# Patient Record
Sex: MALE | Race: WHITE | Employment: FULL TIME | URBAN - METROPOLITAN AREA
[De-identification: names, ages, dates, MRNs, and addresses within clinical notes are randomized per-mention and may not be internally consistent; named-entity substitution may affect disease eponyms.]

---

## 2017-02-07 DIAGNOSIS — I77.82 ANCA-ASSOCIATED VASCULITIS (H): ICD-10-CM

## 2017-02-07 LAB
ALBUMIN SERPL-MCNC: 3.8 G/DL (ref 3.4–5)
ALBUMIN UR-MCNC: NEGATIVE MG/DL
ALT SERPL W P-5'-P-CCNC: 23 U/L (ref 0–70)
APPEARANCE UR: CLEAR
AST SERPL W P-5'-P-CCNC: 23 U/L (ref 0–45)
BASOPHILS # BLD AUTO: 0 10E9/L (ref 0–0.2)
BASOPHILS NFR BLD AUTO: 0.5 %
BILIRUB UR QL STRIP: NEGATIVE
COLOR UR AUTO: YELLOW
CREAT SERPL-MCNC: 1.61 MG/DL (ref 0.66–1.25)
CREAT UR-MCNC: 102 MG/DL
CRP SERPL-MCNC: 40.6 MG/L (ref 0–8)
DIFFERENTIAL METHOD BLD: ABNORMAL
EOSINOPHIL # BLD AUTO: 0.5 10E9/L (ref 0–0.7)
EOSINOPHIL NFR BLD AUTO: 6.3 %
ERYTHROCYTE [DISTWIDTH] IN BLOOD BY AUTOMATED COUNT: 13.1 % (ref 10–15)
ERYTHROCYTE [SEDIMENTATION RATE] IN BLOOD BY WESTERGREN METHOD: 12 MM/H (ref 0–20)
GFR SERPL CREATININE-BSD FRML MDRD: 43 ML/MIN/1.7M2
GLUCOSE UR STRIP-MCNC: NEGATIVE MG/DL
HCT VFR BLD AUTO: 40.3 % (ref 40–53)
HGB BLD-MCNC: 13.6 G/DL (ref 13.3–17.7)
HGB UR QL STRIP: ABNORMAL
KETONES UR STRIP-MCNC: NEGATIVE MG/DL
LEUKOCYTE ESTERASE UR QL STRIP: NEGATIVE
LYMPHOCYTES # BLD AUTO: 1.3 10E9/L (ref 0.8–5.3)
LYMPHOCYTES NFR BLD AUTO: 17.3 %
MCH RBC QN AUTO: 32.2 PG (ref 26.5–33)
MCHC RBC AUTO-ENTMCNC: 33.7 G/DL (ref 31.5–36.5)
MCV RBC AUTO: 96 FL (ref 78–100)
MONOCYTES # BLD AUTO: 1.2 10E9/L (ref 0–1.3)
MONOCYTES NFR BLD AUTO: 16.3 %
NEUTROPHILS # BLD AUTO: 4.4 10E9/L (ref 1.6–8.3)
NEUTROPHILS NFR BLD AUTO: 59.6 %
NITRATE UR QL: NEGATIVE
PH UR STRIP: 5.5 PH (ref 5–7)
PLATELET # BLD AUTO: 182 10E9/L (ref 150–450)
PROT UR-MCNC: 0.15 G/L
PROT/CREAT 24H UR: 0.15 G/G CR (ref 0–0.2)
RBC # BLD AUTO: 4.22 10E12/L (ref 4.4–5.9)
RBC #/AREA URNS AUTO: NORMAL /HPF (ref 0–2)
SP GR UR STRIP: 1.02 (ref 1–1.03)
URN SPEC COLLECT METH UR: ABNORMAL
UROBILINOGEN UR STRIP-ACNC: 0.2 EU/DL (ref 0.2–1)
WBC # BLD AUTO: 7.4 10E9/L (ref 4–11)
WBC #/AREA URNS AUTO: NORMAL /HPF (ref 0–2)

## 2017-02-07 PROCEDURE — 83876 ASSAY MYELOPEROXIDASE: CPT | Performed by: INTERNAL MEDICINE

## 2017-02-07 PROCEDURE — 86256 FLUORESCENT ANTIBODY TITER: CPT | Mod: 90 | Performed by: INTERNAL MEDICINE

## 2017-02-07 PROCEDURE — 82040 ASSAY OF SERUM ALBUMIN: CPT | Performed by: INTERNAL MEDICINE

## 2017-02-07 PROCEDURE — 99000 SPECIMEN HANDLING OFFICE-LAB: CPT | Performed by: INTERNAL MEDICINE

## 2017-02-07 PROCEDURE — 85652 RBC SED RATE AUTOMATED: CPT | Performed by: INTERNAL MEDICINE

## 2017-02-07 PROCEDURE — 86255 FLUORESCENT ANTIBODY SCREEN: CPT | Mod: 90 | Performed by: INTERNAL MEDICINE

## 2017-02-07 PROCEDURE — 83516 IMMUNOASSAY NONANTIBODY: CPT | Performed by: INTERNAL MEDICINE

## 2017-02-07 PROCEDURE — 84450 TRANSFERASE (AST) (SGOT): CPT | Performed by: INTERNAL MEDICINE

## 2017-02-07 PROCEDURE — 84460 ALANINE AMINO (ALT) (SGPT): CPT | Performed by: INTERNAL MEDICINE

## 2017-02-07 PROCEDURE — 82565 ASSAY OF CREATININE: CPT | Performed by: INTERNAL MEDICINE

## 2017-02-07 PROCEDURE — 85025 COMPLETE CBC W/AUTO DIFF WBC: CPT | Performed by: INTERNAL MEDICINE

## 2017-02-07 PROCEDURE — 36415 COLL VENOUS BLD VENIPUNCTURE: CPT | Performed by: INTERNAL MEDICINE

## 2017-02-07 PROCEDURE — 81001 URINALYSIS AUTO W/SCOPE: CPT | Performed by: INTERNAL MEDICINE

## 2017-02-07 PROCEDURE — 84156 ASSAY OF PROTEIN URINE: CPT | Performed by: INTERNAL MEDICINE

## 2017-02-07 PROCEDURE — 86140 C-REACTIVE PROTEIN: CPT | Performed by: INTERNAL MEDICINE

## 2017-02-08 LAB
MYELOPEROXIDASE AB SER-ACNC: NORMAL AI (ref 0–0.9)
PROTEINASE3 IGG SER-ACNC: 0.9 AI (ref 0–0.9)

## 2017-02-09 LAB — ANCA IGG TITR SER IF: ABNORMAL {TITER}

## 2017-02-10 NOTE — PROGRESS NOTES
Quick Note:    Stable labs except high CRP (inflammation marker) and slightly worsened kidney function, suspect to be due to infection as your vasculitis markers look really good and even better than before. Did you have an infection at the time of blood draw?  ______

## 2017-02-13 ENCOUNTER — TELEPHONE (OUTPATIENT)
Dept: RHEUMATOLOGY | Facility: CLINIC | Age: 72
End: 2017-02-13

## 2017-02-13 NOTE — TELEPHONE ENCOUNTER
Ede Sanchez MD at 2/9/2017 11:35 PM      Status: Signed           Quick Note:    Stable labs except high CRP (inflammation marker) and slightly worsened kidney function, suspect to be due to infection as your vasculitis markers look really good and even better than before. Did you have an infection at the time of blood draw?           Left message requesting return call.    Consuelo Canales RN  [facility]

## 2017-02-13 NOTE — TELEPHONE ENCOUNTER
Talked with ambrocio, discussed that pt has been running a fever, and has been sick for about a week and a half right now, lots of sinus drainage and coughing. Fever broke yesterday, but does feel tired today.   Was taking tylenol for the fever.  No other cold medicine.  Discussed that he should still get checked out since he was running a fever, just to make sure he doesn't have something more serious than a cold.    She will do her best to get the pt in and looked at.  Will call back if pt needs antibiotics or if he starts running a fever again.    Consuelo Canales RN  [facility]

## 2017-02-14 ENCOUNTER — APPOINTMENT (OUTPATIENT)
Dept: GENERAL RADIOLOGY | Facility: CLINIC | Age: 72
End: 2017-02-14
Attending: EMERGENCY MEDICINE
Payer: COMMERCIAL

## 2017-02-14 ENCOUNTER — HOSPITAL ENCOUNTER (EMERGENCY)
Facility: CLINIC | Age: 72
Discharge: HOME | End: 2017-02-14
Attending: EMERGENCY MEDICINE | Admitting: EMERGENCY MEDICINE
Payer: COMMERCIAL

## 2017-02-14 VITALS
RESPIRATION RATE: 18 BRPM | OXYGEN SATURATION: 99 % | DIASTOLIC BLOOD PRESSURE: 77 MMHG | SYSTOLIC BLOOD PRESSURE: 148 MMHG | TEMPERATURE: 98.6 F

## 2017-02-14 DIAGNOSIS — J01.90 ACUTE SINUSITIS WITH SYMPTOMS > 10 DAYS: ICD-10-CM

## 2017-02-14 DIAGNOSIS — J18.9 PNEUMONIA DUE TO INFECTIOUS ORGANISM, UNSPECIFIED LATERALITY, UNSPECIFIED PART OF LUNG: ICD-10-CM

## 2017-02-14 LAB
ALBUMIN SERPL-MCNC: 2.9 G/DL (ref 3.4–5)
ALP SERPL-CCNC: 121 U/L (ref 40–150)
ALT SERPL W P-5'-P-CCNC: 47 U/L (ref 0–70)
ANION GAP SERPL CALCULATED.3IONS-SCNC: 8 MMOL/L (ref 3–14)
AST SERPL W P-5'-P-CCNC: 35 U/L (ref 0–45)
BASOPHILS # BLD AUTO: 0.1 10E9/L (ref 0–0.2)
BASOPHILS NFR BLD AUTO: 0.6 %
BILIRUB SERPL-MCNC: 0.6 MG/DL (ref 0.2–1.3)
BUN SERPL-MCNC: 32 MG/DL (ref 7–30)
CALCIUM SERPL-MCNC: 9.2 MG/DL (ref 8.5–10.1)
CHLORIDE SERPL-SCNC: 103 MMOL/L (ref 94–109)
CO2 SERPL-SCNC: 25 MMOL/L (ref 20–32)
CREAT SERPL-MCNC: 1.46 MG/DL (ref 0.66–1.25)
DIFFERENTIAL METHOD BLD: ABNORMAL
EOSINOPHIL # BLD AUTO: 0.3 10E9/L (ref 0–0.7)
EOSINOPHIL NFR BLD AUTO: 3.4 %
ERYTHROCYTE [DISTWIDTH] IN BLOOD BY AUTOMATED COUNT: 12.7 % (ref 10–15)
FLUAV+FLUBV AG SPEC QL: NORMAL
FLUAV+FLUBV AG SPEC QL: NORMAL
GFR SERPL CREATININE-BSD FRML MDRD: 48 ML/MIN/1.7M2
GLUCOSE SERPL-MCNC: 91 MG/DL (ref 70–99)
HCT VFR BLD AUTO: 34.1 % (ref 40–53)
HGB BLD-MCNC: 11.5 G/DL (ref 13.3–17.7)
IMM GRANULOCYTES # BLD: 0.1 10E9/L (ref 0–0.4)
IMM GRANULOCYTES NFR BLD: 0.6 %
LACTATE BLD-SCNC: 0.8 MMOL/L (ref 0.7–2.1)
LYMPHOCYTES # BLD AUTO: 1.2 10E9/L (ref 0.8–5.3)
LYMPHOCYTES NFR BLD AUTO: 15.1 %
MCH RBC QN AUTO: 31.5 PG (ref 26.5–33)
MCHC RBC AUTO-ENTMCNC: 33.7 G/DL (ref 31.5–36.5)
MCV RBC AUTO: 93 FL (ref 78–100)
MONOCYTES # BLD AUTO: 0.9 10E9/L (ref 0–1.3)
MONOCYTES NFR BLD AUTO: 11.9 %
NEUTROPHILS # BLD AUTO: 5.3 10E9/L (ref 1.6–8.3)
NEUTROPHILS NFR BLD AUTO: 68.4 %
PLATELET # BLD AUTO: 351 10E9/L (ref 150–450)
POTASSIUM SERPL-SCNC: 4.6 MMOL/L (ref 3.4–5.3)
PROT SERPL-MCNC: 6.9 G/DL (ref 6.8–8.8)
RBC # BLD AUTO: 3.65 10E12/L (ref 4.4–5.9)
SODIUM SERPL-SCNC: 136 MMOL/L (ref 133–144)
SPECIMEN SOURCE: NORMAL
TROPONIN I SERPL-MCNC: NORMAL UG/L (ref 0–0.04)
WBC # BLD AUTO: 7.8 10E9/L (ref 4–11)

## 2017-02-14 PROCEDURE — 85025 COMPLETE CBC W/AUTO DIFF WBC: CPT | Performed by: EMERGENCY MEDICINE

## 2017-02-14 PROCEDURE — 25000132 ZZH RX MED GY IP 250 OP 250 PS 637: Performed by: EMERGENCY MEDICINE

## 2017-02-14 PROCEDURE — 99284 EMERGENCY DEPT VISIT MOD MDM: CPT | Mod: 25

## 2017-02-14 PROCEDURE — 84484 ASSAY OF TROPONIN QUANT: CPT | Performed by: EMERGENCY MEDICINE

## 2017-02-14 PROCEDURE — 80053 COMPREHEN METABOLIC PANEL: CPT | Performed by: EMERGENCY MEDICINE

## 2017-02-14 PROCEDURE — 83605 ASSAY OF LACTIC ACID: CPT | Performed by: EMERGENCY MEDICINE

## 2017-02-14 PROCEDURE — 25800025 ZZH RX 258: Performed by: EMERGENCY MEDICINE

## 2017-02-14 PROCEDURE — 99284 EMERGENCY DEPT VISIT MOD MDM: CPT | Performed by: EMERGENCY MEDICINE

## 2017-02-14 PROCEDURE — 96361 HYDRATE IV INFUSION ADD-ON: CPT

## 2017-02-14 PROCEDURE — 71020 XR CHEST 2 VW: CPT

## 2017-02-14 PROCEDURE — 87804 INFLUENZA ASSAY W/OPTIC: CPT | Performed by: EMERGENCY MEDICINE

## 2017-02-14 PROCEDURE — 25000128 H RX IP 250 OP 636: Performed by: EMERGENCY MEDICINE

## 2017-02-14 PROCEDURE — 96365 THER/PROPH/DIAG IV INF INIT: CPT

## 2017-02-14 RX ORDER — CEFTRIAXONE 1 G/1
1 INJECTION, POWDER, FOR SOLUTION INTRAMUSCULAR; INTRAVENOUS ONCE
Status: COMPLETED | OUTPATIENT
Start: 2017-02-14 | End: 2017-02-14

## 2017-02-14 RX ORDER — SODIUM CHLORIDE, SODIUM LACTATE, POTASSIUM CHLORIDE, CALCIUM CHLORIDE 600; 310; 30; 20 MG/100ML; MG/100ML; MG/100ML; MG/100ML
1000 INJECTION, SOLUTION INTRAVENOUS CONTINUOUS
Status: DISCONTINUED | OUTPATIENT
Start: 2017-02-14 | End: 2017-02-14 | Stop reason: HOSPADM

## 2017-02-14 RX ORDER — AZITHROMYCIN 250 MG/1
500 TABLET, FILM COATED ORAL ONCE
Status: COMPLETED | OUTPATIENT
Start: 2017-02-14 | End: 2017-02-14

## 2017-02-14 RX ORDER — AZITHROMYCIN 250 MG/1
250 TABLET, FILM COATED ORAL DAILY
Qty: 4 TABLET | Refills: 0 | Status: SHIPPED | OUTPATIENT
Start: 2017-02-14 | End: 2017-02-18

## 2017-02-14 RX ADMIN — AZITHROMYCIN 500 MG: 250 TABLET, FILM COATED ORAL at 18:05

## 2017-02-14 RX ADMIN — CEFTRIAXONE 1 G: 1 INJECTION, POWDER, FOR SOLUTION INTRAMUSCULAR; INTRAVENOUS at 18:05

## 2017-02-14 RX ADMIN — SODIUM CHLORIDE, POTASSIUM CHLORIDE, SODIUM LACTATE AND CALCIUM CHLORIDE 1000 ML: 600; 310; 30; 20 INJECTION, SOLUTION INTRAVENOUS at 17:00

## 2017-02-14 RX ADMIN — SODIUM CHLORIDE 1000 ML: 9 INJECTION, SOLUTION INTRAVENOUS at 18:04

## 2017-02-14 NOTE — ED NOTES
patient presents to Ed with complaint of URi symptoms. Has hx of wegners disease. Pt called primary MD and was asked to be evaluated in ED. Reports, cough, sinus congestion, and intermittent fever for 2 weeks.

## 2017-02-14 NOTE — ED PROVIDER NOTES
Chief complaint fever congestion    71-year-old male chronic methotrexate for Wegener's vasculitis presents with upper respiratory congestion, green nasal drainage, cough productive of yellowish sputum, intermittent chills and sweats, no measured temperature at home.  Frontal headache, denies stiff neck.  Denies shortness of air.  No nausea or vomiting, 1 episode diarrhea last week.  Symptoms waxing and waning for the past 2 weeks.  Urinating 3 times daily, nocturia ×3, which is baseline.  Continues to take fluids, anorectic.  Denies chest pain.    Past medical history, medications, allergies, social history, family history all reviewed.  Patient Active Problem List   Diagnosis     Tobacco abuse     CARDIOVASCULAR SCREENING; LDL GOAL LESS THAN 160     Melanoma (H)     Idiopathic progressive polyneuropathy     Advanced directives, counseling/discussion     GERD (gastroesophageal reflux disease)     Hypertension, renal     Anemia     Encounter for long-term (current) use of steroids     Granulomatosis with polyangiitis (H)     CKD (chronic kidney disease) stage 3, GFR 30-59 ml/min     ROS: All other systems reviewed and are negative.    /73  Temp 98.6  F (37  C) (Oral)  SpO2 98%  Nontoxic appearing no respiratory distress alert and oriented ×3  Head atraumatic normocephalic  TMs unremarkable, conjunctiva noninjected, oropharynx moist without lesions or erythema  No cervical adenopathy neck supple full active range of motion  Lungs clear to auscultation  Heart regular no murmur  Abdomen soft nontender bowel sounds positive no masses or HSM  Strength and sensation grossly intact throughout the extremities, gait and station normal  Speech is fluent, good eye contact, thought processes are rational    Results for orders placed or performed during the hospital encounter of 02/14/17   XR Chest 2 Views    Narrative    CHEST TWO VIEWS   2/14/2017 5:30 PM     HISTORY: Cough.    COMPARISON: 4/3/2015.      Impression     IMPRESSION: New mild patchy opacity at the right lung base appears to  localize to the posterior right lower lobe base and could represent a  developing area of pneumonia. Left lung is clear.    JEFFERY BREEN MD   CBC with platelets differential   Result Value Ref Range    WBC 7.8 4.0 - 11.0 10e9/L    RBC Count 3.65 (L) 4.4 - 5.9 10e12/L    Hemoglobin 11.5 (L) 13.3 - 17.7 g/dL    Hematocrit 34.1 (L) 40.0 - 53.0 %    MCV 93 78 - 100 fl    MCH 31.5 26.5 - 33.0 pg    MCHC 33.7 31.5 - 36.5 g/dL    RDW 12.7 10.0 - 15.0 %    Platelet Count 351 150 - 450 10e9/L    Diff Method Automated Method     % Neutrophils 68.4 %    % Lymphocytes 15.1 %    % Monocytes 11.9 %    % Eosinophils 3.4 %    % Basophils 0.6 %    % Immature Granulocytes 0.6 %    Absolute Neutrophil 5.3 1.6 - 8.3 10e9/L    Absolute Lymphocytes 1.2 0.8 - 5.3 10e9/L    Absolute Monocytes 0.9 0.0 - 1.3 10e9/L    Absolute Eosinophils 0.3 0.0 - 0.7 10e9/L    Absolute Basophils 0.1 0.0 - 0.2 10e9/L    Abs Immature Granulocytes 0.1 0 - 0.4 10e9/L   Comprehensive metabolic panel   Result Value Ref Range    Sodium 136 133 - 144 mmol/L    Potassium 4.6 3.4 - 5.3 mmol/L    Chloride 103 94 - 109 mmol/L    Carbon Dioxide 25 20 - 32 mmol/L    Anion Gap 8 3 - 14 mmol/L    Glucose 91 70 - 99 mg/dL    Urea Nitrogen 32 (H) 7 - 30 mg/dL    Creatinine 1.46 (H) 0.66 - 1.25 mg/dL    GFR Estimate 48 (L) >60 mL/min/1.7m2    GFR Estimate If Black 58 (L) >60 mL/min/1.7m2    Calcium 9.2 8.5 - 10.1 mg/dL    Bilirubin Total 0.6 0.2 - 1.3 mg/dL    Albumin 2.9 (L) 3.4 - 5.0 g/dL    Protein Total 6.9 6.8 - 8.8 g/dL    Alkaline Phosphatase 121 40 - 150 U/L    ALT 47 0 - 70 U/L    AST 35 0 - 45 U/L   Lactic acid whole blood   Result Value Ref Range    Lactic Acid 0.8 0.7 - 2.1 mmol/L   Troponin I   Result Value Ref Range    Troponin I ES  0.000 - 0.045 ug/L     <0.015  The 99th percentile for upper reference range is 0.045 ug/L.  Troponin values in   the range of 0.045 - 0.120 ug/L may be  associated with risks of adverse   clinical events.     Influenza A/B antigen   Result Value Ref Range    Influenza A/B Agn Specimen Nasopharyngeal     Influenza A  NEG     Negative   Test results must be correlated with clinical data. If necessary, results   should be confirmed by a molecular assay or viral culture.      Influenza B  NEG     Negative   Test results must be correlated with clinical data. If necessary, results   should be confirmed by a molecular assay or viral culture.           Medications   lactated ringers BOLUS 1,000 mL (0 mLs Intravenous Stopped 2/14/17 1753)   0.9% sodium chloride BOLUS (0 mLs Intravenous Stopped 2/14/17 1837)   cefTRIAXone (ROCEPHIN) 1 g vial to attach to  mL bag for ADULTS or NS 50 mL bag for PEDS (0 g Intravenous Stopped 2/14/17 1837)   azithromycin (ZITHROMAX) tablet 500 mg (500 mg Oral Given 2/14/17 1805)     MDM: 71-year-old male presents with 2 weeks of congestion, cough.  Context of Wegener's vasculitis on methotrexate.  Creatinine up slightly from baseline consistent with dehydration, lactate normal, chest x-ray question new right lower lobe infiltrate versus chronic change secondary to Wegener's, white count normal.  1 g Rocephin and 1st dose of Zithromax administered here.  Finished course Zithromax 250 mg daily ×4 more days.  Should cover sinusitis recently well in addition to possible pneumonia.  Recommend rest, Tylenol, plenty of fluids, return criteria reviewed.    Impression: Sinusitis, pneumonia, Wegener's vasculitis, immunosuppression     Shukri Mcqueen MD  02/16/17 1024

## 2017-02-14 NOTE — ED AVS SNAPSHOT
[facility] Emergency Department    [address] [address]    Phone:  [phone]    Fax:  [phone]                                       Joshua Ennis   MRN: [id number]    Department:  [facility] Emergency Department   Date of Visit:  2/14/2017           After Visit Summary Signature Page     I have received my discharge instructions, and my questions have been answered. I have discussed any challenges I see with this plan with the nurse or doctor.    ..........................................................................................................................................  Patient/Patient Representative Signature      ..........................................................................................................................................  Patient Representative Print Name and Relationship to Patient    ..................................................               ................................................  Date                                            Time    ..........................................................................................................................................  Reviewed by Signature/Title    ...................................................              ..............................................  Date                                                            Time

## 2017-02-14 NOTE — ED AVS SNAPSHOT
[facility] Emergency Department    [address] [facility] [address]    Phone:  [phone]    Fax:  [phone]                                       Joshua Ennis   MRN: [id number]    Department:  [facility] Emergency Department   Date of Visit:  2/14/2017           Patient Information     Date Of Birth          1945        Your diagnoses for this visit were:     Acute sinusitis with symptoms > 10 days     Pneumonia due to infectious organism, unspecified laterality, unspecified part of lung        You were seen by Shukri Mcqueen MD.      Follow-up Information     Follow up with Unknown, Provider.        Discharge Instructions         Acute Bacterial Rhinosinusitis (ABRS)  Acute bacterial rhinosinusitis (ABRS) is an infection of your nasal cavity and sinuses. It s caused by bacteria. Acute means that you ve had symptoms for less than 12 weeks.  Understanding your sinuses  The nasal cavity is the large air-filled space behind your nose. The sinuses are a group of spaces formed by the bones of your face. They connect with your nasal cavity. ABRS causes the tissue lining these spaces to become inflamed. Mucus may not drain normally. This leads to facial pain and other symptoms.  What causes ABRS?  ABRS most often follows an upper respiratory infection caused by a virus. Bacteria then infect the lining of your nasal cavity and sinuses. But you can also get ABRS if you have:    Nasal allergies    Long-term nasal swelling and congestion not caused by allergies    Blockage in the nose  Symptoms of ABRS  The symptoms of ABRS may be different for each person, and can include:    Nasal congestion    Runny nose    Fluid draining from the nose down the throat (postnasal drip)    Headache    Cough    Pain in the sinuses    Thick, colored fluid from the nose (mucus)    Fever  Diagnosing ABRS  ABRS may be diagnosed if you ve had an upper respiratory infection like a cold and cough for longer than 10  to 14 days. Your health care provider will ask about your symptoms and your medical history. The provider will check your vital signs, including your temperature. You ll have a physical exam. The health care provider will check your ears, nose, and throat. You likely won t need any tests. If ABRS comes back, you may have a culture or other tests.  Treatment for ABRS  Treatment may include:    Antibiotic medicine. This is for symptoms that last for at least 10 to 14 days.    Nasal corticosteroid medicine. Drops or spray used in the nose can lessen swelling and congestion.    Over-the-counter pain medicine. This is to lessen sinus pain and pressure.    Nasal decongestant medicine. Spray or drops may help to lessen congestion. Do not use them for more than a few days.    Salt wash (saline irrigation). This can help to loosen mucus.  Possible complications of ABRS  ABRS may come back or become long-term (chronic).  In rare cases, ABRS may cause complications such as:     Inflamed tissue around the brain and spinal cord (meningitis)    Inflamed tissue around the eyes (orbital cellulitis)    Inflamed bones around the sinuses (osteitis)  These problems may need to be treated in a hospital with intravenous (IV) antibiotic medicine or surgery.  When to call the health care provider  Call your health care provider if you have any of the following:    Symptoms that don t get better, or get worse    Symptoms that don t get better after 3 to 5 days on antibiotics    Trouble seeing    Swelling around your eyes    Confusion or trouble staying awake        0536-0273 [employer]. [address] [place] [address]. All rights reserved. This information is not intended as a substitute for professional medical care. Always follow your healthcare professional's instructions.          Future Appointments        Provider Department Dept Phone Center    3/3/2017 11:30 AM Ede Sanchez MD [facility] Rheumatology  [phone] [facility]      24 Hour Appointment Hotline       To make an appointment at any [facility], call [phone]). If you don't have a family doctor or clinic, we will help you find one. [facility] are conveniently located to serve the needs of you and your family.             Review of your medicines      START taking        Dose / Directions Last dose taken    azithromycin 250 MG tablet   Commonly known as:  ZITHROMAX   Dose:  250 mg   Quantity:  4 tablet        Take 1 tablet (250 mg) by mouth daily for 4 days   Refills:  0          Our records show that you are taking the medicines listed below. If these are incorrect, please call your family doctor or clinic.        Dose / Directions Last dose taken    Blood Pressure Monitor Kit   Quantity:  1 kit        Automatic Blood Pressure Monitor   Refills:  0        calcium 600 + D 600-400 MG-UNIT per tablet   Dose:  1 tablet   Generic drug:  calcium-vitamin D        Take 1 tablet by mouth 2 times daily.   Refills:  0        folic acid 1 MG tablet   Commonly known as:  FOLVITE   Dose:  2 mg   Quantity:  180 tablet        Take 2 tablets (2 mg) by mouth daily   Refills:  3        gabapentin 400 MG capsule   Commonly known as:  NEURONTIN   Dose:  400 mg   Quantity:  90 capsule        Take 1 capsule (400 mg) by mouth At Bedtime   Refills:  1        losartan 50 MG tablet   Commonly known as:  COZAAR   Dose:  50 mg   Quantity:  90 tablet        Take 1 tablet (50 mg) by mouth daily   Refills:  3        methotrexate 2.5 MG tablet CHEMO   Dose:  15 mg   Quantity:  90 tablet        Take 6 tablets (15 mg) by mouth once a week   Refills:  1        TYLENOL PO   Dose:  650 mg        Take 650 mg by mouth once as needed for mild pain or fever   Refills:  0        vitamin D 1000 UNITS capsule   Dose:  1 capsule        Take 1 capsule by mouth daily.   Refills:  0                Prescriptions were sent or printed at these locations (1 Prescription)                    [facility] [place], [place] - [address]   [address] [facility] [address]    Telephone:  [phone]   Fax:  [phone]   Hours:                  Printed at Department/Unit printer (1 of 1)         azithromycin (ZITHROMAX) 250 MG tablet                Procedures and tests performed during your visit     CBC with platelets differential    Comprehensive metabolic panel    Influenza A/B antigen    Lactic acid whole blood    Troponin I    XR Chest 2 Views      Orders Needing Specimen Collection     None      Pending Results     Date and Time Order Name Status Description    2/14/2017 1642 XR Chest 2 Views Preliminary             Pending Culture Results     No orders found from 2/12/2017 to 2/15/2017.             Test Results from your hospital stay     2/14/2017  5:29 PM - Interface, Flexilab Results      Component Results     Component Value Ref Range & Units Status    WBC 7.8 4.0 - 11.0 10e9/L Final    RBC Count 3.65 (L) 4.4 - 5.9 10e12/L Final    Hemoglobin 11.5 (L) 13.3 - 17.7 g/dL Final    Hematocrit 34.1 (L) 40.0 - 53.0 % Final    MCV 93 78 - 100 fl Final    MCH 31.5 26.5 - 33.0 pg Final    MCHC 33.7 31.5 - 36.5 g/dL Final    RDW 12.7 10.0 - 15.0 % Final    Platelet Count 351 150 - 450 10e9/L Final    Diff Method Automated Method  Final    % Neutrophils 68.4 % Final    % Lymphocytes 15.1 % Final    % Monocytes 11.9 % Final    % Eosinophils 3.4 % Final    % Basophils 0.6 % Final    % Immature Granulocytes 0.6 % Final    Absolute Neutrophil 5.3 1.6 - 8.3 10e9/L Final    Absolute Lymphocytes 1.2 0.8 - 5.3 10e9/L Final    Absolute Monocytes 0.9 0.0 - 1.3 10e9/L Final    Absolute Eosinophils 0.3 0.0 - 0.7 10e9/L Final    Absolute Basophils 0.1 0.0 - 0.2 10e9/L Final    Abs Immature Granulocytes 0.1 0 - 0.4 10e9/L Final         2/14/2017  5:42 PM - Interface, Flexilab Results      Component Results     Component Value Ref Range & Units Status    Sodium 136 133 - 144 mmol/L Final     Potassium 4.6 3.4 - 5.3 mmol/L Final    Chloride 103 94 - 109 mmol/L Final    Carbon Dioxide 25 20 - 32 mmol/L Final    Anion Gap 8 3 - 14 mmol/L Final    Glucose 91 70 - 99 mg/dL Final    Urea Nitrogen 32 (H) 7 - 30 mg/dL Final    Creatinine 1.46 (H) 0.66 - 1.25 mg/dL Final    GFR Estimate 48 (L) >60 mL/min/1.7m2 Final    Non  GFR Calc    GFR Estimate If Black 58 (L) >60 mL/min/1.7m2 Final    African American GFR Calc    Calcium 9.2 8.5 - 10.1 mg/dL Final    Bilirubin Total 0.6 0.2 - 1.3 mg/dL Final    Albumin 2.9 (L) 3.4 - 5.0 g/dL Final    Protein Total 6.9 6.8 - 8.8 g/dL Final    Alkaline Phosphatase 121 40 - 150 U/L Final    ALT 47 0 - 70 U/L Final    AST 35 0 - 45 U/L Final         2/14/2017  5:13 PM - Interface, Flexilab Results      Component Results     Component Value Ref Range & Units Status    Lactic Acid 0.8 0.7 - 2.1 mmol/L Final         2/14/2017  5:42 PM - Interface, Flexilab Results      Component Results     Component Value Ref Range & Units Status    Troponin I ES  0.000 - 0.045 ug/L Final    <0.015  The 99th percentile for upper reference range is 0.045 ug/L.  Troponin values in   the range of 0.045 - 0.120 ug/L may be associated with risks of adverse   clinical events.           2/14/2017  5:33 PM - Interface, Radiant Ib      Narrative     CHEST TWO VIEWS   2/14/2017 5:30 PM     HISTORY: Cough.    COMPARISON: 4/3/2015.        Impression     IMPRESSION: New mild patchy opacity at the right lung base appears to  localize to the posterior right lower lobe base and could represent a  developing area of pneumonia. Left lung is clear.         2/14/2017  5:38 PM - Interface, Flexilab Results      Component Results     Component Value Ref Range & Units Status    Influenza A/B Agn Specimen Nasopharyngeal  Final    Influenza A  NEG Final    Negative   Test results must be correlated with clinical data. If necessary, results   should be confirmed by a molecular assay or viral culture.    "   Influenza B  NEG Final    Negative   Test results must be correlated with clinical data. If necessary, results   should be confirmed by a molecular assay or viral culture.                  Thank you for choosing [facility]       Thank you for choosing [facility] for your care. Our goal is always to provide you with excellent care. Hearing back from our patients is one way we can continue to improve our services. Please take a few minutes to complete the written survey that you may receive in the mail after you visit with us. Thank you!        [employer] Information     [employer] lets you send messages to your doctor, view your test results, renew your prescriptions, schedule appointments and more. To sign up, go to [email or web address] . Click on \"Log in\" on the left side of the screen, which will take you to the Welcome page. Then click on \"Sign up Now\" on the right side of the page.     You will be asked to enter the access code listed below, as well as some personal information. Please follow the directions to create your username and password.     Your access code is: [email or web address]  Expires: 2017  9:39 AM     Your access code will  in 90 days. If you need help or a new code, please call your [facility] clinic or [phone].        Care EveryWhere ID     This is your Care EveryWhere ID. This could be used by other organizations to access your [facility] medical records  [id number]        After Visit Summary       This is your record. Keep this with you and show to your community pharmacist(s) and doctor(s) at your next visit.                  "

## 2017-02-15 NOTE — DISCHARGE INSTRUCTIONS
Acute Bacterial Rhinosinusitis (ABRS)  Acute bacterial rhinosinusitis (ABRS) is an infection of your nasal cavity and sinuses. It s caused by bacteria. Acute means that you ve had symptoms for less than 12 weeks.  Understanding your sinuses  The nasal cavity is the large air-filled space behind your nose. The sinuses are a group of spaces formed by the bones of your face. They connect with your nasal cavity. ABRS causes the tissue lining these spaces to become inflamed. Mucus may not drain normally. This leads to facial pain and other symptoms.  What causes ABRS?  ABRS most often follows an upper respiratory infection caused by a virus. Bacteria then infect the lining of your nasal cavity and sinuses. But you can also get ABRS if you have:    Nasal allergies    Long-term nasal swelling and congestion not caused by allergies    Blockage in the nose  Symptoms of ABRS  The symptoms of ABRS may be different for each person, and can include:    Nasal congestion    Runny nose    Fluid draining from the nose down the throat (postnasal drip)    Headache    Cough    Pain in the sinuses    Thick, colored fluid from the nose (mucus)    Fever  Diagnosing ABRS  ABRS may be diagnosed if you ve had an upper respiratory infection like a cold and cough for longer than 10 to 14 days. Your health care provider will ask about your symptoms and your medical history. The provider will check your vital signs, including your temperature. You ll have a physical exam. The health care provider will check your ears, nose, and throat. You likely won t need any tests. If ABRS comes back, you may have a culture or other tests.  Treatment for ABRS  Treatment may include:    Antibiotic medicine. This is for symptoms that last for at least 10 to 14 days.    Nasal corticosteroid medicine. Drops or spray used in the nose can lessen swelling and congestion.    Over-the-counter pain medicine. This is to lessen sinus pain and pressure.    Nasal  decongestant medicine. Spray or drops may help to lessen congestion. Do not use them for more than a few days.    Salt wash (saline irrigation). This can help to loosen mucus.  Possible complications of ABRS  ABRS may come back or become long-term (chronic).  In rare cases, ABRS may cause complications such as:     Inflamed tissue around the brain and spinal cord (meningitis)    Inflamed tissue around the eyes (orbital cellulitis)    Inflamed bones around the sinuses (osteitis)  These problems may need to be treated in a hospital with intravenous (IV) antibiotic medicine or surgery.  When to call the health care provider  Call your health care provider if you have any of the following:    Symptoms that don t get better, or get worse    Symptoms that don t get better after 3 to 5 days on antibiotics    Trouble seeing    Swelling around your eyes    Confusion or trouble staying awake        8241-1252 [employer]. [address] [place] [address]. All rights reserved. This information is not intended as a substitute for professional medical care. Always follow your healthcare professional's instructions.

## 2017-02-23 ENCOUNTER — OFFICE VISIT (OUTPATIENT)
Dept: DERMATOLOGY | Facility: CLINIC | Age: 72
End: 2017-02-23
Payer: COMMERCIAL

## 2017-02-23 VITALS — HEART RATE: 75 BPM | DIASTOLIC BLOOD PRESSURE: 64 MMHG | SYSTOLIC BLOOD PRESSURE: 126 MMHG | OXYGEN SATURATION: 98 %

## 2017-02-23 DIAGNOSIS — Z85.89 HISTORY OF SQUAMOUS CELL CARCINOMA: ICD-10-CM

## 2017-02-23 DIAGNOSIS — Z85.820 HISTORY OF MELANOMA: ICD-10-CM

## 2017-02-23 DIAGNOSIS — D18.01 CHERRY ANGIOMA: ICD-10-CM

## 2017-02-23 DIAGNOSIS — L57.0 AK (ACTINIC KERATOSIS): Primary | ICD-10-CM

## 2017-02-23 DIAGNOSIS — B35.3 TINEA PEDIS OF BOTH FEET: ICD-10-CM

## 2017-02-23 DIAGNOSIS — L82.1 SEBORRHEIC KERATOSIS: ICD-10-CM

## 2017-02-23 DIAGNOSIS — D22.9 MULTIPLE BENIGN NEVI: ICD-10-CM

## 2017-02-23 DIAGNOSIS — L81.4 LENTIGO: ICD-10-CM

## 2017-02-23 PROCEDURE — 99213 OFFICE O/P EST LOW 20 MIN: CPT | Performed by: PHYSICIAN ASSISTANT

## 2017-02-23 RX ORDER — FLUOROURACIL 50 MG/G
CREAM TOPICAL
Qty: 40 G | Refills: 0 | Status: SHIPPED | OUTPATIENT
Start: 2017-02-23 | End: 2018-06-08

## 2017-02-23 NOTE — MR AVS SNAPSHOT
"              After Visit Summary   2/23/2017    Joshua Ennis    MRN: [id number]           Patient Information     Date Of Birth          1945        Visit Information        Provider Department      2/23/2017 3:40 PM Ricarda Lowery PA-C [facility]        Today's Diagnoses     AK (actinic keratosis)    -  1      Care Instructions    Apply efudex sparingly twice daily to scalp for 2-4 weeks, rub in. Will get red and sore.     Make sure that red areas are resolved one month after treatment has finished.     Can use betadine solution in between toes, to help with fungus.         Follow-ups after your visit        Your next 10 appointments already scheduled     Mar 03, 2017 11:30 AM CST   (Arrive by 11:15 AM)   Return Visit with Ede Sanchez MD   [facility] ([facility])    [address] [place] [address] [phone]              Who to contact     If you have questions or need follow up information about today's clinic visit or your schedule please contact [facility] directly at [phone].  Normal or non-critical lab and imaging results will be communicated to you by MyChart, letter or phone within 4 business days after the clinic has received the results. If you do not hear from us within 7 days, please contact the clinic through Notice Kioskhart or phone. If you have a critical or abnormal lab result, we will notify you by phone as soon as possible.  Submit refill requests through [email or web address] or call your pharmacy and they will forward the refill request to us. Please allow 3 business days for your refill to be completed.          Additional Information About Your Visit        MyChart Information     [email or web address] lets you send messages to your doctor, view your test results, renew your prescriptions, schedule appointments and more. To sign up, go to [email or web address] . Click on \"Log in\" on the left side of the " "screen, which will take you to the Welcome page. Then click on \"Sign up Now\" on the right side of the page.     You will be asked to enter the access code listed below, as well as some personal information. Please follow the directions to create your username and password.     Your access code is: [email or web address]  Expires: 2017  6:30 AM     Your access code will  in 90 days. If you need help or a new code, please call your [facility] or [phone].        Care EveryWhere ID     This is your Care EveryWhere ID. This could be used by other organizations to access your [place] medical records  [id number]        Your Vitals Were     Pulse Pulse Oximetry                75 98%           Blood Pressure from Last 3 Encounters:   17 126/64   17 148/77   16 126/71    Weight from Last 3 Encounters:   16 82.3 kg (181 lb 6.4 oz)   16 85.1 kg (187 lb 9.6 oz)   16 89.5 kg (197 lb 6.4 oz)              Today, you had the following     No orders found for display         Today's Medication Changes          These changes are accurate as of: 17  4:09 PM.  If you have any questions, ask your nurse or doctor.               Start taking these medicines.        Dose/Directions    fluorouracil 5 % cream   Commonly known as:  EFUDEX   Used for:  AK (actinic keratosis)   Started by:  Ricarda Lowery PA-C        Apply twice daily to scalp for 2-4 weeks.   Quantity:  40 g   Refills:  0            Where to get your medicines      These medications were sent to [facility] - [address] [address]     Phone:  [phone]     fluorouracil 5 % cream                Primary Care Provider    Provider Unknown       No address on file        Thank you!     Thank you for choosing [facility]  for your care. Our goal is always to provide you with excellent care. Hearing back from our patients is one " way we can continue to improve our services. Please take a few minutes to complete the written survey that you may receive in the mail after your visit with us. Thank you!             Your Updated Medication List - Protect others around you: Learn how to safely use, store and throw away your medicines at www.disposemymeds.org.          This list is accurate as of: 2/23/17  4:09 PM.  Always use your most recent med list.                   Brand Name Dispense Instructions for use    Blood Pressure Monitor Kit     1 kit    Automatic Blood Pressure Monitor       calcium 600 + D 600-400 MG-UNIT per tablet   Generic drug:  calcium-vitamin D      Take 1 tablet by mouth 2 times daily.       fluorouracil 5 % cream    EFUDEX    40 g    Apply twice daily to scalp for 2-4 weeks.       folic acid 1 MG tablet    FOLVITE    180 tablet    Take 2 tablets (2 mg) by mouth daily       gabapentin 400 MG capsule    NEURONTIN    90 capsule    Take 1 capsule (400 mg) by mouth At Bedtime       losartan 50 MG tablet    COZAAR    90 tablet    Take 1 tablet (50 mg) by mouth daily       methotrexate 2.5 MG tablet CHEMO     90 tablet    Take 6 tablets (15 mg) by mouth once a week       TYLENOL PO      Take 650 mg by mouth once as needed for mild pain or fever       vitamin D 1000 UNITS capsule      Take 1 capsule by mouth daily.

## 2017-02-23 NOTE — PATIENT INSTRUCTIONS
Apply efudex sparingly twice daily to scalp for 2-4 weeks, rub in. Will get red and sore.     Make sure that red areas are resolved one month after treatment has finished.     Can use betadine solution in between toes, to help with fungus.

## 2017-02-23 NOTE — NURSING NOTE
"Initial /64  Pulse 75  SpO2 98% Estimated body mass index is 26.79 kg/(m^2) as calculated from the following:    Height as of 11/18/16: 1.753 m (5' 9\").    Weight as of 11/18/16: 82.3 kg (181 lb 6.4 oz). .      "

## 2017-02-27 NOTE — PROGRESS NOTES
Joshua Ennis is a 71 year old year old male patient here today for skin check.  Patient denies any new concerning spots today. He reports that he will occasionally get white peeling skin on his feet.  Remainder of the HPI, Meds, PMH, Allergies, FH, and SH was reviewed in chart.    Pertinent Hx:   History of Melanoma (back 6-8 years ago) and SCC  Past Medical History   Diagnosis Date     Hypertension      Malignant melanoma (H)      Malignant neoplasm (H)      melanoma     Squamous cell carcinoma (H)      Russo syndrome        Past Surgical History   Procedure Laterality Date     Biopsy  11/2011     melanoma on back     Herniorrhaphy inguinal  8/6/2012     Procedure: HERNIORRHAPHY INGUINAL;  Open Repair Left Inguinal Hernia;  Surgeon: Jerad Baker MD;  Location: [facility] OR     Dissect lymph node inguinal  3/1/2013     Procedure: DISSECT LYMPH NODE INGUINAL;  Bilateral Inguinal Lymph Node Biopsy.;  Surgeon: Tariq Acosta MD;  Location: [facility] OR     Esophagoscopy, gastroscopy, duodenoscopy (egd), combined  7/5/2013     Procedure: COMBINED ESOPHAGOSCOPY, GASTROSCOPY, DUODENOSCOPY (EGD);  Gastroscopy;  Surgeon: Tariq Acosta MD;  Location: [facility]        Family History   Problem Relation Age of Onset     DIABETES Mother      CANCER Father      stomach     HEART DISEASE Father      HEART DISEASE Brother      DIABETES Sister      DIABETES Sister      DIABETES Sister      HEART DISEASE Brother      Glaucoma No family hx of      Macular Degeneration No family hx of        Social History     Social History     Marital status:      Spouse name: N/A     Number of children: N/A     Years of education: N/A     Occupational History      [employer]     Social History Main Topics     Smoking status: Former Smoker     Packs/day: 1.00     Types: Cigarettes     Quit date: 2/14/2013     Smokeless tobacco: Never Used     Alcohol use Yes      Comment: rare     Drug use: No     Sexual activity: No      Other Topics Concern     Exercise Yes     walking     Social History Narrative       Outpatient Encounter Prescriptions as of 2/23/2017   Medication Sig Dispense Refill     fluorouracil (EFUDEX) 5 % cream Apply twice daily to scalp for 2-4 weeks. 40 g 0     Acetaminophen (TYLENOL PO) Take 650 mg by mouth once as needed for mild pain or fever       folic acid (FOLVITE) 1 MG tablet Take 2 tablets (2 mg) by mouth daily 180 tablet 3     methotrexate 2.5 MG tablet Take 6 tablets (15 mg) by mouth once a week 90 tablet 1     gabapentin (NEURONTIN) 400 MG capsule Take 1 capsule (400 mg) by mouth At Bedtime 90 capsule 1     losartan (COZAAR) 50 MG tablet Take 1 tablet (50 mg) by mouth daily 90 tablet 3     calcium-vitamin D (CALCIUM 600 + D) 600-400 MG-UNIT per tablet Take 1 tablet by mouth 2 times daily.       Cholecalciferol (VITAMIN D) 1000 UNITS capsule Take 1 capsule by mouth daily.       Blood Pressure Monitor KIT Automatic Blood Pressure Monitor 1 kit 0     No facility-administered encounter medications on file as of 2/23/2017.              Review Of Systems  Skin: As above  Eyes: negative  Ears/Nose/Throat: negative  Respiratory: No shortness of breath, dyspnea on exertion, cough, or hemoptysis  Cardiovascular: negative  Gastrointestinal: negative  Genitourinary: negative  Musculoskeletal: negative  Neurologic: negative  Psychiatric: negative  Hematologic/Lymphatic/Immunologic: negative  Endocrine: negative      O:   NAD, WDWN, Alert & Oriented, Mood & Affect wnl, Vitals stable   Here today alone   /64  Pulse 75  SpO2 98%   General appearance normal   Vitals stable   Alert, oriented and in no acute distress      Pink gritty papules of scalp    Following lymph nodes palpated: Occipital, Cervical, Supraclavicular, and axillary   Brown stuck on papules, brown macules, and red papules on torso and extremities    Brown papules and macules with regular pigment network and borders on torso and  extremities  Interdigital maceration on 4th interspace on bilateral feet       The remainder of the detailed exam was unremarkable; the following areas were examined:  scalp/hair, conjunctiva/lids, face, neck, lips/teeth, oral mucosa/gingiva, chest, back, abdomen, buttocks, digits/nails, RUE, LUE, RLE, LLE.      Eyes: Conjunctivae/lids:Normal     ENT: Lips    MSK:Normal    Pulm: Breathing Normal    Lymph Nodes: No Head, Neck, or axillary Lymphadenopathy     Neuro/Psych: Orientation:Normal; Mood/Affect:Normal  A/P:  1. History of Melanoma and SCC   I explained the need for monthly skin exams including and taught the patient how to do this. Patient was asked about new or changing moles and a full skin exam was performed.   2. Actinic Keratoses on scalp   Apply efudex twice daily to scalp for 2-4 weeks. Will get red and sore.   3. Tinea Pedis   Discussed applying betadine in between toes.   4. Seborrheic keratosis, lentigo, cherry angioma, benign nevi   BENIGN LESIONS DISCUSSED WITH PATIENT:  I discussed the specifics of tumor, prognosis, and genetics of benign lesions.  I explained that treatment of these lesions would be purely cosmetic and not medically neccessary.  I discussed with patient different removal options including excision, cautery and /or laser.      Nature and genetics of benign skin lesions dicussed with patient.  Signs and Symptoms of skin cancer discussed with patient.  ABCDEs of melanoma reviewed with patient.  Patient encouraged to perform monthly skin exams.  UV precautions reviewed with patient.  Skin care regimen reviewed with patient: Eliminate harsh soaps, i.e. Dial, zest, irsih spring; Mild soaps such as Cetaphil or Dove sensitive skin, avoid hot or cold showers, aggressive use of emollients including vanicream, cetaphil or cerave discussed with patient.    Risks of non-melanoma skin cancer discussed with patient   Return to clinic one year or sooner if needed.

## 2017-03-03 ENCOUNTER — TELEPHONE (OUTPATIENT)
Dept: OPHTHALMOLOGY | Facility: CLINIC | Age: 72
End: 2017-03-03

## 2017-03-03 ENCOUNTER — OFFICE VISIT (OUTPATIENT)
Dept: RHEUMATOLOGY | Facility: CLINIC | Age: 72
End: 2017-03-03
Attending: INTERNAL MEDICINE
Payer: COMMERCIAL

## 2017-03-03 VITALS
HEART RATE: 68 BPM | OXYGEN SATURATION: 98 % | BODY MASS INDEX: 27.55 KG/M2 | DIASTOLIC BLOOD PRESSURE: 78 MMHG | SYSTOLIC BLOOD PRESSURE: 147 MMHG | HEIGHT: 69 IN | WEIGHT: 186 LBS

## 2017-03-03 DIAGNOSIS — G62.9 NEUROPATHY: ICD-10-CM

## 2017-03-03 DIAGNOSIS — M31.30 GRANULOMATOSIS WITH POLYANGIITIS (H): ICD-10-CM

## 2017-03-03 DIAGNOSIS — H53.2 DIPLOPIA: Primary | ICD-10-CM

## 2017-03-03 DIAGNOSIS — Z79.899 HIGH RISK MEDICATIONS (NOT ANTICOAGULANTS) LONG-TERM USE: ICD-10-CM

## 2017-03-03 PROCEDURE — 99212 OFFICE O/P EST SF 10 MIN: CPT | Mod: 25,ZF

## 2017-03-03 PROCEDURE — 90732 PPSV23 VACC 2 YRS+ SUBQ/IM: CPT | Mod: ZF

## 2017-03-03 PROCEDURE — G0009 ADMIN PNEUMOCOCCAL VACCINE: HCPCS

## 2017-03-03 PROCEDURE — 25000128 H RX IP 250 OP 636: Mod: ZF

## 2017-03-03 RX ORDER — FOLIC ACID 1 MG/1
2 TABLET ORAL DAILY
Qty: 180 TABLET | Refills: 3 | Status: SHIPPED | OUTPATIENT
Start: 2017-03-03 | End: 2018-04-04

## 2017-03-03 RX ORDER — GABAPENTIN 400 MG/1
400 CAPSULE ORAL AT BEDTIME
Qty: 90 CAPSULE | Refills: 1 | Status: SHIPPED | OUTPATIENT
Start: 2017-03-03 | End: 2018-04-04

## 2017-03-03 ASSESSMENT — PAIN SCALES - GENERAL: PAINLEVEL: NO PAIN (0)

## 2017-03-03 NOTE — TELEPHONE ENCOUNTER
Referral to dr. Robertson for diplopia per dr. frias note-- one time episode was reported to me  Pt was scheduled by call center in April after reviewing different dates-- pt lives out of town per call center  Sudden vision loss was before seeing dr. Melchor in November 2016 per message from call center  Pt schedule with dr. Robertson for diplopia in April-- able to see as scheduled-- orthoptist available  Left message with pt to call direct triage to review diplopia/vision changes in more detail-- would recommended earlier appt if new vision changes since visit with dr. Riuz Thompson RN 3:33 PM 03/03/17

## 2017-03-03 NOTE — MR AVS SNAPSHOT
After Visit Summary   3/3/2017    Joshua Ennis    MRN: [id number]           Patient Information     Date Of Birth          1945        Visit Information        Provider Department      3/3/2017 11:30 AM Ede Sanchez MD M [facility] Rheumatology        Today's Diagnoses     Diplopia    -  1    High risk medications (not anticoagulants) long-term use        Granulomatosis with polyangiitis (H)        Neuropathy (H)          Care Instructions    Pneumovax vaccine today  Lower MTX to 5 tab a week  Hold MTX for infections and fevers  Labs in 4 weeks  Refer to Dr. Robertson eye doctor  F/u in 3-4 months        Follow-ups after your visit        Additional Services     OPHTHALMOLOGY ADULT REFERRAL       Your provider has referred you to: Dr. Robertson neuro-ophthalmologist    Please be aware that coverage of these services is subject to the terms and limitations of your health insurance plan.  Call member services at your health plan with any benefit or coverage questions.      Please bring the following with you to your appointment:    (1) Any X-Rays, CTs or MRIs which have been performed.  Contact the facility where they were done to arrange for  prior to your scheduled appointment.    (2) List of current medications  (3) This referral request   (4) Any documents/labs given to you for this referral                  Your next 10 appointments already scheduled     Apr 18, 2017  8:30 AM CDT   NEW NEURO with Tyler Robertson MD   Eye Clinic ([facility])    Callum Diaz [address]  [address] [address]   [phone]            Apr 19, 2017 10:15 AM CDT   (Arrive by 10:00 AM)   Return Visit with Gabbi Obando MD   [facility] ([facility])    [address]  [address] [facility] [address]   [phone]            Jun 30, 2017 11:00 AM CDT   (Arrive by 10:45 AM)   Return Visit with Ede Sanchez MD     [facility] ([facility])    [address]  3rd Floor  [facility] [id number] [phone]              Future tests that were ordered for you today     Open Future Orders        Priority Expected Expires Ordered    Albumin level Routine 4/14/2017 3/3/2018 3/3/2017    ALT Routine 4/14/2017 3/3/2018 3/3/2017    AST Routine 4/14/2017 3/3/2018 3/3/2017    CBC with platelets differential Routine 4/14/2017 3/3/2018 3/3/2017    Creatinine Routine 4/14/2017 3/3/2018 3/3/2017    CRP inflammation Routine 4/14/2017 3/3/2018 3/3/2017    Erythrocyte sedimentation rate auto Routine 4/14/2017 3/3/2018 3/3/2017    Routine UA with Micro Reflex to Culture Routine 4/14/2017 3/3/2018 3/3/2017    Protein  random urine Routine 4/14/2017 3/3/2018 3/3/2017    Creatinine random urine Routine 4/14/2017 3/3/2018 3/3/2017    Antineutrophil cytoplasmic Anju IgG Routine 4/14/2017 3/3/2018 3/3/2017    Vasculitis panel Routine 4/14/2017 3/3/2018 3/3/2017            Who to contact     If you have questions or need follow up information about today's clinic visit or your schedule please contact [facility] RHE[facility] directly at [phone].  Normal or non-critical lab and imaging results will be communicated to you by CoachMePlushart, letter or phone within 4 business days after the clinic has received the results. If you do not hear from us within 7 days, please contact the clinic through CoachMePlushart or phone. If you have a critical or abnormal lab result, we will notify you by phone as soon as possible.  Submit refill requests through Vital Juice Newsletter or call your pharmacy and they will forward the refill request to us. Please allow 3 business days for your refill to be completed.          Additional Information About Your Visit        Vital Juice Newsletter Information     Vital Juice Newsletter lets you send messages to your doctor, view your test results, renew your prescriptions, schedule appointments and more. To sign up, go to  [email or web address] . Click on \"Log in\" on the left side of the screen, which will take you to the Welcome page. Then click on \"Sign up Now\" on the right side of the page.     You will be asked to enter the access code listed below, as well as some personal information. Please follow the directions to create your username and password.     Your access code is: [email or web address]  Expires: 2017  6:30 AM     Your access code will  in 90 days. If you need help or a new code, please call your [place] clinic or [phone].        Care EveryWhere ID     This is your Care EveryWhere ID. This could be used by other organizations to access your [facility] medical records  [id number]        Your Vitals Were     Pulse Height Pulse Oximetry BMI (Body Mass Index)          68 1.753 m (5' 9\") 98% 27.47 kg/m2         Blood Pressure from Last 3 Encounters:   17 147/78   17 126/64   17 148/77    Weight from Last 3 Encounters:   17 84.4 kg (186 lb)   16 82.3 kg (181 lb 6.4 oz)   16 85.1 kg (187 lb 9.6 oz)              We Performed the Following     OPHTHALMOLOGY ADULT REFERRAL     PNEUMOCOCCAL VACCINE,ADULT,SQ OR IM (PPSV23)          Today's Medication Changes          These changes are accurate as of: 3/3/17 12:49 PM.  If you have any questions, ask your nurse or doctor.               These medicines have changed or have updated prescriptions.        Dose/Directions    methotrexate 2.5 MG tablet CHEMO   This may have changed:  how much to take   Used for:  High risk medications (not anticoagulants) long-term use        Dose:  12.5 mg   Take 5 tablets (12.5 mg) by mouth once a week   Quantity:  60 tablet   Refills:  1            Where to get your medicines      These medications were sent to [facility]- - [address] [address]     Phone:  [phone]     folic acid 1 MG tablet    gabapentin 400 MG capsule    " methotrexate 2.5 MG tablet CHEMO                Primary Care Provider    Provider Unknown       No address on file        Thank you!     Thank you for choosing [facility] RHEUM[facility]  for your care. Our goal is always to provide you with excellent care. Hearing back from our patients is one way we can continue to improve our services. Please take a few minutes to complete the written survey that you may receive in the mail after your visit with us. Thank you!             Your Updated Medication List - Protect others around you: Learn how to safely use, store and throw away your medicines at www.disposemymeds.org.          This list is accurate as of: 3/3/17 12:49 PM.  Always use your most recent med list.                   Brand Name Dispense Instructions for use    Blood Pressure Monitor Kit     1 kit    Automatic Blood Pressure Monitor       calcium 600 + D 600-400 MG-UNIT per tablet   Generic drug:  calcium-vitamin D      Take 1 tablet by mouth 2 times daily.       fluorouracil 5 % cream    EFUDEX    40 g    Apply twice daily to scalp for 2-4 weeks.       folic acid 1 MG tablet    FOLVITE    180 tablet    Take 2 tablets (2 mg) by mouth daily       gabapentin 400 MG capsule    NEURONTIN    90 capsule    Take 1 capsule (400 mg) by mouth At Bedtime       losartan 50 MG tablet    COZAAR    90 tablet    Take 1 tablet (50 mg) by mouth daily       methotrexate 2.5 MG tablet CHEMO     60 tablet    Take 5 tablets (12.5 mg) by mouth once a week       TYLENOL PO      Take 650 mg by mouth once as needed for mild pain or fever       vitamin D 1000 UNITS capsule      Take 1 capsule by mouth daily.

## 2017-03-03 NOTE — NURSING NOTE
"Chief Complaint   Patient presents with     RECHECK     Granulomatosis with polyangiitis       Initial /78  Pulse 68  Ht 1.753 m (5' 9\")  Wt 84.4 kg (186 lb)  SpO2 98%  BMI 27.47 kg/m2 Estimated body mass index is 27.47 kg/(m^2) as calculated from the following:    Height as of this encounter: 1.753 m (5' 9\").    Weight as of this encounter: 84.4 kg (186 lb).  Medication Reconciliation: complete  Lexus Das CMA    "

## 2017-03-03 NOTE — NURSING NOTE
Patient received a/n pneumonia 23 injection today. Patient identified by name and date of birth. Patient tolerated the injection well and left in stable condition.   Lexus Das, CMA

## 2017-03-03 NOTE — PATIENT INSTRUCTIONS
Pneumovax vaccine today  Lower MTX to 5 tab a week  Hold MTX for infections and fevers  Labs in 4 weeks  Refer to Dr. Robertson eye doctor  F/u in 3-4 months

## 2017-03-04 NOTE — PROGRESS NOTES
[facility] Follow-Up Note  Date of visit: 3/3/17  Last visit date:  11/18/2016    Joshua Ennis MRN# [id number]   Age: 71 year old YOB: 1945          Assessment and Plan:     Assessment:   Mr. Ennis is a 71 yr old  male with history of melanoma s/p resection in 11/2011, former tobacco use, and GPA (PR3+, MPO and c-ANCA negative in 3/2013 based on aforementioned labs, confirmed by kidney and lung biopsy) who presents for clinic follow-up regarding GPA.  He was initiated on cyclophosphamide 150 mg QD and high-dose prednisone 3/2013. He last took prednisone in 10/2013. Cytoxan was switched to MTX for maintenance treatment in 10/2013.  He has been tolerating it well. He had a flare in 12/2013 with increased crusts in his nose along with recurrence of arthralgia, worsening numbness in feet and increasing vasculitis marker. Renal function was stable with negative repeat chest CT. His vasculitis flare was treated with short course of prednisone taper 20 mg po qd max and increasing MTX to 8 tab = 20 mg qwk. Vasculitis symptoms improved.  At visit on 1/2015, he had scabs in his nose, had cold [place] symptoms and increased arthralgia/fatigue (shoulders, L hand). Labs from 12/16 showed increased Cr level and hematuria with rising PR3 (more than 8), there was a concern for vasculitis flare (in kidneys, sinuses), no flare on repeat chest CT 1/2015. Therefore it was recommended to try rituximab. Another reason was to be able to taper MTX off given abnormal Cr.  He is now s/p Rituximab infusion x 4 (1st on 2/25/2015). He is feeling well. No hematuria with stable Cr, no SOB. Saw ENT with negative nasal mucosa biopsy 6/2015 for granuloma but showed active inflammation, had left nostril lesion which decreased in size by use of mupirocin ointment. PR3 vasculitis marker went back to NL in 6/2015, it was NL in 10/2015, given stable vasculitis and low GFR, MTX from 8 to 7 tab po qwk. Repeat MI-3  in 3/2016 was slightly positive, Cr was slightly higher than baseline. We tapered his MTX further to 6 tab po qwk in 2/2016 given lack of symptoms. Re-check labs in April were almost unchanged from 3/2016 but CRP was slightly up. Most recent labs on 2/7/17 with elevated CRP (40.6) but normal ESR (12) and ANCA 1:20 likely reflecting bacterial infection.  Patient is currently recovering from pneumonia but is otherwise stable from a vasculitis standpoint. He does not have any new complaints. He was found on physical exam today to have a small dark lesion on his left soft palate. Given history of melanoma, would recommend that this be further evaluated. He recently followed-up in dermatology for annual skin exam but this was not commented on. He also continues to have episodes of diplopia concerning for a central process given normal eye exam.      Plan:    - Decrease MTX to 12.5mg (5 tabs) po qwk, continue with Folic acid     > Will repeat MTX monitoring labs in 1 month     > Patient instructed to hold MTX if develops fevers or infection  - Refer to neurophthalmology for evaluation of diplopia (Dr. Robertson)  - Schedule appointment with Dr. Obando in ENT for 2nd opinion on mouth lesion   - Neuropathy unchanged on Gabapentin  - PPSV23 vaccine today  - RTC in 3-4 months    Patient was seen and discussed with Dr. Sanchez who agrees with the above plan.    Angelica Smith  Internal Medicine, PGY2  [phone]    Attending Note: I saw and evaluated the patient with Dr. Smith. I agree with the assessment and plan.    Ede Sanchez MD    Orders Placed This Encounter   Procedures     PNEUMOCOCCAL VACCINE,ADULT,SQ OR IM (PPSV23)     Albumin level     ALT     AST     CBC with platelets differential     Creatinine     CRP inflammation     Erythrocyte sedimentation rate auto     Routine UA with Micro Reflex to Culture     Protein  random urine     Creatinine random urine     Antineutrophil cytoplasmic Anju IgG     Vasculitis  panel     OPHTHALMOLOGY ADULT REFERRAL             HPI / Interim History:      Mr. Ennis is a 71 yo WM with h/o melanoma s/p resection in 11/2011, former tobacco use, and GPA diagnosed in 3/2013. For details of GPA diagnosis, please refer to initial consult note. Briefly, he initially presented with constitutional sx of fever, sweating, weight loss, migratory arthralgia, numbness/shooting pain in feet to his PCP.  Had enlarged inguinal LNs. His PCP suspected malignancy and especially lung cancer given h/o tobacco use.  His work-up showed several lung nodules and CT guided biopsy of one of the nodules done in 2/13 showed granulomatous inflammation with no evidence of malignancy.  Inguinal LN biopsy done 3/13 showed inflammation with no malignancy.  He had not had any respiratory sx, SOB, CP, cough, hemoptysis or sinusitis.  He was hospitalized for further work up and was found having high PR3, neg MPO, neg ANCA.  He also had + RF and trace cryo.   Had microscopic hematuria with NL Cr at the time of admission but his Cr started to rise before discharge. Had abnormal LFTs toward the end of discharge(was trending down, liver US was unremarkable).  He was diagnosed with GPA given high titer of PR3 to 723, +granulomatous inflammation of the lung nodule, microscopic hematuria, and migratory arthralgia along with high ESR/CRP and constitutional sx.  He was Started on cytoxan 150 mg po qd, prednisone 70 mg po qd (after IV solumedrol 1 gr qd x 3 days), Bactrim DS three times a wk for PCP prophylaxis and fosamax 70 mg po qwk. He was also put on vit D 06076 units qwk and neurontin.  He was discharged on 3/13/2013.   At initial visit in Rheumatology clinic, patient was referred to nephrology for renal biopsy since Cr was up despite being on cytoxan and prednisone 75 qd.  Renal biopsy 3/13 confirmed Dx of GPA.  After discussion with Dr. Krishnamurthy, made a decision to continue with current regimen and if no improvement to switch to IV  cytoxan or rituximab.  His Cr initially increased to 1.71, but stabilized to ~ 1.3.  He has a h/o high BP and was recently started on losartan per nephology.  He has been seen by neurology for bilaterally foot and ankle numbness and was diagnosed with neuropathy possibly secondary to vasculitis.  He was also seen by oncology; there was no concern for malignancy.   Overall, the patient is doing well today with no specific complaints or concerns.  He had recent blood work 9/19 which showed negative inflammatory and vasculitis markers (PR3, MPO, ANCA, and CRP).  His SCr has remained elevated, but stable at 1.37.  He is at the tail end of a prednisone taper; currently taking 1 mg QD, set to d/c 10/15.  He is still on cyclophosphamide, taking 150 mg QD.  He was prescribed losartan at a f/u visit w/ nephrology 8/21, but states his BP have been running 130's/80's, so has not taken the medication for over a month.  Additionally, he had been taking neurontin for numbness in his feet, toes, and ankles, but stopped b/c he was not getting symptom relief.  Today, the patient denies vision changes, epistaxis, oral ulcerations, SOB, hemoptysis, CP, joint pain, abdominal pain, dysuria, or hematuria.  He does endorse a cough, but states that this has been intermittent, chronic, and non-productive.  Additionally, he endorses right shoulder discomfort that is worse w/ abduction, but has also been chronic. Of note, the patient remains a former smoker, having quit in February of this year.     His major complaint is increased numbness of his toes, but has resumed taking neurontin and feels sensation is coming back which is painful. No SOB, cough, crusts in nose or blood in urine. He has fatigue. He is off cytoxan since 10/2013, is on MTX 8tab po qwk. MTX was started with 4 tab po qwk in 10/2013, was increased to 8 tab after last visit in 1/2014. In 12/2013, was felt to have a small flare of vasculitis with increased PR3, crusts in nose  and R shoulder pain; therefore prednisone taper with max dose of 20 mg qd was given along with increasing MTX to 7 tab qwk. MTX was later increased to 8 tab qwk in 1/2014. Labs done in 2/2014 showed rising PR3. He had ER visit on 1/8 for urosepsis (E. Coli), was treated with cedifir, recovered. Continues having shoulder pain, requests referral to PT.    1/2015: He reports having increased arthralgia (shoulders, L hand) x a month. About 2 wk ago, L hand pain was so bad that he could not move his hand. No major joint swelling. Has no AM stiffness. Reports being tested negative for lyme (local lab) about 2 wk ago. Appetite is worse. He is more tired. He sleeps a lot. Has a cold x 1 wk, no hemoptysis. Neuropathy is the same. No SOB. No fever. Reports having a rash under L axilla x last 2 wk, comes and goes but to him looks like a lyme rash. He thinks he probably had a tick bite.    Last visit 3/2015: Rituximab qwk x 4 doses was recommended at last visit given concern for active GPA. He had his 1st infusion on 2/25/2015, right after start of infusion, developed hives without any resp sx, was treated with extra dose of benadryl and solu cortef. Hives resolved. Infusion was resumed at a slower rate and he finished and tolerated it well. Had his 2nd infusion on 3/4/2015 with no reaction although this time I increased his solumedrol from 100 to 250 mg as part of pre-medication. He has since completed all 4 infusions w/o reaction. States that nasal and joint symptoms remain improved. He still does have some crusting in his left nostril. Does have some pain and swelling in the hands w/minimal morning stiffness but that is his baseline. Says pain in his shoulders has improved. He denies any medication side effects.    He was seen in the ED 4/3/15 for productive cough and fever. He was treated with Levaquin for likely pna. He is now feeling better with only lingering cough.    10/16/2015: He is feeling well, has no complaints  "except cough at night because of sinus drainage (chronic). Requests pneumonia shot.    2/2016: Today patient reports feeling in excellent condition. Denies chest symptoms, such as SOB, hemoptysis, cough, or nasal drainage/bleeding. He also denies any hematuria and his last renal labs were stable. Unfortunately, his neuropathy seems to be his biggest problem. Neurology started him on a low dose of gabapentin without improvement.     5/6/16: Besides stuffy nose due to seasonal allergies, has no complaints. No cough, SOB, hemoptysis, hematuria, weight loss or fatigue.    8/12/16: Patient has no complaints today. No cough, SOB, hemoptysis, hematuria, weight loss or fatigue. He was seen today in Nephrology clinic for CKD follow up, doing well, no further follow up is needed. Patient reports visual disturbance with decreased vision probably on the R eye (chelsietent does not remember) with decreased to none visual strength in low visual field with some \"blanks\" which lasted for 40 seconds. This is a second episode in the last 1.5-2 years. Patient also reports recurrent double vision occuring every 4-5 months. Last ophthalmologist appointment was 1986.     11/18/16: Feeling very well, no complaints. Is going to have an eye exam today.    3/3/17: Mr. Ennis says he is doing well overall. He is still recovering from a pneumonia diagnosed on 2/14/17. He says that he was feeling ill for about 2 weeks before this with worsening productive cough, nasal congestion and high fevers with \"fever blisters\". He went to the ED on 2/14/17 and received ceftriaxone and a Z-pack and says he has been improving since then. He says that many other people at his work were sick with similar symptoms. Today he reports some residual nasal congestion but denies shortness of breath. He says that during his illness he did have some blood in his nose \"if I picked at it\" but he otherwise denies epistaxis, hemoptysis, hematuria and blood in his stools. His " significant other is wondering if his methotrexate dose can start to be tapered down. Currently he is taking MTX 15mg weekly although this was held when he had pneumonia.    Mr. Ennis says that he continues to experience episodes of diplopia on average once per month. These episodes typically last 20-30 seconds. He was seen by ophthalmology on 11/18/16 and had a normal eye exam.         Past Medical History:     Past Medical History   Diagnosis Date     Hypertension      Malignant melanoma (H)      Malignant neoplasm (H)      melanoma     Squamous cell carcinoma (H)      Russo syndrome           Past Surgical History:     Past Surgical History   Procedure Laterality Date     Biopsy  11/2011     melanoma on back     Herniorrhaphy inguinal  8/6/2012     Procedure: HERNIORRHAPHY INGUINAL;  Open Repair Left Inguinal Hernia;  Surgeon: Jerad Baker MD;  Location: [facility]     Dissect lymph node inguinal  3/1/2013     Procedure: DISSECT LYMPH NODE INGUINAL;  Bilateral Inguinal Lymph Node Biopsy.;  Surgeon: Tariq Acosta MD;  Location: [facility]     Esophagoscopy, gastroscopy, duodenoscopy (egd), combined  7/5/2013     Procedure: COMBINED ESOPHAGOSCOPY, GASTROSCOPY, DUODENOSCOPY (EGD);  Gastroscopy;  Surgeon: Tariq Acosta MD;  Location: [facility]          Social History:     Social History   Substance Use Topics     Smoking status: Former Smoker     Packs/day: 1.00     Types: Cigarettes     Quit date: 2/14/2013     Smokeless tobacco: Never Used     Alcohol use Yes      Comment: rare          Family History:     Family History   Problem Relation Age of Onset     DIABETES Mother      CANCER Father      stomach     HEART DISEASE Father      HEART DISEASE Brother      DIABETES Sister      DIABETES Sister      DIABETES Sister      HEART DISEASE Brother      Glaucoma No family hx of      Macular Degeneration No family hx of           Immunizations:   Due for PPSV23, will administer today. Immunizations otherwise up  "to date.    PMHx, FHx, SHx were reviewed, unchanged.         Allergies:     Allergies   Allergen Reactions     Shrimp Nausea and Vomiting     Got sick each time he ate it          Medications:     Current Outpatient Prescriptions   Medication Sig     methotrexate 2.5 MG tablet CHEMO Take 5 tablets (12.5 mg) by mouth once a week     folic acid (FOLVITE) 1 MG tablet Take 2 tablets (2 mg) by mouth daily     gabapentin (NEURONTIN) 400 MG capsule Take 1 capsule (400 mg) by mouth At Bedtime     fluorouracil (EFUDEX) 5 % cream Apply twice daily to scalp for 2-4 weeks.     Acetaminophen (TYLENOL PO) Take 650 mg by mouth once as needed for mild pain or fever     losartan (COZAAR) 50 MG tablet Take 1 tablet (50 mg) by mouth daily     calcium-vitamin D (CALCIUM 600 + D) 600-400 MG-UNIT per tablet Take 1 tablet by mouth 2 times daily.     Cholecalciferol (VITAMIN D) 1000 UNITS capsule Take 1 capsule by mouth daily.     Blood Pressure Monitor KIT Automatic Blood Pressure Monitor     [DISCONTINUED] methotrexate 2.5 MG tablet Take 6 tablets (15 mg) by mouth once a week     [DISCONTINUED] gabapentin (NEURONTIN) 400 MG capsule Take 1 capsule (400 mg) by mouth At Bedtime     No current facility-administered medications for this visit.           Review of Systems:   A 10-point ROS was done. Positives are per HPI.         Physical Exam:     Vitals:    03/03/17 1128   BP: 147/78   Pulse: 68   SpO2: 98%   Weight: 84.4 kg (186 lb)   Height: 1.753 m (5' 9\")     Constitutional:   Awake, alert, cooperative, no apparent distress.   Eyes:   Pupils equal, round and reactive to light, sclera clear, conjunctiva normal.   ENT:   Normocephalic, atramatic,  oral pharynx with moist mucus membranes, tonsils without erythema or exudates, dentures in place. Small dark purple/black macule left soft palate.   Neck:   Supple, symmetrical, trachea midline, no adenopathy.   Lungs:   No increased work of breathing, good air exchange, clear to auscultation " bilaterally, no crackles or wheezing.    Cardiovascular:   Regular rate and rhythm, normal S1 and S2, no S3 or S4, and no murmur noted.   Musculoskeletal:   No active synovitis or joint tenderness. Good ROM in all joints tested. No edema in LE.   Neurologic:   AOx3; CN grossly intact.   Skin:   No rashes or ecchymoses on limited exam.          Data:   Recent laboratory data reviewed.    2/14/17  Cr 1.46, WBC 7.8, AST/ALT normal    2/7/17  CRP 40.6, ESR 12, ANCA 1:20, MPO neg, TN-3 neg    Recent imaging studies reviewed by me.    CXR (2/14/17)    New mild patchy opacity at the right lung base appears to  localize to the posterior right lower lobe base and could represent a  developing area of pneumonia. Left lung is clear.    CHEST CT (2/16/2013)    Chest: Multiple indeterminate pulmonary nodules. Several of the  larger lesions are cavitary. There are 3 dominant lesions, a 3.2 cm  cavitary lesion in the left upper lobe, a 3.4 cm solid mass in the  right lower lobe laterally and a 3.7 cm cavitary mass in the right  lower lobe medially. In addition there is mediastinal lymphadenopathy  with a dominant 3 cm subcarinal node. Bilateral axillary  lymphadenopathy with 2.4 cm node seen bilaterally. Chest otherwise  unremarkable.       Exam: High-resolution Chest CT without contrast 1/9/2015 12:42 PM.  History: Concern for ANCA vasculitis flare in the chest.  Comparison: 3/7/2014, 11/14/2013, 2/16/2013.  Technique: CT helical acquisition from the lung apices to the kidneys  was obtained without intravenous contrast. Both inspiratory and  expiratory images were obtained.    Findings:  Moderate atherosclerotic calcifications of the coronary arteries. Mild  calcifications involving the aorta and aortic great vessels. Prominent  paratracheal lymph node on series 3 image 22 measuring 9 mm. Decreased  from 2/16/2013 and unchanged from 3/7/2014. Borderline enlarged right  hilar lymph node, unchanged from 3/7/2014 and decreased  from  2/16/2013. Heart is not enlarged. Trace cardial effusion. No axillary  lymphadenopathy.  Nodular scarring in the left apex, unchanged from 2/16/2013. No  pleural effusion or pneumothorax. No evidence of intrapulmonary  infection. There is a cluster of tree in bud nodularity noted in the  anteromedial right upper lobe. These nodules appear new. Expiratory  images demonstrate mild air trapping. Scattered pulmonary nodules:  Series 6 image 146: Seen posteriorly in the right lower lobe, there is  a sub-pleural nodule measuring 1.6 x 1.2 cm with a spiculated border.  Previously, this nodule measured 2.0 x 1.6 cm.  Series 6 image 189: Seen laterally in the right lower lobe, there is a  former mid pulmonary nodule which is unchanged from 3/7/2013 and  decreased from 2/16/2013.  Series 6 image 169: Seen posterolaterally in the right lower lobe,  there is a 3 mm pulmonary nodule which is unchanged from 3/7/2014 and  decreased from 2/16/2013.  Series 6 image 225: Seen posterolaterally in the left lower lobe,  there is a 4 mm subpleural nodule which is unchanged from 2/16/2014.  Other scattered sub-4 mm pulmonary nodules appear stable from previous  study.  Evaluation of the upper abdomen is limited due to the lack of  intravenous contrast.  No suspicious bony lesions.    IMPRESSION  Impression:   1. Scattered pulmonary nodules enlarged lymph nodes are  stable/slightly decreased from the previous study. No evidence for  acute flare in patient's known ANCA-associated vasculitis.  2. New tree in bud nodularity seen anteromedially in the right middle  lobe. Findings could be seen in the setting of an atypical infectious  process.  3. Mild air trapping. Finding is nonspecific and is seen in setting of  small airways disease such as asthma or bronchiolitis.  I have personally reviewed the examination and initial interpretation  and I agree with the findings.  TOÑO PERKINS MD    Component      Latest Ref Rng 6/11/2015  6/11/2015           9:26 AM  9:29 AM   Sodium      133 - 144 mmol/L  139   Potassium      3.4 - 5.3 mmol/L  4.4   Chloride      94 - 109 mmol/L  109   Carbon Dioxide      20 - 32 mmol/L  23   Anion Gap      3 - 14 mmol/L  7   Glucose      70 - 99 mg/dL  81   Urea Nitrogen      7 - 30 mg/dL  28   Creatinine      0.66 - 1.25 mg/dL  1.26 (H)   GFR Estimate      >60 mL/min/1.7m2  57 (L)   GFR Estimate If Black      >60 mL/min/1.7m2  69   Calcium      8.5 - 10.1 mg/dL  9.2   Phosphorus      2.5 - 4.5 mg/dL  2.1 (L)   Albumin      3.4 - 5.0 g/dL  1.7 (L)   Iron      35 - 180 ug/dL  129   Iron Binding Cap      240 - 430 ug/dL  292   Iron Saturation Index      15 - 46 %  44   Protein Random Urine       0.11    Protein Total Urine g/gr Creatinine      0 - 0.2 g/g Cr 0.14    Hemoglobin      13.3 - 17.7 g/dL  13.0 (L)   Ferritin      26 - 388 ng/mL  193   Parathormone Intact      12 - 72 pg/mL  49   Vitamin D Deficiency screening      30 - 75 ug/L  55   Creatinine Urine       84    Copath Report           Proteinase 3 Antibody IgG      0.0 - 0.9 AI       Component      Latest Ref Rng 6/11/2015 6/11/2015          11:15 AM 12:18 PM   Copath Report       Patient Name: ADONAY FAITH . . .    Proteinase 3 Antibody IgG      0.0 - 0.9 AI  0.8     Copath Report     Patient Name: ADONAY FAITH  MR#: [id number]  Specimen #: [id number]  Collected: 6/11/2015  Received: 6/11/2015  Reported: 6/12/2015 17:37  Ordering Phy(s): DANIELLE PARIS    SPECIMEN(S):  Nasal septum    FINAL DIAGNOSIS:  Nasal septum, biopsy:  -Squamous mucosa with ulcer, marked acute inflammation and reactive  changes  -No granulomas identified  -A special stain for fungi (GMS) is negative    I have personally reviewed all specimens and or slides, including the  listed special stains, and used them with my medical judgement to  determine the final diagnosis.    Electronically signed out by:    Dorys Barton M.D., [facility]    CLINICAL HISTORY:  The patient is a  "70-year-old man with a history of left upper back  melanoma, resected in 2011 (see consult report [id number]). 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 He now presents for followup visit with nasal  cavity scabs, increased arthralgia and fatigue, concerning for  vasculitis flare. Operative procedure: Nasal septum biopsy.    GROSS:  The specimen is received in formalin with proper patient identification,  labeled \"septal tissue\". The specimen consists of three tan-pink tissue  fragments, 0.2 cm in greatest dimension, entirely submitted in cassette  1. (Dictated by: Ana Mixon 6/11/2015 03:39 PM)    MICROSCOPIC:  Microscopic examination is performed. A GMS stain, performed with  appropriate positive control, is negative.    CPT Codes:  A: 60251-ZV7, 36913-ALT    TESTING LAB LOCATION:  [facility], [address] [id number] [phone]    COLLECTION SITE:  Client: [facility]  Location: [facility] (B)     Component      Latest Ref Rng 12/10/2015   WBC      4.0 - 11.0 10e9/L 5.8   RBC Count      4.4 - 5.9 10e12/L 4.32 (L)   Hemoglobin      13.3 - 17.7 g/dL 13.9   Hematocrit      40.0 - 53.0 % 42.0   MCV      78 - 100 fl 97   MCH      26.5 - 33.0 pg 32.2   MCHC      31.5 - 36.5 g/dL 33.1   RDW      10.0 - 15.0 % 12.5   Platelet Count      150 - 450 10e9/L 199   Diff Method       Automated Method   % Neutrophils       70.2   % Lymphocytes       10.8   % Monocytes       9.6   % Eosinophils       7.7   % Basophils       1.7   Absolute Neutrophil      1.6 - 8.3 10e9/L 4.1   Absolute Lymphocytes      0.8 - 5.3 10e9/L 0.6 (L)   Absolute Monoctyes      0.0 - 1.3 10e9/L 0.6   Absolute Eosinophils      0.0 - 0.7 10e9/L " 0.5   Absolute Basophils      0.0 - 0.2 10e9/L 0.1   Sodium      133 - 144 mmol/L 140   Potassium      3.4 - 5.3 mmol/L 4.7   Chloride      94 - 109 mmol/L 107   Carbon Dioxide      20 - 32 mmol/L 25   Anion Gap      3 - 14 mmol/L 8   Glucose      70 - 99 mg/dL 91   Urea Nitrogen      7 - 30 mg/dL 28   Creatinine      0.66 - 1.25 mg/dL 1.40 (H)   GFR Estimate      >60 mL/min/1.7m2 50 (L)   GFR Estimate If Black      >60 mL/min/1.7m2 61   Calcium      8.5 - 10.1 mg/dL 9.1   Bilirubin Total      0.2 - 1.3 mg/dL 0.7   Albumin      3.4 - 5.0 g/dL 3.9   Protein Total      6.8 - 8.8 g/dL 6.9   Alkaline Phosphatase      40 - 150 U/L 103   ALT      0 - 70 U/L 28   AST      0 - 45 U/L 16   Color Urine       Yellow   Appearance Urine       Clear   Glucose Urine      NEG mg/dL Negative   Bilirubin Urine      NEG Negative   Ketones Urine      NEG mg/dL Negative   Specific Gravity Urine      1.003 - 1.035 >1.030   pH Urine      5.0 - 7.0 pH 5.5   Protein Albumin Urine      NEG mg/dL Negative   Urobilinogen Urine      0.2 - 1.0 EU/dL 0.2   Nitrite Urine      NEG Negative   Blood Urine      NEG Negative   Leukocyte Esterase Urine      NEG Negative   Source       Midstream Urine   WBC Urine      0 - 2 /HPF O - 2   RBC Urine      0 - 2 /HPF O - 2   Protein Random Urine       0.16   Protein Total Urine g/gr Creatinine      0 - 0.2 g/g Cr 0.11   Myeloperoxidase Antibody IgG      0.0 - 0.9 AI <0.2 . . .   Proteinase 3 Antibody IgG      0.0 - 0.9 AI 0.2   CRP Inflammation      0.0 - 8.0 mg/L <2.9   Sed Rate      0 - 20 mm/h 6   Creatinine Urine Random       142   Neutrophil Cytoplasmic IgG Antibody       <1:20 . . .   Creatinine Urine       142     Component      Latest Ref Rn 3/14/2016   WBC      4.0 - 11.0 10e9/L 7.2   RBC Count      4.4 - 5.9 10e12/L 4.07 (L)   Hemoglobin      13.3 - 17.7 g/dL 13.4   Hematocrit      40.0 - 53.0 % 38.8 (L)   MCV      78 - 100 fl 95   MCH      26.5 - 33.0 pg 32.9   MCHC      31.5 - 36.5 g/dL 34.5    RDW      10.0 - 15.0 % 12.5   Platelet Count      150 - 450 10e9/L 194   Diff Method       Automated Method   % Neutrophils       68.1   % Lymphocytes       14.0   % Monocytes       11.7   % Eosinophils       5.5   % Basophils       0.7   Absolute Neutrophil      1.6 - 8.3 10e9/L 4.9   Absolute Lymphocytes      0.8 - 5.3 10e9/L 1.0   Absolute Monocytes      0.0 - 1.3 10e9/L 0.8   Absolute Eosinophils      0.0 - 0.7 10e9/L 0.4   Absolute Basophils      0.0 - 0.2 10e9/L 0.1   Sodium      133 - 144 mmol/L 136   Potassium      3.4 - 5.3 mmol/L 4.7   Chloride      94 - 109 mmol/L 106   Carbon Dioxide      20 - 32 mmol/L 25   Anion Gap      3 - 14 mmol/L 5   Glucose      70 - 99 mg/dL 86   Urea Nitrogen      7 - 30 mg/dL 32 (H)   Creatinine      0.66 - 1.25 mg/dL 1.62 (H)   GFR Estimate      >60 mL/min/1.7m2 42 (L)   GFR Estimate If Black      >60 mL/min/1.7m2 51 (L)   Calcium      8.5 - 10.1 mg/dL 9.2   Bilirubin Total      0.2 - 1.3 mg/dL 0.7   Albumin      3.4 - 5.0 g/dL 4.0   Protein Total      6.8 - 8.8 g/dL 6.9   Alkaline Phosphatase      40 - 150 U/L 94   ALT      0 - 70 U/L 34   AST      0 - 45 U/L 24   Color Urine       Yellow   Appearance Urine       Clear   Glucose Urine      NEG mg/dL Negative   Bilirubin Urine      NEG Negative   Ketones Urine      NEG mg/dL Negative   Specific Gravity Urine      1.003 - 1.035 <=1.005   pH Urine      5.0 - 7.0 pH 5.5   Protein Albumin Urine      NEG mg/dL Negative   Urobilinogen Urine      0.2 - 1.0 EU/dL 0.2   Nitrite Urine      NEG Negative   Blood Urine      NEG Negative   Leukocyte Esterase Urine      NEG Negative   Source       Midstream Urine   WBC Urine      0 - 2 /HPF    RBC Urine      0 - 2 /HPF    Myeloperoxidase Antibody IgG      0.0 - 0.9 AI <0.2 . . .   Proteinase 3 Antibody IgG      0.0 - 0.9 AI 1.7 (H)   Protein Random Urine       <0.05   Protein Total Urine g/gr Creatinine      0 - 0.2 g/g Cr Unable to calculate due to low value   CRP Inflammation      0.0 -  8.0 mg/L <2.9   Sed Rate      0 - 20 mm/h 5   Neutrophil Cytoplasmic IgG Antibody       <1:20 . . .   Magnesium      1.6 - 2.3 mg/dL 2.1   Creatinine Urine Random       26   Copath Report          Creatinine Urine            Component      Latest Ref Rng 4/18/2016   WBC      4.0 - 11.0 10e9/L 6.7   RBC Count      4.4 - 5.9 10e12/L 4.14 (L)   Hemoglobin      13.3 - 17.7 g/dL 13.3   Hematocrit      40.0 - 53.0 % 39.5 (L)   MCV      78 - 100 fl 95   MCH      26.5 - 33.0 pg 32.1   MCHC      31.5 - 36.5 g/dL 33.7   RDW      10.0 - 15.0 % 12.5   Platelet Count      150 - 450 10e9/L 218   Diff Method       Automated Method   % Neutrophils       65.4   % Lymphocytes       16.0   % Monocytes       13.2   % Eosinophils       4.2   % Basophils       1.2   Absolute Neutrophil      1.6 - 8.3 10e9/L 4.4   Absolute Lymphocytes      0.8 - 5.3 10e9/L 1.1   Absolute Monocytes      0.0 - 1.3 10e9/L 0.9   Absolute Eosinophils      0.0 - 0.7 10e9/L 0.3   Absolute Basophils      0.0 - 0.2 10e9/L 0.1   Sodium      133 - 144 mmol/L    Potassium      3.4 - 5.3 mmol/L    Chloride      94 - 109 mmol/L    Carbon Dioxide      20 - 32 mmol/L    Anion Gap      3 - 14 mmol/L    Glucose      70 - 99 mg/dL    Urea Nitrogen      7 - 30 mg/dL    Creatinine      0.66 - 1.25 mg/dL 1.72 (H)   GFR Estimate      >60 mL/min/1.7m2 40 (L)   GFR Estimate If Black      >60 mL/min/1.7m2 48 (L)   Calcium      8.5 - 10.1 mg/dL    Bilirubin Total      0.2 - 1.3 mg/dL    Albumin      3.4 - 5.0 g/dL    Protein Total      6.8 - 8.8 g/dL    Alkaline Phosphatase      40 - 150 U/L    ALT      0 - 70 U/L 30   AST      0 - 45 U/L 22   Color Urine       Yellow   Appearance Urine       Clear   Glucose Urine      NEG mg/dL Negative   Bilirubin Urine      NEG Negative   Ketones Urine      NEG mg/dL Negative   Specific Gravity Urine      1.003 - 1.035 <=1.005   pH Urine      5.0 - 7.0 pH 5.0   Protein Albumin Urine      NEG mg/dL Negative   Urobilinogen Urine      0.2 - 1.0  EU/dL 0.2   Nitrite Urine      NEG Negative   Blood Urine      NEG Negative   Leukocyte Esterase Urine      NEG Negative   Source       Midstream Urine   WBC Urine      0 - 2 /HPF O - 2   RBC Urine      0 - 2 /HPF O - 2   Myeloperoxidase Antibody IgG      0.0 - 0.9 AI    Proteinase 3 Antibody IgG      0.0 - 0.9 AI 2.0 (H)   Protein Random Urine       <0.05   Protein Total Urine g/gr Creatinine      0 - 0.2 g/g Cr Unable to calculate due to low value   CRP Inflammation      0.0 - 8.0 mg/L 8.4 (H)   Sed Rate      0 - 20 mm/h 9   Neutrophil Cytoplasmic IgG Antibody       <1:20 . . .   Magnesium      1.6 - 2.3 mg/dL    Creatinine Urine Random          Copath Report          Creatinine Urine       53           Component      Latest Ref Rng 7/6/2016   WBC      4.0 - 11.0 10e9/L 7.9   RBC Count      4.4 - 5.9 10e12/L 4.31 (L)   Hemoglobin      13.3 - 17.7 g/dL 13.9   Hematocrit      40.0 - 53.0 % 41.5   MCV      78 - 100 fl 96   MCH      26.5 - 33.0 pg 32.3   MCHC      31.5 - 36.5 g/dL 33.5   RDW      10.0 - 15.0 % 12.5   Platelet Count      150 - 450 10e9/L 197   Diff Method       Automated Method   % Neutrophils       68.1   % Lymphocytes       13.2   % Monocytes       11.8   % Eosinophils       5.6   % Basophils       1.3   Absolute Neutrophil      1.6 - 8.3 10e9/L 5.4   Absolute Lymphocytes      0.8 - 5.3 10e9/L 1.0   Absolute Monocytes      0.0 - 1.3 10e9/L 0.9   Absolute Eosinophils      0.0 - 0.7 10e9/L 0.4   Absolute Basophils      0.0 - 0.2 10e9/L 0.1   Color Urine       Yellow   Appearance Urine       Clear   Glucose Urine      NEG mg/dL Negative   Bilirubin Urine      NEG Negative   Ketones Urine      NEG mg/dL Negative   Specific Gravity Urine      1.003 - 1.035 1.015   Blood Urine      NEG Negative   pH Urine      5.0 - 7.0 pH 5.5   Protein Albumin Urine      NEG mg/dL Negative   Urobilinogen Urine      0.2 - 1.0 EU/dL 0.2   Nitrite Urine      NEG Negative   Leukocyte Esterase Urine      NEG Negative    Source       Midstream Urine   Creatinine      0.66 - 1.25 mg/dL 1.62 (H)   GFR Estimate      >60 mL/min/1.7m2 42 (L)   GFR Estimate If Black      >60 mL/min/1.7m2 51 (L)   Protein Random Urine       0.10   Protein Total Urine g/gr Creatinine      0 - 0.2 g/g Cr 0.11   Myeloperoxidase Antibody IgG      0.0 - 0.9 AI <0.2 . . .   Proteinase 3 Antibody IgG      0.0 - 0.9 AI 1.3 (H)   CRP Inflammation      0.0 - 8.0 mg/L <2.9   Sed Rate      0 - 20 mm/h 5   Creatinine Urine Random       91   Neutrophil Cytoplasmic IgG Antibody       <1:20 . . .   ALT      0 - 70 U/L 28   AST      0 - 45 U/L 19   Albumin      3.4 - 5.0 g/dL 4.0   Creatinine Urine       91     Component      Latest Ref [facility] 10/24/2016   WBC      4.0 - 11.0 10e9/L 6.3   RBC Count      4.4 - 5.9 10e12/L 4.18 (L)   Hemoglobin      13.3 - 17.7 g/dL 13.5   Hematocrit      40.0 - 53.0 % 40.1   MCV      78 - 100 fl 96   MCH      26.5 - 33.0 pg 32.3   MCHC      31.5 - 36.5 g/dL 33.7   RDW      10.0 - 15.0 % 12.7   Platelet Count      150 - 450 10e9/L 190   Diff Method       Automated Method   % Neutrophils       67.6   % Lymphocytes       14.9   % Monocytes       12.1   % Eosinophils       4.3   % Basophils       1.1   Absolute Neutrophil      1.6 - 8.3 10e9/L 4.3   Absolute Lymphocytes      0.8 - 5.3 10e9/L 0.9   Absolute Monocytes      0.0 - 1.3 10e9/L 0.8   Absolute Eosinophils      0.0 - 0.7 10e9/L 0.3   Absolute Basophils      0.0 - 0.2 10e9/L 0.1   Color Urine       Yellow   Appearance Urine       Clear   Glucose Urine      NEG mg/dL Negative   Bilirubin Urine      NEG Negative   Ketones Urine      NEG mg/dL Negative   Specific Gravity Urine      1.003 - 1.035 1.010   pH Urine      5.0 - 7.0 pH 5.0   Protein Albumin Urine      NEG mg/dL Negative   Urobilinogen Urine      0.2 - 1.0 EU/dL 0.2   Nitrite Urine      NEG Negative   Blood Urine      NEG Negative   Leukocyte Esterase Urine      NEG Negative   Source       Midstream Urine   WBC Urine      0 - 2  /HPF O - 2   RBC Urine      0 - 2 /HPF O - 2   Creatinine      0.66 - 1.25 mg/dL 1.38 (H)   GFR Estimate      >60 mL/min/1.7m2 51 (L)   GFR Estimate If Black      >60 mL/min/1.7m2 62   Protein Random Urine       <0.05   Protein Total Urine g/gr Creatinine      0 - 0.2 g/g Cr Unable to calculate due to low value   Albumin      3.4 - 5.0 g/dL 3.9   ALT      0 - 70 U/L 26   AST      0 - 45 U/L 25   CRP Inflammation      0.0 - 8.0 mg/L 6.1   Sed Rate      0 - 20 mm/h 6   Creatinine Urine Random       36   Proteinase 3 Antibody IgG      0.0 - 0.9 AI 1.2 (H)   Neutrophil Cytoplasmic IgG Antibody       <1:20 . . .   SAKINA  [employer]       Canceled, Test credited   DNA-ds      <10 IU/mL <1 . . .   Creatinine Urine       36   Direct Antiglobulin       Neg

## 2017-03-07 NOTE — TELEPHONE ENCOUNTER
Left 2 messages on 3 calls since Friday.  No call back as of 3-7-17 at 0900    Asked pt to call and update clinic if having any new vision changes/symptoms since last visit  Direct triage number provided  Del Thompson RN 8:59 AM 03/07/17

## 2017-04-03 DIAGNOSIS — M31.30 GRANULOMATOSIS WITH POLYANGIITIS (H): ICD-10-CM

## 2017-04-03 DIAGNOSIS — G62.9 NEUROPATHY: ICD-10-CM

## 2017-04-03 DIAGNOSIS — I77.82 ANCA-ASSOCIATED VASCULITIS (H): ICD-10-CM

## 2017-04-03 DIAGNOSIS — Z79.899 HIGH RISK MEDICATIONS (NOT ANTICOAGULANTS) LONG-TERM USE: ICD-10-CM

## 2017-04-03 LAB
ALBUMIN SERPL-MCNC: 4.2 G/DL (ref 3.4–5)
ALBUMIN UR-MCNC: NEGATIVE MG/DL
ALT SERPL W P-5'-P-CCNC: 32 U/L (ref 0–70)
APPEARANCE UR: CLEAR
AST SERPL W P-5'-P-CCNC: 27 U/L (ref 0–45)
BASOPHILS # BLD AUTO: 0.1 10E9/L (ref 0–0.2)
BASOPHILS NFR BLD AUTO: 0.9 %
BILIRUB UR QL STRIP: NEGATIVE
COLOR UR AUTO: YELLOW
CREAT SERPL-MCNC: 1.41 MG/DL (ref 0.66–1.25)
CREAT UR-MCNC: 48 MG/DL
CREAT UR-MCNC: 48 MG/DL
CRP SERPL-MCNC: <2.9 MG/L (ref 0–8)
DIFFERENTIAL METHOD BLD: ABNORMAL
EOSINOPHIL # BLD AUTO: 0.4 10E9/L (ref 0–0.7)
EOSINOPHIL NFR BLD AUTO: 4.7 %
ERYTHROCYTE [DISTWIDTH] IN BLOOD BY AUTOMATED COUNT: 13.4 % (ref 10–15)
ERYTHROCYTE [SEDIMENTATION RATE] IN BLOOD BY WESTERGREN METHOD: 5 MM/H (ref 0–20)
GFR SERPL CREATININE-BSD FRML MDRD: 50 ML/MIN/1.7M2
GLUCOSE UR STRIP-MCNC: NEGATIVE MG/DL
HCT VFR BLD AUTO: 43.1 % (ref 40–53)
HGB BLD-MCNC: 15.1 G/DL (ref 13.3–17.7)
HGB UR QL STRIP: NEGATIVE
KETONES UR STRIP-MCNC: NEGATIVE MG/DL
LEUKOCYTE ESTERASE UR QL STRIP: NEGATIVE
LYMPHOCYTES # BLD AUTO: 1.4 10E9/L (ref 0.8–5.3)
LYMPHOCYTES NFR BLD AUTO: 17.7 %
MCH RBC QN AUTO: 33.6 PG (ref 26.5–33)
MCHC RBC AUTO-ENTMCNC: 35 G/DL (ref 31.5–36.5)
MCV RBC AUTO: 96 FL (ref 78–100)
MONOCYTES # BLD AUTO: 0.9 10E9/L (ref 0–1.3)
MONOCYTES NFR BLD AUTO: 11 %
NEUTROPHILS # BLD AUTO: 5.1 10E9/L (ref 1.6–8.3)
NEUTROPHILS NFR BLD AUTO: 65.7 %
NITRATE UR QL: NEGATIVE
PH UR STRIP: 5 PH (ref 5–7)
PLATELET # BLD AUTO: 216 10E9/L (ref 150–450)
PROT UR-MCNC: <0.05 G/L
PROT/CREAT 24H UR: NORMAL G/G CR (ref 0–0.2)
RBC # BLD AUTO: 4.5 10E12/L (ref 4.4–5.9)
SP GR UR STRIP: 1.01 (ref 1–1.03)
URN SPEC COLLECT METH UR: NORMAL
UROBILINOGEN UR STRIP-ACNC: 0.2 EU/DL (ref 0.2–1)
WBC # BLD AUTO: 7.7 10E9/L (ref 4–11)

## 2017-04-03 PROCEDURE — 86255 FLUORESCENT ANTIBODY SCREEN: CPT | Mod: 90 | Performed by: INTERNAL MEDICINE

## 2017-04-03 PROCEDURE — 83516 IMMUNOASSAY NONANTIBODY: CPT | Performed by: INTERNAL MEDICINE

## 2017-04-03 PROCEDURE — 83876 ASSAY MYELOPEROXIDASE: CPT | Performed by: INTERNAL MEDICINE

## 2017-04-03 PROCEDURE — 85652 RBC SED RATE AUTOMATED: CPT | Performed by: INTERNAL MEDICINE

## 2017-04-03 PROCEDURE — 81003 URINALYSIS AUTO W/O SCOPE: CPT | Performed by: INTERNAL MEDICINE

## 2017-04-03 PROCEDURE — 99000 SPECIMEN HANDLING OFFICE-LAB: CPT | Performed by: INTERNAL MEDICINE

## 2017-04-03 PROCEDURE — 84156 ASSAY OF PROTEIN URINE: CPT | Performed by: INTERNAL MEDICINE

## 2017-04-03 PROCEDURE — 82040 ASSAY OF SERUM ALBUMIN: CPT | Performed by: INTERNAL MEDICINE

## 2017-04-03 PROCEDURE — 85025 COMPLETE CBC W/AUTO DIFF WBC: CPT | Performed by: INTERNAL MEDICINE

## 2017-04-03 PROCEDURE — 36415 COLL VENOUS BLD VENIPUNCTURE: CPT | Performed by: INTERNAL MEDICINE

## 2017-04-03 PROCEDURE — 84460 ALANINE AMINO (ALT) (SGPT): CPT | Performed by: INTERNAL MEDICINE

## 2017-04-03 PROCEDURE — 82565 ASSAY OF CREATININE: CPT | Performed by: INTERNAL MEDICINE

## 2017-04-03 PROCEDURE — 86140 C-REACTIVE PROTEIN: CPT | Performed by: INTERNAL MEDICINE

## 2017-04-03 PROCEDURE — 84450 TRANSFERASE (AST) (SGOT): CPT | Performed by: INTERNAL MEDICINE

## 2017-04-03 NOTE — LETTER
Patient:  Joshua Ennis  :   1945  MRN:     [id number]            Mr.Roger DEANNA Ennis  [address] [facility] [address]        2017    Dear ,    We are writing to inform you of your test results. Labs look good except slight rise in MPO (vasculitis marker), this could be monitored for now. Please stay on your methotrexate 5 tab a week and let me know if any concerns or new symptoms suggestive of vasculitis flare (like weight loss, difficulty breathing, coughing up blood, blood in urine).      Resulted Orders   Albumin level   Result Value Ref Range    Albumin 4.2 3.4 - 5.0 g/dL   ALT   Result Value Ref Range    ALT 32 0 - 70 U/L   AST   Result Value Ref Range    AST 27 0 - 45 U/L   CBC with platelets differential   Result Value Ref Range    WBC 7.7 4.0 - 11.0 10e9/L    RBC Count 4.50 4.4 - 5.9 10e12/L    Hemoglobin 15.1 13.3 - 17.7 g/dL    Hematocrit 43.1 40.0 - 53.0 %    MCV 96 78 - 100 fl    MCH 33.6 (H) 26.5 - 33.0 pg    MCHC 35.0 31.5 - 36.5 g/dL    RDW 13.4 10.0 - 15.0 %    Platelet Count 216 150 - 450 10e9/L    Diff Method Automated Method     % Neutrophils 65.7 %    % Lymphocytes 17.7 %    % Monocytes 11.0 %    % Eosinophils 4.7 %    % Basophils 0.9 %    Absolute Neutrophil 5.1 1.6 - 8.3 10e9/L    Absolute Lymphocytes 1.4 0.8 - 5.3 10e9/L    Absolute Monocytes 0.9 0.0 - 1.3 10e9/L    Absolute Eosinophils 0.4 0.0 - 0.7 10e9/L    Absolute Basophils 0.1 0.0 - 0.2 10e9/L   Creatinine   Result Value Ref Range    Creatinine 1.41 (H) 0.66 - 1.25 mg/dL    GFR Estimate 50 (L) >60 mL/min/1.7m2      Comment:      Non  GFR Calc    GFR Estimate If Black 60 (L) >60 mL/min/1.7m2      Comment:       GFR Calc   CRP inflammation   Result Value Ref Range    CRP Inflammation <2.9 0.0 - 8.0 mg/L   Erythrocyte sedimentation rate auto   Result Value Ref Range    Sed Rate 5 0 - 20 mm/h   Antineutrophil cytoplasmic Anju IgG   Result Value Ref Range    Neutrophil  Cytoplasmic IgG Antibody       <1:20  Reference range: <1:20  (Note)  The ANCA IFA is <1:20; therefore, no further testing will  be performed.  INTERPRETIVE INFORMATION: Anti-Neutrophil Cyto Ab, IgG  Neutrophil Cytoplasmic Antibodies (C-ANCA = granular  cytoplasmic staining, P-ANCA = perinuclear staining) are  found in the serum of over 90 percent of patients with  certain necrotizing systemic vasculitides, and usually in  less than 5 percent of patients with collagen vascular  disease or arthritis.  Performed by [facility],  [address] [place] [id number] [phone]  [email or web address], Joe Keller MD, Lab. Director     Vasculitis panel   Result Value Ref Range    Myeloperoxidase Antibody IgG  0.0 - 0.9 AI     <0.2  Negative   Antibody index (AI) values reflect qualitative changes in antibody   concentration that cannot be directly associated with clinical condition or   disease state.      Proteinase 3 Antibody IgG 2.8 (H) 0.0 - 0.9 AI      Comment:      Positive   Antibody index (AI) values reflect qualitative changes in antibody   concentration that cannot be directly associated with clinical condition or   disease state.     Protein  random urine   Result Value Ref Range    Protein Random Urine <0.05 g/L    Protein Total Urine g/gr Creatinine Unable to calculate due to low value 0 - 0.2 g/g Cr   Creatinine random urine   Result Value Ref Range    Creatinine Urine Random 48 mg/dL   *UA reflex to Microscopic and Culture ([facility] (except [place] and [place])   Result Value Ref Range    Color Urine Yellow     Appearance Urine Clear     Glucose Urine Negative NEG mg/dL    Bilirubin Urine Negative NEG    Ketones Urine Negative NEG mg/dL    Specific Gravity Urine 1.010 1.003 - 1.035    Blood Urine Negative NEG    pH Urine 5.0 5.0 - 7.0 pH    Protein Albumin Urine Negative NEG mg/dL    Urobilinogen Urine 0.2 0.2 - 1.0 EU/dL    Nitrite Urine Negative NEG    Leukocyte Esterase  Urine Negative NEG    Source Midstream Urine    Creatinine urine calculation only   Result Value Ref Range    Creatinine Urine 48 mg/dL       Ede Sanchez MD

## 2017-04-04 LAB
MYELOPEROXIDASE AB SER-ACNC: ABNORMAL AI (ref 0–0.9)
PROTEINASE3 IGG SER-ACNC: 2.8 AI (ref 0–0.9)

## 2017-04-06 LAB — ANCA IGG TITR SER IF: NORMAL {TITER}

## 2017-04-14 DIAGNOSIS — H53.10 SUBJECTIVE VISUAL DISTURBANCE: Primary | ICD-10-CM

## 2017-04-17 NOTE — PROGRESS NOTES
Labs look good except slight rise in MPO (vasculitis marker), this could be monitored for now. Please stay on your methotrexate 5 tab a week and let me know if any concerns or new symptoms suggestive of vasculitis flare (like weight loss, difficulty breathing, coughing up blood, blood in urine).

## 2017-04-18 ENCOUNTER — OFFICE VISIT (OUTPATIENT)
Dept: OPHTHALMOLOGY | Facility: CLINIC | Age: 72
End: 2017-04-18
Attending: OPHTHALMOLOGY
Payer: COMMERCIAL

## 2017-04-18 ENCOUNTER — HOSPITAL ENCOUNTER (OUTPATIENT)
Dept: MRI IMAGING | Facility: CLINIC | Age: 72
Discharge: HOME | End: 2017-04-18
Attending: OPHTHALMOLOGY | Admitting: OPHTHALMOLOGY
Payer: COMMERCIAL

## 2017-04-18 DIAGNOSIS — H53.10 SUBJECTIVE VISUAL DISTURBANCE: ICD-10-CM

## 2017-04-18 DIAGNOSIS — H53.2 DIPLOPIA: ICD-10-CM

## 2017-04-18 DIAGNOSIS — H53.129 TRANSIENT VISUAL LOSS, UNSPECIFIED LATERALITY: Primary | ICD-10-CM

## 2017-04-18 DIAGNOSIS — H53.40 VISUAL FIELD DEFECT: ICD-10-CM

## 2017-04-18 PROCEDURE — 25500064 ZZH RX 255 OP 636: Performed by: OPHTHALMOLOGY

## 2017-04-18 PROCEDURE — 99000 SPECIMEN HANDLING OFFICE-LAB: CPT | Performed by: OPHTHALMOLOGY

## 2017-04-18 PROCEDURE — 92133 CPTRZD OPH DX IMG PST SGM ON: CPT | Mod: ZF | Performed by: OPHTHALMOLOGY

## 2017-04-18 PROCEDURE — 83519 RIA NONANTIBODY: CPT | Mod: 90 | Performed by: OPHTHALMOLOGY

## 2017-04-18 PROCEDURE — 99214 OFFICE O/P EST MOD 30 MIN: CPT | Mod: 25,ZF

## 2017-04-18 PROCEDURE — 36415 COLL VENOUS BLD VENIPUNCTURE: CPT | Performed by: OPHTHALMOLOGY

## 2017-04-18 PROCEDURE — A9585 GADOBUTROL INJECTION: HCPCS | Performed by: OPHTHALMOLOGY

## 2017-04-18 PROCEDURE — 92083 EXTENDED VISUAL FIELD XM: CPT | Mod: ZF | Performed by: OPHTHALMOLOGY

## 2017-04-18 PROCEDURE — 70553 MRI BRAIN STEM W/O & W/DYE: CPT

## 2017-04-18 RX ORDER — GADOBUTROL 604.72 MG/ML
7 INJECTION INTRAVENOUS ONCE
Status: COMPLETED | OUTPATIENT
Start: 2017-04-18 | End: 2017-04-18

## 2017-04-18 RX ADMIN — GADOBUTROL 65 ML: 604.72 INJECTION INTRAVENOUS at 14:39

## 2017-04-18 ASSESSMENT — CUP TO DISC RATIO
OD_RATIO: 0.3
OS_RATIO: 0.3

## 2017-04-18 ASSESSMENT — TONOMETRY
IOP_METHOD: TONOPEN
OD_IOP_MMHG: 15
OS_IOP_MMHG: 15

## 2017-04-18 ASSESSMENT — EXTERNAL EXAM - RIGHT EYE: OD_EXAM: NORMAL

## 2017-04-18 ASSESSMENT — SLIT LAMP EXAM - LIDS
COMMENTS: NORMAL
COMMENTS: NORMAL

## 2017-04-18 ASSESSMENT — EXTERNAL EXAM - LEFT EYE: OS_EXAM: NORMAL

## 2017-04-18 ASSESSMENT — CONF VISUAL FIELD
OD_NORMAL: 1
OS_NORMAL: 1

## 2017-04-18 ASSESSMENT — VISUAL ACUITY
METHOD: SNELLEN - LINEAR
OD_SC: 20/20
OS_SC: 20/20

## 2017-04-18 NOTE — PROGRESS NOTES
Assessment & Plan     Joshua Ennis is a 71 year old male with the following diagnoses:   1. Transient visual loss, unspecified laterality    2. Diplopia    3. Subjective visual disturbance    4. Visual field defect       Mr. Ennis has a history of skin melanoma, and granulomatosis with polyangiitis. Today he presents for evaluation of intermittent short episodes of variable double vision for about 2-3 years and in addition to that he had an episode of complete vision loss in both eye that lasted for <30 seconds about 8 months ago.     Examination today is remarkable for normal visual acuity.  No afferent defect. Left hyperphoria.  He has a paracentral visual field defect RIGHT eye but the optical coherence tomography is normal.  This is likely an artifact. The patient is asymptomatic.   The double vision is hard for him to describe and does not correlate to his exam.  Will obtain testing for myasthenia gravis and an MRI brain.  If these are normal, then would observe.    The transient vision loss is not apt to be embolic since it was complete, simultaneous, bilateral vision loss x seconds.  It is virtually impossible to embolize both eyes or both occipital lobes simultaneously.  Additionally, emboli typically cause transient vision loss x 2-5 min rather than seconds.  It was one episode 8 months ago.  I think it unlikely to be vasculitic for the same reasons as the embolic argument.  Would observe.            Attending Physician Attestation:  I have seen and examined this patient.  I have confirmed and edited as necessary the chief complaint(s), history of present illness, review of systems, relevant history, and examination findings as documented by others.  I have personally reviewed the relevant tests, images, and reports as documented above.  I have confirmed and edited as necessary the assessment and plan and agree with this note.  - Tyler Robertson MD 10:28 AM 4/18/2017

## 2017-04-18 NOTE — MR AVS SNAPSHOT
After Visit Summary   4/18/2017    Joshua Ennis    MRN: [id number]           Patient Information     Date Of Birth          1945        Visit Information        Provider Department      4/18/2017 8:30 AM Tyler Robertson MD [facility]        Today's Diagnoses     Diplopia        Subjective visual disturbance           Follow-ups after your visit        Your next 10 appointments already scheduled     Apr 19, 2017  5:00 PM CDT   MR BRAIN W/O & W CONTRAST with [id number]   [facility] ([facility])    [address] [facility] [address]   [phone]           Take your medicines as usual, unless your doctor tells you not to. Bring a list of your current medicines to your exam (including vitamins, minerals and over-the-counter drugs).  You will be given intravenous contrast for this exam. To prepare:   The day before your exam, drink extra fluids at least six 8-ounce glasses (unless your doctor tells you to restrict your fluids).   Have a blood test (creatinine test) within 30 days of your exam. Go to your clinic or Diagnostic Imaging Department for this test.  The MRI machine uses a strong magnet. Please wear clothes without metal (snaps, zippers). A sweatsuit works well, or we may give you a hospital gown.  Please remove any body piercings and hair extensions before you arrive. You will also remove watches, jewelry, hairpins, wallets, dentures, partial dental plates and hearing aids. You may wear contact lenses, and you may be able to wear your rings. We have a safe place to keep your personal items, but it is safer to leave them at home.   **IMPORTANT** THE INSTRUCTIONS BELOW ARE ONLY FOR THOSE PATIENTS WHO HAVE BEEN TOLD THEY WILL RECEIVE SEDATION OR GENERAL ANESTHESIA DURING THEIR MRI PROCEDURE:  IF YOU WILL RECEIVE SEDATION (take medicine to help you relax during your exam):   You must get the medicine from your doctor before you arrive. Bring the medicine  to the exam. Do not take it at home.   Arrive one hour early. Bring someone who can take you home after the test. Your medicine will make you sleepy. After the exam, you may not drive, take a bus or take a taxi by yourself.   No eating 8 hours before your exam. You may have clear liquids up until 4 hours before your exam. (Clear liquids include water, clear tea, black coffee and fruit juice without pulp.)  IF YOU WILL RECEIVE ANESTHESIA (be asleep for your exam):   Arrive 1 1/2 hours early. Bring someone who can take you home after the test. You may not drive, take a bus or take a taxi by yourself.   No eating 8 hours before your exam. You may have clear liquids up until 4 hours before your exam. (Clear liquids include water, clear tea, black coffee and fruit juice without pulp.)  Please call the Imaging Department at your exam site with any questions.            May 24, 2017  9:30 AM CDT   (Arrive by 9:15 AM)   Return Visit with Gabbi Obando MD   [facility] ([facility])    [address] [place] [id number] [phone]            Jun 30, 2017 11:00 AM CDT   (Arrive by 10:45 AM)   Return Visit with Ede Sanchez MD   [facility] Rhe[facility] ([facility])    [address] [place] [id number] [phone]              Future tests that were ordered for you today     Open Future Orders        Priority Expected Expires Ordered    MR Brain w/o & w Contrast Routine  4/18/2018 4/18/2017    Acetylcholine receptor binding Routine  7/17/2017 4/18/2017            Who to contact     Please call your clinic at [phone] to:    Ask questions about your health    Make or cancel appointments    Discuss your medicines    Learn about your test results    Speak to your doctor   If you have compliments or concerns about an experience at your clinic, or if you wish to file a complaint, please contact [facility] Patient Relations at [phone] or email us at [email or web address]         Additional Information About Your Visit        [employer] Information     I-Pulse is an electronic gateway that provides easy, online access to your medical records. With I-Pulse, you can request a clinic appointment, read your test results, renew a prescription or communicate with your care team.     To sign up for I-Pulse visit the website at [email or web address]   You will be asked to enter the access code listed below, as well as some personal information. Please follow the directions to create your username and password.     Your access code is: [email or web address]  Expires: 2017  7:30 AM     Your access code will  in 90 days. If you need help or a new code, please contact your [facility] or call [phone] for assistance.        Care EveryWhere ID     This is your Care EveryWhere ID. This could be used by other organizations to access your [place] medical records  [id number]         Blood Pressure from Last 3 Encounters:   17 147/78   17 126/64   17 148/77    Weight from Last 3 Encounters:   17 84.4 kg (186 lb)   16 82.3 kg (181 lb 6.4 oz)   16 85.1 kg (187 lb 9.6 oz)              We Performed the Following     Glaucoma Top OU     IOP Measurement     OCT Optic Nerve RNFL Spectralis OU (both eyes)        Primary Care Provider    Provider Unknown       No address on file        Thank you!     Thank you for choosing [facility]  for your care. Our goal is always to provide you with excellent care. Hearing back from our patients is one way we can continue to improve our services. Please take a few minutes to complete the written survey that you may receive in the mail after your visit with us. Thank you!             Your Updated Medication List - Protect others around you: Learn how to safely use, store and throw away your  medicines at www.disposemymeds.org.          This list is accurate as of: 4/18/17 11:20 AM.  Always use your most recent med list.                   Brand Name Dispense Instructions for use    Blood Pressure Monitor Kit     1 kit    Automatic Blood Pressure Monitor       calcium 600 + D 600-400 MG-UNIT per tablet   Generic drug:  calcium-vitamin D      Take 1 tablet by mouth 2 times daily.       fluorouracil 5 % cream    EFUDEX    40 g    Apply twice daily to scalp for 2-4 weeks.       folic acid 1 MG tablet    FOLVITE    180 tablet    Take 2 tablets (2 mg) by mouth daily       gabapentin 400 MG capsule    NEURONTIN    90 capsule    Take 1 capsule (400 mg) by mouth At Bedtime       losartan 50 MG tablet    COZAAR    90 tablet    Take 1 tablet (50 mg) by mouth daily       methotrexate 2.5 MG tablet CHEMO     60 tablet    Take 5 tablets (12.5 mg) by mouth once a week       TYLENOL PO      Take 650 mg by mouth once as needed for mild pain or fever       vitamin D 1000 UNITS capsule      Take 1 capsule by mouth daily.

## 2017-04-18 NOTE — NURSING NOTE
Chief Complaints and History of Present Illnesses   Patient presents with     Diplopia Evaluation     INTERMITTENT     HPI    Symptoms:              Comments:  Joshua is a 71 year old male presenting with a history of:    1. Intermittent diplopia  - longstanding per patient  - happens twice a month. Lasts a minute at most  - no history of brain MRI.   - binocular, intermittent, vertical.     2. Had one episode of total vision loss OU. Lasted 30 seconds. 6-8 months ago. Resolved. No associated headaches.     Medina CAO 8:27 AM April 18, 2017

## 2017-04-20 LAB — ACHR BIND AB SER-SCNC: 0 NMOL/L

## 2017-05-24 ENCOUNTER — OFFICE VISIT (OUTPATIENT)
Dept: OTOLARYNGOLOGY | Facility: CLINIC | Age: 72
End: 2017-05-24

## 2017-05-24 DIAGNOSIS — J34.89 NASAL CRUSTING: Primary | ICD-10-CM

## 2017-05-24 DIAGNOSIS — K13.79 LESION OF PALATE: ICD-10-CM

## 2017-05-24 ASSESSMENT — PAIN SCALES - GENERAL: PAINLEVEL: NO PAIN (0)

## 2017-05-24 NOTE — MR AVS SNAPSHOT
After Visit Summary   2017    Joshua Ennis    MRN: [id number]           Patient Information     Date Of Birth          1945        Visit Information        Provider Department      2017 9:30 AM Gabbi Obando MD M [facility]         Follow-ups after your visit        Your next 10 appointments already scheduled     2017 11:00 AM CDT   (Arrive by 10:45 AM)   Return Visit with MD KILLIAN Pittman [facility] Rheumatology ([facility])    [address] [place] [id number] [phone]            Aug 30, 2017 10:15 AM CDT   (Arrive by 10:00 AM)   Return Visit with MD KILLIAN Jim [facility] ([facility])    [address] [place] [id number] [phone]              Who to contact     Please call your clinic at [phone] to:    Ask questions about your health    Make or cancel appointments    Discuss your medicines    Learn about your test results    Speak to your doctor   If you have compliments or concerns about an experience at your clinic, or if you wish to file a complaint, please contact [facility] Patient Relations at [phone] or email us at [email or web address]         Additional Information About Your Visit        MyChart Information     [employer] is an electronic gateway that provides easy, online access to your medical records. With [employer], you can request a clinic appointment, read your test results, renew a prescription or communicate with your care team.     To sign up for [employer] visit the website at [email or web address]   You will be asked to enter the access code listed below, as well as some personal information. Please follow the directions to create your username and password.     Your access code is: [email or web address]  Expires: 2017 10:30 AM     Your access code will   in 90 days. If you need help or a new code, please contact your [facility] or call [phone] for assistance.        Care EveryWhere ID     This is your Care EveryWhere ID. This could be used by other organizations to access your [facility] medical records  [id number]         Blood Pressure from Last 3 Encounters:   03/03/17 147/78   02/23/17 126/64   02/14/17 148/77    Weight from Last 3 Encounters:   03/03/17 84.4 kg (186 lb)   11/18/16 82.3 kg (181 lb 6.4 oz)   08/12/16 85.1 kg (187 lb 9.6 oz)              Today, you had the following     No orders found for display       Primary Care Provider    Provider Unknown       No address on file        Thank you!     Thank you for choosing [facility] E[facility] TH[facility]AT  for your care. Our goal is always to provide you with excellent care. Hearing back from our patients is one way we can continue to improve our services. Please take a few minutes to complete the written survey that you may receive in the mail after your visit with us. Thank you!             Your Updated Medication List - Protect others around you: Learn how to safely use, store and throw away your medicines at www.disposemymeds.org.          This list is accurate as of: 5/24/17 10:30 AM.  Always use your most recent med list.                   Brand Name Dispense Instructions for use    Blood Pressure Monitor Kit     1 kit    Automatic Blood Pressure Monitor       calcium 600 + D 600-400 MG-UNIT per tablet   Generic drug:  calcium-vitamin D      Take 1 tablet by mouth 2 times daily.       fluorouracil 5 % cream    EFUDEX    40 g    Apply twice daily to scalp for 2-4 weeks.       folic acid 1 MG tablet    FOLVITE    180 tablet    Take 2 tablets (2 mg) by mouth daily       gabapentin 400 MG capsule    NEURONTIN    90 capsule    Take 1 capsule (400 mg) by mouth At Bedtime       losartan 50 MG tablet    COZAAR    90 tablet    Take 1 tablet (50 mg) by mouth daily        methotrexate 2.5 MG tablet CHEMO     60 tablet    Take 5 tablets (12.5 mg) by mouth once a week       TYLENOL PO      Take 650 mg by mouth once as needed for mild pain or fever       vitamin D 1000 UNITS capsule      Take 1 capsule by mouth daily.

## 2017-05-24 NOTE — PATIENT INSTRUCTIONS
Plan of care:  Follow up with Dr Obando in 3 months  Clinic contact information:  1. To schedule an appointment call [phone], option 1  2. To talk to the Triage RN call [phone], option 3  3. If you need to speak to Tena or get a message to your doctor on a Friday, call the triage RN  4. TenaRN: [phone]  5. Surgery scheduling:      Gillian Woo: [phone]      Barbra Vivar: [phone]  6. Fax: [phone]  7. [phone]

## 2017-05-24 NOTE — NURSING NOTE
Chief Complaint   Patient presents with     RECHECK     Return F/U Nose      Pt states no pain today.    N Lukasz PONCEN

## 2017-05-24 NOTE — LETTER
5/24/2017       RE: Joshua Ennis  [address] [facility] [address]     Dear Colleague,    Thank you for referring your patient, Joshua Ennis, to the [facility] E[facility] TH[facility] at [facility]. Please see a copy of my visit note below.    HISTORY OF PRESENT ILLNESS:  Joshua Ennis is a patient I have seen before for a crusted nasal lesion on the left.  I prescribed mupirocin ointment.  He was using it for a while, had some improvement, and then stopped using it.  He is now having nasal crusting.  He also saw Dr. Sanchez and she noted a melanotic lesion on his left soft palate.  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  He is here for evaluation of that as well.      PHYSICAL EXAMINATION:  Exam reveals that in his oral cavity, he has a small bluish pigmented lesion in the left soft palate.  This could be a venous lake or could be a melanotic lesion.      ASSESSMENT AND PLAN:  I have recommended biopsy, but he would like to hold off on that for now.  He is not sure how long it has been there.  It was just noticed recently by Dr. Sanchez.  He does follow frequently with Rheumatology.  For his nose, I cleaned out some of the crusting in the nose and performed silver nitrate cauterization.  I also would consider biopsying that in the future if it continues to be problematic.  His wife states that he has had intermittent hoarseness.  His voice comes and goes.  Currently, he is not significantly hoarse.  He is a previous smoker.  When he returns, we will discuss whether or not undergoing biopsy is reasonable.  If he develops persistent hoarseness, I think laryngoscopy would be indicated and I will also reexamine his nasal lesion.  I would like to see him back in two to three months.         Again, thank you for allowing me to participate in the care of your patient.      Sincerely,    Gabbi Obando MD

## 2017-05-24 NOTE — PROGRESS NOTES
HISTORY OF PRESENT ILLNESS:  Joshua Ennis is a patient I have seen before for a crusted nasal lesion on the left.  I prescribed mupirocin ointment.  He was using it for a while, had some improvement, and then stopped using it.  He is now having nasal crusting.  He also saw Dr. Sanchez and she noted a melanotic lesion on his left soft palate.  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  He is here for evaluation of that as well.      PHYSICAL EXAMINATION:  Exam reveals that in his oral cavity, he has a small bluish pigmented lesion in the left soft palate.  This could be a venous lake or could be a melanotic lesion.      ASSESSMENT AND PLAN:  I have recommended biopsy, but he would like to hold off on that for now.  He is not sure how long it has been there.  It was just noticed recently by Dr. Sanchez.  He does follow frequently with Rheumatology.  For his nose, I cleaned out some of the crusting in the nose and performed silver nitrate cauterization.  I also would consider biopsying that in the future if it continues to be problematic.  His wife states that he has had intermittent hoarseness.  His voice comes and goes.  Currently, he is not significantly hoarse.  He is a previous smoker.  When he returns, we will discuss whether or not undergoing biopsy is reasonable.  If he develops persistent hoarseness, I think laryngoscopy would be indicated and I will also reexamine his nasal lesion.  I would like to see him back in two to three months.

## 2017-05-31 ENCOUNTER — TELEPHONE (OUTPATIENT)
Dept: OTOLARYNGOLOGY | Facility: CLINIC | Age: 72
End: 2017-05-31

## 2017-05-31 DIAGNOSIS — J34.89 INFECTION OF NOSE: Primary | ICD-10-CM

## 2017-05-31 RX ORDER — MUPIROCIN 20 MG/G
OINTMENT TOPICAL
Qty: 22 G | Refills: 0 | Status: SHIPPED | OUTPATIENT
Start: 2017-05-31 | End: 2017-06-10

## 2017-06-30 ENCOUNTER — OFFICE VISIT (OUTPATIENT)
Dept: RHEUMATOLOGY | Facility: CLINIC | Age: 72
End: 2017-06-30
Attending: INTERNAL MEDICINE
Payer: COMMERCIAL

## 2017-06-30 VITALS
SYSTOLIC BLOOD PRESSURE: 129 MMHG | OXYGEN SATURATION: 99 % | BODY MASS INDEX: 27.37 KG/M2 | HEIGHT: 69 IN | WEIGHT: 184.8 LBS | TEMPERATURE: 98.8 F | HEART RATE: 61 BPM | DIASTOLIC BLOOD PRESSURE: 77 MMHG

## 2017-06-30 DIAGNOSIS — I12.9 HYPERTENSION, RENAL, STAGE 1-4 OR UNSPECIFIED CHRONIC KIDNEY DISEASE: ICD-10-CM

## 2017-06-30 DIAGNOSIS — Z79.899 HIGH RISK MEDICATIONS (NOT ANTICOAGULANTS) LONG-TERM USE: ICD-10-CM

## 2017-06-30 LAB
ALBUMIN SERPL-MCNC: 3.7 G/DL (ref 3.4–5)
ALBUMIN UR-MCNC: NEGATIVE MG/DL
ALP SERPL-CCNC: 101 U/L (ref 40–150)
ALT SERPL W P-5'-P-CCNC: 22 U/L (ref 0–70)
ANION GAP SERPL CALCULATED.3IONS-SCNC: 6 MMOL/L (ref 3–14)
APPEARANCE UR: CLEAR
AST SERPL W P-5'-P-CCNC: 16 U/L (ref 0–45)
BASOPHILS # BLD AUTO: 0.1 10E9/L (ref 0–0.2)
BASOPHILS NFR BLD AUTO: 1 %
BILIRUB SERPL-MCNC: 0.7 MG/DL (ref 0.2–1.3)
BILIRUB UR QL STRIP: NEGATIVE
BUN SERPL-MCNC: 24 MG/DL (ref 7–30)
CALCIUM SERPL-MCNC: 9.1 MG/DL (ref 8.5–10.1)
CHLORIDE SERPL-SCNC: 107 MMOL/L (ref 94–109)
CO2 SERPL-SCNC: 27 MMOL/L (ref 20–32)
COLOR UR AUTO: YELLOW
CREAT SERPL-MCNC: 1.26 MG/DL (ref 0.66–1.25)
CREAT UR-MCNC: 115 MG/DL
CREAT UR-MCNC: 115 MG/DL
CRP SERPL-MCNC: <2.9 MG/L (ref 0–8)
DIFFERENTIAL METHOD BLD: NORMAL
EOSINOPHIL # BLD AUTO: 0.4 10E9/L (ref 0–0.7)
EOSINOPHIL NFR BLD AUTO: 5.7 %
ERYTHROCYTE [DISTWIDTH] IN BLOOD BY AUTOMATED COUNT: 12.6 % (ref 10–15)
ERYTHROCYTE [SEDIMENTATION RATE] IN BLOOD BY WESTERGREN METHOD: 6 MM/H (ref 0–20)
GFR SERPL CREATININE-BSD FRML MDRD: 57 ML/MIN/1.7M2
GLUCOSE SERPL-MCNC: 104 MG/DL (ref 70–99)
GLUCOSE UR STRIP-MCNC: NEGATIVE MG/DL
HCT VFR BLD AUTO: 45.8 % (ref 40–53)
HGB BLD-MCNC: 14.8 G/DL (ref 13.3–17.7)
HGB UR QL STRIP: NEGATIVE
IMM GRANULOCYTES # BLD: 0 10E9/L (ref 0–0.4)
IMM GRANULOCYTES NFR BLD: 0.3 %
KETONES UR STRIP-MCNC: NEGATIVE MG/DL
LEUKOCYTE ESTERASE UR QL STRIP: NEGATIVE
LYMPHOCYTES # BLD AUTO: 1.2 10E9/L (ref 0.8–5.3)
LYMPHOCYTES NFR BLD AUTO: 18.3 %
MCH RBC QN AUTO: 31.4 PG (ref 26.5–33)
MCHC RBC AUTO-ENTMCNC: 32.3 G/DL (ref 31.5–36.5)
MCV RBC AUTO: 97 FL (ref 78–100)
MONOCYTES # BLD AUTO: 0.6 10E9/L (ref 0–1.3)
MONOCYTES NFR BLD AUTO: 10.2 %
MUCOUS THREADS #/AREA URNS LPF: PRESENT /LPF
NEUTROPHILS # BLD AUTO: 4 10E9/L (ref 1.6–8.3)
NEUTROPHILS NFR BLD AUTO: 64.5 %
NITRATE UR QL: NEGATIVE
NRBC # BLD AUTO: 0 10*3/UL
NRBC BLD AUTO-RTO: 0 /100
PH UR STRIP: 5 PH (ref 5–7)
PLATELET # BLD AUTO: 177 10E9/L (ref 150–450)
POTASSIUM SERPL-SCNC: 4.7 MMOL/L (ref 3.4–5.3)
PROT SERPL-MCNC: 7 G/DL (ref 6.8–8.8)
PROT UR-MCNC: 0.15 G/L
PROT/CREAT 24H UR: 0.13 G/G CR (ref 0–0.2)
RBC # BLD AUTO: 4.71 10E12/L (ref 4.4–5.9)
RBC #/AREA URNS AUTO: 1 /HPF (ref 0–2)
SODIUM SERPL-SCNC: 140 MMOL/L (ref 133–144)
SP GR UR STRIP: 1.02 (ref 1–1.03)
URN SPEC COLLECT METH UR: ABNORMAL
UROBILINOGEN UR STRIP-MCNC: 0 MG/DL (ref 0–2)
WBC # BLD AUTO: 6.3 10E9/L (ref 4–11)
WBC #/AREA URNS AUTO: 1 /HPF (ref 0–2)

## 2017-06-30 PROCEDURE — 81001 URINALYSIS AUTO W/SCOPE: CPT | Performed by: INTERNAL MEDICINE

## 2017-06-30 PROCEDURE — 85652 RBC SED RATE AUTOMATED: CPT | Performed by: INTERNAL MEDICINE

## 2017-06-30 PROCEDURE — 83516 IMMUNOASSAY NONANTIBODY: CPT | Performed by: INTERNAL MEDICINE

## 2017-06-30 PROCEDURE — 99212 OFFICE O/P EST SF 10 MIN: CPT | Mod: ZF

## 2017-06-30 PROCEDURE — 80053 COMPREHEN METABOLIC PANEL: CPT | Performed by: INTERNAL MEDICINE

## 2017-06-30 PROCEDURE — 84156 ASSAY OF PROTEIN URINE: CPT | Performed by: INTERNAL MEDICINE

## 2017-06-30 PROCEDURE — 83876 ASSAY MYELOPEROXIDASE: CPT | Performed by: INTERNAL MEDICINE

## 2017-06-30 PROCEDURE — 86255 FLUORESCENT ANTIBODY SCREEN: CPT | Performed by: INTERNAL MEDICINE

## 2017-06-30 PROCEDURE — 36415 COLL VENOUS BLD VENIPUNCTURE: CPT | Performed by: INTERNAL MEDICINE

## 2017-06-30 PROCEDURE — 85025 COMPLETE CBC W/AUTO DIFF WBC: CPT | Performed by: INTERNAL MEDICINE

## 2017-06-30 PROCEDURE — 86140 C-REACTIVE PROTEIN: CPT | Performed by: INTERNAL MEDICINE

## 2017-06-30 RX ORDER — LOSARTAN POTASSIUM 50 MG/1
50 TABLET ORAL DAILY
Qty: 90 TABLET | Refills: 3 | Status: SHIPPED | OUTPATIENT
Start: 2017-06-30 | End: 2018-08-27

## 2017-06-30 ASSESSMENT — PAIN SCALES - GENERAL: PAINLEVEL: NO PAIN (0)

## 2017-06-30 NOTE — PROGRESS NOTES
[facility] Follow-Up Note  Date of visit: 6/30/2017  Last visit date:  3/3/17      Joshua Ennis MRN# [id number]   Age: 72 year old YOB: 1945          Assessment and Plan:     Assessment:   Mr. Ennis is a 72 yr old  male with history of melanoma s/p resection in 11/2011, former tobacco use, and GPA (PR3+, MPO and c-ANCA negative in 3/2013 based on aforementioned labs, confirmed by kidney and lung biopsy) who presents for clinic follow-up regarding GPA.  He was initiated on cyclophosphamide 150 mg QD and high-dose prednisone 3/2013. He last took prednisone in 10/2013. Cytoxan was switched to MTX for maintenance treatment in 10/2013.  He has been tolerating it well. He had a flare in 12/2013 with increased crusts in his nose along with recurrence of arthralgia, worsening numbness in feet and increasing vasculitis marker. Renal function was stable with negative repeat chest CT. His vasculitis flare was treated with short course of prednisone taper 20 mg po qd max and increasing MTX to 8 tab = 20 mg qwk. Vasculitis symptoms improved.  At visit on 1/2015, he had scabs in his nose, had cold [place] symptoms and increased arthralgia/fatigue (shoulders, L hand). Labs from 12/16 showed increased Cr level and hematuria with rising PR3 (more than 8), there was a concern for vasculitis flare (in kidneys, sinuses), no flare on repeat chest CT 1/2015. Therefore it was recommended to try rituximab. Another reason was to be able to taper MTX off given abnormal Cr.  He is now s/p Rituximab infusion x 4 (1st on 2/25/2015). He is feeling well. No hematuria with stable Cr, no SOB. Saw ENT with negative nasal mucosa biopsy 6/2015 for granuloma but showed active inflammation, had left nostril lesion which decreased in size by use of mupirocin ointment. PR3 vasculitis marker went back to NL in 6/2015, it was NL in 10/2015, given stable vasculitis and low GFR, MTX from 8 to 7 tab po qwk. Repeat GA-3  in 3/2016 was slightly positive, Cr was slightly higher than baseline. We tapered his MTX further to 6 tab po qwk in 2/2016 given lack of symptoms. Re-check labs in April were almost unchanged from 3/2016 but CRP was slightly up. Labs on 2/7/17 with elevated CRP (40.6) but normal ESR (12) and ANCA 1:20 likely reflecting bacterial infection.    In 3/2017, MTX was reduced from 6 to 5 tab qwk. Labs in 4/2017 were stable except slight rise in PR3, ANCA was negative. Vasculitis seems to be stable and he has no signs or symptoms of flare up today; therefore will reduce MTX to 4 tab po qwk with checking labs today and in 2 months. Was advised to call in case of vasculitis flare up sx.        Plan:    - Decrease MTX from 12.5 to 10 mg (4 tabs) po qwk, continue with Folic acid     > Labs today and in 2 months including MTX monitoring labs    > Patient instructed to hold MTX if develops fevers or infection  - Neuropathy unchanged on Gabapentin  - PPSV23 vaccine today  - RTC in 4-5 months        Orders Placed This Encounter   Procedures     CBC with platelets differential     CRP inflammation     Erythrocyte sedimentation rate auto     Routine UA with Micro Reflex to Culture     Protein  random urine     Creatinine random urine     Comprehensive metabolic panel     Antineutrophil cytoplasmic Anju IgG     Vasculitis panel             HPI / Interim History:      Mr. Ennis is a 71 yo WM with h/o melanoma s/p resection in 11/2011, former tobacco use, and GPA diagnosed in 3/2013. For details of GPA diagnosis, please refer to initial consult note. Briefly, he initially presented with constitutional sx of fever, sweating, weight loss, migratory arthralgia, numbness/shooting pain in feet to his PCP.  Had enlarged inguinal LNs. His PCP suspected malignancy and especially lung cancer given h/o tobacco use.  His work-up showed several lung nodules and CT guided biopsy of one of the nodules done in 2/13 showed granulomatous inflammation  with no evidence of malignancy.  Inguinal LN biopsy done 3/13 showed inflammation with no malignancy.  He had not had any respiratory sx, SOB, CP, cough, hemoptysis or sinusitis.  He was hospitalized for further work up and was found having high PR3, neg MPO, neg ANCA.  He also had + RF and trace cryo.   Had microscopic hematuria with NL Cr at the time of admission but his Cr started to rise before discharge. Had abnormal LFTs toward the end of discharge(was trending down, liver US was unremarkable).  He was diagnosed with GPA given high titer of PR3 to 723, +granulomatous inflammation of the lung nodule, microscopic hematuria, and migratory arthralgia along with high ESR/CRP and constitutional sx.  He was Started on cytoxan 150 mg po qd, prednisone 70 mg po qd (after IV solumedrol 1 gr qd x 3 days), Bactrim DS three times a wk for PCP prophylaxis and fosamax 70 mg po qwk. He was also put on vit D 42334 units qwk and neurontin.  He was discharged on 3/13/2013.   At initial visit in Rheumatology clinic, patient was referred to nephrology for renal biopsy since Cr was up despite being on cytoxan and prednisone 75 qd.  Renal biopsy 3/13 confirmed Dx of GPA.  After discussion with Dr. Krishnamurthy, made a decision to continue with current regimen and if no improvement to switch to IV cytoxan or rituximab.  His Cr initially increased to 1.71, but stabilized to ~ 1.3.  He has a h/o high BP and was recently started on losartan per nephology.  He has been seen by neurology for bilaterally foot and ankle numbness and was diagnosed with neuropathy possibly secondary to vasculitis.  He was also seen by oncology; there was no concern for malignancy.   Overall, the patient is doing well today with no specific complaints or concerns.  He had recent blood work 9/19 which showed negative inflammatory and vasculitis markers (PR3, MPO, ANCA, and CRP).  His SCr has remained elevated, but stable at 1.37.  He is at the tail end of a prednisone  taper; currently taking 1 mg QD, set to d/c 10/15.  He is still on cyclophosphamide, taking 150 mg QD.  He was prescribed losartan at a f/u visit w/ nephrology 8/21, but states his BP have been running 130's/80's, so has not taken the medication for over a month.  Additionally, he had been taking neurontin for numbness in his feet, toes, and ankles, but stopped b/c he was not getting symptom relief.  Today, the patient denies vision changes, epistaxis, oral ulcerations, SOB, hemoptysis, CP, joint pain, abdominal pain, dysuria, or hematuria.  He does endorse a cough, but states that this has been intermittent, chronic, and non-productive.  Additionally, he endorses right shoulder discomfort that is worse w/ abduction, but has also been chronic. Of note, the patient remains a former smoker, having quit in February of this year.     His major complaint is increased numbness of his toes, but has resumed taking neurontin and feels sensation is coming back which is painful. No SOB, cough, crusts in nose or blood in urine. He has fatigue. He is off cytoxan since 10/2013, is on MTX 8tab po qwk. MTX was started with 4 tab po qwk in 10/2013, was increased to 8 tab after last visit in 1/2014. In 12/2013, was felt to have a small flare of vasculitis with increased PR3, crusts in nose and R shoulder pain; therefore prednisone taper with max dose of 20 mg qd was given along with increasing MTX to 7 tab qwk. MTX was later increased to 8 tab qwk in 1/2014. Labs done in 2/2014 showed rising PR3. He had ER visit on 1/8 for urosepsis (E. Coli), was treated with cedifir, recovered. Continues having shoulder pain, requests referral to PT.    1/2015: He reports having increased arthralgia (shoulders, L hand) x a month. About 2 wk ago, L hand pain was so bad that he could not move his hand. No major joint swelling. Has no AM stiffness. Reports being tested negative for lyme (local lab) about 2 wk ago. Appetite is worse. He is more tired.  He sleeps a lot. Has a cold x 1 wk, no hemoptysis. Neuropathy is the same. No SOB. No fever. Reports having a rash under L axilla x last 2 wk, comes and goes but to him looks like a lyme rash. He thinks he probably had a tick bite.    Last visit 3/2015: Rituximab qwk x 4 doses was recommended at last visit given concern for active GPA. He had his 1st infusion on 2/25/2015, right after start of infusion, developed hives without any resp sx, was treated with extra dose of benadryl and solu cortef. Hives resolved. Infusion was resumed at a slower rate and he finished and tolerated it well. Had his 2nd infusion on 3/4/2015 with no reaction although this time I increased his solumedrol from 100 to 250 mg as part of pre-medication. He has since completed all 4 infusions w/o reaction. States that nasal and joint symptoms remain improved. He still does have some crusting in his left nostril. Does have some pain and swelling in the hands w/minimal morning stiffness but that is his baseline. Says pain in his shoulders has improved. He denies any medication side effects.    He was seen in the ED 4/3/15 for productive cough and fever. He was treated with Levaquin for likely pna. He is now feeling better with only lingering cough.    10/16/2015: He is feeling well, has no complaints except cough at night because of sinus drainage (chronic). Requests pneumonia shot.    2/2016: Today patient reports feeling in excellent condition. Denies chest symptoms, such as SOB, hemoptysis, cough, or nasal drainage/bleeding. He also denies any hematuria and his last renal labs were stable. Unfortunately, his neuropathy seems to be his biggest problem. Neurology started him on a low dose of gabapentin without improvement.     5/6/16: Besides stuffy nose due to seasonal allergies, has no complaints. No cough, SOB, hemoptysis, hematuria, weight loss or fatigue.    8/12/16: Patient has no complaints today. No cough, SOB, hemoptysis, hematuria,  "weight loss or fatigue. He was seen today in Nephrology clinic for CKD follow up, doing well, no further follow up is needed. Patient reports visual disturbance with decreased vision probably on the R eye (linden does not remember) with decreased to none visual strength in low visual field with some \"blanks\" which lasted for 40 seconds. This is a second episode in the last 1.5-2 years. Patient also reports recurrent double vision occuring every 4-5 months. Last ophthalmologist appointment was 1986.     11/18/16: Feeling very well, no complaints. Is going to have an eye exam today.    3/3/17: Mr. Ennis says he is doing well overall. He is still recovering from a pneumonia diagnosed on 2/14/17. He says that he was feeling ill for about 2 weeks before this with worsening productive cough, nasal congestion and high fevers with \"fever blisters\". He went to the ED on 2/14/17 and received ceftriaxone and a Z-pack and says he has been improving since then. He says that many other people at his work were sick with similar symptoms. Today he reports some residual nasal congestion but denies shortness of breath. He says that during his illness he did have some blood in his nose \"if I picked at it\" but he otherwise denies epistaxis, hemoptysis, hematuria and blood in his stools. His significant other is wondering if his methotrexate dose can start to be tapered down. Currently he is taking MTX 15mg weekly although this was held when he had pneumonia.    Mr. Ennis says that he continues to experience episodes of diplopia on average once per month. These episodes typically last 20-30 seconds. He was seen by ophthalmology on 11/18/16 and had a normal eye exam.    Today: Feeling well with no complaints. On MTX 5 tab po qwk.         Past Medical History:     Past Medical History:   Diagnosis Date     Arthritis      Autoimmune disease (H)      Hearing problem      Hoarseness      Hypertension      Kidney problem      Malignant " melanoma (H)      Malignant neoplasm (H)     melanoma     Squamous cell carcinoma (H)      Russo syndrome           Past Surgical History:     Past Surgical History:   Procedure Laterality Date     BIOPSY  11/2011    melanoma on back     DISSECT LYMPH NODE INGUINAL  3/1/2013    Procedure: DISSECT LYMPH NODE INGUINAL;  Bilateral Inguinal Lymph Node Biopsy.;  Surgeon: Tariq Acosta MD;  Location: [facility]     ESOPHAGOSCOPY, GASTROSCOPY, DUODENOSCOPY (EGD), COMBINED  7/5/2013    Procedure: COMBINED ESOPHAGOSCOPY, GASTROSCOPY, DUODENOSCOPY (EGD);  Gastroscopy;  Surgeon: Tariq Acosta MD;  Location: [facility]     HERNIORRHAPHY INGUINAL  8/6/2012    Procedure: HERNIORRHAPHY INGUINAL;  Open Repair Left Inguinal Hernia;  Surgeon: Jerad Baker MD;  Location: [facility]          Social History:     Social History   Substance Use Topics     Smoking status: Former Smoker     Packs/day: 1.00     Years: 20.00     Types: Cigarettes     Quit date: 2/14/2013     Smokeless tobacco: Never Used     Alcohol use Yes      Comment: rare          Family History:     Family History   Problem Relation Age of Onset     DIABETES Mother      Hypertension Mother      CANCER Father      stomach     HEART DISEASE Father      HEART DISEASE Brother      DIABETES Sister      DIABETES Sister      DIABETES Sister      HEART DISEASE Brother      CANCER Sister      Glaucoma No family hx of      Macular Degeneration No family hx of           Immunizations:   Due for PPSV23, will administer today. Immunizations otherwise up to date.    PMHx, FHx, SHx were reviewed, unchanged.         Allergies:     Allergies   Allergen Reactions     Shrimp Nausea and Vomiting     Got sick each time he ate it          Medications:     Current Outpatient Prescriptions   Medication Sig     methotrexate 2.5 MG tablet CHEMO Take 5 tablets (12.5 mg) by mouth once a week     folic acid (FOLVITE) 1 MG tablet Take 2 tablets (2 mg) by mouth daily     gabapentin  "(NEURONTIN) 400 MG capsule Take 1 capsule (400 mg) by mouth At Bedtime     Acetaminophen (TYLENOL PO) Take 650 mg by mouth once as needed for mild pain or fever     losartan (COZAAR) 50 MG tablet Take 1 tablet (50 mg) by mouth daily     calcium-vitamin D (CALCIUM 600 + D) 600-400 MG-UNIT per tablet Take 1 tablet by mouth 2 times daily.     Cholecalciferol (VITAMIN D) 1000 UNITS capsule Take 1 capsule by mouth daily.     fluorouracil (EFUDEX) 5 % cream Apply twice daily to scalp for 2-4 weeks. (Patient not taking: Reported on 5/24/2017)     Blood Pressure Monitor KIT Automatic Blood Pressure Monitor (Patient not taking: Reported on 5/24/2017)     No current facility-administered medications for this visit.           Review of Systems:   A 10-point ROS was done. Positives are per HPI.         Physical Exam:     Vitals:    06/30/17 1053   BP: 129/77   Pulse: 61   Temp: 98.8  F (37.1  C)   TempSrc: Oral   SpO2: 99%   Weight: 83.8 kg (184 lb 12.8 oz)   Height: 1.753 m (5' 9\")     Constitutional:   Awake, alert, cooperative, no apparent distress.   Eyes:   Pupils equal, round and reactive to light, sclera clear, conjunctiva normal.   ENT:   Normocephalic, atramatic,  oral pharynx with moist mucus membranes, tonsils without erythema or exudates, dentures in place. Small dark purple/black macule left soft palate.   Neck:   Supple, symmetrical, trachea midline, no adenopathy.   Lungs:   No increased work of breathing, good air exchange, clear to auscultation bilaterally, no crackles or wheezing.    Cardiovascular:   Regular rate and rhythm, normal S1 and S2, no S3 or S4, and no murmur noted.   Musculoskeletal:   No active synovitis or joint tenderness. Good ROM in all joints tested. No edema in LE.   Neurologic:   AOx3; CN grossly intact.   Skin:   No rashes or ecchymoses on limited exam.          Data:   Recent laboratory data reviewed.    2/14/17  Cr 1.46, WBC 7.8, AST/ALT normal    2/7/17  CRP 40.6, ESR 12, ANCA 1:20, MPO " neg, NM-3 neg    Recent imaging studies reviewed by me.    CXR (2/14/17)    New mild patchy opacity at the right lung base appears to  localize to the posterior right lower lobe base and could represent a  developing area of pneumonia. Left lung is clear.    CHEST CT (2/16/2013)    Chest: Multiple indeterminate pulmonary nodules. Several of the  larger lesions are cavitary. There are 3 dominant lesions, a 3.2 cm  cavitary lesion in the left upper lobe, a 3.4 cm solid mass in the  right lower lobe laterally and a 3.7 cm cavitary mass in the right  lower lobe medially. In addition there is mediastinal lymphadenopathy  with a dominant 3 cm subcarinal node. Bilateral axillary  lymphadenopathy with 2.4 cm node seen bilaterally. Chest otherwise  unremarkable.       Exam: High-resolution Chest CT without contrast 1/9/2015 12:42 PM.  History: Concern for ANCA vasculitis flare in the chest.  Comparison: 3/7/2014, 11/14/2013, 2/16/2013.  Technique: CT helical acquisition from the lung apices to the kidneys  was obtained without intravenous contrast. Both inspiratory and  expiratory images were obtained.    Findings:  Moderate atherosclerotic calcifications of the coronary arteries. Mild  calcifications involving the aorta and aortic great vessels. Prominent  paratracheal lymph node on series 3 image 22 measuring 9 mm. Decreased  from 2/16/2013 and unchanged from 3/7/2014. Borderline enlarged right  hilar lymph node, unchanged from 3/7/2014 and decreased from  2/16/2013. Heart is not enlarged. Trace cardial effusion. No axillary  lymphadenopathy.  Nodular scarring in the left apex, unchanged from 2/16/2013. No  pleural effusion or pneumothorax. No evidence of intrapulmonary  infection. There is a cluster of tree in bud nodularity noted in the  anteromedial right upper lobe. These nodules appear new. Expiratory  images demonstrate mild air trapping. Scattered pulmonary nodules:  Series 6 image 146: Seen posteriorly in the right  lower lobe, there is  a sub-pleural nodule measuring 1.6 x 1.2 cm with a spiculated border.  Previously, this nodule measured 2.0 x 1.6 cm.  Series 6 image 189: Seen laterally in the right lower lobe, there is a  former mid pulmonary nodule which is unchanged from 3/7/2013 and  decreased from 2/16/2013.  Series 6 image 169: Seen posterolaterally in the right lower lobe,  there is a 3 mm pulmonary nodule which is unchanged from 3/7/2014 and  decreased from 2/16/2013.  Series 6 image 225: Seen posterolaterally in the left lower lobe,  there is a 4 mm subpleural nodule which is unchanged from 2/16/2014.  Other scattered sub-4 mm pulmonary nodules appear stable from previous  study.  Evaluation of the upper abdomen is limited due to the lack of  intravenous contrast.  No suspicious bony lesions.    IMPRESSION  Impression:   1. Scattered pulmonary nodules enlarged lymph nodes are  stable/slightly decreased from the previous study. No evidence for  acute flare in patient's known ANCA-associated vasculitis.  2. New tree in bud nodularity seen anteromedially in the right middle  lobe. Findings could be seen in the setting of an atypical infectious  process.  3. Mild air trapping. Finding is nonspecific and is seen in setting of  small airways disease such as asthma or bronchiolitis.  I have personally reviewed the examination and initial interpretation  and I agree with the findings.  TOÑO PERKINS MD    Component      Latest Ref Rng 6/11/2015 6/11/2015           9:26 AM  9:29 AM   Sodium      133 - 144 mmol/L  139   Potassium      3.4 - 5.3 mmol/L  4.4   Chloride      94 - 109 mmol/L  109   Carbon Dioxide      20 - 32 mmol/L  23   Anion Gap      3 - 14 mmol/L  7   Glucose      70 - 99 mg/dL  81   Urea Nitrogen      7 - 30 mg/dL  28   Creatinine      0.66 - 1.25 mg/dL  1.26 (H)   GFR Estimate      >60 mL/min/1.7m2  57 (L)   GFR Estimate If Black      >60 mL/min/1.7m2  69   Calcium      8.5 - 10.1 mg/dL  9.2   Phosphorus       2.5 - 4.5 mg/dL  2.1 (L)   Albumin      3.4 - 5.0 g/dL  1.7 (L)   Iron      35 - 180 ug/dL  129   Iron Binding Cap      240 - 430 ug/dL  292   Iron Saturation Index      15 - 46 %  44   Protein Random Urine       0.11    Protein Total Urine g/gr Creatinine      0 - 0.2 g/g Cr 0.14    Hemoglobin      13.3 - 17.7 g/dL  13.0 (L)   Ferritin      26 - 388 ng/mL  193   Parathormone Intact      12 - 72 pg/mL  49   Vitamin D Deficiency screening      30 - 75 ug/L  55   Creatinine Urine       84    Copath Report           Proteinase 3 Antibody IgG      0.0 - 0.9 AI       Component      Latest Ref Rng 6/11/2015 6/11/2015          11:15 AM 12:18 PM   Copath Report       Patient Name: ADONAY FAITH . . .    Proteinase 3 Antibody IgG      0.0 - 0.9 AI  0.8     Copath Report     Patient Name: ADONAY FAITH  MR#: [id number]  Specimen #: [id number]  Collected: 6/11/2015  Received: 6/11/2015  Reported: 6/12/2015 17:37  Ordering Phy(s): DANIELLE PARIS    SPECIMEN(S):  Nasal septum    FINAL DIAGNOSIS:  Nasal septum, biopsy:  -Squamous mucosa with ulcer, marked acute inflammation and reactive  changes  -No granulomas identified  -A special stain for fungi (GMS) is negative    I have personally reviewed all specimens and or slides, including the  listed special stains, and used them with my medical judgement to  determine the final diagnosis.    Electronically signed out by:    Dorys Barton M.D., [facility]    CLINICAL HISTORY:  The patient is a 70-year-old man with a history of left upper back  melanoma, resected in 2011 (see consult report [id number]). 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 He now presents for followup visit with nasal  cavity scabs, increased arthralgia and fatigue, concerning for  vasculitis flare. Operative  "procedure: Nasal septum biopsy.    GROSS:  The specimen is received in formalin with proper patient identification,  labeled \"septal tissue\". The specimen consists of three tan-pink tissue  fragments, 0.2 cm in greatest dimension, entirely submitted in cassette  1. (Dictated by: Ana Mixon 6/11/2015 03:39 PM)    MICROSCOPIC:  Microscopic examination is performed. A GMS stain, performed with  appropriate positive control, is negative.    CPT Codes:  A: 51511-LY5, 33716-ADN    TESTING LAB LOCATION:  [facility], [address] [address]  [phone]    COLLECTION SITE:  Client: [facility]  Location: [facility] (B)     Component      Latest Ref Rng 12/10/2015   WBC      4.0 - 11.0 10e9/L 5.8   RBC Count      4.4 - 5.9 10e12/L 4.32 (L)   Hemoglobin      13.3 - 17.7 g/dL 13.9   Hematocrit      40.0 - 53.0 % 42.0   MCV      78 - 100 fl 97   MCH      26.5 - 33.0 pg 32.2   MCHC      31.5 - 36.5 g/dL 33.1   RDW      10.0 - 15.0 % 12.5   Platelet Count      150 - 450 10e9/L 199   Diff Method       Automated Method   % Neutrophils       70.2   % Lymphocytes       10.8   % Monocytes       9.6   % Eosinophils       7.7   % Basophils       1.7   Absolute Neutrophil      1.6 - 8.3 10e9/L 4.1   Absolute Lymphocytes      0.8 - 5.3 10e9/L 0.6 (L)   Absolute Monoctyes      0.0 - 1.3 10e9/L 0.6   Absolute Eosinophils      0.0 - 0.7 10e9/L 0.5   Absolute Basophils      0.0 - 0.2 10e9/L 0.1   Sodium      133 - 144 mmol/L 140   Potassium      3.4 - 5.3 mmol/L 4.7   Chloride      94 - 109 mmol/L 107   Carbon Dioxide      20 - 32 mmol/L 25   Anion Gap      3 - 14 mmol/L 8   Glucose      70 - 99 mg/dL 91   Urea Nitrogen      7 - 30 mg/dL 28   Creatinine      0.66 - 1.25 mg/dL 1.40 (H)   GFR Estimate      >60 mL/min/1.7m2 50 (L)   GFR Estimate If Black      >60 mL/min/1.7m2 61   Calcium      8.5 - 10.1 mg/dL 9.1   Bilirubin " Total      0.2 - 1.3 mg/dL 0.7   Albumin      3.4 - 5.0 g/dL 3.9   Protein Total      6.8 - 8.8 g/dL 6.9   Alkaline Phosphatase      40 - 150 U/L 103   ALT      0 - 70 U/L 28   AST      0 - 45 U/L 16   Color Urine       Yellow   Appearance Urine       Clear   Glucose Urine      NEG mg/dL Negative   Bilirubin Urine      NEG Negative   Ketones Urine      NEG mg/dL Negative   Specific Gravity Urine      1.003 - 1.035 >1.030   pH Urine      5.0 - 7.0 pH 5.5   Protein Albumin Urine      NEG mg/dL Negative   Urobilinogen Urine      0.2 - 1.0 EU/dL 0.2   Nitrite Urine      NEG Negative   Blood Urine      NEG Negative   Leukocyte Esterase Urine      NEG Negative   Source       Midstream Urine   WBC Urine      0 - 2 /HPF O - 2   RBC Urine      0 - 2 /HPF O - 2   Protein Random Urine       0.16   Protein Total Urine g/gr Creatinine      0 - 0.2 g/g Cr 0.11   Myeloperoxidase Antibody IgG      0.0 - 0.9 AI <0.2 . . .   Proteinase 3 Antibody IgG      0.0 - 0.9 AI 0.2   CRP Inflammation      0.0 - 8.0 mg/L <2.9   Sed Rate      0 - 20 mm/h 6   Creatinine Urine Random       142   Neutrophil Cytoplasmic IgG Antibody       <1:20 . . .   Creatinine Urine       142     Component      Latest Ref Rng 3/14/2016   WBC      4.0 - 11.0 10e9/L 7.2   RBC Count      4.4 - 5.9 10e12/L 4.07 (L)   Hemoglobin      13.3 - 17.7 g/dL 13.4   Hematocrit      40.0 - 53.0 % 38.8 (L)   MCV      78 - 100 fl 95   MCH      26.5 - 33.0 pg 32.9   MCHC      31.5 - 36.5 g/dL 34.5   RDW      10.0 - 15.0 % 12.5   Platelet Count      150 - 450 10e9/L 194   Diff Method       Automated Method   % Neutrophils       68.1   % Lymphocytes       14.0   % Monocytes       11.7   % Eosinophils       5.5   % Basophils       0.7   Absolute Neutrophil      1.6 - 8.3 10e9/L 4.9   Absolute Lymphocytes      0.8 - 5.3 10e9/L 1.0   Absolute Monocytes      0.0 - 1.3 10e9/L 0.8   Absolute Eosinophils      0.0 - 0.7 10e9/L 0.4   Absolute Basophils      0.0 - 0.2 10e9/L 0.1   Sodium       133 - 144 mmol/L 136   Potassium      3.4 - 5.3 mmol/L 4.7   Chloride      94 - 109 mmol/L 106   Carbon Dioxide      20 - 32 mmol/L 25   Anion Gap      3 - 14 mmol/L 5   Glucose      70 - 99 mg/dL 86   Urea Nitrogen      7 - 30 mg/dL 32 (H)   Creatinine      0.66 - 1.25 mg/dL 1.62 (H)   GFR Estimate      >60 mL/min/1.7m2 42 (L)   GFR Estimate If Black      >60 mL/min/1.7m2 51 (L)   Calcium      8.5 - 10.1 mg/dL 9.2   Bilirubin Total      0.2 - 1.3 mg/dL 0.7   Albumin      3.4 - 5.0 g/dL 4.0   Protein Total      6.8 - 8.8 g/dL 6.9   Alkaline Phosphatase      40 - 150 U/L 94   ALT      0 - 70 U/L 34   AST      0 - 45 U/L 24   Color Urine       Yellow   Appearance Urine       Clear   Glucose Urine      NEG mg/dL Negative   Bilirubin Urine      NEG Negative   Ketones Urine      NEG mg/dL Negative   Specific Gravity Urine      1.003 - 1.035 <=1.005   pH Urine      5.0 - 7.0 pH 5.5   Protein Albumin Urine      NEG mg/dL Negative   Urobilinogen Urine      0.2 - 1.0 EU/dL 0.2   Nitrite Urine      NEG Negative   Blood Urine      NEG Negative   Leukocyte Esterase Urine      NEG Negative   Source       Midstream Urine   WBC Urine      0 - 2 /HPF    RBC Urine      0 - 2 /HPF    Myeloperoxidase Antibody IgG      0.0 - 0.9 AI <0.2 . . .   Proteinase 3 Antibody IgG      0.0 - 0.9 AI 1.7 (H)   Protein Random Urine       <0.05   Protein Total Urine g/gr Creatinine      0 - 0.2 g/g Cr Unable to calculate due to low value   CRP Inflammation      0.0 - 8.0 mg/L <2.9   Sed Rate      0 - 20 mm/h 5   Neutrophil Cytoplasmic IgG Antibody       <1:20 . . .   Magnesium      1.6 - 2.3 mg/dL 2.1   Creatinine Urine Random       26   Copath Report          Creatinine Urine            Component      Latest Ref Rng 4/18/2016   WBC      4.0 - 11.0 10e9/L 6.7   RBC Count      4.4 - 5.9 10e12/L 4.14 (L)   Hemoglobin      13.3 - 17.7 g/dL 13.3   Hematocrit      40.0 - 53.0 % 39.5 (L)   MCV      78 - 100 fl 95   MCH      26.5 - 33.0 pg 32.1   MCHC       31.5 - 36.5 g/dL 33.7   RDW      10.0 - 15.0 % 12.5   Platelet Count      150 - 450 10e9/L 218   Diff Method       Automated Method   % Neutrophils       65.4   % Lymphocytes       16.0   % Monocytes       13.2   % Eosinophils       4.2   % Basophils       1.2   Absolute Neutrophil      1.6 - 8.3 10e9/L 4.4   Absolute Lymphocytes      0.8 - 5.3 10e9/L 1.1   Absolute Monocytes      0.0 - 1.3 10e9/L 0.9   Absolute Eosinophils      0.0 - 0.7 10e9/L 0.3   Absolute Basophils      0.0 - 0.2 10e9/L 0.1   Sodium      133 - 144 mmol/L    Potassium      3.4 - 5.3 mmol/L    Chloride      94 - 109 mmol/L    Carbon Dioxide      20 - 32 mmol/L    Anion Gap      3 - 14 mmol/L    Glucose      70 - 99 mg/dL    Urea Nitrogen      7 - 30 mg/dL    Creatinine      0.66 - 1.25 mg/dL 1.72 (H)   GFR Estimate      >60 mL/min/1.7m2 40 (L)   GFR Estimate If Black      >60 mL/min/1.7m2 48 (L)   Calcium      8.5 - 10.1 mg/dL    Bilirubin Total      0.2 - 1.3 mg/dL    Albumin      3.4 - 5.0 g/dL    Protein Total      6.8 - 8.8 g/dL    Alkaline Phosphatase      40 - 150 U/L    ALT      0 - 70 U/L 30   AST      0 - 45 U/L 22   Color Urine       Yellow   Appearance Urine       Clear   Glucose Urine      NEG mg/dL Negative   Bilirubin Urine      NEG Negative   Ketones Urine      NEG mg/dL Negative   Specific Gravity Urine      1.003 - 1.035 <=1.005   pH Urine      5.0 - 7.0 pH 5.0   Protein Albumin Urine      NEG mg/dL Negative   Urobilinogen Urine      0.2 - 1.0 EU/dL 0.2   Nitrite Urine      NEG Negative   Blood Urine      NEG Negative   Leukocyte Esterase Urine      NEG Negative   Source       Midstream Urine   WBC Urine      0 - 2 /HPF O - 2   RBC Urine      0 - 2 /HPF O - 2   Myeloperoxidase Antibody IgG      0.0 - 0.9 AI    Proteinase 3 Antibody IgG      0.0 - 0.9 AI 2.0 (H)   Protein Random Urine       <0.05   Protein Total Urine g/gr Creatinine      0 - 0.2 g/g Cr Unable to calculate due to low value   CRP Inflammation      0.0 - 8.0 mg/L  8.4 (H)   Sed Rate      0 - 20 mm/h 9   Neutrophil Cytoplasmic IgG Antibody       <1:20 . . .   Magnesium      1.6 - 2.3 mg/dL    Creatinine Urine Random          Copath Report          Creatinine Urine       53           Component      Latest Ref Rng 7/6/2016   WBC      4.0 - 11.0 10e9/L 7.9   RBC Count      4.4 - 5.9 10e12/L 4.31 (L)   Hemoglobin      13.3 - 17.7 g/dL 13.9   Hematocrit      40.0 - 53.0 % 41.5   MCV      78 - 100 fl 96   MCH      26.5 - 33.0 pg 32.3   MCHC      31.5 - 36.5 g/dL 33.5   RDW      10.0 - 15.0 % 12.5   Platelet Count      150 - 450 10e9/L 197   Diff Method       Automated Method   % Neutrophils       68.1   % Lymphocytes       13.2   % Monocytes       11.8   % Eosinophils       5.6   % Basophils       1.3   Absolute Neutrophil      1.6 - 8.3 10e9/L 5.4   Absolute Lymphocytes      0.8 - 5.3 10e9/L 1.0   Absolute Monocytes      0.0 - 1.3 10e9/L 0.9   Absolute Eosinophils      0.0 - 0.7 10e9/L 0.4   Absolute Basophils      0.0 - 0.2 10e9/L 0.1   Color Urine       Yellow   Appearance Urine       Clear   Glucose Urine      NEG mg/dL Negative   Bilirubin Urine      NEG Negative   Ketones Urine      NEG mg/dL Negative   Specific Gravity Urine      1.003 - 1.035 1.015   Blood Urine      NEG Negative   pH Urine      5.0 - 7.0 pH 5.5   Protein Albumin Urine      NEG mg/dL Negative   Urobilinogen Urine      0.2 - 1.0 EU/dL 0.2   Nitrite Urine      NEG Negative   Leukocyte Esterase Urine      NEG Negative   Source       Midstream Urine   Creatinine      0.66 - 1.25 mg/dL 1.62 (H)   GFR Estimate      >60 mL/min/1.7m2 42 (L)   GFR Estimate If Black      >60 mL/min/1.7m2 51 (L)   Protein Random Urine       0.10   Protein Total Urine g/gr Creatinine      0 - 0.2 g/g Cr 0.11   Myeloperoxidase Antibody IgG      0.0 - 0.9 AI <0.2 . . .   Proteinase 3 Antibody IgG      0.0 - 0.9 AI 1.3 (H)   CRP Inflammation      0.0 - 8.0 mg/L <2.9   Sed Rate      0 - 20 mm/h 5   Creatinine Urine Random       91    Neutrophil Cytoplasmic IgG Antibody       <1:20 . . .   ALT      0 - 70 U/L 28   AST      0 - 45 U/L 19   Albumin      3.4 - 5.0 g/dL 4.0   Creatinine Urine       91     Component      Latest Ref Rn 10/24/2016   WBC      4.0 - 11.0 10e9/L 6.3   RBC Count      4.4 - 5.9 10e12/L 4.18 (L)   Hemoglobin      13.3 - 17.7 g/dL 13.5   Hematocrit      40.0 - 53.0 % 40.1   MCV      78 - 100 fl 96   MCH      26.5 - 33.0 pg 32.3   MCHC      31.5 - 36.5 g/dL 33.7   RDW      10.0 - 15.0 % 12.7   Platelet Count      150 - 450 10e9/L 190   Diff Method       Automated Method   % Neutrophils       67.6   % Lymphocytes       14.9   % Monocytes       12.1   % Eosinophils       4.3   % Basophils       1.1   Absolute Neutrophil      1.6 - 8.3 10e9/L 4.3   Absolute Lymphocytes      0.8 - 5.3 10e9/L 0.9   Absolute Monocytes      0.0 - 1.3 10e9/L 0.8   Absolute Eosinophils      0.0 - 0.7 10e9/L 0.3   Absolute Basophils      0.0 - 0.2 10e9/L 0.1   Color Urine       Yellow   Appearance Urine       Clear   Glucose Urine      NEG mg/dL Negative   Bilirubin Urine      NEG Negative   Ketones Urine      NEG mg/dL Negative   Specific Gravity Urine      1.003 - 1.035 1.010   pH Urine      5.0 - 7.0 pH 5.0   Protein Albumin Urine      NEG mg/dL Negative   Urobilinogen Urine      0.2 - 1.0 EU/dL 0.2   Nitrite Urine      NEG Negative   Blood Urine      NEG Negative   Leukocyte Esterase Urine      NEG Negative   Source       Midstream Urine   WBC Urine      0 - 2 /HPF O - 2   RBC Urine      0 - 2 /HPF O - 2   Creatinine      0.66 - 1.25 mg/dL 1.38 (H)   GFR Estimate      >60 mL/min/1.7m2 51 (L)   GFR Estimate If Black      >60 mL/min/1.7m2 62   Protein Random Urine       <0.05   Protein Total Urine g/gr Creatinine      0 - 0.2 g/g Cr Unable to calculate due to low value   Albumin      3.4 - 5.0 g/dL 3.9   ALT      0 - 70 U/L 26   AST      0 - 45 U/L 25   CRP Inflammation      0.0 - 8.0 mg/L 6.1   Sed Rate      0 - 20 mm/h 6   Creatinine Urine  Random       36   Proteinase 3 Antibody IgG      0.0 - 0.9 AI 1.2 (H)   Neutrophil Cytoplasmic IgG Antibody       <1:20 . . .   SAKINA  Broad Spectrum       Canceled, Test credited   DNA-ds      <10 IU/mL <1 . . .   Creatinine Urine       36   Direct Antiglobulin       Neg     Component      Latest Ref Rng & Units 4/3/2017 4/18/2017   WBC      4.0 - 11.0 10e9/L 7.7    RBC Count      4.4 - 5.9 10e12/L 4.50    Hemoglobin      13.3 - 17.7 g/dL 15.1    Hematocrit      40.0 - 53.0 % 43.1    MCV      78 - 100 fl 96    MCH      26.5 - 33.0 pg 33.6 (H)    MCHC      31.5 - 36.5 g/dL 35.0    RDW      10.0 - 15.0 % 13.4    Platelet Count      150 - 450 10e9/L 216    Diff Method       Automated Method    % Neutrophils      % 65.7    % Lymphocytes      % 17.7    % Monocytes      % 11.0    % Eosinophils      % 4.7    % Basophils      % 0.9    Absolute Neutrophil      1.6 - 8.3 10e9/L 5.1    Absolute Lymphocytes      0.8 - 5.3 10e9/L 1.4    Absolute Monocytes      0.0 - 1.3 10e9/L 0.9    Absolute Eosinophils      0.0 - 0.7 10e9/L 0.4    Absolute Basophils      0.0 - 0.2 10e9/L 0.1    Color Urine       Yellow    Appearance Urine       Clear    Glucose Urine      NEG mg/dL Negative    Bilirubin Urine      NEG Negative    Ketones Urine      NEG mg/dL Negative    Specific Gravity Urine      1.003 - 1.035 1.010    Blood Urine      NEG Negative    pH Urine      5.0 - 7.0 pH 5.0    Protein Albumin Urine      NEG mg/dL Negative    Urobilinogen Urine      0.2 - 1.0 EU/dL 0.2    Nitrite Urine      NEG Negative    Leukocyte Esterase Urine      NEG Negative    Source       Midstream Urine    Creatinine      0.66 - 1.25 mg/dL 1.41 (H)    GFR Estimate      >60 mL/min/1.7m2 50 (L)    GFR Estimate If Black      >60 mL/min/1.7m2 60 (L)    Myeloperoxidase Antibody IgG      0.0 - 0.9 AI <0.2 . . .    Proteinase 3 Antibody IgG      0.0 - 0.9 AI 2.8 (H)    Protein Random Urine      g/L <0.05    Protein Total Urine g/gr Creatinine      0 - 0.2 g/g Cr  Unable to calculate due to low value    Albumin      3.4 - 5.0 g/dL 4.2    ALT      0 - 70 U/L 32    AST      0 - 45 U/L 27    CRP Inflammation      0.0 - 8.0 mg/L <2.9    Sed Rate      0 - 20 mm/h 5    Neutrophil Cytoplasmic IgG Antibody       <1:20 . . .    Creatinine Urine Random      mg/dL 48    Creatinine Urine      mg/dL 48    AcetChol Binding Anju        0.0

## 2017-06-30 NOTE — LETTER
6/30/2017      RE: Joshua Ennis  [address] [facility] [place] [address]       [facility] Follow-Up Note  Date of visit: 6/30/2017  Last visit date:  3/3/17      Joshua Ennis MRN# [id number]   Age: 72 year old YOB: 1945          Assessment and Plan:     Assessment:   Mr. Ennis is a 72 yr old  male with history of melanoma s/p resection in 11/2011, former tobacco use, and GPA (PR3+, MPO and c-ANCA negative in 3/2013 based on aforementioned labs, confirmed by kidney and lung biopsy) who presents for clinic follow-up regarding GPA.  He was initiated on cyclophosphamide 150 mg QD and high-dose prednisone 3/2013. He last took prednisone in 10/2013. Cytoxan was switched to MTX for maintenance treatment in 10/2013.  He has been tolerating it well. He had a flare in 12/2013 with increased crusts in his nose along with recurrence of arthralgia, worsening numbness in feet and increasing vasculitis marker. Renal function was stable with negative repeat chest CT. His vasculitis flare was treated with short course of prednisone taper 20 mg po qd max and increasing MTX to 8 tab = 20 mg qwk. Vasculitis symptoms improved.  At visit on 1/2015, he had scabs in his nose, had cold [place]ak[place] symptoms and increased arthralgia/fatigue (shoulders, L hand). Labs from 12/16 showed increased Cr level and hematuria with rising PR3 (more than 8), there was a concern for vasculitis flare (in kidneys, sinuses), no flare on repeat chest CT 1/2015. Therefore it was recommended to try rituximab. Another reason was to be able to taper MTX off given abnormal Cr.  He is now s/p Rituximab infusion x 4 (1st on 2/25/2015). He is feeling well. No hematuria with stable Cr, no SOB. Saw ENT with negative nasal mucosa biopsy 6/2015 for granuloma but showed active inflammation, had left nostril lesion which decreased in size by use of mupirocin ointment. PR3 vasculitis marker went back to NL in 6/2015, it was NL in  10/2015, given stable vasculitis and low GFR, MTX from 8 to 7 tab po qwk. Repeat PA-3 in 3/2016 was slightly positive, Cr was slightly higher than baseline. We tapered his MTX further to 6 tab po qwk in 2/2016 given lack of symptoms. Re-check labs in April were almost unchanged from 3/2016 but CRP was slightly up. Labs on 2/7/17 with elevated CRP (40.6) but normal ESR (12) and ANCA 1:20 likely reflecting bacterial infection.    In 3/2017, MTX was reduced from 6 to 5 tab qwk. Labs in 4/2017 were stable except slight rise in PR3, ANCA was negative. Vasculitis seems to be stable and he has no signs or symptoms of flare up today; therefore will reduce MTX to 4 tab po qwk with checking labs today and in 2 months. Was advised to call in case of vasculitis flare up sx.        Plan:    - Decrease MTX from 12.5 to 10 mg (4 tabs) po qwk, continue with Folic acid     > Labs today and in 2 months including MTX monitoring labs    > Patient instructed to hold MTX if develops fevers or infection  - Neuropathy unchanged on Gabapentin  - PPSV23 vaccine today  - RTC in 4-5 months        Orders Placed This Encounter   Procedures     CBC with platelets differential     CRP inflammation     Erythrocyte sedimentation rate auto     Routine UA with Micro Reflex to Culture     Protein  random urine     Creatinine random urine     Comprehensive metabolic panel     Antineutrophil cytoplasmic Anju IgG     Vasculitis panel             HPI / Interim History:      Mr. Ennis is a 71 yo WM with h/o melanoma s/p resection in 11/2011, former tobacco use, and GPA diagnosed in 3/2013. For details of GPA diagnosis, please refer to initial consult note. Briefly, he initially presented with constitutional sx of fever, sweating, weight loss, migratory arthralgia, numbness/shooting pain in feet to his PCP.  Had enlarged inguinal LNs. His PCP suspected malignancy and especially lung cancer given h/o tobacco use.  His work-up showed several lung nodules and  CT guided biopsy of one of the nodules done in 2/13 showed granulomatous inflammation with no evidence of malignancy.  Inguinal LN biopsy done 3/13 showed inflammation with no malignancy.  He had not had any respiratory sx, SOB, CP, cough, hemoptysis or sinusitis.  He was hospitalized for further work up and was found having high PR3, neg MPO, neg ANCA.  He also had + RF and trace cryo.   Had microscopic hematuria with NL Cr at the time of admission but his Cr started to rise before discharge. Had abnormal LFTs toward the end of discharge(was trending down, liver US was unremarkable).  He was diagnosed with GPA given high titer of PR3 to 723, +granulomatous inflammation of the lung nodule, microscopic hematuria, and migratory arthralgia along with high ESR/CRP and constitutional sx.  He was Started on cytoxan 150 mg po qd, prednisone 70 mg po qd (after IV solumedrol 1 gr qd x 3 days), Bactrim DS three times a wk for PCP prophylaxis and fosamax 70 mg po qwk. He was also put on vit D 75304 units qwk and neurontin.  He was discharged on 3/13/2013.   At initial visit in Rheumatology clinic, patient was referred to nephrology for renal biopsy since Cr was up despite being on cytoxan and prednisone 75 qd.  Renal biopsy 3/13 confirmed Dx of GPA.  After discussion with Dr. Krishnamurthy, made a decision to continue with current regimen and if no improvement to switch to IV cytoxan or rituximab.  His Cr initially increased to 1.71, but stabilized to ~ 1.3.  He has a h/o high BP and was recently started on losartan per nephology.  He has been seen by neurology for bilaterally foot and ankle numbness and was diagnosed with neuropathy possibly secondary to vasculitis.  He was also seen by oncology; there was no concern for malignancy.   Overall, the patient is doing well today with no specific complaints or concerns.  He had recent blood work 9/19 which showed negative inflammatory and vasculitis markers (PR3, MPO, ANCA, and CRP).  His  SCr has remained elevated, but stable at 1.37.  He is at the tail end of a prednisone taper; currently taking 1 mg QD, set to d/c 10/15.  He is still on cyclophosphamide, taking 150 mg QD.  He was prescribed losartan at a f/u visit w/ nephrology 8/21, but states his BP have been running 130's/80's, so has not taken the medication for over a month.  Additionally, he had been taking neurontin for numbness in his feet, toes, and ankles, but stopped b/c he was not getting symptom relief.  Today, the patient denies vision changes, epistaxis, oral ulcerations, SOB, hemoptysis, CP, joint pain, abdominal pain, dysuria, or hematuria.  He does endorse a cough, but states that this has been intermittent, chronic, and non-productive.  Additionally, he endorses right shoulder discomfort that is worse w/ abduction, but has also been chronic. Of note, the patient remains a former smoker, having quit in February of this year.     His major complaint is increased numbness of his toes, but has resumed taking neurontin and feels sensation is coming back which is painful. No SOB, cough, crusts in nose or blood in urine. He has fatigue. He is off cytoxan since 10/2013, is on MTX 8tab po qwk. MTX was started with 4 tab po qwk in 10/2013, was increased to 8 tab after last visit in 1/2014. In 12/2013, was felt to have a small flare of vasculitis with increased PR3, crusts in nose and R shoulder pain; therefore prednisone taper with max dose of 20 mg qd was given along with increasing MTX to 7 tab qwk. MTX was later increased to 8 tab qwk in 1/2014. Labs done in 2/2014 showed rising PR3. He had ER visit on 1/8 for urosepsis (E. Coli), was treated with cedifir, recovered. Continues having shoulder pain, requests referral to PT.    1/2015: He reports having increased arthralgia (shoulders, L hand) x a month. About 2 wk ago, L hand pain was so bad that he could not move his hand. No major joint swelling. Has no AM stiffness. Reports being  tested negative for lyme (local lab) about 2 wk ago. Appetite is worse. He is more tired. He sleeps a lot. Has a cold x 1 wk, no hemoptysis. Neuropathy is the same. No SOB. No fever. Reports having a rash under L axilla x last 2 wk, comes and goes but to him looks like a lyme rash. He thinks he probably had a tick bite.    Last visit 3/2015: Rituximab qwk x 4 doses was recommended at last visit given concern for active GPA. He had his 1st infusion on 2/25/2015, right after start of infusion, developed hives without any resp sx, was treated with extra dose of benadryl and solu cortef. Hives resolved. Infusion was resumed at a slower rate and he finished and tolerated it well. Had his 2nd infusion on 3/4/2015 with no reaction although this time I increased his solumedrol from 100 to 250 mg as part of pre-medication. He has since completed all 4 infusions w/o reaction. States that nasal and joint symptoms remain improved. He still does have some crusting in his left nostril. Does have some pain and swelling in the hands w/minimal morning stiffness but that is his baseline. Says pain in his shoulders has improved. He denies any medication side effects.    He was seen in the ED 4/3/15 for productive cough and fever. He was treated with Levaquin for likely pna. He is now feeling better with only lingering cough.    10/16/2015: He is feeling well, has no complaints except cough at night because of sinus drainage (chronic). Requests pneumonia shot.    2/2016: Today patient reports feeling in excellent condition. Denies chest symptoms, such as SOB, hemoptysis, cough, or nasal drainage/bleeding. He also denies any hematuria and his last renal labs were stable. Unfortunately, his neuropathy seems to be his biggest problem. Neurology started him on a low dose of gabapentin without improvement.     5/6/16: Besides stuffy nose due to seasonal allergies, has no complaints. No cough, SOB, hemoptysis, hematuria, weight loss or  "fatigue.    8/12/16: Patient has no complaints today. No cough, SOB, hemoptysis, hematuria, weight loss or fatigue. He was seen today in Nephrology clinic for CKD follow up, doing well, no further follow up is needed. Patient reports visual disturbance with decreased vision probably on the R eye (ariant does not remember) with decreased to none visual strength in low visual field with some \"blanks\" which lasted for 40 seconds. This is a second episode in the last 1.5-2 years. Patient also reports recurrent double vision occuring every 4-5 months. Last ophthalmologist appointment was 1986.     11/18/16: Feeling very well, no complaints. Is going to have an eye exam today.    3/3/17: Mr. Ennis says he is doing well overall. He is still recovering from a pneumonia diagnosed on 2/14/17. He says that he was feeling ill for about 2 weeks before this with worsening productive cough, nasal congestion and high fevers with \"fever blisters\". He went to the ED on 2/14/17 and received ceftriaxone and a Z-pack and says he has been improving since then. He says that many other people at his work were sick with similar symptoms. Today he reports some residual nasal congestion but denies shortness of breath. He says that during his illness he did have some blood in his nose \"if I picked at it\" but he otherwise denies epistaxis, hemoptysis, hematuria and blood in his stools. His significant other is wondering if his methotrexate dose can start to be tapered down. Currently he is taking MTX 15mg weekly although this was held when he had pneumonia.    Mr. Ennis says that he continues to experience episodes of diplopia on average once per month. These episodes typically last 20-30 seconds. He was seen by ophthalmology on 11/18/16 and had a normal eye exam.    Today: Feeling well with no complaints. On MTX 5 tab po qwk.         Past Medical History:     Past Medical History:   Diagnosis Date     Arthritis      Autoimmune disease (H)  "     Hearing problem      Hoarseness      Hypertension      Kidney problem      Malignant melanoma (H)      Malignant neoplasm (H)     melanoma     Squamous cell carcinoma (H)      Russo syndrome           Past Surgical History:     Past Surgical History:   Procedure Laterality Date     BIOPSY  11/2011    melanoma on back     DISSECT LYMPH NODE INGUINAL  3/1/2013    Procedure: DISSECT LYMPH NODE INGUINAL;  Bilateral Inguinal Lymph Node Biopsy.;  Surgeon: Tariq Acosta MD;  Location: [facility]     ESOPHAGOSCOPY, GASTROSCOPY, DUODENOSCOPY (EGD), COMBINED  7/5/2013    Procedure: COMBINED ESOPHAGOSCOPY, GASTROSCOPY, DUODENOSCOPY (EGD);  Gastroscopy;  Surgeon: Tariq Acosta MD;  Location: [facility]     HERNIORRHAPHY INGUINAL  8/6/2012    Procedure: HERNIORRHAPHY INGUINAL;  Open Repair Left Inguinal Hernia;  Surgeon: Jerad Baker MD;  Location: [facility]          Social History:     Social History   Substance Use Topics     Smoking status: Former Smoker     Packs/day: 1.00     Years: 20.00     Types: Cigarettes     Quit date: 2/14/2013     Smokeless tobacco: Never Used     Alcohol use Yes      Comment: rare          Family History:     Family History   Problem Relation Age of Onset     DIABETES Mother      Hypertension Mother      CANCER Father      stomach     HEART DISEASE Father      HEART DISEASE Brother      DIABETES Sister      DIABETES Sister      DIABETES Sister      HEART DISEASE Brother      CANCER Sister      Glaucoma No family hx of      Macular Degeneration No family hx of           Immunizations:   Due for PPSV23, will administer today. Immunizations otherwise up to date.    PMHx, FHx, SHx were reviewed, unchanged.         Allergies:     Allergies   Allergen Reactions     Shrimp Nausea and Vomiting     Got sick each time he ate it          Medications:     Current Outpatient Prescriptions   Medication Sig     methotrexate 2.5 MG tablet CHEMO Take 5 tablets (12.5 mg) by mouth once a week     folic  "acid (FOLVITE) 1 MG tablet Take 2 tablets (2 mg) by mouth daily     gabapentin (NEURONTIN) 400 MG capsule Take 1 capsule (400 mg) by mouth At Bedtime     Acetaminophen (TYLENOL PO) Take 650 mg by mouth once as needed for mild pain or fever     losartan (COZAAR) 50 MG tablet Take 1 tablet (50 mg) by mouth daily     calcium-vitamin D (CALCIUM 600 + D) 600-400 MG-UNIT per tablet Take 1 tablet by mouth 2 times daily.     Cholecalciferol (VITAMIN D) 1000 UNITS capsule Take 1 capsule by mouth daily.     fluorouracil (EFUDEX) 5 % cream Apply twice daily to scalp for 2-4 weeks. (Patient not taking: Reported on 5/24/2017)     Blood Pressure Monitor KIT Automatic Blood Pressure Monitor (Patient not taking: Reported on 5/24/2017)     No current facility-administered medications for this visit.           Review of Systems:   A 10-point ROS was done. Positives are per HPI.         Physical Exam:     Vitals:    06/30/17 1053   BP: 129/77   Pulse: 61   Temp: 98.8  F (37.1  C)   TempSrc: Oral   SpO2: 99%   Weight: 83.8 kg (184 lb 12.8 oz)   Height: 1.753 m (5' 9\")     Constitutional:   Awake, alert, cooperative, no apparent distress.   Eyes:   Pupils equal, round and reactive to light, sclera clear, conjunctiva normal.   ENT:   Normocephalic, atramatic,  oral pharynx with moist mucus membranes, tonsils without erythema or exudates, dentures in place. Small dark purple/black macule left soft palate.   Neck:   Supple, symmetrical, trachea midline, no adenopathy.   Lungs:   No increased work of breathing, good air exchange, clear to auscultation bilaterally, no crackles or wheezing.    Cardiovascular:   Regular rate and rhythm, normal S1 and S2, no S3 or S4, and no murmur noted.   Musculoskeletal:   No active synovitis or joint tenderness. Good ROM in all joints tested. No edema in LE.   Neurologic:   AOx3; CN grossly intact.   Skin:   No rashes or ecchymoses on limited exam.          Data:   Recent laboratory data " reviewed.    2/14/17  Cr 1.46, WBC 7.8, AST/ALT normal    2/7/17  CRP 40.6, ESR 12, ANCA 1:20, MPO neg, MO-3 neg    Recent imaging studies reviewed by me.    CXR (2/14/17)    New mild patchy opacity at the right lung base appears to  localize to the posterior right lower lobe base and could represent a  developing area of pneumonia. Left lung is clear.    CHEST CT (2/16/2013)    Chest: Multiple indeterminate pulmonary nodules. Several of the  larger lesions are cavitary. There are 3 dominant lesions, a 3.2 cm  cavitary lesion in the left upper lobe, a 3.4 cm solid mass in the  right lower lobe laterally and a 3.7 cm cavitary mass in the right  lower lobe medially. In addition there is mediastinal lymphadenopathy  with a dominant 3 cm subcarinal node. Bilateral axillary  lymphadenopathy with 2.4 cm node seen bilaterally. Chest otherwise  unremarkable.       Exam: High-resolution Chest CT without contrast 1/9/2015 12:42 PM.  History: Concern for ANCA vasculitis flare in the chest.  Comparison: 3/7/2014, 11/14/2013, 2/16/2013.  Technique: CT helical acquisition from the lung apices to the kidneys  was obtained without intravenous contrast. Both inspiratory and  expiratory images were obtained.    Findings:  Moderate atherosclerotic calcifications of the coronary arteries. Mild  calcifications involving the aorta and aortic great vessels. Prominent  paratracheal lymph node on series 3 image 22 measuring 9 mm. Decreased  from 2/16/2013 and unchanged from 3/7/2014. Borderline enlarged right  hilar lymph node, unchanged from 3/7/2014 and decreased from  2/16/2013. Heart is not enlarged. Trace cardial effusion. No axillary  lymphadenopathy.  Nodular scarring in the left apex, unchanged from 2/16/2013. No  pleural effusion or pneumothorax. No evidence of intrapulmonary  infection. There is a cluster of tree in bud nodularity noted in the  anteromedial right upper lobe. These nodules appear new. Expiratory  images demonstrate  mild air trapping. Scattered pulmonary nodules:  Series 6 image 146: Seen posteriorly in the right lower lobe, there is  a sub-pleural nodule measuring 1.6 x 1.2 cm with a spiculated border.  Previously, this nodule measured 2.0 x 1.6 cm.  Series 6 image 189: Seen laterally in the right lower lobe, there is a  former mid pulmonary nodule which is unchanged from 3/7/2013 and  decreased from 2/16/2013.  Series 6 image 169: Seen posterolaterally in the right lower lobe,  there is a 3 mm pulmonary nodule which is unchanged from 3/7/2014 and  decreased from 2/16/2013.  Series 6 image 225: Seen posterolaterally in the left lower lobe,  there is a 4 mm subpleural nodule which is unchanged from 2/16/2014.  Other scattered sub-4 mm pulmonary nodules appear stable from previous  study.  Evaluation of the upper abdomen is limited due to the lack of  intravenous contrast.  No suspicious bony lesions.    IMPRESSION  Impression:   1. Scattered pulmonary nodules enlarged lymph nodes are  stable/slightly decreased from the previous study. No evidence for  acute flare in patient's known ANCA-associated vasculitis.  2. New tree in bud nodularity seen anteromedially in the right middle  lobe. Findings could be seen in the setting of an atypical infectious  process.  3. Mild air trapping. Finding is nonspecific and is seen in setting of  small airways disease such as asthma or bronchiolitis.  I have personally reviewed the examination and initial interpretation  and I agree with the findings.  TOÑO PERKINS MD    Component      Latest Ref Rng 6/11/2015 6/11/2015           9:26 AM  9:29 AM   Sodium      133 - 144 mmol/L  139   Potassium      3.4 - 5.3 mmol/L  4.4   Chloride      94 - 109 mmol/L  109   Carbon Dioxide      20 - 32 mmol/L  23   Anion Gap      3 - 14 mmol/L  7   Glucose      70 - 99 mg/dL  81   Urea Nitrogen      7 - 30 mg/dL  28   Creatinine      0.66 - 1.25 mg/dL  1.26 (H)   GFR Estimate      >60 mL/min/1.7m2  57 (L)    GFR Estimate If Black      >60 mL/min/1.7m2  69   Calcium      8.5 - 10.1 mg/dL  9.2   Phosphorus      2.5 - 4.5 mg/dL  2.1 (L)   Albumin      3.4 - 5.0 g/dL  1.7 (L)   Iron      35 - 180 ug/dL  129   Iron Binding Cap      240 - 430 ug/dL  292   Iron Saturation Index      15 - 46 %  44   Protein Random Urine       0.11    Protein Total Urine g/gr Creatinine      0 - 0.2 g/g Cr 0.14    Hemoglobin      13.3 - 17.7 g/dL  13.0 (L)   Ferritin      26 - 388 ng/mL  193   Parathormone Intact      12 - 72 pg/mL  49   Vitamin D Deficiency screening      30 - 75 ug/L  55   Creatinine Urine       84    Copath Report           Proteinase 3 Antibody IgG      0.0 - 0.9 AI       Component      Latest Ref Rng 6/11/2015 6/11/2015          11:15 AM 12:18 PM   Copath Report       Patient Name: ADONAY FAITH . . .    Proteinase 3 Antibody IgG      0.0 - 0.9 AI  0.8     Copath Report     Patient Name: ADONAY FAITH  MR#: [id number]  Specimen #: [id number]  Collected: 6/11/2015  Received: 6/11/2015  Reported: 6/12/2015 17:37  Ordering Phy(s): DANIELLE PARIS    SPECIMEN(S):  Nasal septum    FINAL DIAGNOSIS:  Nasal septum, biopsy:  -Squamous mucosa with ulcer, marked acute inflammation and reactive  changes  -No granulomas identified  -A special stain for fungi (GMS) is negative    I have personally reviewed all specimens and or slides, including the  listed special stains, and used them with my medical judgement to  determine the final diagnosis.    Electronically signed out by:    Dorys Barton M.D., [facility]    CLINICAL HISTORY:  The patient is a 70-year-old man with a history of left upper back  melanoma, resected in 2011 (see consult report [id number]). 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 He now presents for followup visit  "with nasal  cavity scabs, increased arthralgia and fatigue, concerning for  vasculitis flare. Operative procedure: Nasal septum biopsy.    GROSS:  The specimen is received in formalin with proper patient identification,  labeled \"septal tissue\". The specimen consists of three tan-pink tissue  fragments, 0.2 cm in greatest dimension, entirely submitted in cassette  1. (Dictated by: Ana Mixon 6/11/2015 03:39 PM)    MICROSCOPIC:  Microscopic examination is performed. A GMS stain, performed with  appropriate positive control, is negative.    CPT Codes:  A: 61747-LV4, 33593-AON    TESTING LAB LOCATION:  [facility], [address] [id number] [phone]    COLLECTION SITE:  Client: [facility]  Location: [facility] (B)     Component      Latest Ref Rng 12/10/2015   WBC      4.0 - 11.0 10e9/L 5.8   RBC Count      4.4 - 5.9 10e12/L 4.32 (L)   Hemoglobin      13.3 - 17.7 g/dL 13.9   Hematocrit      40.0 - 53.0 % 42.0   MCV      78 - 100 fl 97   MCH      26.5 - 33.0 pg 32.2   MCHC      31.5 - 36.5 g/dL 33.1   RDW      10.0 - 15.0 % 12.5   Platelet Count      150 - 450 10e9/L 199   Diff Method       Automated Method   % Neutrophils       70.2   % Lymphocytes       10.8   % Monocytes       9.6   % Eosinophils       7.7   % Basophils       1.7   Absolute Neutrophil      1.6 - 8.3 10e9/L 4.1   Absolute Lymphocytes      0.8 - 5.3 10e9/L 0.6 (L)   Absolute Monoctyes      0.0 - 1.3 10e9/L 0.6   Absolute Eosinophils      0.0 - 0.7 10e9/L 0.5   Absolute Basophils      0.0 - 0.2 10e9/L 0.1   Sodium      133 - 144 mmol/L 140   Potassium      3.4 - 5.3 mmol/L 4.7   Chloride      94 - 109 mmol/L 107   Carbon Dioxide      20 - 32 mmol/L 25   Anion Gap      3 - 14 mmol/L 8   Glucose      70 - 99 mg/dL 91   Urea Nitrogen      7 - 30 mg/dL 28   Creatinine      0.66 - 1.25 mg/dL 1.40 (H)   GFR Estimate      >60 mL/min/1.7m2 50 " (L)   GFR Estimate If Black      >60 mL/min/1.7m2 61   Calcium      8.5 - 10.1 mg/dL 9.1   Bilirubin Total      0.2 - 1.3 mg/dL 0.7   Albumin      3.4 - 5.0 g/dL 3.9   Protein Total      6.8 - 8.8 g/dL 6.9   Alkaline Phosphatase      40 - 150 U/L 103   ALT      0 - 70 U/L 28   AST      0 - 45 U/L 16   Color Urine       Yellow   Appearance Urine       Clear   Glucose Urine      NEG mg/dL Negative   Bilirubin Urine      NEG Negative   Ketones Urine      NEG mg/dL Negative   Specific Gravity Urine      1.003 - 1.035 >1.030   pH Urine      5.0 - 7.0 pH 5.5   Protein Albumin Urine      NEG mg/dL Negative   Urobilinogen Urine      0.2 - 1.0 EU/dL 0.2   Nitrite Urine      NEG Negative   Blood Urine      NEG Negative   Leukocyte Esterase Urine      NEG Negative   Source       Midstream Urine   WBC Urine      0 - 2 /HPF O - 2   RBC Urine      0 - 2 /HPF O - 2   Protein Random Urine       0.16   Protein Total Urine g/gr Creatinine      0 - 0.2 g/g Cr 0.11   Myeloperoxidase Antibody IgG      0.0 - 0.9 AI <0.2 . . .   Proteinase 3 Antibody IgG      0.0 - 0.9 AI 0.2   CRP Inflammation      0.0 - 8.0 mg/L <2.9   Sed Rate      0 - 20 mm/h 6   Creatinine Urine Random       142   Neutrophil Cytoplasmic IgG Antibody       <1:20 . . .   Creatinine Urine       142     Component      Latest Ref Rn 3/14/2016   WBC      4.0 - 11.0 10e9/L 7.2   RBC Count      4.4 - 5.9 10e12/L 4.07 (L)   Hemoglobin      13.3 - 17.7 g/dL 13.4   Hematocrit      40.0 - 53.0 % 38.8 (L)   MCV      78 - 100 fl 95   MCH      26.5 - 33.0 pg 32.9   MCHC      31.5 - 36.5 g/dL 34.5   RDW      10.0 - 15.0 % 12.5   Platelet Count      150 - 450 10e9/L 194   Diff Method       Automated Method   % Neutrophils       68.1   % Lymphocytes       14.0   % Monocytes       11.7   % Eosinophils       5.5   % Basophils       0.7   Absolute Neutrophil      1.6 - 8.3 10e9/L 4.9   Absolute Lymphocytes      0.8 - 5.3 10e9/L 1.0   Absolute Monocytes      0.0 - 1.3 10e9/L 0.8    Absolute Eosinophils      0.0 - 0.7 10e9/L 0.4   Absolute Basophils      0.0 - 0.2 10e9/L 0.1   Sodium      133 - 144 mmol/L 136   Potassium      3.4 - 5.3 mmol/L 4.7   Chloride      94 - 109 mmol/L 106   Carbon Dioxide      20 - 32 mmol/L 25   Anion Gap      3 - 14 mmol/L 5   Glucose      70 - 99 mg/dL 86   Urea Nitrogen      7 - 30 mg/dL 32 (H)   Creatinine      0.66 - 1.25 mg/dL 1.62 (H)   GFR Estimate      >60 mL/min/1.7m2 42 (L)   GFR Estimate If Black      >60 mL/min/1.7m2 51 (L)   Calcium      8.5 - 10.1 mg/dL 9.2   Bilirubin Total      0.2 - 1.3 mg/dL 0.7   Albumin      3.4 - 5.0 g/dL 4.0   Protein Total      6.8 - 8.8 g/dL 6.9   Alkaline Phosphatase      40 - 150 U/L 94   ALT      0 - 70 U/L 34   AST      0 - 45 U/L 24   Color Urine       Yellow   Appearance Urine       Clear   Glucose Urine      NEG mg/dL Negative   Bilirubin Urine      NEG Negative   Ketones Urine      NEG mg/dL Negative   Specific Gravity Urine      1.003 - 1.035 <=1.005   pH Urine      5.0 - 7.0 pH 5.5   Protein Albumin Urine      NEG mg/dL Negative   Urobilinogen Urine      0.2 - 1.0 EU/dL 0.2   Nitrite Urine      NEG Negative   Blood Urine      NEG Negative   Leukocyte Esterase Urine      NEG Negative   Source       Midstream Urine   WBC Urine      0 - 2 /HPF    RBC Urine      0 - 2 /HPF    Myeloperoxidase Antibody IgG      0.0 - 0.9 AI <0.2 . . .   Proteinase 3 Antibody IgG      0.0 - 0.9 AI 1.7 (H)   Protein Random Urine       <0.05   Protein Total Urine g/gr Creatinine      0 - 0.2 g/g Cr Unable to calculate due to low value   CRP Inflammation      0.0 - 8.0 mg/L <2.9   Sed Rate      0 - 20 mm/h 5   Neutrophil Cytoplasmic IgG Antibody       <1:20 . . .   Magnesium      1.6 - 2.3 mg/dL 2.1   Creatinine Urine Random       26   Copath Report          Creatinine Urine            Component      Latest Ref Rn 4/18/2016   WBC      4.0 - 11.0 10e9/L 6.7   RBC Count      4.4 - 5.9 10e12/L 4.14 (L)   Hemoglobin      13.3 - 17.7 g/dL 13.3    Hematocrit      40.0 - 53.0 % 39.5 (L)   MCV      78 - 100 fl 95   MCH      26.5 - 33.0 pg 32.1   MCHC      31.5 - 36.5 g/dL 33.7   RDW      10.0 - 15.0 % 12.5   Platelet Count      150 - 450 10e9/L 218   Diff Method       Automated Method   % Neutrophils       65.4   % Lymphocytes       16.0   % Monocytes       13.2   % Eosinophils       4.2   % Basophils       1.2   Absolute Neutrophil      1.6 - 8.3 10e9/L 4.4   Absolute Lymphocytes      0.8 - 5.3 10e9/L 1.1   Absolute Monocytes      0.0 - 1.3 10e9/L 0.9   Absolute Eosinophils      0.0 - 0.7 10e9/L 0.3   Absolute Basophils      0.0 - 0.2 10e9/L 0.1   Sodium      133 - 144 mmol/L    Potassium      3.4 - 5.3 mmol/L    Chloride      94 - 109 mmol/L    Carbon Dioxide      20 - 32 mmol/L    Anion Gap      3 - 14 mmol/L    Glucose      70 - 99 mg/dL    Urea Nitrogen      7 - 30 mg/dL    Creatinine      0.66 - 1.25 mg/dL 1.72 (H)   GFR Estimate      >60 mL/min/1.7m2 40 (L)   GFR Estimate If Black      >60 mL/min/1.7m2 48 (L)   Calcium      8.5 - 10.1 mg/dL    Bilirubin Total      0.2 - 1.3 mg/dL    Albumin      3.4 - 5.0 g/dL    Protein Total      6.8 - 8.8 g/dL    Alkaline Phosphatase      40 - 150 U/L    ALT      0 - 70 U/L 30   AST      0 - 45 U/L 22   Color Urine       Yellow   Appearance Urine       Clear   Glucose Urine      NEG mg/dL Negative   Bilirubin Urine      NEG Negative   Ketones Urine      NEG mg/dL Negative   Specific Gravity Urine      1.003 - 1.035 <=1.005   pH Urine      5.0 - 7.0 pH 5.0   Protein Albumin Urine      NEG mg/dL Negative   Urobilinogen Urine      0.2 - 1.0 EU/dL 0.2   Nitrite Urine      NEG Negative   Blood Urine      NEG Negative   Leukocyte Esterase Urine      NEG Negative   Source       Midstream Urine   WBC Urine      0 - 2 /HPF O - 2   RBC Urine      0 - 2 /HPF O - 2   Myeloperoxidase Antibody IgG      0.0 - 0.9 AI    Proteinase 3 Antibody IgG      0.0 - 0.9 AI 2.0 (H)   Protein Random Urine       <0.05   Protein Total Urine g/gr  Creatinine      0 - 0.2 g/g Cr Unable to calculate due to low value   CRP Inflammation      0.0 - 8.0 mg/L 8.4 (H)   Sed Rate      0 - 20 mm/h 9   Neutrophil Cytoplasmic IgG Antibody       <1:20 . . .   Magnesium      1.6 - 2.3 mg/dL    Creatinine Urine Random          Copath Report          Creatinine Urine       53           Component      Latest Ref Rng 7/6/2016   WBC      4.0 - 11.0 10e9/L 7.9   RBC Count      4.4 - 5.9 10e12/L 4.31 (L)   Hemoglobin      13.3 - 17.7 g/dL 13.9   Hematocrit      40.0 - 53.0 % 41.5   MCV      78 - 100 fl 96   MCH      26.5 - 33.0 pg 32.3   MCHC      31.5 - 36.5 g/dL 33.5   RDW      10.0 - 15.0 % 12.5   Platelet Count      150 - 450 10e9/L 197   Diff Method       Automated Method   % Neutrophils       68.1   % Lymphocytes       13.2   % Monocytes       11.8   % Eosinophils       5.6   % Basophils       1.3   Absolute Neutrophil      1.6 - 8.3 10e9/L 5.4   Absolute Lymphocytes      0.8 - 5.3 10e9/L 1.0   Absolute Monocytes      0.0 - 1.3 10e9/L 0.9   Absolute Eosinophils      0.0 - 0.7 10e9/L 0.4   Absolute Basophils      0.0 - 0.2 10e9/L 0.1   Color Urine       Yellow   Appearance Urine       Clear   Glucose Urine      NEG mg/dL Negative   Bilirubin Urine      NEG Negative   Ketones Urine      NEG mg/dL Negative   Specific Gravity Urine      1.003 - 1.035 1.015   Blood Urine      NEG Negative   pH Urine      5.0 - 7.0 pH 5.5   Protein Albumin Urine      NEG mg/dL Negative   Urobilinogen Urine      0.2 - 1.0 EU/dL 0.2   Nitrite Urine      NEG Negative   Leukocyte Esterase Urine      NEG Negative   Source       Midstream Urine   Creatinine      0.66 - 1.25 mg/dL 1.62 (H)   GFR Estimate      >60 mL/min/1.7m2 42 (L)   GFR Estimate If Black      >60 mL/min/1.7m2 51 (L)   Protein Random Urine       0.10   Protein Total Urine g/gr Creatinine      0 - 0.2 g/g Cr 0.11   Myeloperoxidase Antibody IgG      0.0 - 0.9 AI <0.2 . . .   Proteinase 3 Antibody IgG      0.0 - 0.9 AI 1.3 (H)   CRP  Inflammation      0.0 - 8.0 mg/L <2.9   Sed Rate      0 - 20 mm/h 5   Creatinine Urine Random       91   Neutrophil Cytoplasmic IgG Antibody       <1:20 . . .   ALT      0 - 70 U/L 28   AST      0 - 45 U/L 19   Albumin      3.4 - 5.0 g/dL 4.0   Creatinine Urine       91     Component      Latest Ref Rng 10/24/2016   WBC      4.0 - 11.0 10e9/L 6.3   RBC Count      4.4 - 5.9 10e12/L 4.18 (L)   Hemoglobin      13.3 - 17.7 g/dL 13.5   Hematocrit      40.0 - 53.0 % 40.1   MCV      78 - 100 fl 96   MCH      26.5 - 33.0 pg 32.3   MCHC      31.5 - 36.5 g/dL 33.7   RDW      10.0 - 15.0 % 12.7   Platelet Count      150 - 450 10e9/L 190   Diff Method       Automated Method   % Neutrophils       67.6   % Lymphocytes       14.9   % Monocytes       12.1   % Eosinophils       4.3   % Basophils       1.1   Absolute Neutrophil      1.6 - 8.3 10e9/L 4.3   Absolute Lymphocytes      0.8 - 5.3 10e9/L 0.9   Absolute Monocytes      0.0 - 1.3 10e9/L 0.8   Absolute Eosinophils      0.0 - 0.7 10e9/L 0.3   Absolute Basophils      0.0 - 0.2 10e9/L 0.1   Color Urine       Yellow   Appearance Urine       Clear   Glucose Urine      NEG mg/dL Negative   Bilirubin Urine      NEG Negative   Ketones Urine      NEG mg/dL Negative   Specific Gravity Urine      1.003 - 1.035 1.010   pH Urine      5.0 - 7.0 pH 5.0   Protein Albumin Urine      NEG mg/dL Negative   Urobilinogen Urine      0.2 - 1.0 EU/dL 0.2   Nitrite Urine      NEG Negative   Blood Urine      NEG Negative   Leukocyte Esterase Urine      NEG Negative   Source       Midstream Urine   WBC Urine      0 - 2 /HPF O - 2   RBC Urine      0 - 2 /HPF O - 2   Creatinine      0.66 - 1.25 mg/dL 1.38 (H)   GFR Estimate      >60 mL/min/1.7m2 51 (L)   GFR Estimate If Black      >60 mL/min/1.7m2 62   Protein Random Urine       <0.05   Protein Total Urine g/gr Creatinine      0 - 0.2 g/g Cr Unable to calculate due to low value   Albumin      3.4 - 5.0 g/dL 3.9   ALT      0 - 70 U/L 26   AST      0 - 45 U/L  25   CRP Inflammation      0.0 - 8.0 mg/L 6.1   Sed Rate      0 - 20 mm/h 6   Creatinine Urine Random       36   Proteinase 3 Antibody IgG      0.0 - 0.9 AI 1.2 (H)   Neutrophil Cytoplasmic IgG Antibody       <1:20 . . .   SAKINA  Broad Spectrum       Canceled, Test credited   DNA-ds      <10 IU/mL <1 . . .   Creatinine Urine       36   Direct Antiglobulin       Neg     Component      Latest Ref Rng & Units 4/3/2017 4/18/2017   WBC      4.0 - 11.0 10e9/L 7.7    RBC Count      4.4 - 5.9 10e12/L 4.50    Hemoglobin      13.3 - 17.7 g/dL 15.1    Hematocrit      40.0 - 53.0 % 43.1    MCV      78 - 100 fl 96    MCH      26.5 - 33.0 pg 33.6 (H)    MCHC      31.5 - 36.5 g/dL 35.0    RDW      10.0 - 15.0 % 13.4    Platelet Count      150 - 450 10e9/L 216    Diff Method       Automated Method    % Neutrophils      % 65.7    % Lymphocytes      % 17.7    % Monocytes      % 11.0    % Eosinophils      % 4.7    % Basophils      % 0.9    Absolute Neutrophil      1.6 - 8.3 10e9/L 5.1    Absolute Lymphocytes      0.8 - 5.3 10e9/L 1.4    Absolute Monocytes      0.0 - 1.3 10e9/L 0.9    Absolute Eosinophils      0.0 - 0.7 10e9/L 0.4    Absolute Basophils      0.0 - 0.2 10e9/L 0.1    Color Urine       Yellow    Appearance Urine       Clear    Glucose Urine      NEG mg/dL Negative    Bilirubin Urine      NEG Negative    Ketones Urine      NEG mg/dL Negative    Specific Gravity Urine      1.003 - 1.035 1.010    Blood Urine      NEG Negative    pH Urine      5.0 - 7.0 pH 5.0    Protein Albumin Urine      NEG mg/dL Negative    Urobilinogen Urine      0.2 - 1.0 EU/dL 0.2    Nitrite Urine      NEG Negative    Leukocyte Esterase Urine      NEG Negative    Source       Midstream Urine    Creatinine      0.66 - 1.25 mg/dL 1.41 (H)    GFR Estimate      >60 mL/min/1.7m2 50 (L)    GFR Estimate If Black      >60 mL/min/1.7m2 60 (L)    Myeloperoxidase Antibody IgG      0.0 - 0.9 AI <0.2 . . .    Proteinase 3 Antibody IgG      0.0 - 0.9 AI 2.8 (H)     Protein Random Urine      g/L <0.05    Protein Total Urine g/gr Creatinine      0 - 0.2 g/g Cr Unable to calculate due to low value    Albumin      3.4 - 5.0 g/dL 4.2    ALT      0 - 70 U/L 32    AST      0 - 45 U/L 27    CRP Inflammation      0.0 - 8.0 mg/L <2.9    Sed Rate      0 - 20 mm/h 5    Neutrophil Cytoplasmic IgG Antibody       <1:20 . . .    Creatinine Urine Random      mg/dL 48    Creatinine Urine      mg/dL 48    AcetChol Binding Anju        0.0       Ede Sanchez MD

## 2017-06-30 NOTE — MR AVS SNAPSHOT
After Visit Summary   6/30/2017    Joshua Ennis    MRN: [id number]           Patient Information     Date Of Birth          1945        Visit Information        Provider Department      6/30/2017 11:00 AM Ede Sanchez MD [facility] Rheumatology        Today's Diagnoses     Hypertension, renal, stage 1-4 or unspecified chronic kidney disease        High risk medications (not anticoagulants) long-term use          Care Instructions    Cut back on methotrexate to 4 tab a week    Labs today and in 2 months    F/u in 4-5 months          Follow-ups after your visit        Your next 10 appointments already scheduled     Jun 30, 2017 12:15 PM CDT   Lab with  LAB   [facility] Lab ([facility])    [address]  1st Floor  [place] [address]   [phone]            Aug 30, 2017 10:15 AM CDT   (Arrive by 10:00 AM)   Return Visit with Gabbi Obando MD   [facility] Ear Nose and Throat ([facility])    [address]  4th Floor  [place] [address]   [phone]            Jan 05, 2018 11:00 AM CST   (Arrive by 10:45 AM)   Return Visit with Ede Sanchez MD   [facility] Rheumatology ([facility])    [address]  3rd Floor  [place] [address]   [phone]              Future tests that were ordered for you today     Open Standing Orders        Priority Remaining Interval Expires Ordered    CBC with platelets differential Routine 3/3 q2 months 6/30/2018 6/30/2017    CRP inflammation Routine 3/3 q2 months 6/30/2018 6/30/2017    Erythrocyte sedimentation rate auto Routine 3/3 q2 months 6/30/2018 6/30/2017    Routine UA with Micro Reflex to Culture Routine 3/3 q2 months 6/30/2018 6/30/2017    Protein  random urine Routine 3/3 q2 months 6/30/2018 6/30/2017    Creatinine random urine Routine 3/3 q2 months 6/30/2018 6/30/2017    Comprehensive metabolic panel Routine 3/3 q2 months 6/30/2018  "2017    Antineutrophil cytoplasmic Anju IgG Routine 3/3 q2 months 2018    Vasculitis panel Routine 3/3 q2 months 2018            Who to contact     If you have questions or need follow up information about today's clinic visit or your schedule please contact [facility] RHEUMATOLOGY directly at [phone].  Normal or non-critical lab and imaging results will be communicated to you by Inf[employer]art, letter or phone within 4 business days after the clinic has received the results. If you do not hear from us within 7 days, please contact the clinic through Inf[employer] or phone. If you have a critical or abnormal lab result, we will notify you by phone as soon as possible.  Submit refill requests through [employer] or call your pharmacy and they will forward the refill request to us. Please allow 3 business days for your refill to be completed.          Additional Information About Your Visit        InfermedicaharTaodangpu Information     [employer] lets you send messages to your doctor, view your test results, renew your prescriptions, schedule appointments and more. To sign up, go to [email or web address] . Click on \"Log in\" on the left side of the screen, which will take you to the Welcome page. Then click on \"Sign up Now\" on the right side of the page.     You will be asked to enter the access code listed below, as well as some personal information. Please follow the directions to create your username and password.     Your access code is: [email or web address]  Expires: 2017 10:30 AM     Your access code will  in 90 days. If you need help or a new code, please call your [place] clinic or [phone].        Care EveryWhere ID     This is your Care EveryWhere ID. This could be used by other organizations to access your [place] medical records  [id number]        Your Vitals Were     Pulse Temperature Height Pulse Oximetry BMI (Body Mass Index)       61 98.8  F (37.1  C) (Oral) 1.753 m (5' 9\") 99% 27.29 " kg/m2        Blood Pressure from Last 3 Encounters:   06/30/17 129/77   03/03/17 147/78   02/23/17 126/64    Weight from Last 3 Encounters:   06/30/17 83.8 kg (184 lb 12.8 oz)   03/03/17 84.4 kg (186 lb)   11/18/16 82.3 kg (181 lb 6.4 oz)                 Today's Medication Changes          These changes are accurate as of: 6/30/17 12:04 PM.  If you have any questions, ask your nurse or doctor.               These medicines have changed or have updated prescriptions.        Dose/Directions    methotrexate 2.5 MG tablet CHEMO   This may have changed:  how much to take   Used for:  High risk medications (not anticoagulants) long-term use   Changed by:  Ede Sanchez MD        Dose:  10 mg   Take 4 tablets (10 mg) by mouth once a week   Quantity:  60 tablet   Refills:  1            Where to get your medicines      These medications were sent to [facility]-[address] [address]     Phone:  [phone]     losartan 50 MG tablet    methotrexate 2.5 MG tablet CHEMO                Primary Care Provider    Provider Unknown       No address on file        Equal Access to Services     GURPREET ARREDONDO AH: Hadii tom Keyes, walakeshada luduane, qaybta kaalmada adekevinyada, baljit alvarado. So [facility] [phone].    ATENCIÓN: Si habla español, tiene a enamorado disposición servicios gratuitos de asistencia lingüística. Llame al [phone].    We comply with applicable federal civil rights laws and [place] laws. We do not discriminate on the basis of race, color, national origin, age, disability sex, sexual orientation or gender identity.            Thank you!     Thank you for choosing [facility] RHEUMATOLOGY  for your care. Our goal is always to provide you with excellent care. Hearing back from our patients is one way we can continue to improve our services. Please take a few minutes to complete the written survey that you  may receive in the mail after your visit with us. Thank you!             Your Updated Medication List - Protect others around you: Learn how to safely use, store and throw away your medicines at www.disposemymeds.org.          This list is accurate as of: 6/30/17 12:04 PM.  Always use your most recent med list.                   Brand Name Dispense Instructions for use Diagnosis    Blood Pressure Monitor Kit     1 kit    Automatic Blood Pressure Monitor    Hypertension, renal       calcium 600 + D 600-400 MG-UNIT per tablet   Generic drug:  calcium-vitamin D      Take 1 tablet by mouth 2 times daily.        fluorouracil 5 % cream    EFUDEX    40 g    Apply twice daily to scalp for 2-4 weeks.    AK (actinic keratosis)       folic acid 1 MG tablet    FOLVITE    180 tablet    Take 2 tablets (2 mg) by mouth daily    Granulomatosis with polyangiitis (H)       gabapentin 400 MG capsule    NEURONTIN    90 capsule    Take 1 capsule (400 mg) by mouth At Bedtime    Neuropathy (H)       losartan 50 MG tablet    COZAAR    90 tablet    Take 1 tablet (50 mg) by mouth daily    Hypertension, renal, stage 1-4 or unspecified chronic kidney disease       methotrexate 2.5 MG tablet CHEMO     60 tablet    Take 4 tablets (10 mg) by mouth once a week    High risk medications (not anticoagulants) long-term use       TYLENOL PO      Take 650 mg by mouth once as needed for mild pain or fever        vitamin D 1000 UNITS capsule      Take 1 capsule by mouth daily.

## 2017-06-30 NOTE — NURSING NOTE
"Chief Complaint   Patient presents with     RECHECK     Follow up CKD stage 3.       Initial /77  Pulse 61  Temp 98.8  F (37.1  C) (Oral)  Ht 1.753 m (5' 9\")  Wt 83.8 kg (184 lb 12.8 oz)  SpO2 99%  BMI 27.29 kg/m2 Estimated body mass index is 27.29 kg/(m^2) as calculated from the following:    Height as of this encounter: 1.753 m (5' 9\").    Weight as of this encounter: 83.8 kg (184 lb 12.8 oz).  Medication Reconciliation: complete   Nahomy Blanchard., CMA    "

## 2017-06-30 NOTE — LETTER
Patient:  Joshua Ennis  :   1945  MRN:     [id number]        Mr.Roger DEANNA Ennis  [address] [facility] [address]        2017    Dear ,    We are writing to inform you of your test results. Labs look good. Cr (kidney function) is improved. One of vasculitis markers (ANCA) is normal and the other one (PR3) is improved. This is good news!        Resulted Orders   CBC with platelets differential   Result Value Ref Range    WBC 6.3 4.0 - 11.0 10e9/L    RBC Count 4.71 4.4 - 5.9 10e12/L    Hemoglobin 14.8 13.3 - 17.7 g/dL    Hematocrit 45.8 40.0 - 53.0 %    MCV 97 78 - 100 fl    MCH 31.4 26.5 - 33.0 pg    MCHC 32.3 31.5 - 36.5 g/dL    RDW 12.6 10.0 - 15.0 %    Platelet Count 177 150 - 450 10e9/L    Diff Method Automated Method     % Neutrophils 64.5 %    % Lymphocytes 18.3 %    % Monocytes 10.2 %    % Eosinophils 5.7 %    % Basophils 1.0 %    % Immature Granulocytes 0.3 %    Nucleated RBCs 0 0 /100    Absolute Neutrophil 4.0 1.6 - 8.3 10e9/L    Absolute Lymphocytes 1.2 0.8 - 5.3 10e9/L    Absolute Monocytes 0.6 0.0 - 1.3 10e9/L    Absolute Eosinophils 0.4 0.0 - 0.7 10e9/L    Absolute Basophils 0.1 0.0 - 0.2 10e9/L    Abs Immature Granulocytes 0.0 0 - 0.4 10e9/L    Absolute Nucleated RBC 0.0    CRP inflammation   Result Value Ref Range    CRP Inflammation <2.9 0.0 - 8.0 mg/L   Erythrocyte sedimentation rate auto   Result Value Ref Range    Sed Rate 6 0 - 20 mm/h   Routine UA with Micro Reflex to Culture   Result Value Ref Range    Color Urine Yellow     Appearance Urine Clear     Glucose Urine Negative NEG mg/dL    Bilirubin Urine Negative NEG    Ketones Urine Negative NEG mg/dL    Specific Gravity Urine 1.017 1.003 - 1.035    Blood Urine Negative NEG    pH Urine 5.0 5.0 - 7.0 pH    Protein Albumin Urine Negative NEG mg/dL    Urobilinogen mg/dL 0.0 0.0 - 2.0 mg/dL    Nitrite Urine Negative NEG    Leukocyte Esterase Urine Negative NEG    Source Midstream Urine     WBC Urine 1 0 - 2 /HPF     RBC Urine 1 0 - 2 /HPF    Mucous Urine Present (A) NEG /LPF   Protein  random urine   Result Value Ref Range    Protein Random Urine 0.15 g/L    Protein Total Urine g/gr Creatinine 0.13 0 - 0.2 g/g Cr   Creatinine random urine   Result Value Ref Range    Creatinine Urine Random 115 mg/dL   Comprehensive metabolic panel   Result Value Ref Range    Sodium 140 133 - 144 mmol/L    Potassium 4.7 3.4 - 5.3 mmol/L    Chloride 107 94 - 109 mmol/L    Carbon Dioxide 27 20 - 32 mmol/L    Anion Gap 6 3 - 14 mmol/L    Glucose 104 (H) 70 - 99 mg/dL    Urea Nitrogen 24 7 - 30 mg/dL    Creatinine 1.26 (H) 0.66 - 1.25 mg/dL    GFR Estimate 57 (L) >60 mL/min/1.7m2      Comment:      Non  GFR Calc    GFR Estimate If Black 68 >60 mL/min/1.7m2      Comment:       GFR Calc    Calcium 9.1 8.5 - 10.1 mg/dL    Bilirubin Total 0.7 0.2 - 1.3 mg/dL    Albumin 3.7 3.4 - 5.0 g/dL    Protein Total 7.0 6.8 - 8.8 g/dL    Alkaline Phosphatase 101 40 - 150 U/L    ALT 22 0 - 70 U/L    AST 16 0 - 45 U/L   Antineutrophil cytoplasmic Anju IgG   Result Value Ref Range    Neutrophil Cytoplasmic IgG Antibody       <1:20  Reference range: <1:20  (Note)  The ANCA IFA is <1:20; therefore, no further testing will  be performed.  INTERPRETIVE INFORMATION: Anti-Neutrophil Cyto Ab, IgG  Neutrophil Cytoplasmic Antibodies (C-ANCA = granular  cytoplasmic staining, P-ANCA = perinuclear staining) are  found in the serum of over 90 percent of patients with  certain necrotizing systemic vasculitides, and usually in  less than 5 percent of patients with collagen vascular  disease or arthritis.  Performed by [facility],  [address] [place] [id number] [phone]  [email or web address], Joe Keller MD, Lab. Director     Vasculitis panel   Result Value Ref Range    Myeloperoxidase Antibody IgG  0.0 - 0.9 AI     <0.2  Negative   Antibody index (AI) values reflect qualitative changes in antibody   concentration that cannot be directly  associated with clinical condition or   disease state.      Proteinase 3 Antibody IgG 2.3 (H) 0.0 - 0.9 AI      Comment:      Positive   Antibody index (AI) values reflect qualitative changes in antibody   concentration that cannot be directly associated with clinical condition or   disease state.     Creatinine urine calculation only   Result Value Ref Range    Creatinine Urine 115 mg/dL       Ede Sanchez MD

## 2017-07-03 LAB
MYELOPEROXIDASE AB SER-ACNC: ABNORMAL AI (ref 0–0.9)
PROTEINASE3 IGG SER-ACNC: 2.3 AI (ref 0–0.9)

## 2017-07-04 LAB — ANCA IGG TITR SER IF: NORMAL {TITER}

## 2017-07-06 NOTE — PROGRESS NOTES
Labs look good. Cr (kidney function) is improved. One of vasculitis markers (ANCA) is normal and the other one (PR3) is improved. This is good news!

## 2017-08-25 DIAGNOSIS — I77.82 ANCA-ASSOCIATED VASCULITIS (H): ICD-10-CM

## 2017-08-25 DIAGNOSIS — Z79.899 HIGH RISK MEDICATIONS (NOT ANTICOAGULANTS) LONG-TERM USE: ICD-10-CM

## 2017-08-25 DIAGNOSIS — I12.9 HYPERTENSION, RENAL, STAGE 1-4 OR UNSPECIFIED CHRONIC KIDNEY DISEASE: ICD-10-CM

## 2017-08-25 LAB
ALBUMIN SERPL-MCNC: 3.9 G/DL (ref 3.4–5)
ALBUMIN UR-MCNC: NEGATIVE MG/DL
ALP SERPL-CCNC: 98 U/L (ref 40–150)
ALT SERPL W P-5'-P-CCNC: 25 U/L (ref 0–70)
ANION GAP SERPL CALCULATED.3IONS-SCNC: 5 MMOL/L (ref 3–14)
APPEARANCE UR: CLEAR
AST SERPL W P-5'-P-CCNC: 20 U/L (ref 0–45)
BASOPHILS # BLD AUTO: 0.1 10E9/L (ref 0–0.2)
BASOPHILS NFR BLD AUTO: 1.3 %
BILIRUB SERPL-MCNC: 0.5 MG/DL (ref 0.2–1.3)
BILIRUB UR QL STRIP: NEGATIVE
BUN SERPL-MCNC: 31 MG/DL (ref 7–30)
CALCIUM SERPL-MCNC: 9.6 MG/DL (ref 8.5–10.1)
CHLORIDE SERPL-SCNC: 106 MMOL/L (ref 94–109)
CO2 SERPL-SCNC: 27 MMOL/L (ref 20–32)
COLOR UR AUTO: YELLOW
CREAT SERPL-MCNC: 1.47 MG/DL (ref 0.66–1.25)
CREAT UR-MCNC: 88 MG/DL
CREAT UR-MCNC: 88 MG/DL
CRP SERPL-MCNC: <2.9 MG/L (ref 0–8)
DIFFERENTIAL METHOD BLD: ABNORMAL
EOSINOPHIL # BLD AUTO: 0.4 10E9/L (ref 0–0.7)
EOSINOPHIL NFR BLD AUTO: 5.5 %
ERYTHROCYTE [DISTWIDTH] IN BLOOD BY AUTOMATED COUNT: 12.8 % (ref 10–15)
ERYTHROCYTE [SEDIMENTATION RATE] IN BLOOD BY WESTERGREN METHOD: 5 MM/H (ref 0–20)
GFR SERPL CREATININE-BSD FRML MDRD: 47 ML/MIN/1.7M2
GLUCOSE SERPL-MCNC: 91 MG/DL (ref 70–99)
GLUCOSE UR STRIP-MCNC: NEGATIVE MG/DL
HCT VFR BLD AUTO: 41.2 % (ref 40–53)
HGB BLD-MCNC: 13.6 G/DL (ref 13.3–17.7)
HGB UR QL STRIP: ABNORMAL
KETONES UR STRIP-MCNC: NEGATIVE MG/DL
LEUKOCYTE ESTERASE UR QL STRIP: NEGATIVE
LYMPHOCYTES # BLD AUTO: 1.4 10E9/L (ref 0.8–5.3)
LYMPHOCYTES NFR BLD AUTO: 22 %
MCH RBC QN AUTO: 32 PG (ref 26.5–33)
MCHC RBC AUTO-ENTMCNC: 33 G/DL (ref 31.5–36.5)
MCV RBC AUTO: 97 FL (ref 78–100)
MONOCYTES # BLD AUTO: 0.8 10E9/L (ref 0–1.3)
MONOCYTES NFR BLD AUTO: 12.2 %
NEUTROPHILS # BLD AUTO: 3.8 10E9/L (ref 1.6–8.3)
NEUTROPHILS NFR BLD AUTO: 59 %
NITRATE UR QL: NEGATIVE
PH UR STRIP: 5.5 PH (ref 5–7)
PLATELET # BLD AUTO: 174 10E9/L (ref 150–450)
POTASSIUM SERPL-SCNC: 4.8 MMOL/L (ref 3.4–5.3)
PROT SERPL-MCNC: 6.9 G/DL (ref 6.8–8.8)
PROT UR-MCNC: 0.1 G/L
PROT/CREAT 24H UR: 0.12 G/G CR (ref 0–0.2)
RBC # BLD AUTO: 4.25 10E12/L (ref 4.4–5.9)
RBC #/AREA URNS AUTO: NORMAL /HPF
SODIUM SERPL-SCNC: 138 MMOL/L (ref 133–144)
SOURCE: ABNORMAL
SP GR UR STRIP: 1.02 (ref 1–1.03)
UROBILINOGEN UR STRIP-ACNC: 0.2 EU/DL (ref 0.2–1)
WBC # BLD AUTO: 6.4 10E9/L (ref 4–11)
WBC #/AREA URNS AUTO: NORMAL /HPF

## 2017-08-25 PROCEDURE — 80053 COMPREHEN METABOLIC PANEL: CPT | Performed by: INTERNAL MEDICINE

## 2017-08-25 PROCEDURE — 85652 RBC SED RATE AUTOMATED: CPT | Performed by: INTERNAL MEDICINE

## 2017-08-25 PROCEDURE — 86255 FLUORESCENT ANTIBODY SCREEN: CPT | Mod: 90 | Performed by: INTERNAL MEDICINE

## 2017-08-25 PROCEDURE — 86140 C-REACTIVE PROTEIN: CPT | Performed by: INTERNAL MEDICINE

## 2017-08-25 PROCEDURE — 83876 ASSAY MYELOPEROXIDASE: CPT | Performed by: INTERNAL MEDICINE

## 2017-08-25 PROCEDURE — 81001 URINALYSIS AUTO W/SCOPE: CPT | Performed by: INTERNAL MEDICINE

## 2017-08-25 PROCEDURE — 99000 SPECIMEN HANDLING OFFICE-LAB: CPT | Performed by: INTERNAL MEDICINE

## 2017-08-25 PROCEDURE — 84156 ASSAY OF PROTEIN URINE: CPT | Performed by: INTERNAL MEDICINE

## 2017-08-25 PROCEDURE — 85025 COMPLETE CBC W/AUTO DIFF WBC: CPT | Performed by: INTERNAL MEDICINE

## 2017-08-25 PROCEDURE — 36415 COLL VENOUS BLD VENIPUNCTURE: CPT | Performed by: INTERNAL MEDICINE

## 2017-08-25 PROCEDURE — 83516 IMMUNOASSAY NONANTIBODY: CPT | Performed by: INTERNAL MEDICINE

## 2017-08-25 NOTE — LETTER
Patient:  Joshua Ennis  :   1945  MRN:     [id number]        Mr.Roger DEANNA Ennis  [address] [facility] [address]        October 15, 2017    Dear ,    We are writing to inform you of your test results. PR3 (vasculitis marker) continues to go down (improve) which is good news.      Resulted Orders   CBC with platelets differential   Result Value Ref Range    WBC 6.4 4.0 - 11.0 10e9/L    RBC Count 4.25 (L) 4.4 - 5.9 10e12/L    Hemoglobin 13.6 13.3 - 17.7 g/dL    Hematocrit 41.2 40.0 - 53.0 %    MCV 97 78 - 100 fl    MCH 32.0 26.5 - 33.0 pg    MCHC 33.0 31.5 - 36.5 g/dL    RDW 12.8 10.0 - 15.0 %    Platelet Count 174 150 - 450 10e9/L    Diff Method Automated Method     % Neutrophils 59.0 %    % Lymphocytes 22.0 %    % Monocytes 12.2 %    % Eosinophils 5.5 %    % Basophils 1.3 %    Absolute Neutrophil 3.8 1.6 - 8.3 10e9/L    Absolute Lymphocytes 1.4 0.8 - 5.3 10e9/L    Absolute Monocytes 0.8 0.0 - 1.3 10e9/L    Absolute Eosinophils 0.4 0.0 - 0.7 10e9/L    Absolute Basophils 0.1 0.0 - 0.2 10e9/L   CRP inflammation   Result Value Ref Range    CRP Inflammation <2.9 0.0 - 8.0 mg/L   Erythrocyte sedimentation rate auto   Result Value Ref Range    Sed Rate 5 0 - 20 mm/h   Protein  random urine   Result Value Ref Range    Protein Random Urine 0.10 g/L    Protein Total Urine g/gr Creatinine 0.12 0 - 0.2 g/g Cr   Creatinine random urine   Result Value Ref Range    Creatinine Urine Random 88 mg/dL   Comprehensive metabolic panel   Result Value Ref Range    Sodium 138 133 - 144 mmol/L    Potassium 4.8 3.4 - 5.3 mmol/L    Chloride 106 94 - 109 mmol/L    Carbon Dioxide 27 20 - 32 mmol/L    Anion Gap 5 3 - 14 mmol/L    Glucose 91 70 - 99 mg/dL    Urea Nitrogen 31 (H) 7 - 30 mg/dL    Creatinine 1.47 (H) 0.66 - 1.25 mg/dL    GFR Estimate 47 (L) >60 mL/min/1.7m2      Comment:      Non  GFR Calc    GFR Estimate If Black 57 (L) >60 mL/min/1.7m2      Comment:       GFR Calc     Calcium 9.6 8.5 - 10.1 mg/dL    Bilirubin Total 0.5 0.2 - 1.3 mg/dL    Albumin 3.9 3.4 - 5.0 g/dL    Protein Total 6.9 6.8 - 8.8 g/dL    Alkaline Phosphatase 98 40 - 150 U/L    ALT 25 0 - 70 U/L    AST 20 0 - 45 U/L   Antineutrophil cytoplasmic Anju IgG   Result Value Ref Range    Neutrophil Cytoplasmic IgG Antibody <1:20 <1:20      Comment:      (Note)  The ANCA IFA is <1:20; therefore, no further testing will   be performed.  INTERPRETIVE INFORMATION: Anti-Neutrophil Cyto Ab, IgG  Neutrophil Cytoplasmic Antibodies (C-ANCA = granular   cytoplasmic staining, P-ANCA = perinuclear staining) are   found in the serum of over 90 percent of patients with   certain necrotizing systemic vasculitides, and usually in   less than 5 percent of patients with collagen vascular   disease or arthritis.  Performed by [facility],  [address] [place] [id number] [phone]  [email or web address], Joe Keller MD, Lab. Director     Vasculitis panel   Result Value Ref Range    Myeloperoxidase Antibody IgG <0.2 0.0 - 0.9 AI      Comment:      Negative  Antibody index (AI) values reflect qualitative changes in antibody   concentration that cannot be directly associated with clinical condition or   disease state.      Proteinase 3 Antibody IgG 2.0 (H) 0.0 - 0.9 AI      Comment:      Positive  Antibody index (AI) values reflect qualitative changes in antibody   concentration that cannot be directly associated with clinical condition or   disease state.     Creatinine urine calculation only   Result Value Ref Range    Creatinine Urine 88 mg/dL   *UA reflex to Microscopic and Culture ([facility] (except [place] and [place])   Result Value Ref Range    Color Urine Yellow     Appearance Urine Clear     Glucose Urine Negative NEG^Negative mg/dL    Bilirubin Urine Negative NEG^Negative    Ketones Urine Negative NEG^Negative mg/dL    Specific Gravity Urine 1.020 1.003 - 1.035    Blood Urine Trace (A) NEG^Negative     pH Urine 5.5 5.0 - 7.0 pH    Protein Albumin Urine Negative NEG^Negative mg/dL    Urobilinogen Urine 0.2 0.2 - 1.0 EU/dL    Nitrite Urine Negative NEG^Negative    Leukocyte Esterase Urine Negative NEG^Negative    Source Midstream Urine    Urine Microscopic   Result Value Ref Range    WBC Urine O - 2 OTO2^O - 2 /HPF    RBC Urine O - 2 OTO2^O - 2 /HPF       Ede Sanchez MD

## 2017-08-27 LAB — ANCA IGG TITR SER IF: NORMAL {TITER}

## 2017-08-28 LAB
MYELOPEROXIDASE AB SER-ACNC: <0.2 AI (ref 0–0.9)
PROTEINASE3 IGG SER-ACNC: 2 AI (ref 0–0.9)

## 2017-12-13 ENCOUNTER — OFFICE VISIT (OUTPATIENT)
Dept: RHEUMATOLOGY | Facility: CLINIC | Age: 72
End: 2017-12-13
Attending: INTERNAL MEDICINE
Payer: COMMERCIAL

## 2017-12-13 VITALS
HEIGHT: 68 IN | BODY MASS INDEX: 28.43 KG/M2 | WEIGHT: 187.6 LBS | DIASTOLIC BLOOD PRESSURE: 80 MMHG | OXYGEN SATURATION: 98 % | SYSTOLIC BLOOD PRESSURE: 130 MMHG | HEART RATE: 74 BPM

## 2017-12-13 DIAGNOSIS — Z79.899 HIGH RISK MEDICATIONS (NOT ANTICOAGULANTS) LONG-TERM USE: ICD-10-CM

## 2017-12-13 DIAGNOSIS — M31.30 WEGENER'S GRANULOMATOSIS: Primary | ICD-10-CM

## 2017-12-13 DIAGNOSIS — M31.30 WEGENER'S GRANULOMATOSIS: ICD-10-CM

## 2017-12-13 DIAGNOSIS — I12.9 HYPERTENSIVE CKD (CHRONIC KIDNEY DISEASE): ICD-10-CM

## 2017-12-13 LAB
ALBUMIN SERPL-MCNC: 4 G/DL (ref 3.4–5)
ALBUMIN UR-MCNC: NEGATIVE MG/DL
ALP SERPL-CCNC: 97 U/L (ref 40–150)
ALT SERPL W P-5'-P-CCNC: 30 U/L (ref 0–70)
ANION GAP SERPL CALCULATED.3IONS-SCNC: 6 MMOL/L (ref 3–14)
APPEARANCE UR: CLEAR
AST SERPL W P-5'-P-CCNC: 22 U/L (ref 0–45)
BASOPHILS # BLD AUTO: 0.1 10E9/L (ref 0–0.2)
BASOPHILS NFR BLD AUTO: 1 %
BILIRUB SERPL-MCNC: 0.7 MG/DL (ref 0.2–1.3)
BILIRUB UR QL STRIP: NEGATIVE
BUN SERPL-MCNC: 25 MG/DL (ref 7–30)
CALCIUM SERPL-MCNC: 9.4 MG/DL (ref 8.5–10.1)
CHLORIDE SERPL-SCNC: 104 MMOL/L (ref 94–109)
CO2 SERPL-SCNC: 27 MMOL/L (ref 20–32)
COLOR UR AUTO: YELLOW
CREAT SERPL-MCNC: 1.48 MG/DL (ref 0.66–1.25)
CREAT UR-MCNC: 106 MG/DL
CRP SERPL-MCNC: <2.9 MG/L (ref 0–8)
DIFFERENTIAL METHOD BLD: NORMAL
EOSINOPHIL # BLD AUTO: 0.2 10E9/L (ref 0–0.7)
EOSINOPHIL NFR BLD AUTO: 3.8 %
ERYTHROCYTE [DISTWIDTH] IN BLOOD BY AUTOMATED COUNT: 12.8 % (ref 10–15)
ERYTHROCYTE [SEDIMENTATION RATE] IN BLOOD BY WESTERGREN METHOD: 5 MM/H (ref 0–20)
GFR SERPL CREATININE-BSD FRML MDRD: 47 ML/MIN/1.7M2
GLUCOSE SERPL-MCNC: 91 MG/DL (ref 70–99)
GLUCOSE UR STRIP-MCNC: NEGATIVE MG/DL
HCT VFR BLD AUTO: 46.4 % (ref 40–53)
HGB BLD-MCNC: 15.3 G/DL (ref 13.3–17.7)
HGB UR QL STRIP: NEGATIVE
IMM GRANULOCYTES # BLD: 0 10E9/L (ref 0–0.4)
IMM GRANULOCYTES NFR BLD: 0.3 %
KETONES UR STRIP-MCNC: NEGATIVE MG/DL
LEUKOCYTE ESTERASE UR QL STRIP: NEGATIVE
LYMPHOCYTES # BLD AUTO: 1.2 10E9/L (ref 0.8–5.3)
LYMPHOCYTES NFR BLD AUTO: 20 %
MCH RBC QN AUTO: 31.9 PG (ref 26.5–33)
MCHC RBC AUTO-ENTMCNC: 33 G/DL (ref 31.5–36.5)
MCV RBC AUTO: 97 FL (ref 78–100)
MONOCYTES # BLD AUTO: 0.7 10E9/L (ref 0–1.3)
MONOCYTES NFR BLD AUTO: 11.8 %
MUCOUS THREADS #/AREA URNS LPF: PRESENT /LPF
NEUTROPHILS # BLD AUTO: 3.8 10E9/L (ref 1.6–8.3)
NEUTROPHILS NFR BLD AUTO: 63.1 %
NITRATE UR QL: NEGATIVE
NRBC # BLD AUTO: 0 10*3/UL
NRBC BLD AUTO-RTO: 0 /100
PH UR STRIP: 5 PH (ref 5–7)
PLATELET # BLD AUTO: 180 10E9/L (ref 150–450)
POTASSIUM SERPL-SCNC: 5 MMOL/L (ref 3.4–5.3)
PROT SERPL-MCNC: 7.8 G/DL (ref 6.8–8.8)
PROT UR-MCNC: 0.15 G/L
PROT/CREAT 24H UR: 0.14 G/G CR (ref 0–0.2)
RBC # BLD AUTO: 4.8 10E12/L (ref 4.4–5.9)
RBC #/AREA URNS AUTO: 1 /HPF (ref 0–2)
SODIUM SERPL-SCNC: 137 MMOL/L (ref 133–144)
SOURCE: ABNORMAL
SP GR UR STRIP: 1.02 (ref 1–1.03)
SQUAMOUS #/AREA URNS AUTO: <1 /HPF (ref 0–1)
UROBILINOGEN UR STRIP-MCNC: 0 MG/DL (ref 0–2)
WBC # BLD AUTO: 6.1 10E9/L (ref 4–11)
WBC #/AREA URNS AUTO: <1 /HPF (ref 0–2)

## 2017-12-13 PROCEDURE — 85025 COMPLETE CBC W/AUTO DIFF WBC: CPT | Performed by: INTERNAL MEDICINE

## 2017-12-13 PROCEDURE — 86140 C-REACTIVE PROTEIN: CPT | Performed by: INTERNAL MEDICINE

## 2017-12-13 PROCEDURE — 36415 COLL VENOUS BLD VENIPUNCTURE: CPT | Performed by: INTERNAL MEDICINE

## 2017-12-13 PROCEDURE — 83876 ASSAY MYELOPEROXIDASE: CPT | Performed by: INTERNAL MEDICINE

## 2017-12-13 PROCEDURE — 86255 FLUORESCENT ANTIBODY SCREEN: CPT | Performed by: INTERNAL MEDICINE

## 2017-12-13 PROCEDURE — 99212 OFFICE O/P EST SF 10 MIN: CPT | Mod: ZF

## 2017-12-13 PROCEDURE — 85652 RBC SED RATE AUTOMATED: CPT | Performed by: INTERNAL MEDICINE

## 2017-12-13 PROCEDURE — 81001 URINALYSIS AUTO W/SCOPE: CPT | Performed by: INTERNAL MEDICINE

## 2017-12-13 PROCEDURE — 83516 IMMUNOASSAY NONANTIBODY: CPT | Performed by: INTERNAL MEDICINE

## 2017-12-13 PROCEDURE — 84156 ASSAY OF PROTEIN URINE: CPT | Performed by: INTERNAL MEDICINE

## 2017-12-13 PROCEDURE — 80053 COMPREHEN METABOLIC PANEL: CPT | Performed by: INTERNAL MEDICINE

## 2017-12-13 ASSESSMENT — PAIN SCALES - GENERAL: PAINLEVEL: MILD PAIN (2)

## 2017-12-13 NOTE — NURSING NOTE
"Chief Complaint   Patient presents with     RECHECK     Follow up for Wegener's        Initial /80  Pulse 74  Ht 1.727 m (5' 8\")  Wt 85.1 kg (187 lb 9.6 oz)  SpO2 98%  BMI 28.52 kg/m2 Estimated body mass index is 28.52 kg/(m^2) as calculated from the following:    Height as of this encounter: 1.727 m (5' 8\").    Weight as of this encounter: 85.1 kg (187 lb 9.6 oz).  Medication Reconciliation: complete   Peg Bahena CMA    "

## 2017-12-13 NOTE — LETTER
12/13/2017      RE: Joshua Ennis  [address] [facility] [address]       [facility] Follow-Up Note  Date of visit: 12/13/2017  Last visit date:  6/30/2017      Joshua Ennis MRN# [id number]   Age: 72 year old YOB: 1945          Assessment and Plan:     Assessment:   Mr. Ennis is a 72 yr old  male with history of melanoma s/p resection in 11/2011, former tobacco use, and GPA (PR3+, MPO and c-ANCA negative in 3/2013 based on aforementioned labs, confirmed by kidney and lung biopsy) who presents for clinic follow-up regarding GPA.  He was initiated on cyclophosphamide 150 mg QD and high-dose prednisone 3/2013. He last took prednisone in 10/2013. Cytoxan was switched to MTX for maintenance treatment in 10/2013.  He has been tolerating it well. He had a flare in 12/2013 with increased crusts in his nose along with recurrence of arthralgia, worsening numbness in feet and increasing vasculitis marker. Renal function was stable with negative repeat chest CT. His vasculitis flare was treated with short course of prednisone taper 20 mg po qd max and increasing MTX to 8 tab = 20 mg qwk. Vasculitis symptoms improved.  At visit on 1/2015, he had scabs in his nose, had cold [place]akota symptoms and increased arthralgia/fatigue (shoulders, L hand). Labs from 12/16 showed increased Cr level and hematuria with rising PR3 (more than 8), there was a concern for vasculitis flare (in kidneys, sinuses), no flare on repeat chest CT 1/2015. Therefore it was recommended to try rituximab. Another reason was to be able to taper MTX off given abnormal Cr.  He is now s/p Rituximab infusion x 4 (1st on 2/25/2015). He is feeling well. No hematuria with stable Cr, no SOB. Saw ENT with negative nasal mucosa biopsy 6/2015 for granuloma but showed active inflammation, had left nostril lesion which decreased in size by use of mupirocin ointment. PR3 vasculitis marker went back to NL in 6/2015, it was NL in  10/2015, given stable vasculitis and low GFR, MTX from 8 to 7 tab po qwk. Repeat ID-3 in 3/2016 was slightly positive, Cr was slightly higher than baseline. We tapered his MTX further to 6 tab po qwk in 2/2016 given lack of symptoms. Re-check labs in April were almost unchanged from 3/2016 but CRP was slightly up. Labs on 2/7/17 with elevated CRP (40.6) but normal ESR (12) and ANCA 1:20 likely reflecting bacterial infection.    In 3/2017, MTX was reduced from 6 to 5 tab qwk. It was further tapered to 4 tab po qwk in 6/2017 given stable disease. No vasculitis flare ups by such change. ANCA remained negative, ID-3 is going down despite tapering MTX, dropped from 2.8 in 4/2017 to 2.3 in 6/2017 to 2 in 8/2017.    Vasculitis seems to be stable and he has no signs or symptoms of flare up today. Recommend to continue MTX at low dose of 4 tab po qwk as GPA or ANCA-vasculitis tends to flare up. Was advised to call in case of vasculitis flare up sx.        Plan:    - Continue MTX 10 mg (4 tabs) po qwk, continue with Folic acid     > Labs today and q3 months including MTX monitoring labs    > Patient instructed to hold MTX if develops fevers or infection  - Neuropathy unchanged on Gabapentin  - F/u with Dr. Obando ENT for small bluish pigmented lesion in the left soft palate.   Dr. Obando recommended biopsy in 5/2017 but pt wanted to hold off  - RTC in 6 months        Orders Placed This Encounter   Procedures     ALT     Albumin level     AST     CBC with platelets differential     Creatinine     CRP inflammation     Erythrocyte sedimentation rate auto     Routine UA with micro reflex to culture     Antineutrophil cytoplasmic Anju IgG     Vasculitis panel             HPI / Interim History:      Mr. Ennis is a 71 yo WM with h/o melanoma s/p resection in 11/2011, former tobacco use, and GPA diagnosed in 3/2013. For details of GPA diagnosis, please refer to initial consult note. Briefly, he initially presented with constitutional  sx of fever, sweating, weight loss, migratory arthralgia, numbness/shooting pain in feet to his PCP.  Had enlarged inguinal LNs. His PCP suspected malignancy and especially lung cancer given h/o tobacco use.  His work-up showed several lung nodules and CT guided biopsy of one of the nodules done in 2/13 showed granulomatous inflammation with no evidence of malignancy.  Inguinal LN biopsy done 3/13 showed inflammation with no malignancy.  He had not had any respiratory sx, SOB, CP, cough, hemoptysis or sinusitis.  He was hospitalized for further work up and was found having high PR3, neg MPO, neg ANCA.  He also had + RF and trace cryo.   Had microscopic hematuria with NL Cr at the time of admission but his Cr started to rise before discharge. Had abnormal LFTs toward the end of discharge(was trending down, liver US was unremarkable).  He was diagnosed with GPA given high titer of PR3 to 723, +granulomatous inflammation of the lung nodule, microscopic hematuria, and migratory arthralgia along with high ESR/CRP and constitutional sx.  He was Started on cytoxan 150 mg po qd, prednisone 70 mg po qd (after IV solumedrol 1 gr qd x 3 days), Bactrim DS three times a wk for PCP prophylaxis and fosamax 70 mg po qwk. He was also put on vit D 38267 units qwk and neurontin.  He was discharged on 3/13/2013.   At initial visit in Rheumatology clinic, patient was referred to nephrology for renal biopsy since Cr was up despite being on cytoxan and prednisone 75 qd.  Renal biopsy 3/13 confirmed Dx of GPA.  After discussion with Dr. Krishnamurthy, made a decision to continue with current regimen and if no improvement to switch to IV cytoxan or rituximab.  His Cr initially increased to 1.71, but stabilized to ~ 1.3.  He has a h/o high BP and was recently started on losartan per nephology.  He has been seen by neurology for bilaterally foot and ankle numbness and was diagnosed with neuropathy possibly secondary to vasculitis.  He was also seen  by oncology; there was no concern for malignancy.   Overall, the patient is doing well today with no specific complaints or concerns.  He had recent blood work 9/19 which showed negative inflammatory and vasculitis markers (PR3, MPO, ANCA, and CRP).  His SCr has remained elevated, but stable at 1.37.  He is at the tail end of a prednisone taper; currently taking 1 mg QD, set to d/c 10/15.  He is still on cyclophosphamide, taking 150 mg QD.  He was prescribed losartan at a f/u visit w/ nephrology 8/21, but states his BP have been running 130's/80's, so has not taken the medication for over a month.  Additionally, he had been taking neurontin for numbness in his feet, toes, and ankles, but stopped b/c he was not getting symptom relief.  Today, the patient denies vision changes, epistaxis, oral ulcerations, SOB, hemoptysis, CP, joint pain, abdominal pain, dysuria, or hematuria.  He does endorse a cough, but states that this has been intermittent, chronic, and non-productive.  Additionally, he endorses right shoulder discomfort that is worse w/ abduction, but has also been chronic. Of note, the patient remains a former smoker, having quit in February of this year.     His major complaint is increased numbness of his toes, but has resumed taking neurontin and feels sensation is coming back which is painful. No SOB, cough, crusts in nose or blood in urine. He has fatigue. He is off cytoxan since 10/2013, is on MTX 8tab po qwk. MTX was started with 4 tab po qwk in 10/2013, was increased to 8 tab after last visit in 1/2014. In 12/2013, was felt to have a small flare of vasculitis with increased PR3, crusts in nose and R shoulder pain; therefore prednisone taper with max dose of 20 mg qd was given along with increasing MTX to 7 tab qwk. MTX was later increased to 8 tab qwk in 1/2014. Labs done in 2/2014 showed rising PR3. He had ER visit on 1/8 for urosepsis (E. Coli), was treated with cedifir, recovered. Continues having  shoulder pain, requests referral to PT.    1/2015: He reports having increased arthralgia (shoulders, L hand) x a month. About 2 wk ago, L hand pain was so bad that he could not move his hand. No major joint swelling. Has no AM stiffness. Reports being tested negative for lyme (local lab) about 2 wk ago. Appetite is worse. He is more tired. He sleeps a lot. Has a cold x 1 wk, no hemoptysis. Neuropathy is the same. No SOB. No fever. Reports having a rash under L axilla x last 2 wk, comes and goes but to him looks like a lyme rash. He thinks he probably had a tick bite.    Last visit 3/2015: Rituximab qwk x 4 doses was recommended at last visit given concern for active GPA. He had his 1st infusion on 2/25/2015, right after start of infusion, developed hives without any resp sx, was treated with extra dose of benadryl and solu cortef. Hives resolved. Infusion was resumed at a slower rate and he finished and tolerated it well. Had his 2nd infusion on 3/4/2015 with no reaction although this time I increased his solumedrol from 100 to 250 mg as part of pre-medication. He has since completed all 4 infusions w/o reaction. States that nasal and joint symptoms remain improved. He still does have some crusting in his left nostril. Does have some pain and swelling in the hands w/minimal morning stiffness but that is his baseline. Says pain in his shoulders has improved. He denies any medication side effects.    He was seen in the ED 4/3/15 for productive cough and fever. He was treated with Levaquin for likely pna. He is now feeling better with only lingering cough.    10/16/2015: He is feeling well, has no complaints except cough at night because of sinus drainage (chronic). Requests pneumonia shot.    2/2016: Today patient reports feeling in excellent condition. Denies chest symptoms, such as SOB, hemoptysis, cough, or nasal drainage/bleeding. He also denies any hematuria and his last renal labs were stable. Unfortunately,  "his neuropathy seems to be his biggest problem. Neurology started him on a low dose of gabapentin without improvement.     5/6/16: Besides stuffy nose due to seasonal allergies, has no complaints. No cough, SOB, hemoptysis, hematuria, weight loss or fatigue.    8/12/16: Patient has no complaints today. No cough, SOB, hemoptysis, hematuria, weight loss or fatigue. He was seen today in Nephrology clinic for CKD follow up, doing well, no further follow up is needed. Patient reports visual disturbance with decreased vision probably on the R eye (paitent does not remember) with decreased to none visual strength in low visual field with some \"blanks\" which lasted for 40 seconds. This is a second episode in the last 1.5-2 years. Patient also reports recurrent double vision occuring every 4-5 months. Last ophthalmologist appointment was 1986.     11/18/16: Feeling very well, no complaints. Is going to have an eye exam today.    3/3/17: Mr. Ennis says he is doing well overall. He is still recovering from a pneumonia diagnosed on 2/14/17. He says that he was feeling ill for about 2 weeks before this with worsening productive cough, nasal congestion and high fevers with \"fever blisters\". He went to the ED on 2/14/17 and received ceftriaxone and a Z-pack and says he has been improving since then. He says that many other people at his work were sick with similar symptoms. Today he reports some residual nasal congestion but denies shortness of breath. He says that during his illness he did have some blood in his nose \"if I picked at it\" but he otherwise denies epistaxis, hemoptysis, hematuria and blood in his stools. His significant other is wondering if his methotrexate dose can start to be tapered down. Currently he is taking MTX 15mg weekly although this was held when he had pneumonia.    Mr. Ennis says that he continues to experience episodes of diplopia on average once per month. These episodes typically last 20-30 " seconds. He was seen by ophthalmology on 11/18/16 and had a normal eye exam.    Today: Feeling well with no complaints. On MTX 4 tab po qwk.         Past Medical History:     Past Medical History:   Diagnosis Date     Arthritis      Autoimmune disease (H)      Hearing problem      Hoarseness      Hypertension      Kidney problem      Malignant melanoma (H)      Malignant neoplasm (H)     melanoma     Squamous cell carcinoma      Russo syndrome           Past Surgical History:     Past Surgical History:   Procedure Laterality Date     BIOPSY  11/2011    melanoma on back     DISSECT LYMPH NODE INGUINAL  3/1/2013    Procedure: DISSECT LYMPH NODE INGUINAL;  Bilateral Inguinal Lymph Node Biopsy.;  Surgeon: Tariq Acosta MD;  Location: [facility]     ESOPHAGOSCOPY, GASTROSCOPY, DUODENOSCOPY (EGD), COMBINED  7/5/2013    Procedure: COMBINED ESOPHAGOSCOPY, GASTROSCOPY, DUODENOSCOPY (EGD);  Gastroscopy;  Surgeon: Tariq Acosta MD;  Location: [facility]     HERNIORRHAPHY INGUINAL  8/6/2012    Procedure: HERNIORRHAPHY INGUINAL;  Open Repair Left Inguinal Hernia;  Surgeon: Jerad Baker MD;  Location: [facility]          Social History:     Social History   Substance Use Topics     Smoking status: Former Smoker     Packs/day: 1.00     Years: 20.00     Types: Cigarettes     Quit date: 2/14/2013     Smokeless tobacco: Never Used     Alcohol use Yes      Comment: rare          Family History:     Family History   Problem Relation Age of Onset     DIABETES Mother      Hypertension Mother      CANCER Father      stomach     HEART DISEASE Father      HEART DISEASE Brother      DIABETES Sister      DIABETES Sister      DIABETES Sister      HEART DISEASE Brother      CANCER Sister      Glaucoma No family hx of      Macular Degeneration No family hx of           Immunizations:   Due for PPSV23, will administer today. Immunizations otherwise up to date.    PMHx, FHx, SHx were reviewed, unchanged.         Allergies:     Allergies  "  Allergen Reactions     Shrimp Nausea and Vomiting     Got sick each time he ate it          Medications:     Current Outpatient Prescriptions   Medication Sig     losartan (COZAAR) 50 MG tablet Take 1 tablet (50 mg) by mouth daily     methotrexate 2.5 MG tablet CHEMO Take 4 tablets (10 mg) by mouth once a week     folic acid (FOLVITE) 1 MG tablet Take 2 tablets (2 mg) by mouth daily     gabapentin (NEURONTIN) 400 MG capsule Take 1 capsule (400 mg) by mouth At Bedtime     fluorouracil (EFUDEX) 5 % cream Apply twice daily to scalp for 2-4 weeks. (Patient not taking: Reported on 5/24/2017)     Acetaminophen (TYLENOL PO) Take 650 mg by mouth once as needed for mild pain or fever     calcium-vitamin D (CALCIUM 600 + D) 600-400 MG-UNIT per tablet Take 1 tablet by mouth 2 times daily.     Cholecalciferol (VITAMIN D) 1000 UNITS capsule Take 1 capsule by mouth daily.     Blood Pressure Monitor KIT Automatic Blood Pressure Monitor (Patient not taking: Reported on 5/24/2017)     No current facility-administered medications for this visit.           Review of Systems:   A 10-point ROS was done. Positives are per HPI.         Physical Exam:     Vitals:    12/13/17 1115   BP: 130/80   Pulse: 74   SpO2: 98%   Weight: 85.1 kg (187 lb 9.6 oz)   Height: 1.727 m (5' 8\")     Constitutional:   Awake, alert, cooperative, no apparent distress.   Eyes:   Pupils equal, round and reactive to light, sclera clear, conjunctiva normal.   ENT:   Normocephalic, atramatic,  oral pharynx with moist mucus membranes, tonsils without erythema or exudates, dentures in place. Small dark black spot over left soft palate.   Neck:   Supple, symmetrical, trachea midline, no adenopathy.   Lungs:   No increased work of breathing, good air exchange, clear to auscultation bilaterally, no crackles or wheezing.    Cardiovascular:   Regular rate and rhythm, normal S1 and S2, no S3 or S4, and no murmur noted.   Musculoskeletal:   No active synovitis or joint " tenderness. Good ROM in all joints tested. No edema in LE.   Neurologic:   AOx3; CN grossly intact.   Skin:   No rashes or ecchymoses on limited exam.          Data:   Recent laboratory data reviewed.    2/14/17  Cr 1.46, WBC 7.8, AST/ALT normal    2/7/17  CRP 40.6, ESR 12, ANCA 1:20, MPO neg, PA-3 neg    Recent imaging studies reviewed by me.    CXR (2/14/17)    New mild patchy opacity at the right lung base appears to  localize to the posterior right lower lobe base and could represent a  developing area of pneumonia. Left lung is clear.    CHEST CT (2/16/2013)    Chest: Multiple indeterminate pulmonary nodules. Several of the  larger lesions are cavitary. There are 3 dominant lesions, a 3.2 cm  cavitary lesion in the left upper lobe, a 3.4 cm solid mass in the  right lower lobe laterally and a 3.7 cm cavitary mass in the right  lower lobe medially. In addition there is mediastinal lymphadenopathy  with a dominant 3 cm subcarinal node. Bilateral axillary  lymphadenopathy with 2.4 cm node seen bilaterally. Chest otherwise  unremarkable.       Exam: High-resolution Chest CT without contrast 1/9/2015 12:42 PM.  History: Concern for ANCA vasculitis flare in the chest.  Comparison: 3/7/2014, 11/14/2013, 2/16/2013.  Technique: CT helical acquisition from the lung apices to the kidneys  was obtained without intravenous contrast. Both inspiratory and  expiratory images were obtained.    Findings:  Moderate atherosclerotic calcifications of the coronary arteries. Mild  calcifications involving the aorta and aortic great vessels. Prominent  paratracheal lymph node on series 3 image 22 measuring 9 mm. Decreased  from 2/16/2013 and unchanged from 3/7/2014. Borderline enlarged right  hilar lymph node, unchanged from 3/7/2014 and decreased from  2/16/2013. Heart is not enlarged. Trace cardial effusion. No axillary  lymphadenopathy.  Nodular scarring in the left apex, unchanged from 2/16/2013. No  pleural effusion or  pneumothorax. No evidence of intrapulmonary  infection. There is a cluster of tree in bud nodularity noted in the  anteromedial right upper lobe. These nodules appear new. Expiratory  images demonstrate mild air trapping. Scattered pulmonary nodules:  Series 6 image 146: Seen posteriorly in the right lower lobe, there is  a sub-pleural nodule measuring 1.6 x 1.2 cm with a spiculated border.  Previously, this nodule measured 2.0 x 1.6 cm.  Series 6 image 189: Seen laterally in the right lower lobe, there is a  former mid pulmonary nodule which is unchanged from 3/7/2013 and  decreased from 2/16/2013.  Series 6 image 169: Seen posterolaterally in the right lower lobe,  there is a 3 mm pulmonary nodule which is unchanged from 3/7/2014 and  decreased from 2/16/2013.  Series 6 image 225: Seen posterolaterally in the left lower lobe,  there is a 4 mm subpleural nodule which is unchanged from 2/16/2014.  Other scattered sub-4 mm pulmonary nodules appear stable from previous  study.  Evaluation of the upper abdomen is limited due to the lack of  intravenous contrast.  No suspicious bony lesions.    IMPRESSION  Impression:   1. Scattered pulmonary nodules enlarged lymph nodes are  stable/slightly decreased from the previous study. No evidence for  acute flare in patient's known ANCA-associated vasculitis.  2. New tree in bud nodularity seen anteromedially in the right middle  lobe. Findings could be seen in the setting of an atypical infectious  process.  3. Mild air trapping. Finding is nonspecific and is seen in setting of  small airways disease such as asthma or bronchiolitis.  I have personally reviewed the examination and initial interpretation  and I agree with the findings.  TOÑO PERKINS MD    Component      Latest Ref Rng 6/11/2015 6/11/2015           9:26 AM  9:29 AM   Sodium      133 - 144 mmol/L  139   Potassium      3.4 - 5.3 mmol/L  4.4   Chloride      94 - 109 mmol/L  109   Carbon Dioxide      20 - 32 mmol/L   23   Anion Gap      3 - 14 mmol/L  7   Glucose      70 - 99 mg/dL  81   Urea Nitrogen      7 - 30 mg/dL  28   Creatinine      0.66 - 1.25 mg/dL  1.26 (H)   GFR Estimate      >60 mL/min/1.7m2  57 (L)   GFR Estimate If Black      >60 mL/min/1.7m2  69   Calcium      8.5 - 10.1 mg/dL  9.2   Phosphorus      2.5 - 4.5 mg/dL  2.1 (L)   Albumin      3.4 - 5.0 g/dL  1.7 (L)   Iron      35 - 180 ug/dL  129   Iron Binding Cap      240 - 430 ug/dL  292   Iron Saturation Index      15 - 46 %  44   Protein Random Urine       0.11    Protein Total Urine g/gr Creatinine      0 - 0.2 g/g Cr 0.14    Hemoglobin      13.3 - 17.7 g/dL  13.0 (L)   Ferritin      26 - 388 ng/mL  193   Parathormone Intact      12 - 72 pg/mL  49   Vitamin D Deficiency screening      30 - 75 ug/L  55   Creatinine Urine       84    Copath Report           Proteinase 3 Antibody IgG      0.0 - 0.9 AI       Component      Latest Ref Rng 6/11/2015 6/11/2015          11:15 AM 12:18 PM   Copath Report       Patient Name: ADONAY FAITH . . .    Proteinase 3 Antibody IgG      0.0 - 0.9 AI  0.8     Copath Report     Patient Name: ADONAY FAITH  MR#: [id number]  Specimen #: [id number]  Collected: 6/11/2015  Received: 6/11/2015  Reported: 6/12/2015 17:37  Ordering Phy(s): DANIELLE PARIS    SPECIMEN(S):  Nasal septum    FINAL DIAGNOSIS:  Nasal septum, biopsy:  -Squamous mucosa with ulcer, marked acute inflammation and reactive  changes  -No granulomas identified  -A special stain for fungi (GMS) is negative    I have personally reviewed all specimens and or slides, including the  listed special stains, and used them with my medical judgement to  determine the final diagnosis.    Electronically signed out by:    Dorys Barton M.D., [facility]    CLINICAL HISTORY:  The patient is a 70-year-old man with a history of left upper back  melanoma, resected in 2011 (see consult report [id number]). 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 He now presents for followup visit with nasal  cavity scabs, increased arthralgia and fatigue, concerning for  vasculitis flare. Operative procedure: Nasal septum biopsy.    GROSS:  The specimen is received in formalin with proper patient identification,  labeled \"septal tissue\". The specimen consists of three tan-pink tissue  fragments, 0.2 cm in greatest dimension, entirely submitted in cassette  1. (Dictated by: Ana Mixon 6/11/2015 03:39 PM)    MICROSCOPIC:  Microscopic examination is performed. A GMS stain, performed with  appropriate positive control, is negative.    CPT Codes:  A: 21979-KX2, 12955-KJW    TESTING LAB LOCATION:  [facility], [address] [address] [phone]    COLLECTION SITE:  Client: [facility]  Location: [facility] (B)     Component      Latest Ref Rng 12/10/2015   WBC      4.0 - 11.0 10e9/L 5.8   RBC Count      4.4 - 5.9 10e12/L 4.32 (L)   Hemoglobin      13.3 - 17.7 g/dL 13.9   Hematocrit      40.0 - 53.0 % 42.0   MCV      78 - 100 fl 97   MCH      26.5 - 33.0 pg 32.2   MCHC      31.5 - 36.5 g/dL 33.1   RDW      10.0 - 15.0 % 12.5   Platelet Count      150 - 450 10e9/L 199   Diff Method       Automated Method   % Neutrophils       70.2   % Lymphocytes       10.8   % Monocytes       9.6   % Eosinophils       7.7   % Basophils       1.7   Absolute Neutrophil      1.6 - 8.3 10e9/L 4.1   Absolute Lymphocytes      0.8 - 5.3 10e9/L 0.6 (L)   Absolute Monoctyes      0.0 - 1.3 10e9/L 0.6   Absolute Eosinophils      0.0 - 0.7 10e9/L 0.5   Absolute Basophils      0.0 - 0.2 10e9/L 0.1   Sodium      133 - 144 mmol/L 140   Potassium      3.4 - 5.3 mmol/L 4.7   Chloride      94 - 109 mmol/L 107   Carbon Dioxide      20 " - 32 mmol/L 25   Anion Gap      3 - 14 mmol/L 8   Glucose      70 - 99 mg/dL 91   Urea Nitrogen      7 - 30 mg/dL 28   Creatinine      0.66 - 1.25 mg/dL 1.40 (H)   GFR Estimate      >60 mL/min/1.7m2 50 (L)   GFR Estimate If Black      >60 mL/min/1.7m2 61   Calcium      8.5 - 10.1 mg/dL 9.1   Bilirubin Total      0.2 - 1.3 mg/dL 0.7   Albumin      3.4 - 5.0 g/dL 3.9   Protein Total      6.8 - 8.8 g/dL 6.9   Alkaline Phosphatase      40 - 150 U/L 103   ALT      0 - 70 U/L 28   AST      0 - 45 U/L 16   Color Urine       Yellow   Appearance Urine       Clear   Glucose Urine      NEG mg/dL Negative   Bilirubin Urine      NEG Negative   Ketones Urine      NEG mg/dL Negative   Specific Gravity Urine      1.003 - 1.035 >1.030   pH Urine      5.0 - 7.0 pH 5.5   Protein Albumin Urine      NEG mg/dL Negative   Urobilinogen Urine      0.2 - 1.0 EU/dL 0.2   Nitrite Urine      NEG Negative   Blood Urine      NEG Negative   Leukocyte Esterase Urine      NEG Negative   Source       Midstream Urine   WBC Urine      0 - 2 /HPF O - 2   RBC Urine      0 - 2 /HPF O - 2   Protein Random Urine       0.16   Protein Total Urine g/gr Creatinine      0 - 0.2 g/g Cr 0.11   Myeloperoxidase Antibody IgG      0.0 - 0.9 AI <0.2 . . .   Proteinase 3 Antibody IgG      0.0 - 0.9 AI 0.2   CRP Inflammation      0.0 - 8.0 mg/L <2.9   Sed Rate      0 - 20 mm/h 6   Creatinine Urine Random       142   Neutrophil Cytoplasmic IgG Antibody       <1:20 . . .   Creatinine Urine       142     Component      Latest Ref Rn 3/14/2016   WBC      4.0 - 11.0 10e9/L 7.2   RBC Count      4.4 - 5.9 10e12/L 4.07 (L)   Hemoglobin      13.3 - 17.7 g/dL 13.4   Hematocrit      40.0 - 53.0 % 38.8 (L)   MCV      78 - 100 fl 95   MCH      26.5 - 33.0 pg 32.9   MCHC      31.5 - 36.5 g/dL 34.5   RDW      10.0 - 15.0 % 12.5   Platelet Count      150 - 450 10e9/L 194   Diff Method       Automated Method   % Neutrophils       68.1   % Lymphocytes       14.0   % Monocytes       11.7    % Eosinophils       5.5   % Basophils       0.7   Absolute Neutrophil      1.6 - 8.3 10e9/L 4.9   Absolute Lymphocytes      0.8 - 5.3 10e9/L 1.0   Absolute Monocytes      0.0 - 1.3 10e9/L 0.8   Absolute Eosinophils      0.0 - 0.7 10e9/L 0.4   Absolute Basophils      0.0 - 0.2 10e9/L 0.1   Sodium      133 - 144 mmol/L 136   Potassium      3.4 - 5.3 mmol/L 4.7   Chloride      94 - 109 mmol/L 106   Carbon Dioxide      20 - 32 mmol/L 25   Anion Gap      3 - 14 mmol/L 5   Glucose      70 - 99 mg/dL 86   Urea Nitrogen      7 - 30 mg/dL 32 (H)   Creatinine      0.66 - 1.25 mg/dL 1.62 (H)   GFR Estimate      >60 mL/min/1.7m2 42 (L)   GFR Estimate If Black      >60 mL/min/1.7m2 51 (L)   Calcium      8.5 - 10.1 mg/dL 9.2   Bilirubin Total      0.2 - 1.3 mg/dL 0.7   Albumin      3.4 - 5.0 g/dL 4.0   Protein Total      6.8 - 8.8 g/dL 6.9   Alkaline Phosphatase      40 - 150 U/L 94   ALT      0 - 70 U/L 34   AST      0 - 45 U/L 24   Color Urine       Yellow   Appearance Urine       Clear   Glucose Urine      NEG mg/dL Negative   Bilirubin Urine      NEG Negative   Ketones Urine      NEG mg/dL Negative   Specific Gravity Urine      1.003 - 1.035 <=1.005   pH Urine      5.0 - 7.0 pH 5.5   Protein Albumin Urine      NEG mg/dL Negative   Urobilinogen Urine      0.2 - 1.0 EU/dL 0.2   Nitrite Urine      NEG Negative   Blood Urine      NEG Negative   Leukocyte Esterase Urine      NEG Negative   Source       Midstream Urine   WBC Urine      0 - 2 /HPF    RBC Urine      0 - 2 /HPF    Myeloperoxidase Antibody IgG      0.0 - 0.9 AI <0.2 . . .   Proteinase 3 Antibody IgG      0.0 - 0.9 AI 1.7 (H)   Protein Random Urine       <0.05   Protein Total Urine g/gr Creatinine      0 - 0.2 g/g Cr Unable to calculate due to low value   CRP Inflammation      0.0 - 8.0 mg/L <2.9   Sed Rate      0 - 20 mm/h 5   Neutrophil Cytoplasmic IgG Antibody       <1:20 . . .   Magnesium      1.6 - 2.3 mg/dL 2.1   Creatinine Urine Random       26   Copath  Report          Creatinine Urine            Component      Latest Ref Rng 4/18/2016   WBC      4.0 - 11.0 10e9/L 6.7   RBC Count      4.4 - 5.9 10e12/L 4.14 (L)   Hemoglobin      13.3 - 17.7 g/dL 13.3   Hematocrit      40.0 - 53.0 % 39.5 (L)   MCV      78 - 100 fl 95   MCH      26.5 - 33.0 pg 32.1   MCHC      31.5 - 36.5 g/dL 33.7   RDW      10.0 - 15.0 % 12.5   Platelet Count      150 - 450 10e9/L 218   Diff Method       Automated Method   % Neutrophils       65.4   % Lymphocytes       16.0   % Monocytes       13.2   % Eosinophils       4.2   % Basophils       1.2   Absolute Neutrophil      1.6 - 8.3 10e9/L 4.4   Absolute Lymphocytes      0.8 - 5.3 10e9/L 1.1   Absolute Monocytes      0.0 - 1.3 10e9/L 0.9   Absolute Eosinophils      0.0 - 0.7 10e9/L 0.3   Absolute Basophils      0.0 - 0.2 10e9/L 0.1   Sodium      133 - 144 mmol/L    Potassium      3.4 - 5.3 mmol/L    Chloride      94 - 109 mmol/L    Carbon Dioxide      20 - 32 mmol/L    Anion Gap      3 - 14 mmol/L    Glucose      70 - 99 mg/dL    Urea Nitrogen      7 - 30 mg/dL    Creatinine      0.66 - 1.25 mg/dL 1.72 (H)   GFR Estimate      >60 mL/min/1.7m2 40 (L)   GFR Estimate If Black      >60 mL/min/1.7m2 48 (L)   Calcium      8.5 - 10.1 mg/dL    Bilirubin Total      0.2 - 1.3 mg/dL    Albumin      3.4 - 5.0 g/dL    Protein Total      6.8 - 8.8 g/dL    Alkaline Phosphatase      40 - 150 U/L    ALT      0 - 70 U/L 30   AST      0 - 45 U/L 22   Color Urine       Yellow   Appearance Urine       Clear   Glucose Urine      NEG mg/dL Negative   Bilirubin Urine      NEG Negative   Ketones Urine      NEG mg/dL Negative   Specific Gravity Urine      1.003 - 1.035 <=1.005   pH Urine      5.0 - 7.0 pH 5.0   Protein Albumin Urine      NEG mg/dL Negative   Urobilinogen Urine      0.2 - 1.0 EU/dL 0.2   Nitrite Urine      NEG Negative   Blood Urine      NEG Negative   Leukocyte Esterase Urine      NEG Negative   Source       Midstream Urine   WBC Urine      0 - 2 /HPF O -  2   RBC Urine      0 - 2 /HPF O - 2   Myeloperoxidase Antibody IgG      0.0 - 0.9 AI    Proteinase 3 Antibody IgG      0.0 - 0.9 AI 2.0 (H)   Protein Random Urine       <0.05   Protein Total Urine g/gr Creatinine      0 - 0.2 g/g Cr Unable to calculate due to low value   CRP Inflammation      0.0 - 8.0 mg/L 8.4 (H)   Sed Rate      0 - 20 mm/h 9   Neutrophil Cytoplasmic IgG Antibody       <1:20 . . .   Magnesium      1.6 - 2.3 mg/dL    Creatinine Urine Random          Copath Report          Creatinine Urine       53           Component      Latest Ref Rng 7/6/2016   WBC      4.0 - 11.0 10e9/L 7.9   RBC Count      4.4 - 5.9 10e12/L 4.31 (L)   Hemoglobin      13.3 - 17.7 g/dL 13.9   Hematocrit      40.0 - 53.0 % 41.5   MCV      78 - 100 fl 96   MCH      26.5 - 33.0 pg 32.3   MCHC      31.5 - 36.5 g/dL 33.5   RDW      10.0 - 15.0 % 12.5   Platelet Count      150 - 450 10e9/L 197   Diff Method       Automated Method   % Neutrophils       68.1   % Lymphocytes       13.2   % Monocytes       11.8   % Eosinophils       5.6   % Basophils       1.3   Absolute Neutrophil      1.6 - 8.3 10e9/L 5.4   Absolute Lymphocytes      0.8 - 5.3 10e9/L 1.0   Absolute Monocytes      0.0 - 1.3 10e9/L 0.9   Absolute Eosinophils      0.0 - 0.7 10e9/L 0.4   Absolute Basophils      0.0 - 0.2 10e9/L 0.1   Color Urine       Yellow   Appearance Urine       Clear   Glucose Urine      NEG mg/dL Negative   Bilirubin Urine      NEG Negative   Ketones Urine      NEG mg/dL Negative   Specific Gravity Urine      1.003 - 1.035 1.015   Blood Urine      NEG Negative   pH Urine      5.0 - 7.0 pH 5.5   Protein Albumin Urine      NEG mg/dL Negative   Urobilinogen Urine      0.2 - 1.0 EU/dL 0.2   Nitrite Urine      NEG Negative   Leukocyte Esterase Urine      NEG Negative   Source       Midstream Urine   Creatinine      0.66 - 1.25 mg/dL 1.62 (H)   GFR Estimate      >60 mL/min/1.7m2 42 (L)   GFR Estimate If Black      >60 mL/min/1.7m2 51 (L)   Protein Random  Urine       0.10   Protein Total Urine g/gr Creatinine      0 - 0.2 g/g Cr 0.11   Myeloperoxidase Antibody IgG      0.0 - 0.9 AI <0.2 . . .   Proteinase 3 Antibody IgG      0.0 - 0.9 AI 1.3 (H)   CRP Inflammation      0.0 - 8.0 mg/L <2.9   Sed Rate      0 - 20 mm/h 5   Creatinine Urine Random       91   Neutrophil Cytoplasmic IgG Antibody       <1:20 . . .   ALT      0 - 70 U/L 28   AST      0 - 45 U/L 19   Albumin      3.4 - 5.0 g/dL 4.0   Creatinine Urine       91     Component      Latest Ref Rng 10/24/2016   WBC      4.0 - 11.0 10e9/L 6.3   RBC Count      4.4 - 5.9 10e12/L 4.18 (L)   Hemoglobin      13.3 - 17.7 g/dL 13.5   Hematocrit      40.0 - 53.0 % 40.1   MCV      78 - 100 fl 96   MCH      26.5 - 33.0 pg 32.3   MCHC      31.5 - 36.5 g/dL 33.7   RDW      10.0 - 15.0 % 12.7   Platelet Count      150 - 450 10e9/L 190   Diff Method       Automated Method   % Neutrophils       67.6   % Lymphocytes       14.9   % Monocytes       12.1   % Eosinophils       4.3   % Basophils       1.1   Absolute Neutrophil      1.6 - 8.3 10e9/L 4.3   Absolute Lymphocytes      0.8 - 5.3 10e9/L 0.9   Absolute Monocytes      0.0 - 1.3 10e9/L 0.8   Absolute Eosinophils      0.0 - 0.7 10e9/L 0.3   Absolute Basophils      0.0 - 0.2 10e9/L 0.1   Color Urine       Yellow   Appearance Urine       Clear   Glucose Urine      NEG mg/dL Negative   Bilirubin Urine      NEG Negative   Ketones Urine      NEG mg/dL Negative   Specific Gravity Urine      1.003 - 1.035 1.010   pH Urine      5.0 - 7.0 pH 5.0   Protein Albumin Urine      NEG mg/dL Negative   Urobilinogen Urine      0.2 - 1.0 EU/dL 0.2   Nitrite Urine      NEG Negative   Blood Urine      NEG Negative   Leukocyte Esterase Urine      NEG Negative   Source       Midstream Urine   WBC Urine      0 - 2 /HPF O - 2   RBC Urine      0 - 2 /HPF O - 2   Creatinine      0.66 - 1.25 mg/dL 1.38 (H)   GFR Estimate      >60 mL/min/1.7m2 51 (L)   GFR Estimate If Black      >60 mL/min/1.7m2 62   Protein  Random Urine       <0.05   Protein Total Urine g/gr Creatinine      0 - 0.2 g/g Cr Unable to calculate due to low value   Albumin      3.4 - 5.0 g/dL 3.9   ALT      0 - 70 U/L 26   AST      0 - 45 U/L 25   CRP Inflammation      0.0 - 8.0 mg/L 6.1   Sed Rate      0 - 20 mm/h 6   Creatinine Urine Random       36   Proteinase 3 Antibody IgG      0.0 - 0.9 AI 1.2 (H)   Neutrophil Cytoplasmic IgG Antibody       <1:20 . . .   SAKINA  Broad Spectrum       Canceled, Test credited   DNA-ds      <10 IU/mL <1 . . .   Creatinine Urine       36   Direct Antiglobulin       Neg     Component      Latest Ref Rng & Units 4/3/2017 4/18/2017   WBC      4.0 - 11.0 10e9/L 7.7    RBC Count      4.4 - 5.9 10e12/L 4.50    Hemoglobin      13.3 - 17.7 g/dL 15.1    Hematocrit      40.0 - 53.0 % 43.1    MCV      78 - 100 fl 96    MCH      26.5 - 33.0 pg 33.6 (H)    MCHC      31.5 - 36.5 g/dL 35.0    RDW      10.0 - 15.0 % 13.4    Platelet Count      150 - 450 10e9/L 216    Diff Method       Automated Method    % Neutrophils      % 65.7    % Lymphocytes      % 17.7    % Monocytes      % 11.0    % Eosinophils      % 4.7    % Basophils      % 0.9    Absolute Neutrophil      1.6 - 8.3 10e9/L 5.1    Absolute Lymphocytes      0.8 - 5.3 10e9/L 1.4    Absolute Monocytes      0.0 - 1.3 10e9/L 0.9    Absolute Eosinophils      0.0 - 0.7 10e9/L 0.4    Absolute Basophils      0.0 - 0.2 10e9/L 0.1    Color Urine       Yellow    Appearance Urine       Clear    Glucose Urine      NEG mg/dL Negative    Bilirubin Urine      NEG Negative    Ketones Urine      NEG mg/dL Negative    Specific Gravity Urine      1.003 - 1.035 1.010    Blood Urine      NEG Negative    pH Urine      5.0 - 7.0 pH 5.0    Protein Albumin Urine      NEG mg/dL Negative    Urobilinogen Urine      0.2 - 1.0 EU/dL 0.2    Nitrite Urine      NEG Negative    Leukocyte Esterase Urine      NEG Negative    Source       Midstream Urine    Creatinine      0.66 - 1.25 mg/dL 1.41 (H)    GFR Estimate       >60 mL/min/1.7m2 50 (L)    GFR Estimate If Black      >60 mL/min/1.7m2 60 (L)    Myeloperoxidase Antibody IgG      0.0 - 0.9 AI <0.2 . . .    Proteinase 3 Antibody IgG      0.0 - 0.9 AI 2.8 (H)    Protein Random Urine      g/L <0.05    Protein Total Urine g/gr Creatinine      0 - 0.2 g/g Cr Unable to calculate due to low value    Albumin      3.4 - 5.0 g/dL 4.2    ALT      0 - 70 U/L 32    AST      0 - 45 U/L 27    CRP Inflammation      0.0 - 8.0 mg/L <2.9    Sed Rate      0 - 20 mm/h 5    Neutrophil Cytoplasmic IgG Antibody       <1:20 . . .    Creatinine Urine Random      mg/dL 48    Creatinine Urine      mg/dL 48    AcetChol Binding Anju        0.0     Component      Latest Ref Rng & Units 6/30/2017 8/25/2017   WBC      4.0 - 11.0 10e9/L 6.3 6.4   RBC Count      4.4 - 5.9 10e12/L 4.71 4.25 (L)   Hemoglobin      13.3 - 17.7 g/dL 14.8 13.6   Hematocrit      40.0 - 53.0 % 45.8 41.2   MCV      78 - 100 fl 97 97   MCH      26.5 - 33.0 pg 31.4 32.0   MCHC      31.5 - 36.5 g/dL 32.3 33.0   RDW      10.0 - 15.0 % 12.6 12.8   Platelet Count      150 - 450 10e9/L 177 174   Diff Method       Automated Method Automated Method   % Neutrophils      % 64.5 59.0   % Lymphocytes      % 18.3 22.0   % Monocytes      % 10.2 12.2   % Eosinophils      % 5.7 5.5   % Basophils      % 1.0 1.3   % Immature Granulocytes      % 0.3    Nucleated RBCs      0 /100 0    Absolute Neutrophil      1.6 - 8.3 10e9/L 4.0 3.8   Absolute Lymphocytes      0.8 - 5.3 10e9/L 1.2 1.4   Absolute Monocytes      0.0 - 1.3 10e9/L 0.6 0.8   Absolute Eosinophils      0.0 - 0.7 10e9/L 0.4 0.4   Absolute Basophils      0.0 - 0.2 10e9/L 0.1 0.1   Abs Immature Granulocytes      0 - 0.4 10e9/L 0.0    Absolute Nucleated RBC       0.0    Sodium      133 - 144 mmol/L 140 138   Potassium      3.4 - 5.3 mmol/L 4.7 4.8   Chloride      94 - 109 mmol/L 107 106   Carbon Dioxide      20 - 32 mmol/L 27 27   Anion Gap      3 - 14 mmol/L 6 5   Glucose      70 - 99 mg/dL 104 (H) 91    Urea Nitrogen      7 - 30 mg/dL 24 31 (H)   Creatinine      0.66 - 1.25 mg/dL 1.26 (H) 1.47 (H)   GFR Estimate      >60 mL/min/1.7m2 57 (L) 47 (L)   GFR Estimate If Black      >60 mL/min/1.7m2 68 57 (L)   Calcium      8.5 - 10.1 mg/dL 9.1 9.6   Bilirubin Total      0.2 - 1.3 mg/dL 0.7 0.5   Albumin      3.4 - 5.0 g/dL 3.7 3.9   Protein Total      6.8 - 8.8 g/dL 7.0 6.9   Alkaline Phosphatase      40 - 150 U/L 101 98   ALT      0 - 70 U/L 22 25   AST      0 - 45 U/L 16 20   Color Urine       Yellow Yellow   Appearance Urine       Clear Clear   Glucose Urine      NEG:Negative mg/dL Negative Negative   Bilirubin Urine      NEG:Negative Negative Negative   Ketones Urine      NEG:Negative mg/dL Negative Negative   Specific Gravity Urine      1.003 - 1.035 1.017 1.020   Blood Urine      NEG:Negative Negative Trace (A)   pH Urine      5.0 - 7.0 pH 5.0 5.5   Protein Albumin Urine      NEG:Negative mg/dL Negative Negative   Urobilinogen mg/dL      0.0 - 2.0 mg/dL 0.0    Nitrite Urine      NEG:Negative Negative Negative   Leukocyte Esterase Urine      NEG:Negative Negative Negative   Source       Midstream Urine Midstream Urine   WBC Urine      OTO2:O - 2 /HPF 1 O - 2   RBC Urine      OTO2:O - 2 /HPF 1 O - 2   Mucous Urine      NEG /LPF Present (A)    Urobilinogen Urine      0.2 - 1.0 EU/dL  0.2   Protein Random Urine      g/L 0.15 0.10   Protein Total Urine g/gr Creatinine      0 - 0.2 g/g Cr 0.13 0.12   Myeloperoxidase Antibody IgG      0.0 - 0.9 AI <0.2 . . . <0.2   Proteinase 3 Antibody IgG      0.0 - 0.9 AI 2.3 (H) 2.0 (H)   CRP Inflammation      0.0 - 8.0 mg/L <2.9 <2.9   Sed Rate      0 - 20 mm/h 6 5   Creatinine Urine Random      mg/dL 115 88   Neutrophil Cytoplasmic IgG Antibody      <1:20 <1:20 . . . <1:20   Creatinine Urine      mg/dL 115 88       Parastoo Fazeli, MD

## 2017-12-13 NOTE — LETTER
Patient:  Joshua Ennis  :   1945  MRN:     [id number]        Mr.Roger DEANNA Ennis  [address] [facility] [place] [address]        2017    Dear ,    We are writing to inform you of your test results. They are stable. This is good news.      Resulted Orders   CBC with platelets differential   Result Value Ref Range    WBC 6.1 4.0 - 11.0 10e9/L    RBC Count 4.80 4.4 - 5.9 10e12/L    Hemoglobin 15.3 13.3 - 17.7 g/dL    Hematocrit 46.4 40.0 - 53.0 %    MCV 97 78 - 100 fl    MCH 31.9 26.5 - 33.0 pg    MCHC 33.0 31.5 - 36.5 g/dL    RDW 12.8 10.0 - 15.0 %    Platelet Count 180 150 - 450 10e9/L    Diff Method Automated Method     % Neutrophils 63.1 %    % Lymphocytes 20.0 %    % Monocytes 11.8 %    % Eosinophils 3.8 %    % Basophils 1.0 %    % Immature Granulocytes 0.3 %    Nucleated RBCs 0 0 /100    Absolute Neutrophil 3.8 1.6 - 8.3 10e9/L    Absolute Lymphocytes 1.2 0.8 - 5.3 10e9/L    Absolute Monocytes 0.7 0.0 - 1.3 10e9/L    Absolute Eosinophils 0.2 0.0 - 0.7 10e9/L    Absolute Basophils 0.1 0.0 - 0.2 10e9/L    Abs Immature Granulocytes 0.0 0 - 0.4 10e9/L    Absolute Nucleated RBC 0.0    CRP inflammation   Result Value Ref Range    CRP Inflammation <2.9 0.0 - 8.0 mg/L   Erythrocyte sedimentation rate auto   Result Value Ref Range    Sed Rate 5 0 - 20 mm/h   Routine UA with Micro Reflex to Culture   Result Value Ref Range    Color Urine Yellow     Appearance Urine Clear     Glucose Urine Negative NEG^Negative mg/dL    Bilirubin Urine Negative NEG^Negative    Ketones Urine Negative NEG^Negative mg/dL    Specific Gravity Urine 1.016 1.003 - 1.035    Blood Urine Negative NEG^Negative    pH Urine 5.0 5.0 - 7.0 pH    Protein Albumin Urine Negative NEG^Negative mg/dL    Urobilinogen mg/dL 0.0 0.0 - 2.0 mg/dL    Nitrite Urine Negative NEG^Negative    Leukocyte Esterase Urine Negative NEG^Negative    Source Midstream Urine     WBC Urine <1 0 - 2 /HPF    RBC Urine 1 0 - 2 /HPF    Squamous Epithelial  /HPF Urine <1 0 - 1 /HPF    Mucous Urine Present (A) NEG^Negative /LPF   Protein  random urine   Result Value Ref Range    Protein Random Urine 0.15 g/L    Protein Total Urine g/gr Creatinine 0.14 0 - 0.2 g/g Cr   Creatinine random urine   Result Value Ref Range    Creatinine Urine Random 106 mg/dL   Comprehensive metabolic panel   Result Value Ref Range    Sodium 137 133 - 144 mmol/L    Potassium 5.0 3.4 - 5.3 mmol/L    Chloride 104 94 - 109 mmol/L    Carbon Dioxide 27 20 - 32 mmol/L    Anion Gap 6 3 - 14 mmol/L    Glucose 91 70 - 99 mg/dL    Urea Nitrogen 25 7 - 30 mg/dL    Creatinine 1.48 (H) 0.66 - 1.25 mg/dL    GFR Estimate 47 (L) >60 mL/min/1.7m2      Comment:      Non  GFR Calc    GFR Estimate If Black 57 (L) >60 mL/min/1.7m2      Comment:       GFR Calc    Calcium 9.4 8.5 - 10.1 mg/dL    Bilirubin Total 0.7 0.2 - 1.3 mg/dL    Albumin 4.0 3.4 - 5.0 g/dL    Protein Total 7.8 6.8 - 8.8 g/dL    Alkaline Phosphatase 97 40 - 150 U/L    ALT 30 0 - 70 U/L    AST 22 0 - 45 U/L   Antineutrophil cytoplasmic Anju IgG   Result Value Ref Range    Neutrophil Cytoplasmic IgG Antibody <1:20 <1:20      Comment:      (Note)  The ANCA IFA is <1:20; therefore, no further testing will   be performed.  INTERPRETIVE INFORMATION: Anti-Neutrophil Cyto Ab, IgG  Neutrophil Cytoplasmic Antibodies (C-ANCA = granular   cytoplasmic staining, P-ANCA = perinuclear staining) are   found in the serum of over 90 percent of patients with   certain necrotizing systemic vasculitides, and usually in   less than 5 percent of patients with collagen vascular   disease or arthritis.  Performed by [facility],  [address] [place] [id number] [phone]  [email or web address], Joe Keller MD, Lab. Director     Vasculitis panel   Result Value Ref Range    Myeloperoxidase Antibody IgG <0.2 0.0 - 0.9 AI      Comment:      Negative  Antibody index (AI) values reflect qualitative changes in antibody   concentration that  cannot be directly associated with clinical condition or   disease state.      Proteinase 3 Antibody IgG 2.2 (H) 0.0 - 0.9 AI      Comment:      Positive  Antibody index (AI) values reflect qualitative changes in antibody   concentration that cannot be directly associated with clinical condition or   disease state.         Ede Sanchez MD

## 2017-12-13 NOTE — PROGRESS NOTES
[facility] Follow-Up Note  Date of visit: 12/13/2017  Last visit date:  6/30/2017      Joshua Ennis MRN# [id number]   Age: 72 year old YOB: 1945          Assessment and Plan:     Assessment:   Mr. Ennis is a 72 yr old  male with history of melanoma s/p resection in 11/2011, former tobacco use, and GPA (PR3+, MPO and c-ANCA negative in 3/2013 based on aforementioned labs, confirmed by kidney and lung biopsy) who presents for clinic follow-up regarding GPA.  He was initiated on cyclophosphamide 150 mg QD and high-dose prednisone 3/2013. He last took prednisone in 10/2013. Cytoxan was switched to MTX for maintenance treatment in 10/2013.  He has been tolerating it well. He had a flare in 12/2013 with increased crusts in his nose along with recurrence of arthralgia, worsening numbness in feet and increasing vasculitis marker. Renal function was stable with negative repeat chest CT. His vasculitis flare was treated with short course of prednisone taper 20 mg po qd max and increasing MTX to 8 tab = 20 mg qwk. Vasculitis symptoms improved.  At visit on 1/2015, he had scabs in his nose, had cold [place]akota symptoms and increased arthralgia/fatigue (shoulders, L hand). Labs from 12/16 showed increased Cr level and hematuria with rising PR3 (more than 8), there was a concern for vasculitis flare (in kidneys, sinuses), no flare on repeat chest CT 1/2015. Therefore it was recommended to try rituximab. Another reason was to be able to taper MTX off given abnormal Cr.  He is now s/p Rituximab infusion x 4 (1st on 2/25/2015). He is feeling well. No hematuria with stable Cr, no SOB. Saw ENT with negative nasal mucosa biopsy 6/2015 for granuloma but showed active inflammation, had left nostril lesion which decreased in size by use of mupirocin ointment. PR3 vasculitis marker went back to NL in 6/2015, it was NL in 10/2015, given stable vasculitis and low GFR, MTX from 8 to 7 tab po qwk. Repeat  IL-3 in 3/2016 was slightly positive, Cr was slightly higher than baseline. We tapered his MTX further to 6 tab po qwk in 2/2016 given lack of symptoms. Re-check labs in April were almost unchanged from 3/2016 but CRP was slightly up. Labs on 2/7/17 with elevated CRP (40.6) but normal ESR (12) and ANCA 1:20 likely reflecting bacterial infection.    In 3/2017, MTX was reduced from 6 to 5 tab qwk. It was further tapered to 4 tab po qwk in 6/2017 given stable disease. No vasculitis flare ups by such change. ANCA remained negative, IL-3 is going down despite tapering MTX, dropped from 2.8 in 4/2017 to 2.3 in 6/2017 to 2 in 8/2017.    Vasculitis seems to be stable and he has no signs or symptoms of flare up today. Recommend to continue MTX at low dose of 4 tab po qwk as GPA or ANCA-vasculitis tends to flare up. Was advised to call in case of vasculitis flare up sx.        Plan:    - Continue MTX 10 mg (4 tabs) po qwk, continue with Folic acid     > Labs today and q3 months including MTX monitoring labs    > Patient instructed to hold MTX if develops fevers or infection  - Neuropathy unchanged on Gabapentin  - F/u with Dr. Obando ENT for small bluish pigmented lesion in the left soft palate.  Dr. Obando recommended biopsy in 5/2017 but pt wanted to hold off  - RTC in 6 months        Orders Placed This Encounter   Procedures     ALT     Albumin level     AST     CBC with platelets differential     Creatinine     CRP inflammation     Erythrocyte sedimentation rate auto     Routine UA with micro reflex to culture     Antineutrophil cytoplasmic Anju IgG     Vasculitis panel             HPI / Interim History:      Mr. Ennis is a 69 yo WM with h/o melanoma s/p resection in 11/2011, former tobacco use, and GPA diagnosed in 3/2013. For details of GPA diagnosis, please refer to initial consult note. Briefly, he initially presented with constitutional sx of fever, sweating, weight loss, migratory arthralgia, numbness/shooting pain in  feet to his PCP.  Had enlarged inguinal LNs. His PCP suspected malignancy and especially lung cancer given h/o tobacco use.  His work-up showed several lung nodules and CT guided biopsy of one of the nodules done in 2/13 showed granulomatous inflammation with no evidence of malignancy.  Inguinal LN biopsy done 3/13 showed inflammation with no malignancy.  He had not had any respiratory sx, SOB, CP, cough, hemoptysis or sinusitis.  He was hospitalized for further work up and was found having high PR3, neg MPO, neg ANCA.  He also had + RF and trace cryo.   Had microscopic hematuria with NL Cr at the time of admission but his Cr started to rise before discharge. Had abnormal LFTs toward the end of discharge(was trending down, liver US was unremarkable).  He was diagnosed with GPA given high titer of PR3 to 723, +granulomatous inflammation of the lung nodule, microscopic hematuria, and migratory arthralgia along with high ESR/CRP and constitutional sx.  He was Started on cytoxan 150 mg po qd, prednisone 70 mg po qd (after IV solumedrol 1 gr qd x 3 days), Bactrim DS three times a wk for PCP prophylaxis and fosamax 70 mg po qwk. He was also put on vit D 91200 units qwk and neurontin.  He was discharged on 3/13/2013.   At initial visit in Rheumatology clinic, patient was referred to nephrology for renal biopsy since Cr was up despite being on cytoxan and prednisone 75 qd.  Renal biopsy 3/13 confirmed Dx of GPA.  After discussion with Dr. Krishnamurthy, made a decision to continue with current regimen and if no improvement to switch to IV cytoxan or rituximab.  His Cr initially increased to 1.71, but stabilized to ~ 1.3.  He has a h/o high BP and was recently started on losartan per nephology.  He has been seen by neurology for bilaterally foot and ankle numbness and was diagnosed with neuropathy possibly secondary to vasculitis.  He was also seen by oncology; there was no concern for malignancy.   Overall, the patient is doing  well today with no specific complaints or concerns.  He had recent blood work 9/19 which showed negative inflammatory and vasculitis markers (PR3, MPO, ANCA, and CRP).  His SCr has remained elevated, but stable at 1.37.  He is at the tail end of a prednisone taper; currently taking 1 mg QD, set to d/c 10/15.  He is still on cyclophosphamide, taking 150 mg QD.  He was prescribed losartan at a f/u visit w/ nephrology 8/21, but states his BP have been running 130's/80's, so has not taken the medication for over a month.  Additionally, he had been taking neurontin for numbness in his feet, toes, and ankles, but stopped b/c he was not getting symptom relief.  Today, the patient denies vision changes, epistaxis, oral ulcerations, SOB, hemoptysis, CP, joint pain, abdominal pain, dysuria, or hematuria.  He does endorse a cough, but states that this has been intermittent, chronic, and non-productive.  Additionally, he endorses right shoulder discomfort that is worse w/ abduction, but has also been chronic. Of note, the patient remains a former smoker, having quit in February of this year.     His major complaint is increased numbness of his toes, but has resumed taking neurontin and feels sensation is coming back which is painful. No SOB, cough, crusts in nose or blood in urine. He has fatigue. He is off cytoxan since 10/2013, is on MTX 8tab po qwk. MTX was started with 4 tab po qwk in 10/2013, was increased to 8 tab after last visit in 1/2014. In 12/2013, was felt to have a small flare of vasculitis with increased PR3, crusts in nose and R shoulder pain; therefore prednisone taper with max dose of 20 mg qd was given along with increasing MTX to 7 tab qwk. MTX was later increased to 8 tab qwk in 1/2014. Labs done in 2/2014 showed rising PR3. He had ER visit on 1/8 for urosepsis (E. Coli), was treated with cedifir, recovered. Continues having shoulder pain, requests referral to PT.    1/2015: He reports having increased  arthralgia (shoulders, L hand) x a month. About 2 wk ago, L hand pain was so bad that he could not move his hand. No major joint swelling. Has no AM stiffness. Reports being tested negative for lyme (local lab) about 2 wk ago. Appetite is worse. He is more tired. He sleeps a lot. Has a cold x 1 wk, no hemoptysis. Neuropathy is the same. No SOB. No fever. Reports having a rash under L axilla x last 2 wk, comes and goes but to him looks like a lyme rash. He thinks he probably had a tick bite.    Last visit 3/2015: Rituximab qwk x 4 doses was recommended at last visit given concern for active GPA. He had his 1st infusion on 2/25/2015, right after start of infusion, developed hives without any resp sx, was treated with extra dose of benadryl and solu cortef. Hives resolved. Infusion was resumed at a slower rate and he finished and tolerated it well. Had his 2nd infusion on 3/4/2015 with no reaction although this time I increased his solumedrol from 100 to 250 mg as part of pre-medication. He has since completed all 4 infusions w/o reaction. States that nasal and joint symptoms remain improved. He still does have some crusting in his left nostril. Does have some pain and swelling in the hands w/minimal morning stiffness but that is his baseline. Says pain in his shoulders has improved. He denies any medication side effects.    He was seen in the ED 4/3/15 for productive cough and fever. He was treated with Levaquin for likely pna. He is now feeling better with only lingering cough.    10/16/2015: He is feeling well, has no complaints except cough at night because of sinus drainage (chronic). Requests pneumonia shot.    2/2016: Today patient reports feeling in excellent condition. Denies chest symptoms, such as SOB, hemoptysis, cough, or nasal drainage/bleeding. He also denies any hematuria and his last renal labs were stable. Unfortunately, his neuropathy seems to be his biggest problem. Neurology started him on a low  "dose of gabapentin without improvement.     5/6/16: Besides stuffy nose due to seasonal allergies, has no complaints. No cough, SOB, hemoptysis, hematuria, weight loss or fatigue.    8/12/16: Patient has no complaints today. No cough, SOB, hemoptysis, hematuria, weight loss or fatigue. He was seen today in Nephrology clinic for CKD follow up, doing well, no further follow up is needed. Patient reports visual disturbance with decreased vision probably on the R eye (chelsietent does not remember) with decreased to none visual strength in low visual field with some \"blanks\" which lasted for 40 seconds. This is a second episode in the last 1.5-2 years. Patient also reports recurrent double vision occuring every 4-5 months. Last ophthalmologist appointment was 1986.     11/18/16: Feeling very well, no complaints. Is going to have an eye exam today.    3/3/17: Mr. Ennis says he is doing well overall. He is still recovering from a pneumonia diagnosed on 2/14/17. He says that he was feeling ill for about 2 weeks before this with worsening productive cough, nasal congestion and high fevers with \"fever blisters\". He went to the ED on 2/14/17 and received ceftriaxone and a Z-pack and says he has been improving since then. He says that many other people at his work were sick with similar symptoms. Today he reports some residual nasal congestion but denies shortness of breath. He says that during his illness he did have some blood in his nose \"if I picked at it\" but he otherwise denies epistaxis, hemoptysis, hematuria and blood in his stools. His significant other is wondering if his methotrexate dose can start to be tapered down. Currently he is taking MTX 15mg weekly although this was held when he had pneumonia.    Mr. Ennis says that he continues to experience episodes of diplopia on average once per month. These episodes typically last 20-30 seconds. He was seen by ophthalmology on 11/18/16 and had a normal eye exam.    Today: " Feeling well with no complaints. On MTX 4 tab po qwk.         Past Medical History:     Past Medical History:   Diagnosis Date     Arthritis      Autoimmune disease (H)      Hearing problem      Hoarseness      Hypertension      Kidney problem      Malignant melanoma (H)      Malignant neoplasm (H)     melanoma     Squamous cell carcinoma      Russo syndrome           Past Surgical History:     Past Surgical History:   Procedure Laterality Date     BIOPSY  11/2011    melanoma on back     DISSECT LYMPH NODE INGUINAL  3/1/2013    Procedure: DISSECT LYMPH NODE INGUINAL;  Bilateral Inguinal Lymph Node Biopsy.;  Surgeon: Tariq Acosta MD;  Location: [facility]     ESOPHAGOSCOPY, GASTROSCOPY, DUODENOSCOPY (EGD), COMBINED  7/5/2013    Procedure: COMBINED ESOPHAGOSCOPY, GASTROSCOPY, DUODENOSCOPY (EGD);  Gastroscopy;  Surgeon: Tariq Acosta MD;  Location: [facility]     HERNIORRHAPHY INGUINAL  8/6/2012    Procedure: HERNIORRHAPHY INGUINAL;  Open Repair Left Inguinal Hernia;  Surgeon: Jerad Baker MD;  Location: [facility]          Social History:     Social History   Substance Use Topics     Smoking status: Former Smoker     Packs/day: 1.00     Years: 20.00     Types: Cigarettes     Quit date: 2/14/2013     Smokeless tobacco: Never Used     Alcohol use Yes      Comment: rare          Family History:     Family History   Problem Relation Age of Onset     DIABETES Mother      Hypertension Mother      CANCER Father      stomach     HEART DISEASE Father      HEART DISEASE Brother      DIABETES Sister      DIABETES Sister      DIABETES Sister      HEART DISEASE Brother      CANCER Sister      Glaucoma No family hx of      Macular Degeneration No family hx of           Immunizations:   Due for PPSV23, will administer today. Immunizations otherwise up to date.    PMHx, FHx, SHx were reviewed, unchanged.         Allergies:     Allergies   Allergen Reactions     Shrimp Nausea and Vomiting     Got sick each time he ate it  "         Medications:     Current Outpatient Prescriptions   Medication Sig     losartan (COZAAR) 50 MG tablet Take 1 tablet (50 mg) by mouth daily     methotrexate 2.5 MG tablet CHEMO Take 4 tablets (10 mg) by mouth once a week     folic acid (FOLVITE) 1 MG tablet Take 2 tablets (2 mg) by mouth daily     gabapentin (NEURONTIN) 400 MG capsule Take 1 capsule (400 mg) by mouth At Bedtime     fluorouracil (EFUDEX) 5 % cream Apply twice daily to scalp for 2-4 weeks. (Patient not taking: Reported on 5/24/2017)     Acetaminophen (TYLENOL PO) Take 650 mg by mouth once as needed for mild pain or fever     calcium-vitamin D (CALCIUM 600 + D) 600-400 MG-UNIT per tablet Take 1 tablet by mouth 2 times daily.     Cholecalciferol (VITAMIN D) 1000 UNITS capsule Take 1 capsule by mouth daily.     Blood Pressure Monitor KIT Automatic Blood Pressure Monitor (Patient not taking: Reported on 5/24/2017)     No current facility-administered medications for this visit.           Review of Systems:   A 10-point ROS was done. Positives are per HPI.         Physical Exam:     Vitals:    12/13/17 1115   BP: 130/80   Pulse: 74   SpO2: 98%   Weight: 85.1 kg (187 lb 9.6 oz)   Height: 1.727 m (5' 8\")     Constitutional:   Awake, alert, cooperative, no apparent distress.   Eyes:   Pupils equal, round and reactive to light, sclera clear, conjunctiva normal.   ENT:   Normocephalic, atramatic,  oral pharynx with moist mucus membranes, tonsils without erythema or exudates, dentures in place. Small dark black spot over left soft palate.   Neck:   Supple, symmetrical, trachea midline, no adenopathy.   Lungs:   No increased work of breathing, good air exchange, clear to auscultation bilaterally, no crackles or wheezing.    Cardiovascular:   Regular rate and rhythm, normal S1 and S2, no S3 or S4, and no murmur noted.   Musculoskeletal:   No active synovitis or joint tenderness. Good ROM in all joints tested. No edema in LE.   Neurologic:   AOx3; CN " grossly intact.   Skin:   No rashes or ecchymoses on limited exam.          Data:   Recent laboratory data reviewed.    2/14/17  Cr 1.46, WBC 7.8, AST/ALT normal    2/7/17  CRP 40.6, ESR 12, ANCA 1:20, MPO neg, IL-3 neg    Recent imaging studies reviewed by me.    CXR (2/14/17)    New mild patchy opacity at the right lung base appears to  localize to the posterior right lower lobe base and could represent a  developing area of pneumonia. Left lung is clear.    CHEST CT (2/16/2013)    Chest: Multiple indeterminate pulmonary nodules. Several of the  larger lesions are cavitary. There are 3 dominant lesions, a 3.2 cm  cavitary lesion in the left upper lobe, a 3.4 cm solid mass in the  right lower lobe laterally and a 3.7 cm cavitary mass in the right  lower lobe medially. In addition there is mediastinal lymphadenopathy  with a dominant 3 cm subcarinal node. Bilateral axillary  lymphadenopathy with 2.4 cm node seen bilaterally. Chest otherwise  unremarkable.       Exam: High-resolution Chest CT without contrast 1/9/2015 12:42 PM.  History: Concern for ANCA vasculitis flare in the chest.  Comparison: 3/7/2014, 11/14/2013, 2/16/2013.  Technique: CT helical acquisition from the lung apices to the kidneys  was obtained without intravenous contrast. Both inspiratory and  expiratory images were obtained.    Findings:  Moderate atherosclerotic calcifications of the coronary arteries. Mild  calcifications involving the aorta and aortic great vessels. Prominent  paratracheal lymph node on series 3 image 22 measuring 9 mm. Decreased  from 2/16/2013 and unchanged from 3/7/2014. Borderline enlarged right  hilar lymph node, unchanged from 3/7/2014 and decreased from  2/16/2013. Heart is not enlarged. Trace cardial effusion. No axillary  lymphadenopathy.  Nodular scarring in the left apex, unchanged from 2/16/2013. No  pleural effusion or pneumothorax. No evidence of intrapulmonary  infection. There is a cluster of tree in bud  nodularity noted in the  anteromedial right upper lobe. These nodules appear new. Expiratory  images demonstrate mild air trapping. Scattered pulmonary nodules:  Series 6 image 146: Seen posteriorly in the right lower lobe, there is  a sub-pleural nodule measuring 1.6 x 1.2 cm with a spiculated border.  Previously, this nodule measured 2.0 x 1.6 cm.  Series 6 image 189: Seen laterally in the right lower lobe, there is a  former mid pulmonary nodule which is unchanged from 3/7/2013 and  decreased from 2/16/2013.  Series 6 image 169: Seen posterolaterally in the right lower lobe,  there is a 3 mm pulmonary nodule which is unchanged from 3/7/2014 and  decreased from 2/16/2013.  Series 6 image 225: Seen posterolaterally in the left lower lobe,  there is a 4 mm subpleural nodule which is unchanged from 2/16/2014.  Other scattered sub-4 mm pulmonary nodules appear stable from previous  study.  Evaluation of the upper abdomen is limited due to the lack of  intravenous contrast.  No suspicious bony lesions.    IMPRESSION  Impression:   1. Scattered pulmonary nodules enlarged lymph nodes are  stable/slightly decreased from the previous study. No evidence for  acute flare in patient's known ANCA-associated vasculitis.  2. New tree in bud nodularity seen anteromedially in the right middle  lobe. Findings could be seen in the setting of an atypical infectious  process.  3. Mild air trapping. Finding is nonspecific and is seen in setting of  small airways disease such as asthma or bronchiolitis.  I have personally reviewed the examination and initial interpretation  and I agree with the findings.  TOÑO PERKINS MD    Component      Latest Ref Rng 6/11/2015 6/11/2015           9:26 AM  9:29 AM   Sodium      133 - 144 mmol/L  139   Potassium      3.4 - 5.3 mmol/L  4.4   Chloride      94 - 109 mmol/L  109   Carbon Dioxide      20 - 32 mmol/L  23   Anion Gap      3 - 14 mmol/L  7   Glucose      70 - 99 mg/dL  81   Urea Nitrogen       7 - 30 mg/dL  28   Creatinine      0.66 - 1.25 mg/dL  1.26 (H)   GFR Estimate      >60 mL/min/1.7m2  57 (L)   GFR Estimate If Black      >60 mL/min/1.7m2  69   Calcium      8.5 - 10.1 mg/dL  9.2   Phosphorus      2.5 - 4.5 mg/dL  2.1 (L)   Albumin      3.4 - 5.0 g/dL  1.7 (L)   Iron      35 - 180 ug/dL  129   Iron Binding Cap      240 - 430 ug/dL  292   Iron Saturation Index      15 - 46 %  44   Protein Random Urine       0.11    Protein Total Urine g/gr Creatinine      0 - 0.2 g/g Cr 0.14    Hemoglobin      13.3 - 17.7 g/dL  13.0 (L)   Ferritin      26 - 388 ng/mL  193   Parathormone Intact      12 - 72 pg/mL  49   Vitamin D Deficiency screening      30 - 75 ug/L  55   Creatinine Urine       84    Copath Report           Proteinase 3 Antibody IgG      0.0 - 0.9 AI       Component      Latest Ref Rng 6/11/2015 6/11/2015          11:15 AM 12:18 PM   Copath Report       Patient Name: ADONAY FAITH . . .    Proteinase 3 Antibody IgG      0.0 - 0.9 AI  0.8     Copath Report     Patient Name: ADONAY FAITH  MR#: [id number]  Specimen #: [id number]  Collected: 6/11/2015  Received: 6/11/2015  Reported: 6/12/2015 17:37  Ordering Phy(s): DANIELLE PARIS    SPECIMEN(S):  Nasal septum    FINAL DIAGNOSIS:  Nasal septum, biopsy:  -Squamous mucosa with ulcer, marked acute inflammation and reactive  changes  -No granulomas identified  -A special stain for fungi (GMS) is negative    I have personally reviewed all specimens and or slides, including the  listed special stains, and used them with my medical judgement to  determine the final diagnosis.    Electronically signed out by:    Dorys Barton M.D., [facility]    CLINICAL HISTORY:  The patient is a 70-year-old man with a history of left upper back  melanoma, resected in 2011 (see consult report [id number]). 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 He now presents for followup visit with nasal  cavity scabs, increased arthralgia and fatigue, concerning for  vasculitis flare. Operative procedure: Nasal septum biopsy.    GROSS:  The specimen is received in formalin with proper patient identification,  labeled \"septal tissue\". The specimen consists of three tan-pink tissue  fragments, 0.2 cm in greatest dimension, entirely submitted in cassette  1. (Dictated by: Ana Mixon 6/11/2015 03:39 PM)    MICROSCOPIC:  Microscopic examination is performed. A GMS stain, performed with  appropriate positive control, is negative.    CPT Codes:  A: 02410-KU0, 98757-DWH    TESTING LAB LOCATION:  [facility], [address] [id number] [phone]    COLLECTION SITE:  Client: [facility]  Location: [facility] (B)     Component      Latest Ref Rng 12/10/2015   WBC      4.0 - 11.0 10e9/L 5.8   RBC Count      4.4 - 5.9 10e12/L 4.32 (L)   Hemoglobin      13.3 - 17.7 g/dL 13.9   Hematocrit      40.0 - 53.0 % 42.0   MCV      78 - 100 fl 97   MCH      26.5 - 33.0 pg 32.2   MCHC      31.5 - 36.5 g/dL 33.1   RDW      10.0 - 15.0 % 12.5   Platelet Count      150 - 450 10e9/L 199   Diff Method       Automated Method   % Neutrophils       70.2   % Lymphocytes       10.8   % Monocytes       9.6   % Eosinophils       7.7   % Basophils       1.7   Absolute Neutrophil      1.6 - 8.3 10e9/L 4.1   Absolute Lymphocytes      0.8 - 5.3 10e9/L 0.6 (L)   Absolute Monoctyes      0.0 - 1.3 10e9/L 0.6   Absolute Eosinophils      0.0 - 0.7 10e9/L 0.5   Absolute Basophils      0.0 - 0.2 10e9/L 0.1   Sodium      133 - 144 mmol/L 140   Potassium      3.4 - 5.3 mmol/L 4.7   Chloride      94 - 109 mmol/L 107   Carbon Dioxide      20 - 32 mmol/L 25   Anion Gap      3 - 14 mmol/L 8   Glucose      70 - 99 mg/dL 91   Urea " Nitrogen      7 - 30 mg/dL 28   Creatinine      0.66 - 1.25 mg/dL 1.40 (H)   GFR Estimate      >60 mL/min/1.7m2 50 (L)   GFR Estimate If Black      >60 mL/min/1.7m2 61   Calcium      8.5 - 10.1 mg/dL 9.1   Bilirubin Total      0.2 - 1.3 mg/dL 0.7   Albumin      3.4 - 5.0 g/dL 3.9   Protein Total      6.8 - 8.8 g/dL 6.9   Alkaline Phosphatase      40 - 150 U/L 103   ALT      0 - 70 U/L 28   AST      0 - 45 U/L 16   Color Urine       Yellow   Appearance Urine       Clear   Glucose Urine      NEG mg/dL Negative   Bilirubin Urine      NEG Negative   Ketones Urine      NEG mg/dL Negative   Specific Gravity Urine      1.003 - 1.035 >1.030   pH Urine      5.0 - 7.0 pH 5.5   Protein Albumin Urine      NEG mg/dL Negative   Urobilinogen Urine      0.2 - 1.0 EU/dL 0.2   Nitrite Urine      NEG Negative   Blood Urine      NEG Negative   Leukocyte Esterase Urine      NEG Negative   Source       Midstream Urine   WBC Urine      0 - 2 /HPF O - 2   RBC Urine      0 - 2 /HPF O - 2   Protein Random Urine       0.16   Protein Total Urine g/gr Creatinine      0 - 0.2 g/g Cr 0.11   Myeloperoxidase Antibody IgG      0.0 - 0.9 AI <0.2 . . .   Proteinase 3 Antibody IgG      0.0 - 0.9 AI 0.2   CRP Inflammation      0.0 - 8.0 mg/L <2.9   Sed Rate      0 - 20 mm/h 6   Creatinine Urine Random       142   Neutrophil Cytoplasmic IgG Antibody       <1:20 . . .   Creatinine Urine       142     Component      Latest Ref [facility] 3/14/2016   WBC      4.0 - 11.0 10e9/L 7.2   RBC Count      4.4 - 5.9 10e12/L 4.07 (L)   Hemoglobin      13.3 - 17.7 g/dL 13.4   Hematocrit      40.0 - 53.0 % 38.8 (L)   MCV      78 - 100 fl 95   MCH      26.5 - 33.0 pg 32.9   MCHC      31.5 - 36.5 g/dL 34.5   RDW      10.0 - 15.0 % 12.5   Platelet Count      150 - 450 10e9/L 194   Diff Method       Automated Method   % Neutrophils       68.1   % Lymphocytes       14.0   % Monocytes       11.7   % Eosinophils       5.5   % Basophils       0.7   Absolute Neutrophil      1.6 - 8.3  10e9/L 4.9   Absolute Lymphocytes      0.8 - 5.3 10e9/L 1.0   Absolute Monocytes      0.0 - 1.3 10e9/L 0.8   Absolute Eosinophils      0.0 - 0.7 10e9/L 0.4   Absolute Basophils      0.0 - 0.2 10e9/L 0.1   Sodium      133 - 144 mmol/L 136   Potassium      3.4 - 5.3 mmol/L 4.7   Chloride      94 - 109 mmol/L 106   Carbon Dioxide      20 - 32 mmol/L 25   Anion Gap      3 - 14 mmol/L 5   Glucose      70 - 99 mg/dL 86   Urea Nitrogen      7 - 30 mg/dL 32 (H)   Creatinine      0.66 - 1.25 mg/dL 1.62 (H)   GFR Estimate      >60 mL/min/1.7m2 42 (L)   GFR Estimate If Black      >60 mL/min/1.7m2 51 (L)   Calcium      8.5 - 10.1 mg/dL 9.2   Bilirubin Total      0.2 - 1.3 mg/dL 0.7   Albumin      3.4 - 5.0 g/dL 4.0   Protein Total      6.8 - 8.8 g/dL 6.9   Alkaline Phosphatase      40 - 150 U/L 94   ALT      0 - 70 U/L 34   AST      0 - 45 U/L 24   Color Urine       Yellow   Appearance Urine       Clear   Glucose Urine      NEG mg/dL Negative   Bilirubin Urine      NEG Negative   Ketones Urine      NEG mg/dL Negative   Specific Gravity Urine      1.003 - 1.035 <=1.005   pH Urine      5.0 - 7.0 pH 5.5   Protein Albumin Urine      NEG mg/dL Negative   Urobilinogen Urine      0.2 - 1.0 EU/dL 0.2   Nitrite Urine      NEG Negative   Blood Urine      NEG Negative   Leukocyte Esterase Urine      NEG Negative   Source       Midstream Urine   WBC Urine      0 - 2 /HPF    RBC Urine      0 - 2 /HPF    Myeloperoxidase Antibody IgG      0.0 - 0.9 AI <0.2 . . .   Proteinase 3 Antibody IgG      0.0 - 0.9 AI 1.7 (H)   Protein Random Urine       <0.05   Protein Total Urine g/gr Creatinine      0 - 0.2 g/g Cr Unable to calculate due to low value   CRP Inflammation      0.0 - 8.0 mg/L <2.9   Sed Rate      0 - 20 mm/h 5   Neutrophil Cytoplasmic IgG Antibody       <1:20 . . .   Magnesium      1.6 - 2.3 mg/dL 2.1   Creatinine Urine Random       26   Copath Report          Creatinine Urine            Component      Latest Ref Rng 4/18/2016   WBC       4.0 - 11.0 10e9/L 6.7   RBC Count      4.4 - 5.9 10e12/L 4.14 (L)   Hemoglobin      13.3 - 17.7 g/dL 13.3   Hematocrit      40.0 - 53.0 % 39.5 (L)   MCV      78 - 100 fl 95   MCH      26.5 - 33.0 pg 32.1   MCHC      31.5 - 36.5 g/dL 33.7   RDW      10.0 - 15.0 % 12.5   Platelet Count      150 - 450 10e9/L 218   Diff Method       Automated Method   % Neutrophils       65.4   % Lymphocytes       16.0   % Monocytes       13.2   % Eosinophils       4.2   % Basophils       1.2   Absolute Neutrophil      1.6 - 8.3 10e9/L 4.4   Absolute Lymphocytes      0.8 - 5.3 10e9/L 1.1   Absolute Monocytes      0.0 - 1.3 10e9/L 0.9   Absolute Eosinophils      0.0 - 0.7 10e9/L 0.3   Absolute Basophils      0.0 - 0.2 10e9/L 0.1   Sodium      133 - 144 mmol/L    Potassium      3.4 - 5.3 mmol/L    Chloride      94 - 109 mmol/L    Carbon Dioxide      20 - 32 mmol/L    Anion Gap      3 - 14 mmol/L    Glucose      70 - 99 mg/dL    Urea Nitrogen      7 - 30 mg/dL    Creatinine      0.66 - 1.25 mg/dL 1.72 (H)   GFR Estimate      >60 mL/min/1.7m2 40 (L)   GFR Estimate If Black      >60 mL/min/1.7m2 48 (L)   Calcium      8.5 - 10.1 mg/dL    Bilirubin Total      0.2 - 1.3 mg/dL    Albumin      3.4 - 5.0 g/dL    Protein Total      6.8 - 8.8 g/dL    Alkaline Phosphatase      40 - 150 U/L    ALT      0 - 70 U/L 30   AST      0 - 45 U/L 22   Color Urine       Yellow   Appearance Urine       Clear   Glucose Urine      NEG mg/dL Negative   Bilirubin Urine      NEG Negative   Ketones Urine      NEG mg/dL Negative   Specific Gravity Urine      1.003 - 1.035 <=1.005   pH Urine      5.0 - 7.0 pH 5.0   Protein Albumin Urine      NEG mg/dL Negative   Urobilinogen Urine      0.2 - 1.0 EU/dL 0.2   Nitrite Urine      NEG Negative   Blood Urine      NEG Negative   Leukocyte Esterase Urine      NEG Negative   Source       Midstream Urine   WBC Urine      0 - 2 /HPF O - 2   RBC Urine      0 - 2 /HPF O - 2   Myeloperoxidase Antibody IgG      0.0 - 0.9 AI     Proteinase 3 Antibody IgG      0.0 - 0.9 AI 2.0 (H)   Protein Random Urine       <0.05   Protein Total Urine g/gr Creatinine      0 - 0.2 g/g Cr Unable to calculate due to low value   CRP Inflammation      0.0 - 8.0 mg/L 8.4 (H)   Sed Rate      0 - 20 mm/h 9   Neutrophil Cytoplasmic IgG Antibody       <1:20 . . .   Magnesium      1.6 - 2.3 mg/dL    Creatinine Urine Random          Copath Report          Creatinine Urine       53           Component      Latest Ref Rng 7/6/2016   WBC      4.0 - 11.0 10e9/L 7.9   RBC Count      4.4 - 5.9 10e12/L 4.31 (L)   Hemoglobin      13.3 - 17.7 g/dL 13.9   Hematocrit      40.0 - 53.0 % 41.5   MCV      78 - 100 fl 96   MCH      26.5 - 33.0 pg 32.3   MCHC      31.5 - 36.5 g/dL 33.5   RDW      10.0 - 15.0 % 12.5   Platelet Count      150 - 450 10e9/L 197   Diff Method       Automated Method   % Neutrophils       68.1   % Lymphocytes       13.2   % Monocytes       11.8   % Eosinophils       5.6   % Basophils       1.3   Absolute Neutrophil      1.6 - 8.3 10e9/L 5.4   Absolute Lymphocytes      0.8 - 5.3 10e9/L 1.0   Absolute Monocytes      0.0 - 1.3 10e9/L 0.9   Absolute Eosinophils      0.0 - 0.7 10e9/L 0.4   Absolute Basophils      0.0 - 0.2 10e9/L 0.1   Color Urine       Yellow   Appearance Urine       Clear   Glucose Urine      NEG mg/dL Negative   Bilirubin Urine      NEG Negative   Ketones Urine      NEG mg/dL Negative   Specific Gravity Urine      1.003 - 1.035 1.015   Blood Urine      NEG Negative   pH Urine      5.0 - 7.0 pH 5.5   Protein Albumin Urine      NEG mg/dL Negative   Urobilinogen Urine      0.2 - 1.0 EU/dL 0.2   Nitrite Urine      NEG Negative   Leukocyte Esterase Urine      NEG Negative   Source       Midstream Urine   Creatinine      0.66 - 1.25 mg/dL 1.62 (H)   GFR Estimate      >60 mL/min/1.7m2 42 (L)   GFR Estimate If Black      >60 mL/min/1.7m2 51 (L)   Protein Random Urine       0.10   Protein Total Urine g/gr Creatinine      0 - 0.2 g/g Cr 0.11    Myeloperoxidase Antibody IgG      0.0 - 0.9 AI <0.2 . . .   Proteinase 3 Antibody IgG      0.0 - 0.9 AI 1.3 (H)   CRP Inflammation      0.0 - 8.0 mg/L <2.9   Sed Rate      0 - 20 mm/h 5   Creatinine Urine Random       91   Neutrophil Cytoplasmic IgG Antibody       <1:20 . . .   ALT      0 - 70 U/L 28   AST      0 - 45 U/L 19   Albumin      3.4 - 5.0 g/dL 4.0   Creatinine Urine       91     Component      Latest Ref Rn 10/24/2016   WBC      4.0 - 11.0 10e9/L 6.3   RBC Count      4.4 - 5.9 10e12/L 4.18 (L)   Hemoglobin      13.3 - 17.7 g/dL 13.5   Hematocrit      40.0 - 53.0 % 40.1   MCV      78 - 100 fl 96   MCH      26.5 - 33.0 pg 32.3   MCHC      31.5 - 36.5 g/dL 33.7   RDW      10.0 - 15.0 % 12.7   Platelet Count      150 - 450 10e9/L 190   Diff Method       Automated Method   % Neutrophils       67.6   % Lymphocytes       14.9   % Monocytes       12.1   % Eosinophils       4.3   % Basophils       1.1   Absolute Neutrophil      1.6 - 8.3 10e9/L 4.3   Absolute Lymphocytes      0.8 - 5.3 10e9/L 0.9   Absolute Monocytes      0.0 - 1.3 10e9/L 0.8   Absolute Eosinophils      0.0 - 0.7 10e9/L 0.3   Absolute Basophils      0.0 - 0.2 10e9/L 0.1   Color Urine       Yellow   Appearance Urine       Clear   Glucose Urine      NEG mg/dL Negative   Bilirubin Urine      NEG Negative   Ketones Urine      NEG mg/dL Negative   Specific Gravity Urine      1.003 - 1.035 1.010   pH Urine      5.0 - 7.0 pH 5.0   Protein Albumin Urine      NEG mg/dL Negative   Urobilinogen Urine      0.2 - 1.0 EU/dL 0.2   Nitrite Urine      NEG Negative   Blood Urine      NEG Negative   Leukocyte Esterase Urine      NEG Negative   Source       Midstream Urine   WBC Urine      0 - 2 /HPF O - 2   RBC Urine      0 - 2 /HPF O - 2   Creatinine      0.66 - 1.25 mg/dL 1.38 (H)   GFR Estimate      >60 mL/min/1.7m2 51 (L)   GFR Estimate If Black      >60 mL/min/1.7m2 62   Protein Random Urine       <0.05   Protein Total Urine g/gr Creatinine      0 - 0.2 g/g  Cr Unable to calculate due to low value   Albumin      3.4 - 5.0 g/dL 3.9   ALT      0 - 70 U/L 26   AST      0 - 45 U/L 25   CRP Inflammation      0.0 - 8.0 mg/L 6.1   Sed Rate      0 - 20 mm/h 6   Creatinine Urine Random       36   Proteinase 3 Antibody IgG      0.0 - 0.9 AI 1.2 (H)   Neutrophil Cytoplasmic IgG Antibody       <1:20 . . .   SAKINA  Broad Spectrum       Canceled, Test credited   DNA-ds      <10 IU/mL <1 . . .   Creatinine Urine       36   Direct Antiglobulin       Neg     Component      Latest Ref Rng & Units 4/3/2017 4/18/2017   WBC      4.0 - 11.0 10e9/L 7.7    RBC Count      4.4 - 5.9 10e12/L 4.50    Hemoglobin      13.3 - 17.7 g/dL 15.1    Hematocrit      40.0 - 53.0 % 43.1    MCV      78 - 100 fl 96    MCH      26.5 - 33.0 pg 33.6 (H)    MCHC      31.5 - 36.5 g/dL 35.0    RDW      10.0 - 15.0 % 13.4    Platelet Count      150 - 450 10e9/L 216    Diff Method       Automated Method    % Neutrophils      % 65.7    % Lymphocytes      % 17.7    % Monocytes      % 11.0    % Eosinophils      % 4.7    % Basophils      % 0.9    Absolute Neutrophil      1.6 - 8.3 10e9/L 5.1    Absolute Lymphocytes      0.8 - 5.3 10e9/L 1.4    Absolute Monocytes      0.0 - 1.3 10e9/L 0.9    Absolute Eosinophils      0.0 - 0.7 10e9/L 0.4    Absolute Basophils      0.0 - 0.2 10e9/L 0.1    Color Urine       Yellow    Appearance Urine       Clear    Glucose Urine      NEG mg/dL Negative    Bilirubin Urine      NEG Negative    Ketones Urine      NEG mg/dL Negative    Specific Gravity Urine      1.003 - 1.035 1.010    Blood Urine      NEG Negative    pH Urine      5.0 - 7.0 pH 5.0    Protein Albumin Urine      NEG mg/dL Negative    Urobilinogen Urine      0.2 - 1.0 EU/dL 0.2    Nitrite Urine      NEG Negative    Leukocyte Esterase Urine      NEG Negative    Source       Midstream Urine    Creatinine      0.66 - 1.25 mg/dL 1.41 (H)    GFR Estimate      >60 mL/min/1.7m2 50 (L)    GFR Estimate If Black      >60 mL/min/1.7m2 60 (L)     Myeloperoxidase Antibody IgG      0.0 - 0.9 AI <0.2 . . .    Proteinase 3 Antibody IgG      0.0 - 0.9 AI 2.8 (H)    Protein Random Urine      g/L <0.05    Protein Total Urine g/gr Creatinine      0 - 0.2 g/g Cr Unable to calculate due to low value    Albumin      3.4 - 5.0 g/dL 4.2    ALT      0 - 70 U/L 32    AST      0 - 45 U/L 27    CRP Inflammation      0.0 - 8.0 mg/L <2.9    Sed Rate      0 - 20 mm/h 5    Neutrophil Cytoplasmic IgG Antibody       <1:20 . . .    Creatinine Urine Random      mg/dL 48    Creatinine Urine      mg/dL 48    AcetChol Binding Anju        0.0     Component      Latest Ref Rng & Units 6/30/2017 8/25/2017   WBC      4.0 - 11.0 10e9/L 6.3 6.4   RBC Count      4.4 - 5.9 10e12/L 4.71 4.25 (L)   Hemoglobin      13.3 - 17.7 g/dL 14.8 13.6   Hematocrit      40.0 - 53.0 % 45.8 41.2   MCV      78 - 100 fl 97 97   MCH      26.5 - 33.0 pg 31.4 32.0   MCHC      31.5 - 36.5 g/dL 32.3 33.0   RDW      10.0 - 15.0 % 12.6 12.8   Platelet Count      150 - 450 10e9/L 177 174   Diff Method       Automated Method Automated Method   % Neutrophils      % 64.5 59.0   % Lymphocytes      % 18.3 22.0   % Monocytes      % 10.2 12.2   % Eosinophils      % 5.7 5.5   % Basophils      % 1.0 1.3   % Immature Granulocytes      % 0.3    Nucleated RBCs      0 /100 0    Absolute Neutrophil      1.6 - 8.3 10e9/L 4.0 3.8   Absolute Lymphocytes      0.8 - 5.3 10e9/L 1.2 1.4   Absolute Monocytes      0.0 - 1.3 10e9/L 0.6 0.8   Absolute Eosinophils      0.0 - 0.7 10e9/L 0.4 0.4   Absolute Basophils      0.0 - 0.2 10e9/L 0.1 0.1   Abs Immature Granulocytes      0 - 0.4 10e9/L 0.0    Absolute Nucleated RBC       0.0    Sodium      133 - 144 mmol/L 140 138   Potassium      3.4 - 5.3 mmol/L 4.7 4.8   Chloride      94 - 109 mmol/L 107 106   Carbon Dioxide      20 - 32 mmol/L 27 27   Anion Gap      3 - 14 mmol/L 6 5   Glucose      70 - 99 mg/dL 104 (H) 91   Urea Nitrogen      7 - 30 mg/dL 24 31 (H)   Creatinine      0.66 - 1.25 mg/dL  1.26 (H) 1.47 (H)   GFR Estimate      >60 mL/min/1.7m2 57 (L) 47 (L)   GFR Estimate If Black      >60 mL/min/1.7m2 68 57 (L)   Calcium      8.5 - 10.1 mg/dL 9.1 9.6   Bilirubin Total      0.2 - 1.3 mg/dL 0.7 0.5   Albumin      3.4 - 5.0 g/dL 3.7 3.9   Protein Total      6.8 - 8.8 g/dL 7.0 6.9   Alkaline Phosphatase      40 - 150 U/L 101 98   ALT      0 - 70 U/L 22 25   AST      0 - 45 U/L 16 20   Color Urine       Yellow Yellow   Appearance Urine       Clear Clear   Glucose Urine      NEG:Negative mg/dL Negative Negative   Bilirubin Urine      NEG:Negative Negative Negative   Ketones Urine      NEG:Negative mg/dL Negative Negative   Specific Gravity Urine      1.003 - 1.035 1.017 1.020   Blood Urine      NEG:Negative Negative Trace (A)   pH Urine      5.0 - 7.0 pH 5.0 5.5   Protein Albumin Urine      NEG:Negative mg/dL Negative Negative   Urobilinogen mg/dL      0.0 - 2.0 mg/dL 0.0    Nitrite Urine      NEG:Negative Negative Negative   Leukocyte Esterase Urine      NEG:Negative Negative Negative   Source       Midstream Urine Midstream Urine   WBC Urine      OTO2:O - 2 /HPF 1 O - 2   RBC Urine      OTO2:O - 2 /HPF 1 O - 2   Mucous Urine      NEG /LPF Present (A)    Urobilinogen Urine      0.2 - 1.0 EU/dL  0.2   Protein Random Urine      g/L 0.15 0.10   Protein Total Urine g/gr Creatinine      0 - 0.2 g/g Cr 0.13 0.12   Myeloperoxidase Antibody IgG      0.0 - 0.9 AI <0.2 . . . <0.2   Proteinase 3 Antibody IgG      0.0 - 0.9 AI 2.3 (H) 2.0 (H)   CRP Inflammation      0.0 - 8.0 mg/L <2.9 <2.9   Sed Rate      0 - 20 mm/h 6 5   Creatinine Urine Random      mg/dL 115 88   Neutrophil Cytoplasmic IgG Antibody      <1:20 <1:20 . . . <1:20   Creatinine Urine      mg/dL 115 88

## 2017-12-13 NOTE — MR AVS SNAPSHOT
After Visit Summary   12/13/2017    Joshua Ennis    MRN: [id number]           Patient Information     Date Of Birth          1945        Visit Information        Provider Department      12/13/2017 11:00 AM Ede Sanchez MD [facility] Rhe[facility]        Today's Diagnoses     Wegener's granulomatosis (H)    -  1      Care Instructions    Labs today and in 3 months (March 2018)    Follow up with Dr. Obando    Return in 6 months          Follow-ups after your visit        Follow-up notes from your care team     Return in about 6 months (around 6/13/2018).      Your next 10 appointments already scheduled     Dec 13, 2017 12:00 PM CST   Lab with  LAB   [facility] ([facility])    [address]  [address] [place] [id number] [phone]            Mar 13, 2018  4:45 PM CDT   LAB with [facility] ([facility])    [address] [address] [phone]           Please do not eat 10-12 hours before your appointment if you are coming in fasting for labs on lipids, cholesterol, or glucose (sugar). This does not apply to pregnant women. Water, hot tea and black coffee (with nothing added) are okay. Do not drink other fluids, diet soda or chew gum.            Jun 13, 2018  9:00 AM CDT   (Arrive by 8:45 AM)   Return Visit with Gabbi Obando MD   [facility] ([facility])    [address]  [address] [place] [id number] [phone]            Jun 13, 2018 10:00 AM CDT   (Arrive by 9:45 AM)   Return Visit with Ede Sanchez MD   [facility] ([facility])    [address]  [address] [place] [id number] [phone]              Future tests that were ordered for you today     Open Future Orders        Priority Expected Expires Ordered    Antineutrophil cytoplasmic Anju IgG Routine 3/13/2018  12/13/2018 12/13/2017    Vasculitis panel Routine 3/13/2018 12/13/2018 12/13/2017    CBC with platelets differential Routine 3/13/2018 12/13/2018 12/13/2017    AST Routine 3/13/2018 12/13/2018 12/13/2017    ALT Routine 3/13/2018 12/13/2018 12/13/2017    Albumin level Routine 3/13/2018 12/13/2018 12/13/2017    Creatinine Routine 3/13/2018 12/13/2018 12/13/2017    CRP inflammation Routine 3/13/2018 12/13/2018 12/13/2017    Erythrocyte sedimentation rate auto Routine 3/13/2018 12/13/2018 12/13/2017    Routine UA with micro reflex to culture Routine 3/13/2018 12/13/2018 12/13/2017    ALT Routine  12/13/2018 12/13/2017    Albumin level Routine  12/13/2018 12/13/2017    AST Routine  12/13/2018 12/13/2017    CBC with platelets differential Routine  12/13/2018 12/13/2017    Creatinine Routine  12/13/2018 12/13/2017    CRP inflammation Routine  12/13/2018 12/13/2017    Erythrocyte sedimentation rate auto Routine  12/13/2018 12/13/2017    Routine UA with micro reflex to culture Routine  12/13/2018 12/13/2017    Antineutrophil cytoplasmic Anju IgG Routine  12/13/2018 12/13/2017            Who to contact     If you have questions or need follow up information about today's clinic visit or your schedule please contact [facility] RHE[facility]AT[facility] directly at [phone].  Normal or non-critical lab and imaging results will be communicated to you by [employer], letter or phone within 4 business days after the clinic has received the results. If you do not hear from us within 7 days, please contact the clinic through [employer] or phone. If you have a critical or abnormal lab result, we will notify you by phone as soon as possible.  Submit refill requests through [facility] or call your pharmacy and they will forward the refill request to us. Please allow 3 business days for your refill to be completed.          Additional Information About Your Visit        [facility] Information     [facility] lets you send messages to your doctor, view your test  "results, renew your prescriptions, schedule appointments and more. To sign up, go to [email or web address] . Click on \"Log in\" on the left side of the screen, which will take you to the Welcome page. Then click on \"Sign up Now\" on the right side of the page.     You will be asked to enter the access code listed below, as well as some personal information. Please follow the directions to create your username and password.     Your access code is: [email or web address]  Expires: 2017  5:31 AM     Your access code will  in 90 days. If you need help or a new code, please call your [facility] clinic or [phone].        Care EveryWhere ID     This is your Care EveryWhere ID. This could be used by other organizations to access your [facility] medical records  [id number]        Your Vitals Were     Pulse Height Pulse Oximetry BMI (Body Mass Index)          74 1.727 m (5' 8\") 98% 28.52 kg/m2         Blood Pressure from Last 3 Encounters:   17 130/80   17 129/77   17 147/78    Weight from Last 3 Encounters:   17 85.1 kg (187 lb 9.6 oz)   17 83.8 kg (184 lb 12.8 oz)   17 84.4 kg (186 lb)              We Performed the Following     Vasculitis panel        Primary Care Provider Fax #    Physician No Ref-Primary [phone]       No address on file        Equal Access to Services     [facility] : Hadii tom ku hadasho Soomaali, waaxda luqadaha, qaybta kaalmada adeegyada, baljit ortega . So [facility] [phone].    ATENCIÓN: Si habla español, tiene a enamorado disposición servicios gratuitos de asistencia lingüística. Llame al [phone].    We comply with applicable federal civil rights laws and [place] laws. We do not discriminate on the basis of race, color, national origin, age, disability, sex, sexual orientation, or gender identity.            Thank you!     Thank you for choosing [facility] RHEUMATOLOG[facility]  for your care. Our goal is always to provide you with " excellent care. Hearing back from our patients is one way we can continue to improve our services. Please take a few minutes to complete the written survey that you may receive in the mail after your visit with us. Thank you!             Your Updated Medication List - Protect others around you: Learn how to safely use, store and throw away your medicines at www.disposemymeds.org.          This list is accurate as of: 12/13/17 11:57 AM.  Always use your most recent med list.                   Brand Name Dispense Instructions for use Diagnosis    Blood Pressure Monitor Kit     1 kit    Automatic Blood Pressure Monitor    Hypertension, renal       calcium 600 + D 600-400 MG-UNIT per tablet   Generic drug:  calcium-vitamin D      Take 1 tablet by mouth 2 times daily.        fluorouracil 5 % cream    EFUDEX    40 g    Apply twice daily to scalp for 2-4 weeks.    AK (actinic keratosis)       folic acid 1 MG tablet    FOLVITE    180 tablet    Take 2 tablets (2 mg) by mouth daily    Granulomatosis with polyangiitis (H)       gabapentin 400 MG capsule    NEURONTIN    90 capsule    Take 1 capsule (400 mg) by mouth At Bedtime    Neuropathy       losartan 50 MG tablet    COZAAR    90 tablet    Take 1 tablet (50 mg) by mouth daily    Hypertension, renal, stage 1-4 or unspecified chronic kidney disease       methotrexate 2.5 MG tablet CHEMO     60 tablet    Take 4 tablets (10 mg) by mouth once a week    High risk medications (not anticoagulants) long-term use       TYLENOL PO      Take 650 mg by mouth once as needed for mild pain or fever        vitamin D 1000 UNITS capsule      Take 1 capsule by mouth daily.

## 2017-12-14 LAB
ANCA IGG TITR SER IF: NORMAL {TITER}
MYELOPEROXIDASE AB SER-ACNC: <0.2 AI (ref 0–0.9)
PROTEINASE3 IGG SER-ACNC: 2.2 AI (ref 0–0.9)

## 2018-02-06 ENCOUNTER — OFFICE VISIT (OUTPATIENT)
Dept: FAMILY MEDICINE | Facility: CLINIC | Age: 73
End: 2018-02-06
Payer: COMMERCIAL

## 2018-02-06 VITALS
SYSTOLIC BLOOD PRESSURE: 138 MMHG | RESPIRATION RATE: 18 BRPM | HEART RATE: 68 BPM | WEIGHT: 186 LBS | BODY MASS INDEX: 28.19 KG/M2 | TEMPERATURE: 97.6 F | HEIGHT: 68 IN | DIASTOLIC BLOOD PRESSURE: 82 MMHG

## 2018-02-06 DIAGNOSIS — M31.30 GRANULOMATOSIS WITH POLYANGIITIS (H): ICD-10-CM

## 2018-02-06 DIAGNOSIS — J02.0 ACUTE STREPTOCOCCAL PHARYNGITIS: Primary | ICD-10-CM

## 2018-02-06 DIAGNOSIS — R07.0 THROAT PAIN: ICD-10-CM

## 2018-02-06 DIAGNOSIS — Z12.11 SCREEN FOR COLON CANCER: ICD-10-CM

## 2018-02-06 LAB
DEPRECATED S PYO AG THROAT QL EIA: ABNORMAL
SPECIMEN SOURCE: ABNORMAL

## 2018-02-06 PROCEDURE — 87880 STREP A ASSAY W/OPTIC: CPT | Performed by: FAMILY MEDICINE

## 2018-02-06 PROCEDURE — 99214 OFFICE O/P EST MOD 30 MIN: CPT | Performed by: FAMILY MEDICINE

## 2018-02-06 RX ORDER — CEPHALEXIN 500 MG/1
500 CAPSULE ORAL 2 TIMES DAILY
Qty: 20 CAPSULE | Refills: 0 | Status: SHIPPED | OUTPATIENT
Start: 2018-02-06 | End: 2018-02-16

## 2018-02-06 NOTE — MR AVS SNAPSHOT
After Visit Summary   2/6/2018    Joshua Ennis    MRN: [id number]           Patient Information     Date Of Birth          1945        Visit Information        Provider Department      2/6/2018 5:40 PM Patrick Mcintyre MD [facility]        Today's Diagnoses     Throat pain    -  1    Acute streptococcal pharyngitis        Screen for colon cancer          Care Instructions      Pharyngitis: Strep (Confirmed)    You have had a positive test for strep throat. Strep throat is a contagious illness. It is spread by coughing, kissing or by touching others after touching your mouth or nose. Symptoms include throat pain that is worse with swallowing, aching all over, headache, and fever. It is treated with antibiotic medicine. This should help you start to feel better in 1 to 2 days.  Home care    Rest at home. Drink plenty of fluids to you won't get dehydrated.    No work or school for the first 2 days of taking the antibiotics. After this time, you will not be contagious. You can then return to school or work if you are feeling better.     Take antibiotic medicine for the full 10 days, even if you feel better. This is very important to ensure the infection is treated. It is also important to prevent medicine-resistant germs from developing. If you were given an antibiotic shot, you don't need any more antibiotics.    You may use acetaminophen or ibuprofen to control pain or fever, unless another medicine was prescribed for this. Talk with your doctor before taking these medicines if you have chronic liver or kidney disease. Also talk with your doctor if you have had a stomach ulcer or GI bleeding.    Throat lozenges or sprays help reduce pain. Gargling with warm saltwater will also reduce throat pain. Dissolve 1/2 teaspoon of salt in 1 glass of warm water. This may be useful just before meals.     Soft foods are OK. Avoid salty or spicy foods.  Follow-up care  Follow up with your  healthcare provider or our staff if you don't get better over the next week.  When to seek medical advice  Call your healthcare provider right away if any of these occur:    Fever of 100.4 F (38 C) or higher, or as directed by your healthcare provider    New or worsening ear pain, sinus pain, or headache    Painful lumps in the back of neck    Stiff neck    Lymph nodes getting larger or becoming soft in the middle    You can't swallow liquids or you can't open your mouth wide because of throat pain    Signs of dehydration. These include very dark urine or no urine, sunken eyes, and dizziness.    Trouble breathing or noisy breathing    Muffled voice    Rash  Date Last Reviewed: 4/13/2015 2000-2017 [employer]. [address] [place] [address], [place] [address]. All rights reserved. This information is not intended as a substitute for professional medical care. Always follow your healthcare professional's instructions.        Cephalexin tablets or capsules  Brand Names: Biocef, Daxbia, Keflex  What is this medicine?  CEPHALEXIN (sef a LAQUITA in) is a cephalosporin antibiotic. It is used to treat certain kinds of bacterial infections It will not work for colds, flu, or other viral infections.  How should I use this medicine?  Take this medicine by mouth with a full glass of water. Follow the directions on the prescription label. This medicine can be taken with or without food. Take your medicine at regular intervals. Do not take your medicine more often than directed. Take all of your medicine as directed even if you think you are better. Do not skip doses or stop your medicine early.  Talk to your pediatrician regarding the use of this medicine in children. While this drug may be prescribed for selected conditions, precautions do apply.  What side effects may I notice from receiving this medicine?  Side effects that you should report to your doctor or health care professional as soon as possible:    allergic  reactions like skin rash, itching or hives, swelling of the face, lips, or tongue    breathing problems    pain or trouble passing urine    redness, blistering, peeling or loosening of the skin, including inside the mouth    severe or watery diarrhea    unusually weak or tired    yellowing of the eyes, skin  Side effects that usually do not require medical attention (report to your doctor or health care professional if they continue or are bothersome):    gas or heartburn    genital or anal irritation    headache    joint or muscle pain    nausea, vomiting  What may interact with this medicine?    probenecid    some other antibiotics  What if I miss a dose?  If you miss a dose, take it as soon as you can. If it is almost time for your next dose, take only that dose. Do not take double or extra doses. There should be at least 4 to 6 hours between doses.  Where should I keep my medicine?  Keep out of the reach of children.  Store at room temperature between 59 and 86 degrees F (15 and 30 degrees C). Throw away any unused medicine after the expiration date.  What should I tell my health care provider before I take this medicine?  They need to know if you have any of these conditions:    kidney disease    stomach or intestine problems, especially colitis    an unusual or allergic reaction to cephalexin, other cephalosporins, penicillins, other antibiotics, medicines, foods, dyes or preservatives    pregnant or trying to get pregnant    breast-feeding  What should I watch for while using this medicine?  Tell your doctor or health care professional if your symptoms do not begin to improve in a few days.  Do not treat diarrhea with over the counter products. Contact your doctor if you have diarrhea that lasts more than 2 days or if it is severe and watery.  If you have diabetes, you may get a false-positive result for sugar in your urine. Check with your doctor or health care professional.  NOTE:This sheet is a summary. It  may not cover all possible information. If you have questions about this medicine, talk to your doctor, pharmacist, or health care provider. Copyright  2017 [employer]                Follow-ups after your visit        Additional Services     GASTROENTEROLOGY ADULT REF PROCEDURE ONLY       Last Lab Result: Creatinine (mg/dL)       Date                     Value                 12/13/2017               1.48 (H)         ----------  Body mass index is 28.28 kg/(m^2).     Needed:  No  Language:  English    Patient will be contacted to schedule procedure.     Please be aware that coverage of these services is subject to the terms and limitations of your health insurance plan.  Call member services at your health plan with any benefit or coverage questions.  Any procedures must be performed at a [facility] OR coordinated by your clinic's referral office.    Please bring the following with you to your appointment:    (1) Any X-Rays, CTs or MRIs which have been performed.  Contact the facility where they were done to arrange for  prior to your scheduled appointment.    (2) List of current medications   (3) This referral request   (4) Any documents/labs given to you for this referral                  Your next 10 appointments already scheduled     Mar 13, 2018  4:45 PM CDT   LAB with [facility] ([facility])    [address] [address] [phone]           Please do not eat 10-12 hours before your appointment if you are coming in fasting for labs on lipids, cholesterol, or glucose (sugar). This does not apply to pregnant women. Water, hot tea and black coffee (with nothing added) are okay. Do not drink other fluids, diet soda or chew gum.            Jun 13, 2018  9:00 AM CDT   (Arrive by 8:45 AM)   Return Visit with Gabbi Obando MD   [facility] ([facility])    [address] [place] [address]  "Floor  [facility] [id number] [phone]            2018 10:00 AM CDT   (Arrive by 9:45 AM)   Return Visit with Ede Sanchez MD   [facility] ([facility])    [address]  [address]  [facility] [id number] [phone]              Who to contact     If you have questions or need follow up information about today's clinic visit or your schedule please contact [facility] directly at [phone].  Normal or non-critical lab and imaging results will be communicated to you by [employer], letter or phone within 4 business days after the clinic has received the results. If you do not hear from us within 7 days, please contact the clinic through [employer] or phone. If you have a critical or abnormal lab result, we will notify you by phone as soon as possible.  Submit refill requests through [employer] or call your pharmacy and they will forward the refill request to us. Please allow 3 business days for your refill to be completed.          Additional Information About Your Visit        MyChart Information     [employer] lets you send messages to your doctor, view your test results, renew your prescriptions, schedule appointments and more. To sign up, go to [email or web address] . Click on \"Log in\" on the left side of the screen, which will take you to the Welcome page. Then click on \"Sign up Now\" on the right side of the page.     You will be asked to enter the access code listed below, as well as some personal information. Please follow the directions to create your username and password.     Your access code is: [email or web address]  Expires: 2018  6:15 PM     Your access code will  in 90 days. If you need help or a new code, please call your [facility] or [phone].        Care EveryWhere ID     This is your Care EveryWhere ID. This could be used by other organizations to access your [facility] medical records  [id number]        Your " "Vitals Were     Pulse Temperature Respirations Height BMI (Body Mass Index)       68 97.6  F (36.4  C) (Tympanic) 18 5' 8\" (1.727 m) 28.28 kg/m2        Blood Pressure from Last 3 Encounters:   02/06/18 138/82   12/13/17 130/80   06/30/17 129/77    Weight from Last 3 Encounters:   02/06/18 186 lb (84.4 kg)   12/13/17 187 lb 9.6 oz (85.1 kg)   06/30/17 184 lb 12.8 oz (83.8 kg)              We Performed the Following     GASTROENTEROLOGY ADULT REF PROCEDURE ONLY     Strep, Rapid Screen          Today's Medication Changes          These changes are accurate as of 2/6/18  6:15 PM.  If you have any questions, ask your nurse or doctor.               Start taking these medicines.        Dose/Directions    Benzocaine 20 % Aero spray   Commonly known as:  HURRICAINE/TOPEX   Used for:  Throat pain   Started by:  Patrick Mcintyre MD        1 spray up to 4 times daily as needed   Quantity:  57 g   Refills:  0       cephALEXin 500 MG capsule   Commonly known as:  KEFLEX   Used for:  Acute streptococcal pharyngitis   Started by:  Patrick Mcintyre MD        Dose:  500 mg   Take 1 capsule (500 mg) by mouth 2 times daily for 10 days   Quantity:  20 capsule   Refills:  0            Where to get your medicines      These medications were sent to [facility] [address] [place] [address]  [address] [address]     Phone:  [phone]     Benzocaine 20 % Aero spray    cephALEXin 500 MG capsule                Primary Care Provider Fax #    Physician No Ref-Primary [phone]       No address on file        Equal Access to Services     [facility] AH: Ana Keyes, wayen luduane, qaybta kaalrene oconnor, baljit alvarado. So [facility] [phone].    ATENCIÓN: Si habla español, tiene a enamorado disposición servicios gratuitos de asistencia lingüística. Llame al [phone].    We comply with applicable federal civil rights laws and [place] laws. We do not discriminate " on the basis of race, color, national origin, age, disability, sex, sexual orientation, or gender identity.            Thank you!     Thank you for choosing [facility]  for your care. Our goal is always to provide you with excellent care. Hearing back from our patients is one way we can continue to improve our services. Please take a few minutes to complete the written survey that you may receive in the mail after your visit with us. Thank you!             Your Updated Medication List - Protect others around you: Learn how to safely use, store and throw away your medicines at www.disposemymeds.org.          This list is accurate as of 2/6/18  6:15 PM.  Always use your most recent med list.                   Brand Name Dispense Instructions for use Diagnosis    Benzocaine 20 % Aero spray    HURRICAINE/TOPEX    57 g    1 spray up to 4 times daily as needed    Throat pain       Blood Pressure Monitor Kit     1 kit    Automatic Blood Pressure Monitor    Hypertension, renal       calcium 600 + D 600-400 MG-UNIT per tablet   Generic drug:  calcium-vitamin D      Take 1 tablet by mouth 2 times daily.        cephALEXin 500 MG capsule    KEFLEX    20 capsule    Take 1 capsule (500 mg) by mouth 2 times daily for 10 days    Acute streptococcal pharyngitis       fluorouracil 5 % cream    EFUDEX    40 g    Apply twice daily to scalp for 2-4 weeks.    AK (actinic keratosis)       folic acid 1 MG tablet    FOLVITE    180 tablet    Take 2 tablets (2 mg) by mouth daily    Granulomatosis with polyangiitis (H)       gabapentin 400 MG capsule    NEURONTIN    90 capsule    Take 1 capsule (400 mg) by mouth At Bedtime    Neuropathy       losartan 50 MG tablet    COZAAR    90 tablet    Take 1 tablet (50 mg) by mouth daily    Hypertension, renal, stage 1-4 or unspecified chronic kidney disease       methotrexate 2.5 MG tablet CHEMO     60 tablet    Take 4 tablets (10 mg) by mouth once a week    High risk medications (not  anticoagulants) long-term use       TYLENOL PO      Take 650 mg by mouth once as needed for mild pain or fever        vitamin D 1000 UNITS capsule      Take 1 capsule by mouth daily.

## 2018-02-06 NOTE — NURSING NOTE
"Chief Complaint   Patient presents with     Pharyngitis       Initial /82 (Cuff Size: Adult Regular)  Pulse 68  Temp 97.6  F (36.4  C) (Tympanic)  Resp 18  Ht 5' 8\" (1.727 m)  Wt 186 lb (84.4 kg)  BMI 28.28 kg/m2 Estimated body mass index is 28.28 kg/(m^2) as calculated from the following:    Height as of this encounter: 5' 8\" (1.727 m).    Weight as of this encounter: 186 lb (84.4 kg).      Health Maintenance that is potentially due pending provider review:  Colonoscopy/FIT    Gave pt phone number/pended order to schedule mammo and/or colonoscopy(or FIT)    Is there anyone who you would like to be able to receive your results? Not Applicable  If yes have patient fill out SABAS    "

## 2018-02-06 NOTE — PROGRESS NOTES
SUBJECTIVE:   Joshua Ennis is a 72 year old male who presents to clinic today for the following health issues:      RESPIRATORY SYMPTOMS      Duration: 3 days     Description  rhinorrhea and sore throat, headache    Severity: moderate    Accompanying signs and symptoms: Has had a few bloody noses. Thinks the blood and mucous is running down his throat .  Hurts to swallow     History (predisposing factors):  strep exposure and sick co-workers     Precipitating or alleviating factors: None    Therapies tried and outcome:  rest and fluids, cough drops            Problem list and histories reviewed & adjusted, as indicated.  Additional history: as documented    Patient Active Problem List   Diagnosis     Tobacco abuse     CARDIOVASCULAR SCREENING; LDL GOAL LESS THAN 160     Melanoma (H)     Idiopathic progressive polyneuropathy     Advanced directives, counseling/discussion     GERD (gastroesophageal reflux disease)     Hypertension, renal     Anemia     Encounter for long-term (current) use of steroids     Granulomatosis with polyangiitis (H)     CKD (chronic kidney disease) stage 3, GFR 30-59 ml/min     Past Surgical History:   Procedure Laterality Date     BIOPSY  11/2011    melanoma on back     DISSECT LYMPH NODE INGUINAL  3/1/2013    Procedure: DISSECT LYMPH NODE INGUINAL;  Bilateral Inguinal Lymph Node Biopsy.;  Surgeon: Tariq Acosta MD;  Location: [facility]     ESOPHAGOSCOPY, GASTROSCOPY, DUODENOSCOPY (EGD), COMBINED  7/5/2013    Procedure: COMBINED ESOPHAGOSCOPY, GASTROSCOPY, DUODENOSCOPY (EGD);  Gastroscopy;  Surgeon: Tariq Acosta MD;  Location: [facility]     HERNIORRHAPHY INGUINAL  8/6/2012    Procedure: HERNIORRHAPHY INGUINAL;  Open Repair Left Inguinal Hernia;  Surgeon: Jerad Baker MD;  Location: [facility]       Social History   Substance Use Topics     Smoking status: Former Smoker     Packs/day: 1.00     Years: 20.00     Types: Cigarettes     Quit date: 2/14/2013     Smokeless tobacco:  Never Used     Alcohol use Yes      Comment: rare     Family History   Problem Relation Age of Onset     DIABETES Mother      Hypertension Mother      CANCER Father      stomach     HEART DISEASE Father      HEART DISEASE Brother      DIABETES Sister      DIABETES Sister      DIABETES Sister      HEART DISEASE Brother      CANCER Sister      Glaucoma No family hx of      Macular Degeneration No family hx of          Current Outpatient Prescriptions   Medication Sig Dispense Refill     Benzocaine (BENZOCAINE) 20 % AERO spray 1 spray up to 4 times daily as needed 57 g 0     cephALEXin (KEFLEX) 500 MG capsule Take 1 capsule (500 mg) by mouth 2 times daily for 10 days 20 capsule 0     losartan (COZAAR) 50 MG tablet Take 1 tablet (50 mg) by mouth daily 90 tablet 3     methotrexate 2.5 MG tablet CHEMO Take 4 tablets (10 mg) by mouth once a week 60 tablet 1     folic acid (FOLVITE) 1 MG tablet Take 2 tablets (2 mg) by mouth daily 180 tablet 3     gabapentin (NEURONTIN) 400 MG capsule Take 1 capsule (400 mg) by mouth At Bedtime 90 capsule 1     Acetaminophen (TYLENOL PO) Take 650 mg by mouth once as needed for mild pain or fever       calcium-vitamin D (CALCIUM 600 + D) 600-400 MG-UNIT per tablet Take 1 tablet by mouth 2 times daily.       Cholecalciferol (VITAMIN D) 1000 UNITS capsule Take 1 capsule by mouth daily.       Blood Pressure Monitor KIT Automatic Blood Pressure Monitor 1 kit 0     fluorouracil (EFUDEX) 5 % cream Apply twice daily to scalp for 2-4 weeks. (Patient not taking: Reported on 5/24/2017) 40 g 0     Allergies   Allergen Reactions     Shrimp Nausea and Vomiting     Got sick each time he ate it     Recent Labs   Lab Test  12/13/17   1228  08/25/17   1651  06/30/17   1229   04/17/13   0901   LDL   --    --    --    --   91   HDL   --    --    --    --   72   TRIG   --    --    --    --   125   ALT  30  25  22   < >   --    CR  1.48*  1.47*  1.26*   < >   --    GFRESTIMATED  47*  47*  57*   < >   --   "  GFRESTBLACK  57*  57*  68   < >   --    POTASSIUM  5.0  4.8  4.7   < >   --     < > = values in this interval not displayed.      BP Readings from Last 3 Encounters:   02/06/18 138/82   12/13/17 130/80   06/30/17 129/77    Wt Readings from Last 3 Encounters:   02/06/18 186 lb (84.4 kg)   12/13/17 187 lb 9.6 oz (85.1 kg)   06/30/17 184 lb 12.8 oz (83.8 kg)                  Labs reviewed in EPIC    Reviewed and updated as needed this visit by clinical staff       Reviewed and updated as needed this visit by Provider         ROS:  Constitutional, HEENT, cardiovascular, pulmonary, GI, , musculoskeletal, neuro, skin, endocrine and psych systems are negative, except as otherwise noted.    OBJECTIVE:     /82 (Cuff Size: Adult Regular)  Pulse 68  Temp 97.6  F (36.4  C) (Tympanic)  Resp 18  Ht 5' 8\" (1.727 m)  Wt 186 lb (84.4 kg)  BMI 28.28 kg/m2  Body mass index is 28.28 kg/(m^2).  GENERAL: alert, no distress and over weight  EYES: Eyes grossly normal to inspection, PERRL and conjunctivae and sclerae normal  HENT: normal cephalic/atraumatic, ear canals and TM's normal, nasal mucosa edematous , oral mucous membranes moist, oropharxnx crowded, tonsillar erythema, left-sided nasal crusting, postnasal drainage with some blood  NECK: no adenopathy, no asymmetry, masses, or scars and thyroid normal to palpation  RESP: lungs clear to auscultation - no rales, rhonchi or wheezes  CV: regular rates and rhythm, normal S1 S2, no S3 or S4 and no murmur, click or rub  ABDOMEN: soft, nontender, no hepatosplenomegaly, no masses and bowel sounds normal  MS: no gross musculoskeletal defects noted, no edema  NEURO: Normal strength and tone, mentation intact and speech normal  PSYCH: mentation appears normal, affect normal/bright    Diagnostic Test Results:  Results for orders placed or performed in visit on 02/06/18 (from the past 24 hour(s))   Strep, Rapid Screen   Result Value Ref Range    Specimen Description Throat     " Rapid Strep A Screen (A)      POSITIVE: Group A Streptococcal antigen detected by immunoassay.       ASSESSMENT/PLAN:         ICD-10-CM    1. Acute streptococcal pharyngitis J02.0 cephALEXin (KEFLEX) 500 MG capsule   2. Granulomatosis with polyangiitis (H) M31.30    3. Screen for colon cancer Z12.11 GASTROENTEROLOGY ADULT REF PROCEDURE ONLY     CANCELED: Fecal colorectal cancer screen (FIT)   4. Throat pain R07.0 Strep, Rapid Screen     Benzocaine (BENZOCAINE) 20 % AERO spray       72-year-old male presents with sore throat and rhinorrhea.  Past medical history significant for [place] pneumatosis, chronic kidney disease stage III.  Physical examination remarkable for left nasal crusting, tonsillar erythema along with some postnasal drainage with some blood.  Rapid strep came back positive.  Symptoms likely secondary to strep pharyngitis.  Cephalexin prescribed considering amoxicillin drug interaction with methotrexate, common side effect discussed.  Suggested to continue well hydration, warm fluids and humidifier use.  Continue Tylenol and benzocaine spray prescribed for pain control.  Other medications reviewed and no changes made.  Colonoscopy ordered as per current guidelines for colon cancer screening.  Written information provided.  All questions answered.        Patient Instructions     Pharyngitis: Strep (Confirmed)    You have had a positive test for strep throat. Strep throat is a contagious illness. It is spread by coughing, kissing or by touching others after touching your mouth or nose. Symptoms include throat pain that is worse with swallowing, aching all over, headache, and fever. It is treated with antibiotic medicine. This should help you start to feel better in 1 to 2 days.  Home care    Rest at home. Drink plenty of fluids to you won't get dehydrated.    No work or school for the first 2 days of taking the antibiotics. After this time, you will not be contagious. You can then return to  school or work if you are feeling better.     Take antibiotic medicine for the full 10 days, even if you feel better. This is very important to ensure the infection is treated. It is also important to prevent medicine-resistant germs from developing. If you were given an antibiotic shot, you don't need any more antibiotics.    You may use acetaminophen or ibuprofen to control pain or fever, unless another medicine was prescribed for this. Talk with your doctor before taking these medicines if you have chronic liver or kidney disease. Also talk with your doctor if you have had a stomach ulcer or GI bleeding.    Throat lozenges or sprays help reduce pain. Gargling with warm saltwater will also reduce throat pain. Dissolve 1/2 teaspoon of salt in 1 glass of warm water. This may be useful just before meals.     Soft foods are OK. Avoid salty or spicy foods.  Follow-up care  Follow up with your healthcare provider or our staff if you don't get better over the next week.  When to seek medical advice  Call your healthcare provider right away if any of these occur:    Fever of 100.4 F (38 C) or higher, or as directed by your healthcare provider    New or worsening ear pain, sinus pain, or headache    Painful lumps in the back of neck    Stiff neck    Lymph nodes getting larger or becoming soft in the middle    You can't swallow liquids or you can't open your mouth wide because of throat pain    Signs of dehydration. These include very dark urine or no urine, sunken eyes, and dizziness.    Trouble breathing or noisy breathing    Muffled voice    Rash  Date Last Reviewed: 4/13/2015 2000-2017 [employer]. [address] [place] [address], [place] [address]. All rights reserved. This information is not intended as a substitute for professional medical care. Always follow your healthcare professional's instructions.        Cephalexin tablets or capsules  Brand Names: Biocef, Daxbia, Keflex  What is this  medicine?  CEPHALEXIN (sef a LAQUITA in) is a cephalosporin antibiotic. It is used to treat certain kinds of bacterial infections It will not work for colds, flu, or other viral infections.  How should I use this medicine?  Take this medicine by mouth with a full glass of water. Follow the directions on the prescription label. This medicine can be taken with or without food. Take your medicine at regular intervals. Do not take your medicine more often than directed. Take all of your medicine as directed even if you think you are better. Do not skip doses or stop your medicine early.  Talk to your pediatrician regarding the use of this medicine in children. While this drug may be prescribed for selected conditions, precautions do apply.  What side effects may I notice from receiving this medicine?  Side effects that you should report to your doctor or health care professional as soon as possible:    allergic reactions like skin rash, itching or hives, swelling of the face, lips, or tongue    breathing problems    pain or trouble passing urine    redness, blistering, peeling or loosening of the skin, including inside the mouth    severe or watery diarrhea    unusually weak or tired    yellowing of the eyes, skin  Side effects that usually do not require medical attention (report to your doctor or health care professional if they continue or are bothersome):    gas or heartburn    genital or anal irritation    headache    joint or muscle pain    nausea, vomiting  What may interact with this medicine?    probenecid    some other antibiotics  What if I miss a dose?  If you miss a dose, take it as soon as you can. If it is almost time for your next dose, take only that dose. Do not take double or extra doses. There should be at least 4 to 6 hours between doses.  Where should I keep my medicine?  Keep out of the reach of children.  Store at room temperature between 59 and 86 degrees F (15 and 30 degrees C). Throw away any  unused medicine after the expiration date.  What should I tell my health care provider before I take this medicine?  They need to know if you have any of these conditions:    kidney disease    stomach or intestine problems, especially colitis    an unusual or allergic reaction to cephalexin, other cephalosporins, penicillins, other antibiotics, medicines, foods, dyes or preservatives    pregnant or trying to get pregnant    breast-feeding  What should I watch for while using this medicine?  Tell your doctor or health care professional if your symptoms do not begin to improve in a few days.  Do not treat diarrhea with over the counter products. Contact your doctor if you have diarrhea that lasts more than 2 days or if it is severe and watery.  If you have diabetes, you may get a false-positive result for sugar in your urine. Check with your doctor or health care professional.  NOTE:This sheet is a summary. It may not cover all possible information. If you have questions about this medicine, talk to your doctor, pharmacist, or health care provider. Copyright  2017 [employer]            Patrick Mcintyre MD  [facility]

## 2018-02-07 NOTE — PATIENT INSTRUCTIONS
Pharyngitis: Strep (Confirmed)    You have had a positive test for strep throat. Strep throat is a contagious illness. It is spread by coughing, kissing or by touching others after touching your mouth or nose. Symptoms include throat pain that is worse with swallowing, aching all over, headache, and fever. It is treated with antibiotic medicine. This should help you start to feel better in 1 to 2 days.  Home care    Rest at home. Drink plenty of fluids to you won't get dehydrated.    No work or school for the first 2 days of taking the antibiotics. After this time, you will not be contagious. You can then return to school or work if you are feeling better.     Take antibiotic medicine for the full 10 days, even if you feel better. This is very important to ensure the infection is treated. It is also important to prevent medicine-resistant germs from developing. If you were given an antibiotic shot, you don't need any more antibiotics.    You may use acetaminophen or ibuprofen to control pain or fever, unless another medicine was prescribed for this. Talk with your doctor before taking these medicines if you have chronic liver or kidney disease. Also talk with your doctor if you have had a stomach ulcer or GI bleeding.    Throat lozenges or sprays help reduce pain. Gargling with warm saltwater will also reduce throat pain. Dissolve 1/2 teaspoon of salt in 1 glass of warm water. This may be useful just before meals.     Soft foods are OK. Avoid salty or spicy foods.  Follow-up care  Follow up with your healthcare provider or our staff if you don't get better over the next week.  When to seek medical advice  Call your healthcare provider right away if any of these occur:    Fever of 100.4 F (38 C) or higher, or as directed by your healthcare provider    New or worsening ear pain, sinus pain, or headache    Painful lumps in the back of neck    Stiff neck    Lymph nodes getting larger or becoming soft in the  middle    You can't swallow liquids or you can't open your mouth wide because of throat pain    Signs of dehydration. These include very dark urine or no urine, sunken eyes, and dizziness.    Trouble breathing or noisy breathing    Muffled voice    Rash  Date Last Reviewed: 4/13/2015 2000-2017 [employer]. [address] [place] [address]. All rights reserved. This information is not intended as a substitute for professional medical care. Always follow your healthcare professional's instructions.        Cephalexin tablets or capsules  Brand Names: Biocef, Daxbia, Keflex  What is this medicine?  CEPHALEXIN (sef a LAQUITA in) is a cephalosporin antibiotic. It is used to treat certain kinds of bacterial infections It will not work for colds, flu, or other viral infections.  How should I use this medicine?  Take this medicine by mouth with a full glass of water. Follow the directions on the prescription label. This medicine can be taken with or without food. Take your medicine at regular intervals. Do not take your medicine more often than directed. Take all of your medicine as directed even if you think you are better. Do not skip doses or stop your medicine early.  Talk to your pediatrician regarding the use of this medicine in children. While this drug may be prescribed for selected conditions, precautions do apply.  What side effects may I notice from receiving this medicine?  Side effects that you should report to your doctor or health care professional as soon as possible:    allergic reactions like skin rash, itching or hives, swelling of the face, lips, or tongue    breathing problems    pain or trouble passing urine    redness, blistering, peeling or loosening of the skin, including inside the mouth    severe or watery diarrhea    unusually weak or tired    yellowing of the eyes, skin  Side effects that usually do not require medical attention (report to your doctor or health care  professional if they continue or are bothersome):    gas or heartburn    genital or anal irritation    headache    joint or muscle pain    nausea, vomiting  What may interact with this medicine?    probenecid    some other antibiotics  What if I miss a dose?  If you miss a dose, take it as soon as you can. If it is almost time for your next dose, take only that dose. Do not take double or extra doses. There should be at least 4 to 6 hours between doses.  Where should I keep my medicine?  Keep out of the reach of children.  Store at room temperature between 59 and 86 degrees F (15 and 30 degrees C). Throw away any unused medicine after the expiration date.  What should I tell my health care provider before I take this medicine?  They need to know if you have any of these conditions:    kidney disease    stomach or intestine problems, especially colitis    an unusual or allergic reaction to cephalexin, other cephalosporins, penicillins, other antibiotics, medicines, foods, dyes or preservatives    pregnant or trying to get pregnant    breast-feeding  What should I watch for while using this medicine?  Tell your doctor or health care professional if your symptoms do not begin to improve in a few days.  Do not treat diarrhea with over the counter products. Contact your doctor if you have diarrhea that lasts more than 2 days or if it is severe and watery.  If you have diabetes, you may get a false-positive result for sugar in your urine. Check with your doctor or health care professional.  NOTE:This sheet is a summary. It may not cover all possible information. If you have questions about this medicine, talk to your doctor, pharmacist, or health care provider. Copyright  2017 [employer]

## 2018-03-13 DIAGNOSIS — M31.30 WEGENER'S GRANULOMATOSIS: ICD-10-CM

## 2018-03-13 LAB
ALBUMIN SERPL-MCNC: 3.7 G/DL (ref 3.4–5)
ALBUMIN UR-MCNC: NEGATIVE MG/DL
ALT SERPL W P-5'-P-CCNC: 30 U/L (ref 0–70)
APPEARANCE UR: CLEAR
AST SERPL W P-5'-P-CCNC: 26 U/L (ref 0–45)
BASOPHILS # BLD AUTO: 0.1 10E9/L (ref 0–0.2)
BASOPHILS NFR BLD AUTO: 0.9 %
BILIRUB UR QL STRIP: NEGATIVE
COLOR UR AUTO: YELLOW
CREAT SERPL-MCNC: 1.47 MG/DL (ref 0.66–1.25)
CRP SERPL-MCNC: <2.9 MG/L (ref 0–8)
DIFFERENTIAL METHOD BLD: ABNORMAL
EOSINOPHIL # BLD AUTO: 0.3 10E9/L (ref 0–0.7)
EOSINOPHIL NFR BLD AUTO: 4.8 %
ERYTHROCYTE [DISTWIDTH] IN BLOOD BY AUTOMATED COUNT: 12.5 % (ref 10–15)
ERYTHROCYTE [SEDIMENTATION RATE] IN BLOOD BY WESTERGREN METHOD: 5 MM/H (ref 0–20)
GFR SERPL CREATININE-BSD FRML MDRD: 47 ML/MIN/1.7M2
GLUCOSE UR STRIP-MCNC: NEGATIVE MG/DL
HCT VFR BLD AUTO: 41.2 % (ref 40–53)
HGB BLD-MCNC: 13.9 G/DL (ref 13.3–17.7)
HGB UR QL STRIP: NEGATIVE
KETONES UR STRIP-MCNC: NEGATIVE MG/DL
LEUKOCYTE ESTERASE UR QL STRIP: NEGATIVE
LYMPHOCYTES # BLD AUTO: 1.5 10E9/L (ref 0.8–5.3)
LYMPHOCYTES NFR BLD AUTO: 25.1 %
MCH RBC QN AUTO: 32.6 PG (ref 26.5–33)
MCHC RBC AUTO-ENTMCNC: 33.7 G/DL (ref 31.5–36.5)
MCV RBC AUTO: 97 FL (ref 78–100)
MONOCYTES # BLD AUTO: 0.7 10E9/L (ref 0–1.3)
MONOCYTES NFR BLD AUTO: 11.7 %
NEUTROPHILS # BLD AUTO: 3.3 10E9/L (ref 1.6–8.3)
NEUTROPHILS NFR BLD AUTO: 57.5 %
NITRATE UR QL: NEGATIVE
PH UR STRIP: 6 PH (ref 5–7)
PLATELET # BLD AUTO: 177 10E9/L (ref 150–450)
RBC # BLD AUTO: 4.27 10E12/L (ref 4.4–5.9)
RBC #/AREA URNS AUTO: NORMAL /HPF
SOURCE: NORMAL
SP GR UR STRIP: 1.01 (ref 1–1.03)
UROBILINOGEN UR STRIP-ACNC: 0.2 EU/DL (ref 0.2–1)
WBC # BLD AUTO: 5.8 10E9/L (ref 4–11)
WBC #/AREA URNS AUTO: NORMAL /HPF

## 2018-03-13 PROCEDURE — 84450 TRANSFERASE (AST) (SGOT): CPT | Performed by: INTERNAL MEDICINE

## 2018-03-13 PROCEDURE — 85652 RBC SED RATE AUTOMATED: CPT | Performed by: INTERNAL MEDICINE

## 2018-03-13 PROCEDURE — 83876 ASSAY MYELOPEROXIDASE: CPT | Performed by: INTERNAL MEDICINE

## 2018-03-13 PROCEDURE — 85025 COMPLETE CBC W/AUTO DIFF WBC: CPT | Performed by: INTERNAL MEDICINE

## 2018-03-13 PROCEDURE — 82040 ASSAY OF SERUM ALBUMIN: CPT | Performed by: INTERNAL MEDICINE

## 2018-03-13 PROCEDURE — 83516 IMMUNOASSAY NONANTIBODY: CPT | Performed by: INTERNAL MEDICINE

## 2018-03-13 PROCEDURE — 86255 FLUORESCENT ANTIBODY SCREEN: CPT | Mod: 90 | Performed by: INTERNAL MEDICINE

## 2018-03-13 PROCEDURE — 86140 C-REACTIVE PROTEIN: CPT | Performed by: INTERNAL MEDICINE

## 2018-03-13 PROCEDURE — 99000 SPECIMEN HANDLING OFFICE-LAB: CPT | Performed by: INTERNAL MEDICINE

## 2018-03-13 PROCEDURE — 82565 ASSAY OF CREATININE: CPT | Performed by: INTERNAL MEDICINE

## 2018-03-13 PROCEDURE — 84460 ALANINE AMINO (ALT) (SGPT): CPT | Performed by: INTERNAL MEDICINE

## 2018-03-13 PROCEDURE — 81001 URINALYSIS AUTO W/SCOPE: CPT | Performed by: INTERNAL MEDICINE

## 2018-03-13 PROCEDURE — 36415 COLL VENOUS BLD VENIPUNCTURE: CPT | Performed by: INTERNAL MEDICINE

## 2018-03-13 NOTE — LETTER
Patient:  Joshua Ennis  :   1945  MRN:     [id number]        Mr.Roger DEANNA Ennis  [address] [facility] [address]        2018    Dear ,    We are writing to inform you of your test results. They are stable/unchanged.      Resulted Orders   CBC with platelets differential   Result Value Ref Range    WBC 5.8 4.0 - 11.0 10e9/L    RBC Count 4.27 (L) 4.4 - 5.9 10e12/L    Hemoglobin 13.9 13.3 - 17.7 g/dL    Hematocrit 41.2 40.0 - 53.0 %    MCV 97 78 - 100 fl    MCH 32.6 26.5 - 33.0 pg    MCHC 33.7 31.5 - 36.5 g/dL    RDW 12.5 10.0 - 15.0 %    Platelet Count 177 150 - 450 10e9/L    Diff Method Automated Method     % Neutrophils 57.5 %    % Lymphocytes 25.1 %    % Monocytes 11.7 %    % Eosinophils 4.8 %    % Basophils 0.9 %    Absolute Neutrophil 3.3 1.6 - 8.3 10e9/L    Absolute Lymphocytes 1.5 0.8 - 5.3 10e9/L    Absolute Monocytes 0.7 0.0 - 1.3 10e9/L    Absolute Eosinophils 0.3 0.0 - 0.7 10e9/L    Absolute Basophils 0.1 0.0 - 0.2 10e9/L   AST   Result Value Ref Range    AST 26 0 - 45 U/L   ALT   Result Value Ref Range    ALT 30 0 - 70 U/L   Albumin level   Result Value Ref Range    Albumin 3.7 3.4 - 5.0 g/dL   Creatinine   Result Value Ref Range    Creatinine 1.47 (H) 0.66 - 1.25 mg/dL    GFR Estimate 47 (L) >60 mL/min/1.7m2      Comment:      Non  GFR Calc    GFR Estimate If Black 57 (L) >60 mL/min/1.7m2      Comment:       GFR Calc   CRP inflammation   Result Value Ref Range    CRP Inflammation <2.9 0.0 - 8.0 mg/L   Erythrocyte sedimentation rate auto   Result Value Ref Range    Sed Rate 5 0 - 20 mm/h   Antineutrophil cytoplasmic Anju IgG   Result Value Ref Range    Neutrophil Cytoplasmic IgG Antibody <1:20 <1:20      Comment:      (Note)  The ANCA IFA is <1:20; therefore, no further testing will   be performed.  INTERPRETIVE INFORMATION: Anti-Neutrophil Cyto Ab, IgG  Neutrophil Cytoplasmic Antibodies (C-ANCA = granular   cytoplasmic staining, P-ANCA =  perinuclear staining) are   found in the serum of over 90 percent of patients with   certain necrotizing systemic vasculitides, and usually in   less than 5 percent of patients with collagen vascular   disease or arthritis.  Performed by [facility],  [address] [place], [place] [address] [address] [phone]  [email or web address], Joe Keller MD, Lab. Director     Vasculitis panel   Result Value Ref Range    Myeloperoxidase Antibody IgG <0.2 0.0 - 0.9 AI      Comment:      Negative  Antibody index (AI) values reflect qualitative changes in antibody   concentration that cannot be directly associated with clinical condition or   disease state.      Proteinase 3 Antibody IgG 2.4 (H) 0.0 - 0.9 AI      Comment:      Positive  Antibody index (AI) values reflect qualitative changes in antibody   concentration that cannot be directly associated with clinical condition or   disease state.     UA with Microscopic reflex to Culture   Result Value Ref Range    Color Urine Yellow     Appearance Urine Clear     Glucose Urine Negative NEG^Negative mg/dL    Bilirubin Urine Negative NEG^Negative    Ketones Urine Negative NEG^Negative mg/dL    Specific Gravity Urine 1.010 1.003 - 1.035    pH Urine 6.0 5.0 - 7.0 pH    Protein Albumin Urine Negative NEG^Negative mg/dL    Urobilinogen Urine 0.2 0.2 - 1.0 EU/dL    Nitrite Urine Negative NEG^Negative    Blood Urine Negative NEG^Negative    Leukocyte Esterase Urine Negative NEG^Negative    Source Midstream Urine     WBC Urine 0 - 5 OTO5^0 - 5 /HPF    RBC Urine O - 2 OTO2^O - 2 /HPF       Ede Sanchez MD

## 2018-03-14 LAB
MYELOPEROXIDASE AB SER-ACNC: <0.2 AI (ref 0–0.9)
PROTEINASE3 IGG SER-ACNC: 2.4 AI (ref 0–0.9)

## 2018-03-16 LAB — ANCA IGG TITR SER IF: NORMAL {TITER}

## 2018-03-27 ENCOUNTER — OFFICE VISIT (OUTPATIENT)
Dept: FAMILY MEDICINE | Facility: CLINIC | Age: 73
End: 2018-03-27
Payer: COMMERCIAL

## 2018-03-27 ENCOUNTER — TELEPHONE (OUTPATIENT)
Dept: FAMILY MEDICINE | Facility: CLINIC | Age: 73
End: 2018-03-27

## 2018-03-27 VITALS
SYSTOLIC BLOOD PRESSURE: 132 MMHG | WEIGHT: 193 LBS | BODY MASS INDEX: 29.25 KG/M2 | TEMPERATURE: 98.7 F | DIASTOLIC BLOOD PRESSURE: 70 MMHG | HEIGHT: 68 IN | HEART RATE: 88 BPM | RESPIRATION RATE: 18 BRPM
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
## 2018-03-27 PROCEDURE — 90471 IMMUNIZATION ADMIN: CPT | Performed by: FAMILY MEDICINE

## 2018-03-27 PROCEDURE — 90715 TDAP VACCINE 7 YRS/> IM: CPT | Performed by: FAMILY MEDICINE

## 2018-03-27 PROCEDURE — 99213 OFFICE O/P EST LOW 20 MIN: CPT | Mod: 25 | Performed by: FAMILY MEDICINE

## 2018-03-27 NOTE — PATIENT INSTRUCTIONS
Dog Bite  A dog bite can cause a wound deep enough to break the skin. In such cases, the wound is cleaned and sometimes closed. If the wound is closed, it is usually not completely closed. This is so that fluid can drain if the wound becomes infected. Often, wounds will be left open to heal. In addition to wound care, a tetanus shot may be given, if needed.    Home care    Wash your hands well with soap and warm water before and after caring for the wound. This helps lower the risk of infection.    Care for the wound as directed. If a dressing was applied to the wound, be sure to change it as directed.    If the wound bleeds, place a clean, soft cloth on the wound. Then firmly apply pressure until the bleeding stops. This may take up to 5 minutes. Do not release the pressure and look at the wound during this time.    Most wounds heal within 10 days. But an infection can occur even with proper treatment. So be sure to check the wound daily for signs of infection (see below).    Antibiotics may be prescribed. These help prevent or treat infection. If you re given antibiotics, take them as directed. Also be sure to complete the medicines.  Rabies prevention  Rabies is a virus that can be carried in certain animals. These can include domestic animals such as dogs and cats. Pets fully vaccinated against rabies (2 shots) are at very low risk of infection. But because human rabies is almost always fatal, any biting pet should be confined for 10 days as an extra precaution. In general, if there is a risk for rabies, the following steps may need to be taken:    If someone s pet dog has bitten you, it should be kept in a secure area for the next 10 days to watch for signs of illness. (If the pet owner won t allow this, contact your local animal control center.) If the dog becomes ill or dies during that time, contact your local animal control center at once so the animal may be tested for rabies. If the dog stays healthy  for the next 10 days, there is no danger of rabies in the animal or you.    If a stray dog bit you, contact your local animal control center. They can give information on capture, quarantine, and animal rabies testing.    If you can t find the animal that bit you in the next 2 days, and if rabies exists in your area, you may need to receive the rabies vaccine series. Call your healthcare provider right away. Or, return to the emergency department promptly.    All animal bites should be reported to the local animal control center. If you were not given a form to fill out, you can report this yourself.  Follow-up care  Follow up with your healthcare provider, or as directed.  When to seek medical advice  Call your healthcare provider right away if any of these occur:    Signs of infection:    Spreading redness or warmth from the wound    Increased pain or swelling    Fever of 100.4 F (38 C) or higher, or as directed by your healthcare provider    Colored fluid or pus draining from the wound    Signs of rabies infection:    Headache    Confusion    Strange behavior    Increased salivating and drooling    Seizure    Decreased ability to move any body part near the wound    Bleeding that can't be stopped after 5 minutes of firm pressure  Date Last Reviewed: 3/1/2017    7714-0359 [employer]. [address] [place] [address]. All rights reserved. This information is not intended as a substitute for professional medical care. Always follow your healthcare professional's instructions.        Amoxicillin; Clavulanic Acid chewable tablets  Brand Name: Augmentin  What is this medicine?  AMOXICILLIN; CLAVULANIC ACID (a mox i PROMISE in; MARIALUISA cagle ic AS id) is a penicillin antibiotic. It is used to treat certain kinds of bacterial infections. It It will not work for colds, flu, or other viral infections.  How should I use this medicine?  Take this medicine by mouth. Chew it completely before swallowing.  Follow the directions on the prescription label. Take this medicine at the start of a meal or snack. Take your medicine at regular intervals. Do not take your medicine more often than directed. Take all of your medicine as directed even if you think you are better. Do not skip doses or stop your medicine early.  Talk to your pediatrician regarding the use of this medicine in children. While this drug may be prescribed for selected conditions, precautions do apply.  What side effects may I notice from receiving this medicine?  Side effects that you should report to your doctor or health care professional as soon as possible:    allergic reactions like skin rash, itching or hives, swelling of the face, lips, or tongue    breathing problems    dark urine    fever or chills, sore throat    redness, blistering, peeling or loosening of the skin, including inside the mouth    seizures    trouble passing urine or change in the amount of urine    unusual bleeding, bruising    unusually weak or tired    white patches or sores in the mouth or throat  Side effects that usually do not require medical attention (report to your doctor or health care professional if they continue or are bothersome):    diarrhea    dizziness    headache    nausea, vomiting    stomach upset    vaginal or anal irritation  What may interact with this medicine?    allopurinol    anticoagulants    birth control pills    methotrexate    probenecid  What if I miss a dose?  If you miss a dose, take it as soon as you can. If it is almost time for your next dose, take only that dose. Do not take double or extra doses.  Where should I keep my medicine?  Keep out of the reach of children.  Store at room temperature below 25 degrees C (77 degrees F). Keep container tightly closed. Throw away any unused medicine after the expiration date.  What should I tell my health care provider before I take this medicine?  They need to know if you have any of these  conditions:    bowel disease, like colitis    kidney disease    liver disease    mononucleosis    phenylketonuria    an unusual or allergic reaction to amoxicillin, penicillin, cephalosporin, other antibiotics, clavulanic acid, other medicines, foods, dyes, or preservatives    pregnant or trying to get pregnant    breast-feeding  What should I watch for while using this medicine?  Tell your doctor or health care professional if your symptoms do not improve.  Do not treat diarrhea with over the counter products. Contact your doctor if you have diarrhea that lasts more than 2 days or if it is severe and watery.  If you have diabetes, you may get a false-positive result for sugar in your urine. Check with your doctor or health care professional.  Birth control pills may not work properly while you are taking this medicine. Talk to your doctor about using an extra method of birth control.  NOTE:This sheet is a summary. It may not cover all possible information. If you have questions about this medicine, talk to your doctor, pharmacist, or health care provider. Copyright  2017 [employer]

## 2018-03-27 NOTE — PROGRESS NOTES
SUBJECTIVE:   Joshua Ennis is a 72 year old male who presents to clinic today for the following health issues:      Dog Bite       Duration: Today    Description (location/character/radiation): Left hand- thumb     Intensity:  mild    Accompanying signs and symptoms: 2 little puncture wounds    History (similar episodes/previous evaluation): Dog is up to date on shots. Dog is just old and weak and crabby     Precipitating or alleviating factors: CKD    Therapies tried and outcome: rinsing out with peroxide, band-aid          Problem list and histories reviewed & adjusted, as indicated.    Additional history: as documented    Patient Active Problem List   Diagnosis     Tobacco abuse     CARDIOVASCULAR SCREENING; LDL GOAL LESS THAN 160     Melanoma (H)     Idiopathic progressive polyneuropathy     Advanced directives, counseling/discussion     GERD (gastroesophageal reflux disease)     Hypertension, renal     Anemia     Encounter for long-term (current) use of steroids     Granulomatosis with polyangiitis (H)     CKD (chronic kidney disease) stage 3, GFR 30-59 ml/min     Past Surgical History:   Procedure Laterality Date     BIOPSY  11/2011    melanoma on back     DISSECT LYMPH NODE INGUINAL  3/1/2013    Procedure: DISSECT LYMPH NODE INGUINAL;  Bilateral Inguinal Lymph Node Biopsy.;  Surgeon: Tariq Acosta MD;  Location: [facility]     ESOPHAGOSCOPY, GASTROSCOPY, DUODENOSCOPY (EGD), COMBINED  7/5/2013    Procedure: COMBINED ESOPHAGOSCOPY, GASTROSCOPY, DUODENOSCOPY (EGD);  Gastroscopy;  Surgeon: Tariq Acosta MD;  Location: [facility]     HERNIORRHAPHY INGUINAL  8/6/2012    Procedure: HERNIORRHAPHY INGUINAL;  Open Repair Left Inguinal Hernia;  Surgeon: Jerad Baker MD;  Location: [facility]       Social History   Substance Use Topics     Smoking status: Former Smoker     Packs/day: 1.00     Years: 20.00     Types: Cigarettes     Quit date: 2/14/2013     Smokeless tobacco: Never Used     Alcohol use Yes       Comment: rare     Family History   Problem Relation Age of Onset     DIABETES Mother      Hypertension Mother      CANCER Father      stomach     HEART DISEASE Father      HEART DISEASE Brother      DIABETES Sister      DIABETES Sister      DIABETES Sister      HEART DISEASE Brother      CANCER Sister      Glaucoma No family hx of      Macular Degeneration No family hx of          Current Outpatient Prescriptions   Medication Sig Dispense Refill     amoxicillin-clavulanate (AUGMENTIN) 875-125 MG per tablet Take 1 tablet by mouth 2 times daily 20 tablet 0     Benzocaine (BENZOCAINE) 20 % AERO spray 1 spray up to 4 times daily as needed 57 g 0     losartan (COZAAR) 50 MG tablet Take 1 tablet (50 mg) by mouth daily 90 tablet 3     methotrexate 2.5 MG tablet CHEMO Take 4 tablets (10 mg) by mouth once a week 60 tablet 1     folic acid (FOLVITE) 1 MG tablet Take 2 tablets (2 mg) by mouth daily 180 tablet 3     gabapentin (NEURONTIN) 400 MG capsule Take 1 capsule (400 mg) by mouth At Bedtime 90 capsule 1     Acetaminophen (TYLENOL PO) Take 650 mg by mouth once as needed for mild pain or fever       calcium-vitamin D (CALCIUM 600 + D) 600-400 MG-UNIT per tablet Take 1 tablet by mouth 2 times daily.       Cholecalciferol (VITAMIN D) 1000 UNITS capsule Take 1 capsule by mouth daily.       Blood Pressure Monitor KIT Automatic Blood Pressure Monitor 1 kit 0     fluorouracil (EFUDEX) 5 % cream Apply twice daily to scalp for 2-4 weeks. (Patient not taking: Reported on 5/24/2017) 40 g 0     Allergies   Allergen Reactions     Shrimp Nausea and Vomiting     Got sick each time he ate it     Recent Labs   Lab Test  03/13/18   1630  12/13/17   1228  08/25/17   1651   04/17/13   0901   LDL   --    --    --    --   91   HDL   --    --    --    --   72   TRIG   --    --    --    --   125   ALT  30  30  25   < >   --    CR  1.47*  1.48*  1.47*   < >   --    GFRESTIMATED  47*  47*  47*   < >   --    GFRESTBLACK  57*  57*  57*   < >   --   "  POTASSIUM   --   5.0  4.8   < >   --     < > = values in this interval not displayed.      BP Readings from Last 3 Encounters:   03/27/18 132/70   02/06/18 138/82   12/13/17 130/80    Wt Readings from Last 3 Encounters:   03/27/18 193 lb (87.5 kg)   02/06/18 186 lb (84.4 kg)   12/13/17 187 lb 9.6 oz (85.1 kg)                  Labs reviewed in EPIC    Reviewed and updated as needed this visit by clinical staff  Tobacco  Allergies  Meds       Reviewed and updated as needed this visit by Provider         ROS:  Constitutional, HEENT, cardiovascular, pulmonary, gi and gu systems are negative, except as otherwise noted.    OBJECTIVE:     /70 (Cuff Size: Adult Regular)  Pulse 88  Temp 98.7  F (37.1  C) (Tympanic)  Resp 18  Ht 5' 8\" (1.727 m)  Wt 193 lb (87.5 kg)  BMI 29.35 kg/m2  Body mass index is 29.35 kg/(m^2).  GENERAL: healthy, alert and no distress  EYES: Eyes grossly normal to inspection, PERRL and conjunctivae and sclerae normal  HENT: ear canals and TM's normal, nose and mouth without ulcers or lesions  NECK: no adenopathy, no asymmetry, masses, or scars and thyroid normal to palpation  RESP: lungs clear to auscultation - no rales, rhonchi or wheezes  CV: regular rates and rhythm, normal S1 S2, no S3 or S4 and no murmur, click or rub  SKIN:  2 X small bite marks with some oozing involving left plantar thumb skin      ASSESSMENT/PLAN:         ICD-10-CM    1. Dog bite, initial encounter W54.0XXA TDAP (ADACEL)     amoxicillin-clavulanate (AUGMENTIN) 875-125 MG per tablet     Prophylactic antibiotic indicated, Augmentin prescribed, common side effect discussed.  Wound dressed with normal saline, bacitracin applied and dressed with gauze and Kerlix.  Suggested to continue apply bacitracin regularly.  Risk of infection explained.  TDAP vaccination administered.  Written information provided.  All questions answered.      Patient Instructions     Dog Bite  A dog bite can cause a wound deep enough to " break the skin. In such cases, the wound is cleaned and sometimes closed. If the wound is closed, it is usually not completely closed. This is so that fluid can drain if the wound becomes infected. Often, wounds will be left open to heal. In addition to wound care, a tetanus shot may be given, if needed.    Home care    Wash your hands well with soap and warm water before and after caring for the wound. This helps lower the risk of infection.    Care for the wound as directed. If a dressing was applied to the wound, be sure to change it as directed.    If the wound bleeds, place a clean, soft cloth on the wound. Then firmly apply pressure until the bleeding stops. This may take up to 5 minutes. Do not release the pressure and look at the wound during this time.    Most wounds heal within 10 days. But an infection can occur even with proper treatment. So be sure to check the wound daily for signs of infection (see below).    Antibiotics may be prescribed. These help prevent or treat infection. If you re given antibiotics, take them as directed. Also be sure to complete the medicines.  Rabies prevention  Rabies is a virus that can be carried in certain animals. These can include domestic animals such as dogs and cats. Pets fully vaccinated against rabies (2 shots) are at very low risk of infection. But because human rabies is almost always fatal, any biting pet should be confined for 10 days as an extra precaution. In general, if there is a risk for rabies, the following steps may need to be taken:    If someone s pet dog has bitten you, it should be kept in a secure area for the next 10 days to watch for signs of illness. (If the pet owner won t allow this, contact your local animal control center.) If the dog becomes ill or dies during that time, contact your local animal control center at once so the animal may be tested for rabies. If the dog stays healthy for the next 10 days, there is no danger of rabies in the  animal or you.    If a stray dog bit you, contact your local animal control center. They can give information on capture, quarantine, and animal rabies testing.    If you can t find the animal that bit you in the next 2 days, and if rabies exists in your area, you may need to receive the rabies vaccine series. Call your healthcare provider right away. Or, return to the emergency department promptly.    All animal bites should be reported to the local animal control center. If you were not given a form to fill out, you can report this yourself.  Follow-up care  Follow up with your healthcare provider, or as directed.  When to seek medical advice  Call your healthcare provider right away if any of these occur:    Signs of infection:    Spreading redness or warmth from the wound    Increased pain or swelling    Fever of 100.4 F (38 C) or higher, or as directed by your healthcare provider    Colored fluid or pus draining from the wound    Signs of rabies infection:    Headache    Confusion    Strange behavior    Increased salivating and drooling    Seizure    Decreased ability to move any body part near the wound    Bleeding that can't be stopped after 5 minutes of firm pressure  Date Last Reviewed: 3/1/2017    7033-2841 [employer]. [address] [place] [address]. All rights reserved. This information is not intended as a substitute for professional medical care. Always follow your healthcare professional's instructions.        Amoxicillin; Clavulanic Acid chewable tablets  Brand Name: Augmentin  What is this medicine?  AMOXICILLIN; CLAVULANIC ACID (a mox i PROMISE in; MARIALUISA cagle ic AS id) is a penicillin antibiotic. It is used to treat certain kinds of bacterial infections. It It will not work for colds, flu, or other viral infections.  How should I use this medicine?  Take this medicine by mouth. Chew it completely before swallowing. Follow the directions on the prescription label. Take this  medicine at the start of a meal or snack. Take your medicine at regular intervals. Do not take your medicine more often than directed. Take all of your medicine as directed even if you think you are better. Do not skip doses or stop your medicine early.  Talk to your pediatrician regarding the use of this medicine in children. While this drug may be prescribed for selected conditions, precautions do apply.  What side effects may I notice from receiving this medicine?  Side effects that you should report to your doctor or health care professional as soon as possible:    allergic reactions like skin rash, itching or hives, swelling of the face, lips, or tongue    breathing problems    dark urine    fever or chills, sore throat    redness, blistering, peeling or loosening of the skin, including inside the mouth    seizures    trouble passing urine or change in the amount of urine    unusual bleeding, bruising    unusually weak or tired    white patches or sores in the mouth or throat  Side effects that usually do not require medical attention (report to your doctor or health care professional if they continue or are bothersome):    diarrhea    dizziness    headache    nausea, vomiting    stomach upset    vaginal or anal irritation  What may interact with this medicine?    allopurinol    anticoagulants    birth control pills    methotrexate    probenecid  What if I miss a dose?  If you miss a dose, take it as soon as you can. If it is almost time for your next dose, take only that dose. Do not take double or extra doses.  Where should I keep my medicine?  Keep out of the reach of children.  Store at room temperature below 25 degrees C (77 degrees F). Keep container tightly closed. Throw away any unused medicine after the expiration date.  What should I tell my health care provider before I take this medicine?  They need to know if you have any of these conditions:    bowel disease, like colitis    kidney  disease    liver disease    mononucleosis    phenylketonuria    an unusual or allergic reaction to amoxicillin, penicillin, cephalosporin, other antibiotics, clavulanic acid, other medicines, foods, dyes, or preservatives    pregnant or trying to get pregnant    breast-feeding  What should I watch for while using this medicine?  Tell your doctor or health care professional if your symptoms do not improve.  Do not treat diarrhea with over the counter products. Contact your doctor if you have diarrhea that lasts more than 2 days or if it is severe and watery.  If you have diabetes, you may get a false-positive result for sugar in your urine. Check with your doctor or health care professional.  Birth control pills may not work properly while you are taking this medicine. Talk to your doctor about using an extra method of birth control.  NOTE:This sheet is a summary. It may not cover all possible information. If you have questions about this medicine, talk to your doctor, pharmacist, or health care provider. Copyright  2017 [employer]            Patrick Mcintyre MD  [facility]

## 2018-03-27 NOTE — NURSING NOTE
"Chief Complaint   Patient presents with     Dog Bite       Initial /70 (Cuff Size: Adult Regular)  Pulse 88  Temp 98.7  F (37.1  C) (Tympanic)  Resp 18  Ht 5' 8\" (1.727 m)  Wt 193 lb (87.5 kg)  BMI 29.35 kg/m2 Estimated body mass index is 29.35 kg/(m^2) as calculated from the following:    Height as of this encounter: 5' 8\" (1.727 m).    Weight as of this encounter: 193 lb (87.5 kg).      Health Maintenance that is potentially due pending provider review:  NONE    Gave pt phone number/pended order to schedule mammo and/or colonoscopy(or FIT)    Is there anyone who you would like to be able to receive your results? Not Applicable  If yes have patient fill out SABAS    "

## 2018-03-27 NOTE — NURSING NOTE
Screening Questionnaire for Adult Immunization    Are you sick today?   No   Do you have allergies to medications, food, a vaccine component or latex?   No   Have you ever had a serious reaction after receiving a vaccination?   No   Do you have a long-term health problem with heart disease, lung disease, asthma, kidney disease, metabolic disease (e.g. diabetes), anemia, or other blood disorder?   No   Do you have cancer, leukemia, HIV/AIDS, or any other immune system problem?   No   In the past 3 months, have you taken medications that affect  your immune system, such as prednisone, other steroids, or anticancer drugs; drugs for the treatment of rheumatoid arthritis, Crohn s disease, or psoriasis; or have you had radiation treatments?   No   Have you had a seizure, or a brain or other nervous system problem?   No   During the past year, have you received a transfusion of blood or blood     products, or been given immune (gamma) globulin or antiviral drug?   No   For women: Are you pregnant or is there a chance you could become        pregnant during the next month?   No   Have you received any vaccinations in the past 4 weeks?   No     Immunization questionnaire answers were all negative.        Per orders of Dr. Mcintyre, injection of Adacel was given by Nury Means. Patient instructed to remain in clinic for 15 minutes afterwards, and to report any adverse reaction to me immediately.       Screening performed by Nury Means on 3/27/2018 at 6:08 PM.

## 2018-03-27 NOTE — MR AVS SNAPSHOT
After Visit Summary   3/27/2018    Joshua Ennis    MRN: [id number]           Patient Information     Date Of Birth          1945        Visit Information        Provider Department      3/27/2018 5:20 PM Patrick Mcintyre MD [facility]        Today's Diagnoses     Dog bite, initial encounter    -  1      Care Instructions      Dog Bite  A dog bite can cause a wound deep enough to break the skin. In such cases, the wound is cleaned and sometimes closed. If the wound is closed, it is usually not completely closed. This is so that fluid can drain if the wound becomes infected. Often, wounds will be left open to heal. In addition to wound care, a tetanus shot may be given, if needed.    Home care    Wash your hands well with soap and warm water before and after caring for the wound. This helps lower the risk of infection.    Care for the wound as directed. If a dressing was applied to the wound, be sure to change it as directed.    If the wound bleeds, place a clean, soft cloth on the wound. Then firmly apply pressure until the bleeding stops. This may take up to 5 minutes. Do not release the pressure and look at the wound during this time.    Most wounds heal within 10 days. But an infection can occur even with proper treatment. So be sure to check the wound daily for signs of infection (see below).    Antibiotics may be prescribed. These help prevent or treat infection. If you re given antibiotics, take them as directed. Also be sure to complete the medicines.  Rabies prevention  Rabies is a virus that can be carried in certain animals. These can include domestic animals such as dogs and cats. Pets fully vaccinated against rabies (2 shots) are at very low risk of infection. But because human rabies is almost always fatal, any biting pet should be confined for 10 days as an extra precaution. In general, if there is a risk for rabies, the following steps may need to be taken:    If  someone s pet dog has bitten you, it should be kept in a secure area for the next 10 days to watch for signs of illness. (If the pet owner won t allow this, contact your local animal control center.) If the dog becomes ill or dies during that time, contact your local animal control center at once so the animal may be tested for rabies. If the dog stays healthy for the next 10 days, there is no danger of rabies in the animal or you.    If a stray dog bit you, contact your local animal control center. They can give information on capture, quarantine, and animal rabies testing.    If you can t find the animal that bit you in the next 2 days, and if rabies exists in your area, you may need to receive the rabies vaccine series. Call your healthcare provider right away. Or, return to the emergency department promptly.    All animal bites should be reported to the local animal control center. If you were not given a form to fill out, you can report this yourself.  Follow-up care  Follow up with your healthcare provider, or as directed.  When to seek medical advice  Call your healthcare provider right away if any of these occur:    Signs of infection:    Spreading redness or warmth from the wound    Increased pain or swelling    Fever of 100.4 F (38 C) or higher, or as directed by your healthcare provider    Colored fluid or pus draining from the wound    Signs of rabies infection:    Headache    Confusion    Strange behavior    Increased salivating and drooling    Seizure    Decreased ability to move any body part near the wound    Bleeding that can't be stopped after 5 minutes of firm pressure  Date Last Reviewed: 3/1/2017    2089-4272 [employer]. [address] [place], [place] [id number]. All rights reserved. This information is not intended as a substitute for professional medical care. Always follow your healthcare professional's instructions.        Amoxicillin; Clavulanic Acid chewable tablets  Brand  Name: Augmentin  What is this medicine?  AMOXICILLIN; CLAVULANIC ACID (a mox i PROMISE in; MARIALUISA harry tsering ic AS id) is a penicillin antibiotic. It is used to treat certain kinds of bacterial infections. It It will not work for colds, flu, or other viral infections.  How should I use this medicine?  Take this medicine by mouth. Chew it completely before swallowing. Follow the directions on the prescription label. Take this medicine at the start of a meal or snack. Take your medicine at regular intervals. Do not take your medicine more often than directed. Take all of your medicine as directed even if you think you are better. Do not skip doses or stop your medicine early.  Talk to your pediatrician regarding the use of this medicine in children. While this drug may be prescribed for selected conditions, precautions do apply.  What side effects may I notice from receiving this medicine?  Side effects that you should report to your doctor or health care professional as soon as possible:    allergic reactions like skin rash, itching or hives, swelling of the face, lips, or tongue    breathing problems    dark urine    fever or chills, sore throat    redness, blistering, peeling or loosening of the skin, including inside the mouth    seizures    trouble passing urine or change in the amount of urine    unusual bleeding, bruising    unusually weak or tired    white patches or sores in the mouth or throat  Side effects that usually do not require medical attention (report to your doctor or health care professional if they continue or are bothersome):    diarrhea    dizziness    headache    nausea, vomiting    stomach upset    vaginal or anal irritation  What may interact with this medicine?    allopurinol    anticoagulants    birth control pills    methotrexate    probenecid  What if I miss a dose?  If you miss a dose, take it as soon as you can. If it is almost time for your next dose, take only that dose. Do not take double or  extra doses.  Where should I keep my medicine?  Keep out of the reach of children.  Store at room temperature below 25 degrees C (77 degrees F). Keep container tightly closed. Throw away any unused medicine after the expiration date.  What should I tell my health care provider before I take this medicine?  They need to know if you have any of these conditions:    bowel disease, like colitis    kidney disease    liver disease    mononucleosis    phenylketonuria    an unusual or allergic reaction to amoxicillin, penicillin, cephalosporin, other antibiotics, clavulanic acid, other medicines, foods, dyes, or preservatives    pregnant or trying to get pregnant    breast-feeding  What should I watch for while using this medicine?  Tell your doctor or health care professional if your symptoms do not improve.  Do not treat diarrhea with over the counter products. Contact your doctor if you have diarrhea that lasts more than 2 days or if it is severe and watery.  If you have diabetes, you may get a false-positive result for sugar in your urine. Check with your doctor or health care professional.  Birth control pills may not work properly while you are taking this medicine. Talk to your doctor about using an extra method of birth control.  NOTE:This sheet is a summary. It may not cover all possible information. If you have questions about this medicine, talk to your doctor, pharmacist, or health care provider. Copyright  2017 [employer]                Follow-ups after your visit        Your next 10 appointments already scheduled     Apr 13, 2018   Procedure with Costa Brumfield MD   [facility] ([facility])    [address] [place] [id number]   [phone]           The medical center is located at [address] [place] [address]. (between [address] in [place], [place] miles north of [place]).            Jun 13, 2018  9:00 AM CDT   (Arrive by 8:45 AM)   Return Visit with MD KILLIAN Jim  [facility] ([facility])    [address]  4th Floor  [facility] [id number] [phone]            2018 10:00 AM CDT   (Arrive by 9:45 AM)   Return Visit with Ede Sanchez MD   [facility] ([facility])    [address]  [address]  [facility] [id number] [phone]              Who to contact     If you have questions or need follow up information about today's clinic visit or your schedule please contact [facility] directly at [phone].  Normal or non-critical lab and imaging results will be communicated to you by MyChart, letter or phone within 4 business days after the clinic has received the results. If you do not hear from us within 7 days, please contact the clinic through MyChart or phone. If you have a critical or abnormal lab result, we will notify you by phone as soon as possible.  Submit refill requests through [employer] or call your pharmacy and they will forward the refill request to us. Please allow 3 business days for your refill to be completed.          Additional Information About Your Visit        [employer] Information     [employer] lets you send messages to your doctor, view your test results, renew your prescriptions, schedule appointments and more. To sign up, go to [email or web address] . Click on \"Log in\" on the left side of the screen, which will take you to the Welcome page. Then click on \"Sign up Now\" on the right side of the page.     You will be asked to enter the access code listed below, as well as some personal information. Please follow the directions to create your username and password.     Your access code is: [email or web address]  Expires: 2018  7:15 PM     Your access code will  in 90 days. If you need help or a new code, please call your [facility] or [phone].        Care EveryWhere ID     This is your Care EveryWhere ID. This could be " "used by other organizations to access your [facility] medical records  [id number]        Your Vitals Were     Pulse Temperature Respirations Height BMI (Body Mass Index)       88 98.7  F (37.1  C) (Tympanic) 18 5' 8\" (1.727 m) 29.35 kg/m2        Blood Pressure from Last 3 Encounters:   03/27/18 132/70   02/06/18 138/82   12/13/17 130/80    Weight from Last 3 Encounters:   03/27/18 193 lb (87.5 kg)   02/06/18 186 lb (84.4 kg)   12/13/17 187 lb 9.6 oz (85.1 kg)              We Performed the Following     TDAP (ADACEL)          Today's Medication Changes          These changes are accurate as of 3/27/18  5:42 PM.  If you have any questions, ask your nurse or doctor.               Start taking these medicines.        Dose/Directions    amoxicillin-clavulanate 875-125 MG per tablet   Commonly known as:  AUGMENTIN   Used for:  Dog bite, initial encounter   Started by:  Patrick Mcintyre MD        Dose:  1 tablet   Take 1 tablet by mouth 2 times daily   Quantity:  20 tablet   Refills:  0            Where to get your medicines      These medications were sent to [facility] [address] [place] [address]     Phone:  [phone]     amoxicillin-clavulanate 875-125 MG per tablet                Primary Care Provider Fax #    Physician No Ref-Primary [phone]       No address on file        Equal Access to Services     GURPREET ARREDONDO AH: Ana bai Sofransico, waaxda luqadaha, qaybta kaalmada anastasia, wax magdi alvarado. So [facility] [phone].    ATENCIÓN: Si habla español, tiene a enamorado disposición servicios gratuitos de asistencia lingüística. Llame al [phone].    We comply with applicable federal civil rights laws and [place] laws. We do not discriminate on the basis of race, color, national origin, age, disability, sex, sexual orientation, or gender identity.            Thank you!     Thank you for choosing [facility]" for your care. Our goal is always to provide you with excellent care. Hearing back from our patients is one way we can continue to improve our services. Please take a few minutes to complete the written survey that you may receive in the mail after your visit with us. Thank you!             Your Updated Medication List - Protect others around you: Learn how to safely use, store and throw away your medicines at www.disposemymeds.org.          This list is accurate as of 3/27/18  5:42 PM.  Always use your most recent med list.                   Brand Name Dispense Instructions for use Diagnosis    amoxicillin-clavulanate 875-125 MG per tablet    AUGMENTIN    20 tablet    Take 1 tablet by mouth 2 times daily    Dog bite, initial encounter       Benzocaine 20 % Aero spray    HURRICAINE/TOPEX    57 g    1 spray up to 4 times daily as needed    Throat pain       Blood Pressure Monitor Kit     1 kit    Automatic Blood Pressure Monitor    Hypertension, renal       calcium 600 + D 600-400 MG-UNIT per tablet   Generic drug:  calcium-vitamin D      Take 1 tablet by mouth 2 times daily.        fluorouracil 5 % cream    EFUDEX    40 g    Apply twice daily to scalp for 2-4 weeks.    AK (actinic keratosis)       folic acid 1 MG tablet    FOLVITE    180 tablet    Take 2 tablets (2 mg) by mouth daily    Granulomatosis with polyangiitis (H)       gabapentin 400 MG capsule    NEURONTIN    90 capsule    Take 1 capsule (400 mg) by mouth At Bedtime    Neuropathy       losartan 50 MG tablet    COZAAR    90 tablet    Take 1 tablet (50 mg) by mouth daily    Hypertension, renal, stage 1-4 or unspecified chronic kidney disease       methotrexate 2.5 MG tablet CHEMO     60 tablet    Take 4 tablets (10 mg) by mouth once a week    High risk medications (not anticoagulants) long-term use       TYLENOL PO      Take 650 mg by mouth once as needed for mild pain or fever        vitamin D 1000 UNITS capsule      Take 1 capsule by mouth daily.

## 2018-03-27 NOTE — TELEPHONE ENCOUNTER
PC from [place], , who reports dog bite to thumb approx 15 min ago. Dog belongs to friend, up to date on vaccinations, 17 yrs old and becoming agressive. Friend having dog put down due to age and behavior.  F[place]i denies any known illness, infectious process with dog.  Has washed out puncture wounds with H2O2, bleeding has stopped.   ? Last tetanus.  RN discussed with Dr. Mcintyre who will see pt today, appt scheduled 5:20 PM today.   MELISA Suazo RN

## 2018-04-04 DIAGNOSIS — M31.30 GRANULOMATOSIS WITH POLYANGIITIS (H): ICD-10-CM

## 2018-04-04 DIAGNOSIS — G62.9 NEUROPATHY: ICD-10-CM

## 2018-04-04 RX ORDER — FOLIC ACID 1 MG/1
2 TABLET ORAL DAILY
Qty: 180 TABLET | Refills: 3 | Status: SHIPPED | OUTPATIENT
Start: 2018-04-04 | End: 2019-03-30

## 2018-04-05 RX ORDER — GABAPENTIN 400 MG/1
400 CAPSULE ORAL AT BEDTIME
Qty: 90 CAPSULE | Refills: 1 | Status: SHIPPED | OUTPATIENT
Start: 2018-04-05 | End: 2018-10-03

## 2018-04-11 ENCOUNTER — ANESTHESIA EVENT (OUTPATIENT)
Dept: GASTROENTEROLOGY | Facility: CLINIC | Age: 73
End: 2018-04-11
Payer: COMMERCIAL

## 2018-04-11 ASSESSMENT — LIFESTYLE VARIABLES: TOBACCO_USE: 1

## 2018-04-11 NOTE — ANESTHESIA PREPROCEDURE EVALUATION
Anesthesia Evaluation     . Pt has had prior anesthetic.     No history of anesthetic complications          ROS/MED HX    ENT/Pulmonary:     (+)tobacco use, Past use , . .    Neurologic: Comment: Idiopathic progressive polyneuropathy      Cardiovascular:     (+) hypertension----. : . . . :. . Previous cardiac testing date:results:date: results:ECG reviewed date:4/3/15 results:Sinus Rhythm date: results:          METS/Exercise Tolerance:  >4 METS   Hematologic:     (+) Anemia, -      Musculoskeletal:   (+) arthritis, , , -       GI/Hepatic:     (+) GERD       Renal/Genitourinary:     (+) chronic renal disease, type: CRI,       Endo:  - neg endo ROS       Psychiatric:  - neg psychiatric ROS       Infectious Disease:  - neg infectious disease ROS       Malignancy:   (+) Malignancy History of Skin          Other: Comment: Russo syndrome                    Physical Exam  Normal systems: cardiovascular and pulmonary    Airway   Mallampati: II  TM distance: >3 FB  Neck ROM: full    Dental   (+) upper dentures    Cardiovascular       Pulmonary                     Anesthesia Plan      History & Physical Review  History and physical reviewed and following examination; no interval change.    ASA Status:  2 .    NPO Status:  > 8 hours    Plan for MAC and General with Propofol and Intravenous induction. Reason for MAC:  Difficulty with conscious sedation (QS)  PONV prophylaxis:  Ondansetron (or other 5HT-3) and Dexamethasone or Solumedrol       Postoperative Care  Postoperative pain management:  IV analgesics and Oral pain medications.      Consents  Anesthetic plan, risks, benefits and alternatives discussed with:  Patient and Spouse..                          .

## 2018-04-13 ENCOUNTER — SURGERY (OUTPATIENT)
Age: 73
End: 2018-04-13

## 2018-04-13 ENCOUNTER — HOSPITAL ENCOUNTER (OUTPATIENT)
Facility: CLINIC | Age: 73
Discharge: HOME | End: 2018-04-13
Attending: SURGERY | Admitting: SURGERY
Payer: COMMERCIAL

## 2018-04-13 ENCOUNTER — ANESTHESIA (OUTPATIENT)
Dept: GASTROENTEROLOGY | Facility: CLINIC | Age: 73
End: 2018-04-13
Payer: COMMERCIAL

## 2018-04-13 VITALS
HEIGHT: 68 IN | SYSTOLIC BLOOD PRESSURE: 131 MMHG | WEIGHT: 193 LBS | BODY MASS INDEX: 29.25 KG/M2 | OXYGEN SATURATION: 99 % | HEART RATE: 60 BPM | TEMPERATURE: 98.1 F | RESPIRATION RATE: 16 BRPM | DIASTOLIC BLOOD PRESSURE: 86 MMHG

## 2018-04-13 LAB — COLONOSCOPY: NORMAL

## 2018-04-13 PROCEDURE — 88305 TISSUE EXAM BY PATHOLOGIST: CPT | Performed by: SURGERY

## 2018-04-13 PROCEDURE — 45385 COLONOSCOPY W/LESION REMOVAL: CPT | Mod: PT | Performed by: SURGERY

## 2018-04-13 PROCEDURE — 25000125 ZZHC RX 250: Performed by: NURSE ANESTHETIST, CERTIFIED REGISTERED

## 2018-04-13 PROCEDURE — 37000008 ZZH ANESTHESIA TECHNICAL FEE, 1ST 30 MIN: Performed by: SURGERY

## 2018-04-13 PROCEDURE — 37000009 ZZH ANESTHESIA TECHNICAL FEE, EACH ADDTL 15 MIN: Performed by: SURGERY

## 2018-04-13 PROCEDURE — 25000125 ZZHC RX 250: Performed by: SURGERY

## 2018-04-13 PROCEDURE — 25000128 H RX IP 250 OP 636: Performed by: SURGERY

## 2018-04-13 PROCEDURE — 25000128 H RX IP 250 OP 636: Performed by: NURSE ANESTHETIST, CERTIFIED REGISTERED

## 2018-04-13 PROCEDURE — 88305 TISSUE EXAM BY PATHOLOGIST: CPT | Mod: 26 | Performed by: SURGERY

## 2018-04-13 RX ORDER — SODIUM CHLORIDE, SODIUM LACTATE, POTASSIUM CHLORIDE, CALCIUM CHLORIDE 600; 310; 30; 20 MG/100ML; MG/100ML; MG/100ML; MG/100ML
INJECTION, SOLUTION INTRAVENOUS CONTINUOUS
Status: DISCONTINUED | OUTPATIENT
Start: 2018-04-13 | End: 2018-04-13 | Stop reason: HOSPADM

## 2018-04-13 RX ORDER — PROPOFOL 10 MG/ML
INJECTION, EMULSION INTRAVENOUS PRN
Status: DISCONTINUED | OUTPATIENT
Start: 2018-04-13 | End: 2018-04-13

## 2018-04-13 RX ORDER — LIDOCAINE 40 MG/G
CREAM TOPICAL
Status: DISCONTINUED | OUTPATIENT
Start: 2018-04-13 | End: 2018-04-13 | Stop reason: HOSPADM

## 2018-04-13 RX ORDER — LIDOCAINE HYDROCHLORIDE 10 MG/ML
INJECTION, SOLUTION INFILTRATION; PERINEURAL PRN
Status: DISCONTINUED | OUTPATIENT
Start: 2018-04-13 | End: 2018-04-13

## 2018-04-13 RX ORDER — PROPOFOL 10 MG/ML
INJECTION, EMULSION INTRAVENOUS CONTINUOUS PRN
Status: DISCONTINUED | OUTPATIENT
Start: 2018-04-13 | End: 2018-04-13

## 2018-04-13 RX ORDER — ONDANSETRON 2 MG/ML
4 INJECTION INTRAMUSCULAR; INTRAVENOUS
Status: DISCONTINUED | OUTPATIENT
Start: 2018-04-13 | End: 2018-04-13 | Stop reason: HOSPADM

## 2018-04-13 RX ADMIN — LIDOCAINE HYDROCHLORIDE 50 MG: 10 INJECTION, SOLUTION INFILTRATION; PERINEURAL at 07:34

## 2018-04-13 RX ADMIN — LIDOCAINE HYDROCHLORIDE 0.2 ML: 10 INJECTION, SOLUTION EPIDURAL; INFILTRATION; INTRACAUDAL; PERINEURAL at 06:50

## 2018-04-13 RX ADMIN — SODIUM CHLORIDE, POTASSIUM CHLORIDE, SODIUM LACTATE AND CALCIUM CHLORIDE: 600; 310; 30; 20 INJECTION, SOLUTION INTRAVENOUS at 06:50

## 2018-04-13 RX ADMIN — PROPOFOL 150 MCG/KG/MIN: 10 INJECTION, EMULSION INTRAVENOUS at 07:34

## 2018-04-13 RX ADMIN — PROPOFOL 60 MG: 10 INJECTION, EMULSION INTRAVENOUS at 07:34

## 2018-04-13 NOTE — H&P
"72 year old year old male here for colonoscopy for screening.    Patient Active Problem List   Diagnosis     Tobacco abuse     CARDIOVASCULAR SCREENING; LDL GOAL LESS THAN 160     Melanoma (H)     Idiopathic progressive polyneuropathy     Advanced directives, counseling/discussion     GERD (gastroesophageal reflux disease)     Hypertension, renal     Anemia     Encounter for long-term (current) use of steroids     Granulomatosis with polyangiitis (H)     CKD (chronic kidney disease) stage 3, GFR 30-59 ml/min       Past Medical History:   Diagnosis Date     Arthritis      Autoimmune disease (H)      Hearing problem      Hoarseness      Hypertension      Kidney problem      Malignant melanoma (H)      Malignant neoplasm (H)     melanoma     Squamous cell carcinoma      Russo syndrome        Past Surgical History:   Procedure Laterality Date     BIOPSY  11/2011    melanoma on back     DISSECT LYMPH NODE INGUINAL  3/1/2013    Procedure: DISSECT LYMPH NODE INGUINAL;  Bilateral Inguinal Lymph Node Biopsy.;  Surgeon: Tariq Acosta MD;  Location: [facility] OR     ESOPHAGOSCOPY, GASTROSCOPY, DUODENOSCOPY (EGD), COMBINED  7/5/2013    Procedure: COMBINED ESOPHAGOSCOPY, GASTROSCOPY, DUODENOSCOPY (EGD);  Gastroscopy;  Surgeon: Tariq Acosta MD;  Location: [facility]     HERNIORRHAPHY INGUINAL  8/6/2012    Procedure: HERNIORRHAPHY INGUINAL;  Open Repair Left Inguinal Hernia;  Surgeon: Jerad Baker MD;  Location: [facility] OR       @[facility]@     current outpatient prescriptions on file.       Allergies   Allergen Reactions     Shrimp Nausea and Vomiting     Got sick each time he ate it       Pt reports that he quit smoking about 5 years ago. His smoking use included Cigarettes. He has a 20.00 pack-year smoking history. He has never used smokeless tobacco. He reports that he drinks alcohol. He reports that he does not use illicit drugs.    Exam:  /86  Temp 98.1  F (36.7  C) (Oral)  Resp 16  Ht 1.727 m (5' 8\")  Wt 87.5 " kg (193 lb)  SpO2 98%  BMI 29.35 kg/m2    Awake, Alert OX3  Lungs - CTA bilaterally  CV - RRR, no murmurs, distal pulses intact  Abd - soft, non-distended, non-tender, +BS  Extr - No cyanosis or edema    A/P 72 year old year old male in need of colonoscopy for screening. Risks, benefits, alternatives, and complications were discussed including the possibility of perforation and the patient agreed to proceed    Feliz Franco MD

## 2018-04-13 NOTE — ANESTHESIA POSTPROCEDURE EVALUATION
Patient: Joshua Ennis    Procedure(s):  colonoscopy - Wound Class: II-Clean Contaminated    Diagnosis:screening  Diagnosis Additional Information: No value filed.    Anesthesia Type:  No value filed.    Note:  Anesthesia Post Evaluation    Patient location during evaluation: Bedside  Patient participation: Able to fully participate in evaluation  Level of consciousness: awake  Pain management: adequate  Airway patency: patent  Cardiovascular status: stable  Respiratory status: nasal cannula  Hydration status: stable  PONV: none     Anesthetic complications: None          Last vitals:  Vitals:    04/13/18 0629 04/13/18 0812   BP: 136/86 101/68   Pulse:  66   Resp: 16 12   Temp: 36.7  C (98.1  F)    SpO2: 98% 100%         Electronically Signed By: DENISSE Lainez CRNA  April 13, 2018  8:26 AM

## 2018-04-13 NOTE — BRIEF OP NOTE
[facility]   Brief Operative Note    Pre-operative diagnosis: screening   Post-operative diagnosis polyps, hemorrhoids   Procedure: Procedure(s):  colonoscopy - Wound Class: II-Clean Contaminated   Surgeon(s): Surgeon(s) and Role:     * Feliz Franco MD - Primary   Estimated blood loss: * No values recorded between 4/13/2018 12:00 AM and 4/13/2018  8:03 AM *    Specimens:   ID Type Source Tests Collected by Time Destination   A : polyp at cecum Polyp Large Intestine, Cecum SURGICAL PATHOLOGY EXAM Feliz Franco MD 4/13/2018  7:54 AM    B : polyp at 30 cm Polyp Colon SURGICAL PATHOLOGY EXAM Feliz Franco MD 4/13/2018  7:57 AM       Findings: 1. Two polyps  2. Scattered diverticuli  3. Hemorrhoids

## 2018-04-13 NOTE — ANESTHESIA CARE TRANSFER NOTE
Patient: Jsohua Ennis    Procedure(s):  colonoscopy - Wound Class: II-Clean Contaminated    Diagnosis: screening  Diagnosis Additional Information: No value filed.    Anesthesia Type:   No value filed.     Note:  Airway :Nasal Cannula  Patient transferred to:Phase II  Handoff Report: Identifed the Patient, Identified the Reponsible Provider, Reviewed the pertinent medical history, Discussed the surgical course, Reviewed Intra-OP anesthesia mangement and issues during anesthesia, Set expectations for post-procedure period and Allowed opportunity for questions and acknowledgement of understanding      Vitals: (Last set prior to Anesthesia Care Transfer)    CRNA VITALS  4/13/2018 0738 - 4/13/2018 0809      4/13/2018             Pulse: 60    SpO2: 99 %                Electronically Signed By: DENISSE Lainez CRNA  April 13, 2018  8:09 AM

## 2018-04-17 LAB — COPATH REPORT: NORMAL

## 2018-04-18 PROBLEM — Z86.0100 HISTORY OF COLONIC POLYPS: Status: ACTIVE | Noted: 2018-04-18

## 2018-04-19 ENCOUNTER — NURSE TRIAGE (OUTPATIENT)
Dept: NURSING | Facility: CLINIC | Age: 73
End: 2018-04-19

## 2018-04-19 NOTE — TELEPHONE ENCOUNTER
Reason for Disposition    [1] Caller requesting NON-URGENT health information AND [2] PCP's office is the best resource    Protocols used: INFORMATION ONLY CALL-ADULT-  Caller returning a call from the clinic about patient's coloscopy, saying there was a message to call back.  No notes in chart found, and [place] clinic closed.  They will return the call 4-20-19.  Berenice Bose RN  [facility]

## 2018-05-14 DIAGNOSIS — Z79.899 HIGH RISK MEDICATIONS (NOT ANTICOAGULANTS) LONG-TERM USE: ICD-10-CM

## 2018-05-15 NOTE — TELEPHONE ENCOUNTER
methotrexate 2.5 MG    Last Written Prescription Date:  6/30/17  Last Fill Quantity: 60,   # refills: 1  Last Office Visit: 12/13/17  Future Office visit:  6/13/18    CBC RESULTS:   Recent Labs   Lab Test  03/13/18   1630   WBC  5.8   RBC  4.27*   HGB  13.9   HCT  41.2   MCV  97   MCH  32.6   MCHC  33.7   RDW  12.5   PLT  177       Creatinine   Date Value Ref Range Status   03/13/2018 1.47 (H) 0.66 - 1.25 mg/dL Final   ]    Liver Function Studies -   Recent Labs   Lab Test  03/13/18   1630  12/13/17   1228   PROTTOTAL   --   7.8   ALBUMIN  3.7  4.0   BILITOTAL   --   0.7   ALKPHOS   --   97   AST  26  22   ALT  30  30       Routing refill request to provider for review/approval because:  LABS 3/13/18     1 MOS RF PENDING    6/13/18 APPT.

## 2018-06-08 ENCOUNTER — OFFICE VISIT (OUTPATIENT)
Dept: FAMILY MEDICINE | Facility: CLINIC | Age: 73
End: 2018-06-08
Payer: COMMERCIAL

## 2018-06-08 VITALS
RESPIRATION RATE: 18 BRPM | HEART RATE: 82 BPM | OXYGEN SATURATION: 98 % | DIASTOLIC BLOOD PRESSURE: 70 MMHG | BODY MASS INDEX: 28.4 KG/M2 | SYSTOLIC BLOOD PRESSURE: 126 MMHG | TEMPERATURE: 99.1 F | WEIGHT: 186.8 LBS

## 2018-06-08 DIAGNOSIS — H69.93 DYSFUNCTION OF BOTH EUSTACHIAN TUBES: ICD-10-CM

## 2018-06-08 DIAGNOSIS — R07.0 THROAT PAIN: ICD-10-CM

## 2018-06-08 DIAGNOSIS — J01.10 ACUTE NON-RECURRENT FRONTAL SINUSITIS: Primary | ICD-10-CM

## 2018-06-08 LAB
DEPRECATED S PYO AG THROAT QL EIA: NORMAL
SPECIMEN SOURCE: NORMAL

## 2018-06-08 PROCEDURE — 87880 STREP A ASSAY W/OPTIC: CPT | Performed by: NURSE PRACTITIONER

## 2018-06-08 PROCEDURE — 99213 OFFICE O/P EST LOW 20 MIN: CPT | Performed by: NURSE PRACTITIONER

## 2018-06-08 PROCEDURE — 87081 CULTURE SCREEN ONLY: CPT | Performed by: NURSE PRACTITIONER

## 2018-06-08 ASSESSMENT — PAIN SCALES - GENERAL: PAINLEVEL: MILD PAIN (2)

## 2018-06-08 NOTE — PROGRESS NOTES
SUBJECTIVE:   Joshua Ennis is a 73 year old male who presents to clinic today for the following health issues:      ENT Symptoms             Symptoms: cc Present Absent Comment   Fever/Chills   x    Fatigue   x    Muscle Aches   x    Eye Irritation   x    Sneezing  x     Nasal Jovani/Drg  x  So plugged up last night, could barely breath   Sinus Pressure/Pain  x  Above eyes    Loss of smell   x    Dental pain   x    Sore Throat  x  But throat is getting better   Swollen Glands   x    Ear Pain/Fullness  x  Left ear pain   Cough  x  Productive cough    Wheeze   x    Chest Pain   x    Shortness of breath   x    Rash   x    Other  x  Headache this morning     Symptom duration:  4 days   Symptom severity:  mild   Treatments tried:  tylenol is some help   Contacts:   at work had strep         Problem list and histories reviewed & adjusted, as indicated.  Additional history: as documented    Patient Active Problem List   Diagnosis     Tobacco abuse     CARDIOVASCULAR SCREENING; LDL GOAL LESS THAN 160     Melanoma (H)     Idiopathic progressive polyneuropathy     Advanced directives, counseling/discussion     GERD (gastroesophageal reflux disease)     Hypertension, renal     Anemia     Encounter for long-term (current) use of steroids     Granulomatosis with polyangiitis (H)     CKD (chronic kidney disease) stage 3, GFR 30-59 ml/min     colonic polyps- Tubular Adenomas     Past Surgical History:   Procedure Laterality Date     BIOPSY  11/2011    melanoma on back     DISSECT LYMPH NODE INGUINAL  3/1/2013    Procedure: DISSECT LYMPH NODE INGUINAL;  Bilateral Inguinal Lymph Node Biopsy.;  Surgeon: Tariq Acosta MD;  Location: [facility]     ESOPHAGOSCOPY, GASTROSCOPY, DUODENOSCOPY (EGD), COMBINED  7/5/2013    Procedure: COMBINED ESOPHAGOSCOPY, GASTROSCOPY, DUODENOSCOPY (EGD);  Gastroscopy;  Surgeon: Tariq Acosta MD;  Location: [facility]     HERNIORRHAPHY INGUINAL  8/6/2012    Procedure: HERNIORRHAPHY  INGUINAL;  Open Repair Left Inguinal Hernia;  Surgeon: Jerad Baker MD;  Location: [facility]       Social History   Substance Use Topics     Smoking status: Former Smoker     Packs/day: 1.00     Years: 20.00     Types: Cigarettes     Quit date: 2/14/2013     Smokeless tobacco: Never Used     Alcohol use Yes      Comment: rare     Family History   Problem Relation Age of Onset     DIABETES Mother      Hypertension Mother      CANCER Father      stomach     HEART DISEASE Father      HEART DISEASE Brother      DIABETES Sister      DIABETES Sister      DIABETES Sister      HEART DISEASE Brother      CANCER Sister      Glaucoma No family hx of      Macular Degeneration No family hx of          Current Outpatient Prescriptions   Medication Sig Dispense Refill     Acetaminophen (TYLENOL PO) Take 650 mg by mouth once as needed for mild pain or fever       Blood Pressure Monitor KIT Automatic Blood Pressure Monitor 1 kit 0     calcium-vitamin D (CALCIUM 600 + D) 600-400 MG-UNIT per tablet Take 1 tablet by mouth 2 times daily.       Cholecalciferol (VITAMIN D) 1000 UNITS capsule Take 1 capsule by mouth daily.       folic acid (FOLVITE) 1 MG tablet Take 2 tablets (2 mg) by mouth daily 180 tablet 3     gabapentin (NEURONTIN) 400 MG capsule Take 1 capsule (400 mg) by mouth At Bedtime 90 capsule 1     losartan (COZAAR) 50 MG tablet Take 1 tablet (50 mg) by mouth daily 90 tablet 3     methotrexate 2.5 MG tablet CHEMO Take 4 tablets (10 mg) by mouth every 7 days 60 tablet 0     methotrexate 2.5 MG tablet CHEMO Take 4 tablets (10 mg) by mouth once a week 60 tablet 1     Allergies   Allergen Reactions     Shrimp Nausea and Vomiting     Got sick each time he ate it       Reviewed and updated as needed this visit by clinical staff  Tobacco  Allergies  Meds  Problems  Med Hx  Surg Hx  Fam Hx  Soc Hx        Reviewed and updated as needed this visit by Provider  Tobacco  Allergies  Meds  Problems  Med Hx  Surg Hx  Fam  Hx  Soc Hx          ROS:  Constitutional, HEENT, cardiovascular, pulmonary, gi and gu systems are negative, except as otherwise noted.    OBJECTIVE:     /70  Pulse 82  Temp 99.1  F (37.3  C) (Tympanic)  Resp 18  Wt 186 lb 12.8 oz (84.7 kg)  SpO2 98%  BMI 28.4 kg/m2  Body mass index is 28.4 kg/(m^2).  GENERAL APPEARANCE: healthy, alert and no distress  EYES: Eyes grossly normal to inspection, PERRL and conjunctivae and sclerae normal  HENT: ear canals normal and TM's with serous fluid bilateral, nose and mouth without ulcers or lesions, oropharynx clear, frontal sinus tenderness bilateral and maxillary sinus tenderness bilateral, palatoglossal arch and posterior oropharynx erythema without exudate and no post nasal drainage noted  NECK: no adenopathy, no asymmetry, masses, or scars and thyroid normal to palpation  RESP: lungs clear to auscultation - no rales, rhonchi or wheezes  CV: regular rates and rhythm, normal S1 S2, no S3 or S4 and no murmur, click or rub  ABDOMEN: soft, nontender, without hepatosplenomegaly or masses and bowel sounds normal    Diagnostic Test Results:  Results for orders placed or performed in visit on 06/08/18 (from the past 24 hour(s))   Strep, Rapid Screen   Result Value Ref Range    Specimen Description Throat     Rapid Strep A Screen       NEGATIVE: No Group A streptococcal antigen detected by immunoassay, await culture report.       ASSESSMENT/PLAN:     1. Acute non-recurrent frontal sinusitis  Patients symptoms consistent with sinusitis, increased congestion, post nasal drainage, sinus pressure. No fevers. Hasn't tried any at home or over the counter remedies. Discussed supportive cares for this, start and if not improving or worsening in 1 week, call and update and will start antibiotics.     2. Dysfunction of both eustachian tubes  Start anti-histamine and decongestant. Likely from sinus congestion.     3. Throat pain  Rapid strep negative, await culture.   - Strep, Rapid  Screen  - Beta strep group A culture    Patient Instructions   Rapid strep negative - will await culture and update you if positive     This is most likely a viral sinusitis/rhinosinusitis   Make sure you are getting a lot of rest and fluids  Ibuprofen and/or tylenol for discomfort or fever  Warm salt water gargles 3-4 times a day for sore throat   Start Claritin or Zyrtec over the counter to help fluid - start over the counter Sudafed for congestion - only take this for 3-4 days and check blood pressure daily - if blood pressure significantly elevated stop      Cool mist vaporizer at night   If you can tolerate you can use a nasal saline flush such as the Nicole Pot  Sleep with head of bed up at night to promote drainage     No Antibiotic are warranted at this time.  It is important if your symptoms worsen or not improved in a another week contact me and we will evaluate need for antibiotic.         Sinusitis (No Antibiotics)    The sinuses are air-filled spaces within the bones of the face. They connect to the inside of the nose. Sinusitis is an inflammation of the tissue that lines the sinuses. Sinusitis can occur during a cold. It can also happen due to allergies to pollens and other particles in the air. It can cause symptoms such as sinus congestion, headache, sore throat, facial swelling, and a feeling of fullness. It may also cause a low-grade fever. Your sinusitis does not include an infection with bacteria. Because of this, antibiotics are not used to treat this problem.  Home care    Drink plenty of water, hot tea, and other liquids. This may help thin nasal mucus. It also may help your sinuses drain fluids.    Heat may help soothe painful areas of your face. Use a towel soaked in hot water. Or,  the shower and direct the warm spray onto your face. Using a vaporizer along with a menthol rub at night may also help soothe symptoms.     An expectorant with guaifenesin may help thin nasal mucus and  help your sinuses drain fluids.    You can use an over-the-counter decongestant, unless a similar medicine was prescribed to you. Nasal sprays work the fastest. Use one that contains phenylephrine or oxymetazoline. First blow your nose gently. Then use the spray. Do not use these medicines more often than directed on the label. If you do, your symptoms may get worse. You may also take pills that contain pseudoephedrine. Don t use products that combine multiple medicines. This is because side effects may be increased. Read all medicine labels. You can also ask the pharmacist for help. (People with high blood pressure should not use decongestants. They can raise blood pressure.)    Over-the-counter antihistamines may help if allergies contributed to your sinusitis.      Use acetaminophen or ibuprofen to control pain, unless another pain medicine was prescribed to you. If you have chronic liver or kidney disease or ever had a stomach ulcer, talk with your healthcare provider before using these medicines. (Aspirin should never be taken by anyone under age 18 who is ill with a fever. It may cause severe liver damage.)    Use nasal rinses or irrigation as instructed by your healthcare provider.    Don't smoke. This can make symptoms worse.  Follow-up care  Follow up with your healthcare provider or our staff if you are better in 1 week.  When to seek medical advice  Call your healthcare provider if any of these occur:    Green or yellow fluid draining from your nose or into your throat    Facial pain or headache that gets worse    Stiff neck    Unusual drowsiness or confusion    Swelling of your forehead or eyelids    Vision problems, such as blurred or double vision    Fever of 100.4 F (38 C) or higher, or as directed by your healthcare provider    Seizure    Breathing problems    Symptoms that don't go away in 10 days  Date Last Reviewed: 11/1/2017 2000-2017 [employer]. [address] [facility]  [place] [address]. All rights reserved. This information is not intended as a substitute for professional medical care. Always follow your healthcare professional's instructions.            DENISSE Romero [facility] [place]

## 2018-06-08 NOTE — LETTER
June 11, 2018      Joshua Ennis  [address] [facility] [address]        Dear Joshua,       The results of your recent throat culture were negative.  If you have any further questions or concerns please contact the clinic.      Sincerely,        DENISSE Romero CNP

## 2018-06-08 NOTE — NURSING NOTE
"Chief Complaint   Patient presents with     Pharyngitis       Initial /70  Pulse 82  Temp 99.1  F (37.3  C) (Tympanic)  Resp 18  Wt 186 lb 12.8 oz (84.7 kg)  SpO2 98%  BMI 28.4 kg/m2 Estimated body mass index is 28.4 kg/(m^2) as calculated from the following:    Height as of 4/13/18: 5' 8\" (1.727 m).    Weight as of this encounter: 186 lb 12.8 oz (84.7 kg).      Health Maintenance that is potentially due pending provider review:  NONE    n/a        "

## 2018-06-08 NOTE — PATIENT INSTRUCTIONS
Rapid strep negative - will await culture and update you if positive     This is most likely a viral sinusitis/rhinosinusitis   Make sure you are getting a lot of rest and fluids  Ibuprofen and/or tylenol for discomfort or fever  Warm salt water gargles 3-4 times a day for sore throat   Start Claritin or Zyrtec over the counter to help fluid - start over the counter Sudafed for congestion - only take this for 3-4 days and check blood pressure daily - if blood pressure significantly elevated stop      Cool mist vaporizer at night   If you can tolerate you can use a nasal saline flush such as the Nicole Pot  Sleep with head of bed up at night to promote drainage     No Antibiotic are warranted at this time.  It is important if your symptoms worsen or not improved in a another week contact me and we will evaluate need for antibiotic.         Sinusitis (No Antibiotics)    The sinuses are air-filled spaces within the bones of the face. They connect to the inside of the nose. Sinusitis is an inflammation of the tissue that lines the sinuses. Sinusitis can occur during a cold. It can also happen due to allergies to pollens and other particles in the air. It can cause symptoms such as sinus congestion, headache, sore throat, facial swelling, and a feeling of fullness. It may also cause a low-grade fever. Your sinusitis does not include an infection with bacteria. Because of this, antibiotics are not used to treat this problem.  Home care    Drink plenty of water, hot tea, and other liquids. This may help thin nasal mucus. It also may help your sinuses drain fluids.    Heat may help soothe painful areas of your face. Use a towel soaked in hot water. Or,  the shower and direct the warm spray onto your face. Using a vaporizer along with a menthol rub at night may also help soothe symptoms.     An expectorant with guaifenesin may help thin nasal mucus and help your sinuses drain fluids.    You can use an  over-the-counter decongestant, unless a similar medicine was prescribed to you. Nasal sprays work the fastest. Use one that contains phenylephrine or oxymetazoline. First blow your nose gently. Then use the spray. Do not use these medicines more often than directed on the label. If you do, your symptoms may get worse. You may also take pills that contain pseudoephedrine. Don t use products that combine multiple medicines. This is because side effects may be increased. Read all medicine labels. You can also ask the pharmacist for help. (People with high blood pressure should not use decongestants. They can raise blood pressure.)    Over-the-counter antihistamines may help if allergies contributed to your sinusitis.      Use acetaminophen or ibuprofen to control pain, unless another pain medicine was prescribed to you. If you have chronic liver or kidney disease or ever had a stomach ulcer, talk with your healthcare provider before using these medicines. (Aspirin should never be taken by anyone under age 18 who is ill with a fever. It may cause severe liver damage.)    Use nasal rinses or irrigation as instructed by your healthcare provider.    Don't smoke. This can make symptoms worse.  Follow-up care  Follow up with your healthcare provider or our staff if you are better in 1 week.  When to seek medical advice  Call your healthcare provider if any of these occur:    Green or yellow fluid draining from your nose or into your throat    Facial pain or headache that gets worse    Stiff neck    Unusual drowsiness or confusion    Swelling of your forehead or eyelids    Vision problems, such as blurred or double vision    Fever of 100.4 F (38 C) or higher, or as directed by your healthcare provider    Seizure    Breathing problems    Symptoms that don't go away in 10 days  Date Last Reviewed: 11/1/2017 2000-2017 [employer]. [address] [place] [address], [place] [address]. All rights reserved. This information  is not intended as a substitute for professional medical care. Always follow your healthcare professional's instructions.

## 2018-06-08 NOTE — MR AVS SNAPSHOT
After Visit Summary   6/8/2018    Joshua Ennis    MRN: [id number]           Patient Information     Date Of Birth          1945        Visit Information        Provider Department      6/8/2018 10:40 AM Jennie Celestin APRN [facility]        Today's Diagnoses     Throat pain    -  1    Acute rhinitis, unspecified type        Dysfunction of both eustachian tubes        Acute non-recurrent frontal sinusitis          Care Instructions    Rapid strep negative - will await culture and update you if positive     This is most likely a viral sinusitis/rhinosinusitis   Make sure you are getting a lot of rest and fluids  Ibuprofen and/or tylenol for discomfort or fever  Warm salt water gargles 3-4 times a day for sore throat   Start Claritin or Zyrtec over the counter to help fluid - start over the counter Sudafed for congestion - only take this for 3-4 days and check blood pressure daily - if blood pressure significantly elevated stop      Cool mist vaporizer at night   If you can tolerate you can use a nasal saline flush such as the [place] Pot  Sleep with head of bed up at night to promote drainage     No Antibiotic are warranted at this time.  It is important if your symptoms worsen or not improved in a another week contact me and we will evaluate need for antibiotic.         Sinusitis (No Antibiotics)    The sinuses are air-filled spaces within the bones of the face. They connect to the inside of the nose. Sinusitis is an inflammation of the tissue that lines the sinuses. Sinusitis can occur during a cold. It can also happen due to allergies to pollens and other particles in the air. It can cause symptoms such as sinus congestion, headache, sore throat, facial swelling, and a feeling of fullness. It may also cause a low-grade fever. Your sinusitis does not include an infection with bacteria. Because of this, antibiotics are not used to treat this problem.  Home care    Drink  plenty of water, hot tea, and other liquids. This may help thin nasal mucus. It also may help your sinuses drain fluids.    Heat may help soothe painful areas of your face. Use a towel soaked in hot water. Or,  the shower and direct the warm spray onto your face. Using a vaporizer along with a menthol rub at night may also help soothe symptoms.     An expectorant with guaifenesin may help thin nasal mucus and help your sinuses drain fluids.    You can use an over-the-counter decongestant, unless a similar medicine was prescribed to you. Nasal sprays work the fastest. Use one that contains phenylephrine or oxymetazoline. First blow your nose gently. Then use the spray. Do not use these medicines more often than directed on the label. If you do, your symptoms may get worse. You may also take pills that contain pseudoephedrine. Don t use products that combine multiple medicines. This is because side effects may be increased. Read all medicine labels. You can also ask the pharmacist for help. (People with high blood pressure should not use decongestants. They can raise blood pressure.)    Over-the-counter antihistamines may help if allergies contributed to your sinusitis.      Use acetaminophen or ibuprofen to control pain, unless another pain medicine was prescribed to you. If you have chronic liver or kidney disease or ever had a stomach ulcer, talk with your healthcare provider before using these medicines. (Aspirin should never be taken by anyone under age 18 who is ill with a fever. It may cause severe liver damage.)    Use nasal rinses or irrigation as instructed by your healthcare provider.    Don't smoke. This can make symptoms worse.  Follow-up care  Follow up with your healthcare provider or our staff if you are better in 1 week.  When to seek medical advice  Call your healthcare provider if any of these occur:    Green or yellow fluid draining from your nose or into your throat    Facial pain or  headache that gets worse    Stiff neck    Unusual drowsiness or confusion    Swelling of your forehead or eyelids    Vision problems, such as blurred or double vision    Fever of 100.4 F (38 C) or higher, or as directed by your healthcare provider    Seizure    Breathing problems    Symptoms that don't go away in 10 days  Date Last Reviewed: 11/1/2017 2000-2017 [employer]. [address] [place] [address]. All rights reserved. This information is not intended as a substitute for professional medical care. Always follow your healthcare professional's instructions.                Follow-ups after your visit        Your next 10 appointments already scheduled     Jun 13, 2018  9:00 AM CDT   (Arrive by 8:45 AM)   Return Visit with Gabbi Obando MD   [facility] Ear Nose and Throat ([facility])    [address] [place] [address]  4th Floor  [facility] [id number] [phone]            Jun 13, 2018 10:00 AM CDT   (Arrive by 9:45 AM)   Return Visit with Ede Sanchez MD   [facility] Rheumatology ([facility])    [address] [place] [address]  [address]  [facility] [id number] [phone]              Who to contact     If you have questions or need follow up information about today's clinic visit or your schedule please contact [facility] directly at [phone].  Normal or non-critical lab and imaging results will be communicated to you by MyChart, letter or phone within 4 business days after the clinic has received the results. If you do not hear from us within 7 days, please contact the clinic through MyChart or phone. If you have a critical or abnormal lab result, we will notify you by phone as soon as possible.  Submit refill requests through [employer] or call your pharmacy and they will forward the refill request to us. Please allow 3 business days for your refill to be completed.          Additional Information About Your Visit       "  MyChart Information     [employer] lets you send messages to your doctor, view your test results, renew your prescriptions, schedule appointments and more. To sign up, go to [email or web address] . Click on \"Log in\" on the left side of the screen, which will take you to the Welcome page. Then click on \"Sign up Now\" on the right side of the page.     You will be asked to enter the access code listed below, as well as some personal information. Please follow the directions to create your username and password.     Your access code is: [email or web address]  Expires: 2018  6:30 AM     Your access code will  in 90 days. If you need help or a new code, please call your [place] clinic or [phone].        Care EveryWhere ID     This is your [facility] EveryWhere ID. This could be used by other organizations to access your [facility] medical records  [id number]        Your Vitals Were     Pulse Temperature Respirations Pulse Oximetry BMI (Body Mass Index)       82 99.1  F (37.3  C) (Tympanic) 18 98% 28.4 kg/m2        Blood Pressure from Last 3 Encounters:   18 126/70   18 131/86   18 132/70    Weight from Last 3 Encounters:   18 186 lb 12.8 oz (84.7 kg)   18 193 lb (87.5 kg)   18 193 lb (87.5 kg)              We Performed the Following     Beta strep group A culture     Strep, Rapid Screen        Primary Care Provider Office Phone # Fax #    [facility] [phone]       [address] [facility] [address]        Equal Access to Services     LAZARUS ARREDONDO : Hadii tom Keyes, walakeshada ludawitadaha, qaybta pavelalbaljit seay. So [facility] [phone].    ATENCIÓN: Si habla español, tiene a enamorado disposición servicios gratuitos de asistencia lingüística. Llame al [phone].    We comply with applicable federal civil rights laws and [place] laws. We do not discriminate on the basis of race, color, national " origin, age, disability, sex, sexual orientation, or gender identity.            Thank you!     Thank you for choosing [facility]  for your care. Our goal is always to provide you with excellent care. Hearing back from our patients is one way we can continue to improve our services. Please take a few minutes to complete the written survey that you may receive in the mail after your visit with us. Thank you!             Your Updated Medication List - Protect others around you: Learn how to safely use, store and throw away your medicines at www.disposemymeds.org.          This list is accurate as of 6/8/18 11:31 AM.  Always use your most recent med list.                   Brand Name Dispense Instructions for use Diagnosis    Blood Pressure Monitor Kit     1 kit    Automatic Blood Pressure Monitor    Hypertension, renal       calcium 600 + D 600-400 MG-UNIT per tablet   Generic drug:  calcium-vitamin D      Take 1 tablet by mouth 2 times daily.        folic acid 1 MG tablet    FOLVITE    180 tablet    Take 2 tablets (2 mg) by mouth daily    Granulomatosis with polyangiitis (H)       gabapentin 400 MG capsule    NEURONTIN    90 capsule    Take 1 capsule (400 mg) by mouth At Bedtime    Neuropathy       losartan 50 MG tablet    COZAAR    90 tablet    Take 1 tablet (50 mg) by mouth daily    Hypertension, renal, stage 1-4 or unspecified chronic kidney disease       * methotrexate 2.5 MG tablet CHEMO     60 tablet    Take 4 tablets (10 mg) by mouth once a week    High risk medications (not anticoagulants) long-term use       * methotrexate 2.5 MG tablet CHEMO     60 tablet    Take 4 tablets (10 mg) by mouth every 7 days    High risk medications (not anticoagulants) long-term use       TYLENOL PO      Take 650 mg by mouth once as needed for mild pain or fever        vitamin D 1000 units capsule      Take 1 capsule by mouth daily.        * Notice:  This list has 2 medication(s) that are the same as other  medications prescribed for you. Read the directions carefully, and ask your doctor or other care provider to review them with you.

## 2018-06-09 LAB
BACTERIA SPEC CULT: NORMAL
SPECIMEN SOURCE: NORMAL

## 2018-06-13 ENCOUNTER — OFFICE VISIT (OUTPATIENT)
Dept: OTOLARYNGOLOGY | Facility: CLINIC | Age: 73
End: 2018-06-13
Payer: COMMERCIAL

## 2018-06-13 ENCOUNTER — OFFICE VISIT (OUTPATIENT)
Dept: RHEUMATOLOGY | Facility: CLINIC | Age: 73
End: 2018-06-13
Attending: INTERNAL MEDICINE
Payer: COMMERCIAL

## 2018-06-13 VITALS
DIASTOLIC BLOOD PRESSURE: 77 MMHG | OXYGEN SATURATION: 100 % | TEMPERATURE: 97.5 F | SYSTOLIC BLOOD PRESSURE: 136 MMHG | WEIGHT: 186.4 LBS | HEIGHT: 68 IN | BODY MASS INDEX: 28.25 KG/M2 | HEART RATE: 55 BPM

## 2018-06-13 VITALS — BODY MASS INDEX: 27.28 KG/M2 | WEIGHT: 180 LBS | HEIGHT: 68 IN

## 2018-06-13 DIAGNOSIS — I77.82 ANCA-ASSOCIATED VASCULITIS (H): ICD-10-CM

## 2018-06-13 DIAGNOSIS — J34.89 NASAL LESION: Primary | ICD-10-CM

## 2018-06-13 DIAGNOSIS — Z51.81 MEDICATION MONITORING ENCOUNTER: Primary | ICD-10-CM

## 2018-06-13 DIAGNOSIS — Z51.81 MEDICATION MONITORING ENCOUNTER: ICD-10-CM

## 2018-06-13 LAB
ALBUMIN SERPL-MCNC: 3.6 G/DL (ref 3.4–5)
ALBUMIN UR-MCNC: NEGATIVE MG/DL
ALT SERPL W P-5'-P-CCNC: 25 U/L (ref 0–70)
APPEARANCE UR: CLEAR
AST SERPL W P-5'-P-CCNC: 20 U/L (ref 0–45)
BASOPHILS # BLD AUTO: 0.1 10E9/L (ref 0–0.2)
BASOPHILS NFR BLD AUTO: 0.8 %
BILIRUB UR QL STRIP: NEGATIVE
COLOR UR AUTO: ABNORMAL
CREAT SERPL-MCNC: 1.33 MG/DL (ref 0.66–1.25)
CREAT UR-MCNC: 32 MG/DL
CREAT UR-MCNC: 32 MG/DL
CRP SERPL-MCNC: 5 MG/L (ref 0–8)
DIFFERENTIAL METHOD BLD: NORMAL
EOSINOPHIL # BLD AUTO: 0.3 10E9/L (ref 0–0.7)
EOSINOPHIL NFR BLD AUTO: 4.5 %
ERYTHROCYTE [DISTWIDTH] IN BLOOD BY AUTOMATED COUNT: 12.5 % (ref 10–15)
ERYTHROCYTE [SEDIMENTATION RATE] IN BLOOD BY WESTERGREN METHOD: 9 MM/H (ref 0–20)
GFR SERPL CREATININE-BSD FRML MDRD: 53 ML/MIN/1.7M2
GLUCOSE UR STRIP-MCNC: NEGATIVE MG/DL
HCT VFR BLD AUTO: 44.9 % (ref 40–53)
HGB BLD-MCNC: 14.8 G/DL (ref 13.3–17.7)
HGB UR QL STRIP: NEGATIVE
IMM GRANULOCYTES # BLD: 0 10E9/L (ref 0–0.4)
IMM GRANULOCYTES NFR BLD: 0.5 %
KETONES UR STRIP-MCNC: NEGATIVE MG/DL
LEUKOCYTE ESTERASE UR QL STRIP: NEGATIVE
LYMPHOCYTES # BLD AUTO: 1.5 10E9/L (ref 0.8–5.3)
LYMPHOCYTES NFR BLD AUTO: 24.5 %
MCH RBC QN AUTO: 31.7 PG (ref 26.5–33)
MCHC RBC AUTO-ENTMCNC: 33 G/DL (ref 31.5–36.5)
MCV RBC AUTO: 96 FL (ref 78–100)
MONOCYTES # BLD AUTO: 0.6 10E9/L (ref 0–1.3)
MONOCYTES NFR BLD AUTO: 9.7 %
MUCOUS THREADS #/AREA URNS LPF: PRESENT /LPF
NEUTROPHILS # BLD AUTO: 3.7 10E9/L (ref 1.6–8.3)
NEUTROPHILS NFR BLD AUTO: 60 %
NITRATE UR QL: NEGATIVE
NRBC # BLD AUTO: 0 10*3/UL
NRBC BLD AUTO-RTO: 0 /100
PH UR STRIP: 7 PH (ref 5–7)
PLATELET # BLD AUTO: 209 10E9/L (ref 150–450)
PROT UR-MCNC: 0.07 G/L
PROT/CREAT 24H UR: 0.22 G/G CR (ref 0–0.2)
RBC # BLD AUTO: 4.67 10E12/L (ref 4.4–5.9)
RBC #/AREA URNS AUTO: 1 /HPF (ref 0–2)
SOURCE: ABNORMAL
SP GR UR STRIP: 1.01 (ref 1–1.03)
UROBILINOGEN UR STRIP-MCNC: 0 MG/DL (ref 0–2)
WBC # BLD AUTO: 6.2 10E9/L (ref 4–11)
WBC #/AREA URNS AUTO: <1 /HPF (ref 0–5)

## 2018-06-13 PROCEDURE — 81001 URINALYSIS AUTO W/SCOPE: CPT | Performed by: INTERNAL MEDICINE

## 2018-06-13 PROCEDURE — 86140 C-REACTIVE PROTEIN: CPT | Performed by: INTERNAL MEDICINE

## 2018-06-13 PROCEDURE — 82565 ASSAY OF CREATININE: CPT | Performed by: INTERNAL MEDICINE

## 2018-06-13 PROCEDURE — 82040 ASSAY OF SERUM ALBUMIN: CPT | Performed by: INTERNAL MEDICINE

## 2018-06-13 PROCEDURE — 36415 COLL VENOUS BLD VENIPUNCTURE: CPT | Performed by: INTERNAL MEDICINE

## 2018-06-13 PROCEDURE — 84460 ALANINE AMINO (ALT) (SGPT): CPT | Performed by: INTERNAL MEDICINE

## 2018-06-13 PROCEDURE — 85652 RBC SED RATE AUTOMATED: CPT | Performed by: INTERNAL MEDICINE

## 2018-06-13 PROCEDURE — 86255 FLUORESCENT ANTIBODY SCREEN: CPT | Performed by: INTERNAL MEDICINE

## 2018-06-13 PROCEDURE — G0463 HOSPITAL OUTPT CLINIC VISIT: HCPCS | Mod: ZF

## 2018-06-13 PROCEDURE — 85025 COMPLETE CBC W/AUTO DIFF WBC: CPT | Performed by: INTERNAL MEDICINE

## 2018-06-13 PROCEDURE — 84156 ASSAY OF PROTEIN URINE: CPT | Performed by: INTERNAL MEDICINE

## 2018-06-13 PROCEDURE — 84450 TRANSFERASE (AST) (SGOT): CPT | Performed by: INTERNAL MEDICINE

## 2018-06-13 RX ORDER — MUPIROCIN 20 MG/G
OINTMENT TOPICAL
Qty: 22 G | Refills: 0 | Status: SHIPPED | OUTPATIENT
Start: 2018-06-13 | End: 2019-07-21

## 2018-06-13 ASSESSMENT — PAIN SCALES - GENERAL
PAINLEVEL: NO PAIN (0)
PAINLEVEL: NO PAIN (0)

## 2018-06-13 NOTE — LETTER
6/13/2018       RE: Joshua Ennis  [address] [facility] [address]     Dear Colleague,    Thank you for referring your patient, Joshua Ennis, to the [facility] EAR NOSE AND THROAT at [facility]. Please see a copy of my visit note below.    Joshua saw me previously for a nasal lesion and oral cavity lesion.  He had a dark pigmented lesion on his palate that appeared benign - we did not biopsy.  There has been no change.  He also reports no change in nasal lesion.      Exam:  Small area on left posterior palate unchanged, not ulcerated, no mass like appearance.  Left caudal septum with scab.  Removed scab, slight bleeding cauterized with silver nitrate. Small perf with smooth edges.     Recommend consistent use of mupirocin ointment.  No indication to biopsy.  See me in 6 months.     Again, thank you for allowing me to participate in the care of your patient.      Sincerely,    Gabbi Obando MD

## 2018-06-13 NOTE — MR AVS SNAPSHOT
After Visit Summary   6/13/2018    Joshua Ennis    MRN: [id number]           Patient Information     Date Of Birth          1945        Visit Information        Provider Department      6/13/2018 10:00 AM Ede Sanchez MD [facility] Rheumatology        Today's Diagnoses     Medication monitoring encounter    -  1    ANCA-associated vasculitis (H)          Care Instructions    Recommend Shingrix vaccine    Labs today (done)and again in 3 months    Return in 6 months          Follow-ups after your visit        Follow-up notes from your care team     Return in about 6 months (around 12/13/2018).      Your next 10 appointments already scheduled     Dec 14, 2018 10:00 AM CST   Lab with  LAB   [facility] Lab ([facility])    [address]  1st Floor  [facility] [id number] [phone]            Dec 14, 2018 10:30 AM CST   (Arrive by 10:15 AM)   Return Visit with Ede Sanchez MD   [facility] Rheumatology ([facility])    [address]  [address]  [facility] [id number] [phone]              Future tests that were ordered for you today     Open Standing Orders        Priority Remaining Interval Expires Ordered    Albumin level Routine 3/3 o1afkdde 6/13/2019 6/13/2018    ALT Routine 3/3 g8wngwhz 6/13/2019 6/13/2018    AST Routine 3/3 j5alivcp 6/13/2019 6/13/2018    CBC with platelets differential Routine 3/3 m1fvafbd 6/13/2019 6/13/2018    Creatinine Routine 3/3 c2yhrmbz 6/13/2019 6/13/2018    CRP inflammation Routine 3/3 s6nvvltz 6/13/2019 6/13/2018    Erythrocyte sedimentation rate auto Routine 3/3 t4vgraxv 6/13/2019 6/13/2018    Routine UA with micro reflex to culture Routine 3/3 g8cfthok 6/13/2019 6/13/2018    Protein  random urine with Creat Ratio Routine 3/3 l1iqahyz 6/13/2019 6/13/2018    Creatinine random urine Routine 3/3 b1flipzw 6/13/2019 6/13/2018    Antineutrophil cytoplasmic Anju IgG Routine 3/3 n1kvyaeq  "2019            Who to contact     If you have questions or need follow up information about today's clinic visit or your schedule please contact [facility] RHEUMATOLOGY directly at [phone].  Normal or non-critical lab and imaging results will be communicated to you by MyChart, letter or phone within 4 business days after the clinic has received the results. If you do not hear from us within 7 days, please contact the clinic through MyChart or phone. If you have a critical or abnormal lab result, we will notify you by phone as soon as possible.  Submit refill requests through [employer] or call your pharmacy and they will forward the refill request to us. Please allow 3 business days for your refill to be completed.          Additional Information About Your Visit        [employer] Information     [employer] lets you send messages to your doctor, view your test results, renew your prescriptions, schedule appointments and more. To sign up, go to [email or web address] . Click on \"Log in\" on the left side of the screen, which will take you to the Welcome page. Then click on \"Sign up Now\" on the right side of the page.     You will be asked to enter the access code listed below, as well as some personal information. Please follow the directions to create your username and password.     Your access code is: [email or web address]  Expires: 2018  6:30 AM     Your access code will  in 90 days. If you need help or a new code, please call your [place] clinic or [phone].        Care EveryWhere ID     This is your Care EveryWhere ID. This could be used by other organizations to access your [place] medical records  [id number]        Your Vitals Were     Pulse Temperature Height Pulse Oximetry BMI (Body Mass Index)       55 97.5  F (36.4  C) (Oral) 1.727 m (5' 8\") 100% 28.34 kg/m2        Blood Pressure from Last 3 Encounters:   18 136/77   18 126/70   18 131/86    Weight from Last 3 " Encounters:   06/13/18 84.6 kg (186 lb 6.4 oz)   06/13/18 81.6 kg (180 lb)   06/08/18 84.7 kg (186 lb 12.8 oz)                 Today's Medication Changes          These changes are accurate as of 6/13/18 11:02 AM.  If you have any questions, ask your nurse or doctor.               Start taking these medicines.        Dose/Directions    mupirocin 2 % ointment   Commonly known as:  BACTROBAN   Used for:  Nasal lesion   Started by:  Gabbi Obando MD        Use in nose twice daily for 5 days   Quantity:  22 g   Refills:  0            Where to get your medicines      These medications were sent to [facility]-[address] [address]     Phone:  [phone]     mupirocin 2 % ointment                Primary Care Provider Office Phone # Fax #    [facility] [phone]       [address] [facility] [address]        Equal Access to Services     LAZARUS ARREDONDO AH: Hadii aad ku hadasho Soomaali, waaxda luqadaha, qaybta kaalmada adeegyada, waxay idiin haychristianen rickey ortega . So [facility] [phone].    ATENCIÓN: Si habla español, tiene a enamorado disposición servicios gratuitos de asistencia lingüística. LlK[facility]tering Health [facility] [phone].    We comply with applicable federal civil rights laws and [place] laws. We do not discriminate on the basis of race, color, national origin, age, disability, sex, sexual orientation, or gender identity.            Thank you!     Thank you for choosing [facility] RHEUMATOLOGY  for your care. Our goal is always to provide you with excellent care. Hearing back from our patients is one way we can continue to improve our services. Please take a few minutes to complete the written survey that you may receive in the mail after your visit with us. Thank you!             Your Updated Medication List - Protect others around you: Learn how to safely use, store and throw away your medicines at  www.disposemymeds.org.          This list is accurate as of 6/13/18 11:02 AM.  Always use your most recent med list.                   Brand Name Dispense Instructions for use Diagnosis    Blood Pressure Monitor Kit     1 kit    Automatic Blood Pressure Monitor    Hypertension, renal       calcium 600 + D 600-400 MG-UNIT per tablet   Generic drug:  calcium-vitamin D      Take 1 tablet by mouth 2 times daily.        folic acid 1 MG tablet    FOLVITE    180 tablet    Take 2 tablets (2 mg) by mouth daily    Granulomatosis with polyangiitis (H)       gabapentin 400 MG capsule    NEURONTIN    90 capsule    Take 1 capsule (400 mg) by mouth At Bedtime    Neuropathy       losartan 50 MG tablet    COZAAR    90 tablet    Take 1 tablet (50 mg) by mouth daily    Hypertension, renal, stage 1-4 or unspecified chronic kidney disease       * methotrexate 2.5 MG tablet CHEMO     60 tablet    Take 4 tablets (10 mg) by mouth once a week    High risk medications (not anticoagulants) long-term use       * methotrexate 2.5 MG tablet CHEMO     60 tablet    Take 4 tablets (10 mg) by mouth every 7 days    High risk medications (not anticoagulants) long-term use       mupirocin 2 % ointment    BACTROBAN    22 g    Use in nose twice daily for 5 days    Nasal lesion       TYLENOL PO      Take 650 mg by mouth once as needed for mild pain or fever        vitamin D 1000 units capsule      Take 1 capsule by mouth daily.        * Notice:  This list has 2 medication(s) that are the same as other medications prescribed for you. Read the directions carefully, and ask your doctor or other care provider to review them with you.

## 2018-06-13 NOTE — NURSING NOTE
"Chief Complaint   Patient presents with     RECHECK     f/u wegeners     /77  Pulse 55  Temp 97.5  F (36.4  C) (Oral)  Ht 1.727 m (5' 8\")  Wt 84.6 kg (186 lb 6.4 oz)  SpO2 100%  BMI 28.34 kg/m2  Gordon Redd, CMA    "

## 2018-06-13 NOTE — LETTER
Patient:  Joshua Ennis  :   1945  MRN:     [id number]        Mr.Roger DEANNA Ennis  [address] [facility] [address]        2018    Dear ,    We are writing to inform you of your test results. GFR (kidney function) is improved. ANCA (vasculitis marker) is normal which is good news.        Results for orders placed or performed in visit on 18   Albumin level   Result Value Ref Range    Albumin 3.6 3.4 - 5.0 g/dL   ALT   Result Value Ref Range    ALT 25 0 - 70 U/L   AST   Result Value Ref Range    AST 20 0 - 45 U/L   CBC with platelets differential   Result Value Ref Range    WBC 6.2 4.0 - 11.0 10e9/L    RBC Count 4.67 4.4 - 5.9 10e12/L    Hemoglobin 14.8 13.3 - 17.7 g/dL    Hematocrit 44.9 40.0 - 53.0 %    MCV 96 78 - 100 fl    MCH 31.7 26.5 - 33.0 pg    MCHC 33.0 31.5 - 36.5 g/dL    RDW 12.5 10.0 - 15.0 %    Platelet Count 209 150 - 450 10e9/L    Diff Method Automated Method     % Neutrophils 60.0 %    % Lymphocytes 24.5 %    % Monocytes 9.7 %    % Eosinophils 4.5 %    % Basophils 0.8 %    % Immature Granulocytes 0.5 %    Nucleated RBCs 0 0 /100    Absolute Neutrophil 3.7 1.6 - 8.3 10e9/L    Absolute Lymphocytes 1.5 0.8 - 5.3 10e9/L    Absolute Monocytes 0.6 0.0 - 1.3 10e9/L    Absolute Eosinophils 0.3 0.0 - 0.7 10e9/L    Absolute Basophils 0.1 0.0 - 0.2 10e9/L    Abs Immature Granulocytes 0.0 0 - 0.4 10e9/L    Absolute Nucleated RBC 0.0    Creatinine   Result Value Ref Range    Creatinine 1.33 (H) 0.66 - 1.25 mg/dL    GFR Estimate 53 (L) >60 mL/min/1.7m2    GFR Estimate If Black 64 >60 mL/min/1.7m2   CRP inflammation   Result Value Ref Range    CRP Inflammation 5.0 0.0 - 8.0 mg/L   Erythrocyte sedimentation rate auto   Result Value Ref Range    Sed Rate 9 0 - 20 mm/h   Antineutrophil cytoplasmic Anju IgG   Result Value Ref Range    Neutrophil Cytoplasmic IgG Antibody <1:20 <1:20   Routine UA with micro reflex to culture   Result Value Ref Range    Color Urine Straw     Appearance  Urine Clear     Glucose Urine Negative NEG^Negative mg/dL    Bilirubin Urine Negative NEG^Negative    Ketones Urine Negative NEG^Negative mg/dL    Specific Gravity Urine 1.008 1.003 - 1.035    Blood Urine Negative NEG^Negative    pH Urine 7.0 5.0 - 7.0 pH    Protein Albumin Urine Negative NEG^Negative mg/dL    Urobilinogen mg/dL 0.0 0.0 - 2.0 mg/dL    Nitrite Urine Negative NEG^Negative    Leukocyte Esterase Urine Negative NEG^Negative    Source Midstream Urine     WBC Urine <1 0 - 5 /HPF    RBC Urine 1 0 - 2 /HPF    Mucous Urine Present (A) NEG^Negative /LPF   Protein  random urine with Creat Ratio   Result Value Ref Range    Protein Random Urine 0.07 g/L    Protein Total Urine g/gr Creatinine 0.22 (H) 0 - 0.2 g/g Cr   Creatinine random urine   Result Value Ref Range    Creatinine Urine Random 32 mg/dL   Creatinine urine calculation only   Result Value Ref Range    Creatinine Urine 32 mg/dL       Ede Sanchez MD

## 2018-06-13 NOTE — PROGRESS NOTES
[facility] Follow-Up Note  Date of visit: 6/13/2018  Last visit date: 12/13/2017      Joshua Ennis MRN# [id number]   Age: 73 year old YOB: 1945          Assessment and Plan:     Assessment:   Mr. Ennis is a 73 yr old  male with history of melanoma s/p resection in 11/2011, former tobacco use, and GPA (PR3+, MPO and c-ANCA negative in 3/2013 based on aforementioned labs, confirmed by kidney and lung biopsy) who presents for clinic follow-up regarding GPA.  He was initiated on cyclophosphamide 150 mg QD and high-dose prednisone 3/2013. He last took prednisone in 10/2013. Cytoxan was switched to MTX for maintenance treatment in 10/2013.  He has been tolerating it well. He had a flare in 12/2013 with increased crusts in his nose along with recurrence of arthralgia, worsening numbness in feet and increasing vasculitis marker. Renal function was stable with negative repeat chest CT. His vasculitis flare was treated with short course of prednisone taper 20 mg po qd max and increasing MTX to 8 tab = 20 mg qwk. Vasculitis symptoms improved.  At visit on 1/2015, he had scabs in his nose, had cold [place] symptoms and increased arthralgia/fatigue (shoulders, L hand). Labs from 12/16 showed increased Cr level and hematuria with rising PR3 (more than 8), there was a concern for vasculitis flare (in kidneys, sinuses), no flare on repeat chest CT 1/2015. Therefore it was recommended to try rituximab. Another reason was to be able to taper MTX off given abnormal Cr.  He is now s/p Rituximab infusion x 4 (1st on 2/25/2015). He is feeling well. No hematuria with stable Cr, no SOB. Saw ENT with negative nasal mucosa biopsy 6/2015 for granuloma but showed active inflammation, had left nostril lesion which decreased in size by use of mupirocin ointment. PR3 vasculitis marker went back to NL in 6/2015, it was NL in 10/2015, given stable vasculitis and low GFR, MTX from 8 to 7 tab po qwk. Repeat  NC-3 in 3/2016 was slightly positive, Cr was slightly higher than baseline. We tapered his MTX further to 6 tab po qwk in 2/2016 given lack of symptoms. Re-check labs in April were almost unchanged from 3/2016 but CRP was slightly up. Labs on 2/7/17 with elevated CRP (40.6) but normal ESR (12) and ANCA 1:20 likely reflecting bacterial infection.    In 3/2017, MTX was reduced from 6 to 5 tab qwk. It was further tapered to 4 tab po qwk in 6/2017 given stable disease. No vasculitis flare ups by such change. ANCA remained negative, NC-3 is going down despite tapering MTX, dropped from 2.8 in 4/2017 to 2.3 in 6/2017 to 2 in 8/2017. 2.4 in 3/2018.    Vasculitis seems to be stable and he has no signs or symptoms of flare up today. Recommend to continue MTX at low dose of 4 tab po qwk as GPA or ANCA-vasculitis tends to flare up. Was advised to call in case of vasculitis flare up sx.    He had labs done today, will follow the results.        Plan:    - Continue MTX 10 mg (4 tabs) po qwk, continue with Folic acid 1 mg po qd    > Labs today and q3 months including MTX monitoring labs    > Patient instructed to hold MTX if develops fevers or infection  - Neuropathy unchanged on Gabapentin  - F/u with Dr. Obando ENT for small bluish pigmented lesion in the left soft palate.  Dr. Obando recommended biopsy in 5/2017 but pt wanted to hold off, looks smaller in size to me  -Shingrix vaccine. Recommended shingrix. CDC recommends this vaccine 2 doses,  by 2-6 months for adults 50 years and older. It is killed virus and ok to be used in immunocompromised patients. Shingrix reduces the risk of shingles and PHN by more than 90% in people 50 and older.     AE include: pain, redness and swelling at the inj site, myalgia, fatigue, headaches, shivering, fever and stomach upset.    He is going to get it locally.    - RTC in 6 months         Orders Placed This Encounter   Procedures     Albumin level     ALT     AST     CBC with  platelets differential     Creatinine     CRP inflammation     Erythrocyte sedimentation rate auto     Antineutrophil cytoplasmic Anju IgG     Routine UA with micro reflex to culture     Protein  random urine with Creat Ratio     Creatinine random urine             HPI / Interim History:      Mr. Ennis is a 71 yo WM with h/o melanoma s/p resection in 11/2011, former tobacco use, and GPA diagnosed in 3/2013. For details of GPA diagnosis, please refer to initial consult note. Briefly, he initially presented with constitutional sx of fever, sweating, weight loss, migratory arthralgia, numbness/shooting pain in feet to his PCP.  Had enlarged inguinal LNs. His PCP suspected malignancy and especially lung cancer given h/o tobacco use.  His work-up showed several lung nodules and CT guided biopsy of one of the nodules done in 2/13 showed granulomatous inflammation with no evidence of malignancy.  Inguinal LN biopsy done 3/13 showed inflammation with no malignancy.  He had not had any respiratory sx, SOB, CP, cough, hemoptysis or sinusitis.  He was hospitalized for further work up and was found having high PR3, neg MPO, neg ANCA.  He also had + RF and trace cryo.   Had microscopic hematuria with NL Cr at the time of admission but his Cr started to rise before discharge. Had abnormal LFTs toward the end of discharge(was trending down, liver US was unremarkable).  He was diagnosed with GPA given high titer of PR3 to 723, +granulomatous inflammation of the lung nodule, microscopic hematuria, and migratory arthralgia along with high ESR/CRP and constitutional sx.  He was Started on cytoxan 150 mg po qd, prednisone 70 mg po qd (after IV solumedrol 1 gr qd x 3 days), Bactrim DS three times a wk for PCP prophylaxis and fosamax 70 mg po qwk. He was also put on vit D 25578 units qwk and neurontin.  He was discharged on 3/13/2013.   At initial visit in Rheumatology clinic, patient was referred to nephrology for renal biopsy since Cr  was up despite being on cytoxan and prednisone 75 qd.  Renal biopsy 3/13 confirmed Dx of GPA.  After discussion with Dr. Krishnamurthy, made a decision to continue with current regimen and if no improvement to switch to IV cytoxan or rituximab.  His Cr initially increased to 1.71, but stabilized to ~ 1.3.  He has a h/o high BP and was recently started on losartan per nephology.  He has been seen by neurology for bilaterally foot and ankle numbness and was diagnosed with neuropathy possibly secondary to vasculitis.  He was also seen by oncology; there was no concern for malignancy.   Overall, the patient is doing well today with no specific complaints or concerns.  He had recent blood work 9/19 which showed negative inflammatory and vasculitis markers (PR3, MPO, ANCA, and CRP).  His SCr has remained elevated, but stable at 1.37.  He is at the tail end of a prednisone taper; currently taking 1 mg QD, set to d/c 10/15.  He is still on cyclophosphamide, taking 150 mg QD.  He was prescribed losartan at a f/u visit w/ nephrology 8/21, but states his BP have been running 130's/80's, so has not taken the medication for over a month.  Additionally, he had been taking neurontin for numbness in his feet, toes, and ankles, but stopped b/c he was not getting symptom relief.  Today, the patient denies vision changes, epistaxis, oral ulcerations, SOB, hemoptysis, CP, joint pain, abdominal pain, dysuria, or hematuria.  He does endorse a cough, but states that this has been intermittent, chronic, and non-productive.  Additionally, he endorses right shoulder discomfort that is worse w/ abduction, but has also been chronic. Of note, the patient remains a former smoker, having quit in February of this year.     His major complaint is increased numbness of his toes, but has resumed taking neurontin and feels sensation is coming back which is painful. No SOB, cough, crusts in nose or blood in urine. He has fatigue. He is off cytoxan since  10/2013, is on MTX 8tab po qwk. MTX was started with 4 tab po qwk in 10/2013, was increased to 8 tab after last visit in 1/2014. In 12/2013, was felt to have a small flare of vasculitis with increased PR3, crusts in nose and R shoulder pain; therefore prednisone taper with max dose of 20 mg qd was given along with increasing MTX to 7 tab qwk. MTX was later increased to 8 tab qwk in 1/2014. Labs done in 2/2014 showed rising PR3. He had ER visit on 1/8 for urosepsis (E. Coli), was treated with cedifir, recovered. Continues having shoulder pain, requests referral to PT.    1/2015: He reports having increased arthralgia (shoulders, L hand) x a month. About 2 wk ago, L hand pain was so bad that he could not move his hand. No major joint swelling. Has no AM stiffness. Reports being tested negative for lyme (local lab) about 2 wk ago. Appetite is worse. He is more tired. He sleeps a lot. Has a cold x 1 wk, no hemoptysis. Neuropathy is the same. No SOB. No fever. Reports having a rash under L axilla x last 2 wk, comes and goes but to him looks like a lyme rash. He thinks he probably had a tick bite.    Last visit 3/2015: Rituximab qwk x 4 doses was recommended at last visit given concern for active GPA. He had his 1st infusion on 2/25/2015, right after start of infusion, developed hives without any resp sx, was treated with extra dose of benadryl and solu cortef. Hives resolved. Infusion was resumed at a slower rate and he finished and tolerated it well. Had his 2nd infusion on 3/4/2015 with no reaction although this time I increased his solumedrol from 100 to 250 mg as part of pre-medication. He has since completed all 4 infusions w/o reaction. States that nasal and joint symptoms remain improved. He still does have some crusting in his left nostril. Does have some pain and swelling in the hands w/minimal morning stiffness but that is his baseline. Says pain in his shoulders has improved. He denies any medication side  "effects.    He was seen in the ED 4/3/15 for productive cough and fever. He was treated with Levaquin for likely pna. He is now feeling better with only lingering cough.    10/16/2015: He is feeling well, has no complaints except cough at night because of sinus drainage (chronic). Requests pneumonia shot.    2/2016: Today patient reports feeling in excellent condition. Denies chest symptoms, such as SOB, hemoptysis, cough, or nasal drainage/bleeding. He also denies any hematuria and his last renal labs were stable. Unfortunately, his neuropathy seems to be his biggest problem. Neurology started him on a low dose of gabapentin without improvement.     5/6/16: Besides stuffy nose due to seasonal allergies, has no complaints. No cough, SOB, hemoptysis, hematuria, weight loss or fatigue.    8/12/16: Patient has no complaints today. No cough, SOB, hemoptysis, hematuria, weight loss or fatigue. He was seen today in Nephrology clinic for CKD follow up, doing well, no further follow up is needed. Patient reports visual disturbance with decreased vision probably on the R eye (paitent does not remember) with decreased to none visual strength in low visual field with some \"blanks\" which lasted for 40 seconds. This is a second episode in the last 1.5-2 years. Patient also reports recurrent double vision occuring every 4-5 months. Last ophthalmologist appointment was 1986.     11/18/16: Feeling very well, no complaints. Is going to have an eye exam today.    3/3/17: Mr. Ennis says he is doing well overall. He is still recovering from a pneumonia diagnosed on 2/14/17. He says that he was feeling ill for about 2 weeks before this with worsening productive cough, nasal congestion and high fevers with \"fever blisters\". He went to the ED on 2/14/17 and received ceftriaxone and a Z-pack and says he has been improving since then. He says that many other people at his work were sick with similar symptoms. Today he reports some residual " "nasal congestion but denies shortness of breath. He says that during his illness he did have some blood in his nose \"if I picked at it\" but he otherwise denies epistaxis, hemoptysis, hematuria and blood in his stools. His significant other is wondering if his methotrexate dose can start to be tapered down. Currently he is taking MTX 15mg weekly although this was held when he had pneumonia.    Mr. Ennis says that he continues to experience episodes of diplopia on average once per month. These episodes typically last 20-30 seconds. He was seen by ophthalmology on 11/18/16 and had a normal eye exam.    Today: Feeling well with no complaints. On MTX 4 tab po qwk.         Past Medical History:     Past Medical History:   Diagnosis Date     Arthritis      Autoimmune disease (H)      Hearing problem      Hoarseness      Hypertension      Kidney problem      Malignant melanoma (H)      Malignant neoplasm (H)     melanoma     Squamous cell carcinoma      Russo syndrome           Past Surgical History:     Past Surgical History:   Procedure Laterality Date     BIOPSY  11/2011    melanoma on back     DISSECT LYMPH NODE INGUINAL  3/1/2013    Procedure: DISSECT LYMPH NODE INGUINAL;  Bilateral Inguinal Lymph Node Biopsy.;  Surgeon: Tariq Acosta MD;  Location: [facility]     ESOPHAGOSCOPY, GASTROSCOPY, DUODENOSCOPY (EGD), COMBINED  7/5/2013    Procedure: COMBINED ESOPHAGOSCOPY, GASTROSCOPY, DUODENOSCOPY (EGD);  Gastroscopy;  Surgeon: Tariq Acosta MD;  Location: [facility]     HERNIORRHAPHY INGUINAL  8/6/2012    Procedure: HERNIORRHAPHY INGUINAL;  Open Repair Left Inguinal Hernia;  Surgeon: Jerad Baker MD;  Location: [facility]          Social History:     Social History   Substance Use Topics     Smoking status: Former Smoker     Packs/day: 1.00     Years: 20.00     Types: Cigarettes     Quit date: 2/14/2013     Smokeless tobacco: Never Used     Alcohol use Yes      Comment: rare          Family History:     Family " "History   Problem Relation Age of Onset     DIABETES Mother      Hypertension Mother      CANCER Father      stomach     HEART DISEASE Father      HEART DISEASE Brother      DIABETES Sister      DIABETES Sister      DIABETES Sister      HEART DISEASE Brother      CANCER Sister      Glaucoma No family hx of      Macular Degeneration No family hx of           Immunizations:   Due for PPSV23, will administer today. Immunizations otherwise up to date.    PMHx, FHx, SHx were reviewed, unchanged.         Allergies:     Allergies   Allergen Reactions     Shrimp Nausea and Vomiting     Got sick each time he ate it          Medications:     Current Outpatient Prescriptions   Medication Sig     Acetaminophen (TYLENOL PO) Take 650 mg by mouth once as needed for mild pain or fever     Blood Pressure Monitor KIT Automatic Blood Pressure Monitor     calcium-vitamin D (CALCIUM 600 + D) 600-400 MG-UNIT per tablet Take 1 tablet by mouth 2 times daily.     Cholecalciferol (VITAMIN D) 1000 UNITS capsule Take 1 capsule by mouth daily.     folic acid (FOLVITE) 1 MG tablet Take 2 tablets (2 mg) by mouth daily     gabapentin (NEURONTIN) 400 MG capsule Take 1 capsule (400 mg) by mouth At Bedtime     losartan (COZAAR) 50 MG tablet Take 1 tablet (50 mg) by mouth daily     methotrexate 2.5 MG tablet CHEMO Take 4 tablets (10 mg) by mouth every 7 days     methotrexate 2.5 MG tablet CHEMO Take 4 tablets (10 mg) by mouth once a week     mupirocin (BACTROBAN) 2 % ointment Use in nose twice daily for 5 days     No current facility-administered medications for this visit.           Review of Systems:   A comprehensive ROS was done. Positives are per HPI.           Physical Exam:     Vitals:    06/13/18 0927   BP: 136/77   Pulse: 55   Temp: 97.5  F (36.4  C)   TempSrc: Oral   SpO2: 100%   Weight: 84.6 kg (186 lb 6.4 oz)   Height: 1.727 m (5' 8\")     Constitutional:   Awake, alert, cooperative, no apparent distress. Falguni present, grandson present. "   Eyes:   Pupils equal, round and reactive to light, sclera clear, conjunctiva normal.   ENT:   Normocephalic, atramatic,  oral pharynx with moist mucus membranes, tonsils without erythema or exudates, dentures in place. Small dark black spot over left soft palate smaller and lighter in color.   Neck:   Supple, symmetrical, trachea midline, no adenopathy.   Lungs:   No increased work of breathing, good air exchange, clear to auscultation bilaterally, no crackles or wheezing.    Cardiovascular:   Regular rate and rhythm, normal S1 and S2, no S3 or S4, and no murmur noted.   Musculoskeletal:   No active synovitis or joint tenderness. Good ROM in all joints tested. No edema in LE.   Neurologic:   AOx3; CN grossly intact.   Skin:   No rashes or ecchymoses on limited exam.          Data:   Recent laboratory data reviewed.    2/14/17  Cr 1.46, WBC 7.8, AST/ALT normal    2/7/17  CRP 40.6, ESR 12, ANCA 1:20, MPO neg, IN-3 neg    Recent imaging studies reviewed by me.    CXR (2/14/17)    New mild patchy opacity at the right lung base appears to  localize to the posterior right lower lobe base and could represent a  developing area of pneumonia. Left lung is clear.    CHEST CT (2/16/2013)    Chest: Multiple indeterminate pulmonary nodules. Several of the  larger lesions are cavitary. There are 3 dominant lesions, a 3.2 cm  cavitary lesion in the left upper lobe, a 3.4 cm solid mass in the  right lower lobe laterally and a 3.7 cm cavitary mass in the right  lower lobe medially. In addition there is mediastinal lymphadenopathy  with a dominant 3 cm subcarinal node. Bilateral axillary  lymphadenopathy with 2.4 cm node seen bilaterally. Chest otherwise  unremarkable.       Exam: High-resolution Chest CT without contrast 1/9/2015 12:42 PM.  History: Concern for ANCA vasculitis flare in the chest.  Comparison: 3/7/2014, 11/14/2013, 2/16/2013.  Technique: CT helical acquisition from the lung apices to the kidneys  was obtained  without intravenous contrast. Both inspiratory and  expiratory images were obtained.    Findings:  Moderate atherosclerotic calcifications of the coronary arteries. Mild  calcifications involving the aorta and aortic great vessels. Prominent  paratracheal lymph node on series 3 image 22 measuring 9 mm. Decreased  from 2/16/2013 and unchanged from 3/7/2014. Borderline enlarged right  hilar lymph node, unchanged from 3/7/2014 and decreased from  2/16/2013. Heart is not enlarged. Trace cardial effusion. No axillary  lymphadenopathy.  Nodular scarring in the left apex, unchanged from 2/16/2013. No  pleural effusion or pneumothorax. No evidence of intrapulmonary  infection. There is a cluster of tree in bud nodularity noted in the  anteromedial right upper lobe. These nodules appear new. Expiratory  images demonstrate mild air trapping. Scattered pulmonary nodules:  Series 6 image 146: Seen posteriorly in the right lower lobe, there is  a sub-pleural nodule measuring 1.6 x 1.2 cm with a spiculated border.  Previously, this nodule measured 2.0 x 1.6 cm.  Series 6 image 189: Seen laterally in the right lower lobe, there is a  former mid pulmonary nodule which is unchanged from 3/7/2013 and  decreased from 2/16/2013.  Series 6 image 169: Seen posterolaterally in the right lower lobe,  there is a 3 mm pulmonary nodule which is unchanged from 3/7/2014 and  decreased from 2/16/2013.  Series 6 image 225: Seen posterolaterally in the left lower lobe,  there is a 4 mm subpleural nodule which is unchanged from 2/16/2014.  Other scattered sub-4 mm pulmonary nodules appear stable from previous  study.  Evaluation of the upper abdomen is limited due to the lack of  intravenous contrast.  No suspicious bony lesions.    IMPRESSION  Impression:   1. Scattered pulmonary nodules enlarged lymph nodes are  stable/slightly decreased from the previous study. No evidence for  acute flare in patient's known ANCA-associated vasculitis.  2. New  tree in bud nodularity seen anteromedially in the right middle  lobe. Findings could be seen in the setting of an atypical infectious  process.  3. Mild air trapping. Finding is nonspecific and is seen in setting of  small airways disease such as asthma or bronchiolitis.  I have personally reviewed the examination and initial interpretation  and I agree with the findings.  TOÑO PERKINS MD    Component      Latest Ref Rng 6/11/2015 6/11/2015           9:26 AM  9:29 AM   Sodium      133 - 144 mmol/L  139   Potassium      3.4 - 5.3 mmol/L  4.4   Chloride      94 - 109 mmol/L  109   Carbon Dioxide      20 - 32 mmol/L  23   Anion Gap      3 - 14 mmol/L  7   Glucose      70 - 99 mg/dL  81   Urea Nitrogen      7 - 30 mg/dL  28   Creatinine      0.66 - 1.25 mg/dL  1.26 (H)   GFR Estimate      >60 mL/min/1.7m2  57 (L)   GFR Estimate If Black      >60 mL/min/1.7m2  69   Calcium      8.5 - 10.1 mg/dL  9.2   Phosphorus      2.5 - 4.5 mg/dL  2.1 (L)   Albumin      3.4 - 5.0 g/dL  1.7 (L)   Iron      35 - 180 ug/dL  129   Iron Binding Cap      240 - 430 ug/dL  292   Iron Saturation Index      15 - 46 %  44   Protein Random Urine       0.11    Protein Total Urine g/gr Creatinine      0 - 0.2 g/g Cr 0.14    Hemoglobin      13.3 - 17.7 g/dL  13.0 (L)   Ferritin      26 - 388 ng/mL  193   Parathormone Intact      12 - 72 pg/mL  49   Vitamin D Deficiency screening      30 - 75 ug/L  55   Creatinine Urine       84    Copath Report           Proteinase 3 Antibody IgG      0.0 - 0.9 AI       Component      Latest Ref Rng 6/11/2015 6/11/2015          11:15 AM 12:18 PM   Copath Report       Patient Name: ADONAY FAITH . . .    Proteinase 3 Antibody IgG      0.0 - 0.9 AI  0.8     Copath Report     Patient Name: ADONAY FAITH  MR#: [id number]  Specimen #: [id number]  Collected: 6/11/2015  Received: 6/11/2015  Reported: 6/12/2015 17:37  Ordering Phy(s): DANIELLE PARIS    SPECIMEN(S):  Nasal septum    FINAL DIAGNOSIS:  Nasal septum,  "biopsy:  -Squamous mucosa with ulcer, marked acute inflammation and reactive  changes  -No granulomas identified  -A special stain for fungi (GMS) is negative    I have personally reviewed all specimens and or slides, including the  listed special stains, and used them with my medical judgement to  determine the final diagnosis.    Electronically signed out by:    Dorys Barton M.D., [facility]    CLINICAL HISTORY:  The patient is a 70-year-old man with a history of left upper back  melanoma, resected in 2011 (see consult report [id number]). 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 He now presents for followup visit with nasal  cavity scabs, increased arthralgia and fatigue, concerning for  vasculitis flare. Operative procedure: Nasal septum biopsy.    GROSS:  The specimen is received in formalin with proper patient identification,  labeled \"septal tissue\". The specimen consists of three tan-pink tissue  fragments, 0.2 cm in greatest dimension, entirely submitted in cassette  1. (Dictated by: Ana Mixon 6/11/2015 03:39 PM)    MICROSCOPIC:  Microscopic examination is performed. A GMS stain, performed with  appropriate positive control, is negative.    CPT Codes:  A: 37313-JL3, 38623-CGO    TESTING LAB LOCATION:  [facility], [address] [address]  [phone]    COLLECTION SITE:  Client: [facility] acute hospital  Location: [facility] (B)       Component      Latest Ref Rng & Units 3/13/2018   WBC      4.0 - 11.0 10e9/L 5.8   RBC Count      4.4 - 5.9 10e12/L 4.27 (L)   Hemoglobin      13.3 - 17.7 g/dL 13.9   Hematocrit      40.0 - 53.0 % 41.2   MCV      78 - 100 fl 97   MCH      26.5 - 33.0 pg 32.6   MCHC      31.5 " - 36.5 g/dL 33.7   RDW      10.0 - 15.0 % 12.5   Platelet Count      150 - 450 10e9/L 177   Diff Method       Automated Method   % Neutrophils      % 57.5   % Lymphocytes      % 25.1   % Monocytes      % 11.7   % Eosinophils      % 4.8   % Basophils      % 0.9   Absolute Neutrophil      1.6 - 8.3 10e9/L 3.3   Absolute Lymphocytes      0.8 - 5.3 10e9/L 1.5   Absolute Monocytes      0.0 - 1.3 10e9/L 0.7   Absolute Eosinophils      0.0 - 0.7 10e9/L 0.3   Absolute Basophils      0.0 - 0.2 10e9/L 0.1   Color Urine       Yellow   Appearance Urine       Clear   Glucose Urine      NEG:Negative mg/dL Negative   Bilirubin Urine      NEG:Negative Negative   Ketones Urine      NEG:Negative mg/dL Negative   Specific Gravity Urine      1.003 - 1.035 1.010   pH Urine      5.0 - 7.0 pH 6.0   Protein Albumin Urine      NEG:Negative mg/dL Negative   Urobilinogen Urine      0.2 - 1.0 EU/dL 0.2   Nitrite Urine      NEG:Negative Negative   Blood Urine      NEG:Negative Negative   Leukocyte Esterase Urine      NEG:Negative Negative   Source       Midstream Urine   WBC Urine      OTO5:0 - 5 /HPF 0 - 5   RBC Urine      OTO2:O - 2 /HPF O - 2   Creatinine      0.66 - 1.25 mg/dL 1.47 (H)   GFR Estimate      >60 mL/min/1.7m2 47 (L)   GFR Estimate If Black      >60 mL/min/1.7m2 57 (L)   Myeloperoxidase Antibody IgG      0.0 - 0.9 AI <0.2   Proteinase 3 Antibody IgG      0.0 - 0.9 AI 2.4 (H)   AST      0 - 45 U/L 26   ALT      0 - 70 U/L 30   Albumin      3.4 - 5.0 g/dL 3.7   CRP Inflammation      0.0 - 8.0 mg/L <2.9   Sed Rate      0 - 20 mm/h 5   Neutrophil Cytoplasmic IgG Antibody      <1:20 <1:20

## 2018-06-13 NOTE — PROGRESS NOTES
Joshua saw me previously for a nasal lesion and oral cavity lesion.  He had a dark pigmented lesion on his palate that appeared benign - we did not biopsy.  There has been no change.  He also reports no change in nasal lesion.      Exam:  Small area on left posterior palate unchanged, not ulcerated, no mass like appearance.  Left caudal septum with scab.  Removed scab, slight bleeding cauterized with silver nitrate. Small perf with smooth edges.     Recommend consistent use of mupirocin ointment.  No indication to biopsy.  See me in 6 months.

## 2018-06-13 NOTE — NURSING NOTE
Chief Complaint   Patient presents with     RECHECK     requested by dr. Laura Pruitt Medical Assistant

## 2018-06-13 NOTE — LETTER
6/13/2018      RE: Joshua Ennis  [address] [facility] [address]       [facility] Follow-Up Note  Date of visit: 6/13/2018  Last visit date: 12/13/2017      Joshua Ennis MRN# [id number]   Age: 73 year old YOB: 1945          Assessment and Plan:     Assessment:   Mr. Ennis is a 73 yr old  male with history of melanoma s/p resection in 11/2011, former tobacco use, and GPA (PR3+, MPO and c-ANCA negative in 3/2013 based on aforementioned labs, confirmed by kidney and lung biopsy) who presents for clinic follow-up regarding GPA.  He was initiated on cyclophosphamide 150 mg QD and high-dose prednisone 3/2013. He last took prednisone in 10/2013. Cytoxan was switched to MTX for maintenance treatment in 10/2013.  He has been tolerating it well. He had a flare in 12/2013 with increased crusts in his nose along with recurrence of arthralgia, worsening numbness in feet and increasing vasculitis marker. Renal function was stable with negative repeat chest CT. His vasculitis flare was treated with short course of prednisone taper 20 mg po qd max and increasing MTX to 8 tab = 20 mg qwk. Vasculitis symptoms improved.  At visit on 1/2015, he had scabs in his nose, had cold [place] symptoms and increased arthralgia/fatigue (shoulders, L hand). Labs from 12/16 showed increased Cr level and hematuria with rising PR3 (more than 8), there was a concern for vasculitis flare (in kidneys, sinuses), no flare on repeat chest CT 1/2015. Therefore it was recommended to try rituximab. Another reason was to be able to taper MTX off given abnormal Cr.  He is now s/p Rituximab infusion x 4 (1st on 2/25/2015). He is feeling well. No hematuria with stable Cr, no SOB. Saw ENT with negative nasal mucosa biopsy 6/2015 for granuloma but showed active inflammation, had left nostril lesion which decreased in size by use of mupirocin ointment. PR3 vasculitis marker went back to NL in 6/2015, it was NL in  10/2015, given stable vasculitis and low GFR, MTX from 8 to 7 tab po qwk. Repeat ME-3 in 3/2016 was slightly positive, Cr was slightly higher than baseline. We tapered his MTX further to 6 tab po qwk in 2/2016 given lack of symptoms. Re-check labs in April were almost unchanged from 3/2016 but CRP was slightly up. Labs on 2/7/17 with elevated CRP (40.6) but normal ESR (12) and ANCA 1:20 likely reflecting bacterial infection.    In 3/2017, MTX was reduced from 6 to 5 tab qwk. It was further tapered to 4 tab po qwk in 6/2017 given stable disease. No vasculitis flare ups by such change. ANCA remained negative, ME-3 is going down despite tapering MTX, dropped from 2.8 in 4/2017 to 2.3 in 6/2017 to 2 in 8/2017. 2.4 in 3/2018.    Vasculitis seems to be stable and he has no signs or symptoms of flare up today. Recommend to continue MTX at low dose of 4 tab po qwk as GPA or ANCA-vasculitis tends to flare up. Was advised to call in case of vasculitis flare up sx.    He had labs done today, will follow the results.        Plan:    - Continue MTX 10 mg (4 tabs) po qwk, continue with Folic acid 1 mg po qd    > Labs today and q3 months including MTX monitoring labs    > Patient instructed to hold MTX if develops fevers or infection  - Neuropathy unchanged on Gabapentin  - F/u with Dr. Obando ENT for small bluish pigmented lesion in the left soft palate.  Dr. Obando recommended biopsy in 5/2017 but pt wanted to hold off, looks smaller in size to me  -Shingrix vaccine. Recommended shingrix. CDC recommends this vaccine 2 doses,  by 2-6 months for adults 50 years and older. It is killed virus and ok to be used in immunocompromised patients. Shingrix reduces the risk of shingles and PHN by more than 90% in people 50 and older.     AE include: pain, redness and swelling at the inj site, myalgia, fatigue, headaches, shivering, fever and stomach upset.    He is going to get it locally.    - RTC in 6 months         Orders Placed  This Encounter   Procedures     Albumin level     ALT     AST     CBC with platelets differential     Creatinine     CRP inflammation     Erythrocyte sedimentation rate auto     Antineutrophil cytoplasmic Anju IgG     Routine UA with micro reflex to culture     Protein  random urine with Creat Ratio     Creatinine random urine             HPI / Interim History:      Mr. Ennis is a 69 yo WM with h/o melanoma s/p resection in 11/2011, former tobacco use, and GPA diagnosed in 3/2013. For details of GPA diagnosis, please refer to initial consult note. Briefly, he initially presented with constitutional sx of fever, sweating, weight loss, migratory arthralgia, numbness/shooting pain in feet to his PCP.  Had enlarged inguinal LNs. His PCP suspected malignancy and especially lung cancer given h/o tobacco use.  His work-up showed several lung nodules and CT guided biopsy of one of the nodules done in 2/13 showed granulomatous inflammation with no evidence of malignancy.  Inguinal LN biopsy done 3/13 showed inflammation with no malignancy.  He had not had any respiratory sx, SOB, CP, cough, hemoptysis or sinusitis.  He was hospitalized for further work up and was found having high PR3, neg MPO, neg ANCA.  He also had + RF and trace cryo.   Had microscopic hematuria with NL Cr at the time of admission but his Cr started to rise before discharge. Had abnormal LFTs toward the end of discharge(was trending down, liver US was unremarkable).  He was diagnosed with GPA given high titer of PR3 to 723, +granulomatous inflammation of the lung nodule, microscopic hematuria, and migratory arthralgia along with high ESR/CRP and constitutional sx.  He was Started on cytoxan 150 mg po qd, prednisone 70 mg po qd (after IV solumedrol 1 gr qd x 3 days), Bactrim DS three times a wk for PCP prophylaxis and fosamax 70 mg po qwk. He was also put on vit D 58444 units qwk and neurontin.  He was discharged on 3/13/2013.   At initial visit in  Rheumatology clinic, patient was referred to nephrology for renal biopsy since Cr was up despite being on cytoxan and prednisone 75 qd.  Renal biopsy 3/13 confirmed Dx of GPA.  After discussion with Dr. Krishnamurthy, made a decision to continue with current regimen and if no improvement to switch to IV cytoxan or rituximab.  His Cr initially increased to 1.71, but stabilized to ~ 1.3.  He has a h/o high BP and was recently started on losartan per nephology.  He has been seen by neurology for bilaterally foot and ankle numbness and was diagnosed with neuropathy possibly secondary to vasculitis.  He was also seen by oncology; there was no concern for malignancy.   Overall, the patient is doing well today with no specific complaints or concerns.  He had recent blood work 9/19 which showed negative inflammatory and vasculitis markers (PR3, MPO, ANCA, and CRP).  His SCr has remained elevated, but stable at 1.37.  He is at the tail end of a prednisone taper; currently taking 1 mg QD, set to d/c 10/15.  He is still on cyclophosphamide, taking 150 mg QD.  He was prescribed losartan at a f/u visit w/ nephrology 8/21, but states his BP have been running 130's/80's, so has not taken the medication for over a month.  Additionally, he had been taking neurontin for numbness in his feet, toes, and ankles, but stopped b/c he was not getting symptom relief.  Today, the patient denies vision changes, epistaxis, oral ulcerations, SOB, hemoptysis, CP, joint pain, abdominal pain, dysuria, or hematuria.  He does endorse a cough, but states that this has been intermittent, chronic, and non-productive.  Additionally, he endorses right shoulder discomfort that is worse w/ abduction, but has also been chronic. Of note, the patient remains a former smoker, having quit in February of this year.     His major complaint is increased numbness of his toes, but has resumed taking neurontin and feels sensation is coming back which is painful. No SOB,  cough, crusts in nose or blood in urine. He has fatigue. He is off cytoxan since 10/2013, is on MTX 8tab po qwk. MTX was started with 4 tab po qwk in 10/2013, was increased to 8 tab after last visit in 1/2014. In 12/2013, was felt to have a small flare of vasculitis with increased PR3, crusts in nose and R shoulder pain; therefore prednisone taper with max dose of 20 mg qd was given along with increasing MTX to 7 tab qwk. MTX was later increased to 8 tab qwk in 1/2014. Labs done in 2/2014 showed rising PR3. He had ER visit on 1/8 for urosepsis (E. Coli), was treated with cedifir, recovered. Continues having shoulder pain, requests referral to PT.    1/2015: He reports having increased arthralgia (shoulders, L hand) x a month. About 2 wk ago, L hand pain was so bad that he could not move his hand. No major joint swelling. Has no AM stiffness. Reports being tested negative for lyme (local lab) about 2 wk ago. Appetite is worse. He is more tired. He sleeps a lot. Has a cold x 1 wk, no hemoptysis. Neuropathy is the same. No SOB. No fever. Reports having a rash under L axilla x last 2 wk, comes and goes but to him looks like a lyme rash. He thinks he probably had a tick bite.    Last visit 3/2015: Rituximab qwk x 4 doses was recommended at last visit given concern for active GPA. He had his 1st infusion on 2/25/2015, right after start of infusion, developed hives without any resp sx, was treated with extra dose of benadryl and solu cortef. Hives resolved. Infusion was resumed at a slower rate and he finished and tolerated it well. Had his 2nd infusion on 3/4/2015 with no reaction although this time I increased his solumedrol from 100 to 250 mg as part of pre-medication. He has since completed all 4 infusions w/o reaction. States that nasal and joint symptoms remain improved. He still does have some crusting in his left nostril. Does have some pain and swelling in the hands w/minimal morning stiffness but that is his  "baseline. Says pain in his shoulders has improved. He denies any medication side effects.    He was seen in the ED 4/3/15 for productive cough and fever. He was treated with Levaquin for likely pna. He is now feeling better with only lingering cough.    10/16/2015: He is feeling well, has no complaints except cough at night because of sinus drainage (chronic). Requests pneumonia shot.    2/2016: Today patient reports feeling in excellent condition. Denies chest symptoms, such as SOB, hemoptysis, cough, or nasal drainage/bleeding. He also denies any hematuria and his last renal labs were stable. Unfortunately, his neuropathy seems to be his biggest problem. Neurology started him on a low dose of gabapentin without improvement.     5/6/16: Besides stuffy nose due to seasonal allergies, has no complaints. No cough, SOB, hemoptysis, hematuria, weight loss or fatigue.    8/12/16: Patient has no complaints today. No cough, SOB, hemoptysis, hematuria, weight loss or fatigue. He was seen today in Nephrology clinic for CKD follow up, doing well, no further follow up is needed. Patient reports visual disturbance with decreased vision probably on the R eye (paitent does not remember) with decreased to none visual strength in low visual field with some \"blanks\" which lasted for 40 seconds. This is a second episode in the last 1.5-2 years. Patient also reports recurrent double vision occuring every 4-5 months. Last ophthalmologist appointment was 1986.     11/18/16: Feeling very well, no complaints. Is going to have an eye exam today.    3/3/17: Mr. Ennis says he is doing well overall. He is still recovering from a pneumonia diagnosed on 2/14/17. He says that he was feeling ill for about 2 weeks before this with worsening productive cough, nasal congestion and high fevers with \"fever blisters\". He went to the ED on 2/14/17 and received ceftriaxone and a Z-pack and says he has been improving since then. He says that many other " "people at his work were sick with similar symptoms. Today he reports some residual nasal congestion but denies shortness of breath. He says that during his illness he did have some blood in his nose \"if I picked at it\" but he otherwise denies epistaxis, hemoptysis, hematuria and blood in his stools. His significant other is wondering if his methotrexate dose can start to be tapered down. Currently he is taking MTX 15mg weekly although this was held when he had pneumonia.    Mr. Ennis says that he continues to experience episodes of diplopia on average once per month. These episodes typically last 20-30 seconds. He was seen by ophthalmology on 11/18/16 and had a normal eye exam.    Today: Feeling well with no complaints. On MTX 4 tab po qwk.         Past Medical History:     Past Medical History:   Diagnosis Date     Arthritis      Autoimmune disease (H)      Hearing problem      Hoarseness      Hypertension      Kidney problem      Malignant melanoma (H)      Malignant neoplasm (H)     melanoma     Squamous cell carcinoma      Russo syndrome           Past Surgical History:     Past Surgical History:   Procedure Laterality Date     BIOPSY  11/2011    melanoma on back     DISSECT LYMPH NODE INGUINAL  3/1/2013    Procedure: DISSECT LYMPH NODE INGUINAL;  Bilateral Inguinal Lymph Node Biopsy.;  Surgeon: Tariq Acosta MD;  Location: [facility]     ESOPHAGOSCOPY, GASTROSCOPY, DUODENOSCOPY (EGD), COMBINED  7/5/2013    Procedure: COMBINED ESOPHAGOSCOPY, GASTROSCOPY, DUODENOSCOPY (EGD);  Gastroscopy;  Surgeon: Tariq Acosta MD;  Location: [facility]     HERNIORRHAPHY INGUINAL  8/6/2012    Procedure: HERNIORRHAPHY INGUINAL;  Open Repair Left Inguinal Hernia;  Surgeon: Jerad Baker MD;  Location: [facility]          Social History:     Social History   Substance Use Topics     Smoking status: Former Smoker     Packs/day: 1.00     Years: 20.00     Types: Cigarettes     Quit date: 2/14/2013     Smokeless tobacco: Never " "Used     Alcohol use Yes      Comment: rare          Family History:     Family History   Problem Relation Age of Onset     DIABETES Mother      Hypertension Mother      CANCER Father      stomach     HEART DISEASE Father      HEART DISEASE Brother      DIABETES Sister      DIABETES Sister      DIABETES Sister      HEART DISEASE Brother      CANCER Sister      Glaucoma No family hx of      Macular Degeneration No family hx of           Immunizations:   Due for PPSV23, will administer today. Immunizations otherwise up to date.    PMHx, FHx, SHx were reviewed, unchanged.         Allergies:     Allergies   Allergen Reactions     Shrimp Nausea and Vomiting     Got sick each time he ate it          Medications:     Current Outpatient Prescriptions   Medication Sig     Acetaminophen (TYLENOL PO) Take 650 mg by mouth once as needed for mild pain or fever     Blood Pressure Monitor KIT Automatic Blood Pressure Monitor     calcium-vitamin D (CALCIUM 600 + D) 600-400 MG-UNIT per tablet Take 1 tablet by mouth 2 times daily.     Cholecalciferol (VITAMIN D) 1000 UNITS capsule Take 1 capsule by mouth daily.     folic acid (FOLVITE) 1 MG tablet Take 2 tablets (2 mg) by mouth daily     gabapentin (NEURONTIN) 400 MG capsule Take 1 capsule (400 mg) by mouth At Bedtime     losartan (COZAAR) 50 MG tablet Take 1 tablet (50 mg) by mouth daily     methotrexate 2.5 MG tablet CHEMO Take 4 tablets (10 mg) by mouth every 7 days     methotrexate 2.5 MG tablet CHEMO Take 4 tablets (10 mg) by mouth once a week     mupirocin (BACTROBAN) 2 % ointment Use in nose twice daily for 5 days     No current facility-administered medications for this visit.           Review of Systems:   A comprehensive ROS was done. Positives are per HPI.           Physical Exam:     Vitals:    06/13/18 0927   BP: 136/77   Pulse: 55   Temp: 97.5  F (36.4  C)   TempSrc: Oral   SpO2: 100%   Weight: 84.6 kg (186 lb 6.4 oz)   Height: 1.727 m (5' 8\")     Constitutional:   " Awake, alert, cooperative, no apparent distress. Falguni present, grandson present.   Eyes:   Pupils equal, round and reactive to light, sclera clear, conjunctiva normal.   ENT:   Normocephalic, atramatic,  oral pharynx with moist mucus membranes, tonsils without erythema or exudates, dentures in place. Small dark black spot over left soft palate smaller and lighter in color.   Neck:   Supple, symmetrical, trachea midline, no adenopathy.   Lungs:   No increased work of breathing, good air exchange, clear to auscultation bilaterally, no crackles or wheezing.    Cardiovascular:   Regular rate and rhythm, normal S1 and S2, no S3 or S4, and no murmur noted.   Musculoskeletal:   No active synovitis or joint tenderness. Good ROM in all joints tested. No edema in LE.   Neurologic:   AOx3; CN grossly intact.   Skin:   No rashes or ecchymoses on limited exam.          Data:   Recent laboratory data reviewed.    2/14/17  Cr 1.46, WBC 7.8, AST/ALT normal    2/7/17  CRP 40.6, ESR 12, ANCA 1:20, MPO neg, AZ-3 neg    Recent imaging studies reviewed by me.    CXR (2/14/17)    New mild patchy opacity at the right lung base appears to  localize to the posterior right lower lobe base and could represent a  developing area of pneumonia. Left lung is clear.    CHEST CT (2/16/2013)    Chest: Multiple indeterminate pulmonary nodules. Several of the  larger lesions are cavitary. There are 3 dominant lesions, a 3.2 cm  cavitary lesion in the left upper lobe, a 3.4 cm solid mass in the  right lower lobe laterally and a 3.7 cm cavitary mass in the right  lower lobe medially. In addition there is mediastinal lymphadenopathy  with a dominant 3 cm subcarinal node. Bilateral axillary  lymphadenopathy with 2.4 cm node seen bilaterally. Chest otherwise  unremarkable.       Exam: High-resolution Chest CT without contrast 1/9/2015 12:42 PM.  History: Concern for ANCA vasculitis flare in the chest.  Comparison: 3/7/2014, 11/14/2013,  2/16/2013.  Technique: CT helical acquisition from the lung apices to the kidneys  was obtained without intravenous contrast. Both inspiratory and  expiratory images were obtained.    Findings:  Moderate atherosclerotic calcifications of the coronary arteries. Mild  calcifications involving the aorta and aortic great vessels. Prominent  paratracheal lymph node on series 3 image 22 measuring 9 mm. Decreased  from 2/16/2013 and unchanged from 3/7/2014. Borderline enlarged right  hilar lymph node, unchanged from 3/7/2014 and decreased from  2/16/2013. Heart is not enlarged. Trace cardial effusion. No axillary  lymphadenopathy.  Nodular scarring in the left apex, unchanged from 2/16/2013. No  pleural effusion or pneumothorax. No evidence of intrapulmonary  infection. There is a cluster of tree in bud nodularity noted in the  anteromedial right upper lobe. These nodules appear new. Expiratory  images demonstrate mild air trapping. Scattered pulmonary nodules:  Series 6 image 146: Seen posteriorly in the right lower lobe, there is  a sub-pleural nodule measuring 1.6 x 1.2 cm with a spiculated border.  Previously, this nodule measured 2.0 x 1.6 cm.  Series 6 image 189: Seen laterally in the right lower lobe, there is a  former mid pulmonary nodule which is unchanged from 3/7/2013 and  decreased from 2/16/2013.  Series 6 image 169: Seen posterolaterally in the right lower lobe,  there is a 3 mm pulmonary nodule which is unchanged from 3/7/2014 and  decreased from 2/16/2013.  Series 6 image 225: Seen posterolaterally in the left lower lobe,  there is a 4 mm subpleural nodule which is unchanged from 2/16/2014.  Other scattered sub-4 mm pulmonary nodules appear stable from previous  study.  Evaluation of the upper abdomen is limited due to the lack of  intravenous contrast.  No suspicious bony lesions.    IMPRESSION  Impression:   1. Scattered pulmonary nodules enlarged lymph nodes are  stable/slightly decreased from the  previous study. No evidence for  acute flare in patient's known ANCA-associated vasculitis.  2. New tree in bud nodularity seen anteromedially in the right middle  lobe. Findings could be seen in the setting of an atypical infectious  process.  3. Mild air trapping. Finding is nonspecific and is seen in setting of  small airways disease such as asthma or bronchiolitis.  I have personally reviewed the examination and initial interpretation  and I agree with the findings.  TOÑO PERKINS MD    Component      Latest Ref Rng 6/11/2015 6/11/2015           9:26 AM  9:29 AM   Sodium      133 - 144 mmol/L  139   Potassium      3.4 - 5.3 mmol/L  4.4   Chloride      94 - 109 mmol/L  109   Carbon Dioxide      20 - 32 mmol/L  23   Anion Gap      3 - 14 mmol/L  7   Glucose      70 - 99 mg/dL  81   Urea Nitrogen      7 - 30 mg/dL  28   Creatinine      0.66 - 1.25 mg/dL  1.26 (H)   GFR Estimate      >60 mL/min/1.7m2  57 (L)   GFR Estimate If Black      >60 mL/min/1.7m2  69   Calcium      8.5 - 10.1 mg/dL  9.2   Phosphorus      2.5 - 4.5 mg/dL  2.1 (L)   Albumin      3.4 - 5.0 g/dL  1.7 (L)   Iron      35 - 180 ug/dL  129   Iron Binding Cap      240 - 430 ug/dL  292   Iron Saturation Index      15 - 46 %  44   Protein Random Urine       0.11    Protein Total Urine g/gr Creatinine      0 - 0.2 g/g Cr 0.14    Hemoglobin      13.3 - 17.7 g/dL  13.0 (L)   Ferritin      26 - 388 ng/mL  193   Parathormone Intact      12 - 72 pg/mL  49   Vitamin D Deficiency screening      30 - 75 ug/L  55   Creatinine Urine       84    Copath Report           Proteinase 3 Antibody IgG      0.0 - 0.9 AI       Component      Latest Ref Rng 6/11/2015 6/11/2015          11:15 AM 12:18 PM   Copath Report       Patient Name: ADONAY FAITH . . .    Proteinase 3 Antibody IgG      0.0 - 0.9 AI  0.8     Copath Report     Patient Name: ADONAY FAITH  MR#: [id number]  Specimen #: [id number]  Collected: 6/11/2015  Received: 6/11/2015  Reported: 6/12/2015  "17:37  Ordering Phy(s): DANIELLE PARIS    SPECIMEN(S):  Nasal septum    FINAL DIAGNOSIS:  Nasal septum, biopsy:  -Squamous mucosa with ulcer, marked acute inflammation and reactive  changes  -No granulomas identified  -A special stain for fungi (GMS) is negative    I have personally reviewed all specimens and or slides, including the  listed special stains, and used them with my medical judgement to  determine the final diagnosis.    Electronically signed out by:    Dorys Barton M.D., [facility] [[email or web address]]    CLINICAL HISTORY:  The patient is a 70-year-old man with a history of left upper back  melanoma, resected in 2011 (see consult report [id number]). 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 He now presents for followup visit with nasal  cavity scabs, increased arthralgia and fatigue, concerning for  vasculitis flare. Operative procedure: Nasal septum biopsy.    GROSS:  The specimen is received in formalin with proper patient identification,  labeled \"septal tissue\". The specimen consists of three tan-pink tissue  fragments, 0.2 cm in greatest dimension, entirely submitted in cassette  1. (Dictated by: Ana Mixon 6/11/2015 03:39 PM)    MICROSCOPIC:  Microscopic examination is performed. A GMS stain, performed with  appropriate positive control, is negative.    CPT Codes:  A: 98815-MT8, 21414-KYT    TESTING LAB LOCATION:  [facility], [address] [id number] [phone]    COLLECTION SITE:  Client: [facility]  Location: [facility] (B)       Component      Latest Ref Rng & Units 3/13/2018   WBC      4.0 - 11.0 10e9/L 5.8   RBC Count      4.4 - 5.9 10e12/L 4.27 (L)   Hemoglobin      13.3 - 17.7 g/dL 13.9 "   Hematocrit      40.0 - 53.0 % 41.2   MCV      78 - 100 fl 97   MCH      26.5 - 33.0 pg 32.6   MCHC      31.5 - 36.5 g/dL 33.7   RDW      10.0 - 15.0 % 12.5   Platelet Count      150 - 450 10e9/L 177   Diff Method       Automated Method   % Neutrophils      % 57.5   % Lymphocytes      % 25.1   % Monocytes      % 11.7   % Eosinophils      % 4.8   % Basophils      % 0.9   Absolute Neutrophil      1.6 - 8.3 10e9/L 3.3   Absolute Lymphocytes      0.8 - 5.3 10e9/L 1.5   Absolute Monocytes      0.0 - 1.3 10e9/L 0.7   Absolute Eosinophils      0.0 - 0.7 10e9/L 0.3   Absolute Basophils      0.0 - 0.2 10e9/L 0.1   Color Urine       Yellow   Appearance Urine       Clear   Glucose Urine      NEG:Negative mg/dL Negative   Bilirubin Urine      NEG:Negative Negative   Ketones Urine      NEG:Negative mg/dL Negative   Specific Gravity Urine      1.003 - 1.035 1.010   pH Urine      5.0 - 7.0 pH 6.0   Protein Albumin Urine      NEG:Negative mg/dL Negative   Urobilinogen Urine      0.2 - 1.0 EU/dL 0.2   Nitrite Urine      NEG:Negative Negative   Blood Urine      NEG:Negative Negative   Leukocyte Esterase Urine      NEG:Negative Negative   Source       Midstream Urine   WBC Urine      OTO5:0 - 5 /HPF 0 - 5   RBC Urine      OTO2:O - 2 /HPF O - 2   Creatinine      0.66 - 1.25 mg/dL 1.47 (H)   GFR Estimate      >60 mL/min/1.7m2 47 (L)   GFR Estimate If Black      >60 mL/min/1.7m2 57 (L)   Myeloperoxidase Antibody IgG      0.0 - 0.9 AI <0.2   Proteinase 3 Antibody IgG      0.0 - 0.9 AI 2.4 (H)   AST      0 - 45 U/L 26   ALT      0 - 70 U/L 30   Albumin      3.4 - 5.0 g/dL 3.7   CRP Inflammation      0.0 - 8.0 mg/L <2.9   Sed Rate      0 - 20 mm/h 5   Neutrophil Cytoplasmic IgG Antibody      <1:20 <1:20       Ede Sanchez MD

## 2018-06-13 NOTE — MR AVS SNAPSHOT
After Visit Summary   2018    Joshua Ennis    MRN: [id number]           Patient Information     Date Of Birth          1945        Visit Information        Provider Department      2018 9:00 AM Gabbi Obando MD [facility]        Today's Diagnoses     Nasal lesion    -  1      Care Instructions    Please follow up in about 6 months if the symptoms persist.  Ian Platt ,RN  [phone]              Follow-ups after your visit        Follow-up notes from your care team     Return in about 6 months (around 2018).      Your next 10 appointments already scheduled     2018 10:00 AM CDT   (Arrive by 9:45 AM)   Return Visit with MD KILLIAN [facility] ([facility] [place])    [address] [place] [address]  [address]  [facility] [address] [phone]              Who to contact     Please call your clinic at [phone] to:    Ask questions about your health    Make or cancel appointments    Discuss your medicines    Learn about your test results    Speak to your doctor            Additional Information About Your Visit        MyChart Information     [email or web address] is an electronic gateway that provides easy, online access to your medical records. With [email or web address], you can request a clinic appointment, read your test results, renew a prescription or communicate with your care team.     To sign up for [employer] visit the website at [email or web address]   You will be asked to enter the access code listed below, as well as some personal information. Please follow the directions to create your username and password.     Your access code is: [email or web address]  Expires: 2018  6:30 AM     Your access code will  in 90 days. If you need help or a new code, please contact your [facility] or call [phone] for assistance.        Care EveryWhere ID     This is your Care EveryWhere ID. This could  "be used by other organizations to access your [facility] medical records  [id number]        Your Vitals Were     Height BMI (Body Mass Index)                1.727 m (5' 7.99\") 27.38 kg/m2           Blood Pressure from Last 3 Encounters:   06/08/18 126/70   04/13/18 131/86   03/27/18 132/70    Weight from Last 3 Encounters:   06/13/18 81.6 kg (180 lb)   06/08/18 84.7 kg (186 lb 12.8 oz)   04/13/18 87.5 kg (193 lb)              Today, you had the following     No orders found for display         Today's Medication Changes          These changes are accurate as of 6/13/18  9:37 AM.  If you have any questions, ask your nurse or doctor.               Start taking these medicines.        Dose/Directions    mupirocin 2 % ointment   Commonly known as:  BACTROBAN   Used for:  Nasal lesion   Started by:  Gabbi Obando MD        Use in nose twice daily for 5 days   Quantity:  22 g   Refills:  0            Where to get your medicines      These medications were sent to [facility] [id number]     Phone:  [phone]     mupirocin 2 % ointment                Primary Care Provider Office Phone # Fax #    [facility] [phone]       [address] [id number]        Equal Access to Services     GURPREET ARREDONDO AH: Hadii tom solis hadasho Sofransico, waaxda luqadaha, qaybta kaalmada adeojdabaljit . So [facility] [phone].    ATENCIÓN: Si habla español, tiene a enamorado disposición servicios gratuitos de asistencia lingüística. Llame al [phone].    We comply with applicable federal civil rights laws and [place] laws. We do not discriminate on the basis of race, color, national origin, age, disability, sex, sexual orientation, or gender identity.            Thank you!     Thank you for choosing [facility]  for your care. Our goal is always to provide you " with excellent care. Hearing back from our patients is one way we can continue to improve our services. Please take a few minutes to complete the written survey that you may receive in the mail after your visit with us. Thank you!             Your Updated Medication List - Protect others around you: Learn how to safely use, store and throw away your medicines at www.disposemymeds.org.          This list is accurate as of 6/13/18  9:37 AM.  Always use your most recent med list.                   Brand Name Dispense Instructions for use Diagnosis    Blood Pressure Monitor Kit     1 kit    Automatic Blood Pressure Monitor    Hypertension, renal       calcium 600 + D 600-400 MG-UNIT per tablet   Generic drug:  calcium-vitamin D      Take 1 tablet by mouth 2 times daily.        folic acid 1 MG tablet    FOLVITE    180 tablet    Take 2 tablets (2 mg) by mouth daily    Granulomatosis with polyangiitis (H)       gabapentin 400 MG capsule    NEURONTIN    90 capsule    Take 1 capsule (400 mg) by mouth At Bedtime    Neuropathy       losartan 50 MG tablet    COZAAR    90 tablet    Take 1 tablet (50 mg) by mouth daily    Hypertension, renal, stage 1-4 or unspecified chronic kidney disease       * methotrexate 2.5 MG tablet CHEMO     60 tablet    Take 4 tablets (10 mg) by mouth once a week    High risk medications (not anticoagulants) long-term use       * methotrexate 2.5 MG tablet CHEMO     60 tablet    Take 4 tablets (10 mg) by mouth every 7 days    High risk medications (not anticoagulants) long-term use       mupirocin 2 % ointment    BACTROBAN    22 g    Use in nose twice daily for 5 days    Nasal lesion       TYLENOL PO      Take 650 mg by mouth once as needed for mild pain or fever        vitamin D 1000 units capsule      Take 1 capsule by mouth daily.        * Notice:  This list has 2 medication(s) that are the same as other medications prescribed for you. Read the directions carefully, and ask your doctor or other care  provider to review them with you.

## 2018-06-15 LAB — ANCA IGG TITR SER IF: NORMAL {TITER}

## 2018-08-27 DIAGNOSIS — I10 HYPERTENSION, UNSPECIFIED TYPE: Primary | ICD-10-CM

## 2018-08-27 DIAGNOSIS — Z79.899 HIGH RISK MEDICATIONS (NOT ANTICOAGULANTS) LONG-TERM USE: ICD-10-CM

## 2018-08-29 RX ORDER — METHOTREXATE 2.5 MG/1
10 TABLET ORAL
Qty: 60 TABLET | Refills: 3 | Status: SHIPPED | OUTPATIENT
Start: 2018-08-29 | End: 2019-10-14

## 2018-08-29 RX ORDER — LOSARTAN POTASSIUM 50 MG/1
50 TABLET ORAL DAILY
Qty: 90 TABLET | Refills: 3 | Status: SHIPPED | OUTPATIENT
Start: 2018-08-29 | End: 2019-08-12

## 2018-08-29 NOTE — TELEPHONE ENCOUNTER
LOSARTAN POTASSIUM 50 MG TABLET       Last Written Prescription Date:  6/30/2017  Last Fill Quantity: 90,   # refills: 3  Routing refill request to provider for review/approval because: fails protocol for refill:   CR 1.33(H)    METHOTREXATE 2.5 MG TABLET          Last Written Prescription Date:  6/30/2017  Last Fill Quantity: 60,   # refills: 1  Last Office Visit: 6/13/2018  Future Office visit:  12/14/2018    CBC RESULTS:   Recent Labs   Lab Test  06/13/18   1013   WBC  6.2   RBC  4.67   HGB  14.8   HCT  44.9   MCV  96   MCH  31.7   MCHC  33.0   RDW  12.5   PLT  209       Creatinine   Date Value Ref Range Status   06/13/2018 1.33 (H) 0.66 - 1.25 mg/dL Final   ]    Liver Function Studies -   Recent Labs   Lab Test  06/13/18   1013   12/13/17   1228   PROTTOTAL   --    --   7.8   ALBUMIN  3.6   < >  4.0   BILITOTAL   --    --   0.7   ALKPHOS   --    --   97   AST  20   < >  22   ALT  25   < >  30    < > = values in this interval not displayed.       Routing refill request to provider for review/approval because:  DMARD, labs every 3 months per notes. NVD 12/14/2018

## 2018-09-21 DIAGNOSIS — Z51.81 MEDICATION MONITORING ENCOUNTER: ICD-10-CM

## 2018-09-21 DIAGNOSIS — I77.82 ANCA-ASSOCIATED VASCULITIS (H): ICD-10-CM

## 2018-09-21 LAB
ALBUMIN SERPL-MCNC: 3.8 G/DL (ref 3.4–5)
ALBUMIN UR-MCNC: NEGATIVE MG/DL
ALT SERPL W P-5'-P-CCNC: 24 U/L (ref 0–70)
APPEARANCE UR: CLEAR
AST SERPL W P-5'-P-CCNC: 19 U/L (ref 0–45)
BASOPHILS # BLD AUTO: 0.1 10E9/L (ref 0–0.2)
BASOPHILS NFR BLD AUTO: 1.2 %
BILIRUB UR QL STRIP: NEGATIVE
COLOR UR AUTO: YELLOW
CREAT SERPL-MCNC: 1.44 MG/DL (ref 0.66–1.25)
CREAT UR-MCNC: 49 MG/DL
CRP SERPL-MCNC: <2.9 MG/L (ref 0–8)
DIFFERENTIAL METHOD BLD: ABNORMAL
EOSINOPHIL # BLD AUTO: 0.2 10E9/L (ref 0–0.7)
EOSINOPHIL NFR BLD AUTO: 2.9 %
ERYTHROCYTE [DISTWIDTH] IN BLOOD BY AUTOMATED COUNT: 12.5 % (ref 10–15)
ERYTHROCYTE [SEDIMENTATION RATE] IN BLOOD BY WESTERGREN METHOD: 4 MM/H (ref 0–20)
GFR SERPL CREATININE-BSD FRML MDRD: 48 ML/MIN/1.7M2
GLUCOSE UR STRIP-MCNC: NEGATIVE MG/DL
HCT VFR BLD AUTO: 40.9 % (ref 40–53)
HGB BLD-MCNC: 13.9 G/DL (ref 13.3–17.7)
HGB UR QL STRIP: NEGATIVE
KETONES UR STRIP-MCNC: NEGATIVE MG/DL
LEUKOCYTE ESTERASE UR QL STRIP: NEGATIVE
LYMPHOCYTES # BLD AUTO: 1.4 10E9/L (ref 0.8–5.3)
LYMPHOCYTES NFR BLD AUTO: 21.3 %
MCH RBC QN AUTO: 32.6 PG (ref 26.5–33)
MCHC RBC AUTO-ENTMCNC: 34 G/DL (ref 31.5–36.5)
MCV RBC AUTO: 96 FL (ref 78–100)
MONOCYTES # BLD AUTO: 0.6 10E9/L (ref 0–1.3)
MONOCYTES NFR BLD AUTO: 9.5 %
NEUTROPHILS # BLD AUTO: 4.3 10E9/L (ref 1.6–8.3)
NEUTROPHILS NFR BLD AUTO: 65.1 %
NITRATE UR QL: NEGATIVE
PH UR STRIP: 5.5 PH (ref 5–7)
PLATELET # BLD AUTO: 178 10E9/L (ref 150–450)
PROT UR-MCNC: 0.06 G/L
PROT/CREAT 24H UR: 0.12 G/G CR (ref 0–0.2)
RBC # BLD AUTO: 4.26 10E12/L (ref 4.4–5.9)
RBC #/AREA URNS AUTO: NORMAL /HPF
SOURCE: NORMAL
SP GR UR STRIP: 1.01 (ref 1–1.03)
UROBILINOGEN UR STRIP-ACNC: 0.2 EU/DL (ref 0.2–1)
WBC # BLD AUTO: 6.6 10E9/L (ref 4–11)
WBC #/AREA URNS AUTO: NORMAL /HPF

## 2018-09-21 PROCEDURE — 84156 ASSAY OF PROTEIN URINE: CPT | Performed by: INTERNAL MEDICINE

## 2018-09-21 PROCEDURE — 85025 COMPLETE CBC W/AUTO DIFF WBC: CPT | Performed by: INTERNAL MEDICINE

## 2018-09-21 PROCEDURE — 85652 RBC SED RATE AUTOMATED: CPT | Performed by: INTERNAL MEDICINE

## 2018-09-21 PROCEDURE — 36415 COLL VENOUS BLD VENIPUNCTURE: CPT | Performed by: INTERNAL MEDICINE

## 2018-09-21 PROCEDURE — 84460 ALANINE AMINO (ALT) (SGPT): CPT | Performed by: INTERNAL MEDICINE

## 2018-09-21 PROCEDURE — 82565 ASSAY OF CREATININE: CPT | Performed by: INTERNAL MEDICINE

## 2018-09-21 PROCEDURE — 83876 ASSAY MYELOPEROXIDASE: CPT | Performed by: INTERNAL MEDICINE

## 2018-09-21 PROCEDURE — 86140 C-REACTIVE PROTEIN: CPT | Performed by: INTERNAL MEDICINE

## 2018-09-21 PROCEDURE — 81001 URINALYSIS AUTO W/SCOPE: CPT | Performed by: INTERNAL MEDICINE

## 2018-09-21 PROCEDURE — 84450 TRANSFERASE (AST) (SGOT): CPT | Performed by: INTERNAL MEDICINE

## 2018-09-21 PROCEDURE — 82040 ASSAY OF SERUM ALBUMIN: CPT | Performed by: INTERNAL MEDICINE

## 2018-09-21 PROCEDURE — 83516 IMMUNOASSAY NONANTIBODY: CPT | Performed by: INTERNAL MEDICINE

## 2018-09-21 NOTE — LETTER
Patient:  Joshua Ennis  :   1945  MRN:     [id number]        Mr.Roger DEANNA Ennis  [address] [facility] [address]        2018    Dear ,    We are writing to inform you of your test results. Stable labs. PR3 (vasculitis marker) continues to go down which is good news.      Results for orders placed or performed in visit on 18   Albumin level   Result Value Ref Range    Albumin 3.8 3.4 - 5.0 g/dL   ALT   Result Value Ref Range    ALT 24 0 - 70 U/L   AST   Result Value Ref Range    AST 19 0 - 45 U/L   CBC with platelets differential   Result Value Ref Range    WBC 6.6 4.0 - 11.0 10e9/L    RBC Count 4.26 (L) 4.4 - 5.9 10e12/L    Hemoglobin 13.9 13.3 - 17.7 g/dL    Hematocrit 40.9 40.0 - 53.0 %    MCV 96 78 - 100 fl    MCH 32.6 26.5 - 33.0 pg    MCHC 34.0 31.5 - 36.5 g/dL    RDW 12.5 10.0 - 15.0 %    Platelet Count 178 150 - 450 10e9/L    % Neutrophils 65.1 %    % Lymphocytes 21.3 %    % Monocytes 9.5 %    % Eosinophils 2.9 %    % Basophils 1.2 %    Absolute Neutrophil 4.3 1.6 - 8.3 10e9/L    Absolute Lymphocytes 1.4 0.8 - 5.3 10e9/L    Absolute Monocytes 0.6 0.0 - 1.3 10e9/L    Absolute Eosinophils 0.2 0.0 - 0.7 10e9/L    Absolute Basophils 0.1 0.0 - 0.2 10e9/L    Diff Method Automated Method    Creatinine   Result Value Ref Range    Creatinine 1.44 (H) 0.66 - 1.25 mg/dL    GFR Estimate 48 (L) >60 mL/min/1.7m2    GFR Estimate If Black 58 (L) >60 mL/min/1.7m2   CRP inflammation   Result Value Ref Range    CRP Inflammation <2.9 0.0 - 8.0 mg/L   Erythrocyte sedimentation rate auto   Result Value Ref Range    Sed Rate 4 0 - 20 mm/h   Protein  random urine with Creat Ratio   Result Value Ref Range    Protein Random Urine 0.06 g/L    Protein Total Urine g/gr Creatinine 0.12 0 - 0.2 g/g Cr   Creatinine random urine   Result Value Ref Range    Creatinine Urine Random 49 mg/dL   UA with Microscopic reflex to Culture   Result Value Ref Range    Color Urine Yellow     Appearance Urine  Clear     Glucose Urine Negative NEG^Negative mg/dL    Bilirubin Urine Negative NEG^Negative    Ketones Urine Negative NEG^Negative mg/dL    Specific Gravity Urine 1.015 1.003 - 1.035    pH Urine 5.5 5.0 - 7.0 pH    Protein Albumin Urine Negative NEG^Negative mg/dL    Urobilinogen Urine 0.2 0.2 - 1.0 EU/dL    Nitrite Urine Negative NEG^Negative    Blood Urine Negative NEG^Negative    Leukocyte Esterase Urine Negative NEG^Negative    Source Urine     WBC Urine 0 - 5 OTO5^0 - 5 /HPF    RBC Urine O - 2 OTO2^O - 2 /HPF   Vasculitis panel   Result Value Ref Range    Myeloperoxidase Antibody IgG <0.2 0.0 - 0.9 AI    Proteinase 3 Antibody IgG 1.6 (H) 0.0 - 0.9 DAYANA Sanchez MD

## 2018-09-28 LAB
MYELOPEROXIDASE AB SER-ACNC: <0.2 AI (ref 0–0.9)
PROTEINASE3 IGG SER-ACNC: 1.6 AI (ref 0–0.9)

## 2018-10-03 DIAGNOSIS — G62.9 NEUROPATHY: ICD-10-CM

## 2018-10-03 RX ORDER — GABAPENTIN 400 MG/1
CAPSULE ORAL
Qty: 90 CAPSULE | Refills: 1 | Status: SHIPPED | OUTPATIENT
Start: 2018-10-03 | End: 2019-03-30

## 2018-10-03 NOTE — TELEPHONE ENCOUNTER
gabapentin (NEURONTIN) 400 MG capsule      Last Written Prescription Date:  4-5-18  Last Fill Quantity: 90,   # refills: 1  Last Office Visit : 6-13-18  Future Office visit:  12-14-18    Routing refill request to provider for review/approval because:  Not on rheumatology medication refill protocol.

## 2018-12-10 ENCOUNTER — OFFICE VISIT (OUTPATIENT)
Dept: DERMATOLOGY | Facility: CLINIC | Age: 73
End: 2018-12-10
Payer: COMMERCIAL

## 2018-12-10 VITALS — OXYGEN SATURATION: 98 % | SYSTOLIC BLOOD PRESSURE: 142 MMHG | DIASTOLIC BLOOD PRESSURE: 75 MMHG | HEART RATE: 84 BPM

## 2018-12-10 DIAGNOSIS — Z85.820 HISTORY OF MELANOMA: ICD-10-CM

## 2018-12-10 DIAGNOSIS — Z85.828 HISTORY OF SKIN CANCER: Primary | ICD-10-CM

## 2018-12-10 DIAGNOSIS — L82.1 SEBORRHEIC KERATOSIS: ICD-10-CM

## 2018-12-10 DIAGNOSIS — L57.0 AK (ACTINIC KERATOSIS): ICD-10-CM

## 2018-12-10 DIAGNOSIS — L81.4 LENTIGO: ICD-10-CM

## 2018-12-10 PROCEDURE — 99213 OFFICE O/P EST LOW 20 MIN: CPT | Performed by: DERMATOLOGY

## 2018-12-10 NOTE — LETTER
12/10/2018         RE: Joshua Ennis  [address] [facility] [address]        Dear Colleague,    Thank you for referring your patient, Joshua Ennis, to the [facility]. Please see a copy of my visit note below.    Joshua Ennis is a 73 year old year old male patient here today for hx of non-melanoma skin cancer and melanoma.  He notes rough spots on scalp.  He has hx of getting pdt on face/   .  Patient states this has been present for  A wihle ons calp .  Patient reports the following symptoms:  scale.  Patient reports the following previous treatments none.  Patient reports the following modifying factors none.  Associated symptoms: none.  Patient has no other skin complaints today.  Remainder of the HPI, Meds, PMH, Allergies, FH, and SH was reviewed in chart.      Past Medical History:   Diagnosis Date     Arthritis      Autoimmune disease (H)      Hearing problem      Hoarseness      Hypertension      Kidney problem      Malignant melanoma (H)      Malignant neoplasm (H)     melanoma     Squamous cell carcinoma      Russo syndrome        Past Surgical History:   Procedure Laterality Date     BIOPSY  11/2011    melanoma on back     DISSECT LYMPH NODE INGUINAL  3/1/2013    Procedure: DISSECT LYMPH NODE INGUINAL;  Bilateral Inguinal Lymph Node Biopsy.;  Surgeon: Tariq Acosta MD;  Location: [facility]     ESOPHAGOSCOPY, GASTROSCOPY, DUODENOSCOPY (EGD), COMBINED  7/5/2013    Procedure: COMBINED ESOPHAGOSCOPY, GASTROSCOPY, DUODENOSCOPY (EGD);  Gastroscopy;  Surgeon: Tariq Acosta MD;  Location: [facility]     HERNIORRHAPHY INGUINAL  8/6/2012    Procedure: HERNIORRHAPHY INGUINAL;  Open Repair Left Inguinal Hernia;  Surgeon: Jerad Baker MD;  Location: [facility] OR        Family History   Problem Relation Age of Onset     Diabetes Mother      Hypertension Mother      Cancer Father         stomach     Heart Disease Father      Heart Disease Brother      Diabetes Sister      Diabetes  Sister      Diabetes Sister      Heart Disease Brother      Cancer Sister      Glaucoma No family hx of      Macular Degeneration No family hx of        Social History     Socioeconomic History     Marital status: Single     Spouse name: Not on file     Number of children: Not on file     Years of education: Not on file     Highest education level: Not on file   Social Needs     Financial resource strain: Not on file     Food insecurity - worry: Not on file     Food insecurity - inability: Not on file     Transportation needs - medical: Not on file     Transportation needs - non-medical: Not on file   Occupational History     Employer: [employer]   Tobacco Use     Smoking status: Former Smoker     Packs/day: 1.00     Years: 20.00     Pack years: 20.00     Types: Cigarettes     Last attempt to quit: 2013     Years since quittin.8     Smokeless tobacco: Never Used   Substance and Sexual Activity     Alcohol use: Yes     Comment: rare     Drug use: No     Sexual activity: No   Other Topics Concern     Parent/sibling w/ CABG, MI or angioplasty before 65F 55M? Not Asked      Service Not Asked     Blood Transfusions Not Asked     Caffeine Concern Not Asked     Occupational Exposure Not Asked     Hobby Hazards Not Asked     Sleep Concern Not Asked     Stress Concern Not Asked     Weight Concern Not Asked     Special Diet Not Asked     Back Care Not Asked     Exercise Yes     Comment: walking     Bike Helmet Not Asked     Seat Belt Not Asked     Self-Exams Not Asked   Social History Narrative     Not on file       Outpatient Encounter Medications as of 12/10/2018   Medication Sig Dispense Refill     Acetaminophen (TYLENOL PO) Take 650 mg by mouth once as needed for mild pain or fever       Blood Pressure Monitor KIT Automatic Blood Pressure Monitor 1 kit 0     calcium-vitamin D (CALCIUM 600 + D) 600-400 MG-UNIT per tablet Take 1 tablet by mouth 2 times daily.       Cholecalciferol (VITAMIN D)  1000 UNITS capsule Take 1 capsule by mouth daily.       folic acid (FOLVITE) 1 MG tablet Take 2 tablets (2 mg) by mouth daily 180 tablet 3     gabapentin (NEURONTIN) 400 MG capsule Take 1 capsule (400 mg) by mouth At Bedtime 90 capsule 1     losartan (COZAAR) 50 MG tablet Take 1 tablet (50 mg) by mouth daily 90 tablet 3     methotrexate 2.5 MG tablet CHEMO Take 4 tablets (10 mg) by mouth once a week 60 tablet 1     methotrexate sodium 2.5 MG TABS Take 4 tablets (10 mg) by mouth every 7 days 60 tablet 3     mupirocin (BACTROBAN) 2 % ointment Use in nose twice daily for 5 days 22 g 0     No facility-administered encounter medications on file as of 12/10/2018.              Review Of Systems  Skin: As above  Eyes: negative  Ears/Nose/Throat: negative  Respiratory: No shortness of breath, dyspnea on exertion, cough, or hemoptysis  Cardiovascular: negative  Gastrointestinal: negative  Genitourinary: negative  Musculoskeletal: negative  Neurologic: negative  Psychiatric: negative  Hematologic/Lymphatic/Immunologic: negative  Endocrine: negative      O:   NAD, WDWN, Alert & Oriented, Mood & Affect wnl, Vitals stable   Here today alone   /75   Pulse 84   SpO2 98%    General appearance normal   Vitals stable   Alert, oriented and in no acute distress      Following lymph nodes palpated: Occipital, Cervical, Supraclavicular , axill,a, inguinal no lad   Stuck on papules and brown macules on trunk and ext    Gritty papule son scalp      Stuck on papules and brown macules on trunk and ext   Red papules on trunk  Flesh colored papules on trunk     The remainder of the full exam was unremarkable; the following areas were examined:  conjunctiva/lids, oral mucosa, neck, peripheral vascular system, abdomen, lymph nodes, digits/nails, eccrine and apocrine glands, scalp/hair, face, neck, chest, abdomen, buttocks, back, RUE, LUE, RLE, LLE       Eyes: Conjunctivae/lids:Normal     ENT: Lips, buccal mucosa, tongue:  normal    MSK:Normal    Cardiovascular: peripheral edema none    Pulm: Breathing Normal    Lymph Nodes: No Head and Neck Lymphadenopathy     Neuro/Psych: Orientation:Normal; Mood/Affect:Normal      A/P:  1. Seborrheic keratosis, lentigo, angioma, dermal nevus, hx of melanoma., hx of msc   2. Actinic keratosis   Efudex discussed with patient   Irritation discussed with patient   Twice daily 3 weeks to sclap    BENIGN LESIONS DISCUSSED WITH PATIENT:  I discussed the specifics of tumor, prognosis, and genetics of benign lesions.  I explained that treatment of these lesions would be purely cosmetic and not medically neccessary.  I discussed with patient different removal options including excision, cautery and /or laser.      Nature and genetics of benign skin lesions dicussed with patient.  Signs and Symptoms of skin cancer discussed with patient.  ABCDEs of melanoma reviewed with patient.  Patient encouraged to perform monthly skin exams.  UV precautions reviewed with patient.  Patient to follow up with Primary Care provider regarding elevated blood pressure.  Skin care regimen reviewed with patient: Eliminate harsh soaps, i.e. Dial, zest, irsih spring; Mild soaps such as Cetaphil or Dove sensitive skin, avoid hot or cold showers, aggressive use of emollients including vanicream, cetaphil or cerave discussed with patient.    Risks of non-melanoma skin cancer discussed with patient   Return to clinic 3 months        Again, thank you for allowing me to participate in the care of your patient.        Sincerely,        Tyler Hooks MD

## 2018-12-10 NOTE — NURSING NOTE
Chief Complaint   Patient presents with     Skin Check       Vitals:    12/10/18 1356   BP: 142/75   Pulse: 84   SpO2: 98%     Wt Readings from Last 1 Encounters:   06/13/18 84.6 kg (186 lb 6.4 oz)       Megan Lkae LPN.................12/10/2018

## 2018-12-10 NOTE — PROGRESS NOTES
Joshua Ennis is a 73 year old year old male patient here today for hx of non-melanoma skin cancer and melanoma.  He notes rough spots on scalp.  He has hx of getting pdt on face/   .  Patient states this has been present for  A wihle ons calp .  Patient reports the following symptoms:  scale.  Patient reports the following previous treatments none.  Patient reports the following modifying factors none.  Associated symptoms: none.  Patient has no other skin complaints today.  Remainder of the HPI, Meds, PMH, Allergies, FH, and SH was reviewed in chart.      Past Medical History:   Diagnosis Date     Arthritis      Autoimmune disease (H)      Hearing problem      Hoarseness      Hypertension      Kidney problem      Malignant melanoma (H)      Malignant neoplasm (H)     melanoma     Squamous cell carcinoma      Russo syndrome        Past Surgical History:   Procedure Laterality Date     BIOPSY  11/2011    melanoma on back     DISSECT LYMPH NODE INGUINAL  3/1/2013    Procedure: DISSECT LYMPH NODE INGUINAL;  Bilateral Inguinal Lymph Node Biopsy.;  Surgeon: Tariq Acosta MD;  Location: [facility]     ESOPHAGOSCOPY, GASTROSCOPY, DUODENOSCOPY (EGD), COMBINED  7/5/2013    Procedure: COMBINED ESOPHAGOSCOPY, GASTROSCOPY, DUODENOSCOPY (EGD);  Gastroscopy;  Surgeon: Tariq Acosta MD;  Location: [facility]     HERNIORRHAPHY INGUINAL  8/6/2012    Procedure: HERNIORRHAPHY INGUINAL;  Open Repair Left Inguinal Hernia;  Surgeon: Jerda Baker MD;  Location: [facility]        Family History   Problem Relation Age of Onset     Diabetes Mother      Hypertension Mother      Cancer Father         stomach     Heart Disease Father      Heart Disease Brother      Diabetes Sister      Diabetes Sister      Diabetes Sister      Heart Disease Brother      Cancer Sister      Glaucoma No family hx of      Macular Degeneration No family hx of        Social History     Socioeconomic History     Marital status: Single     Spouse name: Not  on file     Number of children: Not on file     Years of education: Not on file     Highest education level: Not on file   Social Needs     Financial resource strain: Not on file     Food insecurity - worry: Not on file     Food insecurity - inability: Not on file     Transportation needs - medical: Not on file     Transportation needs - non-medical: Not on file   Occupational History     Employer: [employer]   Tobacco Use     Smoking status: Former Smoker     Packs/day: 1.00     Years: 20.00     Pack years: 20.00     Types: Cigarettes     Last attempt to quit: 2013     Years since quittin.8     Smokeless tobacco: Never Used   Substance and Sexual Activity     Alcohol use: Yes     Comment: rare     Drug use: No     Sexual activity: No   Other Topics Concern     Parent/sibling w/ CABG, MI or angioplasty before 65F 55M? Not Asked      Service Not Asked     Blood Transfusions Not Asked     Caffeine Concern Not Asked     Occupational Exposure Not Asked     Hobby Hazards Not Asked     Sleep Concern Not Asked     Stress Concern Not Asked     Weight Concern Not Asked     Special Diet Not Asked     Back Care Not Asked     Exercise Yes     Comment: walking     Bike Helmet Not Asked     Seat Belt Not Asked     Self-Exams Not Asked   Social History Narrative     Not on file       Outpatient Encounter Medications as of 12/10/2018   Medication Sig Dispense Refill     Acetaminophen (TYLENOL PO) Take 650 mg by mouth once as needed for mild pain or fever       Blood Pressure Monitor KIT Automatic Blood Pressure Monitor 1 kit 0     calcium-vitamin D (CALCIUM 600 + D) 600-400 MG-UNIT per tablet Take 1 tablet by mouth 2 times daily.       Cholecalciferol (VITAMIN D) 1000 UNITS capsule Take 1 capsule by mouth daily.       folic acid (FOLVITE) 1 MG tablet Take 2 tablets (2 mg) by mouth daily 180 tablet 3     gabapentin (NEURONTIN) 400 MG capsule Take 1 capsule (400 mg) by mouth At Bedtime 90 capsule 1      losartan (COZAAR) 50 MG tablet Take 1 tablet (50 mg) by mouth daily 90 tablet 3     methotrexate 2.5 MG tablet CHEMO Take 4 tablets (10 mg) by mouth once a week 60 tablet 1     methotrexate sodium 2.5 MG TABS Take 4 tablets (10 mg) by mouth every 7 days 60 tablet 3     mupirocin (BACTROBAN) 2 % ointment Use in nose twice daily for 5 days 22 g 0     No facility-administered encounter medications on file as of 12/10/2018.              Review Of Systems  Skin: As above  Eyes: negative  Ears/Nose/Throat: negative  Respiratory: No shortness of breath, dyspnea on exertion, cough, or hemoptysis  Cardiovascular: negative  Gastrointestinal: negative  Genitourinary: negative  Musculoskeletal: negative  Neurologic: negative  Psychiatric: negative  Hematologic/Lymphatic/Immunologic: negative  Endocrine: negative      O:   NAD, WDWN, Alert & Oriented, Mood & Affect wnl, Vitals stable   Here today alone   /75   Pulse 84   SpO2 98%    General appearance normal   Vitals stable   Alert, oriented and in no acute distress      Following lymph nodes palpated: Occipital, Cervical, Supraclavicular , axill,a, inguinal no lad   Stuck on papules and brown macules on trunk and ext    Gritty papule son scalp      Stuck on papules and brown macules on trunk and ext   Red papules on trunk  Flesh colored papules on trunk     The remainder of the full exam was unremarkable; the following areas were examined:  conjunctiva/lids, oral mucosa, neck, peripheral vascular system, abdomen, lymph nodes, digits/nails, eccrine and apocrine glands, scalp/hair, face, neck, chest, abdomen, buttocks, back, RUE, LUE, RLE, LLE       Eyes: Conjunctivae/lids:Normal     ENT: Lips, buccal mucosa, tongue: normal    MSK:Normal    Cardiovascular: peripheral edema none    Pulm: Breathing Normal    Lymph Nodes: No Head and Neck Lymphadenopathy     Neuro/Psych: Orientation:Normal; Mood/Affect:Normal      A/P:  1. Seborrheic keratosis, lentigo, angioma, dermal  nevus, hx of melanoma., hx of msc   2. Actinic keratosis   Efudex discussed with patient   Irritation discussed with patient   Twice daily 3 weeks to sclap    BENIGN LESIONS DISCUSSED WITH PATIENT:  I discussed the specifics of tumor, prognosis, and genetics of benign lesions.  I explained that treatment of these lesions would be purely cosmetic and not medically neccessary.  I discussed with patient different removal options including excision, cautery and /or laser.      Nature and genetics of benign skin lesions dicussed with patient.  Signs and Symptoms of skin cancer discussed with patient.  ABCDEs of melanoma reviewed with patient.  Patient encouraged to perform monthly skin exams.  UV precautions reviewed with patient.  Patient to follow up with Primary Care provider regarding elevated blood pressure.  Skin care regimen reviewed with patient: Eliminate harsh soaps, i.e. Dial, zest, irsih spring; Mild soaps such as Cetaphil or Dove sensitive skin, avoid hot or cold showers, aggressive use of emollients including vanicream, cetaphil or cerave discussed with patient.    Risks of non-melanoma skin cancer discussed with patient   Return to clinic 3 months

## 2018-12-14 ENCOUNTER — OFFICE VISIT (OUTPATIENT)
Dept: RHEUMATOLOGY | Facility: CLINIC | Age: 73
End: 2018-12-14
Attending: INTERNAL MEDICINE
Payer: COMMERCIAL

## 2018-12-14 VITALS
OXYGEN SATURATION: 97 % | DIASTOLIC BLOOD PRESSURE: 87 MMHG | TEMPERATURE: 98.6 F | WEIGHT: 190.2 LBS | BODY MASS INDEX: 28.82 KG/M2 | HEART RATE: 65 BPM | HEIGHT: 68 IN | SYSTOLIC BLOOD PRESSURE: 143 MMHG

## 2018-12-14 DIAGNOSIS — I77.82 ANCA-ASSOCIATED VASCULITIS (H): ICD-10-CM

## 2018-12-14 DIAGNOSIS — M31.30 WEGENER'S VASCULITIS: Primary | ICD-10-CM

## 2018-12-14 DIAGNOSIS — Z51.81 MEDICATION MONITORING ENCOUNTER: ICD-10-CM

## 2018-12-14 LAB
ALBUMIN SERPL-MCNC: 3.8 G/DL (ref 3.4–5)
ALBUMIN UR-MCNC: NEGATIVE MG/DL
ALT SERPL W P-5'-P-CCNC: 26 U/L (ref 0–70)
APPEARANCE UR: CLEAR
AST SERPL W P-5'-P-CCNC: 21 U/L (ref 0–45)
BASOPHILS # BLD AUTO: 0.1 10E9/L (ref 0–0.2)
BASOPHILS NFR BLD AUTO: 1 %
BILIRUB UR QL STRIP: NEGATIVE
COLOR UR AUTO: YELLOW
CREAT SERPL-MCNC: 1.34 MG/DL (ref 0.66–1.25)
CREAT UR-MCNC: 46 MG/DL
CREAT UR-MCNC: 46 MG/DL
CRP SERPL-MCNC: <2.9 MG/L (ref 0–8)
DIFFERENTIAL METHOD BLD: NORMAL
EOSINOPHIL # BLD AUTO: 0.2 10E9/L (ref 0–0.7)
EOSINOPHIL NFR BLD AUTO: 3.9 %
ERYTHROCYTE [DISTWIDTH] IN BLOOD BY AUTOMATED COUNT: 12.7 % (ref 10–15)
ERYTHROCYTE [SEDIMENTATION RATE] IN BLOOD BY WESTERGREN METHOD: 5 MM/H (ref 0–20)
GFR SERPL CREATININE-BSD FRML MDRD: 52 ML/MIN/1.7M2
GLUCOSE UR STRIP-MCNC: NEGATIVE MG/DL
HCT VFR BLD AUTO: 43.8 % (ref 40–53)
HGB BLD-MCNC: 14.8 G/DL (ref 13.3–17.7)
HGB UR QL STRIP: NEGATIVE
IMM GRANULOCYTES # BLD: 0 10E9/L (ref 0–0.4)
IMM GRANULOCYTES NFR BLD: 0.3 %
KETONES UR STRIP-MCNC: NEGATIVE MG/DL
LEUKOCYTE ESTERASE UR QL STRIP: NEGATIVE
LYMPHOCYTES # BLD AUTO: 1.2 10E9/L (ref 0.8–5.3)
LYMPHOCYTES NFR BLD AUTO: 19.3 %
MCH RBC QN AUTO: 32.5 PG (ref 26.5–33)
MCHC RBC AUTO-ENTMCNC: 33.8 G/DL (ref 31.5–36.5)
MCV RBC AUTO: 96 FL (ref 78–100)
MONOCYTES # BLD AUTO: 0.6 10E9/L (ref 0–1.3)
MONOCYTES NFR BLD AUTO: 9.4 %
MYELOPEROXIDASE AB SER-ACNC: <0.2 AI (ref 0–0.9)
NEUTROPHILS # BLD AUTO: 3.9 10E9/L (ref 1.6–8.3)
NEUTROPHILS NFR BLD AUTO: 66.1 %
NITRATE UR QL: NEGATIVE
NRBC # BLD AUTO: 0 10*3/UL
NRBC BLD AUTO-RTO: 0 /100
PH UR STRIP: 7 PH (ref 5–7)
PLATELET # BLD AUTO: 185 10E9/L (ref 150–450)
PROT UR-MCNC: 0.06 G/L
PROT/CREAT 24H UR: 0.13 G/G CR (ref 0–0.2)
PROTEINASE3 IGG SER-ACNC: 1.6 AI (ref 0–0.9)
RBC # BLD AUTO: 4.56 10E12/L (ref 4.4–5.9)
RBC #/AREA URNS AUTO: 0 /HPF (ref 0–2)
SOURCE: NORMAL
SP GR UR STRIP: 1.01 (ref 1–1.03)
UROBILINOGEN UR STRIP-MCNC: 0 MG/DL (ref 0–2)
WBC # BLD AUTO: 6 10E9/L (ref 4–11)
WBC #/AREA URNS AUTO: 0 /HPF (ref 0–5)

## 2018-12-14 PROCEDURE — G0463 HOSPITAL OUTPT CLINIC VISIT: HCPCS | Mod: ZF

## 2018-12-14 PROCEDURE — 84450 TRANSFERASE (AST) (SGOT): CPT | Performed by: INTERNAL MEDICINE

## 2018-12-14 PROCEDURE — 85652 RBC SED RATE AUTOMATED: CPT | Performed by: INTERNAL MEDICINE

## 2018-12-14 PROCEDURE — 85025 COMPLETE CBC W/AUTO DIFF WBC: CPT | Performed by: INTERNAL MEDICINE

## 2018-12-14 PROCEDURE — 83516 IMMUNOASSAY NONANTIBODY: CPT | Performed by: INTERNAL MEDICINE

## 2018-12-14 PROCEDURE — 82565 ASSAY OF CREATININE: CPT | Performed by: INTERNAL MEDICINE

## 2018-12-14 PROCEDURE — 84156 ASSAY OF PROTEIN URINE: CPT | Performed by: INTERNAL MEDICINE

## 2018-12-14 PROCEDURE — 83876 ASSAY MYELOPEROXIDASE: CPT | Performed by: INTERNAL MEDICINE

## 2018-12-14 PROCEDURE — 81001 URINALYSIS AUTO W/SCOPE: CPT | Performed by: INTERNAL MEDICINE

## 2018-12-14 PROCEDURE — 84460 ALANINE AMINO (ALT) (SGPT): CPT | Performed by: INTERNAL MEDICINE

## 2018-12-14 PROCEDURE — 86140 C-REACTIVE PROTEIN: CPT | Performed by: INTERNAL MEDICINE

## 2018-12-14 PROCEDURE — 82040 ASSAY OF SERUM ALBUMIN: CPT | Performed by: INTERNAL MEDICINE

## 2018-12-14 PROCEDURE — 36415 COLL VENOUS BLD VENIPUNCTURE: CPT | Performed by: INTERNAL MEDICINE

## 2018-12-14 ASSESSMENT — PAIN SCALES - GENERAL: PAINLEVEL: MILD PAIN (3)

## 2018-12-14 ASSESSMENT — MIFFLIN-ST. JEOR: SCORE: 1592.24

## 2018-12-14 NOTE — LETTER
12/14/2018      RE: Joshua Ennis  [address]  \[facility] [place] [facility]\"" [id number]           Ronald macias  [facility] Follow-Up Note  Date of visit: 6/13/2018  Last visit date: 12/13/2017      Joshua Ennis MRN# [id number]   Age: 73 year old YOB: 1945          Assessment and Plan:     Assessment:   Mr. Ennis is a 73 yr old  male with history of melanoma s/p resection in 11/2011, former tobacco use, and GPA (PR3+, MPO and c-ANCA negative in 3/2013 based on aforementioned labs, confirmed by kidney and lung biopsy) who presents for clinic follow-up regarding GPA.  He was initiated on cyclophosphamide 150 mg QD and high-dose prednisone 3/2013. He last took prednisone in 10/2013. Cytoxan was switched to MTX for maintenance treatment in 10/2013.  He has been tolerating it well. He had a flare in 12/2013 with increased crusts in his nose along with recurrence of arthralgia, worsening numbness in feet and increasing vasculitis marker. Renal function was stable with negative repeat chest CT. His vasculitis flare was treated with short course of prednisone taper 20 mg po qd max and increasing MTX to 8 tab = 20 mg qwk. Vasculitis symptoms improved.  At visit on 1/2015, he had scabs in his nose, had cold [place] symptoms and increased arthralgia/fatigue (shoulders, L hand). Labs from 12/16 showed increased Cr level and hematuria with rising PR3 (more than 8), there was a concern for vasculitis flare (in kidneys, sinuses), no flare on repeat chest CT 1/2015. Therefore it was recommended to try rituximab. Another reason was to be able to taper MTX off given abnormal Cr.  He is now s/p Rituximab infusion x 4 (1st on 2/25/2015). He is feeling well. No hematuria with stable Cr, no SOB. Saw ENT with negative nasal mucosa biopsy 6/2015 for granuloma but showed active inflammation, had left nostril lesion which decreased in size by use of mupirocin ointment. PR3 vasculitis marker went back to NL in  6/2015, it was NL in 10/2015, given stable vasculitis and low GFR, MTX from 8 to 7 tab po qwk. Repeat SC-3 in 3/2016 was slightly positive, Cr was slightly higher than baseline. We tapered his MTX further to 6 tab po qwk in 2/2016 given lack of symptoms. Re-check labs in April were almost unchanged from 3/2016 but CRP was slightly up. Labs on 2/7/17 with elevated CRP (40.6) but normal ESR (12) and ANCA 1:20 likely reflecting bacterial infection.    In 3/2017, MTX was reduced from 6 to 5 tab qwk. It was further tapered to 4 tab po qwk in 6/2017 given stable disease. No vasculitis flare ups by such change. ANCA remained negative, SC-3 is going down despite tapering MTX, dropped from 2.8 in 4/2017 to 2.3 in 6/2017 to 2 in 8/2017. 2.4 in 3/2018.    Vasculitis seems to be stable and he has no signs or symptoms of flare up today. Recommend to continue MTX at low dose of 4 tab po qwk as GPA or ANCA-vasculitis tends to flare up. Was advised to call in case of vasculitis flare up sx.    He had labs done today, will follow the results.        Plan:    - Continue MTX 10 mg (4 tabs) po qwk, continue with Folic acid 1 mg po qd    > Labs today and q3 months including MTX monitoring labs    > Patient instructed to hold MTX if develops fevers or infection  - Neuropathy unchanged on Gabapentin  - F/u with Dr. Obando ENT for small bluish pigmented lesion in the left soft palate.  Dr. Obando recommended biopsy in 5/2017 but pt wanted to hold off, looks smaller in size to me  -Shingrix vaccine. Recommended shingrix. CDC recommends this vaccine 2 doses,  by 2-6 months for adults 50 years and older. It is killed virus and ok to be used in immunocompromised patients. Shingrix reduces the risk of shingles and PHN by more than 90% in people 50 and older.     AE include: pain, redness and swelling at the inj site, myalgia, fatigue, headaches, shivering, fever and stomach upset.    He is going to get it locally.    - RTC in 6 months          No orders of the defined types were placed in this encounter.            HPI / Interim History:      Mr. Ennis is a 71 yo WM with h/o melanoma s/p resection in 11/2011, former tobacco use, and GPA diagnosed in 3/2013. For details of GPA diagnosis, please refer to initial consult note. Briefly, he initially presented with constitutional sx of fever, sweating, weight loss, migratory arthralgia, numbness/shooting pain in feet to his PCP.  Had enlarged inguinal LNs. His PCP suspected malignancy and especially lung cancer given h/o tobacco use.  His work-up showed several lung nodules and CT guided biopsy of one of the nodules done in 2/13 showed granulomatous inflammation with no evidence of malignancy.  Inguinal LN biopsy done 3/13 showed inflammation with no malignancy.  He had not had any respiratory sx, SOB, CP, cough, hemoptysis or sinusitis.  He was hospitalized for further work up and was found having high PR3, neg MPO, neg ANCA.  He also had + RF and trace cryo.   Had microscopic hematuria with NL Cr at the time of admission but his Cr started to rise before discharge. Had abnormal LFTs toward the end of discharge(was trending down, liver US was unremarkable).  He was diagnosed with GPA given high titer of PR3 to 723, +granulomatous inflammation of the lung nodule, microscopic hematuria, and migratory arthralgia along with high ESR/CRP and constitutional sx.  He was Started on cytoxan 150 mg po qd, prednisone 70 mg po qd (after IV solumedrol 1 gr qd x 3 days), Bactrim DS three times a wk for PCP prophylaxis and fosamax 70 mg po qwk. He was also put on vit D 03130 units qwk and neurontin.  He was discharged on 3/13/2013.   At initial visit in Rheumatology clinic, patient was referred to nephrology for renal biopsy since Cr was up despite being on cytoxan and prednisone 75 qd.  Renal biopsy 3/13 confirmed Dx of GPA.  After discussion with Dr. Krishnamurthy, made a decision to continue with current regimen and if  no improvement to switch to IV cytoxan or rituximab.  His Cr initially increased to 1.71, but stabilized to ~ 1.3.  He has a h/o high BP and was recently started on losartan per nephology.  He has been seen by neurology for bilaterally foot and ankle numbness and was diagnosed with neuropathy possibly secondary to vasculitis.  He was also seen by oncology; there was no concern for malignancy.   Overall, the patient is doing well today with no specific complaints or concerns.  He had recent blood work 9/19 which showed negative inflammatory and vasculitis markers (PR3, MPO, ANCA, and CRP).  His SCr has remained elevated, but stable at 1.37.  He is at the tail end of a prednisone taper; currently taking 1 mg QD, set to d/c 10/15.  He is still on cyclophosphamide, taking 150 mg QD.  He was prescribed losartan at a f/u visit w/ nephrology 8/21, but states his BP have been running 130's/80's, so has not taken the medication for over a month.  Additionally, he had been taking neurontin for numbness in his feet, toes, and ankles, but stopped b/c he was not getting symptom relief.  Today, the patient denies vision changes, epistaxis, oral ulcerations, SOB, hemoptysis, CP, joint pain, abdominal pain, dysuria, or hematuria.  He does endorse a cough, but states that this has been intermittent, chronic, and non-productive.  Additionally, he endorses right shoulder discomfort that is worse w/ abduction, but has also been chronic. Of note, the patient remains a former smoker, having quit in February of this year.     His major complaint is increased numbness of his toes, but has resumed taking neurontin and feels sensation is coming back which is painful. No SOB, cough, crusts in nose or blood in urine. He has fatigue. He is off cytoxan since 10/2013, is on MTX 8tab po qwk. MTX was started with 4 tab po qwk in 10/2013, was increased to 8 tab after last visit in 1/2014. In 12/2013, was felt to have a small flare of vasculitis with  increased PR3, crusts in nose and R shoulder pain; therefore prednisone taper with max dose of 20 mg qd was given along with increasing MTX to 7 tab qwk. MTX was later increased to 8 tab qwk in 1/2014. Labs done in 2/2014 showed rising PR3. He had ER visit on 1/8 for urosepsis (E. Coli), was treated with cedifir, recovered. Continues having shoulder pain, requests referral to PT.    1/2015: He reports having increased arthralgia (shoulders, L hand) x a month. About 2 wk ago, L hand pain was so bad that he could not move his hand. No major joint swelling. Has no AM stiffness. Reports being tested negative for lyme (local lab) about 2 wk ago. Appetite is worse. He is more tired. He sleeps a lot. Has a cold x 1 wk, no hemoptysis. Neuropathy is the same. No SOB. No fever. Reports having a rash under L axilla x last 2 wk, comes and goes but to him looks like a lyme rash. He thinks he probably had a tick bite.    Last visit 3/2015: Rituximab qwk x 4 doses was recommended at last visit given concern for active GPA. He had his 1st infusion on 2/25/2015, right after start of infusion, developed hives without any resp sx, was treated with extra dose of benadryl and solu cortef. Hives resolved. Infusion was resumed at a slower rate and he finished and tolerated it well. Had his 2nd infusion on 3/4/2015 with no reaction although this time I increased his solumedrol from 100 to 250 mg as part of pre-medication. He has since completed all 4 infusions w/o reaction. States that nasal and joint symptoms remain improved. He still does have some crusting in his left nostril. Does have some pain and swelling in the hands w/minimal morning stiffness but that is his baseline. Says pain in his shoulders has improved. He denies any medication side effects.    He was seen in the ED 4/3/15 for productive cough and fever. He was treated with Levaquin for likely pna. He is now feeling better with only lingering cough.    10/16/2015: He is  "feeling well, has no complaints except cough at night because of sinus drainage (chronic). Requests pneumonia shot.    2/2016: Today patient reports feeling in excellent condition. Denies chest symptoms, such as SOB, hemoptysis, cough, or nasal drainage/bleeding. He also denies any hematuria and his last renal labs were stable. Unfortunately, his neuropathy seems to be his biggest problem. Neurology started him on a low dose of gabapentin without improvement.     5/6/16: Besides stuffy nose due to seasonal allergies, has no complaints. No cough, SOB, hemoptysis, hematuria, weight loss or fatigue.    8/12/16: Patient has no complaints today. No cough, SOB, hemoptysis, hematuria, weight loss or fatigue. He was seen today in Nephrology clinic for CKD follow up, doing well, no further follow up is needed. Patient reports visual disturbance with decreased vision probably on the R eye (chelsietent does not remember) with decreased to none visual strength in low visual field with some \"blanks\" which lasted for 40 seconds. This is a second episode in the last 1.5-2 years. Patient also reports recurrent double vision occuring every 4-5 months. Last ophthalmologist appointment was 1986.     11/18/16: Feeling very well, no complaints. Is going to have an eye exam today.    3/3/17: Mr. Ennis says he is doing well overall. He is still recovering from a pneumonia diagnosed on 2/14/17. He says that he was feeling ill for about 2 weeks before this with worsening productive cough, nasal congestion and high fevers with \"fever blisters\". He went to the ED on 2/14/17 and received ceftriaxone and a Z-pack and says he has been improving since then. He says that many other people at his work were sick with similar symptoms. Today he reports some residual nasal congestion but denies shortness of breath. He says that during his illness he did have some blood in his nose \"if I picked at it\" but he otherwise denies epistaxis, hemoptysis, " hematuria and blood in his stools. His significant other is wondering if his methotrexate dose can start to be tapered down. Currently he is taking MTX 15mg weekly although this was held when he had pneumonia.    Mr. Ennis says that he continues to experience episodes of diplopia on average once per month. These episodes typically last 20-30 seconds. He was seen by ophthalmology on 16 and had a normal eye exam.    Today: Feeling well with no complaints. On MTX 4 tab po qwk.         Past Medical History:     Past Medical History:   Diagnosis Date     Arthritis      Autoimmune disease (H)      Hearing problem      Hoarseness      Hypertension      Kidney problem      Malignant melanoma (H)      Malignant neoplasm (H)     melanoma     Squamous cell carcinoma      Russo syndrome           Past Surgical History:     Past Surgical History:   Procedure Laterality Date     BIOPSY  2011    melanoma on back     DISSECT LYMPH NODE INGUINAL  3/1/2013    Procedure: DISSECT LYMPH NODE INGUINAL;  Bilateral Inguinal Lymph Node Biopsy.;  Surgeon: Tariq Acosta MD;  Location: [facility]     ESOPHAGOSCOPY, GASTROSCOPY, DUODENOSCOPY (EGD), COMBINED  2013    Procedure: COMBINED ESOPHAGOSCOPY, GASTROSCOPY, DUODENOSCOPY (EGD);  Gastroscopy;  Surgeon: Tariq Acosta MD;  Location: [facility]     HERNIORRHAPHY INGUINAL  2012    Procedure: HERNIORRHAPHY INGUINAL;  Open Repair Left Inguinal Hernia;  Surgeon: Jerad Baker MD;  Location: [facility]          Social History:     Social History     Tobacco Use     Smoking status: Former Smoker     Packs/day: 1.00     Years: 20.00     Pack years: 20.00     Types: Cigarettes     Last attempt to quit: 2013     Years since quittin.8     Smokeless tobacco: Never Used   Substance Use Topics     Alcohol use: Yes     Comment: rare          Family History:     Family History   Problem Relation Age of Onset     Diabetes Mother      Hypertension Mother      Cancer Father   "       stomach     Heart Disease Father      Heart Disease Brother      Diabetes Sister      Diabetes Sister      Diabetes Sister      Heart Disease Brother      Cancer Sister      Glaucoma No family hx of      Macular Degeneration No family hx of           Immunizations:   Due for PPSV23, will administer today. Immunizations otherwise up to date.    PMHx, FHx, SHx were reviewed, unchanged.         Allergies:     Allergies   Allergen Reactions     Shrimp Nausea and Vomiting     Got sick each time he ate it          Medications:     Current Outpatient Medications   Medication Sig     Acetaminophen (TYLENOL PO) Take 650 mg by mouth once as needed for mild pain or fever     Blood Pressure Monitor KIT Automatic Blood Pressure Monitor     calcium-vitamin D (CALCIUM 600 + D) 600-400 MG-UNIT per tablet Take 1 tablet by mouth 2 times daily.     Cholecalciferol (VITAMIN D) 1000 UNITS capsule Take 1 capsule by mouth daily.     folic acid (FOLVITE) 1 MG tablet Take 2 tablets (2 mg) by mouth daily     gabapentin (NEURONTIN) 400 MG capsule Take 1 capsule (400 mg) by mouth At Bedtime     losartan (COZAAR) 50 MG tablet Take 1 tablet (50 mg) by mouth daily     methotrexate 2.5 MG tablet CHEMO Take 4 tablets (10 mg) by mouth once a week     methotrexate sodium 2.5 MG TABS Take 4 tablets (10 mg) by mouth every 7 days     mupirocin (BACTROBAN) 2 % ointment Use in nose twice daily for 5 days     No current facility-administered medications for this visit.           Review of Systems:   A comprehensive ROS was done. Positives are per HPI.           Physical Exam:     Vitals:    12/14/18 1030   BP: 143/87   Pulse: 65   Temp: 98.6  F (37  C)   TempSrc: Oral   SpO2: 97%   Weight: 86.3 kg (190 lb 3.2 oz)   Height: 1.727 m (5' 8\")     Constitutional:   Awake, alert, cooperative, no apparent distress. Falguni present, grandson present.   Eyes:   Pupils equal, round and reactive to light, sclera clear, conjunctiva normal.   ENT:   Normocephalic, " atramatic,  oral pharynx with moist mucus membranes, tonsils without erythema or exudates, dentures in place. Small dark black spot over left soft palate smaller and lighter in color.   Neck:   Supple, symmetrical, trachea midline, no adenopathy.   Lungs:   No increased work of breathing, good air exchange, clear to auscultation bilaterally, no crackles or wheezing.    Cardiovascular:   Regular rate and rhythm, normal S1 and S2, no S3 or S4, and no murmur noted.   Musculoskeletal:   No active synovitis or joint tenderness. Good ROM in all joints tested. No edema in LE.   Neurologic:   AOx3; CN grossly intact.   Skin:   No rashes or ecchymoses on limited exam.          Data:   Recent laboratory data reviewed.    2/14/17  Cr 1.46, WBC 7.8, AST/ALT normal    2/7/17  CRP 40.6, ESR 12, ANCA 1:20, MPO neg, DC-3 neg    Recent imaging studies reviewed by me.    CXR (2/14/17)    New mild patchy opacity at the right lung base appears to  localize to the posterior right lower lobe base and could represent a  developing area of pneumonia. Left lung is clear.    CHEST CT (2/16/2013)    Chest: Multiple indeterminate pulmonary nodules. Several of the  larger lesions are cavitary. There are 3 dominant lesions, a 3.2 cm  cavitary lesion in the left upper lobe, a 3.4 cm solid mass in the  right lower lobe laterally and a 3.7 cm cavitary mass in the right  lower lobe medially. In addition there is mediastinal lymphadenopathy  with a dominant 3 cm subcarinal node. Bilateral axillary  lymphadenopathy with 2.4 cm node seen bilaterally. Chest otherwise  unremarkable.       Exam: High-resolution Chest CT without contrast 1/9/2015 12:42 PM.  History: Concern for ANCA vasculitis flare in the chest.  Comparison: 3/7/2014, 11/14/2013, 2/16/2013.  Technique: CT helical acquisition from the lung apices to the kidneys  was obtained without intravenous contrast. Both inspiratory and  expiratory images were obtained.    Findings:  Moderate  atherosclerotic calcifications of the coronary arteries. Mild  calcifications involving the aorta and aortic great vessels. Prominent  paratracheal lymph node on series 3 image 22 measuring 9 mm. Decreased  from 2/16/2013 and unchanged from 3/7/2014. Borderline enlarged right  hilar lymph node, unchanged from 3/7/2014 and decreased from  2/16/2013. Heart is not enlarged. Trace cardial effusion. No axillary  lymphadenopathy.  Nodular scarring in the left apex, unchanged from 2/16/2013. No  pleural effusion or pneumothorax. No evidence of intrapulmonary  infection. There is a cluster of tree in bud nodularity noted in the  anteromedial right upper lobe. These nodules appear new. Expiratory  images demonstrate mild air trapping. Scattered pulmonary nodules:  Series 6 image 146: Seen posteriorly in the right lower lobe, there is  a sub-pleural nodule measuring 1.6 x 1.2 cm with a spiculated border.  Previously, this nodule measured 2.0 x 1.6 cm.  Series 6 image 189: Seen laterally in the right lower lobe, there is a  former mid pulmonary nodule which is unchanged from 3/7/2013 and  decreased from 2/16/2013.  Series 6 image 169: Seen posterolaterally in the right lower lobe,  there is a 3 mm pulmonary nodule which is unchanged from 3/7/2014 and  decreased from 2/16/2013.  Series 6 image 225: Seen posterolaterally in the left lower lobe,  there is a 4 mm subpleural nodule which is unchanged from 2/16/2014.  Other scattered sub-4 mm pulmonary nodules appear stable from previous  study.  Evaluation of the upper abdomen is limited due to the lack of  intravenous contrast.  No suspicious bony lesions.    IMPRESSION  Impression:   1. Scattered pulmonary nodules enlarged lymph nodes are  stable/slightly decreased from the previous study. No evidence for  acute flare in patient's known ANCA-associated vasculitis.  2. New tree in bud nodularity seen anteromedially in the right middle  lobe. Findings could be seen in the setting  of an atypical infectious  process.  3. Mild air trapping. Finding is nonspecific and is seen in setting of  small airways disease such as asthma or bronchiolitis.  I have personally reviewed the examination and initial interpretation  and I agree with the findings.  TOÑO PERKINS MD    Component      Latest Ref Rng 6/11/2015 6/11/2015           9:26 AM  9:29 AM   Sodium      133 - 144 mmol/L  139   Potassium      3.4 - 5.3 mmol/L  4.4   Chloride      94 - 109 mmol/L  109   Carbon Dioxide      20 - 32 mmol/L  23   Anion Gap      3 - 14 mmol/L  7   Glucose      70 - 99 mg/dL  81   Urea Nitrogen      7 - 30 mg/dL  28   Creatinine      0.66 - 1.25 mg/dL  1.26 (H)   GFR Estimate      >60 mL/min/1.7m2  57 (L)   GFR Estimate If Black      >60 mL/min/1.7m2  69   Calcium      8.5 - 10.1 mg/dL  9.2   Phosphorus      2.5 - 4.5 mg/dL  2.1 (L)   Albumin      3.4 - 5.0 g/dL  1.7 (L)   Iron      35 - 180 ug/dL  129   Iron Binding Cap      240 - 430 ug/dL  292   Iron Saturation Index      15 - 46 %  44   Protein Random Urine       0.11    Protein Total Urine g/gr Creatinine      0 - 0.2 g/g Cr 0.14    Hemoglobin      13.3 - 17.7 g/dL  13.0 (L)   Ferritin      26 - 388 ng/mL  193   Parathormone Intact      12 - 72 pg/mL  49   Vitamin D Deficiency screening      30 - 75 ug/L  55   Creatinine Urine       84    Copath Report           Proteinase 3 Antibody IgG      0.0 - 0.9 AI       Component      Latest Ref Rng 6/11/2015 6/11/2015          11:15 AM 12:18 PM   Copath Report       Patient Name: ADONAY FAITH . . .    Proteinase 3 Antibody IgG      0.0 - 0.9 AI  0.8     Copath Report     Patient Name: ADONAY FAITH  MR#: [id number]  Specimen #: [id number]  Collected: 6/11/2015  Received: 6/11/2015  Reported: 6/12/2015 17:37  Ordering Phy(s): DANIELLE PARIS    SPECIMEN(S):  Nasal septum    FINAL DIAGNOSIS:  Nasal septum, biopsy:  -Squamous mucosa with ulcer, marked acute inflammation and reactive  changes  -No granulomas  "identified  -A special stain for fungi (GMS) is negative    I have personally reviewed all specimens and or slides, including the  listed special stains, and used them with my medical judgement to  determine the final diagnosis.    Electronically signed out by:    Dorys Barton M.D., [facility]    CLINICAL HISTORY:  The patient is a 70-year-old man with a history of left upper back  melanoma, resected in 2011 (see consult report [id number]). 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 He now presents for followup visit with nasal  cavity scabs, increased arthralgia and fatigue, concerning for  vasculitis flare. Operative procedure: Nasal septum biopsy.    GROSS:  The specimen is received in formalin with proper patient identification,  labeled \"septal tissue\". The specimen consists of three tan-pink tissue  fragments, 0.2 cm in greatest dimension, entirely submitted in cassette  1. (Dictated by: Ana Mixon 6/11/2015 03:39 PM)    MICROSCOPIC:  Microscopic examination is performed. A GMS stain, performed with  appropriate positive control, is negative.    CPT Codes:  A: 42965-GK5, 30910-PVY    TESTING LAB LOCATION:  [facility], [address] [id number] [phone]    COLLECTION SITE:  Client: [facility]  Location: [facility] (B)       Component      Latest Ref Rng & Units 3/13/2018   WBC      4.0 - 11.0 10e9/L 5.8   RBC Count      4.4 - 5.9 10e12/L 4.27 (L)   Hemoglobin      13.3 - 17.7 g/dL 13.9   Hematocrit      40.0 - 53.0 % 41.2   MCV      78 - 100 fl 97   MCH      26.5 - 33.0 pg 32.6   MCHC      31.5 - 36.5 g/dL 33.7   RDW      10.0 - 15.0 % 12.5   Platelet Count      150 - 450 10e9/L 177   Diff " Method       Automated Method   % Neutrophils      % 57.5   % Lymphocytes      % 25.1   % Monocytes      % 11.7   % Eosinophils      % 4.8   % Basophils      % 0.9   Absolute Neutrophil      1.6 - 8.3 10e9/L 3.3   Absolute Lymphocytes      0.8 - 5.3 10e9/L 1.5   Absolute Monocytes      0.0 - 1.3 10e9/L 0.7   Absolute Eosinophils      0.0 - 0.7 10e9/L 0.3   Absolute Basophils      0.0 - 0.2 10e9/L 0.1   Color Urine       Yellow   Appearance Urine       Clear   Glucose Urine      NEG:Negative mg/dL Negative   Bilirubin Urine      NEG:Negative Negative   Ketones Urine      NEG:Negative mg/dL Negative   Specific Gravity Urine      1.003 - 1.035 1.010   pH Urine      5.0 - 7.0 pH 6.0   Protein Albumin Urine      NEG:Negative mg/dL Negative   Urobilinogen Urine      0.2 - 1.0 EU/dL 0.2   Nitrite Urine      NEG:Negative Negative   Blood Urine      NEG:Negative Negative   Leukocyte Esterase Urine      NEG:Negative Negative   Source       Midstream Urine   WBC Urine      OTO5:0 - 5 /HPF 0 - 5   RBC Urine      OTO2:O - 2 /HPF O - 2   Creatinine      0.66 - 1.25 mg/dL 1.47 (H)   GFR Estimate      >60 mL/min/1.7m2 47 (L)   GFR Estimate If Black      >60 mL/min/1.7m2 57 (L)   Myeloperoxidase Antibody IgG      0.0 - 0.9 AI <0.2   Proteinase 3 Antibody IgG      0.0 - 0.9 AI 2.4 (H)   AST      0 - 45 U/L 26   ALT      0 - 70 U/L 30   Albumin      3.4 - 5.0 g/dL 3.7   CRP Inflammation      0.0 - 8.0 mg/L <2.9   Sed Rate      0 - 20 mm/h 5   Neutrophil Cytoplasmic IgG Antibody      <1:20 <1:20       [facility] Follow-Up Note  Date of visit: 12/14/2018  Last visit date: 6/13/2018      Joshua Ennis MRN# [id number]   Age: 73 year old YOB: 1945          Assessment and Plan:     Assessment:   Mr. Ennis is a 73 yr old  male with history of melanoma s/p resection in 11/2011, former tobacco use, and GPA (PR3+, MPO and c-ANCA negative in 3/2013 based on aforementioned labs, confirmed by kidney and  lung biopsy) who presents for clinic follow-up regarding GPA.  He was initiated on cyclophosphamide 150 mg QD and high-dose prednisone 3/2013. He last took prednisone in 10/2013. Cytoxan was switched to MTX for maintenance treatment in 10/2013.  He has been tolerating it well. He had a flare in 12/2013 with increased crusts in his nose along with recurrence of arthralgia, worsening numbness in feet and increasing vasculitis marker. Renal function was stable with negative repeat chest CT. His vasculitis flare was treated with short course of prednisone taper 20 mg po qd max and increasing MTX to 8 tab = 20 mg qwk. Vasculitis symptoms improved.  At visit on 1/2015, he had scabs in his nose, had cold [place] symptoms and increased arthralgia/fatigue (shoulders, L hand). Labs from 12/16 showed increased Cr level and hematuria with rising PR3 (more than 8), there was a concern for vasculitis flare (in kidneys, sinuses), no flare on repeat chest CT 1/2015. Therefore it was recommended to try rituximab. Another reason was to be able to taper MTX off given abnormal Cr.  He is now s/p Rituximab infusion x 4 (1st on 2/25/2015). He is feeling well. No hematuria with stable Cr, no SOB. Saw ENT with negative nasal mucosa biopsy 6/2015 for granuloma but showed active inflammation, had left nostril lesion which decreased in size by use of mupirocin ointment. PR3 vasculitis marker went back to NL in 6/2015, it was NL in 10/2015, given stable vasculitis and low GFR, MTX from 8 to 7 tab po qwk. Repeat MI-3 in 3/2016 was slightly positive, Cr was slightly higher than baseline. We tapered his MTX further to 6 tab po qwk in 2/2016 given lack of symptoms. Re-check labs in April were almost unchanged from 3/2016 but CRP was slightly up. Labs on 2/7/17 with elevated CRP (40.6) but normal ESR (12) and ANCA 1:20 likely reflecting bacterial infection.    In 3/2017, MTX was reduced from 6 to 5 tab qwk. It was further tapered to 4 tab po qwk in  6/2017 given stable disease. No vasculitis flare ups by such change. ANCA remained negative, LA-3 is going down despite tapering MTX, dropped from 2.8 in 4/2017 to 2.3 in 6/2017 to 2 in 8/2017. 2.4 in 3/2018.    Vasculitis seems to be stable and he has no signs or symptoms of flare up today. Recommend to continue MTX at low dose of 4 tab po qwk as GPA or ANCA-vasculitis tends to flare up. Was advised to call in case of vasculitis flare up sx.    He had labs done today, will follow the results.        Plan:    - Continue MTX 10 mg (4 tabs) po qwk, continue with Folic acid 1 mg po qd    > Labs today and q3 months including MTX monitoring labs    > Patient instructed to hold MTX if develops fevers or infection  - Neuropathy unchanged on Gabapentin  - F/u with Dr. Obando ENT for small bluish pigmented lesion in the left soft palate.  Dr. Obando recommended biopsy in 5/2017 but pt wanted to hold off, looks almost gone.    He is going to get it locally.    - RTC in 12 months         Orders Placed This Encounter   Procedures     ALT     Albumin level     AST     CBC with platelets differential     Creatinine     CRP inflammation     Erythrocyte sedimentation rate auto     Routine UA with Micro Reflex to Culture     Protein  random urine with Creat Ratio     Creatinine random urine     ANCA IgG by IFA with Reflex to Titer     Proteinase 3 Antibody IgG             HPI / Interim History:      Mr. Ennis is a 69 yo WM with h/o melanoma s/p resection in 11/2011, former tobacco use, and GPA diagnosed in 3/2013. For details of GPA diagnosis, please refer to initial consult note. Briefly, he initially presented with constitutional sx of fever, sweating, weight loss, migratory arthralgia, numbness/shooting pain in feet to his PCP.  Had enlarged inguinal LNs. His PCP suspected malignancy and especially lung cancer given h/o tobacco use.  His work-up showed several lung nodules and CT guided biopsy of one of the nodules done in 2/13  showed granulomatous inflammation with no evidence of malignancy.  Inguinal LN biopsy done 3/13 showed inflammation with no malignancy.  He had not had any respiratory sx, SOB, CP, cough, hemoptysis or sinusitis.  He was hospitalized for further work up and was found having high PR3, neg MPO, neg ANCA.  He also had + RF and trace cryo.   Had microscopic hematuria with NL Cr at the time of admission but his Cr started to rise before discharge. Had abnormal LFTs toward the end of discharge(was trending down, liver US was unremarkable).  He was diagnosed with GPA given high titer of PR3 to 723, +granulomatous inflammation of the lung nodule, microscopic hematuria, and migratory arthralgia along with high ESR/CRP and constitutional sx.  He was Started on cytoxan 150 mg po qd, prednisone 70 mg po qd (after IV solumedrol 1 gr qd x 3 days), Bactrim DS three times a wk for PCP prophylaxis and fosamax 70 mg po qwk. He was also put on vit D 46233 units qwk and neurontin.  He was discharged on 3/13/2013.   At initial visit in Rheumatology clinic, patient was referred to nephrology for renal biopsy since Cr was up despite being on cytoxan and prednisone 75 qd.  Renal biopsy 3/13 confirmed Dx of GPA.  After discussion with Dr. Krishnamurthy, made a decision to continue with current regimen and if no improvement to switch to IV cytoxan or rituximab.  His Cr initially increased to 1.71, but stabilized to ~ 1.3.  He has a h/o high BP and was recently started on losartan per nephology.  He has been seen by neurology for bilaterally foot and ankle numbness and was diagnosed with neuropathy possibly secondary to vasculitis.  He was also seen by oncology; there was no concern for malignancy.   Overall, the patient is doing well today with no specific complaints or concerns.  He had recent blood work 9/19 which showed negative inflammatory and vasculitis markers (PR3, MPO, ANCA, and CRP).  His SCr has remained elevated, but stable at 1.37.  He  is at the tail end of a prednisone taper; currently taking 1 mg QD, set to d/c 10/15.  He is still on cyclophosphamide, taking 150 mg QD.  He was prescribed losartan at a f/u visit w/ nephrology 8/21, but states his BP have been running 130's/80's, so has not taken the medication for over a month.  Additionally, he had been taking neurontin for numbness in his feet, toes, and ankles, but stopped b/c he was not getting symptom relief.  Today, the patient denies vision changes, epistaxis, oral ulcerations, SOB, hemoptysis, CP, joint pain, abdominal pain, dysuria, or hematuria.  He does endorse a cough, but states that this has been intermittent, chronic, and non-productive.  Additionally, he endorses right shoulder discomfort that is worse w/ abduction, but has also been chronic. Of note, the patient remains a former smoker, having quit in February of this year.     His major complaint is increased numbness of his toes, but has resumed taking neurontin and feels sensation is coming back which is painful. No SOB, cough, crusts in nose or blood in urine. He has fatigue. He is off cytoxan since 10/2013, is on MTX 8tab po qwk. MTX was started with 4 tab po qwk in 10/2013, was increased to 8 tab after last visit in 1/2014. In 12/2013, was felt to have a small flare of vasculitis with increased PR3, crusts in nose and R shoulder pain; therefore prednisone taper with max dose of 20 mg qd was given along with increasing MTX to 7 tab qwk. MTX was later increased to 8 tab qwk in 1/2014. Labs done in 2/2014 showed rising PR3. He had ER visit on 1/8 for urosepsis (E. Coli), was treated with cedifir, recovered. Continues having shoulder pain, requests referral to PT.    1/2015: He reports having increased arthralgia (shoulders, L hand) x a month. About 2 wk ago, L hand pain was so bad that he could not move his hand. No major joint swelling. Has no AM stiffness. Reports being tested negative for lyme (local lab) about 2 wk ago.  Appetite is worse. He is more tired. He sleeps a lot. Has a cold x 1 wk, no hemoptysis. Neuropathy is the same. No SOB. No fever. Reports having a rash under L axilla x last 2 wk, comes and goes but to him looks like a lyme rash. He thinks he probably had a tick bite.    Last visit 3/2015: Rituximab qwk x 4 doses was recommended at last visit given concern for active GPA. He had his 1st infusion on 2/25/2015, right after start of infusion, developed hives without any resp sx, was treated with extra dose of benadryl and solu cortef. Hives resolved. Infusion was resumed at a slower rate and he finished and tolerated it well. Had his 2nd infusion on 3/4/2015 with no reaction although this time I increased his solumedrol from 100 to 250 mg as part of pre-medication. He has since completed all 4 infusions w/o reaction. States that nasal and joint symptoms remain improved. He still does have some crusting in his left nostril. Does have some pain and swelling in the hands w/minimal morning stiffness but that is his baseline. Says pain in his shoulders has improved. He denies any medication side effects.    He was seen in the ED 4/3/15 for productive cough and fever. He was treated with Levaquin for likely pna. He is now feeling better with only lingering cough.    10/16/2015: He is feeling well, has no complaints except cough at night because of sinus drainage (chronic). Requests pneumonia shot.    2/2016: Today patient reports feeling in excellent condition. Denies chest symptoms, such as SOB, hemoptysis, cough, or nasal drainage/bleeding. He also denies any hematuria and his last renal labs were stable. Unfortunately, his neuropathy seems to be his biggest problem. Neurology started him on a low dose of gabapentin without improvement.     5/6/16: Besides stuffy nose due to seasonal allergies, has no complaints. No cough, SOB, hemoptysis, hematuria, weight loss or fatigue.    8/12/16: Patient has no complaints today. No  "cough, SOB, hemoptysis, hematuria, weight loss or fatigue. He was seen today in Nephrology clinic for CKD follow up, doing well, no further follow up is needed. Patient reports visual disturbance with decreased vision probably on the R eye (ariant does not remember) with decreased to none visual strength in low visual field with some \"blanks\" which lasted for 40 seconds. This is a second episode in the last 1.5-2 years. Patient also reports recurrent double vision occuring every 4-5 months. Last ophthalmologist appointment was 1986.     11/18/16: Feeling very well, no complaints. Is going to have an eye exam today.    3/3/17: Mr. Ennis says he is doing well overall. He is still recovering from a pneumonia diagnosed on 2/14/17. He says that he was feeling ill for about 2 weeks before this with worsening productive cough, nasal congestion and high fevers with \"fever blisters\". He went to the ED on 2/14/17 and received ceftriaxone and a Z-pack and says he has been improving since then. He says that many other people at his work were sick with similar symptoms. Today he reports some residual nasal congestion but denies shortness of breath. He says that during his illness he did have some blood in his nose \"if I picked at it\" but he otherwise denies epistaxis, hemoptysis, hematuria and blood in his stools. His significant other is wondering if his methotrexate dose can start to be tapered down. Currently he is taking MTX 15mg weekly although this was held when he had pneumonia.    Mr. Ennis says that he continues to experience episodes of diplopia on average once per month. These episodes typically last 20-30 seconds. He was seen by ophthalmology on 11/18/16 and had a normal eye exam.    Today: Feeling well with no complaints, still gets some crusting in his nose and cough ups phlegm (unchanged for years). On MTX 4 tab po qwk.         Past Medical History:     Past Medical History:   Diagnosis Date     Arthritis      " Autoimmune disease (H)      Hearing problem      Hoarseness      Hypertension      Kidney problem      Malignant melanoma (H)      Malignant neoplasm (H)     melanoma     Squamous cell carcinoma      Russo syndrome           Past Surgical History:     Past Surgical History:   Procedure Laterality Date     BIOPSY  2011    melanoma on back     DISSECT LYMPH NODE INGUINAL  3/1/2013    Procedure: DISSECT LYMPH NODE INGUINAL;  Bilateral Inguinal Lymph Node Biopsy.;  Surgeon: Tariq Acosta MD;  Location: [facility]     ESOPHAGOSCOPY, GASTROSCOPY, DUODENOSCOPY (EGD), COMBINED  2013    Procedure: COMBINED ESOPHAGOSCOPY, GASTROSCOPY, DUODENOSCOPY (EGD);  Gastroscopy;  Surgeon: Tariq Acosta MD;  Location: [facility]     HERNIORRHAPHY INGUINAL  2012    Procedure: HERNIORRHAPHY INGUINAL;  Open Repair Left Inguinal Hernia;  Surgeon: Jerad Baker MD;  Location: [facility]          Social History:     Social History     Tobacco Use     Smoking status: Former Smoker     Packs/day: 1.00     Years: 20.00     Pack years: 20.00     Types: Cigarettes     Last attempt to quit: 2013     Years since quittin.8     Smokeless tobacco: Never Used   Substance Use Topics     Alcohol use: Yes     Comment: rare          Family History:     Family History   Problem Relation Age of Onset     Diabetes Mother      Hypertension Mother      Cancer Father         stomach     Heart Disease Father      Heart Disease Brother      Diabetes Sister      Diabetes Sister      Diabetes Sister      Heart Disease Brother      Cancer Sister      Glaucoma No family hx of      Macular Degeneration No family hx of           Immunizations:   Due for PPSV23, will administer today. Immunizations otherwise up to date.    PMHx, FHx, SHx were reviewed, unchanged.         Allergies:     Allergies   Allergen Reactions     Shrimp Nausea and Vomiting     Got sick each time he ate it          Medications:     Current Outpatient Medications  "  Medication Sig     Acetaminophen (TYLENOL PO) Take 650 mg by mouth once as needed for mild pain or fever     Blood Pressure Monitor KIT Automatic Blood Pressure Monitor     calcium-vitamin D (CALCIUM 600 + D) 600-400 MG-UNIT per tablet Take 1 tablet by mouth 2 times daily.     Cholecalciferol (VITAMIN D) 1000 UNITS capsule Take 1 capsule by mouth daily.     folic acid (FOLVITE) 1 MG tablet Take 2 tablets (2 mg) by mouth daily     gabapentin (NEURONTIN) 400 MG capsule Take 1 capsule (400 mg) by mouth At Bedtime     losartan (COZAAR) 50 MG tablet Take 1 tablet (50 mg) by mouth daily     methotrexate 2.5 MG tablet CHEMO Take 4 tablets (10 mg) by mouth once a week     methotrexate sodium 2.5 MG TABS Take 4 tablets (10 mg) by mouth every 7 days     mupirocin (BACTROBAN) 2 % ointment Use in nose twice daily for 5 days     No current facility-administered medications for this visit.           Review of Systems:   A comprehensive ROS was done. Positives are per HPI.           Physical Exam:     Vitals:    12/14/18 1030   BP: 143/87   Pulse: 65   Temp: 98.6  F (37  C)   TempSrc: Oral   SpO2: 97%   Weight: 86.3 kg (190 lb 3.2 oz)   Height: 1.727 m (5' 8\")     Constitutional:   Awake, alert, cooperative, no apparent distress. Falguni present   Eyes:   Pupils equal, round and reactive to light, sclera clear, conjunctiva normal.   ENT:   Normocephalic, atramatic,  oral pharynx with moist mucus membranes, tonsils without erythema or exudates, dentures in place. Small dark black spot over left soft palate smaller and lighter in color (almost gone).   Neck:   Supple, symmetrical, trachea midline, no adenopathy.   Lungs:   No increased work of breathing, good air exchange, clear to auscultation bilaterally, no crackles or wheezing.    Cardiovascular:   Regular rate and rhythm, normal S1 and S2, no S3 or S4, and no murmur noted.   Musculoskeletal:   No active synovitis or joint tenderness. Good ROM in all joints tested. No edema in " LE.   Neurologic:   AOx3; CN grossly intact.   Skin:   No rashes or ecchymoses on limited exam.       Psych: NL affect       Data:   Recent laboratory data reviewed.    2/14/17  Cr 1.46, WBC 7.8, AST/ALT normal    2/7/17  CRP 40.6, ESR 12, ANCA 1:20, MPO neg, AL-3 neg    Recent imaging studies reviewed by me.    CXR (2/14/17)    New mild patchy opacity at the right lung base appears to  localize to the posterior right lower lobe base and could represent a  developing area of pneumonia. Left lung is clear.    CHEST CT (2/16/2013)    Chest: Multiple indeterminate pulmonary nodules. Several of the  larger lesions are cavitary. There are 3 dominant lesions, a 3.2 cm  cavitary lesion in the left upper lobe, a 3.4 cm solid mass in the  right lower lobe laterally and a 3.7 cm cavitary mass in the right  lower lobe medially. In addition there is mediastinal lymphadenopathy  with a dominant 3 cm subcarinal node. Bilateral axillary  lymphadenopathy with 2.4 cm node seen bilaterally. Chest otherwise  unremarkable.       Exam: High-resolution Chest CT without contrast 1/9/2015 12:42 PM.  History: Concern for ANCA vasculitis flare in the chest.  Comparison: 3/7/2014, 11/14/2013, 2/16/2013.  Technique: CT helical acquisition from the lung apices to the kidneys  was obtained without intravenous contrast. Both inspiratory and  expiratory images were obtained.    Findings:  Moderate atherosclerotic calcifications of the coronary arteries. Mild  calcifications involving the aorta and aortic great vessels. Prominent  paratracheal lymph node on series 3 image 22 measuring 9 mm. Decreased  from 2/16/2013 and unchanged from 3/7/2014. Borderline enlarged right  hilar lymph node, unchanged from 3/7/2014 and decreased from  2/16/2013. Heart is not enlarged. Trace cardial effusion. No axillary  lymphadenopathy.  Nodular scarring in the left apex, unchanged from 2/16/2013. No  pleural effusion or pneumothorax. No evidence of  intrapulmonary  infection. There is a cluster of tree in bud nodularity noted in the  anteromedial right upper lobe. These nodules appear new. Expiratory  images demonstrate mild air trapping. Scattered pulmonary nodules:  Series 6 image 146: Seen posteriorly in the right lower lobe, there is  a sub-pleural nodule measuring 1.6 x 1.2 cm with a spiculated border.  Previously, this nodule measured 2.0 x 1.6 cm.  Series 6 image 189: Seen laterally in the right lower lobe, there is a  former mid pulmonary nodule which is unchanged from 3/7/2013 and  decreased from 2/16/2013.  Series 6 image 169: Seen posterolaterally in the right lower lobe,  there is a 3 mm pulmonary nodule which is unchanged from 3/7/2014 and  decreased from 2/16/2013.  Series 6 image 225: Seen posterolaterally in the left lower lobe,  there is a 4 mm subpleural nodule which is unchanged from 2/16/2014.  Other scattered sub-4 mm pulmonary nodules appear stable from previous  study.  Evaluation of the upper abdomen is limited due to the lack of  intravenous contrast.  No suspicious bony lesions.    IMPRESSION  Impression:   1. Scattered pulmonary nodules enlarged lymph nodes are  stable/slightly decreased from the previous study. No evidence for  acute flare in patient's known ANCA-associated vasculitis.  2. New tree in bud nodularity seen anteromedially in the right middle  lobe. Findings could be seen in the setting of an atypical infectious  process.  3. Mild air trapping. Finding is nonspecific and is seen in setting of  small airways disease such as asthma or bronchiolitis.  I have personally reviewed the examination and initial interpretation  and I agree with the findings.  TOÑO PERKINS MD    Component      Latest Ref Rng 6/11/2015 6/11/2015           9:26 AM  9:29 AM   Sodium      133 - 144 mmol/L  139   Potassium      3.4 - 5.3 mmol/L  4.4   Chloride      94 - 109 mmol/L  109   Carbon Dioxide      20 - 32 mmol/L  23   Anion Gap      3 - 14  mmol/L  7   Glucose      70 - 99 mg/dL  81   Urea Nitrogen      7 - 30 mg/dL  28   Creatinine      0.66 - 1.25 mg/dL  1.26 (H)   GFR Estimate      >60 mL/min/1.7m2  57 (L)   GFR Estimate If Black      >60 mL/min/1.7m2  69   Calcium      8.5 - 10.1 mg/dL  9.2   Phosphorus      2.5 - 4.5 mg/dL  2.1 (L)   Albumin      3.4 - 5.0 g/dL  1.7 (L)   Iron      35 - 180 ug/dL  129   Iron Binding Cap      240 - 430 ug/dL  292   Iron Saturation Index      15 - 46 %  44   Protein Random Urine       0.11    Protein Total Urine g/gr Creatinine      0 - 0.2 g/g Cr 0.14    Hemoglobin      13.3 - 17.7 g/dL  13.0 (L)   Ferritin      26 - 388 ng/mL  193   Parathormone Intact      12 - 72 pg/mL  49   Vitamin D Deficiency screening      30 - 75 ug/L  55   Creatinine Urine       84    Copath Report           Proteinase 3 Antibody IgG      0.0 - 0.9 AI       Component      Latest Ref Rng 6/11/2015 6/11/2015          11:15 AM 12:18 PM   Copath Report       Patient Name: ADONAY FAITH . . .    Proteinase 3 Antibody IgG      0.0 - 0.9 AI  0.8     Copath Report     Patient Name: ADONAY FAITH  MR#: [id number]  Specimen #: [id number]  Collected: 6/11/2015  Received: 6/11/2015  Reported: 6/12/2015 17:37  Ordering Phy(s): DANIELLE PARIS    SPECIMEN(S):  Nasal septum    FINAL DIAGNOSIS:  Nasal septum, biopsy:  -Squamous mucosa with ulcer, marked acute inflammation and reactive  changes  -No granulomas identified  -A special stain for fungi (GMS) is negative    I have personally reviewed all specimens and or slides, including the  listed special stains, and used them with my medical judgement to  determine the final diagnosis.    Electronically signed out by:    Dorys Barton M.D., [facility]    CLINICAL HISTORY:  The patient is a 70-year-old man with a history of left upper back  melanoma, resected in 2011 (see consult report [id number]). 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 He now presents for followup visit with nasal  cavity scabs, increased arthralgia and fatigue, concerning for  vasculitis flare. Operative procedure: Nasal septum biopsy.    GROSS:  The specimen is received in formalin with proper patient identification,  labeled \"septal tissue\". The specimen consists of three tan-pink tissue  fragments, 0.2 cm in greatest dimension, entirely submitted in cassette  1. (Dictated by: Ana Mixon 6/11/2015 03:39 PM)    MICROSCOPIC:  Microscopic examination is performed. A GMS stain, performed with  appropriate positive control, is negative.    CPT Codes:  A: 99551-QK0, 64469-IUS    TESTING LAB LOCATION:  [facility], [address] [id number] [phone]    COLLECTION SITE:  Client: [facility]  Location: [facility] (B)       Component      Latest Ref Rng & Units 3/13/2018   WBC      4.0 - 11.0 10e9/L 5.8   RBC Count      4.4 - 5.9 10e12/L 4.27 (L)   Hemoglobin      13.3 - 17.7 g/dL 13.9   Hematocrit      40.0 - 53.0 % 41.2   MCV      78 - 100 fl 97   MCH      26.5 - 33.0 pg 32.6   MCHC      31.5 - 36.5 g/dL 33.7   RDW      10.0 - 15.0 % 12.5   Platelet Count      150 - 450 10e9/L 177   Diff Method       Automated Method   % Neutrophils      % 57.5   % Lymphocytes      % 25.1   % Monocytes      % 11.7   % Eosinophils      % 4.8   % Basophils      % 0.9   Absolute Neutrophil      1.6 - 8.3 10e9/L 3.3   Absolute Lymphocytes      0.8 - 5.3 10e9/L 1.5   Absolute Monocytes      0.0 - 1.3 10e9/L 0.7   Absolute Eosinophils      0.0 - 0.7 10e9/L 0.3   Absolute Basophils      0.0 - 0.2 10e9/L 0.1   Color Urine       Yellow   Appearance Urine       Clear   Glucose Urine      NEG:Negative mg/dL Negative   Bilirubin Urine      NEG:Negative Negative "   Ketones Urine      NEG:Negative mg/dL Negative   Specific Gravity Urine      1.003 - 1.035 1.010   pH Urine      5.0 - 7.0 pH 6.0   Protein Albumin Urine      NEG:Negative mg/dL Negative   Urobilinogen Urine      0.2 - 1.0 EU/dL 0.2   Nitrite Urine      NEG:Negative Negative   Blood Urine      NEG:Negative Negative   Leukocyte Esterase Urine      NEG:Negative Negative   Source       Midstream Urine   WBC Urine      OTO5:0 - 5 /HPF 0 - 5   RBC Urine      OTO2:O - 2 /HPF O - 2   Creatinine      0.66 - 1.25 mg/dL 1.47 (H)   GFR Estimate      >60 mL/min/1.7m2 47 (L)   GFR Estimate If Black      >60 mL/min/1.7m2 57 (L)   Myeloperoxidase Antibody IgG      0.0 - 0.9 AI <0.2   Proteinase 3 Antibody IgG      0.0 - 0.9 AI 2.4 (H)   AST      0 - 45 U/L 26   ALT      0 - 70 U/L 30   Albumin      3.4 - 5.0 g/dL 3.7   CRP Inflammation      0.0 - 8.0 mg/L <2.9   Sed Rate      0 - 20 mm/h 5   Neutrophil Cytoplasmic IgG Antibody      <1:20 <1:20   Ede Sanchez MD

## 2018-12-14 NOTE — LETTER
Patient:  Joshua Ennis  :   1945  MRN:     [id number]        Mr.Roger DEANNA Ennis  [address] [facility] [address]        2019    Dear ,    We are writing to inform you of your test results. Improved kidney function (GFR). The rest of labs are stable.      Results for orders placed or performed in visit on 18   Creatinine random urine   Result Value Ref Range    Creatinine Urine Random 46 mg/dL   Protein  random urine with Creat Ratio   Result Value Ref Range    Protein Random Urine 0.06 g/L    Protein Total Urine g/gr Creatinine 0.13 0 - 0.2 g/g Cr   Routine UA with micro reflex to culture   Result Value Ref Range    Color Urine Yellow     Appearance Urine Clear     Glucose Urine Negative NEG^Negative mg/dL    Bilirubin Urine Negative NEG^Negative    Ketones Urine Negative NEG^Negative mg/dL    Specific Gravity Urine 1.010 1.003 - 1.035    Blood Urine Negative NEG^Negative    pH Urine 7.0 5.0 - 7.0 pH    Protein Albumin Urine Negative NEG^Negative mg/dL    Urobilinogen mg/dL 0.0 0.0 - 2.0 mg/dL    Nitrite Urine Negative NEG^Negative    Leukocyte Esterase Urine Negative NEG^Negative    Source Midstream Urine     WBC Urine 0 0 - 5 /HPF    RBC Urine 0 0 - 2 /HPF   Erythrocyte sedimentation rate auto   Result Value Ref Range    Sed Rate 5 0 - 20 mm/h   CRP inflammation   Result Value Ref Range    CRP Inflammation <2.9 0.0 - 8.0 mg/L   Creatinine   Result Value Ref Range    Creatinine 1.34 (H) 0.66 - 1.25 mg/dL    GFR Estimate 52 (L) >60 mL/min/1.7m2    GFR Estimate If Black 63 >60 mL/min/1.7m2   CBC with platelets differential   Result Value Ref Range    WBC 6.0 4.0 - 11.0 10e9/L    RBC Count 4.56 4.4 - 5.9 10e12/L    Hemoglobin 14.8 13.3 - 17.7 g/dL    Hematocrit 43.8 40.0 - 53.0 %    MCV 96 78 - 100 fl    MCH 32.5 26.5 - 33.0 pg    MCHC 33.8 31.5 - 36.5 g/dL    RDW 12.7 10.0 - 15.0 %    Platelet Count 185 150 - 450 10e9/L    Diff Method Automated Method     % Neutrophils 66.1  %    % Lymphocytes 19.3 %    % Monocytes 9.4 %    % Eosinophils 3.9 %    % Basophils 1.0 %    % Immature Granulocytes 0.3 %    Nucleated RBCs 0 0 /100    Absolute Neutrophil 3.9 1.6 - 8.3 10e9/L    Absolute Lymphocytes 1.2 0.8 - 5.3 10e9/L    Absolute Monocytes 0.6 0.0 - 1.3 10e9/L    Absolute Eosinophils 0.2 0.0 - 0.7 10e9/L    Absolute Basophils 0.1 0.0 - 0.2 10e9/L    Abs Immature Granulocytes 0.0 0 - 0.4 10e9/L    Absolute Nucleated RBC 0.0    AST   Result Value Ref Range    AST 21 0 - 45 U/L   ALT   Result Value Ref Range    ALT 26 0 - 70 U/L   Albumin level   Result Value Ref Range    Albumin 3.8 3.4 - 5.0 g/dL   Vasculitis panel   Result Value Ref Range    Myeloperoxidase Antibody IgG <0.2 0.0 - 0.9 AI    Proteinase 3 Antibody IgG 1.6 (H) 0.0 - 0.9 AI   Creatinine urine calculation only   Result Value Ref Range    Creatinine Urine 46 mg/dL       Ede Sanchez MD

## 2018-12-14 NOTE — PROGRESS NOTES
[facility] Follow-Up Note  Date of visit: 12/14/2018  Last visit date: 6/13/2018      Joshua Ennis MRN# [id number]   Age: 73 year old YOB: 1945          Assessment and Plan:     Assessment:   Mr. Ennis is a 73 yr old  male with history of melanoma s/p resection in 11/2011, former tobacco use, and GPA (PR3+, MPO and c-ANCA negative in 3/2013 based on aforementioned labs, confirmed by kidney and lung biopsy) who presents for clinic follow-up regarding GPA.  He was initiated on cyclophosphamide 150 mg QD and high-dose prednisone 3/2013. He last took prednisone in 10/2013. Cytoxan was switched to MTX for maintenance treatment in 10/2013.  He has been tolerating it well. He had a flare in 12/2013 with increased crusts in his nose along with recurrence of arthralgia, worsening numbness in feet and increasing vasculitis marker. Renal function was stable with negative repeat chest CT. His vasculitis flare was treated with short course of prednisone taper 20 mg po qd max and increasing MTX to 8 tab = 20 mg qwk. Vasculitis symptoms improved.  At visit on 1/2015, he had scabs in his nose, had cold [place] symptoms and increased arthralgia/fatigue (shoulders, L hand). Labs from 12/16 showed increased Cr level and hematuria with rising PR3 (more than 8), there was a concern for vasculitis flare (in kidneys, sinuses), no flare on repeat chest CT 1/2015. Therefore it was recommended to try rituximab. Another reason was to be able to taper MTX off given abnormal Cr.  He is now s/p Rituximab infusion x 4 (1st on 2/25/2015). He is feeling well. No hematuria with stable Cr, no SOB. Saw ENT with negative nasal mucosa biopsy 6/2015 for granuloma but showed active inflammation, had left nostril lesion which decreased in size by use of mupirocin ointment. PR3 vasculitis marker went back to NL in 6/2015, it was NL in 10/2015, given stable vasculitis and low GFR, MTX from 8 to 7 tab po qwk. Repeat  NC-3 in 3/2016 was slightly positive, Cr was slightly higher than baseline. We tapered his MTX further to 6 tab po qwk in 2/2016 given lack of symptoms. Re-check labs in April were almost unchanged from 3/2016 but CRP was slightly up. Labs on 2/7/17 with elevated CRP (40.6) but normal ESR (12) and ANCA 1:20 likely reflecting bacterial infection.    In 3/2017, MTX was reduced from 6 to 5 tab qwk. It was further tapered to 4 tab po qwk in 6/2017 given stable disease. No vasculitis flare ups by such change. ANCA remained negative, NC-3 is going down despite tapering MTX, dropped from 2.8 in 4/2017 to 2.3 in 6/2017 to 2 in 8/2017. 2.4 in 3/2018.    Vasculitis seems to be stable and he has no signs or symptoms of flare up today. Recommend to continue MTX at low dose of 4 tab po qwk as GPA or ANCA-vasculitis tends to flare up. Was advised to call in case of vasculitis flare up sx.    He had labs done today, will follow the results.        Plan:    - Continue MTX 10 mg (4 tabs) po qwk, continue with Folic acid 1 mg po qd    > Labs today and q3 months including MTX monitoring labs    > Patient instructed to hold MTX if develops fevers or infection  - Neuropathy unchanged on Gabapentin  - F/u with Dr. Obando ENT for small bluish pigmented lesion in the left soft palate.  Dr. Obando recommended biopsy in 5/2017 but pt wanted to hold off, looks almost gone.    He is going to get it locally.    - RTC in 12 months         Orders Placed This Encounter   Procedures     ALT     Albumin level     AST     CBC with platelets differential     Creatinine     CRP inflammation     Erythrocyte sedimentation rate auto     Routine UA with Micro Reflex to Culture     Protein  random urine with Creat Ratio     Creatinine random urine     ANCA IgG by IFA with Reflex to Titer     Proteinase 3 Antibody IgG             HPI / Interim History:      Mr. Ennis is a 71 yo WM with h/o melanoma s/p resection in 11/2011, former tobacco use, and GPA  diagnosed in 3/2013. For details of GPA diagnosis, please refer to initial consult note. Briefly, he initially presented with constitutional sx of fever, sweating, weight loss, migratory arthralgia, numbness/shooting pain in feet to his PCP.  Had enlarged inguinal LNs. His PCP suspected malignancy and especially lung cancer given h/o tobacco use.  His work-up showed several lung nodules and CT guided biopsy of one of the nodules done in 2/13 showed granulomatous inflammation with no evidence of malignancy.  Inguinal LN biopsy done 3/13 showed inflammation with no malignancy.  He had not had any respiratory sx, SOB, CP, cough, hemoptysis or sinusitis.  He was hospitalized for further work up and was found having high PR3, neg MPO, neg ANCA.  He also had + RF and trace cryo.   Had microscopic hematuria with NL Cr at the time of admission but his Cr started to rise before discharge. Had abnormal LFTs toward the end of discharge(was trending down, liver US was unremarkable).  He was diagnosed with GPA given high titer of PR3 to 723, +granulomatous inflammation of the lung nodule, microscopic hematuria, and migratory arthralgia along with high ESR/CRP and constitutional sx.  He was Started on cytoxan 150 mg po qd, prednisone 70 mg po qd (after IV solumedrol 1 gr qd x 3 days), Bactrim DS three times a wk for PCP prophylaxis and fosamax 70 mg po qwk. He was also put on vit D 36746 units qwk and neurontin.  He was discharged on 3/13/2013.   At initial visit in Rheumatology clinic, patient was referred to nephrology for renal biopsy since Cr was up despite being on cytoxan and prednisone 75 qd.  Renal biopsy 3/13 confirmed Dx of GPA.  After discussion with Dr. Krishnamurthy, made a decision to continue with current regimen and if no improvement to switch to IV cytoxan or rituximab.  His Cr initially increased to 1.71, but stabilized to ~ 1.3.  He has a h/o high BP and was recently started on losartan per nephology.  He has been seen  by neurology for bilaterally foot and ankle numbness and was diagnosed with neuropathy possibly secondary to vasculitis.  He was also seen by oncology; there was no concern for malignancy.   Overall, the patient is doing well today with no specific complaints or concerns.  He had recent blood work 9/19 which showed negative inflammatory and vasculitis markers (PR3, MPO, ANCA, and CRP).  His SCr has remained elevated, but stable at 1.37.  He is at the tail end of a prednisone taper; currently taking 1 mg QD, set to d/c 10/15.  He is still on cyclophosphamide, taking 150 mg QD.  He was prescribed losartan at a f/u visit w/ nephrology 8/21, but states his BP have been running 130's/80's, so has not taken the medication for over a month.  Additionally, he had been taking neurontin for numbness in his feet, toes, and ankles, but stopped b/c he was not getting symptom relief.  Today, the patient denies vision changes, epistaxis, oral ulcerations, SOB, hemoptysis, CP, joint pain, abdominal pain, dysuria, or hematuria.  He does endorse a cough, but states that this has been intermittent, chronic, and non-productive.  Additionally, he endorses right shoulder discomfort that is worse w/ abduction, but has also been chronic. Of note, the patient remains a former smoker, having quit in February of this year.     His major complaint is increased numbness of his toes, but has resumed taking neurontin and feels sensation is coming back which is painful. No SOB, cough, crusts in nose or blood in urine. He has fatigue. He is off cytoxan since 10/2013, is on MTX 8tab po qwk. MTX was started with 4 tab po qwk in 10/2013, was increased to 8 tab after last visit in 1/2014. In 12/2013, was felt to have a small flare of vasculitis with increased PR3, crusts in nose and R shoulder pain; therefore prednisone taper with max dose of 20 mg qd was given along with increasing MTX to 7 tab qwk. MTX was later increased to 8 tab qwk in 1/2014. Labs  done in 2/2014 showed rising PR3. He had ER visit on 1/8 for urosepsis (E. Coli), was treated with cedifir, recovered. Continues having shoulder pain, requests referral to PT.    1/2015: He reports having increased arthralgia (shoulders, L hand) x a month. About 2 wk ago, L hand pain was so bad that he could not move his hand. No major joint swelling. Has no AM stiffness. Reports being tested negative for lyme (local lab) about 2 wk ago. Appetite is worse. He is more tired. He sleeps a lot. Has a cold x 1 wk, no hemoptysis. Neuropathy is the same. No SOB. No fever. Reports having a rash under L axilla x last 2 wk, comes and goes but to him looks like a lyme rash. He thinks he probably had a tick bite.    Last visit 3/2015: Rituximab qwk x 4 doses was recommended at last visit given concern for active GPA. He had his 1st infusion on 2/25/2015, right after start of infusion, developed hives without any resp sx, was treated with extra dose of benadryl and solu cortef. Hives resolved. Infusion was resumed at a slower rate and he finished and tolerated it well. Had his 2nd infusion on 3/4/2015 with no reaction although this time I increased his solumedrol from 100 to 250 mg as part of pre-medication. He has since completed all 4 infusions w/o reaction. States that nasal and joint symptoms remain improved. He still does have some crusting in his left nostril. Does have some pain and swelling in the hands w/minimal morning stiffness but that is his baseline. Says pain in his shoulders has improved. He denies any medication side effects.    He was seen in the ED 4/3/15 for productive cough and fever. He was treated with Levaquin for likely pna. He is now feeling better with only lingering cough.    10/16/2015: He is feeling well, has no complaints except cough at night because of sinus drainage (chronic). Requests pneumonia shot.    2/2016: Today patient reports feeling in excellent condition. Denies chest symptoms, such as  "SOB, hemoptysis, cough, or nasal drainage/bleeding. He also denies any hematuria and his last renal labs were stable. Unfortunately, his neuropathy seems to be his biggest problem. Neurology started him on a low dose of gabapentin without improvement.     5/6/16: Besides stuffy nose due to seasonal allergies, has no complaints. No cough, SOB, hemoptysis, hematuria, weight loss or fatigue.    8/12/16: Patient has no complaints today. No cough, SOB, hemoptysis, hematuria, weight loss or fatigue. He was seen today in Nephrology clinic for CKD follow up, doing well, no further follow up is needed. Patient reports visual disturbance with decreased vision probably on the R eye (chelsietent does not remember) with decreased to none visual strength in low visual field with some \"blanks\" which lasted for 40 seconds. This is a second episode in the last 1.5-2 years. Patient also reports recurrent double vision occuring every 4-5 months. Last ophthalmologist appointment was 1986.     11/18/16: Feeling very well, no complaints. Is going to have an eye exam today.    3/3/17: Mr. Ennis says he is doing well overall. He is still recovering from a pneumonia diagnosed on 2/14/17. He says that he was feeling ill for about 2 weeks before this with worsening productive cough, nasal congestion and high fevers with \"fever blisters\". He went to the ED on 2/14/17 and received ceftriaxone and a Z-pack and says he has been improving since then. He says that many other people at his work were sick with similar symptoms. Today he reports some residual nasal congestion but denies shortness of breath. He says that during his illness he did have some blood in his nose \"if I picked at it\" but he otherwise denies epistaxis, hemoptysis, hematuria and blood in his stools. His significant other is wondering if his methotrexate dose can start to be tapered down. Currently he is taking MTX 15mg weekly although this was held when he had pneumonia.    Mr." Raymon says that he continues to experience episodes of diplopia on average once per month. These episodes typically last 20-30 seconds. He was seen by ophthalmology on 16 and had a normal eye exam.    Today: Feeling well with no complaints, still gets some crusting in his nose and cough ups phlegm (unchanged for years). On MTX 4 tab po qwk.         Past Medical History:     Past Medical History:   Diagnosis Date     Arthritis      Autoimmune disease (H)      Hearing problem      Hoarseness      Hypertension      Kidney problem      Malignant melanoma (H)      Malignant neoplasm (H)     melanoma     Squamous cell carcinoma      Russo syndrome           Past Surgical History:     Past Surgical History:   Procedure Laterality Date     BIOPSY  2011    melanoma on back     DISSECT LYMPH NODE INGUINAL  3/1/2013    Procedure: DISSECT LYMPH NODE INGUINAL;  Bilateral Inguinal Lymph Node Biopsy.;  Surgeon: Tariq Acosta MD;  Location: [facility]     ESOPHAGOSCOPY, GASTROSCOPY, DUODENOSCOPY (EGD), COMBINED  2013    Procedure: COMBINED ESOPHAGOSCOPY, GASTROSCOPY, DUODENOSCOPY (EGD);  Gastroscopy;  Surgeon: Tariq Acosta MD;  Location: [facility]     HERNIORRHAPHY INGUINAL  2012    Procedure: HERNIORRHAPHY INGUINAL;  Open Repair Left Inguinal Hernia;  Surgeon: Jerad Baker MD;  Location: [facility]          Social History:     Social History     Tobacco Use     Smoking status: Former Smoker     Packs/day: 1.00     Years: 20.00     Pack years: 20.00     Types: Cigarettes     Last attempt to quit: 2013     Years since quittin.8     Smokeless tobacco: Never Used   Substance Use Topics     Alcohol use: Yes     Comment: rare          Family History:     Family History   Problem Relation Age of Onset     Diabetes Mother      Hypertension Mother      Cancer Father         stomach     Heart Disease Father      Heart Disease Brother      Diabetes Sister      Diabetes Sister      Diabetes Sister       "Heart Disease Brother      Cancer Sister      Glaucoma No family hx of      Macular Degeneration No family hx of           Immunizations:   Due for PPSV23, will administer today. Immunizations otherwise up to date.    PMHx, FHx, SHx were reviewed, unchanged.         Allergies:     Allergies   Allergen Reactions     Shrimp Nausea and Vomiting     Got sick each time he ate it          Medications:     Current Outpatient Medications   Medication Sig     Acetaminophen (TYLENOL PO) Take 650 mg by mouth once as needed for mild pain or fever     Blood Pressure Monitor KIT Automatic Blood Pressure Monitor     calcium-vitamin D (CALCIUM 600 + D) 600-400 MG-UNIT per tablet Take 1 tablet by mouth 2 times daily.     Cholecalciferol (VITAMIN D) 1000 UNITS capsule Take 1 capsule by mouth daily.     folic acid (FOLVITE) 1 MG tablet Take 2 tablets (2 mg) by mouth daily     gabapentin (NEURONTIN) 400 MG capsule Take 1 capsule (400 mg) by mouth At Bedtime     losartan (COZAAR) 50 MG tablet Take 1 tablet (50 mg) by mouth daily     methotrexate 2.5 MG tablet CHEMO Take 4 tablets (10 mg) by mouth once a week     methotrexate sodium 2.5 MG TABS Take 4 tablets (10 mg) by mouth every 7 days     mupirocin (BACTROBAN) 2 % ointment Use in nose twice daily for 5 days     No current facility-administered medications for this visit.           Review of Systems:   A comprehensive ROS was done. Positives are per HPI.           Physical Exam:     Vitals:    12/14/18 1030   BP: 143/87   Pulse: 65   Temp: 98.6  F (37  C)   TempSrc: Oral   SpO2: 97%   Weight: 86.3 kg (190 lb 3.2 oz)   Height: 1.727 m (5' 8\")     Constitutional:   Awake, alert, cooperative, no apparent distress. Falguni present   Eyes:   Pupils equal, round and reactive to light, sclera clear, conjunctiva normal.   ENT:   Normocephalic, atramatic,  oral pharynx with moist mucus membranes, tonsils without erythema or exudates, dentures in place. Small dark black spot over left soft palate " smaller and lighter in color (almost gone).   Neck:   Supple, symmetrical, trachea midline, no adenopathy.   Lungs:   No increased work of breathing, good air exchange, clear to auscultation bilaterally, no crackles or wheezing.    Cardiovascular:   Regular rate and rhythm, normal S1 and S2, no S3 or S4, and no murmur noted.   Musculoskeletal:   No active synovitis or joint tenderness. Good ROM in all joints tested. No edema in LE.   Neurologic:   AOx3; CN grossly intact.   Skin:   No rashes or ecchymoses on limited exam.       Psych: NL affect       Data:   Recent laboratory data reviewed.    2/14/17  Cr 1.46, WBC 7.8, AST/ALT normal    2/7/17  CRP 40.6, ESR 12, ANCA 1:20, MPO neg, DE-3 neg    Recent imaging studies reviewed by me.    CXR (2/14/17)    New mild patchy opacity at the right lung base appears to  localize to the posterior right lower lobe base and could represent a  developing area of pneumonia. Left lung is clear.    CHEST CT (2/16/2013)    Chest: Multiple indeterminate pulmonary nodules. Several of the  larger lesions are cavitary. There are 3 dominant lesions, a 3.2 cm  cavitary lesion in the left upper lobe, a 3.4 cm solid mass in the  right lower lobe laterally and a 3.7 cm cavitary mass in the right  lower lobe medially. In addition there is mediastinal lymphadenopathy  with a dominant 3 cm subcarinal node. Bilateral axillary  lymphadenopathy with 2.4 cm node seen bilaterally. Chest otherwise  unremarkable.       Exam: High-resolution Chest CT without contrast 1/9/2015 12:42 PM.  History: Concern for ANCA vasculitis flare in the chest.  Comparison: 3/7/2014, 11/14/2013, 2/16/2013.  Technique: CT helical acquisition from the lung apices to the kidneys  was obtained without intravenous contrast. Both inspiratory and  expiratory images were obtained.    Findings:  Moderate atherosclerotic calcifications of the coronary arteries. Mild  calcifications involving the aorta and aortic great vessels.  Prominent  paratracheal lymph node on series 3 image 22 measuring 9 mm. Decreased  from 2/16/2013 and unchanged from 3/7/2014. Borderline enlarged right  hilar lymph node, unchanged from 3/7/2014 and decreased from  2/16/2013. Heart is not enlarged. Trace cardial effusion. No axillary  lymphadenopathy.  Nodular scarring in the left apex, unchanged from 2/16/2013. No  pleural effusion or pneumothorax. No evidence of intrapulmonary  infection. There is a cluster of tree in bud nodularity noted in the  anteromedial right upper lobe. These nodules appear new. Expiratory  images demonstrate mild air trapping. Scattered pulmonary nodules:  Series 6 image 146: Seen posteriorly in the right lower lobe, there is  a sub-pleural nodule measuring 1.6 x 1.2 cm with a spiculated border.  Previously, this nodule measured 2.0 x 1.6 cm.  Series 6 image 189: Seen laterally in the right lower lobe, there is a  former mid pulmonary nodule which is unchanged from 3/7/2013 and  decreased from 2/16/2013.  Series 6 image 169: Seen posterolaterally in the right lower lobe,  there is a 3 mm pulmonary nodule which is unchanged from 3/7/2014 and  decreased from 2/16/2013.  Series 6 image 225: Seen posterolaterally in the left lower lobe,  there is a 4 mm subpleural nodule which is unchanged from 2/16/2014.  Other scattered sub-4 mm pulmonary nodules appear stable from previous  study.  Evaluation of the upper abdomen is limited due to the lack of  intravenous contrast.  No suspicious bony lesions.    IMPRESSION  Impression:   1. Scattered pulmonary nodules enlarged lymph nodes are  stable/slightly decreased from the previous study. No evidence for  acute flare in patient's known ANCA-associated vasculitis.  2. New tree in bud nodularity seen anteromedially in the right middle  lobe. Findings could be seen in the setting of an atypical infectious  process.  3. Mild air trapping. Finding is nonspecific and is seen in setting of  small airways  disease such as asthma or bronchiolitis.  I have personally reviewed the examination and initial interpretation  and I agree with the findings.  TOÑO PERKINS MD    Component      Latest Ref Rng 6/11/2015 6/11/2015           9:26 AM  9:29 AM   Sodium      133 - 144 mmol/L  139   Potassium      3.4 - 5.3 mmol/L  4.4   Chloride      94 - 109 mmol/L  109   Carbon Dioxide      20 - 32 mmol/L  23   Anion Gap      3 - 14 mmol/L  7   Glucose      70 - 99 mg/dL  81   Urea Nitrogen      7 - 30 mg/dL  28   Creatinine      0.66 - 1.25 mg/dL  1.26 (H)   GFR Estimate      >60 mL/min/1.7m2  57 (L)   GFR Estimate If Black      >60 mL/min/1.7m2  69   Calcium      8.5 - 10.1 mg/dL  9.2   Phosphorus      2.5 - 4.5 mg/dL  2.1 (L)   Albumin      3.4 - 5.0 g/dL  1.7 (L)   Iron      35 - 180 ug/dL  129   Iron Binding Cap      240 - 430 ug/dL  292   Iron Saturation Index      15 - 46 %  44   Protein Random Urine       0.11    Protein Total Urine g/gr Creatinine      0 - 0.2 g/g Cr 0.14    Hemoglobin      13.3 - 17.7 g/dL  13.0 (L)   Ferritin      26 - 388 ng/mL  193   Parathormone Intact      12 - 72 pg/mL  49   Vitamin D Deficiency screening      30 - 75 ug/L  55   Creatinine Urine       84    Copath Report           Proteinase 3 Antibody IgG      0.0 - 0.9 AI       Component      Latest Ref Rng 6/11/2015 6/11/2015          11:15 AM 12:18 PM   Copath Report       Patient Name: ADONAY FAITH . . .    Proteinase 3 Antibody IgG      0.0 - 0.9 AI  0.8     Copath Report     Patient Name: ADONAY FAITH  MR#: [id number]  Specimen #: [id number]  Collected: 6/11/2015  Received: 6/11/2015  Reported: 6/12/2015 17:37  Ordering Phy(s): DANIELLE PARIS    SPECIMEN(S):  Nasal septum    FINAL DIAGNOSIS:  Nasal septum, biopsy:  -Squamous mucosa with ulcer, marked acute inflammation and reactive  changes  -No granulomas identified  -A special stain for fungi (GMS) is negative    I have personally reviewed all specimens and or slides, including  "the  listed special stains, and used them with my medical judgement to  determine the final diagnosis.    Electronically signed out by:    Dorys Barton M.D., [facility]    CLINICAL HISTORY:  The patient is a 70-year-old man with a history of left upper back  melanoma, resected in 2011 (see consult report [id number]). 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 He now presents for followup visit with nasal  cavity scabs, increased arthralgia and fatigue, concerning for  vasculitis flare. Operative procedure: Nasal septum biopsy.    GROSS:  The specimen is received in formalin with proper patient identification,  labeled \"septal tissue\". The specimen consists of three tan-pink tissue  fragments, 0.2 cm in greatest dimension, entirely submitted in cassette  1. (Dictated by: Ana Mixon 6/11/2015 03:39 PM)    MICROSCOPIC:  Microscopic examination is performed. A GMS stain, performed with  appropriate positive control, is negative.    CPT Codes:  A: 76079-YU4, 09112-UPH    TESTING LAB LOCATION:  [facility], [address] [address] [phone]    COLLECTION SITE:  Client: [facility]  Location: [facility] (B)       Component      Latest Ref Rng & Units 3/13/2018   WBC      4.0 - 11.0 10e9/L 5.8   RBC Count      4.4 - 5.9 10e12/L 4.27 (L)   Hemoglobin      13.3 - 17.7 g/dL 13.9   Hematocrit      40.0 - 53.0 % 41.2   MCV      78 - 100 fl 97   MCH      26.5 - 33.0 pg 32.6   MCHC      31.5 - 36.5 g/dL 33.7   RDW      10.0 - 15.0 % 12.5   Platelet Count      150 - 450 10e9/L 177   Diff Method       Automated Method   % Neutrophils      % 57.5   % Lymphocytes      % 25.1   % Monocytes      % 11.7   % Eosinophils     "  % 4.8   % Basophils      % 0.9   Absolute Neutrophil      1.6 - 8.3 10e9/L 3.3   Absolute Lymphocytes      0.8 - 5.3 10e9/L 1.5   Absolute Monocytes      0.0 - 1.3 10e9/L 0.7   Absolute Eosinophils      0.0 - 0.7 10e9/L 0.3   Absolute Basophils      0.0 - 0.2 10e9/L 0.1   Color Urine       Yellow   Appearance Urine       Clear   Glucose Urine      NEG:Negative mg/dL Negative   Bilirubin Urine      NEG:Negative Negative   Ketones Urine      NEG:Negative mg/dL Negative   Specific Gravity Urine      1.003 - 1.035 1.010   pH Urine      5.0 - 7.0 pH 6.0   Protein Albumin Urine      NEG:Negative mg/dL Negative   Urobilinogen Urine      0.2 - 1.0 EU/dL 0.2   Nitrite Urine      NEG:Negative Negative   Blood Urine      NEG:Negative Negative   Leukocyte Esterase Urine      NEG:Negative Negative   Source       Midstream Urine   WBC Urine      OTO5:0 - 5 /HPF 0 - 5   RBC Urine      OTO2:O - 2 /HPF O - 2   Creatinine      0.66 - 1.25 mg/dL 1.47 (H)   GFR Estimate      >60 mL/min/1.7m2 47 (L)   GFR Estimate If Black      >60 mL/min/1.7m2 57 (L)   Myeloperoxidase Antibody IgG      0.0 - 0.9 AI <0.2   Proteinase 3 Antibody IgG      0.0 - 0.9 AI 2.4 (H)   AST      0 - 45 U/L 26   ALT      0 - 70 U/L 30   Albumin      3.4 - 5.0 g/dL 3.7   CRP Inflammation      0.0 - 8.0 mg/L <2.9   Sed Rate      0 - 20 mm/h 5   Neutrophil Cytoplasmic IgG Antibody      <1:20 <1:20

## 2018-12-14 NOTE — LETTER
12/14/2018       RE: Joshua Ennis  [address] [facility] [id number]     Dear Colleague,    Thank you for referring your patient, Joshua Ennis, to the [facility] RHEUMATOLOGY at [facility]. Please see a copy of my visit note below.        Elias quiñonez,   [facility] Follow-Up Note  Date of visit: 6/13/2018  Last visit date: 12/13/2017      Joshua Ennis MRN# [id number]   Age: 73 year old YOB: 1945          Assessment and Plan:     Assessment:   Mr. Ennis is a 73 yr old  male with history of melanoma s/p resection in 11/2011, former tobacco use, and GPA (PR3+, MPO and c-ANCA negative in 3/2013 based on aforementioned labs, confirmed by kidney and lung biopsy) who presents for clinic follow-up regarding GPA.  He was initiated on cyclophosphamide 150 mg QD and high-dose prednisone 3/2013. He last took prednisone in 10/2013. Cytoxan was switched to MTX for maintenance treatment in 10/2013.  He has been tolerating it well. He had a flare in 12/2013 with increased crusts in his nose along with recurrence of arthralgia, worsening numbness in feet and increasing vasculitis marker. Renal function was stable with negative repeat chest CT. His vasculitis flare was treated with short course of prednisone taper 20 mg po qd max and increasing MTX to 8 tab = 20 mg qwk. Vasculitis symptoms improved.  At visit on 1/2015, he had scabs in his nose, had cold [place]ota symptoms and increased arthralgia/fatigue (shoulders, L hand). Labs from 12/16 showed increased Cr level and hematuria with rising PR3 (more than 8), there was a concern for vasculitis flare (in kidneys, sinuses), no flare on repeat chest CT 1/2015. Therefore it was recommended to try rituximab. Another reason was to be able to taper MTX off given abnormal Cr.  He is now s/p Rituximab infusion x 4 (1st on 2/25/2015). He is feeling well. No hematuria with stable Cr, no SOB. Saw ENT with  negative nasal mucosa biopsy 6/2015 for granuloma but showed active inflammation, had left nostril lesion which decreased in size by use of mupirocin ointment. PR3 vasculitis marker went back to NL in 6/2015, it was NL in 10/2015, given stable vasculitis and low GFR, MTX from 8 to 7 tab po qwk. Repeat MN-3 in 3/2016 was slightly positive, Cr was slightly higher than baseline. We tapered his MTX further to 6 tab po qwk in 2/2016 given lack of symptoms. Re-check labs in April were almost unchanged from 3/2016 but CRP was slightly up. Labs on 2/7/17 with elevated CRP (40.6) but normal ESR (12) and ANCA 1:20 likely reflecting bacterial infection.    In 3/2017, MTX was reduced from 6 to 5 tab qwk. It was further tapered to 4 tab po qwk in 6/2017 given stable disease. No vasculitis flare ups by such change. ANCA remained negative, MN-3 is going down despite tapering MTX, dropped from 2.8 in 4/2017 to 2.3 in 6/2017 to 2 in 8/2017. 2.4 in 3/2018.    Vasculitis seems to be stable and he has no signs or symptoms of flare up today. Recommend to continue MTX at low dose of 4 tab po qwk as GPA or ANCA-vasculitis tends to flare up. Was advised to call in case of vasculitis flare up sx.    He had labs done today, will follow the results.        Plan:    - Continue MTX 10 mg (4 tabs) po qwk, continue with Folic acid 1 mg po qd    > Labs today and q3 months including MTX monitoring labs    > Patient instructed to hold MTX if develops fevers or infection  - Neuropathy unchanged on Gabapentin  - F/u with Dr. Obando ENT for small bluish pigmented lesion in the left soft palate.  Dr. Obando recommended biopsy in 5/2017 but pt wanted to hold off, looks smaller in size to me  -Shingrix vaccine. Recommended shingrix. CDC recommends this vaccine 2 doses,  by 2-6 months for adults 50 years and older. It is killed virus and ok to be used in immunocompromised patients. Shingrix reduces the risk of shingles and PHN by more than 90% in  people 50 and older.     AE include: pain, redness and swelling at the inj site, myalgia, fatigue, headaches, shivering, fever and stomach upset.    He is going to get it locally.    - RTC in 6 months         No orders of the defined types were placed in this encounter.            HPI / Interim History:      Mr. Ennis is a 71 yo WM with h/o melanoma s/p resection in 11/2011, former tobacco use, and GPA diagnosed in 3/2013. For details of GPA diagnosis, please refer to initial consult note. Briefly, he initially presented with constitutional sx of fever, sweating, weight loss, migratory arthralgia, numbness/shooting pain in feet to his PCP.  Had enlarged inguinal LNs. His PCP suspected malignancy and especially lung cancer given h/o tobacco use.  His work-up showed several lung nodules and CT guided biopsy of one of the nodules done in 2/13 showed granulomatous inflammation with no evidence of malignancy.  Inguinal LN biopsy done 3/13 showed inflammation with no malignancy.  He had not had any respiratory sx, SOB, CP, cough, hemoptysis or sinusitis.  He was hospitalized for further work up and was found having high PR3, neg MPO, neg ANCA.  He also had + RF and trace cryo.   Had microscopic hematuria with NL Cr at the time of admission but his Cr started to rise before discharge. Had abnormal LFTs toward the end of discharge(was trending down, liver US was unremarkable).  He was diagnosed with GPA given high titer of PR3 to 723, +granulomatous inflammation of the lung nodule, microscopic hematuria, and migratory arthralgia along with high ESR/CRP and constitutional sx.  He was Started on cytoxan 150 mg po qd, prednisone 70 mg po qd (after IV solumedrol 1 gr qd x 3 days), Bactrim DS three times a wk for PCP prophylaxis and fosamax 70 mg po qwk. He was also put on vit D 16952 units qwk and neurontin.  He was discharged on 3/13/2013.   At initial visit in Rheumatology clinic, patient was referred to nephrology for renal  biopsy since Cr was up despite being on cytoxan and prednisone 75 qd.  Renal biopsy 3/13 confirmed Dx of GPA.  After discussion with Dr. Krishnamurthy, made a decision to continue with current regimen and if no improvement to switch to IV cytoxan or rituximab.  His Cr initially increased to 1.71, but stabilized to ~ 1.3.  He has a h/o high BP and was recently started on losartan per nephology.  He has been seen by neurology for bilaterally foot and ankle numbness and was diagnosed with neuropathy possibly secondary to vasculitis.  He was also seen by oncology; there was no concern for malignancy.   Overall, the patient is doing well today with no specific complaints or concerns.  He had recent blood work 9/19 which showed negative inflammatory and vasculitis markers (PR3, MPO, ANCA, and CRP).  His SCr has remained elevated, but stable at 1.37.  He is at the tail end of a prednisone taper; currently taking 1 mg QD, set to d/c 10/15.  He is still on cyclophosphamide, taking 150 mg QD.  He was prescribed losartan at a f/u visit w/ nephrology 8/21, but states his BP have been running 130's/80's, so has not taken the medication for over a month.  Additionally, he had been taking neurontin for numbness in his feet, toes, and ankles, but stopped b/c he was not getting symptom relief.  Today, the patient denies vision changes, epistaxis, oral ulcerations, SOB, hemoptysis, CP, joint pain, abdominal pain, dysuria, or hematuria.  He does endorse a cough, but states that this has been intermittent, chronic, and non-productive.  Additionally, he endorses right shoulder discomfort that is worse w/ abduction, but has also been chronic. Of note, the patient remains a former smoker, having quit in February of this year.     His major complaint is increased numbness of his toes, but has resumed taking neurontin and feels sensation is coming back which is painful. No SOB, cough, crusts in nose or blood in urine. He has fatigue. He is off  cytoxan since 10/2013, is on MTX 8tab po qwk. MTX was started with 4 tab po qwk in 10/2013, was increased to 8 tab after last visit in 1/2014. In 12/2013, was felt to have a small flare of vasculitis with increased PR3, crusts in nose and R shoulder pain; therefore prednisone taper with max dose of 20 mg qd was given along with increasing MTX to 7 tab qwk. MTX was later increased to 8 tab qwk in 1/2014. Labs done in 2/2014 showed rising PR3. He had ER visit on 1/8 for urosepsis (E. Coli), was treated with cedifir, recovered. Continues having shoulder pain, requests referral to PT.    1/2015: He reports having increased arthralgia (shoulders, L hand) x a month. About 2 wk ago, L hand pain was so bad that he could not move his hand. No major joint swelling. Has no AM stiffness. Reports being tested negative for lyme (local lab) about 2 wk ago. Appetite is worse. He is more tired. He sleeps a lot. Has a cold x 1 wk, no hemoptysis. Neuropathy is the same. No SOB. No fever. Reports having a rash under L axilla x last 2 wk, comes and goes but to him looks like a lyme rash. He thinks he probably had a tick bite.    Last visit 3/2015: Rituximab qwk x 4 doses was recommended at last visit given concern for active GPA. He had his 1st infusion on 2/25/2015, right after start of infusion, developed hives without any resp sx, was treated with extra dose of benadryl and solu cortef. Hives resolved. Infusion was resumed at a slower rate and he finished and tolerated it well. Had his 2nd infusion on 3/4/2015 with no reaction although this time I increased his solumedrol from 100 to 250 mg as part of pre-medication. He has since completed all 4 infusions w/o reaction. States that nasal and joint symptoms remain improved. He still does have some crusting in his left nostril. Does have some pain and swelling in the hands w/minimal morning stiffness but that is his baseline. Says pain in his shoulders has improved. He denies any  "medication side effects.    He was seen in the ED 4/3/15 for productive cough and fever. He was treated with Levaquin for likely pna. He is now feeling better with only lingering cough.    10/16/2015: He is feeling well, has no complaints except cough at night because of sinus drainage (chronic). Requests pneumonia shot.    2/2016: Today patient reports feeling in excellent condition. Denies chest symptoms, such as SOB, hemoptysis, cough, or nasal drainage/bleeding. He also denies any hematuria and his last renal labs were stable. Unfortunately, his neuropathy seems to be his biggest problem. Neurology started him on a low dose of gabapentin without improvement.     5/6/16: Besides stuffy nose due to seasonal allergies, has no complaints. No cough, SOB, hemoptysis, hematuria, weight loss or fatigue.    8/12/16: Patient has no complaints today. No cough, SOB, hemoptysis, hematuria, weight loss or fatigue. He was seen today in Nephrology clinic for CKD follow up, doing well, no further follow up is needed. Patient reports visual disturbance with decreased vision probably on the R eye (paitent does not remember) with decreased to none visual strength in low visual field with some \"blanks\" which lasted for 40 seconds. This is a second episode in the last 1.5-2 years. Patient also reports recurrent double vision occuring every 4-5 months. Last ophthalmologist appointment was 1986.     11/18/16: Feeling very well, no complaints. Is going to have an eye exam today.    3/3/17: Mr. Ennis says he is doing well overall. He is still recovering from a pneumonia diagnosed on 2/14/17. He says that he was feeling ill for about 2 weeks before this with worsening productive cough, nasal congestion and high fevers with \"fever blisters\". He went to the ED on 2/14/17 and received ceftriaxone and a Z-pack and says he has been improving since then. He says that many other people at his work were sick with similar symptoms. Today he " "reports some residual nasal congestion but denies shortness of breath. He says that during his illness he did have some blood in his nose \"if I picked at it\" but he otherwise denies epistaxis, hemoptysis, hematuria and blood in his stools. His significant other is wondering if his methotrexate dose can start to be tapered down. Currently he is taking MTX 15mg weekly although this was held when he had pneumonia.    Mr. Ennis says that he continues to experience episodes of diplopia on average once per month. These episodes typically last 20-30 seconds. He was seen by ophthalmology on 16 and had a normal eye exam.    Today: Feeling well with no complaints. On MTX 4 tab po qwk.         Past Medical History:     Past Medical History:   Diagnosis Date     Arthritis      Autoimmune disease (H)      Hearing problem      Hoarseness      Hypertension      Kidney problem      Malignant melanoma (H)      Malignant neoplasm (H)     melanoma     Squamous cell carcinoma      Russo syndrome           Past Surgical History:     Past Surgical History:   Procedure Laterality Date     BIOPSY  2011    melanoma on back     DISSECT LYMPH NODE INGUINAL  3/1/2013    Procedure: DISSECT LYMPH NODE INGUINAL;  Bilateral Inguinal Lymph Node Biopsy.;  Surgeon: Tariq Acosta MD;  Location: [facility]     ESOPHAGOSCOPY, GASTROSCOPY, DUODENOSCOPY (EGD), COMBINED  2013    Procedure: COMBINED ESOPHAGOSCOPY, GASTROSCOPY, DUODENOSCOPY (EGD);  Gastroscopy;  Surgeon: Tariq Acosta MD;  Location: [facility]     HERNIORRHAPHY INGUINAL  2012    Procedure: HERNIORRHAPHY INGUINAL;  Open Repair Left Inguinal Hernia;  Surgeon: Jerad Baker MD;  Location: [facility]          Social History:     Social History     Tobacco Use     Smoking status: Former Smoker     Packs/day: 1.00     Years: 20.00     Pack years: 20.00     Types: Cigarettes     Last attempt to quit: 2013     Years since quittin.8     Smokeless tobacco: Never Used " "  Substance Use Topics     Alcohol use: Yes     Comment: rare          Family History:     Family History   Problem Relation Age of Onset     Diabetes Mother      Hypertension Mother      Cancer Father         stomach     Heart Disease Father      Heart Disease Brother      Diabetes Sister      Diabetes Sister      Diabetes Sister      Heart Disease Brother      Cancer Sister      Glaucoma No family hx of      Macular Degeneration No family hx of           Immunizations:   Due for PPSV23, will administer today. Immunizations otherwise up to date.    PMHx, FHx, SHx were reviewed, unchanged.         Allergies:     Allergies   Allergen Reactions     Shrimp Nausea and Vomiting     Got sick each time he ate it          Medications:     Current Outpatient Medications   Medication Sig     Acetaminophen (TYLENOL PO) Take 650 mg by mouth once as needed for mild pain or fever     Blood Pressure Monitor KIT Automatic Blood Pressure Monitor     calcium-vitamin D (CALCIUM 600 + D) 600-400 MG-UNIT per tablet Take 1 tablet by mouth 2 times daily.     Cholecalciferol (VITAMIN D) 1000 UNITS capsule Take 1 capsule by mouth daily.     folic acid (FOLVITE) 1 MG tablet Take 2 tablets (2 mg) by mouth daily     gabapentin (NEURONTIN) 400 MG capsule Take 1 capsule (400 mg) by mouth At Bedtime     losartan (COZAAR) 50 MG tablet Take 1 tablet (50 mg) by mouth daily     methotrexate 2.5 MG tablet CHEMO Take 4 tablets (10 mg) by mouth once a week     methotrexate sodium 2.5 MG TABS Take 4 tablets (10 mg) by mouth every 7 days     mupirocin (BACTROBAN) 2 % ointment Use in nose twice daily for 5 days     No current facility-administered medications for this visit.           Review of Systems:   A comprehensive ROS was done. Positives are per HPI.           Physical Exam:     Vitals:    12/14/18 1030   BP: 143/87   Pulse: 65   Temp: 98.6  F (37  C)   TempSrc: Oral   SpO2: 97%   Weight: 86.3 kg (190 lb 3.2 oz)   Height: 1.727 m (5' 8\") "     Constitutional:   Awake, alert, cooperative, no apparent distress. Falguni present, grandson present.   Eyes:   Pupils equal, round and reactive to light, sclera clear, conjunctiva normal.   ENT:   Normocephalic, atramatic,  oral pharynx with moist mucus membranes, tonsils without erythema or exudates, dentures in place. Small dark black spot over left soft palate smaller and lighter in color.   Neck:   Supple, symmetrical, trachea midline, no adenopathy.   Lungs:   No increased work of breathing, good air exchange, clear to auscultation bilaterally, no crackles or wheezing.    Cardiovascular:   Regular rate and rhythm, normal S1 and S2, no S3 or S4, and no murmur noted.   Musculoskeletal:   No active synovitis or joint tenderness. Good ROM in all joints tested. No edema in LE.   Neurologic:   AOx3; CN grossly intact.   Skin:   No rashes or ecchymoses on limited exam.          Data:   Recent laboratory data reviewed.    2/14/17  Cr 1.46, WBC 7.8, AST/ALT normal    2/7/17  CRP 40.6, ESR 12, ANCA 1:20, MPO neg, MA-3 neg    Recent imaging studies reviewed by me.    CXR (2/14/17)    New mild patchy opacity at the right lung base appears to  localize to the posterior right lower lobe base and could represent a  developing area of pneumonia. Left lung is clear.    CHEST CT (2/16/2013)    Chest: Multiple indeterminate pulmonary nodules. Several of the  larger lesions are cavitary. There are 3 dominant lesions, a 3.2 cm  cavitary lesion in the left upper lobe, a 3.4 cm solid mass in the  right lower lobe laterally and a 3.7 cm cavitary mass in the right  lower lobe medially. In addition there is mediastinal lymphadenopathy  with a dominant 3 cm subcarinal node. Bilateral axillary  lymphadenopathy with 2.4 cm node seen bilaterally. Chest otherwise  unremarkable.       Exam: High-resolution Chest CT without contrast 1/9/2015 12:42 PM.  History: Concern for ANCA vasculitis flare in the chest.  Comparison: 3/7/2014,  11/14/2013, 2/16/2013.  Technique: CT helical acquisition from the lung apices to the kidneys  was obtained without intravenous contrast. Both inspiratory and  expiratory images were obtained.    Findings:  Moderate atherosclerotic calcifications of the coronary arteries. Mild  calcifications involving the aorta and aortic great vessels. Prominent  paratracheal lymph node on series 3 image 22 measuring 9 mm. Decreased  from 2/16/2013 and unchanged from 3/7/2014. Borderline enlarged right  hilar lymph node, unchanged from 3/7/2014 and decreased from  2/16/2013. Heart is not enlarged. Trace cardial effusion. No axillary  lymphadenopathy.  Nodular scarring in the left apex, unchanged from 2/16/2013. No  pleural effusion or pneumothorax. No evidence of intrapulmonary  infection. There is a cluster of tree in bud nodularity noted in the  anteromedial right upper lobe. These nodules appear new. Expiratory  images demonstrate mild air trapping. Scattered pulmonary nodules:  Series 6 image 146: Seen posteriorly in the right lower lobe, there is  a sub-pleural nodule measuring 1.6 x 1.2 cm with a spiculated border.  Previously, this nodule measured 2.0 x 1.6 cm.  Series 6 image 189: Seen laterally in the right lower lobe, there is a  former mid pulmonary nodule which is unchanged from 3/7/2013 and  decreased from 2/16/2013.  Series 6 image 169: Seen posterolaterally in the right lower lobe,  there is a 3 mm pulmonary nodule which is unchanged from 3/7/2014 and  decreased from 2/16/2013.  Series 6 image 225: Seen posterolaterally in the left lower lobe,  there is a 4 mm subpleural nodule which is unchanged from 2/16/2014.  Other scattered sub-4 mm pulmonary nodules appear stable from previous  study.  Evaluation of the upper abdomen is limited due to the lack of  intravenous contrast.  No suspicious bony lesions.    IMPRESSION  Impression:   1. Scattered pulmonary nodules enlarged lymph nodes are  stable/slightly decreased  from the previous study. No evidence for  acute flare in patient's known ANCA-associated vasculitis.  2. New tree in bud nodularity seen anteromedially in the right middle  lobe. Findings could be seen in the setting of an atypical infectious  process.  3. Mild air trapping. Finding is nonspecific and is seen in setting of  small airways disease such as asthma or bronchiolitis.  I have personally reviewed the examination and initial interpretation  and I agree with the findings.  TOÑO PERKINS MD    Component      Latest Ref Rng 6/11/2015 6/11/2015           9:26 AM  9:29 AM   Sodium      133 - 144 mmol/L  139   Potassium      3.4 - 5.3 mmol/L  4.4   Chloride      94 - 109 mmol/L  109   Carbon Dioxide      20 - 32 mmol/L  23   Anion Gap      3 - 14 mmol/L  7   Glucose      70 - 99 mg/dL  81   Urea Nitrogen      7 - 30 mg/dL  28   Creatinine      0.66 - 1.25 mg/dL  1.26 (H)   GFR Estimate      >60 mL/min/1.7m2  57 (L)   GFR Estimate If Black      >60 mL/min/1.7m2  69   Calcium      8.5 - 10.1 mg/dL  9.2   Phosphorus      2.5 - 4.5 mg/dL  2.1 (L)   Albumin      3.4 - 5.0 g/dL  1.7 (L)   Iron      35 - 180 ug/dL  129   Iron Binding Cap      240 - 430 ug/dL  292   Iron Saturation Index      15 - 46 %  44   Protein Random Urine       0.11    Protein Total Urine g/gr Creatinine      0 - 0.2 g/g Cr 0.14    Hemoglobin      13.3 - 17.7 g/dL  13.0 (L)   Ferritin      26 - 388 ng/mL  193   Parathormone Intact      12 - 72 pg/mL  49   Vitamin D Deficiency screening      30 - 75 ug/L  55   Creatinine Urine       84    Copath Report           Proteinase 3 Antibody IgG      0.0 - 0.9 AI       Component      Latest Ref Rng 6/11/2015 6/11/2015          11:15 AM 12:18 PM   Copath Report       Patient Name: ADONAY FAITH . . .    Proteinase 3 Antibody IgG      0.0 - 0.9 AI  0.8     Copath Report     Patient Name: ADONAY FAITH  MR#: [id number]  Specimen #: [id number]  Collected: 6/11/2015  Received: 6/11/2015  Reported: 6/12/2015  "17:37  Ordering Phy(s): DANIELLE PARIS    SPECIMEN(S):  Nasal septum    FINAL DIAGNOSIS:  Nasal septum, biopsy:  -Squamous mucosa with ulcer, marked acute inflammation and reactive  changes  -No granulomas identified  -A special stain for fungi (GMS) is negative    I have personally reviewed all specimens and or slides, including the  listed special stains, and used them with my medical judgement to  determine the final diagnosis.    Electronically signed out by:    Dorys Barton M.D., [facility]    CLINICAL HISTORY:  The patient is a 70-year-old man with a history of left upper back  melanoma, resected in 2011 (see consult report [id number]). 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 He now presents for followup visit with nasal  cavity scabs, increased arthralgia and fatigue, concerning for  vasculitis flare. Operative procedure: Nasal septum biopsy.    GROSS:  The specimen is received in formalin with proper patient identification,  labeled \"septal tissue\". The specimen consists of three tan-pink tissue  fragments, 0.2 cm in greatest dimension, entirely submitted in cassette  1. (Dictated by: Ana Mixon 6/11/2015 03:39 PM)    MICROSCOPIC:  Microscopic examination is performed. A GMS stain, performed with  appropriate positive control, is negative.    CPT Codes:  A: 31528-XA0, 48119-BZX    TESTING LAB LOCATION:  [facility], [address] [id number] [phone]    COLLECTION SITE:  Client: [facility]  Location: [facility] (B)       Component      Latest Ref Rng & Units 3/13/2018   WBC      4.0 - 11.0 10e9/L 5.8   RBC Count      4.4 - 5.9 10e12/L 4.27 (L)   Hemoglobin      13.3 - 17.7 g/dL 13.9 "   Hematocrit      40.0 - 53.0 % 41.2   MCV      78 - 100 fl 97   MCH      26.5 - 33.0 pg 32.6   MCHC      31.5 - 36.5 g/dL 33.7   RDW      10.0 - 15.0 % 12.5   Platelet Count      150 - 450 10e9/L 177   Diff Method       Automated Method   % Neutrophils      % 57.5   % Lymphocytes      % 25.1   % Monocytes      % 11.7   % Eosinophils      % 4.8   % Basophils      % 0.9   Absolute Neutrophil      1.6 - 8.3 10e9/L 3.3   Absolute Lymphocytes      0.8 - 5.3 10e9/L 1.5   Absolute Monocytes      0.0 - 1.3 10e9/L 0.7   Absolute Eosinophils      0.0 - 0.7 10e9/L 0.3   Absolute Basophils      0.0 - 0.2 10e9/L 0.1   Color Urine       Yellow   Appearance Urine       Clear   Glucose Urine      NEG:Negative mg/dL Negative   Bilirubin Urine      NEG:Negative Negative   Ketones Urine      NEG:Negative mg/dL Negative   Specific Gravity Urine      1.003 - 1.035 1.010   pH Urine      5.0 - 7.0 pH 6.0   Protein Albumin Urine      NEG:Negative mg/dL Negative   Urobilinogen Urine      0.2 - 1.0 EU/dL 0.2   Nitrite Urine      NEG:Negative Negative   Blood Urine      NEG:Negative Negative   Leukocyte Esterase Urine      NEG:Negative Negative   Source       Midstream Urine   WBC Urine      OTO5:0 - 5 /HPF 0 - 5   RBC Urine      OTO2:O - 2 /HPF O - 2   Creatinine      0.66 - 1.25 mg/dL 1.47 (H)   GFR Estimate      >60 mL/min/1.7m2 47 (L)   GFR Estimate If Black      >60 mL/min/1.7m2 57 (L)   Myeloperoxidase Antibody IgG      0.0 - 0.9 AI <0.2   Proteinase 3 Antibody IgG      0.0 - 0.9 AI 2.4 (H)   AST      0 - 45 U/L 26   ALT      0 - 70 U/L 30   Albumin      3.4 - 5.0 g/dL 3.7   CRP Inflammation      0.0 - 8.0 mg/L <2.9   Sed Rate      0 - 20 mm/h 5   Neutrophil Cytoplasmic IgG Antibody      <1:20 <1:20       [facility] Follow-Up Note  Date of visit: 12/14/2018  Last visit date: 6/13/2018      Joshua Ennis MRN# [id number]   Age: 73 year old YOB: 1945          Assessment and Plan:     Assessment:     Raymon is a 73 yr old  male with history of melanoma s/p resection in 11/2011, former tobacco use, and GPA (PR3+, MPO and c-ANCA negative in 3/2013 based on aforementioned labs, confirmed by kidney and lung biopsy) who presents for clinic follow-up regarding GPA.  He was initiated on cyclophosphamide 150 mg QD and high-dose prednisone 3/2013. He last took prednisone in 10/2013. Cytoxan was switched to MTX for maintenance treatment in 10/2013.  He has been tolerating it well. He had a flare in 12/2013 with increased crusts in his nose along with recurrence of arthralgia, worsening numbness in feet and increasing vasculitis marker. Renal function was stable with negative repeat chest CT. His vasculitis flare was treated with short course of prednisone taper 20 mg po qd max and increasing MTX to 8 tab = 20 mg qwk. Vasculitis symptoms improved.  At visit on 1/2015, he had scabs in his nose, had cold [place] symptoms and increased arthralgia/fatigue (shoulders, L hand). Labs from 12/16 showed increased Cr level and hematuria with rising PR3 (more than 8), there was a concern for vasculitis flare (in kidneys, sinuses), no flare on repeat chest CT 1/2015. Therefore it was recommended to try rituximab. Another reason was to be able to taper MTX off given abnormal Cr.  He is now s/p Rituximab infusion x 4 (1st on 2/25/2015). He is feeling well. No hematuria with stable Cr, no SOB. Saw ENT with negative nasal mucosa biopsy 6/2015 for granuloma but showed active inflammation, had left nostril lesion which decreased in size by use of mupirocin ointment. PR3 vasculitis marker went back to NL in 6/2015, it was NL in 10/2015, given stable vasculitis and low GFR, MTX from 8 to 7 tab po qwk. Repeat [place]-3 in 3/2016 was slightly positive, Cr was slightly higher than baseline. We tapered his MTX further to 6 tab po qwk in 2/2016 given lack of symptoms. Re-check labs in April were almost unchanged from 3/2016 but CRP was slightly  up. Labs on 2/7/17 with elevated CRP (40.6) but normal ESR (12) and ANCA 1:20 likely reflecting bacterial infection.    In 3/2017, MTX was reduced from 6 to 5 tab qwk. It was further tapered to 4 tab po qwk in 6/2017 given stable disease. No vasculitis flare ups by such change. ANCA remained negative, OH-3 is going down despite tapering MTX, dropped from 2.8 in 4/2017 to 2.3 in 6/2017 to 2 in 8/2017. 2.4 in 3/2018.    Vasculitis seems to be stable and he has no signs or symptoms of flare up today. Recommend to continue MTX at low dose of 4 tab po qwk as GPA or ANCA-vasculitis tends to flare up. Was advised to call in case of vasculitis flare up sx.    He had labs done today, will follow the results.        Plan:    - Continue MTX 10 mg (4 tabs) po qwk, continue with Folic acid 1 mg po qd    > Labs today and q3 months including MTX monitoring labs    > Patient instructed to hold MTX if develops fevers or infection  - Neuropathy unchanged on Gabapentin  - F/u with Dr. Obando ENT for small bluish pigmented lesion in the left soft palate.  Dr. Obando recommended biopsy in 5/2017 but pt wanted to hold off, looks almost gone.    He is going to get it locally.    - RTC in 12 months         Orders Placed This Encounter   Procedures     ALT     Albumin level     AST     CBC with platelets differential     Creatinine     CRP inflammation     Erythrocyte sedimentation rate auto     Routine UA with Micro Reflex to Culture     Protein  random urine with Creat Ratio     Creatinine random urine     ANCA IgG by IFA with Reflex to Titer     Proteinase 3 Antibody IgG             HPI / Interim History:      Mr. Ennis is a 71 yo WM with h/o melanoma s/p resection in 11/2011, former tobacco use, and GPA diagnosed in 3/2013. For details of GPA diagnosis, please refer to initial consult note. Briefly, he initially presented with constitutional sx of fever, sweating, weight loss, migratory arthralgia, numbness/shooting pain in feet to his  PCP.  Had enlarged inguinal LNs. His PCP suspected malignancy and especially lung cancer given h/o tobacco use.  His work-up showed several lung nodules and CT guided biopsy of one of the nodules done in 2/13 showed granulomatous inflammation with no evidence of malignancy.  Inguinal LN biopsy done 3/13 showed inflammation with no malignancy.  He had not had any respiratory sx, SOB, CP, cough, hemoptysis or sinusitis.  He was hospitalized for further work up and was found having high PR3, neg MPO, neg ANCA.  He also had + RF and trace cryo.   Had microscopic hematuria with NL Cr at the time of admission but his Cr started to rise before discharge. Had abnormal LFTs toward the end of discharge(was trending down, liver US was unremarkable).  He was diagnosed with GPA given high titer of PR3 to 723, +granulomatous inflammation of the lung nodule, microscopic hematuria, and migratory arthralgia along with high ESR/CRP and constitutional sx.  He was Started on cytoxan 150 mg po qd, prednisone 70 mg po qd (after IV solumedrol 1 gr qd x 3 days), Bactrim DS three times a wk for PCP prophylaxis and fosamax 70 mg po qwk. He was also put on vit D 72299 units qwk and neurontin.  He was discharged on 3/13/2013.   At initial visit in Rheumatology clinic, patient was referred to nephrology for renal biopsy since Cr was up despite being on cytoxan and prednisone 75 qd.  Renal biopsy 3/13 confirmed Dx of GPA.  After discussion with Dr. Krishnamurthy, made a decision to continue with current regimen and if no improvement to switch to IV cytoxan or rituximab.  His Cr initially increased to 1.71, but stabilized to ~ 1.3.  He has a h/o high BP and was recently started on losartan per nephology.  He has been seen by neurology for bilaterally foot and ankle numbness and was diagnosed with neuropathy possibly secondary to vasculitis.  He was also seen by oncology; there was no concern for malignancy.   Overall, the patient is doing well today with  no specific complaints or concerns.  He had recent blood work 9/19 which showed negative inflammatory and vasculitis markers (PR3, MPO, ANCA, and CRP).  His SCr has remained elevated, but stable at 1.37.  He is at the tail end of a prednisone taper; currently taking 1 mg QD, set to d/c 10/15.  He is still on cyclophosphamide, taking 150 mg QD.  He was prescribed losartan at a f/u visit w/ nephrology 8/21, but states his BP have been running 130's/80's, so has not taken the medication for over a month.  Additionally, he had been taking neurontin for numbness in his feet, toes, and ankles, but stopped b/c he was not getting symptom relief.  Today, the patient denies vision changes, epistaxis, oral ulcerations, SOB, hemoptysis, CP, joint pain, abdominal pain, dysuria, or hematuria.  He does endorse a cough, but states that this has been intermittent, chronic, and non-productive.  Additionally, he endorses right shoulder discomfort that is worse w/ abduction, but has also been chronic. Of note, the patient remains a former smoker, having quit in February of this year.     His major complaint is increased numbness of his toes, but has resumed taking neurontin and feels sensation is coming back which is painful. No SOB, cough, crusts in nose or blood in urine. He has fatigue. He is off cytoxan since 10/2013, is on MTX 8tab po qwk. MTX was started with 4 tab po qwk in 10/2013, was increased to 8 tab after last visit in 1/2014. In 12/2013, was felt to have a small flare of vasculitis with increased PR3, crusts in nose and R shoulder pain; therefore prednisone taper with max dose of 20 mg qd was given along with increasing MTX to 7 tab qwk. MTX was later increased to 8 tab qwk in 1/2014. Labs done in 2/2014 showed rising PR3. He had ER visit on 1/8 for urosepsis (E. Coli), was treated with cedifir, recovered. Continues having shoulder pain, requests referral to PT.    1/2015: He reports having increased arthralgia  (shoulders, L hand) x a month. About 2 wk ago, L hand pain was so bad that he could not move his hand. No major joint swelling. Has no AM stiffness. Reports being tested negative for lyme (local lab) about 2 wk ago. Appetite is worse. He is more tired. He sleeps a lot. Has a cold x 1 wk, no hemoptysis. Neuropathy is the same. No SOB. No fever. Reports having a rash under L axilla x last 2 wk, comes and goes but to him looks like a lyme rash. He thinks he probably had a tick bite.    Last visit 3/2015: Rituximab qwk x 4 doses was recommended at last visit given concern for active GPA. He had his 1st infusion on 2/25/2015, right after start of infusion, developed hives without any resp sx, was treated with extra dose of benadryl and solu cortef. Hives resolved. Infusion was resumed at a slower rate and he finished and tolerated it well. Had his 2nd infusion on 3/4/2015 with no reaction although this time I increased his solumedrol from 100 to 250 mg as part of pre-medication. He has since completed all 4 infusions w/o reaction. States that nasal and joint symptoms remain improved. He still does have some crusting in his left nostril. Does have some pain and swelling in the hands w/minimal morning stiffness but that is his baseline. Says pain in his shoulders has improved. He denies any medication side effects.    He was seen in the ED 4/3/15 for productive cough and fever. He was treated with Levaquin for likely pna. He is now feeling better with only lingering cough.    10/16/2015: He is feeling well, has no complaints except cough at night because of sinus drainage (chronic). Requests pneumonia shot.    2/2016: Today patient reports feeling in excellent condition. Denies chest symptoms, such as SOB, hemoptysis, cough, or nasal drainage/bleeding. He also denies any hematuria and his last renal labs were stable. Unfortunately, his neuropathy seems to be his biggest problem. Neurology started him on a low dose of  "gabapentin without improvement.     5/6/16: Besides stuffy nose due to seasonal allergies, has no complaints. No cough, SOB, hemoptysis, hematuria, weight loss or fatigue.    8/12/16: Patient has no complaints today. No cough, SOB, hemoptysis, hematuria, weight loss or fatigue. He was seen today in Nephrology clinic for CKD follow up, doing well, no further follow up is needed. Patient reports visual disturbance with decreased vision probably on the R eye (chelsietent does not remember) with decreased to none visual strength in low visual field with some \"blanks\" which lasted for 40 seconds. This is a second episode in the last 1.5-2 years. Patient also reports recurrent double vision occuring every 4-5 months. Last ophthalmologist appointment was 1986.     11/18/16: Feeling very well, no complaints. Is going to have an eye exam today.    3/3/17: Mr. Ennis says he is doing well overall. He is still recovering from a pneumonia diagnosed on 2/14/17. He says that he was feeling ill for about 2 weeks before this with worsening productive cough, nasal congestion and high fevers with \"fever blisters\". He went to the ED on 2/14/17 and received ceftriaxone and a Z-pack and says he has been improving since then. He says that many other people at his work were sick with similar symptoms. Today he reports some residual nasal congestion but denies shortness of breath. He says that during his illness he did have some blood in his nose \"if I picked at it\" but he otherwise denies epistaxis, hemoptysis, hematuria and blood in his stools. His significant other is wondering if his methotrexate dose can start to be tapered down. Currently he is taking MTX 15mg weekly although this was held when he had pneumonia.    Mr. Ennis says that he continues to experience episodes of diplopia on average once per month. These episodes typically last 20-30 seconds. He was seen by ophthalmology on 11/18/16 and had a normal eye exam.    Today: Feeling " well with no complaints, still gets some crusting in his nose and cough ups phlegm (unchanged for years). On MTX 4 tab po qwk.         Past Medical History:     Past Medical History:   Diagnosis Date     Arthritis      Autoimmune disease (H)      Hearing problem      Hoarseness      Hypertension      Kidney problem      Malignant melanoma (H)      Malignant neoplasm (H)     melanoma     Squamous cell carcinoma      Russo syndrome           Past Surgical History:     Past Surgical History:   Procedure Laterality Date     BIOPSY  2011    melanoma on back     DISSECT LYMPH NODE INGUINAL  3/1/2013    Procedure: DISSECT LYMPH NODE INGUINAL;  Bilateral Inguinal Lymph Node Biopsy.;  Surgeon: Tariq Acosta MD;  Location: [facility]     ESOPHAGOSCOPY, GASTROSCOPY, DUODENOSCOPY (EGD), COMBINED  2013    Procedure: COMBINED ESOPHAGOSCOPY, GASTROSCOPY, DUODENOSCOPY (EGD);  Gastroscopy;  Surgeon: Tariq Acosta MD;  Location: [facility]     HERNIORRHAPHY INGUINAL  2012    Procedure: HERNIORRHAPHY INGUINAL;  Open Repair Left Inguinal Hernia;  Surgeon: Jerad Baker MD;  Location: [facility]          Social History:     Social History     Tobacco Use     Smoking status: Former Smoker     Packs/day: 1.00     Years: 20.00     Pack years: 20.00     Types: Cigarettes     Last attempt to quit: 2013     Years since quittin.8     Smokeless tobacco: Never Used   Substance Use Topics     Alcohol use: Yes     Comment: rare          Family History:     Family History   Problem Relation Age of Onset     Diabetes Mother      Hypertension Mother      Cancer Father         stomach     Heart Disease Father      Heart Disease Brother      Diabetes Sister      Diabetes Sister      Diabetes Sister      Heart Disease Brother      Cancer Sister      Glaucoma No family hx of      Macular Degeneration No family hx of           Immunizations:   Due for PPSV23, will administer today. Immunizations otherwise up to date.    PMHx,  "FHx, SHx were reviewed, unchanged.         Allergies:     Allergies   Allergen Reactions     Shrimp Nausea and Vomiting     Got sick each time he ate it          Medications:     Current Outpatient Medications   Medication Sig     Acetaminophen (TYLENOL PO) Take 650 mg by mouth once as needed for mild pain or fever     Blood Pressure Monitor KIT Automatic Blood Pressure Monitor     calcium-vitamin D (CALCIUM 600 + D) 600-400 MG-UNIT per tablet Take 1 tablet by mouth 2 times daily.     Cholecalciferol (VITAMIN D) 1000 UNITS capsule Take 1 capsule by mouth daily.     folic acid (FOLVITE) 1 MG tablet Take 2 tablets (2 mg) by mouth daily     gabapentin (NEURONTIN) 400 MG capsule Take 1 capsule (400 mg) by mouth At Bedtime     losartan (COZAAR) 50 MG tablet Take 1 tablet (50 mg) by mouth daily     methotrexate 2.5 MG tablet CHEMO Take 4 tablets (10 mg) by mouth once a week     methotrexate sodium 2.5 MG TABS Take 4 tablets (10 mg) by mouth every 7 days     mupirocin (BACTROBAN) 2 % ointment Use in nose twice daily for 5 days     No current facility-administered medications for this visit.           Review of Systems:   A comprehensive ROS was done. Positives are per HPI.           Physical Exam:     Vitals:    12/14/18 1030   BP: 143/87   Pulse: 65   Temp: 98.6  F (37  C)   TempSrc: Oral   SpO2: 97%   Weight: 86.3 kg (190 lb 3.2 oz)   Height: 1.727 m (5' 8\")     Constitutional:   Awake, alert, cooperative, no apparent distress. Falguni present   Eyes:   Pupils equal, round and reactive to light, sclera clear, conjunctiva normal.   ENT:   Normocephalic, atramatic,  oral pharynx with moist mucus membranes, tonsils without erythema or exudates, dentures in place. Small dark black spot over left soft palate smaller and lighter in color (almost gone).   Neck:   Supple, symmetrical, trachea midline, no adenopathy.   Lungs:   No increased work of breathing, good air exchange, clear to auscultation bilaterally, no crackles or " wheezing.    Cardiovascular:   Regular rate and rhythm, normal S1 and S2, no S3 or S4, and no murmur noted.   Musculoskeletal:   No active synovitis or joint tenderness. Good ROM in all joints tested. No edema in LE.   Neurologic:   AOx3; CN grossly intact.   Skin:   No rashes or ecchymoses on limited exam.       Psych: NL affect       Data:   Recent laboratory data reviewed.    2/14/17  Cr 1.46, WBC 7.8, AST/ALT normal    2/7/17  CRP 40.6, ESR 12, ANCA 1:20, MPO neg, LA-3 neg    Recent imaging studies reviewed by me.    CXR (2/14/17)    New mild patchy opacity at the right lung base appears to  localize to the posterior right lower lobe base and could represent a  developing area of pneumonia. Left lung is clear.    CHEST CT (2/16/2013)    Chest: Multiple indeterminate pulmonary nodules. Several of the  larger lesions are cavitary. There are 3 dominant lesions, a 3.2 cm  cavitary lesion in the left upper lobe, a 3.4 cm solid mass in the  right lower lobe laterally and a 3.7 cm cavitary mass in the right  lower lobe medially. In addition there is mediastinal lymphadenopathy  with a dominant 3 cm subcarinal node. Bilateral axillary  lymphadenopathy with 2.4 cm node seen bilaterally. Chest otherwise  unremarkable.       Exam: High-resolution Chest CT without contrast 1/9/2015 12:42 PM.  History: Concern for ANCA vasculitis flare in the chest.  Comparison: 3/7/2014, 11/14/2013, 2/16/2013.  Technique: CT helical acquisition from the lung apices to the kidneys  was obtained without intravenous contrast. Both inspiratory and  expiratory images were obtained.    Findings:  Moderate atherosclerotic calcifications of the coronary arteries. Mild  calcifications involving the aorta and aortic great vessels. Prominent  paratracheal lymph node on series 3 image 22 measuring 9 mm. Decreased  from 2/16/2013 and unchanged from 3/7/2014. Borderline enlarged right  hilar lymph node, unchanged from 3/7/2014 and decreased  from  2/16/2013. Heart is not enlarged. Trace cardial effusion. No axillary  lymphadenopathy.  Nodular scarring in the left apex, unchanged from 2/16/2013. No  pleural effusion or pneumothorax. No evidence of intrapulmonary  infection. There is a cluster of tree in bud nodularity noted in the  anteromedial right upper lobe. These nodules appear new. Expiratory  images demonstrate mild air trapping. Scattered pulmonary nodules:  Series 6 image 146: Seen posteriorly in the right lower lobe, there is  a sub-pleural nodule measuring 1.6 x 1.2 cm with a spiculated border.  Previously, this nodule measured 2.0 x 1.6 cm.  Series 6 image 189: Seen laterally in the right lower lobe, there is a  former mid pulmonary nodule which is unchanged from 3/7/2013 and  decreased from 2/16/2013.  Series 6 image 169: Seen posterolaterally in the right lower lobe,  there is a 3 mm pulmonary nodule which is unchanged from 3/7/2014 and  decreased from 2/16/2013.  Series 6 image 225: Seen posterolaterally in the left lower lobe,  there is a 4 mm subpleural nodule which is unchanged from 2/16/2014.  Other scattered sub-4 mm pulmonary nodules appear stable from previous  study.  Evaluation of the upper abdomen is limited due to the lack of  intravenous contrast.  No suspicious bony lesions.    IMPRESSION  Impression:   1. Scattered pulmonary nodules enlarged lymph nodes are  stable/slightly decreased from the previous study. No evidence for  acute flare in patient's known ANCA-associated vasculitis.  2. New tree in bud nodularity seen anteromedially in the right middle  lobe. Findings could be seen in the setting of an atypical infectious  process.  3. Mild air trapping. Finding is nonspecific and is seen in setting of  small airways disease such as asthma or bronchiolitis.  I have personally reviewed the examination and initial interpretation  and I agree with the findings.  TOÑO PERKINS MD    Component      Latest Ref Rng 6/11/2015  6/11/2015           9:26 AM  9:29 AM   Sodium      133 - 144 mmol/L  139   Potassium      3.4 - 5.3 mmol/L  4.4   Chloride      94 - 109 mmol/L  109   Carbon Dioxide      20 - 32 mmol/L  23   Anion Gap      3 - 14 mmol/L  7   Glucose      70 - 99 mg/dL  81   Urea Nitrogen      7 - 30 mg/dL  28   Creatinine      0.66 - 1.25 mg/dL  1.26 (H)   GFR Estimate      >60 mL/min/1.7m2  57 (L)   GFR Estimate If Black      >60 mL/min/1.7m2  69   Calcium      8.5 - 10.1 mg/dL  9.2   Phosphorus      2.5 - 4.5 mg/dL  2.1 (L)   Albumin      3.4 - 5.0 g/dL  1.7 (L)   Iron      35 - 180 ug/dL  129   Iron Binding Cap      240 - 430 ug/dL  292   Iron Saturation Index      15 - 46 %  44   Protein Random Urine       0.11    Protein Total Urine g/gr Creatinine      0 - 0.2 g/g Cr 0.14    Hemoglobin      13.3 - 17.7 g/dL  13.0 (L)   Ferritin      26 - 388 ng/mL  193   Parathormone Intact      12 - 72 pg/mL  49   Vitamin D Deficiency screening      30 - 75 ug/L  55   Creatinine Urine       84    Copath Report           Proteinase 3 Antibody IgG      0.0 - 0.9 AI       Component      Latest Ref Rng 6/11/2015 6/11/2015          11:15 AM 12:18 PM   Copath Report       Patient Name: ADONAY FAITH . . .    Proteinase 3 Antibody IgG      0.0 - 0.9 AI  0.8     Copath Report     Patient Name: ADONAY FAITH  MR#: [id number]  Specimen #: [id number]  Collected: 6/11/2015  Received: 6/11/2015  Reported: 6/12/2015 17:37  Ordering Phy(s): DANIELLE PARIS    SPECIMEN(S):  Nasal septum    FINAL DIAGNOSIS:  Nasal septum, biopsy:  -Squamous mucosa with ulcer, marked acute inflammation and reactive  changes  -No granulomas identified  -A special stain for fungi (GMS) is negative    I have personally reviewed all specimens and or slides, including the  listed special stains, and used them with my medical judgement to  determine the final diagnosis.    Electronically signed out by:    Dorys Barton M.D., [facility]    CLINICAL HISTORY:  The patient is a  "70-year-old man with a history of left upper back  melanoma, resected in 2011 (see consult report [id number]). 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 He now presents for followup visit with nasal  cavity scabs, increased arthralgia and fatigue, concerning for  vasculitis flare. Operative procedure: Nasal septum biopsy.    GROSS:  The specimen is received in formalin with proper patient identification,  labeled \"septal tissue\". The specimen consists of three tan-pink tissue  fragments, 0.2 cm in greatest dimension, entirely submitted in cassette  1. (Dictated by: Ana Mixon 6/11/2015 03:39 PM)    MICROSCOPIC:  Microscopic examination is performed. A GMS stain, performed with  appropriate positive control, is negative.    CPT Codes:  A: 63160-FS7, 49737-ZAR    TESTING LAB LOCATION:  [facility], [address] [id number] [phone]    COLLECTION SITE:  Client: [facility]  Location: [facility] (B)       Component      Latest Ref Rng & Units 3/13/2018   WBC      4.0 - 11.0 10e9/L 5.8   RBC Count      4.4 - 5.9 10e12/L 4.27 (L)   Hemoglobin      13.3 - 17.7 g/dL 13.9   Hematocrit      40.0 - 53.0 % 41.2   MCV      78 - 100 fl 97   MCH      26.5 - 33.0 pg 32.6   MCHC      31.5 - 36.5 g/dL 33.7   RDW      10.0 - 15.0 % 12.5   Platelet Count      150 - 450 10e9/L 177   Diff Method       Automated Method   % Neutrophils      % 57.5   % Lymphocytes      % 25.1   % Monocytes      % 11.7   % Eosinophils      % 4.8   % Basophils      % 0.9   Absolute Neutrophil      1.6 - 8.3 10e9/L 3.3   Absolute Lymphocytes      0.8 - 5.3 10e9/L 1.5   Absolute Monocytes      0.0 - 1.3 10e9/L 0.7   Absolute Eosinophils      0.0 - " 0.7 10e9/L 0.3   Absolute Basophils      0.0 - 0.2 10e9/L 0.1   Color Urine       Yellow   Appearance Urine       Clear   Glucose Urine      NEG:Negative mg/dL Negative   Bilirubin Urine      NEG:Negative Negative   Ketones Urine      NEG:Negative mg/dL Negative   Specific Gravity Urine      1.003 - 1.035 1.010   pH Urine      5.0 - 7.0 pH 6.0   Protein Albumin Urine      NEG:Negative mg/dL Negative   Urobilinogen Urine      0.2 - 1.0 EU/dL 0.2   Nitrite Urine      NEG:Negative Negative   Blood Urine      NEG:Negative Negative   Leukocyte Esterase Urine      NEG:Negative Negative   Source       Midstream Urine   WBC Urine      OTO5:0 - 5 /HPF 0 - 5   RBC Urine      OTO2:O - 2 /HPF O - 2   Creatinine      0.66 - 1.25 mg/dL 1.47 (H)   GFR Estimate      >60 mL/min/1.7m2 47 (L)   GFR Estimate If Black      >60 mL/min/1.7m2 57 (L)   Myeloperoxidase Antibody IgG      0.0 - 0.9 AI <0.2   Proteinase 3 Antibody IgG      0.0 - 0.9 AI 2.4 (H)   AST      0 - 45 U/L 26   ALT      0 - 70 U/L 30   Albumin      3.4 - 5.0 g/dL 3.7   CRP Inflammation      0.0 - 8.0 mg/L <2.9   Sed Rate      0 - 20 mm/h 5   Neutrophil Cytoplasmic IgG Antibody      <1:20 <1:20   Sincerely,    Ede Sanchez MD

## 2019-03-11 ENCOUNTER — TELEPHONE (OUTPATIENT)
Dept: RHEUMATOLOGY | Facility: CLINIC | Age: 74
End: 2019-03-11

## 2019-03-11 NOTE — TELEPHONE ENCOUNTER
Earlene called, on behalf of pt, has is Handicap placard  as of 19.  Pt needs a new Application for Disability Parking Certificate form completed and mailed to his home address, so he can get a new handicap placard.  Thank you!

## 2019-03-12 NOTE — TELEPHONE ENCOUNTER
Form given to Dr. Sanchez for her review and signature. Dr. Sanchez is in clinic 3/13/2019.   Lexus Das CMA  3/12/2019 8:52 AM

## 2019-03-13 DIAGNOSIS — I77.82 ANCA-ASSOCIATED VASCULITIS (H): ICD-10-CM

## 2019-03-13 DIAGNOSIS — Z51.81 MEDICATION MONITORING ENCOUNTER: ICD-10-CM

## 2019-03-13 DIAGNOSIS — M31.30 WEGENER'S VASCULITIS: ICD-10-CM

## 2019-03-13 LAB
ALBUMIN SERPL-MCNC: 4.1 G/DL (ref 3.4–5)
ALBUMIN SERPL-MCNC: NORMAL G/DL (ref 3.4–5)
ALBUMIN UR-MCNC: NEGATIVE MG/DL
ALT SERPL W P-5'-P-CCNC: 26 U/L (ref 0–70)
APPEARANCE UR: CLEAR
AST SERPL W P-5'-P-CCNC: 23 U/L (ref 0–45)
BASOPHILS # BLD AUTO: 0.1 10E9/L (ref 0–0.2)
BASOPHILS NFR BLD AUTO: 1 %
BILIRUB UR QL STRIP: NEGATIVE
COLOR UR AUTO: YELLOW
CREAT SERPL-MCNC: 1.51 MG/DL (ref 0.66–1.25)
CREAT UR-MCNC: 44 MG/DL
CREAT UR-MCNC: 44 MG/DL
CRP SERPL-MCNC: <2.9 MG/L (ref 0–8)
DIFFERENTIAL METHOD BLD: NORMAL
EOSINOPHIL # BLD AUTO: 0.3 10E9/L (ref 0–0.7)
EOSINOPHIL NFR BLD AUTO: 4.4 %
ERYTHROCYTE [DISTWIDTH] IN BLOOD BY AUTOMATED COUNT: 12.4 % (ref 10–15)
ERYTHROCYTE [SEDIMENTATION RATE] IN BLOOD BY WESTERGREN METHOD: 6 MM/H (ref 0–20)
GFR SERPL CREATININE-BSD FRML MDRD: 46 ML/MIN/{1.73_M2}
GLUCOSE UR STRIP-MCNC: NEGATIVE MG/DL
HCT VFR BLD AUTO: 41.9 % (ref 40–53)
HGB BLD-MCNC: 14.2 G/DL (ref 13.3–17.7)
HGB UR QL STRIP: NEGATIVE
KETONES UR STRIP-MCNC: NEGATIVE MG/DL
LEUKOCYTE ESTERASE UR QL STRIP: NEGATIVE
LYMPHOCYTES # BLD AUTO: 1.6 10E9/L (ref 0.8–5.3)
LYMPHOCYTES NFR BLD AUTO: 26.5 %
MCH RBC QN AUTO: 32.3 PG (ref 26.5–33)
MCHC RBC AUTO-ENTMCNC: 33.9 G/DL (ref 31.5–36.5)
MCV RBC AUTO: 95 FL (ref 78–100)
MONOCYTES # BLD AUTO: 0.7 10E9/L (ref 0–1.3)
MONOCYTES NFR BLD AUTO: 11.4 %
NEUTROPHILS # BLD AUTO: 3.3 10E9/L (ref 1.6–8.3)
NEUTROPHILS NFR BLD AUTO: 56.7 %
NITRATE UR QL: NEGATIVE
PH UR STRIP: 5.5 PH (ref 5–7)
PLATELET # BLD AUTO: 192 10E9/L (ref 150–450)
PROT UR-MCNC: 0.06 G/L
PROT/CREAT 24H UR: 0.14 G/G CR (ref 0–0.2)
RBC # BLD AUTO: 4.4 10E12/L (ref 4.4–5.9)
RBC #/AREA URNS AUTO: NORMAL /HPF
SOURCE: NORMAL
SP GR UR STRIP: 1.01 (ref 1–1.03)
UROBILINOGEN UR STRIP-ACNC: 0.2 EU/DL (ref 0.2–1)
WBC # BLD AUTO: 5.9 10E9/L (ref 4–11)
WBC #/AREA URNS AUTO: NORMAL /HPF

## 2019-03-13 PROCEDURE — 81001 URINALYSIS AUTO W/SCOPE: CPT | Performed by: INTERNAL MEDICINE

## 2019-03-13 PROCEDURE — 85025 COMPLETE CBC W/AUTO DIFF WBC: CPT | Performed by: INTERNAL MEDICINE

## 2019-03-13 PROCEDURE — 82040 ASSAY OF SERUM ALBUMIN: CPT | Performed by: INTERNAL MEDICINE

## 2019-03-13 PROCEDURE — 86140 C-REACTIVE PROTEIN: CPT | Performed by: INTERNAL MEDICINE

## 2019-03-13 PROCEDURE — 84156 ASSAY OF PROTEIN URINE: CPT | Performed by: INTERNAL MEDICINE

## 2019-03-13 PROCEDURE — 83516 IMMUNOASSAY NONANTIBODY: CPT | Performed by: INTERNAL MEDICINE

## 2019-03-13 PROCEDURE — 82565 ASSAY OF CREATININE: CPT | Performed by: INTERNAL MEDICINE

## 2019-03-13 PROCEDURE — 84450 TRANSFERASE (AST) (SGOT): CPT | Performed by: INTERNAL MEDICINE

## 2019-03-13 PROCEDURE — 84460 ALANINE AMINO (ALT) (SGPT): CPT | Performed by: INTERNAL MEDICINE

## 2019-03-13 PROCEDURE — 36415 COLL VENOUS BLD VENIPUNCTURE: CPT | Performed by: INTERNAL MEDICINE

## 2019-03-13 PROCEDURE — 85652 RBC SED RATE AUTOMATED: CPT | Performed by: INTERNAL MEDICINE

## 2019-03-13 NOTE — LETTER
Patient:  Joshua Ennis  :   1945  MRN:     [id number]        Mr.Roger DEANNA Ennis  [address] [facility] [address]        March 15, 2019    Dear ,    We are writing to inform you of your test results. Labs look good. Vasculitis marker continues to improve, good news!    Results for orders placed or performed in visit on 19   Proteinase 3 Antibody IgG   Result Value Ref Range    Proteinase 3 Antibody IgG 1.4 (H) 0.0 - 0.9 AI   ANCA IgG by IFA with Reflex to Titer   Result Value Ref Range    Neutrophil Cytoplasmic Antibody <1:10 <1:10 [titer]    Neutrophil Cytoplasmic Antibody Pattern       The ANCA IFA is <1:10.  No further testing will be performed.   Erythrocyte sedimentation rate auto   Result Value Ref Range    Sed Rate 6 0 - 20 mm/h   CRP inflammation   Result Value Ref Range    CRP Inflammation <2.9 0.0 - 8.0 mg/L   Creatinine   Result Value Ref Range    Creatinine 1.51 (H) 0.66 - 1.25 mg/dL    GFR Estimate 46 (L) >60 mL/min/[1.73_m2]    GFR Estimate If Black 53 (L) >60 mL/min/[1.73_m2]   CBC with platelets differential   Result Value Ref Range    WBC 5.9 4.0 - 11.0 10e9/L    RBC Count 4.40 4.4 - 5.9 10e12/L    Hemoglobin 14.2 13.3 - 17.7 g/dL    Hematocrit 41.9 40.0 - 53.0 %    MCV 95 78 - 100 fl    MCH 32.3 26.5 - 33.0 pg    MCHC 33.9 31.5 - 36.5 g/dL    RDW 12.4 10.0 - 15.0 %    Platelet Count 192 150 - 450 10e9/L    % Neutrophils 56.7 %    % Lymphocytes 26.5 %    % Monocytes 11.4 %    % Eosinophils 4.4 %    % Basophils 1.0 %    Absolute Neutrophil 3.3 1.6 - 8.3 10e9/L    Absolute Lymphocytes 1.6 0.8 - 5.3 10e9/L    Absolute Monocytes 0.7 0.0 - 1.3 10e9/L    Absolute Eosinophils 0.3 0.0 - 0.7 10e9/L    Absolute Basophils 0.1 0.0 - 0.2 10e9/L    Diff Method Automated Method    AST   Result Value Ref Range    AST 23 0 - 45 U/L   Albumin level   Result Value Ref Range    Albumin 4.1 3.4 - 5.0 g/dL   ALT   Result Value Ref Range    ALT 26 0 - 70 U/L   Creatinine random urine    Result Value Ref Range    Creatinine Urine Random 44 mg/dL   Protein  random urine with Creat Ratio   Result Value Ref Range    Protein Random Urine 0.06 g/L    Protein Total Urine g/gr Creatinine 0.14 0 - 0.2 g/g Cr   Albumin level   Result Value Ref Range    Albumin Canceled, Test credited 3.4 - 5.0 g/dL   Creatinine urine calculation only   Result Value Ref Range    Creatinine Urine 44 mg/dL   UA with Microscopic reflex to Culture   Result Value Ref Range    Color Urine Yellow     Appearance Urine Clear     Glucose Urine Negative NEG^Negative mg/dL    Bilirubin Urine Negative NEG^Negative    Ketones Urine Negative NEG^Negative mg/dL    Specific Gravity Urine 1.010 1.003 - 1.035    pH Urine 5.5 5.0 - 7.0 pH    Protein Albumin Urine Negative NEG^Negative mg/dL    Urobilinogen Urine 0.2 0.2 - 1.0 EU/dL    Nitrite Urine Negative NEG^Negative    Blood Urine Negative NEG^Negative    Leukocyte Esterase Urine Negative NEG^Negative    Source Midstream Urine     WBC Urine 0 - 5 OTO5^0 - 5 /HPF    RBC Urine O - 2 OTO2^O - 2 /HPF     Ede Sanchez MD

## 2019-03-14 LAB — PROTEINASE3 IGG SER-ACNC: 1.4 AI (ref 0–0.9)

## 2019-03-14 NOTE — TELEPHONE ENCOUNTER
Form is completed and signed. I have placed the form in the mail and notified the patient. A copy has been scanned to the chart.  Lexus Das CMA  3/14/2019 4:48 PM

## 2019-03-15 LAB
ANCA AB PATTERN SER IF-IMP: NORMAL
C-ANCA TITR SER IF: NORMAL {TITER}

## 2019-03-30 DIAGNOSIS — M31.30 GRANULOMATOSIS WITH POLYANGIITIS (H): ICD-10-CM

## 2019-03-30 DIAGNOSIS — G62.9 NEUROPATHY: ICD-10-CM

## 2019-04-02 RX ORDER — FOLIC ACID 1 MG/1
2 TABLET ORAL DAILY
Qty: 180 TABLET | Refills: 3 | Status: SHIPPED | OUTPATIENT
Start: 2019-04-02 | End: 2020-03-13

## 2019-04-02 RX ORDER — GABAPENTIN 400 MG/1
CAPSULE ORAL
Qty: 90 CAPSULE | Refills: 1 | Status: SHIPPED | OUTPATIENT
Start: 2019-04-02 | End: 2019-08-08

## 2019-04-02 NOTE — TELEPHONE ENCOUNTER
GABAPENTIN      Last Written Prescription Date:  10-3-18  Last Fill Quantity: 90,   # refills: 1  Last Office Visit : 12-14-18  Future Office visit:  12-6-19    Routing refill request to provider for review/approval because:  Not on protocol      Kathleen M Doege RN

## 2019-06-24 ENCOUNTER — OFFICE VISIT (OUTPATIENT)
Dept: FAMILY MEDICINE | Facility: CLINIC | Age: 74
End: 2019-06-24
Payer: COMMERCIAL

## 2019-06-24 VITALS
WEIGHT: 183 LBS | DIASTOLIC BLOOD PRESSURE: 70 MMHG | TEMPERATURE: 98.3 F | SYSTOLIC BLOOD PRESSURE: 120 MMHG | OXYGEN SATURATION: 97 % | BODY MASS INDEX: 27.74 KG/M2 | HEART RATE: 73 BPM | HEIGHT: 68 IN | RESPIRATION RATE: 12 BRPM

## 2019-06-24 DIAGNOSIS — L23.7 CONTACT DERMATITIS DUE TO POISON IVY: Primary | ICD-10-CM

## 2019-06-24 PROCEDURE — 99213 OFFICE O/P EST LOW 20 MIN: CPT | Performed by: NURSE PRACTITIONER

## 2019-06-24 RX ORDER — PREDNISONE 20 MG/1
TABLET ORAL
Qty: 20 TABLET | Refills: 0 | Status: SHIPPED | OUTPATIENT
Start: 2019-06-24 | End: 2019-07-21

## 2019-06-24 ASSESSMENT — MIFFLIN-ST. JEOR: SCORE: 1554.58

## 2019-06-24 NOTE — PATIENT INSTRUCTIONS
1. Contact dermatitis due to poison ivy  Start prednisone   Take AM as can cause some insomnia   Ok to use calamine or hydrocortisone   Try alcohol to dry out the lesions  - predniSONE (DELTASONE) 20 MG tablet; Take 3 tabs by mouth daily x 3 days, then 2 tabs daily x 3 days, then 1 tab daily x 3 days, then 1/2 tab daily x 3 days.  Dispense: 20 tablet; Refill: 0      Patient Education     Managing a Poison Ivy, Poison Oak, or Poison Sumac Reaction  If you come in contact with urushiol    If you think you may have come in contact with the sap oil (called urushiol) contained in poison ivy, poison oak, or poison sumac plants, wash the affected part of your skin. Do this within 15 minutes after contact. Use water or preferably, soap and water.  Undress, and wash your clothes and gear as soon as you can. Be sure to wash any pet that was with you. Taking these steps can help prevent spreading sap oil to someone else. If you have a rash, but are not sure if it is from one of these plants, see your healthcare provider.  To soothe the itching  Your skin may react to poison oak, poison ivy, and poison sumac within hours to a few days after contact. Once you have come into contact with these plants, you can t stop the reaction. But you can take these steps to soothe the itching:    Don t scratch or scrub your rash. Not even if the itching is severe. Scratching can lead to infection.    Bathe in lukewarm (not hot) water. Or take short cool showers to relieve the itching. For a more soothing bath, add oatmeal to the water.    Use antihistamines that are taken by mouth. These include diphenhydramine. You can buy these at the pharmacy. Talk to your healthcare provider or pharmacist for more information on oral antihistamines.    Use over-the-counter treatments on your skin. These include cortisone and calamine lotion.  How your skin may react  A mild rash may become red, swollen, and itchy. The rash may form a line on your skin  where you brushed against the plant. If you have a severe rash, your itching may worsen. And your rash may blister and ooze. If this happens, seek medical care. The fluid from your blisters will not make your rash spread. With or without medical care, your rash may last up to 3 weeks. In the future, try to avoid coming in contact with these plants.  When to call your healthcare provider  Call your healthcare provider if:    Your rash is severe    The rash spreads beyond the exposed part of your body or affects your face.    The rash does not clear up within a few weeks  You may be given medicine to take by mouth or apply directly on the skin.     Call 911  Call 911 if you have any of the following:    Trouble breathing or swallowing    Any significant swelling   Date Last Reviewed: 3/1/2017    0648-2350 [employer]. [address] [place] [id number]. All rights reserved. This information is not intended as a substitute for professional medical care. Always follow your healthcare professional's instructions.

## 2019-06-24 NOTE — PROGRESS NOTES
SUBJECTIVE   Joshua Ennis is a 74 year old male who presents with    Rash  Onset: 1 week ago    Description:   Location: left side of face, left side of rib area, both arms,   Character: round, blotchy, raised, red  Itching (Pruritis): YES    Progression of Symptoms:  worsening    Accompanying Signs & Symptoms:  Fever: no   Body aches or joint pain: no   Sore throat symptoms: no   Recent cold symptoms: no, expose to strep    History:   Previous similar rash: no     Precipitating factors:   Exposure to similar rash: no   New exposures: in the yard doing yard work and noticed the rash after    Recent travel: no     Alleviating factors:  none    Therapies Tried and outcome: calamine lotion is not helping       PCP   [facility] [phone]    Health Maintenance        Health Maintenance Due   Topic Date Due     SKIN CANCER SCREENING  1945     ZOSTER IMMUNIZATION (1 of 2) 04/24/1997     MEDICARE ANNUAL WELLNESS VISIT  04/24/2012     ADVANCE CARE PLANNING  04/15/2018     LIPID  04/17/2018     FALL RISK ASSESSMENT  06/13/2019       HPI        Patient Active Problem List   Diagnosis     Tobacco abuse     CARDIOVASCULAR SCREENING; LDL GOAL LESS THAN 160     Melanoma (H)     Idiopathic progressive polyneuropathy     Advanced directives, counseling/discussion     GERD (gastroesophageal reflux disease)     Hypertension, renal     Anemia     Encounter for long-term (current) use of steroids     Granulomatosis with polyangiitis (H)     CKD (chronic kidney disease) stage 3, GFR 30-59 ml/min (H)     colonic polyps- Tubular Adenomas     Current Outpatient Medications   Medication     Blood Pressure Monitor KIT     calcium-vitamin D (CALCIUM 600 + D) 600-400 MG-UNIT per tablet     Cholecalciferol (VITAMIN D) 1000 UNITS capsule     folic acid (FOLVITE) 1 MG tablet     gabapentin (NEURONTIN) 400 MG capsule     losartan (COZAAR) 50 MG tablet     methotrexate sodium 2.5 MG TABS     mupirocin (BACTROBAN) 2 %  "ointment     predniSONE (DELTASONE) 20 MG tablet     Acetaminophen (TYLENOL PO)     No current facility-administered medications for this visit.          Reviewed and updated:  Tobacco  Allergies  Meds  Med Hx  Surg Hx  Fam Hx  Soc Hx     ROS:  Constitutional, HEENT, cardiovascular, pulmonary, gi and gu systems are negative, except as otherwise noted.    PHYSICAL EXAM   /70   Pulse 73   Temp 98.3  F (36.8  C) (Tympanic)   Resp 12   Ht 1.727 m (5' 8\")   Wt 83 kg (183 lb)   SpO2 97%   BMI 27.83 kg/m    Body mass index is 27.83 kg/m .  GENERAL: healthy, alert and no distress  RESP: lungs clear to auscultation - no rales, rhonchi or wheezes  CV: regular rate and rhythm, normal S1 S2, no S3 or S4, no murmur, click or rub, no peripheral edema and peripheral pulses strong  ABDOMEN: soft, nontender, no hepatosplenomegaly, no masses and bowel sounds normal  MS: no gross musculoskeletal defects noted, no edema  SKIN: Examination of the rash reveals: Poison Ivy: clusters of well-defined erythematous vesicles with clear drainage on arms and torso         NEURO: Normal strength and tone, mentation intact and speech normal  PSYCH: mentation appears normal, affect normal/bright    Assessment & Plan     1. Contact dermatitis due to poison ivy  Start prednisone   Take AM as can cause some insomnia   Ok to use calamine or hydrocortisone   Try alcohol to dry out the lesions  - predniSONE (DELTASONE) 20 MG tablet; Take 3 tabs by mouth daily x 3 days, then 2 tabs daily x 3 days, then 1 tab daily x 3 days, then 1/2 tab daily x 3 days.  Dispense: 20 tablet; Refill: 0      Patient Education     Managing a Poison Ivy, Poison Oak, or Poison Sumac Reaction  If you come in contact with urushiol    If you think you may have come in contact with the sap oil (called urushiol) contained in poison ivy, poison oak, or poison sumac plants, wash the affected part of your skin. Do this within 15 minutes after contact. Use water or " preferably, soap and water.  Undress, and wash your clothes and gear as soon as you can. Be sure to wash any pet that was with you. Taking these steps can help prevent spreading sap oil to someone else. If you have a rash, but are not sure if it is from one of these plants, see your healthcare provider.  To soothe the itching  Your skin may react to poison oak, poison ivy, and poison sumac within hours to a few days after contact. Once you have come into contact with these plants, you can t stop the reaction. But you can take these steps to soothe the itching:    Don t scratch or scrub your rash. Not even if the itching is severe. Scratching can lead to infection.    Bathe in lukewarm (not hot) water. Or take short cool showers to relieve the itching. For a more soothing bath, add oatmeal to the water.    Use antihistamines that are taken by mouth. These include diphenhydramine. You can buy these at the pharmacy. Talk to your healthcare provider or pharmacist for more information on oral antihistamines.    Use over-the-counter treatments on your skin. These include cortisone and calamine lotion.  How your skin may react  A mild rash may become red, swollen, and itchy. The rash may form a line on your skin where you brushed against the plant. If you have a severe rash, your itching may worsen. And your rash may blister and ooze. If this happens, seek medical care. The fluid from your blisters will not make your rash spread. With or without medical care, your rash may last up to 3 weeks. In the future, try to avoid coming in contact with these plants.  When to call your healthcare provider  Call your healthcare provider if:    Your rash is severe    The rash spreads beyond the exposed part of your body or affects your face.    The rash does not clear up within a few weeks  You may be given medicine to take by mouth or apply directly on the skin.     Call 911  Call 911 if you have any of the following:    Trouble  breathing or swallowing    Any significant swelling   Date Last Reviewed: 3/1/2017    5394-2836 [email or web address]. [address] [place] [address], [place] [address]. All rights reserved. This information is not intended as a substitute for professional medical care. Always follow your healthcare professional's instructions.               Return in about 2 weeks (around 7/8/2019), or if symptoms worsen or fail to improve.    Di Frye NP  [facility]      Risks, benefits, side effects and rationale for treatment plan fully discussed with the patient and understanding expressed.

## 2019-07-21 ENCOUNTER — HOSPITAL ENCOUNTER (EMERGENCY)
Facility: CLINIC | Age: 74
Discharge: HOME | End: 2019-07-21
Attending: NURSE PRACTITIONER | Admitting: NURSE PRACTITIONER
Payer: COMMERCIAL

## 2019-07-21 VITALS
OXYGEN SATURATION: 99 % | TEMPERATURE: 98.3 F | RESPIRATION RATE: 16 BRPM | DIASTOLIC BLOOD PRESSURE: 73 MMHG | SYSTOLIC BLOOD PRESSURE: 145 MMHG

## 2019-07-21 DIAGNOSIS — B02.9 SHINGLES: ICD-10-CM

## 2019-07-21 PROCEDURE — G0463 HOSPITAL OUTPT CLINIC VISIT: HCPCS | Performed by: NURSE PRACTITIONER

## 2019-07-21 PROCEDURE — 99214 OFFICE O/P EST MOD 30 MIN: CPT | Mod: Z6 | Performed by: NURSE PRACTITIONER

## 2019-07-21 RX ORDER — VALACYCLOVIR HYDROCHLORIDE 1 G/1
1000 TABLET, FILM COATED ORAL 3 TIMES DAILY
Qty: 21 TABLET | Refills: 0 | Status: SHIPPED | OUTPATIENT
Start: 2019-07-21 | End: 2019-08-08

## 2019-07-21 NOTE — ED PROVIDER NOTES
"  History     Chief Complaint   Patient presents with     Rash     left side of back     HPI  Joshua Ennis is a 74 year old male who presents with rash with onset of symptoms today.  PT denies pruritis.  Pt states rash is located in left mid to lower back.  Pt states the rash was preceded by mid to lower back pain and wonders if \"his kidneys are hurting him\".  PT states also felt a little constipated yesterday when he had a BM.      Allergies:  Allergies   Allergen Reactions     Shrimp Nausea and Vomiting     Got sick each time he ate it       Problem List:    Patient Active Problem List    Diagnosis Date Noted     colonic polyps- Tubular Adenomas 04/18/2018     Priority: Medium     Collected: 4/13/2018   Received: 4/13/2018   Reported: 4/17/2018 19:01   Ordering Phy(s): GERMAN ASENCIO     For improved result formatting, select 'View Enhanced Report Format' under    Linked Documents section.     SPECIMEN(S):   A: Cecal polyp   B: Colon polyp at 30 cm     FINAL DIAGNOSIS:   A.  Cecum, mucosal biopsies:   - Tubular adenoma.   - Negative for high-grade dysplasia and malignancy.     B.  Colon at 30 cm, mucosal biopsies:   - Tubular adenoma and a fragment of normal colonic mucosa.   - Negative for high-grade dysplasia and malignancy.     Electronically signed out by:     Yuliet Melissa M.D.       CKD (chronic kidney disease) stage 3, GFR 30-59 ml/min (H) 06/18/2014     Priority: Medium     Granulomatosis with polyangiitis (H) 09/12/2013     Priority: Medium     Diagnosis updated by automated process. Provider to review and confirm.       Encounter for long-term (current) use of steroids 07/30/2013     Priority: Medium     Anemia 07/10/2013     Priority: Medium     Hypertension, renal 05/20/2013     Priority: Medium     GERD (gastroesophageal reflux disease) 04/26/2013     Priority: Medium     Advanced directives, counseling/discussion 04/15/2013     Priority: Medium     Advance Care Planning:   Receipt of ACP document: "  Received: Health Care Directive which was witnessed or notarized on 5/9/2013.  Document not previously scanned.  Validation form completed and sent with document to be scanned.  Code Status reflects choices in most recent ACP document.  Confirmed/documented designated decision maker(s). See permanent comments section of demographics in clinical tab. View document(s) and details by clicking on code status.   Added by Ana Ochoa on 6/3/2013.             Idiopathic progressive polyneuropathy 04/11/2013     Priority: Medium     Class: Acute     Melanoma (H) 02/14/2013     Priority: Medium     CARDIOVASCULAR SCREENING; LDL GOAL LESS THAN 160 07/06/2012     Priority: Medium     Tobacco abuse 04/16/2009     Priority: Medium     3/4 pack/day  X 25 years           Past Medical History:    Past Medical History:   Diagnosis Date     Arthritis      Autoimmune disease (H)      Hearing problem      Hoarseness      Hypertension      Kidney problem      Malignant melanoma (H)      Malignant neoplasm (H)      Squamous cell carcinoma      Russo syndrome        Past Surgical History:    Past Surgical History:   Procedure Laterality Date     BIOPSY  11/2011    melanoma on back     DISSECT LYMPH NODE INGUINAL  3/1/2013    Procedure: DISSECT LYMPH NODE INGUINAL;  Bilateral Inguinal Lymph Node Biopsy.;  Surgeon: Tariq Acosta MD;  Location: [facility]     ESOPHAGOSCOPY, GASTROSCOPY, DUODENOSCOPY (EGD), COMBINED  7/5/2013    Procedure: COMBINED ESOPHAGOSCOPY, GASTROSCOPY, DUODENOSCOPY (EGD);  Gastroscopy;  Surgeon: Tariq Acosta MD;  Location: [facility]     HERNIORRHAPHY INGUINAL  8/6/2012    Procedure: HERNIORRHAPHY INGUINAL;  Open Repair Left Inguinal Hernia;  Surgeon: Jerad Baker MD;  Location: [facility]       Family History:    Family History   Problem Relation Age of Onset     Diabetes Mother      Hypertension Mother      Cancer Father         stomach     Heart Disease Father      Heart Disease Brother      Diabetes  Sister      Diabetes Sister      Diabetes Sister      Heart Disease Brother      Cancer Sister      Glaucoma No family hx of      Macular Degeneration No family hx of        Social History:  Marital Status:  Single [1]  Social History     Tobacco Use     Smoking status: Former Smoker     Packs/day: 1.00     Years: 20.00     Pack years: 20.00     Types: Cigarettes     Last attempt to quit: 2013     Years since quittin.4     Smokeless tobacco: Never Used   Substance Use Topics     Alcohol use: Yes     Comment: rare     Drug use: No        Medications:      valACYclovir (VALTREX) 1000 mg tablet   Acetaminophen (TYLENOL PO)   Blood Pressure Monitor KIT   calcium-vitamin D (CALCIUM 600 + D) 600-400 MG-UNIT per tablet   Cholecalciferol (VITAMIN D) 1000 UNITS capsule   folic acid (FOLVITE) 1 MG tablet   gabapentin (NEURONTIN) 400 MG capsule   losartan (COZAAR) 50 MG tablet   methotrexate sodium 2.5 MG TABS     Review of Systems  Pertinent positives and negatives listed in the HPI, all other systems reviewed and are negative.    Physical Exam   BP: 145/73  Heart Rate: 74  Temp: 98.3  F (36.8  C)  Resp: 16  SpO2: 99 %      Physical Exam   Constitutional: He is oriented to person, place, and time. He appears well-developed and well-nourished. No distress.   HENT:   Head: Normocephalic and atraumatic.   Right Ear: External ear normal.   Left Ear: External ear normal.   Nose: Nose normal.   Eyes: Conjunctivae are normal. Right eye exhibits no discharge. Left eye exhibits no discharge.   Cardiovascular: Normal rate, regular rhythm, normal heart sounds and intact distal pulses. Exam reveals no gallop and no friction rub.   No murmur heard.  Pulmonary/Chest: Effort normal and breath sounds normal. No stridor. He has no wheezes.   Abdominal: There is no tenderness.   Musculoskeletal: He exhibits no edema.        Lumbar back: He exhibits tenderness (with rash). He exhibits normal range of motion, no bony tenderness, no  "swelling, no edema, no deformity, no laceration, no pain and no spasm.        Back:    Neurological: He is alert and oriented to person, place, and time.   Skin: Skin is warm. No rash noted. He is not diaphoretic.   Nursing note and vitals reviewed.      ED Course        Procedures    No results found for this or any previous visit (from the past 24 hour(s)).    Medications - No data to display    Assessments & Plan (with Medical Decision Making)     I have reviewed the nursing notes.    I have reviewed the findings, diagnosis, plan and need for follow up with the patient.  Joshua Ennis is a 74 year old male who presents with rash with onset of symptoms today.  PT denies pruritis.  Pt states rash is located in left mid to lower back.  Pt states the rash was preceded by mid to lower back pain and wonders if \"his kidneys are hurting him\".    Exam as noted above and consistent with shingles.  Reviewed with patient initiation of valacyclovir 3 times daily and recommend follow-up in 7 to 10 days to recheck with his primary need for resolution of symptoms and see if there is any onset of postherpetic neuropathy.  Patient verbalized understanding.  Patient discharged in stable condition.  There is no concern for overlying or underlying cellulitis at this point time.     Medication List      Started    valACYclovir 1000 mg tablet  Commonly known as:  VALTREX  1,000 mg, Oral, 3 TIMES DAILY            Final diagnoses:   Shingles       7/21/2019   [facility] EMERGENCY DEPARTMENT     Geena Malone, DENISSE MEHTA  07/21/19 2108    "

## 2019-07-21 NOTE — ED AVS SNAPSHOT
[facility]  [address] [facility] [address]  Phone:  [phone]  Fax:  [phone]                                    Joshua Ennis   MRN: [id number]    Department:  [facility]   Date of Visit:  7/21/2019           After Visit Summary Signature Page    I have received my discharge instructions, and my questions have been answered. I have discussed any challenges I see with this plan with the nurse or doctor.    ..........................................................................................................................................  Patient/Patient Representative Signature      ..........................................................................................................................................  Patient Representative Print Name and Relationship to Patient    ..................................................               ................................................  Date                                   Time    ..........................................................................................................................................  Reviewed by Signature/Title    ...................................................              ..............................................  Date                                               Time          22EPIC Rev 08/18

## 2019-07-26 ENCOUNTER — OFFICE VISIT (OUTPATIENT)
Dept: FAMILY MEDICINE | Facility: CLINIC | Age: 74
End: 2019-07-26
Payer: COMMERCIAL

## 2019-07-26 VITALS
OXYGEN SATURATION: 98 % | RESPIRATION RATE: 16 BRPM | TEMPERATURE: 99 F | HEIGHT: 68 IN | DIASTOLIC BLOOD PRESSURE: 66 MMHG | WEIGHT: 182.8 LBS | SYSTOLIC BLOOD PRESSURE: 110 MMHG | HEART RATE: 81 BPM | BODY MASS INDEX: 27.71 KG/M2

## 2019-07-26 DIAGNOSIS — B02.9 HERPES ZOSTER WITHOUT COMPLICATION: Primary | ICD-10-CM

## 2019-07-26 PROCEDURE — 99213 OFFICE O/P EST LOW 20 MIN: CPT | Performed by: FAMILY MEDICINE

## 2019-07-26 ASSESSMENT — MIFFLIN-ST. JEOR: SCORE: 1553.68

## 2019-07-26 NOTE — PATIENT INSTRUCTIONS
Patient Education     Shingles  Shingles is a viral infection caused by the same virus that causes chicken pox. Anyone who has had chicken pox may get shingles later in life. The virus stays in the body, but remains asleep (dormant). Shingles often occurs in older persons or persons with lowered immunity. But it can affect anyone at any age.  Shingles starts as a tingling patch of skin on one side of the body. Small, painful blisters may then appear. The rash rarely spreads to other parts of the body.  Exposure to shingles can't cause shingles. However, it can cause chicken pox in anyone who has not had chicken pox or has not been vaccinated. The contagious period ends when all blisters have crusted over, generally 1 to 2 weeks after the illness starts.  After the blisters heal, the affected skin may be sensitive or painful for weeks or months, gradually resolving over time. But, sometimes this can last longer and be permanent (called postherpetic neuralgia.)  Shingles vaccines are available. Vaccination can help prevent shingles or make it less painful. It is generally recommended for adults older than 50, even if you've had singles in the past. Talk with your healthcare provider about when to get vaccinated and which vaccine is best for you.  Home care    Medicines may be prescribed to help relieve pain. Take these medicines as directed. Ask your healthcare provider or pharmacist before using over-the-counter medicines for helping treat pain and itching.    In certain cases, antiviral medicines may be prescribed to reduce pain, shorten the illness, and prevent neuralgia. Take these medicines as directed.    Compresses made from a solution of cool water mixed with cornstarch or baking soda may help relieve pain and itching.     Gently wash skin daily with soap and water to help prevent infection. Be certain to rinse off all of the soap, which can be irritating.    Trim fingernails and try not to scratch.  Scratching the sores may leave scars.    Stay home from work or school until all blisters have formed a crust and you are no longer contagious.  Follow-up care  Follow up with your healthcare provider, or as directed.  When to seek medical advice    Fever of 100.4 F (38 C) or higher, or as directed by your healthcare provider    Affected skin is on the face or neck and any of the following occur:  ? Headache  ? Eye pain  ? Changes in vision  ? Sores near the eye  ? Weakness of facial muscles    Blisters occurring on new areas of the body    Pain, redness, or swelling of a joint    Signs of skin infection: colored drainage from the sores, warmth, increasing redness, fever, or increasing pain  Date Last Reviewed: 4/1/2018 2000-2018 [employer]. [address] [place] [address], [place] [address]. All rights reserved. This information is not intended as a substitute for professional medical care. Always follow your healthcare professional's instructions.

## 2019-07-26 NOTE — NURSING NOTE
"Initial /66   Pulse 81   Temp 99  F (37.2  C) (Tympanic)   Resp 16   Ht 1.727 m (5' 8\")   Wt 82.9 kg (182 lb 12.8 oz)   SpO2 98%   BMI 27.79 kg/m   Estimated body mass index is 27.79 kg/m  as calculated from the following:    Height as of this encounter: 1.727 m (5' 8\").    Weight as of this encounter: 82.9 kg (182 lb 12.8 oz). .      "

## 2019-07-26 NOTE — PROGRESS NOTES
Subjective     Joshua Ennis is a 74 year old male who presents to clinic today for the following health issues:    HPI   ED/UC Followup:    Facility:  [facility]  Date of visit: 2019  Reason for visit: Shingles  Current Status: Rash has spread and feels raw         Patient Active Problem List   Diagnosis     Tobacco abuse     CARDIOVASCULAR SCREENING; LDL GOAL LESS THAN 160     Melanoma (H)     Idiopathic progressive polyneuropathy     Advanced directives, counseling/discussion     GERD (gastroesophageal reflux disease)     Hypertension, renal     Anemia     Encounter for long-term (current) use of steroids     Granulomatosis with polyangiitis (H)     CKD (chronic kidney disease) stage 3, GFR 30-59 ml/min (H)     colonic polyps- Tubular Adenomas     Past Surgical History:   Procedure Laterality Date     BIOPSY  2011    melanoma on back     DISSECT LYMPH NODE INGUINAL  3/1/2013    Procedure: DISSECT LYMPH NODE INGUINAL;  Bilateral Inguinal Lymph Node Biopsy.;  Surgeon: Tariq Acosta MD;  Location: [facility]     ESOPHAGOSCOPY, GASTROSCOPY, DUODENOSCOPY (EGD), COMBINED  2013    Procedure: COMBINED ESOPHAGOSCOPY, GASTROSCOPY, DUODENOSCOPY (EGD);  Gastroscopy;  Surgeon: Tariq Acosta MD;  Location: [facility]     HERNIORRHAPHY INGUINAL  2012    Procedure: HERNIORRHAPHY INGUINAL;  Open Repair Left Inguinal Hernia;  Surgeon: Jerad Baker MD;  Location: [facility]       Social History     Tobacco Use     Smoking status: Former Smoker     Packs/day: 1.00     Years: 20.00     Pack years: 20.00     Types: Cigarettes     Last attempt to quit: 2013     Years since quittin.4     Smokeless tobacco: Never Used   Substance Use Topics     Alcohol use: Yes     Comment: rare     Family History   Problem Relation Age of Onset     Diabetes Mother      Hypertension Mother      Cancer Father         stomach     Heart Disease Father      Heart Disease Brother      Diabetes Sister      Diabetes Sister       Diabetes Sister      Heart Disease Brother      Cancer Sister      Glaucoma No family hx of      Macular Degeneration No family hx of          Current Outpatient Medications   Medication Sig Dispense Refill     Acetaminophen (TYLENOL PO) Take 650 mg by mouth once as needed for mild pain or fever       Blood Pressure Monitor KIT Automatic Blood Pressure Monitor 1 kit 0     calcium-vitamin D (CALCIUM 600 + D) 600-400 MG-UNIT per tablet Take 1 tablet by mouth 2 times daily.       Cholecalciferol (VITAMIN D) 1000 UNITS capsule Take 1 capsule by mouth daily.       folic acid (FOLVITE) 1 MG tablet Take 2 tablets (2 mg) by mouth daily 180 tablet 3     gabapentin (NEURONTIN) 400 MG capsule Take 1 capsule (400 mg) by mouth At Bedtime 90 capsule 1     losartan (COZAAR) 50 MG tablet Take 1 tablet (50 mg) by mouth daily 90 tablet 3     methotrexate sodium 2.5 MG TABS Take 4 tablets (10 mg) by mouth every 7 days 60 tablet 3     valACYclovir (VALTREX) 1000 mg tablet Take 1 tablet (1,000 mg) by mouth 3 times daily for 7 days 21 tablet 0     Allergies   Allergen Reactions     Shrimp Nausea and Vomiting     Got sick each time he ate it     Recent Labs   Lab Test 03/13/19  1639 12/14/18  0952 09/21/18  1700  12/13/17  1228 08/25/17  1651  04/17/13  0901   LDL  --   --   --   --   --   --   --  91   HDL  --   --   --   --   --   --   --  72   TRIG  --   --   --   --   --   --   --  125   ALT 26 26 24   < > 30 25   < >  --    CR 1.51* 1.34* 1.44*   < > 1.48* 1.47*   < >  --    GFRESTIMATED 46* 52* 48*   < > 47* 47*   < >  --    GFRESTBLACK 53* 63 58*   < > 57* 57*   < >  --    POTASSIUM  --   --   --   --  5.0 4.8   < >  --     < > = values in this interval not displayed.      BP Readings from Last 3 Encounters:   07/26/19 110/66   07/21/19 145/73   06/24/19 120/70    Wt Readings from Last 3 Encounters:   07/26/19 82.9 kg (182 lb 12.8 oz)   06/24/19 83 kg (183 lb)   12/14/18 86.3 kg (190 lb 3.2 oz)                    Reviewed and  "updated as needed this visit by Provider         Review of Systems   ROS COMP: Constitutional, HEENT, cardiovascular, pulmonary, gi and gu systems are negative, except as otherwise noted.      Objective    /66   Pulse 81   Temp 99  F (37.2  C) (Tympanic)   Resp 16   Ht 1.727 m (5' 8\")   Wt 82.9 kg (182 lb 12.8 oz)   SpO2 98%   BMI 27.79 kg/m    Body mass index is 27.79 kg/m .  Physical Exam   GENERAL: alert and no distress  EYES: Eyes grossly normal to inspection, PERRL and conjunctivae and sclerae normal  NECK: no adenopathy, no asymmetry, masses, or scars and thyroid normal to palpation  RESP: lungs clear to auscultation - no rales, rhonchi or wheezes  CV: regular rates and rhythm, normal S1 S2, no S3 or S4 and no murmur, click or rub  SKIN: Edematous rashes involving left lower back, thigh and left lower abdominal wall as shown below                Assessment & Plan     (B02.9) Herpes zoster without complication  (primary encounter diagnosis)  Comment: Rashes are secondary to herpes zoster.  Suggested to continue Valtrex, over-the-counter analgesia.  Instructed to avoid heat and keep the area covered.  Complication explained in detail.  Follow-up if symptoms persist or worsen.  All questions answered.           Patient Instructions     Patient Education     Shingles  Shingles is a viral infection caused by the same virus that causes chicken pox. Anyone who has had chicken pox may get shingles later in life. The virus stays in the body, but remains asleep (dormant). Shingles often occurs in older persons or persons with lowered immunity. But it can affect anyone at any age.  Shingles starts as a tingling patch of skin on one side of the body. Small, painful blisters may then appear. The rash rarely spreads to other parts of the body.  Exposure to shingles can't cause shingles. However, it can cause chicken pox in anyone who has not had chicken pox or has not been vaccinated. The contagious period ends " when all blisters have crusted over, generally 1 to 2 weeks after the illness starts.  After the blisters heal, the affected skin may be sensitive or painful for weeks or months, gradually resolving over time. But, sometimes this can last longer and be permanent (called postherpetic neuralgia.)  Shingles vaccines are available. Vaccination can help prevent shingles or make it less painful. It is generally recommended for adults older than 50, even if you've had singles in the past. Talk with your healthcare provider about when to get vaccinated and which vaccine is best for you.  Home care    Medicines may be prescribed to help relieve pain. Take these medicines as directed. Ask your healthcare provider or pharmacist before using over-the-counter medicines for helping treat pain and itching.    In certain cases, antiviral medicines may be prescribed to reduce pain, shorten the illness, and prevent neuralgia. Take these medicines as directed.    Compresses made from a solution of cool water mixed with cornstarch or baking soda may help relieve pain and itching.     Gently wash skin daily with soap and water to help prevent infection. Be certain to rinse off all of the soap, which can be irritating.    Trim fingernails and try not to scratch. Scratching the sores may leave scars.    Stay home from work or school until all blisters have formed a crust and you are no longer contagious.  Follow-up care  Follow up with your healthcare provider, or as directed.  When to seek medical advice    Fever of 100.4 F (38 C) or higher, or as directed by your healthcare provider    Affected skin is on the face or neck and any of the following occur:  ? Headache  ? Eye pain  ? Changes in vision  ? Sores near the eye  ? Weakness of facial muscles    Blisters occurring on new areas of the body    Pain, redness, or swelling of a joint    Signs of skin infection: colored drainage from the sores, warmth, increasing redness, fever, or  increasing pain  Date Last Reviewed: 4/1/2018 2000-2018 [employer]. [address] [facility] [address] [facility], [place] [address]. All rights reserved. This information is not intended as a substitute for professional medical care. Always follow your healthcare professional's instructions.                 Patrick Mcintyre MD  [facility]

## 2019-08-04 ENCOUNTER — NURSE TRIAGE (OUTPATIENT)
Dept: NURSING | Facility: CLINIC | Age: 74
End: 2019-08-04

## 2019-08-04 NOTE — TELEPHONE ENCOUNTER
"Darby called concerned about patients shingles and with medication questions. Wondered if can take methotrexate with valtrax. Reviewed micro medex and no interactions. Reviewed notes no mention to hold methotrexate.     Joshua states pain is at waist where shingles are. Rates pain at \"3\" coming and going, shooting pain. Patient states hard to move d/t to the pain. Went to brothers  on Friday. Draby is concerned that \"laying around more than normal\" Went over guidelines explained to call back if rash worsens, develops a fever or severe headache. Verbalizes understanding.    Additional Information    Negative: Difficult to awaken or acting confused (e.g., disoriented, slurred speech)    Negative: Sounds like a life-threatening emergency to the triager    Negative: Patient sounds very sick or weak to the triager    [1] Shingles rash already diagnosed and [2] taking antiviral medication    Negative: [1] Shingles rash (matches SYMPTOMS) AND [2] weak immune system (e.g., HIV positive,  cancer chemotherapy, chronic steroid treatment, splenectomy) AND [3] NOT taking antiviral medication    Negative: Shingles rash on the eyelid or tip of the nose    Negative: [1] Shingles rash of face AND [2] eye pain or blurred vision    Negative: [1] Shingles rash of face AND [2] facial weakness    Negative: [1] Shingles rash of face or ear AND [2] earache or ringing in the ear    Negative: [1] Shingles rash AND [2] spots start appearing other places on body    Negative: Fever > 100.5 F (38.1 C)    Negative: SEVERE pain (e.g., excruciating)    Negative: [1] Shingles rash (matches SYMPTOMS) AND [2] onset within past 72 hours    Negative: [1] Shingles rash AND [2] onset > 72 hours ago    Negative: [1] Looks infected (spreading redness, pus) AND [2] no fever    Negative: [1] Shingle rash already diagnosed AND [2] weak immune system (e.g., HIV positive,  cancer chemotherapy, chronic steroid treatment, splenectomy) AND [3]  taking " antiviral medication    Negative: Pain persisting > 1 month after rash disappears    Protocols used: SHINGLES-MIKE

## 2019-08-08 ENCOUNTER — OFFICE VISIT (OUTPATIENT)
Dept: FAMILY MEDICINE | Facility: CLINIC | Age: 74
End: 2019-08-08
Payer: COMMERCIAL

## 2019-08-08 VITALS
BODY MASS INDEX: 27.43 KG/M2 | HEART RATE: 62 BPM | SYSTOLIC BLOOD PRESSURE: 118 MMHG | RESPIRATION RATE: 18 BRPM | DIASTOLIC BLOOD PRESSURE: 62 MMHG | TEMPERATURE: 97.8 F | WEIGHT: 181 LBS | HEIGHT: 68 IN

## 2019-08-08 DIAGNOSIS — B02.29 POST HERPETIC NEURALGIA: Primary | ICD-10-CM

## 2019-08-08 DIAGNOSIS — N18.30 CHRONIC KIDNEY DISEASE, STAGE III (MODERATE) (H): ICD-10-CM

## 2019-08-08 PROCEDURE — 99214 OFFICE O/P EST MOD 30 MIN: CPT | Performed by: FAMILY MEDICINE

## 2019-08-08 RX ORDER — GABAPENTIN 300 MG/1
300 CAPSULE ORAL 3 TIMES DAILY
Qty: 90 CAPSULE | Refills: 1 | Status: SHIPPED | OUTPATIENT
Start: 2019-08-08 | End: 2019-10-23

## 2019-08-08 ASSESSMENT — MIFFLIN-ST. JEOR: SCORE: 1545.51

## 2019-08-08 NOTE — PROGRESS NOTES
SUBJECTIVE   Joshua Ennis is a 74 year old male who presents with     Shingles       Duration: Recheck from 7/26/19    Description (location/character/radiation): Left side- hip and butt     Intensity:  moderate    Accompanying signs and symptoms: Left side is sore- back is sore, waist is sore. Feels crawly     History (similar episodes/previous evaluation): None    Therapies tried and outcome: Valtrex          PCP   [facility] [phone]    Health Maintenance        Health Maintenance Due   Topic Date Due     SKIN CANCER SCREENING  1945     ZOSTER IMMUNIZATION (1 of 2) 04/24/1997     MEDICARE ANNUAL WELLNESS VISIT  04/24/2012     ADVANCE CARE PLANNING  04/15/2018     LIPID  04/17/2018       [facility]        Patient Active Problem List   Diagnosis     Tobacco abuse     CARDIOVASCULAR SCREENING; LDL GOAL LESS THAN 160     Melanoma (H)     Idiopathic progressive polyneuropathy     Advanced directives, counseling/discussion     GERD (gastroesophageal reflux disease)     Hypertension, renal     Anemia     Encounter for long-term (current) use of steroids     Granulomatosis with polyangiitis (H)     CKD (chronic kidney disease) stage 3, GFR 30-59 ml/min (H)     colonic polyps- Tubular Adenomas     Current Outpatient Medications   Medication     Acetaminophen (TYLENOL PO)     Blood Pressure Monitor KIT     calcium-vitamin D (CALCIUM 600 + D) 600-400 MG-UNIT per tablet     Cholecalciferol (VITAMIN D) 1000 UNITS capsule     folic acid (FOLVITE) 1 MG tablet     gabapentin (NEURONTIN) 400 MG capsule     losartan (COZAAR) 50 MG tablet     methotrexate sodium 2.5 MG TABS     No current facility-administered medications for this visit.        Patient Active Problem List   Diagnosis     Tobacco abuse     CARDIOVASCULAR SCREENING; LDL GOAL LESS THAN 160     Melanoma (H)     Idiopathic progressive polyneuropathy     Advanced directives, counseling/discussion     GERD (gastroesophageal reflux disease)      Hypertension, renal     Anemia     Encounter for long-term (current) use of steroids     Granulomatosis with polyangiitis (H)     CKD (chronic kidney disease) stage 3, GFR 30-59 ml/min (H)     colonic polyps- Tubular Adenomas     Past Surgical History:   Procedure Laterality Date     BIOPSY  2011    melanoma on back     DISSECT LYMPH NODE INGUINAL  3/1/2013    Procedure: DISSECT LYMPH NODE INGUINAL;  Bilateral Inguinal Lymph Node Biopsy.;  Surgeon: Tariq Acosta MD;  Location: [facility]     ESOPHAGOSCOPY, GASTROSCOPY, DUODENOSCOPY (EGD), COMBINED  2013    Procedure: COMBINED ESOPHAGOSCOPY, GASTROSCOPY, DUODENOSCOPY (EGD);  Gastroscopy;  Surgeon: Tariq Acosta MD;  Location: [facility]     HERNIORRHAPHY INGUINAL  2012    Procedure: HERNIORRHAPHY INGUINAL;  Open Repair Left Inguinal Hernia;  Surgeon: Jerad Baker MD;  Location: [facility]       Social History     Tobacco Use     Smoking status: Former Smoker     Packs/day: 1.00     Years: 20.00     Pack years: 20.00     Types: Cigarettes     Last attempt to quit: 2013     Years since quittin.4     Smokeless tobacco: Never Used   Substance Use Topics     Alcohol use: Yes     Comment: rare     Family History   Problem Relation Age of Onset     Diabetes Mother      Hypertension Mother      Cancer Father         stomach     Heart Disease Father      Heart Disease Brother      Diabetes Sister      Diabetes Sister      Diabetes Sister      Heart Disease Brother      Cancer Sister      Glaucoma No family hx of      Macular Degeneration No family hx of          Current Outpatient Medications   Medication Sig Dispense Refill     Acetaminophen (TYLENOL PO) Take 650 mg by mouth once as needed for mild pain or fever       Blood Pressure Monitor KIT Automatic Blood Pressure Monitor 1 kit 0     calcium-vitamin D (CALCIUM 600 + D) 600-400 MG-UNIT per tablet Take 1 tablet by mouth 2 times daily.       Cholecalciferol (VITAMIN D) 1000 UNITS capsule Take 1  "capsule by mouth daily.       folic acid (FOLVITE) 1 MG tablet Take 2 tablets (2 mg) by mouth daily 180 tablet 3     gabapentin (NEURONTIN) 300 MG capsule Take 1 capsule (300 mg) by mouth 3 times daily 90 capsule 1     losartan (COZAAR) 50 MG tablet Take 1 tablet (50 mg) by mouth daily 90 tablet 3     methotrexate sodium 2.5 MG TABS Take 4 tablets (10 mg) by mouth every 7 days 60 tablet 3     Allergies   Allergen Reactions     Shrimp Nausea and Vomiting     Got sick each time he ate it     Recent Labs   Lab Test 03/13/19  1639 12/14/18  0952 09/21/18  1700  12/13/17  1228 08/25/17  1651  04/17/13  0901   LDL  --   --   --   --   --   --   --  91   HDL  --   --   --   --   --   --   --  72   TRIG  --   --   --   --   --   --   --  125   ALT 26 26 24   < > 30 25   < >  --    CR 1.51* 1.34* 1.44*   < > 1.48* 1.47*   < >  --    GFRESTIMATED 46* 52* 48*   < > 47* 47*   < >  --    GFRESTBLACK 53* 63 58*   < > 57* 57*   < >  --    POTASSIUM  --   --   --   --  5.0 4.8   < >  --     < > = values in this interval not displayed.      BP Readings from Last 3 Encounters:   08/08/19 118/62   07/26/19 110/66   07/21/19 145/73    Wt Readings from Last 3 Encounters:   08/08/19 82.1 kg (181 lb)   07/26/19 82.9 kg (182 lb 12.8 oz)   06/24/19 83 kg (183 lb)                    Reviewed and updated:  Tobacco  Allergies  Meds  Med Hx  Surg Hx  Fam Hx  Soc Hx     ROS:  Constitutional, neuro, ENT, endocrine, pulmonary, cardiac, gastrointestinal, genitourinary, musculoskeletal, integument and psychiatric systems are negative, except as otherwise noted.    PHYSICAL EXAM   /62 (Cuff Size: Adult Regular)   Pulse 62   Temp 97.8  F (36.6  C) (Tympanic)   Resp 18   Ht 1.727 m (5' 8\")   Wt 82.1 kg (181 lb)   BMI 27.52 kg/m    Body mass index is 27.52 kg/m .  GENERAL: alert and no distress  EYES: Eyes grossly normal to inspection, PERRL and conjunctivae and sclerae normal  NECK: no adenopathy, no asymmetry, masses, or scars and " thyroid normal to palpation  RESP: lungs clear to auscultation - no rales, rhonchi or wheezes  CV: regular rates and rhythm, normal S1 S2, no S3 or S4 and no murmur, click or rub  ABDOMEN: soft, nontender  MS: no gross musculoskeletal defects noted, no edema  SKIN: dry appearing erythematous rash involving left lower back as shown below, no blistering or discharge noted                  Assessment & Plan     (B02.29) Post herpetic neuralgia  (primary encounter diagnosis)  Comment: Suspect symptoms secondary to postherpetic neuralgia.  Patient is known to have chronic kidney disease, peripheral neuropathy.  Suggested to discontinue gabapentin 400 mg at night and start 300 mg twice daily and increase it up to 3 times daily if needed.  Explained that symptoms can last for several months to years and even lifetime, mean interval about 3.3 years.  Patient and wife will be having Shingrix vaccine as well.  Suggested to continue well hydration, will continue monitoring renal function periodically.  Patient/wife understood and in agreement with above plan.  All questions answered.  Plan: gabapentin (NEURONTIN) 300 MG capsule           (N18.3) Chronic kidney disease, stage III (moderate) (H)  Comment: as above       Patrick Mcintyre MD  [facility]

## 2019-08-08 NOTE — NURSING NOTE
"Chief Complaint   Patient presents with     Derm Problem     /62 (Cuff Size: Adult Regular)   Pulse 62   Temp 97.8  F (36.6  C) (Tympanic)   Resp 18   Ht 1.727 m (5' 8\")   Wt 82.1 kg (181 lb)   BMI 27.52 kg/m   Estimated body mass index is 27.52 kg/m  as calculated from the following:    Height as of this encounter: 1.727 m (5' 8\").    Weight as of this encounter: 82.1 kg (181 lb).  Patient presents to the clinic using No DME      Health Maintenance that is potentially due pending provider review:    Health Maintenance Due   Topic Date Due     SKIN CANCER SCREENING  1945     ZOSTER IMMUNIZATION (1 of 2) 04/24/1997     MEDICARE ANNUAL WELLNESS VISIT  04/24/2012     ADVANCE CARE PLANNING  04/15/2018     LIPID  04/17/2018        n/a        "

## 2019-08-09 ENCOUNTER — NURSE TRIAGE (OUTPATIENT)
Dept: CALL CENTER | Age: 74
End: 2019-08-09

## 2019-08-09 ENCOUNTER — TELEPHONE (OUTPATIENT)
Dept: RHEUMATOLOGY | Facility: CLINIC | Age: 74
End: 2019-08-09

## 2019-08-09 DIAGNOSIS — Z51.81 MEDICATION MONITORING ENCOUNTER: ICD-10-CM

## 2019-08-09 DIAGNOSIS — I77.82 ANCA-ASSOCIATED VASCULITIS (H): ICD-10-CM

## 2019-08-09 DIAGNOSIS — M31.30 WEGENER'S VASCULITIS: ICD-10-CM

## 2019-08-09 LAB
ALBUMIN SERPL-MCNC: 3.7 G/DL (ref 3.4–5)
ALBUMIN UR-MCNC: NEGATIVE MG/DL
ALT SERPL W P-5'-P-CCNC: 22 U/L (ref 0–70)
APPEARANCE UR: CLEAR
AST SERPL W P-5'-P-CCNC: 15 U/L (ref 0–45)
BASOPHILS # BLD AUTO: 0.1 10E9/L (ref 0–0.2)
BASOPHILS NFR BLD AUTO: 0.7 %
BILIRUB UR QL STRIP: NEGATIVE
COLOR UR AUTO: YELLOW
CREAT SERPL-MCNC: 1.29 MG/DL (ref 0.66–1.25)
CREAT UR-MCNC: 54 MG/DL
CREAT UR-MCNC: 54 MG/DL
CRP SERPL-MCNC: <2.9 MG/L (ref 0–8)
DIFFERENTIAL METHOD BLD: NORMAL
EOSINOPHIL # BLD AUTO: 0.2 10E9/L (ref 0–0.7)
EOSINOPHIL NFR BLD AUTO: 2.5 %
ERYTHROCYTE [DISTWIDTH] IN BLOOD BY AUTOMATED COUNT: 12.9 % (ref 10–15)
ERYTHROCYTE [SEDIMENTATION RATE] IN BLOOD BY WESTERGREN METHOD: 5 MM/H (ref 0–20)
GFR SERPL CREATININE-BSD FRML MDRD: 55 ML/MIN/{1.73_M2}
GLUCOSE UR STRIP-MCNC: NEGATIVE MG/DL
HCT VFR BLD AUTO: 44.4 % (ref 40–53)
HGB BLD-MCNC: 15.1 G/DL (ref 13.3–17.7)
HGB UR QL STRIP: NEGATIVE
KETONES UR STRIP-MCNC: NEGATIVE MG/DL
LEUKOCYTE ESTERASE UR QL STRIP: NEGATIVE
LYMPHOCYTES # BLD AUTO: 1.4 10E9/L (ref 0.8–5.3)
LYMPHOCYTES NFR BLD AUTO: 18.8 %
MCH RBC QN AUTO: 32.6 PG (ref 26.5–33)
MCHC RBC AUTO-ENTMCNC: 34 G/DL (ref 31.5–36.5)
MCV RBC AUTO: 96 FL (ref 78–100)
MONOCYTES # BLD AUTO: 0.8 10E9/L (ref 0–1.3)
MONOCYTES NFR BLD AUTO: 10 %
NEUTROPHILS # BLD AUTO: 5.2 10E9/L (ref 1.6–8.3)
NEUTROPHILS NFR BLD AUTO: 68 %
NITRATE UR QL: NEGATIVE
PH UR STRIP: 5.5 PH (ref 5–7)
PLATELET # BLD AUTO: 191 10E9/L (ref 150–450)
PROT UR-MCNC: 0.05 G/L
PROT/CREAT 24H UR: 0.1 G/G CR (ref 0–0.2)
RBC # BLD AUTO: 4.63 10E12/L (ref 4.4–5.9)
RBC #/AREA URNS AUTO: NORMAL /HPF
SOURCE: NORMAL
SP GR UR STRIP: <=1.005 (ref 1–1.03)
UROBILINOGEN UR STRIP-ACNC: 0.2 EU/DL (ref 0.2–1)
WBC # BLD AUTO: 7.6 10E9/L (ref 4–11)
WBC #/AREA URNS AUTO: NORMAL /HPF

## 2019-08-09 PROCEDURE — 81001 URINALYSIS AUTO W/SCOPE: CPT | Performed by: INTERNAL MEDICINE

## 2019-08-09 PROCEDURE — 36415 COLL VENOUS BLD VENIPUNCTURE: CPT | Performed by: INTERNAL MEDICINE

## 2019-08-09 PROCEDURE — 82040 ASSAY OF SERUM ALBUMIN: CPT | Performed by: INTERNAL MEDICINE

## 2019-08-09 PROCEDURE — 84156 ASSAY OF PROTEIN URINE: CPT | Performed by: INTERNAL MEDICINE

## 2019-08-09 PROCEDURE — 86140 C-REACTIVE PROTEIN: CPT | Performed by: INTERNAL MEDICINE

## 2019-08-09 PROCEDURE — 85652 RBC SED RATE AUTOMATED: CPT | Performed by: INTERNAL MEDICINE

## 2019-08-09 PROCEDURE — 84460 ALANINE AMINO (ALT) (SGPT): CPT | Performed by: INTERNAL MEDICINE

## 2019-08-09 PROCEDURE — 85025 COMPLETE CBC W/AUTO DIFF WBC: CPT | Performed by: INTERNAL MEDICINE

## 2019-08-09 PROCEDURE — 86255 FLUORESCENT ANTIBODY SCREEN: CPT | Performed by: INTERNAL MEDICINE

## 2019-08-09 PROCEDURE — 86256 FLUORESCENT ANTIBODY TITER: CPT | Performed by: INTERNAL MEDICINE

## 2019-08-09 PROCEDURE — 83516 IMMUNOASSAY NONANTIBODY: CPT | Performed by: INTERNAL MEDICINE

## 2019-08-09 PROCEDURE — 82565 ASSAY OF CREATININE: CPT | Performed by: INTERNAL MEDICINE

## 2019-08-09 PROCEDURE — 84450 TRANSFERASE (AST) (SGOT): CPT | Performed by: INTERNAL MEDICINE

## 2019-08-09 NOTE — TELEPHONE ENCOUNTER
Received page from patient and his wife in regards to post-herpetic neuralgia. He had seen his PCP yesterday and was given increased dose of gabapentin. He had taken one extra 300mg dose and, per the PCP note, could take this tid, so I instructed him to do this.     His active shingles, per report, has resolved, so there is no indication to withhold methotrexate.

## 2019-08-09 NOTE — TELEPHONE ENCOUNTER
Health Call Center    Phone Message    May a detailed message be left on voicemail: yes    Reason for Call: Symptoms or Concerns     If patient has red-flag symptoms, warm transfer to triage line    Current symptom or concern: Severe shingles. Patients finance stated he has been seen 3-4 times since the shingles developed on his back. And there are photos and labs from Dr. Mckeon at [place] that the care team can take a look at. Would like to consult with care team. Please advise.     Symptoms have been present for:  3-4 week(s)    Has patient previously been seen for this? Yes    By: Dr. Mckeon [place]    Date: NA    Are there any new or worsening symptoms? Yes: Shingles on back have gotten worse.      Action Taken: Message routed to:  [facility] Rheumatology

## 2019-08-09 NOTE — LETTER
Patient:  Joshua Ennis  :   1945  MRN:     [id number]        Mr.Roger DEANNA Ennis  [address] [facility] [address]        2019    Dear ,    We are writing to inform you of your test results. Kidney function is improved. Vasculitis markers are slightly increased, could be due to recent shingles. Do you have any more crusts in  Your nose? Coughing? Shortness of breath? Recommend re-checking all these labs in one month.      Results for orders placed or performed in visit on 19   UA with Microscopic reflex to Culture   Result Value Ref Range    Color Urine Yellow     Appearance Urine Clear     Glucose Urine Negative NEG^Negative mg/dL    Bilirubin Urine Negative NEG^Negative    Ketones Urine Negative NEG^Negative mg/dL    Specific Gravity Urine <=1.005 1.003 - 1.035    pH Urine 5.5 5.0 - 7.0 pH    Protein Albumin Urine Negative NEG^Negative mg/dL    Urobilinogen Urine 0.2 0.2 - 1.0 EU/dL    Nitrite Urine Negative NEG^Negative    Blood Urine Negative NEG^Negative    Leukocyte Esterase Urine Negative NEG^Negative    Source Midstream Urine     WBC Urine 0 - 5 OTO5^0 - 5 /HPF    RBC Urine O - 2 OTO2^O - 2 /HPF   Proteinase 3 Antibody IgG   Result Value Ref Range    Proteinase 3 Antibody IgG 2.1 (H) 0.0 - 0.9 AI   ANCA IgG by IFA with Reflex to Titer   Result Value Ref Range    Neutrophil Cytoplasmic Antibody 1:20 (A) <1:10 [titer]    Neutrophil Cytoplasmic Antibody Pattern       Cytoplasmic ANCA (c-ANCA) staining pattern observed and confirmed on formalin-fixed   neutrophils.     Creatinine random urine   Result Value Ref Range    Creatinine Urine Random 54 mg/dL   Protein  random urine with Creat Ratio   Result Value Ref Range    Protein Random Urine 0.05 g/L    Protein Total Urine g/gr Creatinine 0.10 0 - 0.2 g/g Cr   Erythrocyte sedimentation rate auto   Result Value Ref Range    Sed Rate 5 0 - 20 mm/h   CRP inflammation   Result Value Ref Range    CRP Inflammation <2.9 0.0 - 8.0  mg/L   Creatinine   Result Value Ref Range    Creatinine 1.29 (H) 0.66 - 1.25 mg/dL    GFR Estimate 55 (L) >60 mL/min/[1.73_m2]    GFR Estimate If Black 64 >60 mL/min/[1.73_m2]   CBC with platelets differential   Result Value Ref Range    WBC 7.6 4.0 - 11.0 10e9/L    RBC Count 4.63 4.4 - 5.9 10e12/L    Hemoglobin 15.1 13.3 - 17.7 g/dL    Hematocrit 44.4 40.0 - 53.0 %    MCV 96 78 - 100 fl    MCH 32.6 26.5 - 33.0 pg    MCHC 34.0 31.5 - 36.5 g/dL    RDW 12.9 10.0 - 15.0 %    Platelet Count 191 150 - 450 10e9/L    % Neutrophils 68.0 %    % Lymphocytes 18.8 %    % Monocytes 10.0 %    % Eosinophils 2.5 %    % Basophils 0.7 %    Absolute Neutrophil 5.2 1.6 - 8.3 10e9/L    Absolute Lymphocytes 1.4 0.8 - 5.3 10e9/L    Absolute Monocytes 0.8 0.0 - 1.3 10e9/L    Absolute Eosinophils 0.2 0.0 - 0.7 10e9/L    Absolute Basophils 0.1 0.0 - 0.2 10e9/L    Diff Method Automated Method    AST   Result Value Ref Range    AST 15 0 - 45 U/L   Albumin level   Result Value Ref Range    Albumin 3.7 3.4 - 5.0 g/dL   ALT   Result Value Ref Range    ALT 22 0 - 70 U/L   Creatinine urine calculation only   Result Value Ref Range    Creatinine Urine 54 mg/dL       Ede Sanchez MD

## 2019-08-10 LAB — PROTEINASE3 IGG SER-ACNC: 2.1 AI (ref 0–0.9)

## 2019-08-10 NOTE — TELEPHONE ENCOUNTER
Patients chago along with the patient called worried about the fact that he has shingles which are extremely painful -he almost can't get up out of bed due to pain - and has vasculitis Is there concern about the vasculitis and the shingles or the medications he take?  They have Gabapentin for the pain - but he is not getting a lot of relief yet from that.    They were given the number to the on call resident to find out if there is any concern about the vasculitis and the shingles.    They had gotten the call from the clinic earlier but missed it.    Kathleen M Doege RN

## 2019-08-12 DIAGNOSIS — I10 HYPERTENSION, UNSPECIFIED TYPE: ICD-10-CM

## 2019-08-12 NOTE — TELEPHONE ENCOUNTER
Pt's ambrocio returned call to clinic.  Per ambrocio, pt is suffering from nerve pain related to resolving shingles.  No current shingles per provider review.  Discussed continuing the gabapentin to see if that works, if it does not, could ask about lidocaine cream or ointment, if provider recommends.  They will connect with PCP for further pain management if needed.    She did bring him to have labs last week as he was c/o heaviness in his legs. No other symptoms noted per ambrocio.  Will wait for labs to result and see what Dr. Sanchez has to say.    Consuelo Canales RN  Rheumatology Clinic

## 2019-08-12 NOTE — TELEPHONE ENCOUNTER
Pt was able to talk with on call provider on 8/9/19.    Left message for them to return call for any further questions.    Consuelo Canales RN  Rheumatology Clinic

## 2019-08-13 LAB
ANCA AB PATTERN SER IF-IMP: ABNORMAL
C-ANCA TITR SER IF: ABNORMAL {TITER}

## 2019-08-14 NOTE — RESULT ENCOUNTER NOTE
Kidney function is improved. Vasculitis markers are slightly increased, could be due to recent shingles. Do you have any more crusts in  Your nose? Coughing? Shortness of breath? Recommend re-checking all these labs in one month.

## 2019-08-16 RX ORDER — LOSARTAN POTASSIUM 50 MG/1
50 TABLET ORAL DAILY
Qty: 30 TABLET | Refills: 0 | Status: SHIPPED | OUTPATIENT
Start: 2019-08-16 | End: 2019-09-16

## 2019-08-16 NOTE — TELEPHONE ENCOUNTER
losartan (COZAAR) 50 MG tablet  Last Written Prescription Date:  8/29/18  Last Fill Quantity: 90,   # refills: 3  Last Office Visit : 12/14/18    Jhonny PLAZA  All Reviewers List     Ede Sanchez MD on 8/13/2019  9:26 PM     No change on med list.      Routing refill request to provider for review/approval because:  K+ past due

## 2019-09-05 ENCOUNTER — OFFICE VISIT (OUTPATIENT)
Dept: FAMILY MEDICINE | Facility: CLINIC | Age: 74
End: 2019-09-05
Payer: COMMERCIAL

## 2019-09-05 VITALS
HEIGHT: 68 IN | BODY MASS INDEX: 27.74 KG/M2 | DIASTOLIC BLOOD PRESSURE: 70 MMHG | WEIGHT: 183 LBS | RESPIRATION RATE: 18 BRPM | SYSTOLIC BLOOD PRESSURE: 118 MMHG | TEMPERATURE: 100 F | HEART RATE: 88 BPM

## 2019-09-05 DIAGNOSIS — R10.9 LEFT FLANK PAIN: ICD-10-CM

## 2019-09-05 DIAGNOSIS — R50.9 FEVER, UNSPECIFIED FEVER CAUSE: ICD-10-CM

## 2019-09-05 DIAGNOSIS — R21 RASH: Primary | ICD-10-CM

## 2019-09-05 LAB
ALBUMIN UR-MCNC: NEGATIVE MG/DL
ANION GAP SERPL CALCULATED.3IONS-SCNC: 7 MMOL/L (ref 3–14)
APPEARANCE UR: CLEAR
BILIRUB UR QL STRIP: NEGATIVE
BUN SERPL-MCNC: 27 MG/DL (ref 7–30)
CALCIUM SERPL-MCNC: 9.5 MG/DL (ref 8.5–10.1)
CHLORIDE SERPL-SCNC: 109 MMOL/L (ref 94–109)
CO2 SERPL-SCNC: 24 MMOL/L (ref 20–32)
COLOR UR AUTO: YELLOW
CREAT SERPL-MCNC: 1.29 MG/DL (ref 0.66–1.25)
ERYTHROCYTE [DISTWIDTH] IN BLOOD BY AUTOMATED COUNT: 13 % (ref 10–15)
ERYTHROCYTE [SEDIMENTATION RATE] IN BLOOD BY WESTERGREN METHOD: 6 MM/H (ref 0–20)
GFR SERPL CREATININE-BSD FRML MDRD: 55 ML/MIN/{1.73_M2}
GLUCOSE SERPL-MCNC: 95 MG/DL (ref 70–99)
GLUCOSE UR STRIP-MCNC: NEGATIVE MG/DL
HCT VFR BLD AUTO: 41.1 % (ref 40–53)
HGB BLD-MCNC: 13.9 G/DL (ref 13.3–17.7)
HGB UR QL STRIP: NEGATIVE
KETONES UR STRIP-MCNC: NEGATIVE MG/DL
LEUKOCYTE ESTERASE UR QL STRIP: NEGATIVE
MCH RBC QN AUTO: 32.4 PG (ref 26.5–33)
MCHC RBC AUTO-ENTMCNC: 33.8 G/DL (ref 31.5–36.5)
MCV RBC AUTO: 96 FL (ref 78–100)
NITRATE UR QL: NEGATIVE
PH UR STRIP: 5 PH (ref 5–7)
PLATELET # BLD AUTO: 189 10E9/L (ref 150–450)
POTASSIUM SERPL-SCNC: 4.5 MMOL/L (ref 3.4–5.3)
RBC # BLD AUTO: 4.29 10E12/L (ref 4.4–5.9)
SODIUM SERPL-SCNC: 140 MMOL/L (ref 133–144)
SOURCE: NORMAL
SP GR UR STRIP: 1.02 (ref 1–1.03)
UROBILINOGEN UR STRIP-ACNC: 0.2 EU/DL (ref 0.2–1)
WBC # BLD AUTO: 6.9 10E9/L (ref 4–11)

## 2019-09-05 PROCEDURE — 81003 URINALYSIS AUTO W/O SCOPE: CPT | Performed by: FAMILY MEDICINE

## 2019-09-05 PROCEDURE — 36415 COLL VENOUS BLD VENIPUNCTURE: CPT | Performed by: FAMILY MEDICINE

## 2019-09-05 PROCEDURE — 85027 COMPLETE CBC AUTOMATED: CPT | Performed by: FAMILY MEDICINE

## 2019-09-05 PROCEDURE — 80048 BASIC METABOLIC PNL TOTAL CA: CPT | Performed by: FAMILY MEDICINE

## 2019-09-05 PROCEDURE — 99213 OFFICE O/P EST LOW 20 MIN: CPT | Performed by: FAMILY MEDICINE

## 2019-09-05 PROCEDURE — 85652 RBC SED RATE AUTOMATED: CPT | Performed by: FAMILY MEDICINE

## 2019-09-05 ASSESSMENT — MIFFLIN-ST. JEOR: SCORE: 1554.58

## 2019-09-05 NOTE — PROGRESS NOTES
SUBJECTIVE   Joshua Ennis is a 74 year old male who presents with     Rash      Duration: about 4 days - noticed after mowing the yard and pulling weeds     Description  Location: Right lower leg- sock line   Itching: mild    Intensity:  moderate    Accompanying signs and symptoms: had shingles on the left side of his body in July - rash is better- but the pain is still pretty bad. Even having a shirt on hurts and the waste on his pants causes pain     History (similar episodes/previous evaluation): None    Precipitating or alleviating factors: has vasculitis- labs were a little elevated on 8/9/19.  Legs are getting sore and left side is sore  New exposures:  None  Recent travel: no      Therapies tried and outcome: calamine lotion        PCP   [facility] [phone]    Health Maintenance        Health Maintenance Due   Topic Date Due     SKIN CANCER SCREENING  1945     ZOSTER IMMUNIZATION (1 of 2) 04/24/1997     MEDICARE ANNUAL WELLNESS VISIT  04/24/2012     ADVANCE CARE PLANNING  04/15/2018     LIPID  04/17/2018     INFLUENZA VACCINE (1) 09/01/2019       HPI        Patient Active Problem List   Diagnosis     Tobacco abuse     CARDIOVASCULAR SCREENING; LDL GOAL LESS THAN 160     Melanoma (H)     Idiopathic progressive polyneuropathy     Advanced directives, counseling/discussion     GERD (gastroesophageal reflux disease)     Hypertension, renal     Anemia     Encounter for long-term (current) use of steroids     Granulomatosis with polyangiitis (H)     CKD (chronic kidney disease) stage 3, GFR 30-59 ml/min (H)     colonic polyps- Tubular Adenomas     Current Outpatient Medications   Medication     Acetaminophen (TYLENOL PO)     Blood Pressure Monitor KIT     calcium-vitamin D (CALCIUM 600 + D) 600-400 MG-UNIT per tablet     Cholecalciferol (VITAMIN D) 1000 UNITS capsule     folic acid (FOLVITE) 1 MG tablet     gabapentin (NEURONTIN) 300 MG capsule     losartan (COZAAR) 50 MG tablet      methotrexate sodium 2.5 MG TABS     No current facility-administered medications for this visit.        Patient Active Problem List   Diagnosis     Tobacco abuse     CARDIOVASCULAR SCREENING; LDL GOAL LESS THAN 160     Melanoma (H)     Idiopathic progressive polyneuropathy     Advanced directives, counseling/discussion     GERD (gastroesophageal reflux disease)     Hypertension, renal     Anemia     Encounter for long-term (current) use of steroids     Granulomatosis with polyangiitis (H)     CKD (chronic kidney disease) stage 3, GFR 30-59 ml/min (H)     colonic polyps- Tubular Adenomas     Past Surgical History:   Procedure Laterality Date     BIOPSY  2011    melanoma on back     DISSECT LYMPH NODE INGUINAL  3/1/2013    Procedure: DISSECT LYMPH NODE INGUINAL;  Bilateral Inguinal Lymph Node Biopsy.;  Surgeon: Tariq Acosta MD;  Location: [facility]     ESOPHAGOSCOPY, GASTROSCOPY, DUODENOSCOPY (EGD), COMBINED  2013    Procedure: COMBINED ESOPHAGOSCOPY, GASTROSCOPY, DUODENOSCOPY (EGD);  Gastroscopy;  Surgeon: Tariq Acosta MD;  Location: [facility]     HERNIORRHAPHY INGUINAL  2012    Procedure: HERNIORRHAPHY INGUINAL;  Open Repair Left Inguinal Hernia;  Surgeon: Jerad Baker MD;  Location: [facility]       Social History     Tobacco Use     Smoking status: Former Smoker     Packs/day: 1.00     Years: 20.00     Pack years: 20.00     Types: Cigarettes     Last attempt to quit: 2013     Years since quittin.5     Smokeless tobacco: Never Used   Substance Use Topics     Alcohol use: Yes     Comment: rare     Family History   Problem Relation Age of Onset     Diabetes Mother      Hypertension Mother      Cancer Father         stomach     Heart Disease Father      Heart Disease Brother      Diabetes Sister      Diabetes Sister      Diabetes Sister      Heart Disease Brother      Cancer Sister      Glaucoma No family hx of      Macular Degeneration No family hx of          Current Outpatient  Medications   Medication Sig Dispense Refill     Acetaminophen (TYLENOL PO) Take 650 mg by mouth once as needed for mild pain or fever       Blood Pressure Monitor KIT Automatic Blood Pressure Monitor 1 kit 0     calcium-vitamin D (CALCIUM 600 + D) 600-400 MG-UNIT per tablet Take 1 tablet by mouth 2 times daily.       Cholecalciferol (VITAMIN D) 1000 UNITS capsule Take 1 capsule by mouth daily.       folic acid (FOLVITE) 1 MG tablet Take 2 tablets (2 mg) by mouth daily 180 tablet 3     gabapentin (NEURONTIN) 300 MG capsule Take 1 capsule (300 mg) by mouth 3 times daily 90 capsule 1     losartan (COZAAR) 50 MG tablet Take 1 tablet (50 mg) by mouth daily 30 tablet 0     methotrexate sodium 2.5 MG TABS Take 4 tablets (10 mg) by mouth every 7 days 60 tablet 3     Allergies   Allergen Reactions     Shrimp Nausea and Vomiting     Got sick each time he ate it     Recent Labs   Lab Test 08/09/19  1339 03/13/19  1639 12/14/18  0952  12/13/17  1228 08/25/17  1651  04/17/13  0901   LDL  --   --   --   --   --   --   --  91   HDL  --   --   --   --   --   --   --  72   TRIG  --   --   --   --   --   --   --  125   ALT 22 26 26   < > 30 25   < >  --    CR 1.29* 1.51* 1.34*   < > 1.48* 1.47*   < >  --    GFRESTIMATED 55* 46* 52*   < > 47* 47*   < >  --    GFRESTBLACK 64 53* 63   < > 57* 57*   < >  --    POTASSIUM  --   --   --   --  5.0 4.8   < >  --     < > = values in this interval not displayed.      BP Readings from Last 3 Encounters:   09/05/19 118/70   08/08/19 118/62   07/26/19 110/66    Wt Readings from Last 3 Encounters:   09/05/19 83 kg (183 lb)   08/08/19 82.1 kg (181 lb)   07/26/19 82.9 kg (182 lb 12.8 oz)                    Reviewed and updated:  Tobacco  Allergies  Meds  Med Hx  Surg Hx  Fam Hx  Soc Hx     ROS:  Constitutional, neuro, ENT, endocrine, pulmonary, cardiac, gastrointestinal, genitourinary, musculoskeletal, integument and psychiatric systems are negative, except as otherwise noted.    PHYSICAL EXAM  "  /70 (Cuff Size: Adult Regular)   Pulse 88   Temp 100  F (37.8  C) (Tympanic)   Resp 18   Ht 1.727 m (5' 8\")   Wt 83 kg (183 lb)   BMI 27.83 kg/m    Body mass index is 27.83 kg/m .  GENERAL: alert and no distress  EYES: Eyes grossly normal to inspection, PERRL and conjunctivae and sclerae normal  HENT: normal cephalic/atraumatic, nose and mouth without ulcers or lesions, oropharynx clear and oral mucous membranes moist  NECK: no adenopathy, no asymmetry, masses, or scars and thyroid normal to palpation  RESP: lungs clear to auscultation - no rales, rhonchi or wheezes  CV: regular rate and rhythm, normal S1 S2, no S3 or S4, no murmur, click or rub, no peripheral edema and peripheral pulses strong  ABDOMEN: soft, nontender, no hepatosplenomegaly, no masses and bowel sounds normal  MS: no gross musculoskeletal defects noted, no edema  SKIN: few sporadic erythematous rashes involving right lower leg, no blistering or discharge noted  NEURO: Normal strength and tone, mentation intact and speech normal  BACK: no CVA tenderness, no paralumbar tenderness          Results for orders placed or performed in visit on 09/05/19   CBC with platelets   Result Value Ref Range    WBC 6.9 4.0 - 11.0 10e9/L    RBC Count 4.29 (L) 4.4 - 5.9 10e12/L    Hemoglobin 13.9 13.3 - 17.7 g/dL    Hematocrit 41.1 40.0 - 53.0 %    MCV 96 78 - 100 fl    MCH 32.4 26.5 - 33.0 pg    MCHC 33.8 31.5 - 36.5 g/dL    RDW 13.0 10.0 - 15.0 %    Platelet Count 189 150 - 450 10e9/L   ESR: Erythrocyte sedimentation rate   Result Value Ref Range    Sed Rate 6 0 - 20 mm/h   UA reflex to Microscopic   Result Value Ref Range    Color Urine Yellow     Appearance Urine Clear     Glucose Urine Negative NEG^Negative mg/dL    Bilirubin Urine Negative NEG^Negative    Ketones Urine Negative NEG^Negative mg/dL    Specific Gravity Urine 1.025 1.003 - 1.035    Blood Urine Negative NEG^Negative    pH Urine 5.0 5.0 - 7.0 pH    Protein Albumin Urine Negative " NEG^Negative mg/dL    Urobilinogen Urine 0.2 0.2 - 1.0 EU/dL    Nitrite Urine Negative NEG^Negative    Leukocyte Esterase Urine Negative NEG^Negative    Source Midstream Urine        Assessment & Plan        ICD-10-CM    1. Rash R21    2. Fever, unspecified fever cause R50.9 CBC with platelets     ESR: Erythrocyte sedimentation rate     UA reflex to Microscopic     Basic metabolic panel   3. Left flank pain R10.9 US Abdomen Complete       74-year-old male presents with few rashes involving right lower leg, concerned about shingles, history of left abdominal/flank shingles recently, continues to have left flank pain.  Physical examination as described above, temperature 100.  Differentials discussed in detail, no specific etiology identified.  CBC, sed rate, UA came back normal.  Ultrasound abdomen ordered for further evaluation.  Suggested to continue well hydration and Tylenol, can use cortisone cream for lower leg rashes.  Return criteria discussed in detail.  Patient/wife understood and in agreement with above plan.  All questions answered.        I spent 25 minutes during this encounter, greater than 50% of the time was spent on education, counseling, reviewing the plan of care, and coordination in regards to his specific conditions.           Patrick Mcintyre MD  [facility]

## 2019-09-05 NOTE — LETTER
September 6, 2019      Joshua Ennis  [address] [facility] [address]        Dear ,    Kidney function at around baseline low.  Other electrolytes came back normal. Let us know if there are any questions.   CBC with platelets   Result Value Ref Range    WBC 6.9 4.0 - 11.0 10e9/L    RBC Count 4.29 (L) 4.4 - 5.9 10e12/L    Hemoglobin 13.9 13.3 - 17.7 g/dL    Hematocrit 41.1 40.0 - 53.0 %    MCV 96 78 - 100 fl    MCH 32.4 26.5 - 33.0 pg    MCHC 33.8 31.5 - 36.5 g/dL    RDW 13.0 10.0 - 15.0 %    Platelet Count 189 150 - 450 10e9/L   ESR: Erythrocyte sedimentation rate    Sed Rate 6 0 - 20 mm/h   Basic metabolic panel    Sodium 140 133 - 144 mmol/L    Potassium 4.5 3.4 - 5.3 mmol/L    Chloride 109 94 - 109 mmol/L    Carbon Dioxide 24 20 - 32 mmol/L    Anion Gap 7 3 - 14 mmol/L    Glucose 95 70 - 99 mg/dL    Urea Nitrogen 27 7 - 30 mg/dL    Creatinine 1.29 (H) 0.66 - 1.25 mg/dL    GFR Estimate 55 (L) >60 mL/min/[1.73_m2]    Calcium 9.5 8.5 - 10.1 mg/dL     Sincerely,    Patrick Mcintyre MD

## 2019-09-05 NOTE — NURSING NOTE
"Chief Complaint   Patient presents with     Derm Problem     /70 (Cuff Size: Adult Regular)   Pulse 88   Temp 100  F (37.8  C) (Tympanic)   Resp 18   Ht 1.727 m (5' 8\")   Wt 83 kg (183 lb)   BMI 27.83 kg/m   Estimated body mass index is 27.83 kg/m  as calculated from the following:    Height as of this encounter: 1.727 m (5' 8\").    Weight as of this encounter: 83 kg (183 lb).  Patient presents to the clinic using No DME      Health Maintenance that is potentially due pending provider review:    Health Maintenance Due   Topic Date Due     SKIN CANCER SCREENING  1945     ZOSTER IMMUNIZATION (1 of 2) 04/24/1997     MEDICARE ANNUAL WELLNESS VISIT  04/24/2012     ADVANCE CARE PLANNING  04/15/2018     LIPID  04/17/2018     INFLUENZA VACCINE (1) 09/01/2019        n/a        "

## 2019-09-07 ENCOUNTER — HOSPITAL ENCOUNTER (OUTPATIENT)
Dept: ULTRASOUND IMAGING | Facility: CLINIC | Age: 74
Discharge: HOME | End: 2019-09-07
Attending: FAMILY MEDICINE | Admitting: FAMILY MEDICINE
Payer: COMMERCIAL

## 2019-09-07 DIAGNOSIS — R10.9 LEFT FLANK PAIN: ICD-10-CM

## 2019-09-07 PROCEDURE — 76700 US EXAM ABDOM COMPLETE: CPT

## 2019-09-09 DIAGNOSIS — K80.20 GALLSTONES: Primary | ICD-10-CM

## 2019-09-10 ENCOUNTER — HOSPITAL ENCOUNTER (OUTPATIENT)
Dept: CT IMAGING | Facility: CLINIC | Age: 74
Discharge: HOME | End: 2019-09-10
Attending: FAMILY MEDICINE | Admitting: FAMILY MEDICINE
Payer: COMMERCIAL

## 2019-09-10 DIAGNOSIS — I77.82 ANCA-ASSOCIATED VASCULITIS (H): ICD-10-CM

## 2019-09-10 DIAGNOSIS — M31.30 WEGENER'S VASCULITIS: ICD-10-CM

## 2019-09-10 DIAGNOSIS — Z51.81 MEDICATION MONITORING ENCOUNTER: ICD-10-CM

## 2019-09-10 DIAGNOSIS — K80.20 GALLSTONES: ICD-10-CM

## 2019-09-10 LAB
ALBUMIN SERPL-MCNC: 3.8 G/DL (ref 3.4–5)
ALBUMIN UR-MCNC: NEGATIVE MG/DL
ALT SERPL W P-5'-P-CCNC: 25 U/L (ref 0–70)
APPEARANCE UR: CLEAR
AST SERPL W P-5'-P-CCNC: 18 U/L (ref 0–45)
BASOPHILS # BLD AUTO: 0 10E9/L (ref 0–0.2)
BASOPHILS NFR BLD AUTO: 0.6 %
BILIRUB UR QL STRIP: NEGATIVE
COLOR UR AUTO: YELLOW
CREAT SERPL-MCNC: 1.37 MG/DL (ref 0.66–1.25)
CREAT UR-MCNC: 20 MG/DL
CRP SERPL-MCNC: <2.9 MG/L (ref 0–8)
DIFFERENTIAL METHOD BLD: ABNORMAL
EOSINOPHIL # BLD AUTO: 0.2 10E9/L (ref 0–0.7)
EOSINOPHIL NFR BLD AUTO: 2.4 %
ERYTHROCYTE [DISTWIDTH] IN BLOOD BY AUTOMATED COUNT: 12.6 % (ref 10–15)
ERYTHROCYTE [SEDIMENTATION RATE] IN BLOOD BY WESTERGREN METHOD: 5 MM/H (ref 0–20)
GFR SERPL CREATININE-BSD FRML MDRD: 51 ML/MIN/{1.73_M2}
GLUCOSE UR STRIP-MCNC: NEGATIVE MG/DL
HCT VFR BLD AUTO: 40.6 % (ref 40–53)
HGB BLD-MCNC: 13.9 G/DL (ref 13.3–17.7)
HGB UR QL STRIP: NEGATIVE
KETONES UR STRIP-MCNC: NEGATIVE MG/DL
LEUKOCYTE ESTERASE UR QL STRIP: NEGATIVE
LYMPHOCYTES # BLD AUTO: 1.3 10E9/L (ref 0.8–5.3)
LYMPHOCYTES NFR BLD AUTO: 19.8 %
MCH RBC QN AUTO: 32.9 PG (ref 26.5–33)
MCHC RBC AUTO-ENTMCNC: 34.2 G/DL (ref 31.5–36.5)
MCV RBC AUTO: 96 FL (ref 78–100)
MONOCYTES # BLD AUTO: 0.7 10E9/L (ref 0–1.3)
MONOCYTES NFR BLD AUTO: 9.8 %
NEUTROPHILS # BLD AUTO: 4.4 10E9/L (ref 1.6–8.3)
NEUTROPHILS NFR BLD AUTO: 67.4 %
NITRATE UR QL: NEGATIVE
PH UR STRIP: 5.5 PH (ref 5–7)
PLATELET # BLD AUTO: 181 10E9/L (ref 150–450)
PROT UR-MCNC: <0.05 G/L
PROT/CREAT 24H UR: NORMAL G/G CR (ref 0–0.2)
RBC # BLD AUTO: 4.23 10E12/L (ref 4.4–5.9)
RBC #/AREA URNS AUTO: NORMAL /HPF
SOURCE: NORMAL
SP GR UR STRIP: 1.01 (ref 1–1.03)
UROBILINOGEN UR STRIP-ACNC: 0.2 EU/DL (ref 0.2–1)
WBC # BLD AUTO: 6.6 10E9/L (ref 4–11)
WBC #/AREA URNS AUTO: NORMAL /HPF

## 2019-09-10 PROCEDURE — 81001 URINALYSIS AUTO W/SCOPE: CPT | Performed by: INTERNAL MEDICINE

## 2019-09-10 PROCEDURE — 84450 TRANSFERASE (AST) (SGOT): CPT | Performed by: INTERNAL MEDICINE

## 2019-09-10 PROCEDURE — 85025 COMPLETE CBC W/AUTO DIFF WBC: CPT | Performed by: INTERNAL MEDICINE

## 2019-09-10 PROCEDURE — 86140 C-REACTIVE PROTEIN: CPT | Performed by: INTERNAL MEDICINE

## 2019-09-10 PROCEDURE — 83516 IMMUNOASSAY NONANTIBODY: CPT | Performed by: INTERNAL MEDICINE

## 2019-09-10 PROCEDURE — 74177 CT ABD & PELVIS W/CONTRAST: CPT

## 2019-09-10 PROCEDURE — 25000125 ZZHC RX 250: Performed by: FAMILY MEDICINE

## 2019-09-10 PROCEDURE — 25000128 H RX IP 250 OP 636: Performed by: FAMILY MEDICINE

## 2019-09-10 PROCEDURE — 82040 ASSAY OF SERUM ALBUMIN: CPT | Performed by: INTERNAL MEDICINE

## 2019-09-10 PROCEDURE — 85652 RBC SED RATE AUTOMATED: CPT | Performed by: INTERNAL MEDICINE

## 2019-09-10 PROCEDURE — 82565 ASSAY OF CREATININE: CPT | Performed by: INTERNAL MEDICINE

## 2019-09-10 PROCEDURE — 36415 COLL VENOUS BLD VENIPUNCTURE: CPT | Performed by: INTERNAL MEDICINE

## 2019-09-10 PROCEDURE — 86255 FLUORESCENT ANTIBODY SCREEN: CPT | Performed by: INTERNAL MEDICINE

## 2019-09-10 PROCEDURE — 84156 ASSAY OF PROTEIN URINE: CPT | Performed by: INTERNAL MEDICINE

## 2019-09-10 PROCEDURE — 84460 ALANINE AMINO (ALT) (SGPT): CPT | Performed by: INTERNAL MEDICINE

## 2019-09-10 RX ORDER — IOPAMIDOL 755 MG/ML
90 INJECTION, SOLUTION INTRAVASCULAR ONCE
Status: COMPLETED | OUTPATIENT
Start: 2019-09-10 | End: 2019-09-10

## 2019-09-10 RX ADMIN — IOPAMIDOL 90 ML: 755 INJECTION, SOLUTION INTRAVENOUS at 16:21

## 2019-09-10 RX ADMIN — SODIUM CHLORIDE 62 ML: 9 INJECTION, SOLUTION INTRAVENOUS at 16:21

## 2019-09-10 NOTE — LETTER
Patient:  Joshua Ennis  :   1945  MRN:     [id number]        Mr.Roger DEANNA Ennis  [address] [facility] [address]        2019    Dear ,    We are writing to inform you of your test results. Improved vasculitis markers which is good news!    Component      Latest Ref Rng & Units 9/10/2019   WBC      4.0 - 11.0 10e9/L 6.6   RBC Count      4.4 - 5.9 10e12/L 4.23 (L)   Hemoglobin      13.3 - 17.7 g/dL 13.9   Hematocrit      40.0 - 53.0 % 40.6   MCV      78 - 100 fl 96   MCH      26.5 - 33.0 pg 32.9   MCHC      31.5 - 36.5 g/dL 34.2   RDW      10.0 - 15.0 % 12.6   Platelet Count      150 - 450 10e9/L 181   % Neutrophils      % 67.4   % Lymphocytes      % 19.8   % Monocytes      % 9.8   % Eosinophils      % 2.4   % Basophils      % 0.6   Absolute Neutrophil      1.6 - 8.3 10e9/L 4.4   Absolute Lymphocytes      0.8 - 5.3 10e9/L 1.3   Absolute Monocytes      0.0 - 1.3 10e9/L 0.7   Absolute Eosinophils      0.0 - 0.7 10e9/L 0.2   Absolute Basophils      0.0 - 0.2 10e9/L 0.0   Diff Method       Automated Method   Color Urine       Yellow   Appearance Urine       Clear   Glucose Urine      NEG:Negative mg/dL Negative   Bilirubin Urine      NEG:Negative Negative   Ketones Urine      NEG:Negative mg/dL Negative   Specific Gravity Urine      1.003 - 1.035 1.010   pH Urine      5.0 - 7.0 pH 5.5   Protein Albumin Urine      NEG:Negative mg/dL Negative   Urobilinogen Urine      0.2 - 1.0 EU/dL 0.2   Nitrite Urine      NEG:Negative Negative   Blood Urine      NEG:Negative Negative   Leukocyte Esterase Urine      NEG:Negative Negative   Source       Midstream Urine   WBC Urine      OTO5:0 - 5 /HPF 0 - 5   RBC Urine      OTO2:O - 2 /HPF O - 2   Creatinine      0.66 - 1.25 mg/dL 1.37 (H)   GFR Estimate      >60 mL/min/1.73:m2 51 (L)   GFR Estimate If Black      >60 mL/min/1.73:m2 59 (L)   Neutrophil Cytoplasmic Antibody      <1:10 titer <1:10   Neutrophil Cytoplasmic Antibody Pattern       The ANCA  IFA is <1:10.  No further testing will be performed.   Protein Random Urine      g/L <0.05   Protein Total Urine g/gr Creatinine      0 - 0.2 g/g Cr Unable to calculate due to low value   Proteinase 3 Antibody IgG      0.0 - 0.9 AI 1.5 (H)   Creatinine Urine Random      mg/dL 20   Sed Rate      0 - 20 mm/h 5   CRP Inflammation      0.0 - 8.0 mg/L <2.9   AST      0 - 45 U/L 18   Albumin      3.4 - 5.0 g/dL 3.8   ALT      0 - 70 U/L 25     Ede Sanchez MD

## 2019-09-11 LAB — PROTEINASE3 IGG SER-ACNC: 1.5 AI (ref 0–0.9)

## 2019-09-16 DIAGNOSIS — I10 HYPERTENSION, UNSPECIFIED TYPE: ICD-10-CM

## 2019-09-16 LAB
ANCA AB PATTERN SER IF-IMP: NORMAL
C-ANCA TITR SER IF: NORMAL {TITER}

## 2019-09-18 RX ORDER — LOSARTAN POTASSIUM 50 MG/1
50 TABLET ORAL DAILY
Qty: 90 TABLET | Refills: 0 | Status: SHIPPED | OUTPATIENT
Start: 2019-09-18 | End: 2019-12-12

## 2019-09-18 NOTE — TELEPHONE ENCOUNTER
Losartan 50 mg daily    Last Written Prescription Date: 8/16/2019  Last Fill Quantity: 30 ,   # refills: 0  Last Office Visit : 12/14/2018  Future Office visit:  12/14/2019    Routing refill request to provider for review/approval because:  Failed protocol

## 2019-10-14 DIAGNOSIS — Z79.899 HIGH RISK MEDICATIONS (NOT ANTICOAGULANTS) LONG-TERM USE: ICD-10-CM

## 2019-10-14 RX ORDER — METHOTREXATE 2.5 MG/1
10 TABLET ORAL
Qty: 60 TABLET | Refills: 0 | Status: SHIPPED | OUTPATIENT
Start: 2019-10-14 | End: 2020-01-03

## 2019-10-14 NOTE — TELEPHONE ENCOUNTER
.  methotrexate sodium 2.5 MG TABS      Last Written Prescription Date:  8/29/18  Last Fill Quantity: 60,   # refills: 3  Last Office Visit: 12/14/18  Future Office visit:  12/6/19    CBC RESULTS:   Recent Labs   Lab Test 09/10/19  1654   WBC 6.6   RBC 4.23*   HGB 13.9   HCT 40.6   MCV 96   MCH 32.9   MCHC 34.2   RDW 12.6          Creatinine   Date Value Ref Range Status   09/10/2019 1.37 (H) 0.66 - 1.25 mg/dL Final   ]    Liver Function Studies -   Recent Labs   Lab Test 09/10/19  1654  12/13/17  1228   PROTTOTAL  --   --  7.8   ALBUMIN 3.8   < > 4.0   BILITOTAL  --   --  0.7   ALKPHOS  --   --  97   AST 18   < > 22   ALT 25   < > 30    < > = values in this interval not displayed.       Routing refill request to provider for review/approval because:    Drug not on the [facility], [facility] or [facility] refill protocol or controlled substance    Overdue 6 month appointment

## 2019-10-22 ENCOUNTER — TELEPHONE (OUTPATIENT)
Dept: RHEUMATOLOGY | Facility: CLINIC | Age: 74
End: 2019-10-22

## 2019-10-22 DIAGNOSIS — B02.29 POST HERPETIC NEURALGIA: ICD-10-CM

## 2019-10-22 DIAGNOSIS — G62.9 NEUROPATHY: ICD-10-CM

## 2019-10-22 NOTE — TELEPHONE ENCOUNTER
gabapentin (NEURONTIN) 300 MG capsule      Last Written Prescription Date:  8/8/19  Last Fill Quantity: 90,   # refills: 1  Last Office Visit :12/14/18   Future Office visit:  12/6/19    Routing refill request to provider for review/approval because:  Drug not on the [facility], [facility] or [facility] refill protocol or controlled substance

## 2019-10-23 RX ORDER — GABAPENTIN 400 MG/1
CAPSULE ORAL
Qty: 90 CAPSULE | Refills: 1 | Status: SHIPPED | OUTPATIENT
Start: 2019-10-23 | End: 2020-04-01

## 2019-10-23 RX ORDER — GABAPENTIN 300 MG/1
300 CAPSULE ORAL 3 TIMES DAILY
Qty: 90 CAPSULE | Refills: 0 | Status: SHIPPED | OUTPATIENT
Start: 2019-10-23 | End: 2019-10-23

## 2019-10-23 NOTE — TELEPHONE ENCOUNTER
Last Seen: 12/14/2018  Next Appointment: 12/6/2019  Medication: gabapentin 300mg  Last Filled: 8/8/2019  Qty: #90     reviewed as follows:      Request sent to provider for review and signature.    Lexus Das CMA   10/23/2019 1:45 PM

## 2019-10-23 NOTE — TELEPHONE ENCOUNTER
2 different gabapentin Rx were sent to me and I realized that I signed both after they got approved. Could you plz call him and clarify the dose? Meanwhile I am going to call the pharmacy to discontinue 300 tid and fill 400 at bedtime. Thanks

## 2019-10-25 NOTE — TELEPHONE ENCOUNTER
Called and spoke with pt's ambrocio Henao, Pt got shingles, was prescribed 300 mg TID, but it did not work, so he just wants to do the 400 mg at night.    Consuelo Canales RN  Rheumatology Clinic

## 2019-11-06 ENCOUNTER — TELEPHONE (OUTPATIENT)
Dept: RHEUMATOLOGY | Facility: CLINIC | Age: 74
End: 2019-11-06

## 2019-11-06 DIAGNOSIS — M31.30 WEGENER'S VASCULITIS: ICD-10-CM

## 2019-11-06 DIAGNOSIS — Z79.899 HIGH RISK MEDICATIONS (NOT ANTICOAGULANTS) LONG-TERM USE: Primary | ICD-10-CM

## 2019-11-06 DIAGNOSIS — I77.82 ANCA-ASSOCIATED VASCULITIS (H): ICD-10-CM

## 2019-11-07 NOTE — TELEPHONE ENCOUNTER
Called to let Leora know that Joshua will require labs to be done in December as pr last office visit note:    Labs today and q3 months including MTX monitoring labs    I will place lab orders.    Claudia Bailon, KATIUSKAN RN   Rheumatology Clinic Nurse   KILLIAN Pisano    
[facility] Call Center    Phone Message    May a detailed message be left on voicemail: no    Reason for Call: Other: Falguni returned Claudia's call and says she will schedule a lab for Joshua in December.      Action Taken: Message routed to:  [facility] Rheumatology    
[facility] Call Center    Phone Message    May a detailed message be left on voicemail: yes    Reason for Call: Other: Please call Pt chago Montes De Oca back - needs to know if Pt needs to have labs done prior to Appt in Feb - Pt could not get in until Feb - booked first availlable FU with Dr Sanchez and wait listed - please call back either way - Thanks     Action Taken: Message routed to:  [facility] Rheumatology Clinic  
Yes

## 2019-12-12 DIAGNOSIS — I10 HYPERTENSION, UNSPECIFIED TYPE: ICD-10-CM

## 2019-12-12 RX ORDER — LOSARTAN POTASSIUM 50 MG/1
50 TABLET ORAL DAILY
Qty: 90 TABLET | Refills: 0 | Status: SHIPPED | OUTPATIENT
Start: 2019-12-12 | End: 2020-03-13

## 2019-12-12 NOTE — TELEPHONE ENCOUNTER
LOSARTAN POTASSIUM 50 MG TABLET     Last Written Prescription Date:  9/18/2019  Last Fill Quantity: 90,   # refills: 0  Last Office Visit : 12/14/18  Future Office visit:  2/14/20    Routing refill request to provider for review/approval because:  Cr=1.37 on 9/10/2019

## 2019-12-13 DIAGNOSIS — I77.82 ANCA-ASSOCIATED VASCULITIS (H): ICD-10-CM

## 2019-12-13 DIAGNOSIS — M31.30 WEGENER'S VASCULITIS: ICD-10-CM

## 2019-12-13 DIAGNOSIS — Z79.899 HIGH RISK MEDICATIONS (NOT ANTICOAGULANTS) LONG-TERM USE: ICD-10-CM

## 2019-12-13 DIAGNOSIS — Z51.81 MEDICATION MONITORING ENCOUNTER: ICD-10-CM

## 2019-12-13 LAB
ALBUMIN SERPL-MCNC: 4.1 G/DL (ref 3.4–5)
ALBUMIN UR-MCNC: NEGATIVE MG/DL
ALT SERPL W P-5'-P-CCNC: 25 U/L (ref 0–70)
APPEARANCE UR: CLEAR
AST SERPL W P-5'-P-CCNC: 22 U/L (ref 0–45)
BASOPHILS # BLD AUTO: 0 10E9/L (ref 0–0.2)
BASOPHILS NFR BLD AUTO: 0.6 %
BILIRUB UR QL STRIP: NEGATIVE
COLOR UR AUTO: YELLOW
CREAT SERPL-MCNC: 1.41 MG/DL (ref 0.66–1.25)
CREAT UR-MCNC: 50 MG/DL
CREAT UR-MCNC: 50 MG/DL
CRP SERPL-MCNC: <2.9 MG/L (ref 0–8)
DIFFERENTIAL METHOD BLD: NORMAL
EOSINOPHIL # BLD AUTO: 0.2 10E9/L (ref 0–0.7)
EOSINOPHIL NFR BLD AUTO: 3.2 %
ERYTHROCYTE [DISTWIDTH] IN BLOOD BY AUTOMATED COUNT: 12.4 % (ref 10–15)
ERYTHROCYTE [SEDIMENTATION RATE] IN BLOOD BY WESTERGREN METHOD: 7 MM/H (ref 0–20)
GFR SERPL CREATININE-BSD FRML MDRD: 49 ML/MIN/{1.73_M2}
GLUCOSE UR STRIP-MCNC: NEGATIVE MG/DL
HCT VFR BLD AUTO: 44.8 % (ref 40–53)
HGB BLD-MCNC: 15.1 G/DL (ref 13.3–17.7)
HGB UR QL STRIP: NEGATIVE
KETONES UR STRIP-MCNC: NEGATIVE MG/DL
LEUKOCYTE ESTERASE UR QL STRIP: NEGATIVE
LYMPHOCYTES # BLD AUTO: 1.3 10E9/L (ref 0.8–5.3)
LYMPHOCYTES NFR BLD AUTO: 17.9 %
MCH RBC QN AUTO: 32.4 PG (ref 26.5–33)
MCHC RBC AUTO-ENTMCNC: 33.7 G/DL (ref 31.5–36.5)
MCV RBC AUTO: 96 FL (ref 78–100)
MONOCYTES # BLD AUTO: 0.6 10E9/L (ref 0–1.3)
MONOCYTES NFR BLD AUTO: 8.5 %
NEUTROPHILS # BLD AUTO: 5 10E9/L (ref 1.6–8.3)
NEUTROPHILS NFR BLD AUTO: 69.8 %
NITRATE UR QL: NEGATIVE
PH UR STRIP: 5.5 PH (ref 5–7)
PLATELET # BLD AUTO: 222 10E9/L (ref 150–450)
PROT UR-MCNC: 0.06 G/L
PROT/CREAT 24H UR: 0.12 G/G CR (ref 0–0.2)
RBC # BLD AUTO: 4.66 10E12/L (ref 4.4–5.9)
RBC #/AREA URNS AUTO: NORMAL /HPF
SOURCE: NORMAL
SP GR UR STRIP: 1.01 (ref 1–1.03)
UROBILINOGEN UR STRIP-ACNC: 0.2 EU/DL (ref 0.2–1)
WBC # BLD AUTO: 7.2 10E9/L (ref 4–11)
WBC #/AREA URNS AUTO: NORMAL /HPF

## 2019-12-13 PROCEDURE — 86255 FLUORESCENT ANTIBODY SCREEN: CPT | Performed by: INTERNAL MEDICINE

## 2019-12-13 PROCEDURE — 84156 ASSAY OF PROTEIN URINE: CPT | Performed by: INTERNAL MEDICINE

## 2019-12-13 PROCEDURE — 85652 RBC SED RATE AUTOMATED: CPT | Performed by: INTERNAL MEDICINE

## 2019-12-13 PROCEDURE — 84450 TRANSFERASE (AST) (SGOT): CPT | Performed by: INTERNAL MEDICINE

## 2019-12-13 PROCEDURE — 85025 COMPLETE CBC W/AUTO DIFF WBC: CPT | Performed by: INTERNAL MEDICINE

## 2019-12-13 PROCEDURE — 84460 ALANINE AMINO (ALT) (SGPT): CPT | Performed by: INTERNAL MEDICINE

## 2019-12-13 PROCEDURE — 82040 ASSAY OF SERUM ALBUMIN: CPT | Performed by: INTERNAL MEDICINE

## 2019-12-13 PROCEDURE — 36415 COLL VENOUS BLD VENIPUNCTURE: CPT | Performed by: INTERNAL MEDICINE

## 2019-12-13 PROCEDURE — 81001 URINALYSIS AUTO W/SCOPE: CPT | Performed by: INTERNAL MEDICINE

## 2019-12-13 PROCEDURE — 86140 C-REACTIVE PROTEIN: CPT | Performed by: INTERNAL MEDICINE

## 2019-12-13 PROCEDURE — 83516 IMMUNOASSAY NONANTIBODY: CPT | Performed by: INTERNAL MEDICINE

## 2019-12-13 PROCEDURE — 82565 ASSAY OF CREATININE: CPT | Performed by: INTERNAL MEDICINE

## 2019-12-13 NOTE — LETTER
Patient:  Joshua Ennis  :   1945  MRN:     [id number]        Mr.Roger DEANNA Ennis  [address] [facility] [address]        2019    Dear ,    We are writing to inform you of your test results. Overall stable vasculitis labs, but I want to make sure that you drink more water as there is small drop in GFR (kidney function).      Results for orders placed or performed in visit on 19   UA with Microscopic reflex to Culture     Status: None   Result Value Ref Range    Color Urine Yellow     Appearance Urine Clear     Glucose Urine Negative NEG^Negative mg/dL    Bilirubin Urine Negative NEG^Negative    Ketones Urine Negative NEG^Negative mg/dL    Specific Gravity Urine 1.010 1.003 - 1.035    pH Urine 5.5 5.0 - 7.0 pH    Protein Albumin Urine Negative NEG^Negative mg/dL    Urobilinogen Urine 0.2 0.2 - 1.0 EU/dL    Nitrite Urine Negative NEG^Negative    Blood Urine Negative NEG^Negative    Leukocyte Esterase Urine Negative NEG^Negative    Source Midstream Urine     WBC Urine 0 - 5 OTO5^0 - 5 /HPF    RBC Urine O - 2 OTO2^O - 2 /HPF   ALT     Status: None   Result Value Ref Range    ALT 25 0 - 70 U/L   Albumin level     Status: None   Result Value Ref Range    Albumin 4.1 3.4 - 5.0 g/dL   AST     Status: None   Result Value Ref Range    AST 22 0 - 45 U/L   CBC with platelets differential     Status: None   Result Value Ref Range    WBC 7.2 4.0 - 11.0 10e9/L    RBC Count 4.66 4.4 - 5.9 10e12/L    Hemoglobin 15.1 13.3 - 17.7 g/dL    Hematocrit 44.8 40.0 - 53.0 %    MCV 96 78 - 100 fl    MCH 32.4 26.5 - 33.0 pg    MCHC 33.7 31.5 - 36.5 g/dL    RDW 12.4 10.0 - 15.0 %    Platelet Count 222 150 - 450 10e9/L    % Neutrophils 69.8 %    % Lymphocytes 17.9 %    % Monocytes 8.5 %    % Eosinophils 3.2 %    % Basophils 0.6 %    Absolute Neutrophil 5.0 1.6 - 8.3 10e9/L    Absolute Lymphocytes 1.3 0.8 - 5.3 10e9/L    Absolute Monocytes 0.6 0.0 - 1.3 10e9/L    Absolute Eosinophils 0.2 0.0 - 0.7 10e9/L     Absolute Basophils 0.0 0.0 - 0.2 10e9/L    Diff Method Automated Method    Creatinine     Status: Abnormal   Result Value Ref Range    Creatinine 1.41 (H) 0.66 - 1.25 mg/dL    GFR Estimate 49 (L) >60 mL/min/[1.73_m2]    GFR Estimate If Black 57 (L) >60 mL/min/[1.73_m2]   CRP inflammation     Status: None   Result Value Ref Range    CRP Inflammation <2.9 0.0 - 8.0 mg/L   Erythrocyte sedimentation rate auto     Status: None   Result Value Ref Range    Sed Rate 7 0 - 20 mm/h   Protein  random urine with Creat Ratio     Status: None   Result Value Ref Range    Protein Random Urine 0.06 g/L    Protein Total Urine g/gr Creatinine 0.12 0 - 0.2 g/g Cr   Creatinine random urine     Status: None   Result Value Ref Range    Creatinine Urine Random 50 mg/dL   ANCA IgG by IFA with Reflex to Titer     Status: None   Result Value Ref Range    Neutrophil Cytoplasmic Antibody <1:10 <1:10 [titer]    Neutrophil Cytoplasmic Antibody Pattern       The ANCA IFA is <1:10.  No further testing will be performed.   Proteinase 3 Antibody IgG     Status: Abnormal   Result Value Ref Range    Proteinase 3 Antibody IgG 1.7 (H) 0.0 - 0.9 AI   Creatinine urine calculation only     Status: None   Result Value Ref Range    Creatinine Urine 50 mg/dL       Ede Sanchez MD

## 2019-12-14 LAB — PROTEINASE3 IGG SER-ACNC: 1.7 AI (ref 0–0.9)

## 2019-12-16 LAB
ANCA AB PATTERN SER IF-IMP: NORMAL
C-ANCA TITR SER IF: NORMAL {TITER}

## 2019-12-17 ENCOUNTER — TELEPHONE (OUTPATIENT)
Dept: RHEUMATOLOGY | Facility: CLINIC | Age: 74
End: 2019-12-17

## 2019-12-17 NOTE — TELEPHONE ENCOUNTER
We are writing to inform you of your test results. Overall stable vasculitis labs, but I want to make sure that you drink more water as there is small drop in GFR (kidney function).    Left message requesting return call.    Consuelo Canales RN  [facility]

## 2019-12-17 NOTE — TELEPHONE ENCOUNTER
[facility] Call Center    Phone Message    May a detailed message be left on voicemail: yes    Reason for Call: Other: Patient's fiance, Earlene called, to return Consuelo's call.  It is hard for Earlene to answer cell phone at work.  Please have Consuelo follow up with Earlene.  Thank you!     Action Taken: Message routed to:  [facility] ([facility] [place] [place]

## 2019-12-17 NOTE — RESULT ENCOUNTER NOTE
Overall stable vasculitis labs, but I want to make sure that you drink more water as there is small drop in GFR (kidney function).

## 2019-12-18 NOTE — TELEPHONE ENCOUNTER
Pt's ambrocio returned call discussed increasing water intake. They will do so. Discussed that they need to move appt in February. Aware that Dr. Sanchez is booking out until May. Will plan to do labs in 3 months and then will call with any symptoms.      Pt will discuss shingles shot with PCP.    Consuelo Canales RN  [facility]

## 2019-12-31 DIAGNOSIS — Z79.899 HIGH RISK MEDICATIONS (NOT ANTICOAGULANTS) LONG-TERM USE: ICD-10-CM

## 2020-01-03 NOTE — TELEPHONE ENCOUNTER
METHOTREXATE 2.5 MG TABLET     Last Written Prescription Date:  10/14/2019  Last Fill Quantity: 60,   # refills: 0  Last Office Visit: 12/14/2018  Future Office visit:  2/14/2020    CBC RESULTS:   Recent Labs   Lab Test 12/13/19  1401   WBC 7.2   RBC 4.66   HGB 15.1   HCT 44.8   MCV 96   MCH 32.4   MCHC 33.7   RDW 12.4          Creatinine   Date Value Ref Range Status   12/13/2019 1.41 (H) 0.66 - 1.25 mg/dL Final   ]    Liver Function Studies -   Recent Labs   Lab Test 12/13/19  1401  12/13/17  1228   PROTTOTAL  --   --  7.8   ALBUMIN 4.1   < > 4.0   BILITOTAL  --   --  0.7   ALKPHOS  --   --  97   AST 22   < > 22   ALT 25   < > 30    < > = values in this interval not displayed.       Routing refill request to provider for review/approval because:  Drug not on the [facility] or [facility] refill protocol or controlled substance    Leah Quintana RN  Central Triage Red Flags/Med Refills

## 2020-01-04 RX ORDER — METHOTREXATE 2.5 MG/1
10 TABLET ORAL
Qty: 60 TABLET | Refills: 0 | Status: SHIPPED | OUTPATIENT
Start: 2020-01-04 | End: 2020-04-01

## 2020-01-14 ENCOUNTER — OFFICE VISIT (OUTPATIENT)
Dept: FAMILY MEDICINE | Facility: CLINIC | Age: 75
End: 2020-01-14
Payer: COMMERCIAL

## 2020-01-14 VITALS
BODY MASS INDEX: 28.95 KG/M2 | SYSTOLIC BLOOD PRESSURE: 136 MMHG | TEMPERATURE: 97.9 F | WEIGHT: 191 LBS | DIASTOLIC BLOOD PRESSURE: 84 MMHG | HEART RATE: 68 BPM | RESPIRATION RATE: 18 BRPM | HEIGHT: 68 IN

## 2020-01-14 DIAGNOSIS — L30.9 PERIANAL DERMATITIS: Primary | ICD-10-CM

## 2020-01-14 DIAGNOSIS — K64.9 HEMORRHOIDS, UNSPECIFIED HEMORRHOID TYPE: ICD-10-CM

## 2020-01-14 PROCEDURE — 99213 OFFICE O/P EST LOW 20 MIN: CPT | Performed by: FAMILY MEDICINE

## 2020-01-14 RX ORDER — CLOTRIMAZOLE AND BETAMETHASONE DIPROPIONATE 10; .64 MG/G; MG/G
CREAM TOPICAL 2 TIMES DAILY PRN
Qty: 45 G | Refills: 1 | Status: SHIPPED | OUTPATIENT
Start: 2020-01-14 | End: 2020-01-24

## 2020-01-14 ASSESSMENT — MIFFLIN-ST. JEOR: SCORE: 1590.87

## 2020-01-14 NOTE — NURSING NOTE
"Chief Complaint   Patient presents with     Rectal Problem     /84 (Cuff Size: Adult Regular)   Pulse 68   Temp 97.9  F (36.6  C) (Tympanic)   Resp 18   Ht 1.727 m (5' 8\")   Wt 86.6 kg (191 lb)   BMI 29.04 kg/m   Estimated body mass index is 29.04 kg/m  as calculated from the following:    Height as of this encounter: 1.727 m (5' 8\").    Weight as of this encounter: 86.6 kg (191 lb).  Patient presents to the clinic using No DME      Health Maintenance that is potentially due pending provider review:    Health Maintenance Due   Topic Date Due     SKIN CANCER SCREENING  1945     ZOSTER IMMUNIZATION (1 of 2) 04/24/1997     MEDICARE ANNUAL WELLNESS VISIT  04/24/2012     ADVANCE CARE PLANNING  04/15/2018     LIPID  04/17/2018     INFLUENZA VACCINE (1) 09/01/2019     PHQ-2  01/01/2020                "

## 2020-01-14 NOTE — PROGRESS NOTES
SUBJECTIVE   Joshua Ennis is a 74 year old male who presents with     Hemorrhoids ?      Duration: today    Description:   Pain: not right now. After going to the bathroom- burning while wiping    Itching: no     Accompanying signs and symptoms:   Blood in stool: not in the stool. Was on the toilet paper after wiping   Changes in stool pattern: YES- had diahrea once today- while wiping he noticed that it hurt and had some blood on the paper. Did have chili for dinner last night     History (similar episodes/previous evaluation): Colon done 4/18    Precipitating or alleviating factors: None    Therapies tried and outcome: cleaning, did put a pink cream on it today- took away the burning.     2018  Impression:    - Non-bleeding internal hemorrhoids.                        - Two 3 to 7 mm polyps in the cecum and at 30 cm                        proximal to the anus, removed with a hot snare. Resected                        and retrieved.                        - Diverticulosis in the sigmoid colon.   Recommendation:      - Discharge patient to home (ambulatory).                        - Await pathology results.                        - Repeat colonoscopy in 5 years for surveillance based                        on pathology results.         PCP   [facility] [phone]    Health Maintenance        Health Maintenance Due   Topic Date Due     SKIN CANCER SCREENING  1945     ZOSTER IMMUNIZATION (1 of 2) 04/24/1997     MEDICARE ANNUAL WELLNESS VISIT  04/24/2012     ADVANCE CARE PLANNING  04/15/2018     LIPID  04/17/2018     INFLUENZA VACCINE (1) 09/01/2019     PHQ-2  01/01/2020       HPI        Patient Active Problem List   Diagnosis     Tobacco abuse     CARDIOVASCULAR SCREENING; LDL GOAL LESS THAN 160     Melanoma (H)     Idiopathic progressive polyneuropathy     Advanced directives, counseling/discussion     GERD (gastroesophageal reflux disease)     Hypertension, renal     Anemia     Encounter for  long-term (current) use of steroids     Granulomatosis with polyangiitis (H)     CKD (chronic kidney disease) stage 3, GFR 30-59 ml/min (H)     colonic polyps- Tubular Adenomas     Current Outpatient Medications   Medication     Acetaminophen (TYLENOL PO)     Blood Pressure Monitor KIT     calcium-vitamin D (CALCIUM 600 + D) 600-400 MG-UNIT per tablet     Cholecalciferol (VITAMIN D) 1000 UNITS capsule     folic acid (FOLVITE) 1 MG tablet     gabapentin (NEURONTIN) 400 MG capsule     losartan (COZAAR) 50 MG tablet     methotrexate sodium 2.5 MG TABS     No current facility-administered medications for this visit.        Patient Active Problem List   Diagnosis     Tobacco abuse     CARDIOVASCULAR SCREENING; LDL GOAL LESS THAN 160     Melanoma (H)     Idiopathic progressive polyneuropathy     Advanced directives, counseling/discussion     GERD (gastroesophageal reflux disease)     Hypertension, renal     Anemia     Encounter for long-term (current) use of steroids     Granulomatosis with polyangiitis (H)     CKD (chronic kidney disease) stage 3, GFR 30-59 ml/min (H)     colonic polyps- Tubular Adenomas     Past Surgical History:   Procedure Laterality Date     BIOPSY  11/2011    melanoma on back     DISSECT LYMPH NODE INGUINAL  3/1/2013    Procedure: DISSECT LYMPH NODE INGUINAL;  Bilateral Inguinal Lymph Node Biopsy.;  Surgeon: Tariq Acosta MD;  Location: [facility]     ESOPHAGOSCOPY, GASTROSCOPY, DUODENOSCOPY (EGD), COMBINED  7/5/2013    Procedure: COMBINED ESOPHAGOSCOPY, GASTROSCOPY, DUODENOSCOPY (EGD);  Gastroscopy;  Surgeon: Tariq Acosta MD;  Location: [facility]     HERNIORRHAPHY INGUINAL  8/6/2012    Procedure: HERNIORRHAPHY INGUINAL;  Open Repair Left Inguinal Hernia;  Surgeon: Jerad Baker MD;  Location: [facility]       Social History     Tobacco Use     Smoking status: Former Smoker     Packs/day: 1.00     Years: 20.00     Pack years: 20.00     Types: Cigarettes     Last attempt to quit: 2/14/2013      Years since quittin.9     Smokeless tobacco: Never Used   Substance Use Topics     Alcohol use: Yes     Comment: rare     Family History   Problem Relation Age of Onset     Diabetes Mother      Hypertension Mother      Cancer Father         stomach     Heart Disease Father      Heart Disease Brother      Diabetes Sister      Diabetes Sister      Diabetes Sister      Heart Disease Brother      Cancer Sister      Glaucoma No family hx of      Macular Degeneration No family hx of          Current Outpatient Medications   Medication Sig Dispense Refill     clotrimazole-betamethasone (LOTRISONE) 1-0.05 % external cream Apply topically 2 times daily as needed 45 g 1     Acetaminophen (TYLENOL PO) Take 650 mg by mouth once as needed for mild pain or fever       Blood Pressure Monitor KIT Automatic Blood Pressure Monitor 1 kit 0     calcium-vitamin D (CALCIUM 600 + D) 600-400 MG-UNIT per tablet Take 1 tablet by mouth 2 times daily.       Cholecalciferol (VITAMIN D) 1000 UNITS capsule Take 1 capsule by mouth daily.       folic acid (FOLVITE) 1 MG tablet Take 2 tablets (2 mg) by mouth daily 180 tablet 3     gabapentin (NEURONTIN) 400 MG capsule Take 1 capsule (400 mg) by mouth At Bedtime 90 capsule 1     losartan (COZAAR) 50 MG tablet Take 1 tablet (50 mg) by mouth daily 90 tablet 0     methotrexate sodium 2.5 MG TABS Take 4 tablets (10 mg) by mouth every 7 days 60 tablet 0     Allergies   Allergen Reactions     Shrimp Nausea and Vomiting     Got sick each time he ate it     Recent Labs   Lab Test 19  1401 09/10/19  1654 19  1530 19  1339  17  1228  13  0901   LDL  --   --   --   --   --   --   --  91   HDL  --   --   --   --   --   --   --  72   TRIG  --   --   --   --   --   --   --  125   ALT 25 25  --  22   < > 30   < >  --    CR 1.41* 1.37* 1.29* 1.29*   < > 1.48*   < >  --    GFRESTIMATED 49* 51* 55* 55*   < > 47*   < >  --    GFRESTBLACK 57* 59* 64 64   < > 57*   < >  --   "  POTASSIUM  --   --  4.5  --   --  5.0   < >  --     < > = values in this interval not displayed.      BP Readings from Last 3 Encounters:   01/14/20 136/84   09/05/19 118/70   08/08/19 118/62    Wt Readings from Last 3 Encounters:   01/14/20 86.6 kg (191 lb)   09/05/19 83 kg (183 lb)   08/08/19 82.1 kg (181 lb)                    Reviewed and updated:  Tobacco  Allergies  Meds  Med Hx  Surg Hx  Fam Hx  Soc Hx     ROS:  Constitutional, HEENT, cardiovascular, pulmonary, gi and gu systems are negative, except as otherwise noted.    PHYSICAL EXAM   /84 (Cuff Size: Adult Regular)   Pulse 68   Temp 97.9  F (36.6  C) (Tympanic)   Resp 18   Ht 1.727 m (5' 8\")   Wt 86.6 kg (191 lb)   BMI 29.04 kg/m    Body mass index is 29.04 kg/m .  GENERAL: alert and no distress  NECK: no adenopathy, no asymmetry, masses, or scars and thyroid normal to palpation  RESP: lungs clear to auscultation - no rales, rhonchi or wheezes  CV: regular rate and rhythm, normal S1 S2, no S3 or S4, no murmur, click or rub, no peripheral edema and peripheral pulses strong  ABDOMEN: soft, nontender, no hepatosplenomegaly, no masses and bowel sounds normal  RECTAL (male): erythematous rash involving perirectally with some superficial skin abrasions, no blistering, satellite lesion or discharge noted, small external hemorrhoids, anoscopy not performed      Assessment & Plan     (L30.9) Perianal dermatitis  (primary encounter diagnosis)  Comment: Suspect rashes secondary to dermatitis, differential discussed in detail including fungal infection.  Lotrisone prescribed, suggested to keep the area dry and clean  Plan: clotrimazole-betamethasone (LOTRISONE) 1-0.05 %        external cream            (K64.9) Hemorrhoids, unspecified hemorrhoid type  Comment: Physical examination remarkable for a small external hemorrhoids, suggested warm sitz bath, regular walks, increasing fiber in diet.  Last colonoscopy result reviewed            Patient " Instructions       Patient Education     Hemorrhoids    Hemorrhoids are swollen and inflamed veins inside the rectum and near the anus. The rectum is the last several inches of the colon. The anus is the passage between the rectum and the outside of the body.  Causes  The veins can become swollen due to increased pressure in them. This is most often caused by:    Chronic constipation or diarrhea    Straining when having a bowel movement    Sitting too long on the toilet    A low-fiber diet    Pregnancy  Symptoms    Bleeding from the rectum (this may be noticeable after bowel movements)    Lump near the anus    Itching around the anus    Pain around the anus  There are different types of hemorrhoids. Depending on the type you have and the severity, you may be able to treat yourself at home. In some cases, a procedure may be the best treatment option. Your healthcare provider can tell you more about this, if needed.  Home care  General care    To get relief from pain or itching, try:  ? Medicines. Your healthcare provider may recommend stool softeners, suppositories, or laxatives to help manage constipation. Use these exactly as directed.  ? Sitz baths. A sitz bath involves sitting in a few inches of warm bath water. Be careful not to make the water so hot that you burn yourself--test it before sitting in it. Soak for about 10 to 15 minutes a few times a day. This may help relieve pain.  ? Topical products. Your healthcare provider may prescribe or recommend creams, ointments, or pads that can be applied to the hemorrhoid. Use these exactly as directed.  Tips to help prevent hemorrhoids    Eat more fiber. Fiber adds bulk to stool and absorbs water as it moves through your colon. This makes stool softer and easier to pass.  ? Increase the fiber in your diet with more fiber-rich foods. These include fresh fruit, vegetables, and whole grains.  ? Take a fiber supplement or bulking agent, if advised by your healthcare  provider. These include products such as psyllium or methylcellulose.    Drink more water. Your healthcare provider may direct you to drink plenty of water. This can help keep stool soft.    Be more active. Frequent exercise aids digestion and helps prevent constipation. It may also help make bowel movements more regular.    Don t strain during bowel movements. This can make hemorrhoids more likely. Also, don t sit on the toilet for long periods of time.  Follow-up care  Follow up with your healthcare provider as advised. If a culture or imaging tests were done, someone will let you know the results when they are ready. This may take a few days or longer. If your healthcare provider recommends a procedure for your hemorrhoids, these options can be discussed. Options may include surgery and outpatient office treatments.  When to seek medical advice  Call your healthcare provider right away if any of these occur:    Increased bleeding from the rectum    Increased pain around the rectum or anus    Weakness or dizziness  Call 911  Call 911 if any of these occur:    Trouble breathing or swallowing    Fainting or loss of consciousness    Unusually fast heart rate    Vomiting blood    Large amounts of blood in stool or black, tarry stools  Date Last Reviewed: 9/1/2017 2000-2019 [employer]. [address] [place], [place] [address]. All rights reserved. This information is not intended as a substitute for professional medical care. Always follow your healthcare professional's instructions.           Patient Education     Treating Hemorrhoids: Self-Care    Follow your healthcare provider s advice about caring for your hemorrhoids at home. Some treatments help relieve symptoms right away. Others involve making changes in your diet and exercise habits. These can help ease constipation and prevent hemorrhoid symptoms from coming back.  Relieving symptoms  Your healthcare provider may prescribe  anti-inflammatory medicine to help ease your symptoms. The following tips will also help relieve pain and swelling.    Take sitz baths. Taking a sitz bath means sitting in a few inches of warm bath water. Soaking for 10 minutes twice a day can provide welcome relief from painful hemorrhoids. It can also help the area stay clean.    Develop good bowel habits. Use the bathroom when you need to. Don t ignore the urge to move your bowels. This can lead to constipation, hard stools, and straining. Also, don t read while on the toilet. Sit only as long as needed. Wipe gently with soft, unscented toilet tissue or baby wipes.    Use ice packs. Placing an ice pack on a thrombosed external hemorrhoid can help relieve pain right away. It will also help reduce the blood clot. Use the ice for 15 to 20 minutes at a time. Keep a cloth between the ice and your skin to prevent skin damage.    Use other measures. Laxatives and enemas can help ease constipation. But use them only on your healthcare provider s advice. For symptom relief, try using cotton pads soaked in witch hazel. These are available at most drugstores. Over-the-counter hemorrhoid ointments and petroleum jelly can also provide relief.  Add fiber to your diet  Adding fiber to your diet can help relieve constipation by making stools softer and easier to pass. To increase your fiber intake, your healthcare provider may recommend a bulking agent, such as psyllium. This is a high-fiber supplement available at most grocery stores and drugstores. Eating more fiber-rich foods will also help. There are two types of fiber:    Insoluble fiber is the main ingredient in bulking agents. It s also found in foods such as wheat bran, whole-grain breads, fresh fruits, and vegetables.    Soluble fiber is found in foods such as oat bran. Although soluble fiber is good for you, it may not ease constipation as much as foods high in insoluble fiber.  Drink more water  Along with a  high-fiber diet, drinking more water can help ease constipation. This is because insoluble fiber absorbs water, making stools soft and bulky. Be sure to drink plenty of water throughout the day. Drinking fruit juices, such as prune juice or apple juice, can also help prevent constipation.  Get more exercise  Regular exercise aids digestion and helps prevent constipation. It s also great for your health. So talk with your healthcare provider about starting an exercise program. Low-impact activities, such as swimming or walking, are good places to start. Take it easy at first. And remember to drink plenty of water when you exercise.  High-fiber foods  High-fiber foods offer many benefits. By making your stools softer, they help heal and prevent swollen hemorrhoids. They may also help reduce the risk of colon and rectal cancer. Best of all, they re usually low in calories and taste great. Here are some examples of fiber-rich foods.    Whole grains, such as wheat bran, corn bran, and brown rice.    Vegetables, especially carrots, broccoli, cabbage, and peas.    Fruits, such as apples, bananas, raisins, peaches, and pears.    Nuts and legumes, especially peanuts, lentils, and kidney beans.  Easy ways to add fiber  The tips below offer some simple ways to add more high-fiber foods to your meals.    Start your day with a high-fiber breakfast. Eat a wheat bran cereal along with a sliced banana. Or, try peanut butter on whole-wheat toast.    Eat carrot sticks for snacks. They re easy to prepare, taste great, and are low in calories.    Use whole-grain breads instead of white bread for sandwiches.    Eat fruits for treats. Try an apple and some raisins instead of a candy bar.   Date Last Reviewed: 7/1/2016 2000-2019 [employer]. [address] [place] [address], [place] [address]. All rights reserved. This information is not intended as a substitute for professional medical care. Always follow your healthcare  professional's instructions.               Patrick Mcintyre MD  [facility]

## 2020-01-14 NOTE — PATIENT INSTRUCTIONS
Patient Education     Hemorrhoids    Hemorrhoids are swollen and inflamed veins inside the rectum and near the anus. The rectum is the last several inches of the colon. The anus is the passage between the rectum and the outside of the body.  Causes  The veins can become swollen due to increased pressure in them. This is most often caused by:    Chronic constipation or diarrhea    Straining when having a bowel movement    Sitting too long on the toilet    A low-fiber diet    Pregnancy  Symptoms    Bleeding from the rectum (this may be noticeable after bowel movements)    Lump near the anus    Itching around the anus    Pain around the anus  There are different types of hemorrhoids. Depending on the type you have and the severity, you may be able to treat yourself at home. In some cases, a procedure may be the best treatment option. Your healthcare provider can tell you more about this, if needed.  Home care  General care    To get relief from pain or itching, try:  ? Medicines. Your healthcare provider may recommend stool softeners, suppositories, or laxatives to help manage constipation. Use these exactly as directed.  ? Sitz baths. A sitz bath involves sitting in a few inches of warm bath water. Be careful not to make the water so hot that you burn yourself--test it before sitting in it. Soak for about 10 to 15 minutes a few times a day. This may help relieve pain.  ? Topical products. Your healthcare provider may prescribe or recommend creams, ointments, or pads that can be applied to the hemorrhoid. Use these exactly as directed.  Tips to help prevent hemorrhoids    Eat more fiber. Fiber adds bulk to stool and absorbs water as it moves through your colon. This makes stool softer and easier to pass.  ? Increase the fiber in your diet with more fiber-rich foods. These include fresh fruit, vegetables, and whole grains.  ? Take a fiber supplement or bulking agent, if advised by your healthcare provider. These  include products such as psyllium or methylcellulose.    Drink more water. Your healthcare provider may direct you to drink plenty of water. This can help keep stool soft.    Be more active. Frequent exercise aids digestion and helps prevent constipation. It may also help make bowel movements more regular.    Don t strain during bowel movements. This can make hemorrhoids more likely. Also, don t sit on the toilet for long periods of time.  Follow-up care  Follow up with your healthcare provider as advised. If a culture or imaging tests were done, someone will let you know the results when they are ready. This may take a few days or longer. If your healthcare provider recommends a procedure for your hemorrhoids, these options can be discussed. Options may include surgery and outpatient office treatments.  When to seek medical advice  Call your healthcare provider right away if any of these occur:    Increased bleeding from the rectum    Increased pain around the rectum or anus    Weakness or dizziness  Call 911  Call 911 if any of these occur:    Trouble breathing or swallowing    Fainting or loss of consciousness    Unusually fast heart rate    Vomiting blood    Large amounts of blood in stool or black, tarry stools  Date Last Reviewed: 9/1/2017 2000-2019 [employer]. [address] [place] [address]. All rights reserved. This information is not intended as a substitute for professional medical care. Always follow your healthcare professional's instructions.           Patient Education     Treating Hemorrhoids: Self-Care    Follow your healthcare provider s advice about caring for your hemorrhoids at home. Some treatments help relieve symptoms right away. Others involve making changes in your diet and exercise habits. These can help ease constipation and prevent hemorrhoid symptoms from coming back.  Relieving symptoms  Your healthcare provider may prescribe anti-inflammatory medicine to  help ease your symptoms. The following tips will also help relieve pain and swelling.    Take sitz baths. Taking a sitz bath means sitting in a few inches of warm bath water. Soaking for 10 minutes twice a day can provide welcome relief from painful hemorrhoids. It can also help the area stay clean.    Develop good bowel habits. Use the bathroom when you need to. Don t ignore the urge to move your bowels. This can lead to constipation, hard stools, and straining. Also, don t read while on the toilet. Sit only as long as needed. Wipe gently with soft, unscented toilet tissue or baby wipes.    Use ice packs. Placing an ice pack on a thrombosed external hemorrhoid can help relieve pain right away. It will also help reduce the blood clot. Use the ice for 15 to 20 minutes at a time. Keep a cloth between the ice and your skin to prevent skin damage.    Use other measures. Laxatives and enemas can help ease constipation. But use them only on your healthcare provider s advice. For symptom relief, try using cotton pads soaked in witch hazel. These are available at most drugstores. Over-the-counter hemorrhoid ointments and petroleum jelly can also provide relief.  Add fiber to your diet  Adding fiber to your diet can help relieve constipation by making stools softer and easier to pass. To increase your fiber intake, your healthcare provider may recommend a bulking agent, such as psyllium. This is a high-fiber supplement available at most grocery stores and drugstores. Eating more fiber-rich foods will also help. There are two types of fiber:    Insoluble fiber is the main ingredient in bulking agents. It s also found in foods such as wheat bran, whole-grain breads, fresh fruits, and vegetables.    Soluble fiber is found in foods such as oat bran. Although soluble fiber is good for you, it may not ease constipation as much as foods high in insoluble fiber.  Drink more water  Along with a high-fiber diet, drinking more water  can help ease constipation. This is because insoluble fiber absorbs water, making stools soft and bulky. Be sure to drink plenty of water throughout the day. Drinking fruit juices, such as prune juice or apple juice, can also help prevent constipation.  Get more exercise  Regular exercise aids digestion and helps prevent constipation. It s also great for your health. So talk with your healthcare provider about starting an exercise program. Low-impact activities, such as swimming or walking, are good places to start. Take it easy at first. And remember to drink plenty of water when you exercise.  High-fiber foods  High-fiber foods offer many benefits. By making your stools softer, they help heal and prevent swollen hemorrhoids. They may also help reduce the risk of colon and rectal cancer. Best of all, they re usually low in calories and taste great. Here are some examples of fiber-rich foods.    Whole grains, such as wheat bran, corn bran, and brown rice.    Vegetables, especially carrots, broccoli, cabbage, and peas.    Fruits, such as apples, bananas, raisins, peaches, and pears.    Nuts and legumes, especially peanuts, lentils, and kidney beans.  Easy ways to add fiber  The tips below offer some simple ways to add more high-fiber foods to your meals.    Start your day with a high-fiber breakfast. Eat a wheat bran cereal along with a sliced banana. Or, try peanut butter on whole-wheat toast.    Eat carrot sticks for snacks. They re easy to prepare, taste great, and are low in calories.    Use whole-grain breads instead of white bread for sandwiches.    Eat fruits for treats. Try an apple and some raisins instead of a candy bar.   Date Last Reviewed: 7/1/2016 2000-2019 [employer]. [address] [place] [address], [place] [address]. All rights reserved. This information is not intended as a substitute for professional medical care. Always follow your healthcare professional's instructions.

## 2020-01-21 ENCOUNTER — OFFICE VISIT (OUTPATIENT)
Dept: FAMILY MEDICINE | Facility: CLINIC | Age: 75
End: 2020-01-21
Payer: COMMERCIAL

## 2020-01-21 VITALS
DIASTOLIC BLOOD PRESSURE: 78 MMHG | SYSTOLIC BLOOD PRESSURE: 138 MMHG | WEIGHT: 193 LBS | HEART RATE: 75 BPM | RESPIRATION RATE: 16 BRPM | HEIGHT: 68 IN | BODY MASS INDEX: 29.25 KG/M2 | OXYGEN SATURATION: 98 % | TEMPERATURE: 98.5 F

## 2020-01-21 DIAGNOSIS — H00.015 HORDEOLUM EXTERNUM OF LEFT LOWER EYELID: Primary | ICD-10-CM

## 2020-01-21 PROCEDURE — 99213 OFFICE O/P EST LOW 20 MIN: CPT | Performed by: NURSE PRACTITIONER

## 2020-01-21 RX ORDER — NEOMYCIN SULFATE, POLYMYXIN B SULFATE AND DEXAMETHASONE 3.5; 10000; 1 MG/ML; [USP'U]/ML; MG/ML
1-2 SUSPENSION/ DROPS OPHTHALMIC 4 TIMES DAILY
Qty: 1 BOTTLE | Refills: 0 | Status: SHIPPED | OUTPATIENT
Start: 2020-01-21 | End: 2020-11-02

## 2020-01-21 RX ORDER — NEOMYCIN/POLYMYXIN B/HYDROCORT 3.5-10K-1
1-2 SUSPENSION, DROPS(FINAL DOSAGE FORM)(ML) OPHTHALMIC (EYE) 4 TIMES DAILY
Qty: 7.5 ML | Refills: 0 | Status: SHIPPED | OUTPATIENT
Start: 2020-01-21 | End: 2020-01-21 | Stop reason: ALTCHOICE

## 2020-01-21 ASSESSMENT — MIFFLIN-ST. JEOR: SCORE: 1599.94

## 2020-01-21 NOTE — PROGRESS NOTES
SUBJECTIVE   Joshua Ennis is a  male who presents to clinic today for the following health issue(s):         Eye(s) Problem  Onset: started last night     Description:   Location: left  Pain: YES  Redness: YES    Accompanying Signs & Symptoms:  Discharge/mattering: YES and watery  Swelling: YES  Visual changes: no   Fever: no   Nasal Congestion: no   Bothered by bright lights: no     History:   Trauma: no   Foreign body exposure: YES- feels like something is in it     Precipitating factors:   Wearing contacts: no     Alleviating factors:  Improved by: none  Therapies Tried and outcome: none  Maybe a sty?  No itchiness  Only left eye      Health Maintenance        Health Maintenance Due   Topic Date Due     SKIN CANCER SCREENING  1945     ZOSTER IMMUNIZATION (1 of 2) 04/24/1997     MEDICARE ANNUAL WELLNESS VISIT  04/24/2012     ADVANCE CARE PLANNING  04/15/2018     LIPID  04/17/2018     INFLUENZA VACCINE (1) 09/01/2019     PHQ-2  01/01/2020       PCP   Patrick Mcintyre [phone]    PROBLEM LIST        Patient Active Problem List   Diagnosis     Tobacco abuse     CARDIOVASCULAR SCREENING; LDL GOAL LESS THAN 160     Melanoma (H)     Idiopathic progressive polyneuropathy     Advanced directives, counseling/discussion     GERD (gastroesophageal reflux disease)     Hypertension, renal     Anemia     Encounter for long-term (current) use of steroids     Granulomatosis with polyangiitis (H)     CKD (chronic kidney disease) stage 3, GFR 30-59 ml/min (H)     colonic polyps- Tubular Adenomas       MEDICATIONS        Current Outpatient Medications   Medication     Blood Pressure Monitor KIT     calcium-vitamin D (CALCIUM 600 + D) 600-400 MG-UNIT per tablet     Cholecalciferol (VITAMIN D) 1000 UNITS capsule     clotrimazole-betamethasone (LOTRISONE) 1-0.05 % external cream     folic acid (FOLVITE) 1 MG tablet     gabapentin (NEURONTIN) 400 MG capsule     losartan (COZAAR) 50 MG tablet     methotrexate sodium  "2.5 MG TABS     neomycin-polymyxin-hydrocortisone (CORTISPORIN) 3.5-28869-6 ophthalmic suspension     Acetaminophen (TYLENOL PO)     No current facility-administered medications for this visit.        Reviewed and updated as needed this visit by Provider:  Tobacco  Allergies  Meds  Med Hx  Surg Hx  Fam Hx  Soc Hx     ROS      Constitutional, HEENT, cardiovascular, pulmonary, gi and gu systems are negative, except as otherwise noted.    PHYSICAL EXAM   /78   Pulse 75   Temp 98.5  F (36.9  C) (Tympanic)   Resp 16   Ht 1.727 m (5' 8\")   Wt 87.5 kg (193 lb)   SpO2 98%   BMI 29.35 kg/m    Body mass index is 29.35 kg/m .  GENERAL APPEARANCE: healthy, alert and no distress  EYES: Eyes grossly normal to inspection, PERRL, eyelids- chalazion left inner lower eyelid, conjunctiva erythemic, Eye Procedures: Isopto Homatropine 2 gtts were instilled for anesthesia. Fluorescein dye was instilled. With Wood's lamp and no corneal abrasion or dye uptake is noted. Eye was irrigated.  RESP: lungs clear to auscultation - no rales, rhonchi or wheezes  CV: regular rates and rhythm, normal S1 S2, no S3 or S4 and no murmur, click or rub  PSYCH: mentation appears normal and affect normal/bright    ASSESSMENT & PLAN     1. Hordeolum externum of left lower eyelid  Acute, stable  - neomycin-polymyxin-dexamethasone (MAXITROL) 3.5-14208-6.1 SUSP ophthalmic susp; Place 1-2 drops Into the left eye 4 times daily 7-10 days  Dispense: 1 Bottle; Refill: 0  Recheck in 2 days if symptoms are not improving  Warm compress  Keep hands clean      Patient Education     Sty (or Stye)  A sty is an infection of the oil gland of the eyelid. It may develop into a small pocket of pus (an abscess). This can cause pain, redness, and swelling. In early stages, a sty is treated with antibiotic cream, eye drops, or a small towel soaked in warm water (a warm compress). More severe cases may need to be opened and drained by a healthcare provider.  Home " care    Eye drops or ointment are usually prescribed to treat the infection. Use these as directed.     Artificial tears may also be used to lubricate the eye and make it more comfortable. You can buy these over the counter without a prescription. Talk with your healthcare provider before using any over-the-counter treatment for a sty.    Apply a warm, damp towel to the affected eye for at least 5 minutes, 3 to 4 times a day for a week. Warm compresses open the pores and speed the healing. But if the compresses are too hot, they may burn your eyelid.    Sometimes the sty will drain with this treatment alone. If this happens, keep using the antibiotic until all the redness and swelling are gone.    Wash your hands before and after touching the infected eyelid to avoid spreading the infection.    Don t squeeze or try to break open the sty.  Follow-up care  Follow up with your healthcare provider, or as advised.   When to seek medical advice  Call your healthcare provider right away if any of these occur:    Increase in swelling or redness around the eyelid after 48 to 72 hours    Increase in eye pain or the eyelid blisters    Increase in warmth--the eyelid feels hot    Drainage of blood or thick pus from the sty    Blister on the eyelid    Inability to open the eyelid due to swelling    Fever of 100.4 F (38 C) or above, or as directed by your provider    Vision changes    Headache or stiff neck    The sty comes back  Date Last Reviewed: 8/1/2017 2000-2019 [employer]. [address] [place] [address], [place] [address]. All rights reserved. This information is not intended as a substitute for professional medical care. Always follow your healthcare professional's instructions.             Risks, benefits, side effects and rationale for treatment plan fully discussed with the patient and understanding expressed.  JEANETTE Dwyer-BC  [facility]

## 2020-01-21 NOTE — NURSING NOTE
"Chief Complaint   Patient presents with     Eye Problem     sarted last night        Initial BP (!) 140/90   Pulse 75   Temp 98.5  F (36.9  C) (Tympanic)   Resp 16   Ht 1.727 m (5' 8\")   Wt 87.5 kg (193 lb)   SpO2 98%   BMI 29.35 kg/m   Estimated body mass index is 29.35 kg/m  as calculated from the following:    Height as of this encounter: 1.727 m (5' 8\").    Weight as of this encounter: 87.5 kg (193 lb).    Patient presents to the clinic using No DME    Health Maintenance that is potentially due pending provider review:  NONE    n/a    Is there anyone who you would like to be able to receive your results? No  If yes have patient fill out SABAS    "

## 2020-01-21 NOTE — PATIENT INSTRUCTIONS
1. Hordeolum externum of left lower eyelid  Acute, stable  - neomycin-polymyxin-hydrocortisone (CORTISPORIN) 3.5-86693-9 ophthalmic suspension; Place 1-2 drops Into the left eye 4 times daily  Dispense: 7.5 mL; Refill: 0  Recheck in 2 days if symptoms are not improving  Warm compress  Keep hands clean    Patient Education     Sty (or Stye)  A sty is an infection of the oil gland of the eyelid. It may develop into a small pocket of pus (an abscess). This can cause pain, redness, and swelling. In early stages, a sty is treated with antibiotic cream, eye drops, or a small towel soaked in warm water (a warm compress). More severe cases may need to be opened and drained by a healthcare provider.  Home care    Eye drops or ointment are usually prescribed to treat the infection. Use these as directed.     Artificial tears may also be used to lubricate the eye and make it more comfortable. You can buy these over the counter without a prescription. Talk with your healthcare provider before using any over-the-counter treatment for a sty.    Apply a warm, damp towel to the affected eye for at least 5 minutes, 3 to 4 times a day for a week. Warm compresses open the pores and speed the healing. But if the compresses are too hot, they may burn your eyelid.    Sometimes the sty will drain with this treatment alone. If this happens, keep using the antibiotic until all the redness and swelling are gone.    Wash your hands before and after touching the infected eyelid to avoid spreading the infection.    Don t squeeze or try to break open the sty.  Follow-up care  Follow up with your healthcare provider, or as advised.   When to seek medical advice  Call your healthcare provider right away if any of these occur:    Increase in swelling or redness around the eyelid after 48 to 72 hours    Increase in eye pain or the eyelid blisters    Increase in warmth--the eyelid feels hot    Drainage of blood or thick pus from the sty    Blister on  the eyelid    Inability to open the eyelid due to swelling    Fever of 100.4 F (38 C) or above, or as directed by your provider    Vision changes    Headache or stiff neck    The sty comes back  Date Last Reviewed: 8/1/2017 2000-2019 [employer]. [address] [place], [place] [id number]. All rights reserved. This information is not intended as a substitute for professional medical care. Always follow your healthcare professional's instructions.

## 2020-03-05 DIAGNOSIS — Z51.81 MEDICATION MONITORING ENCOUNTER: ICD-10-CM

## 2020-03-05 DIAGNOSIS — I77.82 ANCA-ASSOCIATED VASCULITIS (H): ICD-10-CM

## 2020-03-05 DIAGNOSIS — M31.30 WEGENER'S VASCULITIS: ICD-10-CM

## 2020-03-05 LAB
ALBUMIN UR-MCNC: NEGATIVE MG/DL
APPEARANCE UR: CLEAR
BACTERIA #/AREA URNS HPF: ABNORMAL /HPF
BILIRUB UR QL STRIP: NEGATIVE
COLOR UR AUTO: YELLOW
GLUCOSE UR STRIP-MCNC: NEGATIVE MG/DL
HGB UR QL STRIP: NEGATIVE
KETONES UR STRIP-MCNC: NEGATIVE MG/DL
LEUKOCYTE ESTERASE UR QL STRIP: NEGATIVE
NITRATE UR QL: NEGATIVE
NON-SQ EPI CELLS #/AREA URNS LPF: ABNORMAL /LPF
PH UR STRIP: 6 PH (ref 5–7)
RBC #/AREA URNS AUTO: ABNORMAL /HPF
SOURCE: ABNORMAL
SP GR UR STRIP: 1.02 (ref 1–1.03)
UROBILINOGEN UR STRIP-ACNC: 0.2 EU/DL (ref 0.2–1)
WBC #/AREA URNS AUTO: ABNORMAL /HPF

## 2020-03-05 PROCEDURE — 81001 URINALYSIS AUTO W/SCOPE: CPT | Performed by: INTERNAL MEDICINE

## 2020-03-05 NOTE — LETTER
Patient:  Joshua Ennis  :   1945  MRN:     [id number]        Mr.Roger DEANNA Ennis  [address] [facility] [address]        2020    Dear ,    We are writing to inform you of your test results. Urine test is ok.      Results for orders placed or performed in visit on 20   UA with Microscopic reflex to Culture     Status: Abnormal   Result Value Ref Range    Color Urine Yellow     Appearance Urine Clear     Glucose Urine Negative NEG^Negative mg/dL    Bilirubin Urine Negative NEG^Negative    Ketones Urine Negative NEG^Negative mg/dL    Specific Gravity Urine 1.020 1.003 - 1.035    pH Urine 6.0 5.0 - 7.0 pH    Protein Albumin Urine Negative NEG^Negative mg/dL    Urobilinogen Urine 0.2 0.2 - 1.0 EU/dL    Nitrite Urine Negative NEG^Negative    Blood Urine Negative NEG^Negative    Leukocyte Esterase Urine Negative NEG^Negative    Source Midstream Urine     WBC Urine 0 - 5 OTO5^0 - 5 /HPF    RBC Urine O - 2 OTO2^O - 2 /HPF    Squamous Epithelial /LPF Urine Few FEW^Few /LPF    Bacteria Urine Few (A) NEG^Negative /HPF       Ede Sanchez MD

## 2020-03-09 DIAGNOSIS — G62.9 NEUROPATHY: ICD-10-CM

## 2020-03-09 DIAGNOSIS — I10 HYPERTENSION, UNSPECIFIED TYPE: ICD-10-CM

## 2020-03-09 DIAGNOSIS — M31.30 GRANULOMATOSIS WITH POLYANGIITIS (H): ICD-10-CM

## 2020-03-11 DIAGNOSIS — M31.30 GRANULOMATOSIS WITH POLYANGIITIS, UNSPECIFIED WHETHER RENAL INVOLVEMENT (H): ICD-10-CM

## 2020-03-11 DIAGNOSIS — Z79.899 HIGH RISK MEDICATIONS (NOT ANTICOAGULANTS) LONG-TERM USE: ICD-10-CM

## 2020-03-11 LAB
ALBUMIN SERPL-MCNC: 3.6 G/DL (ref 3.4–5)
ALT SERPL W P-5'-P-CCNC: 29 U/L (ref 0–70)
AST SERPL W P-5'-P-CCNC: 20 U/L (ref 0–45)
BASOPHILS # BLD AUTO: 0 10E9/L (ref 0–0.2)
BASOPHILS NFR BLD AUTO: 0.7 %
CREAT SERPL-MCNC: 1.33 MG/DL (ref 0.66–1.25)
CRP SERPL-MCNC: <2.9 MG/L (ref 0–8)
DIFFERENTIAL METHOD BLD: ABNORMAL
EOSINOPHIL # BLD AUTO: 0.2 10E9/L (ref 0–0.7)
EOSINOPHIL NFR BLD AUTO: 3.2 %
ERYTHROCYTE [DISTWIDTH] IN BLOOD BY AUTOMATED COUNT: 12.8 % (ref 10–15)
ERYTHROCYTE [SEDIMENTATION RATE] IN BLOOD BY WESTERGREN METHOD: 5 MM/H (ref 0–20)
GFR SERPL CREATININE-BSD FRML MDRD: 53 ML/MIN/{1.73_M2}
HCT VFR BLD AUTO: 42.9 % (ref 40–53)
HGB BLD-MCNC: 14.2 G/DL (ref 13.3–17.7)
LYMPHOCYTES # BLD AUTO: 1.3 10E9/L (ref 0.8–5.3)
LYMPHOCYTES NFR BLD AUTO: 23.2 %
MCH RBC QN AUTO: 32.6 PG (ref 26.5–33)
MCHC RBC AUTO-ENTMCNC: 33.1 G/DL (ref 31.5–36.5)
MCV RBC AUTO: 98 FL (ref 78–100)
MONOCYTES # BLD AUTO: 0.7 10E9/L (ref 0–1.3)
MONOCYTES NFR BLD AUTO: 12.6 %
NEUTROPHILS # BLD AUTO: 3.2 10E9/L (ref 1.6–8.3)
NEUTROPHILS NFR BLD AUTO: 60.3 %
PLATELET # BLD AUTO: 176 10E9/L (ref 150–450)
RBC # BLD AUTO: 4.36 10E12/L (ref 4.4–5.9)
WBC # BLD AUTO: 5.4 10E9/L (ref 4–11)

## 2020-03-11 PROCEDURE — 83876 ASSAY MYELOPEROXIDASE: CPT | Performed by: INTERNAL MEDICINE

## 2020-03-11 PROCEDURE — 82565 ASSAY OF CREATININE: CPT | Performed by: INTERNAL MEDICINE

## 2020-03-11 PROCEDURE — 84450 TRANSFERASE (AST) (SGOT): CPT | Performed by: INTERNAL MEDICINE

## 2020-03-11 PROCEDURE — 86140 C-REACTIVE PROTEIN: CPT | Performed by: INTERNAL MEDICINE

## 2020-03-11 PROCEDURE — 36415 COLL VENOUS BLD VENIPUNCTURE: CPT | Performed by: INTERNAL MEDICINE

## 2020-03-11 PROCEDURE — 86255 FLUORESCENT ANTIBODY SCREEN: CPT | Performed by: INTERNAL MEDICINE

## 2020-03-11 PROCEDURE — 84460 ALANINE AMINO (ALT) (SGPT): CPT | Performed by: INTERNAL MEDICINE

## 2020-03-11 PROCEDURE — 82040 ASSAY OF SERUM ALBUMIN: CPT | Performed by: INTERNAL MEDICINE

## 2020-03-11 PROCEDURE — 85652 RBC SED RATE AUTOMATED: CPT | Performed by: INTERNAL MEDICINE

## 2020-03-11 PROCEDURE — 83516 IMMUNOASSAY NONANTIBODY: CPT | Performed by: INTERNAL MEDICINE

## 2020-03-11 PROCEDURE — 86256 FLUORESCENT ANTIBODY TITER: CPT | Performed by: INTERNAL MEDICINE

## 2020-03-11 PROCEDURE — 85025 COMPLETE CBC W/AUTO DIFF WBC: CPT | Performed by: INTERNAL MEDICINE

## 2020-03-11 NOTE — LETTER
March 25, 2020      Joshua Ennis  [address] [facility] [address]        Dear ,    We are writing to inform you of your test results.    Stable labs except slight increase in PR3 (vasculitis marker). Recommend re-check albs in April (ordered).    Resulted Orders   AST   Result Value Ref Range    AST 20 0 - 45 U/L   ALT   Result Value Ref Range    ALT 29 0 - 70 U/L   Myeloperoxidase and Proteinase 3 Panel   Result Value Ref Range    Myeloperoxidase Antibody IgG <0.2 0.0 - 0.9 AI      Comment:      Negative  Antibody index (AI) values reflect qualitative changes in antibody   concentration that cannot be directly associated with clinical condition or   disease state.      Proteinase 3 Antibody IgG 2.8 (H) 0.0 - 0.9 AI      Comment:      Positive  Antibody index (AI) values reflect qualitative changes in antibody   concentration that cannot be directly associated with clinical condition or   disease state.     Albumin level   Result Value Ref Range    Albumin 3.6 3.4 - 5.0 g/dL   CBC with platelets differential   Result Value Ref Range    WBC 5.4 4.0 - 11.0 10e9/L    RBC Count 4.36 (L) 4.4 - 5.9 10e12/L    Hemoglobin 14.2 13.3 - 17.7 g/dL    Hematocrit 42.9 40.0 - 53.0 %    MCV 98 78 - 100 fl    MCH 32.6 26.5 - 33.0 pg    MCHC 33.1 31.5 - 36.5 g/dL    RDW 12.8 10.0 - 15.0 %    Platelet Count 176 150 - 450 10e9/L    % Neutrophils 60.3 %    % Lymphocytes 23.2 %    % Monocytes 12.6 %    % Eosinophils 3.2 %    % Basophils 0.7 %    Absolute Neutrophil 3.2 1.6 - 8.3 10e9/L    Absolute Lymphocytes 1.3 0.8 - 5.3 10e9/L    Absolute Monocytes 0.7 0.0 - 1.3 10e9/L    Absolute Eosinophils 0.2 0.0 - 0.7 10e9/L    Absolute Basophils 0.0 0.0 - 0.2 10e9/L    Diff Method Automated Method    Creatinine   Result Value Ref Range    Creatinine 1.33 (H) 0.66 - 1.25 mg/dL    GFR Estimate 53 (L) >60 mL/min/[1.73_m2]      Comment:      Non  GFR Calc  Starting 12/18/2018, serum creatinine based estimated GFR  (eGFR) will be   calculated using the Chronic Kidney Disease Epidemiology Collaboration   (CKD-EPI) equation.      GFR Estimate If Black 61 >60 mL/min/[1.73_m2]      Comment:       GFR Calc  Starting 12/18/2018, serum creatinine based estimated GFR (eGFR) will be   calculated using the Chronic Kidney Disease Epidemiology Collaboration   (CKD-EPI) equation.     CRP inflammation   Result Value Ref Range    CRP Inflammation <2.9 0.0 - 8.0 mg/L   Erythrocyte sedimentation rate auto   Result Value Ref Range    Sed Rate 5 0 - 20 mm/h   ANCA IgG by IFA with Reflex to Titer   Result Value Ref Range    Neutrophil Cytoplasmic Antibody 1:10 (A) <1:10 [titer]    Neutrophil Cytoplasmic Antibody Pattern       Cytoplasmic ANCA (c-ANCA) staining pattern observed and confirmed on formalin-fixed   neutrophils.         Ede Sanchez MD

## 2020-03-12 LAB
MYELOPEROXIDASE AB SER-ACNC: <0.2 AI (ref 0–0.9)
PROTEINASE3 IGG SER-ACNC: 2.8 AI (ref 0–0.9)

## 2020-03-13 LAB
ANCA AB PATTERN SER IF-IMP: ABNORMAL
C-ANCA TITR SER IF: ABNORMAL {TITER}

## 2020-03-13 RX ORDER — GABAPENTIN 400 MG/1
CAPSULE ORAL
Qty: 90 CAPSULE | Refills: 1 | OUTPATIENT
Start: 2020-03-13

## 2020-03-13 RX ORDER — LOSARTAN POTASSIUM 50 MG/1
50 TABLET ORAL DAILY
Qty: 90 TABLET | Refills: 0 | Status: SHIPPED | OUTPATIENT
Start: 2020-03-13 | End: 2020-06-18

## 2020-03-13 RX ORDER — FOLIC ACID 1 MG/1
2 TABLET ORAL DAILY
Qty: 180 TABLET | Refills: 0 | Status: SHIPPED | OUTPATIENT
Start: 2020-03-13 | End: 2020-06-26

## 2020-03-13 NOTE — TELEPHONE ENCOUNTER
FOLIC ACID 1 MG TABLET ,   losartan 50 mg tablet   Last Written Prescription Date:    Last Fill Quantity: Folic Acid 4/2/19 #90 with 3 refills, losartan 50 mg 12/12/19 0 refills    Last Office Visit : 12/14/18 with recommended 12 month follow up.  Has cancelled and rescheduled 12/6/19, 2/14/20 and 3/19/20  Future Office visit:  7/10/20    Routing refill request to provider for review/approval because:  Creatinine abnormal    Lab Test 03/11/20  1614   CR 1.33*       Has cancelled 3 appointments, ok to refill both until appointment in July or see sooner?

## 2020-03-25 NOTE — RESULT ENCOUNTER NOTE
Stable labs except slight increase in PR3 (vasculitis marker). Recommend re-check albs in April (ordered).

## 2020-04-01 DIAGNOSIS — Z79.899 HIGH RISK MEDICATIONS (NOT ANTICOAGULANTS) LONG-TERM USE: ICD-10-CM

## 2020-04-01 DIAGNOSIS — G62.9 NEUROPATHY: ICD-10-CM

## 2020-04-01 RX ORDER — GABAPENTIN 400 MG/1
CAPSULE ORAL
Qty: 90 CAPSULE | Refills: 1 | Status: SHIPPED | OUTPATIENT
Start: 2020-04-01 | End: 2020-10-08

## 2020-04-01 NOTE — TELEPHONE ENCOUNTER
gabapentin 400 mg capsule       Last Written Prescription Date:  10/23/2019  Last Fill Quantity: 90,   # refills: 1  Last Office Visit : 12/14/2018  Future Office visit:  7/10/2020  Routing refill request to provider for review/approval because:  Drug not on the [facility], [facility] or [facility] refill protocol or controlled substance      methotrexate sodium 2.5 mg tablet   Last Written Prescription Date:  1/4/2020  Last Fill Quantity: 60,   # refills: 0  Last Office Visit: 12/14/2018  Future Office visit:  7/10/2020    CBC RESULTS:   Recent Labs   Lab Test 03/11/20  1614   WBC 5.4   RBC 4.36*   HGB 14.2   HCT 42.9   MCV 98   MCH 32.6   MCHC 33.1   RDW 12.8          Creatinine   Date Value Ref Range Status   03/11/2020 1.33 (H) 0.66 - 1.25 mg/dL Final   ]    Liver Function Studies -   Recent Labs   Lab Test 03/11/20  1614  12/13/17  1228   PROTTOTAL  --   --  7.8   ALBUMIN 3.6   < > 4.0   BILITOTAL  --   --  0.7   ALKPHOS  --   --  97   AST 20   < > 22   ALT 29   < > 30    < > = values in this interval not displayed.       Routing refill request to provider for review/approval because:  Drug not on the [facility], [facility] or [facility] refill protocol or controlled substance    Leah Quintana RN  Central Triage Red Flags/Med Refills

## 2020-06-15 DIAGNOSIS — G62.9 NEUROPATHY: ICD-10-CM

## 2020-06-15 DIAGNOSIS — I10 HYPERTENSION, UNSPECIFIED TYPE: ICD-10-CM

## 2020-06-18 NOTE — TELEPHONE ENCOUNTER
losartan (COZAAR) 50 MG    Last Written Prescription Date:  3/13/20  Last Fill Quantity: 90,   # refills: 0  Last Office Visit : 12/14/18  Future Office visit:  7/10/20    Routing refill request to provider for review/approval because:  12/14/18   6 [facility] RTC ( June 2019)  PENDING APPT  7/10/20

## 2020-06-19 RX ORDER — LOSARTAN POTASSIUM 50 MG/1
50 TABLET ORAL DAILY
Qty: 90 TABLET | Refills: 1 | Status: SHIPPED | OUTPATIENT
Start: 2020-06-19 | End: 2020-12-09

## 2020-06-24 DIAGNOSIS — G62.9 NEUROPATHY: ICD-10-CM

## 2020-06-26 RX ORDER — FOLIC ACID 1 MG/1
2 TABLET ORAL DAILY
Qty: 180 TABLET | Refills: 3 | Status: SHIPPED | OUTPATIENT
Start: 2020-06-26 | End: 2021-06-03

## 2020-06-26 NOTE — TELEPHONE ENCOUNTER
folic acid 1 mg tablet    Last Written Prescription Date:  3/13/2020  Last Fill Quantity: 180,   # refills: 0  Last Office Visit : 12/14/2018  Future Office visit:  7/10/2020    Routing refill request to provider for review/approval because:  Ok to add refills to current order due to continuous refill request?    Leah Quintana RN  Central Triage Red Flags/Med Refills

## 2020-06-29 DIAGNOSIS — Z79.899 HIGH RISK MEDICATIONS (NOT ANTICOAGULANTS) LONG-TERM USE: ICD-10-CM

## 2020-07-02 DIAGNOSIS — M31.30 GRANULOMATOSIS WITH POLYANGIITIS, UNSPECIFIED WHETHER RENAL INVOLVEMENT (H): Primary | ICD-10-CM

## 2020-07-02 DIAGNOSIS — Z79.899 HIGH RISK MEDICATIONS (NOT ANTICOAGULANTS) LONG-TERM USE: ICD-10-CM

## 2020-07-02 NOTE — TELEPHONE ENCOUNTER
methotrexate sodium 2.5 mg tablet    Last Written Prescription Date:  4/1/2020  Last Fill Quantity: 60,   # refills: 0  Last Office Visit:  12/14/2018  Future Office visit:  7/10/2020    CBC RESULTS:   Recent Labs   Lab Test 03/11/20  1614   WBC 5.4   RBC 4.36*   HGB 14.2   HCT 42.9   MCV 98   MCH 32.6   MCHC 33.1   RDW 12.8          Creatinine   Date Value Ref Range Status   03/11/2020 1.33 (H) 0.66 - 1.25 mg/dL Final   ]    Liver Function Studies -   Recent Labs   Lab Test 03/11/20  1614  12/13/17  1228   PROTTOTAL  --   --  7.8   ALBUMIN 3.6   < > 4.0   BILITOTAL  --   --  0.7   ALKPHOS  --   --  97   AST 20   < > 22   ALT 29   < > 30    < > = values in this interval not displayed.       Routing refill request to provider for review/approval because:  Drug not on the [facility]  [facility] refill protocol or controlled substance    Leah Quintana RN  Central Triage Red Flags/Med Refills

## 2020-07-03 NOTE — TELEPHONE ENCOUNTER
Ede Sanchez MD Beard, Madeline, RN    Caller: Unspecified (4 days ago, 10:44 AM)               Ordered vasculitis labs to  be done ahead of 7/10 appointment, then we'll have results to discuss.      Called and spoke with pt's ambrocio, they will get labs done soon.  She is aware that it will be a telephone visit.    Consuelo Canales RN  [facility]

## 2020-07-06 DIAGNOSIS — M31.30 GRANULOMATOSIS WITH POLYANGIITIS, UNSPECIFIED WHETHER RENAL INVOLVEMENT (H): ICD-10-CM

## 2020-07-06 DIAGNOSIS — Z79.899 HIGH RISK MEDICATIONS (NOT ANTICOAGULANTS) LONG-TERM USE: ICD-10-CM

## 2020-07-06 LAB
ALBUMIN SERPL-MCNC: 4 G/DL (ref 3.4–5)
ALBUMIN UR-MCNC: NEGATIVE MG/DL
ALT SERPL W P-5'-P-CCNC: 29 U/L (ref 0–70)
APPEARANCE UR: CLEAR
AST SERPL W P-5'-P-CCNC: 25 U/L (ref 0–45)
BASOPHILS # BLD AUTO: 0 10E9/L (ref 0–0.2)
BASOPHILS NFR BLD AUTO: 0.4 %
BILIRUB UR QL STRIP: NEGATIVE
COLOR UR AUTO: YELLOW
CREAT SERPL-MCNC: 1.67 MG/DL (ref 0.66–1.25)
CREAT UR-MCNC: 53 MG/DL
CRP SERPL-MCNC: <2.9 MG/L (ref 0–8)
DIFFERENTIAL METHOD BLD: NORMAL
EOSINOPHIL # BLD AUTO: 0.3 10E9/L (ref 0–0.7)
EOSINOPHIL NFR BLD AUTO: 3.5 %
ERYTHROCYTE [DISTWIDTH] IN BLOOD BY AUTOMATED COUNT: 12.4 % (ref 10–15)
ERYTHROCYTE [SEDIMENTATION RATE] IN BLOOD BY WESTERGREN METHOD: 5 MM/H (ref 0–20)
GFR SERPL CREATININE-BSD FRML MDRD: 40 ML/MIN/{1.73_M2}
GLUCOSE UR STRIP-MCNC: NEGATIVE MG/DL
HCT VFR BLD AUTO: 43.2 % (ref 40–53)
HGB BLD-MCNC: 14.5 G/DL (ref 13.3–17.7)
HGB UR QL STRIP: NEGATIVE
KETONES UR STRIP-MCNC: NEGATIVE MG/DL
LEUKOCYTE ESTERASE UR QL STRIP: NEGATIVE
LYMPHOCYTES # BLD AUTO: 1.2 10E9/L (ref 0.8–5.3)
LYMPHOCYTES NFR BLD AUTO: 17.1 %
MCH RBC QN AUTO: 32.2 PG (ref 26.5–33)
MCHC RBC AUTO-ENTMCNC: 33.6 G/DL (ref 31.5–36.5)
MCV RBC AUTO: 96 FL (ref 78–100)
MONOCYTES # BLD AUTO: 0.6 10E9/L (ref 0–1.3)
MONOCYTES NFR BLD AUTO: 8.7 %
NEUTROPHILS # BLD AUTO: 5 10E9/L (ref 1.6–8.3)
NEUTROPHILS NFR BLD AUTO: 70.3 %
NITRATE UR QL: NEGATIVE
PH UR STRIP: 5.5 PH (ref 5–7)
PLATELET # BLD AUTO: 194 10E9/L (ref 150–450)
PROT UR-MCNC: 0.06 G/L
PROT/CREAT 24H UR: 0.11 G/G CR (ref 0–0.2)
RBC # BLD AUTO: 4.51 10E12/L (ref 4.4–5.9)
RBC #/AREA URNS AUTO: NORMAL /HPF
SOURCE: NORMAL
SP GR UR STRIP: 1.01 (ref 1–1.03)
UROBILINOGEN UR STRIP-ACNC: 0.2 EU/DL (ref 0.2–1)
WBC # BLD AUTO: 7.1 10E9/L (ref 4–11)
WBC #/AREA URNS AUTO: NORMAL /HPF

## 2020-07-06 PROCEDURE — 82784 ASSAY IGA/IGD/IGG/IGM EACH: CPT | Performed by: INTERNAL MEDICINE

## 2020-07-06 PROCEDURE — 86256 FLUORESCENT ANTIBODY TITER: CPT | Performed by: INTERNAL MEDICINE

## 2020-07-06 PROCEDURE — 84156 ASSAY OF PROTEIN URINE: CPT | Performed by: INTERNAL MEDICINE

## 2020-07-06 PROCEDURE — 86140 C-REACTIVE PROTEIN: CPT | Performed by: INTERNAL MEDICINE

## 2020-07-06 PROCEDURE — 86355 B CELLS TOTAL COUNT: CPT | Performed by: INTERNAL MEDICINE

## 2020-07-06 PROCEDURE — 85652 RBC SED RATE AUTOMATED: CPT | Performed by: INTERNAL MEDICINE

## 2020-07-06 PROCEDURE — 84460 ALANINE AMINO (ALT) (SGPT): CPT | Performed by: INTERNAL MEDICINE

## 2020-07-06 PROCEDURE — 82040 ASSAY OF SERUM ALBUMIN: CPT | Performed by: INTERNAL MEDICINE

## 2020-07-06 PROCEDURE — 81001 URINALYSIS AUTO W/SCOPE: CPT | Performed by: INTERNAL MEDICINE

## 2020-07-06 PROCEDURE — 82565 ASSAY OF CREATININE: CPT | Performed by: INTERNAL MEDICINE

## 2020-07-06 PROCEDURE — 83876 ASSAY MYELOPEROXIDASE: CPT | Performed by: INTERNAL MEDICINE

## 2020-07-06 PROCEDURE — 84450 TRANSFERASE (AST) (SGOT): CPT | Performed by: INTERNAL MEDICINE

## 2020-07-06 PROCEDURE — 83516 IMMUNOASSAY NONANTIBODY: CPT | Performed by: INTERNAL MEDICINE

## 2020-07-06 PROCEDURE — 85025 COMPLETE CBC W/AUTO DIFF WBC: CPT | Performed by: INTERNAL MEDICINE

## 2020-07-06 PROCEDURE — 36415 COLL VENOUS BLD VENIPUNCTURE: CPT | Performed by: INTERNAL MEDICINE

## 2020-07-06 PROCEDURE — 86255 FLUORESCENT ANTIBODY SCREEN: CPT | Performed by: INTERNAL MEDICINE

## 2020-07-07 LAB
ANCA AB PATTERN SER IF-IMP: ABNORMAL
C-ANCA TITR SER IF: ABNORMAL {TITER}
CD19 CELLS # BLD: 314 CELLS/UL (ref 107–698)
CD19 CELLS NFR BLD: 25 % (ref 6–27)
IGA SERPL-MCNC: 80 MG/DL (ref 84–499)
IGG SERPL-MCNC: 970 MG/DL (ref 610–1616)
IGM SERPL-MCNC: 28 MG/DL (ref 35–242)
MYELOPEROXIDASE AB SER-ACNC: <0.2 AI (ref 0–0.9)
PROTEINASE3 IGG SER-ACNC: 3.6 AI (ref 0–0.9)

## 2020-07-10 ENCOUNTER — VIRTUAL VISIT (OUTPATIENT)
Dept: RHEUMATOLOGY | Facility: CLINIC | Age: 75
End: 2020-07-10
Attending: INTERNAL MEDICINE
Payer: COMMERCIAL

## 2020-07-10 DIAGNOSIS — M31.30 GRANULOMATOSIS WITH POLYANGIITIS, UNSPECIFIED WHETHER RENAL INVOLVEMENT (H): Primary | ICD-10-CM

## 2020-07-10 DIAGNOSIS — Z79.899 HIGH RISK MEDICATIONS (NOT ANTICOAGULANTS) LONG-TERM USE: ICD-10-CM

## 2020-07-10 NOTE — PATIENT INSTRUCTIONS
Labs next month (GFR or kidney function, vasculitis markers)    No change in meds    Drink water    Return in 6 months

## 2020-07-10 NOTE — LETTER
"7/10/2020       RE: Joshua Ennis  [address] [facility] [address]     Dear Colleague,    Thank you for referring your patient, Joshua Ennis, to the [facility] RHEUMATOLOGY at [facility]. Please see a copy of my visit note below.    Joshua Ennis is a 75 year old male who is being evaluated via a billable telephone visit.      The patient has been notified of following:     \"This telephone visit will be conducted via a call between you and your physician/provider. We have found that certain health care needs can be provided without the need for a physical exam.  This service lets us provide the care you need with a short phone conversation.  If a prescription is necessary we can send it directly to your pharmacy.  If lab work is needed we can place an order for that and you can then stop by our lab to have the test done at a later time.    Telephone visits are billed at different rates depending on your insurance coverage. During this emergency period, for some insurers they may be billed the same as an in-person visit.  Please reach out to your insurance provider with any questions.    If during the course of the call the physician/provider feels a telephone visit is not appropriate, you will not be charged for this service.\"    Patient has given verbal consent for Telephone visit?  Yes    What phone number would you like to be contacted at? [phone]    How would you like to obtain your AVS? Mail a copy    Phone call duration: 18 min    Ede Sanchez MD        [facility] Virtual  Follow-Up Note  Date of visit: 7/10/2020  Last visit date: 6/13/2018    Joshua Ennis MRN# [id number]   Age: 75 year old    Reason for visit: GPA    (this is a phone visit due to COVID-19 outbreak), patient agreed   YOB: 1945          Assessment and Plan:     Assessment:   Mr. Ennis is a 75 yr old  male with history of melanoma s/p resection in " 11/2011, former tobacco use, and GPA (PR3+, MPO and c-ANCA negative in 3/2013 based on aforementioned labs, confirmed by kidney and lung biopsy) who presents for clinic follow-up regarding GPA.  He was initiated on cyclophosphamide 150 mg QD and high-dose prednisone 3/2013. He last took prednisone in 10/2013. Cytoxan was switched to MTX for maintenance treatment in 10/2013.  He has been tolerating it well. He had a flare in 12/2013 with increased crusts in his nose along with recurrence of arthralgia, worsening numbness in feet and increasing vasculitis marker. Renal function was stable with negative repeat chest CT. His vasculitis flare was treated with short course of prednisone taper 20 mg po qd max and increasing MTX to 8 tab = 20 mg qwk. Vasculitis symptoms improved.  At visit on 1/2015, he had scabs in his nose, had cold [place]ota symptoms and increased arthralgia/fatigue (shoulders, L hand). Labs from 12/16 showed increased Cr level and hematuria with rising PR3 (more than 8), there was a concern for vasculitis flare (in kidneys, sinuses), no flare on repeat chest CT 1/2015. Therefore it was recommended to try rituximab. Another reason was to be able to taper MTX off given abnormal Cr.  He is now s/p Rituximab infusion x 4 (1st on 2/25/2015). He is feeling well. No hematuria with stable Cr, no SOB. Saw ENT with negative nasal mucosa biopsy 6/2015 for granuloma but showed active inflammation, had left nostril lesion which decreased in size by use of mupirocin ointment. PR3 vasculitis marker went back to NL in 6/2015, it was NL in 10/2015, given stable vasculitis and low GFR, MTX from 8 to 7 tab po qwk. Repeat GA-3 in 3/2016 was slightly positive, Cr was slightly higher than baseline. We tapered his MTX further to 6 tab po qwk in 2/2016 given lack of symptoms. Re-check labs in April were almost unchanged from 3/2016 but CRP was slightly up. Labs on 2/7/17 with elevated CRP (40.6) but normal ESR (12) and ANCA 1:20  likely reflecting bacterial infection.    In 3/2017, MTX was reduced from 6 to 5 tab qwk. It was further tapered to 4 tab po qwk in 6/2017 given stable disease. No vasculitis flare ups by such change. ANCA remained negative, AK-3 is going down despite tapering MTX, dropped from 2.8 in 4/2017 to 2.3 in 6/2017 to 2 in 8/2017. 2.4 in 3/2018.    Vasculitis seems to be stable and he has no signs or symptoms of flare up today. Labs from 7/6/2020, shows slight increase in C-ANCA, PR3 which might not be significant. His Cr is up, however his U/A does not show any blood or protein so  I don't think this rise in Cr is due to vasculitis flare. This could be due to dehydration as he does not drink much water during summer.    Recommend to continue MTX at low dose of 4 tab po qwk for now but drink plenty of water and have Cr, ANCA, PR3 re-checked next month.    Was advised to call in case of vasculitis flare up sx.        Plan:    - Continue MTX 10 mg (4 tabs) po qwk, continue with Folic acid 1 mg po qd    > Labs in a month (as above) and q3 months including MTX monitoring labs    > Patient instructed to hold MTX if develops fevers or infection  - Neuropathy unchanged on Gabapentin  - F/u with Dr. Obando ENT for small bluish pigmented lesion in the left soft palate.  Dr. Obando recommended biopsy in 5/2017 but pt wanted to hold off    -Shingrix vaccine. Highly recommended shingrix (he had an episode of severe shingles since last visit). CDC recommends this vaccine 2 doses,  by 2-6 months for adults 50 years and older. It is killed virus and ok to be used in immunocompromised patients. Shingrix reduces the risk of shingles and PHN by more than 90% in people 50 and older.     AE include: pain, redness and swelling at the inj site, myalgia, fatigue, headaches, shivering, fever and stomach upset.    He is going to get it locally.    - RTC in 6 months         Orders Placed This Encounter   Procedures     Basic metabolic panel      Myeloperoxidase and Proteinase 3 Panel     ANCA IgG by IFA with Reflex to Titer          Phone visit duration:  18 min      Ede Sanchez MD         HPI / Interim History:      Mr. Ennis is a 71 yo WM with h/o melanoma s/p resection in 11/2011, former tobacco use, and GPA diagnosed in 3/2013. For details of GPA diagnosis, please refer to initial consult note. Briefly, he initially presented with constitutional sx of fever, sweating, weight loss, migratory arthralgia, numbness/shooting pain in feet to his PCP.  Had enlarged inguinal LNs. His PCP suspected malignancy and especially lung cancer given h/o tobacco use.  His work-up showed several lung nodules and CT guided biopsy of one of the nodules done in 2/13 showed granulomatous inflammation with no evidence of malignancy.  Inguinal LN biopsy done 3/13 showed inflammation with no malignancy.  He had not had any respiratory sx, SOB, CP, cough, hemoptysis or sinusitis.  He was hospitalized for further work up and was found having high PR3, neg MPO, neg ANCA.  He also had + RF and trace cryo.   Had microscopic hematuria with NL Cr at the time of admission but his Cr started to rise before discharge. Had abnormal LFTs toward the end of discharge(was trending down, liver US was unremarkable).  He was diagnosed with GPA given high titer of PR3 to 723, +granulomatous inflammation of the lung nodule, microscopic hematuria, and migratory arthralgia along with high ESR/CRP and constitutional sx.  He was Started on cytoxan 150 mg po qd, prednisone 70 mg po qd (after IV solumedrol 1 gr qd x 3 days), Bactrim DS three times a wk for PCP prophylaxis and fosamax 70 mg po qwk. He was also put on vit D 81535 units qwk and neurontin.  He was discharged on 3/13/2013.   At initial visit in Rheumatology clinic, patient was referred to nephrology for renal biopsy since Cr was up despite being on cytoxan and prednisone 75 qd.  Renal biopsy 3/13 confirmed Dx of GPA.  After  discussion with Dr. Krishnamurthy, made a decision to continue with current regimen and if no improvement to switch to IV cytoxan or rituximab.  His Cr initially increased to 1.71, but stabilized to ~ 1.3.  He has a h/o high BP and was recently started on losartan per nephology.  He has been seen by neurology for bilaterally foot and ankle numbness and was diagnosed with neuropathy possibly secondary to vasculitis.  He was also seen by oncology; there was no concern for malignancy.   Overall, the patient is doing well today with no specific complaints or concerns.  He had recent blood work 9/19 which showed negative inflammatory and vasculitis markers (PR3, MPO, ANCA, and CRP).  His SCr has remained elevated, but stable at 1.37.  He is at the tail end of a prednisone taper; currently taking 1 mg QD, set to d/c 10/15.  He is still on cyclophosphamide, taking 150 mg QD.  He was prescribed losartan at a f/u visit w/ nephrology 8/21, but states his BP have been running 130's/80's, so has not taken the medication for over a month.  Additionally, he had been taking neurontin for numbness in his feet, toes, and ankles, but stopped b/c he was not getting symptom relief.  Today, the patient denies vision changes, epistaxis, oral ulcerations, SOB, hemoptysis, CP, joint pain, abdominal pain, dysuria, or hematuria.  He does endorse a cough, but states that this has been intermittent, chronic, and non-productive.  Additionally, he endorses right shoulder discomfort that is worse w/ abduction, but has also been chronic. Of note, the patient remains a former smoker, having quit in February of this year.     His major complaint is increased numbness of his toes, but has resumed taking neurontin and feels sensation is coming back which is painful. No SOB, cough, crusts in nose or blood in urine. He has fatigue. He is off cytoxan since 10/2013, is on MTX 8tab po qwk. MTX was started with 4 tab po qwk in 10/2013, was increased to 8 tab after  last visit in 1/2014. In 12/2013, was felt to have a small flare of vasculitis with increased PR3, crusts in nose and R shoulder pain; therefore prednisone taper with max dose of 20 mg qd was given along with increasing MTX to 7 tab qwk. MTX was later increased to 8 tab qwk in 1/2014. Labs done in 2/2014 showed rising PR3. He had ER visit on 1/8 for urosepsis (E. Coli), was treated with cedifir, recovered. Continues having shoulder pain, requests referral to PT.    1/2015: He reports having increased arthralgia (shoulders, L hand) x a month. About 2 wk ago, L hand pain was so bad that he could not move his hand. No major joint swelling. Has no AM stiffness. Reports being tested negative for lyme (local lab) about 2 wk ago. Appetite is worse. He is more tired. He sleeps a lot. Has a cold x 1 wk, no hemoptysis. Neuropathy is the same. No SOB. No fever. Reports having a rash under L axilla x last 2 wk, comes and goes but to him looks like a lyme rash. He thinks he probably had a tick bite.    Last visit 3/2015: Rituximab qwk x 4 doses was recommended at last visit given concern for active GPA. He had his 1st infusion on 2/25/2015, right after start of infusion, developed hives without any resp sx, was treated with extra dose of benadryl and solu cortef. Hives resolved. Infusion was resumed at a slower rate and he finished and tolerated it well. Had his 2nd infusion on 3/4/2015 with no reaction although this time I increased his solumedrol from 100 to 250 mg as part of pre-medication. He has since completed all 4 infusions w/o reaction. States that nasal and joint symptoms remain improved. He still does have some crusting in his left nostril. Does have some pain and swelling in the hands w/minimal morning stiffness but that is his baseline. Says pain in his shoulders has improved. He denies any medication side effects.    He was seen in the ED 4/3/15 for productive cough and fever. He was treated with Levaquin for  "likely pna. He is now feeling better with only lingering cough.    10/16/2015: He is feeling well, has no complaints except cough at night because of sinus drainage (chronic). Requests pneumonia shot.    2/2016: Today patient reports feeling in excellent condition. Denies chest symptoms, such as SOB, hemoptysis, cough, or nasal drainage/bleeding. He also denies any hematuria and his last renal labs were stable. Unfortunately, his neuropathy seems to be his biggest problem. Neurology started him on a low dose of gabapentin without improvement.     5/6/16: Besides stuffy nose due to seasonal allergies, has no complaints. No cough, SOB, hemoptysis, hematuria, weight loss or fatigue.    8/12/16: Patient has no complaints today. No cough, SOB, hemoptysis, hematuria, weight loss or fatigue. He was seen today in Nephrology clinic for CKD follow up, doing well, no further follow up is needed. Patient reports visual disturbance with decreased vision probably on the R eye (paitent does not remember) with decreased to none visual strength in low visual field with some \"blanks\" which lasted for 40 seconds. This is a second episode in the last 1.5-2 years. Patient also reports recurrent double vision occuring every 4-5 months. Last ophthalmologist appointment was 1986.     11/18/16: Feeling very well, no complaints. Is going to have an eye exam today.    3/3/17: Mr. Ennis says he is doing well overall. He is still recovering from a pneumonia diagnosed on 2/14/17. He says that he was feeling ill for about 2 weeks before this with worsening productive cough, nasal congestion and high fevers with \"fever blisters\". He went to the ED on 2/14/17 and received ceftriaxone and a Z-pack and says he has been improving since then. He says that many other people at his work were sick with similar symptoms. Today he reports some residual nasal congestion but denies shortness of breath. He says that during his illness he did have some blood " "in his nose \"if I picked at it\" but he otherwise denies epistaxis, hemoptysis, hematuria and blood in his stools. His significant other is wondering if his methotrexate dose can start to be tapered down. Currently he is taking MTX 15mg weekly although this was held when he had pneumonia.    Mr. Ennis says that he continues to experience episodes of diplopia on average once per month. These episodes typically last 20-30 seconds. He was seen by ophthalmology on 11/18/16 and had a normal eye exam.    12/2018: Feeling well with no complaints. On MTX 4 tab po qwk.    Today 7/10/2020: Last summer 7/2019 had shingles over back and hips. Never had shingrix vaccine. Had eye infection (? which eye) in 1/2020 and was treated with antibiotic eyedrops. Still takes methotrexate 4 tabs a week. No nasal crusting. No SOB, CP, or cough, hemoptysis, or skin rashes. Has some morning pain over L hand (knuckles) x past few months. It lasts 20 minutes, it is tolerable.         Past Medical History:     Past Medical History:   Diagnosis Date     Arthritis      Autoimmune disease (H)      Hearing problem      Hoarseness      Hypertension      Kidney problem      Malignant melanoma (H)      Malignant neoplasm (H)     melanoma     Squamous cell carcinoma      Russo syndrome           Past Surgical History:     Past Surgical History:   Procedure Laterality Date     BIOPSY  11/2011    melanoma on back     DISSECT LYMPH NODE INGUINAL  3/1/2013    Procedure: DISSECT LYMPH NODE INGUINAL;  Bilateral Inguinal Lymph Node Biopsy.;  Surgeon: Tariq Acosta MD;  Location: [facility]     ESOPHAGOSCOPY, GASTROSCOPY, DUODENOSCOPY (EGD), COMBINED  7/5/2013    Procedure: COMBINED ESOPHAGOSCOPY, GASTROSCOPY, DUODENOSCOPY (EGD);  Gastroscopy;  Surgeon: Tariq Acosta MD;  Location: [facility]     HERNIORRHAPHY INGUINAL  8/6/2012    Procedure: HERNIORRHAPHY INGUINAL;  Open Repair Left Inguinal Hernia;  Surgeon: Jerad Baker MD;  Location: [facility] OR "          Social History:     Social History     Tobacco Use     Smoking status: Former Smoker     Packs/day: 1.00     Years: 20.00     Pack years: 20.00     Types: Cigarettes     Last attempt to quit: 2013     Years since quittin.4     Smokeless tobacco: Never Used   Substance Use Topics     Alcohol use: Yes     Comment: rare          Family History:     Family History   Problem Relation Age of Onset     Diabetes Mother      Hypertension Mother      Cancer Father         stomach     Heart Disease Father      Heart Disease Brother      Diabetes Sister      Diabetes Sister      Diabetes Sister      Heart Disease Brother      Cancer Sister      Glaucoma No family hx of      Macular Degeneration No family hx of           Immunizations:   Due for PPSV23, will administer today. Immunizations otherwise up to date.    PMHx, FHx, SHx were reviewed, unchanged.         Allergies:     Allergies   Allergen Reactions     Shrimp Nausea and Vomiting     Got sick each time he ate it          Medications:     Current Outpatient Medications   Medication Sig     Acetaminophen (TYLENOL PO) Take 650 mg by mouth once as needed for mild pain or fever     calcium-vitamin D (CALCIUM 600 + D) 600-400 MG-UNIT per tablet Take 1 tablet by mouth 2 times daily.     folic acid (FOLVITE) 1 MG tablet Take 2 tablets (2 mg) by mouth daily     gabapentin (NEURONTIN) 400 MG capsule Take 1 capsule (400 mg) by mouth At Bedtime     losartan (COZAAR) 50 MG tablet Take 1 tablet (50 mg) by mouth daily     methotrexate 2.5 MG tablet Take 4 tablets (10 mg) by mouth every 7 days     Blood Pressure Monitor KIT Automatic Blood Pressure Monitor (Patient not taking: Reported on 7/10/2020)     Cholecalciferol (VITAMIN D) 1000 UNITS capsule Take 1 capsule by mouth daily.     neomycin-polymyxin-dexamethasone (MAXITROL) 3.5-27593-2.1 SUSP ophthalmic susp Place 1-2 drops Into the left eye 4 times daily 7-10 days (Patient not taking: Reported on 7/10/2020)     No  current facility-administered medications for this visit.           Review of Systems:   A comprehensive ROS was done. Positives are per HPI.           Physical Exam:     Not done, phone visit         Data:   Recent laboratory data reviewed.    2/14/17  Cr 1.46, WBC 7.8, AST/ALT normal    2/7/17  CRP 40.6, ESR 12, ANCA 1:20, MPO neg, WV-3 neg    Recent imaging studies reviewed by me.    CXR (2/14/17)    New mild patchy opacity at the right lung base appears to  localize to the posterior right lower lobe base and could represent a  developing area of pneumonia. Left lung is clear.    CHEST CT (2/16/2013)    Chest: Multiple indeterminate pulmonary nodules. Several of the  larger lesions are cavitary. There are 3 dominant lesions, a 3.2 cm  cavitary lesion in the left upper lobe, a 3.4 cm solid mass in the  right lower lobe laterally and a 3.7 cm cavitary mass in the right  lower lobe medially. In addition there is mediastinal lymphadenopathy  with a dominant 3 cm subcarinal node. Bilateral axillary  lymphadenopathy with 2.4 cm node seen bilaterally. Chest otherwise  unremarkable.       Exam: High-resolution Chest CT without contrast 1/9/2015 12:42 PM.  History: Concern for ANCA vasculitis flare in the chest.  Comparison: 3/7/2014, 11/14/2013, 2/16/2013.  Technique: CT helical acquisition from the lung apices to the kidneys  was obtained without intravenous contrast. Both inspiratory and  expiratory images were obtained.    Findings:  Moderate atherosclerotic calcifications of the coronary arteries. Mild  calcifications involving the aorta and aortic great vessels. Prominent  paratracheal lymph node on series 3 image 22 measuring 9 mm. Decreased  from 2/16/2013 and unchanged from 3/7/2014. Borderline enlarged right  hilar lymph node, unchanged from 3/7/2014 and decreased from  2/16/2013. Heart is not enlarged. Trace cardial effusion. No axillary  lymphadenopathy.  Nodular scarring in the left apex, unchanged from  2/16/2013. No  pleural effusion or pneumothorax. No evidence of intrapulmonary  infection. There is a cluster of tree in bud nodularity noted in the  anteromedial right upper lobe. These nodules appear new. Expiratory  images demonstrate mild air trapping. Scattered pulmonary nodules:  Series 6 image 146: Seen posteriorly in the right lower lobe, there is  a sub-pleural nodule measuring 1.6 x 1.2 cm with a spiculated border.  Previously, this nodule measured 2.0 x 1.6 cm.  Series 6 image 189: Seen laterally in the right lower lobe, there is a  former mid pulmonary nodule which is unchanged from 3/7/2013 and  decreased from 2/16/2013.  Series 6 image 169: Seen posterolaterally in the right lower lobe,  there is a 3 mm pulmonary nodule which is unchanged from 3/7/2014 and  decreased from 2/16/2013.  Series 6 image 225: Seen posterolaterally in the left lower lobe,  there is a 4 mm subpleural nodule which is unchanged from 2/16/2014.  Other scattered sub-4 mm pulmonary nodules appear stable from previous  study.  Evaluation of the upper abdomen is limited due to the lack of  intravenous contrast.  No suspicious bony lesions.    IMPRESSION  Impression:   1. Scattered pulmonary nodules enlarged lymph nodes are  stable/slightly decreased from the previous study. No evidence for  acute flare in patient's known ANCA-associated vasculitis.  2. New tree in bud nodularity seen anteromedially in the right middle  lobe. Findings could be seen in the setting of an atypical infectious  process.  3. Mild air trapping. Finding is nonspecific and is seen in setting of  small airways disease such as asthma or bronchiolitis.  I have personally reviewed the examination and initial interpretation  and I agree with the findings.  TOÑO PERKINS MD    Component      Latest Ref Rng 6/11/2015 6/11/2015           9:26 AM  9:29 AM   Sodium      133 - 144 mmol/L  139   Potassium      3.4 - 5.3 mmol/L  4.4   Chloride      94 - 109 mmol/L  109    Carbon Dioxide      20 - 32 mmol/L  23   Anion Gap      3 - 14 mmol/L  7   Glucose      70 - 99 mg/dL  81   Urea Nitrogen      7 - 30 mg/dL  28   Creatinine      0.66 - 1.25 mg/dL  1.26 (H)   GFR Estimate      >60 mL/min/1.7m2  57 (L)   GFR Estimate If Black      >60 mL/min/1.7m2  69   Calcium      8.5 - 10.1 mg/dL  9.2   Phosphorus      2.5 - 4.5 mg/dL  2.1 (L)   Albumin      3.4 - 5.0 g/dL  1.7 (L)   Iron      35 - 180 ug/dL  129   Iron Binding Cap      240 - 430 ug/dL  292   Iron Saturation Index      15 - 46 %  44   Protein Random Urine       0.11    Protein Total Urine g/gr Creatinine      0 - 0.2 g/g Cr 0.14    Hemoglobin      13.3 - 17.7 g/dL  13.0 (L)   Ferritin      26 - 388 ng/mL  193   Parathormone Intact      12 - 72 pg/mL  49   Vitamin D Deficiency screening      30 - 75 ug/L  55   Creatinine Urine       84    Copath Report           Proteinase 3 Antibody IgG      0.0 - 0.9 AI       Component      Latest Ref Rng 6/11/2015 6/11/2015          11:15 AM 12:18 PM   Copath Report       Patient Name: ADONAY FAITH . . .    Proteinase 3 Antibody IgG      0.0 - 0.9 AI  0.8     Copath Report     Patient Name: ADONAY FAITH  MR#: [id number]  Specimen #: [id number]  Collected: 6/11/2015  Received: 6/11/2015  Reported: 6/12/2015 17:37  Ordering Phy(s): DANIELLE PARIS    SPECIMEN(S):  Nasal septum    FINAL DIAGNOSIS:  Nasal septum, biopsy:  -Squamous mucosa with ulcer, marked acute inflammation and reactive  changes  -No granulomas identified  -A special stain for fungi (GMS) is negative    I have personally reviewed all specimens and or slides, including the  listed special stains, and used them with my medical judgement to  determine the final diagnosis.    Electronically signed out by:    Dorys Barton M.D., [facility]    CLINICAL HISTORY:  The patient is a 70-year-old man with a history of left upper back  melanoma, resected in 2011 (see consult report [id number]). 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 He now presents for followup visit with nasal  cavity scabs, increased arthralgia and fatigue, concerning for  vasculitis flare. Operative procedure: Nasal septum biopsy.    GROSS:  The specimen is received in formalin with proper patient identification,  labeled \"septal tissue\". The specimen consists of three tan-pink tissue  fragments, 0.2 cm in greatest dimension, entirely submitted in cassette  1. (Dictated by: Ana Mixon 6/11/2015 03:39 PM)    MICROSCOPIC:  Microscopic examination is performed. A GMS stain, performed with  appropriate positive control, is negative.    CPT Codes:  A: 80267-XL1, 24979-UUG    TESTING LAB LOCATION:  [facility], [address] [id number] [phone]    COLLECTION SITE:  Client: [facility]  Location: [facility] (B)         Component      Latest Ref Rng & Units 7/6/2020   WBC      4.0 - 11.0 10e9/L 7.1   RBC Count      4.4 - 5.9 10e12/L 4.51   Hemoglobin      13.3 - 17.7 g/dL 14.5   Hematocrit      40.0 - 53.0 % 43.2   MCV      78 - 100 fl 96   MCH      26.5 - 33.0 pg 32.2   MCHC      31.5 - 36.5 g/dL 33.6   RDW      10.0 - 15.0 % 12.4   Platelet Count      150 - 450 10e9/L 194   % Neutrophils      % 70.3   % Lymphocytes      % 17.1   % Monocytes      % 8.7   % Eosinophils      % 3.5   % Basophils      % 0.4   Absolute Neutrophil      1.6 - 8.3 10e9/L 5.0   Absolute Lymphocytes      0.8 - 5.3 10e9/L 1.2   Absolute Monocytes      0.0 - 1.3 10e9/L 0.6   Absolute Eosinophils      0.0 - 0.7 10e9/L 0.3   Absolute Basophils      0.0 - 0.2 10e9/L 0.0   Diff Method       Automated Method   Color Urine       Yellow   Appearance Urine       Clear   Glucose Urine      " NEG:Negative mg/dL Negative   Bilirubin Urine      NEG:Negative Negative   Ketones Urine      NEG:Negative mg/dL Negative   Specific Gravity Urine      1.003 - 1.035 1.015   pH Urine      5.0 - 7.0 pH 5.5   Protein Albumin Urine      NEG:Negative mg/dL Negative   Urobilinogen Urine      0.2 - 1.0 EU/dL 0.2   Nitrite Urine      NEG:Negative Negative   Blood Urine      NEG:Negative Negative   Leukocyte Esterase Urine      NEG:Negative Negative   Source       Midstream Urine   WBC Urine      OTO5:0 - 5 /HPF 0 - 5   RBC Urine      OTO2:O - 2 /HPF O - 2   IGG      610 - 1,616 mg/dL 970   IGA      84 - 499 mg/dL 80 (L)   IGM      35 - 242 mg/dL 28 (L)   Creatinine      0.66 - 1.25 mg/dL 1.67 (H)   GFR Estimate      >60 mL/min/1.73:m2 40 (L)   GFR Estimate If Black      >60 mL/min/1.73:m2 46 (L)   Neutrophil Cytoplasmic Antibody      <1:10 titer 1:20 (A)   Neutrophil Cytoplasmic Antibody Pattern       Cytoplasmic ANCA (c-ANCA) staining pattern observed and confirmed on formalin-fixed . . .   CD19 B Cells      6 - 27 % 25   Absolute CD19      107 - 698 cells/uL 314   Myeloperoxidase Antibody IgG      0.0 - 0.9 AI <0.2   Proteinase 3 Antibody IgG      0.0 - 0.9 AI 3.6 (H)   Protein Random Urine      g/L 0.06   Protein Total Urine g/gr Creatinine      0 - 0.2 g/g Cr 0.11   Sed Rate      0 - 20 mm/h 5   CRP Inflammation      0.0 - 8.0 mg/L <2.9   Albumin      3.4 - 5.0 g/dL 4.0   ALT      0 - 70 U/L 29   AST      0 - 45 U/L 25   Creatinine Urine Random      mg/dL 53

## 2020-07-10 NOTE — PROGRESS NOTES
"Joshua Ennis is a 75 year old male who is being evaluated via a billable telephone visit.      The patient has been notified of following:     \"This telephone visit will be conducted via a call between you and your physician/provider. We have found that certain health care needs can be provided without the need for a physical exam.  This service lets us provide the care you need with a short phone conversation.  If a prescription is necessary we can send it directly to your pharmacy.  If lab work is needed we can place an order for that and you can then stop by our lab to have the test done at a later time.    Telephone visits are billed at different rates depending on your insurance coverage. During this emergency period, for some insurers they may be billed the same as an in-person visit.  Please reach out to your insurance provider with any questions.    If during the course of the call the physician/provider feels a telephone visit is not appropriate, you will not be charged for this service.\"    Patient has given verbal consent for Telephone visit?  Yes    What phone number would you like to be contacted at? [phone]    How would you like to obtain your AVS? Mail a copy    Phone call duration: 18 min    Ede Sanchez MD      "

## 2020-07-10 NOTE — PROGRESS NOTES
[facility] Virtual  Follow-Up Note  Date of visit: 7/10/2020  Last visit date: 6/13/2018    Joshua Ennis MRN# [id number]   Age: 75 year old    Reason for visit: GPA    (this is a phone visit due to COVID-19 outbreak), patient agreed   YOB: 1945          Assessment and Plan:     Assessment:   Mr. Ennis is a 75 yr old  male with history of melanoma s/p resection in 11/2011, former tobacco use, and GPA (PR3+, MPO and c-ANCA negative in 3/2013 based on aforementioned labs, confirmed by kidney and lung biopsy) who presents for clinic follow-up regarding GPA.  He was initiated on cyclophosphamide 150 mg QD and high-dose prednisone 3/2013. He last took prednisone in 10/2013. Cytoxan was switched to MTX for maintenance treatment in 10/2013.  He has been tolerating it well. He had a flare in 12/2013 with increased crusts in his nose along with recurrence of arthralgia, worsening numbness in feet and increasing vasculitis marker. Renal function was stable with negative repeat chest CT. His vasculitis flare was treated with short course of prednisone taper 20 mg po qd max and increasing MTX to 8 tab = 20 mg qwk. Vasculitis symptoms improved.  At visit on 1/2015, he had scabs in his nose, had cold [place] symptoms and increased arthralgia/fatigue (shoulders, L hand). Labs from 12/16 showed increased Cr level and hematuria with rising PR3 (more than 8), there was a concern for vasculitis flare (in kidneys, sinuses), no flare on repeat chest CT 1/2015. Therefore it was recommended to try rituximab. Another reason was to be able to taper MTX off given abnormal Cr.  He is now s/p Rituximab infusion x 4 (1st on 2/25/2015). He is feeling well. No hematuria with stable Cr, no SOB. Saw ENT with negative nasal mucosa biopsy 6/2015 for granuloma but showed active inflammation, had left nostril lesion which decreased in size by use of mupirocin ointment. PR3 vasculitis marker went back to NL in  6/2015, it was NL in 10/2015, given stable vasculitis and low GFR, MTX from 8 to 7 tab po qwk. Repeat AL-3 in 3/2016 was slightly positive, Cr was slightly higher than baseline. We tapered his MTX further to 6 tab po qwk in 2/2016 given lack of symptoms. Re-check labs in April were almost unchanged from 3/2016 but CRP was slightly up. Labs on 2/7/17 with elevated CRP (40.6) but normal ESR (12) and ANCA 1:20 likely reflecting bacterial infection.    In 3/2017, MTX was reduced from 6 to 5 tab qwk. It was further tapered to 4 tab po qwk in 6/2017 given stable disease. No vasculitis flare ups by such change. ANCA remained negative, AL-3 is going down despite tapering MTX, dropped from 2.8 in 4/2017 to 2.3 in 6/2017 to 2 in 8/2017. 2.4 in 3/2018.    Vasculitis seems to be stable and he has no signs or symptoms of flare up today. Labs from 7/6/2020, shows slight increase in C-ANCA, PR3 which might not be significant. His Cr is up, however his U/A does not show any blood or protein so  I don't think this rise in Cr is due to vasculitis flare. This could be due to dehydration as he does not drink much water during summer.    Recommend to continue MTX at low dose of 4 tab po qwk for now but drink plenty of water and have Cr, ANCA, PR3 re-checked next month.    Was advised to call in case of vasculitis flare up sx.        Plan:    - Continue MTX 10 mg (4 tabs) po qwk, continue with Folic acid 1 mg po qd    > Labs in a month (as above) and q3 months including MTX monitoring labs    > Patient instructed to hold MTX if develops fevers or infection  - Neuropathy unchanged on Gabapentin  - F/u with Dr. Obando ENT for small bluish pigmented lesion in the left soft palate.  Dr. Obando recommended biopsy in 5/2017 but pt wanted to hold off    -Shingrix vaccine. Highly recommended shingrix (he had an episode of severe shingles since last visit). CDC recommends this vaccine 2 doses,  by 2-6 months for adults 50 years and older.  It is killed virus and ok to be used in immunocompromised patients. Shingrix reduces the risk of shingles and PHN by more than 90% in people 50 and older.     AE include: pain, redness and swelling at the inj site, myalgia, fatigue, headaches, shivering, fever and stomach upset.    He is going to get it locally.    - RTC in 6 months         Orders Placed This Encounter   Procedures     Basic metabolic panel     Myeloperoxidase and Proteinase 3 Panel     ANCA IgG by IFA with Reflex to Titer          Phone visit duration:  18 min      Ede Sanchez MD         HPI / Interim History:      Mr. Ennis is a 69 yo WM with h/o melanoma s/p resection in 11/2011, former tobacco use, and GPA diagnosed in 3/2013. For details of GPA diagnosis, please refer to initial consult note. Briefly, he initially presented with constitutional sx of fever, sweating, weight loss, migratory arthralgia, numbness/shooting pain in feet to his PCP.  Had enlarged inguinal LNs. His PCP suspected malignancy and especially lung cancer given h/o tobacco use.  His work-up showed several lung nodules and CT guided biopsy of one of the nodules done in 2/13 showed granulomatous inflammation with no evidence of malignancy.  Inguinal LN biopsy done 3/13 showed inflammation with no malignancy.  He had not had any respiratory sx, SOB, CP, cough, hemoptysis or sinusitis.  He was hospitalized for further work up and was found having high PR3, neg MPO, neg ANCA.  He also had + RF and trace cryo.   Had microscopic hematuria with NL Cr at the time of admission but his Cr started to rise before discharge. Had abnormal LFTs toward the end of discharge(was trending down, liver US was unremarkable).  He was diagnosed with GPA given high titer of PR3 to 723, +granulomatous inflammation of the lung nodule, microscopic hematuria, and migratory arthralgia along with high ESR/CRP and constitutional sx.  He was Started on cytoxan 150 mg po qd, prednisone 70 mg po qd (after  IV solumedrol 1 gr qd x 3 days), Bactrim DS three times a wk for PCP prophylaxis and fosamax 70 mg po qwk. He was also put on vit D 36310 units qwk and neurontin.  He was discharged on 3/13/2013.   At initial visit in Rheumatology clinic, patient was referred to nephrology for renal biopsy since Cr was up despite being on cytoxan and prednisone 75 qd.  Renal biopsy 3/13 confirmed Dx of GPA.  After discussion with Dr. Krishnamurthy, made a decision to continue with current regimen and if no improvement to switch to IV cytoxan or rituximab.  His Cr initially increased to 1.71, but stabilized to ~ 1.3.  He has a h/o high BP and was recently started on losartan per nephology.  He has been seen by neurology for bilaterally foot and ankle numbness and was diagnosed with neuropathy possibly secondary to vasculitis.  He was also seen by oncology; there was no concern for malignancy.   Overall, the patient is doing well today with no specific complaints or concerns.  He had recent blood work 9/19 which showed negative inflammatory and vasculitis markers (PR3, MPO, ANCA, and CRP).  His SCr has remained elevated, but stable at 1.37.  He is at the tail end of a prednisone taper; currently taking 1 mg QD, set to d/c 10/15.  He is still on cyclophosphamide, taking 150 mg QD.  He was prescribed losartan at a f/u visit w/ nephrology 8/21, but states his BP have been running 130's/80's, so has not taken the medication for over a month.  Additionally, he had been taking neurontin for numbness in his feet, toes, and ankles, but stopped b/c he was not getting symptom relief.  Today, the patient denies vision changes, epistaxis, oral ulcerations, SOB, hemoptysis, CP, joint pain, abdominal pain, dysuria, or hematuria.  He does endorse a cough, but states that this has been intermittent, chronic, and non-productive.  Additionally, he endorses right shoulder discomfort that is worse w/ abduction, but has also been chronic. Of note, the patient  remains a former smoker, having quit in February of this year.     His major complaint is increased numbness of his toes, but has resumed taking neurontin and feels sensation is coming back which is painful. No SOB, cough, crusts in nose or blood in urine. He has fatigue. He is off cytoxan since 10/2013, is on MTX 8tab po qwk. MTX was started with 4 tab po qwk in 10/2013, was increased to 8 tab after last visit in 1/2014. In 12/2013, was felt to have a small flare of vasculitis with increased PR3, crusts in nose and R shoulder pain; therefore prednisone taper with max dose of 20 mg qd was given along with increasing MTX to 7 tab qwk. MTX was later increased to 8 tab qwk in 1/2014. Labs done in 2/2014 showed rising PR3. He had ER visit on 1/8 for urosepsis (E. Coli), was treated with cedifir, recovered. Continues having shoulder pain, requests referral to PT.    1/2015: He reports having increased arthralgia (shoulders, L hand) x a month. About 2 wk ago, L hand pain was so bad that he could not move his hand. No major joint swelling. Has no AM stiffness. Reports being tested negative for lyme (local lab) about 2 wk ago. Appetite is worse. He is more tired. He sleeps a lot. Has a cold x 1 wk, no hemoptysis. Neuropathy is the same. No SOB. No fever. Reports having a rash under L axilla x last 2 wk, comes and goes but to him looks like a lyme rash. He thinks he probably had a tick bite.    Last visit 3/2015: Rituximab qwk x 4 doses was recommended at last visit given concern for active GPA. He had his 1st infusion on 2/25/2015, right after start of infusion, developed hives without any resp sx, was treated with extra dose of benadryl and solu cortef. Hives resolved. Infusion was resumed at a slower rate and he finished and tolerated it well. Had his 2nd infusion on 3/4/2015 with no reaction although this time I increased his solumedrol from 100 to 250 mg as part of pre-medication. He has since completed all 4 infusions  "w/o reaction. States that nasal and joint symptoms remain improved. He still does have some crusting in his left nostril. Does have some pain and swelling in the hands w/minimal morning stiffness but that is his baseline. Says pain in his shoulders has improved. He denies any medication side effects.    He was seen in the ED 4/3/15 for productive cough and fever. He was treated with Levaquin for likely pna. He is now feeling better with only lingering cough.    10/16/2015: He is feeling well, has no complaints except cough at night because of sinus drainage (chronic). Requests pneumonia shot.    2/2016: Today patient reports feeling in excellent condition. Denies chest symptoms, such as SOB, hemoptysis, cough, or nasal drainage/bleeding. He also denies any hematuria and his last renal labs were stable. Unfortunately, his neuropathy seems to be his biggest problem. Neurology started him on a low dose of gabapentin without improvement.     5/6/16: Besides stuffy nose due to seasonal allergies, has no complaints. No cough, SOB, hemoptysis, hematuria, weight loss or fatigue.    8/12/16: Patient has no complaints today. No cough, SOB, hemoptysis, hematuria, weight loss or fatigue. He was seen today in Nephrology clinic for CKD follow up, doing well, no further follow up is needed. Patient reports visual disturbance with decreased vision probably on the R eye (paitent does not remember) with decreased to none visual strength in low visual field with some \"blanks\" which lasted for 40 seconds. This is a second episode in the last 1.5-2 years. Patient also reports recurrent double vision occuring every 4-5 months. Last ophthalmologist appointment was 1986.     11/18/16: Feeling very well, no complaints. Is going to have an eye exam today.    3/3/17: Mr. Ennis says he is doing well overall. He is still recovering from a pneumonia diagnosed on 2/14/17. He says that he was feeling ill for about 2 weeks before this with " "worsening productive cough, nasal congestion and high fevers with \"fever blisters\". He went to the ED on 2/14/17 and received ceftriaxone and a Z-pack and says he has been improving since then. He says that many other people at his work were sick with similar symptoms. Today he reports some residual nasal congestion but denies shortness of breath. He says that during his illness he did have some blood in his nose \"if I picked at it\" but he otherwise denies epistaxis, hemoptysis, hematuria and blood in his stools. His significant other is wondering if his methotrexate dose can start to be tapered down. Currently he is taking MTX 15mg weekly although this was held when he had pneumonia.    Mr. Ennis says that he continues to experience episodes of diplopia on average once per month. These episodes typically last 20-30 seconds. He was seen by ophthalmology on 11/18/16 and had a normal eye exam.    12/2018: Feeling well with no complaints. On MTX 4 tab po qwk.    Today 7/10/2020: Last summer 7/2019 had shingles over back and hips. Never had shingrix vaccine. Had eye infection (? which eye) in 1/2020 and was treated with antibiotic eyedrops. Still takes methotrexate 4 tabs a week. No nasal crusting. No SOB, CP, or cough, hemoptysis, or skin rashes. Has some morning pain over L hand (knuckles) x past few months. It lasts 20 minutes, it is tolerable.         Past Medical History:     Past Medical History:   Diagnosis Date     Arthritis      Autoimmune disease (H)      Hearing problem      Hoarseness      Hypertension      Kidney problem      Malignant melanoma (H)      Malignant neoplasm (H)     melanoma     Squamous cell carcinoma      Russo syndrome           Past Surgical History:     Past Surgical History:   Procedure Laterality Date     BIOPSY  11/2011    melanoma on back     DISSECT LYMPH NODE INGUINAL  3/1/2013    Procedure: DISSECT LYMPH NODE INGUINAL;  Bilateral Inguinal Lymph Node Biopsy.;  Surgeon: Karla, " Tariq Blackmon MD;  Location: [facility]     ESOPHAGOSCOPY, GASTROSCOPY, DUODENOSCOPY (EGD), COMBINED  2013    Procedure: COMBINED ESOPHAGOSCOPY, GASTROSCOPY, DUODENOSCOPY (EGD);  Gastroscopy;  Surgeon: Tariq Acosta MD;  Location: [facility]     HERNIORRHAPHY INGUINAL  2012    Procedure: HERNIORRHAPHY INGUINAL;  Open Repair Left Inguinal Hernia;  Surgeon: Jerad Baker MD;  Location: [facility]          Social History:     Social History     Tobacco Use     Smoking status: Former Smoker     Packs/day: 1.00     Years: 20.00     Pack years: 20.00     Types: Cigarettes     Last attempt to quit: 2013     Years since quittin.4     Smokeless tobacco: Never Used   Substance Use Topics     Alcohol use: Yes     Comment: rare          Family History:     Family History   Problem Relation Age of Onset     Diabetes Mother      Hypertension Mother      Cancer Father         stomach     Heart Disease Father      Heart Disease Brother      Diabetes Sister      Diabetes Sister      Diabetes Sister      Heart Disease Brother      Cancer Sister      Glaucoma No family hx of      Macular Degeneration No family hx of           Immunizations:   Due for PPSV23, will administer today. Immunizations otherwise up to date.    PMHx, FHx, SHx were reviewed, unchanged.         Allergies:     Allergies   Allergen Reactions     Shrimp Nausea and Vomiting     Got sick each time he ate it          Medications:     Current Outpatient Medications   Medication Sig     Acetaminophen (TYLENOL PO) Take 650 mg by mouth once as needed for mild pain or fever     calcium-vitamin D (CALCIUM 600 + D) 600-400 MG-UNIT per tablet Take 1 tablet by mouth 2 times daily.     folic acid (FOLVITE) 1 MG tablet Take 2 tablets (2 mg) by mouth daily     gabapentin (NEURONTIN) 400 MG capsule Take 1 capsule (400 mg) by mouth At Bedtime     losartan (COZAAR) 50 MG tablet Take 1 tablet (50 mg) by mouth daily     methotrexate 2.5 MG tablet Take 4 tablets (10 mg)  by mouth every 7 days     Blood Pressure Monitor KIT Automatic Blood Pressure Monitor (Patient not taking: Reported on 7/10/2020)     Cholecalciferol (VITAMIN D) 1000 UNITS capsule Take 1 capsule by mouth daily.     neomycin-polymyxin-dexamethasone (MAXITROL) 3.5-64085-6.1 SUSP ophthalmic susp Place 1-2 drops Into the left eye 4 times daily 7-10 days (Patient not taking: Reported on 7/10/2020)     No current facility-administered medications for this visit.           Review of Systems:   A comprehensive ROS was done. Positives are per HPI.           Physical Exam:     Not done, phone visit         Data:   Recent laboratory data reviewed.    2/14/17  Cr 1.46, WBC 7.8, AST/ALT normal    2/7/17  CRP 40.6, ESR 12, ANCA 1:20, MPO neg, NE-3 neg    Recent imaging studies reviewed by me.    CXR (2/14/17)    New mild patchy opacity at the right lung base appears to  localize to the posterior right lower lobe base and could represent a  developing area of pneumonia. Left lung is clear.    CHEST CT (2/16/2013)    Chest: Multiple indeterminate pulmonary nodules. Several of the  larger lesions are cavitary. There are 3 dominant lesions, a 3.2 cm  cavitary lesion in the left upper lobe, a 3.4 cm solid mass in the  right lower lobe laterally and a 3.7 cm cavitary mass in the right  lower lobe medially. In addition there is mediastinal lymphadenopathy  with a dominant 3 cm subcarinal node. Bilateral axillary  lymphadenopathy with 2.4 cm node seen bilaterally. Chest otherwise  unremarkable.       Exam: High-resolution Chest CT without contrast 1/9/2015 12:42 PM.  History: Concern for ANCA vasculitis flare in the chest.  Comparison: 3/7/2014, 11/14/2013, 2/16/2013.  Technique: CT helical acquisition from the lung apices to the kidneys  was obtained without intravenous contrast. Both inspiratory and  expiratory images were obtained.    Findings:  Moderate atherosclerotic calcifications of the coronary arteries. Mild  calcifications  involving the aorta and aortic great vessels. Prominent  paratracheal lymph node on series 3 image 22 measuring 9 mm. Decreased  from 2/16/2013 and unchanged from 3/7/2014. Borderline enlarged right  hilar lymph node, unchanged from 3/7/2014 and decreased from  2/16/2013. Heart is not enlarged. Trace cardial effusion. No axillary  lymphadenopathy.  Nodular scarring in the left apex, unchanged from 2/16/2013. No  pleural effusion or pneumothorax. No evidence of intrapulmonary  infection. There is a cluster of tree in bud nodularity noted in the  anteromedial right upper lobe. These nodules appear new. Expiratory  images demonstrate mild air trapping. Scattered pulmonary nodules:  Series 6 image 146: Seen posteriorly in the right lower lobe, there is  a sub-pleural nodule measuring 1.6 x 1.2 cm with a spiculated border.  Previously, this nodule measured 2.0 x 1.6 cm.  Series 6 image 189: Seen laterally in the right lower lobe, there is a  former mid pulmonary nodule which is unchanged from 3/7/2013 and  decreased from 2/16/2013.  Series 6 image 169: Seen posterolaterally in the right lower lobe,  there is a 3 mm pulmonary nodule which is unchanged from 3/7/2014 and  decreased from 2/16/2013.  Series 6 image 225: Seen posterolaterally in the left lower lobe,  there is a 4 mm subpleural nodule which is unchanged from 2/16/2014.  Other scattered sub-4 mm pulmonary nodules appear stable from previous  study.  Evaluation of the upper abdomen is limited due to the lack of  intravenous contrast.  No suspicious bony lesions.    IMPRESSION  Impression:   1. Scattered pulmonary nodules enlarged lymph nodes are  stable/slightly decreased from the previous study. No evidence for  acute flare in patient's known ANCA-associated vasculitis.  2. New tree in bud nodularity seen anteromedially in the right middle  lobe. Findings could be seen in the setting of an atypical infectious  process.  3. Mild air trapping. Finding is  nonspecific and is seen in setting of  small airways disease such as asthma or bronchiolitis.  I have personally reviewed the examination and initial interpretation  and I agree with the findings.  TOÑO PERKINS MD    Component      Latest Ref Rng 6/11/2015 6/11/2015           9:26 AM  9:29 AM   Sodium      133 - 144 mmol/L  139   Potassium      3.4 - 5.3 mmol/L  4.4   Chloride      94 - 109 mmol/L  109   Carbon Dioxide      20 - 32 mmol/L  23   Anion Gap      3 - 14 mmol/L  7   Glucose      70 - 99 mg/dL  81   Urea Nitrogen      7 - 30 mg/dL  28   Creatinine      0.66 - 1.25 mg/dL  1.26 (H)   GFR Estimate      >60 mL/min/1.7m2  57 (L)   GFR Estimate If Black      >60 mL/min/1.7m2  69   Calcium      8.5 - 10.1 mg/dL  9.2   Phosphorus      2.5 - 4.5 mg/dL  2.1 (L)   Albumin      3.4 - 5.0 g/dL  1.7 (L)   Iron      35 - 180 ug/dL  129   Iron Binding Cap      240 - 430 ug/dL  292   Iron Saturation Index      15 - 46 %  44   Protein Random Urine       0.11    Protein Total Urine g/gr Creatinine      0 - 0.2 g/g Cr 0.14    Hemoglobin      13.3 - 17.7 g/dL  13.0 (L)   Ferritin      26 - 388 ng/mL  193   Parathormone Intact      12 - 72 pg/mL  49   Vitamin D Deficiency screening      30 - 75 ug/L  55   Creatinine Urine       84    Copath Report           Proteinase 3 Antibody IgG      0.0 - 0.9 AI       Component      Latest Ref Rng 6/11/2015 6/11/2015          11:15 AM 12:18 PM   Copath Report       Patient Name: ADONAY FAITH . . .    Proteinase 3 Antibody IgG      0.0 - 0.9 AI  0.8     Copath Report     Patient Name: ADONAY FAITH  MR#: [id number]  Specimen #: [id number]  Collected: 6/11/2015  Received: 6/11/2015  Reported: 6/12/2015 17:37  Ordering Phy(s): DANIELLE PARIS    SPECIMEN(S):  Nasal septum    FINAL DIAGNOSIS:  Nasal septum, biopsy:  -Squamous mucosa with ulcer, marked acute inflammation and reactive  changes  -No granulomas identified  -A special stain for fungi (GMS) is negative    I have personally  "reviewed all specimens and or slides, including the  listed special stains, and used them with my medical judgement to  determine the final diagnosis.    Electronically signed out by:    Dorys Barton M.D., [facility]    CLINICAL HISTORY:  The patient is a 70-year-old man with a history of left upper back  melanoma, resected in 2011 (see consult report [id number]). 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 He now presents for followup visit with nasal  cavity scabs, increased arthralgia and fatigue, concerning for  vasculitis flare. Operative procedure: Nasal septum biopsy.    GROSS:  The specimen is received in formalin with proper patient identification,  labeled \"septal tissue\". The specimen consists of three tan-pink tissue  fragments, 0.2 cm in greatest dimension, entirely submitted in cassette  1. (Dictated by: Ana Mixon 6/11/2015 03:39 PM)    MICROSCOPIC:  Microscopic examination is performed. A GMS stain, performed with  appropriate positive control, is negative.    CPT Codes:  A: 78852-WV3, 18832-HNL    TESTING LAB LOCATION:  [facility], [address] [address] [phone]    COLLECTION SITE:  Client: [facility]  Location: [facility] (B)         Component      Latest Ref Rng & Units 7/6/2020   WBC      4.0 - 11.0 10e9/L 7.1   RBC Count      4.4 - 5.9 10e12/L 4.51   Hemoglobin      13.3 - 17.7 g/dL 14.5   Hematocrit      40.0 - 53.0 % 43.2   MCV      78 - 100 fl 96   MCH      26.5 - 33.0 pg 32.2   MCHC      31.5 - 36.5 g/dL 33.6   RDW      10.0 - 15.0 % 12.4   Platelet Count      150 - 450 10e9/L 194   % Neutrophils      % 70.3   % Lymphocytes      % 17.1   % Monocytes      % 8.7   % " Eosinophils      % 3.5   % Basophils      % 0.4   Absolute Neutrophil      1.6 - 8.3 10e9/L 5.0   Absolute Lymphocytes      0.8 - 5.3 10e9/L 1.2   Absolute Monocytes      0.0 - 1.3 10e9/L 0.6   Absolute Eosinophils      0.0 - 0.7 10e9/L 0.3   Absolute Basophils      0.0 - 0.2 10e9/L 0.0   Diff Method       Automated Method   Color Urine       Yellow   Appearance Urine       Clear   Glucose Urine      NEG:Negative mg/dL Negative   Bilirubin Urine      NEG:Negative Negative   Ketones Urine      NEG:Negative mg/dL Negative   Specific Gravity Urine      1.003 - 1.035 1.015   pH Urine      5.0 - 7.0 pH 5.5   Protein Albumin Urine      NEG:Negative mg/dL Negative   Urobilinogen Urine      0.2 - 1.0 EU/dL 0.2   Nitrite Urine      NEG:Negative Negative   Blood Urine      NEG:Negative Negative   Leukocyte Esterase Urine      NEG:Negative Negative   Source       Midstream Urine   WBC Urine      OTO5:0 - 5 /HPF 0 - 5   RBC Urine      OTO2:O - 2 /HPF O - 2   IGG      610 - 1,616 mg/dL 970   IGA      84 - 499 mg/dL 80 (L)   IGM      35 - 242 mg/dL 28 (L)   Creatinine      0.66 - 1.25 mg/dL 1.67 (H)   GFR Estimate      >60 mL/min/1.73:m2 40 (L)   GFR Estimate If Black      >60 mL/min/1.73:m2 46 (L)   Neutrophil Cytoplasmic Antibody      <1:10 titer 1:20 (A)   Neutrophil Cytoplasmic Antibody Pattern       Cytoplasmic ANCA (c-ANCA) staining pattern observed and confirmed on formalin-fixed . . .   CD19 B Cells      6 - 27 % 25   Absolute CD19      107 - 698 cells/uL 314   Myeloperoxidase Antibody IgG      0.0 - 0.9 AI <0.2   Proteinase 3 Antibody IgG      0.0 - 0.9 AI 3.6 (H)   Protein Random Urine      g/L 0.06   Protein Total Urine g/gr Creatinine      0 - 0.2 g/g Cr 0.11   Sed Rate      0 - 20 mm/h 5   CRP Inflammation      0.0 - 8.0 mg/L <2.9   Albumin      3.4 - 5.0 g/dL 4.0   ALT      0 - 70 U/L 29   AST      0 - 45 U/L 25   Creatinine Urine Random      mg/dL 53

## 2020-07-15 ENCOUNTER — TELEPHONE (OUTPATIENT)
Dept: RHEUMATOLOGY | Facility: CLINIC | Age: 75
End: 2020-07-15

## 2020-08-10 DIAGNOSIS — Z79.899 HIGH RISK MEDICATIONS (NOT ANTICOAGULANTS) LONG-TERM USE: ICD-10-CM

## 2020-08-10 DIAGNOSIS — M31.30 GRANULOMATOSIS WITH POLYANGIITIS, UNSPECIFIED WHETHER RENAL INVOLVEMENT (H): ICD-10-CM

## 2020-08-10 LAB
ALBUMIN SERPL-MCNC: 3.8 G/DL (ref 3.4–5)
ALBUMIN UR-MCNC: NEGATIVE MG/DL
ALT SERPL W P-5'-P-CCNC: 24 U/L (ref 0–70)
ANION GAP SERPL CALCULATED.3IONS-SCNC: 6 MMOL/L (ref 3–14)
APPEARANCE UR: CLEAR
AST SERPL W P-5'-P-CCNC: 18 U/L (ref 0–45)
BASOPHILS # BLD AUTO: 0 10E9/L (ref 0–0.2)
BASOPHILS NFR BLD AUTO: 0.4 %
BILIRUB UR QL STRIP: NEGATIVE
BUN SERPL-MCNC: 34 MG/DL (ref 7–30)
CALCIUM SERPL-MCNC: 9.3 MG/DL (ref 8.5–10.1)
CHLORIDE SERPL-SCNC: 105 MMOL/L (ref 94–109)
CO2 SERPL-SCNC: 24 MMOL/L (ref 20–32)
COLOR UR AUTO: YELLOW
CREAT SERPL-MCNC: 1.53 MG/DL (ref 0.66–1.25)
CREAT UR-MCNC: 98 MG/DL
CRP SERPL-MCNC: <2.9 MG/L (ref 0–8)
DIFFERENTIAL METHOD BLD: ABNORMAL
EOSINOPHIL # BLD AUTO: 0.2 10E9/L (ref 0–0.7)
EOSINOPHIL NFR BLD AUTO: 3 %
ERYTHROCYTE [DISTWIDTH] IN BLOOD BY AUTOMATED COUNT: 13.2 % (ref 10–15)
ERYTHROCYTE [SEDIMENTATION RATE] IN BLOOD BY WESTERGREN METHOD: 5 MM/H (ref 0–20)
GFR SERPL CREATININE-BSD FRML MDRD: 44 ML/MIN/{1.73_M2}
GLUCOSE SERPL-MCNC: 93 MG/DL (ref 70–99)
GLUCOSE UR STRIP-MCNC: NEGATIVE MG/DL
HCT VFR BLD AUTO: 41.4 % (ref 40–53)
HGB BLD-MCNC: 13.6 G/DL (ref 13.3–17.7)
HGB UR QL STRIP: NEGATIVE
KETONES UR STRIP-MCNC: NEGATIVE MG/DL
LEUKOCYTE ESTERASE UR QL STRIP: NEGATIVE
LYMPHOCYTES # BLD AUTO: 1.2 10E9/L (ref 0.8–5.3)
LYMPHOCYTES NFR BLD AUTO: 15.7 %
MCH RBC QN AUTO: 31.9 PG (ref 26.5–33)
MCHC RBC AUTO-ENTMCNC: 32.9 G/DL (ref 31.5–36.5)
MCV RBC AUTO: 97 FL (ref 78–100)
MONOCYTES # BLD AUTO: 0.8 10E9/L (ref 0–1.3)
MONOCYTES NFR BLD AUTO: 10.2 %
NEUTROPHILS # BLD AUTO: 5.2 10E9/L (ref 1.6–8.3)
NEUTROPHILS NFR BLD AUTO: 70.7 %
NITRATE UR QL: NEGATIVE
PH UR STRIP: 5 PH (ref 5–7)
PLATELET # BLD AUTO: 176 10E9/L (ref 150–450)
POTASSIUM SERPL-SCNC: 4.6 MMOL/L (ref 3.4–5.3)
PROT UR-MCNC: 0.1 G/L
PROT/CREAT 24H UR: 0.1 G/G CR (ref 0–0.2)
RBC # BLD AUTO: 4.27 10E12/L (ref 4.4–5.9)
RBC #/AREA URNS AUTO: NORMAL /HPF
SODIUM SERPL-SCNC: 135 MMOL/L (ref 133–144)
SOURCE: NORMAL
SP GR UR STRIP: 1.02 (ref 1–1.03)
UROBILINOGEN UR STRIP-ACNC: 0.2 EU/DL (ref 0.2–1)
WBC # BLD AUTO: 7.4 10E9/L (ref 4–11)
WBC #/AREA URNS AUTO: NORMAL /HPF

## 2020-08-10 PROCEDURE — 84450 TRANSFERASE (AST) (SGOT): CPT | Performed by: FAMILY MEDICINE

## 2020-08-10 PROCEDURE — 83516 IMMUNOASSAY NONANTIBODY: CPT | Performed by: FAMILY MEDICINE

## 2020-08-10 PROCEDURE — 86140 C-REACTIVE PROTEIN: CPT | Performed by: FAMILY MEDICINE

## 2020-08-10 PROCEDURE — 85025 COMPLETE CBC W/AUTO DIFF WBC: CPT | Performed by: FAMILY MEDICINE

## 2020-08-10 PROCEDURE — 80048 BASIC METABOLIC PNL TOTAL CA: CPT | Performed by: FAMILY MEDICINE

## 2020-08-10 PROCEDURE — 85652 RBC SED RATE AUTOMATED: CPT | Performed by: FAMILY MEDICINE

## 2020-08-10 PROCEDURE — 86255 FLUORESCENT ANTIBODY SCREEN: CPT | Performed by: FAMILY MEDICINE

## 2020-08-10 PROCEDURE — 83876 ASSAY MYELOPEROXIDASE: CPT | Performed by: FAMILY MEDICINE

## 2020-08-10 PROCEDURE — 81001 URINALYSIS AUTO W/SCOPE: CPT | Performed by: FAMILY MEDICINE

## 2020-08-10 PROCEDURE — 36415 COLL VENOUS BLD VENIPUNCTURE: CPT | Performed by: FAMILY MEDICINE

## 2020-08-10 PROCEDURE — 82040 ASSAY OF SERUM ALBUMIN: CPT | Performed by: FAMILY MEDICINE

## 2020-08-10 PROCEDURE — 84460 ALANINE AMINO (ALT) (SGPT): CPT | Performed by: FAMILY MEDICINE

## 2020-08-10 PROCEDURE — 84156 ASSAY OF PROTEIN URINE: CPT | Performed by: FAMILY MEDICINE

## 2020-08-10 PROCEDURE — 86256 FLUORESCENT ANTIBODY TITER: CPT | Performed by: FAMILY MEDICINE

## 2020-08-10 NOTE — LETTER
September 23, 2020      Joshua Ennis  [address] [facility] [address]        Dear ,    We are writing to inform you of your test results.    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 Recommend to repeat labs in October (ordered). Please let me know if any change in your symptoms. If not, we'll continue to monitor labs without any change in your medications.    Resulted Orders   ANCA IgG by IFA with Reflex to Titer   Result Value Ref Range    Neutrophil Cytoplasmic Antibody 1:20 (A) <1:10 [titer]    Neutrophil Cytoplasmic Antibody Pattern       Cytoplasmic ANCA (c-ANCA) staining pattern observed and confirmed on formalin-fixed   neutrophils.     Myeloperoxidase and Proteinase 3 Panel   Result Value Ref Range    Myeloperoxidase Antibody IgG <0.2 0.0 - 0.9 AI      Comment:      Negative  Antibody index (AI) values reflect qualitative changes in antibody   concentration that cannot be directly associated with clinical condition or   disease state.      Proteinase 3 Antibody IgG 5.1 (H) 0.0 - 0.9 AI      Comment:      Positive  Antibody index (AI) values reflect qualitative changes in antibody   concentration that cannot be directly associated with clinical condition or   disease state.     Basic metabolic panel   Result Value Ref Range    Sodium 135 133 - 144 mmol/L    Potassium 4.6 3.4 - 5.3 mmol/L    Chloride 105 94 - 109 mmol/L    Carbon Dioxide 24 20 - 32 mmol/L    Anion Gap 6 3 - 14 mmol/L    Glucose 93 70 - 99 mg/dL    Urea Nitrogen 34 (H) 7 - 30 mg/dL    Creatinine 1.53 (H) 0.66 - 1.25 mg/dL    GFR Estimate 44 (L) >60 mL/min/[1.73_m2]      Comment:      Non  GFR Calc  Starting 12/18/2018, serum creatinine based estimated GFR (eGFR) will be   calculated using the Chronic Kidney Disease Epidemiology Collaboration   (CKD-EPI) equation.      GFR Estimate If Black 51 (L) >60 mL/min/[1.73_m2]      Comment:       GFR Calc  Starting 12/18/2018,  serum creatinine based estimated GFR (eGFR) will be   calculated using the Chronic Kidney Disease Epidemiology Collaboration   (CKD-EPI) equation.      Calcium 9.3 8.5 - 10.1 mg/dL   Erythrocyte sedimentation rate auto   Result Value Ref Range    Sed Rate 5 0 - 20 mm/h   Creatinine random urine   Result Value Ref Range    Creatinine Urine Random 98 mg/dL   Protein  random urine with Creat Ratio   Result Value Ref Range    Protein Random Urine 0.10 g/L    Protein Total Urine g/gr Creatinine 0.10 0 - 0.2 g/g Cr   CRP inflammation   Result Value Ref Range    CRP Inflammation <2.9 0.0 - 8.0 mg/L   CBC with platelets differential   Result Value Ref Range    WBC 7.4 4.0 - 11.0 10e9/L    RBC Count 4.27 (L) 4.4 - 5.9 10e12/L    Hemoglobin 13.6 13.3 - 17.7 g/dL    Hematocrit 41.4 40.0 - 53.0 %    MCV 97 78 - 100 fl    MCH 31.9 26.5 - 33.0 pg    MCHC 32.9 31.5 - 36.5 g/dL    RDW 13.2 10.0 - 15.0 %    Platelet Count 176 150 - 450 10e9/L    % Neutrophils 70.7 %    % Lymphocytes 15.7 %    % Monocytes 10.2 %    % Eosinophils 3.0 %    % Basophils 0.4 %    Absolute Neutrophil 5.2 1.6 - 8.3 10e9/L    Absolute Lymphocytes 1.2 0.8 - 5.3 10e9/L    Absolute Monocytes 0.8 0.0 - 1.3 10e9/L    Absolute Eosinophils 0.2 0.0 - 0.7 10e9/L    Absolute Basophils 0.0 0.0 - 0.2 10e9/L    Diff Method Automated Method    Albumin level   Result Value Ref Range    Albumin 3.8 3.4 - 5.0 g/dL   ALT   Result Value Ref Range    ALT 24 0 - 70 U/L   AST   Result Value Ref Range    AST 18 0 - 45 U/L   UA with Microscopic reflex to Culture   Result Value Ref Range    Color Urine Yellow     Appearance Urine Clear     Glucose Urine Negative NEG^Negative mg/dL    Bilirubin Urine Negative NEG^Negative    Ketones Urine Negative NEG^Negative mg/dL    Specific Gravity Urine 1.025 1.003 - 1.035    pH Urine 5.0 5.0 - 7.0 pH    Protein Albumin Urine Negative NEG^Negative mg/dL    Urobilinogen Urine 0.2 0.2 - 1.0 EU/dL    Nitrite Urine Negative NEG^Negative    Blood  Urine Negative NEG^Negative    Leukocyte Esterase Urine Negative NEG^Negative    Source Midstream Urine     WBC Urine 0 - 5 OTO5^0 - 5 /HPF    RBC Urine O - 2 OTO2^O - 2 /HPF       If you have any questions or concerns, please call the clinic at the number listed above.       Sincerely,      Ede Sanchez MD

## 2020-08-11 LAB
ANCA AB PATTERN SER IF-IMP: ABNORMAL
C-ANCA TITR SER IF: ABNORMAL {TITER}
MYELOPEROXIDASE AB SER-ACNC: <0.2 AI (ref 0–0.9)
PROTEINASE3 IGG SER-ACNC: 5.1 AI (ref 0–0.9)

## 2020-09-23 DIAGNOSIS — Z79.899 HIGH RISK MEDICATIONS (NOT ANTICOAGULANTS) LONG-TERM USE: ICD-10-CM

## 2020-09-23 DIAGNOSIS — M31.30 GRANULOMATOSIS WITH POLYANGIITIS, UNSPECIFIED WHETHER RENAL INVOLVEMENT (H): Primary | ICD-10-CM

## 2020-09-23 DIAGNOSIS — G62.9 NEUROPATHY: ICD-10-CM

## 2020-09-23 NOTE — RESULT ENCOUNTER NOTE
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 Recommend to repeat labs in October (ordered). Please let me know if any change in your symptoms. If not, we'll continue to monitor labs without any change in your medications.

## 2020-10-04 DIAGNOSIS — G62.9 NEUROPATHY: ICD-10-CM

## 2020-10-08 RX ORDER — GABAPENTIN 400 MG/1
400 CAPSULE ORAL AT BEDTIME
Qty: 90 CAPSULE | Refills: 1 | Status: SHIPPED | OUTPATIENT
Start: 2020-10-08 | End: 2021-04-01

## 2020-10-08 NOTE — TELEPHONE ENCOUNTER
gabapentin 400 mg capsule     Last Written Prescription Date:  4/1/2020  Last Fill Quantity: 90,   # refills: 1  Last Office Visit : 7/10/2020  Future Office visit:  1/8/2021    Routing refill request to provider for review/approval because:  Drug not on the [facility], [facility] or [facility] refill protocol or controlled substance      Leah Quintana RN  Central Triage Red Flags/Med Refills

## 2020-11-02 ENCOUNTER — APPOINTMENT (OUTPATIENT)
Dept: CT IMAGING | Facility: CLINIC | Age: 75
DRG: 184 | End: 2020-11-02
Attending: FAMILY MEDICINE
Payer: COMMERCIAL

## 2020-11-02 ENCOUNTER — HOSPITAL ENCOUNTER (INPATIENT)
Facility: CLINIC | Age: 75
LOS: 2 days | Discharge: HOME | DRG: 184 | End: 2020-11-04
Attending: FAMILY MEDICINE | Admitting: INTERNAL MEDICINE
Payer: COMMERCIAL

## 2020-11-02 DIAGNOSIS — Z20.828 EXPOSURE TO SARS-ASSOCIATED CORONAVIRUS: ICD-10-CM
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
## 2020-11-02 DIAGNOSIS — R91.8 LUNG MASS: ICD-10-CM

## 2020-11-02 DIAGNOSIS — R91.8 PULMONARY NODULES: ICD-10-CM

## 2020-11-02 DIAGNOSIS — S22.42XA MULTIPLE FRACTURES OF RIBS, LEFT SIDE, INITIAL ENCOUNTER FOR CLOSED FRACTURE: ICD-10-CM

## 2020-11-02 LAB
ANION GAP SERPL CALCULATED.3IONS-SCNC: 4 MMOL/L (ref 3–14)
BASOPHILS # BLD AUTO: 0.1 10E9/L (ref 0–0.2)
BASOPHILS NFR BLD AUTO: 0.6 %
BUN SERPL-MCNC: 32 MG/DL (ref 7–30)
CALCIUM SERPL-MCNC: 9.9 MG/DL (ref 8.5–10.1)
CHLORIDE SERPL-SCNC: 108 MMOL/L (ref 94–109)
CO2 SERPL-SCNC: 27 MMOL/L (ref 20–32)
CREAT SERPL-MCNC: 1.61 MG/DL (ref 0.66–1.25)
DIFFERENTIAL METHOD BLD: ABNORMAL
EOSINOPHIL # BLD AUTO: 0.1 10E9/L (ref 0–0.7)
EOSINOPHIL NFR BLD AUTO: 0.6 %
ERYTHROCYTE [DISTWIDTH] IN BLOOD BY AUTOMATED COUNT: 13.1 % (ref 10–15)
GFR SERPL CREATININE-BSD FRML MDRD: 42 ML/MIN/{1.73_M2}
GLUCOSE SERPL-MCNC: 125 MG/DL (ref 70–99)
HCT VFR BLD AUTO: 44.6 % (ref 40–53)
HGB BLD-MCNC: 14.9 G/DL (ref 13.3–17.7)
IMM GRANULOCYTES # BLD: 0.1 10E9/L (ref 0–0.4)
IMM GRANULOCYTES NFR BLD: 0.5 %
LYMPHOCYTES # BLD AUTO: 1 10E9/L (ref 0.8–5.3)
LYMPHOCYTES NFR BLD AUTO: 7.1 %
MCH RBC QN AUTO: 32.1 PG (ref 26.5–33)
MCHC RBC AUTO-ENTMCNC: 33.4 G/DL (ref 31.5–36.5)
MCV RBC AUTO: 96 FL (ref 78–100)
MONOCYTES # BLD AUTO: 0.8 10E9/L (ref 0–1.3)
MONOCYTES NFR BLD AUTO: 5.7 %
NEUTROPHILS # BLD AUTO: 11.7 10E9/L (ref 1.6–8.3)
NEUTROPHILS NFR BLD AUTO: 85.5 %
NRBC # BLD AUTO: 0 10*3/UL
NRBC BLD AUTO-RTO: 0 /100
PLATELET # BLD AUTO: 175 10E9/L (ref 150–450)
POTASSIUM SERPL-SCNC: 4.7 MMOL/L (ref 3.4–5.3)
RADIOLOGIST FLAGS: NORMAL
RBC # BLD AUTO: 4.64 10E12/L (ref 4.4–5.9)
SODIUM SERPL-SCNC: 139 MMOL/L (ref 133–144)
WBC # BLD AUTO: 13.7 10E9/L (ref 4–11)

## 2020-11-02 PROCEDURE — 85025 COMPLETE CBC W/AUTO DIFF WBC: CPT | Performed by: FAMILY MEDICINE

## 2020-11-02 PROCEDURE — 72128 CT CHEST SPINE W/O DYE: CPT

## 2020-11-02 PROCEDURE — 74177 CT ABD & PELVIS W/CONTRAST: CPT

## 2020-11-02 PROCEDURE — U0003 INFECTIOUS AGENT DETECTION BY NUCLEIC ACID (DNA OR RNA); SEVERE ACUTE RESPIRATORY SYNDROME CORONAVIRUS 2 (SARS-COV-2) (CORONAVIRUS DISEASE [COVID-19]), AMPLIFIED PROBE TECHNIQUE, MAKING USE OF HIGH THROUGHPUT TECHNOLOGIES AS DESCRIBED BY CMS-2020-01-R: HCPCS | Performed by: FAMILY MEDICINE

## 2020-11-02 PROCEDURE — 250N000011 HC RX IP 250 OP 636: Performed by: FAMILY MEDICINE

## 2020-11-02 PROCEDURE — 96376 TX/PRO/DX INJ SAME DRUG ADON: CPT | Performed by: FAMILY MEDICINE

## 2020-11-02 PROCEDURE — 99285 EMERGENCY DEPT VISIT HI MDM: CPT | Mod: 25 | Performed by: FAMILY MEDICINE

## 2020-11-02 PROCEDURE — 72125 CT NECK SPINE W/O DYE: CPT

## 2020-11-02 PROCEDURE — 96374 THER/PROPH/DIAG INJ IV PUSH: CPT | Performed by: FAMILY MEDICINE

## 2020-11-02 PROCEDURE — 96375 TX/PRO/DX INJ NEW DRUG ADDON: CPT | Performed by: FAMILY MEDICINE

## 2020-11-02 PROCEDURE — 250N000009 HC RX 250: Performed by: FAMILY MEDICINE

## 2020-11-02 PROCEDURE — 80048 BASIC METABOLIC PNL TOTAL CA: CPT | Performed by: FAMILY MEDICINE

## 2020-11-02 PROCEDURE — 120N000001 HC R&B MED SURG/OB

## 2020-11-02 PROCEDURE — C9803 HOPD COVID-19 SPEC COLLECT: HCPCS

## 2020-11-02 RX ORDER — IOPAMIDOL 755 MG/ML
88 INJECTION, SOLUTION INTRAVASCULAR ONCE
Status: COMPLETED | OUTPATIENT
Start: 2020-11-02 | End: 2020-11-02

## 2020-11-02 RX ORDER — HYDROMORPHONE HYDROCHLORIDE 1 MG/ML
0.5 INJECTION, SOLUTION INTRAMUSCULAR; INTRAVENOUS; SUBCUTANEOUS ONCE
Status: COMPLETED | OUTPATIENT
Start: 2020-11-02 | End: 2020-11-02

## 2020-11-02 RX ORDER — ONDANSETRON 2 MG/ML
4 INJECTION INTRAMUSCULAR; INTRAVENOUS
Status: DISCONTINUED | OUTPATIENT
Start: 2020-11-02 | End: 2020-11-03

## 2020-11-02 RX ADMIN — HYDROMORPHONE HYDROCHLORIDE 0.5 MG: 1 INJECTION, SOLUTION INTRAMUSCULAR; INTRAVENOUS; SUBCUTANEOUS at 20:01

## 2020-11-02 RX ADMIN — ONDANSETRON 4 MG: 2 INJECTION INTRAMUSCULAR; INTRAVENOUS at 20:02

## 2020-11-02 RX ADMIN — IOPAMIDOL 88 ML: 755 INJECTION, SOLUTION INTRAVENOUS at 20:42

## 2020-11-02 RX ADMIN — SODIUM CHLORIDE 64 ML: 9 INJECTION, SOLUTION INTRAVENOUS at 20:42

## 2020-11-02 RX ADMIN — HYDROMORPHONE HYDROCHLORIDE 0.5 MG: 1 INJECTION, SOLUTION INTRAMUSCULAR; INTRAVENOUS; SUBCUTANEOUS at 21:16

## 2020-11-03 ENCOUNTER — APPOINTMENT (OUTPATIENT)
Dept: GENERAL RADIOLOGY | Facility: CLINIC | Age: 75
DRG: 184 | End: 2020-11-03
Attending: INTERNAL MEDICINE
Payer: COMMERCIAL

## 2020-11-03 LAB
SARS-COV-2 RNA SPEC QL NAA+PROBE: NOT DETECTED
SPECIMEN SOURCE: NORMAL

## 2020-11-03 PROCEDURE — 120N000001 HC R&B MED SURG/OB

## 2020-11-03 PROCEDURE — 99207 PR CDG-CODE CATEGORY CHANGED: CPT | Performed by: INTERNAL MEDICINE

## 2020-11-03 PROCEDURE — 99222 1ST HOSP IP/OBS MODERATE 55: CPT | Mod: AI | Performed by: INTERNAL MEDICINE

## 2020-11-03 PROCEDURE — 250N000011 HC RX IP 250 OP 636: Performed by: FAMILY MEDICINE

## 2020-11-03 PROCEDURE — 71045 X-RAY EXAM CHEST 1 VIEW: CPT

## 2020-11-03 PROCEDURE — 250N000013 HC RX MED GY IP 250 OP 250 PS 637: Performed by: INTERNAL MEDICINE

## 2020-11-03 PROCEDURE — 99222 1ST HOSP IP/OBS MODERATE 55: CPT | Performed by: SURGERY

## 2020-11-03 RX ORDER — AMOXICILLIN 250 MG
1 CAPSULE ORAL 2 TIMES DAILY
Status: DISCONTINUED | OUTPATIENT
Start: 2020-11-03 | End: 2020-11-04 | Stop reason: HOSPADM

## 2020-11-03 RX ORDER — POLYETHYLENE GLYCOL 3350 17 G/17G
17 POWDER, FOR SOLUTION ORAL DAILY
Status: DISCONTINUED | OUTPATIENT
Start: 2020-11-03 | End: 2020-11-04 | Stop reason: HOSPADM

## 2020-11-03 RX ORDER — ACETAMINOPHEN 325 MG/1
650 TABLET ORAL EVERY 4 HOURS PRN
Status: DISCONTINUED | OUTPATIENT
Start: 2020-11-03 | End: 2020-11-04 | Stop reason: HOSPADM

## 2020-11-03 RX ORDER — OXYCODONE HYDROCHLORIDE 5 MG/1
5-10 TABLET ORAL
Status: DISCONTINUED | OUTPATIENT
Start: 2020-11-03 | End: 2020-11-04 | Stop reason: HOSPADM

## 2020-11-03 RX ORDER — LOSARTAN POTASSIUM 50 MG/1
50 TABLET ORAL DAILY
Status: DISCONTINUED | OUTPATIENT
Start: 2020-11-03 | End: 2020-11-04 | Stop reason: HOSPADM

## 2020-11-03 RX ORDER — NALOXONE HYDROCHLORIDE 0.4 MG/ML
.1-.4 INJECTION, SOLUTION INTRAMUSCULAR; INTRAVENOUS; SUBCUTANEOUS
Status: DISCONTINUED | OUTPATIENT
Start: 2020-11-03 | End: 2020-11-04 | Stop reason: HOSPADM

## 2020-11-03 RX ORDER — FOLIC ACID 1 MG/1
2 TABLET ORAL DAILY
Status: DISCONTINUED | OUTPATIENT
Start: 2020-11-03 | End: 2020-11-04 | Stop reason: HOSPADM

## 2020-11-03 RX ORDER — HYDROMORPHONE HYDROCHLORIDE 1 MG/ML
.3-.5 INJECTION, SOLUTION INTRAMUSCULAR; INTRAVENOUS; SUBCUTANEOUS
Status: DISCONTINUED | OUTPATIENT
Start: 2020-11-03 | End: 2020-11-03

## 2020-11-03 RX ORDER — HYDROMORPHONE HYDROCHLORIDE 1 MG/ML
.3-.5 INJECTION, SOLUTION INTRAMUSCULAR; INTRAVENOUS; SUBCUTANEOUS
Status: DISCONTINUED | OUTPATIENT
Start: 2020-11-03 | End: 2020-11-04 | Stop reason: HOSPADM

## 2020-11-03 RX ORDER — ONDANSETRON 4 MG/1
4 TABLET, ORALLY DISINTEGRATING ORAL EVERY 6 HOURS PRN
Status: DISCONTINUED | OUTPATIENT
Start: 2020-11-03 | End: 2020-11-04 | Stop reason: HOSPADM

## 2020-11-03 RX ORDER — ONDANSETRON 2 MG/ML
4 INJECTION INTRAMUSCULAR; INTRAVENOUS EVERY 6 HOURS PRN
Status: DISCONTINUED | OUTPATIENT
Start: 2020-11-03 | End: 2020-11-04 | Stop reason: HOSPADM

## 2020-11-03 RX ORDER — GABAPENTIN 400 MG/1
400 CAPSULE ORAL AT BEDTIME
Status: DISCONTINUED | OUTPATIENT
Start: 2020-11-03 | End: 2020-11-04 | Stop reason: HOSPADM

## 2020-11-03 RX ORDER — AMOXICILLIN 250 MG
2 CAPSULE ORAL 2 TIMES DAILY
Status: DISCONTINUED | OUTPATIENT
Start: 2020-11-03 | End: 2020-11-04 | Stop reason: HOSPADM

## 2020-11-03 RX ORDER — LIDOCAINE 40 MG/G
CREAM TOPICAL
Status: DISCONTINUED | OUTPATIENT
Start: 2020-11-03 | End: 2020-11-04 | Stop reason: HOSPADM

## 2020-11-03 RX ADMIN — POLYETHYLENE GLYCOL 3350 17 G: 17 POWDER, FOR SOLUTION ORAL at 08:13

## 2020-11-03 RX ADMIN — GABAPENTIN 400 MG: 400 CAPSULE ORAL at 00:30

## 2020-11-03 RX ADMIN — Medication 1 TABLET: at 08:10

## 2020-11-03 RX ADMIN — ACETAMINOPHEN 650 MG: 325 TABLET, FILM COATED ORAL at 10:12

## 2020-11-03 RX ADMIN — ACETAMINOPHEN 650 MG: 325 TABLET, FILM COATED ORAL at 14:56

## 2020-11-03 RX ADMIN — Medication 1 TABLET: at 21:33

## 2020-11-03 RX ADMIN — OXYCODONE HYDROCHLORIDE 10 MG: 5 TABLET ORAL at 10:12

## 2020-11-03 RX ADMIN — FOLIC ACID 2 MG: 1 TABLET ORAL at 08:09

## 2020-11-03 RX ADMIN — LOSARTAN POTASSIUM 50 MG: 50 TABLET, FILM COATED ORAL at 20:29

## 2020-11-03 RX ADMIN — HYDROMORPHONE HYDROCHLORIDE 0.5 MG: 1 INJECTION, SOLUTION INTRAMUSCULAR; INTRAVENOUS; SUBCUTANEOUS at 03:00

## 2020-11-03 RX ADMIN — DOCUSATE SODIUM AND SENNOSIDES 2 TABLET: 8.6; 5 TABLET ORAL at 20:29

## 2020-11-03 RX ADMIN — ACETAMINOPHEN 650 MG: 325 TABLET, FILM COATED ORAL at 21:33

## 2020-11-03 RX ADMIN — GABAPENTIN 400 MG: 400 CAPSULE ORAL at 20:40

## 2020-11-03 RX ADMIN — HYDROMORPHONE HYDROCHLORIDE 0.5 MG: 1 INJECTION, SOLUTION INTRAMUSCULAR; INTRAVENOUS; SUBCUTANEOUS at 00:29

## 2020-11-03 RX ADMIN — OXYCODONE HYDROCHLORIDE 5 MG: 5 TABLET ORAL at 06:40

## 2020-11-03 RX ADMIN — DOCUSATE SODIUM AND SENNOSIDES 2 TABLET: 8.6; 5 TABLET ORAL at 08:09

## 2020-11-03 RX ADMIN — HYDROMORPHONE HYDROCHLORIDE 0.5 MG: 1 INJECTION, SOLUTION INTRAMUSCULAR; INTRAVENOUS; SUBCUTANEOUS at 05:06

## 2020-11-03 RX ADMIN — OXYCODONE HYDROCHLORIDE 10 MG: 5 TABLET ORAL at 18:22

## 2020-11-03 RX ADMIN — OXYCODONE HYDROCHLORIDE 10 MG: 5 TABLET ORAL at 21:33

## 2020-11-03 RX ADMIN — OXYCODONE HYDROCHLORIDE 10 MG: 5 TABLET ORAL at 13:58

## 2020-11-03 ASSESSMENT — ACTIVITIES OF DAILY LIVING (ADL)
ADLS_ACUITY_SCORE: 13

## 2020-11-03 ASSESSMENT — MIFFLIN-ST. JEOR: SCORE: 1555.38

## 2020-11-03 NOTE — H&P
[facility] History and Physical    Joshua Ennis MRN# [id number]   Age: 75 year old YOB: 1945     Date of Admission:  11/2/2020    Home clinic: [facility]  Primary care provider: Patrick Mcintyre          Chief Complaint:   Fall from ladder. L chest pain    History is obtained from the patient          History of Present Illness:   The patient is a 75-year-old male presenting with left chest and back pain after falling from a ladder just prior to arrival.  Patient fell onto his left side into the grass from approximately 5-6 feet height. He denies falling on head-no LOC. Has L chest pain, no sob/n/v/d. No cough/fever/chills. No weakness/ numbness in any part of body. No hemetemesis/ no dysuria/ hematuria.            Past Medical History:     Patient Active Problem List    Diagnosis Date Noted     Multiple fractures of ribs, left side, initial encounter for closed fracture 11/02/2020     Priority: Medium     colonic polyps- Tubular Adenomas 04/18/2018     Priority: Medium     Collected: 4/13/2018   Received: 4/13/2018   Reported: 4/17/2018 19:01   Ordering Phy(s): GERMAN ASENCIO     For improved result formatting, select 'View Enhanced Report Format' under    Linked Documents section.     SPECIMEN(S):   A: Cecal polyp   B: Colon polyp at 30 cm     FINAL DIAGNOSIS:   A.  Cecum, mucosal biopsies:   - Tubular adenoma.   - Negative for high-grade dysplasia and malignancy.     B.  Colon at 30 cm, mucosal biopsies:   - Tubular adenoma and a fragment of normal colonic mucosa.   - Negative for high-grade dysplasia and malignancy.     Electronically signed out by:     Yuliet Melissa M.D.       CKD (chronic kidney disease) stage 3, GFR 30-59 ml/min 06/18/2014     Priority: Medium     Granulomatosis with polyangiitis (H) 09/12/2013     Priority: Medium     Diagnosis updated by automated process. Provider to review and confirm.       Encounter for long-term (current) use of steroids  07/30/2013     Priority: Medium     Anemia 07/10/2013     Priority: Medium     Hypertension, renal 05/20/2013     Priority: Medium     GERD (gastroesophageal reflux disease) 04/26/2013     Priority: Medium     Advanced directives, counseling/discussion 04/15/2013     Priority: Medium     Advance Care Planning:   Receipt of ACP document:  Received: Health Care Directive which was witnessed or notarized on 5/9/2013.  Document not previously scanned.  Validation form completed and sent with document to be scanned.  Code Status reflects choices in most recent ACP document.  Confirmed/documented designated decision maker(s). See permanent comments section of demographics in clinical tab. View document(s) and details by clicking on code status.   Added by Ana Ochoa on 6/3/2013.             Idiopathic progressive polyneuropathy 04/11/2013     Priority: Medium     Class: Acute     Melanoma (H) 02/14/2013     Priority: Medium     CARDIOVASCULAR SCREENING; LDL GOAL LESS THAN 160 07/06/2012     Priority: Medium     Tobacco abuse 04/16/2009     Priority: Medium     3/4 pack/day  X 25 years                  Past Surgical History:      Past Surgical History:   Procedure Laterality Date     BIOPSY  11/2011    melanoma on back     DISSECT LYMPH NODE INGUINAL  3/1/2013    Procedure: DISSECT LYMPH NODE INGUINAL;  Bilateral Inguinal Lymph Node Biopsy.;  Surgeon: Tariq Acosta MD;  Location: [facility]     ESOPHAGOSCOPY, GASTROSCOPY, DUODENOSCOPY (EGD), COMBINED  7/5/2013    Procedure: COMBINED ESOPHAGOSCOPY, GASTROSCOPY, DUODENOSCOPY (EGD);  Gastroscopy;  Surgeon: Tariq Acosta MD;  Location: [facility]     HERNIORRHAPHY INGUINAL  8/6/2012    Procedure: HERNIORRHAPHY INGUINAL;  Open Repair Left Inguinal Hernia;  Surgeon: Jerad Baker MD;  Location: [facility]             Social History:     Social History     Socioeconomic History     Marital status: Single     Spouse name: Not on file     Number of children: Not on file      Years of education: Not on file     Highest education level: Not on file   Occupational History     Employer: [employer]   Social Needs     Financial resource strain: Not on file     Food insecurity     Worry: Not on file     Inability: Not on file     Transportation needs     Medical: Not on file     Non-medical: Not on file   Tobacco Use     Smoking status: Former Smoker     Packs/day: 1.00     Years: 20.00     Pack years: 20.00     Types: Cigarettes     Quit date: 2013     Years since quittin.7     Smokeless tobacco: Never Used   Substance and Sexual Activity     Alcohol use: Yes     Comment: rare     Drug use: No     Sexual activity: Not Currently     Partners: Female   Lifestyle     Physical activity     Days per week: Not on file     Minutes per session: Not on file     Stress: Not on file   Relationships     Social connections     Talks on phone: Not on file     Gets together: Not on file     Attends Bahai service: Not on file     Active member of club or organization: Not on file     Attends meetings of clubs or organizations: Not on file     Relationship status: Not on file     Intimate partner violence     Fear of current or ex partner: Not on file     Emotionally abused: Not on file     Physically abused: Not on file     Forced sexual activity: Not on file   Other Topics Concern     Parent/sibling w/ CABG, MI or angioplasty before 65F 55M? Not Asked      Service Not Asked     Blood Transfusions Not Asked     Caffeine Concern Not Asked     Occupational Exposure Not Asked     Hobby Hazards Not Asked     Sleep Concern Not Asked     Stress Concern Not Asked     Weight Concern Not Asked     Special Diet Not Asked     Back Care Not Asked     Exercise Yes     Comment: walking     Bike Helmet Not Asked     Seat Belt Not Asked     Self-Exams Not Asked   Social History Narrative     Not on file             Family History:     Family History   Problem Relation Age of Onset      Diabetes Mother      Hypertension Mother      Cancer Father         stomach     Heart Disease Father      Heart Disease Brother      Diabetes Sister      Diabetes Sister      Diabetes Sister      Heart Disease Brother      Cancer Sister      Glaucoma No family hx of      Macular Degeneration No family hx of              Allergies:     Allergies   Allergen Reactions     Shrimp Nausea and Vomiting     Got sick each time he ate it             Medications:     Prior to Admission medications    Medication Sig Last Dose Taking? Auth Provider   calcium-vitamin D (CALCIUM 600 + D) 600-400 MG-UNIT per tablet Take 1 tablet by mouth 2 times daily. 11/2/2020 at 8:00 am Yes Reported, Patient   Cholecalciferol (VITAMIN D) 1000 UNITS capsule Take 1 capsule by mouth daily. 11/2/2020 at 8:00am Yes Reported, Patient   folic acid (FOLVITE) 1 MG tablet Take 2 tablets (2 mg) by mouth daily 11/2/2020 at 8:00am Yes Enrrique Faith MD   gabapentin (NEURONTIN) 400 MG capsule Take 1 capsule (400 mg) by mouth At Bedtime 11/1/2020 at 8:00pm Yes Ede Sanchez MD   losartan (COZAAR) 50 MG tablet Take 1 tablet (50 mg) by mouth daily 11/1/2020 at 8:00pm Yes Ede Sanchez MD   methotrexate 2.5 MG tablet Take 4 tablets (10 mg) by mouth every 7 days 11/1/2020 at 8:00am Yes Ede Sanchez MD   Acetaminophen (TYLENOL PO) Take 650 mg by mouth once as needed for mild pain or fever Unknown at Unknown time  Reported, Patient   Blood Pressure Monitor KIT Automatic Blood Pressure Monitor  Patient not taking: Reported on 7/10/2020   Aniket Krishnamurthy MD            Review of Systems:   The Review of Systems is negative in ALL other than noted in the HPI          Physical Exam:   Blood pressure (!) 144/73, pulse 80, temperature 98.6  F (37  C), temperature source Axillary, SpO2 93 %.  GENERAL APPEARANCE: healthy, alert and no distress  EYES: conjunctiva clear, eyes grossly normal  HENT: external ears and nose normal   NECK: supple, no masses or  adenopathy  RESP: lungs clear to auscultation - no rales, rhonchi or wheezes  CV: regular rate and rhythm, normal S1 S2, no S3 or S4 and no murmur, click or rub   ABDOMEN: soft, nontender, no HSM or masses and bowel sounds normal  MS: no clubbing, cyanosis; no edema-L chest tenderness on palpation  SKIN: clear without significant rashes or lesions  NEURO: Normal strength and tone, sensory exam grossly normal, mentation intact and speech normal         Data:     Lab Results   Component Value Date    WBC 13.7 (H) 11/02/2020    HGB 14.9 11/02/2020    HCT 44.6 11/02/2020    MCV 96 11/02/2020     11/02/2020     Lab Results   Component Value Date     11/02/2020    CO2 27 11/02/2020     Lab Results   Component Value Date    BUN 32 (H) 11/02/2020     No components found for: SEDRATE  No components found for: DDIMER  No results found for: BNP  No results found for: TSH  No results found for: TROPONIN  UA RESULTS:  Recent Labs   Lab Test 08/10/20  1601   COLOR Yellow   APPEARANCE Clear   URINEGLC Negative   URINEBILI Negative   URINEKETONE Negative   SG 1.025   UBLD Negative   URINEPH 5.0   PROTEIN Negative   UROBILINOGEN 0.2   NITRITE Negative   LEUKEST Negative   RBCU O - 2   WBCU 0 - 5     Liver Function Studies -   Recent Labs   Lab Test 08/10/20  1549 12/13/17  1228 12/13/17  1228   PROTTOTAL  --   --  7.8   ALBUMIN 3.8   < > 4.0   BILITOTAL  --   --  0.7   ALKPHOS  --   --  97   AST 18   < > 22   ALT 24   < > 30    < > = values in this interval not displayed.       EKG results:  none    RADIOLOGY:    CT CAP- IMPRESSION:  1.  Acute minimally displaced posterior left 9th rib fracture.     2.  Acute displaced lateral left 5th-8th rib fractures.     3.  No other fractures.     4.  Normal caliber thoracic and abdominal aorta without evidence for acute injury.     5.  No solid organ injury in the upper abdomen or significant free fluid throughout the abdomen or pelvis.     6.  No pneumothorax or pulmonary  contusion or significant pleural fluid.     7.  In the extreme left lung apex there is a 9 x 7 mm nodule extending toward the level of the pleural surface. Recommend continued short interval follow-up in 2-3 months to assess for stability.    CT THORACIC SPINE-IMPRESSION:  1.  Degenerative change throughout the thoracic spine with no evidence of thoracic spine fracture.     2.  Left eighth and ninth rib fractures    CT CERVICAL SPINE-IMPRESSION:  1.  No fracture or posttraumatic subluxation.  2.  No high-grade spinal canal or neural foraminal stenosis.    A/P    Fall with multiple displaced rib fx  CT as above. Being admitted for pain control. Surgery aware-will see pt in am. Will get CXR for tomorrow. Continue o2 as needed. Tele overnight.    Hypertension  Continue meds    CRD  Labs stable. At baseline.     Leucocytosis  Likely due to stress. Asymptomatic. Will f/u in am with CBC    idopathic progressive polyneuropathy  Continue neurontin    Granulomatosis with polyangiitis   Sees rheum. On methotrexate.     Dispo  Home in AM if stable.     DVT PROF-low risk ambulate     Geoffrey Arellano MD  [phone]

## 2020-11-03 NOTE — PROGRESS NOTES
[facility]     Hospitalist Progress Note    Date of Service (when I saw the patient): 11/03/2020    Assessment & Plan   Joshua Ennis is a 75 year old male who was admitted on 11/2/2020 with rib fractures after a 6 foot fall from a ladder.    Fall with displaced left 5th-9th rib fractures  CT demonstrated minimally displaced posterior 9th rib fracture and displaced left 5th-8th rib fractures.  Admitted for pain control.   - CXR AM of 11/3 demonstrate stable fractures  - General Surgery consult for trauma appreciated  - Repeat CXR tomorrow  - Continue pain control     Hypertension  Continue losartan     Chronic kidney disease stage 3  Cr stable at baseline of 1.5 to 1.7.      Leucocytosis  Likely due to stress. Asymptomatic. Will f/u in am with CBC     Idopathic progressive polyneuropathy  Continue neurontin     Granulomatosis with polyangiitis   Sees rheum. On methotrexate.      DVT Prophylaxis: Pneumatic Compression Devices  Code Status: Prior    Disposition: Expected discharge in 1-2 days once CXR again stable overnight.    [place]    Interval History   Patient complains of pain with movement in left chest.  Pain is well controlled at rest.    -Data reviewed today: I reviewed all new labs and imaging results over the last 24 hours. I personally reviewed the chest x-ray image(s) showing displaced 5-9 left rib fractures.    Physical Exam   Temp: 97.6  F (36.4  C) Temp src: Oral BP: (!) 143/68 Pulse: 59   Resp: 16 SpO2: 93 % O2 Device: None (Room air)    Vitals:    11/03/20 0014   Weight: 82 kg (180 lb 12.4 oz)     Vital Signs with Ranges  Temp:  [97.6  F (36.4  C)-98.6  F (37  C)] 97.6  F (36.4  C)  Pulse:  [57-87] 59  Resp:  [16-18] 16  BP: (127-160)/() 143/68  SpO2:  [90 %-99 %] 93 %  No intake/output data recorded.    Gen: Well nourished, well developed, alert and oriented x 3, no acute distressed  HEENT: Atraumatic, normocephalic; sclera non-injected, anicterric;  oral mucosa moist, no lesion, no exudate  Lungs: Clear to ausculation, no wheezes, no rhonchi, no rales  Heart: Regular rate, regular rhythm, no gallops, no rubs, no murmurs  GI: Bowel sound normal, no hepatosplenomegaly, no masses, non-tender, non-distended, no guarding, no rebound tenderness  Lymph: No lymphadenopathy, no edema  Skin: No rashes, no chronic venous stasis     Medications       calcium carbonate-vitamin D  1 tablet Oral BID     folic acid  2 mg Oral Daily     gabapentin  400 mg Oral At Bedtime     losartan  50 mg Oral Daily     polyethylene glycol  17 g Oral Daily     senna-docusate  1 tablet Oral BID    Or     senna-docusate  2 tablet Oral BID     sodium chloride (PF)  3 mL Intracatheter Q8H       Data   Recent Labs   Lab 11/02/20 1954   WBC 13.7*   HGB 14.9   MCV 96         POTASSIUM 4.7   CHLORIDE 108   CO2 27   BUN 32*   CR 1.61*   ANIONGAP 4   LAMAR 9.9   *       Recent Results (from the past 24 hour(s))   CT Cervical Spine w/o Contrast    Narrative    EXAM: CT CERVICAL SPINE W/O CONTRAST  LOCATION: [facility]  DATE/TIME: 11/2/2020 8:41 PM    INDICATION: Fall the left side. Neck pain.  COMPARISON: None.  TECHNIQUE: Routine without IV contrast. Multiplanar reformats. Dose reduction techniques were used.    FINDINGS:  VERTEBRA: No fracture or posttraumatic subluxation. Millimeter degenerative retrolisthesis of the 3 C4. No additional listhesis. Vertebral body heights are maintained. No aggressive sclerotic or lytic osseous lesion. Incidental os terminale. Multilevel   moderate degenerative disc disease throughout the cervical spine. Left greater than right multilevel mild facet arthropathy.    CANAL/FORAMINA: No high-grade canal or neural foraminal stenosis.    PARASPINAL: No acute extraspinal abnormality. Nonspecific mild mastoid tip effusions bilaterally.      Impression    IMPRESSION:  1.  No fracture or posttraumatic subluxation.  2.  No high-grade spinal canal  or neural foraminal stenosis.   CT Thoracic Spine w/o Contrast    Narrative    EXAM: CT THORACIC SPINE W/O CONTRAST  LOCATION: [facility]  DATE/TIME: 11/2/2020 8:41 PM    INDICATION: Trauma.  COMPARISON: None.  TECHNIQUE: Routine without IV contrast. Multiplanar reformats.  Dose reduction techniques were used.     FINDINGS:  VERTEBRA: Left eighth and ninth rib fractures. No evidence of fracture of the thoracic spine. Mild degenerative change with slight loss of disc height involving the thoracic disc spaces. No fracture or posttraumatic subluxation.         PARASPINAL: No extraspinal abnormality.      Impression    IMPRESSION:  1.  Degenerative change throughout the thoracic spine with no evidence of thoracic spine fracture.    2.  Left eighth and ninth rib fractures.     CT Chest/Abdomen/Pelvis w Contrast   Result Value    Radiologist flags Lung nodule    Narrative    EXAM: CT CHEST/ABDOMEN/PELVIS W CONTRAST  LOCATION: [facility]  DATE/TIME: 11/2/2020 8:40 PM    INDICATION: Fall onto left side with left lateral chest pain as well as moderate left upper quadrant abdominal pain and back pain.  COMPARISON: 09/10/2020, 01/09/2015.  TECHNIQUE: CT scan of the chest, abdomen, and pelvis was performed following injection of IV contrast. Multiplanar reformats were obtained. Dose reduction techniques were used.   CONTRAST: 88 mL Isovue-370.    FINDINGS:   LUNGS AND PLEURA: No pneumothorax or pulmonary contusion. In the extreme left lung apex associated with the pleural surface and extending inferiorly there is a 9 x 7 mm nodule (series 7, image 23 and series 8, image 63). Minimal linear atelectasis. No   pleural fluid.    MEDIASTINUM/AXILLAE: Normal caliber thoracic aorta without evidence for acute thoracic aortic injury. No evidence for pneumomediastinum or significant fluid within the mediastinum. Atherosclerotic vascular calcification including marked coronary artery   calcification. Normal  heart size. No pericardial fluid. No lymphadenopathy. Normal caliber esophagus. No axillary lymphadenopathy or definitive evidence for chest wall hematoma.    HEPATOBILIARY: No evidence for hepatic laceration or perihepatic hematoma. Cholelithiasis with a single 4 mm gallstone. No biliary dilatation. Portal vein patent.    PANCREAS: Normal.    SPLEEN: Normal size spleen. No splenic laceration or perisplenic hematoma. Splenic vein patent.    ADRENAL GLANDS: No significant nodules.    KIDNEYS/BLADDER: Symmetric contrast enhancement to both kidneys. No renal laceration or perinephric hematoma. No urinary collecting system dilatation or calculi. Left parapelvic renal cysts. Bladder unremarkable.    BOWEL: No free intraperitoneal air or mesenteric venous bleeding. No evidence for barotrauma of the bowel. No bowel wall thickening or obstruction. Appendix unremarkable. Colonic diverticulosis without diverticulitis.    LYMPH NODES: No lymphadenopathy.    VASCULATURE: Normal caliber abdominal aorta without evidence for acute abdominal aortic injury. Atherosclerotic vascular calcification.    ADDITIONAL FINDINGS: No significant free fluid.    MUSCULOSKELETAL: Acute minimally displaced posterior left 9th rib fracture. Acute displaced lateral left 5th-8th rib fractures. Healing posterior right 3rd rib fracture. No evidence for acute displaced pelvic fracture. Degenerative changes in the spine.      Impression    IMPRESSION:  1.  Acute minimally displaced posterior left 9th rib fracture.    2.  Acute displaced lateral left 5th-8th rib fractures.    3.  No other fractures.    4.  Normal caliber thoracic and abdominal aorta without evidence for acute injury.    5.  No solid organ injury in the upper abdomen or significant free fluid throughout the abdomen or pelvis.    6.  No pneumothorax or pulmonary contusion or significant pleural fluid.    7.  In the extreme left lung apex there is a 9 x 7 mm nodule extending toward the level  of the pleural surface. Recommend continued short interval follow-up in 2-3 months to assess for stability.      [Recommend Follow Up: Lung nodule]    This report will be copied to the [facility] to ensure a provider acknowledges the finding.      XR Chest Port 1 View    Narrative    XR CHEST PORT 1 VW 11/3/2020 8:31 AM    HISTORY: Pain. Known left rib fractures.    COMPARISON: 2/14/2017.      Impression    IMPRESSION: A single view the chest shows low lung volumes with no  pneumothorax or other acute cardiopulmonary disease. Acute mildly  displaced fractures are seen involving the posterolateral left seventh  and eighth rib arcs.    ED BAUTISTA MD

## 2020-11-03 NOTE — PLAN OF CARE
"[facility] ADMISSION NOTE    Patient admitted to room 2305 at approximately 0010 via cart from emergency room. Patient was accompanied by transport tech.     Verbal SBAR report received from Norma OBRIEN prior to patient arrival.     Patient ambulated to bed independently. Patient alert and oriented X 3. Pain is controlled with current analgesics.  Medication(s) being used: narcotic analgesics including hydromorphone (Dilaudid). 0-10 Pain Scale: 8. Admission vital signs: Blood pressure 134/69, pulse 82, temperature 98.6  F (37  C), temperature source Oral, resp. rate 18, height 1.753 m (5' 9\"), weight 82 kg (180 lb 12.4 oz), SpO2 95 %. Patient was oriented to plan of care, call light, bed controls, tv, telephone, bathroom, and visiting hours.     Risk Assessment    The following safety risks were identified during admission: fall. Yellow risk band applied: YES.     Skin Initial Assessment    This writer admitted this patient and completed a full skin assessment and Basim score in the Adult PCS flowsheet. Appropriate interventions initiated as needed. Left elbow scrap that was bleeding- band aid applied. Buttocks and Back red blanchable, scattered bruises BLE.     Secondary skin check completed by Shanti Stallworth Risk Assessment  Sensory Perception: 3-->slightly limited  Moisture: 4-->rarely moist  Activity: 3-->walks occasionally  Mobility: 3-->slightly limited  Nutrition: 3-->adequate  Friction and Shear: 3-->no apparent problem  Basim Score: 19  Bed Support Surface: Atmos Air mattress  Reassessed using Bed Algorithm: Yes  Basim Intervention(s) Implemented: draw sheets  Informed Refusal Basim Interventions: Yes    Education    Patient has a [place] to Observation order: No  Observation education completed and documented: N/A      Rebecca Yu RN on 11/3/2020 at 12:49 AM        "

## 2020-11-03 NOTE — PLAN OF CARE
"-Alert and orientated X 3.   -When laying/sitting still has minimal pain. Left side rib pain is significantly exacerbated with movement/activity.   -Ambulated to restroom with standby assist.   -IV dilaudid given at 0029, 0300,& 0506.  -Telemetry in place.   -Continuous pulse oximetry in place.   -Patient has been sleeping off and on.   -0250 patient up to bathroom- in significant pain with movement. IV dilaudid given at 0300.  -0500 patient requesting pain medication for left rib pain- 0.5 dilaudid IV given at 0506.  -Patient reports relief in between IV doses.       Continue to monitor and cares as ordered.     /70 (BP Location: Left arm)   Pulse 66   Temp 98.2  F (36.8  C) (Oral)   Resp 16   Ht 1.753 m (5' 9\")   Wt 82 kg (180 lb 12.4 oz)   SpO2 97%   BMI 26.70 kg/m          Rebecca Yu RN on 11/3/2020 at 5:48 AM        "

## 2020-11-03 NOTE — ED PROVIDER NOTES
HPI   The patient is a 75-year-old male presenting with left chest and back pain after falling from a ladder just prior to arrival.  Patient fell onto his left side into the grass from approximately 5-6 feet height.  He denies landing on any objects.  He denies loss of consciousness.  He felt like he had great difficulty catching his breath after he fell.  His pain was and continues to be severe.  Movement elicits worsened pain.  Breathing elicits worsened pain.  He denies hemoptysis.  He denies headache.  He does report neck stiffness and discomfort.  He denies radiating symptoms into his arms or legs.  He denies abdominal pain.  He does report some left-sided upper back pain.        Allergies:  Allergies   Allergen Reactions     Shrimp Nausea and Vomiting     Got sick each time he ate it     Problem List:    Patient Active Problem List    Diagnosis Date Noted     Multiple fractures of ribs, left side, initial encounter for closed fracture 11/02/2020     Priority: Medium     colonic polyps- Tubular Adenomas 04/18/2018     Priority: Medium     Collected: 4/13/2018   Received: 4/13/2018   Reported: 4/17/2018 19:01   Ordering Phy(s): GERMAN ASENCIO     For improved result formatting, select 'View Enhanced Report Format' under    Linked Documents section.     SPECIMEN(S):   A: Cecal polyp   B: Colon polyp at 30 cm     FINAL DIAGNOSIS:   A.  Cecum, mucosal biopsies:   - Tubular adenoma.   - Negative for high-grade dysplasia and malignancy.     B.  Colon at 30 cm, mucosal biopsies:   - Tubular adenoma and a fragment of normal colonic mucosa.   - Negative for high-grade dysplasia and malignancy.     Electronically signed out by:     Yuliet Melissa M.D.       CKD (chronic kidney disease) stage 3, GFR 30-59 ml/min 06/18/2014     Priority: Medium     Granulomatosis with polyangiitis (H) 09/12/2013     Priority: Medium     Diagnosis updated by automated process. Provider to review and confirm.       Encounter for long-term  (current) use of steroids 07/30/2013     Priority: Medium     Anemia 07/10/2013     Priority: Medium     Hypertension, renal 05/20/2013     Priority: Medium     GERD (gastroesophageal reflux disease) 04/26/2013     Priority: Medium     Advanced directives, counseling/discussion 04/15/2013     Priority: Medium     Advance Care Planning:   Receipt of ACP document:  Received: Health Care Directive which was witnessed or notarized on 5/9/2013.  Document not previously scanned.  Validation form completed and sent with document to be scanned.  Code Status reflects choices in most recent ACP document.  Confirmed/documented designated decision maker(s). See permanent comments section of demographics in clinical tab. View document(s) and details by clicking on code status.   Added by Ana Ochoa on 6/3/2013.             Idiopathic progressive polyneuropathy 04/11/2013     Priority: Medium     Class: Acute     Melanoma (H) 02/14/2013     Priority: Medium     CARDIOVASCULAR SCREENING; LDL GOAL LESS THAN 160 07/06/2012     Priority: Medium     Tobacco abuse 04/16/2009     Priority: Medium     3/4 pack/day  X 25 years         Past Medical History:    Past Medical History:   Diagnosis Date     Arthritis      Autoimmune disease (H)      Hearing problem      Hoarseness      Hypertension      Kidney problem      Malignant melanoma (H)      Malignant neoplasm (H)      Squamous cell carcinoma      Russo syndrome      Past Surgical History:    Past Surgical History:   Procedure Laterality Date     BIOPSY  11/2011    melanoma on back     DISSECT LYMPH NODE INGUINAL  3/1/2013    Procedure: DISSECT LYMPH NODE INGUINAL;  Bilateral Inguinal Lymph Node Biopsy.;  Surgeon: Tariq Acosta MD;  Location: [facility]     ESOPHAGOSCOPY, GASTROSCOPY, DUODENOSCOPY (EGD), COMBINED  7/5/2013    Procedure: COMBINED ESOPHAGOSCOPY, GASTROSCOPY, DUODENOSCOPY (EGD);  Gastroscopy;  Surgeon: Tariq Acosta MD;  Location: [facility]     HERNIORRHAPHY INGUINAL   2012    Procedure: HERNIORRHAPHY INGUINAL;  Open Repair Left Inguinal Hernia;  Surgeon: Jerad Baker MD;  Location: [facility] OR     Family History:    Family History   Problem Relation Age of Onset     Diabetes Mother      Hypertension Mother      Cancer Father         stomach     Heart Disease Father      Heart Disease Brother      Diabetes Sister      Diabetes Sister      Diabetes Sister      Heart Disease Brother      Cancer Sister      Glaucoma No family hx of      Macular Degeneration No family hx of      Social History:  Marital Status:  Single [1]  Social History     Tobacco Use     Smoking status: Former Smoker     Packs/day: 1.00     Years: 20.00     Pack years: 20.00     Types: Cigarettes     Quit date: 2013     Years since quittin.7     Smokeless tobacco: Never Used   Substance Use Topics     Alcohol use: Yes     Comment: rare     Drug use: No      Medications:         Acetaminophen (TYLENOL PO)       Blood Pressure Monitor KIT       calcium-vitamin D (CALCIUM 600 + D) 600-400 MG-UNIT per tablet       Cholecalciferol (VITAMIN D) 1000 UNITS capsule       folic acid (FOLVITE) 1 MG tablet       gabapentin (NEURONTIN) 400 MG capsule       losartan (COZAAR) 50 MG tablet       methotrexate 2.5 MG tablet       neomycin-polymyxin-dexamethasone (MAXITROL) 3.5-13862-7.1 SUSP ophthalmic susp      Review of Systems   All other systems reviewed and are negative.      PE   BP: (!) 147/80  Pulse: 85  Temp: 98.6  F (37  C)  SpO2: 97 %  Physical Exam  Vitals signs and nursing note reviewed.   Constitutional:       General: He is in acute distress.      Comments: The patient is lying on his back, flat on the bed.  Obvious discomfort with any movements.  He experiences occasional spasm.   HENT:      Head: Atraumatic.      Right Ear: External ear normal.      Left Ear: External ear normal.      Nose: Nose normal.      Mouth/Throat:      Mouth: Mucous membranes are moist.      Pharynx: Oropharynx is clear.    Eyes:      General: No scleral icterus.     Extraocular Movements: Extraocular movements intact.      Conjunctiva/sclera: Conjunctivae normal.      Pupils: Pupils are equal, round, and reactive to light.   Neck:      Musculoskeletal: Normal range of motion.      Comments: Tenderness along the left paracervical musculature.  Cardiovascular:      Rate and Rhythm: Normal rate.   Pulmonary:      Effort: Pulmonary effort is normal. No respiratory distress.   Abdominal:      Comments: Mild tenderness in the left upper quadrant of his abdomen.  Soft throughout.   Musculoskeletal: Normal range of motion.      Comments: Moving upper and lower extremities equally, bilaterally.  Movement elicits severe left chest pain.  Severe pain as I push into his left chest.  No obvious step-off or bowel movement.  He has tenderness as I push along his thoracic spine, left greater than right.  Otherwise, not tender to palpation over major muscles, joints, long bones.   Skin:     General: Skin is warm and dry.   Neurological:      Mental Status: He is alert and oriented to person, place, and time.      Comments: Strength 5/5 in his upper and lower extremities equally, bilaterally.  Sensation grossly intact to touch throughout.   Psychiatric:         Behavior: Behavior normal.         ED COURSE and [facility]   1943.  Patient fell from a ladder.  He has obvious pain and tenderness.  CT scan of his chest, abdomen, and pelvis pending.  CT scan of the cervical and thoracic spine pending.  Dilaudid and Zofran ordered.    2312.  CT scan reviewed with the patient.  He has 5 rib fractures on the left side.  Ribs 5 through 8 have displacement on the lateral chest.  Rib 9 has displacement on the posterior chest.  No evidence for underlying hemothorax, pneumothorax, effusion, or pulmonary contusion.  He has not required oxygen here.  There is no respiratory distress.  He is more comfortable now after analgesia.  CT of his abdomen and pelvis are  unremarkable.  CT of his cervical spine and thoracic spine are unremarkable.  He does have a pulmonary nodule documented on the chest CT.  The patient will be admitted to the hospital for pain control and further evaluation.  Dr. Arellano is excepting.  No additional orders are requested.  I also spoke with Dr. Brmufield about the case.  He was comfortable with the patient being assigned to a bed on the medical surgical floor.  I agree with this plan.  No additional orders requested.  COVID-19 swab is pending at the time of admission.    LABS  Labs Ordered and Resulted from Time of ED Arrival Up to the Time of Departure from the ED   BASIC METABOLIC PANEL - Abnormal; Notable for the following components:       Result Value    Glucose 125 (*)     Urea Nitrogen 32 (*)     Creatinine 1.61 (*)     GFR Estimate 42 (*)     GFR Estimate If Black 48 (*)     All other components within normal limits   CBC WITH PLATELETS DIFFERENTIAL - Abnormal; Notable for the following components:    WBC 13.7 (*)     Absolute Neutrophil 11.7 (*)     All other components within normal limits   COVID-19 VIRUS (CORONAVIRUS) BY PCR       IMAGING  Images reviewed by me.  Radiology report also reviewed.  CT Chest/Abdomen/Pelvis w Contrast   Final Result   IMPRESSION:   1.  Acute minimally displaced posterior left 9th rib fracture.      2.  Acute displaced lateral left 5th-8th rib fractures.      3.  No other fractures.      4.  Normal caliber thoracic and abdominal aorta without evidence for acute injury.      5.  No solid organ injury in the upper abdomen or significant free fluid throughout the abdomen or pelvis.      6.  No pneumothorax or pulmonary contusion or significant pleural fluid.      7.  In the extreme left lung apex there is a 9 x 7 mm nodule extending toward the level of the pleural surface. Recommend continued short interval follow-up in 2-3 months to assess for stability.         [Recommend Follow Up: Lung nodule]      This report will be  copied to the [facility] to ensure a provider acknowledges the finding.          CT Thoracic Spine w/o Contrast   Final Result   IMPRESSION:   1.  Degenerative change throughout the thoracic spine with no evidence of thoracic spine fracture.      2.  Left eighth and ninth rib fractures.         CT Cervical Spine w/o Contrast   Final Result   IMPRESSION:   1.  No fracture or posttraumatic subluxation.   2.  No high-grade spinal canal or neural foraminal stenosis.          Procedures    Medications   ondansetron (ZOFRAN) injection 4 mg (4 mg Intravenous Given 11/2/20 2002)   HYDROmorphone (PF) (DILAUDID) injection 0.5 mg (0.5 mg Intravenous Given 11/2/20 2001)   iopamidol (ISOVUE-370) solution 88 mL (88 mLs Intravenous Given 11/2/20 2042)   sodium chloride 0.9 % bag 500mL for CT scan flush use (64 mLs Intravenous Given 11/2/20 2042)   HYDROmorphone (PF) (DILAUDID) injection 0.5 mg (0.5 mg Intravenous Given 11/2/20 2116)         IMPRESSION       ICD-10-CM    1. Multiple fractures of ribs, left side, initial encounter for closed fracture  S22.42XA    2. Pulmonary nodules  R91.8             Medication List      There are no discharge medications for this visit.                       Pelon Osorio MD  11/02/20 8089

## 2020-11-03 NOTE — CONSULTS
75-year-old male fell off a ladder from a height of about 5 feet landing on his left shoulder and side.  Reports immediate pain upon falling in his left rib cage, there was no loss of consciousness.  He was brought in and evaluated through the emergency his room.  Currently he is complaining of pain mostly on deep inspiration.  Pain is localized to the left lateral rib cage.  Pain about 5-6 out of 10 without radiation.  Pain is described as dull and achy and sharp when during deep breaths.  No other associated symptoms.  Patient does not take any anticoagulant medication.    Patient Active Problem List   Diagnosis     Tobacco abuse     CARDIOVASCULAR SCREENING; LDL GOAL LESS THAN 160     Melanoma (H)     Idiopathic progressive polyneuropathy     Advanced directives, counseling/discussion     GERD (gastroesophageal reflux disease)     Hypertension, renal     Anemia     Encounter for long-term (current) use of steroids     Granulomatosis with polyangiitis (H)     CKD (chronic kidney disease) stage 3, GFR 30-59 ml/min     colonic polyps- Tubular Adenomas     Multiple fractures of ribs, left side, initial encounter for closed fracture       Past Medical History:   Diagnosis Date     Arthritis      Autoimmune disease (H)      Hearing problem      Hoarseness      Hypertension      Kidney problem      Malignant melanoma (H)      Malignant neoplasm (H)     melanoma     Squamous cell carcinoma      Russo syndrome        Past Surgical History:   Procedure Laterality Date     BIOPSY  11/2011    melanoma on back     DISSECT LYMPH NODE INGUINAL  3/1/2013    Procedure: DISSECT LYMPH NODE INGUINAL;  Bilateral Inguinal Lymph Node Biopsy.;  Surgeon: Tariq Acosta MD;  Location: [facility]     ESOPHAGOSCOPY, GASTROSCOPY, DUODENOSCOPY (EGD), COMBINED  7/5/2013    Procedure: COMBINED ESOPHAGOSCOPY, GASTROSCOPY, DUODENOSCOPY (EGD);  Gastroscopy;  Surgeon: Tariq Acosta MD;  Location: [facility]     HERNIORRHAPHY INGUINAL  8/6/2012     Procedure: HERNIORRHAPHY INGUINAL;  Open Repair Left Inguinal Hernia;  Surgeon: Jerad Baker MD;  Location: [facility]       Family History   Problem Relation Age of Onset     Diabetes Mother      Hypertension Mother      Cancer Father         stomach     Heart Disease Father      Heart Disease Brother      Diabetes Sister      Diabetes Sister      Diabetes Sister      Heart Disease Brother      Cancer Sister      Glaucoma No family hx of      Macular Degeneration No family hx of        Social History     Tobacco Use     Smoking status: Former Smoker     Packs/day: 1.00     Years: 20.00     Pack years: 20.00     Types: Cigarettes     Quit date: 2013     Years since quittin.7     Smokeless tobacco: Never Used   Substance Use Topics     Alcohol use: Yes     Comment: rare        History   Drug Use No       No current outpatient medications on file.       Allergies   Allergen Reactions     Shrimp Nausea and Vomiting     Got sick each time he ate it      CBC  Recent Labs   Lab Test 20   WBC 13.7*   RBC 4.64   HGB 14.9   HCT 44.6   MCV 96   MCH 32.1   MCHC 33.4   RDW 13.1          BMP  Recent Labs   Lab Test 20      POTASSIUM 4.7   LAMAR 9.9   CHLORIDE 108   CO2 27   BUN 32*   CR 1.61*   *       LFTs  Recent Labs   Lab Test 08/10/20  1549 17  1228 17  1228   PROTTOTAL  --   --  7.8   ALBUMIN 3.8   < > 4.0   BILITOTAL  --   --  0.7   ALKPHOS  --   --  97   AST 18   < > 22   ALT 24   < > 30    < > = values in this interval not displayed.     Results for orders placed or performed during the hospital encounter of 20   CT Chest/Abdomen/Pelvis w Contrast     Value    Radiologist flags Lung nodule    Narrative    EXAM: CT CHEST/ABDOMEN/PELVIS W CONTRAST  LOCATION: [facility]  DATE/TIME: 2020 8:40 PM    INDICATION: Fall onto left side with left lateral chest pain as well as moderate left upper quadrant abdominal pain and back  pain.  COMPARISON: 09/10/2020, 01/09/2015.  TECHNIQUE: CT scan of the chest, abdomen, and pelvis was performed following injection of IV contrast. Multiplanar reformats were obtained. Dose reduction techniques were used.   CONTRAST: 88 mL Isovue-370.    FINDINGS:   LUNGS AND PLEURA: No pneumothorax or pulmonary contusion. In the extreme left lung apex associated with the pleural surface and extending inferiorly there is a 9 x 7 mm nodule (series 7, image 23 and series 8, image 63). Minimal linear atelectasis. No   pleural fluid.    MEDIASTINUM/AXILLAE: Normal caliber thoracic aorta without evidence for acute thoracic aortic injury. No evidence for pneumomediastinum or significant fluid within the mediastinum. Atherosclerotic vascular calcification including marked coronary artery   calcification. Normal heart size. No pericardial fluid. No lymphadenopathy. Normal caliber esophagus. No axillary lymphadenopathy or definitive evidence for chest wall hematoma.    HEPATOBILIARY: No evidence for hepatic laceration or perihepatic hematoma. Cholelithiasis with a single 4 mm gallstone. No biliary dilatation. Portal vein patent.    PANCREAS: Normal.    SPLEEN: Normal size spleen. No splenic laceration or perisplenic hematoma. Splenic vein patent.    ADRENAL GLANDS: No significant nodules.    KIDNEYS/BLADDER: Symmetric contrast enhancement to both kidneys. No renal laceration or perinephric hematoma. No urinary collecting system dilatation or calculi. Left parapelvic renal cysts. Bladder unremarkable.    BOWEL: No free intraperitoneal air or mesenteric venous bleeding. No evidence for barotrauma of the bowel. No bowel wall thickening or obstruction. Appendix unremarkable. Colonic diverticulosis without diverticulitis.    LYMPH NODES: No lymphadenopathy.    VASCULATURE: Normal caliber abdominal aorta without evidence for acute abdominal aortic injury. Atherosclerotic vascular calcification.    ADDITIONAL FINDINGS: No  significant free fluid.    MUSCULOSKELETAL: Acute minimally displaced posterior left 9th rib fracture. Acute displaced lateral left 5th-8th rib fractures. Healing posterior right 3rd rib fracture. No evidence for acute displaced pelvic fracture. Degenerative changes in the spine.      Impression    IMPRESSION:  1.  Acute minimally displaced posterior left 9th rib fracture.    2.  Acute displaced lateral left 5th-8th rib fractures.    3.  No other fractures.    4.  Normal caliber thoracic and abdominal aorta without evidence for acute injury.    5.  No solid organ injury in the upper abdomen or significant free fluid throughout the abdomen or pelvis.    6.  No pneumothorax or pulmonary contusion or significant pleural fluid.    7.  In the extreme left lung apex there is a 9 x 7 mm nodule extending toward the level of the pleural surface. Recommend continued short interval follow-up in 2-3 months to assess for stability.      [Recommend Follow Up: Lung nodule]    This report will be copied to the [facility] to ensure a provider acknowledges the finding.      CT Thoracic Spine w/o Contrast    Narrative    EXAM: CT THORACIC SPINE W/O CONTRAST  LOCATION: [facility]  DATE/TIME: 11/2/2020 8:41 PM    INDICATION: Trauma.  COMPARISON: None.  TECHNIQUE: Routine without IV contrast. Multiplanar reformats.  Dose reduction techniques were used.     FINDINGS:  VERTEBRA: Left eighth and ninth rib fractures. No evidence of fracture of the thoracic spine. Mild degenerative change with slight loss of disc height involving the thoracic disc spaces. No fracture or posttraumatic subluxation.         PARASPINAL: No extraspinal abnormality.      Impression    IMPRESSION:  1.  Degenerative change throughout the thoracic spine with no evidence of thoracic spine fracture.    2.  Left eighth and ninth rib fractures.     CT Cervical Spine w/o Contrast    Narrative    EXAM: CT CERVICAL SPINE W/O CONTRAST  LOCATION:  [facility]  DATE/TIME: 11/2/2020 8:41 PM    INDICATION: Fall the left side. Neck pain.  COMPARISON: None.  TECHNIQUE: Routine without IV contrast. Multiplanar reformats. Dose reduction techniques were used.    FINDINGS:  VERTEBRA: No fracture or posttraumatic subluxation. Millimeter degenerative retrolisthesis of the 3 C4. No additional listhesis. Vertebral body heights are maintained. No aggressive sclerotic or lytic osseous lesion. Incidental os terminale. Multilevel   moderate degenerative disc disease throughout the cervical spine. Left greater than right multilevel mild facet arthropathy.    CANAL/FORAMINA: No high-grade canal or neural foraminal stenosis.    PARASPINAL: No acute extraspinal abnormality. Nonspecific mild mastoid tip effusions bilaterally.      Impression    IMPRESSION:  1.  No fracture or posttraumatic subluxation.  2.  No high-grade spinal canal or neural foraminal stenosis.   XR Chest Port 1 View    Narrative    XR CHEST PORT 1 VW 11/3/2020 8:31 AM    HISTORY: Pain. Known left rib fractures.    COMPARISON: 2/14/2017.      Impression    IMPRESSION: A single view the chest shows low lung volumes with no  pneumothorax or other acute cardiopulmonary disease. Acute mildly  displaced fractures are seen involving the posterolateral left seventh  and eighth rib arcs.    ED BAUTISTA MD     ROS  Constitutional - Denies fevers, weight loss, malaise, lethargy  Neuro - Denies tremors or seizures  Pulmon - Denies SOB, dyspnea, hemoptysis, chronic cough or use of an inhaler  CV - Denies CP, SOB, lower extremity edema, difficulty w/ stairs, has never used NTG  GI - Denies hematemesis, BRBPR, melena, chronic diarrhea or epigastric pain   - Denies hematuria, difficulty voiding, h/o STDs  Hematology - Denies blood clotting disorders, chronic anemias  Dermatology - No melanomas or skin cancers  Rheumatology - No h/o RA  Pysch - Denies depression, bipolar d/o or schizophrenia    Exam:BP (!)  "143/68   Pulse 59   Temp 97.6  F (36.4  C) (Oral)   Resp 16   Ht 1.753 m (5' 9\")   Wt 82 kg (180 lb 12.4 oz)   SpO2 93%   BMI 26.70 kg/m      General - Alert and Oriented X4, NAD, well nourished  HEENT - Normocephalic, atraumatic, PERRLA, Nose midline, Throat without lesions  Neck - supple, no crepitus  Lungs -mild rhonchi on the left with moderate effort  CV - Heart RRR, no lift's, thrills, murmurs, rubs, or gallops. Carotid, radial, and femoral pulses 2+ bilaterally  Abdomen - Soft, non-tender, +BS  Groins - 2+ pulses bilaterally and no LAD, no masses  Neuro - Full ROM, Strength 5/5 and major muscle groups, sensation intact  Extremities - No cyanosis, clubbing or edema    Assessment and plan: 75-year-old male with multiple left-sided rib fractures without pneumothorax.  Possibly small pulmonary contusion.  Patient at high risk for delayed pneumothorax and worsening of contusion.  Recommend pain control and incentive spirometry.  Recommend observation for 24 hours and repeat chest x-ray in the morning.  If the lung remains up and there is no obvious consolidation, okay for discharge from trauma standpoint.     Costa Brumfield MD   "

## 2020-11-03 NOTE — PLAN OF CARE
Patient receiving Tylenol and Oxycodone with minimal relief.  States pain intensifies with movement.  Using IS, CHEST X-RAY completed, Telemetry in place, will monitor.  Surgical consult done.

## 2020-11-04 ENCOUNTER — APPOINTMENT (OUTPATIENT)
Dept: GENERAL RADIOLOGY | Facility: CLINIC | Age: 75
DRG: 184 | End: 2020-11-04
Attending: INTERNAL MEDICINE
Payer: COMMERCIAL

## 2020-11-04 VITALS
BODY MASS INDEX: 26.78 KG/M2 | DIASTOLIC BLOOD PRESSURE: 62 MMHG | SYSTOLIC BLOOD PRESSURE: 124 MMHG | HEIGHT: 69 IN | TEMPERATURE: 96.2 F | OXYGEN SATURATION: 98 % | WEIGHT: 180.78 LBS | RESPIRATION RATE: 18 BRPM | HEART RATE: 80 BPM

## 2020-11-04 LAB
ANION GAP SERPL CALCULATED.3IONS-SCNC: 3 MMOL/L (ref 3–14)
BUN SERPL-MCNC: 34 MG/DL (ref 7–30)
CALCIUM SERPL-MCNC: 9.3 MG/DL (ref 8.5–10.1)
CHLORIDE SERPL-SCNC: 104 MMOL/L (ref 94–109)
CO2 SERPL-SCNC: 31 MMOL/L (ref 20–32)
CREAT SERPL-MCNC: 1.65 MG/DL (ref 0.66–1.25)
ERYTHROCYTE [DISTWIDTH] IN BLOOD BY AUTOMATED COUNT: 13 % (ref 10–15)
GFR SERPL CREATININE-BSD FRML MDRD: 40 ML/MIN/{1.73_M2}
GLUCOSE SERPL-MCNC: 107 MG/DL (ref 70–99)
HCT VFR BLD AUTO: 41.6 % (ref 40–53)
HGB BLD-MCNC: 13.7 G/DL (ref 13.3–17.7)
MCH RBC QN AUTO: 31.8 PG (ref 26.5–33)
MCHC RBC AUTO-ENTMCNC: 32.9 G/DL (ref 31.5–36.5)
MCV RBC AUTO: 97 FL (ref 78–100)
PLATELET # BLD AUTO: 138 10E9/L (ref 150–450)
POTASSIUM SERPL-SCNC: 4.4 MMOL/L (ref 3.4–5.3)
RBC # BLD AUTO: 4.31 10E12/L (ref 4.4–5.9)
SODIUM SERPL-SCNC: 138 MMOL/L (ref 133–144)
WBC # BLD AUTO: 6.5 10E9/L (ref 4–11)

## 2020-11-04 PROCEDURE — 99239 HOSP IP/OBS DSCHRG MGMT >30: CPT | Performed by: INTERNAL MEDICINE

## 2020-11-04 PROCEDURE — 250N000013 HC RX MED GY IP 250 OP 250 PS 637: Performed by: INTERNAL MEDICINE

## 2020-11-04 PROCEDURE — 80048 BASIC METABOLIC PNL TOTAL CA: CPT | Performed by: INTERNAL MEDICINE

## 2020-11-04 PROCEDURE — 36415 COLL VENOUS BLD VENIPUNCTURE: CPT | Performed by: INTERNAL MEDICINE

## 2020-11-04 PROCEDURE — 71045 X-RAY EXAM CHEST 1 VIEW: CPT

## 2020-11-04 PROCEDURE — 85027 COMPLETE CBC AUTOMATED: CPT | Performed by: INTERNAL MEDICINE

## 2020-11-04 RX ORDER — HYDROCODONE BITARTRATE AND ACETAMINOPHEN 5; 325 MG/1; MG/1
1 TABLET ORAL EVERY 6 HOURS PRN
Qty: 10 TABLET | Refills: 0 | Status: SHIPPED | OUTPATIENT
Start: 2020-11-04 | End: 2020-11-07

## 2020-11-04 RX ADMIN — FOLIC ACID 2 MG: 1 TABLET ORAL at 07:57

## 2020-11-04 RX ADMIN — OXYCODONE HYDROCHLORIDE 10 MG: 5 TABLET ORAL at 10:50

## 2020-11-04 RX ADMIN — ACETAMINOPHEN 650 MG: 325 TABLET, FILM COATED ORAL at 08:01

## 2020-11-04 RX ADMIN — OXYCODONE HYDROCHLORIDE 10 MG: 5 TABLET ORAL at 08:01

## 2020-11-04 RX ADMIN — LOSARTAN POTASSIUM 50 MG: 50 TABLET, FILM COATED ORAL at 07:57

## 2020-11-04 RX ADMIN — Medication 1 TABLET: at 07:57

## 2020-11-04 RX ADMIN — DOCUSATE SODIUM AND SENNOSIDES 2 TABLET: 8.6; 5 TABLET ORAL at 07:57

## 2020-11-04 ASSESSMENT — ACTIVITIES OF DAILY LIVING (ADL)
ADLS_ACUITY_SCORE: 13

## 2020-11-04 NOTE — PLAN OF CARE
"Nursing Assessment:   Patient is alert and oriented x4. Vss on room air.  Pulling to 2000 on IS, encouraging hourly use.  Pain managed with oral oxycodone this shift. Comfortable at rest.   Good appetite eating 100% of dinner.     Vital signs:  Temp: 97.9  F (36.6  C) Temp src: Oral BP: 128/64 Pulse: 63   Resp: 18 SpO2: 97 % O2 Device: None (Room air)   Height: 175.3 cm (5' 9\") Weight: 82 kg (180 lb 12.4 oz)  Estimated body mass index is 26.7 kg/m  as calculated from the following:    Height as of this encounter: 1.753 m (5' 9\").    Weight as of this encounter: 82 kg (180 lb 12.4 oz).     Светлана Arellano RN on 11/3/2020 at 6:46 PM      "

## 2020-11-04 NOTE — DISCHARGE SUMMARY
[facility]   Hospitalist Discharge Summary       Date of Admission:  11/2/2020  Date of Discharge:  11/4/2020  1:45 PM  Discharging Provider: Del Orona MD      Discharge Diagnoses     Fall with displaced left 5th-9th rib fractures  Hypertension  Chronic kidney disease stage 3  Leucocytosis  Idopathic progressive polyneuropathy  Granulomatosis with polyangiitis     Follow-ups Needed After Discharge   Follow-up Appointments     Follow-up and recommended labs and tests      Follow up with primary care provider, Patrick Mcintyre, within 7 days for   hospital follow- up.  The following labs/tests are recommended: BMP and   CBC.           Discharge Disposition   Discharged to home  Condition at discharge: Stable    Hospital Course   Joshua Ennis is a 75 year old male who was admitted on 11/2/2020 with rib fractures after a 6 foot fall from a ladder.    Fall with displaced left 5th-9th rib fractures  CT demonstrated minimally displaced posterior 9th rib fracture and displaced left 5th-8th rib fractures.  Admitted for pain control.   - CXR AM of 11/3 demonstrate stable fractures  - General Surgery consult for trauma appreciated  - Repeat CXR on 11/4 stable  - Discharge with pain control     Hypertension  Continue losartan     Chronic kidney disease stage 3  Cr stable at baseline of 1.5 to 1.7.      Leucocytosis  Likely due to stress. Asymptomatic. Will f/u in am with CBC  - WBC 6.5 on 11/4     Idopathic progressive polyneuropathy  Continue neurontin     Granulomatosis with polyangiitis   Sees rheum. On methotrexate.     Consultations This Hospital Stay   SURGERY GENERAL IP CONSULT  SURGERY GENERAL IP CONSULT    Code Status   Full Code    Time Spent on this Encounter   I, Del Orona MD, personally saw the patient today and spent greater than 30 minutes discharging this patient.       Del Orona MD  [facility]  Center   ______________________________________________________________________    Physical Exam   Vital Signs: Temp: 97.3  F (36.3  C) Temp src: Oral BP: 133/68 Pulse: 67   Resp: 20 SpO2: 92 % O2 Device: None (Room air)    Weight: 180 lbs 12.44 oz       Gen: Well nourished, well developed, alert and oriented x 3, no acute distressed  HEENT: Atraumatic, normocephalic; sclera non-injected, anicterric; oral mucosa moist, no lesion, no exudate  Lungs: Clear to ausculation, no wheezes, no rhonchi, no rales  Heart: Regular rate, regular rhythm, no gallops, no rubs, no murmurs  GI: Bowel sound normal, no hepatosplenomegaly, no masses, non-tender, non-distended, no guarding, no rebound tenderness  Lymph: No lymphadenopathy, no edema  Skin: No rashes, no chronic venous stasis     Primary Care Physician   Patrick Mcintyre    Discharge Orders      Reason for your hospital stay    This is a 75 year old male admitted with left 5th to 9th rib fractures.     Follow-up and recommended labs and tests    Follow up with primary care provider, Patrick Mcintyre, within 7 days for hospital follow- up.  The following labs/tests are recommended: BMP and CBC.     Activity    Your activity upon discharge: activity as tolerated     Full Code     Diet    Follow this diet upon discharge: Orders Placed This Encounter      Combination Diet Regular Diet Adult       Significant Results and Procedures   Most Recent 3 CBC's:  Recent Labs   Lab Test 11/04/20  0520 11/02/20  1954 08/10/20  1549   WBC 6.5 13.7* 7.4   HGB 13.7 14.9 13.6   MCV 97 96 97   * 175 176     Most Recent 3 BMP's:  Recent Labs   Lab Test 11/04/20  0520 11/02/20  1954 08/10/20  1549    139 135   POTASSIUM 4.4 4.7 4.6   CHLORIDE 104 108 105   CO2 31 27 24   BUN 34* 32* 34*   CR 1.65* 1.61* 1.53*   ANIONGAP 3 4 6   LAMAR 9.3 9.9 9.3   * 125* 93   ,   Results for orders placed or performed during the hospital encounter of 11/02/20   CT Chest/Abdomen/Pelvis w Contrast      Value    Radiologist flags Lung nodule    Narrative    EXAM: CT CHEST/ABDOMEN/PELVIS W CONTRAST  LOCATION: [facility]  DATE/TIME: 11/2/2020 8:40 PM    INDICATION: Fall onto left side with left lateral chest pain as well as moderate left upper quadrant abdominal pain and back pain.  COMPARISON: 09/10/2020, 01/09/2015.  TECHNIQUE: CT scan of the chest, abdomen, and pelvis was performed following injection of IV contrast. Multiplanar reformats were obtained. Dose reduction techniques were used.   CONTRAST: 88 mL Isovue-370.    FINDINGS:   LUNGS AND PLEURA: No pneumothorax or pulmonary contusion. In the extreme left lung apex associated with the pleural surface and extending inferiorly there is a 9 x 7 mm nodule (series 7, image 23 and series 8, image 63). Minimal linear atelectasis. No   pleural fluid.    MEDIASTINUM/AXILLAE: Normal caliber thoracic aorta without evidence for acute thoracic aortic injury. No evidence for pneumomediastinum or significant fluid within the mediastinum. Atherosclerotic vascular calcification including marked coronary artery   calcification. Normal heart size. No pericardial fluid. No lymphadenopathy. Normal caliber esophagus. No axillary lymphadenopathy or definitive evidence for chest wall hematoma.    HEPATOBILIARY: No evidence for hepatic laceration or perihepatic hematoma. Cholelithiasis with a single 4 mm gallstone. No biliary dilatation. Portal vein patent.    PANCREAS: Normal.    SPLEEN: Normal size spleen. No splenic laceration or perisplenic hematoma. Splenic vein patent.    ADRENAL GLANDS: No significant nodules.    KIDNEYS/BLADDER: Symmetric contrast enhancement to both kidneys. No renal laceration or perinephric hematoma. No urinary collecting system dilatation or calculi. Left parapelvic renal cysts. Bladder unremarkable.    BOWEL: No free intraperitoneal air or mesenteric venous bleeding. No evidence for barotrauma of the bowel. No bowel wall thickening or  obstruction. Appendix unremarkable. Colonic diverticulosis without diverticulitis.    LYMPH NODES: No lymphadenopathy.    VASCULATURE: Normal caliber abdominal aorta without evidence for acute abdominal aortic injury. Atherosclerotic vascular calcification.    ADDITIONAL FINDINGS: No significant free fluid.    MUSCULOSKELETAL: Acute minimally displaced posterior left 9th rib fracture. Acute displaced lateral left 5th-8th rib fractures. Healing posterior right 3rd rib fracture. No evidence for acute displaced pelvic fracture. Degenerative changes in the spine.      Impression    IMPRESSION:  1.  Acute minimally displaced posterior left 9th rib fracture.    2.  Acute displaced lateral left 5th-8th rib fractures.    3.  No other fractures.    4.  Normal caliber thoracic and abdominal aorta without evidence for acute injury.    5.  No solid organ injury in the upper abdomen or significant free fluid throughout the abdomen or pelvis.    6.  No pneumothorax or pulmonary contusion or significant pleural fluid.    7.  In the extreme left lung apex there is a 9 x 7 mm nodule extending toward the level of the pleural surface. Recommend continued short interval follow-up in 2-3 months to assess for stability.      [Recommend Follow Up: Lung nodule]    This report will be copied to the [facility] to ensure a provider acknowledges the finding.      CT Thoracic Spine w/o Contrast    Narrative    EXAM: CT THORACIC SPINE W/O CONTRAST  LOCATION: [facility]  DATE/TIME: 11/2/2020 8:41 PM    INDICATION: Trauma.  COMPARISON: None.  TECHNIQUE: Routine without IV contrast. Multiplanar reformats.  Dose reduction techniques were used.     FINDINGS:  VERTEBRA: Left eighth and ninth rib fractures. No evidence of fracture of the thoracic spine. Mild degenerative change with slight loss of disc height involving the thoracic disc spaces. No fracture or posttraumatic subluxation.         PARASPINAL: No extraspinal  abnormality.      Impression    IMPRESSION:  1.  Degenerative change throughout the thoracic spine with no evidence of thoracic spine fracture.    2.  Left eighth and ninth rib fractures.     CT Cervical Spine w/o Contrast    Narrative    EXAM: CT CERVICAL SPINE W/O CONTRAST  LOCATION: [facility]  DATE/TIME: 11/2/2020 8:41 PM    INDICATION: Fall the left side. Neck pain.  COMPARISON: None.  TECHNIQUE: Routine without IV contrast. Multiplanar reformats. Dose reduction techniques were used.    FINDINGS:  VERTEBRA: No fracture or posttraumatic subluxation. Millimeter degenerative retrolisthesis of the 3 C4. No additional listhesis. Vertebral body heights are maintained. No aggressive sclerotic or lytic osseous lesion. Incidental os terminale. Multilevel   moderate degenerative disc disease throughout the cervical spine. Left greater than right multilevel mild facet arthropathy.    CANAL/FORAMINA: No high-grade canal or neural foraminal stenosis.    PARASPINAL: No acute extraspinal abnormality. Nonspecific mild mastoid tip effusions bilaterally.      Impression    IMPRESSION:  1.  No fracture or posttraumatic subluxation.  2.  No high-grade spinal canal or neural foraminal stenosis.   XR Chest Port 1 View    Narrative    XR CHEST PORT 1 VW 11/3/2020 8:31 AM    HISTORY: Pain. Known left rib fractures.    COMPARISON: 2/14/2017.      Impression    IMPRESSION: A single view the chest shows low lung volumes with no  pneumothorax or other acute cardiopulmonary disease. Acute mildly  displaced fractures are seen involving the posterolateral left seventh  and eighth rib arcs.    ED BAUTISTA MD   XR Chest Port 1 View    Narrative    CHEST PORTABLE ONE VIEW November 4, 2020 7:04 AM     HISTORY: Trauma with rib fracture.    COMPARISON: Chest x-ray 11/3/2020.      Impression    IMPRESSION: Portable chest. Lungs are hypoaerated. No evidence of  pneumothorax or significant pleural effusion. Left lower rib  fractures  are unchanged. Heart is normal in size.    TIAGO GRACE MD       Discharge Medications   Current Discharge Medication List      START taking these medications    Details   HYDROcodone-acetaminophen (NORCO) 5-325 MG tablet Take 1 tablet by mouth every 6 hours as needed for severe pain  Qty: 10 tablet, Refills: 0    Associated Diagnoses: Multiple fractures of ribs, left side, initial encounter for closed fracture         CONTINUE these medications which have NOT CHANGED    Details   calcium-vitamin D (CALCIUM 600 + D) 600-400 MG-UNIT per tablet Take 1 tablet by mouth 2 times daily.      Cholecalciferol (VITAMIN D) 1000 UNITS capsule Take 1 capsule by mouth daily.      folic acid (FOLVITE) 1 MG tablet Take 2 tablets (2 mg) by mouth daily  Qty: 180 tablet, Refills: 3    Associated Diagnoses: Neuropathy      gabapentin (NEURONTIN) 400 MG capsule Take 1 capsule (400 mg) by mouth At Bedtime  Qty: 90 capsule, Refills: 1    Associated Diagnoses: Neuropathy      losartan (COZAAR) 50 MG tablet Take 1 tablet (50 mg) by mouth daily  Qty: 90 tablet, Refills: 1    Associated Diagnoses: Hypertension, unspecified type; Neuropathy      methotrexate 2.5 MG tablet Take 4 tablets (10 mg) by mouth every 7 days  Qty: 60 tablet, Refills: 1    Associated Diagnoses: High risk medications (not anticoagulants) long-term use      Acetaminophen (TYLENOL PO) Take 650 mg by mouth once as needed for mild pain or fever       Blood Pressure Monitor KIT Automatic Blood Pressure Monitor  Qty: 1 kit, Refills: 0    Associated Diagnoses: Hypertension, renal           Allergies   Allergies   Allergen Reactions     Shrimp Nausea and Vomiting     Got sick each time he ate it

## 2020-11-04 NOTE — PROGRESS NOTES
Chest x-ray shows no pneumothorax.  Patient is using incentive spirometer but complaining of pain.  Patient states the oxycodone is not working effectively for him.  I explained the importance of getting out of bed and ambulating.  I also explained that pain medication will increase your pain threshold but you will still have pain on deep inspiration.  Patient okay for discharge once pain adequately controlled.  Recommend switching from oxycodone to Percocet.    Costa Brumfield MD

## 2020-11-04 NOTE — PLAN OF CARE
Pt does not feel his pain meds are working correctly, although he has been asleep over 5 hours since his last pain med.  Pt helps very little when getting up or down.  Pt feels he needs better pain control so he can move without pain.  We are trying to teach him better techniques to help (log rolling, use your  legs more, etc), but have to give him step by step as he freezes a lot when he is moving.  Pt, however, is using left arm when he is in bed without noticing.  Pt will not move once in bed leading to redness on his back and butt.  Pt asked for the urinal for tonight.  DEANNA Rodriguez RN

## 2020-11-04 NOTE — PLAN OF CARE
NENA BLANDG DISCHARGE NOTE    Patient discharged to home at 1:29 PM via wheel chair. Accompanied by significant other and staff. Discharge instructions reviewed with patient and caregiver, opportunity offered to ask questions. Prescriptions sent to patients preferred pharmacy. All belongings sent with patient.    Isabel Hopkins RN

## 2020-11-05 ENCOUNTER — TELEPHONE (OUTPATIENT)
Dept: FAMILY MEDICINE | Facility: CLINIC | Age: 75
End: 2020-11-05

## 2020-11-05 NOTE — TELEPHONE ENCOUNTER
ED / Discharge Outreach Protocol    Patient Contact    Attempt # 1    Was call answered?  No.  Left message on voicemail with information to call me back.  MELISA Suazo RN

## 2020-11-05 NOTE — TELEPHONE ENCOUNTER
ED/UC/IP follow up phone call: [facility] discharge 11/4/20 Multiple fractures of ribs, Left side, Initial encounter for closed fracture    RN please call to follow up.    Number of ED visits in past 12 months = 0

## 2020-11-11 ENCOUNTER — OFFICE VISIT (OUTPATIENT)
Dept: FAMILY MEDICINE | Facility: CLINIC | Age: 75
End: 2020-11-11
Payer: COMMERCIAL

## 2020-11-11 VITALS
TEMPERATURE: 99.1 F | DIASTOLIC BLOOD PRESSURE: 70 MMHG | WEIGHT: 182 LBS | BODY MASS INDEX: 26.88 KG/M2 | OXYGEN SATURATION: 97 % | HEART RATE: 88 BPM | SYSTOLIC BLOOD PRESSURE: 122 MMHG

## 2020-11-11 DIAGNOSIS — Z79.899 HIGH RISK MEDICATIONS (NOT ANTICOAGULANTS) LONG-TERM USE: ICD-10-CM

## 2020-11-11 DIAGNOSIS — S22.42XD CLOSED FRACTURE OF MULTIPLE RIBS OF LEFT SIDE WITH ROUTINE HEALING, SUBSEQUENT ENCOUNTER: Primary | ICD-10-CM

## 2020-11-11 DIAGNOSIS — Z92.89 HISTORY OF RECENT HOSPITALIZATION: ICD-10-CM

## 2020-11-11 DIAGNOSIS — M31.30 GRANULOMATOSIS WITH POLYANGIITIS, UNSPECIFIED WHETHER RENAL INVOLVEMENT (H): ICD-10-CM

## 2020-11-11 DIAGNOSIS — G62.9 NEUROPATHY: ICD-10-CM

## 2020-11-11 LAB
ALBUMIN SERPL-MCNC: 3.8 G/DL (ref 3.4–5)
ALBUMIN UR-MCNC: NEGATIVE MG/DL
ALT SERPL W P-5'-P-CCNC: 31 U/L (ref 0–70)
ANION GAP SERPL CALCULATED.3IONS-SCNC: 5 MMOL/L (ref 3–14)
APPEARANCE UR: CLEAR
AST SERPL W P-5'-P-CCNC: 20 U/L (ref 0–45)
BASOPHILS # BLD AUTO: 0.1 10E9/L (ref 0–0.2)
BASOPHILS NFR BLD AUTO: 0.6 %
BILIRUB UR QL STRIP: NEGATIVE
BUN SERPL-MCNC: 28 MG/DL (ref 7–30)
CALCIUM SERPL-MCNC: 10 MG/DL (ref 8.5–10.1)
CD19 CELLS # BLD: 297 CELLS/UL (ref 107–698)
CD19 CELLS NFR BLD: 25 % (ref 6–27)
CHLORIDE SERPL-SCNC: 107 MMOL/L (ref 94–109)
CO2 SERPL-SCNC: 27 MMOL/L (ref 20–32)
COLOR UR AUTO: YELLOW
CREAT SERPL-MCNC: 1.38 MG/DL (ref 0.66–1.25)
CREAT SERPL-MCNC: 1.4 MG/DL (ref 0.66–1.25)
CREAT UR-MCNC: 200 MG/DL
CRP SERPL-MCNC: <2.9 MG/L (ref 0–8)
DIFFERENTIAL METHOD BLD: NORMAL
EOSINOPHIL # BLD AUTO: 0.2 10E9/L (ref 0–0.7)
EOSINOPHIL NFR BLD AUTO: 1.9 %
ERYTHROCYTE [DISTWIDTH] IN BLOOD BY AUTOMATED COUNT: 12.8 % (ref 10–15)
ERYTHROCYTE [SEDIMENTATION RATE] IN BLOOD BY WESTERGREN METHOD: 8 MM/H (ref 0–20)
GFR SERPL CREATININE-BSD FRML MDRD: 49 ML/MIN/{1.73_M2}
GFR SERPL CREATININE-BSD FRML MDRD: 50 ML/MIN/{1.73_M2}
GLUCOSE SERPL-MCNC: 79 MG/DL (ref 70–99)
GLUCOSE UR STRIP-MCNC: NEGATIVE MG/DL
HCT VFR BLD AUTO: 43.2 % (ref 40–53)
HGB BLD-MCNC: 14.5 G/DL (ref 13.3–17.7)
HGB UR QL STRIP: NEGATIVE
KETONES UR STRIP-MCNC: NEGATIVE MG/DL
LEUKOCYTE ESTERASE UR QL STRIP: NEGATIVE
LYMPHOCYTES # BLD AUTO: 1.1 10E9/L (ref 0.8–5.3)
LYMPHOCYTES NFR BLD AUTO: 13.7 %
MCH RBC QN AUTO: 31.8 PG (ref 26.5–33)
MCHC RBC AUTO-ENTMCNC: 33.6 G/DL (ref 31.5–36.5)
MCV RBC AUTO: 95 FL (ref 78–100)
MONOCYTES # BLD AUTO: 0.7 10E9/L (ref 0–1.3)
MONOCYTES NFR BLD AUTO: 8.8 %
NEUTROPHILS # BLD AUTO: 6 10E9/L (ref 1.6–8.3)
NEUTROPHILS NFR BLD AUTO: 75 %
NITRATE UR QL: NEGATIVE
PH UR STRIP: 5 PH (ref 5–7)
PLATELET # BLD AUTO: 241 10E9/L (ref 150–450)
POTASSIUM SERPL-SCNC: 4.3 MMOL/L (ref 3.4–5.3)
PROT UR-MCNC: 0.25 G/L
PROT/CREAT 24H UR: 0.13 G/G CR (ref 0–0.2)
RBC # BLD AUTO: 4.56 10E12/L (ref 4.4–5.9)
RBC #/AREA URNS AUTO: NORMAL /HPF
SODIUM SERPL-SCNC: 139 MMOL/L (ref 133–144)
SOURCE: NORMAL
SP GR UR STRIP: 1.02 (ref 1–1.03)
UROBILINOGEN UR STRIP-ACNC: 0.2 EU/DL (ref 0.2–1)
WBC # BLD AUTO: 8 10E9/L (ref 4–11)
WBC #/AREA URNS AUTO: NORMAL /HPF

## 2020-11-11 PROCEDURE — 82040 ASSAY OF SERUM ALBUMIN: CPT | Performed by: INTERNAL MEDICINE

## 2020-11-11 PROCEDURE — 83876 ASSAY MYELOPEROXIDASE: CPT | Performed by: INTERNAL MEDICINE

## 2020-11-11 PROCEDURE — 85652 RBC SED RATE AUTOMATED: CPT | Performed by: INTERNAL MEDICINE

## 2020-11-11 PROCEDURE — 99495 TRANSJ CARE MGMT MOD F2F 14D: CPT | Performed by: FAMILY MEDICINE

## 2020-11-11 PROCEDURE — 86355 B CELLS TOTAL COUNT: CPT | Performed by: INTERNAL MEDICINE

## 2020-11-11 PROCEDURE — 84156 ASSAY OF PROTEIN URINE: CPT | Performed by: INTERNAL MEDICINE

## 2020-11-11 PROCEDURE — 81001 URINALYSIS AUTO W/SCOPE: CPT | Performed by: INTERNAL MEDICINE

## 2020-11-11 PROCEDURE — 82565 ASSAY OF CREATININE: CPT | Performed by: INTERNAL MEDICINE

## 2020-11-11 PROCEDURE — 84450 TRANSFERASE (AST) (SGOT): CPT | Performed by: INTERNAL MEDICINE

## 2020-11-11 PROCEDURE — 86256 FLUORESCENT ANTIBODY TITER: CPT | Performed by: INTERNAL MEDICINE

## 2020-11-11 PROCEDURE — 36415 COLL VENOUS BLD VENIPUNCTURE: CPT | Performed by: FAMILY MEDICINE

## 2020-11-11 PROCEDURE — 85025 COMPLETE CBC W/AUTO DIFF WBC: CPT | Performed by: INTERNAL MEDICINE

## 2020-11-11 PROCEDURE — 82784 ASSAY IGA/IGD/IGG/IGM EACH: CPT | Performed by: INTERNAL MEDICINE

## 2020-11-11 PROCEDURE — 83516 IMMUNOASSAY NONANTIBODY: CPT | Performed by: INTERNAL MEDICINE

## 2020-11-11 PROCEDURE — 86140 C-REACTIVE PROTEIN: CPT | Performed by: INTERNAL MEDICINE

## 2020-11-11 PROCEDURE — 86255 FLUORESCENT ANTIBODY SCREEN: CPT | Performed by: INTERNAL MEDICINE

## 2020-11-11 PROCEDURE — 84460 ALANINE AMINO (ALT) (SGPT): CPT | Performed by: INTERNAL MEDICINE

## 2020-11-11 PROCEDURE — 80048 BASIC METABOLIC PNL TOTAL CA: CPT | Performed by: FAMILY MEDICINE

## 2020-11-11 RX ORDER — HYDROCODONE BITARTRATE AND ACETAMINOPHEN 5; 325 MG/1; MG/1
1 TABLET ORAL 2 TIMES DAILY PRN
Qty: 10 TABLET | Refills: 0 | Status: SHIPPED | OUTPATIENT
Start: 2020-11-11 | End: 2020-11-14

## 2020-11-11 ASSESSMENT — PAIN SCALES - GENERAL: PAINLEVEL: SEVERE PAIN (6)

## 2020-11-11 NOTE — NURSING NOTE
"Chief Complaint   Patient presents with     Hospital F/U     fractured ribs     /70   Pulse 88   Temp 99.1  F (37.3  C) (Tympanic)   Wt 82.6 kg (182 lb)   SpO2 97%   BMI 26.88 kg/m   Estimated body mass index is 26.88 kg/m  as calculated from the following:    Height as of 11/3/20: 1.753 m (5' 9\").    Weight as of this encounter: 82.6 kg (182 lb).  Patient presents to the clinic using Cane      Health Maintenance that is potentially due pending provider review:    Health Maintenance Due   Topic Date Due     SKIN CANCER SCREENING  1945     ZOSTER IMMUNIZATION (1 of 2) 04/24/1997     MEDICARE ANNUAL WELLNESS VISIT  04/24/2012     ADVANCE CARE PLANNING  04/15/2018     LIPID  04/17/2018     PHQ-2  01/01/2020     FALL RISK ASSESSMENT  06/24/2020     INFLUENZA VACCINE (1) 09/01/2020        Declines flu shot.        "

## 2020-11-11 NOTE — PROGRESS NOTES
SUBJECTIVE   Joshua Ennis is a 75 year old male who presents with       Hospital Follow-up Visit:    Hospital/Nursing Home/IP Rehab Facility: [facility]  Date of Admission: 11/2/20  Date of Discharge: 11/4/20  Reason(s) for Admission:   Fall with displaced left 5th-9th rib fractures  Hypertension  Chronic kidney disease stage 3  Leucocytosis  Idopathic progressive polyneuropathy  Granulomatosis with polyangiitis       Was your hospitalization related to COVID-19? No   Problems taking medications regularly:  None  Medication changes since discharge: None  Problems adhering to non-medication therapy:  None    Summary of hospitalization:  [facility] discharge summary reviewed  Diagnostic Tests/Treatments reviewed.  Follow up needed: labs  Other Healthcare Providers Involved in Patient s Care:         None  Update since discharge: stable.   Post Discharge Medication Reconciliation: discharge medications reconciled, continue medications without change.  Plan of care communicated with patient            PCP   Patrick Mcintyre -[phone]    Health Maintenance        Health Maintenance Due   Topic Date Due     SKIN CANCER SCREENING  1945     ZOSTER IMMUNIZATION (1 of 2) 04/24/1997     MEDICARE ANNUAL WELLNESS VISIT  04/24/2012     ADVANCE CARE PLANNING  04/15/2018     LIPID  04/17/2018     PHQ-2  01/01/2020     FALL RISK ASSESSMENT  06/24/2020     INFLUENZA VACCINE (1) 09/01/2020       HPI        Patient Active Problem List   Diagnosis     Tobacco abuse     CARDIOVASCULAR SCREENING; LDL GOAL LESS THAN 160     Melanoma (H)     Idiopathic progressive polyneuropathy     Advanced directives, counseling/discussion     GERD (gastroesophageal reflux disease)     Hypertension, renal     Anemia     Encounter for long-term (current) use of steroids     Granulomatosis with polyangiitis (H)     CKD (chronic kidney disease) stage 3, GFR 30-59 ml/min     colonic polyps- Tubular Adenomas     Multiple  fractures of ribs, left side, initial encounter for closed fracture     Current Outpatient Medications   Medication     Acetaminophen (TYLENOL PO)     Blood Pressure Monitor KIT     calcium-vitamin D (CALCIUM 600 + D) 600-400 MG-UNIT per tablet     Cholecalciferol (VITAMIN D) 1000 UNITS capsule     folic acid (FOLVITE) 1 MG tablet     gabapentin (NEURONTIN) 400 MG capsule     HYDROcodone-acetaminophen (NORCO) 5-325 MG tablet     losartan (COZAAR) 50 MG tablet     methotrexate 2.5 MG tablet     No current facility-administered medications for this visit.        Patient Active Problem List   Diagnosis     Tobacco abuse     CARDIOVASCULAR SCREENING; LDL GOAL LESS THAN 160     Melanoma (H)     Idiopathic progressive polyneuropathy     Advanced directives, counseling/discussion     GERD (gastroesophageal reflux disease)     Hypertension, renal     Anemia     Encounter for long-term (current) use of steroids     Granulomatosis with polyangiitis (H)     CKD (chronic kidney disease) stage 3, GFR 30-59 ml/min     colonic polyps- Tubular Adenomas     Multiple fractures of ribs, left side, initial encounter for closed fracture     Past Surgical History:   Procedure Laterality Date     BIOPSY  11/2011    melanoma on back     DISSECT LYMPH NODE INGUINAL  3/1/2013    Procedure: DISSECT LYMPH NODE INGUINAL;  Bilateral Inguinal Lymph Node Biopsy.;  Surgeon: Tariq Acosta MD;  Location: [facility]     ESOPHAGOSCOPY, GASTROSCOPY, DUODENOSCOPY (EGD), COMBINED  7/5/2013    Procedure: COMBINED ESOPHAGOSCOPY, GASTROSCOPY, DUODENOSCOPY (EGD);  Gastroscopy;  Surgeon: Tariq Acosta MD;  Location: [facility]     HERNIORRHAPHY INGUINAL  8/6/2012    Procedure: HERNIORRHAPHY INGUINAL;  Open Repair Left Inguinal Hernia;  Surgeon: Jerad Baker MD;  Location: [facility]       Social History     Tobacco Use     Smoking status: Former Smoker     Packs/day: 1.00     Years: 20.00     Pack years: 20.00     Types: Cigarettes     Quit date:  2013     Years since quittin.7     Smokeless tobacco: Never Used   Substance Use Topics     Alcohol use: Yes     Comment: rare     Family History   Problem Relation Age of Onset     Diabetes Mother      Hypertension Mother      Cancer Father         stomach     Heart Disease Father      Heart Disease Brother      Diabetes Sister      Diabetes Sister      Diabetes Sister      Heart Disease Brother      Cancer Sister      Glaucoma No family hx of      Macular Degeneration No family hx of          Current Outpatient Medications   Medication Sig Dispense Refill     Acetaminophen (TYLENOL PO) Take 650 mg by mouth once as needed for mild pain or fever        Blood Pressure Monitor KIT Automatic Blood Pressure Monitor 1 kit 0     calcium-vitamin D (CALCIUM 600 + D) 600-400 MG-UNIT per tablet Take 1 tablet by mouth 2 times daily.       Cholecalciferol (VITAMIN D) 1000 UNITS capsule Take 1 capsule by mouth daily.       folic acid (FOLVITE) 1 MG tablet Take 2 tablets (2 mg) by mouth daily 180 tablet 3     gabapentin (NEURONTIN) 400 MG capsule Take 1 capsule (400 mg) by mouth At Bedtime 90 capsule 1     HYDROcodone-acetaminophen (NORCO) 5-325 MG tablet Take 1 tablet by mouth 2 times daily as needed for severe pain 10 tablet 0     losartan (COZAAR) 50 MG tablet Take 1 tablet (50 mg) by mouth daily 90 tablet 1     methotrexate 2.5 MG tablet Take 4 tablets (10 mg) by mouth every 7 days 60 tablet 1     Allergies   Allergen Reactions     Shrimp Nausea and Vomiting     Got sick each time he ate it     Recent Labs   Lab Test 20  0520 20  1954 08/10/20  1549 20  1727 20  1614 13  0901 13  0901   LDL  --   --   --   --   --   --  91   HDL  --   --   --   --   --   --  72   TRIG  --   --   --   --   --   --  125   ALT  --   --  24 29 29   < >  --    CR 1.65* 1.61* 1.53* 1.67* 1.33*   < >  --    GFRESTIMATED 40* 42* 44* 40* 53*   < >  --    GFRESTBLACK 47* 48* 51* 46* 61   < >  --    POTASSIUM  4.4 4.7 4.6  --   --    < >  --     < > = values in this interval not displayed.      BP Readings from Last 3 Encounters:   11/11/20 122/70   11/04/20 124/62   01/21/20 138/78    Wt Readings from Last 3 Encounters:   11/11/20 82.6 kg (182 lb)   11/03/20 82 kg (180 lb 12.4 oz)   01/21/20 87.5 kg (193 lb)                    Reviewed and updated:  Tobacco  Allergies  Meds  Med Hx  Surg Hx  Fam Hx  Soc Hx     ROS:  Constitutional, neuro, ENT, endocrine, pulmonary, cardiac, gastrointestinal, genitourinary, musculoskeletal, integument and psychiatric systems are negative, except as otherwise noted.    PHYSICAL EXAM   /70   Pulse 88   Temp 99.1  F (37.3  C) (Tympanic)   Wt 82.6 kg (182 lb)   SpO2 97%   BMI 26.88 kg/m    Body mass index is 26.88 kg/m .  GENERAL: alert and no distress  EYES: Eyes grossly normal to inspection, PERRL and conjunctivae and sclerae normal  NECK: no adenopathy, no asymmetry, masses, or scars and thyroid normal to palpation  RESP: lungs clear to auscultation - no rales, rhonchi or wheezes  CV: regular rates and rhythm, normal S1 S2, no S3 or S4 and no murmur, click or rub  ABDOMEN: soft, nontender, no hepatosplenomegaly, no masses and bowel sounds normal  MS: Left lower chest wall tenderness, no skin discoloration or swelling noted  SKIN: no suspicious lesions or rashes  NEURO: Normal strength and tone, mentation intact and speech normal    Assessment & Plan     Closed fracture of multiple ribs of left side with routine healing, subsequent encounter  --Patient was hospitalized recently with accidental fall, sustained multiple left ribs fracture.  Medically stable currently, pain tolerable, using Norco sparingly.  Suggested to continue Tylenol, Norco as needed, well hydration and rest.  Other medications reviewed and no changes made  - HYDROcodone-acetaminophen (NORCO) 5-325 MG tablet; Take 1 tablet by mouth 2 times daily as needed for severe pain    History of recent  hospitalization  - Basic metabolic panel  (Ca, Cl, CO2, Creat, Gluc, K, Na, BUN)  - CBC with platelets           Patient Instructions     Patient Education     Rib Fracture    You broke one or more ribs. This is called a rib fracture. Rib fractures don't need a cast like other bones. They will heal by themselves in about 4 to 6 weeks. The first 3 to 4 weeks will be the most painful. During this time deep breathing, coughing, or changing position from sitting to lying down, may cause the broken ends to move slightly.   Home care    Rest. You should not be doing any heavy lifting or strenuous exertion until the pain goes away.    It hurts to breathe when you have a broken rib. This puts you at risk of getting pneumonia from poor airflow through your lungs. To prevent this:  ? Take several very deep breaths once an hour while you're awake. Breathe out through pursed lips as if you are blowing up a balloon. If possible, actually blow up a balloon or a rubber glove. This exercise builds up pressure inside the lung and prevents collapse of the small air sacs of the lung. This exercise may cause some pain at the site of injury. This is normal.  ? You may have gotten a breathing exercise device called an incentive spirometer. Use it at least 4 times a day, or as directed.    Apply an ice pack over the injured area for 15 to 20 minutes every 1 to 2 hours. You should do this for the first 24 to 48 hours. To make an ice pack, put ice cubes in a plastic bag that seals at the top. Wrap the bag in a clean, thin towel or cloth. Never put ice or an ice pack directly on the skin. Keep using ice packs as needed for the relief of pain and swelling.    You may use over-the-counter pain medicine to control pain, unless another pain medicine was prescribed. If you have chronic liver or kidney disease or ever had a stomach ulcer or GI (gastrointestinal) bleeding, talk with your healthcare provider before using these medicines.    If your  pain is not controlled, contact your healthcare provider. Sometimes a stronger pain medicine may be needed. A nerve block can be done in case of severe pain. It will numb the nerve between the ribs.    Follow-up care  Follow up with your healthcare provider, or as advised. In rare cases, a broken rib will cause complications in the first few days that may not be clearly seen during your initial exam. This can include collapsed lung, bleeding around the lung or into the belly (abdomen), or pneumonia. So watch for the signs below.   If X-rays were taken, you will be told of any new findings that may affect your care.  Call 911  Call 911 if you have:     Dizziness, weakness or fainting    Shortness of breath with or without chest discomfort    New or worsening abdominal pain    Discomfort in other areas of your upper body such as your shoulders, jaw, neck, or arms  When to seek medical advice  Call your healthcare provider right away if any of these occur:    Increasing chest pain with breathing    Fever of 100.4 F (38 C) or above, or as directed by your healthcare provider    Congested cough, nausea, or vomiting  [employer] last reviewed this educational content on 4/1/2018 2000-2020 [employer]. [address] [place] [address], [place] [address]. All rights reserved. This information is not intended as a substitute for professional medical care. Always follow your healthcare professional's instructions.                 Patrick Mcintyre MD  [facility]

## 2020-11-11 NOTE — PATIENT INSTRUCTIONS
Patient Education     Rib Fracture    You broke one or more ribs. This is called a rib fracture. Rib fractures don't need a cast like other bones. They will heal by themselves in about 4 to 6 weeks. The first 3 to 4 weeks will be the most painful. During this time deep breathing, coughing, or changing position from sitting to lying down, may cause the broken ends to move slightly.   Home care    Rest. You should not be doing any heavy lifting or strenuous exertion until the pain goes away.    It hurts to breathe when you have a broken rib. This puts you at risk of getting pneumonia from poor airflow through your lungs. To prevent this:  ? Take several very deep breaths once an hour while you're awake. Breathe out through pursed lips as if you are blowing up a balloon. If possible, actually blow up a balloon or a rubber glove. This exercise builds up pressure inside the lung and prevents collapse of the small air sacs of the lung. This exercise may cause some pain at the site of injury. This is normal.  ? You may have gotten a breathing exercise device called an incentive spirometer. Use it at least 4 times a day, or as directed.    Apply an ice pack over the injured area for 15 to 20 minutes every 1 to 2 hours. You should do this for the first 24 to 48 hours. To make an ice pack, put ice cubes in a plastic bag that seals at the top. Wrap the bag in a clean, thin towel or cloth. Never put ice or an ice pack directly on the skin. Keep using ice packs as needed for the relief of pain and swelling.    You may use over-the-counter pain medicine to control pain, unless another pain medicine was prescribed. If you have chronic liver or kidney disease or ever had a stomach ulcer or GI (gastrointestinal) bleeding, talk with your healthcare provider before using these medicines.    If your pain is not controlled, contact your healthcare provider. Sometimes a stronger pain medicine may be needed. A nerve block can be done in  case of severe pain. It will numb the nerve between the ribs.    Follow-up care  Follow up with your healthcare provider, or as advised. In rare cases, a broken rib will cause complications in the first few days that may not be clearly seen during your initial exam. This can include collapsed lung, bleeding around the lung or into the belly (abdomen), or pneumonia. So watch for the signs below.   If X-rays were taken, you will be told of any new findings that may affect your care.  Call 911  Call 911 if you have:     Dizziness, weakness or fainting    Shortness of breath with or without chest discomfort    New or worsening abdominal pain    Discomfort in other areas of your upper body such as your shoulders, jaw, neck, or arms  When to seek medical advice  Call your healthcare provider right away if any of these occur:    Increasing chest pain with breathing    Fever of 100.4 F (38 C) or above, or as directed by your healthcare provider    Congested cough, nausea, or vomiting  [employer] last reviewed this educational content on 4/1/2018 2000-2020 [employer]. [address] [place] [address], [place] [address]. All rights reserved. This information is not intended as a substitute for professional medical care. Always follow your healthcare professional's instructions.

## 2020-11-11 NOTE — LETTER
November 18, 2020      Joshua Ennis  [address] [facility] [address]        Dear ,    We are writing to inform you of your test results.    PR3 vasculitis marker keeps going up but the rest of your labs look good/stable. Do you still have nasal crusting? Any new symptoms?    Resulted Orders   Immunoglobulins A G and M   Result Value Ref Range     610 - 1,616 mg/dL    IGA 83 (L) 84 - 499 mg/dL    IGM 25 (L) 35 - 242 mg/dL   ANCA IgG by IFA with Reflex to Titer   Result Value Ref Range    Neutrophil Cytoplasmic Antibody 1:40 (A) <1:10 [titer]    Neutrophil Cytoplasmic Antibody Pattern       Cytoplasmic ANCA (c-ANCA) staining pattern observed and confirmed on formalin-fixed   neutrophils.     CD19 B Cell Count   Result Value Ref Range    CD19 B Cells 25 6 - 27 %    Absolute CD19 297 107 - 698 cells/uL   Erythrocyte sedimentation rate auto   Result Value Ref Range    Sed Rate 8 0 - 20 mm/h   Creatinine random urine   Result Value Ref Range    Creatinine Urine Random 200 mg/dL   Protein  random urine with Creat Ratio   Result Value Ref Range    Protein Random Urine 0.25 g/L    Protein Total Urine g/gr Creatinine 0.13 0 - 0.2 g/g Cr   CRP inflammation   Result Value Ref Range    CRP Inflammation <2.9 0.0 - 8.0 mg/L   Creatinine   Result Value Ref Range    Creatinine 1.38 (H) 0.66 - 1.25 mg/dL    GFR Estimate 50 (L) >60 mL/min/[1.73_m2]      Comment:      Non  GFR Calc  Starting 12/18/2018, serum creatinine based estimated GFR (eGFR) will be   calculated using the Chronic Kidney Disease Epidemiology Collaboration   (CKD-EPI) equation.      GFR Estimate If Black 58 (L) >60 mL/min/[1.73_m2]      Comment:       GFR Calc  Starting 12/18/2018, serum creatinine based estimated GFR (eGFR) will be   calculated using the Chronic Kidney Disease Epidemiology Collaboration   (CKD-EPI) equation.     CBC with platelets differential   Result Value Ref Range    WBC 8.0 4.0 - 11.0  10e9/L    RBC Count 4.56 4.4 - 5.9 10e12/L    Hemoglobin 14.5 13.3 - 17.7 g/dL    Hematocrit 43.2 40.0 - 53.0 %    MCV 95 78 - 100 fl    MCH 31.8 26.5 - 33.0 pg    MCHC 33.6 31.5 - 36.5 g/dL    RDW 12.8 10.0 - 15.0 %    Platelet Count 241 150 - 450 10e9/L    % Neutrophils 75.0 %    % Lymphocytes 13.7 %    % Monocytes 8.8 %    % Eosinophils 1.9 %    % Basophils 0.6 %    Absolute Neutrophil 6.0 1.6 - 8.3 10e9/L    Absolute Lymphocytes 1.1 0.8 - 5.3 10e9/L    Absolute Monocytes 0.7 0.0 - 1.3 10e9/L    Absolute Eosinophils 0.2 0.0 - 0.7 10e9/L    Absolute Basophils 0.1 0.0 - 0.2 10e9/L    Diff Method Automated Method    Albumin level   Result Value Ref Range    Albumin 3.8 3.4 - 5.0 g/dL   Myeloperoxidase and Proteinase 3 Panel   Result Value Ref Range    Myeloperoxidase Antibody IgG <0.2 0.0 - 0.9 AI      Comment:      Negative  Antibody index (AI) values reflect qualitative changes in antibody   concentration that cannot be directly associated with clinical condition or   disease state.      Proteinase 3 Antibody IgG 8.0 (H) 0.0 - 0.9 AI      Comment:      Positive  Antibody index (AI) values reflect qualitative changes in antibody   concentration that cannot be directly associated with clinical condition or   disease state.     ALT   Result Value Ref Range    ALT 31 0 - 70 U/L   AST   Result Value Ref Range    AST 20 0 - 45 U/L   UA with Microscopic reflex to Culture   Result Value Ref Range    Color Urine Yellow     Appearance Urine Clear     Glucose Urine Negative NEG^Negative mg/dL    Bilirubin Urine Negative NEG^Negative    Ketones Urine Negative NEG^Negative mg/dL    Specific Gravity Urine 1.025 1.003 - 1.035    pH Urine 5.0 5.0 - 7.0 pH    Protein Albumin Urine Negative NEG^Negative mg/dL    Urobilinogen Urine 0.2 0.2 - 1.0 EU/dL    Nitrite Urine Negative NEG^Negative    Blood Urine Negative NEG^Negative    Leukocyte Esterase Urine Negative NEG^Negative    Source Midstream Urine     WBC Urine 0 - 5 OTO5^0 - 5  /HPF    RBC Urine O - 2 OTO2^O - 2 /HPF       If you have any questions or concerns, please call the clinic at the number listed above.       Sincerely,        Ede Sanchez MD

## 2020-11-11 NOTE — LETTER
November 11, 2020      Joshua Ennis  [address] [facility] [address]        Dear ,    We are writing to inform you of your test results.    Kidney function at around baseline low.  Other electrolytes came back normal. Let us know if there are any questions.     Resulted Orders   Basic metabolic panel  (Ca, Cl, CO2, Creat, Gluc, K, Na, BUN)   Result Value Ref Range    Sodium 139 133 - 144 mmol/L    Potassium 4.3 3.4 - 5.3 mmol/L    Chloride 107 94 - 109 mmol/L    Carbon Dioxide 27 20 - 32 mmol/L    Anion Gap 5 3 - 14 mmol/L    Glucose 79 70 - 99 mg/dL    Urea Nitrogen 28 7 - 30 mg/dL    Creatinine 1.40 (H) 0.66 - 1.25 mg/dL    GFR Estimate 49 (L) >60 mL/min/[1.73_m2]      Comment:      Non  GFR Calc  Starting 12/18/2018, serum creatinine based estimated GFR (eGFR) will be   calculated using the Chronic Kidney Disease Epidemiology Collaboration   (CKD-EPI) equation.      GFR Estimate If Black 57 (L) >60 mL/min/[1.73_m2]      Comment:       GFR Calc  Starting 12/18/2018, serum creatinine based estimated GFR (eGFR) will be   calculated using the Chronic Kidney Disease Epidemiology Collaboration   (CKD-EPI) equation.      Calcium 10.0 8.5 - 10.1 mg/dL       If you have any questions or concerns, please call the clinic at the number listed above.       Sincerely,        Patrick Mcintyre MD

## 2020-11-12 LAB
ANCA AB PATTERN SER IF-IMP: ABNORMAL
C-ANCA TITR SER IF: ABNORMAL {TITER}
IGA SERPL-MCNC: 83 MG/DL (ref 84–499)
IGG SERPL-MCNC: 979 MG/DL (ref 610–1616)
IGM SERPL-MCNC: 25 MG/DL (ref 35–242)

## 2020-11-13 LAB
MYELOPEROXIDASE AB SER-ACNC: <0.2 AI (ref 0–0.9)
PROTEINASE3 IGG SER-ACNC: 8 AI (ref 0–0.9)

## 2020-11-18 NOTE — RESULT ENCOUNTER NOTE
PR3 vasculitis marker keeps going up but the rest of your labs look good/stable. Do you still have nasal crusting? Any new symptoms?

## 2020-12-04 ENCOUNTER — TELEPHONE (OUTPATIENT)
Dept: URGENT CARE | Facility: URGENT CARE | Age: 75
End: 2020-12-04

## 2020-12-04 ENCOUNTER — TELEPHONE (OUTPATIENT)
Dept: RHEUMATOLOGY | Facility: CLINIC | Age: 75
End: 2020-12-04

## 2020-12-04 NOTE — TELEPHONE ENCOUNTER
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 Sanchez had put the Pt out on a leave with his employer.  [facility] is requesting a statement and the Pt is still not working.    Earlene states that the statement can be mailed to the Pt's address .    Please contact the Pt if there is any questions at [phone] or [phone]

## 2020-12-04 NOTE — TELEPHONE ENCOUNTER
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 This was after visit after he fractured his ribs. Pt had Lab work also 11/11/20.  Please mail letter to Pt   This is a request from [employer].     Phone Number Patient can be reached at: Home number on file [phone] (home)    Best Time: Any Time      Can we leave a detailed message on this number? YES    Call taken on 12/4/2020 at 2:01 PM by Rhonda Dempsey

## 2020-12-04 NOTE — Clinical Note
Please review. They are asking for a date that you recommended pt to stop working. Please advise on that.    Thanks   [employer]

## 2020-12-04 NOTE — LETTER
December 8, 2020      Joshua Ennis  [address] [facility] [address]        To Whom It May Concern:      Joshua Ennis was seen in our clinic on November 11, 2020 after an accidental fall, due to which he sustained multiple ribs fracture, also had lab work up on 11/11/2020. Let us know if there are any questions.           Sincerely,        Patrick Mcintyre MD

## 2020-12-04 NOTE — LETTER
December 9, 2020    Joshua Ennis  [address]  [facility] [address]      To whom it may concern,    Joshua Ennis is under my care at the [facility]. He is followed for a condition and is treated with medications which put him in a position of increased risk for irina infectious disease. With the recent COVID-19 pandemic, we ask that you allow this patient the option of reducing his direct exposure to infection by limiting her face-to-face contact with others, including working remotely.     Thank you in advance for this accommodation.  Sincerely,    Ede Sanchez MD    Division of Rheumatic and Autoimmune Diseases  [address] [address]

## 2020-12-07 DIAGNOSIS — I10 HYPERTENSION, UNSPECIFIED TYPE: ICD-10-CM

## 2020-12-07 DIAGNOSIS — G62.9 NEUROPATHY: ICD-10-CM

## 2020-12-09 NOTE — TELEPHONE ENCOUNTER
losartan 50 mg tablet      Last Written Prescription Date:  6/19/20  Last Fill Quantity: 90,   # refills: 1  Last Virtual Visit : 7/10/20  Future Office visit:  1/8/21    Routing refill request to provider for review/approval because:  Abnormal lab, noted in lab message  Lab Test 11/11/20  1043   CR 1.38*

## 2020-12-10 NOTE — TELEPHONE ENCOUNTER
Letter completed and mailed to patient. Patient notified.    Lexus Das CMA   12/10/2020 12:52 PM

## 2020-12-11 RX ORDER — LOSARTAN POTASSIUM 50 MG/1
50 TABLET ORAL DAILY
Qty: 90 TABLET | Refills: 0 | Status: SHIPPED | OUTPATIENT
Start: 2020-12-11 | End: 2021-03-16

## 2020-12-21 DIAGNOSIS — Z79.899 HIGH RISK MEDICATIONS (NOT ANTICOAGULANTS) LONG-TERM USE: ICD-10-CM

## 2020-12-28 NOTE — TELEPHONE ENCOUNTER
methotrexate 2.5 MG tablet  Last Written Prescription Date:  7/2/20  Last Fill Quantity: 60,   # refills: 1  Last Office Visit: 7/10/20  Future Office visit: 1/8/21      CBC RESULTS:   Recent Labs   Lab Test 11/11/20  1043   WBC 8.0   RBC 4.56   HGB 14.5   HCT 43.2   MCV 95   MCH 31.8   MCHC 33.6   RDW 12.8          Creatinine   Date Value Ref Range Status   11/11/2020 1.38 (H) 0.66 - 1.25 mg/dL Final   ]    Liver Function Studies -   Recent Labs   Lab Test 11/11/20  1043 12/13/17  1228 12/13/17  1228   PROTTOTAL  --   --  7.8   ALBUMIN 3.8   < > 4.0   BILITOTAL  --   --  0.7   ALKPHOS  --   --  97   AST 20   < > 22   ALT 31   < > 30    < > = values in this interval not displayed.       Routing refill request to provider for review/approval because: provider review.

## 2021-01-08 ENCOUNTER — VIRTUAL VISIT (OUTPATIENT)
Dept: RHEUMATOLOGY | Facility: CLINIC | Age: 76
End: 2021-01-08
Attending: INTERNAL MEDICINE
Payer: COMMERCIAL

## 2021-01-08 DIAGNOSIS — M31.30 GRANULOMATOSIS WITH POLYANGIITIS, UNSPECIFIED WHETHER RENAL INVOLVEMENT (H): ICD-10-CM

## 2021-01-08 DIAGNOSIS — Z79.899 HIGH RISK MEDICATIONS (NOT ANTICOAGULANTS) LONG-TERM USE: Primary | ICD-10-CM

## 2021-01-08 PROCEDURE — 99212 OFFICE O/P EST SF 10 MIN: CPT | Mod: TEL | Performed by: INTERNAL MEDICINE

## 2021-01-08 ASSESSMENT — PAIN SCALES - GENERAL: PAINLEVEL: NO PAIN (0)

## 2021-01-08 NOTE — LETTER
1/8/2021       RE: Joshua Ennis  [address]  [facility] [address]     Dear Colleague,    Thank you for referring your patient, Joshua Ennis, to the [facility] [place] at [facility]. Please see a copy of my visit note below.    Joshua is a 75 year old who is being evaluated via a billable telephone visit.         Phone call duration: 18 minutes      [facility] Virtual  Follow-Up Note  Date of visit: 1/8/2021  Last visit date: 7/10/2020    Joshua Ennis MRN# [id number]   Age: 75 year old    Reason for visit: GPA   YOB: 1945          Assessment and Plan:     Assessment:   Mr. Ennis is a 75 yr old  male with history of melanoma s/p resection in 11/2011, former tobacco use, and GPA (PR3+, MPO and c-ANCA negative in 3/2013 based on aforementioned labs, confirmed by kidney and lung biopsy) who presents for clinic follow-up regarding GPA.  He was initiated on cyclophosphamide 150 mg QD and high-dose prednisone 3/2013. He last took prednisone in 10/2013. Cytoxan was switched to MTX for maintenance treatment in 10/2013.  He has been tolerating it well. He had a flare in 12/2013 with increased crusts in his nose along with recurrence of arthralgia, worsening numbness in feet and increasing vasculitis marker. Renal function was stable with negative repeat chest CT. His vasculitis flare was treated with short course of prednisone taper 20 mg po qd max and increasing MTX to 8 tab = 20 mg qwk. Vasculitis symptoms improved.  At visit on 1/2015, he had scabs in his nose, had cold dakota symptoms and increased arthralgia/fatigue (shoulders, L hand). Labs from 12/16 showed increased Cr level and hematuria with rising PR3 (more than 8), there was a concern for vasculitis flare (in kidneys, sinuses), no flare on repeat chest CT 1/2015. Therefore it was recommended to try rituximab. Another reason was to be able  to taper MTX off given abnormal Cr.  He is now s/p Rituximab infusion x 4 (1st on 2/25/2015). He is feeling well. No hematuria with stable Cr, no SOB. Saw ENT with negative nasal mucosa biopsy 6/2015 for granuloma but showed active inflammation, had left nostril lesion which decreased in size by use of mupirocin ointment. PR3 vasculitis marker went back to NL in 6/2015, it was NL in 10/2015, given stable vasculitis and low GFR, MTX from 8 to 7 tab po qwk. Repeat NM-3 in 3/2016 was slightly positive, Cr was slightly higher than baseline. We tapered his MTX further to 6 tab po qwk in 2/2016 given lack of symptoms. In 3/2017, MTX was reduced from 6 to 5 tab qwk. It was further tapered to 4 tab po qwk in 6/2017 given stable disease. No vasculitis flare ups by such change.     Vasculitis seems to be stable and he has no signs or symptoms of flare up today. Labs from 11/2020, shows continued increase in PR3 but the rest of labs are stable.    Recommend to continue MTX at low dose of 4 tab po qwk for now but continue to monitor labs closely (every 3 months), next in Feb 2021.     Was advised to call in case of vasculitis flare up sx.        Plan:    - Continue MTX 10 mg (4 tabs) po qwk, continue with Folic acid 1 mg po qd    > Labs in 2/2021 and q3 months including MTX monitoring labs    > Hold MTX if develops infection  - Neuropathy unchanged on Gabapentin  -Discussed COVID vaccine:    There is no data about use of COVID-19 vaccines in patients with pregnancy, autoimmune disorders or immunosuppressed as they were excluded from the trials. However from all the available information, we think it should be safe and OK to take as it is not a live vaccine. Concerns include potential risk of autoimmune disease flare up or not being very effective.    The data/information about COVID vaccine is evolving, please contact us when the vaccine becomes available to you for an update/recommendation how to handle rheumatology  medications around the time of vaccination.            - RTC in 6 months         Orders Placed This Encounter   Procedures     AST     ALT     Myeloperoxidase and Proteinase 3 Panel     Albumin level     CBC with platelets differential     Creatinine     CRP inflammation     UA with Microscopic reflex to Culture     Protein  random urine with Creat Ratio     Creatinine random urine     Erythrocyte sedimentation rate auto     CD19 B Cell Count     ANCA IgG by IFA with Reflex to Titer     Immunoglobulins A G and M            Ede Sanchez MD         HPI / Interim History:      Mr. Ennis is a 76 yo WM with h/o melanoma s/p resection in 11/2011, former tobacco use, and GPA diagnosed in 3/2013. He presents for follow up. Feeling well. Still takes methotrexate 4 tabs a week. No nasal crusting. No SOB, CP, or cough, hemoptysis, or skin rashes.     Had a hospitalization 11/2-11/4/2020 with a fall with displaced left 5th-9th rib fractures, now recovered.       Past Medical History:     Past Medical History:   Diagnosis Date     Arthritis      Autoimmune disease (H)      Hearing problem      Hoarseness      Hypertension      Kidney problem      Malignant melanoma (H)      Malignant neoplasm (H)     melanoma     Squamous cell carcinoma      Russo syndrome           Past Surgical History:     Past Surgical History:   Procedure Laterality Date     BIOPSY  11/2011    melanoma on back     DISSECT LYMPH NODE INGUINAL  3/1/2013    Procedure: DISSECT LYMPH NODE INGUINAL;  Bilateral Inguinal Lymph Node Biopsy.;  Surgeon: Tariq Acosta MD;  Location: [facility]     ESOPHAGOSCOPY, GASTROSCOPY, DUODENOSCOPY (EGD), COMBINED  7/5/2013    Procedure: COMBINED ESOPHAGOSCOPY, GASTROSCOPY, DUODENOSCOPY (EGD);  Gastroscopy;  Surgeon: Tariq Acosta MD;  Location: [facility]     HERNIORRHAPHY INGUINAL  8/6/2012    Procedure: HERNIORRHAPHY INGUINAL;  Open Repair Left Inguinal Hernia;  Surgeon: Jerad Baker MD;  Location: [facility]           Social History:     Social History     Tobacco Use     Smoking status: Former Smoker     Packs/day: 1.00     Years: 20.00     Pack years: 20.00     Types: Cigarettes     Quit date: 2013     Years since quittin.9     Smokeless tobacco: Never Used   Substance Use Topics     Alcohol use: Yes     Comment: rare          Family History:     Family History   Problem Relation Age of Onset     Diabetes Mother      Hypertension Mother      Cancer Father         stomach     Heart Disease Father      Heart Disease Brother      Diabetes Sister      Diabetes Sister      Diabetes Sister      Heart Disease Brother      Cancer Sister      Glaucoma No family hx of      Macular Degeneration No family hx of           Immunizations:   Due for PPSV23, will administer today. Immunizations otherwise up to date.    PMHx, FHx, SHx were reviewed, unchanged.         Allergies:     Allergies   Allergen Reactions     Shrimp Nausea and Vomiting     Got sick each time he ate it          Medications:     Current Outpatient Medications   Medication Sig     Acetaminophen (TYLENOL PO) Take 650 mg by mouth once as needed for mild pain or fever      Blood Pressure Monitor KIT Automatic Blood Pressure Monitor     calcium-vitamin D (CALCIUM 600 + D) 600-400 MG-UNIT per tablet Take 1 tablet by mouth 2 times daily.     folic acid (FOLVITE) 1 MG tablet Take 2 tablets (2 mg) by mouth daily     gabapentin (NEURONTIN) 400 MG capsule Take 1 capsule (400 mg) by mouth At Bedtime     losartan (COZAAR) 50 MG tablet Take 1 tablet (50 mg) by mouth daily     methotrexate 2.5 MG tablet Take 4 tablets (10 mg) by mouth every 7 days Please keep appt 21.     Cholecalciferol (VITAMIN D) 1000 UNITS capsule Take 1 capsule by mouth daily.     No current facility-administered medications for this visit.           Review of Systems:   A comprehensive ROS was done. Positives are per HPI.           Physical Exam:     Not done, phone visit         Data:   Recent  laboratory data reviewed.      CHEST CT (2/16/2013)    Chest: Multiple indeterminate pulmonary nodules. Several of the  larger lesions are cavitary. There are 3 dominant lesions, a 3.2 cm  cavitary lesion in the left upper lobe, a 3.4 cm solid mass in the  right lower lobe laterally and a 3.7 cm cavitary mass in the right  lower lobe medially. In addition there is mediastinal lymphadenopathy  with a dominant 3 cm subcarinal node. Bilateral axillary  lymphadenopathy with 2.4 cm node seen bilaterally. Chest otherwise  unremarkable.       Exam: High-resolution Chest CT without contrast 1/9/2015 12:42 PM.  History: Concern for ANCA vasculitis flare in the chest.  Comparison: 3/7/2014, 11/14/2013, 2/16/2013.  Technique: CT helical acquisition from the lung apices to the kidneys  was obtained without intravenous contrast. Both inspiratory and  expiratory images were obtained.    Findings:  Moderate atherosclerotic calcifications of the coronary arteries. Mild  calcifications involving the aorta and aortic great vessels. Prominent  paratracheal lymph node on series 3 image 22 measuring 9 mm. Decreased  from 2/16/2013 and unchanged from 3/7/2014. Borderline enlarged right  hilar lymph node, unchanged from 3/7/2014 and decreased from  2/16/2013. Heart is not enlarged. Trace cardial effusion. No axillary  lymphadenopathy.  Nodular scarring in the left apex, unchanged from 2/16/2013. No  pleural effusion or pneumothorax. No evidence of intrapulmonary  infection. There is a cluster of tree in bud nodularity noted in the  anteromedial right upper lobe. These nodules appear new. Expiratory  images demonstrate mild air trapping. Scattered pulmonary nodules:  Series 6 image 146: Seen posteriorly in the right lower lobe, there is  a sub-pleural nodule measuring 1.6 x 1.2 cm with a spiculated border.  Previously, this nodule measured 2.0 x 1.6 cm.  Series 6 image 189: Seen laterally in the right lower lobe, there is a  former mid  pulmonary nodule which is unchanged from 3/7/2013 and  decreased from 2/16/2013.  Series 6 image 169: Seen posterolaterally in the right lower lobe,  there is a 3 mm pulmonary nodule which is unchanged from 3/7/2014 and  decreased from 2/16/2013.  Series 6 image 225: Seen posterolaterally in the left lower lobe,  there is a 4 mm subpleural nodule which is unchanged from 2/16/2014.  Other scattered sub-4 mm pulmonary nodules appear stable from previous  study.  Evaluation of the upper abdomen is limited due to the lack of  intravenous contrast.  No suspicious bony lesions.    IMPRESSION  Impression:   1. Scattered pulmonary nodules enlarged lymph nodes are  stable/slightly decreased from the previous study. No evidence for  acute flare in patient's known ANCA-associated vasculitis.  2. New tree in bud nodularity seen anteromedially in the right middle  lobe. Findings could be seen in the setting of an atypical infectious  process.  3. Mild air trapping. Finding is nonspecific and is seen in setting of  small airways disease such as asthma or bronchiolitis.  I have personally reviewed the examination and initial interpretation  and I agree with the findings.  TOÑO PERKINS MD    Copath Report     Patient Name: ADONAY FAITH  MR#: [id number]  Specimen #: [id number]  Collected: 6/11/2015  Received: 6/11/2015  Reported: 6/12/2015 17:37  Ordering Phy(s): DANIELLE PARIS    SPECIMEN(S):  Nasal septum    FINAL DIAGNOSIS:  Nasal septum, biopsy:  -Squamous mucosa with ulcer, marked acute inflammation and reactive  changes  -No granulomas identified  -A special stain for fungi (GMS) is negative    I have personally reviewed all specimens and or slides, including the  listed special stains, and used them with my medical judgement to  determine the final diagnosis.    Electronically signed out by:    Dorys Barton M.D., [facility]    CLINICAL HISTORY:  The patient is a 70-year-old man with a history of left upper  "back  melanoma, resected in 2011 (see consult report [id number]). 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 He now presents for followup visit with nasal  cavity scabs, increased arthralgia and fatigue, concerning for  vasculitis flare. Operative procedure: Nasal septum biopsy.    GROSS:  The specimen is received in formalin with proper patient identification,  labeled \"septal tissue\". The specimen consists of three tan-pink tissue  fragments, 0.2 cm in greatest dimension, entirely submitted in cassette  1. (Dictated by: Ana Mixon 6/11/2015 03:39 PM)    MICROSCOPIC:  Microscopic examination is performed. A GMS stain, performed with  appropriate positive control, is negative.    CPT Codes:  A: 89065-TD3, 54417-VCG    TESTING LAB LOCATION:  [facility], [address] [address] [phone]    COLLECTION SITE:  Client: [facility]  Location: [facility] (B)     Component      Latest Ref Rng & Units 11/11/2020   WBC      4.0 - 11.0 10e9/L 8.0   RBC Count      4.4 - 5.9 10e12/L 4.56   Hemoglobin      13.3 - 17.7 g/dL 14.5   Hematocrit      40.0 - 53.0 % 43.2   MCV      78 - 100 fl 95   MCH      26.5 - 33.0 pg 31.8   MCHC      31.5 - 36.5 g/dL 33.6   RDW      10.0 - 15.0 % 12.8   Platelet Count      150 - 450 10e9/L 241   % Neutrophils      % 75.0   % Lymphocytes      % 13.7   % Monocytes      % 8.8   % Eosinophils      % 1.9   % Basophils      % 0.6   Absolute Neutrophil      1.6 - 8.3 10e9/L 6.0   Absolute Lymphocytes      0.8 - 5.3 10e9/L 1.1   Absolute Monocytes      0.0 - 1.3 10e9/L 0.7   Absolute Eosinophils      0.0 - 0.7 10e9/L 0.2   Absolute Basophils      0.0 - 0.2 10e9/L 0.1   Diff Method       " Automated Method   Color Urine       Yellow   Appearance Urine       Clear   Glucose Urine      NEG:Negative mg/dL Negative   Bilirubin Urine      NEG:Negative Negative   Ketones Urine      NEG:Negative mg/dL Negative   Specific Gravity Urine      1.003 - 1.035 1.025   pH Urine      5.0 - 7.0 pH 5.0   Protein Albumin Urine      NEG:Negative mg/dL Negative   Urobilinogen Urine      0.2 - 1.0 EU/dL 0.2   Nitrite Urine      NEG:Negative Negative   Blood Urine      NEG:Negative Negative   Leukocyte Esterase Urine      NEG:Negative Negative   Source       Midstream Urine   WBC Urine      OTO5:0 - 5 /HPF 0 - 5   RBC Urine      OTO2:O - 2 /HPF O - 2   IGG      610 - 1,616 mg/dL 979   IGA      84 - 499 mg/dL 83 (L)   IGM      35 - 242 mg/dL 25 (L)   Creatinine      0.66 - 1.25 mg/dL 1.38 (H)   GFR Estimate      >60 mL/min/1.73:m2 50 (L)   GFR Estimate If Black      >60 mL/min/1.73:m2 58 (L)   Neutrophil Cytoplasmic Antibody      <1:10 titer 1:40 (A)   Neutrophil Cytoplasmic Antibody Pattern       Cytoplasmic ANCA (c-ANCA) staining pattern observed and confirmed on formalin-fixed . . .   CD19 B Cells      6 - 27 % 25   Absolute CD19      107 - 698 cells/uL 297   Protein Random Urine      g/L 0.25   Protein Total Urine g/gr Creatinine      0 - 0.2 g/g Cr 0.13   Myeloperoxidase Antibody IgG      0.0 - 0.9 AI <0.2   Proteinase 3 Antibody IgG      0.0 - 0.9 AI 8.0 (H)   Sed Rate      0 - 20 mm/h 8   Creatinine Urine Random      mg/dL 200   CRP Inflammation      0.0 - 8.0 mg/L <2.9   Albumin      3.4 - 5.0 g/dL 3.8   ALT      0 - 70 U/L 31   AST      0 - 45 U/L 20

## 2021-01-08 NOTE — PROGRESS NOTES
Joshua is a 75 year old who is being evaluated via a billable telephone visit.         Phone call duration: 18 minutes      [facility] Virtual  Follow-Up Note  Date of visit: 1/8/2021  Last visit date: 7/10/2020    Joshua Ennis MRN# [id number]   Age: 75 year old    Reason for visit: GPA   YOB: 1945          Assessment and Plan:     Assessment:   Mr. Ennis is a 75 yr old  male with history of melanoma s/p resection in 11/2011, former tobacco use, and GPA (PR3+, MPO and c-ANCA negative in 3/2013 based on aforementioned labs, confirmed by kidney and lung biopsy) who presents for clinic follow-up regarding GPA.  He was initiated on cyclophosphamide 150 mg QD and high-dose prednisone 3/2013. He last took prednisone in 10/2013. Cytoxan was switched to MTX for maintenance treatment in 10/2013.  He has been tolerating it well. He had a flare in 12/2013 with increased crusts in his nose along with recurrence of arthralgia, worsening numbness in feet and increasing vasculitis marker. Renal function was stable with negative repeat chest CT. His vasculitis flare was treated with short course of prednisone taper 20 mg po qd max and increasing MTX to 8 tab = 20 mg qwk. Vasculitis symptoms improved.  At visit on 1/2015, he had scabs in his nose, had cold [place]akota symptoms and increased arthralgia/fatigue (shoulders, L hand). Labs from 12/16 showed increased Cr level and hematuria with rising PR3 (more than 8), there was a concern for vasculitis flare (in kidneys, sinuses), no flare on repeat chest CT 1/2015. Therefore it was recommended to try rituximab. Another reason was to be able to taper MTX off given abnormal Cr.  He is now s/p Rituximab infusion x 4 (1st on 2/25/2015). He is feeling well. No hematuria with stable Cr, no SOB. Saw ENT with negative nasal mucosa biopsy 6/2015 for granuloma but showed active inflammation, had left nostril lesion which decreased in size by use of mupirocin  ointment. PR3 vasculitis marker went back to NL in 6/2015, it was NL in 10/2015, given stable vasculitis and low GFR, MTX from 8 to 7 tab po qwk. Repeat MA-3 in 3/2016 was slightly positive, Cr was slightly higher than baseline. We tapered his MTX further to 6 tab po qwk in 2/2016 given lack of symptoms. In 3/2017, MTX was reduced from 6 to 5 tab qwk. It was further tapered to 4 tab po qwk in 6/2017 given stable disease. No vasculitis flare ups by such change.     Vasculitis seems to be stable and he has no signs or symptoms of flare up today. Labs from 11/2020, shows continued increase in PR3 but the rest of labs are stable.    Recommend to continue MTX at low dose of 4 tab po qwk for now but continue to monitor labs closely (every 3 months), next in Feb 2021.     Was advised to call in case of vasculitis flare up sx.        Plan:    - Continue MTX 10 mg (4 tabs) po qwk, continue with Folic acid 1 mg po qd    > Labs in 2/2021 and q3 months including MTX monitoring labs    > Hold MTX if develops infection  - Neuropathy unchanged on Gabapentin  -Discussed COVID vaccine:    There is no data about use of COVID-19 vaccines in patients with pregnancy, autoimmune disorders or immunosuppressed as they were excluded from the trials. However from all the available information, we think it should be safe and OK to take as it is not a live vaccine. Concerns include potential risk of autoimmune disease flare up or not being very effective.    The data/information about COVID vaccine is evolving, please contact us when the vaccine becomes available to you for an update/recommendation how to handle rheumatology medications around the time of vaccination.            - RTC in 6 months         Orders Placed This Encounter   Procedures     AST     ALT     Myeloperoxidase and Proteinase 3 Panel     Albumin level     CBC with platelets differential     Creatinine     CRP inflammation     UA with Microscopic reflex to Culture      Protein  random urine with Creat Ratio     Creatinine random urine     Erythrocyte sedimentation rate auto     CD19 B Cell Count     ANCA IgG by IFA with Reflex to Titer     Immunoglobulins A G and M            Ede Sanchez MD         HPI / Interim History:      Mr. Ennis is a 76 yo WM with h/o melanoma s/p resection in 2011, former tobacco use, and GPA diagnosed in 3/2013. He presents for follow up. Feeling well. Still takes methotrexate 4 tabs a week. No nasal crusting. No SOB, CP, or cough, hemoptysis, or skin rashes.     Had a hospitalization -2020 with a fall with displaced left 5th-9th rib fractures, now recovered.       Past Medical History:     Past Medical History:   Diagnosis Date     Arthritis      Autoimmune disease (H)      Hearing problem      Hoarseness      Hypertension      Kidney problem      Malignant melanoma (H)      Malignant neoplasm (H)     melanoma     Squamous cell carcinoma      Russo syndrome           Past Surgical History:     Past Surgical History:   Procedure Laterality Date     BIOPSY  2011    melanoma on back     DISSECT LYMPH NODE INGUINAL  3/1/2013    Procedure: DISSECT LYMPH NODE INGUINAL;  Bilateral Inguinal Lymph Node Biopsy.;  Surgeon: Tariq Acosta MD;  Location: [facility]     ESOPHAGOSCOPY, GASTROSCOPY, DUODENOSCOPY (EGD), COMBINED  2013    Procedure: COMBINED ESOPHAGOSCOPY, GASTROSCOPY, DUODENOSCOPY (EGD);  Gastroscopy;  Surgeon: Tariq Acosta MD;  Location: [facility]     HERNIORRHAPHY INGUINAL  2012    Procedure: HERNIORRHAPHY INGUINAL;  Open Repair Left Inguinal Hernia;  Surgeon: Jerad Baker MD;  Location: [facility]          Social History:     Social History     Tobacco Use     Smoking status: Former Smoker     Packs/day: 1.00     Years: 20.00     Pack years: 20.00     Types: Cigarettes     Quit date: 2013     Years since quittin.9     Smokeless tobacco: Never Used   Substance Use Topics     Alcohol use: Yes     Comment:  rare          Family History:     Family History   Problem Relation Age of Onset     Diabetes Mother      Hypertension Mother      Cancer Father         stomach     Heart Disease Father      Heart Disease Brother      Diabetes Sister      Diabetes Sister      Diabetes Sister      Heart Disease Brother      Cancer Sister      Glaucoma No family hx of      Macular Degeneration No family hx of           Immunizations:   Due for PPSV23, will administer today. Immunizations otherwise up to date.    PMHx, FHx, SHx were reviewed, unchanged.         Allergies:     Allergies   Allergen Reactions     Shrimp Nausea and Vomiting     Got sick each time he ate it          Medications:     Current Outpatient Medications   Medication Sig     Acetaminophen (TYLENOL PO) Take 650 mg by mouth once as needed for mild pain or fever      Blood Pressure Monitor KIT Automatic Blood Pressure Monitor     calcium-vitamin D (CALCIUM 600 + D) 600-400 MG-UNIT per tablet Take 1 tablet by mouth 2 times daily.     folic acid (FOLVITE) 1 MG tablet Take 2 tablets (2 mg) by mouth daily     gabapentin (NEURONTIN) 400 MG capsule Take 1 capsule (400 mg) by mouth At Bedtime     losartan (COZAAR) 50 MG tablet Take 1 tablet (50 mg) by mouth daily     methotrexate 2.5 MG tablet Take 4 tablets (10 mg) by mouth every 7 days Please keep appt 1/8/21.     Cholecalciferol (VITAMIN D) 1000 UNITS capsule Take 1 capsule by mouth daily.     No current facility-administered medications for this visit.           Review of Systems:   A comprehensive ROS was done. Positives are per HPI.           Physical Exam:     Not done, phone visit         Data:   Recent laboratory data reviewed.      CHEST CT (2/16/2013)    Chest: Multiple indeterminate pulmonary nodules. Several of the  larger lesions are cavitary. There are 3 dominant lesions, a 3.2 cm  cavitary lesion in the left upper lobe, a 3.4 cm solid mass in the  right lower lobe laterally and a 3.7 cm cavitary mass in the  right  lower lobe medially. In addition there is mediastinal lymphadenopathy  with a dominant 3 cm subcarinal node. Bilateral axillary  lymphadenopathy with 2.4 cm node seen bilaterally. Chest otherwise  unremarkable.       Exam: High-resolution Chest CT without contrast 1/9/2015 12:42 PM.  History: Concern for ANCA vasculitis flare in the chest.  Comparison: 3/7/2014, 11/14/2013, 2/16/2013.  Technique: CT helical acquisition from the lung apices to the kidneys  was obtained without intravenous contrast. Both inspiratory and  expiratory images were obtained.    Findings:  Moderate atherosclerotic calcifications of the coronary arteries. Mild  calcifications involving the aorta and aortic great vessels. Prominent  paratracheal lymph node on series 3 image 22 measuring 9 mm. Decreased  from 2/16/2013 and unchanged from 3/7/2014. Borderline enlarged right  hilar lymph node, unchanged from 3/7/2014 and decreased from  2/16/2013. Heart is not enlarged. Trace cardial effusion. No axillary  lymphadenopathy.  Nodular scarring in the left apex, unchanged from 2/16/2013. No  pleural effusion or pneumothorax. No evidence of intrapulmonary  infection. There is a cluster of tree in bud nodularity noted in the  anteromedial right upper lobe. These nodules appear new. Expiratory  images demonstrate mild air trapping. Scattered pulmonary nodules:  Series 6 image 146: Seen posteriorly in the right lower lobe, there is  a sub-pleural nodule measuring 1.6 x 1.2 cm with a spiculated border.  Previously, this nodule measured 2.0 x 1.6 cm.  Series 6 image 189: Seen laterally in the right lower lobe, there is a  former mid pulmonary nodule which is unchanged from 3/7/2013 and  decreased from 2/16/2013.  Series 6 image 169: Seen posterolaterally in the right lower lobe,  there is a 3 mm pulmonary nodule which is unchanged from 3/7/2014 and  decreased from 2/16/2013.  Series 6 image 225: Seen posterolaterally in the left lower lobe,  there  is a 4 mm subpleural nodule which is unchanged from 2/16/2014.  Other scattered sub-4 mm pulmonary nodules appear stable from previous  study.  Evaluation of the upper abdomen is limited due to the lack of  intravenous contrast.  No suspicious bony lesions.    IMPRESSION  Impression:   1. Scattered pulmonary nodules enlarged lymph nodes are  stable/slightly decreased from the previous study. No evidence for  acute flare in patient's known ANCA-associated vasculitis.  2. New tree in bud nodularity seen anteromedially in the right middle  lobe. Findings could be seen in the setting of an atypical infectious  process.  3. Mild air trapping. Finding is nonspecific and is seen in setting of  small airways disease such as asthma or bronchiolitis.  I have personally reviewed the examination and initial interpretation  and I agree with the findings.  TOÑO PERKINS MD    Copath Report     Patient Name: ADONAY FAITH  MR#: [id number]  Specimen #: [id number]  Collected: 6/11/2015  Received: 6/11/2015  Reported: 6/12/2015 17:37  Ordering Phy(s): DANIELLE PARIS    SPECIMEN(S):  Nasal septum    FINAL DIAGNOSIS:  Nasal septum, biopsy:  -Squamous mucosa with ulcer, marked acute inflammation and reactive  changes  -No granulomas identified  -A special stain for fungi (GMS) is negative    I have personally reviewed all specimens and or slides, including the  listed special stains, and used them with my medical judgement to  determine the final diagnosis.    Electronically signed out by:    Dorys Barton M.D., [facility]    CLINICAL HISTORY:  The patient is a 70-year-old man with a history of left upper back  melanoma, resected in 2011 (see consult report [id number]). 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 He now presents for  "followup visit with nasal  cavity scabs, increased arthralgia and fatigue, concerning for  vasculitis flare. Operative procedure: Nasal septum biopsy.    GROSS:  The specimen is received in formalin with proper patient identification,  labeled \"septal tissue\". The specimen consists of three tan-pink tissue  fragments, 0.2 cm in greatest dimension, entirely submitted in cassette  1. (Dictated by: Ana Mixon 6/11/2015 03:39 PM)    MICROSCOPIC:  Microscopic examination is performed. A GMS stain, performed with  appropriate positive control, is negative.    CPT Codes:  A: 12279-YX9, 09448-IMG    TESTING LAB LOCATION:  [facility] [address]  [address] [place] [id number] [phone]    COLLECTION SITE:  Client: [facility]  Location: [facility] (B)     Component      Latest Ref Rng & Units 11/11/2020   WBC      4.0 - 11.0 10e9/L 8.0   RBC Count      4.4 - 5.9 10e12/L 4.56   Hemoglobin      13.3 - 17.7 g/dL 14.5   Hematocrit      40.0 - 53.0 % 43.2   MCV      78 - 100 fl 95   MCH      26.5 - 33.0 pg 31.8   MCHC      31.5 - 36.5 g/dL 33.6   RDW      10.0 - 15.0 % 12.8   Platelet Count      150 - 450 10e9/L 241   % Neutrophils      % 75.0   % Lymphocytes      % 13.7   % Monocytes      % 8.8   % Eosinophils      % 1.9   % Basophils      % 0.6   Absolute Neutrophil      1.6 - 8.3 10e9/L 6.0   Absolute Lymphocytes      0.8 - 5.3 10e9/L 1.1   Absolute Monocytes      0.0 - 1.3 10e9/L 0.7   Absolute Eosinophils      0.0 - 0.7 10e9/L 0.2   Absolute Basophils      0.0 - 0.2 10e9/L 0.1   Diff Method       Automated Method   Color Urine       Yellow   Appearance Urine       Clear   Glucose Urine      NEG:Negative mg/dL Negative   Bilirubin Urine      NEG:Negative Negative   Ketones Urine      NEG:Negative mg/dL Negative   Specific Gravity Urine      1.003 - 1.035 1.025   pH Urine      5.0 - 7.0 pH 5.0   Protein Albumin Urine      " NEG:Negative mg/dL Negative   Urobilinogen Urine      0.2 - 1.0 EU/dL 0.2   Nitrite Urine      NEG:Negative Negative   Blood Urine      NEG:Negative Negative   Leukocyte Esterase Urine      NEG:Negative Negative   Source       Midstream Urine   WBC Urine      OTO5:0 - 5 /HPF 0 - 5   RBC Urine      OTO2:O - 2 /HPF O - 2   IGG      610 - 1,616 mg/dL 979   IGA      84 - 499 mg/dL 83 (L)   IGM      35 - 242 mg/dL 25 (L)   Creatinine      0.66 - 1.25 mg/dL 1.38 (H)   GFR Estimate      >60 mL/min/1.73:m2 50 (L)   GFR Estimate If Black      >60 mL/min/1.73:m2 58 (L)   Neutrophil Cytoplasmic Antibody      <1:10 titer 1:40 (A)   Neutrophil Cytoplasmic Antibody Pattern       Cytoplasmic ANCA (c-ANCA) staining pattern observed and confirmed on formalin-fixed . . .   CD19 B Cells      6 - 27 % 25   Absolute CD19      107 - 698 cells/uL 297   Protein Random Urine      g/L 0.25   Protein Total Urine g/gr Creatinine      0 - 0.2 g/g Cr 0.13   Myeloperoxidase Antibody IgG      0.0 - 0.9 AI <0.2   Proteinase 3 Antibody IgG      0.0 - 0.9 AI 8.0 (H)   Sed Rate      0 - 20 mm/h 8   Creatinine Urine Random      mg/dL 200   CRP Inflammation      0.0 - 8.0 mg/L <2.9   Albumin      3.4 - 5.0 g/dL 3.8   ALT      0 - 70 U/L 31   AST      0 - 45 U/L 20

## 2021-01-12 ENCOUNTER — TELEPHONE (OUTPATIENT)
Dept: RHEUMATOLOGY | Facility: CLINIC | Age: 76
End: 2021-01-12

## 2021-01-12 NOTE — TELEPHONE ENCOUNTER
Disability form received and emailed to Dr. Sanchez to review and sign.    Lexus Das CMA   1/12/2021 3:43 PM

## 2021-01-21 NOTE — TELEPHONE ENCOUNTER
Form faxed as requested. Patient notified.     Lexus Das [facility]  Adult Rheumatology Clinic  [facility]

## 2021-02-09 NOTE — Clinical Note
Continued Stay Note  [facility]     Patient Name: Miguelina Lopez  MRN: [id number]  Today's Date: 2/9/2021    Admit Date: 2/1/2021    Discharge Plan     Row Name 02/09/21 1605       Plan    Plan  2/9 SNF referrals pending    Provided Post Acute Provider List?  Yes    Post Acute Provider List  Nursing Home    Provided Post Acute Provider Quality & Resource List?  Yes    Delivered To  Patient;Support Person    Support Person  Carmen, granddaughter    Method of Delivery  In person;Telephone    Plan Comments  Physical Therapy and MD have recommended subacute rehab for Pt at d/c.  CCP printed Pt the CMS compare Skilled nursing facility list for [facility] and spoke with her at bedside.  Pt initially declined the suggestion of rehab and asked that CCP call her granddaughter, Carmen Carrizales to discuss.  CCP called Carmen ([phone]) at 3:55 PM and we discussed SNF choices.  Carmen requested referrals to [facility] and [facility] [place].  CCP gave referrals to [facility] [phone].  CCP following.  JAY PARKINSON/CCP        Discharge Codes    No documentation.             Malina Campos RN     Patient:  Joshua Ennis  :   1945  MRN:     [id number]        Mr.Roger DEANNA Ennis  [address] [facility] [address]        2017    Dear ,    We are writing to inform you of your test results. Stable labs except high CRP (inflammation marker) and slightly worsened kidney function, suspect to be due to infection as your vasculitis markers look really good and even better than before. Did you have an infection at the time of blood draw?      Resulted Orders   CBC with platelets differential   Result Value Ref Range    WBC 7.4 4.0 - 11.0 10e9/L    RBC Count 4.22 (L) 4.4 - 5.9 10e12/L    Hemoglobin 13.6 13.3 - 17.7 g/dL    Hematocrit 40.3 40.0 - 53.0 %    MCV 96 78 - 100 fl    MCH 32.2 26.5 - 33.0 pg    MCHC 33.7 31.5 - 36.5 g/dL    RDW 13.1 10.0 - 15.0 %    Platelet Count 182 150 - 450 10e9/L    Diff Method Automated Method     % Neutrophils 59.6 %    % Lymphocytes 17.3 %    % Monocytes 16.3 %    % Eosinophils 6.3 %    % Basophils 0.5 %    Absolute Neutrophil 4.4 1.6 - 8.3 10e9/L    Absolute Lymphocytes 1.3 0.8 - 5.3 10e9/L    Absolute Monocytes 1.2 0.0 - 1.3 10e9/L    Absolute Eosinophils 0.5 0.0 - 0.7 10e9/L    Absolute Basophils 0.0 0.0 - 0.2 10e9/L   CRP inflammation   Result Value Ref Range    CRP Inflammation 40.6 (H) 0.0 - 8.0 mg/L   Erythrocyte sedimentation rate auto   Result Value Ref Range    Sed Rate 12 0 - 20 mm/h   Protein  random urine   Result Value Ref Range    Protein Random Urine 0.15 g/L    Protein Total Urine g/gr Creatinine 0.15 0 - 0.2 g/g Cr   Creatinine random urine   Result Value Ref Range    Creatinine Urine Random 102 mg/dL   Vasculitis panel   Result Value Ref Range    Myeloperoxidase Antibody IgG  0.0 - 0.9 AI     <0.2  Negative   Antibody index (AI) values reflect qualitative changes in antibody   concentration that cannot be directly associated with clinical condition or   disease state.      Proteinase 3 Antibody IgG 0.9 0.0 - 0.9 AI      Comment:       Negative   Antibody index (AI) values reflect qualitative changes in antibody   concentration that cannot be directly associated with clinical condition or   disease state.     Antineutrophil cytoplasmic Anju IgG   Result Value Ref Range    Neutrophil Cytoplasmic IgG Antibody (A)      1:20  Reference range: <1:20  (Note)  Cytoplasmic ANCA (c-ANCA) staining pattern observed. No  perinuclear ANCA (p-ANCA) pattern seen. Presence of p-ANCA  ruled out on formalin-fixed neutrophils.  INTERPRETIVE INFORMATION: Anti-Neutrophil Cyto Ab, IgG  Neutrophil Cytoplasmic Antibodies (C-ANCA = granular  cytoplasmic staining, P-ANCA = perinuclear staining) are  found in the serum of over 90 percent of patients with  certain necrotizing systemic vasculitides, and usually in  less than 5 percent of patients with collagen vascular  disease or arthritis.  Performed by [facility],  [address] [place] [id number] [phone]  [email or web address], Joe Keller MD, Lab. Director     ALT   Result Value Ref Range    ALT 23 0 - 70 U/L   AST   Result Value Ref Range    AST 23 0 - 45 U/L   Albumin level   Result Value Ref Range    Albumin 3.8 3.4 - 5.0 g/dL   Creatinine   Result Value Ref Range    Creatinine 1.61 (H) 0.66 - 1.25 mg/dL    GFR Estimate 43 (L) >60 mL/min/1.7m2      Comment:      Non  GFR Calc    GFR Estimate If Black 52 (L) >60 mL/min/1.7m2      Comment:       GFR Calc   *UA reflex to Microscopic and Culture ([facility] (except [place] and [place])   Result Value Ref Range    Color Urine Yellow     Appearance Urine Clear     Glucose Urine Negative NEG mg/dL    Bilirubin Urine Negative NEG    Ketones Urine Negative NEG mg/dL    Specific Gravity Urine 1.020 1.003 - 1.035    Blood Urine Trace (A) NEG    pH Urine 5.5 5.0 - 7.0 pH    Protein Albumin Urine Negative NEG mg/dL    Urobilinogen Urine 0.2 0.2 - 1.0 EU/dL    Nitrite Urine Negative NEG    Leukocyte Esterase Urine  Negative NEG    Source Midstream Urine    Urine Microscopic   Result Value Ref Range    WBC Urine O - 2 0 - 2 /HPF    RBC Urine O - 2 0 - 2 /HPF     Ede Sanchez MD

## 2021-03-05 DIAGNOSIS — M31.30 GRANULOMATOSIS WITH POLYANGIITIS, UNSPECIFIED WHETHER RENAL INVOLVEMENT (H): ICD-10-CM

## 2021-03-05 DIAGNOSIS — Z79.899 HIGH RISK MEDICATIONS (NOT ANTICOAGULANTS) LONG-TERM USE: ICD-10-CM

## 2021-03-05 LAB
ALBUMIN SERPL-MCNC: 4 G/DL (ref 3.4–5)
ALBUMIN UR-MCNC: NEGATIVE MG/DL
ALT SERPL W P-5'-P-CCNC: 30 U/L (ref 0–70)
APPEARANCE UR: CLEAR
AST SERPL W P-5'-P-CCNC: 22 U/L (ref 0–45)
BACTERIA #/AREA URNS HPF: ABNORMAL /HPF
BASOPHILS # BLD AUTO: 0 10E9/L (ref 0–0.2)
BASOPHILS NFR BLD AUTO: 0.6 %
BILIRUB UR QL STRIP: NEGATIVE
COLOR UR AUTO: YELLOW
CREAT SERPL-MCNC: 1.48 MG/DL (ref 0.66–1.25)
CREAT UR-MCNC: 82 MG/DL
CREAT UR-MCNC: 82 MG/DL
CRP SERPL-MCNC: <2.9 MG/L (ref 0–8)
DIFFERENTIAL METHOD BLD: ABNORMAL
EOSINOPHIL # BLD AUTO: 0.2 10E9/L (ref 0–0.7)
EOSINOPHIL NFR BLD AUTO: 3.4 %
ERYTHROCYTE [DISTWIDTH] IN BLOOD BY AUTOMATED COUNT: 12.5 % (ref 10–15)
ERYTHROCYTE [SEDIMENTATION RATE] IN BLOOD BY WESTERGREN METHOD: 6 MM/H (ref 0–20)
GFR SERPL CREATININE-BSD FRML MDRD: 46 ML/MIN/{1.73_M2}
GLUCOSE UR STRIP-MCNC: NEGATIVE MG/DL
HCT VFR BLD AUTO: 41.4 % (ref 40–53)
HGB BLD-MCNC: 13.8 G/DL (ref 13.3–17.7)
HGB UR QL STRIP: NEGATIVE
KETONES UR STRIP-MCNC: NEGATIVE MG/DL
LEUKOCYTE ESTERASE UR QL STRIP: NEGATIVE
LYMPHOCYTES # BLD AUTO: 1.5 10E9/L (ref 0.8–5.3)
LYMPHOCYTES NFR BLD AUTO: 21.6 %
MCH RBC QN AUTO: 33 PG (ref 26.5–33)
MCHC RBC AUTO-ENTMCNC: 33.3 G/DL (ref 31.5–36.5)
MCV RBC AUTO: 99 FL (ref 78–100)
MONOCYTES # BLD AUTO: 0.7 10E9/L (ref 0–1.3)
MONOCYTES NFR BLD AUTO: 10.8 %
NEUTROPHILS # BLD AUTO: 4.4 10E9/L (ref 1.6–8.3)
NEUTROPHILS NFR BLD AUTO: 63.6 %
NITRATE UR QL: NEGATIVE
NON-SQ EPI CELLS #/AREA URNS LPF: ABNORMAL /LPF
PH UR STRIP: 5.5 PH (ref 5–7)
PLATELET # BLD AUTO: 185 10E9/L (ref 150–450)
PROT UR-MCNC: 0.1 G/L
PROT/CREAT 24H UR: 0.13 G/G CR (ref 0–0.2)
RBC # BLD AUTO: 4.18 10E12/L (ref 4.4–5.9)
RBC #/AREA URNS AUTO: ABNORMAL /HPF
SOURCE: ABNORMAL
SP GR UR STRIP: 1.02 (ref 1–1.03)
UROBILINOGEN UR STRIP-ACNC: 0.2 EU/DL (ref 0.2–1)
WBC # BLD AUTO: 6.9 10E9/L (ref 4–11)
WBC #/AREA URNS AUTO: ABNORMAL /HPF

## 2021-03-05 PROCEDURE — 86140 C-REACTIVE PROTEIN: CPT | Performed by: INTERNAL MEDICINE

## 2021-03-05 PROCEDURE — 86256 FLUORESCENT ANTIBODY TITER: CPT | Performed by: INTERNAL MEDICINE

## 2021-03-05 PROCEDURE — 85652 RBC SED RATE AUTOMATED: CPT | Performed by: INTERNAL MEDICINE

## 2021-03-05 PROCEDURE — 84156 ASSAY OF PROTEIN URINE: CPT | Performed by: INTERNAL MEDICINE

## 2021-03-05 PROCEDURE — 85025 COMPLETE CBC W/AUTO DIFF WBC: CPT | Performed by: INTERNAL MEDICINE

## 2021-03-05 PROCEDURE — 86355 B CELLS TOTAL COUNT: CPT | Performed by: INTERNAL MEDICINE

## 2021-03-05 PROCEDURE — 84450 TRANSFERASE (AST) (SGOT): CPT | Performed by: INTERNAL MEDICINE

## 2021-03-05 PROCEDURE — 83516 IMMUNOASSAY NONANTIBODY: CPT | Performed by: INTERNAL MEDICINE

## 2021-03-05 PROCEDURE — 36415 COLL VENOUS BLD VENIPUNCTURE: CPT | Performed by: INTERNAL MEDICINE

## 2021-03-05 PROCEDURE — 82565 ASSAY OF CREATININE: CPT | Performed by: INTERNAL MEDICINE

## 2021-03-05 PROCEDURE — 82784 ASSAY IGA/IGD/IGG/IGM EACH: CPT | Performed by: INTERNAL MEDICINE

## 2021-03-05 PROCEDURE — 83876 ASSAY MYELOPEROXIDASE: CPT | Performed by: INTERNAL MEDICINE

## 2021-03-05 PROCEDURE — 84460 ALANINE AMINO (ALT) (SGPT): CPT | Performed by: INTERNAL MEDICINE

## 2021-03-05 PROCEDURE — 82040 ASSAY OF SERUM ALBUMIN: CPT | Performed by: INTERNAL MEDICINE

## 2021-03-05 PROCEDURE — 86255 FLUORESCENT ANTIBODY SCREEN: CPT | Performed by: INTERNAL MEDICINE

## 2021-03-05 PROCEDURE — 81001 URINALYSIS AUTO W/SCOPE: CPT | Performed by: INTERNAL MEDICINE

## 2021-03-05 NOTE — LETTER
March 16, 2021      Joshua Ennis  [address] [facility] [address]        Dear ,    We are writing to inform you of your test results.    Stable labs, improved vasculitis markers.    Resulted Orders   Immunoglobulins A G and M   Result Value Ref Range     610 - 1,616 mg/dL    IGA 85 84 - 499 mg/dL    IGM 28 (L) 35 - 242 mg/dL   ANCA IgG by IFA with Reflex to Titer   Result Value Ref Range    Neutrophil Cytoplasmic Antibody 1:20 (A) <1:10 [titer]    Neutrophil Cytoplasmic Antibody Pattern       Cytoplasmic ANCA (c-ANCA) staining pattern observed and confirmed on formalin-fixed   neutrophils.     CD19 B Cell Count   Result Value Ref Range    CD19 B Cells 20 6 - 27 %    Absolute CD19 352 107 - 698 cells/uL   Erythrocyte sedimentation rate auto   Result Value Ref Range    Sed Rate 6 0 - 20 mm/h   Creatinine random urine   Result Value Ref Range    Creatinine Urine Random 82 mg/dL   Protein  random urine with Creat Ratio   Result Value Ref Range    Protein Random Urine 0.10 g/L    Protein Total Urine g/gr Creatinine 0.13 0 - 0.2 g/g Cr   CRP inflammation   Result Value Ref Range    CRP Inflammation <2.9 0.0 - 8.0 mg/L   Creatinine   Result Value Ref Range    Creatinine 1.48 (H) 0.66 - 1.25 mg/dL    GFR Estimate 46 (L) >60 mL/min/[1.73_m2]      Comment:      Non  GFR Calc  Starting 12/18/2018, serum creatinine based estimated GFR (eGFR) will be   calculated using the Chronic Kidney Disease Epidemiology Collaboration   (CKD-EPI) equation.      GFR Estimate If Black 53 (L) >60 mL/min/[1.73_m2]      Comment:       GFR Calc  Starting 12/18/2018, serum creatinine based estimated GFR (eGFR) will be   calculated using the Chronic Kidney Disease Epidemiology Collaboration   (CKD-EPI) equation.     CBC with platelets differential   Result Value Ref Range    WBC 6.9 4.0 - 11.0 10e9/L    RBC Count 4.18 (L) 4.4 - 5.9 10e12/L    Hemoglobin 13.8 13.3 - 17.7 g/dL    Hematocrit  41.4 40.0 - 53.0 %    MCV 99 78 - 100 fl    MCH 33.0 26.5 - 33.0 pg    MCHC 33.3 31.5 - 36.5 g/dL    RDW 12.5 10.0 - 15.0 %    Platelet Count 185 150 - 450 10e9/L    % Neutrophils 63.6 %    % Lymphocytes 21.6 %    % Monocytes 10.8 %    % Eosinophils 3.4 %    % Basophils 0.6 %    Absolute Neutrophil 4.4 1.6 - 8.3 10e9/L    Absolute Lymphocytes 1.5 0.8 - 5.3 10e9/L    Absolute Monocytes 0.7 0.0 - 1.3 10e9/L    Absolute Eosinophils 0.2 0.0 - 0.7 10e9/L    Absolute Basophils 0.0 0.0 - 0.2 10e9/L    Diff Method Automated Method    Albumin level   Result Value Ref Range    Albumin 4.0 3.4 - 5.0 g/dL   Myeloperoxidase and Proteinase 3 Panel   Result Value Ref Range    Myeloperoxidase Antibody IgG <0.2 0.0 - 0.9 AI      Comment:      Negative  Antibody index (AI) values reflect qualitative changes in antibody   concentration that cannot be directly associated with clinical condition or   disease state.      Proteinase 3 Antibody IgG 6.3 (H) 0.0 - 0.9 AI      Comment:      Positive  Antibody index (AI) values reflect qualitative changes in antibody   concentration that cannot be directly associated with clinical condition or   disease state.     ALT   Result Value Ref Range    ALT 30 0 - 70 U/L   AST   Result Value Ref Range    AST 22 0 - 45 U/L   UA with Microscopic reflex to Culture   Result Value Ref Range    Color Urine Yellow     Appearance Urine Clear     Glucose Urine Negative NEG^Negative mg/dL    Bilirubin Urine Negative NEG^Negative    Ketones Urine Negative NEG^Negative mg/dL    Specific Gravity Urine 1.020 1.003 - 1.035    pH Urine 5.5 5.0 - 7.0 pH    Protein Albumin Urine Negative NEG^Negative mg/dL    Urobilinogen Urine 0.2 0.2 - 1.0 EU/dL    Nitrite Urine Negative NEG^Negative    Blood Urine Negative NEG^Negative    Leukocyte Esterase Urine Negative NEG^Negative    Source Midstream Urine     WBC Urine 0 - 5 OTO5^0 - 5 /HPF    RBC Urine O - 2 OTO2^O - 2 /HPF    Squamous Epithelial /LPF Urine Few FEW^Few /LPF     Bacteria Urine Few (A) NEG^Negative /HPF   Creatinine urine calculation only   Result Value Ref Range    Creatinine Urine 82 mg/dL       If you have any questions or concerns, please call the clinic at the number listed above.       Sincerely,      Ede Sanchez MD

## 2021-03-06 LAB
CD19 CELLS # BLD: 352 CELLS/UL (ref 107–698)
CD19 CELLS NFR BLD: 20 % (ref 6–27)

## 2021-03-07 LAB
MYELOPEROXIDASE AB SER-ACNC: <0.2 AI (ref 0–0.9)
PROTEINASE3 IGG SER-ACNC: 6.3 AI (ref 0–0.9)

## 2021-03-08 LAB
ANCA AB PATTERN SER IF-IMP: ABNORMAL
C-ANCA TITR SER IF: ABNORMAL {TITER}
IGA SERPL-MCNC: 85 MG/DL (ref 84–499)
IGG SERPL-MCNC: 981 MG/DL (ref 610–1616)
IGM SERPL-MCNC: 28 MG/DL (ref 35–242)

## 2021-03-12 DIAGNOSIS — G62.9 NEUROPATHY: ICD-10-CM

## 2021-03-12 DIAGNOSIS — I10 HYPERTENSION, UNSPECIFIED TYPE: ICD-10-CM

## 2021-03-15 DIAGNOSIS — Z79.899 HIGH RISK MEDICATIONS (NOT ANTICOAGULANTS) LONG-TERM USE: ICD-10-CM

## 2021-03-16 RX ORDER — LOSARTAN POTASSIUM 50 MG/1
50 TABLET ORAL DAILY
Qty: 90 TABLET | Refills: 3 | Status: SHIPPED | OUTPATIENT
Start: 2021-03-16 | End: 2022-02-23

## 2021-03-16 NOTE — TELEPHONE ENCOUNTER
losartan 50 mg tablet  Last Written Prescription Date:  12/1/20  Last Fill Quantity: 90,   # refills: 0  Last Office Visit : 1/8/2021  Future Office visit:  7/9/2021 03/05/21  1446    CR 1.48   Reviewed by provider  3/16/2021  2:00 AM CDT      Stable labs, improved vasculitis markers.

## 2021-03-19 NOTE — TELEPHONE ENCOUNTER
methotrexate sodium 2.5 mg tablet  Last Written Prescription Date:  12/28/2020  Last Fill Quantity: 48,   # refills: 0  Last Office Visit: 1/8/2021  Future Office visit:  7/9/2021    CBC RESULTS:   Recent Labs   Lab Test 03/05/21  1446   WBC 6.9   RBC 4.18*   HGB 13.8   HCT 41.4   MCV 99   MCH 33.0   MCHC 33.3   RDW 12.5          Creatinine   Date Value Ref Range Status   03/05/2021 1.48 (H) 0.66 - 1.25 mg/dL Final   ]    Liver Function Studies -   Recent Labs   Lab Test 03/05/21  1446 12/13/17  1228 12/13/17  1228   PROTTOTAL  --   --  7.8   ALBUMIN 4.0   < > 4.0   BILITOTAL  --   --  0.7   ALKPHOS  --   --  97   AST 22   < > 22   ALT 30   < > 30    < > = values in this interval not displayed.       Routing refill request to provider for review/approval because:  Drug not on the [facility] or [facility] refill protocol or controlled substance      Leah Quintana RN  Central Triage Red Flags/Med Refills

## 2021-03-30 DIAGNOSIS — G62.9 NEUROPATHY: ICD-10-CM

## 2021-04-01 ENCOUNTER — TELEPHONE (OUTPATIENT)
Dept: RHEUMATOLOGY | Facility: CLINIC | Age: 76
End: 2021-04-01

## 2021-04-01 RX ORDER — GABAPENTIN 400 MG/1
400 CAPSULE ORAL AT BEDTIME
Qty: 90 CAPSULE | Refills: 3 | Status: SHIPPED | OUTPATIENT
Start: 2021-04-01 | End: 2022-02-23

## 2021-04-01 NOTE — TELEPHONE ENCOUNTER
gabapentin 400 mg capsule      Last Written Prescription Date:  10-8-20  Last Fill Quantity: 90,   # refills: 1  Last Office Visit : 1-8-21  Future Office visit:  7-9-21    Routing refill request to provider for review/approval because:  Drug not on protocol

## 2021-04-01 NOTE — TELEPHONE ENCOUNTER
[facility] Call Center    Phone Message    May a detailed message be left on voicemail: yes     Reason for Call: Form or Letter   Type or form/letter needing completion: Return to work form   Provider: Dr Sanchez  Date form needed: To start back to work on 4/12/21.   Once completed: Mail form to Name: Joshua Ennis , at Address: [address] [place] [id number]    Action Taken: Message routed to:  [facility]    Travel Screening: Not Applicable

## 2021-04-01 NOTE — LETTER
April 9, 2021      Joshua Ennis  [address] [facility] [address]        To Whom It May Concern:    Joshua Ennis is a patient of mine at the [facility]. He may return to work on 4/12/2021 without restrictions.    Sincerely,    Ede Sanchez MD    Division of Rheumatic and Autoimmune Diseases  [address] [place] [id number]

## 2021-04-09 NOTE — TELEPHONE ENCOUNTER
Patient is calling for a update.  They needs the return to work slip by Monday.  Please call the patient with an update.

## 2021-04-09 NOTE — TELEPHONE ENCOUNTER
Letter has been completed and available to patient via [employer] under the letters tab. Patient was notified via [employer].     Lexus Das CMA   4/9/2021 4:10 PM

## 2021-04-12 NOTE — TELEPHONE ENCOUNTER
Patient's fiance, Earlene, is calling because they are wanting the form faxed to patient's work place. The fax number is [phone]    Patient does not know how to get into [employer], neither does Earlene.     Earlene and Joshua would like to be notified once this has been done, if possible.     OKTLDM

## 2021-04-13 NOTE — TELEPHONE ENCOUNTER
Letter has been successfully faxed as requested to the number provided. Message left on cell phone that this was done.     Lexus Das CMA   4/13/2021 7:43 AM

## 2021-04-17 ENCOUNTER — HEALTH MAINTENANCE LETTER (OUTPATIENT)
Age: 76
End: 2021-04-17

## 2021-06-03 DIAGNOSIS — G62.9 NEUROPATHY: ICD-10-CM

## 2021-06-03 RX ORDER — FOLIC ACID 1 MG/1
2 TABLET ORAL DAILY
Qty: 180 TABLET | Refills: 3 | Status: SHIPPED | OUTPATIENT
Start: 2021-06-03 | End: 2022-05-25

## 2021-07-06 ENCOUNTER — VIRTUAL VISIT (OUTPATIENT)
Dept: RHEUMATOLOGY | Facility: CLINIC | Age: 76
End: 2021-07-06
Attending: INTERNAL MEDICINE
Payer: COMMERCIAL

## 2021-07-06 DIAGNOSIS — M31.30 GRANULOMATOSIS WITH POLYANGIITIS, UNSPECIFIED WHETHER RENAL INVOLVEMENT (H): ICD-10-CM

## 2021-07-06 DIAGNOSIS — Z79.899 HIGH RISK MEDICATIONS (NOT ANTICOAGULANTS) LONG-TERM USE: Primary | ICD-10-CM

## 2021-07-06 PROCEDURE — 99213 OFFICE O/P EST LOW 20 MIN: CPT | Mod: TEL | Performed by: INTERNAL MEDICINE

## 2021-07-06 NOTE — LETTER
7/6/2021       RE: Joshua Ennis  [address]  [facility] [place] [address]     Dear Colleague,    Thank you for referring your patient, Joshua Ennis, to the [facility] [place] at [facility]. Please see a copy of my visit note below.    Patient was called three times today and left a message to call back. RADHA Lewis is a 75 year old who is being evaluated via a billable telephone visit.         Phone call duration: 7 minutes      [facility]  Follow-Up Note  Date of visit: 7/6/2021  Last visit date: 1/8/2021    Joshua Ennis MRN# [id number]   Age: 76 year old    Reason for visit: GPA   YOB: 1945          Assessment and Plan:     Assessment:   Mr. Ennis is a 76 yr old  male with history of melanoma s/p resection in 11/2011, former tobacco use, and GPA (PR3+, MPO and c-ANCA negative in 3/2013 based on aforementioned labs, confirmed by kidney and lung biopsy) who presents for clinic follow-up regarding GPA.  He was initiated on cyclophosphamide 150 mg QD and high-dose prednisone 3/2013. He last took prednisone in 10/2013. Cytoxan was switched to MTX for maintenance treatment in 10/2013.  He has been tolerating it well. He had a flare in 12/2013 with increased crusts in his nose along with recurrence of arthralgia, worsening numbness in feet and increasing vasculitis marker. Renal function was stable with negative repeat chest CT. His vasculitis flare was treated with short course of prednisone taper 20 mg po qd max and increasing MTX to 8 tab = 20 mg qwk. Vasculitis symptoms improved.  At visit on 1/2015, he had scabs in his nose, had cold dakota symptoms and increased arthralgia/fatigue (shoulders, L hand). Labs from 12/16 showed increased Cr level and hematuria with rising PR3 (more than 8), there was a concern for vasculitis flare (in kidneys, sinuses), no flare on repeat chest CT 1/2015.  Therefore it was recommended to try rituximab. Another reason was to be able to taper MTX off given abnormal Cr.  He is now s/p Rituximab infusion x 4 (1st on 2/25/2015). He is feeling well. No hematuria with stable Cr, no SOB. Saw ENT with negative nasal mucosa biopsy 6/2015 for granuloma but showed active inflammation, had left nostril lesion which decreased in size by use of mupirocin ointment. PR3 vasculitis marker went back to NL in 6/2015, it was NL in 10/2015, given stable vasculitis and low GFR, MTX from 8 to 7 tab po qwk. Repeat GA-3 in 3/2016 was slightly positive, Cr was slightly higher than baseline. We tapered his MTX further to 6 tab po qwk in 2/2016 given lack of symptoms. In 3/2017, MTX was reduced from 6 to 5 tab qwk. It was further tapered to 4 tab po qwk in 6/2017 given stable disease. No vasculitis flare ups by such change.     Vasculitis seems to be stable and he has no signs or symptoms of flare up today. Labs [address]31 are stable with improved vasculitis markers.    Recommend to continue MTX at low dose of 4 tab po qwk for now but continue to monitor labs closely (every 3 months).    Was advised to call in case of vasculitis flare up sx.        Plan:    - Continue MTX 10 mg (4 tabs) po qwk, continue with Folic acid 1 mg po qd    > Labs q3 months including MTX monitoring labs    > Hold MTX if develops infection  - Neuropathy unchanged on Gabapentin          Labs      Return in 6 months (in person)            Orders Placed This Encounter   Procedures     AST     ALT     Myeloperoxidase and Proteinase 3 Panel     Albumin level     CBC with platelets differential     Creatinine     CRP inflammation     UA with Microscopic reflex to Culture     Protein  random urine with Creat Ratio     Creatinine random urine     Erythrocyte sedimentation rate auto     CD19 B Cell Count     ANCA IgG by IFA with Reflex to Titer     Immunoglobulins A G and M            Ede Sanchez MD         HPI / Interim History:       Mr. Ennis is a 77 yo WM with h/o melanoma s/p resection in 2011, former tobacco use, and GPA diagnosed in 3/2013. He presents for follow up. Feeling well. Still takes methotrexate 4 tabs a week. No nasal crusting. No SOB, CP, or cough, hemoptysis, or skin rashes.     Had a hospitalization -2020 with a fall with displaced left 5th-9th rib fractures, now recovered.    Last week, cut his finger so was presrcivbed amoxicillin. Skipped last dose of MTX.    Doing very well, no complaints and no vasculitis flare.         Past Medical History:     Past Medical History:   Diagnosis Date     Arthritis      Autoimmune disease (H)      Hearing problem      Hoarseness      Hypertension      Kidney problem      Malignant melanoma (H)      Malignant neoplasm (H)     melanoma     Squamous cell carcinoma      Russo syndrome           Past Surgical History:     Past Surgical History:   Procedure Laterality Date     BIOPSY  2011    melanoma on back     DISSECT LYMPH NODE INGUINAL  3/1/2013    Procedure: DISSECT LYMPH NODE INGUINAL;  Bilateral Inguinal Lymph Node Biopsy.;  Surgeon: Tariq Acosta MD;  Location: [facility]     ESOPHAGOSCOPY, GASTROSCOPY, DUODENOSCOPY (EGD), COMBINED  2013    Procedure: COMBINED ESOPHAGOSCOPY, GASTROSCOPY, DUODENOSCOPY (EGD);  Gastroscopy;  Surgeon: Tariq Acosta MD;  Location: [facility]     HERNIORRHAPHY INGUINAL  2012    Procedure: HERNIORRHAPHY INGUINAL;  Open Repair Left Inguinal Hernia;  Surgeon: Jerad Baker MD;  Location: [facility]          Social History:     Social History     Tobacco Use     Smoking status: Former Smoker     Packs/day: 1.00     Years: 20.00     Pack years: 20.00     Types: Cigarettes     Quit date: 2013     Years since quittin.3     Smokeless tobacco: Never Used   Substance Use Topics     Alcohol use: Yes     Comment: rare          Family History:     Family History   Problem Relation Age of Onset     Diabetes Mother       Hypertension Mother      Cancer Father         stomach     Heart Disease Father      Heart Disease Brother      Diabetes Sister      Diabetes Sister      Diabetes Sister      Heart Disease Brother      Cancer Sister      Glaucoma No family hx of      Macular Degeneration No family hx of           Immunizations:   Due for PPSV23, will administer today. Immunizations otherwise up to date.    PMHx, FHx, SHx were reviewed, unchanged.         Allergies:     Allergies   Allergen Reactions     Shrimp Nausea and Vomiting     Got sick each time he ate it          Medications:     Current Outpatient Medications   Medication Sig     Acetaminophen (TYLENOL PO) Take 650 mg by mouth once as needed for mild pain or fever      Blood Pressure Monitor KIT Automatic Blood Pressure Monitor     calcium-vitamin D (CALCIUM 600 + D) 600-400 MG-UNIT per tablet Take 1 tablet by mouth 2 times daily.     Cholecalciferol (VITAMIN D) 1000 UNITS capsule Take 1 capsule by mouth daily.     folic acid (FOLVITE) 1 MG tablet Take 2 tablets (2 mg) by mouth daily     gabapentin (NEURONTIN) 400 MG capsule Take 1 capsule (400 mg) by mouth At Bedtime     losartan (COZAAR) 50 MG tablet Take 1 tablet (50 mg) by mouth daily     methotrexate 2.5 MG tablet Take 4 tablets (10 mg) by mouth every 7 days     No current facility-administered medications for this visit.           Review of Systems:   A comprehensive ROS was done. Positives are per HPI.           Physical Exam:     Not done, phone visit         Data:   Recent laboratory data reviewed.      CHEST CT (2/16/2013)    Chest: Multiple indeterminate pulmonary nodules. Several of the  larger lesions are cavitary. There are 3 dominant lesions, a 3.2 cm  cavitary lesion in the left upper lobe, a 3.4 cm solid mass in the  right lower lobe laterally and a 3.7 cm cavitary mass in the right  lower lobe medially. In addition there is mediastinal lymphadenopathy  with a dominant 3 cm subcarinal node. Bilateral  axillary  lymphadenopathy with 2.4 cm node seen bilaterally. Chest otherwise  unremarkable.       Exam: High-resolution Chest CT without contrast 1/9/2015 12:42 PM.  History: Concern for ANCA vasculitis flare in the chest.  Comparison: 3/7/2014, 11/14/2013, 2/16/2013.  Technique: CT helical acquisition from the lung apices to the kidneys  was obtained without intravenous contrast. Both inspiratory and  expiratory images were obtained.    Findings:  Moderate atherosclerotic calcifications of the coronary arteries. Mild  calcifications involving the aorta and aortic great vessels. Prominent  paratracheal lymph node on series 3 image 22 measuring 9 mm. Decreased  from 2/16/2013 and unchanged from 3/7/2014. Borderline enlarged right  hilar lymph node, unchanged from 3/7/2014 and decreased from  2/16/2013. Heart is not enlarged. Trace cardial effusion. No axillary  lymphadenopathy.  Nodular scarring in the left apex, unchanged from 2/16/2013. No  pleural effusion or pneumothorax. No evidence of intrapulmonary  infection. There is a cluster of tree in bud nodularity noted in the  anteromedial right upper lobe. These nodules appear new. Expiratory  images demonstrate mild air trapping. Scattered pulmonary nodules:  Series 6 image 146: Seen posteriorly in the right lower lobe, there is  a sub-pleural nodule measuring 1.6 x 1.2 cm with a spiculated border.  Previously, this nodule measured 2.0 x 1.6 cm.  Series 6 image 189: Seen laterally in the right lower lobe, there is a  former mid pulmonary nodule which is unchanged from 3/7/2013 and  decreased from 2/16/2013.  Series 6 image 169: Seen posterolaterally in the right lower lobe,  there is a 3 mm pulmonary nodule which is unchanged from 3/7/2014 and  decreased from 2/16/2013.  Series 6 image 225: Seen posterolaterally in the left lower lobe,  there is a 4 mm subpleural nodule which is unchanged from 2/16/2014.  Other scattered sub-4 mm pulmonary nodules appear stable from  previous  study.  Evaluation of the upper abdomen is limited due to the lack of  intravenous contrast.  No suspicious bony lesions.    IMPRESSION  Impression:   1. Scattered pulmonary nodules enlarged lymph nodes are  stable/slightly decreased from the previous study. No evidence for  acute flare in patient's known ANCA-associated vasculitis.  2. New tree in bud nodularity seen anteromedially in the right middle  lobe. Findings could be seen in the setting of an atypical infectious  process.  3. Mild air trapping. Finding is nonspecific and is seen in setting of  small airways disease such as asthma or bronchiolitis.  I have personally reviewed the examination and initial interpretation  and I agree with the findings.  TOÑO PERKINS MD    Copath Report     Patient Name: ADONAY FAITH  MR#: [id number]  Specimen #: [id number]  Collected: 6/11/2015  Received: 6/11/2015  Reported: 6/12/2015 17:37  Ordering Phy(s): DANIELLE PARIS    SPECIMEN(S):  Nasal septum    FINAL DIAGNOSIS:  Nasal septum, biopsy:  -Squamous mucosa with ulcer, marked acute inflammation and reactive  changes  -No granulomas identified  -A special stain for fungi (GMS) is negative    I have personally reviewed all specimens and or slides, including the  listed special stains, and used them with my medical judgement to  determine the final diagnosis.    Electronically signed out by:    Dorys Barton M.D., [facility]    CLINICAL HISTORY:  The patient is a 70-year-old man with a history of left upper back  melanoma, resected in 2011 (see consult report [id number]). 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 He now presents for followup visit with nasal  cavity scabs, increased arthralgia and fatigue, concerning for  vasculitis flare. Operative procedure:  "Nasal septum biopsy.    GROSS:  The specimen is received in formalin with proper patient identification,  labeled \"septal tissue\". The specimen consists of three tan-pink tissue  fragments, 0.2 cm in greatest dimension, entirely submitted in cassette  1. (Dictated by: Ana Mixon 6/11/2015 03:39 PM)    MICROSCOPIC:  Microscopic examination is performed. A GMS stain, performed with  appropriate positive control, is negative.    CPT Codes:  A: 67793-YZ3, 77040-OKX    TESTING LAB LOCATION:  [facility], [address] [address]  [phone]    COLLECTION SITE:  Client: [facility]  Location: [facility] (B)     Component      Latest Ref Rng & Units 3/5/2021   WBC      4.0 - 11.0 10e9/L 6.9   RBC Count      4.4 - 5.9 10e12/L 4.18 (L)   Hemoglobin      13.3 - 17.7 g/dL 13.8   Hematocrit      40.0 - 53.0 % 41.4   MCV      78 - 100 fl 99   MCH      26.5 - 33.0 pg 33.0   MCHC      31.5 - 36.5 g/dL 33.3   RDW      10.0 - 15.0 % 12.5   Platelet Count      150 - 450 10e9/L 185   % Neutrophils      % 63.6   % Lymphocytes      % 21.6   % Monocytes      % 10.8   % Eosinophils      % 3.4   % Basophils      % 0.6   Absolute Neutrophil      1.6 - 8.3 10e9/L 4.4   Absolute Lymphocytes      0.8 - 5.3 10e9/L 1.5   Absolute Monocytes      0.0 - 1.3 10e9/L 0.7   Absolute Eosinophils      0.0 - 0.7 10e9/L 0.2   Absolute Basophils      0.0 - 0.2 10e9/L 0.0   Diff Method       Automated Method   Color Urine       Yellow   Appearance Urine       Clear   Glucose Urine      NEG:Negative mg/dL Negative   Bilirubin Urine      NEG:Negative Negative   Ketones Urine      NEG:Negative mg/dL Negative   Specific Gravity Urine      1.003 - 1.035 1.020   pH Urine      5.0 - 7.0 pH 5.5   Protein Albumin Urine      NEG:Negative mg/dL Negative   Urobilinogen Urine      0.2 - 1.0 EU/dL 0.2   Nitrite Urine      NEG:Negative Negative   Blood Urine   "    NEG:Negative Negative   Leukocyte Esterase Urine      NEG:Negative Negative   Source       Midstream Urine   WBC Urine      OTO5:0 - 5 /HPF 0 - 5   RBC Urine      OTO2:O - 2 /HPF O - 2   Squamous Epithelial /LPF Urine      FEW:Few /LPF Few   Bacteria Urine      NEG:Negative /HPF Few (A)   IGG      610 - 1,616 mg/dL 981   IGA      84 - 499 mg/dL 85   IGM      35 - 242 mg/dL 28 (L)   Creatinine      0.66 - 1.25 mg/dL 1.48 (H)   GFR Estimate      >60 mL/min/1.73:m2 46 (L)   GFR Estimate If Black      >60 mL/min/1.73:m2 53 (L)   Neutrophil Cytoplasmic Antibody      <1:10 titer 1:20 (A)   Neutrophil Cytoplasmic Antibody Pattern       Cytoplasmic ANCA (c-ANCA) staining pattern observed and confirmed on formalin-fixed . . .   CD19 B Cells      6 - 27 % 20   Absolute CD19      107 - 698 cells/uL 352   Protein Random Urine      g/L 0.10   Protein Total Urine g/gr Creatinine      0 - 0.2 g/g Cr 0.13   Myeloperoxidase Antibody IgG      0.0 - 0.9 AI <0.2   Proteinase 3 Antibody IgG      0.0 - 0.9 AI 6.3 (H)   Sed Rate      0 - 20 mm/h 6   Creatinine Urine Random      mg/dL 82   CRP Inflammation      0.0 - 8.0 mg/L <2.9   Albumin      3.4 - 5.0 g/dL 4.0   ALT      0 - 70 U/L 30   AST      0 - 45 U/L 22   Creatinine Urine      mg/dL 82       Again, thank you for allowing me to participate in the care of your patient.      Sincerely,    Ede Sanchez MD

## 2021-07-06 NOTE — PROGRESS NOTES
Patient was called three times today and left a message to call back. RADHA Lewis is a 75 year old who is being evaluated via a billable telephone visit.         Phone call duration: 7 minutes      [facility] Virtual  Follow-Up Note  Date of visit: 7/6/2021  Last visit date: 1/8/2021    Joshua Ennis MRN# [id number]   Age: 76 year old    Reason for visit: GPA   YOB: 1945          Assessment and Plan:     Assessment:   Mr. Ennis is a 76 yr old  male with history of melanoma s/p resection in 11/2011, former tobacco use, and GPA (PR3+, MPO and c-ANCA negative in 3/2013 based on aforementioned labs, confirmed by kidney and lung biopsy) who presents for clinic follow-up regarding GPA.  He was initiated on cyclophosphamide 150 mg QD and high-dose prednisone 3/2013. He last took prednisone in 10/2013. Cytoxan was switched to MTX for maintenance treatment in 10/2013.  He has been tolerating it well. He had a flare in 12/2013 with increased crusts in his nose along with recurrence of arthralgia, worsening numbness in feet and increasing vasculitis marker. Renal function was stable with negative repeat chest CT. His vasculitis flare was treated with short course of prednisone taper 20 mg po qd max and increasing MTX to 8 tab = 20 mg qwk. Vasculitis symptoms improved.  At visit on 1/2015, he had scabs in his nose, had cold [place]akota symptoms and increased arthralgia/fatigue (shoulders, L hand). Labs from 12/16 showed increased Cr level and hematuria with rising PR3 (more than 8), there was a concern for vasculitis flare (in kidneys, sinuses), no flare on repeat chest CT 1/2015. Therefore it was recommended to try rituximab. Another reason was to be able to taper MTX off given abnormal Cr.  He is now s/p Rituximab infusion x 4 (1st on 2/25/2015). He is feeling well. No hematuria with stable Cr, no SOB. Saw ENT with negative nasal mucosa biopsy 6/2015 for granuloma but showed active  inflammation, had left nostril lesion which decreased in size by use of mupirocin ointment. PR3 vasculitis marker went back to NL in 6/2015, it was NL in 10/2015, given stable vasculitis and low GFR, MTX from 8 to 7 tab po qwk. Repeat SD-3 in 3/2016 was slightly positive, Cr was slightly higher than baseline. We tapered his MTX further to 6 tab po qwk in 2/2016 given lack of symptoms. In 3/2017, MTX was reduced from 6 to 5 tab qwk. It was further tapered to 4 tab po qwk in 6/2017 given stable disease. No vasculitis flare ups by such change.     Vasculitis seems to be stable and he has no signs or symptoms of flare up today. Labs 51686 are stable with improved vasculitis markers.    Recommend to continue MTX at low dose of 4 tab po qwk for now but continue to monitor labs closely (every 3 months).    Was advised to call in case of vasculitis flare up sx.        Plan:    - Continue MTX 10 mg (4 tabs) po qwk, continue with Folic acid 1 mg po qd    > Labs q3 months including MTX monitoring labs    > Hold MTX if develops infection  - Neuropathy unchanged on Gabapentin          Labs      Return in 6 months (in person)            Orders Placed This Encounter   Procedures     AST     ALT     Myeloperoxidase and Proteinase 3 Panel     Albumin level     CBC with platelets differential     Creatinine     CRP inflammation     UA with Microscopic reflex to Culture     Protein  random urine with Creat Ratio     Creatinine random urine     Erythrocyte sedimentation rate auto     CD19 B Cell Count     ANCA IgG by IFA with Reflex to Titer     Immunoglobulins A G and M            Ede Sanchez MD         HPI / Interim History:      Mr. Ennis is a 77 yo WM with h/o melanoma s/p resection in 11/2011, former tobacco use, and GPA diagnosed in 3/2013. He presents for follow up. Feeling well. Still takes methotrexate 4 tabs a week. No nasal crusting. No SOB, CP, or cough, hemoptysis, or skin rashes.     Had a hospitalization  -2020 with a fall with displaced left 5th-9th rib fractures, now recovered.    Last week, cut his finger so was presrcivbed amoxicillin. Skipped last dose of MTX.    Doing very well, no complaints and no vasculitis flare.         Past Medical History:     Past Medical History:   Diagnosis Date     Arthritis      Autoimmune disease (H)      Hearing problem      Hoarseness      Hypertension      Kidney problem      Malignant melanoma (H)      Malignant neoplasm (H)     melanoma     Squamous cell carcinoma      Russo syndrome           Past Surgical History:     Past Surgical History:   Procedure Laterality Date     BIOPSY  2011    melanoma on back     DISSECT LYMPH NODE INGUINAL  3/1/2013    Procedure: DISSECT LYMPH NODE INGUINAL;  Bilateral Inguinal Lymph Node Biopsy.;  Surgeon: Tariq Acosta MD;  Location: [facility]     ESOPHAGOSCOPY, GASTROSCOPY, DUODENOSCOPY (EGD), COMBINED  2013    Procedure: COMBINED ESOPHAGOSCOPY, GASTROSCOPY, DUODENOSCOPY (EGD);  Gastroscopy;  Surgeon: Tariq Acosta MD;  Location: [facility]     HERNIORRHAPHY INGUINAL  2012    Procedure: HERNIORRHAPHY INGUINAL;  Open Repair Left Inguinal Hernia;  Surgeon: Jerad Baker MD;  Location: [facility]          Social History:     Social History     Tobacco Use     Smoking status: Former Smoker     Packs/day: 1.00     Years: 20.00     Pack years: 20.00     Types: Cigarettes     Quit date: 2013     Years since quittin.3     Smokeless tobacco: Never Used   Substance Use Topics     Alcohol use: Yes     Comment: rare          Family History:     Family History   Problem Relation Age of Onset     Diabetes Mother      Hypertension Mother      Cancer Father         stomach     Heart Disease Father      Heart Disease Brother      Diabetes Sister      Diabetes Sister      Diabetes Sister      Heart Disease Brother      Cancer Sister      Glaucoma No family hx of      Macular Degeneration No family hx of            Immunizations:   Due for PPSV23, will administer today. Immunizations otherwise up to date.    PMHx, FHx, SHx were reviewed, unchanged.         Allergies:     Allergies   Allergen Reactions     Shrimp Nausea and Vomiting     Got sick each time he ate it          Medications:     Current Outpatient Medications   Medication Sig     Acetaminophen (TYLENOL PO) Take 650 mg by mouth once as needed for mild pain or fever      Blood Pressure Monitor KIT Automatic Blood Pressure Monitor     calcium-vitamin D (CALCIUM 600 + D) 600-400 MG-UNIT per tablet Take 1 tablet by mouth 2 times daily.     Cholecalciferol (VITAMIN D) 1000 UNITS capsule Take 1 capsule by mouth daily.     folic acid (FOLVITE) 1 MG tablet Take 2 tablets (2 mg) by mouth daily     gabapentin (NEURONTIN) 400 MG capsule Take 1 capsule (400 mg) by mouth At Bedtime     losartan (COZAAR) 50 MG tablet Take 1 tablet (50 mg) by mouth daily     methotrexate 2.5 MG tablet Take 4 tablets (10 mg) by mouth every 7 days     No current facility-administered medications for this visit.           Review of Systems:   A comprehensive ROS was done. Positives are per HPI.           Physical Exam:     Not done, phone visit         Data:   Recent laboratory data reviewed.      CHEST CT (2/16/2013)    Chest: Multiple indeterminate pulmonary nodules. Several of the  larger lesions are cavitary. There are 3 dominant lesions, a 3.2 cm  cavitary lesion in the left upper lobe, a 3.4 cm solid mass in the  right lower lobe laterally and a 3.7 cm cavitary mass in the right  lower lobe medially. In addition there is mediastinal lymphadenopathy  with a dominant 3 cm subcarinal node. Bilateral axillary  lymphadenopathy with 2.4 cm node seen bilaterally. Chest otherwise  unremarkable.       Exam: High-resolution Chest CT without contrast 1/9/2015 12:42 PM.  History: Concern for ANCA vasculitis flare in the chest.  Comparison: 3/7/2014, 11/14/2013, 2/16/2013.  Technique: CT helical acquisition  from the lung apices to the kidneys  was obtained without intravenous contrast. Both inspiratory and  expiratory images were obtained.    Findings:  Moderate atherosclerotic calcifications of the coronary arteries. Mild  calcifications involving the aorta and aortic great vessels. Prominent  paratracheal lymph node on series 3 image 22 measuring 9 mm. Decreased  from 2/16/2013 and unchanged from 3/7/2014. Borderline enlarged right  hilar lymph node, unchanged from 3/7/2014 and decreased from  2/16/2013. Heart is not enlarged. Trace cardial effusion. No axillary  lymphadenopathy.  Nodular scarring in the left apex, unchanged from 2/16/2013. No  pleural effusion or pneumothorax. No evidence of intrapulmonary  infection. There is a cluster of tree in bud nodularity noted in the  anteromedial right upper lobe. These nodules appear new. Expiratory  images demonstrate mild air trapping. Scattered pulmonary nodules:  Series 6 image 146: Seen posteriorly in the right lower lobe, there is  a sub-pleural nodule measuring 1.6 x 1.2 cm with a spiculated border.  Previously, this nodule measured 2.0 x 1.6 cm.  Series 6 image 189: Seen laterally in the right lower lobe, there is a  former mid pulmonary nodule which is unchanged from 3/7/2013 and  decreased from 2/16/2013.  Series 6 image 169: Seen posterolaterally in the right lower lobe,  there is a 3 mm pulmonary nodule which is unchanged from 3/7/2014 and  decreased from 2/16/2013.  Series 6 image 225: Seen posterolaterally in the left lower lobe,  there is a 4 mm subpleural nodule which is unchanged from 2/16/2014.  Other scattered sub-4 mm pulmonary nodules appear stable from previous  study.  Evaluation of the upper abdomen is limited due to the lack of  intravenous contrast.  No suspicious bony lesions.    IMPRESSION  Impression:   1. Scattered pulmonary nodules enlarged lymph nodes are  stable/slightly decreased from the previous study. No evidence for  acute flare in  "patient's known ANCA-associated vasculitis.  2. New tree in bud nodularity seen anteromedially in the right middle  lobe. Findings could be seen in the setting of an atypical infectious  process.  3. Mild air trapping. Finding is nonspecific and is seen in setting of  small airways disease such as asthma or bronchiolitis.  I have personally reviewed the examination and initial interpretation  and I agree with the findings.  TOÑO PERKINS MD    Copath Report     Patient Name: ADONAY FAITH  MR#: [id number]  Specimen #: [id number]  Collected: 6/11/2015  Received: 6/11/2015  Reported: 6/12/2015 17:37  Ordering Phy(s): DANIELLE PARIS    SPECIMEN(S):  Nasal septum    FINAL DIAGNOSIS:  Nasal septum, biopsy:  -Squamous mucosa with ulcer, marked acute inflammation and reactive  changes  -No granulomas identified  -A special stain for fungi (GMS) is negative    I have personally reviewed all specimens and or slides, including the  listed special stains, and used them with my medical judgement to  determine the final diagnosis.    Electronically signed out by:    Dorys Barton M.D., [facility]    CLINICAL HISTORY:  The patient is a 70-year-old man with a history of left upper back  melanoma, resected in 2011 (see consult report [id number]). 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 He now presents for followup visit with nasal  cavity scabs, increased arthralgia and fatigue, concerning for  vasculitis flare. Operative procedure: Nasal septum biopsy.    GROSS:  The specimen is received in formalin with proper patient identification,  labeled \"septal tissue\". The specimen consists of three tan-pink tissue  fragments, 0.2 cm in greatest dimension, entirely submitted in cassette  1. (Dictated by: Ana Mixon 6/11/2015 03:39 " PM)    MICROSCOPIC:  Microscopic examination is performed. A GMS stain, performed with  appropriate positive control, is negative.    CPT Codes:  A: 77434-WV0, 47991-OXO    TESTING LAB LOCATION:  [facility], [address] [address] [phone]    COLLECTION SITE:  Client: [facility]  Location: [facility] (B)     Component      Latest Ref Rng & Units 3/5/2021   WBC      4.0 - 11.0 10e9/L 6.9   RBC Count      4.4 - 5.9 10e12/L 4.18 (L)   Hemoglobin      13.3 - 17.7 g/dL 13.8   Hematocrit      40.0 - 53.0 % 41.4   MCV      78 - 100 fl 99   MCH      26.5 - 33.0 pg 33.0   MCHC      31.5 - 36.5 g/dL 33.3   RDW      10.0 - 15.0 % 12.5   Platelet Count      150 - 450 10e9/L 185   % Neutrophils      % 63.6   % Lymphocytes      % 21.6   % Monocytes      % 10.8   % Eosinophils      % 3.4   % Basophils      % 0.6   Absolute Neutrophil      1.6 - 8.3 10e9/L 4.4   Absolute Lymphocytes      0.8 - 5.3 10e9/L 1.5   Absolute Monocytes      0.0 - 1.3 10e9/L 0.7   Absolute Eosinophils      0.0 - 0.7 10e9/L 0.2   Absolute Basophils      0.0 - 0.2 10e9/L 0.0   Diff Method       Automated Method   Color Urine       Yellow   Appearance Urine       Clear   Glucose Urine      NEG:Negative mg/dL Negative   Bilirubin Urine      NEG:Negative Negative   Ketones Urine      NEG:Negative mg/dL Negative   Specific Gravity Urine      1.003 - 1.035 1.020   pH Urine      5.0 - 7.0 pH 5.5   Protein Albumin Urine      NEG:Negative mg/dL Negative   Urobilinogen Urine      0.2 - 1.0 EU/dL 0.2   Nitrite Urine      NEG:Negative Negative   Blood Urine      NEG:Negative Negative   Leukocyte Esterase Urine      NEG:Negative Negative   Source       Midstream Urine   WBC Urine      OTO5:0 - 5 /HPF 0 - 5   RBC Urine      OTO2:O - 2 /HPF O - 2   Squamous Epithelial /LPF Urine      FEW:Few /LPF Few   Bacteria Urine      NEG:Negative /HPF Few (A)    IGG      610 - 1,616 mg/dL 981   IGA      84 - 499 mg/dL 85   IGM      35 - 242 mg/dL 28 (L)   Creatinine      0.66 - 1.25 mg/dL 1.48 (H)   GFR Estimate      >60 mL/min/1.73:m2 46 (L)   GFR Estimate If Black      >60 mL/min/1.73:m2 53 (L)   Neutrophil Cytoplasmic Antibody      <1:10 titer 1:20 (A)   Neutrophil Cytoplasmic Antibody Pattern       Cytoplasmic ANCA (c-ANCA) staining pattern observed and confirmed on formalin-fixed . . .   CD19 B Cells      6 - 27 % 20   Absolute CD19      107 - 698 cells/uL 352   Protein Random Urine      g/L 0.10   Protein Total Urine g/gr Creatinine      0 - 0.2 g/g Cr 0.13   Myeloperoxidase Antibody IgG      0.0 - 0.9 AI <0.2   Proteinase 3 Antibody IgG      0.0 - 0.9 AI 6.3 (H)   Sed Rate      0 - 20 mm/h 6   Creatinine Urine Random      mg/dL 82   CRP Inflammation      0.0 - 8.0 mg/L <2.9   Albumin      3.4 - 5.0 g/dL 4.0   ALT      0 - 70 U/L 30   AST      0 - 45 U/L 22   Creatinine Urine      mg/dL 82

## 2021-07-28 ENCOUNTER — LAB (OUTPATIENT)
Dept: LAB | Facility: CLINIC | Age: 76
End: 2021-07-28
Payer: COMMERCIAL

## 2021-07-28 DIAGNOSIS — Z79.899 HIGH RISK MEDICATIONS (NOT ANTICOAGULANTS) LONG-TERM USE: ICD-10-CM

## 2021-07-28 DIAGNOSIS — M31.30 GRANULOMATOSIS WITH POLYANGIITIS, UNSPECIFIED WHETHER RENAL INVOLVEMENT (H): ICD-10-CM

## 2021-07-28 LAB
ALBUMIN SERPL-MCNC: 4.1 G/DL (ref 3.4–5)
ALBUMIN UR-MCNC: NEGATIVE MG/DL
ALT SERPL W P-5'-P-CCNC: 32 U/L (ref 0–70)
APPEARANCE UR: CLEAR
AST SERPL W P-5'-P-CCNC: 22 U/L (ref 0–45)
BASOPHILS # BLD AUTO: 0 10E3/UL (ref 0–0.2)
BASOPHILS NFR BLD AUTO: 1 %
BILIRUB UR QL STRIP: NEGATIVE
COLOR UR AUTO: YELLOW
CREAT SERPL-MCNC: 1.54 MG/DL (ref 0.66–1.25)
CREAT UR-MCNC: 37 MG/DL
CRP SERPL-MCNC: <2.9 MG/L (ref 0–8)
EOSINOPHIL # BLD AUTO: 0.3 10E3/UL (ref 0–0.7)
EOSINOPHIL NFR BLD AUTO: 5 %
ERYTHROCYTE [DISTWIDTH] IN BLOOD BY AUTOMATED COUNT: 12.5 % (ref 10–15)
ERYTHROCYTE [SEDIMENTATION RATE] IN BLOOD BY WESTERGREN METHOD: 5 MM/HR (ref 0–20)
GFR SERPL CREATININE-BSD FRML MDRD: 44 ML/MIN/1.73M2
GLUCOSE UR STRIP-MCNC: NEGATIVE MG/DL
HCT VFR BLD AUTO: 41.6 % (ref 40–53)
HGB BLD-MCNC: 13.9 G/DL (ref 13.3–17.7)
HGB UR QL STRIP: NEGATIVE
KETONES UR STRIP-MCNC: NEGATIVE MG/DL
LEUKOCYTE ESTERASE UR QL STRIP: NEGATIVE
LYMPHOCYTES # BLD AUTO: 1.3 10E3/UL (ref 0.8–5.3)
LYMPHOCYTES NFR BLD AUTO: 22 %
MCH RBC QN AUTO: 32.9 PG (ref 26.5–33)
MCHC RBC AUTO-ENTMCNC: 33.4 G/DL (ref 31.5–36.5)
MCV RBC AUTO: 99 FL (ref 78–100)
MONOCYTES # BLD AUTO: 0.8 10E3/UL (ref 0–1.3)
MONOCYTES NFR BLD AUTO: 13 %
NEUTROPHILS # BLD AUTO: 3.5 10E3/UL (ref 1.6–8.3)
NEUTROPHILS NFR BLD AUTO: 59 %
NITRATE UR QL: NEGATIVE
PH UR STRIP: 5.5 [PH] (ref 5–7)
PLATELET # BLD AUTO: 148 10E3/UL (ref 150–450)
PROT UR-MCNC: <0.05 G/L
PROT/CREAT 24H UR: NORMAL MG/G{CREAT}
RBC # BLD AUTO: 4.22 10E6/UL (ref 4.4–5.9)
RBC #/AREA URNS AUTO: NORMAL /HPF
SP GR UR STRIP: 1.01 (ref 1–1.03)
UROBILINOGEN UR STRIP-ACNC: 0.2 E.U./DL
WBC # BLD AUTO: 5.8 10E3/UL (ref 4–11)
WBC #/AREA URNS AUTO: NORMAL /HPF

## 2021-07-28 PROCEDURE — 85652 RBC SED RATE AUTOMATED: CPT

## 2021-07-28 PROCEDURE — 86140 C-REACTIVE PROTEIN: CPT

## 2021-07-28 PROCEDURE — 85025 COMPLETE CBC W/AUTO DIFF WBC: CPT

## 2021-07-28 PROCEDURE — 36415 COLL VENOUS BLD VENIPUNCTURE: CPT

## 2021-07-28 PROCEDURE — 82040 ASSAY OF SERUM ALBUMIN: CPT

## 2021-07-28 PROCEDURE — 84156 ASSAY OF PROTEIN URINE: CPT

## 2021-07-28 PROCEDURE — 84460 ALANINE AMINO (ALT) (SGPT): CPT

## 2021-07-28 PROCEDURE — 86256 FLUORESCENT ANTIBODY TITER: CPT

## 2021-07-28 PROCEDURE — 82784 ASSAY IGA/IGD/IGG/IGM EACH: CPT

## 2021-07-28 PROCEDURE — 82565 ASSAY OF CREATININE: CPT

## 2021-07-28 PROCEDURE — 84450 TRANSFERASE (AST) (SGOT): CPT

## 2021-07-28 PROCEDURE — 81001 URINALYSIS AUTO W/SCOPE: CPT

## 2021-07-28 PROCEDURE — 83876 ASSAY MYELOPEROXIDASE: CPT

## 2021-07-28 PROCEDURE — 86255 FLUORESCENT ANTIBODY SCREEN: CPT

## 2021-07-28 PROCEDURE — 83516 IMMUNOASSAY NONANTIBODY: CPT

## 2021-07-28 PROCEDURE — 86355 B CELLS TOTAL COUNT: CPT

## 2021-07-29 LAB
CD19 CELLS # BLD: 275 CELLS/UL (ref 107–698)
CD19 CELLS NFR BLD: 19 % (ref 6–27)

## 2021-07-30 LAB
ANCA AB PATTERN SER IF-IMP: ABNORMAL
C-ANCA TITR SER IF: ABNORMAL {TITER}
IGA SERPL-MCNC: 88 MG/DL (ref 84–499)
IGG SERPL-MCNC: 937 MG/DL (ref 610–1616)
IGM SERPL-MCNC: 29 MG/DL (ref 35–242)
MYELOPEROXIDASE AB SER IA-ACNC: <0.3 U/ML
MYELOPEROXIDASE AB SER IA-ACNC: NEGATIVE
PROTEINASE3 AB SER IA-ACNC: 39 U/ML
PROTEINASE3 AB SER IA-ACNC: POSITIVE

## 2021-08-25 ENCOUNTER — TELEPHONE (OUTPATIENT)
Dept: RHEUMATOLOGY | Facility: CLINIC | Age: 76
End: 2021-08-25

## 2021-08-25 NOTE — TELEPHONE ENCOUNTER
[facility] Call Center    Phone Message    May a detailed message be left on voicemail: yes     Reason for Call: Symptoms or Concerns     If patient has red-flag symptoms, warm transfer to triage line    Current symptom or concern: Earlene has Covid and Joshua was tested as a precaution. He also tested positive but is asymptomatic. He is fully vaccinated. He was offered an antibody infusion. They are wondering if he should take it.    Symptoms have been present for:  Tested positive today    Has patient previously been seen for this? No        Action Taken: Message routed to:  [facility] Rheum    Travel Screening: Not Applicable

## 2021-08-25 NOTE — TELEPHONE ENCOUNTER
Discussed with Dr. Sanchez, pt should get the monoclonal antibody now when he is asymptomatic. Per ID that is the best time to get it when you are a pt who is immunosuppressed.    Called and updated pt's fiancee. They both will be getting the monoclonal antibodies.    Consuelo Canales RN  Rheumatology Clinic

## 2021-09-26 ENCOUNTER — HEALTH MAINTENANCE LETTER (OUTPATIENT)
Age: 76
End: 2021-09-26

## 2022-01-05 ENCOUNTER — TELEPHONE (OUTPATIENT)
Dept: RHEUMATOLOGY | Facility: CLINIC | Age: 77
End: 2022-01-05
Payer: COMMERCIAL

## 2022-01-05 NOTE — TELEPHONE ENCOUNTER
[facility] Call Center    Phone Message    May a detailed message be left on voicemail: yes     Reason for Call: Other: COVID-19 Booster   Questioning if Pt should get the booster for Pt had COVID-19 in August of 2021 and had the Monoclonal Antibody infusion; Earlene is wondering if Pt should still get a booster shot even though Pt had the anitbody infusion in August.    Please call Pt back to let them know.    Action Taken: Message routed to:  [facility] Adult Rheumatology

## 2022-01-07 ENCOUNTER — LAB (OUTPATIENT)
Dept: LAB | Facility: CLINIC | Age: 77
End: 2022-01-07
Payer: COMMERCIAL

## 2022-01-07 DIAGNOSIS — Z79.899 HIGH RISK MEDICATIONS (NOT ANTICOAGULANTS) LONG-TERM USE: ICD-10-CM

## 2022-01-07 DIAGNOSIS — M31.30 GRANULOMATOSIS WITH POLYANGIITIS, UNSPECIFIED WHETHER RENAL INVOLVEMENT (H): ICD-10-CM

## 2022-01-07 LAB
ALBUMIN SERPL-MCNC: 3.6 G/DL (ref 3.4–5)
ALBUMIN UR-MCNC: NEGATIVE MG/DL
ALT SERPL W P-5'-P-CCNC: 26 U/L (ref 0–70)
APPEARANCE UR: CLEAR
AST SERPL W P-5'-P-CCNC: 20 U/L (ref 0–45)
BASOPHILS # BLD AUTO: 0 10E3/UL (ref 0–0.2)
BASOPHILS NFR BLD AUTO: 1 %
BILIRUB UR QL STRIP: NEGATIVE
COLOR UR AUTO: YELLOW
CREAT SERPL-MCNC: 1.53 MG/DL (ref 0.66–1.25)
CREAT UR-MCNC: 35 MG/DL
CRP SERPL-MCNC: <2.9 MG/L (ref 0–8)
EOSINOPHIL # BLD AUTO: 0.2 10E3/UL (ref 0–0.7)
EOSINOPHIL NFR BLD AUTO: 4 %
ERYTHROCYTE [DISTWIDTH] IN BLOOD BY AUTOMATED COUNT: 12.7 % (ref 10–15)
ERYTHROCYTE [SEDIMENTATION RATE] IN BLOOD BY WESTERGREN METHOD: 5 MM/HR (ref 0–20)
GFR SERPL CREATININE-BSD FRML MDRD: 47 ML/MIN/1.73M2
GLUCOSE UR STRIP-MCNC: NEGATIVE MG/DL
HCT VFR BLD AUTO: 42.2 % (ref 40–53)
HGB BLD-MCNC: 14 G/DL (ref 13.3–17.7)
HGB UR QL STRIP: NEGATIVE
KETONES UR STRIP-MCNC: NEGATIVE MG/DL
LEUKOCYTE ESTERASE UR QL STRIP: NEGATIVE
LYMPHOCYTES # BLD AUTO: 1.4 10E3/UL (ref 0.8–5.3)
LYMPHOCYTES NFR BLD AUTO: 22 %
MCH RBC QN AUTO: 32.9 PG (ref 26.5–33)
MCHC RBC AUTO-ENTMCNC: 33.2 G/DL (ref 31.5–36.5)
MCV RBC AUTO: 99 FL (ref 78–100)
MONOCYTES # BLD AUTO: 0.7 10E3/UL (ref 0–1.3)
MONOCYTES NFR BLD AUTO: 11 %
NEUTROPHILS # BLD AUTO: 3.9 10E3/UL (ref 1.6–8.3)
NEUTROPHILS NFR BLD AUTO: 63 %
NITRATE UR QL: NEGATIVE
PH UR STRIP: 6 [PH] (ref 5–7)
PLATELET # BLD AUTO: 183 10E3/UL (ref 150–450)
PROT UR-MCNC: <0.05 G/L
PROT/CREAT 24H UR: NORMAL MG/G{CREAT}
RBC # BLD AUTO: 4.25 10E6/UL (ref 4.4–5.9)
RBC #/AREA URNS AUTO: NORMAL /HPF
SP GR UR STRIP: <=1.005 (ref 1–1.03)
UROBILINOGEN UR STRIP-ACNC: 0.2 E.U./DL
WBC # BLD AUTO: 6.2 10E3/UL (ref 4–11)
WBC #/AREA URNS AUTO: NORMAL /HPF

## 2022-01-07 PROCEDURE — 85652 RBC SED RATE AUTOMATED: CPT

## 2022-01-07 PROCEDURE — 84156 ASSAY OF PROTEIN URINE: CPT

## 2022-01-07 PROCEDURE — 82565 ASSAY OF CREATININE: CPT

## 2022-01-07 PROCEDURE — 81001 URINALYSIS AUTO W/SCOPE: CPT

## 2022-01-07 PROCEDURE — 83516 IMMUNOASSAY NONANTIBODY: CPT

## 2022-01-07 PROCEDURE — 85025 COMPLETE CBC W/AUTO DIFF WBC: CPT

## 2022-01-07 PROCEDURE — 82784 ASSAY IGA/IGD/IGG/IGM EACH: CPT

## 2022-01-07 PROCEDURE — 83876 ASSAY MYELOPEROXIDASE: CPT

## 2022-01-07 PROCEDURE — 84450 TRANSFERASE (AST) (SGOT): CPT

## 2022-01-07 PROCEDURE — 82040 ASSAY OF SERUM ALBUMIN: CPT

## 2022-01-07 PROCEDURE — 86036 ANCA SCREEN EACH ANTIBODY: CPT

## 2022-01-07 PROCEDURE — 86140 C-REACTIVE PROTEIN: CPT

## 2022-01-07 PROCEDURE — 86355 B CELLS TOTAL COUNT: CPT

## 2022-01-07 PROCEDURE — 86256 FLUORESCENT ANTIBODY TITER: CPT

## 2022-01-07 PROCEDURE — 36415 COLL VENOUS BLD VENIPUNCTURE: CPT

## 2022-01-07 PROCEDURE — 84460 ALANINE AMINO (ALT) (SGPT): CPT

## 2022-01-07 NOTE — TELEPHONE ENCOUNTER
Left message letting Falguni know that pt should get the booster if it has been longer than 90 days since he got the monoclonal antibodies.    Consuelo Canales RN  Rheumatology Clinic

## 2022-01-08 LAB
CD19 CELLS # BLD: 336 CELLS/UL (ref 107–698)
CD19 CELLS NFR BLD: 23 % (ref 6–27)

## 2022-01-10 LAB
IGA SERPL-MCNC: 80 MG/DL (ref 84–499)
IGG SERPL-MCNC: 1060 MG/DL (ref 610–1616)
IGM SERPL-MCNC: 32 MG/DL (ref 35–242)

## 2022-01-11 LAB
MYELOPEROXIDASE AB SER IA-ACNC: 0.3 U/ML
MYELOPEROXIDASE AB SER IA-ACNC: NEGATIVE
PROTEINASE3 AB SER IA-ACNC: 36 U/ML
PROTEINASE3 AB SER IA-ACNC: POSITIVE

## 2022-01-18 LAB
ANCA AB PATTERN SER IF-IMP: ABNORMAL
C-ANCA TITR SER IF: ABNORMAL {TITER}

## 2022-02-21 ENCOUNTER — TELEPHONE (OUTPATIENT)
Dept: RHEUMATOLOGY | Facility: CLINIC | Age: 77
End: 2022-02-21
Payer: COMMERCIAL

## 2022-02-21 DIAGNOSIS — G62.9 NEUROPATHY: ICD-10-CM

## 2022-02-21 DIAGNOSIS — Z79.899 HIGH RISK MEDICATIONS (NOT ANTICOAGULANTS) LONG-TERM USE: ICD-10-CM

## 2022-02-21 DIAGNOSIS — I10 HYPERTENSION, UNSPECIFIED TYPE: ICD-10-CM

## 2022-02-21 NOTE — TELEPHONE ENCOUNTER
[facility] Call Center    Phone Message    May a detailed message be left on voicemail: yes     Reason for Call: Other: Lab results from January 2022      Please contact the Pt back to go over his lab results from 1/7/22; Pt has not heard anyone call or received anything in the mail regarding his results.    Action Taken: Message routed to:  [facility] Adult Rheumatology

## 2022-02-23 RX ORDER — LOSARTAN POTASSIUM 50 MG/1
50 TABLET ORAL DAILY
Qty: 90 TABLET | Refills: 3 | Status: SHIPPED | OUTPATIENT
Start: 2022-02-23 | End: 2023-03-02

## 2022-02-23 RX ORDER — GABAPENTIN 400 MG/1
400 CAPSULE ORAL AT BEDTIME
Qty: 90 CAPSULE | Refills: 3 | Status: SHIPPED | OUTPATIENT
Start: 2022-02-23 | End: 2023-04-13

## 2022-02-23 RX ORDER — METHOTREXATE 2.5 MG/1
TABLET ORAL
Qty: 48 TABLET | Refills: 1 | Status: SHIPPED | OUTPATIENT
Start: 2022-02-23 | End: 2022-07-07

## 2022-02-23 NOTE — TELEPHONE ENCOUNTER
methotrexate sodium 2.5 mg tabletTake 4 tablets (10 mg) by mouth every 7 days   Last Written Prescription Date:  7/6/21  Last Fill Quantity: 48,   # refills: 1      CBC RESULTS: Recent Labs   Lab Test 01/07/22  1348   WBC 6.2   RBC 4.25*   HGB 14.0   HCT 42.2   MCV 99   MCH 32.9   MCHC 33.2   RDW 12.7          Creatinine   Date Value Ref Range Status   01/07/2022 1.53 (H) 0.66 - 1.25 mg/dL Final   03/05/2021 1.48 (H) 0.66 - 1.25 mg/dL Final   ]    Liver Function Studies -   Recent Labs   Lab Test 01/07/22  1348 03/13/18  1630 12/13/17  1228   PROTTOTAL  --   --  7.8   ALBUMIN 3.6   < > 4.0   BILITOTAL  --   --  0.7   ALKPHOS  --   --  97   AST 20   < > 22   ALT 26   < > 30    < > = values in this interval not displayed.        losartan 50 mg tablet  Last Written Prescription Date:  3/16/21  Last Fill Quantity: 90,   # refills: 3  gabapentin 400 mg capsule   Last Written Prescription Date:  4/1/21  Last Fill Quantity: 90,   # refills: 3  Last Office Visit : 7/6/21  Future Office visit:  6 months> scheduled for 7/7/22    Routing refill request to provider for review/approval because:  DMARD- methotrexate.  Abnormal Creatinine( stable/ reviewed by Dr Sanchez)  01/07/22  1348    CR 1.53*   Refill team is  not authorized to refill Gabapentin

## 2022-02-25 NOTE — TELEPHONE ENCOUNTER
Stable labs. Improved ANCA vasculitis marker.   Written by Ede Sanchez MD on 2/1/2022  5:09 PM CST    nvite message sent to patient of the above.     Lexus Das CMA   2/25/2022 2:10 PM

## 2022-03-01 NOTE — TELEPHONE ENCOUNTER
LVM x2 for patient to call and schedule a lab appointment. [employer] message also sent.   2/24/22  LG

## 2022-04-06 ENCOUNTER — TELEPHONE (OUTPATIENT)
Dept: RHEUMATOLOGY | Facility: CLINIC | Age: 77
End: 2022-04-06
Payer: COMMERCIAL

## 2022-04-06 NOTE — TELEPHONE ENCOUNTER
Dr. Sanchez patient    Patient is wondering if he can have his labs checked ever 6 months instead of every 3 months because the cost is getting to be too much.

## 2022-05-08 ENCOUNTER — HEALTH MAINTENANCE LETTER (OUTPATIENT)
Age: 77
End: 2022-05-08

## 2022-05-22 DIAGNOSIS — G62.9 NEUROPATHY: ICD-10-CM

## 2022-05-25 RX ORDER — FOLIC ACID 1 MG/1
2 TABLET ORAL DAILY
Qty: 180 TABLET | Refills: 0 | Status: SHIPPED | OUTPATIENT
Start: 2022-05-25 | End: 2022-07-07

## 2022-05-25 NOTE — TELEPHONE ENCOUNTER
Last Clinic Visit: 7/6/2021  [facility] [place]

## 2022-07-07 ENCOUNTER — OFFICE VISIT (OUTPATIENT)
Dept: RHEUMATOLOGY | Facility: CLINIC | Age: 77
End: 2022-07-07
Attending: INTERNAL MEDICINE
Payer: COMMERCIAL

## 2022-07-07 ENCOUNTER — LAB (OUTPATIENT)
Dept: LAB | Facility: CLINIC | Age: 77
End: 2022-07-07
Payer: COMMERCIAL

## 2022-07-07 VITALS
SYSTOLIC BLOOD PRESSURE: 134 MMHG | OXYGEN SATURATION: 98 % | BODY MASS INDEX: 27.98 KG/M2 | HEART RATE: 57 BPM | WEIGHT: 189.5 LBS | DIASTOLIC BLOOD PRESSURE: 87 MMHG | TEMPERATURE: 98.2 F

## 2022-07-07 DIAGNOSIS — G62.9 NEUROPATHY: ICD-10-CM

## 2022-07-07 DIAGNOSIS — Z79.899 HIGH RISK MEDICATIONS (NOT ANTICOAGULANTS) LONG-TERM USE: ICD-10-CM

## 2022-07-07 DIAGNOSIS — M31.30 GRANULOMATOSIS WITH POLYANGIITIS, UNSPECIFIED WHETHER RENAL INVOLVEMENT (H): Primary | ICD-10-CM

## 2022-07-07 LAB
ALBUMIN SERPL-MCNC: 3.7 G/DL (ref 3.4–5)
ALBUMIN UR-MCNC: NEGATIVE MG/DL
ALT SERPL W P-5'-P-CCNC: 25 U/L (ref 0–70)
APPEARANCE UR: CLEAR
AST SERPL W P-5'-P-CCNC: 20 U/L (ref 0–45)
BASOPHILS # BLD AUTO: 0 10E3/UL (ref 0–0.2)
BASOPHILS NFR BLD AUTO: 1 %
BILIRUB UR QL STRIP: NEGATIVE
COLOR UR AUTO: YELLOW
CREAT SERPL-MCNC: 1.41 MG/DL (ref 0.66–1.25)
CREAT UR-MCNC: 48 MG/DL
CRP SERPL-MCNC: <2.9 MG/L (ref 0–8)
EOSINOPHIL # BLD AUTO: 0.2 10E3/UL (ref 0–0.7)
EOSINOPHIL NFR BLD AUTO: 4 %
ERYTHROCYTE [DISTWIDTH] IN BLOOD BY AUTOMATED COUNT: 12.6 % (ref 10–15)
ERYTHROCYTE [SEDIMENTATION RATE] IN BLOOD BY WESTERGREN METHOD: 5 MM/HR (ref 0–20)
GFR SERPL CREATININE-BSD FRML MDRD: 52 ML/MIN/1.73M2
GLUCOSE UR STRIP-MCNC: NEGATIVE MG/DL
HCT VFR BLD AUTO: 44.5 % (ref 40–53)
HGB BLD-MCNC: 14.7 G/DL (ref 13.3–17.7)
HGB UR QL STRIP: NEGATIVE
IMM GRANULOCYTES # BLD: 0 10E3/UL
IMM GRANULOCYTES NFR BLD: 0 %
KETONES UR STRIP-MCNC: NEGATIVE MG/DL
LEUKOCYTE ESTERASE UR QL STRIP: NEGATIVE
LYMPHOCYTES # BLD AUTO: 1.3 10E3/UL (ref 0.8–5.3)
LYMPHOCYTES NFR BLD AUTO: 21 %
MCH RBC QN AUTO: 32.2 PG (ref 26.5–33)
MCHC RBC AUTO-ENTMCNC: 33 G/DL (ref 31.5–36.5)
MCV RBC AUTO: 98 FL (ref 78–100)
MONOCYTES # BLD AUTO: 0.6 10E3/UL (ref 0–1.3)
MONOCYTES NFR BLD AUTO: 10 %
MUCOUS THREADS #/AREA URNS LPF: PRESENT /LPF
NEUTROPHILS # BLD AUTO: 3.8 10E3/UL (ref 1.6–8.3)
NEUTROPHILS NFR BLD AUTO: 64 %
NITRATE UR QL: NEGATIVE
NRBC # BLD AUTO: 0 10E3/UL
NRBC BLD AUTO-RTO: 0 /100
PH UR STRIP: 7 [PH] (ref 5–7)
PLATELET # BLD AUTO: 186 10E3/UL (ref 150–450)
PROT UR-MCNC: 0.07 G/L
PROT/CREAT 24H UR: 0.15 G/G CR (ref 0–0.2)
RBC # BLD AUTO: 4.56 10E6/UL (ref 4.4–5.9)
RBC URINE: 0 /HPF
SP GR UR STRIP: 1.01 (ref 1–1.03)
UROBILINOGEN UR STRIP-MCNC: NORMAL MG/DL
WBC # BLD AUTO: 6 10E3/UL (ref 4–11)
WBC URINE: 0 /HPF

## 2022-07-07 PROCEDURE — 99214 OFFICE O/P EST MOD 30 MIN: CPT | Mod: GC | Performed by: INTERNAL MEDICINE

## 2022-07-07 PROCEDURE — 84450 TRANSFERASE (AST) (SGOT): CPT | Performed by: PATHOLOGY

## 2022-07-07 PROCEDURE — 36415 COLL VENOUS BLD VENIPUNCTURE: CPT | Performed by: PATHOLOGY

## 2022-07-07 PROCEDURE — 81001 URINALYSIS AUTO W/SCOPE: CPT | Performed by: PATHOLOGY

## 2022-07-07 PROCEDURE — 85652 RBC SED RATE AUTOMATED: CPT | Performed by: PATHOLOGY

## 2022-07-07 PROCEDURE — 82040 ASSAY OF SERUM ALBUMIN: CPT | Performed by: PATHOLOGY

## 2022-07-07 PROCEDURE — 99000 SPECIMEN HANDLING OFFICE-LAB: CPT | Performed by: PATHOLOGY

## 2022-07-07 PROCEDURE — 86140 C-REACTIVE PROTEIN: CPT | Performed by: PATHOLOGY

## 2022-07-07 PROCEDURE — 86256 FLUORESCENT ANTIBODY TITER: CPT | Mod: 90 | Performed by: PATHOLOGY

## 2022-07-07 PROCEDURE — 85025 COMPLETE CBC W/AUTO DIFF WBC: CPT | Performed by: PATHOLOGY

## 2022-07-07 PROCEDURE — 83876 ASSAY MYELOPEROXIDASE: CPT | Mod: 90 | Performed by: PATHOLOGY

## 2022-07-07 PROCEDURE — 84156 ASSAY OF PROTEIN URINE: CPT | Performed by: PATHOLOGY

## 2022-07-07 PROCEDURE — 84460 ALANINE AMINO (ALT) (SGPT): CPT | Performed by: PATHOLOGY

## 2022-07-07 PROCEDURE — 82565 ASSAY OF CREATININE: CPT | Performed by: PATHOLOGY

## 2022-07-07 PROCEDURE — 86036 ANCA SCREEN EACH ANTIBODY: CPT | Mod: 90 | Performed by: PATHOLOGY

## 2022-07-07 PROCEDURE — G0463 HOSPITAL OUTPT CLINIC VISIT: HCPCS

## 2022-07-07 RX ORDER — FOLIC ACID 1 MG/1
2 TABLET ORAL DAILY
Qty: 360 TABLET | Refills: 0 | Status: SHIPPED | OUTPATIENT
Start: 2022-07-07 | End: 2023-03-22

## 2022-07-07 RX ORDER — METHOTREXATE 2.5 MG/1
10 TABLET ORAL WEEKLY
Qty: 48 TABLET | Refills: 1 | Status: SHIPPED | OUTPATIENT
Start: 2022-07-07 | End: 2023-01-19

## 2022-07-07 ASSESSMENT — PAIN SCALES - GENERAL: PAINLEVEL: NO PAIN (0)

## 2022-07-07 NOTE — LETTER
7/7/2022       RE: Joshua Ennis  [address] [facility] [address]     Dear Colleague,    Thank you for referring your patient, Joshua Ennis, to the [facility] [place] at [facility]. Please see a copy of my visit note below.    [facility] Follow-Up In Person Visit Note    Date of visit: 07/07/22  Last visit date: 7/6/2021    Joshua Ennis MRN# [id number]   Age: 77 year old    Reason for visit: GPA   YOB: 1945          Assessment and Plan:     Assessment:   Mr. Ennis is a 77 yr old  male with history of melanoma s/p resection in 11/2011, former tobacco use, and GPA (PR3+, MPO and c-ANCA negative in 3/2013 based on aforementioned labs, confirmed by kidney and lung biopsy) who presents for clinic follow-up regarding GPA.  He was initiated on cyclophosphamide 150 mg QD and high-dose prednisone 3/2013. He last took prednisone in 10/2013. Cytoxan was switched to MTX for maintenance treatment in 10/2013.      Vasculitis seems to be stable and he has no signs or symptoms of flare up today. Due for labs today. Will continue with current plan of low dose methotrexate 10mg (4 tabs) weekly. Was advised to call in case of vasculitis flare up sx.        Plan:    - Continue MTX 10 mg (4 tabs) weekly     > Labs q6m      > Hold methotrexate if develops an infection  - Continue with Folic acid 1 mg po   - Ordered labs for today     > Will send results per letter (patient preference)    Return in 6 months (in person)             Patient was seen and staffed with attending physician, Dr. Sanchez, who was present the entire visit.    Mk Bryan MD  Med/Derm PGY-5  P: [phone]      Attending Note: I saw and evaluated the patient with Dr. Bryan.I agree with the assessment and plan. GPA is stable, stable labs in 1/2022 with improved vasculitis marker. Emphasized the need for annual skin check on  MTX given remote h/o melanoma.    Ede Sanchez MD    Plan of care:    Labs every 6 months    Return in a year (in person)    Orders Placed This Encounter   Procedures     AST     ALT     Myeloperoxidase Antibody IgG     ANCA IgG by IFA with Reflex to Titer     Albumin level     Creatinine     CRP inflammation     ESR     Protein  random urine     Urine macroscopic with reflex to micro     CBC with platelets differential            HPI / Interim History:      Mr. Ennis is a 78 yo WM with h/o melanoma s/p resection in 11/2011, former tobacco use, and GPA diagnosed in 3/2013. He presents for follow up.    Had a hospitalization 11/2-11/4/2020 with a fall with displaced left 5th-9th rib fractures, now recovered.    He has been tolerating it well. He had a flare in 12/2013 with increased crusts in his nose along with recurrence of arthralgia, worsening numbness in feet and increasing vasculitis marker. Renal function was stable with negative repeat chest CT. His vasculitis flare was treated with short course of prednisone taper 20 mg po qd max and increasing MTX to 8 tab = 20 mg qwk. Vasculitis symptoms improved.  At visit on 1/2015, he had scabs in his nose, had cold [place] symptoms and increased arthralgia/fatigue (shoulders, L hand). Labs from 12/16 showed increased Cr level and hematuria with rising PR3 (more than 8), there was a concern for vasculitis flare (in kidneys, sinuses), no flare on repeat chest CT 1/2015. Therefore it was recommended to try rituximab. Another reason was to be able to taper MTX off given abnormal Cr.  He is now s/p Rituximab infusion x 4 (1st on 2/25/2015). He is feeling well. No hematuria with stable Cr, no SOB. Saw ENT with negative nasal mucosa biopsy 6/2015 for granuloma but showed active inflammation, had left nostril lesion which decreased in size by use of mupirocin ointment. PR3 vasculitis marker went back to NL in 6/2015, it was NL in 10/2015, given stable vasculitis and low GFR,  MTX from 8 to 7 tab po qwk. Repeat TN-3 in 3/2016 was slightly positive, Cr was slightly higher than baseline. We tapered his MTX further to 6 tab po qwk in 2016 given lack of symptoms. In 3/2017, MTX was reduced from 6 to 5 tab qwk. It was further tapered to 4 tab po qwk in 2017 given stable disease. No vasculitis flare ups by such change.     22:  Reports doing well overall. Denies any vasculitis symptoms. Denies any fever/chills, fatigue, headaches, congestion, nose bleeds, mouth sores, cough/hemoptysis, chest pain, SOB, hematuria, dysuria, or any RUQ pain. Still endorses neuropathy but it is stable. No other concerns         Past Medical History:     Past Medical History:   Diagnosis Date     Arthritis      Autoimmune disease (H)      Hearing problem      Hoarseness      Hypertension      Kidney problem      Malignant melanoma (H)      Malignant neoplasm (H)     melanoma     Squamous cell carcinoma      Russo syndrome           Past Surgical History:     Past Surgical History:   Procedure Laterality Date     BIOPSY  2011    melanoma on back     DISSECT LYMPH NODE INGUINAL  3/1/2013    Procedure: DISSECT LYMPH NODE INGUINAL;  Bilateral Inguinal Lymph Node Biopsy.;  Surgeon: Tariq Acosta MD;  Location: [facility]     ESOPHAGOSCOPY, GASTROSCOPY, DUODENOSCOPY (EGD), COMBINED  2013    Procedure: COMBINED ESOPHAGOSCOPY, GASTROSCOPY, DUODENOSCOPY (EGD);  Gastroscopy;  Surgeon: Tariq Acosta MD;  Location: [facility]     HERNIORRHAPHY INGUINAL  2012    Procedure: HERNIORRHAPHY INGUINAL;  Open Repair Left Inguinal Hernia;  Surgeon: Jerad Baker MD;  Location: [facility]          Social History:     Social History     Tobacco Use     Smoking status: Former Smoker     Packs/day: 1.00     Years: 20.00     Pack years: 20.00     Types: Cigarettes     Quit date: 2013     Years since quittin.3     Smokeless tobacco: Never Used   Substance Use Topics     Alcohol use: Yes     Comment: rare           Family History:     Family History   Problem Relation Age of Onset     Diabetes Mother      Hypertension Mother      Cancer Father         stomach     Heart Disease Father      Heart Disease Brother      Diabetes Sister      Diabetes Sister      Diabetes Sister      Heart Disease Brother      Cancer Sister      Glaucoma No family hx of      Macular Degeneration No family hx of           Immunizations:   Due for PPSV23, will administer today. Immunizations otherwise up to date.    PMHx, FHx, SHx were reviewed, unchanged.         Allergies:     Allergies   Allergen Reactions     Shrimp Nausea and Vomiting     Got sick each time he ate it          Medications:     Current Outpatient Medications   Medication Sig     Acetaminophen (TYLENOL PO) Take 650 mg by mouth once as needed for mild pain or fever      calcium carbonate 600 mg-vitamin D 400 units (CALTRATE) 600-400 MG-UNIT per tablet Take 1 tablet by mouth 2 times daily.     Cholecalciferol (VITAMIN D) 1000 UNITS capsule Take 1 capsule by mouth daily.     folic acid (FOLVITE) 1 MG tablet Take 2 tablets (2 mg) by mouth daily     gabapentin (NEURONTIN) 400 MG capsule Take 1 capsule (400 mg) by mouth At Bedtime     losartan (COZAAR) 50 MG tablet Take 1 tablet (50 mg) by mouth daily     methotrexate sodium 2.5 MG TABS Take 4 tablets (10 mg) by mouth every 7 days     Blood Pressure Monitor KIT Automatic Blood Pressure Monitor (Patient not taking: Reported on 7/7/2022)     No current facility-administered medications for this visit.          Review of Systems:   A comprehensive ROS was done. Positives are per HPI.           Physical Exam:   /87   Pulse 57   Temp 98.2  F (36.8  C)   Wt 86 kg (189 lb 8 oz)   SpO2 98%   BMI 27.98 kg/m      Physical Exam  Constitutional:       General: He is awake.      Appearance: Normal appearance. He is well-developed.   HENT:      Head: Normocephalic and atraumatic.   Cardiovascular:      Rate and Rhythm: Normal rate  and regular rhythm.      Pulses: Normal pulses.      Heart sounds: Normal heart sounds.   Pulmonary:      Effort: Pulmonary effort is normal.      Breath sounds: Normal breath sounds.   Musculoskeletal:         General: Normal range of motion.   Neurological:      Mental Status: He is alert.   Psychiatric:         Behavior: Behavior is cooperative.                Data:   Recent laboratory data reviewed.      CHEST CT (2/16/2013)    Chest: Multiple indeterminate pulmonary nodules. Several of the  larger lesions are cavitary. There are 3 dominant lesions, a 3.2 cm  cavitary lesion in the left upper lobe, a 3.4 cm solid mass in the  right lower lobe laterally and a 3.7 cm cavitary mass in the right  lower lobe medially. In addition there is mediastinal lymphadenopathy  with a dominant 3 cm subcarinal node. Bilateral axillary  lymphadenopathy with 2.4 cm node seen bilaterally. Chest otherwise  unremarkable.       Exam: High-resolution Chest CT without contrast 1/9/2015 12:42 PM.  History: Concern for ANCA vasculitis flare in the chest.  Comparison: 3/7/2014, 11/14/2013, 2/16/2013.  Technique: CT helical acquisition from the lung apices to the kidneys  was obtained without intravenous contrast. Both inspiratory and  expiratory images were obtained.    Findings:  Moderate atherosclerotic calcifications of the coronary arteries. Mild  calcifications involving the aorta and aortic great vessels. Prominent  paratracheal lymph node on series 3 image 22 measuring 9 mm. Decreased  from 2/16/2013 and unchanged from 3/7/2014. Borderline enlarged right  hilar lymph node, unchanged from 3/7/2014 and decreased from  2/16/2013. Heart is not enlarged. Trace cardial effusion. No axillary  lymphadenopathy.  Nodular scarring in the left apex, unchanged from 2/16/2013. No  pleural effusion or pneumothorax. No evidence of intrapulmonary  infection. There is a cluster of tree in bud nodularity noted in the  anteromedial right upper lobe.  These nodules appear new. Expiratory  images demonstrate mild air trapping. Scattered pulmonary nodules:  Series 6 image 146: Seen posteriorly in the right lower lobe, there is  a sub-pleural nodule measuring 1.6 x 1.2 cm with a spiculated border.  Previously, this nodule measured 2.0 x 1.6 cm.  Series 6 image 189: Seen laterally in the right lower lobe, there is a  former mid pulmonary nodule which is unchanged from 3/7/2013 and  decreased from 2/16/2013.  Series 6 image 169: Seen posterolaterally in the right lower lobe,  there is a 3 mm pulmonary nodule which is unchanged from 3/7/2014 and  decreased from 2/16/2013.  Series 6 image 225: Seen posterolaterally in the left lower lobe,  there is a 4 mm subpleural nodule which is unchanged from 2/16/2014.  Other scattered sub-4 mm pulmonary nodules appear stable from previous  study.  Evaluation of the upper abdomen is limited due to the lack of  intravenous contrast.  No suspicious bony lesions.    IMPRESSION  Impression:   1. Scattered pulmonary nodules enlarged lymph nodes are  stable/slightly decreased from the previous study. No evidence for  acute flare in patient's known ANCA-associated vasculitis.  2. New tree in bud nodularity seen anteromedially in the right middle  lobe. Findings could be seen in the setting of an atypical infectious  process.  3. Mild air trapping. Finding is nonspecific and is seen in setting of  small airways disease such as asthma or bronchiolitis.  I have personally reviewed the examination and initial interpretation  and I agree with the findings.  TOÑO PERKINS MD    Copath Report     Patient Name: ADONAY FAITH  MR#: [id number]  Specimen #: [id number]  Collected: 6/11/2015  Received: 6/11/2015  Reported: 6/12/2015 17:37  Ordering Phy(s): DANIELLE PARIS    SPECIMEN(S):  Nasal septum    FINAL DIAGNOSIS:  Nasal septum, biopsy:  -Squamous mucosa with ulcer, marked acute inflammation and reactive  changes  -No granulomas identified  -A  "special stain for fungi (GMS) is negative    I have personally reviewed all specimens and or slides, including the  listed special stains, and used them with my medical judgement to  determine the final diagnosis.    Electronically signed out by:    Dorys Barton M.D., [facility]    CLINICAL HISTORY:  The patient is a 70-year-old man with a history of left upper back  melanoma, resected in 2011 (see consult report [id number]). 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 He now presents for followup visit with nasal  cavity scabs, increased arthralgia and fatigue, concerning for  vasculitis flare. Operative procedure: Nasal septum biopsy.    GROSS:  The specimen is received in formalin with proper patient identification,  labeled \"septal tissue\". The specimen consists of three tan-pink tissue  fragments, 0.2 cm in greatest dimension, entirely submitted in cassette  1. (Dictated by: Ana Mixon 6/11/2015 03:39 PM)    MICROSCOPIC:  Microscopic examination is performed. A GMS stain, performed with  appropriate positive control, is negative.    CPT Codes:  A: 26757-QG3, 93043-AYT    TESTING LAB LOCATION:  [facility], [address] [id number] [phone]    COLLECTION SITE:  Client: [facility]  Location: [facility] (B)     Component      Latest Ref Rng & Units 3/5/2021   WBC      4.0 - 11.0 10e9/L 6.9   RBC Count      4.4 - 5.9 10e12/L 4.18 (L)   Hemoglobin      13.3 - 17.7 g/dL 13.8   Hematocrit      40.0 - 53.0 % 41.4   MCV      78 - 100 fl 99   MCH      26.5 - 33.0 pg 33.0   MCHC      31.5 - 36.5 g/dL 33.3   RDW      10.0 - 15.0 % 12.5   Platelet Count      150 - 450 10e9/L 185   % Neutrophils      % 63.6 "   % Lymphocytes      % 21.6   % Monocytes      % 10.8   % Eosinophils      % 3.4   % Basophils      % 0.6   Absolute Neutrophil      1.6 - 8.3 10e9/L 4.4   Absolute Lymphocytes      0.8 - 5.3 10e9/L 1.5   Absolute Monocytes      0.0 - 1.3 10e9/L 0.7   Absolute Eosinophils      0.0 - 0.7 10e9/L 0.2   Absolute Basophils      0.0 - 0.2 10e9/L 0.0   Diff Method       Automated Method   Color Urine       Yellow   Appearance Urine       Clear   Glucose Urine      NEG:Negative mg/dL Negative   Bilirubin Urine      NEG:Negative Negative   Ketones Urine      NEG:Negative mg/dL Negative   Specific Gravity Urine      1.003 - 1.035 1.020   pH Urine      5.0 - 7.0 pH 5.5   Protein Albumin Urine      NEG:Negative mg/dL Negative   Urobilinogen Urine      0.2 - 1.0 EU/dL 0.2   Nitrite Urine      NEG:Negative Negative   Blood Urine      NEG:Negative Negative   Leukocyte Esterase Urine      NEG:Negative Negative   Source       Midstream Urine   WBC Urine      OTO5:0 - 5 /HPF 0 - 5   RBC Urine      OTO2:O - 2 /HPF O - 2   Squamous Epithelial /LPF Urine      FEW:Few /LPF Few   Bacteria Urine      NEG:Negative /HPF Few (A)   IGG      610 - 1,616 mg/dL 981   IGA      84 - 499 mg/dL 85   IGM      35 - 242 mg/dL 28 (L)   Creatinine      0.66 - 1.25 mg/dL 1.48 (H)   GFR Estimate      >60 mL/min/1.73:m2 46 (L)   GFR Estimate If Black      >60 mL/min/1.73:m2 53 (L)   Neutrophil Cytoplasmic Antibody      <1:10 titer 1:20 (A)   Neutrophil Cytoplasmic Antibody Pattern       Cytoplasmic ANCA (c-ANCA) staining pattern observed and confirmed on formalin-fixed . . .   CD19 B Cells      6 - 27 % 20   Absolute CD19      107 - 698 cells/uL 352   Protein Random Urine      g/L 0.10   Protein Total Urine g/gr Creatinine      0 - 0.2 g/g Cr 0.13   Myeloperoxidase Antibody IgG      0.0 - 0.9 AI <0.2   Proteinase 3 Antibody IgG      0.0 - 0.9 AI 6.3 (H)   Sed Rate      0 - 20 mm/h 6   Creatinine Urine Random      mg/dL 82   CRP Inflammation      0.0 - 8.0  mg/L <2.9   Albumin      3.4 - 5.0 g/dL 4.0   ALT      0 - 70 U/L 30   AST      0 - 45 U/L 22   Creatinine Urine      mg/dL 82     Component      Latest Ref Rng & Units 1/7/2022   WBC      4.0 - 11.0 10e3/uL 6.2   RBC Count      4.40 - 5.90 10e6/uL 4.25 (L)   Hemoglobin      13.3 - 17.7 g/dL 14.0   Hematocrit      40.0 - 53.0 % 42.2   MCV      78 - 100 fL 99   MCH      26.5 - 33.0 pg 32.9   MCHC      31.5 - 36.5 g/dL 33.2   RDW      10.0 - 15.0 % 12.7   Platelet Count      150 - 450 10e3/uL 183   % Neutrophils      % 63   % Lymphocytes      % 22   % Monocytes      % 11   % Eosinophils      % 4   % Basophils      % 1   Absolute Neutrophils      1.6 - 8.3 10e3/uL 3.9   Absolute Lymphocytes      0.8 - 5.3 10e3/uL 1.4   Absolute Monocytes      0.0 - 1.3 10e3/uL 0.7   Absolute Eosinophils      0.0 - 0.7 10e3/uL 0.2   Absolute Basophils      0.0 - 0.2 10e3/uL 0.0   Color Urine      Colorless, Straw, Light Yellow, Yellow Yellow   Appearance Urine      Clear Clear   Glucose Urine      Negative mg/dL Negative   Bilirubin Urine      Negative Negative   Ketones Urine      Negative mg/dL Negative   Specific Gravity Urine      1.003 - 1.035 <=1.005   Blood Urine      Negative Negative   pH Urine      5.0 - 7.0 6.0   Protein Albumin Urine      Negative mg/dL Negative   Urobilinogen Urine      0.2, 1.0 E.U./dL 0.2   Nitrite Urine      Negative Negative   Leukocyte Esterase Urine      Negative Negative   MPO Anju IgG Instrument Value      <3.5 U/mL 0.3   Myeloperoxidase Antibody IgG      Negative Negative   Proteinase 3 Anju IgG Instrument Value      <2.0 U/mL 36.0 (H)   Proteinase 3 Antibody IgG      Negative Positive (A)   IGG      610-1,616 mg/dL 1,060   IGA      84 - 499 mg/dL 80 (L)   IGM      35 - 242 mg/dL 32 (L)   Protein Random Urine      g/L <0.05   Protein Total Urine g/gr Creatinine          Creatinine Urine      mg/dL 35   Neutrophil Cytoplasmic Antibody      <1:10 1:10 (H)   Neutrophil Cytoplasmic Antibody Pattern        Cytoplasmic ANCA (c-ANCA) staining pattern observed and confirmed on formalin-fixed neutrophils.   CD19 B Cells      6 - 27 % 23   Absolute CD19      107 - 698 cells/uL 336   Creatinine      0.66 - 1.25 mg/dL 1.53 (H)   GFR Estimate      >60 mL/min/1.73m2 47 (L)   RBC Urine      0-2 /HPF /HPF None Seen   WBC Urine      0-5 /HPF /HPF None Seen   Sed Rate      0 - 20 mm/hr 5   CRP Inflammation      0.0 - 8.0 mg/L <2.9   Albumin      3.4 - 5.0 g/dL 3.6   ALT      0 - 70 U/L 26   AST      0 - 45 U/L 20       Ede Sanchez MD

## 2022-07-07 NOTE — PROGRESS NOTES
[facility] Follow-Up In Person Visit Note    Date of visit: 07/07/22  Last visit date: 7/6/2021    Joshua Ennis MRN# [id number]   Age: 77 year old    Reason for visit: GPA   YOB: 1945          Assessment and Plan:     Assessment:   Mr. Ennis is a 77 yr old  male with history of melanoma s/p resection in 11/2011, former tobacco use, and GPA (PR3+, MPO and c-ANCA negative in 3/2013 based on aforementioned labs, confirmed by kidney and lung biopsy) who presents for clinic follow-up regarding GPA.  He was initiated on cyclophosphamide 150 mg QD and high-dose prednisone 3/2013. He last took prednisone in 10/2013. Cytoxan was switched to MTX for maintenance treatment in 10/2013.      Vasculitis seems to be stable and he has no signs or symptoms of flare up today. Due for labs today. Will continue with current plan of low dose methotrexate 10mg (4 tabs) weekly. Was advised to call in case of vasculitis flare up sx.        Plan:    - Continue MTX 10 mg (4 tabs) weekly     > Labs q6m      > Hold methotrexate if develops an infection  - Continue with Folic acid 1 mg po   - Ordered labs for today     > Will send results per letter (patient preference)    Return in 6 months (in person)             Patient was seen and staffed with attending physician, Dr. Sanchez, who was present the entire visit.    Mk Bryan MD  Med/Derm PGY-5  P: [phone]      Attending Note: I saw and evaluated the patient with Dr. Bryan.I agree with the assessment and plan. GPA is stable, stable labs in 1/2022 with improved vasculitis marker. Emphasized the need for annual skin check on MTX given remote h/o melanoma.    Ede Sanchez MD    Plan of care:    Labs every 6 months    Return in a year (in person)    Orders Placed This Encounter   Procedures     AST     ALT     Myeloperoxidase Antibody IgG     ANCA IgG by IFA with Reflex to Titer     Albumin level     Creatinine     CRP inflammation      ESR     Protein  random urine     Urine macroscopic with reflex to micro     CBC with platelets differential            HPI / Interim History:      Mr. Ennis is a 78 yo WM with h/o melanoma s/p resection in 11/2011, former tobacco use, and GPA diagnosed in 3/2013. He presents for follow up.    Had a hospitalization 11/2-11/4/2020 with a fall with displaced left 5th-9th rib fractures, now recovered.    He has been tolerating it well. He had a flare in 12/2013 with increased crusts in his nose along with recurrence of arthralgia, worsening numbness in feet and increasing vasculitis marker. Renal function was stable with negative repeat chest CT. His vasculitis flare was treated with short course of prednisone taper 20 mg po qd max and increasing MTX to 8 tab = 20 mg qwk. Vasculitis symptoms improved.  At visit on 1/2015, he had scabs in his nose, had cold [place] symptoms and increased arthralgia/fatigue (shoulders, L hand). Labs from 12/16 showed increased Cr level and hematuria with rising PR3 (more than 8), there was a concern for vasculitis flare (in kidneys, sinuses), no flare on repeat chest CT 1/2015. Therefore it was recommended to try rituximab. Another reason was to be able to taper MTX off given abnormal Cr.  He is now s/p Rituximab infusion x 4 (1st on 2/25/2015). He is feeling well. No hematuria with stable Cr, no SOB. Saw ENT with negative nasal mucosa biopsy 6/2015 for granuloma but showed active inflammation, had left nostril lesion which decreased in size by use of mupirocin ointment. PR3 vasculitis marker went back to NL in 6/2015, it was NL in 10/2015, given stable vasculitis and low GFR, MTX from 8 to 7 tab po qwk. Repeat NM-3 in 3/2016 was slightly positive, Cr was slightly higher than baseline. We tapered his MTX further to 6 tab po qwk in 2/2016 given lack of symptoms. In 3/2017, MTX was reduced from 6 to 5 tab qwk. It was further tapered to 4 tab po qwk in 6/2017 given stable disease. No  vasculitis flare ups by such change.     22:  Reports doing well overall. Denies any vasculitis symptoms. Denies any fever/chills, fatigue, headaches, congestion, nose bleeds, mouth sores, cough/hemoptysis, chest pain, SOB, hematuria, dysuria, or any RUQ pain. Still endorses neuropathy but it is stable. No other concerns         Past Medical History:     Past Medical History:   Diagnosis Date     Arthritis      Autoimmune disease (H)      Hearing problem      Hoarseness      Hypertension      Kidney problem      Malignant melanoma (H)      Malignant neoplasm (H)     melanoma     Squamous cell carcinoma      Russo syndrome           Past Surgical History:     Past Surgical History:   Procedure Laterality Date     BIOPSY  2011    melanoma on back     DISSECT LYMPH NODE INGUINAL  3/1/2013    Procedure: DISSECT LYMPH NODE INGUINAL;  Bilateral Inguinal Lymph Node Biopsy.;  Surgeon: Tariq Acosta MD;  Location: [facility]     ESOPHAGOSCOPY, GASTROSCOPY, DUODENOSCOPY (EGD), COMBINED  2013    Procedure: COMBINED ESOPHAGOSCOPY, GASTROSCOPY, DUODENOSCOPY (EGD);  Gastroscopy;  Surgeon: Tariq Acosta MD;  Location: [facility]     HERNIORRHAPHY INGUINAL  2012    Procedure: HERNIORRHAPHY INGUINAL;  Open Repair Left Inguinal Hernia;  Surgeon: Jerad Baker MD;  Location: [facility]          Social History:     Social History     Tobacco Use     Smoking status: Former Smoker     Packs/day: 1.00     Years: 20.00     Pack years: 20.00     Types: Cigarettes     Quit date: 2013     Years since quittin.3     Smokeless tobacco: Never Used   Substance Use Topics     Alcohol use: Yes     Comment: rare          Family History:     Family History   Problem Relation Age of Onset     Diabetes Mother      Hypertension Mother      Cancer Father         stomach     Heart Disease Father      Heart Disease Brother      Diabetes Sister      Diabetes Sister      Diabetes Sister      Heart Disease Brother      Cancer  Sister      Glaucoma No family hx of      Macular Degeneration No family hx of           Immunizations:   Due for PPSV23, will administer today. Immunizations otherwise up to date.    PMHx, FHx, SHx were reviewed, unchanged.         Allergies:     Allergies   Allergen Reactions     Shrimp Nausea and Vomiting     Got sick each time he ate it          Medications:     Current Outpatient Medications   Medication Sig     Acetaminophen (TYLENOL PO) Take 650 mg by mouth once as needed for mild pain or fever      calcium carbonate 600 mg-vitamin D 400 units (CALTRATE) 600-400 MG-UNIT per tablet Take 1 tablet by mouth 2 times daily.     Cholecalciferol (VITAMIN D) 1000 UNITS capsule Take 1 capsule by mouth daily.     folic acid (FOLVITE) 1 MG tablet Take 2 tablets (2 mg) by mouth daily     gabapentin (NEURONTIN) 400 MG capsule Take 1 capsule (400 mg) by mouth At Bedtime     losartan (COZAAR) 50 MG tablet Take 1 tablet (50 mg) by mouth daily     methotrexate sodium 2.5 MG TABS Take 4 tablets (10 mg) by mouth every 7 days     Blood Pressure Monitor KIT Automatic Blood Pressure Monitor (Patient not taking: Reported on 7/7/2022)     No current facility-administered medications for this visit.          Review of Systems:   A comprehensive ROS was done. Positives are per HPI.           Physical Exam:   /87   Pulse 57   Temp 98.2  F (36.8  C)   Wt 86 kg (189 lb 8 oz)   SpO2 98%   BMI 27.98 kg/m      Physical Exam  Constitutional:       General: He is awake.      Appearance: Normal appearance. He is well-developed.   HENT:      Head: Normocephalic and atraumatic.   Cardiovascular:      Rate and Rhythm: Normal rate and regular rhythm.      Pulses: Normal pulses.      Heart sounds: Normal heart sounds.   Pulmonary:      Effort: Pulmonary effort is normal.      Breath sounds: Normal breath sounds.   Musculoskeletal:         General: Normal range of motion.   Neurological:      Mental Status: He is alert.   Psychiatric:          Behavior: Behavior is cooperative.                Data:   Recent laboratory data reviewed.      CHEST CT (2/16/2013)    Chest: Multiple indeterminate pulmonary nodules. Several of the  larger lesions are cavitary. There are 3 dominant lesions, a 3.2 cm  cavitary lesion in the left upper lobe, a 3.4 cm solid mass in the  right lower lobe laterally and a 3.7 cm cavitary mass in the right  lower lobe medially. In addition there is mediastinal lymphadenopathy  with a dominant 3 cm subcarinal node. Bilateral axillary  lymphadenopathy with 2.4 cm node seen bilaterally. Chest otherwise  unremarkable.       Exam: High-resolution Chest CT without contrast 1/9/2015 12:42 PM.  History: Concern for ANCA vasculitis flare in the chest.  Comparison: 3/7/2014, 11/14/2013, 2/16/2013.  Technique: CT helical acquisition from the lung apices to the kidneys  was obtained without intravenous contrast. Both inspiratory and  expiratory images were obtained.    Findings:  Moderate atherosclerotic calcifications of the coronary arteries. Mild  calcifications involving the aorta and aortic great vessels. Prominent  paratracheal lymph node on series 3 image 22 measuring 9 mm. Decreased  from 2/16/2013 and unchanged from 3/7/2014. Borderline enlarged right  hilar lymph node, unchanged from 3/7/2014 and decreased from  2/16/2013. Heart is not enlarged. Trace cardial effusion. No axillary  lymphadenopathy.  Nodular scarring in the left apex, unchanged from 2/16/2013. No  pleural effusion or pneumothorax. No evidence of intrapulmonary  infection. There is a cluster of tree in bud nodularity noted in the  anteromedial right upper lobe. These nodules appear new. Expiratory  images demonstrate mild air trapping. Scattered pulmonary nodules:  Series 6 image 146: Seen posteriorly in the right lower lobe, there is  a sub-pleural nodule measuring 1.6 x 1.2 cm with a spiculated border.  Previously, this nodule measured 2.0 x 1.6 cm.  Series 6 image  189: Seen laterally in the right lower lobe, there is a  former mid pulmonary nodule which is unchanged from 3/7/2013 and  decreased from 2/16/2013.  Series 6 image 169: Seen posterolaterally in the right lower lobe,  there is a 3 mm pulmonary nodule which is unchanged from 3/7/2014 and  decreased from 2/16/2013.  Series 6 image 225: Seen posterolaterally in the left lower lobe,  there is a 4 mm subpleural nodule which is unchanged from 2/16/2014.  Other scattered sub-4 mm pulmonary nodules appear stable from previous  study.  Evaluation of the upper abdomen is limited due to the lack of  intravenous contrast.  No suspicious bony lesions.    IMPRESSION  Impression:   1. Scattered pulmonary nodules enlarged lymph nodes are  stable/slightly decreased from the previous study. No evidence for  acute flare in patient's known ANCA-associated vasculitis.  2. New tree in bud nodularity seen anteromedially in the right middle  lobe. Findings could be seen in the setting of an atypical infectious  process.  3. Mild air trapping. Finding is nonspecific and is seen in setting of  small airways disease such as asthma or bronchiolitis.  I have personally reviewed the examination and initial interpretation  and I agree with the findings.  TOÑO PERKINS MD    Copath Report     Patient Name: ADONAY FAITH  MR#: [id number]  Specimen #: [id number]  Collected: 6/11/2015  Received: 6/11/2015  Reported: 6/12/2015 17:37  Ordering Phy(s): DANIELLE PARIS    SPECIMEN(S):  Nasal septum    FINAL DIAGNOSIS:  Nasal septum, biopsy:  -Squamous mucosa with ulcer, marked acute inflammation and reactive  changes  -No granulomas identified  -A special stain for fungi (GMS) is negative    I have personally reviewed all specimens and or slides, including the  listed special stains, and used them with my medical judgement to  determine the final diagnosis.    Electronically signed out by:    Dorys Barton M.D., [facility]    CLINICAL HISTORY:  The  "patient is a 70-year-old man with a history of left upper back  melanoma, resected in 2011 (see consult report [id number]). 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 He now presents for followup visit with nasal  cavity scabs, increased arthralgia and fatigue, concerning for  vasculitis flare. Operative procedure: Nasal septum biopsy.    GROSS:  The specimen is received in formalin with proper patient identification,  labeled \"septal tissue\". The specimen consists of three tan-pink tissue  fragments, 0.2 cm in greatest dimension, entirely submitted in cassette  1. (Dictated by: Ana Mixon 6/11/2015 03:39 PM)    MICROSCOPIC:  Microscopic examination is performed. A GMS stain, performed with  appropriate positive control, is negative.    CPT Codes:  A: 73490-AZ0, 58582-FRR    TESTING LAB LOCATION:  [facility], [address] [id number] [phone]    COLLECTION SITE:  Client: [facility]  Location: [facility] (B)     Component      Latest Ref Rng & Units 3/5/2021   WBC      4.0 - 11.0 10e9/L 6.9   RBC Count      4.4 - 5.9 10e12/L 4.18 (L)   Hemoglobin      13.3 - 17.7 g/dL 13.8   Hematocrit      40.0 - 53.0 % 41.4   MCV      78 - 100 fl 99   MCH      26.5 - 33.0 pg 33.0   MCHC      31.5 - 36.5 g/dL 33.3   RDW      10.0 - 15.0 % 12.5   Platelet Count      150 - 450 10e9/L 185   % Neutrophils      % 63.6   % Lymphocytes      % 21.6   % Monocytes      % 10.8   % Eosinophils      % 3.4   % Basophils      % 0.6   Absolute Neutrophil      1.6 - 8.3 10e9/L 4.4   Absolute Lymphocytes      0.8 - 5.3 10e9/L 1.5   Absolute Monocytes      0.0 - 1.3 10e9/L 0.7   Absolute Eosinophils      0.0 - 0.7 10e9/L 0.2   Absolute " Basophils      0.0 - 0.2 10e9/L 0.0   Diff Method       Automated Method   Color Urine       Yellow   Appearance Urine       Clear   Glucose Urine      NEG:Negative mg/dL Negative   Bilirubin Urine      NEG:Negative Negative   Ketones Urine      NEG:Negative mg/dL Negative   Specific Gravity Urine      1.003 - 1.035 1.020   pH Urine      5.0 - 7.0 pH 5.5   Protein Albumin Urine      NEG:Negative mg/dL Negative   Urobilinogen Urine      0.2 - 1.0 EU/dL 0.2   Nitrite Urine      NEG:Negative Negative   Blood Urine      NEG:Negative Negative   Leukocyte Esterase Urine      NEG:Negative Negative   Source       Midstream Urine   WBC Urine      OTO5:0 - 5 /HPF 0 - 5   RBC Urine      OTO2:O - 2 /HPF O - 2   Squamous Epithelial /LPF Urine      FEW:Few /LPF Few   Bacteria Urine      NEG:Negative /HPF Few (A)   IGG      610 - 1,616 mg/dL 981   IGA      84 - 499 mg/dL 85   IGM      35 - 242 mg/dL 28 (L)   Creatinine      0.66 - 1.25 mg/dL 1.48 (H)   GFR Estimate      >60 mL/min/1.73:m2 46 (L)   GFR Estimate If Black      >60 mL/min/1.73:m2 53 (L)   Neutrophil Cytoplasmic Antibody      <1:10 titer 1:20 (A)   Neutrophil Cytoplasmic Antibody Pattern       Cytoplasmic ANCA (c-ANCA) staining pattern observed and confirmed on formalin-fixed . . .   CD19 B Cells      6 - 27 % 20   Absolute CD19      107 - 698 cells/uL 352   Protein Random Urine      g/L 0.10   Protein Total Urine g/gr Creatinine      0 - 0.2 g/g Cr 0.13   Myeloperoxidase Antibody IgG      0.0 - 0.9 AI <0.2   Proteinase 3 Antibody IgG      0.0 - 0.9 AI 6.3 (H)   Sed Rate      0 - 20 mm/h 6   Creatinine Urine Random      mg/dL 82   CRP Inflammation      0.0 - 8.0 mg/L <2.9   Albumin      3.4 - 5.0 g/dL 4.0   ALT      0 - 70 U/L 30   AST      0 - 45 U/L 22   Creatinine Urine      mg/dL 82     Component      Latest Ref Rng & Units 1/7/2022   WBC      4.0 - 11.0 10e3/uL 6.2   RBC Count      4.40 - 5.90 10e6/uL 4.25 (L)   Hemoglobin      13.3 - 17.7 g/dL 14.0   Hematocrit       40.0 - 53.0 % 42.2   MCV      78 - 100 fL 99   MCH      26.5 - 33.0 pg 32.9   MCHC      31.5 - 36.5 g/dL 33.2   RDW      10.0 - 15.0 % 12.7   Platelet Count      150 - 450 10e3/uL 183   % Neutrophils      % 63   % Lymphocytes      % 22   % Monocytes      % 11   % Eosinophils      % 4   % Basophils      % 1   Absolute Neutrophils      1.6 - 8.3 10e3/uL 3.9   Absolute Lymphocytes      0.8 - 5.3 10e3/uL 1.4   Absolute Monocytes      0.0 - 1.3 10e3/uL 0.7   Absolute Eosinophils      0.0 - 0.7 10e3/uL 0.2   Absolute Basophils      0.0 - 0.2 10e3/uL 0.0   Color Urine      Colorless, Straw, Light Yellow, Yellow Yellow   Appearance Urine      Clear Clear   Glucose Urine      Negative mg/dL Negative   Bilirubin Urine      Negative Negative   Ketones Urine      Negative mg/dL Negative   Specific Gravity Urine      1.003 - 1.035 <=1.005   Blood Urine      Negative Negative   pH Urine      5.0 - 7.0 6.0   Protein Albumin Urine      Negative mg/dL Negative   Urobilinogen Urine      0.2, 1.0 E.U./dL 0.2   Nitrite Urine      Negative Negative   Leukocyte Esterase Urine      Negative Negative   MPO Anju IgG Instrument Value      <3.5 U/mL 0.3   Myeloperoxidase Antibody IgG      Negative Negative   Proteinase 3 Anju IgG Instrument Value      <2.0 U/mL 36.0 (H)   Proteinase 3 Antibody IgG      Negative Positive (A)   IGG      610-1,616 mg/dL 1,060   IGA      84 - 499 mg/dL 80 (L)   IGM      35 - 242 mg/dL 32 (L)   Protein Random Urine      g/L <0.05   Protein Total Urine g/gr Creatinine          Creatinine Urine      mg/dL 35   Neutrophil Cytoplasmic Antibody      <1:10 1:10 (H)   Neutrophil Cytoplasmic Antibody Pattern       Cytoplasmic ANCA (c-ANCA) staining pattern observed and confirmed on formalin-fixed neutrophils.   CD19 B Cells      6 - 27 % 23   Absolute CD19      107 - 698 cells/uL 336   Creatinine      0.66 - 1.25 mg/dL 1.53 (H)   GFR Estimate      >60 mL/min/1.73m2 47 (L)   RBC Urine      0-2 /HPF /HPF None Seen   WBC  Urine      0-5 /HPF /HPF None Seen   Sed Rate      0 - 20 mm/hr 5   CRP Inflammation      0.0 - 8.0 mg/L <2.9   Albumin      3.4 - 5.0 g/dL 3.6   ALT      0 - 70 U/L 26   AST      0 - 45 U/L 20

## 2022-07-07 NOTE — NURSING NOTE
Chief Complaint   Patient presents with     RECHECK     Follow up no new concerns.     Blood pressure 134/87, pulse 57, temperature 98.2  F (36.8  C), weight 86 kg (189 lb 8 oz), SpO2 98 %.    JANET DOMINGUEZ

## 2022-07-08 LAB
ANCA AB PATTERN SER IF-IMP: ABNORMAL
C-ANCA TITR SER IF: ABNORMAL {TITER}

## 2022-07-09 LAB
MYELOPEROXIDASE AB SER IA-ACNC: 0.3 U/ML
MYELOPEROXIDASE AB SER IA-ACNC: NEGATIVE

## 2023-01-16 DIAGNOSIS — Z79.899 HIGH RISK MEDICATIONS (NOT ANTICOAGULANTS) LONG-TERM USE: Primary | ICD-10-CM

## 2023-01-16 DIAGNOSIS — M31.30 GRANULOMATOSIS WITH POLYANGIITIS, UNSPECIFIED WHETHER RENAL INVOLVEMENT (H): ICD-10-CM

## 2023-01-19 RX ORDER — METHOTREXATE 2.5 MG/1
10 TABLET ORAL WEEKLY
Qty: 48 TABLET | Refills: 1 | Status: SHIPPED | OUTPATIENT
Start: 2023-01-19 | End: 2023-07-13

## 2023-01-19 NOTE — TELEPHONE ENCOUNTER
Medication/Dose: methotrexate sodium 2.5 MG TABS    Last Written : 7/7/22  Last Quantity: 48, # refills: 1  Last Office Visit :  7/7/22  Pending appointment: 7/6/33     WBC   Date Value Ref Range Status   03/05/2021 6.9 4.0 - 11.0 10e9/L Final     WBC Count   Date Value Ref Range Status   07/07/2022 6.0 4.0 - 11.0 10e3/uL Final     RBC Count   Date Value Ref Range Status   07/07/2022 4.56 4.40 - 5.90 10e6/uL Final   03/05/2021 4.18 (L) 4.4 - 5.9 10e12/L Final     Hemoglobin   Date Value Ref Range Status   07/07/2022 14.7 13.3 - 17.7 g/dL Final   03/05/2021 13.8 13.3 - 17.7 g/dL Final     Hematocrit   Date Value Ref Range Status   07/07/2022 44.5 40.0 - 53.0 % Final   03/05/2021 41.4 40.0 - 53.0 % Final     MCV   Date Value Ref Range Status   07/07/2022 98 78 - 100 fL Final   03/05/2021 99 78 - 100 fl Final     MCH   Date Value Ref Range Status   07/07/2022 32.2 26.5 - 33.0 pg Final   03/05/2021 33.0 26.5 - 33.0 pg Final     MCHC   Date Value Ref Range Status   07/07/2022 33.0 31.5 - 36.5 g/dL Final   03/05/2021 33.3 31.5 - 36.5 g/dL Final     RDW   Date Value Ref Range Status   07/07/2022 12.6 10.0 - 15.0 % Final   03/05/2021 12.5 10.0 - 15.0 % Final     Platelet Count   Date Value Ref Range Status   07/07/2022 186 150 - 450 10e3/uL Final   03/05/2021 185 150 - 450 10e9/L Final     AST   Date Value Ref Range Status   07/07/2022 20 0 - 45 U/L Final   03/05/2021 22 0 - 45 U/L Final     ALT   Date Value Ref Range Status   07/07/2022 25 0 - 70 U/L Final   03/05/2021 30 0 - 70 U/L Final     Creatinine   Date Value Ref Range Status   07/07/2022 1.41 (H) 0.66 - 1.25 mg/dL Final   03/05/2021 1.48 (H) 0.66 - 1.25 mg/dL Final     Albumin   Date Value Ref Range Status   07/07/2022 3.7 3.4 - 5.0 g/dL Final   03/05/2021 4.0 3.4 - 5.0 g/dL Final     CRP Inflammation   Date Value Ref Range Status   07/07/2022 <2.9 0.0 - 8.0 mg/L Final   03/05/2021 <2.9 0.0 - 8.0 mg/L Final     No components found for: ESR    Prescription set up  and routed to provider per Refill Protocol, pt is scheduled for a lab appointment on 1/23/23.    KATIUSKA SalesN, RN  [facility] Refill Team

## 2023-03-01 DIAGNOSIS — G62.9 NEUROPATHY: ICD-10-CM

## 2023-03-01 DIAGNOSIS — I10 HYPERTENSION, UNSPECIFIED TYPE: ICD-10-CM

## 2023-03-02 RX ORDER — LOSARTAN POTASSIUM 50 MG/1
50 TABLET ORAL DAILY
Qty: 90 TABLET | Refills: 1 | Status: SHIPPED | OUTPATIENT
Start: 2023-03-02 | End: 2023-08-31

## 2023-03-02 NOTE — TELEPHONE ENCOUNTER
Medication/Dose: losartan (COZAAR) 50 MG tablet    Last Written : 2/23/22  Last Quantity: 90, # refills: 3  Last Office Visit :  7/7/22  Pending appointment: 7/6/23    Potassium   Date Value Ref Range Status   11/11/2020 4.3 3.4 - 5.3 mmol/L Final     Creatinine   Date Value Ref Range Status   07/07/2022 1.41 (H) 0.66 - 1.25 mg/dL Final   03/05/2021 1.48 (H) 0.66 - 1.25 mg/dL Final     BP Readings from Last 1 Encounters:   07/07/22 134/87     Prescription approved per Refill Protocol    DAIJA Sales, RN  [facility] Refill Team

## 2023-03-10 ENCOUNTER — LAB (OUTPATIENT)
Dept: LAB | Facility: CLINIC | Age: 78
End: 2023-03-10
Payer: COMMERCIAL

## 2023-03-10 DIAGNOSIS — M31.30 GRANULOMATOSIS WITH POLYANGIITIS, UNSPECIFIED WHETHER RENAL INVOLVEMENT (H): ICD-10-CM

## 2023-03-10 DIAGNOSIS — Z79.899 HIGH RISK MEDICATIONS (NOT ANTICOAGULANTS) LONG-TERM USE: ICD-10-CM

## 2023-03-10 LAB
ALBUMIN MFR UR ELPH: 14.1 MG/DL (ref 1–14)
ALBUMIN SERPL BCG-MCNC: 4.4 G/DL (ref 3.5–5.2)
ALBUMIN UR-MCNC: NEGATIVE MG/DL
ALT SERPL W P-5'-P-CCNC: 18 U/L (ref 10–50)
APPEARANCE UR: CLEAR
AST SERPL W P-5'-P-CCNC: 25 U/L (ref 10–50)
BACTERIA #/AREA URNS HPF: ABNORMAL /HPF
BASOPHILS # BLD AUTO: 0.1 10E3/UL (ref 0–0.2)
BASOPHILS NFR BLD AUTO: 1 %
BILIRUB UR QL STRIP: NEGATIVE
COLOR UR AUTO: YELLOW
CREAT SERPL-MCNC: 1.45 MG/DL (ref 0.67–1.17)
CREAT UR-MCNC: 118.5 MG/DL
CRP SERPL-MCNC: <3 MG/L
EOSINOPHIL # BLD AUTO: 0.3 10E3/UL (ref 0–0.7)
EOSINOPHIL NFR BLD AUTO: 4 %
ERYTHROCYTE [DISTWIDTH] IN BLOOD BY AUTOMATED COUNT: 12.4 % (ref 10–15)
ERYTHROCYTE [SEDIMENTATION RATE] IN BLOOD BY WESTERGREN METHOD: 7 MM/HR (ref 0–20)
GFR SERPL CREATININE-BSD FRML MDRD: 50 ML/MIN/1.73M2
GLUCOSE UR STRIP-MCNC: NEGATIVE MG/DL
HCT VFR BLD AUTO: 41.7 % (ref 40–53)
HGB BLD-MCNC: 13.8 G/DL (ref 13.3–17.7)
HGB UR QL STRIP: NEGATIVE
KETONES UR STRIP-MCNC: NEGATIVE MG/DL
LEUKOCYTE ESTERASE UR QL STRIP: NEGATIVE
LYMPHOCYTES # BLD AUTO: 1.4 10E3/UL (ref 0.8–5.3)
LYMPHOCYTES NFR BLD AUTO: 20 %
MCH RBC QN AUTO: 32.5 PG (ref 26.5–33)
MCHC RBC AUTO-ENTMCNC: 33.1 G/DL (ref 31.5–36.5)
MCV RBC AUTO: 98 FL (ref 78–100)
MONOCYTES # BLD AUTO: 0.6 10E3/UL (ref 0–1.3)
MONOCYTES NFR BLD AUTO: 9 %
NEUTROPHILS # BLD AUTO: 4.8 10E3/UL (ref 1.6–8.3)
NEUTROPHILS NFR BLD AUTO: 67 %
NITRATE UR QL: NEGATIVE
PH UR STRIP: 6 [PH] (ref 5–7)
PLATELET # BLD AUTO: 191 10E3/UL (ref 150–450)
PROT/CREAT 24H UR: 0.12 MG/MG CR (ref 0–0.2)
RBC # BLD AUTO: 4.25 10E6/UL (ref 4.4–5.9)
RBC #/AREA URNS AUTO: ABNORMAL /HPF
SP GR UR STRIP: 1.02 (ref 1–1.03)
SQUAMOUS #/AREA URNS AUTO: ABNORMAL /LPF
UROBILINOGEN UR STRIP-ACNC: 0.2 E.U./DL
WBC # BLD AUTO: 7.1 10E3/UL (ref 4–11)
WBC #/AREA URNS AUTO: ABNORMAL /HPF

## 2023-03-10 PROCEDURE — 84460 ALANINE AMINO (ALT) (SGPT): CPT

## 2023-03-10 PROCEDURE — 81001 URINALYSIS AUTO W/SCOPE: CPT

## 2023-03-10 PROCEDURE — 84156 ASSAY OF PROTEIN URINE: CPT

## 2023-03-10 PROCEDURE — 86036 ANCA SCREEN EACH ANTIBODY: CPT

## 2023-03-10 PROCEDURE — 84450 TRANSFERASE (AST) (SGOT): CPT

## 2023-03-10 PROCEDURE — 82040 ASSAY OF SERUM ALBUMIN: CPT

## 2023-03-10 PROCEDURE — 83876 ASSAY MYELOPEROXIDASE: CPT

## 2023-03-10 PROCEDURE — 36415 COLL VENOUS BLD VENIPUNCTURE: CPT

## 2023-03-10 PROCEDURE — 85025 COMPLETE CBC W/AUTO DIFF WBC: CPT

## 2023-03-10 PROCEDURE — 82565 ASSAY OF CREATININE: CPT

## 2023-03-10 PROCEDURE — 85652 RBC SED RATE AUTOMATED: CPT

## 2023-03-10 PROCEDURE — 83516 IMMUNOASSAY NONANTIBODY: CPT

## 2023-03-10 PROCEDURE — 86140 C-REACTIVE PROTEIN: CPT

## 2023-03-10 PROCEDURE — 86037 ANCA TITER EACH ANTIBODY: CPT

## 2023-03-14 LAB
ANCA AB PATTERN SER IF-IMP: ABNORMAL
C-ANCA TITR SER IF: ABNORMAL {TITER}
MYELOPEROXIDASE AB SER IA-ACNC: <0.3 U/ML
MYELOPEROXIDASE AB SER IA-ACNC: NEGATIVE
PROTEINASE3 AB SER IA-ACNC: 31 U/ML
PROTEINASE3 AB SER IA-ACNC: POSITIVE

## 2023-03-20 DIAGNOSIS — G62.9 NEUROPATHY: ICD-10-CM

## 2023-03-22 RX ORDER — FOLIC ACID 1 MG/1
2 TABLET ORAL DAILY
Qty: 180 TABLET | Refills: 3 | Status: SHIPPED | OUTPATIENT
Start: 2023-03-22 | End: 2024-03-22

## 2023-03-22 NOTE — TELEPHONE ENCOUNTER
folic acid (FOLVITE) 1 MG tablet    Vitamin Supplements (Adult) Protocol Passed     7/7/2022  [facility] Ede Moran MD  Rheumatology

## 2023-04-11 DIAGNOSIS — G62.9 NEUROPATHY: ICD-10-CM

## 2023-04-13 NOTE — TELEPHONE ENCOUNTER
[place]  noted below. Will route to Dr. Sanchez for review.    KATIUSKA GrahamN, RN  RN Care Coordinator Rheumatology

## 2023-04-13 NOTE — TELEPHONE ENCOUNTER
Medication/Dose: gabapentin (NEURONTIN) 400 MG capsule    Last Written : 2/23/22  Last Quantity: 90, # refills: 3  Last Office Visit :  7/7/22  Pending appointment:     Jul 06, 2023 11:00 AM  (Arrive by 10:45 AM)  Return Visit with Ede Sancehz MD  [facility] [place] ([facility] ) [address] [place] [id number] [phone]     Routed to Rheumatology team so that the  can be checked.    KATIUSKA SalesN, RN  [facility] Refill Team

## 2023-04-14 RX ORDER — GABAPENTIN 400 MG/1
400 CAPSULE ORAL AT BEDTIME
Qty: 90 CAPSULE | Refills: 3 | Status: SHIPPED | OUTPATIENT
Start: 2023-04-14 | End: 2024-04-17

## 2023-04-23 ENCOUNTER — HEALTH MAINTENANCE LETTER (OUTPATIENT)
Age: 78
End: 2023-04-23

## 2023-06-02 ENCOUNTER — HEALTH MAINTENANCE LETTER (OUTPATIENT)
Age: 78
End: 2023-06-02

## 2023-06-05 ENCOUNTER — TELEPHONE (OUTPATIENT)
Dept: RHEUMATOLOGY | Facility: CLINIC | Age: 78
End: 2023-06-05
Payer: COMMERCIAL

## 2023-06-05 NOTE — TELEPHONE ENCOUNTER
Health Call Center    Phone Message    May a detailed message be left on voicemail: yes     Reason for Call: Requesting Results   Name/type of test: labs  Date of test: couple months ago  Was test done at a location other than [facility] (Please fill in the location if not [facility])?: No      Action Taken: Other: [facility]    Travel Screening: Not Applicable

## 2023-06-06 NOTE — TELEPHONE ENCOUNTER
Returned call to significant other.   They never received the letter Dr. Sanchez mailed in March.  This RN reprinted the letter and put it in the mail to them, confirmed address.    They do not use [place].    Confirmed appts on 7/6 for labs and appt with Dr. Sanchez.    All questions answered.    KATIUSKA GrahamN, RN  RN Care Coordinator Rheumatology

## 2023-07-06 ENCOUNTER — OFFICE VISIT (OUTPATIENT)
Dept: RHEUMATOLOGY | Facility: CLINIC | Age: 78
End: 2023-07-06
Attending: INTERNAL MEDICINE
Payer: COMMERCIAL

## 2023-07-06 ENCOUNTER — LAB (OUTPATIENT)
Dept: LAB | Facility: CLINIC | Age: 78
End: 2023-07-06
Payer: COMMERCIAL

## 2023-07-06 VITALS
HEART RATE: 55 BPM | HEIGHT: 69 IN | DIASTOLIC BLOOD PRESSURE: 74 MMHG | BODY MASS INDEX: 28.64 KG/M2 | SYSTOLIC BLOOD PRESSURE: 150 MMHG | WEIGHT: 193.4 LBS | OXYGEN SATURATION: 97 %

## 2023-07-06 DIAGNOSIS — R91.1 LUNG NODULE: ICD-10-CM

## 2023-07-06 DIAGNOSIS — M31.30 GRANULOMATOSIS WITH POLYANGIITIS, UNSPECIFIED WHETHER RENAL INVOLVEMENT (H): ICD-10-CM

## 2023-07-06 DIAGNOSIS — Z79.899 HIGH RISK MEDICATIONS (NOT ANTICOAGULANTS) LONG-TERM USE: ICD-10-CM

## 2023-07-06 DIAGNOSIS — M31.30 GRANULOMATOSIS WITH POLYANGIITIS, UNSPECIFIED WHETHER RENAL INVOLVEMENT (H): Primary | ICD-10-CM

## 2023-07-06 DIAGNOSIS — R05.2 SUBACUTE COUGH: ICD-10-CM

## 2023-07-06 LAB
ALBUMIN MFR UR ELPH: <6 MG/DL
ALBUMIN SERPL BCG-MCNC: 4.4 G/DL (ref 3.5–5.2)
ALBUMIN UR-MCNC: NEGATIVE MG/DL
ALT SERPL W P-5'-P-CCNC: 18 U/L (ref 0–70)
APPEARANCE UR: CLEAR
AST SERPL W P-5'-P-CCNC: 20 U/L (ref 0–45)
BASOPHILS # BLD AUTO: 0.1 10E3/UL (ref 0–0.2)
BASOPHILS NFR BLD AUTO: 1 %
BILIRUB UR QL STRIP: NEGATIVE
COLOR UR AUTO: ABNORMAL
CREAT SERPL-MCNC: 1.45 MG/DL (ref 0.67–1.17)
CREAT UR-MCNC: 34.5 MG/DL
CRP SERPL-MCNC: <3 MG/L
EOSINOPHIL # BLD AUTO: 0.2 10E3/UL (ref 0–0.7)
EOSINOPHIL NFR BLD AUTO: 3 %
ERYTHROCYTE [DISTWIDTH] IN BLOOD BY AUTOMATED COUNT: 12.7 % (ref 10–15)
ERYTHROCYTE [SEDIMENTATION RATE] IN BLOOD BY WESTERGREN METHOD: 6 MM/HR (ref 0–20)
GFR SERPL CREATININE-BSD FRML MDRD: 50 ML/MIN/1.73M2
GLUCOSE UR STRIP-MCNC: NEGATIVE MG/DL
HCT VFR BLD AUTO: 43.6 % (ref 40–53)
HGB BLD-MCNC: 14.6 G/DL (ref 13.3–17.7)
HGB UR QL STRIP: NEGATIVE
IMM GRANULOCYTES # BLD: 0 10E3/UL
IMM GRANULOCYTES NFR BLD: 1 %
KETONES UR STRIP-MCNC: NEGATIVE MG/DL
LEUKOCYTE ESTERASE UR QL STRIP: NEGATIVE
LYMPHOCYTES # BLD AUTO: 1.2 10E3/UL (ref 0.8–5.3)
LYMPHOCYTES NFR BLD AUTO: 21 %
MCH RBC QN AUTO: 33.2 PG (ref 26.5–33)
MCHC RBC AUTO-ENTMCNC: 33.5 G/DL (ref 31.5–36.5)
MCV RBC AUTO: 99 FL (ref 78–100)
MONOCYTES # BLD AUTO: 0.6 10E3/UL (ref 0–1.3)
MONOCYTES NFR BLD AUTO: 11 %
MUCOUS THREADS #/AREA URNS LPF: PRESENT /LPF
NEUTROPHILS # BLD AUTO: 3.6 10E3/UL (ref 1.6–8.3)
NEUTROPHILS NFR BLD AUTO: 63 %
NITRATE UR QL: NEGATIVE
NRBC # BLD AUTO: 0 10E3/UL
NRBC BLD AUTO-RTO: 0 /100
PH UR STRIP: 5.5 [PH] (ref 5–7)
PLATELET # BLD AUTO: 166 10E3/UL (ref 150–450)
PROT/CREAT 24H UR: NORMAL MG/G{CREAT}
RBC # BLD AUTO: 4.4 10E6/UL (ref 4.4–5.9)
RBC URINE: <1 /HPF
SP GR UR STRIP: 1.01 (ref 1–1.03)
SQUAMOUS EPITHELIAL: <1 /HPF
UROBILINOGEN UR STRIP-MCNC: NORMAL MG/DL
WBC # BLD AUTO: 5.7 10E3/UL (ref 4–11)
WBC URINE: <1 /HPF

## 2023-07-06 PROCEDURE — 82040 ASSAY OF SERUM ALBUMIN: CPT | Performed by: PATHOLOGY

## 2023-07-06 PROCEDURE — 84450 TRANSFERASE (AST) (SGOT): CPT | Performed by: PATHOLOGY

## 2023-07-06 PROCEDURE — 86140 C-REACTIVE PROTEIN: CPT | Performed by: PATHOLOGY

## 2023-07-06 PROCEDURE — 84156 ASSAY OF PROTEIN URINE: CPT | Performed by: PATHOLOGY

## 2023-07-06 PROCEDURE — 85025 COMPLETE CBC W/AUTO DIFF WBC: CPT | Performed by: PATHOLOGY

## 2023-07-06 PROCEDURE — 82565 ASSAY OF CREATININE: CPT | Performed by: PATHOLOGY

## 2023-07-06 PROCEDURE — 84460 ALANINE AMINO (ALT) (SGPT): CPT | Performed by: PATHOLOGY

## 2023-07-06 PROCEDURE — 85652 RBC SED RATE AUTOMATED: CPT | Performed by: PATHOLOGY

## 2023-07-06 PROCEDURE — 36415 COLL VENOUS BLD VENIPUNCTURE: CPT | Performed by: PATHOLOGY

## 2023-07-06 PROCEDURE — 81001 URINALYSIS AUTO W/SCOPE: CPT | Performed by: PATHOLOGY

## 2023-07-06 PROCEDURE — 86036 ANCA SCREEN EACH ANTIBODY: CPT | Performed by: INTERNAL MEDICINE

## 2023-07-06 PROCEDURE — G0463 HOSPITAL OUTPT CLINIC VISIT: HCPCS | Performed by: INTERNAL MEDICINE

## 2023-07-06 PROCEDURE — 83876 ASSAY MYELOPEROXIDASE: CPT | Performed by: INTERNAL MEDICINE

## 2023-07-06 PROCEDURE — 99000 SPECIMEN HANDLING OFFICE-LAB: CPT | Performed by: PATHOLOGY

## 2023-07-06 PROCEDURE — 99214 OFFICE O/P EST MOD 30 MIN: CPT | Mod: GC | Performed by: INTERNAL MEDICINE

## 2023-07-06 ASSESSMENT — PAIN SCALES - GENERAL: PAINLEVEL: NO PAIN (0)

## 2023-07-06 NOTE — PROGRESS NOTES
[facility] Follow-Up In Person Visit Note    Date of visit: 07/06/2023  Last visit date: 7/7/21    Joshua Ennis MRN# [id number]   Age: 78 year old    Reason for visit: GPA   YOB: 1945          Assessment and Plan:     Assessment:   Mr. Ennis is a 78 yr old  male with history of melanoma s/p resection in 11/2011, former tobacco use, and GPA (PR3+, MPO and c-ANCA negative in 3/2013 based on aforementioned labs, confirmed by kidney and lung biopsy) who presents for clinic follow-up regarding GPA.  He was initiated on cyclophosphamide 150 mg QD and high-dose prednisone 3/2013. He last took prednisone in 10/2013. Cytoxan was switched to MTX for maintenance treatment in 10/2013.      Vasculitis seems to be stable and he has no signs or symptoms of flare up today, though PR3 remains persistently positive. New upper airway symptoms, while more likely due to non-vasculitis inflammation, GERD, vocal cord dysfunction, could represent vasculitis in tracheal wall, which can occur in absence of elevated inflammatory markers. Also, former smoker and with immunosuppression status, need to make sure there is no SCC. Will refer for ENT evaluation and also acquire high-resolution non-contrast CT to follow nodule in his lung incidentally identified in 2020. Otherwise continue with current plan of low dose methotrexate 10mg (4 tabs) weekly and Q3 mo labs. Was advised to call in case of vasculitis flare up sx.        Plan:    - Continue MTX 10 mg (4 tabs) weekly     > Labs q3m      > Hold methotrexate if develops an infection  - Continue with Folic acid 1 mg po   - Referral to ENT for new onset persistent voice changes/hoarseness and continued cough/phlegm (2017 note indicated laryngoscopy would be next step if he developed persistent hoarseness)  - High-resolution CT chest w/o contrast for follow up of pulm nodule  - Repeat labs in 3 mo    Return in 1y (in person)             Patient was seen  and staffed with attending physician, Dr. Sanchez, who was present the entire visit.    Coleman Riley MD  Medicine/Dermatology PGY-3  [id number]       Attending Note: I saw and evaluated the patient with Dr. Riley. I agree with the assessment and plan.    Ede Sanchez MD      Orders Placed This Encounter   Procedures     CT Chest w/o contrast (High Resolution)     AST     ALT     Myeloperoxidase and Proteinase 3 Panel     Albumin level     Creatinine     CRP inflammation     UA with Microscopic reflex to Culture     Protein  random urine     Creatinine random urine     Erythrocyte sedimentation rate auto     CD19 B Cell Count     ANCA IgG by IFA with Reflex to Titer     Immunoglobulins A G and M     Adult ENT  Referral     CBC with Platelets & Differential            HPI / Interim History:      Mr. Ennis is a 78 yo WM with h/o melanoma s/p resection in 11/2011, former tobacco use, and GPA diagnosed in 3/2013. He presents for follow up.    Had a hospitalization 11/2-11/4/2020 with a fall with displaced left 5th-9th rib fractures, now recovered.    He has been tolerating it well. He had a flare in 12/2013 with increased crusts in his nose along with recurrence of arthralgia, worsening numbness in feet and increasing vasculitis marker. Renal function was stable with negative repeat chest CT. His vasculitis flare was treated with short course of prednisone taper 20 mg po qd max and increasing MTX to 8 tab = 20 mg qwk. Vasculitis symptoms improved.  At visit on 1/2015, he had scabs in his nose, had cold [place] symptoms and increased arthralgia/fatigue (shoulders, L hand). Labs from 12/16 showed increased Cr level and hematuria with rising PR3 (more than 8), there was a concern for vasculitis flare (in kidneys, sinuses), no flare on repeat chest CT 1/2015. Therefore it was recommended to try rituximab. Another reason was to be able to taper MTX off given abnormal Cr.  He is now s/p Rituximab infusion x 4 (1st  "on 2/25/2015). He is feeling well. No hematuria with stable Cr, no SOB. Saw ENT with negative nasal mucosa biopsy 6/2015 for granuloma but showed active inflammation, had left nostril lesion which decreased in size by use of mupirocin ointment. PR3 vasculitis marker went back to NL in 6/2015, it was NL in 10/2015, given stable vasculitis and low GFR, MTX from 8 to 7 tab po qwk. Repeat VA-3 in 3/2016 was slightly positive, Cr was slightly higher than baseline. We tapered his MTX further to 6 tab po qwk in 2/2016 given lack of symptoms. In 3/2017, MTX was reduced from 6 to 5 tab qwk. It was further tapered to 4 tab po qwk in 6/2017 given stable disease. No vasculitis flare ups by such change.     7/7/22:  Reports doing well overall. Denies any vasculitis symptoms. Denies any fever/chills, fatigue, headaches, congestion, nose bleeds, mouth sores, cough/hemoptysis, chest pain, SOB, hematuria, dysuria, or any RUQ pain. Still endorses neuropathy but it is stable. No other concerns    7/6/23:  Doing fairly well overall though has noted persistent bothersome hoarseness and voice changes over the past 3 mos. Associates this with \"stickier\" phlegm than usual with his chronic cough. Notes this cough has been productive of grey-white sputum and present for many years relatively unchanged. Also reports episode of poison oak ~ 3 wks ago, was given a short course of prednisone (notes indicate 40 mg every day x 5 days) by an urgent care. Has now resolved, and reports that during that prednisone course there was no change to his hoarseness or phlegm. Otherwise denies weight loss, loss of energy, loss of appetite, dysgeusia, fevers, chills, headaches, congestion, nose bleeds, nose crusting, mouth sores, hemoptysis, rashes, sores, chest pain, dyspnea, hematuria, dysuria, nausea, diarrhea, RUQ pain, vision or hearing changes. No other concerns.         Past Medical History:     Past Medical History:   Diagnosis Date     Arthritis      " Autoimmune disease (H)      Hearing problem      Hoarseness      Hypertension      Kidney problem      Malignant melanoma (H)      Malignant neoplasm (H)     melanoma     Squamous cell carcinoma      Russo syndrome           Past Surgical History:     Past Surgical History:   Procedure Laterality Date     BIOPSY  11/2011    melanoma on back     DISSECT LYMPH NODE INGUINAL  3/1/2013    Procedure: DISSECT LYMPH NODE INGUINAL;  Bilateral Inguinal Lymph Node Biopsy.;  Surgeon: Tariq Acosta MD;  Location: [facility]     ESOPHAGOSCOPY, GASTROSCOPY, DUODENOSCOPY (EGD), COMBINED  7/5/2013    Procedure: COMBINED ESOPHAGOSCOPY, GASTROSCOPY, DUODENOSCOPY (EGD);  Gastroscopy;  Surgeon: Tariq Acosta MD;  Location: [facility]     HERNIORRHAPHY INGUINAL  8/6/2012    Procedure: HERNIORRHAPHY INGUINAL;  Open Repair Left Inguinal Hernia;  Surgeon: Jerad Baker MD;  Location: [facility]          Social History:     Social History     Tobacco Use     Smoking status: Former     Packs/day: 1.00     Years: 20.00     Pack years: 20.00     Types: Cigarettes     Quit date: 2/14/2013     Years since quitting: 10.3     Smokeless tobacco: Never   Substance Use Topics     Alcohol use: Yes     Comment: rare          Family History:     Family History   Problem Relation Age of Onset     Diabetes Mother      Hypertension Mother      Cancer Father         stomach     Heart Disease Father      Heart Disease Brother      Diabetes Sister      Diabetes Sister      Diabetes Sister      Heart Disease Brother      Cancer Sister      Glaucoma No family hx of      Macular Degeneration No family hx of           Immunizations:   Due for shingles vaccine, encouraged to seek this from his PCP.    PMHx, FHx, SHx were reviewed, unchanged.         Allergies:     Allergies   Allergen Reactions     Shrimp Nausea and Vomiting     Got sick each time he ate it          Medications:     Current Outpatient Medications   Medication Sig     Acetaminophen (TYLENOL  "PO) Take 650 mg by mouth once as needed for mild pain or fever      Blood Pressure Monitor KIT Automatic Blood Pressure Monitor (Patient not taking: Reported on 7/7/2022)     calcium carbonate 600 mg-vitamin D 400 units (CALTRATE) 600-400 MG-UNIT per tablet Take 1 tablet by mouth 2 times daily.     Cholecalciferol (VITAMIN D) 1000 UNITS capsule Take 1 capsule by mouth daily.     folic acid (FOLVITE) 1 MG tablet Take 2 tablets (2 mg) by mouth daily     gabapentin (NEURONTIN) 400 MG capsule Take 1 capsule (400 mg) by mouth At Bedtime     losartan (COZAAR) 50 MG tablet Take 1 tablet (50 mg) by mouth daily Please keep your appointment on 7/6/23. Thank you.     methotrexate sodium 2.5 MG TABS Take 4 tablets (10 mg) by mouth once a week     No current facility-administered medications for this visit.          Review of Systems:   A comprehensive ROS was done. Positives are per HPI.           Physical Exam:   BP (!) 150/74   Pulse 55   Ht 1.753 m (5' 9\")   Wt 87.7 kg (193 lb 6.4 oz)   SpO2 97%   BMI 28.56 kg/m        Gen:pleasant, NAD, significant other and grandson are present  HEENT: NCAT, nasal mucosa WNL, oropharynx WNL with exception of mild diffuse erythema at posterior oropharynx and solitary purplish macule on L soft palate seemingly stable from prior descriptions, no other lesions of note and no ulcerations or bleeding  CV: RRR no M/G/R  Resp: breathing appears nonlabored, somewhat distant sounds though CTAB  Abd: soft, ND, NT  Ext: no significant peripheral edema, WWP  Skin: no rash or lesions noted on exposed skin  Neuro: awake and alert, without gross deficit  MSK: normal AROM in hands, elbows, shoulders, knees, ankles without swelling, tenderness, effusions             Data:   Recent laboratory data reviewed.    CT CAP 11/2020  IMPRESSION:  1.  Acute minimally displaced posterior left 9th rib fracture.     2.  Acute displaced lateral left 5th-8th rib fractures.     3.  No other fractures.     4.  Normal " caliber thoracic and abdominal aorta without evidence for acute injury.     5.  No solid organ injury in the upper abdomen or significant free fluid throughout the abdomen or pelvis.     6.  No pneumothorax or pulmonary contusion or significant pleural fluid.     7.  In the extreme left lung apex there is a 9 x 7 mm nodule extending toward the level of the pleural surface. Recommend continued short interval follow-up in 2-3 months to assess for stability.        [Recommend Follow Up: Lung nodule]      CHEST CT (2/16/2013)    Chest: Multiple indeterminate pulmonary nodules. Several of the  larger lesions are cavitary. There are 3 dominant lesions, a 3.2 cm  cavitary lesion in the left upper lobe, a 3.4 cm solid mass in the  right lower lobe laterally and a 3.7 cm cavitary mass in the right  lower lobe medially. In addition there is mediastinal lymphadenopathy  with a dominant 3 cm subcarinal node. Bilateral axillary  lymphadenopathy with 2.4 cm node seen bilaterally. Chest otherwise  unremarkable.       Exam: High-resolution Chest CT without contrast 1/9/2015 12:42 PM.  History: Concern for ANCA vasculitis flare in the chest.  Comparison: 3/7/2014, 11/14/2013, 2/16/2013.  Technique: CT helical acquisition from the lung apices to the kidneys  was obtained without intravenous contrast. Both inspiratory and  expiratory images were obtained.    Findings:  Moderate atherosclerotic calcifications of the coronary arteries. Mild  calcifications involving the aorta and aortic great vessels. Prominent  paratracheal lymph node on series 3 image 22 measuring 9 mm. Decreased  from 2/16/2013 and unchanged from 3/7/2014. Borderline enlarged right  hilar lymph node, unchanged from 3/7/2014 and decreased from  2/16/2013. Heart is not enlarged. Trace cardial effusion. No axillary  lymphadenopathy.  Nodular scarring in the left apex, unchanged from 2/16/2013. No  pleural effusion or pneumothorax. No evidence of  intrapulmonary  infection. There is a cluster of tree in bud nodularity noted in the  anteromedial right upper lobe. These nodules appear new. Expiratory  images demonstrate mild air trapping. Scattered pulmonary nodules:  Series 6 image 146: Seen posteriorly in the right lower lobe, there is  a sub-pleural nodule measuring 1.6 x 1.2 cm with a spiculated border.  Previously, this nodule measured 2.0 x 1.6 cm.  Series 6 image 189: Seen laterally in the right lower lobe, there is a  former mid pulmonary nodule which is unchanged from 3/7/2013 and  decreased from 2/16/2013.  Series 6 image 169: Seen posterolaterally in the right lower lobe,  there is a 3 mm pulmonary nodule which is unchanged from 3/7/2014 and  decreased from 2/16/2013.  Series 6 image 225: Seen posterolaterally in the left lower lobe,  there is a 4 mm subpleural nodule which is unchanged from 2/16/2014.  Other scattered sub-4 mm pulmonary nodules appear stable from previous  study.  Evaluation of the upper abdomen is limited due to the lack of  intravenous contrast.  No suspicious bony lesions.    IMPRESSION  Impression:   1. Scattered pulmonary nodules enlarged lymph nodes are  stable/slightly decreased from the previous study. No evidence for  acute flare in patient's known ANCA-associated vasculitis.  2. New tree in bud nodularity seen anteromedially in the right middle  lobe. Findings could be seen in the setting of an atypical infectious  process.  3. Mild air trapping. Finding is nonspecific and is seen in setting of  small airways disease such as asthma or bronchiolitis.  I have personally reviewed the examination and initial interpretation  and I agree with the findings.  TOÑO PERKINS MD    Copath Report     Patient Name: ADONAY FAITH  MR#: [id number]  Specimen #: [id number]  Collected: 6/11/2015  Received: 6/11/2015  Reported: 6/12/2015 17:37  Ordering Phy(s): DANIELLE PARIS    SPECIMEN(S):  Nasal septum    FINAL DIAGNOSIS:  Nasal septum,  "biopsy:  -Squamous mucosa with ulcer, marked acute inflammation and reactive  changes  -No granulomas identified  -A special stain for fungi (GMS) is negative    I have personally reviewed all specimens and or slides, including the  listed special stains, and used them with my medical judgement to  determine the final diagnosis.    Electronically signed out by:    Dorys Barton M.D., [facility]    CLINICAL HISTORY:  The patient is a 70-year-old man with a history of left upper back  melanoma, resected in 2011 (see consult report [id number]). 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 He now presents for followup visit with nasal  cavity scabs, increased arthralgia and fatigue, concerning for  vasculitis flare. Operative procedure: Nasal septum biopsy.    GROSS:  The specimen is received in formalin with proper patient identification,  labeled \"septal tissue\". The specimen consists of three tan-pink tissue  fragments, 0.2 cm in greatest dimension, entirely submitted in cassette  1. (Dictated by: Ana Mixon 6/11/2015 03:39 PM)    MICROSCOPIC:  Microscopic examination is performed. A GMS stain, performed with  appropriate positive control, is negative.    CPT Codes:  A: 75510-PX5, 09082-MNF    TESTING LAB LOCATION:  [facility], [address] [address]  [phone]    COLLECTION SITE:  Client: [facility]  Location: [facility] (B)     Component      Latest Ref Rng & Units 3/5/2021   WBC      4.0 - 11.0 10e9/L 6.9   RBC Count      4.4 - 5.9 10e12/L 4.18 (L)   Hemoglobin      13.3 - 17.7 g/dL 13.8   Hematocrit      40.0 - 53.0 % 41.4   MCV      78 - 100 fl 99   MCH      26.5 - 33.0 pg 33.0   MCHC      31.5 - " 36.5 g/dL 33.3   RDW      10.0 - 15.0 % 12.5   Platelet Count      150 - 450 10e9/L 185   % Neutrophils      % 63.6   % Lymphocytes      % 21.6   % Monocytes      % 10.8   % Eosinophils      % 3.4   % Basophils      % 0.6   Absolute Neutrophil      1.6 - 8.3 10e9/L 4.4   Absolute Lymphocytes      0.8 - 5.3 10e9/L 1.5   Absolute Monocytes      0.0 - 1.3 10e9/L 0.7   Absolute Eosinophils      0.0 - 0.7 10e9/L 0.2   Absolute Basophils      0.0 - 0.2 10e9/L 0.0   Diff Method       Automated Method   Color Urine       Yellow   Appearance Urine       Clear   Glucose Urine      NEG:Negative mg/dL Negative   Bilirubin Urine      NEG:Negative Negative   Ketones Urine      NEG:Negative mg/dL Negative   Specific Gravity Urine      1.003 - 1.035 1.020   pH Urine      5.0 - 7.0 pH 5.5   Protein Albumin Urine      NEG:Negative mg/dL Negative   Urobilinogen Urine      0.2 - 1.0 EU/dL 0.2   Nitrite Urine      NEG:Negative Negative   Blood Urine      NEG:Negative Negative   Leukocyte Esterase Urine      NEG:Negative Negative   Source       Midstream Urine   WBC Urine      OTO5:0 - 5 /HPF 0 - 5   RBC Urine      OTO2:O - 2 /HPF O - 2   Squamous Epithelial /LPF Urine      FEW:Few /LPF Few   Bacteria Urine      NEG:Negative /HPF Few (A)   IGG      610 - 1,616 mg/dL 981   IGA      84 - 499 mg/dL 85   IGM      35 - 242 mg/dL 28 (L)   Creatinine      0.66 - 1.25 mg/dL 1.48 (H)   GFR Estimate      >60 mL/min/1.73:m2 46 (L)   GFR Estimate If Black      >60 mL/min/1.73:m2 53 (L)   Neutrophil Cytoplasmic Antibody      <1:10 titer 1:20 (A)   Neutrophil Cytoplasmic Antibody Pattern       Cytoplasmic ANCA (c-ANCA) staining pattern observed and confirmed on formalin-fixed . . .   CD19 B Cells      6 - 27 % 20   Absolute CD19      107 - 698 cells/uL 352   Protein Random Urine      g/L 0.10   Protein Total Urine g/gr Creatinine      0 - 0.2 g/g Cr 0.13   Myeloperoxidase Antibody IgG      0.0 - 0.9 AI <0.2   Proteinase 3 Antibody IgG      0.0 - 0.9  AI 6.3 (H)   Sed Rate      0 - 20 mm/h 6   Creatinine Urine Random      mg/dL 82   CRP Inflammation      0.0 - 8.0 mg/L <2.9   Albumin      3.4 - 5.0 g/dL 4.0   ALT      0 - 70 U/L 30   AST      0 - 45 U/L 22   Creatinine Urine      mg/dL 82     Component      Latest Ref Rng & Units 1/7/2022   WBC      4.0 - 11.0 10e3/uL 6.2   RBC Count      4.40 - 5.90 10e6/uL 4.25 (L)   Hemoglobin      13.3 - 17.7 g/dL 14.0   Hematocrit      40.0 - 53.0 % 42.2   MCV      78 - 100 fL 99   MCH      26.5 - 33.0 pg 32.9   MCHC      31.5 - 36.5 g/dL 33.2   RDW      10.0 - 15.0 % 12.7   Platelet Count      150 - 450 10e3/uL 183   % Neutrophils      % 63   % Lymphocytes      % 22   % Monocytes      % 11   % Eosinophils      % 4   % Basophils      % 1   Absolute Neutrophils      1.6 - 8.3 10e3/uL 3.9   Absolute Lymphocytes      0.8 - 5.3 10e3/uL 1.4   Absolute Monocytes      0.0 - 1.3 10e3/uL 0.7   Absolute Eosinophils      0.0 - 0.7 10e3/uL 0.2   Absolute Basophils      0.0 - 0.2 10e3/uL 0.0   Color Urine      Colorless, Straw, Light Yellow, Yellow Yellow   Appearance Urine      Clear Clear   Glucose Urine      Negative mg/dL Negative   Bilirubin Urine      Negative Negative   Ketones Urine      Negative mg/dL Negative   Specific Gravity Urine      1.003 - 1.035 <=1.005   Blood Urine      Negative Negative   pH Urine      5.0 - 7.0 6.0   Protein Albumin Urine      Negative mg/dL Negative   Urobilinogen Urine      0.2, 1.0 E.U./dL 0.2   Nitrite Urine      Negative Negative   Leukocyte Esterase Urine      Negative Negative   MPO Anju IgG Instrument Value      <3.5 U/mL 0.3   Myeloperoxidase Antibody IgG      Negative Negative   Proteinase 3 Anju IgG Instrument Value      <2.0 U/mL 36.0 (H)   Proteinase 3 Antibody IgG      Negative Positive (A)   IGG      610-1,616 mg/dL 1,060   IGA      84 - 499 mg/dL 80 (L)   IGM      35 - 242 mg/dL 32 (L)   Protein Random Urine      g/L <0.05   Protein Total Urine g/gr Creatinine          Creatinine  Urine      mg/dL 35   Neutrophil Cytoplasmic Antibody      <1:10 1:10 (H)   Neutrophil Cytoplasmic Antibody Pattern       Cytoplasmic ANCA (c-ANCA) staining pattern observed and confirmed on formalin-fixed neutrophils.   CD19 B Cells      6 - 27 % 23   Absolute CD19      107 - 698 cells/uL 336   Creatinine      0.66 - 1.25 mg/dL 1.53 (H)   GFR Estimate      >60 mL/min/1.73m2 47 (L)   RBC Urine      0-2 /HPF /HPF None Seen   WBC Urine      0-5 /HPF /HPF None Seen   Sed Rate      0 - 20 mm/hr 5   CRP Inflammation      0.0 - 8.0 mg/L <2.9   Albumin      3.4 - 5.0 g/dL 3.6   ALT      0 - 70 U/L 26   AST      0 - 45 U/L 20      [facility] Reference Range & Units 03/10/23 14:29   Creatinine 0.67 - 1.17 mg/dL 1.45 (H)   GFR Estimate >60 mL/min/1.73m2 50 (L)   Albumin 3.5 - 5.2 g/dL 4.4   ALT 10 - 50 U/L 18   AST 10 - 50 U/L 25   CRP Inflammation <5.00 mg/L <3.00   Creatinine Urine mg/dL 118.5   WBC 4.0 - 11.0 10e3/uL 7.1   Hemoglobin 13.3 - 17.7 g/dL 13.8   Hematocrit 40.0 - 53.0 % 41.7   Platelet Count 150 - 450 10e3/uL 191   RBC Count 4.40 - 5.90 10e6/uL 4.25 (L)   MCV 78 - 100 fL 98   MCH 26.5 - 33.0 pg 32.5   MCHC 31.5 - 36.5 g/dL 33.1   RDW 10.0 - 15.0 % 12.4   % Neutrophils % 67   % Lymphocytes % 20   % Monocytes % 9   % Eosinophils % 4   % Basophils % 1   Absolute Basophils 0.0 - 0.2 10e3/uL 0.1   Absolute Eosinophils 0.0 - 0.7 10e3/uL 0.3   Absolute Lymphocytes 0.8 - 5.3 10e3/uL 1.4   Absolute Monocytes 0.0 - 1.3 10e3/uL 0.6   Absolute Neutrophils 1.6 - 8.3 10e3/uL 4.8   Sed Rate 0 - 20 mm/hr 7   Color Urine Colorless, Straw, Light Yellow, Yellow  Yellow   Appearance Urine Clear  Clear   Glucose Urine Negative mg/dL Negative   Bilirubin Urine Negative  Negative   Ketones Urine Negative mg/dL Negative   Specific Gravity Urine 1.003 - 1.035  1.025   pH Urine 5.0 - 7.0  6.0   Protein Albumin Urine Negative mg/dL Negative   Urobilinogen Urine 0.2, 1.0 E.U./dL 0.2   Nitrite Urine Negative  Negative   Blood Urine Negative   Negative   Leukocyte Esterase Urine Negative  Negative   WBC Urine 0-5 /HPF /HPF 0-5   RBC Urine 0-2 /HPF /HPF 0-2   Bacteria Urine None Seen /HPF Few !   Squamous Epithelial /LPF Urine None Seen /LPF Few !   Myeloperoxidase Antibody IgG Negative  Negative   Neutrophil Cytoplasmic Antibody <1:10  1:20 (H)   Neutrophil Cytoplasmic Antibody Pattern  Cytoplasmic ANCA (c-ANCA) staining pattern observed and confirmed on formalin-fixed neutrophils.   Proteinase 3 Antibody IgG Negative  Positive !   (H): Data is abnormally high  (L): Data is abnormally low  !: Data is abnormal

## 2023-07-06 NOTE — LETTER
7/6/2023       RE: Joshua Ennis  [address]  [facility] [address]     Dear Colleague,    Thank you for referring your patient, Joshua Ennis, to the [facility] [place] [facility]. Please see a copy of my visit note below.    [facility] Follow-Up In Person Visit Note    Date of visit: 07/06/2023  Last visit date: 7/7/21    Joshua Ennis MRN# [id number]   Age: 78 year old    Reason for visit: GPA   YOB: 1945          Assessment and Plan:     Assessment:   Mr. Ennis is a 78 yr old  male with history of melanoma s/p resection in 11/2011, former tobacco use, and GPA (PR3+, MPO and c-ANCA negative in 3/2013 based on aforementioned labs, confirmed by kidney and lung biopsy) who presents for clinic follow-up regarding GPA.  He was initiated on cyclophosphamide 150 mg QD and high-dose prednisone 3/2013. He last took prednisone in 10/2013. Cytoxan was switched to MTX for maintenance treatment in 10/2013.      Vasculitis seems to be stable and he has no signs or symptoms of flare up today, though PR3 remains persistently positive. New upper airway symptoms, while more likely due to non-vasculitis inflammation, GERD, vocal cord dysfunction, could represent vasculitis in tracheal wall, which can occur in absence of elevated inflammatory markers. Also, former smoker and with immunosuppression status, need to make sure there is no SCC. Will refer for ENT evaluation and also acquire high-resolution non-contrast CT to follow nodule in his lung incidentally identified in 2020. Otherwise continue with current plan of low dose methotrexate 10mg (4 tabs) weekly and Q3 mo labs. Was advised to call in case of vasculitis flare up sx.        Plan:    - Continue MTX 10 mg (4 tabs) weekly     > Labs q3m      > Hold methotrexate if develops an infection  - Continue with Folic acid 1 mg po   - Referral to ENT for  new onset persistent voice changes/hoarseness and continued cough/phlegm (2017 note indicated laryngoscopy would be next step if he developed persistent hoarseness)  - High-resolution CT chest w/o contrast for follow up of pulm nodule  - Repeat labs in 3 mo    Return in 1y (in person)             Patient was seen and staffed with attending physician, Dr. Sanchez, who was present the entire visit.    Coleman Riley MD  Medicine/Dermatology PGY-3  [id number]       Attending Note: I saw and evaluated the patient with Dr. Riley. I agree with the assessment and plan.    Ede Sanchez MD      Orders Placed This Encounter   Procedures    CT Chest w/o contrast (High Resolution)    AST    ALT    Myeloperoxidase and Proteinase 3 Panel    Albumin level    Creatinine    CRP inflammation    UA with Microscopic reflex to Culture    Protein  random urine    Creatinine random urine    Erythrocyte sedimentation rate auto    CD19 B Cell Count    ANCA IgG by IFA with Reflex to Titer    Immunoglobulins A G and M    Adult ENT  Referral    CBC with Platelets & Differential            HPI / Interim History:      Mr. Ennis is a 78 yo WM with h/o melanoma s/p resection in 11/2011, former tobacco use, and GPA diagnosed in 3/2013. He presents for follow up.    Had a hospitalization 11/2-11/4/2020 with a fall with displaced left 5th-9th rib fractures, now recovered.    He has been tolerating it well. He had a flare in 12/2013 with increased crusts in his nose along with recurrence of arthralgia, worsening numbness in feet and increasing vasculitis marker. Renal function was stable with negative repeat chest CT. His vasculitis flare was treated with short course of prednisone taper 20 mg po qd max and increasing MTX to 8 tab = 20 mg qwk. Vasculitis symptoms improved.  At visit on 1/2015, he had scabs in his nose, had cold dakota symptoms and increased arthralgia/fatigue (shoulders, L hand). Labs from 12/16 showed increased Cr level and  "hematuria with rising PR3 (more than 8), there was a concern for vasculitis flare (in kidneys, sinuses), no flare on repeat chest CT 1/2015. Therefore it was recommended to try rituximab. Another reason was to be able to taper MTX off given abnormal Cr.  He is now s/p Rituximab infusion x 4 (1st on 2/25/2015). He is feeling well. No hematuria with stable Cr, no SOB. Saw ENT with negative nasal mucosa biopsy 6/2015 for granuloma but showed active inflammation, had left nostril lesion which decreased in size by use of mupirocin ointment. PR3 vasculitis marker went back to NL in 6/2015, it was NL in 10/2015, given stable vasculitis and low GFR, MTX from 8 to 7 tab po qwk. Repeat ND-3 in 3/2016 was slightly positive, Cr was slightly higher than baseline. We tapered his MTX further to 6 tab po qwk in 2/2016 given lack of symptoms. In 3/2017, MTX was reduced from 6 to 5 tab qwk. It was further tapered to 4 tab po qwk in 6/2017 given stable disease. No vasculitis flare ups by such change.     7/7/22:  Reports doing well overall. Denies any vasculitis symptoms. Denies any fever/chills, fatigue, headaches, congestion, nose bleeds, mouth sores, cough/hemoptysis, chest pain, SOB, hematuria, dysuria, or any RUQ pain. Still endorses neuropathy but it is stable. No other concerns    7/6/23:  Doing fairly well overall though has noted persistent bothersome hoarseness and voice changes over the past 3 mos. Associates this with \"stickier\" phlegm than usual with his chronic cough. Notes this cough has been productive of grey-white sputum and present for many years relatively unchanged. Also reports episode of poison oak ~ 3 wks ago, was given a short course of prednisone (notes indicate 40 mg every day x 5 days) by an urgent care. Has now resolved, and reports that during that prednisone course there was no change to his hoarseness or phlegm. Otherwise denies weight loss, loss of energy, loss of appetite, dysgeusia, fevers, chills, " headaches, congestion, nose bleeds, nose crusting, mouth sores, hemoptysis, rashes, sores, chest pain, dyspnea, hematuria, dysuria, nausea, diarrhea, RUQ pain, vision or hearing changes. No other concerns.         Past Medical History:     Past Medical History:   Diagnosis Date    Arthritis     Autoimmune disease (H)     Hearing problem     Hoarseness     Hypertension     Kidney problem     Malignant melanoma (H)     Malignant neoplasm (H)     melanoma    Squamous cell carcinoma     Russo syndrome           Past Surgical History:     Past Surgical History:   Procedure Laterality Date    BIOPSY  11/2011    melanoma on back    DISSECT LYMPH NODE INGUINAL  3/1/2013    Procedure: DISSECT LYMPH NODE INGUINAL;  Bilateral Inguinal Lymph Node Biopsy.;  Surgeon: Tariq Acosta MD;  Location: [facility]    ESOPHAGOSCOPY, GASTROSCOPY, DUODENOSCOPY (EGD), COMBINED  7/5/2013    Procedure: COMBINED ESOPHAGOSCOPY, GASTROSCOPY, DUODENOSCOPY (EGD);  Gastroscopy;  Surgeon: Tariq Acosta MD;  Location: [facility]    HERNIORRHAPHY INGUINAL  8/6/2012    Procedure: HERNIORRHAPHY INGUINAL;  Open Repair Left Inguinal Hernia;  Surgeon: Jerad Baker MD;  Location: [facility]          Social History:     Social History     Tobacco Use    Smoking status: Former     Packs/day: 1.00     Years: 20.00     Pack years: 20.00     Types: Cigarettes     Quit date: 2/14/2013     Years since quitting: 10.3    Smokeless tobacco: Never   Substance Use Topics    Alcohol use: Yes     Comment: rare          Family History:     Family History   Problem Relation Age of Onset    Diabetes Mother     Hypertension Mother     Cancer Father         stomach    Heart Disease Father     Heart Disease Brother     Diabetes Sister     Diabetes Sister     Diabetes Sister     Heart Disease Brother     Cancer Sister     Glaucoma No family hx of     Macular Degeneration No family hx of           Immunizations:   Due for shingles vaccine, encouraged to seek this from  "his PCP.    PMHx, FHx, SHx were reviewed, unchanged.         Allergies:     Allergies   Allergen Reactions    Shrimp Nausea and Vomiting     Got sick each time he ate it          Medications:     Current Outpatient Medications   Medication Sig    Acetaminophen (TYLENOL PO) Take 650 mg by mouth once as needed for mild pain or fever     Blood Pressure Monitor KIT Automatic Blood Pressure Monitor (Patient not taking: Reported on 7/7/2022)    calcium carbonate 600 mg-vitamin D 400 units (CALTRATE) 600-400 MG-UNIT per tablet Take 1 tablet by mouth 2 times daily.    Cholecalciferol (VITAMIN D) 1000 UNITS capsule Take 1 capsule by mouth daily.    folic acid (FOLVITE) 1 MG tablet Take 2 tablets (2 mg) by mouth daily    gabapentin (NEURONTIN) 400 MG capsule Take 1 capsule (400 mg) by mouth At Bedtime    losartan (COZAAR) 50 MG tablet Take 1 tablet (50 mg) by mouth daily Please keep your appointment on 7/6/23. Thank you.    methotrexate sodium 2.5 MG TABS Take 4 tablets (10 mg) by mouth once a week     No current facility-administered medications for this visit.          Review of Systems:   A comprehensive ROS was done. Positives are per HPI.           Physical Exam:   BP (!) 150/74   Pulse 55   Ht 1.753 m (5' 9\")   Wt 87.7 kg (193 lb 6.4 oz)   SpO2 97%   BMI 28.56 kg/m        Gen:pleasant, NAD, significant other and grandson are present  HEENT: NCAT, nasal mucosa WNL, oropharynx WNL with exception of mild diffuse erythema at posterior oropharynx and solitary purplish macule on L soft palate seemingly stable from prior descriptions, no other lesions of note and no ulcerations or bleeding  CV: RRR no M/G/R  Resp: breathing appears nonlabored, somewhat distant sounds though CTAB  Abd: soft, ND, NT  Ext: no significant peripheral edema, WWP  Skin: no rash or lesions noted on exposed skin  Neuro: awake and alert, without gross deficit  MSK: normal AROM in hands, elbows, shoulders, knees, ankles without swelling, tenderness, " effusions             Data:   Recent laboratory data reviewed.    CT CAP 11/2020  IMPRESSION:  1.  Acute minimally displaced posterior left 9th rib fracture.     2.  Acute displaced lateral left 5th-8th rib fractures.     3.  No other fractures.     4.  Normal caliber thoracic and abdominal aorta without evidence for acute injury.     5.  No solid organ injury in the upper abdomen or significant free fluid throughout the abdomen or pelvis.     6.  No pneumothorax or pulmonary contusion or significant pleural fluid.     7.  In the extreme left lung apex there is a 9 x 7 mm nodule extending toward the level of the pleural surface. Recommend continued short interval follow-up in 2-3 months to assess for stability.        [Recommend Follow Up: Lung nodule]      CHEST CT (2/16/2013)    Chest: Multiple indeterminate pulmonary nodules. Several of the  larger lesions are cavitary. There are 3 dominant lesions, a 3.2 cm  cavitary lesion in the left upper lobe, a 3.4 cm solid mass in the  right lower lobe laterally and a 3.7 cm cavitary mass in the right  lower lobe medially. In addition there is mediastinal lymphadenopathy  with a dominant 3 cm subcarinal node. Bilateral axillary  lymphadenopathy with 2.4 cm node seen bilaterally. Chest otherwise  unremarkable.       Exam: High-resolution Chest CT without contrast 1/9/2015 12:42 PM.  History: Concern for ANCA vasculitis flare in the chest.  Comparison: 3/7/2014, 11/14/2013, 2/16/2013.  Technique: CT helical acquisition from the lung apices to the kidneys  was obtained without intravenous contrast. Both inspiratory and  expiratory images were obtained.    Findings:  Moderate atherosclerotic calcifications of the coronary arteries. Mild  calcifications involving the aorta and aortic great vessels. Prominent  paratracheal lymph node on series 3 image 22 measuring 9 mm. Decreased  from 2/16/2013 and unchanged from 3/7/2014. Borderline enlarged right  hilar lymph node, unchanged  from 3/7/2014 and decreased from  2/16/2013. Heart is not enlarged. Trace cardial effusion. No axillary  lymphadenopathy.  Nodular scarring in the left apex, unchanged from 2/16/2013. No  pleural effusion or pneumothorax. No evidence of intrapulmonary  infection. There is a cluster of tree in bud nodularity noted in the  anteromedial right upper lobe. These nodules appear new. Expiratory  images demonstrate mild air trapping. Scattered pulmonary nodules:  Series 6 image 146: Seen posteriorly in the right lower lobe, there is  a sub-pleural nodule measuring 1.6 x 1.2 cm with a spiculated border.  Previously, this nodule measured 2.0 x 1.6 cm.  Series 6 image 189: Seen laterally in the right lower lobe, there is a  former mid pulmonary nodule which is unchanged from 3/7/2013 and  decreased from 2/16/2013.  Series 6 image 169: Seen posterolaterally in the right lower lobe,  there is a 3 mm pulmonary nodule which is unchanged from 3/7/2014 and  decreased from 2/16/2013.  Series 6 image 225: Seen posterolaterally in the left lower lobe,  there is a 4 mm subpleural nodule which is unchanged from 2/16/2014.  Other scattered sub-4 mm pulmonary nodules appear stable from previous  study.  Evaluation of the upper abdomen is limited due to the lack of  intravenous contrast.  No suspicious bony lesions.    IMPRESSION  Impression:   1. Scattered pulmonary nodules enlarged lymph nodes are  stable/slightly decreased from the previous study. No evidence for  acute flare in patient's known ANCA-associated vasculitis.  2. New tree in bud nodularity seen anteromedially in the right middle  lobe. Findings could be seen in the setting of an atypical infectious  process.  3. Mild air trapping. Finding is nonspecific and is seen in setting of  small airways disease such as asthma or bronchiolitis.  I have personally reviewed the examination and initial interpretation  and I agree with the findings.  TOÑO PERKINS MD    Copath Report      "Patient Name: ADONAY FAITH  MR#: [id number]  Specimen #: [id number]  Collected: 6/11/2015  Received: 6/11/2015  Reported: 6/12/2015 17:37  Ordering Phy(s): DANIELLE PARIS    SPECIMEN(S):  Nasal septum    FINAL DIAGNOSIS:  Nasal septum, biopsy:  -Squamous mucosa with ulcer, marked acute inflammation and reactive  changes  -No granulomas identified  -A special stain for fungi (GMS) is negative    I have personally reviewed all specimens and or slides, including the  listed special stains, and used them with my medical judgement to  determine the final diagnosis.    Electronically signed out by:    Dorys Barton M.D., [facility]    CLINICAL HISTORY:  The patient is a 70-year-old man with a history of left upper back  melanoma, resected in 2011 (see consult report [id number]). 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 He now presents for followup visit with nasal  cavity scabs, increased arthralgia and fatigue, concerning for  vasculitis flare. Operative procedure: Nasal septum biopsy.    GROSS:  The specimen is received in formalin with proper patient identification,  labeled \"septal tissue\". The specimen consists of three tan-pink tissue  fragments, 0.2 cm in greatest dimension, entirely submitted in cassette  1. (Dictated by: Ana Mixon 6/11/2015 03:39 PM)    MICROSCOPIC:  Microscopic examination is performed. A GMS stain, performed with  appropriate positive control, is negative.    CPT Codes:  A: 77820-EN6, 20572-DLV    TESTING LAB LOCATION:  [facility], [address] [address]  [phone]    COLLECTION SITE:  Client: [facility]  Location: [facility] (B)     Component      Latest Ref Rng & Units " 3/5/2021   WBC      4.0 - 11.0 10e9/L 6.9   RBC Count      4.4 - 5.9 10e12/L 4.18 (L)   Hemoglobin      13.3 - 17.7 g/dL 13.8   Hematocrit      40.0 - 53.0 % 41.4   MCV      78 - 100 fl 99   MCH      26.5 - 33.0 pg 33.0   MCHC      31.5 - 36.5 g/dL 33.3   RDW      10.0 - 15.0 % 12.5   Platelet Count      150 - 450 10e9/L 185   % Neutrophils      % 63.6   % Lymphocytes      % 21.6   % Monocytes      % 10.8   % Eosinophils      % 3.4   % Basophils      % 0.6   Absolute Neutrophil      1.6 - 8.3 10e9/L 4.4   Absolute Lymphocytes      0.8 - 5.3 10e9/L 1.5   Absolute Monocytes      0.0 - 1.3 10e9/L 0.7   Absolute Eosinophils      0.0 - 0.7 10e9/L 0.2   Absolute Basophils      0.0 - 0.2 10e9/L 0.0   Diff Method       Automated Method   Color Urine       Yellow   Appearance Urine       Clear   Glucose Urine      NEG:Negative mg/dL Negative   Bilirubin Urine      NEG:Negative Negative   Ketones Urine      NEG:Negative mg/dL Negative   Specific Gravity Urine      1.003 - 1.035 1.020   pH Urine      5.0 - 7.0 pH 5.5   Protein Albumin Urine      NEG:Negative mg/dL Negative   Urobilinogen Urine      0.2 - 1.0 EU/dL 0.2   Nitrite Urine      NEG:Negative Negative   Blood Urine      NEG:Negative Negative   Leukocyte Esterase Urine      NEG:Negative Negative   Source       Midstream Urine   WBC Urine      OTO5:0 - 5 /HPF 0 - 5   RBC Urine      OTO2:O - 2 /HPF O - 2   Squamous Epithelial /LPF Urine      FEW:Few /LPF Few   Bacteria Urine      NEG:Negative /HPF Few (A)   IGG      610 - 1,616 mg/dL 981   IGA      84 - 499 mg/dL 85   IGM      35 - 242 mg/dL 28 (L)   Creatinine      0.66 - 1.25 mg/dL 1.48 (H)   GFR Estimate      >60 mL/min/1.73:m2 46 (L)   GFR Estimate If Black      >60 mL/min/1.73:m2 53 (L)   Neutrophil Cytoplasmic Antibody      <1:10 titer 1:20 (A)   Neutrophil Cytoplasmic Antibody Pattern       Cytoplasmic ANCA (c-ANCA) staining pattern observed and confirmed on formalin-fixed . . .   CD19 B Cells      6 - 27 % 20    Absolute CD19      107 - 698 cells/uL 352   Protein Random Urine      g/L 0.10   Protein Total Urine g/gr Creatinine      0 - 0.2 g/g Cr 0.13   Myeloperoxidase Antibody IgG      0.0 - 0.9 AI <0.2   Proteinase 3 Antibody IgG      0.0 - 0.9 AI 6.3 (H)   Sed Rate      0 - 20 mm/h 6   Creatinine Urine Random      mg/dL 82   CRP Inflammation      0.0 - 8.0 mg/L <2.9   Albumin      3.4 - 5.0 g/dL 4.0   ALT      0 - 70 U/L 30   AST      0 - 45 U/L 22   Creatinine Urine      mg/dL 82     Component      Latest Ref Rng & Units 1/7/2022   WBC      4.0 - 11.0 10e3/uL 6.2   RBC Count      4.40 - 5.90 10e6/uL 4.25 (L)   Hemoglobin      13.3 - 17.7 g/dL 14.0   Hematocrit      40.0 - 53.0 % 42.2   MCV      78 - 100 fL 99   MCH      26.5 - 33.0 pg 32.9   MCHC      31.5 - 36.5 g/dL 33.2   RDW      10.0 - 15.0 % 12.7   Platelet Count      150 - 450 10e3/uL 183   % Neutrophils      % 63   % Lymphocytes      % 22   % Monocytes      % 11   % Eosinophils      % 4   % Basophils      % 1   Absolute Neutrophils      1.6 - 8.3 10e3/uL 3.9   Absolute Lymphocytes      0.8 - 5.3 10e3/uL 1.4   Absolute Monocytes      0.0 - 1.3 10e3/uL 0.7   Absolute Eosinophils      0.0 - 0.7 10e3/uL 0.2   Absolute Basophils      0.0 - 0.2 10e3/uL 0.0   Color Urine      Colorless, Straw, Light Yellow, Yellow Yellow   Appearance Urine      Clear Clear   Glucose Urine      Negative mg/dL Negative   Bilirubin Urine      Negative Negative   Ketones Urine      Negative mg/dL Negative   Specific Gravity Urine      1.003 - 1.035 <=1.005   Blood Urine      Negative Negative   pH Urine      5.0 - 7.0 6.0   Protein Albumin Urine      Negative mg/dL Negative   Urobilinogen Urine      0.2, 1.0 E.U./dL 0.2   Nitrite Urine      Negative Negative   Leukocyte Esterase Urine      Negative Negative   MPO Anju IgG Instrument Value      <3.5 U/mL 0.3   Myeloperoxidase Antibody IgG      Negative Negative   Proteinase 3 Anju IgG Instrument Value      <2.0 U/mL 36.0 (H)   Proteinase 3  Antibody IgG      Negative Positive (A)   IGG      610-1,616 mg/dL 1,060   IGA      84 - 499 mg/dL 80 (L)   IGM      35 - 242 mg/dL 32 (L)   Protein Random Urine      g/L <0.05   Protein Total Urine g/gr Creatinine          Creatinine Urine      mg/dL 35   Neutrophil Cytoplasmic Antibody      <1:10 1:10 (H)   Neutrophil Cytoplasmic Antibody Pattern       Cytoplasmic ANCA (c-ANCA) staining pattern observed and confirmed on formalin-fixed neutrophils.   CD19 B Cells      6 - 27 % 23   Absolute CD19      107 - 698 cells/uL 336   Creatinine      0.66 - 1.25 mg/dL 1.53 (H)   GFR Estimate      >60 mL/min/1.73m2 47 (L)   RBC Urine      0-2 /HPF /HPF None Seen   WBC Urine      0-5 /HPF /HPF None Seen   Sed Rate      0 - 20 mm/hr 5   CRP Inflammation      0.0 - 8.0 mg/L <2.9   Albumin      3.4 - 5.0 g/dL 3.6   ALT      0 - 70 U/L 26   AST      0 - 45 U/L 20      [facility] Reference Range & Units 03/10/23 14:29   Creatinine 0.67 - 1.17 mg/dL 1.45 (H)   GFR Estimate >60 mL/min/1.73m2 50 (L)   Albumin 3.5 - 5.2 g/dL 4.4   ALT 10 - 50 U/L 18   AST 10 - 50 U/L 25   CRP Inflammation <5.00 mg/L <3.00   Creatinine Urine mg/dL 118.5   WBC 4.0 - 11.0 10e3/uL 7.1   Hemoglobin 13.3 - 17.7 g/dL 13.8   Hematocrit 40.0 - 53.0 % 41.7   Platelet Count 150 - 450 10e3/uL 191   RBC Count 4.40 - 5.90 10e6/uL 4.25 (L)   MCV 78 - 100 fL 98   MCH 26.5 - 33.0 pg 32.5   MCHC 31.5 - 36.5 g/dL 33.1   RDW 10.0 - 15.0 % 12.4   % Neutrophils % 67   % Lymphocytes % 20   % Monocytes % 9   % Eosinophils % 4   % Basophils % 1   Absolute Basophils 0.0 - 0.2 10e3/uL 0.1   Absolute Eosinophils 0.0 - 0.7 10e3/uL 0.3   Absolute Lymphocytes 0.8 - 5.3 10e3/uL 1.4   Absolute Monocytes 0.0 - 1.3 10e3/uL 0.6   Absolute Neutrophils 1.6 - 8.3 10e3/uL 4.8   Sed Rate 0 - 20 mm/hr 7   Color Urine Colorless, Straw, Light Yellow, Yellow  Yellow   Appearance Urine Clear  Clear   Glucose Urine Negative mg/dL Negative   Bilirubin Urine Negative  Negative   Ketones Urine Negative  mg/dL Negative   Specific Gravity Urine 1.003 - 1.035  1.025   pH Urine 5.0 - 7.0  6.0   Protein Albumin Urine Negative mg/dL Negative   Urobilinogen Urine 0.2, 1.0 E.U./dL 0.2   Nitrite Urine Negative  Negative   Blood Urine Negative  Negative   Leukocyte Esterase Urine Negative  Negative   WBC Urine 0-5 /HPF /HPF 0-5   RBC Urine 0-2 /HPF /HPF 0-2   Bacteria Urine None Seen /HPF Few !   Squamous Epithelial /LPF Urine None Seen /LPF Few !   Myeloperoxidase Antibody IgG Negative  Negative   Neutrophil Cytoplasmic Antibody <1:10  1:20 (H)   Neutrophil Cytoplasmic Antibody Pattern  Cytoplasmic ANCA (c-ANCA) staining pattern observed and confirmed on formalin-fixed neutrophils.   Proteinase 3 Antibody IgG Negative  Positive !   (H): Data is abnormally high  (L): Data is abnormally low  !: Data is abnormal    Ede Sanchez MD

## 2023-07-07 ENCOUNTER — HOSPITAL ENCOUNTER (OUTPATIENT)
Dept: CT IMAGING | Facility: CLINIC | Age: 78
Discharge: HOME | End: 2023-07-07
Attending: INTERNAL MEDICINE | Admitting: INTERNAL MEDICINE
Payer: COMMERCIAL

## 2023-07-07 DIAGNOSIS — M31.30 GRANULOMATOSIS WITH POLYANGIITIS, UNSPECIFIED WHETHER RENAL INVOLVEMENT (H): ICD-10-CM

## 2023-07-07 DIAGNOSIS — R05.2 SUBACUTE COUGH: ICD-10-CM

## 2023-07-07 DIAGNOSIS — R91.1 LUNG NODULE: ICD-10-CM

## 2023-07-07 LAB
ANCA AB PATTERN SER IF-IMP: ABNORMAL
C-ANCA TITR SER IF: ABNORMAL {TITER}

## 2023-07-07 PROCEDURE — 71250 CT THORAX DX C-: CPT

## 2023-07-10 ENCOUNTER — TELEPHONE (OUTPATIENT)
Dept: RHEUMATOLOGY | Facility: CLINIC | Age: 78
End: 2023-07-10
Payer: COMMERCIAL

## 2023-07-10 DIAGNOSIS — M31.30 GRANULOMATOSIS WITH POLYANGIITIS, UNSPECIFIED WHETHER RENAL INVOLVEMENT (H): ICD-10-CM

## 2023-07-10 DIAGNOSIS — Z79.899 HIGH RISK MEDICATIONS (NOT ANTICOAGULANTS) LONG-TERM USE: ICD-10-CM

## 2023-07-10 NOTE — TELEPHONE ENCOUNTER
----- Message from Ede Sanchez MD sent at 7/10/2023  1:51 PM CDT -----  No cancer, great news! Yadira, would you please let him know by phone? He was worried. Also with new hoarseness, immunosuppression and former smoker, need to move up ENT appointment, currently scheduled for 12/2023. He needs to see them ASAP. Thank you      Follow up call to patient, relayed information to Earlene.  She stated that Dec was the soonest appt they were able to secure with ENT.  She discussed that patient has previously seen Dr. Obando.  This RN will reach out to ENT to determine if patient could be seen sooner and follow back up with her.      All questions answered. She will relay test results to patient.    Yadira Miranda, KATIUSKAN, RN  RN Care Coordinator Rheumatology

## 2023-07-12 LAB
MYELOPEROXIDASE AB SER IA-ACNC: 0.3 U/ML
MYELOPEROXIDASE AB SER IA-ACNC: NEGATIVE
PROTEINASE3 AB SER IA-ACNC: 40 U/ML
PROTEINASE3 AB SER IA-ACNC: POSITIVE

## 2023-07-13 RX ORDER — METHOTREXATE 2.5 MG/1
10 TABLET ORAL WEEKLY
Qty: 48 TABLET | Refills: 1 | Status: SHIPPED | OUTPATIENT
Start: 2023-07-13 | End: 2024-02-02

## 2023-07-13 NOTE — TELEPHONE ENCOUNTER
methotrexate sodium 2.5 mg tablet      Last Written Prescription Date:  1/19/23  Last Fill Quantity: 48,   # refills: 1  Last Office Visit: 7/6/23  Future Office visit:  7/11/24    CBC RESULTS: Recent Labs   Lab Test 07/06/23  0942   WBC 5.7   RBC 4.40   HGB 14.6   HCT 43.6   MCV 99   MCH 33.2*   MCHC 33.5   RDW 12.7          Creatinine   Date Value Ref Range Status   07/06/2023 1.45 (H) 0.67 - 1.17 mg/dL Final   03/05/2021 1.48 (H) 0.66 - 1.25 mg/dL Final   ]    Liver Function Studies -   Recent Labs   Lab Test 07/06/23  0942 03/13/18  1630 12/13/17  1228   PROTTOTAL  --   --  7.8   ALBUMIN 4.4   < > 4.0   BILITOTAL  --   --  0.7   ALKPHOS  --   --  97   AST 20   < > 22   ALT 18   < > 30    < > = values in this interval not displayed.       Routing refill request to provider for review/approval because:  DMARD - requires provider review/auth

## 2023-07-19 ENCOUNTER — TELEPHONE (OUTPATIENT)
Dept: OTOLARYNGOLOGY | Facility: CLINIC | Age: 78
End: 2023-07-19
Payer: COMMERCIAL

## 2023-07-19 NOTE — TELEPHONE ENCOUNTER
Spoke to patients significant other,earlene, and gave information about upcoming appointment. Gave location information and reason for pt being seen here. Earlene verbalized understanding of information given and appreciative of call

## 2023-07-31 ENCOUNTER — OFFICE VISIT (OUTPATIENT)
Dept: OTOLARYNGOLOGY | Facility: CLINIC | Age: 78
End: 2023-07-31
Payer: COMMERCIAL

## 2023-07-31 VITALS
WEIGHT: 192 LBS | OXYGEN SATURATION: 98 % | BODY MASS INDEX: 28.44 KG/M2 | HEIGHT: 69 IN | SYSTOLIC BLOOD PRESSURE: 132 MMHG | HEART RATE: 74 BPM | DIASTOLIC BLOOD PRESSURE: 69 MMHG

## 2023-07-31 DIAGNOSIS — R49.0 HOARSENESS: Primary | ICD-10-CM

## 2023-07-31 PROCEDURE — 31575 DIAGNOSTIC LARYNGOSCOPY: CPT | Performed by: OTOLARYNGOLOGY

## 2023-07-31 PROCEDURE — 99202 OFFICE O/P NEW SF 15 MIN: CPT | Mod: 25 | Performed by: OTOLARYNGOLOGY

## 2023-07-31 ASSESSMENT — PAIN SCALES - GENERAL: PAINLEVEL: NO PAIN (0)

## 2023-07-31 NOTE — LETTER
2023       RE: Joshua Ennis  [address]  \[facility]\"" [address]     Dear Colleague,    Thank you for referring your patient, Joshua Ennis, to the [facility] EAR NOSE AND THROAT CLINIC [place] at [facility]. Please see a copy of my visit note below.    Otolaryngology Adult Consultation    Patient: Joshua Ennis   : 1945     Patient presents with:  Consult: No chief complaint on file.       Referring Provider: No ref. provider found   Consulting Physician:  Gabbi Obando MD       Assessment/Plan: anterior commissure lesion and hoarseness. Will have him meet with one of my colleagues in laryngology for further assessment.      HPI: Joshua Ennis is a 78 year old male seen today in the Otolaryngology Clinic in consultation from Dr. Ede Sanchez for > 3 month history of hoarseness. He has seen me in the past for a benign mouth lesion. Underlying history of GPA, former tobacco use. On methotrexate for GPA. He feels his voice has worsened over time, no acute events.     Acetaminophen (TYLENOL PO), Take 650 mg by mouth once as needed for mild pain or fever   calcium carbonate 600 mg-vitamin D 400 units (CALTRATE) 600-400 MG-UNIT per tablet, Take 1 tablet by mouth 2 times daily.  Cholecalciferol (VITAMIN D) 1000 UNITS capsule, Take 1 capsule by mouth daily.  folic acid (FOLVITE) 1 MG tablet, Take 2 tablets (2 mg) by mouth daily  gabapentin (NEURONTIN) 400 MG capsule, Take 1 capsule (400 mg) by mouth At Bedtime  losartan (COZAAR) 50 MG tablet, Take 1 tablet (50 mg) by mouth daily Please keep your appointment on 23. Thank you.  methotrexate sodium 2.5 MG TABS, Take 4 tablets (10 mg) by mouth once a week  Blood Pressure Monitor KIT, Automatic Blood Pressure Monitor (Patient not taking: Reported on 2022)    No current facility-administered medications on file prior to visit.         Allergies   Allergen Reactions    Shrimp Nausea and  Vomiting     Got sick each time he ate it       Past Medical History:   Diagnosis Date    Arthritis     Autoimmune disease (H)     Hearing problem     Hoarseness     Hypertension     Kidney problem     Malignant melanoma (H)     Malignant neoplasm (H)     melanoma    Squamous cell carcinoma     Russo syndrome        Social History     Occupational History     Employer: [employer]   Tobacco Use    Smoking status: Former     Packs/day: 1.00     Years: 20.00     Pack years: 20.00     Types: Cigarettes     Quit date: 2/14/2013     Years since quitting: 10.4    Smokeless tobacco: Never   Substance and Sexual Activity    Alcohol use: Yes     Comment: rare    Drug use: No    Sexual activity: Not Currently     Partners: Female        14 point ROS neg other than the symptoms noted above.    Physical Exam:    General Assessment   The patient is alert, oriented and in no acute distress. Sounds hoarse  Head/Face/Scalp  Grossly normal.   EarsNeck  No significant adenopathy, thyroid or salivary gland abnormality.     Laryngoscopy  performed to examine the upper airway for pathology, functional or anatomic abnormality.  Verbal consent is obtained.  Topical anesthetic/decongestant solution is applied per patient request.  The flexible endoscope was passed into each nasal cavity separately, and advanced to the larynx.  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     Nasopharynx:  Clear, no lesions, crusting or inflammation   Base of tongue, vallecula, epiglottis, aryepiglottic folds, false vocal folds without  mucosal lesions, masses or anatomic abnormality.   Glottis with mild hyperadduction, anterior commissure whitish lesion impairing full closure. ? Irregularity of left mid fold?            20 minutes spent by me on the date of the encounter doing chart review, history and exam, documentation and further activities per the note. This time is in addition to  separately billable procedure.      Again, thank you for allowing me to participate in the care of your patient.      Sincerely,    Gabbi Obando MD

## 2023-07-31 NOTE — PATIENT INSTRUCTIONS
1.  You were seen in the ENT Clinic today by . If you have any questions or concerns after your appointment, please call [phone]. Press option #1 for scheduling related needs. Press option #3 for Nurse advice.    2.    has recommended the following:   - follow up with DrGray to further assess lesion. 8/15/23 at 9:00 am    3.  Plan is to return to clinic as needed       Hattie Hair LPN  [phone]  [facility] - Otolaryngology

## 2023-07-31 NOTE — PROGRESS NOTES
Otolaryngology Adult Consultation    Patient: Joshua Ennis   : 1945     Patient presents with:  Consult: No chief complaint on file.       Referring Provider: No ref. provider found   Consulting Physician:  Gabbi Obando MD       Assessment/Plan: anterior commissure lesion and hoarseness. Will have him meet with one of my colleagues in laryngology for further assessment.      HPI: Joshua Ennis is a 78 year old male seen today in the Otolaryngology Clinic in consultation from Dr. Ede Sanchez for > 3 month history of hoarseness. He has seen me in the past for a benign mouth lesion. Underlying history of GPA, former tobacco use. On methotrexate for GPA. He feels his voice has worsened over time, no acute events.     Acetaminophen (TYLENOL PO), Take 650 mg by mouth once as needed for mild pain or fever   calcium carbonate 600 mg-vitamin D 400 units (CALTRATE) 600-400 MG-UNIT per tablet, Take 1 tablet by mouth 2 times daily.  Cholecalciferol (VITAMIN D) 1000 UNITS capsule, Take 1 capsule by mouth daily.  folic acid (FOLVITE) 1 MG tablet, Take 2 tablets (2 mg) by mouth daily  gabapentin (NEURONTIN) 400 MG capsule, Take 1 capsule (400 mg) by mouth At Bedtime  losartan (COZAAR) 50 MG tablet, Take 1 tablet (50 mg) by mouth daily Please keep your appointment on 23. Thank you.  methotrexate sodium 2.5 MG TABS, Take 4 tablets (10 mg) by mouth once a week  Blood Pressure Monitor KIT, Automatic Blood Pressure Monitor (Patient not taking: Reported on 2022)    No current facility-administered medications on file prior to visit.         Allergies   Allergen Reactions    Shrimp Nausea and Vomiting     Got sick each time he ate it       Past Medical History:   Diagnosis Date    Arthritis     Autoimmune disease (H)     Hearing problem     Hoarseness     Hypertension     Kidney problem     Malignant melanoma (H)     Malignant neoplasm (H)     melanoma    Squamous cell carcinoma     Russo syndrome        Social  History     Occupational History     Employer: [employer]   Tobacco Use    Smoking status: Former     Packs/day: 1.00     Years: 20.00     Pack years: 20.00     Types: Cigarettes     Quit date: 2/14/2013     Years since quitting: 10.4    Smokeless tobacco: Never   Substance and Sexual Activity    Alcohol use: Yes     Comment: rare    Drug use: No    Sexual activity: Not Currently     Partners: Female        14 point ROS neg other than the symptoms noted above.    Physical Exam:    General Assessment   The patient is alert, oriented and in no acute distress. Sounds hoarse  Head/Face/Scalp  Grossly normal.   EarsNeck  No significant adenopathy, thyroid or salivary gland abnormality.     Laryngoscopy  performed to examine the upper airway for pathology, functional or anatomic abnormality.  Verbal consent is obtained.  Topical anesthetic/decongestant solution is applied per patient request.  The flexible endoscope was passed into each nasal cavity separately, and advanced to the larynx.  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     Nasopharynx:  Clear, no lesions, crusting or inflammation   Base of tongue, vallecula, epiglottis, aryepiglottic folds, false vocal folds without  mucosal lesions, masses or anatomic abnormality.   Glottis with mild hyperadduction, anterior commissure whitish lesion impairing full closure. ? Irregularity of left mid fold?            20 minutes spent by me on the date of the encounter doing chart review, history and exam, documentation and further activities per the note. This time is in addition to separately billable procedure.

## 2023-08-01 NOTE — TELEPHONE ENCOUNTER
FUTURE VISIT INFORMATION      FUTURE VISIT INFORMATION:  Date: 8/15/23  Time: 9AM  Location: [facility]  REFERRAL INFORMATION:  Referring provider:    Referring providers clinic:    Reason for visit/diagnosis  throat lesion     RECORDS REQUESTED FROM:       Clinic name Comments Records Status Imaging Status   [facility] 7/31/23- OV with Dr. Obando [facility]     Imaging  7/7/23- CT Chest   11/4/20- XR CHEST   11/3/20- XR CHEST  [facility]  PACS    [facility] RHEUMATOLOGY  7/6/23- OV with Ede Sanchez MD  Epic

## 2023-08-14 NOTE — PROGRESS NOTES
[facility] Voice Clinic   at the [facility]   Otolaryngology Clinic     Patient: Joshua Ennis    MRN: [id number]    : 1945    Age/Gender: 78 year old male  Date of Service: 8/15/2023  Rendering Provider:   Josephine Malone MD     Referring Provider   PCP: Patrick Mcintyre  Referring Physician: Alyce Pagan MD  No address on file  Reason for Consultation   Dysphonia  Vocal fold lesion  History   HISTORY OF PRESENT ILLNESS: Mr. Ennis is a 78 year old male who presents to us today with dysphonia.      he presents today for evaluation. he reports:    Dysphonia  - worsening  - does need voice for work    Dysphagia  - denies    Dyspnea  - denies    Throat clearing/cough  - reports       PAST MEDICAL HISTORY:   Past Medical History:   Diagnosis Date    Arthritis     Autoimmune disease (H)     Hearing problem     Hoarseness     Hypertension     Kidney problem     Malignant melanoma (H)     Malignant neoplasm (H)     melanoma    Squamous cell carcinoma     Russo syndrome        PAST SURGICAL HISTORY:   Past Surgical History:   Procedure Laterality Date    BIOPSY  2011    melanoma on back    DISSECT LYMPH NODE INGUINAL  3/1/2013    Procedure: DISSECT LYMPH NODE INGUINAL;  Bilateral Inguinal Lymph Node Biopsy.;  Surgeon: Tariq Acosta MD;  Location: [facility]    ESOPHAGOSCOPY, GASTROSCOPY, DUODENOSCOPY (EGD), COMBINED  2013    Procedure: COMBINED ESOPHAGOSCOPY, GASTROSCOPY, DUODENOSCOPY (EGD);  Gastroscopy;  Surgeon: Tariq Acosta MD;  Location: [facility]    HERNIORRHAPHY INGUINAL  2012    Procedure: HERNIORRHAPHY INGUINAL;  Open Repair Left Inguinal Hernia;  Surgeon: Jerad Baker MD;  Location: [facility]       CURRENT MEDICATIONS:   Current Outpatient Medications:     Acetaminophen (TYLENOL PO), Take 650 mg by mouth once as needed for mild pain or fever , Disp: , Rfl:     Blood Pressure Monitor KIT, Automatic Blood Pressure Monitor (Patient not taking: Reported on 2022), Disp:  1 kit, Rfl: 0    calcium carbonate 600 mg-vitamin D 400 units (CALTRATE) 600-400 MG-UNIT per tablet, Take 1 tablet by mouth 2 times daily., Disp: , Rfl:     Cholecalciferol (VITAMIN D) 1000 UNITS capsule, Take 1 capsule by mouth daily., Disp: , Rfl:     folic acid (FOLVITE) 1 MG tablet, Take 2 tablets (2 mg) by mouth daily, Disp: 180 tablet, Rfl: 3    gabapentin (NEURONTIN) 400 MG capsule, Take 1 capsule (400 mg) by mouth At Bedtime, Disp: 90 capsule, Rfl: 3    losartan (COZAAR) 50 MG tablet, Take 1 tablet (50 mg) by mouth daily Please keep your appointment on 7/6/23. Thank you., Disp: 90 tablet, Rfl: 1    methotrexate sodium 2.5 MG TABS, Take 4 tablets (10 mg) by mouth once a week, Disp: 48 tablet, Rfl: 1    ALLERGIES: Shrimp    SOCIAL HISTORY:    Social History     Socioeconomic History    Marital status: Single     Spouse name: Not on file    Number of children: Not on file    Years of education: Not on file    Highest education level: Not on file   Occupational History     Employer: [employer]   Tobacco Use    Smoking status: Former     Packs/day: 1.00     Years: 20.00     Pack years: 20.00     Types: Cigarettes     Quit date: 2/14/2013     Years since quitting: 10.5    Smokeless tobacco: Never   Substance and Sexual Activity    Alcohol use: Yes     Comment: rare    Drug use: No    Sexual activity: Not Currently     Partners: Female   Other Topics Concern    Parent/sibling w/ CABG, MI or angioplasty before 65F 55M? Not Asked     Service Not Asked    Blood Transfusions Not Asked    Caffeine Concern Not Asked    Occupational Exposure Not Asked    Hobby Hazards Not Asked    Sleep Concern Not Asked    Stress Concern Not Asked    Weight Concern Not Asked    Special Diet Not Asked    Back Care Not Asked    Exercise Yes     Comment: walking    Bike Helmet Not Asked    Seat Belt Not Asked    Self-Exams Not Asked   Social History Narrative    Not on file     Social Determinants of Health     Financial  Resource Strain: Not on file   Food Insecurity: Not on file   Transportation Needs: Not on file   Physical Activity: Not on file   Stress: Not on file   Social Connections: Not on file   Intimate Partner Violence: Not on file   Housing Stability: Not on file         FAMILY HISTORY:   Family History   Problem Relation Age of Onset    Diabetes Mother     Hypertension Mother     Cancer Father         stomach    Heart Disease Father     Heart Disease Brother     Diabetes Sister     Diabetes Sister     Diabetes Sister     Heart Disease Brother     Cancer Sister     Glaucoma No family hx of     Macular Degeneration No family hx of      Non-contributory for problems with anesthesia    REVIEW OF SYSTEMS:   The patient was asked a 14 point review of systems regarding constitutional symptoms, eye symptoms, ears, nose, mouth, throat symptoms, cardiovascular symptoms, respiratory symptoms, gastrointestinal symptoms, genitourinary symptoms, musculoskeletal symptoms, integumentary symptoms, neurological symptoms, psychiatric symptoms, endocrine symptoms, hematologic/lymphatic symptoms, and allergic/ immunologic symptoms.   The pertinent factors have been included in the HPI and below.  Patient Supplied Answers to Review of Systems      6/13/2018     8:42 AM   [facility] ROS   Ears, Nose, Throat Ringing/noise in ears    Nasal congestion or drainage    Sore throat    Hoarseness   Cardiopulmonary Cough       Physical Examination   The patient underwent a physical examination as described below. The pertinent positive and negative findings are summarized after the description of the examination.  Constitutional: The patient's developmental and nutritional status was assessed. The patient's voice quality was assessed.  Head and Face: The head and face were inspected for deformities. The sinuses were palpated. The salivary glands were palpated. Facial muscle strength was assessed bilaterally.  Eyes: Extraocular movements and primary gaze  alignment were assessed.  Ears, Nose, Mouth and Throat: The ears and nose were examined for deformities. The nasal septum, mucosa, and turbinates were inspected by anterior rhinoscopy. The lips, teeth, and gums were examined for abnormalities. The oral mucosa, tongue, palate, tonsils, lateral and posterior pharynx were inspected for the presence of asymmetry or mucosal lesions.    Neck: The tracheal position was noted, and the neck mass palpated to determine if there were any asymmetries, abnormal neck masses, thyromegally, or thyroid nodules.  Respiratory: The nature of the breathing and chest expansion/symmetry was observed.  Cardiovascular: The patient was examined to determine the presence of any edema or jugular venous distension.  Abdomen: The contour of the abdomen was noted.  Lymphatic: The patient was examined for infraclavicular lymphadenopathy.  Musculoskeletal: The patient was inspected for the presence of skeletal deformities.  Extremities: The extremities were examined for any clubbing or cyanosis.  Skin: The skin was examined for inflammatory or neoplastic conditions.  Neurologic: The patient's orientation, mood, and affect were noted. The cranial nerve  functions were examined.  Other pertinent positive and negative findings on physical examination:      OC/OP: no lesions, uvula midline, soft palate elevates symmetrically   Neck: no lesions, no TH tenderness to palpation     All other physical examination findings were within normal limits and noncontributory.  Procedures   Video Laryngoscopy with Stroboscopy (CPT 84549)    Preoperative Diagnosis:  Dysphonia and throat symptoms  Postoperative Diagnosis: Dysphonia and throat symptoms  Indication:  Patient has new or persistent dysphonia and throat symptoms.  This requires evaluation by stroboscopy to fully delineate the laryngeal functioning; especially mucosal wave assessment, ultrasharp visualization of lesions on the vocal folds, and overall  functioning of the larynx.  Details of Procedure: A 70 degree rigid telescopic laryngoscope or a distal chip flexible scope was used. The lighting was obtained via a light cable connected to a stroboscopic unit. The telescope was inserted either transorally or transnasally until the vocal folds could be visualized. The patient was instructed to sustain the vowel  ee  at a comfortable pitch and loudness as the voice was monitored by a microphone connected to a fundamental frequency tracker. This circuit tracked vocal periodicity, allowing the light to flash in synchrony with the glottal cycles. A setting on the stroboscope was set to change the phase of light strobing with relation to the vocal fundamental frequency, producing an image of 1 to 2 glottal cycles every second. The video images were recorded for analysis. Use of the variable speed, slow and stop scan allowed careful study of pertinent segments of laryngeal function to increase accuracy of clinical assessments of function and dysfunction.  In particular, features of glottal closure, mucosal wave on the vocal fold cover and laryngeal symmetry were analyzed. Lastly, the patient was asked to phonate speech samples and auditory/perceptual evaluation of voice and resonance were performed.  The vocal quality was carefully evaluated for hoarseness, breathiness, loudness, phrase length and intelligibility to determine the source of dysphonia.    Findings:      B. LARYNGOVIDEOSTROBOSCOPY   Anatomic/Physiological Deviations:  RNC, anterior commissure lesion with extension to the right vocal fold  Mucosal wave: Right:  Little restriction     Left: Little restriction  Bilateral Vocal Fold Vibration: Asymmetric  Vocal Process: Right: No restriction    Left:  No restriction  Vocal Fold closure: Glottal gap    Complication(s): None  Disposition: Patient tolerated the procedure well        Fiberoptic Endoscopic Evaluation of Swallowing (CPT 68866)  and Interpretation of  Swallowing (CPT 30625)    Indications: See above notes for complete history and physical. Patient complains of dysphagia to both solids and liquids and/or there is suggestion on history and endoscopic exam of the presence of dysphagia causing medical complaints.  Swallowing evaluation is being performed to assess the presence and degree of dysphagia, and to recommend a safe diet.     Pulmonary Status:  No PNA   Current Diet:              regular                                        thin liquids      Consistency Amounts:  Thin Liquid: sip   Puree: sip  Solid: cookies            Positioning: upright in a chair  Oral Peripheral Exam: See physical exam section.  Anatomic Notes: See Videostroboscopy report for assessment of anatomy and laryngeal functioning  Pharyngeal secretions prior to administration of liquid or food: No   Oral Phase Abnormal Findings: No abnormal behavior observed   Pharyngeal Phase Abnormal Findings: no penetration, no aspiration     Recommended Diet:  regular                                        thin liquids             Laryngoscopy with Biopsy (60074)      Pre-procedure diagnosis: Laryngeal lesion  Post-procedure diagnosis: Same  Indication for procedure: Mr. Ennis is a 78 year old male with a laryngeal lesion.Biopsy is being performed for diagnostic purposes.  Procedure(s): Flexible Laryngoscopy with Biopsy   Details of Procedure: After informed consent was obtained, laryngoscopy was performed transnasally. Then, topical anesthesia was achieved by spraying 4% topical lidocaine directly onto the larynx through a working channel in the endoscope or via a trans-cutaneous route.  After adequate anesthesia was achieved a biting forceps was threaded through the working channel and multiple samples of the area of concern was performed. Having completed this the operation was completed and the scope withdrawn atraumatically. The specimen was labeled and sent for pathologic examination.   Findings:    Anatomic/physiological deviations: RNC, biopsy of anterior commissure lesion and right mid anterior edge   Right vocal process: No restriction of mobility   Left vocal process: No restriction of mobility  Glottal gap: Glottal gap  Supraglottic structures: Normal  Hypopharynx: Normal     Estimated Blood Loss: minimal  Complications: None  Disposition: Patient tolerated the procedure well      Review of Relevant Clinical Data   I personally reviewed:  Notes: bailey 7/31/23  Assessment/Plan: anterior commissure lesion and hoarseness. Will have him meet with one of my colleagues in laryngology for further assessment.           Procedures:FOE 7/31/23    Labs:  No results found for: TSH  Lab Results   Component Value Date     11/11/2020    CO2 27 11/11/2020    BUN 28 11/11/2020    PHOS 2.6 08/12/2016     Lab Results   Component Value Date    WBC 5.7 07/06/2023    HGB 14.6 07/06/2023    HCT 43.6 07/06/2023    MCV 99 07/06/2023     07/06/2023     Lab Results   Component Value Date    PTT 24 04/11/2013    INR 0.98 04/11/2013     No results found for: DUANE  No components found for: RHEUMATOIDFACTOR,  RF  Lab Results   Component Value Date    CRP <2.9 07/07/2022    CRP <2.9 01/07/2022    CRP <2.9 07/28/2021    CRP <2.9 03/05/2021    CRP <2.9 11/11/2020     No components found for: CKTOT, URICACID  No components found for: C3, C4, DSDNAAB, NDNAABIFA  Lab Results   Component Value Date    MPOAB Not Reported 02/14/2014       Patient reported Quality of Life (QOL) Measures   Patient Supplied Answers To VHI Questionnaire       No data to display                  Patient Supplied Answers To EAT Questionnaire       No data to display                  Patient Supplied Answers To CSI Questionnaire       No data to display                  Patient Supplied Answers to Dyspnea Index Questionnaire:       No data to display                Impression & Plan     IMPRESSION: Mr. Ennis is a 78 year old male who is being seen for  the following:    Dysphonia   - anterior commissure lesion seen on scope on 7/31/23  - voice changes worsening for a few months  - prior smoker  - scope shows anterior commissure lesion with extension to the right vocal fold  - given anterior location will require CT neck to rule out thyroid cartilage extension  - discussed awake biopsy - patient in agreement this was done today  Plan  - CT neck with contract  - follow up pathology     RETURN VISIT: after testing    Thank you for the kind referral and for allowing me to share in the care of Mr. Ennis. If you have any questions, please do not hesitate to contact me.    Josephine Malone MD    Laryngology    [facility] Voice [facility]  Department of  Otolaryngology - Head and Neck Surgery  Clinics & Surgery Center  [address] [place] [address]  Appointment line: [phone]  Fax: [phone]  [email or web address]

## 2023-08-15 ENCOUNTER — THERAPY VISIT (OUTPATIENT)
Dept: SPEECH THERAPY | Facility: CLINIC | Age: 78
End: 2023-08-15
Payer: COMMERCIAL

## 2023-08-15 ENCOUNTER — PRE VISIT (OUTPATIENT)
Dept: OTOLARYNGOLOGY | Facility: CLINIC | Age: 78
End: 2023-08-15

## 2023-08-15 ENCOUNTER — OFFICE VISIT (OUTPATIENT)
Dept: OTOLARYNGOLOGY | Facility: CLINIC | Age: 78
End: 2023-08-15
Payer: COMMERCIAL

## 2023-08-15 VITALS
WEIGHT: 194 LBS | SYSTOLIC BLOOD PRESSURE: 132 MMHG | BODY MASS INDEX: 28.73 KG/M2 | DIASTOLIC BLOOD PRESSURE: 72 MMHG | HEART RATE: 56 BPM | HEIGHT: 69 IN

## 2023-08-15 DIAGNOSIS — R49.0 DYSPHONIA: ICD-10-CM

## 2023-08-15 DIAGNOSIS — R09.89 CHRONIC THROAT CLEARING: ICD-10-CM

## 2023-08-15 DIAGNOSIS — R13.12 OROPHARYNGEAL DYSPHAGIA: ICD-10-CM

## 2023-08-15 DIAGNOSIS — R49.0 HOARSENESS: Primary | ICD-10-CM

## 2023-08-15 DIAGNOSIS — R49.0 DYSPHONIA: Primary | ICD-10-CM

## 2023-08-15 DIAGNOSIS — R13.12 OROPHARYNGEAL DYSPHAGIA: Primary | ICD-10-CM

## 2023-08-15 DIAGNOSIS — J38.3 LEUKOPLAKIA OF VOCAL CORDS: ICD-10-CM

## 2023-08-15 DIAGNOSIS — J38.7 LARYNGEAL HYPERFUNCTION: ICD-10-CM

## 2023-08-15 DIAGNOSIS — J38.3 LESION OF VOCAL FOLD: ICD-10-CM

## 2023-08-15 PROCEDURE — 99215 OFFICE O/P EST HI 40 MIN: CPT | Mod: 25 | Performed by: OTOLARYNGOLOGY

## 2023-08-15 PROCEDURE — 92610 EVALUATE SWALLOWING FUNCTION: CPT | Mod: GN | Performed by: SPEECH-LANGUAGE PATHOLOGIST

## 2023-08-15 PROCEDURE — 92524 BEHAVRAL QUALIT ANALYS VOICE: CPT | Mod: GN | Performed by: SPEECH-LANGUAGE PATHOLOGIST

## 2023-08-15 PROCEDURE — 92612 ENDOSCOPY SWALLOW (FEES) VID: CPT | Mod: GN | Performed by: OTOLARYNGOLOGY

## 2023-08-15 PROCEDURE — 88305 TISSUE EXAM BY PATHOLOGIST: CPT | Mod: 26 | Performed by: PATHOLOGY

## 2023-08-15 PROCEDURE — 31579 LARYNGOSCOPY TELESCOPIC: CPT | Performed by: OTOLARYNGOLOGY

## 2023-08-15 PROCEDURE — 31576 LARYNGOSCOPY WITH BIOPSY: CPT | Performed by: OTOLARYNGOLOGY

## 2023-08-15 PROCEDURE — 88305 TISSUE EXAM BY PATHOLOGIST: CPT | Mod: TC | Performed by: OTOLARYNGOLOGY

## 2023-08-15 PROCEDURE — 92613 ENDOSCOPY SWALLOW (FEES) I&R: CPT | Performed by: OTOLARYNGOLOGY

## 2023-08-15 ASSESSMENT — PAIN SCALES - GENERAL: PAINLEVEL: NO PAIN (0)

## 2023-08-15 NOTE — PATIENT INSTRUCTIONS
1.  You were seen in the ENT Clinic today by Dr. Malone. If you have any questions or concerns after your appointment, please call [phone]. Press option #1 for scheduling related needs. Press option #3 for Nurse advice.    2.  Dr. Malone has recommended the following:   - CT neck    3.  Plan is to return to clinic once results are back and plan is determined      Nina Cardenas  [phone]  [facility] - Otolaryngology

## 2023-08-15 NOTE — PROGRESS NOTES
"[facility] VOICE [facility]  Edwin Reid Jr., M.D., F.A.C.S.  Nahomy Jimenez M.D., M.P.H.  Josephine Malone M.D.  Mally Gifford M.M. (voice), M.A., CCC-SLP  Oksana Bell M.A., CCC-SLP  Vanessa Scott, Ph.D., CCC-SLP  Jose L Campo, Ph.D., CCC-SLP  Kym Mireles M.S., CCC-SLP  Jamil Banegas M.M., M.A., CCC-SLP  KATHRYN Resendiz (voice), M.S., CCC-SLP    [facility]  VOICE/SPEECH/BREATHING EVALUATION AND LARYNGEAL EXAMINATION REPORT    Patient: Joshua Ennis  Date of Visit: 8/15/2023    Clinician: Oksana Bell M.A., CCC-SLP  Seen in conjunction with: Dr. Josephine Malone  We are also joined by Swallow Specialty SLP: Perla Platt M.S. [facility]-SLP  Referring Physician: Ej/ Gabbi Obando MD  His significant other Falguni was present.     CHIEF COMPLAINT:  Voice    HISTORY  Joshua Ennis is a 78 year old male presenting today for evaluation of dysphonia that began over at least 3 months ago. His voice is getting \"screwed up\" and he can't talk like he used to.  He has noticed his voice is hoarse.  He saw Dr. Gabbi Obando for laryngoscopy on 07/31/2023  and it was noted that there was an anterior commissure whitish lesion impairing full closure with irregularity of left mid fold.    VOICE  Onset/inciting factors: at least 3 months ago  Progression: worsening  Current Symptoms:  People at work are having a hard time understanding him   It feels like his voice \"is goofy\"   Vocal demand:  He is a  and he may talk with people, but he often works alone.     COUGH/THROAT CLEARING  Current Symptoms:  He feels like he has \"slime in his throat just when he wants to cough.\" \"He has thick mucus all the time\"-per his significant other  He feels like he has to cough up something because there is something always there.   He doesn't clear his throat after eating but with talking.    SWALLOWING  No complaints    BREATHING  No complaints.    OTHER PERTINENT HISTORY  Reflux  Once in a while but not regularly. Certain " foods can give him reflux  History of benign mouth lesion that he has seen Dr. Obando for in the past.   Underlying history of GPA, former tobacco use. On methotrexate for GPA   Hearing problem. He often needed instructions repeated.   Please refer to Dr. Malone's note and chart for additional history    OBJECTIVE FINDINGS  VOICE/ SPEECH/ NON-COMMUNICATIVE LARYNGEAL BEHAVIORS EVALUATION  An evaluation of the voice and upper airway was accomplished today; salient features are as follows:  Palpation of the laryngeal area shows:  No tenderness of the thyrohyoid area  Cough/ Throat clear:  Frequent  Dry  Breathing pattern:  Shallow  Phonation is not coordinated with respiration  Tension is evident:  Face  Voice quality is characterized by  Marked-severe roughness  Moderate breathiness  Moderate asthenia  Clearer and louder voice with laugh and when he reacted (a loud exclamataion) to the Lidocaine spray  He often spoke at a higher pitch (G3-A3) than is typical for a man of his age and gender.   Loudness is reduced for the setting  A singing task shows voice quality is consistent between speech and singing  GLOBAL ASSESSMENT OF DYSPHONIA: 80/100    LARYNGEAL EXAMINATION    Endoscopic laryngeal examination was performed by:  Dr. Malone  I provided the protocol of instructions for the patient.    Type of exam:   Flexible endoscopy with chip-tip technology and stroboscopy; topical anesthesia with 3% Lidocaine with 0.025% phenylphrine was applied to both nares.    This exam shows:  Laryngeal and Vocal Fold Mucosa  Mild-moderate posterior commissure hypertrophy  Possible white crusting at the left medial arytenoid wall but unclear  Mild presence of sticky and bubbly secretions on the vocal folds and throughout the laryngeal area  Status of vocal fold mucosa:   White crusting at the anterior commissure supraglottically and infraglottically  White crusting and redness at the anterior portion (superior surface) of the right vocal  fold and the mid portion at the superior surface and medial edge.   White crusting and redness at the vibratory edge/superior surface of the left vocal fold at the middle portion that extends towards posterior portion vibratory edge. This also extends laterally at the middle superior surface  Narrow Band Imaging yielded the following: prominent white crusting observed at the anterior commissure, right,and left vocal fold. Prominent bilateral erythema.     Neurological and Functional Integrity of the Larynx  Vocal folds are mobile and meet at midline; movement is brisk and symmetric; exam is neurologically normal   Airway is adequate  Elongation of the vocal folds for pitch increase appears WNL. It was difficult to fully view due to surpraglottic constriction  Frequent breath holding observed    Supraglottic Function and Therapy Probes  Bilateral ventricular approximation during phonation; L>R  Severe four-way constriction of the supraglottic larynx during connected speech  Supraglottic function during singing is improved  Therapy probes show   Reduction in supraglottic hyperfunction during tasks that involved word-initial voiceless aspirates and labiodental fricatives     Stroboscopy provided the following additional information:  Stroboscopy shows:   RTVF Amplitude: Marked at the site of anterior lesion (stiff) but mildly reduced at the posterior 2/3 portion of the vocal fold.   LTVF Amplitude: Mildly reduced  RTVF mucosal wave: Mildly reduced  LTVF mucosal wave: Mildly reduced  Symmetry: Asymmetry observed  On phonation, glottic closure is incomplete due to irregularity  Irregular-shaped configuration of the glottis during many phonatory tasks    Dr. Malone and I reviewed this laryngeal exam with Mr. Ennis today, and I provided pertinent explanations:  Endoscopic findings are consistent with audio-perceptual assessment and patient Hx/complaints.  his symptoms are accounted for by white crusting at the anterior  commissure and vocal folds, and severe four-way constriction of the supraglottic larynx during connected speech   Because there appears to be a functional component to his symptoms, he would most likely benefit from functional speech therapy.    Please view MsKathryn Vandana's note for full results and impressions of swallow portion of the evaluation.     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    Laryngeal evaluation demonstrated white crusting at the anterior commissure supraglottically and infraglottically:; white crusting and redness at the anterior portion (superior surface) of the right vocal fold and the mid portion at the superior surface and medial edge; white crusting and redness at the vibratory edge/superior surface of the left vocal fold at the middle portion that extends towards posterior portion vibratory edge and this also extends laterally at the middle superior surface; Narrow Band Imaging yielded the following: prominent white crusting observed at the anterior commissure, right,and left vocal fold with prominent bilateral erythema; mild-moderate posterior commissure hypertrophy; possible white crusting at the left medial arytenoid wall but unclear; bilateral ventricular approximation during phonation; L>R; severe four-way constriction of the supraglottic larynx during connected speech; frequent breath holding observed; RTVF Amplitude is marked at the site of anterior lesion (stiff) but mildly reduced at the posterior 2/3 portion of the vocal fold; LTVF amplitude is mildly reduced; mucosal wave of right and left vocal folds is mildly reduced;  asymmetry observed; on phonation, glottic closure is incomplete due to irregularity; irregular-shaped configuration of the glottis  during many phonatory tasks  Perceptual evaluation demonstrated marked-severe roughness; moderate breathiness; moderate asthenia; clearer and louder voice with laugh and when he reacted (a loud exclamataion) to the Lidocaine spray;and he often spoke at a higher pitch (G3-A3) than is typical for a man of his age and gender.     STIMULABILITY: results of therapy probes during perceptual and laryngeal evaluation demonstrate improvement with flow phonation probes.   RECOMMENDATIONS:   A course of speech therapy is recommended to improve voice quality and optimize surgical results.  He demonstrates a Good prognosis for improvement given adherence to therapeutic recommendations. Therapeutic   Positive indicators: commitment to process; diagnosis is known to respond to functional treatment;   Negative indicators: none    TREATMENT PLAN  Speech therapy    DURATION/FREQUENCY OF TREATMENT  3 weekly or bi-weekly one-hour sessions, with 1 monthly one-hour follow-up sessions    GOALS  Patient goal:    To understand the problem and fix it as much as possible  To have a normal and acceptable voice quality    Long-term goal(s): In 10 months, Mr. Ennis will:  1.  Report a speaking voice quality that is acceptable to him, 90%of the time  2. Report resolution of symptoms, and use of optimal voice quality and comfort to meet personal, social, and professional needs, 90% of the time during a typical week of vocal activities    This treatment plan was developed with the patient who agreed with the recommendations.      TOTAL SERVICE TIME: 60 minutes  EVALUATION OF VOICE AND RESONANCE (10665)  NO CHARGE FACILITY FEE (25364)      Oksana Bell M.A., CCC-SLP  Speech-Language Pathologist  [facility]  [email or web address]  She/Her/Hers       Speech recognition software may have been used in this documentation; input is reviewed before signature to the best of my ability.

## 2023-08-15 NOTE — LETTER
8/15/2023       RE: Joshua Ennis  [address] [place] [facility] [address]     Dear Colleague,    Thank you for referring your patient, Joshua Ennis, to the [facility] EAR NOSE AND TH[facility]INIC [place] at [facility]. Please see a copy of my visit note below.        [facility]   at the [facility]   Otolaryngology Clinic     Patient: Joshua Ennis    MRN: [id number]    : 1945    Age/Gender: 78 year old male  Date of Service: 8/15/2023  Rendering Provider:   Josephine Malone MD     Referring Provider   PCP: Patrick Mcintyre  Referring Physician: Alyce Pagan MD  No address on file  Reason for Consultation   Dysphonia  Vocal fold lesion  History   HISTORY OF PRESENT ILLNESS: Mr. Ennis is a 78 year old male who presents to us today with dysphonia.      he presents today for evaluation. he reports:    Dysphonia  - worsening  - does need voice for work    Dysphagia  - denies    Dyspnea  - denies    Throat clearing/cough  - reports       PAST MEDICAL HISTORY:   Past Medical History:   Diagnosis Date    Arthritis     Autoimmune disease (H)     Hearing problem     Hoarseness     Hypertension     Kidney problem     Malignant melanoma (H)     Malignant neoplasm (H)     melanoma    Squamous cell carcinoma     Russo syndrome        PAST SURGICAL HISTORY:   Past Surgical History:   Procedure Laterality Date    BIOPSY  2011    melanoma on back    DISSECT LYMPH NODE INGUINAL  3/1/2013    Procedure: DISSECT LYMPH NODE INGUINAL;  Bilateral Inguinal Lymph Node Biopsy.;  Surgeon: Tariq Acosta MD;  Location: [facility]    ESOPHAGOSCOPY, GASTROSCOPY, DUODENOSCOPY (EGD), COMBINED  2013    Procedure: COMBINED ESOPHAGOSCOPY, GASTROSCOPY, DUODENOSCOPY (EGD);  Gastroscopy;  Surgeon: Tariq Acosta MD;  Location: [facility]    HERNIORRHAPHY INGUINAL  2012    Procedure: HERNIORRHAPHY INGUINAL;  Open Repair Left Inguinal Hernia;  Surgeon:  Jerad Baker MD;  Location: [facility]       CURRENT MEDICATIONS:   Current Outpatient Medications:     Acetaminophen (TYLENOL PO), Take 650 mg by mouth once as needed for mild pain or fever , Disp: , Rfl:     Blood Pressure Monitor KIT, Automatic Blood Pressure Monitor (Patient not taking: Reported on 7/7/2022), Disp: 1 kit, Rfl: 0    calcium carbonate 600 mg-vitamin D 400 units (CALTRATE) 600-400 MG-UNIT per tablet, Take 1 tablet by mouth 2 times daily., Disp: , Rfl:     Cholecalciferol (VITAMIN D) 1000 UNITS capsule, Take 1 capsule by mouth daily., Disp: , Rfl:     folic acid (FOLVITE) 1 MG tablet, Take 2 tablets (2 mg) by mouth daily, Disp: 180 tablet, Rfl: 3    gabapentin (NEURONTIN) 400 MG capsule, Take 1 capsule (400 mg) by mouth At Bedtime, Disp: 90 capsule, Rfl: 3    losartan (COZAAR) 50 MG tablet, Take 1 tablet (50 mg) by mouth daily Please keep your appointment on 7/6/23. Thank you., Disp: 90 tablet, Rfl: 1    methotrexate sodium 2.5 MG TABS, Take 4 tablets (10 mg) by mouth once a week, Disp: 48 tablet, Rfl: 1    ALLERGIES: Shrimp    SOCIAL HISTORY:    Social History     Socioeconomic History    Marital status: Single     Spouse name: Not on file    Number of children: Not on file    Years of education: Not on file    Highest education level: Not on file   Occupational History     Employer: [employer]   Tobacco Use    Smoking status: Former     Packs/day: 1.00     Years: 20.00     Pack years: 20.00     Types: Cigarettes     Quit date: 2/14/2013     Years since quitting: 10.5    Smokeless tobacco: Never   Substance and Sexual Activity    Alcohol use: Yes     Comment: rare    Drug use: No    Sexual activity: Not Currently     Partners: Female   Other Topics Concern    Parent/sibling w/ CABG, MI or angioplasty before 65F 55M? Not Asked     Service Not Asked    Blood Transfusions Not Asked    Caffeine Concern Not Asked    Occupational Exposure Not Asked    Hobby Hazards Not Asked     Sleep Concern Not Asked    Stress Concern Not Asked    Weight Concern Not Asked    Special Diet Not Asked    Back Care Not Asked    Exercise Yes     Comment: walking    Bike Helmet Not Asked    Seat Belt Not Asked    Self-Exams Not Asked   Social History Narrative    Not on file     Social Determinants of Health     Financial Resource Strain: Not on file   Food Insecurity: Not on file   Transportation Needs: Not on file   Physical Activity: Not on file   Stress: Not on file   Social Connections: Not on file   Intimate Partner Violence: Not on file   Housing Stability: Not on file         FAMILY HISTORY:   Family History   Problem Relation Age of Onset    Diabetes Mother     Hypertension Mother     Cancer Father         stomach    Heart Disease Father     Heart Disease Brother     Diabetes Sister     Diabetes Sister     Diabetes Sister     Heart Disease Brother     Cancer Sister     Glaucoma No family hx of     Macular Degeneration No family hx of      Non-contributory for problems with anesthesia    REVIEW OF SYSTEMS:   The patient was asked a 14 point review of systems regarding constitutional symptoms, eye symptoms, ears, nose, mouth, throat symptoms, cardiovascular symptoms, respiratory symptoms, gastrointestinal symptoms, genitourinary symptoms, musculoskeletal symptoms, integumentary symptoms, neurological symptoms, psychiatric symptoms, endocrine symptoms, hematologic/lymphatic symptoms, and allergic/ immunologic symptoms.   The pertinent factors have been included in the HPI and below.  Patient Supplied Answers to Review of Systems      6/13/2018     8:42 AM    ENT ROS   Ears, Nose, Throat Ringing/noise in ears    Nasal congestion or drainage    Sore throat    Hoarseness   Cardiopulmonary Cough       Physical Examination   The patient underwent a physical examination as described below. The pertinent positive and negative findings are summarized after the description of the examination.  Constitutional: The  patient's developmental and nutritional status was assessed. The patient's voice quality was assessed.  Head and Face: The head and face were inspected for deformities. The sinuses were palpated. The salivary glands were palpated. Facial muscle strength was assessed bilaterally.  Eyes: Extraocular movements and primary gaze alignment were assessed.  Ears, Nose, Mouth and Throat: The ears and nose were examined for deformities. The nasal septum, mucosa, and turbinates were inspected by anterior rhinoscopy. The lips, teeth, and gums were examined for abnormalities. The oral mucosa, tongue, palate, tonsils, lateral and posterior pharynx were inspected for the presence of asymmetry or mucosal lesions.    Neck: The tracheal position was noted, and the neck mass palpated to determine if there were any asymmetries, abnormal neck masses, thyromegally, or thyroid nodules.  Respiratory: The nature of the breathing and chest expansion/symmetry was observed.  Cardiovascular: The patient was examined to determine the presence of any edema or jugular venous distension.  Abdomen: The contour of the abdomen was noted.  Lymphatic: The patient was examined for infraclavicular lymphadenopathy.  Musculoskeletal: The patient was inspected for the presence of skeletal deformities.  Extremities: The extremities were examined for any clubbing or cyanosis.  Skin: The skin was examined for inflammatory or neoplastic conditions.  Neurologic: The patient's orientation, mood, and affect were noted. The cranial nerve  functions were examined.  Other pertinent positive and negative findings on physical examination:      OC/OP: no lesions, uvula midline, soft palate elevates symmetrically   Neck: no lesions, no TH tenderness to palpation     All other physical examination findings were within normal limits and noncontributory.  Procedures   Video Laryngoscopy with Stroboscopy (CPT 38222)    Preoperative Diagnosis:  Dysphonia and throat  symptoms  Postoperative Diagnosis: Dysphonia and throat symptoms  Indication:  Patient has new or persistent dysphonia and throat symptoms.  This requires evaluation by stroboscopy to fully delineate the laryngeal functioning; especially mucosal wave assessment, ultrasharp visualization of lesions on the vocal folds, and overall functioning of the larynx.  Details of Procedure: A 70 degree rigid telescopic laryngoscope or a distal chip flexible scope was used. The lighting was obtained via a light cable connected to a stroboscopic unit. The telescope was inserted either transorally or transnasally until the vocal folds could be visualized. The patient was instructed to sustain the vowel  ee  at a comfortable pitch and loudness as the voice was monitored by a microphone connected to a fundamental frequency tracker. This circuit tracked vocal periodicity, allowing the light to flash in synchrony with the glottal cycles. A setting on the stroboscope was set to change the phase of light strobing with relation to the vocal fundamental frequency, producing an image of 1 to 2 glottal cycles every second. The video images were recorded for analysis. Use of the variable speed, slow and stop scan allowed careful study of pertinent segments of laryngeal function to increase accuracy of clinical assessments of function and dysfunction.  In particular, features of glottal closure, mucosal wave on the vocal fold cover and laryngeal symmetry were analyzed. Lastly, the patient was asked to phonate speech samples and auditory/perceptual evaluation of voice and resonance were performed.  The vocal quality was carefully evaluated for hoarseness, breathiness, loudness, phrase length and intelligibility to determine the source of dysphonia.    Findings:      B. 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 Patient complains of dysphagia to both solids and liquids and/or there is suggestion on history and endoscopic exam of the presence of dysphagia causing medical complaints.  Swallowing evaluation is being performed to assess the presence and degree of dysphagia, and to recommend a safe diet.     Pulmonary Status:  No PNA   Current Diet:              regular                                        thin liquids      Consistency Amounts:  Thin Liquid: sip   Puree: sip  Solid: cookies            Positioning: upright in a chair  Oral Peripheral Exam: See physical exam section.  Anatomic Notes: See Videostroboscopy report for assessment of anatomy and laryngeal functioning  Pharyngeal secretions prior to administration of liquid or food: No   Oral Phase Abnormal Findings: No abnormal behavior observed   Pharyngeal Phase Abnormal Findings: no penetration, no aspiration     Recommended Diet:  regular                                        thin liquids             Laryngoscopy with Biopsy (99685)      Pre-procedure diagnosis: Laryngeal lesion  Post-procedure diagnosis: Same  Indication for procedure: Mr. Ennis is a 78 year old male with a laryngeal lesion.Biopsy is being performed for diagnostic purposes.  Procedure(s): Flexible Laryngoscopy with Biopsy   Details of Procedure: After informed consent was obtained, laryngoscopy was performed transnasally. Then, topical anesthesia was achieved by spraying 4% topical lidocaine directly onto the larynx through a working channel in the endoscope or via a  trans-cutaneous route.  After adequate anesthesia was achieved a biting forceps was threaded through the working channel and multiple samples of the area of concern was performed. Having completed this the operation was completed and the scope withdrawn atraumatically. The specimen was labeled and sent for pathologic examination.   Findings:   Anatomic/physiological deviations: RNC, biopsy of anterior commissure lesion and right mid anterior edge   Right vocal process: No restriction of mobility   Left vocal process: No restriction of mobility  Glottal gap: Glottal gap  Supraglottic structures: Normal  Hypopharynx: Normal     Estimated Blood Loss: minimal  Complications: None  Disposition: Patient tolerated the procedure well      Review of Relevant Clinical Data   I personally reviewed:  Notes: lynn 7/31/23  Assessment/Plan: anterior commissure lesion and hoarseness. Will have him meet with one of my colleagues in laryngology for further assessment.           Procedures:FOE 7/31/23    Labs:  No results found for: TSH  Lab Results   Component Value Date     11/11/2020    CO2 27 11/11/2020    BUN 28 11/11/2020    PHOS 2.6 08/12/2016     Lab Results   Component Value Date    WBC 5.7 07/06/2023    HGB 14.6 07/06/2023    HCT 43.6 07/06/2023    MCV 99 07/06/2023     07/06/2023     Lab Results   Component Value Date    PTT 24 04/11/2013    INR 0.98 04/11/2013     No results found for: DUANE  No components found for: RHEUMATOIDFACTOR,  RF  Lab Results   Component Value Date    CRP <2.9 07/07/2022    CRP <2.9 01/07/2022    CRP <2.9 07/28/2021    CRP <2.9 03/05/2021    CRP <2.9 11/11/2020     No components found for: CKTOT, URICACID  No components found for: C3, C4, DSDNAAB, NDNAABIFA  Lab Results   Component Value Date    MPOAB Not Reported 02/14/2014       Patient reported Quality of Life (QOL) Measures   Patient Supplied Answers To VHI Questionnaire       No data to display                  Patient Supplied  Answers To EAT Questionnaire       No data to display                  Patient Supplied Answers To CSI Questionnaire       No data to display                  Patient Supplied Answers to Dyspnea Index Questionnaire:       No data to display                Impression & Plan     IMPRESSION: Mr. Ennis is a 78 year old male who is being seen for the following:    Dysphonia   - anterior commissure lesion seen on scope on 7/31/23  - voice changes worsening for a few months  - prior smoker  - scope shows anterior commissure lesion with extension to the right vocal fold  - given anterior location will require CT neck to rule out thyroid cartilage extension  - discussed awake biopsy - patient in agreement this was done today  Plan  - CT neck with contract  - follow up pathology     RETURN VISIT: after testing    Thank you for the kind referral and for allowing me to share in the care of Mr. Ennis. If you have any questions, please do not hesitate to contact me.    Josephine Malone MD    Laryngology    [facility]  Department of  Otolaryngology - Head and Neck Surgery  Clinics & Surgery Center  [address] [place] [id number]  Appointment line: [phone]  Fax: [phone]  [email or web address]

## 2023-08-15 NOTE — PROGRESS NOTES
SPEECH LANGUAGE PATHOLOGY EVALUATION    See electronic medical record for Abuse and Falls Screening details.    Subjective      Presenting condition or subjective complaint:  Voice is all screwed up. Feels he can't talk well. Denies trouble swallowing.  Date of onset: 08/15/23    Relevant medical history:   malignant melanoma, squamous cell carcinoma, hypertension, kidney problems, arthritis, conroy syndrome    Prior diagnostic imaging/testing results:     Pt denies previous swallowing evaluations or treatments  Prior therapy history for the same diagnosis, illness or injury:        Living Environment  Social support:   Pt's SO present for the evaluation  Help at home:   Independent  Employment:        Patient goals for therapy:      Pain assessment: Pain denied     Objective     SWALLOW EVALUTION  Dysphagia history: Pt report a feeling of something in his throat. This doesn't feel like swallowing is challenging but there is always something in the throat.   Current Diet/Method of Nutritional Intake: thin liquids (level 0), regular diet        CLINICAL SWALLOW EVALUATION  Oral Motor Function: generally intact  Dentition: upper dentures  Secretion management: WFL  Mucosal quality: adequate  Mandibular function: intact  Oral labial function: WFL  Lingual function: WFL  Velar function: intact   Buccal function: intact  Laryngeal function: cough, throat clear, volitional swallow     Level of assist required for feeding: no assistance needed   Textures Trialed:   Clinical Swallow Eval: Thin Liquids  Mode of presentation: straw, self-fed   Volume presented: 6 ounces blue-tinged milk  Preparatory Phase: WFL  Oral Phase: WFL  Pharyngeal phase of swallow: intact   Strategies trialed during procedure: BRITTON SLP CLINICAL EVAL STRATEGIES: None    Diagnostic statement: No aspiration/penetration noted on thin liquid trials.  No pharyngeal residue after the completed swallow.    Clinical Swallow Eval: Purees  Mode of  presentation: spoon, fed by clinician   Volume presented: 2 teaspoons blue-tinged applesauce  Preparatory Phase: WFL  Oral Phase: WFL  Pharyngeal phase of swallow: intact   Strategies trialed during procedure: BRITTON SLP CLINICAL EVAL STRATEGIES: none    Diagnostic statement: No aspiration/penetration noted on purée consistency trials.  No pharyngeal residue after completed swallow.    Clinical Swallow Eval: Solids  Mode of presentation: self-fed   Volume presented: 1 Leah Doone cookie  Preparatory Phase: WFL  Oral Phase: WFL  Pharyngeal phase of swallow: intact   Strategies trialed during procedure: BRITTON SLP CLINICAL EVAL STRATEGIES: none    Diagnostic statement: No aspiration/penetration noted on solid consistency trials.  No pharyngeal residue after completed swallow.    ESOPHAGEAL PHASE OF SWALLOW  no observed or reported concerns related to esophageal function     SWALLOW ASSESSMENT CLINICAL IMPRESSIONS AND RATIONALE  Diet Consistency Recommendations: thin liquids (level 0), regular diet    Recommended Feeding/Eating Techniques: none  Medication Administration Recommendations: whole with liquid  Instrumental Assessment Recommendations: not at this time    Assessment & Plan   CLINICAL IMPRESSIONS   Medical Diagnosis: vocal fold lesion    Treatment Diagnosis: oropharyngeal dysphagia   Impression/Assessment:  Patient demonstrates a functional oropharyngeal swallow.  No aspiration/penetration noted on any consistencies presented.  No pharyngeal residue after completed swallow.  Adequate range of motion and strength of oral mechanism.  Thorough mastication of cookie consistency.  No further speech pathology services indicated at this time for swallow function. There is a lesion on the true vocal folds at the anterior commissure which is being further evaluated by Dr. Malone.    PLAN OF CARE  Evaluation only    Recommended Referrals to Other Professionals: none  Education Assessment:   Learner/Method: No Barriers to  Learning    Risks and benefits of evaluation/treatment have been explained.   Patient/Family/caregiver agrees with Plan of Care.     Evaluation Time:    SLP Eval: oral/pharyngeal swallow function, clinical minutes (08235): 11     Present: Not applicable     Signing Clinician: Perla Platt, SLP

## 2023-08-16 ENCOUNTER — ANCILLARY PROCEDURE (OUTPATIENT)
Dept: CT IMAGING | Facility: CLINIC | Age: 78
End: 2023-08-16
Attending: OTOLARYNGOLOGY
Payer: COMMERCIAL

## 2023-08-16 DIAGNOSIS — R49.0 HOARSENESS: ICD-10-CM

## 2023-08-16 LAB
CREAT BLD-MCNC: 1.6 MG/DL (ref 0.7–1.3)
GFR SERPL CREATININE-BSD FRML MDRD: 44 ML/MIN/1.73M2
RADIOLOGIST FLAGS: NORMAL

## 2023-08-16 PROCEDURE — 82565 ASSAY OF CREATININE: CPT | Performed by: PATHOLOGY

## 2023-08-16 PROCEDURE — 70491 CT SOFT TISSUE NECK W/DYE: CPT | Mod: GC | Performed by: RADIOLOGY

## 2023-08-16 RX ORDER — IOPAMIDOL 755 MG/ML
90 INJECTION, SOLUTION INTRAVASCULAR ONCE
Status: COMPLETED | OUTPATIENT
Start: 2023-08-16 | End: 2023-08-16

## 2023-08-16 RX ADMIN — IOPAMIDOL 90 ML: 755 INJECTION, SOLUTION INTRAVASCULAR at 14:59

## 2023-08-16 NOTE — DISCHARGE INSTRUCTIONS

## 2023-08-17 ENCOUNTER — TELEPHONE (OUTPATIENT)
Dept: OTOLARYNGOLOGY | Facility: CLINIC | Age: 78
End: 2023-08-17

## 2023-08-17 DIAGNOSIS — R91.8 PULMONARY NODULES: Primary | ICD-10-CM

## 2023-08-17 NOTE — TELEPHONE ENCOUNTER
Left voicemail for the patient telling him about the new nodule in the lungs  Will need repeat scan and follow up with lung nodule clinic

## 2023-08-18 ENCOUNTER — PATIENT OUTREACH (OUTPATIENT)
Dept: OTOLARYNGOLOGY | Facility: CLINIC | Age: 78
End: 2023-08-18

## 2023-08-18 NOTE — PROGRESS NOTES
Called patient's partner to let her know the plan given that the CT neck showed a pulmonary nodule that has grown since the July scan.   Sent patient name over to pulmonology to review in pulmonary nodule rounds and establish a plan of care. Patient's partner was agreeable to this plan and once case is reviewed writer will call back to let patient and family know the plan. Patient's partner verbalized understanding of the situation and will reach out with questions in the meantime. Nina Cardenas RN on 8/18/2023 at 2:13 PM

## 2023-08-19 ENCOUNTER — ANCILLARY PROCEDURE (OUTPATIENT)
Dept: CT IMAGING | Facility: CLINIC | Age: 78
End: 2023-08-19
Attending: OTOLARYNGOLOGY
Payer: COMMERCIAL

## 2023-08-19 PROCEDURE — 71260 CT THORAX DX C+: CPT | Performed by: RADIOLOGY

## 2023-08-19 RX ORDER — IOPAMIDOL 755 MG/ML
95 INJECTION, SOLUTION INTRAVASCULAR ONCE
Status: COMPLETED | OUTPATIENT
Start: 2023-08-19 | End: 2023-08-19

## 2023-08-19 RX ADMIN — IOPAMIDOL 95 ML: 755 INJECTION, SOLUTION INTRAVASCULAR at 10:29

## 2023-08-19 NOTE — DISCHARGE INSTRUCTIONS

## 2023-08-21 ENCOUNTER — TELEPHONE (OUTPATIENT)
Dept: RHEUMATOLOGY | Facility: CLINIC | Age: 78
End: 2023-08-21

## 2023-08-21 NOTE — TELEPHONE ENCOUNTER
Call to patient and significant other to review update from Dr. Sanchez after she reviewed the CT results.     All questions answered    KATIUSKA GrahamN, RN  RN Care Coordinator Rheumatology        ----- Message from Ede Sanchez MD sent at 8/21/2023  1:12 PM CDT -----  Regarding: results are benign  Hi Yadira,    Not suggestive of lung cancer, which is good news!      Would you plz let them know? Thank you        Chest CT:    IMPRESSION: Unchanged 9 x 7 mm left apical pleural attached nodular  density from 7/7/2023 and 1/9/2015. Therefore, likely benign scar. No  other suspicious pulmonary opacities pleural air collections. Small  pericardial effusion. Left atrial enlargement. Atherosclerosis.  Probable reactive unchanged mediastinal lymph nodes from recent  previous.     KAYLAN HILLMAN MD

## 2023-08-22 ENCOUNTER — PREP FOR PROCEDURE (OUTPATIENT)
Dept: OTOLARYNGOLOGY | Facility: CLINIC | Age: 78
End: 2023-08-22

## 2023-08-22 ENCOUNTER — TELEPHONE (OUTPATIENT)
Dept: OTOLARYNGOLOGY | Facility: CLINIC | Age: 78
End: 2023-08-22

## 2023-08-22 ENCOUNTER — PATIENT OUTREACH (OUTPATIENT)
Dept: OTOLARYNGOLOGY | Facility: CLINIC | Age: 78
End: 2023-08-22

## 2023-08-22 DIAGNOSIS — C32.9 LARYNGEAL CANCER (H): Primary | ICD-10-CM

## 2023-08-22 LAB
PATH REPORT.COMMENTS IMP SPEC: ABNORMAL
PATH REPORT.COMMENTS IMP SPEC: ABNORMAL
PATH REPORT.COMMENTS IMP SPEC: YES
PATH REPORT.FINAL DX SPEC: ABNORMAL
PATH REPORT.GROSS SPEC: ABNORMAL
PATH REPORT.MICROSCOPIC SPEC OTHER STN: ABNORMAL
PATH REPORT.RELEVANT HX SPEC: ABNORMAL
PHOTO IMAGE: ABNORMAL

## 2023-08-22 NOTE — PROGRESS NOTES
Called patient's partner and let her know the results of the biopsy. Surgery will be scheduled for next week, patient will schedule H&P visit with PCP or reach out to writer if need help scheduling. Pre-op teaching was done, and patient's partner verbalized understanding of the situation. Nina Cardenas RN on 8/22/2023 at 3:12 PM

## 2023-08-22 NOTE — PROGRESS NOTES
Left message with patient's significant other that patient will need to hold his methotrexate one week prior to surgery and resume one week following the surgery. Provided direct number for call back with any questions. Nina Cardenas RN on 8/22/2023 at 5:12 PM

## 2023-08-22 NOTE — TELEPHONE ENCOUNTER
Spoke with Earlene & scheduled surgery with Dr. Malone on 8/30    Surgery is located at [place] [facility]    Patient will be seen for their H&P by:    PCP Dr. Escobar within 30 days of surgery - Patient confirmed their PCP on file is up to date    Anesthesia type: General      Requested Imaging required for surgery: NA    Patient needs scheduled for their 1 week post op    Patient will receive their packet via [employer] per their preference    Additional comments: JODI Pettit on 8/22/2023 at 3:18 PM

## 2023-08-22 NOTE — TELEPHONE ENCOUNTER
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

## 2023-08-23 ENCOUNTER — OFFICE VISIT (OUTPATIENT)
Dept: OTOLARYNGOLOGY | Facility: CLINIC | Age: 78
End: 2023-08-23
Payer: COMMERCIAL

## 2023-08-23 ENCOUNTER — TELEPHONE (OUTPATIENT)
Dept: OTOLARYNGOLOGY | Facility: CLINIC | Age: 78
End: 2023-08-23

## 2023-08-23 ENCOUNTER — PRE VISIT (OUTPATIENT)
Dept: OTOLARYNGOLOGY | Facility: CLINIC | Age: 78
End: 2023-08-23

## 2023-08-23 ENCOUNTER — TELEPHONE (OUTPATIENT)
Dept: RHEUMATOLOGY | Facility: CLINIC | Age: 78
End: 2023-08-23

## 2023-08-23 DIAGNOSIS — J38.7 LARYNGEAL HYPERFUNCTION: ICD-10-CM

## 2023-08-23 DIAGNOSIS — R09.89 CHRONIC THROAT CLEARING: ICD-10-CM

## 2023-08-23 DIAGNOSIS — R49.0 DYSPHONIA: Primary | ICD-10-CM

## 2023-08-23 DIAGNOSIS — J38.3 LESION OF VOCAL FOLD: ICD-10-CM

## 2023-08-23 DIAGNOSIS — J38.7 LEUKOPLAKIA OF LARYNX: ICD-10-CM

## 2023-08-23 PROCEDURE — 92507 TX SP LANG VOICE COMM INDIV: CPT | Mod: GN | Performed by: SPEECH-LANGUAGE PATHOLOGIST

## 2023-08-23 NOTE — TELEPHONE ENCOUNTER
Alli up call to patient and care giver Falguni with recommendations from Dr. Sanchez based on surgical procedure next Wednesday.  Falguni confirmed the procedure on Wednesday.  Per Dr. Aguiar-   hold methotrexate 1 wk before surgery and resume one week after.   Falguni verbalized understanding. Patient receives Methotrexate on Sundays- she will not give this Sunday and will resume on 9/3 as long as recovery is going well and he is not on any antibiotics.  All questions answered.   Encouraged patient/Falguni to call with further questions/concerns.    Yadira Miranda, KATIUSKAN, RN  RN Care Coordinator Rheumatology

## 2023-08-23 NOTE — PATIENT INSTRUCTIONS
"Perioperative voice use instructions:     For the 3 to 5 days after your surgery, you will be on complete voice rest!!!  Dr. Malone will give you the exact number after your surgery.  No talking, whispering, laughing, coughing, or sneezing  If you feel the urge to cough, use your \"Spiritism throat clears\" to help move mucus (e.g. whispering \"eh eh eh\").  Otherwise, swallow hard with or without water to resolve any throat irritation  No heavy lifting  Stay well-hydrated (e.g. 64 oz + of plain water)  Avoid any laryngeal irritation, smoking/vaping of any kind, allergens, reflux, caffeine, etc.  Avoid refluxogenic foods: spicy, fatty/oily/greasy, citrus, tomato products, chocolate, mint, caffeine, etc.  Avoid eating or drinking 2-3 hours before you go to bed so those items have had time to work through your GI tract before you lie down  Elevate the head of your bed  Wedge pillow, blocks under bed posts, etc.  We want to keep your head/throat higher than your stomach to allow gravity to keep your stomach acid from coming up to your throat and causing irritation  Performed rescue breathing twice daily for 3-5 cycles: 3 quick sniffs in through the nose with a long \"shhhhh\" for your exhale, or breathing and the rounded lips (\"noodle slurp\") with your same long \"shhhh\" on the way out     After your recommended complete voice rest...  On day 1 of being able to use her voice, you can speak for 1 minute/hour.   You should also perform your cup bubble exercises (1 to 2 inches of water in a cup with your straw saying \"nooooo\" comfortably while you blow bubbles - no straining!) for 1 minute/hour to aid in recovery and tissue mobility to keep scar tissue from forming at the surgical site  Continue performing your rescue breathing exercises as above  Speaking volume: as if you were speaking to someone at arm's length away - quiet but not whispering  Every day after this, you can increase your speaking per hour by 2 minutes.  For " example: on the second day of using your voice, you can use your voice for 3 minutes/hour, and on the third day of using your voice, you can use your voice for 5 minutes/hour, etc.  Continue vocal hygiene recommendations

## 2023-08-23 NOTE — TELEPHONE ENCOUNTER
FUTURE VISIT INFORMATION      SURGERY INFORMATION:  Date: 8/30/23  Location: [facility]  Surgeon:  Josephine Malone MD   Anesthesia Type:  general  Procedure: MICROLARYNGOSCOPY, CO2 laser resection of vocal fold lesion     RECORDS REQUESTED FROM:       Primary Care Provider:Patrick Mcintyre MD     Pertinent Medical History: hypertension

## 2023-08-23 NOTE — LETTER
"8/23/2023       RE: Joshua Ennis  [address] [facility] [address]     Dear Colleague,    Thank you for referring your patient, Joshua Ennis, to the [facility]ICE [facility] [place] [facility]. Please see a copy of my visit note below.    Critical access hospital  VOICE / UPPER AIRWAY TREATMENT NOTE (CPT 23882)      Patient's name: Joshua Ennis  Date of Session: 8/23/2023  Providing SLP: Kym Mireles MS CCC-SLP  Referring Provider: Josephine Malone MD  Insurance Coverage: [employer]  Total # of SLP Visits: 2  # of SLP Therapy Sessions: 1  Session Location: [facility]      SUBJECTIVE:  Joshua presents for pre-operative voice therapy and counseling today. A micro direct laryngoscopy is scheduled on 8/30/23 to excise a lesion at the anterior commissure.      OBJECTIVE/ASSESSMENT:    Patient Supplied Answers To Last 2 VHI Questionnaires      8/23/2023     8:49 AM   Voice Handicap Index (VHI-10)   My voice makes it difficult for people to hear me 4   People have difficulty understanding me in a noisy room 4   My voice difficulties restrict my personal and social life.  0   I feel left out of conversations because of my voice 2   My voice problem causes me to lose income 0   I feel as though I have to strain to produce voice 1   The clarity of my voice is unpredictable 2   My voice problem upsets me 1   My voice makes me feel handicapped 0   People ask, \"What's wrong with your voice?\" 2   VHI-10 16     Patient Supplied Answers To Last 2 CSI Questionnaires      8/23/2023     8:49 AM   Cough Severity Index (CSI)   My cough is worse when I lie down 0   My coughing problem causes me to restrict my personal and social life 0   I tend to avoid places because of my cough problem 0   I feel embarrassed because of my coughing problem 1   People ask, ''What's wrong?'' because I cough a lot 1   I run out of air when I cough " 0   My coughing problem affects my voice 2   My coughing problem limits my physical activity 0   My coughing problem upsets me 0   People ask me if I am sick because I cough a lot 2   CSI Score 6       THERAPEUTIC ACTIVITIES:  Perioperative voice care as addressed today. We discussed that complete voice rest (e.g. no talking, whispering, laughing, coughing, sneezing) will be required for about 3-5 days after their surgery, with their physician giving them the exact timeframe after the procedure. In that time, increasing and maintaining appropriate vocal hygiene will be helpful in promoting healing, including but not limited to increase hydration, reducing caffeine and alcohol due to their drying effects, behavioral reflux precautions, avoiding smoking, etc. Cough suppression techniques and rescue breathing strategies were instructed if this is needed immediately post-op, and Joshua performed these with high accuracy following instruction. After this span of complete vocal rest, he will increase their voice use to 1 minute per hour on the first day and then increasing by 2 minute each day (e.g. 1 min/hour on day 1 of voice use, 3 min/hour on day 2, 5 min/hour on day 3, etc.). Using a volume as if speaking to someone at an arm's length distance away is recommended, as well as avoiding telephone conversations. One of these minutes per hour will be spent performing SOVT exercises (e.g. straw phonation with water resistance) to aid with resuming appropriate vocal fold vibration/pliability, reduce edema, and lessen risk for scar. These exercises were instructed today, and Joshua performed them with high accuracy. He was cued to avoid straining, although this was only observed rarely and mildly. Follow-up appointments with Dr. Malone will be scheduled appropriately, and Joshua and KATIE (or a member of my team) will plan to continue voice therapy about 4 post-op.      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       PLAN:  Joshua will perform SOVT exercises for 1 minute per hour beginning on his first day of voice use post-operatively  Joshua will adhere to post-operative voice rest recommendations after his surgery  Joshua will perform rescue breathing techniques multiple times daily after his surgery to reduce risk of scar at the anterior commissure  Joshua will utilize cough suppression strategies as needed between sessions  Written instructions were provided to aid home programming via [employer] and written instructions  Joshua continues to demonstrate adequate progress toward long- and short-term goals.  Continued voice therapy services are recommended. Joshua will follow-up for additional sessions in 4 weeks. Current goals will continue to be addressed.  Joshua is in agreement with this plan of care.      SLP PLAN:  Voice SLP presence at next appointment with laryngologist (e.g. Dr. Malone, Dr. Jimenez, Dr. Reid) is recommended to monitor healing after his procedure and answer any questions he might have. Next scheduled MD appointment is 23.      BILLING SUMMARY:  Total treatment time: 56 minutes from 9:04 - 10:00 AM (including 6 minutes of chart review and preparation, interpretation of testing and therapeutic maneuvers, and document writing)  Speech Pathology Treatment (67064)      Kym Mireles MS CCC-SLP  Speech-Language Pathologist  [facility]  Department of Otolaryngology - Head and Neck Surgery  [facility]  [email or web address]  Direct: [phone]  [phone]    *This note may have been completed using [id number] dictation software, so errors may exist. Please contact me for clarification if  needed*      Again, thank you for allowing me to participate in the care of your patient.      Sincerely,    Kym Mireles, SLP

## 2023-08-23 NOTE — PROGRESS NOTES
"[facility]ICE CLINIC  VOICE / UPPER AIRWAY TREATMENT NOTE (CPT 99960)      Patient's name: Joshua Ennis  Date of Session: 8/23/2023  Providing SLP: Kym Mireles MS CCC-SLP  Referring Provider: Josephine Malone MD  Insurance Coverage: [employer]  Total # of SLP Visits: 2  # of SLP Therapy Sessions: 1  Session Location: [facility]      SUBJECTIVE:  Joshua presents for pre-operative voice therapy and counseling today. A micro direct laryngoscopy is scheduled on 8/30/23 to excise a lesion at the anterior commissure.      OBJECTIVE/ASSESSMENT:    Patient Supplied Answers To Last 2 VHI Questionnaires      8/23/2023     8:49 AM   Voice Handicap Index (VHI-10)   My voice makes it difficult for people to hear me 4   People have difficulty understanding me in a noisy room 4   My voice difficulties restrict my personal and social life.  0   I feel left out of conversations because of my voice 2   My voice problem causes me to lose income 0   I feel as though I have to strain to produce voice 1   The clarity of my voice is unpredictable 2   My voice problem upsets me 1   My voice makes me feel handicapped 0   People ask, \"What's wrong with your voice?\" 2   VHI-10 16     Patient Supplied Answers To Last 2 CSI Questionnaires      8/23/2023     8:49 AM   Cough Severity Index (CSI)   My cough is worse when I lie down 0   My coughing problem causes me to restrict my personal and social life 0   I tend to avoid places because of my cough problem 0   I feel embarrassed because of my coughing problem 1   People ask, ''What's wrong?'' because I cough a lot 1   I run out of air when I cough 0   My coughing problem affects my voice 2   My coughing problem limits my physical activity 0   My coughing problem upsets me 0   People ask me if I am sick because I cough a lot 2   CSI Score 6       THERAPEUTIC ACTIVITIES:  Perioperative voice care as addressed today. We discussed that complete voice rest " (e.g. no talking, whispering, laughing, coughing, sneezing) will be required for about 3-5 days after their surgery, with their physician giving them the exact timeframe after the procedure. In that time, increasing and maintaining appropriate vocal hygiene will be helpful in promoting healing, including but not limited to increase hydration, reducing caffeine and alcohol due to their drying effects, behavioral reflux precautions, avoiding smoking, etc. Cough suppression techniques and rescue breathing strategies were instructed if this is needed immediately post-op, and Joshua performed these with high accuracy following instruction. After this span of complete vocal rest, he will increase their voice use to 1 minute per hour on the first day and then increasing by 2 minute each day (e.g. 1 min/hour on day 1 of voice use, 3 min/hour on day 2, 5 min/hour on day 3, etc.). Using a volume as if speaking to someone at an arm's length distance away is recommended, as well as avoiding telephone conversations. One of these minutes per hour will be spent performing SOVT exercises (e.g. straw phonation with water resistance) to aid with resuming appropriate vocal fold vibration/pliability, reduce edema, and lessen risk for scar. These exercises were instructed today, and Joshua performed them with high accuracy. He was cued to avoid straining, although this was only observed rarely and mildly. Follow-up appointments with Dr. Malone will be scheduled appropriately, and Joshua and I (or a member of my team) will plan to continue voice therapy about 4 post-op.      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       PLAN:  Joshua will perform SOVT exercises  for 1 minute per hour beginning on his first day of voice use post-operatively  Joshua will adhere to post-operative voice rest recommendations after his surgery  Joshua will perform rescue breathing techniques multiple times daily after his surgery to reduce risk of scar at the anterior commissure  Joshua will utilize cough suppression strategies as needed between sessions  Written instructions were provided to aid home programming via [employer] and written instructions  Joshua continues to demonstrate adequate progress toward long- and short-term goals.  Continued voice therapy services are recommended. Joshua will follow-up for additional sessions in 4 weeks. Current goals will continue to be addressed.  Joshua is in agreement with this plan of care.      SLP PLAN:  Voice SLP presence at next appointment with laryngologist (e.g. Dr. Malone, Dr. Jimenez, Dr. Reid) is recommended to monitor healing after his procedure and answer any questions he might have. Next scheduled MD appointment is 23.      BILLING SUMMARY:  Total treatment time: 56 minutes from 9:04 - 10:00 AM (including 6 minutes of chart review and preparation, interpretation of testing and therapeutic maneuvers, and document writing)  Speech Pathology Treatment (03154)      Kym Mireles MS CCC-SLP  Speech-Language Pathologist  [facility]  Department of Otolaryngology - Head and Neck Surgery  [facility]  [email or web address]  Direct: [phone]  [phone]    *This note may have been completed using [id number]-fl-abop dictation software, so errors may exist. Please contact me for clarification if needed*

## 2023-08-28 ENCOUNTER — PRE VISIT (OUTPATIENT)
Dept: SURGERY | Facility: CLINIC | Age: 78
End: 2023-08-28

## 2023-08-28 ENCOUNTER — OFFICE VISIT (OUTPATIENT)
Dept: FAMILY MEDICINE | Facility: CLINIC | Age: 78
End: 2023-08-28
Payer: COMMERCIAL

## 2023-08-28 ENCOUNTER — LAB (OUTPATIENT)
Dept: FAMILY MEDICINE | Facility: CLINIC | Age: 78
End: 2023-08-28

## 2023-08-28 VITALS
OXYGEN SATURATION: 97 % | TEMPERATURE: 97.9 F | RESPIRATION RATE: 20 BRPM | HEART RATE: 72 BPM | SYSTOLIC BLOOD PRESSURE: 113 MMHG | BODY MASS INDEX: 29.03 KG/M2 | HEIGHT: 69 IN | DIASTOLIC BLOOD PRESSURE: 66 MMHG | WEIGHT: 196 LBS

## 2023-08-28 DIAGNOSIS — I10 ESSENTIAL HYPERTENSION: ICD-10-CM

## 2023-08-28 DIAGNOSIS — N18.31 STAGE 3A CHRONIC KIDNEY DISEASE (H): ICD-10-CM

## 2023-08-28 DIAGNOSIS — C32.9 LARYNGEAL CANCER (H): ICD-10-CM

## 2023-08-28 DIAGNOSIS — Z12.11 SCREEN FOR COLON CANCER: ICD-10-CM

## 2023-08-28 DIAGNOSIS — M31.30 GRANULOMATOSIS WITH POLYANGIITIS, UNSPECIFIED WHETHER RENAL INVOLVEMENT (H): ICD-10-CM

## 2023-08-28 DIAGNOSIS — Z01.818 PREOP GENERAL PHYSICAL EXAM: Primary | ICD-10-CM

## 2023-08-28 PROCEDURE — 99214 OFFICE O/P EST MOD 30 MIN: CPT | Performed by: FAMILY MEDICINE

## 2023-08-28 ASSESSMENT — PAIN SCALES - GENERAL: PAINLEVEL: NO PAIN (0)

## 2023-08-28 NOTE — PROGRESS NOTES
[facility]  [address] [place] [address]  Phone: [phone]  Fax: [phone]  Primary Provider: Patrick Mcintyre  Pre-op Performing Provider: PATRICK MCINTYRE      PREOPERATIVE EVALUATION:  Today's date: 8/28/2023    Joshua Ennis is a 78 year old male who presents for a preoperative evaluation.      8/28/2023     7:55 AM   Additional Questions   Roomed by veronica snowden cma       Surgical Information:  Surgery/Procedure:   MICROLARYNGOSCOPY, CO2 laser resection of vocal fold lesion     possible steroid injection       Surgery Location: [facility]   Surgeon: Josephine Malone MD   Surgery Date:  8/30/2023   Time of Surgery: 9:10 AM   Where patient plans to recover: At home with family  Fax number for surgical facility: Note does not need to be faxed, will be available electronically in Epic.    Assessment & Plan     The proposed surgical procedure is considered LOW risk.      ICD-10-CM    1. Preop general physical exam  Z01.818       2. 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 Screen for colon cancer  Z12.11 COLOGUARD(EX[employer] SCI[employer])      4. Stage 3a chronic kidney disease (H)  N18.31       5. Essential hypertension  I10       6. Granulomatosis with polyangiitis, unspecified whether renal involvement (H)  M31.30               - No identified additional risk factors other than previously addressed    Antiplatelet or Anticoagulation Medication Instructions:   - Patient is on no antiplatelet or anticoagulation medications.    Additional Medication Instructions:  Hold MTX  for 2 weeks     RECOMMENDATION:  APPROVAL GIVEN to proceed with proposed procedure, without further diagnostic evaluation.        Subjective   HPI related to upcoming procedure:   78-year-old male presents for a preop physical exam.  Patient is scheduled to have microlaryngoscopy, carbon dioxide laser resection of vocal cord lesion, possible steroid injection on August 30, 2023.  He requires evaluation and anesthesia risk  assessment prior to undergoing surgery/procedure.  Patient denies any fever, chills, sore throat, cough, shortness of breath, chest pain, palpitation, diarrhea, constipation, abdominal pain, headache or other relevant systemic symptoms.           8/28/2023     7:47 AM   Preop Questions   1. Have you ever had a heart attack or stroke? No   2. Have you ever had surgery on your heart or blood vessels, such as a stent placement, a coronary artery bypass, or surgery on an artery in your head, neck, heart, or legs? No   3. Do you have chest pain with activity? No   4. Do you have a history of  heart failure? No   5. Do you currently have a cold, bronchitis or symptoms of other infection? No   6. Do you have a cough, shortness of breath, or wheezing? No   7. Do you or anyone in your family have previous history of blood clots? UNKNOWN -    8. Do you or does anyone in your family have a serious bleeding problem such as prolonged bleeding following surgeries or cuts? UNKNOWN -    9. Have you ever had problems with anemia or been told to take iron pills? No   10. Have you had any abnormal blood loss such as black, tarry or bloody stools? No   11. Have you ever had a blood transfusion? No   12. Are you willing to have a blood transfusion if it is medically needed before, during, or after your surgery? Yes   13. Have you or any of your relatives ever had problems with anesthesia? No   14. Do you have sleep apnea, excessive snoring or daytime drowsiness? No   15. Do you have any artifical heart valves or other implanted medical devices like a pacemaker, defibrillator, or continuous glucose monitor? No   16. Do you have artificial joints? No   17. Are you allergic to latex? No       Health Care Directive:  Patient does not have a Health Care Directive or Living Will: Advance Directive received and scanned. Click on Code in the patient header to view.    Preoperative Review of :   reviewed - no record of controlled  substances prescribed.      Review of Systems  CONSTITUTIONAL: NEGATIVE for fever, chills, change in weight  INTEGUMENTARY/SKIN: NEGATIVE for worrisome rashes, moles or lesions  EYES: NEGATIVE for vision changes or irritation  ENT/MOUTH: NEGATIVE for ear, mouth and throat problems  RESP: NEGATIVE for significant cough or SOB  CV: NEGATIVE for chest pain, palpitations or peripheral edema  GI: NEGATIVE for nausea, abdominal pain, heartburn, or change in bowel habits  : NEGATIVE for frequency, dysuria, or hematuria  MUSCULOSKELETAL: NEGATIVE for significant arthralgias or myalgia  NEURO: NEGATIVE for weakness, dizziness or paresthesias  ENDOCRINE: NEGATIVE for temperature intolerance, skin/hair changes  HEME: NEGATIVE for bleeding problems  PSYCHIATRIC: NEGATIVE for changes in mood or affect    Patient Active Problem List    Diagnosis Date Noted    Multiple fractures of ribs, left side, initial encounter for closed fracture 11/02/2020     Priority: Medium    colonic polyps- Tubular Adenomas 04/18/2018     Priority: Medium     Collected: 4/13/2018   Received: 4/13/2018   Reported: 4/17/2018 19:01   Ordering Phy(s): GERMAN ASENCIO     For improved result formatting, select 'View Enhanced Report Format' under    Linked Documents section.     SPECIMEN(S):   A: Cecal polyp   B: Colon polyp at 30 cm     FINAL DIAGNOSIS:   A.  Cecum, mucosal biopsies:   - Tubular adenoma.   - Negative for high-grade dysplasia and malignancy.     B.  Colon at 30 cm, mucosal biopsies:   - Tubular adenoma and a fragment of normal colonic mucosa.   - Negative for high-grade dysplasia and malignancy.     Electronically signed out by:     Yuliet Melissa M.D.      CKD (chronic kidney disease) stage 3, GFR 30-59 ml/min (H) 06/18/2014     Priority: Medium    Granulomatosis with polyangiitis (H) 09/12/2013     Priority: Medium     Diagnosis updated by automated process. Provider to review and confirm.      Encounter for long-term (current) use of  steroids 07/30/2013     Priority: Medium    Anemia 07/10/2013     Priority: Medium    Hypertension, renal 05/20/2013     Priority: Medium    GERD (gastroesophageal reflux disease) 04/26/2013     Priority: Medium    Advanced directives, counseling/discussion 04/15/2013     Priority: Medium     Advance Care Planning:   Receipt of ACP document:  Received: Health Care Directive which was witnessed or notarized on 5/9/2013.  Document not previously scanned.  Validation form completed and sent with document to be scanned.  Code Status reflects choices in most recent ACP document.  Confirmed/documented designated decision maker(s). See permanent comments section of demographics in clinical tab. View document(s) and details by clicking on code status.   Added by Ana Ochoa on 6/3/2013.            Idiopathic progressive polyneuropathy 04/11/2013     Priority: Medium     Class: Acute    CARDIOVASCULAR SCREENING; LDL GOAL LESS THAN 160 07/06/2012     Priority: Medium    Tobacco abuse 04/16/2009     Priority: Medium     3/4 pack/day  X 25 years         Past Medical History:   Diagnosis Date    Arthritis     Autoimmune disease (H)     Hearing problem     Hoarseness     Hypertension     Kidney problem     Malignant melanoma (H)     Malignant neoplasm (H)     melanoma    Squamous cell carcinoma     Russo syndrome      Past Surgical History:   Procedure Laterality Date    BIOPSY  11/2011    melanoma on back    DISSECT LYMPH NODE INGUINAL  3/1/2013    Procedure: DISSECT LYMPH NODE INGUINAL;  Bilateral Inguinal Lymph Node Biopsy.;  Surgeon: Tariq Acosta MD;  Location: [facility]    ESOPHAGOSCOPY, GASTROSCOPY, DUODENOSCOPY (EGD), COMBINED  7/5/2013    Procedure: COMBINED ESOPHAGOSCOPY, GASTROSCOPY, DUODENOSCOPY (EGD);  Gastroscopy;  Surgeon: Tariq Acosta MD;  Location: [facility]    HERNIORRHAPHY INGUINAL  8/6/2012    Procedure: HERNIORRHAPHY INGUINAL;  Open Repair Left Inguinal Hernia;  Surgeon: Jerad Baker MD;   "Location: [facility]     Current Outpatient Medications   Medication Sig Dispense Refill    calcium carbonate 600 mg-vitamin D 400 units (CALTRATE) 600-400 MG-UNIT per tablet Take 1 tablet by mouth 2 times daily.      Cholecalciferol (VITAMIN D) 1000 UNITS capsule Take 1 capsule by mouth daily.      folic acid (FOLVITE) 1 MG tablet Take 2 tablets (2 mg) by mouth daily 180 tablet 3    gabapentin (NEURONTIN) 400 MG capsule Take 1 capsule (400 mg) by mouth At Bedtime 90 capsule 3    losartan (COZAAR) 50 MG tablet Take 1 tablet (50 mg) by mouth daily Please keep your appointment on 7/6/23. Thank you. 90 tablet 1    methotrexate sodium 2.5 MG TABS Take 4 tablets (10 mg) by mouth once a week 48 tablet 1    Acetaminophen (TYLENOL PO) Take 650 mg by mouth once as needed for mild pain or fever  (Patient not taking: Reported on 8/28/2023)      Blood Pressure Monitor KIT Automatic Blood Pressure Monitor (Patient not taking: Reported on 7/7/2022) 1 kit 0       Allergies   Allergen Reactions    Shrimp Nausea and Vomiting     Got sick each time he ate it        Social History     Tobacco Use    Smoking status: Former     Packs/day: 1.00     Years: 20.00     Pack years: 20.00     Types: Cigarettes     Quit date: 2/14/2013     Years since quitting: 10.5    Smokeless tobacco: Never   Substance Use Topics    Alcohol use: Yes     Comment: rare     Family History   Problem Relation Age of Onset    Diabetes Mother     Hypertension Mother     Cancer Father         stomach    Heart Disease Father     Heart Disease Brother     Diabetes Sister     Diabetes Sister     Diabetes Sister     Heart Disease Brother     Cancer Sister     Glaucoma No family hx of     Macular Degeneration No family hx of      History   Drug Use No         Objective     /66 (BP Location: Right arm, Patient Position: Sitting, Cuff Size: Adult Regular)   Pulse 72   Temp 97.9  F (36.6  C) (Oral)   Resp 20   Ht 1.753 m (5' 9.02\")   Wt 88.9 kg (196 lb)   SpO2 97%   " BMI 28.93 kg/m      Physical Exam    GENERAL APPEARANCE: alert, active, and no distress     EYES: EOMI,  PERRL     HENT: ear canals and TM's normal and nose and mouth without ulcers or lesions     NECK: no adenopathy, no asymmetry, masses, or scars and thyroid normal to palpation     RESP: lungs clear to auscultation - no rales, rhonchi or wheezes     CV: regular rates and rhythm, normal S1 S2, no S3 or S4 and no murmur, click or rub     ABDOMEN:  soft, nontender, no HSM or masses and bowel sounds normal     MS: extremities normal- no gross deformities noted, no evidence of inflammation in joints, FROM in all extremities.     SKIN: no suspicious lesions or rashes     NEURO: Normal strength and tone, sensory exam grossly normal, mentation intact and speech normal     PSYCH: mentation appears normal. and affect normal/bright     LYMPHATICS: No cervical adenopathy    Recent Labs   Lab Test 08/16/23  1458 07/06/23  0942 03/10/23  1429   HGB  --  14.6 13.8   PLT  --  166 191   CR 1.6* 1.45* 1.45*        Diagnostics:  No labs were ordered during this visit.   No EKG required for low risk surgery (cataract, skin procedure, breast biopsy, etc).    Revised Cardiac Risk Index (RCRI):  The patient has the following serious cardiovascular risks for perioperative complications:   - No serious cardiac risks = 0 points     RCRI Interpretation: 0 points: Class I (very low risk - 0.4% complication rate)         Signed Electronically by: Patrick Mcintyre MD  Copy of this evaluation report is provided to requesting physician.

## 2023-08-28 NOTE — PATIENT INSTRUCTIONS
For informational purposes only. Not to replace the advice of your health care provider. Copyright   2003,  [facility]. All rights reserved. Clinically reviewed by Ashley Tran MD. [employer] [id number] - REV .  Preparing for Your Surgery  Getting started  A nurse will call you to review your health history and instructions. They will give you an arrival time based on your scheduled surgery time. Please be ready to share:  Your doctor's clinic name and phone number  Your medical, surgical, and anesthesia history  A list of allergies and sensitivities  A list of medicines, including herbal treatments and over-the-counter drugs  Whether the patient has a legal guardian (ask how to send us the papers in advance)  Please tell us if you're pregnant--or if there's any chance you might be pregnant. Some surgeries may injure a fetus (unborn baby), so they require a pregnancy test. Surgeries that are safe for a fetus don't always need a test, and you can choose whether to have one.   If you have a child who's having surgery, please ask for a copy of Preparing for Your Child's Surgery.    Preparing for surgery  Within 10 to 30 days of surgery: Have a pre-op exam (sometimes called an H&P, or History and Physical). This can be done at a clinic or pre-operative center.  If you're having a , you may not need this exam. Talk to your care team.  At your pre-op exam, talk to your care team about all medicines you take. If you need to stop any medicines before surgery, ask when to start taking them again.  We do this for your safety. Many medicines can make you bleed too much during surgery. Some change how well surgery (anesthesia) drugs work.  Call your insurance company to let them know you're having surgery. (If you don't have insurance, call [phone].)  Call your clinic if there's any change in your health. This includes signs of a cold or flu (sore throat, runny nose, cough, rash, fever). It also  includes a scrape or scratch near the surgery site.  If you have questions on the day of surgery, call your hospital or surgery center.  Eating and drinking guidelines  For your safety: Unless your surgeon tells you otherwise, follow the guidelines below.  Eat and drink as usual until 8 hours before you arrive for surgery. After that, no food or milk.  Drink clear liquids until 2 hours before you arrive. These are liquids you can see through, like water, Gatorade, and Propel Water. They also include plain black coffee and tea (no cream or milk), candy, and breath mints. You can spit out gum when you arrive.  If you drink alcohol: Stop drinking it the night before surgery.  If your care team tells you to take medicine on the morning of surgery, it's okay to take it with a sip of water.  Preventing infection  Shower or bathe the night before and morning of your surgery. Follow the instructions your clinic gave you. (If no instructions, use regular soap.)  Don't shave or clip hair near your surgery site. We'll remove the hair if needed.  Don't smoke or vape the morning of surgery. You may chew nicotine gum up to 2 hours before surgery. A nicotine patch is okay.  Note: Some surgeries require you to completely quit smoking and nicotine. Check with your surgeon.  Your care team will make every effort to keep you safe from infection. We will:  Clean our hands often with soap and water (or an alcohol-based hand rub).  Clean the skin at your surgery site with a special soap that kills germs.  Give you a special gown to keep you warm. (Cold raises the risk of infection.)  Wear special hair covers, masks, gowns and gloves during surgery.  Give antibiotic medicine, if prescribed. Not all surgeries need antibiotics.  What to bring on the day of surgery  Photo ID and insurance card  Copy of your health care directive, if you have one  Glasses and hearing aids (bring cases)  You can't wear contacts during surgery  Inhaler and eye  drops, if you use them (tell us about these when you arrive)  CPAP machine or breathing device, if you use them  A few personal items, if spending the night  If you have . . .  A pacemaker, ICD (cardiac defibrillator) or other implant: Bring the ID card.  An implanted stimulator: Bring the remote control.  A legal guardian: Bring a copy of the certified (court-stamped) guardianship papers.  Please remove any jewelry, including body piercings. Leave jewelry and other valuables at home.  If you're going home the day of surgery  You must have a responsible adult drive you home. They should stay with you overnight as well.  If you don't have someone to stay with you, and you aren't safe to go home alone, we may keep you overnight. Insurance often won't pay for this.  After surgery  If it's hard to control your pain or you need more pain medicine, please call your surgeon's office.  Questions?   If you have any questions for your care team, list them here: _________________________________________________________________________________________________________________________________________________________________________ ____________________________________ ____________________________________ ____________________________________    How to Take Your Medication Before Surgery

## 2023-08-30 ENCOUNTER — PREP FOR PROCEDURE (OUTPATIENT)
Dept: OTOLARYNGOLOGY | Facility: CLINIC | Age: 78
End: 2023-08-30

## 2023-08-30 ENCOUNTER — HOSPITAL ENCOUNTER (OUTPATIENT)
Facility: CLINIC | Age: 78
Discharge: HOME | End: 2023-08-30
Attending: OTOLARYNGOLOGY | Admitting: OTOLARYNGOLOGY
Payer: COMMERCIAL

## 2023-08-30 ENCOUNTER — ANESTHESIA EVENT (OUTPATIENT)
Dept: SURGERY | Facility: CLINIC | Age: 78
End: 2023-08-30
Payer: COMMERCIAL

## 2023-08-30 ENCOUNTER — ANESTHESIA (OUTPATIENT)
Dept: SURGERY | Facility: CLINIC | Age: 78
End: 2023-08-30
Payer: COMMERCIAL

## 2023-08-30 VITALS
BODY MASS INDEX: 28.83 KG/M2 | DIASTOLIC BLOOD PRESSURE: 80 MMHG | HEIGHT: 69 IN | HEART RATE: 67 BPM | TEMPERATURE: 97.3 F | WEIGHT: 194.67 LBS | OXYGEN SATURATION: 95 % | SYSTOLIC BLOOD PRESSURE: 117 MMHG | RESPIRATION RATE: 16 BRPM

## 2023-08-30 DIAGNOSIS — Z72.0 TOBACCO ABUSE: Primary | ICD-10-CM

## 2023-08-30 DIAGNOSIS — C32.0 CANCER OF GLOTTIS (H): Primary | ICD-10-CM

## 2023-08-30 DIAGNOSIS — G62.9 NEUROPATHY: ICD-10-CM

## 2023-08-30 DIAGNOSIS — I10 HYPERTENSION, UNSPECIFIED TYPE: ICD-10-CM

## 2023-08-30 PROCEDURE — 370N000017 HC ANESTHESIA TECHNICAL FEE, PER MIN: Performed by: OTOLARYNGOLOGY

## 2023-08-30 PROCEDURE — 250N000009 HC RX 250: Performed by: NURSE ANESTHETIST, CERTIFIED REGISTERED

## 2023-08-30 PROCEDURE — 250N000009 HC RX 250: Performed by: OTOLARYNGOLOGY

## 2023-08-30 PROCEDURE — 258N000003 HC RX IP 258 OP 636: Performed by: NURSE ANESTHETIST, CERTIFIED REGISTERED

## 2023-08-30 PROCEDURE — 272N000002 HC OR SUPPLY OTHER OPNP: Performed by: OTOLARYNGOLOGY

## 2023-08-30 PROCEDURE — 360N000077 HC SURGERY LEVEL 4, PER MIN: Performed by: OTOLARYNGOLOGY

## 2023-08-30 PROCEDURE — 710N000012 HC RECOVERY PHASE 2, PER MINUTE: Performed by: OTOLARYNGOLOGY

## 2023-08-30 PROCEDURE — 250N000011 HC RX IP 250 OP 636: Mod: JZ | Performed by: ANESTHESIOLOGY

## 2023-08-30 PROCEDURE — 88305 TISSUE EXAM BY PATHOLOGIST: CPT | Mod: TC | Performed by: OTOLARYNGOLOGY

## 2023-08-30 PROCEDURE — 250N000011 HC RX IP 250 OP 636: Performed by: NURSE ANESTHETIST, CERTIFIED REGISTERED

## 2023-08-30 PROCEDURE — 272N000001 HC OR GENERAL SUPPLY STERILE: Performed by: OTOLARYNGOLOGY

## 2023-08-30 PROCEDURE — 250N000011 HC RX IP 250 OP 636: Mod: JZ | Performed by: NURSE ANESTHETIST, CERTIFIED REGISTERED

## 2023-08-30 PROCEDURE — 710N000010 HC RECOVERY PHASE 1, LEVEL 2, PER MIN: Performed by: OTOLARYNGOLOGY

## 2023-08-30 PROCEDURE — 250N000025 HC SEVOFLURANE, PER MIN: Performed by: OTOLARYNGOLOGY

## 2023-08-30 PROCEDURE — 250N000013 HC RX MED GY IP 250 OP 250 PS 637: Performed by: ANESTHESIOLOGY

## 2023-08-30 PROCEDURE — 250N000011 HC RX IP 250 OP 636: Mod: JZ | Performed by: OTOLARYNGOLOGY

## 2023-08-30 PROCEDURE — 999N000141 HC STATISTIC PRE-PROCEDURE NURSING ASSESSMENT: Performed by: OTOLARYNGOLOGY

## 2023-08-30 DEVICE — BARD® CLEAN-CATH® INTERMITTENT CATHETER
Type: IMPLANTABLE DEVICE | Site: THROAT | Status: NON-FUNCTIONAL
Brand: BARD® CLEAN-CATH® INTERMITTENT CATHETER
Removed: 2023-09-13

## 2023-08-30 RX ORDER — SODIUM CHLORIDE, SODIUM LACTATE, POTASSIUM CHLORIDE, CALCIUM CHLORIDE 600; 310; 30; 20 MG/100ML; MG/100ML; MG/100ML; MG/100ML
INJECTION, SOLUTION INTRAVENOUS CONTINUOUS PRN
Status: DISCONTINUED | OUTPATIENT
Start: 2023-08-30 | End: 2023-08-30

## 2023-08-30 RX ORDER — LIDOCAINE HYDROCHLORIDE 20 MG/ML
INJECTION, SOLUTION INFILTRATION; PERINEURAL PRN
Status: DISCONTINUED | OUTPATIENT
Start: 2023-08-30 | End: 2023-08-30

## 2023-08-30 RX ORDER — HYDROMORPHONE HCL IN WATER/PF 6 MG/30 ML
0.2 PATIENT CONTROLLED ANALGESIA SYRINGE INTRAVENOUS EVERY 5 MIN PRN
Status: DISCONTINUED | OUTPATIENT
Start: 2023-08-30 | End: 2023-08-30 | Stop reason: HOSPADM

## 2023-08-30 RX ORDER — LIDOCAINE HYDROCHLORIDE AND EPINEPHRINE 10; 10 MG/ML; UG/ML
INJECTION, SOLUTION INFILTRATION; PERINEURAL PRN
Status: DISCONTINUED | OUTPATIENT
Start: 2023-08-30 | End: 2023-08-30 | Stop reason: HOSPADM

## 2023-08-30 RX ORDER — OXYMETAZOLINE HYDROCHLORIDE 0.05 G/100ML
SPRAY NASAL PRN
Status: DISCONTINUED | OUTPATIENT
Start: 2023-08-30 | End: 2023-08-30 | Stop reason: HOSPADM

## 2023-08-30 RX ORDER — ONDANSETRON 2 MG/ML
4 INJECTION INTRAMUSCULAR; INTRAVENOUS EVERY 30 MIN PRN
Status: DISCONTINUED | OUTPATIENT
Start: 2023-08-30 | End: 2023-08-30 | Stop reason: HOSPADM

## 2023-08-30 RX ORDER — FENTANYL CITRATE 50 UG/ML
50 INJECTION, SOLUTION INTRAMUSCULAR; INTRAVENOUS EVERY 5 MIN PRN
Status: DISCONTINUED | OUTPATIENT
Start: 2023-08-30 | End: 2023-08-30 | Stop reason: HOSPADM

## 2023-08-30 RX ORDER — FENTANYL CITRATE 50 UG/ML
25 INJECTION, SOLUTION INTRAMUSCULAR; INTRAVENOUS EVERY 5 MIN PRN
Status: DISCONTINUED | OUTPATIENT
Start: 2023-08-30 | End: 2023-08-30 | Stop reason: HOSPADM

## 2023-08-30 RX ORDER — ONDANSETRON 4 MG/1
4 TABLET, ORALLY DISINTEGRATING ORAL EVERY 30 MIN PRN
Status: DISCONTINUED | OUTPATIENT
Start: 2023-08-30 | End: 2023-08-30 | Stop reason: HOSPADM

## 2023-08-30 RX ORDER — OXYCODONE HYDROCHLORIDE 5 MG/1
5 TABLET ORAL EVERY 6 HOURS PRN
Qty: 10 TABLET | Refills: 0 | Status: SHIPPED | OUTPATIENT
Start: 2023-08-30 | End: 2023-08-30

## 2023-08-30 RX ORDER — OXYCODONE HYDROCHLORIDE 5 MG/1
5 TABLET ORAL EVERY 6 HOURS PRN
Qty: 10 TABLET | Refills: 0 | Status: SHIPPED | OUTPATIENT
Start: 2023-08-30 | End: 2023-09-02

## 2023-08-30 RX ORDER — PROPOFOL 10 MG/ML
INJECTION, EMULSION INTRAVENOUS PRN
Status: DISCONTINUED | OUTPATIENT
Start: 2023-08-30 | End: 2023-08-30

## 2023-08-30 RX ORDER — OXYCODONE HYDROCHLORIDE 10 MG/1
10 TABLET ORAL
Status: DISCONTINUED | OUTPATIENT
Start: 2023-08-30 | End: 2023-08-30 | Stop reason: HOSPADM

## 2023-08-30 RX ORDER — ACETAMINOPHEN 325 MG/1
650 TABLET ORAL
Status: DISCONTINUED | OUTPATIENT
Start: 2023-08-30 | End: 2023-08-30 | Stop reason: HOSPADM

## 2023-08-30 RX ORDER — ONDANSETRON 2 MG/ML
INJECTION INTRAMUSCULAR; INTRAVENOUS PRN
Status: DISCONTINUED | OUTPATIENT
Start: 2023-08-30 | End: 2023-08-30

## 2023-08-30 RX ORDER — LABETALOL 20 MG/4 ML (5 MG/ML) INTRAVENOUS SYRINGE
PRN
Status: DISCONTINUED | OUTPATIENT
Start: 2023-08-30 | End: 2023-08-30

## 2023-08-30 RX ORDER — SODIUM CHLORIDE, SODIUM LACTATE, POTASSIUM CHLORIDE, CALCIUM CHLORIDE 600; 310; 30; 20 MG/100ML; MG/100ML; MG/100ML; MG/100ML
INJECTION, SOLUTION INTRAVENOUS CONTINUOUS
Status: DISCONTINUED | OUTPATIENT
Start: 2023-08-30 | End: 2023-08-30 | Stop reason: HOSPADM

## 2023-08-30 RX ORDER — DEXAMETHASONE SODIUM PHOSPHATE 4 MG/ML
INJECTION, SOLUTION INTRA-ARTICULAR; INTRALESIONAL; INTRAMUSCULAR; INTRAVENOUS; SOFT TISSUE PRN
Status: DISCONTINUED | OUTPATIENT
Start: 2023-08-30 | End: 2023-08-30

## 2023-08-30 RX ORDER — DEXAMETHASONE SODIUM PHOSPHATE 10 MG/ML
INJECTION INTRAMUSCULAR; INTRAVENOUS PRN
Status: DISCONTINUED | OUTPATIENT
Start: 2023-08-30 | End: 2023-08-30 | Stop reason: HOSPADM

## 2023-08-30 RX ORDER — LIDOCAINE HYDROCHLORIDE 40 MG/ML
SOLUTION TOPICAL PRN
Status: DISCONTINUED | OUTPATIENT
Start: 2023-08-30 | End: 2023-08-30 | Stop reason: HOSPADM

## 2023-08-30 RX ORDER — FENTANYL CITRATE 50 UG/ML
INJECTION, SOLUTION INTRAMUSCULAR; INTRAVENOUS PRN
Status: DISCONTINUED | OUTPATIENT
Start: 2023-08-30 | End: 2023-08-30

## 2023-08-30 RX ORDER — OXYCODONE HYDROCHLORIDE 5 MG/1
5 TABLET ORAL
Status: COMPLETED | OUTPATIENT
Start: 2023-08-30 | End: 2023-08-30

## 2023-08-30 RX ORDER — HYDROMORPHONE HCL IN WATER/PF 6 MG/30 ML
0.4 PATIENT CONTROLLED ANALGESIA SYRINGE INTRAVENOUS EVERY 5 MIN PRN
Status: DISCONTINUED | OUTPATIENT
Start: 2023-08-30 | End: 2023-08-30 | Stop reason: HOSPADM

## 2023-08-30 RX ADMIN — FENTANYL CITRATE 25 MCG: 50 INJECTION, SOLUTION INTRAMUSCULAR; INTRAVENOUS at 13:47

## 2023-08-30 RX ADMIN — SODIUM CHLORIDE, POTASSIUM CHLORIDE, SODIUM LACTATE AND CALCIUM CHLORIDE: 600; 310; 30; 20 INJECTION, SOLUTION INTRAVENOUS at 09:38

## 2023-08-30 RX ADMIN — DEXAMETHASONE SODIUM PHOSPHATE 6 MG: 4 INJECTION, SOLUTION INTRA-ARTICULAR; INTRALESIONAL; INTRAMUSCULAR; INTRAVENOUS; SOFT TISSUE at 09:58

## 2023-08-30 RX ADMIN — OXYCODONE HYDROCHLORIDE 5 MG: 5 TABLET ORAL at 15:15

## 2023-08-30 RX ADMIN — SUGAMMADEX 200 MG: 100 INJECTION, SOLUTION INTRAVENOUS at 12:36

## 2023-08-30 RX ADMIN — LABETALOL 20 MG/4 ML (5 MG/ML) INTRAVENOUS SYRINGE 10 MG: at 10:37

## 2023-08-30 RX ADMIN — FENTANYL CITRATE 25 MCG: 50 INJECTION, SOLUTION INTRAMUSCULAR; INTRAVENOUS at 13:53

## 2023-08-30 RX ADMIN — ONDANSETRON 4 MG: 2 INJECTION INTRAMUSCULAR; INTRAVENOUS at 11:34

## 2023-08-30 RX ADMIN — Medication 50 MG: at 09:44

## 2023-08-30 RX ADMIN — PROPOFOL 120 MG: 10 INJECTION, EMULSION INTRAVENOUS at 09:43

## 2023-08-30 RX ADMIN — SODIUM CHLORIDE, POTASSIUM CHLORIDE, SODIUM LACTATE AND CALCIUM CHLORIDE: 600; 310; 30; 20 INJECTION, SOLUTION INTRAVENOUS at 12:11

## 2023-08-30 RX ADMIN — PROPOFOL 100 MCG/KG/MIN: 10 INJECTION, EMULSION INTRAVENOUS at 10:00

## 2023-08-30 RX ADMIN — HYDROMORPHONE HYDROCHLORIDE 0.5 MG: 1 INJECTION, SOLUTION INTRAMUSCULAR; INTRAVENOUS; SUBCUTANEOUS at 12:02

## 2023-08-30 RX ADMIN — HYDROMORPHONE HYDROCHLORIDE 0.2 MG: 0.2 INJECTION, SOLUTION INTRAMUSCULAR; INTRAVENOUS; SUBCUTANEOUS at 14:37

## 2023-08-30 RX ADMIN — DEXMEDETOMIDINE HYDROCHLORIDE 12 MCG: 100 INJECTION, SOLUTION INTRAVENOUS at 12:36

## 2023-08-30 RX ADMIN — FENTANYL CITRATE 100 MCG: 50 INJECTION, SOLUTION INTRAMUSCULAR; INTRAVENOUS at 09:42

## 2023-08-30 RX ADMIN — HYDROMORPHONE HYDROCHLORIDE 0.2 MG: 0.2 INJECTION, SOLUTION INTRAMUSCULAR; INTRAVENOUS; SUBCUTANEOUS at 14:13

## 2023-08-30 RX ADMIN — LABETALOL 20 MG/4 ML (5 MG/ML) INTRAVENOUS SYRINGE 10 MG: at 11:13

## 2023-08-30 RX ADMIN — PROPOFOL 30 MG: 10 INJECTION, EMULSION INTRAVENOUS at 10:04

## 2023-08-30 RX ADMIN — HYDROMORPHONE HYDROCHLORIDE 0.2 MG: 0.2 INJECTION, SOLUTION INTRAMUSCULAR; INTRAVENOUS; SUBCUTANEOUS at 14:51

## 2023-08-30 RX ADMIN — Medication 20 MG: at 10:07

## 2023-08-30 RX ADMIN — PROPOFOL 20 MG: 10 INJECTION, EMULSION INTRAVENOUS at 10:07

## 2023-08-30 RX ADMIN — LIDOCAINE HYDROCHLORIDE 100 MG: 20 INJECTION, SOLUTION INFILTRATION; PERINEURAL at 09:43

## 2023-08-30 RX ADMIN — Medication 10 MG: at 11:32

## 2023-08-30 ASSESSMENT — ACTIVITIES OF DAILY LIVING (ADL)
ADLS_ACUITY_SCORE: 35
ADLS_ACUITY_SCORE: 33

## 2023-08-30 ASSESSMENT — LIFESTYLE VARIABLES: TOBACCO_USE: 1

## 2023-08-30 NOTE — ANESTHESIA PREPROCEDURE EVALUATION
Anesthesia Pre-Procedure Evaluation    Patient: Joshua Ennis   MRN: [id number] : 1945        Procedure : Procedure(s):  MICROLARYNGOSCOPY, CO2 laser resection of vocal fold lesion  possible steroid injection          Past Medical History:   Diagnosis Date    Arthritis     Autoimmune disease (H)     Hearing problem     Hoarseness     Hypertension     Kidney problem     Malignant melanoma (H)     Malignant neoplasm (H)     melanoma    Squamous cell carcinoma     Russo syndrome       Past Surgical History:   Procedure Laterality Date    BIOPSY  2011    melanoma on back    DISSECT LYMPH NODE INGUINAL  3/1/2013    Procedure: DISSECT LYMPH NODE INGUINAL;  Bilateral Inguinal Lymph Node Biopsy.;  Surgeon: Tariq Acosta MD;  Location: [facility]    ESOPHAGOSCOPY, GASTROSCOPY, DUODENOSCOPY (EGD), COMBINED  2013    Procedure: COMBINED ESOPHAGOSCOPY, GASTROSCOPY, DUODENOSCOPY (EGD);  Gastroscopy;  Surgeon: Tariq Acosta MD;  Location: [facility]    HERNIORRHAPHY INGUINAL  2012    Procedure: HERNIORRHAPHY INGUINAL;  Open Repair Left Inguinal Hernia;  Surgeon: Jerad Baker MD;  Location: [facility]      Allergies   Allergen Reactions    Shrimp Nausea and Vomiting and Unknown     Got sick each time he ate it      Social History     Tobacco Use    Smoking status: Former     Packs/day: 1.00     Years: 20.00     Pack years: 20.00     Types: Cigarettes     Quit date: 2013     Years since quitting: 10.5    Smokeless tobacco: Never   Substance Use Topics    Alcohol use: Yes     Comment: rare      Wt Readings from Last 1 Encounters:   23 88.3 kg (194 lb 10.7 oz)        Anesthesia Evaluation            ROS/MED HX  ENT/Pulmonary:     (+)                tobacco use,                       Neurologic: Comment: Idiopathic progressive polyneuropathy - neg neurologic ROS     Cardiovascular:     (+)  hypertension- -   -  - -                                      METS/Exercise Tolerance: >4 METS     Hematologic:     (+)      anemia,          Musculoskeletal:   (+)  arthritis,             GI/Hepatic:     (+) GERD,                   Renal/Genitourinary:     (+) renal disease, type: CRI,            Endo:  - neg endo ROS     Psychiatric/Substance Use:       Infectious Disease:  - neg infectious disease ROS     Malignancy: Comment: skin  (+) Malignancy, History of Skin.    Other:            Physical Exam    Airway        Mallampati: I   TM distance: > 3 FB   Neck ROM: full   Mouth opening: > 3 cm    Respiratory Devices and Support         Dental           Cardiovascular   cardiovascular exam normal          Pulmonary   pulmonary exam normal                OUTSIDE LABS:  CBC:   Lab Results   Component Value Date    WBC 5.7 07/06/2023    WBC 7.1 03/10/2023    HGB 14.6 07/06/2023    HGB 13.8 03/10/2023    HCT 43.6 07/06/2023    HCT 41.7 03/10/2023     07/06/2023     03/10/2023     BMP:   Lab Results   Component Value Date     11/11/2020     11/04/2020    POTASSIUM 4.3 11/11/2020    POTASSIUM 4.4 11/04/2020    CHLORIDE 107 11/11/2020    CHLORIDE 104 11/04/2020    CO2 27 11/11/2020    CO2 31 11/04/2020    BUN 28 11/11/2020    BUN 34 (H) 11/04/2020    CR 1.6 (H) 08/16/2023    CR 1.45 (H) 07/06/2023    GLC 79 11/11/2020     (H) 11/04/2020     COAGS:   Lab Results   Component Value Date    PTT 24 04/11/2013    INR 0.98 04/11/2013     POC:   Lab Results   Component Value Date    BGM 82 03/14/2013     HEPATIC:   Lab Results   Component Value Date    ALBUMIN 4.4 07/06/2023    PROTTOTAL 7.8 12/13/2017    ALT 18 07/06/2023    AST 20 07/06/2023     (H) 03/07/2013    ALKPHOS 97 12/13/2017    BILITOTAL 0.7 12/13/2017     OTHER:   Lab Results   Component Value Date    LACT 0.8 02/14/2017    LAMAR 10.0 11/11/2020    PHOS 2.6 08/12/2016    MAG 2.1 03/14/2016    CRP <2.9 07/07/2022    SED 6 07/06/2023       Anesthesia Plan    ASA Status:  3    NPO Status:  NPO Appropriate    Anesthesia Type:  General.     - Airway: LMA   Induction: Intravenous.           Consents    Anesthesia Plan(s) and associated risks, benefits, and realistic alternatives discussed. Questions answered and patient/representative(s) expressed understanding.     - Discussed: Risks, Benefits and Alternatives for BOTH SEDATION and the PROCEDURE were discussed     - Discussed with:  Patient      - Extended Intubation/Ventilatory Support Discussed: No.      - Patient is DNR/DNI Status: No     Use of blood products discussed: No .     Postoperative Care       PONV prophylaxis: Ondansetron (or other 5HT-3)     Comments:                EVELYNE ASIF MD

## 2023-08-30 NOTE — ANESTHESIA CARE TRANSFER NOTE
Patient: Joshua Ennis    Procedure: Procedure(s):  MICROLARYNGOSCOPY, CO2 laser resection of vocal fold lesion  possible steroid injection       Diagnosis: Laryngeal cancer (H) [C32.9]  Diagnosis Additional Information: No value filed.    Anesthesia Type:   General     Note:    Oropharynx: oropharynx clear of all foreign objects and spontaneously breathing  Level of Consciousness: drowsy  Oxygen Supplementation: face mask    Independent Airway: airway patency satisfactory and stable  Dentition: dentition unchanged  Vital Signs Stable: post-procedure vital signs reviewed and stable  Report to RN Given: handoff report given  Patient transferred to: PACU    Handoff Report: Identifed the Patient, Identified the Reponsible Provider, Reviewed the pertinent medical history, Discussed the surgical course, Reviewed Intra-OP anesthesia mangement and issues during anesthesia, Set expectations for post-procedure period and Allowed opportunity for questions and acknowledgement of understanding      Vitals:  Vitals Value Taken Time   /56 08/30/23 1305   Temp 36.2  C (97.2  F) 08/30/23 1305   Pulse 53 08/30/23 1308   Resp 12 08/30/23 1308   SpO2 97 % 08/30/23 1308   Vitals shown include unvalidated device data.    Electronically Signed By: Charles Patrick Schlatter, APRN CRNA  August 30, 2023  1:10 PM

## 2023-08-30 NOTE — BRIEF OP NOTE
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]  Post-operative diagnosis Same as pre-operative diagnosis    Procedure: Procedure(s):  MICROLARYNGOSCOPY, CO2 laser resection of vocal fold lesion  possible steroid injection  Surgeon: Surgeon(s) and Role:     * Josephine Malone MD - Primary     * Mabel Montilla MD - Fellow - Assisting  Anesthesia: General   Estimated Blood Loss: Less than 10 ml    Drains: None  Specimens:   ID Type Source Tests Collected by Time Destination   1 : right glottic mass Tissue Other SURGICAL PATHOLOGY EXAM Josephine Malone MD 8/30/2023 11:20 AM    2 : anterior commissure and left glottic mass Tissue Other SURGICAL PATHOLOGY EXAM Josephine Malone MD 8/30/2023 11:48 AM    3 : inferior margin Tissue Other SURGICAL PATHOLOGY EXAM Josephine Malone MD 8/30/2023 11:52 AM    4 : anterior margin Tissue Other SURGICAL PATHOLOGY EXAM Josephine Malone MD 8/30/2023 11:52 AM    5 : right margin Tissue Other SURGICAL PATHOLOGY EXAM Josephine Malone MD 8/30/2023 11:53 AM    6 : left margin Tissue Other SURGICAL PATHOLOGY EXAM Josephine Malone MD 8/30/2023 11:53 AM    7 : right posterior margin Tissue Other SURGICAL PATHOLOGY EXAM Josephine Malone MD 8/30/2023 11:54 AM    8 : left posterior margin Tissue Other SURGICAL PATHOLOGY EXAM Josephine Malone MD 8/30/2023 11:55 AM    9 : left anterior margin Tissue Other SURGICAL PATHOLOGY EXAM Josephine Malone MD 8/30/2023 11:56 AM    10 : right anterior margin Tissue Other SURGICAL PATHOLOGY EXAM Josephine Malone MD 8/30/2023 11:57 AM    11 : Additional right posterior margin Tissue Other SURGICAL PATHOLOGY EXAM Josephine Malone MD 8/30/2023 12:07 PM    12 : Additional Left posterior margin Tissue Other SURGICAL PATHOLOGY EXAM Josephine Malone MD 8/30/2023 12:07 PM      Findings:   Mass centered around the anterior commissure, with extension along the anterior 1/3 of the tvcs bilaterally. Excised with CO2  laser. Additional margin biopsies taken. Due to size of resection and concern for formation of anterior glottic web stent consisting of 2cm section of 10F translucent sanz catheter was sutured in place at the apex of the anterior commissure within the tracheal lumen, held in place externally with prolene suture with silastic backing.  .  Complications: None.  Implants: * No implants in log *

## 2023-08-30 NOTE — DISCHARGE INSTRUCTIONS
Same-Day Surgery   Adult Discharge Orders & Instructions   For 24 hours after surgery  Get plenty of rest.  A responsible adult must stay with you for at least 24 hours after you leave the hospital.   Do not drive or use heavy equipment.  If you have weakness or tingling, don't drive or use heavy equipment until this feeling goes away.  Do not drink alcohol.  Avoid strenuous or risky activities.  Ask for help when climbing stairs.   You may feel lightheaded.  IF so, sit for a few minutes before standing.  Have someone help you get up.   If you have nausea (feel sick to your stomach): Drink only clear liquids such as apple juice, ginger ale, broth or 7-Up.  Rest may also help.  Be sure to drink enough fluids.  Move to a regular diet as you feel able.  You may have a slight fever. Call the doctor if your fever is over 100 F (37.7 C) (taken under the tongue) or lasts longer than 24 hours.  You may have a dry mouth, a sore throat, muscle aches or trouble sleeping.  These should go away after 24 hours.  Do not make important or legal decisions.   Call your doctor for any of the followin.  Signs of infection (fever, growing tenderness at the surgery site, a large amount of drainage or bleeding, severe pain, foul-smelling drainage, redness, swelling).    2. It has been over 8 to 10 hours since surgery and you are still not able to urinate (pass water).    3.  Headache for over 24 hours.    To contact a doctor, call Dr. Faisal mann [phone] or:  '   [phone] and ask for the resident on call for ENT (answered 24 hours a day)  '   Emergency Department: [facility]: [phone]

## 2023-08-30 NOTE — ANESTHESIA PROCEDURE NOTES
Airway       Patient location during procedure: OR       Procedure Start/Stop Times: 8/30/2023 9:46 AM  Staff -        CRNA: Schlatter, Charles Patrick, APRN CRNA       Performed By: CRNA  Consent for Airway        Urgency: elective  Indications and Patient Condition       Indications for airway management: reggie-procedural       Induction type:intravenous       Mask difficulty assessment: 1 - vent by mask    Final Airway Details       Final airway type: endotracheal airway       Successful airway: Oral, ETT - single and Laser  Endotracheal Airway Details        ETT size (mm): 5.0       Cuffed: yes       Successful intubation technique: direct laryngoscopy       DL Blade Type: MAC 3       Grade View of Cords: 1       Position: Left       Measured from: gums/teeth       Bite block used: None    Post intubation assessment        Placement verified by: capnometry, equal breath sounds and chest rise        Number of attempts at approach: 1       Secured with: silk tape       Ease of procedure: easy       Dentition: Intact and Unchanged    Medication(s) Administered   Medication Administration Time: 8/30/2023 9:46 AM

## 2023-08-30 NOTE — OP NOTE
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 Transoral laser microsurgery with endoscopic right and left type Tyle Vd - extended cordectomy encompassing the subglottis  Resection per ELS classification) of squamous cell carcinoma of the larynx.  2. Microsuspension direct laryngoscopy with injection of dexamethasone  3. Microsuspension with stent placement    ANESTHESIA  Type: General  ETT: 5.0 laser tenax    SURGEON:   Josephine Malone MD    ASSISTANT SURGEON(S):   Mabel Montilla MD    INDICATIONS FOR PROCEDURE:   The patient is a 78 year old male with a recently diagnosed laryngeal mass. This was consistent with laryngeal cancer, and he is being brought to the Operating Room for resection of this cancer. Risks, benefits, alternatives, and rationale for the procedure(s) listed above were discussed with the patient, and the patient wishes to proceed with surgery and has signed an informed consent.     COMPLEXITY  This surgery was more complex than normally because of performing the surgery with a very large anterior and subglottic tumor. The approach therefore was more difficult technically than normally.  Significantly more time was required to perform all parts of the operation, especially significant longer period of time was needed to perform the laser resection which required multiple repositioning procedures and then a stent had to be placed for the patient as well.     FINDINGS:   1. Exposure was achieved with a dedo laryngoscope, one click up, anterior cricoid pressure  2. Extensive tumor with anterior commissure and subglottic extensive extent involving the full length of the vocal fold and 50% of medial edge of vocal fold with raw area  3. 2cm- 10 [place] sanz stent placed   4. Bleeding on the left needed  rodríguez bovie for control        DESCRIPTION OF PROCEDURE:   The patient was brought into the operating room and placed supine on the operating room table. A time-out was performed. General anesthesia was induced. The patient was 2 hand mask ventilated. Then the patient was intubated with a mac blade and 5.0 laser tenax ETT.     Then the patient was turned 90 degrees from the anesthesiologist. The head was drapped in the usual fashion. Then the dentition and mucosa were inspected prior to start. A reinforced tooth guard was placed on the upper dentition. The dedo laryngoscope was carefully introduced into the oral cavity and gently passed to the oropharynx. Here the larynx was exposed and the patient was placed in suspension.     This revealed the tumor to be extensive on both sides with subglottic extent. Initial photodocumentation was performed with a 0 and 70 degree Coy mahin telescope. The flexible bronchoscope was used with narrow band imaging light to evaluate the edges of the lesion for hypervascularity as well as to identify other areas of hypervascularity.     Laser time out was performed and the patient was appropriately protected, the safety protocols were confirmed and the FiO2 was brought down to a minimal setting. Saline soaked pledgets were placed in the subglottis.     Subsequently, the microscope was brought in and the CO2 laser via micromanipulator was used for the resection. The resection was started beginning anteriorly and the lesion was resected in one piece. Care was taken to ensure a 2-3 mm margin around the tumor. This was a difficult resection given the significant subglottic extent. Hemostasis was achieved in with afrin and the rodríguez suction bovie .    Multiple margins were sent for permanent histopathologic evaluation.     Then 1.0 ml of steroid  was injected to the anterior commissure to reduce chance of web and scar formation.     Then a 10mm sanz catheter was cut to the size that  it would straddle the anterior commissure anterior segment.     Then a 18gauge angiocath was placed external to internal through the cricothyroid membrane and then through the thyrohyoid membrane.      Then 2 separate 0 prolene sutures with the needles cut off were placed from external to internal through the angiocath.     Then the stent was placed through one of the sutures orally and then the sutures were both tied and the stent was brought down into the glottis. This was positioned and then the sutures were tightened externally over silastic sheathing.    Photodocumentation was again performed.     This was the end of our procedure. Afrin soaked pledgets were applied to the surgical site for hemostasis.  The surgical site was then inspected and no residual bleeding was seen. The patient was taken out of suspension. The instrumentation was carefully removed, examining the mucosa and dentition on the way it. The dental guard was removed, and the mucosa and dentition were seen to be in their preoperative state at the conclusion of the procedure. The patient was then handed back to the care of anesthesia who awoke and extubated the patient without complication.    COMPLICATIONS:   None.  .   ESTIMATED BLOOD LOSS:   Less than 10cc    DISPOSITION:   PACU.    SPECIMENS:  ID Type Source Tests Collected by Time Destination   1 : right glottic mass Tissue Other SURGICAL PATHOLOGY EXAM Josephine Malone MD 8/30/2023 11:20 AM    2 : anterior commissure and left glottic mass Tissue Other SURGICAL PATHOLOGY EXAM Josephine Malone MD 8/30/2023 11:48 AM    3 : inferior margin Tissue Other SURGICAL PATHOLOGY EXAM Josephine Malone MD 8/30/2023 11:52 AM    4 : anterior margin Tissue Other SURGICAL PATHOLOGY EXAM Josephine Malone MD 8/30/2023 11:52 AM    5 : right margin Tissue Other SURGICAL PATHOLOGY EXAM Josephine Malone MD 8/30/2023 11:53 AM    6 : left margin Tissue Other SURGICAL PATHOLOGY EXAM Josephine Malone MD 8/30/2023 11:53 AM    7 : right  posterior margin Tissue Other SURGICAL PATHOLOGY EXAM Josephine Malone MD 8/30/2023 11:54 AM    8 : left posterior margin Tissue Other SURGICAL PATHOLOGY EXAM Josephine Malone MD 8/30/2023 11:55 AM    9 : left anterior margin Tissue Other SURGICAL PATHOLOGY EXAM Josephine Malone MD 8/30/2023 11:56 AM    10 : right anterior margin Tissue Other SURGICAL PATHOLOGY EXAM Josephine Malone MD 8/30/2023 11:57 AM    11 : Additional right posterior margin Tissue Other SURGICAL PATHOLOGY EXAM Joesphine Malone MD 8/30/2023 12:07 PM    12 : Additional Left posterior margin Tissue Other SURGICAL PATHOLOGY EXAM Josephine Malone MD 8/30/2023 12:07 PM           PHOTODOCUMENTATION:

## 2023-08-30 NOTE — ANESTHESIA POSTPROCEDURE EVALUATION
Patient: Joshua Ennis    Procedure: Procedure(s):  MICROLARYNGOSCOPY, CO2 laser resection of vocal fold lesion  possible steroid injection       Anesthesia Type:  General    Note:  Disposition: Outpatient   Postop Pain Control: Uneventful            Sign Out: Well controlled pain   PONV: No   Neuro/Psych: Uneventful            Sign Out: Acceptable/Baseline neuro status   Airway/Respiratory: Uneventful            Sign Out: Acceptable/Baseline resp. status   CV/Hemodynamics: Uneventful            Sign Out: Acceptable CV status; No obvious hypovolemia; No obvious fluid overload   Other NRE: NONE   DID A NON-ROUTINE EVENT OCCUR? No    Event details/Postop Comments:  Communicated with patient at bedside via pen and paper given difficulty vocalizing s/p vocal cord lesion resection           Last vitals:  Vitals Value Taken Time   /97 08/30/23 1515   Temp 35.8  C (96.4  F) 08/30/23 1345   Pulse 67 08/30/23 1520   Resp 19 08/30/23 1520   SpO2 92 % 08/30/23 1525   Vitals shown include unvalidated device data.    Electronically Signed By: Tonya Traore MD  August 30, 2023  3:27 PM

## 2023-08-31 ENCOUNTER — PATIENT OUTREACH (OUTPATIENT)
Dept: OTOLARYNGOLOGY | Facility: CLINIC | Age: 78
End: 2023-08-31

## 2023-08-31 RX ORDER — LOSARTAN POTASSIUM 50 MG/1
50 TABLET ORAL DAILY
Qty: 90 TABLET | Refills: 2 | Status: SHIPPED | OUTPATIENT
Start: 2023-08-31 | End: 2024-05-30

## 2023-08-31 NOTE — TELEPHONE ENCOUNTER
Medication/dose: losartan (COZAAR) 50 MG tablet       Last Written: 3/2/23  Last Fill Quantity: 90, # refills: 1  Last Office Visit : 7/6/23  Next Clinic Visit: 7/11/24    Potassium   Date Value Ref Range Status   11/11/2020 4.3 3.4 - 5.3 mmol/L Final     Creatinine   Date Value Ref Range Status   07/06/2023 1.45 (H) 0.67 - 1.17 mg/dL Final   03/05/2021 1.48 (H) 0.66 - 1.25 mg/dL Final     Creatinine POCT   Date Value Ref Range Status   08/16/2023 1.6 (H) 0.7 - 1.3 mg/dL Final     BP Readings from Last 3 Encounters:   08/30/23 117/80   08/28/23 113/66   08/15/23 132/72      Prescription approved per Refill Protocol    DAIJA Sales, RN  [facility] Refill Team

## 2023-08-31 NOTE — PROGRESS NOTES
Called patient's partner to check on patient, patient is doing well and resting voice as told. Patient is okay to do voice exercises, and will reach out if any questions or concerns come up before post-op . Nina Cardenas RN on 8/31/2023 at 11:15 AM

## 2023-09-05 ENCOUNTER — PATIENT OUTREACH (OUTPATIENT)
Dept: OTOLARYNGOLOGY | Facility: CLINIC | Age: 78
End: 2023-09-05

## 2023-09-05 ENCOUNTER — TELEPHONE (OUTPATIENT)
Dept: OTOLARYNGOLOGY | Facility: CLINIC | Age: 78
End: 2023-09-05

## 2023-09-05 NOTE — PROGRESS NOTES
Called linden and let her know that the upcoming surgery is to get the stent removed. Patient's SO verbalized understanding of the situation and agreed to the proposed date. Nina Cardenas RN on 9/5/2023 at 2:21 PM

## 2023-09-05 NOTE — TELEPHONE ENCOUNTER
Called patient to schedule surgery with Dr. Malone. Spoke with Patients wife. She is very confused and states she was unaware that Joshua needed another surgery. Requested to speak with Dr. Mart nurse before proceeding    Senait Pettit, Perioperative Coordinator 9/5/2023 at 1:36 PM

## 2023-09-06 NOTE — PROGRESS NOTES
[facility]  Clinical Voice and Upper Airway Evaluation Report    Patient's Name: Josuha Ennis  Date of Evaluation: 9/7/2023  Providing SLP: Kym Mireles MS CCC-SLP  Seen in Conjunction With: Josephine Malone MD - Clementina Jones CCC-SLP  Insurance Coverage: [employer]  Chief Complaint: Dysphonia  Evaluation Location: 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    Laryngeal evaluation demonstrated white crusting at the anterior commissure supraglottically and infraglottically:; white crusting and redness at the anterior portion (superior surface) of the right vocal fold and the mid portion at the superior surface and medial edge; white crusting and redness at the vibratory edge/superior surface of the left vocal fold at the middle portion that extends towards posterior portion vibratory edge and this also extends laterally at the middle superior surface; Narrow Band Imaging yielded the following: prominent white crusting observed at the anterior commissure, right,and left vocal fold with prominent bilateral erythema; mild-moderate posterior commissure hypertrophy; possible white crusting at the left medial arytenoid wall but unclear; bilateral ventricular approximation during phonation; L>R; severe four-way constriction of the supraglottic larynx during connected speech; frequent breath holding observed; RTVF Amplitude is marked at the site of anterior lesion (stiff) but mildly reduced at the posterior 2/3 portion of the vocal fold; LTVF amplitude is mildly reduced; mucosal wave of right and left vocal folds is mildly reduced;  asymmetry observed; on phonation, glottic closure is  "incomplete due to irregularity; irregular-shaped configuration of the glottis during many phonatory tasks  Perceptual evaluation demonstrated marked-severe roughness; moderate breathiness; moderate asthenia; clearer and louder voice with laugh and when he reacted (a loud exclamataion) to the Lidocaine spray;and he often spoke at a higher pitch (G3-A3) than is typical for a man of his age and gender.      STIMULABILITY: results of therapy probes during perceptual and laryngeal evaluation demonstrate improvement with flow phonation probes.   RECOMMENDATIONS:   A course of speech therapy is recommended to improve voice quality and optimize surgical results.  He demonstrates a Good prognosis for improvement given adherence to therapeutic recommendations.      Updated patient history:  Joshua underwent surgery to excise a vocal fold lesion and stent placement of the anterior commissure on 8/30/23. 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 Since his procedure, he has been performing rescue breathing intermittently throughout the day and performing short instances of SOVT exercises beginning yesterday.      Quality of Life Questionnaires:    Patient Supplied Answers To Last 2 VHI Questionnaires      8/23/2023     8:49 AM   Voice Handicap Index (VHI-10)   My voice makes it difficult for people to hear me 4   People have difficulty understanding me in a noisy room 4   My voice difficulties restrict my personal and social life.  0   I feel left out of conversations because of my voice 2   My voice problem causes me to lose income 0   I feel as though I have to strain to produce voice 1   The clarity of my voice is unpredictable 2   My voice problem upsets me 1   My voice makes me feel handicapped 0   People ask, \"What's wrong with your voice?\" 2   VHI-10 16     Patient Supplied Answers To Last 2 CSI Questionnaires      8/23/2023     " "8:49 AM   Cough Severity Index (CSI)   My cough is worse when I lie down 0   My coughing problem causes me to restrict my personal and social life 0   I tend to avoid places because of my cough problem 0   I feel embarrassed because of my coughing problem 1   People ask, ''What's wrong?'' because I cough a lot 1   I run out of air when I cough 0   My coughing problem affects my voice 2   My coughing problem limits my physical activity 0   My coughing problem upsets me 0   People ask me if I am sick because I cough a lot 2   CSI Score 6       Laryngoscopy without stroboscopy:    Provider performing exam: Josephine Malone MD    Informed consent: Informed verbal consent was obtained, which includes potential side-effects, risks, and benefits of the procedure.     Anesthetic: Topical anesthesia with 3% lidocaine and 0.25% phenylephrine was applied the nostrils bilaterally. Viscous lidocaine 4% was applied to the tip of the endoscope.    Scope type: A distal chip flexible laryngoscope was passed through the nare with halogen light source(s).    The laryngeal and pharyngeal structures were evaluated for gross appearance, mobility, function, and focal lesions / abnormalities of the associated mucosa.  Velar Function: complete closure without bubbling of secretions and no weakness observed   General Appearance: WNL   Secretions: sticky and stringing throughout the larynx/pharynx  Subglottis Appearance: white material observed in the subglottis just inferior to the anterior commmissure  R Arytenoid Abduction / Adduction: symmetrical and timely ab/adduction with appropriate range of motion   L Arytenoid Abduction / Adduction: \" \"   Mediolateral Compression: significant with phonation  Anteroposterior Compression: moderate with phonation  Vocal Fold Elongation: not assessed today  Left (L) Vocal Fold Edge and Mucosa: generally erythemic with white patches covering the anterior 2/3 of the vocal folds R>L with tethering of the " "anterior 1/2 of the true vocal folds  Right (R) Vocal Fold Edge and Mucosa: \" \"   Narrow Band Imaging: not utilized today    FEES: Evaluation was performed by Clementina Jones and Dr. Malone. Impressions from this clinical document indicates safe and functional swallow without aspiration on thin liquids       Therapeutic Techniques Attempted (39483 for Individual Speech Therapy):    Perioperative voice care was reviewed today. We discussed that complete voice rest (e.g. no talking, whispering, laughing, coughing, sneezing) will be required for about 3-5 days after their surgery, with their physician giving them the exact timeframe after the procedure. In that time, increasing and maintaining appropriate vocal hygiene will be helpful in promoting healing, including but not limited to increase hydration, reducing caffeine and alcohol due to their drying effects, behavioral reflux precautions, avoiding smoking, etc. Cough suppression techniques and rescue breathing strategies were instructed if this is needed immediately post-op, and Joshua performed these with high accuracy following instruction. After this span of complete vocal rest, he will increase their voice use to 1 minute per hour on the first day and then increasing by 2 minute each day (e.g. 1 min/hour on day 1 of voice use, 3 min/hour on day 2, 5 min/hour on day 3, etc.). Using a volume as if speaking to someone at an arm's length distance away is recommended, as well as avoiding telephone conversations. One of these minutes per hour will be spent performing SOVT exercises (e.g. straw phonation with water resistance) to aid with resuming appropriate vocal fold vibration/pliability, reduce edema, and lessen risk for scar. These exercises were instructed today, and Joshua performed them with high accuracy. He was cued to avoid straining, although this was only observed rarely and mildly. Follow-up appointments with Dr. Malone will be scheduled appropriately, and Joshua " and I (or a member of my team) will plan to continue voice therapy about 4 post-op.        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 Perceptually, his voice is very weak today, and he is talking minimally during our session, as he is just 7 days post-surgery. Laryngoscopy today demonstrated white scar tissue at the anterior 2/3s of the vocal folds, creating webbing anteriorly. Therefore, Dr. Malone plans on reducing this anterior web surgically, placing a larger stent, and injecting steroid into the anterior commissure next week. Formal re-evaluation is recommended at his next post-op appointment prior to initiating a full course of voice therapy. Perioperative care instructions were reviewed with Joshua and his wife today, as they will be repeating this process next week and had questions about gradually increasing his voice use. Joshua is in agreement with this plan of care.       Billed Procedures:    Individual speech therapy session [id number]  Assessment and treatment time: 20 minutes  Chart review, interpretation of testing, documentation preparation, etc: 17 minutes      Thank you for allowing me to participate in this patient's care,    Kym Mireles MS CCC-SLP  Speech-Language Pathologist  [facility]  Department of Otolaryngology - Head and Neck Surgery  [facility]  [email or web address]  Direct: [phone]  [phone]    *This note may have been completed using [id number] dictation software, so errors may exist. Please contact me for clarification if needed*

## 2023-09-07 ENCOUNTER — PREP FOR PROCEDURE (OUTPATIENT)
Dept: OTOLARYNGOLOGY | Facility: CLINIC | Age: 78
End: 2023-09-07

## 2023-09-07 ENCOUNTER — OFFICE VISIT (OUTPATIENT)
Dept: OTOLARYNGOLOGY | Facility: CLINIC | Age: 78
End: 2023-09-07
Payer: COMMERCIAL

## 2023-09-07 ENCOUNTER — THERAPY VISIT (OUTPATIENT)
Dept: SPEECH THERAPY | Facility: CLINIC | Age: 78
End: 2023-09-07
Payer: COMMERCIAL

## 2023-09-07 VITALS — DIASTOLIC BLOOD PRESSURE: 76 MMHG | SYSTOLIC BLOOD PRESSURE: 146 MMHG | HEART RATE: 72 BPM

## 2023-09-07 DIAGNOSIS — J38.7 LEUKOPLAKIA OF LARYNX: ICD-10-CM

## 2023-09-07 DIAGNOSIS — R13.12 OROPHARYNGEAL DYSPHAGIA: Primary | ICD-10-CM

## 2023-09-07 DIAGNOSIS — Q31.0 ANTERIOR GLOTTIC WEB OF LARYNX: ICD-10-CM

## 2023-09-07 DIAGNOSIS — R49.0 DYSPHONIA: Primary | ICD-10-CM

## 2023-09-07 DIAGNOSIS — J38.7 LARYNGEAL HYPERFUNCTION: ICD-10-CM

## 2023-09-07 DIAGNOSIS — C32.0 CANCER OF GLOTTIS (H): ICD-10-CM

## 2023-09-07 DIAGNOSIS — R09.89 CHRONIC THROAT CLEARING: ICD-10-CM

## 2023-09-07 DIAGNOSIS — C32.9 LARYNGEAL CANCER (H): ICD-10-CM

## 2023-09-07 PROCEDURE — 92610 EVALUATE SWALLOWING FUNCTION: CPT | Mod: GN | Performed by: SPEECH-LANGUAGE PATHOLOGIST

## 2023-09-07 PROCEDURE — 99214 OFFICE O/P EST MOD 30 MIN: CPT | Mod: 25 | Performed by: OTOLARYNGOLOGY

## 2023-09-07 PROCEDURE — 92612 ENDOSCOPY SWALLOW (FEES) VID: CPT | Mod: GN | Performed by: OTOLARYNGOLOGY

## 2023-09-07 PROCEDURE — 92613 ENDOSCOPY SWALLOW (FEES) I&R: CPT | Performed by: OTOLARYNGOLOGY

## 2023-09-07 PROCEDURE — 92507 TX SP LANG VOICE COMM INDIV: CPT | Mod: GN | Performed by: SPEECH-LANGUAGE PATHOLOGIST

## 2023-09-07 ASSESSMENT — PAIN SCALES - GENERAL: PAINLEVEL: NO PAIN (0)

## 2023-09-07 NOTE — PROGRESS NOTES
[facility] Voice [facility]   at [facility]   Otolaryngology Clinic     Patient: Joshua Ennis    MRN: [id number]    : 1945    Age/Gender: 78 year old male  Date of Service: 2023  Rendering Provider:   Josephine Malone MD     Chief Complaint   Vocal fold SCC  S/p MDL with bilateral cordectomy and steroid injection 23  Interval History   HISTORY OF PRESENT ILLNESS: Mr. Ennis is a 78 year old male is being followed for dysphonia. he was initially seen on 8/15/23. Please refer to this note for full history.        Today, he presents for follow up. he reports:  - doing well      PAST MEDICAL HISTORY:   Past Medical History:   Diagnosis Date    Arthritis     Autoimmune disease (H)     Hearing problem     Hoarseness     Hypertension     Kidney problem     Malignant melanoma (H)     Malignant neoplasm (H)     melanoma    Squamous cell carcinoma     Russo syndrome        PAST SURGICAL HISTORY:   Past Surgical History:   Procedure Laterality Date    BIOPSY  2011    melanoma on back    DISSECT LYMPH NODE INGUINAL  3/1/2013    Procedure: DISSECT LYMPH NODE INGUINAL;  Bilateral Inguinal Lymph Node Biopsy.;  Surgeon: Tariq Acosta MD;  Location: [facility] OR    ESOPHAGOSCOPY, GASTROSCOPY, DUODENOSCOPY (EGD), COMBINED  2013    Procedure: COMBINED ESOPHAGOSCOPY, GASTROSCOPY, DUODENOSCOPY (EGD);  Gastroscopy;  Surgeon: Tariq Acosta MD;  Location: [facility]    HERNIORRHAPHY INGUINAL  2012    Procedure: HERNIORRHAPHY INGUINAL;  Open Repair Left Inguinal Hernia;  Surgeon: Jerad Baker MD;  Location: [facility] OR    INJECT STEROID (LOCATION) N/A 2023    Procedure: steroid injection;  Surgeon: Josephine Malone MD;  Location: [facility] OR    LASER CO2 LARYNGOSCOPY N/A 2023    Procedure: MICROLARYNGOSCOPY, CO2 laser resection of vocal fold lesion;  Surgeon: Josephine Malone MD;  Location: [facility] OR       CURRENT MEDICATIONS:   Current Outpatient Medications:     Acetaminophen (TYLENOL PO), Take 650  mg by mouth once as needed for mild pain or fever, Disp: , Rfl:     Blood Pressure Monitor KIT, Automatic Blood Pressure Monitor, Disp: 1 kit, Rfl: 0    calcium carbonate 600 mg-vitamin D 400 units (CALTRATE) 600-400 MG-UNIT per tablet, Take 1 tablet by mouth 2 times daily., Disp: , Rfl:     Cholecalciferol (VITAMIN D) 1000 UNITS capsule, Take 1 capsule by mouth daily., Disp: , Rfl:     folic acid (FOLVITE) 1 MG tablet, Take 2 tablets (2 mg) by mouth daily, Disp: 180 tablet, Rfl: 3    gabapentin (NEURONTIN) 400 MG capsule, Take 1 capsule (400 mg) by mouth At Bedtime, Disp: 90 capsule, Rfl: 3    losartan (COZAAR) 50 MG tablet, Take 1 tablet (50 mg) by mouth daily, Disp: 90 tablet, Rfl: 2    methotrexate sodium 2.5 MG TABS, Take 4 tablets (10 mg) by mouth once a week, Disp: 48 tablet, Rfl: 1    ALLERGIES: Shrimp    SOCIAL HISTORY:    Social History     Socioeconomic History    Marital status: Single     Spouse name: Not on file    Number of children: Not on file    Years of education: Not on file    Highest education level: Not on file   Occupational History     Employer: [employer]   Tobacco Use    Smoking status: Former     Packs/day: 1.00     Years: 20.00     Pack years: 20.00     Types: Cigarettes     Quit date: 2/14/2013     Years since quitting: 10.5    Smokeless tobacco: Never   Vaping Use    Vaping Use: Never used   Substance and Sexual Activity    Alcohol use: Yes     Comment: rare    Drug use: No    Sexual activity: Not Currently     Partners: Female   Other Topics Concern    Parent/sibling w/ CABG, MI or angioplasty before 65F 55M? Not Asked     Service Not Asked    Blood Transfusions Not Asked    Caffeine Concern Not Asked    Occupational Exposure Not Asked    Hobby Hazards Not Asked    Sleep Concern Not Asked    Stress Concern Not Asked    Weight Concern Not Asked    Special Diet Not Asked    Back Care Not Asked    Exercise Yes     Comment: walking    Bike Helmet Not Asked    Seat Belt  Not Asked    Self-Exams Not Asked   Social History Narrative    Not on file     Social Determinants of Health     Financial Resource Strain: Not on file   Food Insecurity: Not on file   Transportation Needs: Not on file   Physical Activity: Not on file   Stress: Not on file   Social Connections: Not on file   Intimate Partner Violence: Not on file   Housing Stability: Not on file         FAMILY HISTORY:   Family History   Problem Relation Age of Onset    Diabetes Mother     Hypertension Mother     Cancer Father         stomach    Heart Disease Father     Heart Disease Brother     Diabetes Sister     Diabetes Sister     Diabetes Sister     Heart Disease Brother     Cancer Sister     Glaucoma No family hx of     Macular Degeneration No family hx of       Non-contributory for problems with anesthesia    REVIEW OF SYSTEMS:   The patient was asked a 14 point review of systems regarding constitutional symptoms, eye symptoms, ears, nose, mouth, throat symptoms, cardiovascular symptoms, respiratory symptoms, gastrointestinal symptoms, genitourinary symptoms, musculoskeletal symptoms, integumentary symptoms, neurological symptoms, psychiatric symptoms, endocrine symptoms, hematologic/lymphatic symptoms, and allergic/ immunologic symptoms.   The pertinent factors have been included in the HPI and below.  Patient Supplied Answers to Review of Systems      6/13/2018     8:42 AM   [facility] ROS   Ears, Nose, Throat Ringing/noise in ears    Nasal congestion or drainage    Sore throat    Hoarseness   Cardiopulmonary Cough       Physical Examination   The patient underwent a physical examination as described below. The pertinent positive and negative findings are summarized after the description of the examination.  Constitutional: The patient's developmental and nutritional status was assessed. The patient's voice quality was assessed.  Head and Face: The head and face were inspected for deformities. The sinuses were palpated. The  salivary glands were palpated. Facial muscle strength was assessed bilaterally.  Eyes: Extraocular movements and primary gaze alignment were assessed.  Ears, Nose, Mouth and Throat: The ears and nose were examined for deformities. The nasal septum, mucosa, and turbinates were inspected by anterior rhinoscopy. The lips, teeth, and gums were examined for abnormalities. The oral mucosa, tongue, palate, tonsils, lateral and posterior pharynx were inspected for the presence of asymmetry or mucosal lesions.    Neck: The tracheal position was noted, and the neck mass palpated to determine if there were any asymmetries, abnormal neck masses, thyromegally, or thyroid nodules.  Respiratory: The nature of the breathing and chest expansion/symmetry was observed.  Cardiovascular: The patient was examined to determine the presence of any edema or jugular venous distension.  Abdomen: The contour of the abdomen was noted.  Lymphatic: The patient was examined for infraclavicular lymphadenopathy.  Musculoskeletal: The patient was inspected for the presence of skeletal deformities.  Extremities: The extremities were examined for any clubbing or cyanosis.  Skin: The skin was examined for inflammatory or neoplastic conditions.  Neurologic: The patient's orientation, mood, and affect were noted. The cranial nerve  functions were examined.  Other pertinent positive and negative findings on physical examination:   OC/OP: no lesions, uvula midline, soft palate elevates symmetrically   Neck: no lesions, no TH tenderness to palpation  Stent in place  All other physical examination findings were within normal limits and noncontributory.    Procedures   Flexible laryngoscopy (CPT 97186)      Pre-procedure diagnosis: dysphonia  Post-procedure diagnosis: same as above  Indication for procedure: Mr. Ennis is a 78 year old male with see above  Procedure(s): Fiberoptic Laryngoscopy    Details of Procedure: After informed consent was obtained, the  patient was seated in the examination chair.  The areas of the nasopharynx as well as the hypopharynx were anesthetized with topical 4% lidocaine with 0.25% phenylephrine atomizer.  Examination of the base of tongue was performed first.  Attention was directed to any evidence of masses in the area or evidence of leukoplakia or candidal infection.  Attention was directed to the epiglottis where its size and position was determined and its movement on phonation of the vowel  e .  The piriform sinuses were then inspected for any mass lesions or pooling of secretions.  Attention was then directed to the larynx. The vocal folds were inspected for infection or any areas of leukoplakia, for masses, polypoid degeneration, or hemorrhage.  Having done this, the arytenoids and vocal processes were inspected for erythema or evidence of granuloma formation.  The posterior commissure was then inspected for evidence of inflammatory changes in the mucosa and heaping up of mucosal tissue. The patient was then instructed to say the vowel  e .  Adduction of vocal folds to the midline was observed for any evidence of paresis or paralysis of the larynx or asymmetry in rotation of the larynx to the left or right. The patient was asked to breathe and the degree of abduction was noted bilaterally.  Subglottic view of the larynx was obtained for any additional mass lesions or mucosal changes.  Finally the post cricoid was examined for evidence of pooling of secretions, as well as the pharyngeal wall mucosa.   Anesthesia type: 0.25% phenylephrine    Findings:  Anatomic/physiological deviations: RNC, vocal folds closed around the stent with anterior glottic web, swelling bilaterally   Right vocal process: No restriction of mobility   Left vocal process: No restriction of mobility  Glottal gap: Complete glottal closure  Supraglottic structures: Normal  Hypopharynx: Normal     Estimated Blood Loss: minimal  Complications: None  Disposition:  Patient tolerated the procedure well        Fiberoptic Endoscopic Evaluation of Swallowing (CPT 80560)  and Interpretation of Swallowing (CPT 99418)     Indications: See above notes for complete history and physical. Patient complains of dysphagia to both solids and liquids and/or there is suggestion on history and endoscopic exam of the presence of dysphagia causing medical complaints.  Swallowing evaluation is being performed to assess the presence and degree of dysphagia, and to recommend a safe diet.     Pulmonary Status:        No PNA   Current Diet:                regular                                                    thin liquids      Consistency Amounts:  Thin Liquid: sip            Positioning: upright in a chair  Oral Peripheral Exam: See physical exam section.  Anatomic Notes: See Videostroboscopy report for assessment of anatomy and laryngeal functioning  Pharyngeal secretions prior to administration of liquid or food: No   Oral Phase Abnormal Findings: No abnormal behavior observed   Pharyngeal Phase Abnormal Findings: no aspiration with thins      Recommended Diet:  regular                                        thin liquids         Review of Relevant Clinical Data   I personally reviewed:     Labs:  No results found for: TSH  Lab Results   Component Value Date     11/11/2020    CO2 27 11/11/2020    BUN 28 11/11/2020    PHOS 2.6 08/12/2016     Lab Results   Component Value Date    WBC 5.7 07/06/2023    HGB 14.6 07/06/2023    HCT 43.6 07/06/2023    MCV 99 07/06/2023     07/06/2023     Lab Results   Component Value Date    PTT 24 04/11/2013    INR 0.98 04/11/2013     No results found for: DUANE  No components found for: RHEUMATOIDFACTOR,  RF  Lab Results   Component Value Date    CRP <2.9 07/07/2022    CRP <2.9 01/07/2022    CRP <2.9 07/28/2021    CRP <2.9 03/05/2021    CRP <2.9 11/11/2020     No components found for: CKTOT, URICACID  No components found for: C3, C4, DSDNAAB,  "NDNAABIFA  Lab Results   Component Value Date    MPOAB Not Reported 02/14/2014       Patient reported Quality of Life (QOL) Measures   Patient Supplied Answers To VHI Questionnaire      8/23/2023     8:49 AM   Voice Handicap Index (VHI-10)   My voice makes it difficult for people to hear me 4   People have difficulty understanding me in a noisy room 4   My voice difficulties restrict my personal and social life.  0   I feel left out of conversations because of my voice 2   My voice problem causes me to lose income 0   I feel as though I have to strain to produce voice 1   The clarity of my voice is unpredictable 2   My voice problem upsets me 1   My voice makes me feel handicapped 0   People ask, \"What's wrong with your voice?\" 2   VHI-10 16         Patient Supplied Answers To EAT Questionnaire       No data to display                  Patient Supplied Answers To CSI Questionnaire      8/23/2023     8:49 AM   Cough Severity Index (CSI)   My cough is worse when I lie down 0   My coughing problem causes me to restrict my personal and social life 0   I tend to avoid places because of my cough problem 0   I feel embarrassed because of my coughing problem 1   People ask, ''What's wrong?'' because I cough a lot 1   I run out of air when I cough 0   My coughing problem affects my voice 2   My coughing problem limits my physical activity 0   My coughing problem upsets me 0   People ask me if I am sick because I cough a lot 2   CSI Score 6         Patient Supplied Answers to Dyspnea Index Questionnaire:       No data to display                Impression & Plan     IMPRESSION: Mr. Ennis is a 78 year old male who is being seen for the following:    Dysphonia   - anterior commissure lesion seen on scope on 7/31/23  - voice changes worsening for a few months  - prior smoker  - scope shows anterior commissure lesion with extension to the right vocal fold  - given anterior location will require CT neck to rule out thyroid " cartilage extension  - discussed awake biopsy - patient in agreement this was done today  - pathology showed SCC  - CT chest had findings of new nodules - after multiple discussions with radiology the decision was made to proceed with surgery and repeat imaging for this finding in the chest  - s/p MDL with bilateral cordectomy with stent placement on 8/30/23  - today shows that the vocal folds closed around the stent - discussed he needs the scar broken up and bigger stent placed - patient in agreement   Plan  - move up surgery to next week for break up of glottic web, steroid injection and stent placement      RETURN VISIT: 1 week after     Josephine Malone MD    Laryngology    [facility] Voice [facility]  Department of  Otolaryngology - Head and Neck Surgery  Clinics & Surgery Center  [address] [place] [address]  Appointment line: [phone]  Fax: [phone]  [email or web address]

## 2023-09-07 NOTE — PROGRESS NOTES
"SPEECH LANGUAGE PATHOLOGY EVALUATION    Subjective      Presenting condition or subjective complaint: Pt presents today in conjunction with ENT post op clinic visit after glottic surgery. MD requests evaluation of thin liquid tolerance given degree of glottic surgery.  Date of onset: 08/30/23 (date of surgery)    Relevant medical history:  malignant melanoma, squamous cell carcinoma of larynx, hypertension, kidney problems, arthritis, conroy syndrome   Dates & types of surgery: Transoral laser microsurgery with endoscopic right and left type Tyle Vd - extended cordectomy encompassing the subglottis  Resection per ELS classification of squamous cell carcinoma of the larynx with stent placement on 8/30/23    Prior diagnostic imaging/testing results: Pre OP swallow evaluation on 8/15/23 which revealed, \"functional oropharyngeal swallow. \"    Living Environment  Social support: Pt's spouse present today with patient     Patient goals for therapy: To make sure liquids are safe after glottic surgery       Objective     SWALLOW EVALUTION  Dysphagia history: Pt reports PO intake since surgery has been going well. His spouse denies witnessing any coughing or throat clearing during PO intake. She does not he was coughing when laying down.     Current Diet/Method of Nutritional Intake: thin liquids (level 0), regular diet      CLINICAL SWALLOW EVALUATION  Oral Motor Function:   Dentition: adequate dentition  Secretion management: WFL  Mucosal quality: adequate  Mandibular function: intact  Oral labial function: WFL  Lingual function: WFL  Velar function: intact   Buccal function: intact  Laryngeal function: throat clear, volitional swallow, voicing WFL, currently on voice restrictions after surgery      Level of assist required for feeding: no assistance needed   Textures Trialed:   Clinical Swallow Eval: Thin Liquids  Mode of presentation: straw   Volume presented: 2oz  Preparatory Phase: WFL  Oral Phase: WFL  Pharyngeal " phase of swallow: intact   Diagnostic statement: PO trials evaluated under endoscopy completed by MD. Penetration with trace residuals in the laryngeal vestibule. No aspiration. No significant oropharyngeal residuals.      ESOPHAGEAL PHASE OF SWALLOW  no observed or reported concerns related to esophageal function     SWALLOW ASSESSMENT CLINICAL IMPRESSIONS AND RATIONALE  Diet Consistency Recommendations: thin liquids (level 0), regular diet    Recommended Feeding/Eating Techniques: small bolus size, slow rate of intake, maintain upright sitting position for eating, minimize distractions during oral intake   Medication Administration Recommendations: as tolerated    Assessment & Plan   CLINICAL IMPRESSIONS   Medical Diagnosis: Laryngeal cancer; cancer of glottis    Treatment Diagnosis: Minimal oropharyngeal dysphagia   Impression/Assessment:  Pt presents today with minimal oropharyngeal dysphagia. No aspiration noted today. Per MD, pt will need to go back to the OR to take the stent out of the anterior commissure of the vocal folds. Thus, recommend a short course of swallow therapy to monitor tolerance of thin liquids given expected glottis insufficiency.     PLAN OF CARE  Treatment Interventions: Swallowing dysfunction and/or oral function for feeding    Prognosis to achieve stated therapy goals is good   Rehab potential is impacted by: current level of function    Long Term Goals   PO tolerance  1. Pt will tolerate regular textures and thin liquids with no feeling of pharyngeal stasis or overt clinical s/sx of penetration/aspiration in 100% of PO trials.   Rationale: To maximize safety, ease and/or independence of oral intake  Target Date: 12/5/23      Frequency of Treatment: 1-2x/month in conjunction with ENT clinic visits  Duration of Treatment: 3 months     Recommended Referrals to Other Professionals:  Continue working with SLP  Education Assessment:   Learner/Method: Patient;Significant Other    Risks and  benefits of evaluation/treatment have been explained.   Patient/Family/caregiver agrees with Plan of Care.     Evaluation Time:    SLP Eval: oral/pharyngeal swallow function, clinical minutes (10065): 8      Signing Clinician: Clementina Jones SLP

## 2023-09-07 NOTE — PATIENT INSTRUCTIONS
"Perioperative voice use instructions:     For the 3 to 5 days after your surgery, you will be on complete voice rest!!!  Dr. Malone will give you the exact number after your surgery.  No talking, whispering, laughing, coughing, or sneezing  If you feel the urge to cough, use your \"Confucianist throat clears\" to help move mucus (e.g. whispering \"eh eh eh\").  Otherwise, swallow hard with or without water to resolve any throat irritation  No heavy lifting  Stay well-hydrated (e.g. 64 oz + of plain water)  Avoid any laryngeal irritation, smoking/vaping of any kind, allergens, reflux, caffeine, etc.  Avoid refluxogenic foods: spicy, fatty/oily/greasy, citrus, tomato products, chocolate, mint, caffeine, etc.  Avoid eating or drinking 2-3 hours before you go to bed so those items have had time to work through your GI tract before you lie down  Elevate the head of your bed  Wedge pillow, blocks under bed posts, etc.  We want to keep your head/throat higher than your stomach to allow gravity to keep your stomach acid from coming up to your throat and causing irritation  Performed rescue breathing twice daily for 3-5 cycles: 3 quick sniffs in through the nose with a long \"shhhhh\" for your exhale, or breathing and the rounded lips (\"noodle slurp\") with your same long \"shhhh\" on the way out     After your recommended complete voice rest...  On day 1 of being able to use her voice, you can speak for 1 minute/hour.   You should also perform your cup bubble exercises (1 to 2 inches of water in a cup with your straw saying \"nooooo\" comfortably while you blow bubbles - no straining!) for 1 minute/hour to aid in recovery and tissue mobility to keep scar tissue from forming at the surgical site  Continue performing your rescue breathing exercises as above  Speaking volume: as if you were speaking to someone at arm's length away - quiet but not whispering  Every day after this, you can increase your speaking per hour by 2 minutes.  For " example: on the second day of using your voice, you can use your voice for 3 minutes/hour, and on the third day of using your voice, you can use your voice for 5 minutes/hour, etc.  Continue vocal hygiene recommendations

## 2023-09-07 NOTE — PATIENT INSTRUCTIONS
1.  You were seen in the ENT Clinic today by Dr. Malone. If you have any questions or concerns after your appointment, please call [phone]. Press option #1 for scheduling related needs. Press option #3 for Nurse advice.    2.  Dr. Malone has recommended the following:   - replace stent in OR on 9/13    3.  Plan is to return to clinic       Nina Cardenas  [phone]  [facility] - Otolaryngology

## 2023-09-07 NOTE — LETTER
2023       RE: Joshua Ennis  [address] [facility] [address]     Dear Colleague,    Thank you for referring your patient, Joshua Ennis, to the [facility] EAR NOSE AND THROAT CLINIC [place] [facility]. Please see a copy of my visit note below.        [facility]   at the [facility]   Otolaryngology Clinic     Patient: Joshua Ennis    MRN: [id number]    : 1945    Age/Gender: 78 year old male  Date of Service: 2023  Rendering Provider:   Josephine Malone MD     Chief Complaint   Vocal fold SCC  S/p MDL with bilateral cordectomy and steroid injection 23  Interval History   HISTORY OF PRESENT ILLNESS: Mr. Ennis is a 78 year old male is being followed for dysphonia. he was initially seen on 8/15/23. Please refer to this note for full history.        Today, he presents for follow up. he reports:  - doing well      PAST MEDICAL HISTORY:   Past Medical History:   Diagnosis Date     Arthritis      Autoimmune disease (H)      Hearing problem      Hoarseness      Hypertension      Kidney problem      Malignant melanoma (H)      Malignant neoplasm (H)     melanoma     Squamous cell carcinoma      Russo syndrome        PAST SURGICAL HISTORY:   Past Surgical History:   Procedure Laterality Date     BIOPSY  2011    melanoma on back     DISSECT LYMPH NODE INGUINAL  3/1/2013    Procedure: DISSECT LYMPH NODE INGUINAL;  Bilateral Inguinal Lymph Node Biopsy.;  Surgeon: Tariq Acosta MD;  Location: [facility]     ESOPHAGOSCOPY, GASTROSCOPY, DUODENOSCOPY (EGD), COMBINED  2013    Procedure: COMBINED ESOPHAGOSCOPY, GASTROSCOPY, DUODENOSCOPY (EGD);  Gastroscopy;  Surgeon: Tariq Acosta MD;  Location: [facility]     HERNIORRHAPHY INGUINAL  2012    Procedure: HERNIORRHAPHY INGUINAL;  Open Repair Left Inguinal Hernia;  Surgeon: Jerad Baker MD;  Location: [facility]     INJECT STEROID (LOCATION) N/A 2023     Procedure: steroid injection;  Surgeon: Josephine Malone MD;  Location: [facility]     LASER CO2 LARYNGOSCOPY N/A 8/30/2023    Procedure: MICROLARYNGOSCOPY, CO2 laser resection of vocal fold lesion;  Surgeon: Josephine Malone MD;  Location: [facility] OR       CURRENT MEDICATIONS:   Current Outpatient Medications:      Acetaminophen (TYLENOL PO), Take 650 mg by mouth once as needed for mild pain or fever, Disp: , Rfl:      Blood Pressure Monitor KIT, Automatic Blood Pressure Monitor, Disp: 1 kit, Rfl: 0     calcium carbonate 600 mg-vitamin D 400 units (CALTRATE) 600-400 MG-UNIT per tablet, Take 1 tablet by mouth 2 times daily., Disp: , Rfl:      Cholecalciferol (VITAMIN D) 1000 UNITS capsule, Take 1 capsule by mouth daily., Disp: , Rfl:      folic acid (FOLVITE) 1 MG tablet, Take 2 tablets (2 mg) by mouth daily, Disp: 180 tablet, Rfl: 3     gabapentin (NEURONTIN) 400 MG capsule, Take 1 capsule (400 mg) by mouth At Bedtime, Disp: 90 capsule, Rfl: 3     losartan (COZAAR) 50 MG tablet, Take 1 tablet (50 mg) by mouth daily, Disp: 90 tablet, Rfl: 2     methotrexate sodium 2.5 MG TABS, Take 4 tablets (10 mg) by mouth once a week, Disp: 48 tablet, Rfl: 1    ALLERGIES: Shrimp    SOCIAL HISTORY:    Social History     Socioeconomic History     Marital status: Single     Spouse name: Not on file     Number of children: Not on file     Years of education: Not on file     Highest education level: Not on file   Occupational History     Employer: [employer]   Tobacco Use     Smoking status: Former     Packs/day: 1.00     Years: 20.00     Pack years: 20.00     Types: Cigarettes     Quit date: 2/14/2013     Years since quitting: 10.5     Smokeless tobacco: Never   Vaping Use     Vaping Use: Never used   Substance and Sexual Activity     Alcohol use: Yes     Comment: rare     Drug use: No     Sexual activity: Not Currently     Partners: Female   Other Topics Concern     Parent/sibling w/ CABG, MI or angioplasty before 65F 55M? Not Asked       Service Not Asked     Blood Transfusions Not Asked     Caffeine Concern Not Asked     Occupational Exposure Not Asked     Hobby Hazards Not Asked     Sleep Concern Not Asked     Stress Concern Not Asked     Weight Concern Not Asked     Special Diet Not Asked     Back Care Not Asked     Exercise Yes     Comment: walking     Bike Helmet Not Asked     Seat Belt Not Asked     Self-Exams Not Asked   Social History Narrative     Not on file     Social Determinants of Health     Financial Resource Strain: Not on file   Food Insecurity: Not on file   Transportation Needs: Not on file   Physical Activity: Not on file   Stress: Not on file   Social Connections: Not on file   Intimate Partner Violence: Not on file   Housing Stability: Not on file         FAMILY HISTORY:   Family History   Problem Relation Age of Onset     Diabetes Mother      Hypertension Mother      Cancer Father         stomach     Heart Disease Father      Heart Disease Brother      Diabetes Sister      Diabetes Sister      Diabetes Sister      Heart Disease Brother      Cancer Sister      Glaucoma No family hx of      Macular Degeneration No family hx of       Non-contributory for problems with anesthesia    REVIEW OF SYSTEMS:   The patient was asked a 14 point review of systems regarding constitutional symptoms, eye symptoms, ears, nose, mouth, throat symptoms, cardiovascular symptoms, respiratory symptoms, gastrointestinal symptoms, genitourinary symptoms, musculoskeletal symptoms, integumentary symptoms, neurological symptoms, psychiatric symptoms, endocrine symptoms, hematologic/lymphatic symptoms, and allergic/ immunologic symptoms.   The pertinent factors have been included in the HPI and below.  Patient Supplied Answers to Review of Systems      6/13/2018     8:42 AM   [facility] ROS   Ears, Nose, Throat Ringing/noise in ears    Nasal congestion or drainage    Sore throat    Hoarseness   Cardiopulmonary Cough       Physical Examination   The  patient underwent a physical examination as described below. The pertinent positive and negative findings are summarized after the description of the examination.  Constitutional: The patient's developmental and nutritional status was assessed. The patient's voice quality was assessed.  Head and Face: The head and face were inspected for deformities. The sinuses were palpated. The salivary glands were palpated. Facial muscle strength was assessed bilaterally.  Eyes: Extraocular movements and primary gaze alignment were assessed.  Ears, Nose, Mouth and Throat: The ears and nose were examined for deformities. The nasal septum, mucosa, and turbinates were inspected by anterior rhinoscopy. The lips, teeth, and gums were examined for abnormalities. The oral mucosa, tongue, palate, tonsils, lateral and posterior pharynx were inspected for the presence of asymmetry or mucosal lesions.    Neck: The tracheal position was noted, and the neck mass palpated to determine if there were any asymmetries, abnormal neck masses, thyromegally, or thyroid nodules.  Respiratory: The nature of the breathing and chest expansion/symmetry was observed.  Cardiovascular: The patient was examined to determine the presence of any edema or jugular venous distension.  Abdomen: The contour of the abdomen was noted.  Lymphatic: The patient was examined for infraclavicular lymphadenopathy.  Musculoskeletal: The patient was inspected for the presence of skeletal deformities.  Extremities: The extremities were examined for any clubbing or cyanosis.  Skin: The skin was examined for inflammatory or neoplastic conditions.  Neurologic: The patient's orientation, mood, and affect were noted. The cranial nerve  functions were examined.  Other pertinent positive and negative findings on physical examination:   OC/OP: no lesions, uvula midline, soft palate elevates symmetrically   Neck: no lesions, no TH tenderness to palpation  Stent in place  All other  physical examination findings were within normal limits and noncontributory.    Procedures   Flexible laryngoscopy (CPT 08152)      Pre-procedure diagnosis: dysphonia  Post-procedure diagnosis: same as above  Indication for procedure: Mr. Ennis is a 78 year old male with see above  Procedure(s): Fiberoptic Laryngoscopy    Details of Procedure: After informed consent was obtained, the patient was seated in the examination chair.  The areas of the nasopharynx as well as the hypopharynx were anesthetized with topical 4% lidocaine with 0.25% phenylephrine atomizer.  Examination of the base of tongue was performed first.  Attention was directed to any evidence of masses in the area or evidence of leukoplakia or candidal infection.  Attention was directed to the epiglottis where its size and position was determined and its movement on phonation of the vowel  e .  The piriform sinuses were then inspected for any mass lesions or pooling of secretions.  Attention was then directed to the larynx. The vocal folds were inspected for infection or any areas of leukoplakia, for masses, polypoid degeneration, or hemorrhage.  Having done this, the arytenoids and vocal processes were inspected for erythema or evidence of granuloma formation.  The posterior commissure was then inspected for evidence of inflammatory changes in the mucosa and heaping up of mucosal tissue. The patient was then instructed to say the vowel  e .  Adduction of vocal folds to the midline was observed for any evidence of paresis or paralysis of the larynx or asymmetry in rotation of the larynx to the left or right. The patient was asked to breathe and the degree of abduction was noted bilaterally.  Subglottic view of the larynx was obtained for any additional mass lesions or mucosal changes.  Finally the post cricoid was examined for evidence of pooling of secretions, as well as the pharyngeal wall mucosa.   Anesthesia type: 0.25%  phenylephrine    Findings:  Anatomic/physiological deviations: RNC, vocal folds closed around the stent with anterior glottic web, swelling bilaterally   Right vocal process: No restriction of mobility   Left vocal process: No restriction of mobility  Glottal gap: Complete glottal closure  Supraglottic structures: Normal  Hypopharynx: Normal     Estimated Blood Loss: minimal  Complications: None  Disposition: Patient tolerated the procedure well        Fiberoptic Endoscopic Evaluation of Swallowing (CPT 54166)  and Interpretation of Swallowing (CPT 87896)     Indications: See above notes for complete history and physical. Patient complains of dysphagia to both solids and liquids and/or there is suggestion on history and endoscopic exam of the presence of dysphagia causing medical complaints.  Swallowing evaluation is being performed to assess the presence and degree of dysphagia, and to recommend a safe diet.     Pulmonary Status:        No PNA   Current Diet:                regular                                                    thin liquids      Consistency Amounts:  Thin Liquid: sip            Positioning: upright in a chair  Oral Peripheral Exam: See physical exam section.  Anatomic Notes: See Videostroboscopy report for assessment of anatomy and laryngeal functioning  Pharyngeal secretions prior to administration of liquid or food: No   Oral Phase Abnormal Findings: No abnormal behavior observed   Pharyngeal Phase Abnormal Findings: no aspiration with thins      Recommended Diet:  regular                                        thin liquids         Review of Relevant Clinical Data   I personally reviewed:     Labs:  No results found for: TSH  Lab Results   Component Value Date     11/11/2020    CO2 27 11/11/2020    BUN 28 11/11/2020    PHOS 2.6 08/12/2016     Lab Results   Component Value Date    WBC 5.7 07/06/2023    HGB 14.6 07/06/2023    HCT 43.6 07/06/2023    MCV 99 07/06/2023     07/06/2023  "    Lab Results   Component Value Date    PTT 24 04/11/2013    INR 0.98 04/11/2013     No results found for: DUANE  No components found for: RHEUMATOIDFACTOR,  RF  Lab Results   Component Value Date    CRP <2.9 07/07/2022    CRP <2.9 01/07/2022    CRP <2.9 07/28/2021    CRP <2.9 03/05/2021    CRP <2.9 11/11/2020     No components found for: CKTOT, URICACID  No components found for: C3, C4, DSDNAAB, NDNAABIFA  Lab Results   Component Value Date    MPOAB Not Reported 02/14/2014       Patient reported Quality of Life (QOL) Measures   Patient Supplied Answers To VHI Questionnaire      8/23/2023     8:49 AM   Voice Handicap Index (VHI-10)   My voice makes it difficult for people to hear me 4   People have difficulty understanding me in a noisy room 4   My voice difficulties restrict my personal and social life.  0   I feel left out of conversations because of my voice 2   My voice problem causes me to lose income 0   I feel as though I have to strain to produce voice 1   The clarity of my voice is unpredictable 2   My voice problem upsets me 1   My voice makes me feel handicapped 0   People ask, \"What's wrong with your voice?\" 2   VHI-10 16         Patient Supplied Answers To EAT Questionnaire       No data to display                  Patient Supplied Answers To CSI Questionnaire      8/23/2023     8:49 AM   Cough Severity Index (CSI)   My cough is worse when I lie down 0   My coughing problem causes me to restrict my personal and social life 0   I tend to avoid places because of my cough problem 0   I feel embarrassed because of my coughing problem 1   People ask, ''What's wrong?'' because I cough a lot 1   I run out of air when I cough 0   My coughing problem affects my voice 2   My coughing problem limits my physical activity 0   My coughing problem upsets me 0   People ask me if I am sick because I cough a lot 2   CSI Score 6         Patient Supplied Answers to Dyspnea Index Questionnaire:       No data to display    "             Impression & Plan     IMPRESSION: Mr. Ennis is a 78 year old male who is being seen for the following:    Dysphonia   - anterior commissure lesion seen on scope on 7/31/23  - voice changes worsening for a few months  - prior smoker  - scope shows anterior commissure lesion with extension to the right vocal fold  - given anterior location will require CT neck to rule out thyroid cartilage extension  - discussed awake biopsy - patient in agreement this was done today  - pathology showed SCC  - CT chest had findings of new nodules - after multiple discussions with radiology the decision was made to proceed with surgery and repeat imaging for this finding in the chest  - s/p MDL with bilateral cordectomy with stent placement on 8/30/23  - today shows that the vocal folds closed around the stent - discussed he needs the scar broken up and bigger stent placed - patient in agreement   Plan  - move up surgery to next week for break up of glottic web, steroid injection and stent placement      RETURN VISIT: 1 week after     Josephine Malone MD    Laryngology    [facility]  Department of  Otolaryngology - Head and Neck Surgery  Clinics & Surgery Center  [address] [place] [address]  Appointment line: [phone]  Fax: [phone]  [email or web address]         Again, thank you for allowing me to participate in the care of your patient.      Sincerely,    Josephine Malone MD

## 2023-09-13 ENCOUNTER — HOSPITAL ENCOUNTER (OUTPATIENT)
Facility: CLINIC | Age: 78
Discharge: HOME | End: 2023-09-13
Attending: OTOLARYNGOLOGY | Admitting: OTOLARYNGOLOGY
Payer: COMMERCIAL

## 2023-09-13 ENCOUNTER — ANESTHESIA EVENT (OUTPATIENT)
Dept: SURGERY | Facility: CLINIC | Age: 78
End: 2023-09-13
Payer: COMMERCIAL

## 2023-09-13 ENCOUNTER — ANESTHESIA (OUTPATIENT)
Dept: SURGERY | Facility: CLINIC | Age: 78
End: 2023-09-13
Payer: COMMERCIAL

## 2023-09-13 VITALS
WEIGHT: 192.02 LBS | SYSTOLIC BLOOD PRESSURE: 159 MMHG | RESPIRATION RATE: 16 BRPM | HEIGHT: 69 IN | HEART RATE: 60 BPM | TEMPERATURE: 98.8 F | BODY MASS INDEX: 28.44 KG/M2 | DIASTOLIC BLOOD PRESSURE: 82 MMHG | OXYGEN SATURATION: 100 %

## 2023-09-13 DIAGNOSIS — C32.9 PRIMARY SQUAMOUS CELL CARCINOMA OF LARYNX (H): Primary | ICD-10-CM

## 2023-09-13 PROCEDURE — 999N000141 HC STATISTIC PRE-PROCEDURE NURSING ASSESSMENT: Performed by: OTOLARYNGOLOGY

## 2023-09-13 PROCEDURE — 88305 TISSUE EXAM BY PATHOLOGIST: CPT | Mod: 26 | Performed by: PATHOLOGY

## 2023-09-13 PROCEDURE — 272N000001 HC OR GENERAL SUPPLY STERILE: Performed by: OTOLARYNGOLOGY

## 2023-09-13 PROCEDURE — 250N000009 HC RX 250: Performed by: NURSE ANESTHETIST, CERTIFIED REGISTERED

## 2023-09-13 PROCEDURE — 250N000011 HC RX IP 250 OP 636: Performed by: OTOLARYNGOLOGY

## 2023-09-13 PROCEDURE — 258N000003 HC RX IP 258 OP 636: Performed by: NURSE ANESTHETIST, CERTIFIED REGISTERED

## 2023-09-13 PROCEDURE — 88360 TUMOR IMMUNOHISTOCHEM/MANUAL: CPT | Mod: 26 | Performed by: PATHOLOGY

## 2023-09-13 PROCEDURE — 88342 IMHCHEM/IMCYTCHM 1ST ANTB: CPT | Mod: 26 | Performed by: PATHOLOGY

## 2023-09-13 PROCEDURE — 710N000010 HC RECOVERY PHASE 1, LEVEL 2, PER MIN: Performed by: OTOLARYNGOLOGY

## 2023-09-13 PROCEDURE — 710N000012 HC RECOVERY PHASE 2, PER MINUTE: Performed by: OTOLARYNGOLOGY

## 2023-09-13 PROCEDURE — 250N000011 HC RX IP 250 OP 636: Performed by: ANESTHESIOLOGY

## 2023-09-13 PROCEDURE — 370N000017 HC ANESTHESIA TECHNICAL FEE, PER MIN: Performed by: OTOLARYNGOLOGY

## 2023-09-13 PROCEDURE — 250N000011 HC RX IP 250 OP 636: Performed by: NURSE ANESTHETIST, CERTIFIED REGISTERED

## 2023-09-13 PROCEDURE — 88305 TISSUE EXAM BY PATHOLOGIST: CPT | Mod: TC | Performed by: OTOLARYNGOLOGY

## 2023-09-13 PROCEDURE — 250N000009 HC RX 250: Performed by: ANESTHESIOLOGY

## 2023-09-13 PROCEDURE — 360N000076 HC SURGERY LEVEL 3, PER MIN: Performed by: OTOLARYNGOLOGY

## 2023-09-13 RX ORDER — ONDANSETRON 2 MG/ML
4 INJECTION INTRAMUSCULAR; INTRAVENOUS EVERY 30 MIN PRN
Status: DISCONTINUED | OUTPATIENT
Start: 2023-09-13 | End: 2023-09-13 | Stop reason: HOSPADM

## 2023-09-13 RX ORDER — SODIUM CHLORIDE, SODIUM LACTATE, POTASSIUM CHLORIDE, CALCIUM CHLORIDE 600; 310; 30; 20 MG/100ML; MG/100ML; MG/100ML; MG/100ML
INJECTION, SOLUTION INTRAVENOUS CONTINUOUS
Status: DISCONTINUED | OUTPATIENT
Start: 2023-09-13 | End: 2023-09-13 | Stop reason: HOSPADM

## 2023-09-13 RX ORDER — ONDANSETRON 4 MG/1
4 TABLET, ORALLY DISINTEGRATING ORAL EVERY 30 MIN PRN
Status: DISCONTINUED | OUTPATIENT
Start: 2023-09-13 | End: 2023-09-13 | Stop reason: HOSPADM

## 2023-09-13 RX ORDER — DEXAMETHASONE SODIUM PHOSPHATE 4 MG/ML
INJECTION, SOLUTION INTRA-ARTICULAR; INTRALESIONAL; INTRAMUSCULAR; INTRAVENOUS; SOFT TISSUE PRN
Status: DISCONTINUED | OUTPATIENT
Start: 2023-09-13 | End: 2023-09-13 | Stop reason: HOSPADM

## 2023-09-13 RX ORDER — OXYCODONE HYDROCHLORIDE 5 MG/1
5 TABLET ORAL
Status: DISCONTINUED | OUTPATIENT
Start: 2023-09-13 | End: 2023-09-13 | Stop reason: HOSPADM

## 2023-09-13 RX ORDER — PROPOFOL 10 MG/ML
INJECTION, EMULSION INTRAVENOUS PRN
Status: DISCONTINUED | OUTPATIENT
Start: 2023-09-13 | End: 2023-09-13

## 2023-09-13 RX ORDER — FENTANYL CITRATE 50 UG/ML
INJECTION, SOLUTION INTRAMUSCULAR; INTRAVENOUS PRN
Status: DISCONTINUED | OUTPATIENT
Start: 2023-09-13 | End: 2023-09-13

## 2023-09-13 RX ORDER — LIDOCAINE 40 MG/G
CREAM TOPICAL
Status: DISCONTINUED | OUTPATIENT
Start: 2023-09-13 | End: 2023-09-13 | Stop reason: HOSPADM

## 2023-09-13 RX ORDER — HYDROMORPHONE HCL IN WATER/PF 6 MG/30 ML
0.4 PATIENT CONTROLLED ANALGESIA SYRINGE INTRAVENOUS EVERY 5 MIN PRN
Status: DISCONTINUED | OUTPATIENT
Start: 2023-09-13 | End: 2023-09-13 | Stop reason: HOSPADM

## 2023-09-13 RX ORDER — OXYCODONE HYDROCHLORIDE 10 MG/1
10 TABLET ORAL
Status: DISCONTINUED | OUTPATIENT
Start: 2023-09-13 | End: 2023-09-13 | Stop reason: HOSPADM

## 2023-09-13 RX ORDER — FLUTICASONE PROPIONATE 220 UG/1
2 AEROSOL, METERED RESPIRATORY (INHALATION) 2 TIMES DAILY
Qty: 12 G | Refills: 3 | Status: SHIPPED | OUTPATIENT
Start: 2023-09-13 | End: 2023-09-13

## 2023-09-13 RX ORDER — LIDOCAINE HYDROCHLORIDE 20 MG/ML
INJECTION, SOLUTION INFILTRATION; PERINEURAL PRN
Status: DISCONTINUED | OUTPATIENT
Start: 2023-09-13 | End: 2023-09-13

## 2023-09-13 RX ORDER — HYDROMORPHONE HCL IN WATER/PF 6 MG/30 ML
0.2 PATIENT CONTROLLED ANALGESIA SYRINGE INTRAVENOUS EVERY 5 MIN PRN
Status: DISCONTINUED | OUTPATIENT
Start: 2023-09-13 | End: 2023-09-13 | Stop reason: HOSPADM

## 2023-09-13 RX ORDER — LIDOCAINE HYDROCHLORIDE 40 MG/ML
SOLUTION TOPICAL PRN
Status: DISCONTINUED | OUTPATIENT
Start: 2023-09-13 | End: 2023-09-13 | Stop reason: HOSPADM

## 2023-09-13 RX ORDER — DEXAMETHASONE SODIUM PHOSPHATE 4 MG/ML
INJECTION, SOLUTION INTRA-ARTICULAR; INTRALESIONAL; INTRAMUSCULAR; INTRAVENOUS; SOFT TISSUE PRN
Status: DISCONTINUED | OUTPATIENT
Start: 2023-09-13 | End: 2023-09-13

## 2023-09-13 RX ORDER — PROPOFOL 10 MG/ML
INJECTION, EMULSION INTRAVENOUS CONTINUOUS PRN
Status: DISCONTINUED | OUTPATIENT
Start: 2023-09-13 | End: 2023-09-13

## 2023-09-13 RX ORDER — SODIUM CHLORIDE, SODIUM LACTATE, POTASSIUM CHLORIDE, CALCIUM CHLORIDE 600; 310; 30; 20 MG/100ML; MG/100ML; MG/100ML; MG/100ML
INJECTION, SOLUTION INTRAVENOUS CONTINUOUS PRN
Status: DISCONTINUED | OUTPATIENT
Start: 2023-09-13 | End: 2023-09-13

## 2023-09-13 RX ORDER — ONDANSETRON 2 MG/ML
INJECTION INTRAMUSCULAR; INTRAVENOUS PRN
Status: DISCONTINUED | OUTPATIENT
Start: 2023-09-13 | End: 2023-09-13

## 2023-09-13 RX ORDER — BACITRACIN ZINC 500 [USP'U]/G
OINTMENT TOPICAL 2 TIMES DAILY
Qty: 14 G | Refills: 0 | Status: ON HOLD | OUTPATIENT
Start: 2023-09-13 | End: 2023-10-25

## 2023-09-13 RX ORDER — PANTOPRAZOLE SODIUM 40 MG/1
40 TABLET, DELAYED RELEASE ORAL 2 TIMES DAILY
Qty: 80 TABLET | Refills: 3 | Status: SHIPPED | OUTPATIENT
Start: 2023-09-13 | End: 2024-07-11

## 2023-09-13 RX ORDER — FENTANYL CITRATE 50 UG/ML
50 INJECTION, SOLUTION INTRAMUSCULAR; INTRAVENOUS EVERY 5 MIN PRN
Status: DISCONTINUED | OUTPATIENT
Start: 2023-09-13 | End: 2023-09-13 | Stop reason: HOSPADM

## 2023-09-13 RX ORDER — FENTANYL CITRATE 50 UG/ML
25 INJECTION, SOLUTION INTRAMUSCULAR; INTRAVENOUS EVERY 5 MIN PRN
Status: DISCONTINUED | OUTPATIENT
Start: 2023-09-13 | End: 2023-09-13 | Stop reason: HOSPADM

## 2023-09-13 RX ADMIN — FENTANYL CITRATE 100 MCG: 50 INJECTION, SOLUTION INTRAMUSCULAR; INTRAVENOUS at 12:48

## 2023-09-13 RX ADMIN — DEXAMETHASONE SODIUM PHOSPHATE 10 MG: 4 INJECTION, SOLUTION INTRA-ARTICULAR; INTRALESIONAL; INTRAMUSCULAR; INTRAVENOUS; SOFT TISSUE at 13:02

## 2023-09-13 RX ADMIN — LIDOCAINE HYDROCHLORIDE 100 MG: 20 INJECTION, SOLUTION INFILTRATION; PERINEURAL at 12:47

## 2023-09-13 RX ADMIN — PROPOFOL 200 MG: 10 INJECTION, EMULSION INTRAVENOUS at 12:49

## 2023-09-13 RX ADMIN — Medication 50 MG: at 12:51

## 2023-09-13 RX ADMIN — ONDANSETRON 4 MG: 2 INJECTION INTRAMUSCULAR; INTRAVENOUS at 13:02

## 2023-09-13 RX ADMIN — SODIUM CHLORIDE, POTASSIUM CHLORIDE, SODIUM LACTATE AND CALCIUM CHLORIDE: 600; 310; 30; 20 INJECTION, SOLUTION INTRAVENOUS at 12:47

## 2023-09-13 RX ADMIN — PROPOFOL 150 MCG/KG/MIN: 10 INJECTION, EMULSION INTRAVENOUS at 12:52

## 2023-09-13 RX ADMIN — SUGAMMADEX 200 MG: 100 INJECTION, SOLUTION INTRAVENOUS at 13:17

## 2023-09-13 ASSESSMENT — ACTIVITIES OF DAILY LIVING (ADL)
ADLS_ACUITY_SCORE: 35

## 2023-09-13 ASSESSMENT — LIFESTYLE VARIABLES: TOBACCO_USE: 1

## 2023-09-13 NOTE — OP NOTE
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 Microdirect suspension laryngoscopy with biopsy of the left vocal fold granulation tissue  3. Microdirect suspension laryngoscopy with steroid injection    ANESTHESIA  Type: General   ETT: mask down to dedo to 5.0 kinjal MLT    SURGEON:   Josephine Malone MD    RESIDENT SURGEON(S):   Abi Miller MD    INDICATIONS FOR PROCEDURE:   The patient is a 78 year old male with history of glottic cancer s/p resection on 8/30/23 and stent placemetn. The patient comes in for evaluation of the glottic web. The patient wishes to proceed with surgery and has signed an informed consent.     FINDINGS:   1. Exposure with dedo  2. Stent removed with well healed mucosal edges  3. Granulation tissue on the left removed and sent for biopsy     DESCRIPTION OF PROCEDURE:   The patient was brought into the operating room and placed supine on the operating room table. A time-out was performed. General anesthesia was induced. The patient was a 2 hand mask ventilated. Then the patient was masked down.    Then the patient was turned 90 degrees from the anesthesiologist. The head was drapped in the usual fashion. Then the dentition and mucosa were inspected prior to start. A reinforced tooth guard was placed on the upper dentition. The dedo laryngoscope was carefully introduced into the oral cavity and gently passed to the oropharynx. Here the larynx was exposed and the patient was placed in suspension.     This revealed patent airway. Photodocumentation was performed.   Then a 5.0 kinjal MLT was placed.     Then a biopsy forceps was used and the left vocal fold was biopsied. This was sent for histopathologic evaluation.      Then the stent was removed.    Then a 25 gauge butterfly needle was used to deliver 1.0ml of dexamethasone 4mg/mL into the glottic web area     This was the end of our procedure. Afrin soaked pledgets were applied to the surgical site for hemostasis.  The surgical site was then inspected and no residual bleeding was seen. The patient was taken out of suspension. The instrumentation was carefully removed, examining the mucosa and dentition on the way it. The dental guard was removed, and the mucosa and dentition were seen to be in their preoperative state at the conclusion of the procedure. The patient was then handed back to the care of anesthesia who awoke and extubated the patient without complication.    COMPLICATIONS:   None.  .   ESTIMATED BLOOD LOSS:   Less than 10cc    DISPOSITION:   PACU.    SPECIMENS:  ID Type Source Tests Collected by Time Destination   1 : Left vocal fold granulation tissue Tissue Other SURGICAL PATHOLOGY EXAM Josephine Malone MD 9/13/2023  1:11 PM           PHOTODOCUMENTATION:

## 2023-09-13 NOTE — DISCHARGE INSTRUCTIONS
[facility]  Same-Day Surgery   Adult Discharge Orders & Instructions     For 24 hours after surgery    Get plenty of rest.  A responsible adult must stay with you for at least 24 hours after you leave the hospital.   Do not drive or use heavy equipment.  If you have weakness or tingling, don't drive or use heavy equipment until this feeling goes away.  Do not drink alcohol.  Avoid strenuous or risky activities.  Ask for help when climbing stairs.   You may feel lightheaded.  IF so, sit for a few minutes before standing.  Have someone help you get up.   If you have nausea (feel sick to your stomach): Drink only clear liquids such as apple juice, ginger ale, broth or 7-Up.  Rest may also help.  Be sure to drink enough fluids.  Move to a regular diet as you feel able.  You may have a slight fever. Call the doctor if your fever is over 100 F (37.7 C) (taken under the tongue) or lasts longer than 24 hours.  You may have a dry mouth, a sore throat, muscle aches or trouble sleeping.  These should go away after 24 hours.  Do not make important or legal decisions.   Call your doctor for any of the followin.  Signs of infection (fever, growing tenderness at the surgery site, a large amount of drainage or bleeding, severe pain, foul-smelling drainage, redness, swelling).    2. It has been over 8 to 10 hours since surgery and you are still not able to urinate (pass water).    3.  Headache for over 24 hours.    To contact a doctor, call Faisal Lopez @ [phone]  or:    '   [phone] and ask for the resident on call for OTOLARYNGOLOGY (answered 24 hours a day)  '   Emergency Department:    [facility]: [phone]       (TTY for hearing impaired: [phone])

## 2023-09-13 NOTE — ANESTHESIA CARE TRANSFER NOTE
Patient: Joshua Ennis    Procedure: Procedure(s):  MICROLARYNGOSCOPY with stent removal and biopsy  steroid injection       Diagnosis: Cancer of glottis (H) [C32.0]  Diagnosis Additional Information: No value filed.    Anesthesia Type:   General     Note:    Oropharynx: oropharynx clear of all foreign objects and spontaneously breathing  Level of Consciousness: drowsy  Oxygen Supplementation: face mask  Level of Supplemental Oxygen (L/min / FiO2): 6  Independent Airway: airway patency satisfactory and stable  Dentition: dentition unchanged  Vital Signs Stable: post-procedure vital signs reviewed and stable  Report to RN Given: handoff report given  Patient transferred to: PACU    Handoff Report: Identifed the Patient, Identified the Reponsible Provider, Reviewed the pertinent medical history, Discussed the surgical course, Reviewed Intra-OP anesthesia mangement and issues during anesthesia, Set expectations for post-procedure period and Allowed opportunity for questions and acknowledgement of understanding      Vitals:  Vitals Value Taken Time   /78 09/13/23 1338   Temp     Pulse 54 09/13/23 1341   Resp 15    SpO2 100 % 09/13/23 1342   Vitals shown include unvalidated device data.    Electronically Signed By: DENISSE Asher CRNA  September 13, 2023  1:43 PM

## 2023-09-13 NOTE — BRIEF OP NOTE
[facility]    Brief Operative Note    Pre-operative diagnosis: Cancer of glottis (H) [C32.0]  Post-operative diagnosis Same as pre-operative diagnosis    Procedure: Procedure(s):  MICROLARYNGOSCOPY with stent removal and biopsy  steroid injection  Surgeon: Surgeon(s) and Role:     * Josephine Malone MD - Primary     * Sonam Miller MD - Resident - Assisting  Anesthesia: General   Estimated Blood Loss: Minimal    Drains: None  Specimens:   ID Type Source Tests Collected by Time Destination   1 : Left vocal fold granulation tissue Tissue Other SURGICAL PATHOLOGY EXAM Josephine Malone MD 9/13/2023  1:11 PM      Findings: removal of stent, minimal granulation tissue at anterior commissure. Sent for path. Dexamethasone injection to b/l anterior VC  Complications: None.  Implants:   Implant Name Type Inv. Item Serial No.  Lot No. LRB No. Used Action   Bard Clean-Cath Intermittent Catheter     [id number] N/A 1 Explanted

## 2023-09-13 NOTE — ANESTHESIA PREPROCEDURE EVALUATION
Anesthesia Pre-Procedure Evaluation    Patient: Joshua Ennis   MRN: [id number] : 1945        Procedure : Procedure(s):  MICROLARYNGOSCOPY, CO2 laser resection of vocal fold lesion  possible steroid injection          Past Medical History:   Diagnosis Date    Arthritis     Autoimmune disease (H)     Hearing problem     Hoarseness     Hypertension     Kidney problem     Malignant melanoma (H)     Malignant neoplasm (H)     melanoma    Squamous cell carcinoma     Russo syndrome       Past Surgical History:   Procedure Laterality Date    BIOPSY  2011    melanoma on back    DISSECT LYMPH NODE INGUINAL  3/1/2013    Procedure: DISSECT LYMPH NODE INGUINAL;  Bilateral Inguinal Lymph Node Biopsy.;  Surgeon: Tariq Acosta MD;  Location: [facility]    ESOPHAGOSCOPY, GASTROSCOPY, DUODENOSCOPY (EGD), COMBINED  2013    Procedure: COMBINED ESOPHAGOSCOPY, GASTROSCOPY, DUODENOSCOPY (EGD);  Gastroscopy;  Surgeon: Tariq Acosta MD;  Location: [facility]    HERNIORRHAPHY INGUINAL  2012    Procedure: HERNIORRHAPHY INGUINAL;  Open Repair Left Inguinal Hernia;  Surgeon: Jerad Baker MD;  Location: [facility]    INJECT STEROID (LOCATION) N/A 2023    Procedure: steroid injection;  Surgeon: Josephine Malone MD;  Location: [facility]    LASER CO2 LARYNGOSCOPY N/A 2023    Procedure: MICROLARYNGOSCOPY, CO2 laser resection of vocal fold lesion;  Surgeon: Josephine Malone MD;  Location: [facility]      Allergies   Allergen Reactions    Shrimp Nausea and Vomiting and Unknown     Got sick each time he ate it      Social History     Tobacco Use    Smoking status: Former     Packs/day: 1.00     Years: 20.00     Pack years: 20.00     Types: Cigarettes     Quit date: 2013     Years since quitting: 10.5    Smokeless tobacco: Never   Substance Use Topics    Alcohol use: Yes     Comment: rare      Wt Readings from Last 1 Encounters:   23 87.1 kg (192 lb 0.3 oz)        Anesthesia Evaluation            ROS/MED  HX  ENT/Pulmonary:     (+)                tobacco use,                       Neurologic: Comment: Idiopathic progressive polyneuropathy - neg neurologic ROS     Cardiovascular:     (+)  hypertension- -   -  - -                                      METS/Exercise Tolerance: >4 METS    Hematologic:     (+)      anemia,          Musculoskeletal:   (+)  arthritis,             GI/Hepatic:     (+) GERD,                   Renal/Genitourinary:     (+) renal disease, type: CRI,            Endo:  - neg endo ROS     Psychiatric/Substance Use:       Infectious Disease:  - neg infectious disease ROS     Malignancy: Comment: skin  (+) Malignancy, History of Skin.    Other:            Physical Exam    Airway        Mallampati: I   TM distance: > 3 FB   Neck ROM: full   Mouth opening: > 3 cm    Respiratory Devices and Support         Dental           Cardiovascular   cardiovascular exam normal       Rhythm and rate: regular and normal     Pulmonary   pulmonary exam normal        breath sounds clear to auscultation           OUTSIDE LABS:  CBC:   Lab Results   Component Value Date    WBC 5.7 07/06/2023    WBC 7.1 03/10/2023    HGB 14.6 07/06/2023    HGB 13.8 03/10/2023    HCT 43.6 07/06/2023    HCT 41.7 03/10/2023     07/06/2023     03/10/2023     BMP:   Lab Results   Component Value Date     11/11/2020     11/04/2020    POTASSIUM 4.3 11/11/2020    POTASSIUM 4.4 11/04/2020    CHLORIDE 107 11/11/2020    CHLORIDE 104 11/04/2020    CO2 27 11/11/2020    CO2 31 11/04/2020    BUN 28 11/11/2020    BUN 34 (H) 11/04/2020    CR 1.6 (H) 08/16/2023    CR 1.45 (H) 07/06/2023    GLC 79 11/11/2020     (H) 11/04/2020     COAGS:   Lab Results   Component Value Date    PTT 24 04/11/2013    INR 0.98 04/11/2013     POC:   Lab Results   Component Value Date    BGM 82 03/14/2013     HEPATIC:   Lab Results   Component Value Date    ALBUMIN 4.4 07/06/2023    PROTTOTAL 7.8 12/13/2017    ALT 18 07/06/2023    AST 20 07/06/2023      (H) 03/07/2013    ALKPHOS 97 12/13/2017    BILITOTAL 0.7 12/13/2017     OTHER:   Lab Results   Component Value Date    LACT 0.8 02/14/2017    LAMAR 10.0 11/11/2020    PHOS 2.6 08/12/2016    MAG 2.1 03/14/2016    CRP <2.9 07/07/2022    SED 6 07/06/2023       Anesthesia Plan    ASA Status:  3    NPO Status:  NPO Appropriate    Anesthesia Type: General.     - Airway: ETT   Induction: Intravenous.   Maintenance: TIVA.   Techniques and Equipment:     - Airway: Jet ventilation       Consents    Anesthesia Plan(s) and associated risks, benefits, and realistic alternatives discussed. Questions answered and patient/representative(s) expressed understanding.     - Discussed: Risks, Benefits and Alternatives for the PROCEDURE were discussed     - Discussed with:  Patient      - Extended Intubation/Ventilatory Support Discussed: No.      - Patient is DNR/DNI Status: No     Use of blood products discussed: No .     Postoperative Care       PONV prophylaxis: Ondansetron (or other 5HT-3)     Comments:    Other Comments: NPO. Meds reviewed. No problems with anesthesia. Airway plan discussed with surgeon. No CP or SOB. No recent URI.            Carmen Quiros MD

## 2023-09-13 NOTE — ANESTHESIA PROCEDURE NOTES
Airway       Patient location during procedure: OR       Procedure Start/Stop Times: 9/13/2023 12:56 PM  Staff -        Performed By: with residents       Procedure performed by resident/fellow/CRNA in presence of a teaching physician.    Consent for Airway        Urgency: elective  Indications and Patient Condition       Indications for airway management: reggie-procedural       Induction type:intravenous       Mask difficulty assessment: 3 - difficult mask (inadequate, unstable, or two providers) +/- NMBA    Final Airway Details       Final airway type: endotracheal airway       Successful airway: ETT - single and Oral (kinjal)  Endotracheal Airway Details        ETT size (mm): 5.0       Cuffed: yes    Post intubation assessment        Ease of procedure: easy    Medication(s) Administered   Medication Administration Time: 9/13/2023 12:56 PM

## 2023-09-14 ENCOUNTER — PATIENT OUTREACH (OUTPATIENT)
Dept: OTOLARYNGOLOGY | Facility: CLINIC | Age: 78
End: 2023-09-14

## 2023-09-14 ENCOUNTER — TELEPHONE (OUTPATIENT)
Dept: RHEUMATOLOGY | Facility: CLINIC | Age: 78
End: 2023-09-14

## 2023-09-14 DIAGNOSIS — C32.9 LARYNGEAL CANCER (H): Primary | ICD-10-CM

## 2023-09-14 LAB
PATH REPORT.ADDENDUM SPEC: ABNORMAL
PATH REPORT.COMMENTS IMP SPEC: ABNORMAL
PATH REPORT.COMMENTS IMP SPEC: YES
PATH REPORT.FINAL DX SPEC: ABNORMAL
PATH REPORT.GROSS SPEC: ABNORMAL
PATH REPORT.MICROSCOPIC SPEC OTHER STN: ABNORMAL
PATH REPORT.RELEVANT HX SPEC: ABNORMAL
PHOTO IMAGE: ABNORMAL

## 2023-09-14 NOTE — ANESTHESIA POSTPROCEDURE EVALUATION
Patient: Joshua Ennis    Procedure: Procedure(s):  MICROLARYNGOSCOPY with stent removal and biopsy  steroid injection       Anesthesia Type:  General    Note:  Disposition: Outpatient   Postop Pain Control: Uneventful            Sign Out: Well controlled pain   PONV: No   Neuro/Psych: Uneventful            Sign Out: Acceptable/Baseline neuro status   Airway/Respiratory: Uneventful            Sign Out: Acceptable/Baseline resp. status   CV/Hemodynamics: Uneventful            Sign Out: Acceptable CV status; No obvious hypovolemia; No obvious fluid overload   Other NRE: NONE   DID A NON-ROUTINE EVENT OCCUR? No           Last vitals:  Vitals Value Taken Time   /87 09/13/23 1400   Temp 36.1  C (97  F) 09/13/23 1345   Pulse 55 09/13/23 1401   Resp 16 09/13/23 1345   SpO2 96 % 09/13/23 1401   Vitals shown include unvalidated device data.    Electronically Signed By: Carmen Quiros MD  September 14, 2023  2:38 PM

## 2023-09-14 NOTE — PROGRESS NOTES
Spoke with SO and clarified the order was placed for CT scan. Patient was agreeable and verbalized understanding of scheduling on 9/29. Nina Cardenas RN on 9/14/2023 at 11:55 AM

## 2023-09-14 NOTE — TELEPHONE ENCOUNTER
"Patient's sig other calling.  Joshua had microlaryngoscopy yesterday for removal of his stent.  She states Dr Malone did not place a new one as \"its healing nicely\".  Dr Malone prescribed Protonix and Earlene states paperwork indicates to not take with methotrexate.  Joshua has not taken methotrexate since 8/30/23.  Dose was 4 tablets weekly.  Earlene wondering if you want Joshua to start back up or continue to hold due to the addition of Protonix.     Tanvi Fowler RN    "

## 2023-09-14 NOTE — TELEPHONE ENCOUNTER
Patient was put on protonix by Dr. Malone. It says on the instructions that patient should not take if taking methotrexate. They are wondering if he should stay off of methotrexate.

## 2023-09-14 NOTE — PROGRESS NOTES
Called patient' significant other to check in after surgery. Patient Is doing well and is minimal pain. Patient is using anti-microbial cream on neck and will let provider know if skin starts to turn red or look infected. Patient's significant other verbalized understanding. Patient will repeat CT chest scan and was agreeable to new date and time for post-op appointment. Nina Cardenas RN on 9/14/2023 at 11:45 AM

## 2023-09-18 LAB
PATH REPORT.ADDENDUM SPEC: NORMAL
PATH REPORT.COMMENTS IMP SPEC: NORMAL
PATH REPORT.FINAL DX SPEC: NORMAL
PATH REPORT.GROSS SPEC: NORMAL
PATH REPORT.MICROSCOPIC SPEC OTHER STN: NORMAL
PATH REPORT.RELEVANT HX SPEC: NORMAL
PHOTO IMAGE: NORMAL

## 2023-09-18 PROCEDURE — 88312 SPECIAL STAINS GROUP 1: CPT | Mod: 26 | Performed by: STUDENT IN AN ORGANIZED HEALTH CARE EDUCATION/TRAINING PROGRAM

## 2023-09-18 PROCEDURE — 88342 IMHCHEM/IMCYTCHM 1ST ANTB: CPT | Mod: 26 | Performed by: STUDENT IN AN ORGANIZED HEALTH CARE EDUCATION/TRAINING PROGRAM

## 2023-09-18 PROCEDURE — 88305 TISSUE EXAM BY PATHOLOGIST: CPT | Mod: 26 | Performed by: STUDENT IN AN ORGANIZED HEALTH CARE EDUCATION/TRAINING PROGRAM

## 2023-09-26 ENCOUNTER — TELEPHONE (OUTPATIENT)
Dept: OTOLARYNGOLOGY | Facility: CLINIC | Age: 78
End: 2023-09-26

## 2023-09-26 NOTE — TELEPHONE ENCOUNTER
Called patient's fiance to let her know that we rescheduled the patient's post-op as patient needed adjustment to timing of appointments related to unexpected stent placement. Patient's SO was agreeable to alternative time and date and will follow up with any questions or concerns. Nina Cardenas RN on 9/26/2023 at 1:56 PM

## 2023-09-26 NOTE — TELEPHONE ENCOUNTER
[facility] Call Center    Phone Message    May a detailed message be left on voicemail: yes     Reason for Call: Other: Earlene called because she was under the impression that pt had a post op appt on 9/29 prior to pt's 3:00 pm CT scan. Pt and writer could not find that appt anywhere. Pls call Earlene to discuss. Thank you.      Action Taken: Message routed to:  [facility] [place] [facility]    Travel Screening: Not Applicable

## 2023-09-29 ENCOUNTER — ANCILLARY PROCEDURE (OUTPATIENT)
Dept: CT IMAGING | Facility: CLINIC | Age: 78
End: 2023-09-29
Attending: OTOLARYNGOLOGY
Payer: COMMERCIAL

## 2023-09-29 ENCOUNTER — PREP FOR PROCEDURE (OUTPATIENT)
Dept: OTOLARYNGOLOGY | Facility: CLINIC | Age: 78
End: 2023-09-29

## 2023-09-29 DIAGNOSIS — C32.9 LARYNGEAL CANCER (H): ICD-10-CM

## 2023-09-29 DIAGNOSIS — C32.9 LARYNGEAL SQUAMOUS CELL CARCINOMA (H): Primary | ICD-10-CM

## 2023-09-29 PROCEDURE — 71250 CT THORAX DX C-: CPT | Mod: GC | Performed by: RADIOLOGY

## 2023-10-03 ENCOUNTER — PATIENT OUTREACH (OUTPATIENT)
Dept: OTOLARYNGOLOGY | Facility: CLINIC | Age: 78
End: 2023-10-03

## 2023-10-03 NOTE — PROGRESS NOTES
Called patient's significant other to clarify surgery plan. Patient's SO was agreeable to this and will reach out with any other questions or concerns in the meantime. Nina Cardenas RN on 10/3/2023 at 9:56 AM

## 2023-10-03 NOTE — TELEPHONE ENCOUNTER
Patients spouse, Earlene, asked for a call to go over CT results from appointment on 9/29/23. Earlene also would like to further discuss what is to be expected with surgery as well as recovery/restrictions he may have following surgery.    Patients wife wanted to confirm if a pre op is needed prior to surgery. Writer confirmed with patient although he did have a pre op back in August, that specific appointment unfortunately does not fall within 30 days of patients upcoming surgery with Dr. Malone on 10/18/23. Writer asked if Earlene would like to schedule a PAC appointment for patient however she said at this time she would prefer to try and schedule with their primary clinic in [place]. Writer asked that patient or wife give a call back to schedule with PAC should they be unable to schedule with their primary clinic prior to 10/18/23.    Renu Martinez on 10/3/2023 at 9:45 AM

## 2023-10-06 ENCOUNTER — TELEPHONE (OUTPATIENT)
Dept: OTOLARYNGOLOGY | Facility: CLINIC | Age: 78
End: 2023-10-06

## 2023-10-06 ENCOUNTER — LAB (OUTPATIENT)
Dept: LAB | Facility: CLINIC | Age: 78
End: 2023-10-06
Payer: COMMERCIAL

## 2023-10-06 DIAGNOSIS — Z79.899 HIGH RISK MEDICATIONS (NOT ANTICOAGULANTS) LONG-TERM USE: ICD-10-CM

## 2023-10-06 DIAGNOSIS — R91.1 LUNG NODULE: ICD-10-CM

## 2023-10-06 DIAGNOSIS — M31.30 GRANULOMATOSIS WITH POLYANGIITIS, UNSPECIFIED WHETHER RENAL INVOLVEMENT (H): ICD-10-CM

## 2023-10-06 DIAGNOSIS — N18.30 CKD (CHRONIC KIDNEY DISEASE) STAGE 3, GFR 30-59 ML/MIN (H): Primary | ICD-10-CM

## 2023-10-06 DIAGNOSIS — R05.2 SUBACUTE COUGH: ICD-10-CM

## 2023-10-06 LAB
ALBUMIN MFR UR ELPH: 8.5 MG/DL
ALBUMIN SERPL BCG-MCNC: 4.2 G/DL (ref 3.5–5.2)
ALBUMIN UR-MCNC: NEGATIVE MG/DL
ALT SERPL W P-5'-P-CCNC: 25 U/L (ref 0–70)
ANION GAP SERPL CALCULATED.3IONS-SCNC: 8 MMOL/L (ref 7–15)
APPEARANCE UR: CLEAR
AST SERPL W P-5'-P-CCNC: 22 U/L (ref 0–45)
BACTERIA #/AREA URNS HPF: ABNORMAL /HPF
BASO+EOS+MONOS # BLD AUTO: ABNORMAL 10*3/UL
BASO+EOS+MONOS NFR BLD AUTO: ABNORMAL %
BASOPHILS # BLD AUTO: 0.1 10E3/UL (ref 0–0.2)
BASOPHILS NFR BLD AUTO: 1 %
BILIRUB UR QL STRIP: NEGATIVE
BUN SERPL-MCNC: 25.3 MG/DL (ref 8–23)
CALCIUM SERPL-MCNC: 9.7 MG/DL (ref 8.8–10.2)
CHLORIDE SERPL-SCNC: 105 MMOL/L (ref 98–107)
CHOLEST SERPL-MCNC: 166 MG/DL
COLOR UR AUTO: YELLOW
CREAT SERPL-MCNC: 1.63 MG/DL (ref 0.67–1.17)
CREAT UR-MCNC: 126.1 MG/DL
CREAT UR-MCNC: 126.3 MG/DL
CRP SERPL-MCNC: <3 MG/L
DEPRECATED HCO3 PLAS-SCNC: 26 MMOL/L (ref 22–29)
EGFRCR SERPLBLD CKD-EPI 2021: 43 ML/MIN/1.73M2
EOSINOPHIL # BLD AUTO: 0.2 10E3/UL (ref 0–0.7)
EOSINOPHIL NFR BLD AUTO: 4 %
ERYTHROCYTE [DISTWIDTH] IN BLOOD BY AUTOMATED COUNT: 12 % (ref 10–15)
ERYTHROCYTE [SEDIMENTATION RATE] IN BLOOD BY WESTERGREN METHOD: 8 MM/HR (ref 0–20)
GLUCOSE SERPL-MCNC: 78 MG/DL (ref 70–99)
GLUCOSE UR STRIP-MCNC: NEGATIVE MG/DL
HCT VFR BLD AUTO: 42.5 % (ref 40–53)
HDLC SERPL-MCNC: 55 MG/DL
HGB BLD-MCNC: 14 G/DL (ref 13.3–17.7)
HGB UR QL STRIP: NEGATIVE
IMM GRANULOCYTES # BLD: 0 10E3/UL
IMM GRANULOCYTES NFR BLD: 1 %
KETONES UR STRIP-MCNC: NEGATIVE MG/DL
LDLC SERPL CALC-MCNC: 77 MG/DL
LEUKOCYTE ESTERASE UR QL STRIP: NEGATIVE
LYMPHOCYTES # BLD AUTO: 1.3 10E3/UL (ref 0.8–5.3)
LYMPHOCYTES NFR BLD AUTO: 23 %
MCH RBC QN AUTO: 32 PG (ref 26.5–33)
MCHC RBC AUTO-ENTMCNC: 32.9 G/DL (ref 31.5–36.5)
MCV RBC AUTO: 97 FL (ref 78–100)
MICROALBUMIN UR-MCNC: 14 MG/L
MICROALBUMIN/CREAT UR: 11.1 MG/G CR (ref 0–17)
MONOCYTES # BLD AUTO: 0.5 10E3/UL (ref 0–1.3)
MONOCYTES NFR BLD AUTO: 9 %
MUCOUS THREADS #/AREA URNS LPF: PRESENT /LPF
NEUTROPHILS # BLD AUTO: 3.6 10E3/UL (ref 1.6–8.3)
NEUTROPHILS NFR BLD AUTO: 63 %
NITRATE UR QL: NEGATIVE
NONHDLC SERPL-MCNC: 111 MG/DL
PH UR STRIP: 6 [PH] (ref 5–7)
PLATELET # BLD AUTO: 218 10E3/UL (ref 150–450)
POTASSIUM SERPL-SCNC: 4.9 MMOL/L (ref 3.4–5.3)
PROT/CREAT 24H UR: 0.07 MG/MG CR (ref 0–0.2)
RBC # BLD AUTO: 4.37 10E6/UL (ref 4.4–5.9)
RBC #/AREA URNS AUTO: ABNORMAL /HPF
SODIUM SERPL-SCNC: 139 MMOL/L (ref 135–145)
SP GR UR STRIP: 1.02 (ref 1–1.03)
SQUAMOUS #/AREA URNS AUTO: ABNORMAL /LPF
TRIGL SERPL-MCNC: 172 MG/DL
UROBILINOGEN UR STRIP-ACNC: 0.2 E.U./DL
WBC # BLD AUTO: 5.8 10E3/UL (ref 4–11)
WBC #/AREA URNS AUTO: ABNORMAL /HPF

## 2023-10-06 PROCEDURE — 86036 ANCA SCREEN EACH ANTIBODY: CPT

## 2023-10-06 PROCEDURE — 82784 ASSAY IGA/IGD/IGG/IGM EACH: CPT

## 2023-10-06 PROCEDURE — 81001 URINALYSIS AUTO W/SCOPE: CPT

## 2023-10-06 PROCEDURE — 82043 UR ALBUMIN QUANTITATIVE: CPT

## 2023-10-06 PROCEDURE — 82040 ASSAY OF SERUM ALBUMIN: CPT

## 2023-10-06 PROCEDURE — 84450 TRANSFERASE (AST) (SGOT): CPT

## 2023-10-06 PROCEDURE — 84156 ASSAY OF PROTEIN URINE: CPT

## 2023-10-06 PROCEDURE — 85025 COMPLETE CBC W/AUTO DIFF WBC: CPT

## 2023-10-06 PROCEDURE — 86037 ANCA TITER EACH ANTIBODY: CPT

## 2023-10-06 PROCEDURE — 86355 B CELLS TOTAL COUNT: CPT

## 2023-10-06 PROCEDURE — 82570 ASSAY OF URINE CREATININE: CPT

## 2023-10-06 PROCEDURE — 84460 ALANINE AMINO (ALT) (SGPT): CPT

## 2023-10-06 PROCEDURE — 80061 LIPID PANEL: CPT

## 2023-10-06 PROCEDURE — 86140 C-REACTIVE PROTEIN: CPT

## 2023-10-06 PROCEDURE — 83516 IMMUNOASSAY NONANTIBODY: CPT

## 2023-10-06 PROCEDURE — 85652 RBC SED RATE AUTOMATED: CPT

## 2023-10-06 PROCEDURE — 83876 ASSAY MYELOPEROXIDASE: CPT

## 2023-10-06 PROCEDURE — 80048 BASIC METABOLIC PNL TOTAL CA: CPT

## 2023-10-06 PROCEDURE — 36415 COLL VENOUS BLD VENIPUNCTURE: CPT

## 2023-10-06 NOTE — TELEPHONE ENCOUNTER
Falguni returned my call. Rescheduled patient for 10/25. Advised her that he will need to make sure his H&P is within 30 days of his new surgery date. She understood and had no further questions    Senait Pettit, Perioperative Coordinator 10/6/2023 at 8:43 AM

## 2023-10-06 NOTE — TELEPHONE ENCOUNTER
Left patients SO a voicemail requesting to reschedule surgery from 10/18 to 10/25    Senait Pettit, Perioperative Coordinator 10/6/2023 at 8:23 AM

## 2023-10-07 LAB
CD19 B CELL COMMENT: NORMAL
CD19 CELLS # BLD: 216 CELLS/UL (ref 107–698)
CD19 CELLS NFR BLD: 16 % (ref 6–27)

## 2023-10-09 LAB
IGA SERPL-MCNC: 103 MG/DL (ref 84–499)
IGG SERPL-MCNC: 1022 MG/DL (ref 610–1616)
IGM SERPL-MCNC: 55 MG/DL (ref 35–242)

## 2023-10-10 ENCOUNTER — OFFICE VISIT (OUTPATIENT)
Dept: OTOLARYNGOLOGY | Facility: CLINIC | Age: 78
End: 2023-10-10
Payer: COMMERCIAL

## 2023-10-10 VITALS
WEIGHT: 198 LBS | SYSTOLIC BLOOD PRESSURE: 134 MMHG | BODY MASS INDEX: 29.24 KG/M2 | DIASTOLIC BLOOD PRESSURE: 83 MMHG | HEART RATE: 73 BPM

## 2023-10-10 DIAGNOSIS — J38.7 LARYNGEAL HYPERFUNCTION: ICD-10-CM

## 2023-10-10 DIAGNOSIS — R49.0 DYSPHONIA: Primary | ICD-10-CM

## 2023-10-10 DIAGNOSIS — C32.9 LARYNGEAL CANCER (H): ICD-10-CM

## 2023-10-10 DIAGNOSIS — J38.3 LESION OF VOCAL FOLD: ICD-10-CM

## 2023-10-10 DIAGNOSIS — J38.3 LEUKOPLAKIA OF VOCAL CORDS: ICD-10-CM

## 2023-10-10 DIAGNOSIS — R09.89 CHRONIC THROAT CLEARING: ICD-10-CM

## 2023-10-10 DIAGNOSIS — C32.9 LARYNGEAL SQUAMOUS CELL CARCINOMA (H): ICD-10-CM

## 2023-10-10 LAB
ANCA AB PATTERN SER IF-IMP: ABNORMAL
C-ANCA TITR SER IF: ABNORMAL {TITER}
MYELOPEROXIDASE AB SER IA-ACNC: 0.4 U/ML
MYELOPEROXIDASE AB SER IA-ACNC: NEGATIVE
PROTEINASE3 AB SER IA-ACNC: 20 U/ML
PROTEINASE3 AB SER IA-ACNC: POSITIVE

## 2023-10-10 PROCEDURE — 31575 DIAGNOSTIC LARYNGOSCOPY: CPT | Mod: 79 | Performed by: OTOLARYNGOLOGY

## 2023-10-10 PROCEDURE — 99207 PR NO CHARGE LOS: CPT

## 2023-10-10 PROCEDURE — 99024 POSTOP FOLLOW-UP VISIT: CPT | Performed by: OTOLARYNGOLOGY

## 2023-10-10 ASSESSMENT — PAIN SCALES - GENERAL: PAINLEVEL: NO PAIN (0)

## 2023-10-10 NOTE — LETTER
10/10/2023       RE: Joshua Ennis  [address] [facility] [address]     Dear Colleague,    Thank you for referring your patient, Joshua Ennis, to the [facility] [place] at [facility]. Please see a copy of my visit note below.        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 Ennis is a 78 year old male is being followed for dysphonia. he was initially seen on 8/15/23. Please refer to this note for full history.       Today, he presents for follow up. he reports:  - voice is poor     PAST MEDICAL HISTORY:   Past Medical History:   Diagnosis Date    Arthritis     Autoimmune disease (H)     Hearing problem     Hoarseness     Hypertension     Kidney problem     Malignant melanoma (H)     Malignant neoplasm (H)     melanoma    Squamous cell carcinoma     Russo syndrome        PAST SURGICAL HISTORY:   Past Surgical History:   Procedure Laterality Date    BIOPSY  2011    melanoma on back    DISSECT LYMPH NODE INGUINAL  3/1/2013    Procedure: DISSECT LYMPH NODE INGUINAL;  Bilateral Inguinal Lymph Node Biopsy.;  Surgeon: Tariq Acosta MD;  Location: [facility]    ESOPHAGOSCOPY, GASTROSCOPY, DUODENOSCOPY (EGD), COMBINED  2013    Procedure: COMBINED ESOPHAGOSCOPY, GASTROSCOPY, DUODENOSCOPY (EGD);  Gastroscopy;  Surgeon: Tariq Acosta MD;  Location: [facility]    HERNIORRHAPHY INGUINAL  2012    Procedure: HERNIORRHAPHY INGUINAL;  Open Repair Left Inguinal Hernia;  Surgeon: Jerad Baker MD;  Location:  [facility]    INJECT STEROID (LOCATION) N/A 8/30/2023    Procedure: steroid injection;  Surgeon: Josephine Malone MD;  Location: [facility]    INJECT STEROID (LOCATION) N/A 9/13/2023    Procedure: steroid injection;  Surgeon: Josephine Malone MD;  Location: [facility]    LARYNGOSCOPY WITH MICROSCOPE N/A 9/13/2023    Procedure: MICROLARYNGOSCOPY with stent removal and biopsy;  Surgeon: Josephine Malone MD;  Location: [facility]    LASER CO2 LARYNGOSCOPY N/A 8/30/2023    Procedure: MICROLARYNGOSCOPY, CO2 laser resection of vocal fold lesion;  Surgeon: Josephine Malone MD;  Location: [facility]       CURRENT MEDICATIONS:   Current Outpatient Medications:     Acetaminophen (TYLENOL PO), Take 650 mg by mouth once as needed for mild pain or fever, Disp: , Rfl:     bacitracin 500 UNIT/GM external ointment, Apply topically 2 times daily, Disp: 14 g, Rfl: 0    beclomethasone HFA (QVAR REDIHALER) 80 MCG/ACT inhaler, Inhale 2 puffs into the lungs 2 times daily, Disp: 10.6 g, Rfl: 3    Blood Pressure Monitor KIT, Automatic Blood Pressure Monitor, Disp: 1 kit, Rfl: 0    calcium carbonate 600 mg-vitamin D 400 units (CALTRATE) 600-400 MG-UNIT per tablet, Take 1 tablet by mouth 2 times daily., Disp: , Rfl:     Cholecalciferol (VITAMIN D) 1000 UNITS capsule, Take 1 capsule by mouth daily., Disp: , Rfl:     folic acid (FOLVITE) 1 MG tablet, Take 2 tablets (2 mg) by mouth daily, Disp: 180 tablet, Rfl: 3    gabapentin (NEURONTIN) 400 MG capsule, Take 1 capsule (400 mg) by mouth At Bedtime, Disp: 90 capsule, Rfl: 3    losartan (COZAAR) 50 MG tablet, Take 1 tablet (50 mg) by mouth daily, Disp: 90 tablet, Rfl: 2    methotrexate sodium 2.5 MG TABS, Take 4 tablets (10 mg) by mouth once a week, Disp: 48 tablet, Rfl: 1    pantoprazole (PROTONIX) 40 MG EC tablet, Take 1 tablet (40 mg) by mouth 2 times daily, Disp: 80 tablet, Rfl: 3    ALLERGIES: Shrimp    SOCIAL HISTORY:    Social History     Socioeconomic History    Marital status: Single     Spouse name: Not on file    Number  of children: Not on file    Years of education: Not on file    Highest education level: Not on file   Occupational History     Employer: [employer]   Tobacco Use    Smoking status: Former     Packs/day: 1.00     Years: 20.00     Pack years: 20.00     Types: Cigarettes     Quit date: 2/14/2013     Years since quitting: 10.6    Smokeless tobacco: Never   Vaping Use    Vaping Use: Never used   Substance and Sexual Activity    Alcohol use: Yes     Comment: rare    Drug use: No    Sexual activity: Not Currently     Partners: Female   Other Topics Concern    Parent/sibling w/ CABG, MI or angioplasty before 65F 55M? Not Asked     Service Not Asked    Blood Transfusions Not Asked    Caffeine Concern Not Asked    Occupational Exposure Not Asked    Hobby Hazards Not Asked    Sleep Concern Not Asked    Stress Concern Not Asked    Weight Concern Not Asked    Special Diet Not Asked    Back Care Not Asked    Exercise Yes     Comment: walking    Bike Helmet Not Asked    Seat Belt Not Asked    Self-Exams Not Asked   Social History Narrative    Not on file     Social Determinants of Health     Financial Resource Strain: Not on file   Food Insecurity: Not on file   Transportation Needs: Not on file   Physical Activity: Not on file   Stress: Not on file   Social Connections: Not on file   Interpersonal Safety: Not on file   Housing Stability: Not on file         FAMILY HISTORY:   Family History   Problem Relation Age of Onset    Diabetes Mother     Hypertension Mother     Cancer Father         stomach    Heart Disease Father     Heart Disease Brother     Diabetes Sister     Diabetes Sister     Diabetes Sister     Heart Disease Brother     Cancer Sister     Glaucoma No family hx of     Macular Degeneration No family hx of       Non-contributory for problems with anesthesia    REVIEW OF SYSTEMS:   The patient was asked a 14 point review of systems regarding constitutional symptoms, eye symptoms, ears, nose, mouth,  throat symptoms, cardiovascular symptoms, respiratory symptoms, gastrointestinal symptoms, genitourinary symptoms, musculoskeletal symptoms, integumentary symptoms, neurological symptoms, psychiatric symptoms, endocrine symptoms, hematologic/lymphatic symptoms, and allergic/ immunologic symptoms.   The pertinent factors have been included in the HPI and below.  Patient Supplied Answers to Review of Systems      6/13/2018     8:42 AM   [facility] ROS   Ears, Nose, Throat Ringing/noise in ears    Nasal congestion or drainage    Sore throat    Hoarseness   Cardiopulmonary Cough       Physical Examination   The patient underwent a physical examination as described below. The pertinent positive and negative findings are summarized after the description of the examination.  Constitutional: The patient's developmental and nutritional status was assessed. The patient's voice quality was assessed.  Head and Face: The head and face were inspected for deformities. The sinuses were palpated. The salivary glands were palpated. Facial muscle strength was assessed bilaterally.  Eyes: Extraocular movements and primary gaze alignment were assessed.  Ears, Nose, Mouth and Throat: The ears and nose were examined for deformities. The nasal septum, mucosa, and turbinates were inspected by anterior rhinoscopy. The lips, teeth, and gums were examined for abnormalities. The oral mucosa, tongue, palate, tonsils, lateral and posterior pharynx were inspected for the presence of asymmetry or mucosal lesions.    Neck: The tracheal position was noted, and the neck mass palpated to determine if there were any asymmetries, abnormal neck masses, thyromegally, or thyroid nodules.  Respiratory: The nature of the breathing and chest expansion/symmetry was observed.  Cardiovascular: The patient was examined to determine the presence of any edema or jugular venous distension.  Abdomen: The contour of the abdomen was noted.  Lymphatic: The patient was  examined for infraclavicular lymphadenopathy.  Musculoskeletal: The patient was inspected for the presence of skeletal deformities.  Extremities: The extremities were examined for any clubbing or cyanosis.  Skin: The skin was examined for inflammatory or neoplastic conditions.  Neurologic: The patient's orientation, mood, and affect were noted. The cranial nerve  functions were examined.  Other pertinent positive and negative findings on physical examination:   OC/OP: no lesions, uvula midline, soft palate elevates symmetrically   Neck: no lesions, no TH tenderness to palpation  Stent in place  All other physical examination findings were within normal limits and noncontributory.    Procedures   Flexible laryngoscopy (CPT 99273)        Pre-procedure diagnosis: dysphonia  Post-procedure diagnosis: same as above  Indication for procedure: Mr. Ennis is a 78 year old male with see above  Procedure(s): Fiberoptic Laryngoscopy     Details of Procedure: After informed consent was obtained, the patient was seated in the examination chair.  The areas of the nasopharynx as well as the hypopharynx were anesthetized with topical 4% lidocaine with 0.25% phenylephrine atomizer.  Examination of the base of tongue was performed first.  Attention was directed to any evidence of masses in the area or evidence of leukoplakia or candidal infection.  Attention was directed to the epiglottis where its size and position was determined and its movement on phonation of the vowel  e .  The piriform sinuses were then inspected for any mass lesions or pooling of secretions.  Attention was then directed to the larynx. The vocal folds were inspected for infection or any areas of leukoplakia, for masses, polypoid degeneration, or hemorrhage.  Having done this, the arytenoids and vocal processes were inspected for erythema or evidence of granuloma formation.  The posterior commissure was then inspected for evidence of inflammatory changes in the  mucosa and heaping up of mucosal tissue. The patient was then instructed to say the vowel  e .  Adduction of vocal folds to the midline was observed for any evidence of paresis or paralysis of the larynx or asymmetry in rotation of the larynx to the left or right. The patient was asked to breathe and the degree of abduction was noted bilaterally.  Subglottic view of the larynx was obtained for any additional mass lesions or mucosal changes.  Finally the post cricoid was examined for evidence of pooling of secretions, as well as the pharyngeal wall mucosa.   Anesthesia type: 0.25% phenylephrine     Findings:  Anatomic/physiological deviations: RNC, laryngeal web developed               Right vocal process: No restriction of mobility   Left vocal process: No restriction of mobility  Glottal gap: Complete glottal closure  Supraglottic structures: Normal  Hypopharynx: Normal      Estimated Blood Loss: minimal  Complications: None  Disposition: Patient tolerated the procedure well             Review of Relevant Clinical Data   I personally reviewed:  Notes: CT chest 9/29/23 - improved nodule   Radiology:    Pathology:      9/13/23   A. LARYNX, LEFT VOCAL CORD GRANULATION TISSUE:  - Granulation tissue with giant cell reaction, extensive necrosis and reactive changes  - Negative for carcinoma    8/30/23  A. RIGHT GLOTTIC MASS, BIOPSY:  -INVASIVE KERATINIZING SQUAMOUS CELL CARCINOMA, moderately differentiated, extending to biopsy margins.     B. ANTERIOR COMMISSURE AND LEFT GLOTTIC MASS, BIOPSY:  -INVASIVE KERATINIZING SQUAMOUS CELL CARCINOMA, moderately differentiated, extending to biopsy margins.  -See comment.     C. INFERIOR MARGIN, BIOPSY:  -Scant tissue, negative for malignancy.     D. ANTERIOR MARGIN, BIOPSY:  -INVASIVE KERATINIZING SQUAMOUS CELL CARCINOMA, moderately differentiated, extending to biopsy margins.     E. RIGHT MARGIN, BIOPSY:  -SMALL DETACHED FRAGMENT OF SQUAMOUS CELL CARCINOMA.     F. LEFT MARGIN,  BIOPSY:  -POSITIVE FOR CARCINOMA, scant tissue with crush artefact.     G. RIGHT POSTERIOR MARGIN, BIOPSY:  -Scant tissue, negative for malignancy.     H. LEFT POSTERIOR MARGIN, BIOPSY:  -POSITIVE FOR CARCINOMA.     I. LEFT ANTERIOR MARGIN, BIOPSY:  -SMALL DETACHED FRAGMENT OF SQUAMOUS CELL CARCINOMA.     J. RIGHT ANTERIOR MARGIN, BIOPSY:  -Small tissue fragment with crush artifact and focal atypical cells.     K. ADDITIONAL RIGHT POSTERIOR MARGIN, BIOPSY:  -Squamous mucosa with focal high-grade dysplasia, negative for invasive carcinoma.     L. ADDITIONAL LEFT POSTERIOR MARGIN, BIOPSY:  -SQUAMOUS CELL CARCINOMA IN-SITU, negative for invasive carcinoma.          Procedures:    Labs:  No results found for: TSH  Lab Results   Component Value Date     10/06/2023    CO2 26 10/06/2023    BUN 25.3 (H) 10/06/2023    PHOS 2.6 08/12/2016     Lab Results   Component Value Date    WBC 5.8 10/06/2023    HGB 14.0 10/06/2023    HCT 42.5 10/06/2023    MCV 97 10/06/2023     10/06/2023     Lab Results   Component Value Date    PTT 24 04/11/2013    INR 0.98 04/11/2013     No results found for: DUANE  No components found for: RHEUMATOIDFACTOR,  RF  Lab Results   Component Value Date    CRP <2.9 07/07/2022    CRP <2.9 01/07/2022    CRP <2.9 07/28/2021    CRP <2.9 03/05/2021    CRP <2.9 11/11/2020     No components found for: CKTOT, URICACID  No components found for: C3, C4, DSDNAAB, NDNAABIFA  Lab Results   Component Value Date    MPOAB Not Reported 02/14/2014       Patient reported Quality of Life (QOL) Measures   Patient Supplied Answers To VHI Questionnaire      8/23/2023     8:49 AM   Voice Handicap Index (VHI-10)   My voice makes it difficult for people to hear me 4   People have difficulty understanding me in a noisy room 4   My voice difficulties restrict my personal and social life.  0   I feel left out of conversations because of my voice 2   My voice problem causes me to lose income 0   I feel as though I have to  "strain to produce voice 1   The clarity of my voice is unpredictable 2   My voice problem upsets me 1   My voice makes me feel handicapped 0   People ask, \"What's wrong with your voice?\" 2   VHI-10 16         Patient Supplied Answers To EAT Questionnaire       No data to display                  Patient Supplied Answers To CSI Questionnaire      8/23/2023     8:49 AM   Cough Severity Index (CSI)   My cough is worse when I lie down 0   My coughing problem causes me to restrict my personal and social life 0   I tend to avoid places because of my cough problem 0   I feel embarrassed because of my coughing problem 1   People ask, ''What's wrong?'' because I cough a lot 1   I run out of air when I cough 0   My coughing problem affects my voice 2   My coughing problem limits my physical activity 0   My coughing problem upsets me 0   People ask me if I am sick because I cough a lot 2   CSI Score 6         Patient Supplied Answers to Dyspnea Index Questionnaire:       No data to display                Impression & Plan     IMPRESSION: Mr. Ennis is a 78 year old male who is being seen for the following:     Dysphonia   - anterior commissure lesion seen on scope on 7/31/23  - voice changes worsening for a few months  - prior smoker  - scope shows anterior commissure lesion with extension to the right vocal fold  - given anterior location will require CT neck to rule out thyroid cartilage extension  - discussed awake biopsy - patient in agreement this was done today  - pathology showed SCC  - CT chest had findings of new nodules - after multiple discussions with radiology the decision was made to proceed with surgery and repeat imaging for this finding in the chest  - s/p MDL with bilateral cordectomy with stent placement on 8/30/23  - today shows that the vocal folds closed around the stent - discussed he needs the scar broken up and bigger stent placed - patient in agreement   - pathology showed SCC with positive margins  - " went back for MDL and stent removal on 9/13/23   - symptoms 10/10/2023 are stable, voice is poor  - scope shows  laryngeal web  - discussed he has positive margins - will proceed with repeat surger - if can't clear will need radiation  - ct chest shows improved size of lung nodule of concern  Plan  - surgery as scheduled  - will do a same day H&P updated  - plan to place a stent (bigger at that time)    RETURN VISIT: after surgery    I, Jayne Rodriguez, am serving as a scribe; to document services personally performed by Dr. Josephine Malone - based on data collection and the provider's statements to me.     I agree with the above history, review of systems, physical exam, and plan. I have reviewed the content of the documentation and have edited it as needed. I have personally performed the services documented here and the documentation accurately represents those services and the decisions I have made.       Josephine Malone MD    Laryngology    [facility]  Department of  Otolaryngology - Head and Neck Surgery  [facility] [place] [address]  Appointment line: [phone]  Fax: [phone]  [email or web address]

## 2023-10-10 NOTE — PROGRESS NOTES
[facility]   at the [facility]   Otolaryngology Clinic     Patient: Joshua Ennis    MRN: [id number]    : 1945    Age/Gender: 78 year old male  Date of Service: 10/10/2023  Rendering Provider:   Josephine Malone MD     Chief Complaint   Vocal fold SCC  S/p MDL with bilateral cordectomy and steroid injection 23  S/p MDL with stent removal and steroid injection on 23  Interval History   HISTORY OF PRESENT ILLNESS:  Mr. Ennis is a 78 year old male is being followed for dysphonia. he was initially seen on 8/15/23. Please refer to this note for full history.       Today, he presents for follow up. he reports:  - voice is poor     PAST MEDICAL HISTORY:   Past Medical History:   Diagnosis Date    Arthritis     Autoimmune disease (H)     Hearing problem     Hoarseness     Hypertension     Kidney problem     Malignant melanoma (H)     Malignant neoplasm (H)     melanoma    Squamous cell carcinoma     Russo syndrome        PAST SURGICAL HISTORY:   Past Surgical History:   Procedure Laterality Date    BIOPSY  2011    melanoma on back    DISSECT LYMPH NODE INGUINAL  3/1/2013    Procedure: DISSECT LYMPH NODE INGUINAL;  Bilateral Inguinal Lymph Node Biopsy.;  Surgeon: Tariq Acosta MD;  Location: [facility]    ESOPHAGOSCOPY, GASTROSCOPY, DUODENOSCOPY (EGD), COMBINED  2013    Procedure: COMBINED ESOPHAGOSCOPY, GASTROSCOPY, DUODENOSCOPY (EGD);  Gastroscopy;  Surgeon: Tariq Acosta MD;  Location: [facility]    HERNIORRHAPHY INGUINAL  2012    Procedure: HERNIORRHAPHY INGUINAL;  Open Repair Left Inguinal Hernia;  Surgeon: Jerad Baker MD;  Location: [facility]    INJECT STEROID (LOCATION) N/A 2023    Procedure: steroid injection;  Surgeon: Josephine Malone MD;  Location: [facility]    INJECT STEROID (LOCATION) N/A 2023    Procedure: steroid injection;  Surgeon: Josephine Malone MD;  Location: [facility]    LARYNGOSCOPY WITH MICROSCOPE N/A 2023    Procedure:  MICROLARYNGOSCOPY with stent removal and biopsy;  Surgeon: Josephine Malone MD;  Location: [facility]    LASER CO2 LARYNGOSCOPY N/A 8/30/2023    Procedure: MICROLARYNGOSCOPY, CO2 laser resection of vocal fold lesion;  Surgeon: Josephine Malone MD;  Location: [facility]       CURRENT MEDICATIONS:   Current Outpatient Medications:     Acetaminophen (TYLENOL PO), Take 650 mg by mouth once as needed for mild pain or fever, Disp: , Rfl:     bacitracin 500 UNIT/GM external ointment, Apply topically 2 times daily, Disp: 14 g, Rfl: 0    beclomethasone HFA (QVAR REDIHALER) 80 MCG/ACT inhaler, Inhale 2 puffs into the lungs 2 times daily, Disp: 10.6 g, Rfl: 3    Blood Pressure Monitor KIT, Automatic Blood Pressure Monitor, Disp: 1 kit, Rfl: 0    calcium carbonate 600 mg-vitamin D 400 units (CALTRATE) 600-400 MG-UNIT per tablet, Take 1 tablet by mouth 2 times daily., Disp: , Rfl:     Cholecalciferol (VITAMIN D) 1000 UNITS capsule, Take 1 capsule by mouth daily., Disp: , Rfl:     folic acid (FOLVITE) 1 MG tablet, Take 2 tablets (2 mg) by mouth daily, Disp: 180 tablet, Rfl: 3    gabapentin (NEURONTIN) 400 MG capsule, Take 1 capsule (400 mg) by mouth At Bedtime, Disp: 90 capsule, Rfl: 3    losartan (COZAAR) 50 MG tablet, Take 1 tablet (50 mg) by mouth daily, Disp: 90 tablet, Rfl: 2    methotrexate sodium 2.5 MG TABS, Take 4 tablets (10 mg) by mouth once a week, Disp: 48 tablet, Rfl: 1    pantoprazole (PROTONIX) 40 MG EC tablet, Take 1 tablet (40 mg) by mouth 2 times daily, Disp: 80 tablet, Rfl: 3    ALLERGIES: Shrimp    SOCIAL HISTORY:    Social History     Socioeconomic History    Marital status: Single     Spouse name: Not on file    Number of children: Not on file    Years of education: Not on file    Highest education level: Not on file   Occupational History     Employer: [employer]   Tobacco Use    Smoking status: Former     Packs/day: 1.00     Years: 20.00     Pack years: 20.00     Types: Cigarettes     Quit date: 2/14/2013      Years since quitting: 10.6    Smokeless tobacco: Never   Vaping Use    Vaping Use: Never used   Substance and Sexual Activity    Alcohol use: Yes     Comment: rare    Drug use: No    Sexual activity: Not Currently     Partners: Female   Other Topics Concern    Parent/sibling w/ CABG, MI or angioplasty before 65F 55M? Not Asked     Service Not Asked    Blood Transfusions Not Asked    Caffeine Concern Not Asked    Occupational Exposure Not Asked    Hobby Hazards Not Asked    Sleep Concern Not Asked    Stress Concern Not Asked    Weight Concern Not Asked    Special Diet Not Asked    Back Care Not Asked    Exercise Yes     Comment: walking    Bike Helmet Not Asked    Seat Belt Not Asked    Self-Exams Not Asked   Social History Narrative    Not on file     Social Determinants of Health     Financial Resource Strain: Not on file   Food Insecurity: Not on file   Transportation Needs: Not on file   Physical Activity: Not on file   Stress: Not on file   Social Connections: Not on file   Interpersonal Safety: Not on file   Housing Stability: Not on file         FAMILY HISTORY:   Family History   Problem Relation Age of Onset    Diabetes Mother     Hypertension Mother     Cancer Father         stomach    Heart Disease Father     Heart Disease Brother     Diabetes Sister     Diabetes Sister     Diabetes Sister     Heart Disease Brother     Cancer Sister     Glaucoma No family hx of     Macular Degeneration No family hx of       Non-contributory for problems with anesthesia    REVIEW OF SYSTEMS:   The patient was asked a 14 point review of systems regarding constitutional symptoms, eye symptoms, ears, nose, mouth, throat symptoms, cardiovascular symptoms, respiratory symptoms, gastrointestinal symptoms, genitourinary symptoms, musculoskeletal symptoms, integumentary symptoms, neurological symptoms, psychiatric symptoms, endocrine symptoms, hematologic/lymphatic symptoms, and allergic/ immunologic symptoms.   The  pertinent factors have been included in the HPI and below.  Patient Supplied Answers to Review of Systems      6/13/2018     8:42 AM   [facility] ROS   Ears, Nose, Throat Ringing/noise in ears    Nasal congestion or drainage    Sore throat    Hoarseness   Cardiopulmonary Cough       Physical Examination   The patient underwent a physical examination as described below. The pertinent positive and negative findings are summarized after the description of the examination.  Constitutional: The patient's developmental and nutritional status was assessed. The patient's voice quality was assessed.  Head and Face: The head and face were inspected for deformities. The sinuses were palpated. The salivary glands were palpated. Facial muscle strength was assessed bilaterally.  Eyes: Extraocular movements and primary gaze alignment were assessed.  Ears, Nose, Mouth and Throat: The ears and nose were examined for deformities. The nasal septum, mucosa, and turbinates were inspected by anterior rhinoscopy. The lips, teeth, and gums were examined for abnormalities. The oral mucosa, tongue, palate, tonsils, lateral and posterior pharynx were inspected for the presence of asymmetry or mucosal lesions.    Neck: The tracheal position was noted, and the neck mass palpated to determine if there were any asymmetries, abnormal neck masses, thyromegally, or thyroid nodules.  Respiratory: The nature of the breathing and chest expansion/symmetry was observed.  Cardiovascular: The patient was examined to determine the presence of any edema or jugular venous distension.  Abdomen: The contour of the abdomen was noted.  Lymphatic: The patient was examined for infraclavicular lymphadenopathy.  Musculoskeletal: The patient was inspected for the presence of skeletal deformities.  Extremities: The extremities were examined for any clubbing or cyanosis.  Skin: The skin was examined for inflammatory or neoplastic conditions.  Neurologic: The patient's  orientation, mood, and affect were noted. The cranial nerve  functions were examined.  Other pertinent positive and negative findings on physical examination:   OC/OP: no lesions, uvula midline, soft palate elevates symmetrically   Neck: no lesions, no TH tenderness to palpation  Stent in place  All other physical examination findings were within normal limits and noncontributory.    Procedures   Flexible laryngoscopy (CPT 75379)        Pre-procedure diagnosis: dysphonia  Post-procedure diagnosis: same as above  Indication for procedure: Mr. Ennis is a 78 year old male with see above  Procedure(s): Fiberoptic Laryngoscopy     Details of Procedure: After informed consent was obtained, the patient was seated in the examination chair.  The areas of the nasopharynx as well as the hypopharynx were anesthetized with topical 4% lidocaine with 0.25% phenylephrine atomizer.  Examination of the base of tongue was performed first.  Attention was directed to any evidence of masses in the area or evidence of leukoplakia or candidal infection.  Attention was directed to the epiglottis where its size and position was determined and its movement on phonation of the vowel  e .  The piriform sinuses were then inspected for any mass lesions or pooling of secretions.  Attention was then directed to the larynx. The vocal folds were inspected for infection or any areas of leukoplakia, for masses, polypoid degeneration, or hemorrhage.  Having done this, the arytenoids and vocal processes were inspected for erythema or evidence of granuloma formation.  The posterior commissure was then inspected for evidence of inflammatory changes in the mucosa and heaping up of mucosal tissue. The patient was then instructed to say the vowel  e .  Adduction of vocal folds to the midline was observed for any evidence of paresis or paralysis of the larynx or asymmetry in rotation of the larynx to the left or right. The patient was asked to breathe and the  degree of abduction was noted bilaterally.  Subglottic view of the larynx was obtained for any additional mass lesions or mucosal changes.  Finally the post cricoid was examined for evidence of pooling of secretions, as well as the pharyngeal wall mucosa.   Anesthesia type: 0.25% phenylephrine     Findings:  Anatomic/physiological deviations: RNC, laryngeal web developed               Right vocal process: No restriction of mobility   Left vocal process: No restriction of mobility  Glottal gap: Complete glottal closure  Supraglottic structures: Normal  Hypopharynx: Normal      Estimated Blood Loss: minimal  Complications: None  Disposition: Patient tolerated the procedure well             Review of Relevant Clinical Data   I personally reviewed:  Notes: CT chest 9/29/23 - improved nodule   Radiology:    Pathology:      9/13/23   A. LARYNX, LEFT VOCAL CORD GRANULATION TISSUE:  - Granulation tissue with giant cell reaction, extensive necrosis and reactive changes  - Negative for carcinoma    8/30/23  A. RIGHT GLOTTIC MASS, BIOPSY:  -INVASIVE KERATINIZING SQUAMOUS CELL CARCINOMA, moderately differentiated, extending to biopsy margins.     B. ANTERIOR COMMISSURE AND LEFT GLOTTIC MASS, BIOPSY:  -INVASIVE KERATINIZING SQUAMOUS CELL CARCINOMA, moderately differentiated, extending to biopsy margins.  -See comment.     C. INFERIOR MARGIN, BIOPSY:  -Scant tissue, negative for malignancy.     D. ANTERIOR MARGIN, BIOPSY:  -INVASIVE KERATINIZING SQUAMOUS CELL CARCINOMA, moderately differentiated, extending to biopsy margins.     E. RIGHT MARGIN, BIOPSY:  -SMALL DETACHED FRAGMENT OF SQUAMOUS CELL CARCINOMA.     F. LEFT MARGIN, BIOPSY:  -POSITIVE FOR CARCINOMA, scant tissue with crush artefact.     G. RIGHT POSTERIOR MARGIN, BIOPSY:  -Scant tissue, negative for malignancy.     H. LEFT POSTERIOR MARGIN, BIOPSY:  -POSITIVE FOR CARCINOMA.     I. LEFT ANTERIOR MARGIN, BIOPSY:  -SMALL DETACHED FRAGMENT OF SQUAMOUS CELL CARCINOMA.     J.  "RIGHT ANTERIOR MARGIN, BIOPSY:  -Small tissue fragment with crush artifact and focal atypical cells.     K. ADDITIONAL RIGHT POSTERIOR MARGIN, BIOPSY:  -Squamous mucosa with focal high-grade dysplasia, negative for invasive carcinoma.     L. ADDITIONAL LEFT POSTERIOR MARGIN, BIOPSY:  -SQUAMOUS CELL CARCINOMA IN-SITU, negative for invasive carcinoma.          Procedures:    Labs:  No results found for: TSH  Lab Results   Component Value Date     10/06/2023    CO2 26 10/06/2023    BUN 25.3 (H) 10/06/2023    PHOS 2.6 08/12/2016     Lab Results   Component Value Date    WBC 5.8 10/06/2023    HGB 14.0 10/06/2023    HCT 42.5 10/06/2023    MCV 97 10/06/2023     10/06/2023     Lab Results   Component Value Date    PTT 24 04/11/2013    INR 0.98 04/11/2013     No results found for: DUANE  No components found for: RHEUMATOIDFACTOR,  RF  Lab Results   Component Value Date    CRP <2.9 07/07/2022    CRP <2.9 01/07/2022    CRP <2.9 07/28/2021    CRP <2.9 03/05/2021    CRP <2.9 11/11/2020     No components found for: CKTOT, URICACID  No components found for: C3, C4, DSDNAAB, NDNAABIFA  Lab Results   Component Value Date    MPOAB Not Reported 02/14/2014       Patient reported Quality of Life (QOL) Measures   Patient Supplied Answers To VHI Questionnaire      8/23/2023     8:49 AM   Voice Handicap Index (VHI-10)   My voice makes it difficult for people to hear me 4   People have difficulty understanding me in a noisy room 4   My voice difficulties restrict my personal and social life.  0   I feel left out of conversations because of my voice 2   My voice problem causes me to lose income 0   I feel as though I have to strain to produce voice 1   The clarity of my voice is unpredictable 2   My voice problem upsets me 1   My voice makes me feel handicapped 0   People ask, \"What's wrong with your voice?\" 2   VHI-10 16         Patient Supplied Answers To EAT Questionnaire       No data to display                  Patient " Supplied Answers To CSI Questionnaire      8/23/2023     8:49 AM   Cough Severity Index (CSI)   My cough is worse when I lie down 0   My coughing problem causes me to restrict my personal and social life 0   I tend to avoid places because of my cough problem 0   I feel embarrassed because of my coughing problem 1   People ask, ''What's wrong?'' because I cough a lot 1   I run out of air when I cough 0   My coughing problem affects my voice 2   My coughing problem limits my physical activity 0   My coughing problem upsets me 0   People ask me if I am sick because I cough a lot 2   CSI Score 6         Patient Supplied Answers to Dyspnea Index Questionnaire:       No data to display                Impression & Plan     IMPRESSION: Mr. Ennis is a 78 year old male who is being seen for the following:     Dysphonia   - anterior commissure lesion seen on scope on 7/31/23  - voice changes worsening for a few months  - prior smoker  - scope shows anterior commissure lesion with extension to the right vocal fold  - given anterior location will require CT neck to rule out thyroid cartilage extension  - discussed awake biopsy - patient in agreement this was done today  - pathology showed SCC  - CT chest had findings of new nodules - after multiple discussions with radiology the decision was made to proceed with surgery and repeat imaging for this finding in the chest  - s/p MDL with bilateral cordectomy with stent placement on 8/30/23  - today shows that the vocal folds closed around the stent - discussed he needs the scar broken up and bigger stent placed - patient in agreement   - pathology showed SCC with positive margins  - went back for MDL and stent removal on 9/13/23   - symptoms 10/10/2023 are stable, voice is poor  - scope shows  laryngeal web  - discussed he has positive margins - will proceed with repeat surger - if can't clear will need radiation  - ct chest shows improved size of lung nodule of concern  Plan  -  surgery as scheduled  - will do a same day H&P updated  - plan to place a stent (bigger at that time)    RETURN VISIT: after surgery    I, Jayne Rodriguez, am serving as a scribe; to document services personally performed by Dr. Josephine Malone - based on data collection and the provider's statements to me.     I agree with the above history, review of systems, physical exam, and plan. I have reviewed the content of the documentation and have edited it as needed. I have personally performed the services documented here and the documentation accurately represents those services and the decisions I have made.       Josephine Malone MD    Laryngology    [facility]  Department of  Otolaryngology - Head and Neck Surgery  [facility] [place] [address]  Appointment line: [phone]  Fax: [phone]  [email or web address]

## 2023-10-10 NOTE — PROGRESS NOTES
"[facility] VOICE [facility]  Edwin Reid Jr., M.D., F.A.C.S.  Nahomy Jimenez M.D., M.P.H.  Josephine Malone M.D.  Mally Gifford M.M. (voice), M.A., CCC-SLP  Oksana Bell M.A., CCC-SLP  Vanessa Scott, Ph.D., CCC-SLP  Jose L Campo, Ph.D., CCC-SLP  Kym Mireles M.S., CCC-SLP  Jamil Banegas M.M., M.A., CCC-SLP  KATHRYN Resendiz (voice), M.S., CCC-SLP    [facility]  VOICE/SPEECH/BREATHING THERAPY  CLINICAL FOLLOW-UP AND LARYNGEAL EXAMINATION REPORT - IN PERSON    Clinician: Oksana Bell M.A., CCC-SLP  Seen in conjunction with: Dr. Josephine Malone  Referring physician:  No ref. 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 He is s/p MDL with bilateral cordectomy with stent placement on 8/30/23. Additionally he is s/p Microdirect suspension laryngoscopy with stent removal, microdirect suspension laryngoscopy with biopsy of the left vocal fold granulation tissue, and microdirect suspension laryngoscopy with steroid injection on 09/13/2023.    PROGRESS SINCE LAST VISIT  Mr. Ennis reports his voice is poor.         INTERVAL HX:  Information and impressions per Kym Mrieles's M.S., [facility]-SLP's note on 09/07/2023:  Joshua underwent surgery to excise a vocal fold lesion and stent placement of the anterior commissure on 8/30/23. 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 Since his procedure, he has been performing rescue breathing intermittently throughout the day and performing short instances of SOVT exercises beginning yesterday. 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 Perceptually, his voice is very weak today, and he is talking minimally during our session, as he is just 7 days post-surgery. Laryngoscopy today demonstrated white scar tissue at the anterior 2/3s of the vocal folds, creating webbing anteriorly. Therefore, Dr. Malone plans on reducing this anterior web surgically, placing a larger stent, and injecting steroid into the anterior commissure next week. Formal re-evaluation is recommended at his next post-op appointment prior to initiating a full course of voice therapy. Perioperative care instructions were reviewed with Joshua and his wife today, as they will be repeating this process next week and had questions about gradually increasing his voice use. Joshua is in agreement with this plan of care.   Therapy pre-op note from 08/23/2023 per Kym Mireles M.S., CCC-SLP: Perioperative voice care as addressed today. We discussed that complete voice rest (e.g. no talking, whispering, laughing, coughing, sneezing) will be required for about 3-5 days after their surgery, with their physician giving them the exact timeframe after the procedure. In that time, increasing and maintaining appropriate vocal hygiene will be helpful in promoting healing, including but not limited to increase hydration, reducing caffeine and alcohol due to their drying effects, behavioral reflux precautions, avoiding smoking, etc. Cough suppression techniques and rescue breathing strategies were instructed if this is needed immediately post-op, and Joshua performed these with high accuracy following instruction. After this span of complete vocal rest, he will increase their voice use to 1 minute per hour on the first day and then increasing by 2 minute each day (e.g. 1 min/hour on day 1 of voice use, 3 min/hour on day 2, 5 min/hour on day 3, etc.). Using a volume as if speaking to someone at an arm's  length distance away is recommended, as well as avoiding telephone conversations. One of these minutes per hour will be spent performing SOVT exercises (e.g. straw phonation with water resistance) to aid with resuming appropriate vocal fold vibration/pliability, reduce edema, and lessen risk for scar. These exercises were instructed today, and Joshua performed them with high accuracy. He was cued to avoid straining, although this was only observed rarely and mildly. Follow-up appointments with Dr. Malone will be scheduled appropriately, and Joshua and KATIE (or a member of my team) will plan to continue voice therapy about 4 post-op.  Impressions from initial evaluation on 08/15/2023: This evaluation has resulted in the following diagnosis/diagnoses for Mr. 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    Laryngeal evaluation demonstrated white crusting at the anterior commissure supraglottically and infraglottically:; white crusting and redness at the anterior portion (superior surface) of the right vocal fold and the mid portion at the superior surface and medial edge; white crusting and redness at the vibratory edge/superior surface of the left vocal fold at the middle portion that extends towards posterior portion vibratory edge and this also extends laterally at the middle superior surface; Narrow Band Imaging yielded the following: prominent white crusting observed at the anterior commissure, right,and left vocal fold with prominent bilateral erythema; mild-moderate posterior commissure hypertrophy; possible white crusting at the left medial arytenoid wall but unclear; bilateral ventricular approximation during phonation; L>R; severe four-way constriction of the supraglottic larynx during connected speech; frequent breath holding observed; RTVF Amplitude is marked at the site of anterior lesion  "(stiff) but mildly reduced at the posterior 2/3 portion of the vocal fold; LTVF amplitude is mildly reduced; mucosal wave of right and left vocal folds is mildly reduced;  asymmetry observed; on phonation, glottic closure is incomplete due to irregularity; irregular-shaped configuration of the glottis during many phonatory tasks  Perceptual evaluation demonstrated marked-severe roughness; moderate breathiness; moderate asthenia; clearer and louder voice with laugh and when he reacted (a loud exclamataion) to the Lidocaine spray;and he often spoke at a higher pitch (G3-A3) than is not typical for a man of his age and gender.        PATIENT REPORTED MEASURES  Patient Supplied Answers To VHI Questionnaire      8/23/2023     8:49 AM   Voice Handicap Index (VHI-10)   My voice makes it difficult for people to hear me 4   People have difficulty understanding me in a noisy room 4   My voice difficulties restrict my personal and social life.  0   I feel left out of conversations because of my voice 2   My voice problem causes me to lose income 0   I feel as though I have to strain to produce voice 1   The clarity of my voice is unpredictable 2   My voice problem upsets me 1   My voice makes me feel handicapped 0   People ask, \"What's wrong with your voice?\" 2   VHI-10 16       Patient Supplied Answers To CSI Questionnaire      8/23/2023     8:49 AM   Cough Severity Index (CSI)   My cough is worse when I lie down 0   My coughing problem causes me to restrict my personal and social life 0   I tend to avoid places because of my cough problem 0   I feel embarrassed because of my coughing problem 1   People ask, ''What's wrong?'' because I cough a lot 1   I run out of air when I cough 0   My coughing problem affects my voice 2   My coughing problem limits my physical activity 0   My coughing problem upsets me 0   People ask me if I am sick because I cough a lot 2   CSI Score 6       Patient Supplied Answers To EAT Questionnaire   "     No data to display              OBJECTIVE FINDINGS  PERCEPTUAL EVALUATION (CPT 92572)  VOICE:  Breathiness: Moderate to severe  Strain: Moderate to severe  Completely aphonic  Conversational speech:  Severely reduced  Global Overall Severity:  96/100      LARYNGEAL EXAMINATION  Procedure: Flexible endoscopy with chip-tip technology without stroboscopy, right nostril; topical anesthesia with 3% Lidocaine and 0.25% phenylephrine was applied.   Performed by: Dr. Malone  The laryngeal and pharyngeal structures were evaluated for gross appearance, mobility, function, and focal lesions / abnormalities of the associated mucosa.     This exam shows:    Laryngeal and Vocal Fold Mucosa  Irritation at the left medial arytenoid wall  Moderate posterior commissure hypertrophy  Mild presence of bubbly and sticky secretions on the vocal folds and throughout the laryngeal area  Status of vocal fold mucosa:   Anterior laryngeal web   Bilateral erythema L>R  Narrow Band Imaging yielded the following:  white discoloration at the anterior portion of the anterior laryngeal web and at the irritation at the left medial arytenoid wall    Neurological and Functional Integrity of the Larynx  Vocal folds are mobile and meet at midline; movement is brisk and symmetric; exam is neurologically normal   Airway is adequate    IMPRESSIONS/ RECOMMENDATIONS/ PLAN  IMPRESSIONS: Joshua Ennis is a 78 year old male, presenting today for follow-up with Dysphonia (R49.0), 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   Remarkable findings included:  Perceptual evaluation demonstrated: moderate to severe breathiness; moderate to severe strain; he is completely aphonic; and severely reduced conversational speech.   Laryngeal exam demonstrated: anterior laryngeal web; bilateral erythema L>R; irritation at the left medial arytenoid wall; moderate  posterior commissure hypertrophy; mild presence of bubbly and sticky secretions on the vocal folds and throughout the laryngeal area; Narrow Band Imaging yielded the following:  white discoloration at the anterior portion of the anterior laryngeal web and at the irritation at the left medial arytenoid wall    RECOMMENDATIONS per Dr. Malone:  - surgery as scheduled  - will do a same day H&P updated  - plan to place a stent (bigger at that time)    No skilled services were not warranted today.     This treatment plan was developed with the patient who agreed with the recommendations.    TOTAL SERVICE TIME: 15 minutes  NO CHARGE FACILITY FEE (35794)    Oksana Bell M.A., CCC-SLP  Speech-Language Pathologist  [facility]  [email or web address]  She/Her/Hers

## 2023-10-16 ENCOUNTER — PATIENT OUTREACH (OUTPATIENT)
Dept: OTOLARYNGOLOGY | Facility: CLINIC | Age: 78
End: 2023-10-16

## 2023-10-16 NOTE — PROGRESS NOTES
Called pharmacy to get medications transferred over to the patient's local pharmacy. Pharmacist said he was unsure why they were not able to transfer the prescriptions and will attempt again today. Nina Cardenas RN on 10/16/2023 at 9:33 AM

## 2023-10-24 ENCOUNTER — ANESTHESIA EVENT (OUTPATIENT)
Dept: SURGERY | Facility: CLINIC | Age: 78
End: 2023-10-24
Payer: COMMERCIAL

## 2023-10-24 ASSESSMENT — LIFESTYLE VARIABLES: TOBACCO_USE: 1

## 2023-10-24 ASSESSMENT — COPD QUESTIONNAIRES: COPD: 0

## 2023-10-24 ASSESSMENT — ENCOUNTER SYMPTOMS: SEIZURES: 0

## 2023-10-24 NOTE — ANESTHESIA PREPROCEDURE EVALUATION
Anesthesia Pre-Procedure Evaluation    Patient: Joshua Ennis   MRN: [id number] : 1945        Procedure : Procedure(s):  MICROLARYNGOSCOPY, CO2 laser resection of vocal fold lesion  possible steroid injection          Past Medical History:   Diagnosis Date     Arthritis      Autoimmune disease (H24)      Hearing problem      Hoarseness      Hypertension      Kidney problem      Malignant melanoma (H)      Malignant neoplasm (H)     melanoma     Squamous cell carcinoma      Russo syndrome       Past Surgical History:   Procedure Laterality Date     BIOPSY  2011    melanoma on back     DISSECT LYMPH NODE INGUINAL  3/1/2013    Procedure: DISSECT LYMPH NODE INGUINAL;  Bilateral Inguinal Lymph Node Biopsy.;  Surgeon: Tariq Acosta MD;  Location: [facility]     ESOPHAGOSCOPY, GASTROSCOPY, DUODENOSCOPY (EGD), COMBINED  2013    Procedure: COMBINED ESOPHAGOSCOPY, GASTROSCOPY, DUODENOSCOPY (EGD);  Gastroscopy;  Surgeon: Tariq Acosta MD;  Location: [facility]     HERNIORRHAPHY INGUINAL  2012    Procedure: HERNIORRHAPHY INGUINAL;  Open Repair Left Inguinal Hernia;  Surgeon: Jerad Baker MD;  Location: [facility]     INJECT STEROID (LOCATION) N/A 2023    Procedure: steroid injection;  Surgeon: Josephine Malone MD;  Location: [facility]     INJECT STEROID (LOCATION) N/A 2023    Procedure: steroid injection;  Surgeon: Josephine Malone MD;  Location: [facility]     LARYNGOSCOPY WITH MICROSCOPE N/A 2023    Procedure: MICROLARYNGOSCOPY with stent removal and biopsy;  Surgeon: Josephine Malone MD;  Location: [facility]     LASER CO2 LARYNGOSCOPY N/A 2023    Procedure: MICROLARYNGOSCOPY, CO2 laser resection of vocal fold lesion;  Surgeon: Josephine Malone MD;  Location: [facility] OR      Allergies   Allergen Reactions     Shrimp Nausea and Vomiting and Unknown     Got sick each time he ate it      Social History     Tobacco Use     Smoking status: Former     Packs/day: 1.00     Years: 20.00     Additional pack years:  0.00     Total pack years: 20.00     Types: Cigarettes     Quit date: 2/14/2013     Years since quitting: 10.6     Smokeless tobacco: Never   Substance Use Topics     Alcohol use: Yes     Comment: rare      Wt Readings from Last 1 Encounters:   10/10/23 89.8 kg (198 lb)        Anesthesia Evaluation   Pt has had prior anesthetic. Type: General.    No history of anesthetic complications       ROS/MED HX  ENT/Pulmonary: Comment: Flexible laryngoscopy 10/10/23:     Findings:  Anatomic/physiological deviations: RNC, laryngeal web developed  Right vocal process: No restriction of mobility   Left vocal process: No restriction of mobility  Glottal gap: Complete glottal closure  Supraglottic structures: Normal  Hypopharynx: Normal         (+)            vocal cord abnormalities -  Hoarseness and Hx of Polyps,   tobacco use, Current use,                   (-) asthma, COPD and recent URI   Neurologic: Comment: Progressive hearing loss    (+)    peripheral neuropathy, - idiopathic progressive polymeuropathy.                        (-) no seizures and no CVA   Cardiovascular:     (+) Dyslipidemia hypertension- -   -  - -                                      METS/Exercise Tolerance: >4 METS    Hematologic:     (+)      anemia,       (-) history of blood clots and history of blood transfusion   Musculoskeletal:   (+)  arthritis,             GI/Hepatic:     (+) GERD, Asymptomatic on medication,                  Renal/Genitourinary: Comment: Ckd3, baseline Cr fluctuates around 1.4    (+) renal disease, type: CRI, Pt does not require dialysis,           Endo: Comment: Granulomatous w/ polyangiitis    (+)            Chronic steroid usage for Other. Date most recently used: beclomethasone daily.        Psychiatric/Substance Use:  - neg psychiatric ROS     Infectious Disease:  - neg infectious disease ROS     Malignancy: Comment: Malignant melanoma hx, laryngeal SCC  (+) Malignancy, History of Skin and Other.Skin CA Remission status  post Surgery.  Other CA Active status post Surgery.    Other:            Physical Exam    Airway        Mallampati: I   TM distance: > 3 FB   Neck ROM: full   Mouth opening: > 3 cm    Respiratory Devices and Support         Dental       (+) Multiple crowns, permanant bridges      Cardiovascular             Pulmonary               OUTSIDE LABS:  CBC:   Lab Results   Component Value Date    WBC 5.8 10/06/2023    WBC 5.7 07/06/2023    HGB 14.0 10/06/2023    HGB 14.6 07/06/2023    HCT 42.5 10/06/2023    HCT 43.6 07/06/2023     10/06/2023     07/06/2023     BMP:   Lab Results   Component Value Date     10/06/2023     11/11/2020    POTASSIUM 4.9 10/06/2023    POTASSIUM 4.3 11/11/2020    CHLORIDE 105 10/06/2023    CHLORIDE 107 11/11/2020    CO2 26 10/06/2023    CO2 27 11/11/2020    BUN 25.3 (H) 10/06/2023    BUN 28 11/11/2020    CR 1.63 (H) 10/06/2023    CR 1.6 (H) 08/16/2023    GLC 78 10/06/2023    GLC 79 11/11/2020     COAGS:   Lab Results   Component Value Date    PTT 24 04/11/2013    INR 0.98 04/11/2013     POC:   Lab Results   Component Value Date    BGM 82 03/14/2013     HEPATIC:   Lab Results   Component Value Date    ALBUMIN 4.2 10/06/2023    PROTTOTAL 7.8 12/13/2017    ALT 25 10/06/2023    AST 22 10/06/2023     (H) 03/07/2013    ALKPHOS 97 12/13/2017    BILITOTAL 0.7 12/13/2017     OTHER:   Lab Results   Component Value Date    LACT 0.8 02/14/2017    LAMAR 9.7 10/06/2023    PHOS 2.6 08/12/2016    MAG 2.1 03/14/2016    CRP <2.9 07/07/2022    SED 8 10/06/2023       Anesthesia Plan    ASA Status:  3    NPO Status:  NPO Appropriate    Anesthesia Type: General.     - Airway: ETT   Induction: Intravenous.   Maintenance: Balanced.   Techniques and Equipment:     - Airway: Video-Laryngoscope       Consents    Anesthesia Plan(s) and associated risks, benefits, and realistic alternatives discussed. Questions answered and patient/representative(s) expressed understanding.     - Discussed:     -  Discussed with:  Patient      - Extended Intubation/Ventilatory Support Discussed: No.      - Patient is DNR/DNI Status: No     Use of blood products discussed: No .     Postoperative Care    Pain management: Multi-modal analgesia.   PONV prophylaxis: Ondansetron (or other 5HT-3), Background Propofol Infusion     Comments:                Jose Saunders MD

## 2023-10-25 ENCOUNTER — ANESTHESIA (OUTPATIENT)
Dept: SURGERY | Facility: CLINIC | Age: 78
End: 2023-10-25
Payer: COMMERCIAL

## 2023-10-25 ENCOUNTER — HOSPITAL ENCOUNTER (OUTPATIENT)
Facility: CLINIC | Age: 78
Discharge: HOME | End: 2023-10-25
Attending: OTOLARYNGOLOGY | Admitting: OTOLARYNGOLOGY
Payer: COMMERCIAL

## 2023-10-25 VITALS
RESPIRATION RATE: 16 BRPM | BODY MASS INDEX: 29.22 KG/M2 | OXYGEN SATURATION: 95 % | HEIGHT: 69 IN | TEMPERATURE: 97.4 F | HEART RATE: 73 BPM | DIASTOLIC BLOOD PRESSURE: 82 MMHG | SYSTOLIC BLOOD PRESSURE: 130 MMHG | WEIGHT: 197.31 LBS

## 2023-10-25 DIAGNOSIS — C32.9 SQUAMOUS CELL CARCINOMA OF LARYNX (H): Primary | ICD-10-CM

## 2023-10-25 PROCEDURE — 999N000141 HC STATISTIC PRE-PROCEDURE NURSING ASSESSMENT: Performed by: OTOLARYNGOLOGY

## 2023-10-25 PROCEDURE — 31554 LARYNGOPLASTY LARYNGEAL STEN: CPT | Mod: 79 | Performed by: OTOLARYNGOLOGY

## 2023-10-25 PROCEDURE — 258N000003 HC RX IP 258 OP 636

## 2023-10-25 PROCEDURE — 88331 PATH CONSLTJ SURG 1 BLK 1SPC: CPT | Mod: TC | Performed by: OTOLARYNGOLOGY

## 2023-10-25 PROCEDURE — 272N000002 HC OR SUPPLY OTHER OPNP: Performed by: OTOLARYNGOLOGY

## 2023-10-25 PROCEDURE — 88341 IMHCHEM/IMCYTCHM EA ADD ANTB: CPT | Mod: 26 | Performed by: PATHOLOGY

## 2023-10-25 PROCEDURE — 710N000010 HC RECOVERY PHASE 1, LEVEL 2, PER MIN: Performed by: OTOLARYNGOLOGY

## 2023-10-25 PROCEDURE — 360N000077 HC SURGERY LEVEL 4, PER MIN: Performed by: OTOLARYNGOLOGY

## 2023-10-25 PROCEDURE — 88342 IMHCHEM/IMCYTCHM 1ST ANTB: CPT | Mod: 26 | Performed by: PATHOLOGY

## 2023-10-25 PROCEDURE — 250N000009 HC RX 250: Performed by: OTOLARYNGOLOGY

## 2023-10-25 PROCEDURE — 710N000012 HC RECOVERY PHASE 2, PER MINUTE: Performed by: OTOLARYNGOLOGY

## 2023-10-25 PROCEDURE — 88331 PATH CONSLTJ SURG 1 BLK 1SPC: CPT | Mod: 26 | Performed by: PATHOLOGY

## 2023-10-25 PROCEDURE — 250N000011 HC RX IP 250 OP 636

## 2023-10-25 PROCEDURE — 88305 TISSUE EXAM BY PATHOLOGIST: CPT | Mod: 26 | Performed by: PATHOLOGY

## 2023-10-25 PROCEDURE — 250N000009 HC RX 250: Performed by: NURSE ANESTHETIST, CERTIFIED REGISTERED

## 2023-10-25 PROCEDURE — 250N000025 HC SEVOFLURANE, PER MIN: Performed by: OTOLARYNGOLOGY

## 2023-10-25 PROCEDURE — 250N000013 HC RX MED GY IP 250 OP 250 PS 637

## 2023-10-25 PROCEDURE — 272N000001 HC OR GENERAL SUPPLY STERILE: Performed by: OTOLARYNGOLOGY

## 2023-10-25 PROCEDURE — 250N000011 HC RX IP 250 OP 636: Performed by: NURSE ANESTHETIST, CERTIFIED REGISTERED

## 2023-10-25 PROCEDURE — 250N000011 HC RX IP 250 OP 636: Mod: JZ | Performed by: OTOLARYNGOLOGY

## 2023-10-25 PROCEDURE — 370N000017 HC ANESTHESIA TECHNICAL FEE, PER MIN: Performed by: OTOLARYNGOLOGY

## 2023-10-25 PROCEDURE — 250N000009 HC RX 250

## 2023-10-25 DEVICE — FOLYSIL® CATHETER STRAIGHT TIP CH FR 14
Type: IMPLANTABLE DEVICE | Site: THROAT | Status: NON-FUNCTIONAL
Brand: PORGES COLOPLAST
Removed: 2023-12-01

## 2023-10-25 RX ORDER — EPHEDRINE SULFATE 50 MG/ML
INJECTION, SOLUTION INTRAMUSCULAR; INTRAVENOUS; SUBCUTANEOUS PRN
Status: DISCONTINUED | OUTPATIENT
Start: 2023-10-25 | End: 2023-10-25

## 2023-10-25 RX ORDER — ONDANSETRON 4 MG/1
4 TABLET, ORALLY DISINTEGRATING ORAL EVERY 30 MIN PRN
Status: DISCONTINUED | OUTPATIENT
Start: 2023-10-25 | End: 2023-10-25 | Stop reason: HOSPADM

## 2023-10-25 RX ORDER — FENTANYL CITRATE 50 UG/ML
25 INJECTION, SOLUTION INTRAMUSCULAR; INTRAVENOUS EVERY 5 MIN PRN
Status: DISCONTINUED | OUTPATIENT
Start: 2023-10-25 | End: 2023-10-25 | Stop reason: HOSPADM

## 2023-10-25 RX ORDER — ONDANSETRON 2 MG/ML
INJECTION INTRAMUSCULAR; INTRAVENOUS PRN
Status: DISCONTINUED | OUTPATIENT
Start: 2023-10-25 | End: 2023-10-25

## 2023-10-25 RX ORDER — PROPOFOL 10 MG/ML
INJECTION, EMULSION INTRAVENOUS CONTINUOUS PRN
Status: DISCONTINUED | OUTPATIENT
Start: 2023-10-25 | End: 2023-10-25

## 2023-10-25 RX ORDER — OXYCODONE HYDROCHLORIDE 10 MG/1
10 TABLET ORAL
Status: DISCONTINUED | OUTPATIENT
Start: 2023-10-25 | End: 2023-10-25 | Stop reason: HOSPADM

## 2023-10-25 RX ORDER — ONDANSETRON 2 MG/ML
4 INJECTION INTRAMUSCULAR; INTRAVENOUS EVERY 30 MIN PRN
Status: DISCONTINUED | OUTPATIENT
Start: 2023-10-25 | End: 2023-10-25 | Stop reason: HOSPADM

## 2023-10-25 RX ORDER — OXYCODONE HYDROCHLORIDE 5 MG/1
5 TABLET ORAL
Status: DISCONTINUED | OUTPATIENT
Start: 2023-10-25 | End: 2023-10-25 | Stop reason: HOSPADM

## 2023-10-25 RX ORDER — LIDOCAINE HYDROCHLORIDE 20 MG/ML
INJECTION, SOLUTION INFILTRATION; PERINEURAL PRN
Status: DISCONTINUED | OUTPATIENT
Start: 2023-10-25 | End: 2023-10-25

## 2023-10-25 RX ORDER — HYDROMORPHONE HYDROCHLORIDE 1 MG/ML
0.2 INJECTION, SOLUTION INTRAMUSCULAR; INTRAVENOUS; SUBCUTANEOUS EVERY 5 MIN PRN
Status: DISCONTINUED | OUTPATIENT
Start: 2023-10-25 | End: 2023-10-25 | Stop reason: HOSPADM

## 2023-10-25 RX ORDER — LIDOCAINE 40 MG/G
CREAM TOPICAL
Status: DISCONTINUED | OUTPATIENT
Start: 2023-10-25 | End: 2023-10-25 | Stop reason: HOSPADM

## 2023-10-25 RX ORDER — ACETAMINOPHEN 325 MG/1
975 TABLET ORAL ONCE
Status: COMPLETED | OUTPATIENT
Start: 2023-10-25 | End: 2023-10-25

## 2023-10-25 RX ORDER — HYDROCODONE BITARTRATE AND ACETAMINOPHEN 5; 325 MG/1; MG/1
1 TABLET ORAL EVERY 6 HOURS PRN
Qty: 6 TABLET | Refills: 0 | Status: SHIPPED | OUTPATIENT
Start: 2023-10-25 | End: 2024-07-11

## 2023-10-25 RX ORDER — LIDOCAINE HYDROCHLORIDE 40 MG/ML
SOLUTION TOPICAL PRN
Status: DISCONTINUED | OUTPATIENT
Start: 2023-10-25 | End: 2023-10-25 | Stop reason: HOSPADM

## 2023-10-25 RX ORDER — SODIUM CHLORIDE, SODIUM LACTATE, POTASSIUM CHLORIDE, CALCIUM CHLORIDE 600; 310; 30; 20 MG/100ML; MG/100ML; MG/100ML; MG/100ML
INJECTION, SOLUTION INTRAVENOUS CONTINUOUS
Status: DISCONTINUED | OUTPATIENT
Start: 2023-10-25 | End: 2023-10-25 | Stop reason: HOSPADM

## 2023-10-25 RX ORDER — FENTANYL CITRATE 50 UG/ML
INJECTION, SOLUTION INTRAMUSCULAR; INTRAVENOUS PRN
Status: DISCONTINUED | OUTPATIENT
Start: 2023-10-25 | End: 2023-10-25

## 2023-10-25 RX ORDER — OXYMETAZOLINE HYDROCHLORIDE 0.05 G/100ML
SPRAY NASAL PRN
Status: DISCONTINUED | OUTPATIENT
Start: 2023-10-25 | End: 2023-10-25 | Stop reason: HOSPADM

## 2023-10-25 RX ORDER — DEXAMETHASONE SODIUM PHOSPHATE 10 MG/ML
INJECTION INTRAMUSCULAR; INTRAVENOUS PRN
Status: DISCONTINUED | OUTPATIENT
Start: 2023-10-25 | End: 2023-10-25 | Stop reason: HOSPADM

## 2023-10-25 RX ORDER — HYDROMORPHONE HYDROCHLORIDE 1 MG/ML
0.4 INJECTION, SOLUTION INTRAMUSCULAR; INTRAVENOUS; SUBCUTANEOUS EVERY 5 MIN PRN
Status: DISCONTINUED | OUTPATIENT
Start: 2023-10-25 | End: 2023-10-25 | Stop reason: HOSPADM

## 2023-10-25 RX ORDER — FENTANYL CITRATE 50 UG/ML
50 INJECTION, SOLUTION INTRAMUSCULAR; INTRAVENOUS EVERY 5 MIN PRN
Status: DISCONTINUED | OUTPATIENT
Start: 2023-10-25 | End: 2023-10-25 | Stop reason: HOSPADM

## 2023-10-25 RX ORDER — DEXAMETHASONE SODIUM PHOSPHATE 4 MG/ML
INJECTION, SOLUTION INTRA-ARTICULAR; INTRALESIONAL; INTRAMUSCULAR; INTRAVENOUS; SOFT TISSUE PRN
Status: DISCONTINUED | OUTPATIENT
Start: 2023-10-25 | End: 2023-10-25

## 2023-10-25 RX ADMIN — EPHEDRINE SULFATE 5 MG: 5 INJECTION INTRAVENOUS at 16:48

## 2023-10-25 RX ADMIN — FENTANYL CITRATE 50 MCG: 50 INJECTION, SOLUTION INTRAMUSCULAR; INTRAVENOUS at 18:51

## 2023-10-25 RX ADMIN — PHENYLEPHRINE HYDROCHLORIDE 150 MCG: 10 INJECTION INTRAVENOUS at 15:13

## 2023-10-25 RX ADMIN — SUGAMMADEX 200 MG: 100 INJECTION, SOLUTION INTRAVENOUS at 17:53

## 2023-10-25 RX ADMIN — DEXAMETHASONE SODIUM PHOSPHATE 10 MG: 4 INJECTION, SOLUTION INTRA-ARTICULAR; INTRALESIONAL; INTRAMUSCULAR; INTRAVENOUS; SOFT TISSUE at 17:03

## 2023-10-25 RX ADMIN — PHENYLEPHRINE HYDROCHLORIDE 50 MCG: 10 INJECTION INTRAVENOUS at 15:50

## 2023-10-25 RX ADMIN — Medication 10 MG: at 17:39

## 2023-10-25 RX ADMIN — EPHEDRINE SULFATE 5 MG: 5 INJECTION INTRAVENOUS at 15:50

## 2023-10-25 RX ADMIN — HYDROMORPHONE HYDROCHLORIDE 0.5 MG: 1 INJECTION, SOLUTION INTRAMUSCULAR; INTRAVENOUS; SUBCUTANEOUS at 17:08

## 2023-10-25 RX ADMIN — Medication 10 MG: at 15:39

## 2023-10-25 RX ADMIN — EPHEDRINE SULFATE 5 MG: 5 INJECTION INTRAVENOUS at 16:42

## 2023-10-25 RX ADMIN — Medication 10 MG: at 16:17

## 2023-10-25 RX ADMIN — PHENYLEPHRINE HYDROCHLORIDE 100 MCG: 10 INJECTION INTRAVENOUS at 15:33

## 2023-10-25 RX ADMIN — FENTANYL CITRATE 50 MCG: 50 INJECTION INTRAMUSCULAR; INTRAVENOUS at 15:22

## 2023-10-25 RX ADMIN — SODIUM CHLORIDE, POTASSIUM CHLORIDE, SODIUM LACTATE AND CALCIUM CHLORIDE: 600; 310; 30; 20 INJECTION, SOLUTION INTRAVENOUS at 16:53

## 2023-10-25 RX ADMIN — FENTANYL CITRATE 50 MCG: 50 INJECTION INTRAMUSCULAR; INTRAVENOUS at 15:00

## 2023-10-25 RX ADMIN — PROPOFOL 100 MG: 10 INJECTION, EMULSION INTRAVENOUS at 15:00

## 2023-10-25 RX ADMIN — Medication 50 MG: at 15:00

## 2023-10-25 RX ADMIN — Medication 10 MG: at 16:44

## 2023-10-25 RX ADMIN — ACETAMINOPHEN 975 MG: 325 TABLET, FILM COATED ORAL at 11:35

## 2023-10-25 RX ADMIN — LIDOCAINE HYDROCHLORIDE 100 MG: 20 INJECTION, SOLUTION INFILTRATION; PERINEURAL at 15:00

## 2023-10-25 RX ADMIN — PROPOFOL 100 MCG/KG/MIN: 10 INJECTION, EMULSION INTRAVENOUS at 15:08

## 2023-10-25 RX ADMIN — ONDANSETRON 4 MG: 2 INJECTION INTRAMUSCULAR; INTRAVENOUS at 17:47

## 2023-10-25 RX ADMIN — SODIUM CHLORIDE, POTASSIUM CHLORIDE, SODIUM LACTATE AND CALCIUM CHLORIDE: 600; 310; 30; 20 INJECTION, SOLUTION INTRAVENOUS at 14:48

## 2023-10-25 ASSESSMENT — ACTIVITIES OF DAILY LIVING (ADL)
ADLS_ACUITY_SCORE: 35
ADLS_ACUITY_SCORE: 33
ADLS_ACUITY_SCORE: 35

## 2023-10-25 NOTE — OP NOTE
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 Transoral laser microsurgery with endoscopic right and left type Tyle Vd - extended cordectomy encompassing the subglottis  Resection per ELS classification) of squamous cell carcinoma of the larynx.  2. Microsuspension direct laryngoscopy with injection of dexamethasone  3. Microsuspension with stent placement    ANESTHESIA  Type: General   ETT: 5.5 laser tenax    SURGEON:   Josephine Malone MD    ASSISTANT SURGEON(S):   Abi Miller MD    INDICATIONS FOR PROCEDURE:   The patient is a 78 year old male with a recently diagnosed laryngeal mass s/p resection on 8/30/23 with positive margins comes today for repeat resection. Risks, benefits, alternatives, and rationale for the procedure(s) listed above were discussed with the patient, and the patient wishes to proceed with surgery and has signed an informed consent.       COMPLEXITY  This surgery was more complex than normally because of performing the surgery with a very large anterior and subglottic tumor. The approach therefore was more difficult technically than normally.  Significantly more time was required to perform all parts of the operation, especially significant longer period of time was needed to perform the laser resection which required multiple repositioning procedures and then a stent had to be placed for the patient as well.       FINDINGS:   1. Exposure was achieved with a dedo and ossoff laryngoscope  2. Significant anterior extension - removed off the thyroid cartilage - circular holes in the thyroid cartilage  3. Size 14 sanz stent placed  4. Inferior margin which was named anterior was positive on frozen - this was further extended and then additional margins were sent  5. Superior  margin was negative on frozen but more tumor was seen and this was extending to the epiglottis ? So more margins were sent here       DESCRIPTION OF PROCEDURE:   The patient was brought into the operating room and placed supine on the operating room table. A time-out was performed. General anesthesia was induced. The patient was an easy mask ventilated. Then the patient was intubated with a cmac and 5.5 laser tenax ETT.     Then the patient was turned 90 degrees from the anesthesiologist. The head was drapped in the usual fashion. Then the dentition and mucosa were inspected prior to start. A reinforced tooth guard was placed on the upper dentition. The dedo and ossoff laryngoscope was carefully introduced into the oral cavity and gently passed to the oropharynx. Here the larynx was exposed and the patient was placed in suspension.     This revealed the tumor to be anterior. Initial photodocumentation was performed with a 0 and 70 degree Coy mahin telescope. The flexible bronchoscope was used with narrow band imaging light to evaluate the edges of the lesion for hypervascularity as well as to identify other areas of hypervascularity.     Laser time out was performed and the patient was appropriately protected, the safety protocols were confirmed and the FiO2 was brought down to a minimal setting. Saline soaked pledgets were placed in the subglottis.     Subsequently, the microscope was brought in and the CO2 laser via micromanipulator was used for the resection. The resection was started beginning posteriorly and the lesion was resected in one piece.This was very difficult.     Multiple margins were sent for permanent histopathologic evaluation.     Then 1.0 ml of steroid  was injected to the anterior commissure to reduce chance of web and scar formation.    Photodocumentation was again performed.     Then a 12mm sanz catheter was cut to the size that it would straddle the anterior commissure anterior segment.      Then a 18gauge angiocath was placed external to internal through the cricothyroid membrane and then through the thyrohyoid membrane.      Then 2 separate 0 prolene sutures with the needles cut off were placed from external to internal through the angiocath.     Then the stent was placed through one of the sutures orally and then the sutures were both tied and the stent was brought down into the glottis. This was positioned and then the sutures were tightened externally over silastic sheathing.     This was the end of our procedure. Afrin soaked pledgets were applied to the surgical site for hemostasis.  The surgical site was then inspected and no residual bleeding was seen. The patient was taken out of suspension. The instrumentation was carefully removed, examining the mucosa and dentition on the way it. The dental guard was removed, and the mucosa and dentition were seen to be in their preoperative state at the conclusion of the procedure. The patient was then handed back to the care of anesthesia who awoke and extubated the patient without complication.    COMPLICATIONS:   None.  .   ESTIMATED BLOOD LOSS:   Less than 10cc    DISPOSITION:   PACU.    SPECIMENS:  ID Type Source Tests Collected by Time Destination   1 : Anterior Web Mass Tissue Other SURGICAL PATHOLOGY EXAM Josephine Malone MD 10/25/2023  4:10 PM    2 : Anterior Margin Tissue Other SURGICAL PATHOLOGY EXAM Josephine Malone MD 10/25/2023  4:12 PM    3 : Superior Margin Tissue Other SURGICAL PATHOLOGY EXAM Josephine Malone MD 10/25/2023  4:16 PM    4 : Cartilage Margin Tissue Other SURGICAL PATHOLOGY EXAM Josephine Malone MD 10/25/2023  4:44 PM    5 : Additional Superior Margin Tissue Other SURGICAL PATHOLOGY EXAM Josephine Malone MD 10/25/2023  4:55 PM    6 : Inferior margin Tissue Other SURGICAL PATHOLOGY EXAM Josephine Malone MD 10/25/2023  5:06 PM    7 : Left margin Tissue Other SURGICAL PATHOLOGY EXAM Josephine Malone MD 10/25/2023  5:07 PM    8 : Right margin Tissue  Other SURGICAL PATHOLOGY EXAM Josephine Malone MD 10/25/2023  5:09 PM           PHOTODOCUMENTATION:

## 2023-10-25 NOTE — BRIEF OP NOTE
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]  Post-operative diagnosis Same as pre-operative diagnosis    Procedure: MICROLARYNGOSCOPY, CO2 laser resection of vocal fold lesion, N/A - Throat  steroid injection, stent placement, and biopsy, N/A - Update    Surgeon: Surgeon(s) and Role:     * Josephine Malone MD - Primary     * Sonam Miller MD - Resident - Assisting  Anesthesia: General   Estimated Blood Loss: Minimal    Drains: None  Specimens:   ID Type Source Tests Collected by Time Destination   1 : Anterior Web Mass Tissue Other SURGICAL PATHOLOGY EXAM Josephine Malone MD 10/25/2023  4:10 PM    2 : Anterior Margin Tissue Other SURGICAL PATHOLOGY EXAM Josephine Malone MD 10/25/2023  4:12 PM    3 : Superior Margin Tissue Other SURGICAL PATHOLOGY EXAM Josephine Malone MD 10/25/2023  4:16 PM    4 : Cartilage Margin Tissue Other SURGICAL PATHOLOGY EXAM Josephine Malone MD 10/25/2023  4:44 PM    5 : Additional Superior Margin Tissue Other SURGICAL PATHOLOGY EXAM Josephine Malone MD 10/25/2023  4:55 PM    6 : Inferior margin Tissue Other SURGICAL PATHOLOGY EXAM Josephine Malone MD 10/25/2023  5:06 PM    7 : Left margin Tissue Other SURGICAL PATHOLOGY EXAM Josephine Malone MD 10/25/2023  5:07 PM    8 : Right margin Tissue Other SURGICAL PATHOLOGY EXAM Josephine Malone MD 10/25/2023  5:09 PM      Findings:  Initial anterior margin postitive for SCC. Debulked to thyroid cartilage. 14F sanz sutured in   Complications: None.  Implants:   Implant Name Type Inv. Item Serial No.  Lot No. LRB No. Used Action   CATH FOLYSIL 14FR 10ML [id number] - [id number]  CATH FOLYSIL 14FR 10ML [id number]  COLOPLAST [id number] N/A 1 Implanted

## 2023-10-25 NOTE — ANESTHESIA POSTPROCEDURE EVALUATION
Patient: Joshua Ennis    Procedure: Procedure(s):  MICROLARYNGOSCOPY, CO2 laser resection of vocal fold lesion  steroid injection, stent placement, and biopsy       Anesthesia Type:  General    Note:  Disposition: Inpatient   Postop Pain Control: Uneventful            Sign Out: Well controlled pain   PONV: No   Neuro/Psych: Uneventful            Sign Out: Acceptable/Baseline neuro status   Airway/Respiratory: Uneventful            Sign Out: Acceptable/Baseline resp. status   CV/Hemodynamics: Uneventful            Sign Out: Acceptable CV status   Other NRE: NONE   DID A NON-ROUTINE EVENT OCCUR? No           Last vitals:  Vitals Value Taken Time   /84 10/25/23 1830   Temp     Pulse 60 10/25/23 1831   Resp     SpO2 100 % 10/25/23 1831   Vitals shown include unfiled device data.    Electronically Signed By: Keron Thakkar MD  October 25, 2023  6:32 PM

## 2023-10-25 NOTE — ANESTHESIA CARE TRANSFER NOTE
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    Anesthesia Type:   General     Note:    Oropharynx: oropharynx clear of all foreign objects and spontaneously breathing  Level of Consciousness: drowsy  Oxygen Supplementation: face mask  Level of Supplemental Oxygen (L/min / FiO2): 6  Independent Airway: airway patency satisfactory and stable  Dentition: dentition unchanged  Vital Signs Stable: post-procedure vital signs reviewed and stable  Report to RN Given: handoff report given  Patient transferred to: PACU    Handoff Report: Identifed the Patient, Identified the Reponsible Provider, Reviewed the pertinent medical history, Discussed the surgical course, Reviewed Intra-OP anesthesia mangement and issues during anesthesia, Set expectations for post-procedure period and Allowed opportunity for questions and acknowledgement of understanding      Vitals:  Vitals Value Taken Time   /84 10/25/23 1830   Temp 36.2  C (97.2  F) 10/25/23 1827   Pulse 58 10/25/23 1843   Resp 11 10/25/23 1843   SpO2 96 % 10/25/23 1843   Vitals shown include unfiled device data.    Electronically Signed By: DENISSE Bates CRNA  October 25, 2023  6:44 PM

## 2023-10-25 NOTE — ANESTHESIA PROCEDURE NOTES
Airway       Patient location during procedure: OR       Procedure Start/Stop Times: 10/25/2023 3:05 PM  Staff -        Anesthesiologist:  Lorin Son MD       Resident/Fellow: Jose Saunders MD       Performed By: resident  Consent for Airway        Urgency: elective  Indications and Patient Condition       Indications for airway management: reggie-procedural       Induction type:intravenous       Mask difficulty assessment: 2 - vent by mask + OA or adjuvant +/- NMBA    Final Airway Details       Final airway type: endotracheal airway       Successful airway: ETT - single and Laser  Endotracheal Airway Details        ETT size (mm): 5.5       Cuffed: yes       Successful intubation technique: video laryngoscopy       VL Blade Size: MAC 3       Grade View of Cords: 1       Adjucts: stylet       Position: Left       Measured from: lips       Secured at (cm): 24       Bite block used: None    Post intubation assessment        Placement verified by: capnometry, equal breath sounds and chest rise        Number of attempts at approach: 1       Number of other approaches attempted: 0       Secured with: silk tape       Ease of procedure: easy       Dentition: Intact and Unchanged    Medication(s) Administered   Medication Administration Time: 10/25/2023 3:05 PM

## 2023-10-26 ENCOUNTER — TELEPHONE (OUTPATIENT)
Dept: OTOLARYNGOLOGY | Facility: CLINIC | Age: 78
End: 2023-10-26

## 2023-10-26 ENCOUNTER — PATIENT OUTREACH (OUTPATIENT)
Dept: OTOLARYNGOLOGY | Facility: CLINIC | Age: 78
End: 2023-10-26

## 2023-10-26 NOTE — PROGRESS NOTES
Called patient SO to check after surgery. Per SO patient is doing well, no pain, trouble swallow or breathing. Patient will continue taking protonix and inhaler. Patient is aware that his case will be presented on tumor board and care team will follow up with next steps. Patient was agreeable and verbalized understanding of the situation. Nina Cardenas RN on 10/26/2023 at 10:33 AM

## 2023-10-26 NOTE — DISCHARGE INSTRUCTIONS
[facility]  Same-Day Surgery   Adult Discharge Orders & Instructions     For 24 hours after surgery    Get plenty of rest.  A responsible adult must stay with you for at least 24 hours after you leave the hospital.   Do not drive or use heavy equipment.  If you have weakness or tingling, don't drive or use heavy equipment until this feeling goes away.  Do not drink alcohol.  Avoid strenuous or risky activities.  Ask for help when climbing stairs.   You may feel lightheaded.  IF so, sit for a few minutes before standing.  Have someone help you get up.   If you have nausea (feel sick to your stomach): Drink only clear liquids such as apple juice, ginger ale, broth or 7-Up.  Rest may also help.  Be sure to drink enough fluids.  Move to a regular diet as you feel able.  You may have a slight fever. Call the doctor if your fever is over 100 F (37.7 C) (taken under the tongue) or lasts longer than 24 hours.  You may have a dry mouth, a sore throat, muscle aches or trouble sleeping.  These should go away after 24 hours.  Do not make important or legal decisions.   Call your doctor for any of the followin.  Signs of infection (fever, growing tenderness at the surgery site, a large amount of drainage or bleeding, severe pain, foul-smelling drainage, redness, swelling).    2. It has been over 8 to 10 hours since surgery and you are still not able to urinate (pass water).    3.  Headache for over 24 hours.    To contact a doctor, call [facility] [phone] [CLINIC]or:    '  To call the  [phone] and ask for the resident on call for Otolaryngology (answered 24 hours a day)  '   Emergency Department:    [facility]: [phone]       (TTY for hearing impaired: [phone])    [facility]: [phone]       (TTY for hearing impaired: [phone])

## 2023-10-26 NOTE — TELEPHONE ENCOUNTER
[facility] Call Center    Phone Message    May a detailed message be left on voicemail: yes     Reason for Call: Other: Significant other is calling in with questions regarding if patient should still take the protonics and inhaler, patient just had surgery yesterday, please call her to discuss further, thanks     Action Taken: Other: ENT    Travel Screening: Not Applicable

## 2023-10-30 LAB
PATH REPORT.COMMENTS IMP SPEC: NORMAL
PATH REPORT.COMMENTS IMP SPEC: NORMAL
PATH REPORT.FINAL DX SPEC: NORMAL
PATH REPORT.GROSS SPEC: NORMAL
PATH REPORT.INTRAOP OBS SPEC DOC: NORMAL
PATH REPORT.MICROSCOPIC SPEC OTHER STN: NORMAL
PATH REPORT.RELEVANT HX SPEC: NORMAL
PHOTO IMAGE: NORMAL

## 2023-11-01 ENCOUNTER — TELEPHONE (OUTPATIENT)
Dept: RHEUMATOLOGY | Facility: CLINIC | Age: 78
End: 2023-11-01

## 2023-11-01 ENCOUNTER — TELEPHONE (OUTPATIENT)
Dept: OTOLARYNGOLOGY | Facility: CLINIC | Age: 78
End: 2023-11-01

## 2023-11-01 NOTE — TELEPHONE ENCOUNTER
Called patient let er know that the patient was referred to Dr. Salgado by Dr. Sanchez. Patient's SO will reach out to Laura's office to confirm appointment is needed. Patient's SO was agreeable and verbalized understanding of the situation. Nina Cardenas RN on 11/1/2023 at 9:56 AM

## 2023-11-01 NOTE — TELEPHONE ENCOUNTER
[facility] Call Center    Phone Message    May a detailed message be left on voicemail: yes     Reason for Call: Other: Per pt's SO wants to know about an appt and some test results. Please call to discuss. Thank you     Action Taken: Message routed to:  [facility] ENT    Travel Screening: Not Applicable

## 2023-11-01 NOTE — TELEPHONE ENCOUNTER
[facility] Call Center    Phone Message    May a detailed message be left on voicemail: yes     Reason for Call: Other: Earlene called and is requesting a call back from the nurse at Dr. Sanchez they have questions about the referral to the ENT clinic      Action Taken: Other: Rheum    Travel Screening: Not Applicable

## 2023-11-02 NOTE — TELEPHONE ENCOUNTER
Returned call to [facility] appt was made back in July. Appt should have been cancelled when patient established care with Dr. Malone in ENT.  Appt canceled on patient's behalf.    All questions answered.    Yadira Miranda, BSN, RN  RN Care Coordinator Rheumatology

## 2023-11-03 ENCOUNTER — TUMOR CONFERENCE (OUTPATIENT)
Dept: ONCOLOGY | Facility: CLINIC | Age: 78
End: 2023-11-03

## 2023-11-03 ENCOUNTER — PATIENT OUTREACH (OUTPATIENT)
Dept: OTOLARYNGOLOGY | Facility: CLINIC | Age: 78
End: 2023-11-03

## 2023-11-03 ENCOUNTER — PREP FOR PROCEDURE (OUTPATIENT)
Dept: OTOLARYNGOLOGY | Facility: CLINIC | Age: 78
End: 2023-11-03

## 2023-11-03 DIAGNOSIS — C32.9 LARYNGEAL CANCER (H): Primary | ICD-10-CM

## 2023-11-03 NOTE — TUMOR CONFERENCE
Head & Neck Tumor Conference Note   Status: New    Staff: Dr. Malone    Tumor Site: Glottis, anterior commisure  Tumor Pathology: SCC  Tumor Stage: T1N0  Tumor Treatment:   - 23: Transoral laser microsurgery with endoscopic right and left Type Vd cordectomy encompassing the subglottis, stent placement   - 23: Stent removal, biopsy  - 10/25/23: Transoral laser microsurgery with endoscopic right and left Type Vd cordectomy encompassing the subglottis, stent placement   Reason for Review: Review imaging, path, and POC    Brief History:   The patient is a 78 year old male with a recently diagnosed laryngeal mass s/p resection on 23 with positive margins and repeat resection on 10/25. We would like to discuss the pathology results as well as a plan of care.   Pertinent PMH:   Past Medical History:   Diagnosis Date     Arthritis      Autoimmune disease (H24)      Hearing problem      Hoarseness      Hypertension      Kidney problem      Malignant melanoma (H)      Malignant neoplasm (H)     melanoma     Squamous cell carcinoma      Russo syndrome       Smoking Hx:   Social History     Tobacco Use     Smoking status: Former     Packs/day: 1.00     Years: 20.00     Additional pack years: 0.00     Total pack years: 20.00     Types: Cigarettes     Quit date: 2013     Years since quitting: 10.7     Smokeless tobacco: Never   Vaping Use     Vaping Use: Never used   Substance Use Topics     Alcohol use: Yes     Comment: rare     Drug use: No       Imagin23: CT Soft tissue neck  IMPRESSION: 1. No soft tissue mass identified by CT at the anterior  commissure of the larynx. Small foci of postprocedure air.  2. No cervical adenopathy.  3. New left upper lobe pulmonary nodule. Recommend further evaluation  with dedicated CT of the chest.  23: CT chest  IMPRESSION:  No acute or suspicious findings in the chest. Stable left apical  nodule compared to multiple prior exams, likely benign  apical  scarring. Mediastinal node prominence unchanged.  Pathology:   Final Diagnosis   A. ANTERIOR WEB MASS, BIOPSY:  -SMALL FOCUS OF INVASIVE SQUAMOUS CELL CARCINOMA with crush artefact.     B. ANTERIOR MARGIN, BIOPSY:  -POSITIVE FOR SQUAMOUS CELL CARCINOMA.     C. SUPERIOR MARGIN, BIOPSY:  -Negative for invasive malignancy.     D. CARTILAGE MARGIN, BIOPSY:  -Negative for invasive malignancy.     E. ADDITIONAL SUPERIOR MARGIN, BIOPSY:  -Negative for invasive malignancy.     F. INFERIOR MARGIN, BIOPSY:  -Minute fragment of fibroconnective tissue with no definitive evidence of invasive malignancy.     G. LEFT MARGIN, BIOPSY:  -Minute fragment of fibroconnective tissue with crushed atypical cells.     H. RIGHT MARGIN, BIOPSY:  -Squamous mucosa with submucosal neural tissue hyperplasia, negative for invasive carcinoma.         Comments:   This is an appended report. These results have been appended to a previously preliminary verified report.        Tumor Board Recommendation:   Discussion:   The patient was most recently taken back to the OR 10/25 for re-resection. Grossly we feel confident the margins are negative. The tumor was able to be peeled off of the thyroid cartilage. However there were small little holes within the thyroid cartilage which appeared regular and round.   Pathology: On review of the most recent pathology, there is an anterior web mass which demonstrates a positive margin anteriorly.   Although the path shows positive margin anteriorly, this area was re-resected and the new margins were then negative. Per discussion, given our confidence in negative margins surgically, and the fact that the sampled tissue over the thyroid cartilage was negative, we will favor observation at this time as opposed to radiation treatment now or re-resection  We will plan to take the patient back for a second look in a few months and do directed biopsies where there was a question, then we can see what the pathology  shows and consider radiation at that time.     Plan:   - Return to OR in a few months, directed biopsies, follow up pathology and consider radiation.     Kyle Christian MD  Otolaryngology Head and Neck Surgery Resident, PGY-3    Documentation / Disclaimer Cancer Tumor Board Note: Cancer tumor board recommendations do not override what is determined to be reasonable care and treatment, which is dependent on the circumstances of a patient's case; the patient's medical, social, and personal concerns; and the clinical judgment of the oncologist [physician].

## 2023-11-03 NOTE — PROGRESS NOTES
Called patient SO to let her know that we discussed Joshua on tumor board. Where is was suggested that we take him back to the OR in about 2 months to repeat biopsies. Patient will come in to clinic to scoped as previously arranged. Patient's SO was agreeable to this plan and verbalized understanding. Patient will reach out to care team with any questions or concerns in the meantime. Nina Cardenas RN on 11/3/2023 at 10:07 AM

## 2023-11-07 ENCOUNTER — PATIENT OUTREACH (OUTPATIENT)
Dept: OTOLARYNGOLOGY | Facility: CLINIC | Age: 78
End: 2023-11-07

## 2023-11-07 NOTE — PROGRESS NOTES
Called patient's fiance to let her know that SLP and Dr. Malone spoke and believe patient is doing well enough to cancel remaining voice appointments. Patient's fiance was agreeable and verbalized understanding of the situation. Nina Cardenas RN on 11/7/2023 at 8:08 AM

## 2023-11-15 ENCOUNTER — PATIENT OUTREACH (OUTPATIENT)
Dept: OTOLARYNGOLOGY | Facility: CLINIC | Age: 78
End: 2023-11-15

## 2023-11-15 NOTE — PROGRESS NOTES
Called patient's SO to offer earlier appointment time. Patient's SO was agreeable to this earlier time and will be there. Patient will reach out with any questions or concerns in the meantime. Nina Cardenas RN on 11/15/2023 at 9:17 AM

## 2023-11-16 ENCOUNTER — OFFICE VISIT (OUTPATIENT)
Dept: OTOLARYNGOLOGY | Facility: CLINIC | Age: 78
End: 2023-11-16
Payer: COMMERCIAL

## 2023-11-16 VITALS
BODY MASS INDEX: 29.24 KG/M2 | WEIGHT: 198 LBS | HEART RATE: 72 BPM | DIASTOLIC BLOOD PRESSURE: 77 MMHG | SYSTOLIC BLOOD PRESSURE: 139 MMHG

## 2023-11-16 DIAGNOSIS — C32.9 LARYNGEAL CANCER (H): ICD-10-CM

## 2023-11-16 DIAGNOSIS — R49.0 DYSPHONIA: Primary | ICD-10-CM

## 2023-11-16 DIAGNOSIS — C32.9 LARYNGEAL CANCER (H): Primary | ICD-10-CM

## 2023-11-16 DIAGNOSIS — Q31.0 ANTERIOR GLOTTIC WEB OF LARYNX: ICD-10-CM

## 2023-11-16 DIAGNOSIS — J38.7 LARYNGEAL HYPERFUNCTION: ICD-10-CM

## 2023-11-16 PROCEDURE — 99207 PR NO BILLABLE SERVICE THIS VISIT: CPT | Performed by: SPEECH-LANGUAGE PATHOLOGIST

## 2023-11-16 PROCEDURE — 99024 POSTOP FOLLOW-UP VISIT: CPT | Performed by: OTOLARYNGOLOGY

## 2023-11-16 PROCEDURE — 31575 DIAGNOSTIC LARYNGOSCOPY: CPT | Mod: 78 | Performed by: OTOLARYNGOLOGY

## 2023-11-16 ASSESSMENT — PAIN SCALES - GENERAL: PAINLEVEL: NO PAIN (0)

## 2023-11-16 NOTE — LETTER
2023       RE: Joshua Ennis  [address] [facility] [place] [address]     Dear Colleague,    Thank you for referring your patient, Joshua Ennis, to the [facility] EAR NOSE AND THROAT CLINIC [place] at [facility]. Please see a copy of my visit note below.        [facility]   at the [facility]   Otolaryngology Clinic     Patient: Joshua Ennis    MRN: [id number]    : 1945    Age/Gender: 78 year old male  Date of Service: 2023  Rendering Provider:   Josephine Malone MD     Chief Complaint   Vocal fold SCC  S/p MDL with bilateral cordectomy and steroid injection 23  S/p MDL with stent removal and steroid injection on 23  S/p microlaryngoscopy, CO2 laser resection of vocal fold lesion, steroid injection, stent placement, and biopsy 10/25/23    Interval History   HISTORY OF PRESENT ILLNESS:  Mr. Ennis is a 78 year old male is being followed for dysphonia. he was initially seen on 8/15/23. Please refer to this note for full history.     Today, he presents for follow up. he reports:  - doing well  - his voice is still around the same as last visit     PAST MEDICAL HISTORY:   Past Medical History:   Diagnosis Date    Arthritis     Autoimmune disease (H24)     Hearing problem     Hoarseness     Hypertension     Kidney problem     Malignant melanoma (H)     Malignant neoplasm (H)     melanoma    Squamous cell carcinoma     Russo syndrome        PAST SURGICAL HISTORY:   Past Surgical History:   Procedure Laterality Date    BIOPSY  2011    melanoma on back    DISSECT LYMPH NODE INGUINAL  3/1/2013    Procedure: DISSECT LYMPH NODE INGUINAL;  Bilateral Inguinal Lymph Node Biopsy.;  Surgeon: Tariq Acosta MD;  Location: [facility]    ESOPHAGOSCOPY, GASTROSCOPY, DUODENOSCOPY (EGD), COMBINED  2013    Procedure: COMBINED ESOPHAGOSCOPY, GASTROSCOPY, DUODENOSCOPY (EGD);  Gastroscopy;  Surgeon: Tariq Acosta MD;   Location: [facility]    HERNIORRHAPHY INGUINAL  8/6/2012    Procedure: HERNIORRHAPHY INGUINAL;  Open Repair Left Inguinal Hernia;  Surgeon: Jerad Baker MD;  Location: [facility]    INJECT STEROID (LOCATION) N/A 8/30/2023    Procedure: steroid injection;  Surgeon: Josephine Malone MD;  Location: [facility]    INJECT STEROID (LOCATION) N/A 9/13/2023    Procedure: steroid injection;  Surgeon: Josephine Malone MD;  [facility]    INJECT STEROID (LOCATION) N/A 10/25/2023    Procedure: steroid injection, stent placement, and biopsy;  Surgeon: Josephine Malone MD;  [facility]    LARYNGOSCOPY WITH MICROSCOPE N/A 9/13/2023    Procedure: MICROLARYNGOSCOPY with stent removal and biopsy;  Surgeon: Josephine Malone MD;  [facility]    LASER CO2 LARYNGOSCOPY N/A 8/30/2023    Procedure: MICROLARYNGOSCOPY, CO2 laser resection of vocal fold lesion;  Surgeon: Josephine Malone MD;  [facility]    LASER CO2 LARYNGOSCOPY N/A 10/25/2023    Procedure: MICROLARYNGOSCOPY, CO2 laser resection of vocal fold lesion;  Surgeon: Josephine Malone MD;  [facility]       CURRENT MEDICATIONS:   Current Outpatient Medications:     Acetaminophen (TYLENOL PO), Take 650 mg by mouth once as needed for mild pain or fever, Disp: , Rfl:     beclomethasone HFA (QVAR REDIHALER) 80 MCG/ACT inhaler, Inhale 2 puffs into the lungs 2 times daily, Disp: 10.6 g, Rfl: 3    Blood Pressure Monitor KIT, Automatic Blood Pressure Monitor, Disp: 1 kit, Rfl: 0    calcium carbonate 600 mg-vitamin D 400 units (CALTRATE) 600-400 MG-UNIT per tablet, Take 1 tablet by mouth 2 times daily., Disp: , Rfl:     Cholecalciferol (VITAMIN D) 1000 UNITS capsule, Take 1 capsule by mouth daily., Disp: , Rfl:     folic acid (FOLVITE) 1 MG tablet, Take 2 tablets (2 mg) by mouth daily, Disp: 180 tablet, Rfl: 3    gabapentin (NEURONTIN) 400 MG capsule, Take 1 capsule (400 mg) by mouth At Bedtime, Disp: 90 capsule, Rfl: 3    HYDROcodone-acetaminophen (NORCO) 5-325 MG tablet, Take 1 tablet by  mouth every 6 hours as needed for severe pain, Disp: 6 tablet, Rfl: 0    losartan (COZAAR) 50 MG tablet, Take 1 tablet (50 mg) by mouth daily, Disp: 90 tablet, Rfl: 2    methotrexate sodium 2.5 MG TABS, Take 4 tablets (10 mg) by mouth once a week, Disp: 48 tablet, Rfl: 1    pantoprazole (PROTONIX) 40 MG EC tablet, Take 1 tablet (40 mg) by mouth 2 times daily, Disp: 80 tablet, Rfl: 3    ALLERGIES: Shrimp    SOCIAL HISTORY:    Social History     Socioeconomic History    Marital status: Single     Spouse name: Not on file    Number of children: Not on file    Years of education: Not on file    Highest education level: Not on file   Occupational History     Employer: [employer]   Tobacco Use    Smoking status: Former     Packs/day: 1.00     Years: 20.00     Additional pack years: 0.00     Total pack years: 20.00     Types: Cigarettes     Quit date: 2/14/2013     Years since quitting: 10.7    Smokeless tobacco: Never   Vaping Use    Vaping Use: Never used   Substance and Sexual Activity    Alcohol use: Yes     Comment: rare    Drug use: No    Sexual activity: Not Currently     Partners: Female   Other Topics Concern    Parent/sibling w/ CABG, MI or angioplasty before 65F 55M? Not Asked     Service Not Asked    Blood Transfusions Not Asked    Caffeine Concern Not Asked    Occupational Exposure Not Asked    Hobby Hazards Not Asked    Sleep Concern Not Asked    Stress Concern Not Asked    Weight Concern Not Asked    Special Diet Not Asked    Back Care Not Asked    Exercise Yes     Comment: walking    Bike Helmet Not Asked    Seat Belt Not Asked    Self-Exams Not Asked   Social History Narrative    Not on file     Social Determinants of Health     Financial Resource Strain: Not on file   Food Insecurity: Not on file   Transportation Needs: Not on file   Physical Activity: Not on file   Stress: Not on file   Social Connections: Not on file   Interpersonal Safety: Not on file   Housing Stability: Not on file          FAMILY HISTORY:   Family History   Problem Relation Age of Onset    Diabetes Mother     Hypertension Mother     Cancer Father         stomach    Heart Disease Father     Heart Disease Brother     Diabetes Sister     Diabetes Sister     Diabetes Sister     Heart Disease Brother     Cancer Sister     Glaucoma No family hx of     Macular Degeneration No family hx of       Non-contributory for problems with anesthesia    REVIEW OF SYSTEMS:   The patient was asked a 14 point review of systems regarding constitutional symptoms, eye symptoms, ears, nose, mouth, throat symptoms, cardiovascular symptoms, respiratory symptoms, gastrointestinal symptoms, genitourinary symptoms, musculoskeletal symptoms, integumentary symptoms, neurological symptoms, psychiatric symptoms, endocrine symptoms, hematologic/lymphatic symptoms, and allergic/ immunologic symptoms.   The pertinent factors have been included in the HPI and below.  Patient Supplied Answers to Review of Systems      6/13/2018     8:42 AM   [facility] ROS   Ears, Nose, Throat Ringing/noise in ears    Nasal congestion or drainage    Sore throat    Hoarseness   Cardiopulmonary Cough       Physical Examination   The patient underwent a physical examination as described below. The pertinent positive and negative findings are summarized after the description of the examination.  Constitutional: The patient's developmental and nutritional status was assessed. The patient's voice quality was assessed.  Head and Face: The head and face were inspected for deformities. The sinuses were palpated. The salivary glands were palpated. Facial muscle strength was assessed bilaterally.  Eyes: Extraocular movements and primary gaze alignment were assessed.  Ears, Nose, Mouth and Throat: The ears and nose were examined for deformities. The nasal septum, mucosa, and turbinates were inspected by anterior rhinoscopy. The lips, teeth, and gums were examined for abnormalities. The oral  mucosa, tongue, palate, tonsils, lateral and posterior pharynx were inspected for the presence of asymmetry or mucosal lesions.    Neck: The tracheal position was noted, and the neck mass palpated to determine if there were any asymmetries, abnormal neck masses, thyromegally, or thyroid nodules.  Respiratory: The nature of the breathing and chest expansion/symmetry was observed.  Cardiovascular: The patient was examined to determine the presence of any edema or jugular venous distension.  Abdomen: The contour of the abdomen was noted.  Lymphatic: The patient was examined for infraclavicular lymphadenopathy.  Musculoskeletal: The patient was inspected for the presence of skeletal deformities.  Extremities: The extremities were examined for any clubbing or cyanosis.  Skin: The skin was examined for inflammatory or neoplastic conditions.  Neurologic: The patient's orientation, mood, and affect were noted. The cranial nerve  functions were examined.  Other pertinent positive and negative findings on physical examination:   OC/OP: no lesions, uvula midline, soft palate elevates symmetrically   Neck: no lesions, no TH tenderness to palpation, stent in place  All other physical examination findings were within normal limits and noncontributory.    Procedures     Flexible laryngoscopy (CPT 00828)        Pre-procedure diagnosis: dysphonia  Post-procedure diagnosis: same as above  Indication for procedure: Mr. Ennis is a 78 year old male with see above  Procedure(s): Fiberoptic Laryngoscopy     Details of Procedure: After informed consent was obtained, the patient was seated in the examination chair.  The areas of the nasopharynx as well as the hypopharynx were anesthetized with topical 4% lidocaine with 0.25% phenylephrine atomizer.  Examination of the base of tongue was performed first.  Attention was directed to any evidence of masses in the area or evidence of leukoplakia or candidal infection.  Attention was directed to  the epiglottis where its size and position was determined and its movement on phonation of the vowel  e .  The piriform sinuses were then inspected for any mass lesions or pooling of secretions.  Attention was then directed to the larynx. The vocal folds were inspected for infection or any areas of leukoplakia, for masses, polypoid degeneration, or hemorrhage.  Having done this, the arytenoids and vocal processes were inspected for erythema or evidence of granuloma formation.  The posterior commissure was then inspected for evidence of inflammatory changes in the mucosa and heaping up of mucosal tissue. The patient was then instructed to say the vowel  e .  Adduction of vocal folds to the midline was observed for any evidence of paresis or paralysis of the larynx or asymmetry in rotation of the larynx to the left or right. The patient was asked to breathe and the degree of abduction was noted bilaterally.  Subglottic view of the larynx was obtained for any additional mass lesions or mucosal changes.  Finally the post cricoid was examined for evidence of pooling of secretions, as well as the pharyngeal wall mucosa.   Anesthesia type: 0.25% phenylephrine     Findings:  Anatomic/physiological deviations: RNC, stent in place, vocal folds are open, laryngeal web developed               Right vocal process: No restriction of mobility   Left vocal process: No restriction of mobility  Glottal gap: Complete glottal closure  Supraglottic structures: Normal  Hypopharynx: Normal      Estimated Blood Loss: minimal  Complications: None  Disposition: Patient tolerated the procedure well      Review of Relevant Clinical Data   I personally reviewed:    Pathology: surgical pathology 10/25/23  Specimens  A Other, Anterior Web Mass  B Other, Anterior Margin  C Other, Superior Margin  D Other, Cartilage Margin  E Other, Additional Superior Margin  F Other, Inferior margin  G Other, Left margin  H Other, Right margin  .  .  Final  "Diagnosis  A. ANTERIOR WEB MASS, BIOPSY:  -SMALL FOCUS OF INVASIVE SQUAMOUS CELL CARCINOMA with crush artefact.  B. ANTERIOR MARGIN, BIOPSY:  -POSITIVE FOR SQUAMOUS CELL CARCINOMA.  C. SUPERIOR MARGIN, BIOPSY:  -Negative for invasive malignancy.  D. CARTILAGE MARGIN, BIOPSY:  -Negative for invasive malignancy.  E. ADDITIONAL SUPERIOR MARGIN, BIOPSY:  -Negative for invasive malignancy.  F. INFERIOR MARGIN, BIOPSY:  -Minute fragment of fibroconnective tissue with no definitive evidence of invasive malignancy.  G. LEFT MARGIN, BIOPSY:  -Minute fragment of fibroconnective tissue with crushed atypical cells.  H. RIGHT MARGIN, BIOPSY:  -Squamous mucosa with submucosal neural tissue hyperplasia, negative for invasive carcinoma.    Labs:  No results found for: \"TSH\"  Lab Results   Component Value Date     10/06/2023    CO2 26 10/06/2023    BUN 25.3 (H) 10/06/2023    PHOS 2.6 08/12/2016     Lab Results   Component Value Date    WBC 5.8 10/06/2023    HGB 14.0 10/06/2023    HCT 42.5 10/06/2023    MCV 97 10/06/2023     10/06/2023     Lab Results   Component Value Date    PTT 24 04/11/2013    INR 0.98 04/11/2013     No results found for: \"DUANE\"  No components found for: \"RHEUMATOIDFACTOR\", \"RF\"  Lab Results   Component Value Date    CRP <2.9 07/07/2022    CRP <2.9 01/07/2022    CRP <2.9 07/28/2021    CRP <2.9 03/05/2021    CRP <2.9 11/11/2020     No components found for: \"CKTOT\", \"URICACID\"  No components found for: \"C3\", \"C4\", \"DSDNAAB\", \"NDNAABIFA\"  Lab Results   Component Value Date    MPOAB Not Reported 02/14/2014       Patient reported Quality of Life (QOL) Measures   Patient Supplied Answers To VHI Questionnaire      8/23/2023     8:49 AM   Voice Handicap Index (VHI-10)   My voice makes it difficult for people to hear me 4   People have difficulty understanding me in a noisy room 4   My voice difficulties restrict my personal and social life.  0   I feel left out of conversations because of my voice 2   My " "voice problem causes me to lose income 0   I feel as though I have to strain to produce voice 1   The clarity of my voice is unpredictable 2   My voice problem upsets me 1   My voice makes me feel handicapped 0   People ask, \"What's wrong with your voice?\" 2   VHI-10 16         Patient Supplied Answers To EAT Questionnaire       No data to display                  Patient Supplied Answers To CSI Questionnaire      8/23/2023     8:49 AM   Cough Severity Index (CSI)   My cough is worse when I lie down 0   My coughing problem causes me to restrict my personal and social life 0   I tend to avoid places because of my cough problem 0   I feel embarrassed because of my coughing problem 1   People ask, ''What's wrong?'' because I cough a lot 1   I run out of air when I cough 0   My coughing problem affects my voice 2   My coughing problem limits my physical activity 0   My coughing problem upsets me 0   People ask me if I am sick because I cough a lot 2   CSI Score 6         Patient Supplied Answers to Dyspnea Index Questionnaire:       No data to display                Impression & Plan     IMPRESSION: Mr. Ennis is a 78 year old male who is being seen for the following:    Dysphonia   - anterior commissure lesion seen on scope on 7/31/23  - voice changes worsening for a few months  - prior smoker  - scope shows anterior commissure lesion with extension to the right vocal fold  - given anterior location will require CT neck to rule out thyroid cartilage extension  - discussed awake biopsy - patient in agreement this was done today  - pathology showed SCC  - CT chest had findings of new nodules - after multiple discussions with radiology the decision was made to proceed with surgery and repeat imaging for this finding in the chest  - s/p MDL with bilateral cordectomy with stent placement on 8/30/23  - today shows that the vocal folds closed around the stent - discussed he needs the scar broken up and bigger stent placed - " patient in agreement   - pathology showed SCC with positive margins  - went back for MDL and stent removal on 9/13/23   - symptoms 10/10/2023 are stable, voice is poor  - scope shows  laryngeal web  - discussed he has positive margins - will proceed with repeat surger - if can't clear will need radiation  - ct chest shows improved size of lung nodule of concern  - s/p microlaryngoscopy, CO2 laser resection of vocal fold lesion, steroid injection, stent placement, and biopsy 10/25/23  - pathology shows invasive squamous cell carcinoma  - symptoms 11/16/2023 are stable, voice is poor, discussed case at tumor board, will continue with close observation and repeat biopsies  - scope shows stent in place, vocal folds are open, laryngeal web developed  - discussed removal of stent, then will collect a sample, if the sample is cancerous, will refer for radiation therapy  Plan  - schedule surgery for stent removal and biopsy      RETURN VISIT: after surgery    Scribe Disclosure:   I, Lulu Cortez, am serving as a scribe; to document services personally performed by Josephine Malone MD -based on data collection and the provider's statements to me.     Provider Disclosure:  I agree with above History, Review of Systems, Physical exam and Plan.  I have reviewed the content of the documentation and have edited it as needed. I have personally performed the services documented here and the documentation accurately represents those services and the decisions I have made.        Josephine Malone MD    Laryngology    [facility]  Department of  Otolaryngology - Head and Neck Surgery  Clinics & Surgery Center  [address] [place] [id number]  Appointment line: [phone]  Fax: [phone]  [email or web address]

## 2023-11-16 NOTE — PROGRESS NOTES
[facility]  Clinical Voice and Upper Airway Evaluation Report    Patient's Name: Joshua Ennis  Date of Evaluation: 11/16/23  Providing SLP: Kym Mireles MS CCC-SLP  Seen in Conjunction With: Josephine Malone MD - Clementina Jones CCC-SLP  Insurance Coverage: [employer]  Chief Complaint: Dysphonia  Evaluation Location: 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    Laryngeal evaluation demonstrated white crusting at the anterior commissure supraglottically and infraglottically:; white crusting and redness at the anterior portion (superior surface) of the right vocal fold and the mid portion at the superior surface and medial edge; white crusting and redness at the vibratory edge/superior surface of the left vocal fold at the middle portion that extends towards posterior portion vibratory edge and this also extends laterally at the middle superior surface; Narrow Band Imaging yielded the following: prominent white crusting observed at the anterior commissure, right,and left vocal fold with prominent bilateral erythema; mild-moderate posterior commissure hypertrophy; possible white crusting at the left medial arytenoid wall but unclear; bilateral ventricular approximation during phonation; L>R; severe four-way constriction of the supraglottic larynx during connected speech; frequent breath holding observed; RTVF Amplitude is marked at the site of anterior lesion (stiff) but mildly reduced at the posterior 2/3 portion of the vocal fold; LTVF amplitude is mildly reduced; mucosal wave of right and left vocal folds is mildly reduced;  asymmetry observed; on phonation, glottic closure is  incomplete due to irregularity; irregular-shaped configuration of the glottis during many phonatory tasks  Perceptual evaluation demonstrated marked-severe roughness; moderate breathiness; moderate asthenia; clearer and louder voice with laugh and when he reacted (a loud exclamataion) to the Lidocaine spray;and he often spoke at a higher pitch (G3-A3) than is typical for a man of his age and gender.      STIMULABILITY: results of therapy probes during perceptual and laryngeal evaluation demonstrate improvement with flow phonation probes.   RECOMMENDATIONS:   A course of speech therapy is recommended to improve voice quality and optimize surgical results.  He demonstrates a Good prognosis for improvement given adherence to therapeutic recommendations.      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 Perceptually, his voice is very weak today, and he is talking minimally during our session, as he is just 7 days post-surgery. Laryngoscopy today demonstrated white scar tissue at the anterior 2/3s of the vocal folds, creating webbing anteriorly. Therefore, Dr. Malone plans on reducing this anterior web surgically, placing a larger stent, and injecting steroid into the anterior commissure next week. Formal re-evaluation is recommended at his next post-op appointment prior to initiating a full course of voice therapy. Perioperative care instructions were reviewed with Joshua and his wife today, as they will be repeating this process next week and had questions about gradually increasing his voice use. Joshua is in agreement with this plan of care.       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   Remarkable findings included:  Perceptual evaluation demonstrated: moderate to severe breathiness; moderate to severe strain; he is completely aphonic; and severely reduced conversational speech.   Laryngeal exam demonstrated: anterior laryngeal web; bilateral erythema L>R; irritation at the left medial arytenoid wall; moderate posterior commissure hypertrophy; mild presence of bubbly and sticky secretions on the vocal folds and throughout the laryngeal area; Narrow Band Imaging yielded the following:  white discoloration at the anterior portion of the anterior laryngeal web and at the irritation at the left medial arytenoid wall     RECOMMENDATIONS per Dr. Malone:  - surgery as scheduled  - will do a same day H&P updated  - plan to place a stent (bigger at that time)      Updated patient history:    Joshua reports stable voice since his most recent procedure with Dr. Malone on 10/25/23. He has not been performing voice exercises with his current stent placement at the anterior commissure; however, he has continued rescue breathing techniques. He has another surgery with Dr. Malone on 12/6/23 to confirm negative margins and hopefully remove the stent.      Quality of Life Questionnaires:    Patient Supplied Answers To Last 2 VHI Questionnaires      8/23/2023     8:49 AM   Voice Handicap Index (VHI-10)   My voice makes it difficult for people to hear me 4   People have difficulty understanding me in a noisy room 4   My voice difficulties restrict my personal and social life.  0   I feel left out of conversations because of my voice 2   My voice problem causes me to lose income 0   I feel as though I have to strain to produce voice 1   The clarity of my voice is unpredictable 2   My voice problem upsets me 1   My  "voice makes me feel handicapped 0   People ask, \"What's wrong with your voice?\" 2   VHI-10 16     Patient Supplied Answers To Last 2 CSI Questionnaires      8/23/2023     8:49 AM   Cough Severity Index (CSI)   My cough is worse when I lie down 0   My coughing problem causes me to restrict my personal and social life 0   I tend to avoid places because of my cough problem 0   I feel embarrassed because of my coughing problem 1   People ask, ''What's wrong?'' because I cough a lot 1   I run out of air when I cough 0   My coughing problem affects my voice 2   My coughing problem limits my physical activity 0   My coughing problem upsets me 0   People ask me if I am sick because I cough a lot 2   CSI Score 6       Laryngoscopy without stroboscopy:     Provider performing exam: Josephine Malone MD    Informed consent: Informed verbal consent was obtained, which includes potential side-effects, risks, and benefits of the procedure.     Anesthetic: Topical anesthesia with 3% lidocaine and 0.25% phenylephrine was applied the nostrils bilaterally. Viscous lidocaine 4% was applied to the tip of the endoscope.    Scope type: A distal chip flexible laryngoscope was passed through the nare with halogen light source(s).    The laryngeal and pharyngeal structures were evaluated for gross appearance, mobility, function, and focal lesions / abnormalities of the associated mucosa.  Velar Function: not assessed today  General Appearance:   Stent still in place at the anterior commissure  Interarytenoid pachydermia  White tissue observed anterior to the stent on the most anterior portion of the true vocal folds  Secretions: appropriate  Subglottis Appearance: visible portion is patent  R Arytenoid Abduction / Adduction: symmetrical and timely ab/adduction with appropriate range of motion; however, stent placement prevents glottic closure and complete adduction  L Arytenoid Abduction / Adduction: \" \"   Mediolateral Compression: significant " "with phonation due to inability to phonate with current stent placement  Anteroposterior Compression: \" \"  Vocal Fold Elongation: not assessed today  Left (L) Vocal Fold Edge and Mucosa: posterior portion is generally erythemic   Right (R) Vocal Fold Edge and Mucosa: \" \"   Narrow Band Imaging: not utilized today      Impressions and Plan:     Joshua is a 78-year-old male presenting today with 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 Perceptually, his voice is very weak today with hints of phonation secondary to stent placement at the anterior commissure and compensatory hyperfunction to achieve phonation. Laryngoscopy today demonstrated white tissue at the anterior-most portion of the true vocal folds, just anterior to the stent, as well as generalized erythema of the true vocal folds. Joshua will be undergoing another procedure with Dr. Malone in the coming weeks to confirm negative margins and hopefully remove the stent. If the stent is removed and margins are negative, we can then proceed with a course of voice therapy. If margins are positive, then Joshua will likely need additional treatments (e.g. radiation). I will keep in contact with Dr. Malone after the procedure and plan accordingly. Joshua and his wife did not have any additional questions about perioperative care today and are in agreement with this plan of care.       Billed Procedures:     No charge facility fee, as no skilled services were provided  Assessment and treatment time: 18 minutes  Chart review, interpretation of testing, documentation preparation, etc: 18 minutes      Thank you for allowing me to participate in this patient's care,    Kym Mireles MS CCC-SLP  Speech-Language Pathologist  [facility]  Department of Otolaryngology - Head and Neck Surgery  [facility]  [email or web address]  Direct: " [phone]  [phone]    *This note may have been completed using [id number] dictation software, so errors may exist. Please contact me for clarification if needed*

## 2023-11-16 NOTE — PROGRESS NOTES
[facility]   at the [facility]   Otolaryngology Clinic     Patient: Joshua Ennis    MRN: [id number]    : 1945    Age/Gender: 78 year old male  Date of Service: 2023  Rendering Provider:   Josephine Malone MD     Chief Complaint   Vocal fold SCC  S/p MDL with bilateral cordectomy and steroid injection 23  S/p MDL with stent removal and steroid injection on 23  S/p microlaryngoscopy, CO2 laser resection of vocal fold lesion, steroid injection, stent placement, and biopsy 10/25/23    Interval History   HISTORY OF PRESENT ILLNESS:  Mr. Ennis is a 78 year old male is being followed for dysphonia. he was initially seen on 8/15/23. Please refer to this note for full history.     Today, he presents for follow up. he reports:  - doing well  - his voice is still around the same as last visit     PAST MEDICAL HISTORY:   Past Medical History:   Diagnosis Date    Arthritis     Autoimmune disease (H24)     Hearing problem     Hoarseness     Hypertension     Kidney problem     Malignant melanoma (H)     Malignant neoplasm (H)     melanoma    Squamous cell carcinoma     Russo syndrome        PAST SURGICAL HISTORY:   Past Surgical History:   Procedure Laterality Date    BIOPSY  2011    melanoma on back    DISSECT LYMPH NODE INGUINAL  3/1/2013    Procedure: DISSECT LYMPH NODE INGUINAL;  Bilateral Inguinal Lymph Node Biopsy.;  Surgeon: Tariq Acosta MD;  Location: [facility]    ESOPHAGOSCOPY, GASTROSCOPY, DUODENOSCOPY (EGD), COMBINED  2013    Procedure: COMBINED ESOPHAGOSCOPY, GASTROSCOPY, DUODENOSCOPY (EGD);  Gastroscopy;  Surgeon: Tariq Acosta MD;  Location: [facility]    HERNIORRHAPHY INGUINAL  2012    Procedure: HERNIORRHAPHY INGUINAL;  Open Repair Left Inguinal Hernia;  Surgeon: Jerad Baker MD;  Location: [facility]    INJECT STEROID (LOCATION) N/A 2023    Procedure: steroid injection;  Surgeon: Josephine Malone MD;  Location:  OR    INJECT STEROID  (LOCATION) N/A 9/13/2023    Procedure: steroid injection;  Surgeon: Josephine Malone MD;  [facility]    INJECT STEROID (LOCATION) N/A 10/25/2023    Procedure: steroid injection, stent placement, and biopsy;  Surgeon: Josephine Malone MD;  [facility]    LARYNGOSCOPY WITH MICROSCOPE N/A 9/13/2023    Procedure: MICROLARYNGOSCOPY with stent removal and biopsy;  Surgeon: Josephine Malone MD;  [facility]    LASER CO2 LARYNGOSCOPY N/A 8/30/2023    Procedure: MICROLARYNGOSCOPY, CO2 laser resection of vocal fold lesion;  Surgeon: Josephine Malone MD;  [facility]    LASER CO2 LARYNGOSCOPY N/A 10/25/2023    Procedure: MICROLARYNGOSCOPY, CO2 laser resection of vocal fold lesion;  Surgeon: Josephine Malone MD;  [facility]       CURRENT MEDICATIONS:   Current Outpatient Medications:     Acetaminophen (TYLENOL PO), Take 650 mg by mouth once as needed for mild pain or fever, Disp: , Rfl:     beclomethasone HFA (QVAR REDIHALER) 80 MCG/ACT inhaler, Inhale 2 puffs into the lungs 2 times daily, Disp: 10.6 g, Rfl: 3    Blood Pressure Monitor KIT, Automatic Blood Pressure Monitor, Disp: 1 kit, Rfl: 0    calcium carbonate 600 mg-vitamin D 400 units (CALTRATE) 600-400 MG-UNIT per tablet, Take 1 tablet by mouth 2 times daily., Disp: , Rfl:     Cholecalciferol (VITAMIN D) 1000 UNITS capsule, Take 1 capsule by mouth daily., Disp: , Rfl:     folic acid (FOLVITE) 1 MG tablet, Take 2 tablets (2 mg) by mouth daily, Disp: 180 tablet, Rfl: 3    gabapentin (NEURONTIN) 400 MG capsule, Take 1 capsule (400 mg) by mouth At Bedtime, Disp: 90 capsule, Rfl: 3    HYDROcodone-acetaminophen (NORCO) 5-325 MG tablet, Take 1 tablet by mouth every 6 hours as needed for severe pain, Disp: 6 tablet, Rfl: 0    losartan (COZAAR) 50 MG tablet, Take 1 tablet (50 mg) by mouth daily, Disp: 90 tablet, Rfl: 2    methotrexate sodium 2.5 MG TABS, Take 4 tablets (10 mg) by mouth once a week, Disp: 48 tablet, Rfl: 1    pantoprazole (PROTONIX) 40 MG EC tablet, Take 1  tablet (40 mg) by mouth 2 times daily, Disp: 80 tablet, Rfl: 3    ALLERGIES: Shrimp    SOCIAL HISTORY:    Social History     Socioeconomic History    Marital status: Single     Spouse name: Not on file    Number of children: Not on file    Years of education: Not on file    Highest education level: Not on file   Occupational History     Employer: [employer]   Tobacco Use    Smoking status: Former     Packs/day: 1.00     Years: 20.00     Additional pack years: 0.00     Total pack years: 20.00     Types: Cigarettes     Quit date: 2/14/2013     Years since quitting: 10.7    Smokeless tobacco: Never   Vaping Use    Vaping Use: Never used   Substance and Sexual Activity    Alcohol use: Yes     Comment: rare    Drug use: No    Sexual activity: Not Currently     Partners: Female   Other Topics Concern    Parent/sibling w/ CABG, MI or angioplasty before 65F 55M? Not Asked     Service Not Asked    Blood Transfusions Not Asked    Caffeine Concern Not Asked    Occupational Exposure Not Asked    Hobby Hazards Not Asked    Sleep Concern Not Asked    Stress Concern Not Asked    Weight Concern Not Asked    Special Diet Not Asked    Back Care Not Asked    Exercise Yes     Comment: walking    Bike Helmet Not Asked    Seat Belt Not Asked    Self-Exams Not Asked   Social History Narrative    Not on file     Social Determinants of Health     Financial Resource Strain: Not on file   Food Insecurity: Not on file   Transportation Needs: Not on file   Physical Activity: Not on file   Stress: Not on file   Social Connections: Not on file   Interpersonal Safety: Not on file   Housing Stability: Not on file         FAMILY HISTORY:   Family History   Problem Relation Age of Onset    Diabetes Mother     Hypertension Mother     Cancer Father         stomach    Heart Disease Father     Heart Disease Brother     Diabetes Sister     Diabetes Sister     Diabetes Sister     Heart Disease Brother     Cancer Sister     Glaucoma No  family hx of     Macular Degeneration No family hx of       Non-contributory for problems with anesthesia    REVIEW OF SYSTEMS:   The patient was asked a 14 point review of systems regarding constitutional symptoms, eye symptoms, ears, nose, mouth, throat symptoms, cardiovascular symptoms, respiratory symptoms, gastrointestinal symptoms, genitourinary symptoms, musculoskeletal symptoms, integumentary symptoms, neurological symptoms, psychiatric symptoms, endocrine symptoms, hematologic/lymphatic symptoms, and allergic/ immunologic symptoms.   The pertinent factors have been included in the HPI and below.  Patient Supplied Answers to Review of Systems      6/13/2018     8:42 AM    ENT ROS   Ears, Nose, Throat Ringing/noise in ears    Nasal congestion or drainage    Sore throat    Hoarseness   Cardiopulmonary Cough       Physical Examination   The patient underwent a physical examination as described below. The pertinent positive and negative findings are summarized after the description of the examination.  Constitutional: The patient's developmental and nutritional status was assessed. The patient's voice quality was assessed.  Head and Face: The head and face were inspected for deformities. The sinuses were palpated. The salivary glands were palpated. Facial muscle strength was assessed bilaterally.  Eyes: Extraocular movements and primary gaze alignment were assessed.  Ears, Nose, Mouth and Throat: The ears and nose were examined for deformities. The nasal septum, mucosa, and turbinates were inspected by anterior rhinoscopy. The lips, teeth, and gums were examined for abnormalities. The oral mucosa, tongue, palate, tonsils, lateral and posterior pharynx were inspected for the presence of asymmetry or mucosal lesions.    Neck: The tracheal position was noted, and the neck mass palpated to determine if there were any asymmetries, abnormal neck masses, thyromegally, or thyroid nodules.  Respiratory: The nature of  the breathing and chest expansion/symmetry was observed.  Cardiovascular: The patient was examined to determine the presence of any edema or jugular venous distension.  Abdomen: The contour of the abdomen was noted.  Lymphatic: The patient was examined for infraclavicular lymphadenopathy.  Musculoskeletal: The patient was inspected for the presence of skeletal deformities.  Extremities: The extremities were examined for any clubbing or cyanosis.  Skin: The skin was examined for inflammatory or neoplastic conditions.  Neurologic: The patient's orientation, mood, and affect were noted. The cranial nerve  functions were examined.  Other pertinent positive and negative findings on physical examination:   OC/OP: no lesions, uvula midline, soft palate elevates symmetrically   Neck: no lesions, no TH tenderness to palpation, stent in place  All other physical examination findings were within normal limits and noncontributory.    Procedures     Flexible laryngoscopy (CPT 47932)        Pre-procedure diagnosis: dysphonia  Post-procedure diagnosis: same as above  Indication for procedure: Mr. Ennis is a 78 year old male with see above  Procedure(s): Fiberoptic Laryngoscopy     Details of Procedure: After informed consent was obtained, the patient was seated in the examination chair.  The areas of the nasopharynx as well as the hypopharynx were anesthetized with topical 4% lidocaine with 0.25% phenylephrine atomizer.  Examination of the base of tongue was performed first.  Attention was directed to any evidence of masses in the area or evidence of leukoplakia or candidal infection.  Attention was directed to the epiglottis where its size and position was determined and its movement on phonation of the vowel  e .  The piriform sinuses were then inspected for any mass lesions or pooling of secretions.  Attention was then directed to the larynx. The vocal folds were inspected for infection or any areas of leukoplakia, for masses,  polypoid degeneration, or hemorrhage.  Having done this, the arytenoids and vocal processes were inspected for erythema or evidence of granuloma formation.  The posterior commissure was then inspected for evidence of inflammatory changes in the mucosa and heaping up of mucosal tissue. The patient was then instructed to say the vowel  e .  Adduction of vocal folds to the midline was observed for any evidence of paresis or paralysis of the larynx or asymmetry in rotation of the larynx to the left or right. The patient was asked to breathe and the degree of abduction was noted bilaterally.  Subglottic view of the larynx was obtained for any additional mass lesions or mucosal changes.  Finally the post cricoid was examined for evidence of pooling of secretions, as well as the pharyngeal wall mucosa.   Anesthesia type: 0.25% phenylephrine     Findings:  Anatomic/physiological deviations: RNC, stent in place, vocal folds are open, laryngeal web developed               Right vocal process: No restriction of mobility   Left vocal process: No restriction of mobility  Glottal gap: Complete glottal closure  Supraglottic structures: Normal  Hypopharynx: Normal      Estimated Blood Loss: minimal  Complications: None  Disposition: Patient tolerated the procedure well      Review of Relevant Clinical Data   I personally reviewed:    Pathology: surgical pathology 10/25/23  Specimens  A Other, Anterior Web Mass  B Other, Anterior Margin  C Other, Superior Margin  D Other, Cartilage Margin  E Other, Additional Superior Margin  F Other, Inferior margin  G Other, Left margin  H Other, Right margin  .  .  Final Diagnosis  A. ANTERIOR WEB MASS, BIOPSY:  -SMALL FOCUS OF INVASIVE SQUAMOUS CELL CARCINOMA with crush artefact.  B. ANTERIOR MARGIN, BIOPSY:  -POSITIVE FOR SQUAMOUS CELL CARCINOMA.  C. SUPERIOR MARGIN, BIOPSY:  -Negative for invasive malignancy.  D. CARTILAGE MARGIN, BIOPSY:  -Negative for invasive malignancy.  E. ADDITIONAL  "SUPERIOR MARGIN, BIOPSY:  -Negative for invasive malignancy.  F. INFERIOR MARGIN, BIOPSY:  -Minute fragment of fibroconnective tissue with no definitive evidence of invasive malignancy.  G. LEFT MARGIN, BIOPSY:  -Minute fragment of fibroconnective tissue with crushed atypical cells.  H. RIGHT MARGIN, BIOPSY:  -Squamous mucosa with submucosal neural tissue hyperplasia, negative for invasive carcinoma.    Labs:  No results found for: \"TSH\"  Lab Results   Component Value Date     10/06/2023    CO2 26 10/06/2023    BUN 25.3 (H) 10/06/2023    PHOS 2.6 08/12/2016     Lab Results   Component Value Date    WBC 5.8 10/06/2023    HGB 14.0 10/06/2023    HCT 42.5 10/06/2023    MCV 97 10/06/2023     10/06/2023     Lab Results   Component Value Date    PTT 24 04/11/2013    INR 0.98 04/11/2013     No results found for: \"DUANE\"  No components found for: \"RHEUMATOIDFACTOR\", \"RF\"  Lab Results   Component Value Date    CRP <2.9 07/07/2022    CRP <2.9 01/07/2022    CRP <2.9 07/28/2021    CRP <2.9 03/05/2021    CRP <2.9 11/11/2020     No components found for: \"CKTOT\", \"URICACID\"  No components found for: \"C3\", \"C4\", \"DSDNAAB\", \"NDNAABIFA\"  Lab Results   Component Value Date    MPOAB Not Reported 02/14/2014       Patient reported Quality of Life (QOL) Measures   Patient Supplied Answers To VHI Questionnaire      8/23/2023     8:49 AM   Voice Handicap Index (VHI-10)   My voice makes it difficult for people to hear me 4   People have difficulty understanding me in a noisy room 4   My voice difficulties restrict my personal and social life.  0   I feel left out of conversations because of my voice 2   My voice problem causes me to lose income 0   I feel as though I have to strain to produce voice 1   The clarity of my voice is unpredictable 2   My voice problem upsets me 1   My voice makes me feel handicapped 0   People ask, \"What's wrong with your voice?\" 2   VHI-10 16         Patient Supplied Answers To EAT Questionnaire       " No data to display                  Patient Supplied Answers To CSI Questionnaire      8/23/2023     8:49 AM   Cough Severity Index (CSI)   My cough is worse when I lie down 0   My coughing problem causes me to restrict my personal and social life 0   I tend to avoid places because of my cough problem 0   I feel embarrassed because of my coughing problem 1   People ask, ''What's wrong?'' because I cough a lot 1   I run out of air when I cough 0   My coughing problem affects my voice 2   My coughing problem limits my physical activity 0   My coughing problem upsets me 0   People ask me if I am sick because I cough a lot 2   CSI Score 6         Patient Supplied Answers to Dyspnea Index Questionnaire:       No data to display                Impression & Plan     IMPRESSION: Mr. Ennis is a 78 year old male who is being seen for the following:    Dysphonia   - anterior commissure lesion seen on scope on 7/31/23  - voice changes worsening for a few months  - prior smoker  - scope shows anterior commissure lesion with extension to the right vocal fold  - given anterior location will require CT neck to rule out thyroid cartilage extension  - discussed awake biopsy - patient in agreement this was done today  - pathology showed SCC  - CT chest had findings of new nodules - after multiple discussions with radiology the decision was made to proceed with surgery and repeat imaging for this finding in the chest  - s/p MDL with bilateral cordectomy with stent placement on 8/30/23  - today shows that the vocal folds closed around the stent - discussed he needs the scar broken up and bigger stent placed - patient in agreement   - pathology showed SCC with positive margins  - went back for MDL and stent removal on 9/13/23   - symptoms 10/10/2023 are stable, voice is poor  - scope shows  laryngeal web  - discussed he has positive margins - will proceed with repeat surger - if can't clear will need radiation  - ct chest shows improved  size of lung nodule of concern  - s/p microlaryngoscopy, CO2 laser resection of vocal fold lesion, steroid injection, stent placement, and biopsy 10/25/23  - pathology shows invasive squamous cell carcinoma  - symptoms 11/16/2023 are stable, voice is poor, discussed case at tumor board, will continue with close observation and repeat biopsies  - scope shows stent in place, vocal folds are open, laryngeal web developed  - discussed removal of stent, then will collect a sample, if the sample is cancerous, will refer for radiation therapy  Plan  - schedule surgery for stent removal and biopsy      RETURN VISIT: after surgery    Scribe Disclosure:   I, Lulu Dana, am serving as a scribe; to document services personally performed by Josephine Malone MD -based on data collection and the provider's statements to me.     Provider Disclosure:  I agree with above History, Review of Systems, Physical exam and Plan.  I have reviewed the content of the documentation and have edited it as needed. I have personally performed the services documented here and the documentation accurately represents those services and the decisions I have made.        Josephine Malone MD    Laryngology    [facility]  Department of  Otolaryngology - Head and Neck Surgery  Clinics & Surgery Center  [address] [place] [address]  Appointment line: [phone]  Fax: [phone]  [email or web address]

## 2023-11-28 ENCOUNTER — TELEPHONE (OUTPATIENT)
Dept: OTOLARYNGOLOGY | Facility: CLINIC | Age: 78
End: 2023-11-28

## 2023-11-28 NOTE — TELEPHONE ENCOUNTER
Called patients girlfriend and rescheduled surgery from 12/6 to  12/1 per Dr. Mart preference. Patient is aware that their H&P must be within 30 days of their new surgery date. She understood and had no further questions.    Senait Pettit on 11/28/2023 at 10:26 AM

## 2023-11-30 ENCOUNTER — ANESTHESIA EVENT (OUTPATIENT)
Dept: SURGERY | Facility: CLINIC | Age: 78
End: 2023-11-30
Payer: COMMERCIAL

## 2023-11-30 RX ORDER — ONDANSETRON 2 MG/ML
4 INJECTION INTRAMUSCULAR; INTRAVENOUS EVERY 30 MIN PRN
Status: CANCELLED | OUTPATIENT
Start: 2023-11-30

## 2023-11-30 RX ORDER — OXYCODONE HYDROCHLORIDE 10 MG/1
10 TABLET ORAL
Status: CANCELLED | OUTPATIENT
Start: 2023-11-30

## 2023-11-30 RX ORDER — OXYCODONE HYDROCHLORIDE 5 MG/1
5 TABLET ORAL
Status: CANCELLED | OUTPATIENT
Start: 2023-11-30

## 2023-11-30 RX ORDER — ONDANSETRON 4 MG/1
4 TABLET, ORALLY DISINTEGRATING ORAL EVERY 30 MIN PRN
Status: CANCELLED | OUTPATIENT
Start: 2023-11-30

## 2023-11-30 ASSESSMENT — LIFESTYLE VARIABLES: TOBACCO_USE: 1

## 2023-11-30 NOTE — ANESTHESIA PREPROCEDURE EVALUATION
Anesthesia Pre-Procedure Evaluation    Patient: Joshua Ennis   MRN: [id number] : 1945        Procedure : Procedure(s):  MICROLARYNGOSCOPY CO2 laser resection of vocal fold lesion possible stent removal  possible steroid injection          Past Medical History:   Diagnosis Date     Arthritis      Autoimmune disease (H24)      Hearing problem      Hoarseness      Hypertension      Kidney problem      Malignant melanoma (H)      Malignant neoplasm (H)     melanoma     Squamous cell carcinoma      Russo syndrome       Past Surgical History:   Procedure Laterality Date     BIOPSY  2011    melanoma on back     DISSECT LYMPH NODE INGUINAL  3/1/2013    Procedure: DISSECT LYMPH NODE INGUINAL;  Bilateral Inguinal Lymph Node Biopsy.;  Surgeon: Tariq Acosta MD;  Location: [facility]     ESOPHAGOSCOPY, GASTROSCOPY, DUODENOSCOPY (EGD), COMBINED  2013    Procedure: COMBINED ESOPHAGOSCOPY, GASTROSCOPY, DUODENOSCOPY (EGD);  Gastroscopy;  Surgeon: Tariq Acosta MD;  [facility]     HERNIORRHAPHY INGUINAL  2012    Procedure: HERNIORRHAPHY INGUINAL;  Open Repair Left Inguinal Hernia;  Surgeon: Jerad Baker MD;  [facility]     INJECT STEROID (LOCATION) N/A 2023    Procedure: steroid injection;  Surgeon: Josephine Malone MD;  [facility]     INJECT STEROID (LOCATION) N/A 2023    Procedure: steroid injection;  Surgeon: Josephine Malone MD;  [facility]     INJECT STEROID (LOCATION) N/A 10/25/2023    Procedure: steroid injection, stent placement, and biopsy;  Surgeon: Josephine Malone MD;  [facility]     LARYNGOSCOPY WITH MICROSCOPE N/A 2023    Procedure: MICROLARYNGOSCOPY with stent removal and biopsy;  Surgeon: Josephine Malone MD;  [facility]     LASER CO2 LARYNGOSCOPY N/A 2023    Procedure: MICROLARYNGOSCOPY, CO2 laser resection of vocal fold lesion;  Surgeon: Josephine Malone MD;  [facility]     LASER CO2 LARYNGOSCOPY N/A 10/25/2023    Procedure:  MICROLARYNGOSCOPY, CO2 laser resection of vocal fold lesion;  Surgeon: Josephine Malone MD;  [facility]      Allergies   Allergen Reactions     Shrimp Nausea and Vomiting and Unknown     Got sick each time he ate it      Social History     Tobacco Use     Smoking status: Former     Packs/day: 1.00     Years: 20.00     Additional pack years: 0.00     Total pack years: 20.00     Types: Cigarettes     Quit date: 2/14/2013     Years since quitting: 10.7     Smokeless tobacco: Never   Substance Use Topics     Alcohol use: Yes     Comment: rare      Wt Readings from Last 1 Encounters:   11/16/23 89.8 kg (198 lb)        Anesthesia Evaluation   Pt has had prior anesthetic. Type: General and MAC.    No history of anesthetic complications       ROS/MED HX  ENT/Pulmonary: Comment: Vocal cord SCC s/p microlaryngoscopy x3 in 2023 with a BL cordectomy     (+)                tobacco use,                       Neurologic: Comment: Idiopathic progressive polyneuropathy - neg neurologic ROS     Cardiovascular:     (+)  hypertension- -   -  - -                                      METS/Exercise Tolerance: >4 METS    Hematologic:     (+)      anemia,          Musculoskeletal:   (+)  arthritis,             GI/Hepatic:     (+) GERD, Asymptomatic on medication,                  Renal/Genitourinary: Comment: Russo Syndrome    (+) renal disease, type: CRI, Pt does not require dialysis,           Endo:       Psychiatric/Substance Use:  - neg psychiatric ROS     Infectious Disease:  - neg infectious disease ROS     Malignancy:   (+) Malignancy (vocal cord SCC),     Other:             Flexible Laryngoscopy: 11/16/23  Findings:  Anatomic/physiological deviations: RNC, stent in place, vocal folds are open, laryngeal web developed               Right vocal process: No restriction of mobility   Left vocal process: No restriction of mobility  Glottal gap: Complete glottal closure  Supraglottic structures: Normal  Hypopharynx: Normal           Physical Exam    Airway        Mallampati: I   TM distance: > 3 FB   Neck ROM: full   Mouth opening: > 3 cm    Respiratory Devices and Support         Dental       (+) Minor Abnormalities - some fillings, tiny chips      Cardiovascular             Pulmonary               OUTSIDE LABS:  CBC:   Lab Results   Component Value Date    WBC 5.8 10/06/2023    WBC 5.7 07/06/2023    HGB 14.0 10/06/2023    HGB 14.6 07/06/2023    HCT 42.5 10/06/2023    HCT 43.6 07/06/2023     10/06/2023     07/06/2023     BMP:   Lab Results   Component Value Date     10/06/2023     11/11/2020    POTASSIUM 4.9 10/06/2023    POTASSIUM 4.3 11/11/2020    CHLORIDE 105 10/06/2023    CHLORIDE 107 11/11/2020    CO2 26 10/06/2023    CO2 27 11/11/2020    BUN 25.3 (H) 10/06/2023    BUN 28 11/11/2020    CR 1.63 (H) 10/06/2023    CR 1.6 (H) 08/16/2023    GLC 78 10/06/2023    GLC 79 11/11/2020     COAGS:   Lab Results   Component Value Date    PTT 24 04/11/2013    INR 0.98 04/11/2013     POC:   Lab Results   Component Value Date    BGM 82 03/14/2013     HEPATIC:   Lab Results   Component Value Date    ALBUMIN 4.2 10/06/2023    PROTTOTAL 7.8 12/13/2017    ALT 25 10/06/2023    AST 22 10/06/2023     (H) 03/07/2013    ALKPHOS 97 12/13/2017    BILITOTAL 0.7 12/13/2017     OTHER:   Lab Results   Component Value Date    LACT 0.8 02/14/2017    LAMAR 9.7 10/06/2023    PHOS 2.6 08/12/2016    MAG 2.1 03/14/2016    CRP <2.9 07/07/2022    SED 8 10/06/2023       Anesthesia Plan    ASA Status:  3    NPO Status:  NPO Appropriate    Anesthesia Type: General.     - Airway: ETT   Induction: Intravenous, Propofol.   Maintenance: TIVA.   Techniques and Equipment:     - Airway: Video-Laryngoscope       Consents    Anesthesia Plan(s) and associated risks, benefits, and realistic alternatives discussed. Questions answered and patient/representative(s) expressed understanding.     - Discussed:     - Discussed with:  Patient      - Extended  Intubation/Ventilatory Support Discussed: No.      - Patient is DNR/DNI Status: No     Use of blood products discussed: No .     Postoperative Care    Pain management: IV analgesics, Oral pain medications, Multi-modal analgesia.   PONV prophylaxis: Ondansetron (or other 5HT-3), Dexamethasone or Solumedrol     Comments:               KEYON IRIZARRY MD    I have reviewed the pertinent notes and labs in the chart from the past 30 days and (re)examined the patient.  Any updates or changes from those notes are reflected in this note.

## 2023-12-01 ENCOUNTER — HOSPITAL ENCOUNTER (OUTPATIENT)
Facility: CLINIC | Age: 78
Discharge: HOME | End: 2023-12-01
Attending: OTOLARYNGOLOGY | Admitting: OTOLARYNGOLOGY
Payer: COMMERCIAL

## 2023-12-01 ENCOUNTER — ANESTHESIA (OUTPATIENT)
Dept: SURGERY | Facility: CLINIC | Age: 78
End: 2023-12-01
Payer: COMMERCIAL

## 2023-12-01 VITALS
BODY MASS INDEX: 29.36 KG/M2 | RESPIRATION RATE: 14 BRPM | WEIGHT: 198.19 LBS | HEART RATE: 58 BPM | SYSTOLIC BLOOD PRESSURE: 155 MMHG | HEIGHT: 69 IN | DIASTOLIC BLOOD PRESSURE: 80 MMHG | TEMPERATURE: 98.2 F | OXYGEN SATURATION: 95 %

## 2023-12-01 DIAGNOSIS — Z98.890 POST-OPERATIVE STATE: Primary | ICD-10-CM

## 2023-12-01 PROCEDURE — 250N000013 HC RX MED GY IP 250 OP 250 PS 637: Performed by: STUDENT IN AN ORGANIZED HEALTH CARE EDUCATION/TRAINING PROGRAM

## 2023-12-01 PROCEDURE — 250N000009 HC RX 250: Performed by: OTOLARYNGOLOGY

## 2023-12-01 PROCEDURE — 250N000011 HC RX IP 250 OP 636: Performed by: ANESTHESIOLOGY

## 2023-12-01 PROCEDURE — 88305 TISSUE EXAM BY PATHOLOGIST: CPT | Mod: TC | Performed by: OTOLARYNGOLOGY

## 2023-12-01 PROCEDURE — 710N000010 HC RECOVERY PHASE 1, LEVEL 2, PER MIN: Performed by: OTOLARYNGOLOGY

## 2023-12-01 PROCEDURE — 250N000011 HC RX IP 250 OP 636: Performed by: OTOLARYNGOLOGY

## 2023-12-01 PROCEDURE — 999N000141 HC STATISTIC PRE-PROCEDURE NURSING ASSESSMENT: Performed by: OTOLARYNGOLOGY

## 2023-12-01 PROCEDURE — 272N000001 HC OR GENERAL SUPPLY STERILE: Performed by: OTOLARYNGOLOGY

## 2023-12-01 PROCEDURE — 370N000017 HC ANESTHESIA TECHNICAL FEE, PER MIN: Performed by: OTOLARYNGOLOGY

## 2023-12-01 PROCEDURE — 88305 TISSUE EXAM BY PATHOLOGIST: CPT | Mod: 26 | Performed by: STUDENT IN AN ORGANIZED HEALTH CARE EDUCATION/TRAINING PROGRAM

## 2023-12-01 PROCEDURE — 250N000009 HC RX 250: Performed by: ANESTHESIOLOGY

## 2023-12-01 PROCEDURE — 710N000012 HC RECOVERY PHASE 2, PER MINUTE: Performed by: OTOLARYNGOLOGY

## 2023-12-01 PROCEDURE — 258N000003 HC RX IP 258 OP 636: Performed by: ANESTHESIOLOGY

## 2023-12-01 PROCEDURE — 360N000077 HC SURGERY LEVEL 4, PER MIN: Performed by: OTOLARYNGOLOGY

## 2023-12-01 RX ORDER — DEXAMETHASONE SODIUM PHOSPHATE 10 MG/ML
INJECTION INTRAMUSCULAR; INTRAVENOUS PRN
Status: DISCONTINUED | OUTPATIENT
Start: 2023-12-01 | End: 2023-12-01 | Stop reason: HOSPADM

## 2023-12-01 RX ORDER — OXYMETAZOLINE HYDROCHLORIDE 0.05 G/100ML
SPRAY NASAL PRN
Status: DISCONTINUED | OUTPATIENT
Start: 2023-12-01 | End: 2023-12-01 | Stop reason: HOSPADM

## 2023-12-01 RX ORDER — LIDOCAINE HYDROCHLORIDE 20 MG/ML
INJECTION, SOLUTION INFILTRATION; PERINEURAL PRN
Status: DISCONTINUED | OUTPATIENT
Start: 2023-12-01 | End: 2023-12-01

## 2023-12-01 RX ORDER — FENTANYL CITRATE 50 UG/ML
25 INJECTION, SOLUTION INTRAMUSCULAR; INTRAVENOUS EVERY 5 MIN PRN
Status: DISCONTINUED | OUTPATIENT
Start: 2023-12-01 | End: 2023-12-01 | Stop reason: HOSPADM

## 2023-12-01 RX ORDER — FENTANYL CITRATE 50 UG/ML
50 INJECTION, SOLUTION INTRAMUSCULAR; INTRAVENOUS EVERY 5 MIN PRN
Status: DISCONTINUED | OUTPATIENT
Start: 2023-12-01 | End: 2023-12-01 | Stop reason: HOSPADM

## 2023-12-01 RX ORDER — ONDANSETRON 4 MG/1
4 TABLET, ORALLY DISINTEGRATING ORAL EVERY 30 MIN PRN
Status: DISCONTINUED | OUTPATIENT
Start: 2023-12-01 | End: 2023-12-01 | Stop reason: HOSPADM

## 2023-12-01 RX ORDER — HYDROMORPHONE HCL IN WATER/PF 6 MG/30 ML
0.4 PATIENT CONTROLLED ANALGESIA SYRINGE INTRAVENOUS EVERY 5 MIN PRN
Status: DISCONTINUED | OUTPATIENT
Start: 2023-12-01 | End: 2023-12-01 | Stop reason: HOSPADM

## 2023-12-01 RX ORDER — ACETAMINOPHEN 325 MG/1
975 TABLET ORAL ONCE
Status: COMPLETED | OUTPATIENT
Start: 2023-12-01 | End: 2023-12-01

## 2023-12-01 RX ORDER — SODIUM CHLORIDE, SODIUM LACTATE, POTASSIUM CHLORIDE, CALCIUM CHLORIDE 600; 310; 30; 20 MG/100ML; MG/100ML; MG/100ML; MG/100ML
INJECTION, SOLUTION INTRAVENOUS CONTINUOUS PRN
Status: DISCONTINUED | OUTPATIENT
Start: 2023-12-01 | End: 2023-12-01

## 2023-12-01 RX ORDER — SODIUM CHLORIDE, SODIUM LACTATE, POTASSIUM CHLORIDE, CALCIUM CHLORIDE 600; 310; 30; 20 MG/100ML; MG/100ML; MG/100ML; MG/100ML
INJECTION, SOLUTION INTRAVENOUS CONTINUOUS
Status: DISCONTINUED | OUTPATIENT
Start: 2023-12-01 | End: 2023-12-01 | Stop reason: HOSPADM

## 2023-12-01 RX ORDER — ONDANSETRON 2 MG/ML
4 INJECTION INTRAMUSCULAR; INTRAVENOUS EVERY 30 MIN PRN
Status: DISCONTINUED | OUTPATIENT
Start: 2023-12-01 | End: 2023-12-01 | Stop reason: HOSPADM

## 2023-12-01 RX ORDER — LIDOCAINE HYDROCHLORIDE 40 MG/ML
SOLUTION TOPICAL PRN
Status: DISCONTINUED | OUTPATIENT
Start: 2023-12-01 | End: 2023-12-01 | Stop reason: HOSPADM

## 2023-12-01 RX ORDER — PROPOFOL 10 MG/ML
INJECTION, EMULSION INTRAVENOUS CONTINUOUS PRN
Status: DISCONTINUED | OUTPATIENT
Start: 2023-12-01 | End: 2023-12-01

## 2023-12-01 RX ORDER — HYDROMORPHONE HCL IN WATER/PF 6 MG/30 ML
0.2 PATIENT CONTROLLED ANALGESIA SYRINGE INTRAVENOUS EVERY 5 MIN PRN
Status: DISCONTINUED | OUTPATIENT
Start: 2023-12-01 | End: 2023-12-01 | Stop reason: HOSPADM

## 2023-12-01 RX ORDER — DEXAMETHASONE SODIUM PHOSPHATE 4 MG/ML
INJECTION, SOLUTION INTRA-ARTICULAR; INTRALESIONAL; INTRAMUSCULAR; INTRAVENOUS; SOFT TISSUE PRN
Status: DISCONTINUED | OUTPATIENT
Start: 2023-12-01 | End: 2023-12-01

## 2023-12-01 RX ORDER — FENTANYL CITRATE 50 UG/ML
INJECTION, SOLUTION INTRAMUSCULAR; INTRAVENOUS PRN
Status: DISCONTINUED | OUTPATIENT
Start: 2023-12-01 | End: 2023-12-01

## 2023-12-01 RX ORDER — LIDOCAINE 40 MG/G
CREAM TOPICAL
Status: DISCONTINUED | OUTPATIENT
Start: 2023-12-01 | End: 2023-12-01 | Stop reason: HOSPADM

## 2023-12-01 RX ORDER — ONDANSETRON 2 MG/ML
INJECTION INTRAMUSCULAR; INTRAVENOUS PRN
Status: DISCONTINUED | OUTPATIENT
Start: 2023-12-01 | End: 2023-12-01

## 2023-12-01 RX ORDER — PROPOFOL 10 MG/ML
INJECTION, EMULSION INTRAVENOUS PRN
Status: DISCONTINUED | OUTPATIENT
Start: 2023-12-01 | End: 2023-12-01

## 2023-12-01 RX ORDER — BACITRACIN ZINC 500 [USP'U]/G
OINTMENT TOPICAL 2 TIMES DAILY
Qty: 28 G | Refills: 0 | Status: SHIPPED | OUTPATIENT
Start: 2023-12-01 | End: 2024-07-11

## 2023-12-01 RX ADMIN — Medication 10 MG: at 15:36

## 2023-12-01 RX ADMIN — SUGAMMADEX 200 MG: 100 INJECTION, SOLUTION INTRAVENOUS at 16:09

## 2023-12-01 RX ADMIN — FENTANYL CITRATE 50 MCG: 50 INJECTION INTRAMUSCULAR; INTRAVENOUS at 15:34

## 2023-12-01 RX ADMIN — FENTANYL CITRATE 50 MCG: 50 INJECTION INTRAMUSCULAR; INTRAVENOUS at 15:45

## 2023-12-01 RX ADMIN — DEXAMETHASONE SODIUM PHOSPHATE 10 MG: 4 INJECTION, SOLUTION INTRA-ARTICULAR; INTRALESIONAL; INTRAMUSCULAR; INTRAVENOUS; SOFT TISSUE at 15:22

## 2023-12-01 RX ADMIN — SODIUM CHLORIDE, POTASSIUM CHLORIDE, SODIUM LACTATE AND CALCIUM CHLORIDE: 600; 310; 30; 20 INJECTION, SOLUTION INTRAVENOUS at 15:06

## 2023-12-01 RX ADMIN — FENTANYL CITRATE 50 MCG: 50 INJECTION INTRAMUSCULAR; INTRAVENOUS at 15:58

## 2023-12-01 RX ADMIN — ONDANSETRON 4 MG: 2 INJECTION INTRAMUSCULAR; INTRAVENOUS at 15:29

## 2023-12-01 RX ADMIN — ACETAMINOPHEN 975 MG: 325 TABLET, FILM COATED ORAL at 10:54

## 2023-12-01 RX ADMIN — PROPOFOL 150 MCG/KG/MIN: 10 INJECTION, EMULSION INTRAVENOUS at 15:22

## 2023-12-01 RX ADMIN — Medication 50 MG: at 15:22

## 2023-12-01 RX ADMIN — PROPOFOL 50 MG: 10 INJECTION, EMULSION INTRAVENOUS at 15:22

## 2023-12-01 RX ADMIN — LIDOCAINE HYDROCHLORIDE 100 MG: 20 INJECTION, SOLUTION INFILTRATION; PERINEURAL at 15:20

## 2023-12-01 RX ADMIN — PROPOFOL 100 MG: 10 INJECTION, EMULSION INTRAVENOUS at 15:21

## 2023-12-01 ASSESSMENT — ACTIVITIES OF DAILY LIVING (ADL)
ADLS_ACUITY_SCORE: 35

## 2023-12-01 NOTE — ANESTHESIA CARE TRANSFER NOTE
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    Anesthesia Type:   General     Note:    Oropharynx: oropharynx clear of all foreign objects and spontaneously breathing  Level of Consciousness: awake  Oxygen Supplementation: face mask  Level of Supplemental Oxygen (L/min / FiO2): 4  Independent Airway: airway patency satisfactory and stable  Dentition: dentition unchanged  Vital Signs Stable: post-procedure vital signs reviewed and stable  Report to RN Given: handoff report given  Patient transferred to: PACU    Handoff Report: Identifed the Patient, Identified the Reponsible Provider, Reviewed the pertinent medical history, Discussed the surgical course, Reviewed Intra-OP anesthesia mangement and issues during anesthesia, Set expectations for post-procedure period and Allowed opportunity for questions and acknowledgement of understanding      Vitals:  Vitals Value Taken Time   /71 12/01/23 1631   Temp     Pulse 59 12/01/23 1636   Resp 16 12/01/23 1636   SpO2 98 % 12/01/23 1636   Vitals shown include unfiled device data.    Electronically Signed By: DENISSE Dawson CRNA  December 1, 2023  4:37 PM

## 2023-12-01 NOTE — DISCHARGE INSTRUCTIONS
Please continue using your steroid inhaler until you hear the biopsy results from Dr. Malone  Please use bacitracin over your neck twice a day.     Contacting your Doctor -   To contact a doctor, call Dr. Malone's [facility] at [phone] or:  [phone] and ask for the resident on call for ENT-Otolaryngology (answered 24 hours a day)   Emergency Department:  [facility]: [phone] 911 if you are in need of immediate or emergent help     After Anesthesia (Sleep Medicine)  What should I do after anesthesia?  You should rest and relax for the next 24 hours. Avoid risky or difficult (strenuous) activity. A responsible adult should stay with you overnight.  Don't drive or use any heavy equipment for 24 hours. Even if you feel normal, your reactions may be affected by the sleep medicine given to you.  Don't drink alcohol or make any important decisions for 24 hours.  Slowly get back to your regular diet, as you feel able.  How should I expect to feel?  It's normal to feel dizzy, light-headed, or faint for up to a full day after anesthesia or while taking pain medicine. If this happens:   Sit down for a few minutes before standing.  Have someone help you when you get up to walk or use the bathroom.  If you have nausea (feel sick to your stomach) or vomit (throw up):   Drink clear liquids (such as apple juice, ginger ale, broth, or 7UP) until you feel better.  If you feel sick to your stomach, or you keep vomiting for 24 hours, please call the doctor.  What else should I know?  You might have a dry mouth, sore throat, muscle aches, or trouble sleeping. These should go away after 24 hours.  Please contact your doctor if you have any other symptoms that concern you, such as fever, pain, bleeding, fluid drainage, swelling, or headache, or if it's been over 8 to 10 hours and you still aren't able to pee (urinate).  If you have a history of sleep apnea, it's very important to use your CPAP machine for the  next 24 hours when you nap or sleep

## 2023-12-01 NOTE — ANESTHESIA PROCEDURE NOTES
Airway       Patient location during procedure: OR       Procedure Start/Stop Times: 12/1/2023 3:31 PM  Staff -        CRNA: Perla Cardoso APRN CRNA       Performed By: CRNAIndications and Patient Condition       Indications for airway management: reggie-procedural         Mask difficulty assessment: 3 - difficult mask (inadequate, unstable, or two providers) +/- NMBA    Final Airway Details       Final airway type: endotracheal airway       Successful airway: ETT - single  Endotracheal Airway Details        ETT size (mm): 6.0       Successful intubation technique: direct laryngoscopy (Per dr. malone)       DL Blade Type: Other (comment)    Post intubation assessment        Ease of procedure: easy       Dentition: Intact and Unchanged    Medication(s) Administered   Medication Administration Time: 12/1/2023 3:31 PM    Additional Comments       ETT placed per Dr. Malone under direct visualization

## 2023-12-01 NOTE — ANESTHESIA POSTPROCEDURE EVALUATION
Patient: Joshua Ennis    Procedure: Procedure(s):  MICROLARYNGOSCOPY CO2 laser standby, resection of vocal fold lesion, stent removal, biopsy  steroid injection       Anesthesia Type:  General    Note:  Disposition: Outpatient   Postop Pain Control: Uneventful            Sign Out: Well controlled pain   PONV: No   Neuro/Psych: Uneventful            Sign Out: Acceptable/Baseline neuro status   Airway/Respiratory: Uneventful            Sign Out: Acceptable/Baseline resp. status   CV/Hemodynamics: Uneventful            Sign Out: Acceptable CV status; No obvious hypovolemia; No obvious fluid overload   Other NRE: NONE   DID A NON-ROUTINE EVENT OCCUR? No    Event details/Postop Comments:  No complications.           Last vitals:  Vitals Value Taken Time   BP     Temp     Pulse     Resp 22 12/01/23 1632   SpO2 100 % 12/01/23 1632   Vitals shown include unfiled device data.    Electronically Signed By: Jluis Cabral MD  December 1, 2023  4:34 PM

## 2023-12-01 NOTE — OP NOTE
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 Microdirect suspension laryngoscopy  with stent removal  2. Microdirect suspension laryngoscopy with biopsy of anterior commissure   5. Microdirect suspension laryngoscopy with steroid injection    ANESTHESIA  Type: general  Mask to  jet ventilation to 6.0 ETT    SURGEON:   Josephine Malone MD    ASSISTANT SURGEON(S):   Ashlyn Sifuentes MD    INDICATIONS FOR PROCEDURE:   The patient is a 78 year old male with a recently diagnosed laryngeal mass s/p resection on 8/30/23 with positive margins came in  for repeat resection on 10/25/23 and stent placement now comes in for stent removal and biopsies. Risks, benefits, alternatives, and rationale for the procedure(s) listed above were discussed with the patient, and the patient wishes to proceed with surgery and has signed an informed consent.     COMPLEXITY  This surgery was more complex than normally because of performing the surgery with history of anterior commissure tumor and stent. The approach therefore was more difficult technically than normally.  Significantly more time was required to perform all parts of the operation, especially obtaining the anterior commissure biopsies - which required both direct and flexible approach. Also the management of the airway was complex and it involved both mask, jet and intubation.    FINDINGS:  1. Exposure was achieved with a dedo for the stent removal and then with the ossoff laryngoscope for the biopsy and injection  2. 2 hand mask ventilation, easy exposure with deod  3. Anterior commissure granulation at the area of hte stent, however superior to it area of regrowth that appears concerning - biopsied     DESCRIPTION OF PROCEDURE:   The patient was  brought into the operating room and placed supine on the operating room table. A time-out was performed. General anesthesia was induced and the patient was easily ventilated with 2 hands and oral airway. Then the patient was turned 90 degrees from the anesthesiologist. The head was drapped in the usual fashion. Then the dentition and mucosa were inspected prior to start. A reinforced tooth guard was placed on the upper dentition. The dedo laryngoscope was carefully introduced into the oral cavity and gently passed to the oropharynx. Here the glottis was exposed and the patient was placed in suspension.     The jet needle was placed and ventilation with good chest rise was confirmed.     Photodocumentation was performed with a 0 degree telescope.       This revealed the stent in place this was removed.   The a 2.0 cups was used to obtain biopsies of the inferior granulation and the superior area at the petiole of the epiglottis that looked concerning. This was also biopsied with the flex bronch and flex biopsy forceps as this allowed for a better approach. The specimen was sent for permanent histopathologic evaluation.     Then 1.0 ml of dexamethasone 10 was injected into the scar.       This was the end of our procedure. Afrin soaked pledgets were applied to the surgical site for hemostasis.  The surgical site was then inspected and no residual bleeding was seen. The patient was taken out of suspension. The instrumentation was carefully removed, examining the mucosa and dentition on the way out. The dental guard was removed, and the mucosa and dentition were seen to be in their preoperative state at the conclusion of the procedure. The patient was then handed back to the care of anesthesia who awoke the patient without complication. The patient was then transferred to the PACU.     COMPLICATIONS:   None.    ESTIMATED BLOOD LOSS:   Less than 10cc    DISPOSITION:   PACU.    SPECIMENS:  ID Type Source Tests Collected  by Time Destination   1 : anterior commissure inferior biopsy Tissue Other SURGICAL PATHOLOGY EXAM Josephine Malone MD 12/1/2023  3:47 PM    2 : anterior commissure superior biopsy Tissue Other SURGICAL PATHOLOGY EXAM Josephine Malone MD 12/1/2023  3:48 PM           PHOTODOCUMENTATION:

## 2023-12-02 ASSESSMENT — ACTIVITIES OF DAILY LIVING (ADL)
ADLS_ACUITY_SCORE: 35

## 2023-12-03 ASSESSMENT — ACTIVITIES OF DAILY LIVING (ADL)
ADLS_ACUITY_SCORE: 35

## 2023-12-04 LAB
PATH REPORT.COMMENTS IMP SPEC: NORMAL
PATH REPORT.COMMENTS IMP SPEC: NORMAL
PATH REPORT.FINAL DX SPEC: NORMAL
PATH REPORT.GROSS SPEC: NORMAL
PATH REPORT.MICROSCOPIC SPEC OTHER STN: NORMAL
PATH REPORT.RELEVANT HX SPEC: NORMAL
PHOTO IMAGE: NORMAL

## 2023-12-04 ASSESSMENT — ACTIVITIES OF DAILY LIVING (ADL)
ADLS_ACUITY_SCORE: 35

## 2023-12-05 ENCOUNTER — PATIENT OUTREACH (OUTPATIENT)
Dept: OTOLARYNGOLOGY | Facility: CLINIC | Age: 78
End: 2023-12-05

## 2023-12-05 ENCOUNTER — PREP FOR PROCEDURE (OUTPATIENT)
Dept: OTOLARYNGOLOGY | Facility: CLINIC | Age: 78
End: 2023-12-05

## 2023-12-05 DIAGNOSIS — Q31.0 GLOTTIC WEB OF LARYNX: Primary | ICD-10-CM

## 2023-12-05 ASSESSMENT — ACTIVITIES OF DAILY LIVING (ADL)
ADLS_ACUITY_SCORE: 35

## 2023-12-05 NOTE — PROGRESS NOTES
Called patient's SO to go over pathology, and the plan to review the case on tumor board for Friday. Writer told patient SO that we would be reaching out with a further plan of care once patient's case is addressed on tumor board. Patient SO was agreeable to this and verbalized understanding of the situation. Patient will reach out with any other question or concerns in the meantime. Nina Cardenas RN on 12/5/2023 at 8:56 AM

## 2023-12-06 ENCOUNTER — TELEPHONE (OUTPATIENT)
Dept: OTOLARYNGOLOGY | Facility: CLINIC | Age: 78
End: 2023-12-06

## 2023-12-06 PROBLEM — Q31.0 GLOTTIC WEB OF LARYNX: Status: ACTIVE | Noted: 2023-12-05

## 2023-12-06 NOTE — TELEPHONE ENCOUNTER
Scheduled surgery with Dr. Malone on 12/11    Spoke with: Earlene Jerez (Significant other)     Surgery is located at [facility]    Patient does not require an H&P    Anesthesia type: Local      Additional comments: JODI Pettit on 12/6/2023 at 10:01 AM

## 2023-12-08 ENCOUNTER — VIRTUAL VISIT (OUTPATIENT)
Dept: ONCOLOGY | Facility: CLINIC | Age: 78
End: 2023-12-08

## 2023-12-08 ENCOUNTER — TELEPHONE (OUTPATIENT)
Dept: OTOLARYNGOLOGY | Facility: CLINIC | Age: 78
End: 2023-12-08

## 2023-12-08 DIAGNOSIS — C76.0 HEAD AND NECK CANCER (H): Primary | ICD-10-CM

## 2023-12-08 NOTE — TELEPHONE ENCOUNTER
[facility] Call Center    Phone Message    May a detailed message be left on voicemail: yes     Reason for Call: Other: Tasia patient's fiance calling to speak to the nurse regarding what the care team has decided to do, please call her to discuss further, thanks     Action Taken: Other: ENT    Travel Screening: Not Applicable

## 2023-12-08 NOTE — PROGRESS NOTES
Head & Neck Tumor Conference Note   Status: Established  Staff: Dr. Malone     Tumor Site: Glottis, anterior commisure  Tumor Pathology: SCC  Tumor Stage: T1N0  Tumor Treatment:   - 8/30/23: Transoral laser microsurgery with endoscopic right and left Type Vd cordectomy encompassing the subglottis, stent placement   - 9/13/23: Stent removal, biopsy  - 10/25/23: Transoral laser microsurgery with endoscopic right and left Type Vd cordectomy encompassing the subglottis, stent placement   -   Reason for Review: Review imaging, path, and POC     Brief History:   The patient is a 78 year old male with a recently diagnosed laryngeal mass s/p resection on 8/30/23 with positive margins and repeat resection on 10/25.  We discussed his case at tumor conference on 11/3/23, where we noted the path shows positive margin anteriorly, this area was re-resected and the new margins were then negative. Per discussion, given our confidence in negative margins surgically, and the fact that the sampled tissue over the thyroid cartilage was negative, we favored observation at this time as opposed to radiation treatment now or re-resection. We took the patient back for a second look with directed biopsies on 12/1, where a concerning regrowth was presented and biopsies were taken. We would like to discuss the pathology results and a plan of care.   Pertinent PMH:   Past Medical History:   Diagnosis Date     Arthritis      Autoimmune disease (H24)      Hearing problem      Hoarseness      Hypertension      Kidney problem      Malignant melanoma (H)      Malignant neoplasm (H)     melanoma     Squamous cell carcinoma      Russo syndrome       Smoking Hx:   Social History     Tobacco Use     Smoking status: Former     Packs/day: 1.00     Years: 20.00     Additional pack years: 0.00     Total pack years: 20.00     Types: Cigarettes     Quit date: 2/14/2013     Years since quitting: 10.8     Smokeless tobacco: Never   Vaping Use     Vaping Use:  Never used   Substance Use Topics     Alcohol use: Yes     Comment: rare     Drug use: No     Pathology:   Final Diagnosis   A. LARYNX, ANTERIOR COMMISSURE INFERIOR BIOPSY:  - Granulation tissue with reactive atypia  - No evidence of carcinoma     LARYNX, ANTERIOR COMMISSURE SUPERIOR BIOPSY:  - Detached fragment of squamous epithelium with mild to moderate dysplasia  - No evidence of carcinoma     Tumor Board Recommendation:   Discussion:   The specimen was reviewed and found to be negative for carcinoma. The superior biopsy is dysplastic, but not CIS, just moderate dysplasia.   No plan for additional surgery (ie laryngectomy) or radiation treatment at this time.     Plan:   - Monitor, close follow up with Dr. Faisal Christian MD  Otolaryngology Head and Neck Surgery Resident, PGY-3    Documentation / Disclaimer Cancer Tumor Board Note: Cancer tumor board recommendations do not override what is determined to be reasonable care and treatment, which is dependent on the circumstances of a patient's case; the patient's medical, social, and personal concerns; and the clinical judgment of the oncologist [physician].

## 2023-12-11 ENCOUNTER — HOSPITAL ENCOUNTER (OUTPATIENT)
Facility: AMBULATORY SURGERY CENTER | Age: 78
Discharge: HOME | End: 2023-12-11
Attending: OTOLARYNGOLOGY | Admitting: OTOLARYNGOLOGY
Payer: COMMERCIAL

## 2023-12-11 VITALS
OXYGEN SATURATION: 98 % | BODY MASS INDEX: 29.33 KG/M2 | TEMPERATURE: 96.8 F | RESPIRATION RATE: 20 BRPM | HEART RATE: 60 BPM | WEIGHT: 198 LBS | DIASTOLIC BLOOD PRESSURE: 83 MMHG | HEIGHT: 69 IN | SYSTOLIC BLOOD PRESSURE: 133 MMHG

## 2023-12-11 PROCEDURE — 31573 LARGSC W/THER INJECTION: CPT | Mod: RT | Performed by: OTOLARYNGOLOGY

## 2023-12-11 RX ORDER — LIDOCAINE HYDROCHLORIDE 40 MG/ML
SOLUTION TOPICAL PRN
Status: DISCONTINUED | OUTPATIENT
Start: 2023-12-11 | End: 2023-12-11 | Stop reason: HOSPADM

## 2023-12-11 RX ORDER — DEXAMETHASONE SODIUM PHOSPHATE 4 MG/ML
INJECTION, SOLUTION INTRA-ARTICULAR; INTRALESIONAL; INTRAMUSCULAR; INTRAVENOUS; SOFT TISSUE PRN
Status: DISCONTINUED | OUTPATIENT
Start: 2023-12-11 | End: 2023-12-11 | Stop reason: HOSPADM

## 2023-12-11 NOTE — DISCHARGE INSTRUCTIONS
NPO until 9:45AM  You will receive a call with the next procedure date.    [facility]  Home Care Following Your Procedure  Call a doctor if you have signs of infection (fever, growing tenderness at the surgery site, a large amount of drainage or bleeding, severe pain, foul-smelling drainage, redness, swelling).             Tylenol/Acetaminophen Consumption    If you feel your pain relief is insufficient, you may take Tylenol/Acetaminophen in addition to your narcotic pain medication.   Be careful not to exceed 4,000 mg of Tylenol/Acetaminophen in a 24 hour period from all sources.  If you are taking extra strength Tylenol/acetaminophen (500 mg), the maximum dose is 8 tablets in 24 hours.  If you are taking regular strength acetaminophen (325 mg), the maximum dose is 12 tablets in 24 hours.      Your doctor is:  Dr. Josephine Malone, ENT Otolaryngology: [phone]                  Or dial [phone] and ask for the resident on call for:  ENT Otolaryngology  For emergency care, call the:  [place]:  [phone] (TTY for hearing impaired: [phone])

## 2023-12-11 NOTE — TELEPHONE ENCOUNTER
Called patient significant other to let her know that provider would be going over the plan prior to the patient's injection. Patient was agreeable to this and verbalized understanding of the situation. Nina Cardenas RN on 12/11/2023 at 8:58 AM

## 2023-12-11 NOTE — OP NOTE
Operative Note   Otolaryngology - Head and Neck Surgery       DATE OF OPERATION:   December 11, 2023    PREOPERATIVE DIAGNOSIS:   Anterior glottic web  Laryngeal carcinoma     POSTOPERATIVE DIAGNOSIS:   Same    NAME OF OPERATION:   Laryngoscopy and anterior glottic web vocal fold steroid injection    ANESTHESIA  Type: local    SURGEON:   Josephine Malone MD    RESIDENT SURGEON(S):   Jamal Barksdale MD    INDICATIONS FOR PROCEDURE:   The patient is a 78 year old male with a recently diagnosed laryngeal mass s/p resection on 8/30/23 with positive margins came in  for repeat resection on 10/25/23 and stent placement now came in on 12/1/23 in for stent removal and biopsies. She returns for vocal fold injection today. Risks, benefits, alternatives, and rationale for the procedure(s) listed above were discussed with the patient, and the patient wishes to proceed with surgery and has signed an informed consent.     FINDINGS:   Anatomic/physiological deviations: RNC, anterior glottic web not reformed, no lesions, dexamethasone injected   Right vocal process: No restriction of mobility   Left vocal process: No restriction of mobility  Glottal gap: Complete glottal closure  Supraglottic structures: Normal  Hypopharynx: Normal      DESCRIPTION OF PROCEDURE:   The patient was brought into the operating room and seated upright in the chair. Topical anesthesia was achieved by spraying 4% topical lidocaine directly onto the vocal folds and trachea. After adequate anesthesia was achieved, the endoscope was passed along the floor or middle meatus of the more open nasal cavity.  The larynx was visualized and the abnormalities noted. Then 1.5mL of 4mg/mL dexamethasone was injected via the side channel into the base of the lesion. Then the endoscope was withdrawn atraumatically.     COMPLICATIONS:   None.  .   ESTIMATED BLOOD LOSS:   Less than 10cc    DISPOSITION:   PACU.    SPECIMENS:  * No specimens in log *       PHOTODOCUMENTATION:

## 2023-12-12 ENCOUNTER — TELEPHONE (OUTPATIENT)
Dept: OTOLARYNGOLOGY | Facility: CLINIC | Age: 78
End: 2023-12-12

## 2024-01-03 ENCOUNTER — HOSPITAL ENCOUNTER (EMERGENCY)
Facility: CLINIC | Age: 79
Discharge: HOME | End: 2024-01-03
Attending: EMERGENCY MEDICINE | Admitting: EMERGENCY MEDICINE
Payer: COMMERCIAL

## 2024-01-03 VITALS
SYSTOLIC BLOOD PRESSURE: 128 MMHG | RESPIRATION RATE: 19 BRPM | TEMPERATURE: 97 F | DIASTOLIC BLOOD PRESSURE: 71 MMHG | BODY MASS INDEX: 27.7 KG/M2 | OXYGEN SATURATION: 100 % | HEIGHT: 69 IN | WEIGHT: 187 LBS | HEART RATE: 60 BPM

## 2024-01-03 DIAGNOSIS — E86.0 DEHYDRATION: ICD-10-CM

## 2024-01-03 DIAGNOSIS — N18.32 STAGE 3B CHRONIC KIDNEY DISEASE (H): ICD-10-CM

## 2024-01-03 DIAGNOSIS — R19.7 DIARRHEA OF PRESUMED INFECTIOUS ORIGIN: ICD-10-CM

## 2024-01-03 LAB
ADV 40+41 DNA STL QL NAA+NON-PROBE: NEGATIVE
ALBUMIN SERPL BCG-MCNC: 4.4 G/DL (ref 3.5–5.2)
ALP SERPL-CCNC: 103 U/L (ref 40–150)
ALT SERPL W P-5'-P-CCNC: 28 U/L (ref 0–70)
ANION GAP SERPL CALCULATED.3IONS-SCNC: 7 MMOL/L (ref 7–15)
AST SERPL W P-5'-P-CCNC: 23 U/L (ref 0–45)
ASTRO TYP 1-8 RNA STL QL NAA+NON-PROBE: NEGATIVE
BASOPHILS # BLD AUTO: 0 10E3/UL (ref 0–0.2)
BASOPHILS NFR BLD AUTO: 1 %
BILIRUB SERPL-MCNC: 0.7 MG/DL
BUN SERPL-MCNC: 35.3 MG/DL (ref 8–23)
C CAYETANENSIS DNA STL QL NAA+NON-PROBE: NEGATIVE
C DIFF TOX B STL QL: NEGATIVE
CALCIUM SERPL-MCNC: 9.2 MG/DL (ref 8.8–10.2)
CAMPYLOBACTER DNA SPEC NAA+PROBE: NEGATIVE
CHLORIDE SERPL-SCNC: 107 MMOL/L (ref 98–107)
CREAT SERPL-MCNC: 1.92 MG/DL (ref 0.67–1.17)
CRYPTOSP DNA STL QL NAA+NON-PROBE: NEGATIVE
DEPRECATED HCO3 PLAS-SCNC: 22 MMOL/L (ref 22–29)
E COLI O157 DNA STL QL NAA+NON-PROBE: ABNORMAL
E HISTOLYT DNA STL QL NAA+NON-PROBE: NEGATIVE
EAEC ASTA GENE ISLT QL NAA+PROBE: NEGATIVE
EC STX1+STX2 GENES STL QL NAA+NON-PROBE: NEGATIVE
EGFRCR SERPLBLD CKD-EPI 2021: 36 ML/MIN/1.73M2
EOSINOPHIL # BLD AUTO: 0.1 10E3/UL (ref 0–0.7)
EOSINOPHIL NFR BLD AUTO: 2 %
EPEC EAE GENE STL QL NAA+NON-PROBE: POSITIVE
ERYTHROCYTE [DISTWIDTH] IN BLOOD BY AUTOMATED COUNT: 13.1 % (ref 10–15)
ETEC LTA+ST1A+ST1B TOX ST NAA+NON-PROBE: NEGATIVE
G LAMBLIA DNA STL QL NAA+NON-PROBE: NEGATIVE
GLUCOSE SERPL-MCNC: 100 MG/DL (ref 70–99)
HCT VFR BLD AUTO: 47.4 % (ref 40–53)
HGB BLD-MCNC: 16.2 G/DL (ref 13.3–17.7)
IMM GRANULOCYTES # BLD: 0 10E3/UL
IMM GRANULOCYTES NFR BLD: 0 %
LIPASE SERPL-CCNC: 40 U/L (ref 13–60)
LYMPHOCYTES # BLD AUTO: 1.2 10E3/UL (ref 0.8–5.3)
LYMPHOCYTES NFR BLD AUTO: 20 %
MCH RBC QN AUTO: 32.8 PG (ref 26.5–33)
MCHC RBC AUTO-ENTMCNC: 34.2 G/DL (ref 31.5–36.5)
MCV RBC AUTO: 96 FL (ref 78–100)
MONOCYTES # BLD AUTO: 0.8 10E3/UL (ref 0–1.3)
MONOCYTES NFR BLD AUTO: 13 %
NEUTROPHILS # BLD AUTO: 3.9 10E3/UL (ref 1.6–8.3)
NEUTROPHILS NFR BLD AUTO: 64 %
NOROVIRUS GI+II RNA STL QL NAA+NON-PROBE: NEGATIVE
NRBC # BLD AUTO: 0 10E3/UL
NRBC BLD AUTO-RTO: 0 /100
P SHIGELLOIDES DNA STL QL NAA+NON-PROBE: NEGATIVE
PLATELET # BLD AUTO: 204 10E3/UL (ref 150–450)
POTASSIUM SERPL-SCNC: 4.1 MMOL/L (ref 3.4–5.3)
PROT SERPL-MCNC: 7.4 G/DL (ref 6.4–8.3)
RBC # BLD AUTO: 4.94 10E6/UL (ref 4.4–5.9)
RVA RNA STL QL NAA+NON-PROBE: NEGATIVE
SALMONELLA SP RPOD STL QL NAA+PROBE: NEGATIVE
SAPO I+II+IV+V RNA STL QL NAA+NON-PROBE: POSITIVE
SHIGELLA SP+EIEC IPAH ST NAA+NON-PROBE: NEGATIVE
SODIUM SERPL-SCNC: 136 MMOL/L (ref 135–145)
V CHOLERAE DNA SPEC QL NAA+PROBE: NEGATIVE
VIBRIO DNA SPEC NAA+PROBE: NEGATIVE
WBC # BLD AUTO: 6.2 10E3/UL (ref 4–11)
Y ENTEROCOL DNA STL QL NAA+PROBE: NEGATIVE

## 2024-01-03 PROCEDURE — 96360 HYDRATION IV INFUSION INIT: CPT

## 2024-01-03 PROCEDURE — 83690 ASSAY OF LIPASE: CPT | Performed by: EMERGENCY MEDICINE

## 2024-01-03 PROCEDURE — 99283 EMERGENCY DEPT VISIT LOW MDM: CPT

## 2024-01-03 PROCEDURE — 258N000003 HC RX IP 258 OP 636: Performed by: EMERGENCY MEDICINE

## 2024-01-03 PROCEDURE — 99283 EMERGENCY DEPT VISIT LOW MDM: CPT | Performed by: EMERGENCY MEDICINE

## 2024-01-03 PROCEDURE — 87507 IADNA-DNA/RNA PROBE TQ 12-25: CPT | Performed by: EMERGENCY MEDICINE

## 2024-01-03 PROCEDURE — 36415 COLL VENOUS BLD VENIPUNCTURE: CPT | Performed by: EMERGENCY MEDICINE

## 2024-01-03 PROCEDURE — 85041 AUTOMATED RBC COUNT: CPT | Performed by: EMERGENCY MEDICINE

## 2024-01-03 PROCEDURE — 80053 COMPREHEN METABOLIC PANEL: CPT | Performed by: EMERGENCY MEDICINE

## 2024-01-03 PROCEDURE — 96361 HYDRATE IV INFUSION ADD-ON: CPT

## 2024-01-03 PROCEDURE — 87493 C DIFF AMPLIFIED PROBE: CPT | Performed by: EMERGENCY MEDICINE

## 2024-01-03 RX ADMIN — SODIUM CHLORIDE 1000 ML: 9 INJECTION, SOLUTION INTRAVENOUS at 11:51

## 2024-01-03 ASSESSMENT — ACTIVITIES OF DAILY LIVING (ADL)
ADLS_ACUITY_SCORE: 35
ADLS_ACUITY_SCORE: 35

## 2024-01-03 NOTE — ED TRIAGE NOTES
Pt presents to ED accomp by wife with c/o watery diarrhea for 12 hours, belching, nausea. Pt reports RUQ abd pain for last week.      Triage Assessment (Adult)       Row Name 01/03/24 1027          Triage Assessment    Airway WDL WDL        Respiratory WDL    Respiratory WDL WDL        Skin Circulation/Temperature WDL    Skin Circulation/Temperature WDL WDL        Cardiac WDL    Cardiac WDL WDL        Peripheral/Neurovascular WDL    Peripheral Neurovascular WDL WDL        Cognitive/Neuro/Behavioral WDL    Cognitive/Neuro/Behavioral WDL WDL

## 2024-01-03 NOTE — PROGRESS NOTES
[facility]  Clinical Voice and Upper Airway Evaluation Report    Patient's Name: Joshua Ennis  Date of Evaluation: 1/4/2024  Providing SLP: Kym Mireles MS CCC-SLP  Referring Provider and Facility: Josephine Malone MD  Insurance Coverage: [employer]  Chief Complaint: Dysphonia  Evaluation Location: [facility]  Others in Attendance for the Evaluation: his partner, Falguni  Time of Evaluation: 1:05 - 3:09 PM      Impressions from initial evaluation on 8/15/23 with Oksana Bell CCC-SLP:     Dysphonia (R49.0)  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    Laryngeal evaluation demonstrated white crusting at the anterior commissure supraglottically and infraglottically:; white crusting and redness at the anterior portion (superior surface) of the right vocal fold and the mid portion at the superior surface and medial edge; white crusting and redness at the vibratory edge/superior surface of the left vocal fold at the middle portion that extends towards posterior portion vibratory edge and this also extends laterally at the middle superior surface; Narrow Band Imaging yielded the following: prominent white crusting observed at the anterior commissure, right,and left vocal fold with prominent bilateral erythema; mild-moderate posterior commissure hypertrophy; possible white crusting at the left medial arytenoid wall but unclear; bilateral ventricular approximation during phonation; L>R; severe four-way constriction of the supraglottic larynx during connected speech; frequent breath holding observed; RTVF Amplitude is marked at the site of anterior lesion (stiff) but mildly reduced at the posterior 2/3 portion of the vocal fold; LTVF amplitude is mildly reduced; mucosal wave of right and left vocal folds is mildly reduced;  asymmetry observed; on phonation, glottic closure is  incomplete due to irregularity; irregular-shaped configuration of the glottis during many phonatory tasks  Perceptual evaluation demonstrated marked-severe roughness; moderate breathiness; moderate asthenia; clearer and louder voice with laugh and when he reacted (a loud exclamataion) to the Lidocaine spray;and he often spoke at a higher pitch (G3-A3) than is typical for a man of his age and gender.      STIMULABILITY: results of therapy probes during perceptual and laryngeal evaluation demonstrate improvement with flow phonation probes.   RECOMMENDATIONS:   A course of speech therapy is recommended to improve voice quality and optimize surgical results.  He demonstrates a Good prognosis for improvement given adherence to therapeutic recommendations.        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 Perceptually, his voice is very weak today, and he is talking minimally during our session, as he is just 7 days post-surgery. Laryngoscopy today demonstrated white scar tissue at the anterior 2/3s of the vocal folds, creating webbing anteriorly. Therefore, Dr. Malone plans on reducing this anterior web surgically, placing a larger stent, and injecting steroid into the anterior commissure next week. Formal re-evaluation is recommended at his next post-op appointment prior to initiating a full course of voice therapy. Perioperative care instructions were reviewed with Joshua and his wife today, as they will be repeating this process next week and had questions about gradually increasing his voice use. Joshua is in agreement with this plan of care.         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   Remarkable findings included:  Perceptual evaluation demonstrated: moderate to severe breathiness; moderate to severe strain; he is completely aphonic; and severely reduced conversational speech.   Laryngeal exam demonstrated: anterior laryngeal web; bilateral erythema L>R; irritation at the left medial arytenoid wall; moderate posterior commissure hypertrophy; mild presence of bubbly and sticky secretions on the vocal folds and throughout the laryngeal area; Narrow Band Imaging yielded the following:  white discoloration at the anterior portion of the anterior laryngeal web and at the irritation at the left medial arytenoid wall     RECOMMENDATIONS per Dr. 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 Perceptually, his voice is very weak today with hints of phonation secondary to stent placement at the anterior commissure and compensatory hyperfunction to achieve phonation. Laryngoscopy today demonstrated white tissue at the anterior-most portion of the true vocal folds, just anterior to the stent, as well as generalized erythema of the true vocal folds. Joshua will be undergoing another procedure with Dr. Malone in the coming weeks to confirm negative margins and hopefully remove the stent. If the stent is removed and margins are negative, we can then proceed with a course of voice therapy. If margins are positive, then Joshua will likely need additional treatments (e.g.  "radiation). I will keep in contact with Dr. Malone after the procedure and plan accordingly. Joshua and his wife did not have any additional questions about perioperative care today and are in agreement with this plan of care.       Updated patient history:  Is just starting to get over a stomach virus  Was in the ED yesterday  IV saline helpful  Feeling really tired generally  Diarrhea  Denies vomiting but has been burping some  Partner is concerned about his fatigue and any connection with this bug and his voice  Following up with PCP on Monday  Voice  Surgery to removed stent with Dr. Malone on 12/11/23  Voice was better (e.g. projection) but not normal  Better for about 3 days  Has been gradually worsening  Has not kept up diligently with straw phonation exercises or rescue breathing  Coughing: continues to cough up large, grey-colored mucus balls (longstanding)      Quality of Life Questionnaires:    Patient Supplied Answers To Last 2 VHI Questionnaires      8/23/2023     8:49 AM   Voice Handicap Index (VHI-10)   My voice makes it difficult for people to hear me 4   People have difficulty understanding me in a noisy room 4   My voice difficulties restrict my personal and social life.  0   I feel left out of conversations because of my voice 2   My voice problem causes me to lose income 0   I feel as though I have to strain to produce voice 1   The clarity of my voice is unpredictable 2   My voice problem upsets me 1   My voice makes me feel handicapped 0   People ask, \"What's wrong with your voice?\" 2   VHI-10 16     Patient Supplied Answers To Last 2 CSI Questionnaires      8/23/2023     8:49 AM   Cough Severity Index (CSI)   My cough is worse when I lie down 0   My coughing problem causes me to restrict my personal and social life 0   I tend to avoid places because of my cough problem 0   I feel embarrassed because of my coughing problem 1   People ask, ''What's wrong?'' because I cough a lot 1   I run out of air " "when I cough 0   My coughing problem affects my voice 2   My coughing problem limits my physical activity 0   My coughing problem upsets me 0   People ask me if I am sick because I cough a lot 2   CSI Score 6       Perceptual Analysis (70884): Evaluation of Voice / Speech / Non-Communicative Laryngeal Behaviors    The GRBAS is a perceptual rating of voice change. 0 indicated no impairment, 3 indicates a severe impairment. \"C\" and \"I\" may be used to signify if these feature was observed consistently or intermittently respectively. This is a rating based on clinical judgement of disordered voice quality.  G ( 2-3 ) General Dysphonia     R ( 1-2 ) Roughness     B ( 2-3 ) Breathiness     A ( 2-3 ) Asthenia     S ( 2 ) Strain  Additional information: high pitch and dry-sounding voice    *Validity of this measure may be slightly reduced when performed via acoustic signal from virtual visit*    Additional observations:   Cough/ Throat Clear: not observed today    Breathing patterns: appropriate at rest   Overt tension: \" \"   Habitual pitch: elevated pitch compared to age- and gender-matched peers   Pitch range: unable to vary pitch at this  Resonance: difficult to assess secondary to reduced ability to phonate  Loudness: moderately to marked reduction      Therapeutic Techniques Attempted (46608 for Individual Speech Therapy):    Given Joshua's history of laryngeal cancer and recurrence of anterior glottic webs despite stenting, Dr. Malone was available to perform laryngoscopy with stroboscopy during the visit today. Increased edema was observed at the anterior commissure but not a reformed anterior web. Generalized erythema of the true vocal folds observed bilaterally. The vocal folds demonstrated appropriate ability to adduct closely enough to enact vocal fold vibration; however, this remained very breathy without observed mucosal wave/amplitude of vibration. Significant mediolateral and anteroposterior compression as noted " with some ventricular phonation; however, therapeutic probes to reduce this compression were unsuccessful due to 1) the sheer amount of hyperfunction, 2) reduced pliability of the true vocal folds, and 3) his ability to hear the modeled instructions. Dr. Malone performed a steroid injection today, followed up this session of voice therapy.    Perioperative voice care was reviewed today, as Joshua underwent his most recent procedure with Dr. Malone on 12/11/23. One of these minutes per hour will be spent performing SOVT exercises to aid with resuming appropriate vocal fold vibration/pliability, reduce edema, and lessen risk for scar. Straw phonation without water resistance and flow phonation with tissue biofeedback were most helpful in producing a clear voice, which was only possible in upper registers today. Cueing was provided to reduce tension and use appropriate airflow. Joshua did have difficulty monitoring his laryngeal tension and voice quality during the exercises. Additionally, given his history of forming anterior glottic webs, rescue breathing techniques were also reviewed. He will also perform these frequently (e.g. 3-5 cycles per hour). Follow-up appointments with Dr. Malone will be scheduled in 2 weeks to repeat steroid injection, and Joshua and I will plan to continue voice therapy in the meantime.       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 Perceptually, his voice was moderately to severely weak and breathy with moderate strain and roughness, as well as elevated pitch compared to age- and gender-matched peers. Given this unsuspected worsening of voice quality, laryngoscopy was performed today by Dr. Malone and demonstrated generalized erythema of the true vocal folds with increased edema, but not reformation of the anterior glottic web, at the  anterior commissure. General vocal fold pliability was significant diminished, and excessive laryngeal hyperfunction was observed with little response to therapeutic probing.  Therefore, Dr. Malone provided a steroid injection into the true vocal folds today, and perioperative voice recommendations were reviewed (e.g. SOVT exercises, rescue breathing) to aid with repairing the delicate vocal fold layers. Written instructions were provided. Joshua is in agreement with this plan of care.       Goals:     Coordinate phonatory and respiratory subsystems, demonstrating adequate independence for activities of daily communication   Decrease extrinsic laryngeal muscle activity during communication events   Improve voice quality in all activities of daily living using, as measured by a minimum of a 50% point reduction on the Voice Handicap Index-10 or Singing VHI  Demonstrate appropriate vocal hygiene including, but not limited to, adequate hydration, avoiding vocal abuse, integrating behavioral and dietary changes for GERD into their daily life?.   Incorporate behaviors to reduce irritation and promote laryngeal healing and prevent recurrence.?   Implement behavioral strategies to reduce laryngopharyngeal reflux. ?   Independently maintain a forward focus voice placement 90% of the time during conversational speech.  Independently perform the Vocal Function Exercises, rebalancing respiration, phonation, and resonance 90% of the time  Perform High Level Phonation and Pitch Range Navigation Exercises independently 90% of the time  Patient will express satisfaction with their voice quality and function and their ability to participate in social, personal, and work/school functions without limitation  Adhere to complete vocal rest recommendations in the acute post-operative period  Gradually increase voice use following the complete voice rest period  Adhere to vocal hygiene recommendations including smoking cessation, hydration,  avoidance of caffeine and alcohol, and behavioral reflux precautions  Perform rescue breathing techniques several times daily  Incorporate SOVT exercises gradually in the immediate post-operative period   Participate in pre- and post-operative voice therapy      Billed Procedures:    Individual speech therapy session 88698  Perceptual voice assessment [id number]  Assessment and treatment time: 124 minutes  Chart review, interpretation of testing, documentation preparation, etc: 27 minutes      Thank you for allowing me to participate in this patient's care,    Kym Mireles MS CCC-SLP  Speech-Language Pathologist  [facility]  Department of Otolaryngology - Head and Neck Surgery  [facility]  [email or web address]  Direct: [phone]  [phone]    *This note may have been completed using [id number] dictation software, so errors may exist. Please contact me for clarification if needed*

## 2024-01-03 NOTE — ED PROVIDER NOTES
History     Chief Complaint   Patient presents with    Diarrhea     HPI  History per patient, his wife review of [facility] EMR and Care Everywhere EMR.  Joshua Ennis is a 78 year old male who presents to the emergency department for evaluation of 3-4 days watery diarrhea, nausea and belching.  He has had some upper abdominal pain yesterday, but has no abdominal pain today.  Diarrhea is watery, yellow from brown and without blood or mucus.  No fever.  He thinks the diarrhea is from eating homemade salsa shortly prior to symptom onset.  He has had no recent travel, known infectious exposures or recent antibiotic use.  He denies prior history of significant GI disease or inflammatory bowel disease.  No other pertinent history or acute complaints or concerns.      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     SPECIMEN(S):   A: Cecal polyp   B: Colon polyp at 30 cm     FINAL DIAGNOSIS:   A.  Cecum, mucosal biopsies:   - Tubular adenoma.   - Negative for high-grade dysplasia and malignancy.     B.  Colon at 30 cm, mucosal biopsies:   - Tubular adenoma and a fragment of normal colonic mucosa.   - Negative for high-grade dysplasia and malignancy.     Electronically signed out by:     Yuliet Melissa M.D.      CKD (chronic kidney disease) stage 3, GFR 30-59 ml/min (H) 06/18/2014     Priority: Medium    Granulomatosis with  polyangiitis (H) 09/12/2013     Priority: Medium     Diagnosis updated by automated process. Provider to review and confirm.      Encounter for long-term (current) use of steroids 07/30/2013     Priority: Medium    Anemia 07/10/2013     Priority: Medium    Hypertension, renal 05/20/2013     Priority: Medium    GERD (gastroesophageal reflux disease) 04/26/2013     Priority: Medium    Advanced directives, counseling/discussion 04/15/2013     Priority: Medium     Advance Care Planning:   Receipt of ACP document:  Received: Health Care Directive which was witnessed or notarized on 5/9/2013.  Document not previously scanned.  Validation form completed and sent with document to be scanned.  Code Status reflects choices in most recent ACP document.  Confirmed/documented designated decision maker(s). See permanent comments section of demographics in clinical tab. View document(s) and details by clicking on code status.   Added by Ana Ochoa on 6/3/2013.            Idiopathic progressive polyneuropathy 04/11/2013     Priority: Medium     Class: Acute    CARDIOVASCULAR SCREENING; LDL GOAL LESS THAN 160 07/06/2012     Priority: Medium    Tobacco abuse 04/16/2009     Priority: Medium     3/4 pack/day  X 25 years           Past Medical History:    Past Medical History:   Diagnosis Date    Arthritis     Autoimmune disease (H24)     Hearing problem     Hoarseness     Hypertension     Kidney problem     Malignant melanoma (H)     Malignant neoplasm (H)     Squamous cell carcinoma     Russo syndrome        Past Surgical History:    Past Surgical History:   Procedure Laterality Date    BIOPSY  11/2011    melanoma on back    DISSECT LYMPH NODE INGUINAL  3/1/2013    Procedure: DISSECT LYMPH NODE INGUINAL;  Bilateral Inguinal Lymph Node Biopsy.;  Surgeon: Tariq Acosta MD;  Location: [facility]    ESOPHAGOSCOPY, GASTROSCOPY, DUODENOSCOPY (EGD), COMBINED  7/5/2013    Procedure: COMBINED ESOPHAGOSCOPY, GASTROSCOPY, DUODENOSCOPY (EGD);   Gastroscopy;  Surgeon: Tariq Acosta MD;  Location: [facility]    HERNIORRHAPHY INGUINAL  8/6/2012    Procedure: HERNIORRHAPHY INGUINAL;  Open Repair Left Inguinal Hernia;  Surgeon: Jerad Baker MD;  Location: [facility]    INJECT STEROID (LOCATION) N/A 8/30/2023    Procedure: steroid injection;  Surgeon: Josephine Malone MD;  Location: [facility]    INJECT STEROID (LOCATION) N/A 9/13/2023    Procedure: steroid injection;  Surgeon: Josephine Malone MD;  Location: [facility]    INJECT STEROID (LOCATION) N/A 10/25/2023    Procedure: steroid injection, stent placement, and biopsy;  Surgeon: Josephine Malone MD;  Location: [facility]    INJECT STEROID (LOCATION) N/A 12/1/2023    Procedure: steroid injection;  Surgeon: Josephine Malone MD;  Location: [facility]    LARYNGOSCOPY WITH MICROSCOPE N/A 9/13/2023    Procedure: MICROLARYNGOSCOPY with stent removal and biopsy;  Surgeon: Josephine Malone MD;  Location: [facility]    LARYNGOSCOPY, FLEXIBLE WITH INJECTION N/A 12/11/2023    Procedure: LARYNGOSCOPY, FLEXIBLE WITH INJECTION with steroid;  Surgeon: Josephine Malone MD;  Location: [facility] OR    LASER CO2 LARYNGOSCOPY N/A 8/30/2023    Procedure: MICROLARYNGOSCOPY, CO2 laser resection of vocal fold lesion;  Surgeon: Josephine Malone MD;  Location: [facility]    LASER CO2 LARYNGOSCOPY N/A 10/25/2023    Procedure: MICROLARYNGOSCOPY, CO2 laser resection of vocal fold lesion;  Surgeon: Josephine Malone MD;  Location: [facility]    LASER CO2 LARYNGOSCOPY N/A 12/1/2023    Procedure: MICROLARYNGOSCOPY CO2 laser standby, resection of vocal fold lesion, stent removal, biopsy;  Surgeon: Josephine Malone MD;  Location: [facility]       Family History:    Family History   Problem Relation Age of Onset    Diabetes Mother     Hypertension Mother     Cancer Father         stomach    Heart Disease Father     Heart Disease Brother     Diabetes Sister     Diabetes Sister     Diabetes Sister     Heart Disease Brother     Cancer Sister     Glaucoma No family hx of     Macular Degeneration No family hx of  "       Social History:  Marital Status:  Single [1]  Social History     Tobacco Use    Smoking status: Former     Packs/day: 1.00     Years: 20.00     Additional pack years: 0.00     Total pack years: 20.00     Types: Cigarettes     Quit date: 2/14/2013     Years since quitting: 10.8    Smokeless tobacco: Never   Vaping Use    Vaping Use: Never used   Substance Use Topics    Alcohol use: Yes     Comment: rare    Drug use: No        Medications:    Acetaminophen (TYLENOL PO)  bacitracin 500 UNIT/GM external ointment  beclomethasone HFA (QVAR REDIHALER) 80 MCG/ACT inhaler  Blood Pressure Monitor KIT  calcium carbonate 600 mg-vitamin D 400 units (CALTRATE) 600-400 MG-UNIT per tablet  Cholecalciferol (VITAMIN D) 1000 UNITS capsule  folic acid (FOLVITE) 1 MG tablet  gabapentin (NEURONTIN) 400 MG capsule  HYDROcodone-acetaminophen (NORCO) 5-325 MG tablet  losartan (COZAAR) 50 MG tablet  methotrexate sodium 2.5 MG TABS  pantoprazole (PROTONIX) 40 MG EC tablet        Review of Systems  As mentioned in the HPI, in addition focused review of systems was negative.    Physical Exam   BP: 134/82  Pulse: 81  Temp: 97  F (36.1  C)  Resp: 16  Height: 175.3 cm (5' 9\")  Weight: 84.8 kg (187 lb)  SpO2: 99 %      Physical Exam  Vitals and nursing note reviewed.   Constitutional:       General: He is not in acute distress.     Appearance: Normal appearance. He is well-developed. He is not ill-appearing or diaphoretic.   HENT:      Head: Normocephalic and atraumatic.      Right Ear: External ear normal.      Left Ear: External ear normal.      Nose: Nose normal.      Mouth/Throat:      Mouth: Mucous membranes are dry.   Eyes:      General: No scleral icterus.     Extraocular Movements: Extraocular movements intact.      Conjunctiva/sclera: Conjunctivae normal.   Neck:      Trachea: No tracheal deviation.   Cardiovascular:      Rate and Rhythm: Normal rate and regular rhythm.      Heart sounds: Normal heart sounds. No murmur heard.     No " friction rub. No gallop.   Pulmonary:      Effort: Pulmonary effort is normal. No respiratory distress.      Breath sounds: Normal breath sounds. No wheezing, rhonchi or rales.   Abdominal:      General: There is no distension.      Palpations: Abdomen is soft.      Tenderness: There is no abdominal tenderness. There is no guarding or rebound.      Hernia: No hernia is present.   Musculoskeletal:         General: No tenderness. Normal range of motion.      Cervical back: Normal range of motion and neck supple.      Right lower leg: No edema.      Left lower leg: No edema.   Skin:     General: Skin is warm and dry.      Coloration: Skin is not pale.      Findings: No erythema or rash.   Neurological:      General: No focal deficit present.      Mental Status: He is alert and oriented to person, place, and time.   Psychiatric:         Mood and Affect: Mood normal.         Behavior: Behavior normal.         ED Course           Procedures                Results for orders placed or performed during the hospital encounter of 01/03/24 (from the past 24 hour(s))   CBC with platelets differential    Narrative    The following orders were created for panel order CBC with platelets differential.  Procedure                               Abnormality         Status                     ---------                               -----------         ------                     CBC with platelets and d...[[id number]]                      Final result                 Please view results for these tests on the individual orders.   Comprehensive metabolic panel   Result Value Ref Range    Sodium 136 135 - 145 mmol/L    Potassium 4.1 3.4 - 5.3 mmol/L    Carbon Dioxide (CO2) 22 22 - 29 mmol/L    Anion Gap 7 7 - 15 mmol/L    Urea Nitrogen 35.3 (H) 8.0 - 23.0 mg/dL    Creatinine 1.92 (H) 0.67 - 1.17 mg/dL    GFR Estimate 36 (L) >60 mL/min/1.73m2    Calcium 9.2 8.8 - 10.2 mg/dL    Chloride 107 98 - 107 mmol/L    Glucose 100 (H) 70 - 99 mg/dL     Alkaline Phosphatase 103 40 - 150 U/L    AST 23 0 - 45 U/L    ALT 28 0 - 70 U/L    Protein Total 7.4 6.4 - 8.3 g/dL    Albumin 4.4 3.5 - 5.2 g/dL    Bilirubin Total 0.7 <=1.2 mg/dL   CBC with platelets and differential   Result Value Ref Range    WBC Count 6.2 4.0 - 11.0 10e3/uL    RBC Count 4.94 4.40 - 5.90 10e6/uL    Hemoglobin 16.2 13.3 - 17.7 g/dL    Hematocrit 47.4 40.0 - 53.0 %    MCV 96 78 - 100 fL    MCH 32.8 26.5 - 33.0 pg    MCHC 34.2 31.5 - 36.5 g/dL    RDW 13.1 10.0 - 15.0 %    Platelet Count 204 150 - 450 10e3/uL    % Neutrophils 64 %    % Lymphocytes 20 %    % Monocytes 13 %    % Eosinophils 2 %    % Basophils 1 %    % Immature Granulocytes 0 %    NRBCs per 100 WBC 0 <1 /100    Absolute Neutrophils 3.9 1.6 - 8.3 10e3/uL    Absolute Lymphocytes 1.2 0.8 - 5.3 10e3/uL    Absolute Monocytes 0.8 0.0 - 1.3 10e3/uL    Absolute Eosinophils 0.1 0.0 - 0.7 10e3/uL    Absolute Basophils 0.0 0.0 - 0.2 10e3/uL    Absolute Immature Granulocytes 0.0 <=0.4 10e3/uL    Absolute NRBCs 0.0 10e3/uL   Lipase   Result Value Ref Range    Lipase 40 13 - 60 U/L       Medications   lactated ringers BOLUS 1,000 mL (has no administration in time range)   sodium chloride 0.9% BOLUS 1,000 mL (1,000 mLs Intravenous $New Bag 1/3/24 1156)       Feeling much improved after 1 L IV fluid bolus, will give a second liter IV fluid bolus.  He has had 2 episodes of diarrhea in the emergency department, the first one was just a very small amount of benign appearing diarrhea without blood      Assessments & Plan (with Medical Decision Making)   Several days of diarrhea with development of dehydration and, but without evidence of GI bleeding and without abdominal pain, fever or chills.  Laboratory evaluation remarkable for evidence of mild acute on chronic kidney disease, due to dehydration.  He is afebrile with normal WBC and he has no abdominal pain.  He felt much improved after 2 L IV fluid bolus and is comfortable with discharge home. Will  await stool results and have him follow-up in primary care clinic.  He was instructed on use of Imodium as needed and he will continue to push fluids at home. He was instructed return for for fever, vomiting, abdominal pain, signs or symptoms of GI bleeding or any new problems or concerns.    I have reviewed the nursing notes.    I have reviewed the findings, diagnosis, plan and need for follow up with the patient.  Medical Decision Making: Moderate complexity      New Prescriptions    No medications on file       Final diagnoses:   Diarrhea of presumed infectious origin   Dehydration       1/3/2024   [facility] EMERGENCY DEPT       oDnald Maria MD  01/03/24 [id number]

## 2024-01-04 ENCOUNTER — OFFICE VISIT (OUTPATIENT)
Dept: OTOLARYNGOLOGY | Facility: CLINIC | Age: 79
End: 2024-01-04
Payer: COMMERCIAL

## 2024-01-04 DIAGNOSIS — H91.90 HEARING LOSS, UNSPECIFIED HEARING LOSS TYPE, UNSPECIFIED LATERALITY: ICD-10-CM

## 2024-01-04 DIAGNOSIS — C32.9 LARYNGEAL CANCER (H): ICD-10-CM

## 2024-01-04 DIAGNOSIS — R49.0 DYSPHONIA: Primary | ICD-10-CM

## 2024-01-04 DIAGNOSIS — J38.7 LARYNGEAL HYPERFUNCTION: ICD-10-CM

## 2024-01-04 DIAGNOSIS — Q31.0 GLOTTIC WEB OF LARYNX: ICD-10-CM

## 2024-01-04 PROCEDURE — 31570 LARYNGOSCOPE W/VC INJ: CPT | Mod: 79 | Performed by: OTOLARYNGOLOGY

## 2024-01-04 PROCEDURE — 92524 BEHAVRAL QUALIT ANALYS VOICE: CPT | Mod: GN | Performed by: SPEECH-LANGUAGE PATHOLOGIST

## 2024-01-04 PROCEDURE — 31579 LARYNGOSCOPY TELESCOPIC: CPT | Mod: 79 | Performed by: OTOLARYNGOLOGY

## 2024-01-04 PROCEDURE — 92507 TX SP LANG VOICE COMM INDIV: CPT | Mod: GN | Performed by: SPEECH-LANGUAGE PATHOLOGIST

## 2024-01-04 NOTE — PATIENT INSTRUCTIONS
Semi-Occluded Vocal Tract Exercises         Sustain for 5-7 seconds each, total of 2-3 minutes, 5-6  mini  practice sessions per day    (You can do these as often as 1 minute per hour)         Straw Phonation with Water Resistance / Cup Bubbles  (1 inch of water)  Airflow  Goal: consistent, small bubbles  Voice   Air stays on, add voice using  No   with round lips and forward placement  Goal: consistent, small bubbles

## 2024-01-04 NOTE — LETTER
2024       RE: Joshua Ennis  [address]  [facility] [address]     Dear Colleague,    Thank you for referring your patient, Joshua Ennis, to the [facility] EAR NOSE AND [facility]IC [place] at [facility]. Please see a copy of my visit note below.        [facility]   at the [facility]   Otolaryngology Clinic     Patient: Joshua Ennis    MRN: [id number]    : 1945    Age/Gender: 78 year old male  Date of Service: 2024  Rendering Provider:   Josephine Malone MD     Chief Complaint   Vocal fold SCC  S/p MDL with bilateral cordectomy and steroid injection 23  S/p MDL with stent removal and steroid injection on 23  S/p microlaryngoscopy, CO2 laser resection of vocal fold lesion, steroid injection, stent placement, and biopsy 10/25/23    S/p microlaryngoscopy, CO2 laser resection of vocal fold lesion, steroid injection, stent removal, and biopsy 23  S/p laryngoscopy and anterior glottic web vocal fold steroid injection 23   S/p steroid injection 24               Interval History   HISTORY OF PRESENT ILLNESS:  Mr. Ennis is a 78 year old male is being followed for dysphonia. he was initially seen on 8/15/23. Please refer to this note for full history.     Today, he presents for follow up. he reports:    - has been having a tough time with talking     PAST MEDICAL HISTORY:   Past Medical History:   Diagnosis Date     Arthritis      Autoimmune disease (H24)      Hearing problem      Hoarseness      Hypertension      Kidney problem      Malignant melanoma (H)      Malignant neoplasm (H)     melanoma     Squamous cell carcinoma      Russo syndrome        PAST SURGICAL HISTORY:   Past Surgical History:   Procedure Laterality Date     BIOPSY  2011    melanoma on back     DISSECT LYMPH NODE INGUINAL  3/1/2013    Procedure: DISSECT LYMPH NODE INGUINAL;  Bilateral Inguinal Lymph Node Biopsy.;   Surgeon: Tariq Acosta MD;  Location: [facility]     ESOPHAGOSCOPY, GASTROSCOPY, DUODENOSCOPY (EGD), COMBINED  7/5/2013    Procedure: COMBINED ESOPHAGOSCOPY, GASTROSCOPY, DUODENOSCOPY (EGD);  Gastroscopy;  Surgeon: Tariq Acosta MD;  Location: [facility]     HERNIORRHAPHY INGUINAL  8/6/2012    Procedure: HERNIORRHAPHY INGUINAL;  Open Repair Left Inguinal Hernia;  Surgeon: Jerad Baker MD;  Location: [facility]     INJECT STEROID (LOCATION) N/A 8/30/2023    Procedure: steroid injection;  Surgeon: Josephine Malone MD;  Location: [facility]     INJECT STEROID (LOCATION) N/A 9/13/2023    Procedure: steroid injection;  Surgeon: Josephine Malone MD;  [facility]: [facility]     INJECT STEROID (LOCATION) N/A 10/25/2023    Procedure: steroid injection, stent placement, and biopsy;  Surgeon: Josephine Malone MD;  [facility]     INJECT STEROID (LOCATION) N/A 12/1/2023    Procedure: steroid injection;  Surgeon: Josephine Malone MD;  Location: [facility]     LARYNGOSCOPY WITH MICROSCOPE N/A 9/13/2023    Procedure: MICROLARYNGOSCOPY with stent removal and biopsy;  Surgeon: Josephine Maolne MD;  Location: [facility]     LARYNGOSCOPY, FLEXIBLE WITH INJECTION N/A 12/11/2023    Procedure: LARYNGOSCOPY, FLEXIBLE WITH INJECTION with steroid;  Surgeon: Josephine Malone MD;  Location: [facility] OR     LASER CO2 LARYNGOSCOPY N/A 8/30/2023    Procedure: MICROLARYNGOSCOPY, CO2 laser resection of vocal fold lesion;  Surgeon: Josephine Malone MD;  Location: [facility]     LASER CO2 LARYNGOSCOPY N/A 10/25/2023    Procedure: MICROLARYNGOSCOPY, CO2 laser resection of vocal fold lesion;  Surgeon: Josephine Malone MD;  Location: [facility]     LASER CO2 LARYNGOSCOPY N/A 12/1/2023    Procedure: MICROLARYNGOSCOPY CO2 laser standby, resection of vocal fold lesion, stent removal, biopsy;  Surgeon: Josephine Malone MD;  Location: [facility]       CURRENT MEDICATIONS:   Current Outpatient Medications:      Acetaminophen (TYLENOL PO), Take 650 mg by mouth once as needed for mild pain or fever, Disp: , Rfl:       bacitracin 500 UNIT/GM external ointment, Apply topically 2 times daily Please use over the red area on your neck twice a day., Disp: 28 g, Rfl: 0     beclomethasone HFA (QVAR REDIHALER) 80 MCG/ACT inhaler, Inhale 2 puffs into the lungs 2 times daily, Disp: 10.6 g, Rfl: 3     Blood Pressure Monitor KIT, Automatic Blood Pressure Monitor, Disp: 1 kit, Rfl: 0     calcium carbonate 600 mg-vitamin D 400 units (CALTRATE) 600-400 MG-UNIT per tablet, Take 1 tablet by mouth 2 times daily., Disp: , Rfl:      Cholecalciferol (VITAMIN D) 1000 UNITS capsule, Take 1 capsule by mouth daily., Disp: , Rfl:      folic acid (FOLVITE) 1 MG tablet, Take 2 tablets (2 mg) by mouth daily, Disp: 180 tablet, Rfl: 3     gabapentin (NEURONTIN) 400 MG capsule, Take 1 capsule (400 mg) by mouth At Bedtime, Disp: 90 capsule, Rfl: 3     HYDROcodone-acetaminophen (NORCO) 5-325 MG tablet, Take 1 tablet by mouth every 6 hours as needed for severe pain, Disp: 6 tablet, Rfl: 0     losartan (COZAAR) 50 MG tablet, Take 1 tablet (50 mg) by mouth daily, Disp: 90 tablet, Rfl: 2     methotrexate sodium 2.5 MG TABS, Take 4 tablets (10 mg) by mouth once a week, Disp: 48 tablet, Rfl: 1     pantoprazole (PROTONIX) 40 MG EC tablet, Take 1 tablet (40 mg) by mouth 2 times daily, Disp: 80 tablet, Rfl: 3  No current facility-administered medications for this visit.    ALLERGIES: Shrimp    SOCIAL HISTORY:    Social History     Socioeconomic History     Marital status: Single     Spouse name: Not on file     Number of children: Not on file     Years of education: Not on file     Highest education level: Not on file   Occupational History     Employer: [employer]   Tobacco Use     Smoking status: Former     Packs/day: 1.00     Years: 20.00     Additional pack years: 0.00     Total pack years: 20.00     Types: Cigarettes     Quit date: 2/14/2013     Years since quitting: 10.8     Smokeless tobacco: Never   Vaping Use     Vaping Use: Never used   Substance  and Sexual Activity     Alcohol use: Yes     Comment: rare     Drug use: No     Sexual activity: Not Currently     Partners: Female   Other Topics Concern     Parent/sibling w/ CABG, MI or angioplasty before 65F 55M? Not Asked      Service Not Asked     Blood Transfusions Not Asked     Caffeine Concern Not Asked     Occupational Exposure Not Asked     Hobby Hazards Not Asked     Sleep Concern Not Asked     Stress Concern Not Asked     Weight Concern Not Asked     Special Diet Not Asked     Back Care Not Asked     Exercise Yes     Comment: walking     Bike Helmet Not Asked     Seat Belt Not Asked     Self-Exams Not Asked   Social History Narrative     Not on file     Social Determinants of Health     Financial Resource Strain: Not on file   Food Insecurity: Not on file   Transportation Needs: Not on file   Physical Activity: Not on file   Stress: Not on file   Social Connections: Not on file   Interpersonal Safety: Not on file   Housing Stability: Not on file         FAMILY HISTORY:   Family History   Problem Relation Age of Onset     Diabetes Mother      Hypertension Mother      Cancer Father         stomach     Heart Disease Father      Heart Disease Brother      Diabetes Sister      Diabetes Sister      Diabetes Sister      Heart Disease Brother      Cancer Sister      Glaucoma No family hx of      Macular Degeneration No family hx of       Non-contributory for problems with anesthesia    REVIEW OF SYSTEMS:   The patient was asked a 14 point review of systems regarding constitutional symptoms, eye symptoms, ears, nose, mouth, throat symptoms, cardiovascular symptoms, respiratory symptoms, gastrointestinal symptoms, genitourinary symptoms, musculoskeletal symptoms, integumentary symptoms, neurological symptoms, psychiatric symptoms, endocrine symptoms, hematologic/lymphatic symptoms, and allergic/ immunologic symptoms.   The pertinent factors have been included in the HPI and below.  Patient Supplied  Answers to Review of Systems      6/13/2018     8:42 AM   [facility] ROS   Ears, Nose, Throat Ringing/noise in ears    Nasal congestion or drainage    Sore throat    Hoarseness   Cardiopulmonary Cough       Physical Examination   The patient underwent a physical examination as described below. The pertinent positive and negative findings are summarized after the description of the examination.  Constitutional: The patient's developmental and nutritional status was assessed. The patient's voice quality was assessed.  Head and Face: The head and face were inspected for deformities. The sinuses were palpated. The salivary glands were palpated. Facial muscle strength was assessed bilaterally.  Eyes: Extraocular movements and primary gaze alignment were assessed.  Ears, Nose, Mouth and Throat: The ears and nose were examined for deformities. The nasal septum, mucosa, and turbinates were inspected by anterior rhinoscopy. The lips, teeth, and gums were examined for abnormalities. The oral mucosa, tongue, palate, tonsils, lateral and posterior pharynx were inspected for the presence of asymmetry or mucosal lesions.    Neck: The tracheal position was noted, and the neck mass palpated to determine if there were any asymmetries, abnormal neck masses, thyromegally, or thyroid nodules.  Respiratory: The nature of the breathing and chest expansion/symmetry was observed.  Cardiovascular: The patient was examined to determine the presence of any edema or jugular venous distension.  Abdomen: The contour of the abdomen was noted.  Lymphatic: The patient was examined for infraclavicular lymphadenopathy.  Musculoskeletal: The patient was inspected for the presence of skeletal deformities.  Extremities: The extremities were examined for any clubbing or cyanosis.  Skin: The skin was examined for inflammatory or neoplastic conditions.  Neurologic: The patient's orientation, mood, and affect were noted. The cranial nerve  functions were  examined.  Other pertinent positive and negative findings on physical examination:   OC/OP: no lesions, uvula midline, soft palate elevates symmetrically   Neck: no lesions, no TH tenderness to palpation  All other physical examination findings were within normal limits and noncontributory.    Procedures     Video Laryngoscopy with Stroboscopy (CPT 34544)    Preoperative Diagnosis:  Dysphonia and throat symptoms  Postoperative Diagnosis: Dysphonia and throat symptoms  Indication:  Patient has new or persistent dysphonia and throat symptoms.  This requires evaluation by stroboscopy to fully delineate the laryngeal functioning; especially mucosal wave assessment, ultrasharp visualization of lesions on the vocal folds, and overall functioning of the larynx.  Details of Procedure: A 70 degree rigid telescopic laryngoscope or a distal chip flexible scope was used. The lighting was obtained via a light cable connected to a stroboscopic unit. The telescope was inserted either transorally or transnasally until the vocal folds could be visualized. The patient was instructed to sustain the vowel  ee  at a comfortable pitch and loudness as the voice was monitored by a microphone connected to a fundamental frequency tracker. This circuit tracked vocal periodicity, allowing the light to flash in synchrony with the glottal cycles. A setting on the stroboscope was set to change the phase of light strobing with relation to the vocal fundamental frequency, producing an image of 1 to 2 glottal cycles every second. The video images were recorded for analysis. Use of the variable speed, slow and stop scan allowed careful study of pertinent segments of laryngeal function to increase accuracy of clinical assessments of function and dysfunction.  In particular, features of glottal closure, mucosal wave on the vocal fold cover and laryngeal symmetry were analyzed. Lastly, the patient was asked to phonate speech samples and  auditory/perceptual evaluation of voice and resonance were performed.  The vocal quality was carefully evaluated for hoarseness, breathiness, loudness, phrase length and intelligibility to determine the source of dysphonia.    Findings:      B. LARYNGOVIDEOSTROBOSCOPY   Anatomic/Physiological Deviations:  RNC, bilateral vocal fold stiffness and swelling with question of laryngeal web, ventricular phonation  Mucosal wave: Right:  Marked restriction     Left: Marked restriction  Bilateral Vocal Fold Vibration: Symmetric  Vocal Process: Right: No restriction    Left:  No restriction  Vocal Fold closure: Complete glottal closure    Complication(s): None  Disposition: Patient tolerated the procedure well         Laryngoscopy with Steroid Injection(CPT 30778)      Pre-procedure diagnosis: Laryngeal lesion  Post-procedure diagnosis: Same  Indication for procedure: Mr. Ennis is a 78 year old male with a laryngeal lesion.Steroid injection is being performed for treatment purposes.  Procedure(s): Flexible Laryngoscopy with Steroid Injection   Details of Procedure: After informed consent was obtained, laryngoscopy was performed transnasally. Then, topical anesthesia was achieved by spraying 4% topical lidocaine directly onto the larynx through a working channel in the endoscope or via a trans-cutaneous route.  After adequate anesthesia was achieved a flexible injector needle was threaded through the working channel and multiple injections were done into the lesion with care not to create swelling and airway obstruction in between each injection. Having completed this the operation was completed and the scope withdrawn atraumatically when hemostasis was confirmed  Findings:   Anatomic/physiological deviations: RNC, done with VT2 and dexamethasone, does not appear to have a web, but rather thickening and swelling of anterior vocal folds   Right vocal process: No restriction of mobility   Left vocal process: No restriction of  "mobility  Glottal gap: Complete glottal closure  Supraglottic structures: Normal  Hypopharynx: Normal     Estimated Blood Loss: minimal  Complications: None  Disposition: Patient tolerated the procedure well         Review of Relevant Clinical Data   I personally reviewed:    Pathology: surgical pathology 12/1/23  Final Diagnosis  A. LARYNX, ANTERIOR COMMISSURE INFERIOR BIOPSY:  - Granulation tissue with reactive atypia  - No evidence of carcinoma  LARYNX, ANTERIOR COMMISSURE SUPERIOR BIOPSY:  - Detached fragment of squamous epithelium with mild to moderate dysplasia  - No evidence of carcinoma    Labs:  No results found for: \"TSH\"  Lab Results   Component Value Date     01/03/2024    CO2 22 01/03/2024    BUN 35.3 (H) 01/03/2024    PHOS 2.6 08/12/2016     Lab Results   Component Value Date    WBC 6.2 01/03/2024    HGB 16.2 01/03/2024    HCT 47.4 01/03/2024    MCV 96 01/03/2024     01/03/2024     Lab Results   Component Value Date    PTT 24 04/11/2013    INR 0.98 04/11/2013     No results found for: \"DUANE\"  No components found for: \"RHEUMATOIDFACTOR\", \"RF\"  Lab Results   Component Value Date    CRP <2.9 07/07/2022    CRP <2.9 01/07/2022    CRP <2.9 07/28/2021    CRP <2.9 03/05/2021    CRP <2.9 11/11/2020     No components found for: \"CKTOT\", \"URICACID\"  No components found for: \"C3\", \"C4\", \"DSDNAAB\", \"NDNAABIFA\"  Lab Results   Component Value Date    MPOAB Not Reported 02/14/2014       Patient reported Quality of Life (QOL) Measures   Patient Supplied Answers To VHI Questionnaire      8/23/2023     8:49 AM   Voice Handicap Index (VHI-10)   My voice makes it difficult for people to hear me 4   People have difficulty understanding me in a noisy room 4   My voice difficulties restrict my personal and social life.  0   I feel left out of conversations because of my voice 2   My voice problem causes me to lose income 0   I feel as though I have to strain to produce voice 1   The clarity of my voice is " "unpredictable 2   My voice problem upsets me 1   My voice makes me feel handicapped 0   People ask, \"What's wrong with your voice?\" 2   VHI-10 16         Patient Supplied Answers To EAT Questionnaire       No data to display                  Patient Supplied Answers To CSI Questionnaire      8/23/2023     8:49 AM   Cough Severity Index (CSI)   My cough is worse when I lie down 0   My coughing problem causes me to restrict my personal and social life 0   I tend to avoid places because of my cough problem 0   I feel embarrassed because of my coughing problem 1   People ask, ''What's wrong?'' because I cough a lot 1   I run out of air when I cough 0   My coughing problem affects my voice 2   My coughing problem limits my physical activity 0   My coughing problem upsets me 0   People ask me if I am sick because I cough a lot 2   CSI Score 6         Patient Supplied Answers to Dyspnea Index Questionnaire:       No data to display                Impression & Plan     IMPRESSION: Mr. Ennis is a 78 year old male who is being seen for the following:    Dysphonia   - anterior commissure lesion seen on scope on 7/31/23  - voice changes worsening for a few months  - prior smoker  - scope shows anterior commissure lesion with extension to the right vocal fold  - given anterior location will require CT neck to rule out thyroid cartilage extension  - discussed awake biopsy - patient in agreement this was done today  - pathology showed SCC  - CT chest had findings of new nodules - after multiple discussions with radiology the decision was made to proceed with surgery and repeat imaging for this finding in the chest  - s/p MDL with bilateral cordectomy with stent placement on 8/30/23  - today shows that the vocal folds closed around the stent - discussed he needs the scar broken up and bigger stent placed - patient in agreement   - pathology showed SCC with positive margins  - went back for MDL and stent removal on 9/13/23   - " symptoms 10/10/2023 are stable, voice is poor  - scope shows  laryngeal web  - discussed he has positive margins - will proceed with repeat surger - if can't clear will need radiation  - ct chest shows improved size of lung nodule of concern  - s/p microlaryngoscopy, CO2 laser resection of vocal fold lesion, steroid injection, stent placement, and biopsy 10/25/23  - pathology shows invasive squamous cell carcinoma  - symptoms 11/16/2023 are stable, voice is poor, discussed case at tumor board, will continue with close observation and repeat biopsies  - scope shows stent in place, vocal folds are open, laryngeal web developed  - discussed removal of stent, then will collect a sample, if the sample is cancerous, will refer for radiation therapy  - s/p microlaryngoscopy, CO2 laser resection of vocal fold lesion, steroid injection, stent removal, and biopsy 12/01/23  - pathology showed detached fragment of squamous epithelium with mild to moderate dysplasia, no evidence of carcinoma  - s/p laryngoscopy and anterior glottic web vocal fold steroid injection 12/11/23      - symptoms 1/4/2024 are has been having a hard time talking with his voice  - strobe shows bilateral vocal fold stiffness and swelling with question of laryngeal web, ventricular phonation  - s/p steroid injection today, does not appear to have a web, but rather vocal fold stiffness and swelling  - discussed treatment will be for vocal folds with steroid injections and voice therapy  - discussed voice result is questionable given severity of ventricular phonation  Plan  - continue voice therapy  - hearing test at next follow-up      RETURN VISIT: repeat steroid injection in 2 weeks    Scribe Disclosure:   I, Lulu Cortez, am serving as a scribe; to document services personally performed by Josephine Malone MD -based on data collection and the provider's statements to me.     Provider Disclosure:  I agree with above History, Review of Systems, Physical exam  and Plan.  I have reviewed the content of the documentation and have edited it as needed. I have personally performed the services documented here and the documentation accurately represents those services and the decisions I have made.        Josephine Malone MD    Laryngology    [facility]  Department of  Otolaryngology - Head and Neck Surgery  Clinics & Surgery Center  [address] [place] [address]  Appointment line: [phone]  Fax: [phone]  [email or web address]

## 2024-01-04 NOTE — PATIENT INSTRUCTIONS
1.  You were seen in the ENT Clinic today by . If you have any questions or concerns after your appointment, please call [phone]. Press option #1 for scheduling related needs. Press option #3 for Nurse advice.    2.   has recommended  the following:   - hearing test prior to next visit    3.  Plan is to return to clinic as scheduled on 1/12/24      Hattie Hair LPN  [phone]  [facility] Otolaryng[facility]

## 2024-01-04 NOTE — LETTER
1/4/2024       RE: Joshua Ennis  [address] [facility] [address]     Dear Colleague,    Thank you for referring your patient, Joshua Ennis, to the [facility] [place] at [facility]. Please see a copy of my visit note below.    [facility]  Clinical Voice and Upper Airway Evaluation Report    Patient's Name: Joshua Ennis  Date of Evaluation: 1/4/2024  Providing SLP: Kym Mireles MS CCC-SLP  Referring Provider and Facility: Josephine Malone MD  Insurance Coverage: [employer]  Chief Complaint: Dysphonia  Evaluation Location: [facility]  Others in Attendance for the Evaluation: his partner, Falguni  Time of Evaluation: 1:05 - 3:09 PM      Impressions from initial evaluation on 8/15/23 with Oksana Bell CCC-SLP:     Dysphonia (R49.0)  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    Laryngeal evaluation demonstrated white crusting at the anterior commissure supraglottically and infraglottically:; white crusting and redness at the anterior portion (superior surface) of the right vocal fold and the mid portion at the superior surface and medial edge; white crusting and redness at the vibratory edge/superior surface of the left vocal fold at the middle portion that extends towards posterior portion vibratory edge and this also extends laterally at the middle superior surface; Narrow Band Imaging yielded the following: prominent white crusting observed at the anterior commissure, right,and left vocal fold with prominent bilateral erythema; mild-moderate posterior commissure hypertrophy; possible white crusting at the left medial arytenoid wall but unclear; bilateral ventricular approximation during phonation; L>R; severe four-way constriction of the supraglottic larynx during connected speech;  frequent breath holding observed; RTVF Amplitude is marked at the site of anterior lesion (stiff) but mildly reduced at the posterior 2/3 portion of the vocal fold; LTVF amplitude is mildly reduced; mucosal wave of right and left vocal folds is mildly reduced;  asymmetry observed; on phonation, glottic closure is incomplete due to irregularity; irregular-shaped configuration of the glottis during many phonatory tasks  Perceptual evaluation demonstrated marked-severe roughness; moderate breathiness; moderate asthenia; clearer and louder voice with laugh and when he reacted (a loud exclamataion) to the Lidocaine spray;and he often spoke at a higher pitch (G3-A3) than is typical for a man of his age and gender.      STIMULABILITY: results of therapy probes during perceptual and laryngeal evaluation demonstrate improvement with flow phonation probes.   RECOMMENDATIONS:   A course of speech therapy is recommended to improve voice quality and optimize surgical results.  He demonstrates a Good prognosis for improvement given adherence to therapeutic recommendations.        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 Perceptually, his voice is very weak today, and he is talking minimally during our session, as he is just 7 days post-surgery. Laryngoscopy today demonstrated white scar tissue at the anterior 2/3s of the vocal folds, creating webbing anteriorly. Therefore, Dr. Malone plans on reducing this anterior web surgically, placing a larger stent, and injecting steroid into the anterior commissure next week. Formal re-evaluation is recommended at his next post-op appointment prior to initiating a full course of voice therapy. Perioperative care instructions were reviewed  with Joshua and his wife today, as they will be repeating this process next week and had questions about gradually increasing his voice use. Joshua is in agreement with this plan of care.         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   Remarkable findings included:  Perceptual evaluation demonstrated: moderate to severe breathiness; moderate to severe strain; he is completely aphonic; and severely reduced conversational speech.   Laryngeal exam demonstrated: anterior laryngeal web; bilateral erythema L>R; irritation at the left medial arytenoid wall; moderate posterior commissure hypertrophy; mild presence of bubbly and sticky secretions on the vocal folds and throughout the laryngeal area; Narrow Band Imaging yielded the following:  white discoloration at the anterior portion of the anterior laryngeal web and at the irritation at the left medial arytenoid wall     RECOMMENDATIONS per Dr. 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 Perceptually, his voice is very weak today with hints of phonation secondary to stent placement at the anterior commissure and compensatory hyperfunction to achieve phonation. Laryngoscopy today demonstrated white tissue at the anterior-most portion of the true vocal folds, just anterior to the stent, as well as generalized erythema of the true vocal folds. Joshua will  "be undergoing another procedure with Dr. Malone in the coming weeks to confirm negative margins and hopefully remove the stent. If the stent is removed and margins are negative, we can then proceed with a course of voice therapy. If margins are positive, then Joshua will likely need additional treatments (e.g. radiation). I will keep in contact with Dr. Malone after the procedure and plan accordingly. Joshua and his wife did not have any additional questions about perioperative care today and are in agreement with this plan of care.       Updated patient history:  Is just starting to get over a stomach virus  Was in the ED yesterday  IV saline helpful  Feeling really tired generally  Diarrhea  Denies vomiting but has been burping some  Partner is concerned about his fatigue and any connection with this bug and his voice  Following up with PCP on Monday  Voice  Surgery to removed stent with Dr. Malone on 12/11/23  Voice was better (e.g. projection) but not normal  Better for about 3 days  Has been gradually worsening  Has not kept up diligently with straw phonation exercises or rescue breathing  Coughing: continues to cough up large, grey-colored mucus balls (longstanding)      Quality of Life Questionnaires:    Patient Supplied Answers To Last 2 VHI Questionnaires      8/23/2023     8:49 AM   Voice Handicap Index (VHI-10)   My voice makes it difficult for people to hear me 4   People have difficulty understanding me in a noisy room 4   My voice difficulties restrict my personal and social life.  0   I feel left out of conversations because of my voice 2   My voice problem causes me to lose income 0   I feel as though I have to strain to produce voice 1   The clarity of my voice is unpredictable 2   My voice problem upsets me 1   My voice makes me feel handicapped 0   People ask, \"What's wrong with your voice?\" 2   VHI-10 16     Patient Supplied Answers To Last 2 CSI Questionnaires      8/23/2023     8:49 AM   Cough Severity " "Index (CSI)   My cough is worse when I lie down 0   My coughing problem causes me to restrict my personal and social life 0   I tend to avoid places because of my cough problem 0   I feel embarrassed because of my coughing problem 1   People ask, ''What's wrong?'' because I cough a lot 1   I run out of air when I cough 0   My coughing problem affects my voice 2   My coughing problem limits my physical activity 0   My coughing problem upsets me 0   People ask me if I am sick because I cough a lot 2   CSI Score 6       Perceptual Analysis (40974): Evaluation of Voice / Speech / Non-Communicative Laryngeal Behaviors    The GRBAS is a perceptual rating of voice change. 0 indicated no impairment, 3 indicates a severe impairment. \"C\" and \"I\" may be used to signify if these feature was observed consistently or intermittently respectively. This is a rating based on clinical judgement of disordered voice quality.  G ( 2-3 ) General Dysphonia     R ( 1-2 ) Roughness     B ( 2-3 ) Breathiness     A ( 2-3 ) Asthenia     S ( 2 ) Strain  Additional information: high pitch and dry-sounding voice    *Validity of this measure may be slightly reduced when performed via acoustic signal from virtual visit*    Additional observations:   Cough/ Throat Clear: not observed today    Breathing patterns: appropriate at rest   Overt tension: \" \"   Habitual pitch: elevated pitch compared to age- and gender-matched peers   Pitch range: unable to vary pitch at this  Resonance: difficult to assess secondary to reduced ability to phonate  Loudness: moderately to marked reduction      Therapeutic Techniques Attempted (61900 for Individual Speech Therapy):    Given Joshua's history of laryngeal cancer and recurrence of anterior glottic webs despite stenting, Dr. Malone was available to perform laryngoscopy with stroboscopy during the visit today. Increased edema was observed at the anterior commissure but not a reformed anterior web. Generalized " erythema of the true vocal folds observed bilaterally. The vocal folds demonstrated appropriate ability to adduct closely enough to enact vocal fold vibration; however, this remained very breathy without observed mucosal wave/amplitude of vibration. Significant mediolateral and anteroposterior compression as noted with some ventricular phonation; however, therapeutic probes to reduce this compression were unsuccessful due to 1) the sheer amount of hyperfunction, 2) reduced pliability of the true vocal folds, and 3) his ability to hear the modeled instructions. Dr. Malone performed a steroid injection today, followed up this session of voice therapy.    Perioperative voice care was reviewed today, as Joshua underwent his most recent procedure with Dr. Malone on 12/11/23. One of these minutes per hour will be spent performing SOVT exercises to aid with resuming appropriate vocal fold vibration/pliability, reduce edema, and lessen risk for scar. Straw phonation without water resistance and flow phonation with tissue biofeedback were most helpful in producing a clear voice, which was only possible in upper registers today. Cueing was provided to reduce tension and use appropriate airflow. Joshua did have difficulty monitoring his laryngeal tension and voice quality during the exercises. Additionally, given his history of forming anterior glottic webs, rescue breathing techniques were also reviewed. He will also perform these frequently (e.g. 3-5 cycles per hour). Follow-up appointments with Dr. Malone will be scheduled in 2 weeks to repeat steroid injection, and Joshua and I will plan to continue voice therapy in the meantime.       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 Perceptually, his voice was moderately to severely weak and breathy with moderate strain and  roughness, as well as elevated pitch compared to age- and gender-matched peers. Given this unsuspected worsening of voice quality, laryngoscopy was performed today by Dr. Malone and demonstrated generalized erythema of the true vocal folds with increased edema, but not reformation of the anterior glottic web, at the anterior commissure. General vocal fold pliability was significant diminished, and excessive laryngeal hyperfunction was observed with little response to therapeutic probing.  Therefore, Dr. Malone provided a steroid injection into the true vocal folds today, and perioperative voice recommendations were reviewed (e.g. SOVT exercises, rescue breathing) to aid with repairing the delicate vocal fold layers. Written instructions were provided. Joshua is in agreement with this plan of care.       Goals:     Coordinate phonatory and respiratory subsystems, demonstrating adequate independence for activities of daily communication   Decrease extrinsic laryngeal muscle activity during communication events   Improve voice quality in all activities of daily living using, as measured by a minimum of a 50% point reduction on the Voice Handicap Index-10 or Singing VHI  Demonstrate appropriate vocal hygiene including, but not limited to, adequate hydration, avoiding vocal abuse, integrating behavioral and dietary changes for GERD into their daily life?.   Incorporate behaviors to reduce irritation and promote laryngeal healing and prevent recurrence.?   Implement behavioral strategies to reduce laryngopharyngeal reflux. ?   Independently maintain a forward focus voice placement 90% of the time during conversational speech.  Independently perform the Vocal Function Exercises, rebalancing respiration, phonation, and resonance 90% of the time  Perform High Level Phonation and Pitch Range Navigation Exercises independently 90% of the time  Patient will express satisfaction with their voice quality and function and their ability  to participate in social, personal, and work/school functions without limitation  Adhere to complete vocal rest recommendations in the acute post-operative period  Gradually increase voice use following the complete voice rest period  Adhere to vocal hygiene recommendations including smoking cessation, hydration, avoidance of caffeine and alcohol, and behavioral reflux precautions  Perform rescue breathing techniques several times daily  Incorporate SOVT exercises gradually in the immediate post-operative period   Participate in pre- and post-operative voice therapy      Billed Procedures:    Individual speech therapy session 41935  Perceptual voice assessment 27629  Assessment and treatment time: 124 minutes  Chart review, interpretation of testing, documentation preparation, etc: 27 minutes      Thank you for allowing me to participate in this patient's care,    Kym Mireles MS CCC-SLP  Speech-Language Pathologist  [facility]  Department of Otolaryngology - Head and Neck Surgery  [facility]  [email or web address]  Direct: [phone]  [phone]    *This note may have been completed using [id number] dictation software, so errors may exist. Please contact me for clarification if needed*      Again, thank you for allowing me to participate in the care of your patient.      Sincerely,    Kym Mireles, JOVANNI

## 2024-01-04 NOTE — PROGRESS NOTES
[facility]   at the [facility]   Otolaryngology Clinic     Patient: Joshua Ennis    MRN: [id number]    : 1945    Age/Gender: 78 year old male  Date of Service: 2024  Rendering Provider:   Josephine Malone MD     Chief Complaint   Vocal fold SCC  S/p MDL with bilateral cordectomy and steroid injection 23  S/p MDL with stent removal and steroid injection on 23  S/p microlaryngoscopy, CO2 laser resection of vocal fold lesion, steroid injection, stent placement, and biopsy 10/25/23    S/p microlaryngoscopy, CO2 laser resection of vocal fold lesion, steroid injection, stent removal, and biopsy 23  S/p laryngoscopy and anterior glottic web vocal fold steroid injection 23   S/p steroid injection 24               Interval History   HISTORY OF PRESENT ILLNESS:  Mr. Ennis is a 78 year old male is being followed for dysphonia. he was initially seen on 8/15/23. Please refer to this note for full history.     Today, he presents for follow up. he reports:    - has been having a tough time with talking     PAST MEDICAL HISTORY:   Past Medical History:   Diagnosis Date    Arthritis     Autoimmune disease (H24)     Hearing problem     Hoarseness     Hypertension     Kidney problem     Malignant melanoma (H)     Malignant neoplasm (H)     melanoma    Squamous cell carcinoma     Russo syndrome        PAST SURGICAL HISTORY:   Past Surgical History:   Procedure Laterality Date    BIOPSY  2011    melanoma on back    DISSECT LYMPH NODE INGUINAL  3/1/2013    Procedure: DISSECT LYMPH NODE INGUINAL;  Bilateral Inguinal Lymph Node Biopsy.;  Surgeon: Tariq Acosta MD;  Location: [facility]    ESOPHAGOSCOPY, GASTROSCOPY, DUODENOSCOPY (EGD), COMBINED  2013    Procedure: COMBINED ESOPHAGOSCOPY, GASTROSCOPY, DUODENOSCOPY (EGD);  Gastroscopy;  Surgeon: Tariq Acosta MD;  Location: [facility]    HERNIORRHAPHY INGUINAL  2012    Procedure: HERNIORRHAPHY INGUINAL;  Open  Repair Left Inguinal Hernia;  Surgeon: Jerad Baker MD;  Location: [facility]    INJECT STEROID (LOCATION) N/A 8/30/2023    Procedure: steroid injection;  Surgeon: Josephine Malone MD;  Location: [facility]    INJECT STEROID (LOCATION) N/A 9/13/2023    Procedure: steroid injection;  Surgeon: Josephine Malone MD;  [facility]    INJECT STEROID (LOCATION) N/A 10/25/2023    Procedure: steroid injection, stent placement, and biopsy;  Surgeon: Josephine Malone MD;  [facility]    INJECT STEROID (LOCATION) N/A 12/1/2023    Procedure: steroid injection;  Surgeon: Josephine Malone MD;  [facility]    LARYNGOSCOPY WITH MICROSCOPE N/A 9/13/2023    Procedure: MICROLARYNGOSCOPY with stent removal and biopsy;  Surgeon: Josephine Malone MD;  Location: [facility]    LARYNGOSCOPY, FLEXIBLE WITH INJECTION N/A 12/11/2023    Procedure: LARYNGOSCOPY, FLEXIBLE WITH INJECTION with steroid;  Surgeon: Josephine Malone MD;  Location: [facility] OR    LASER CO2 LARYNGOSCOPY N/A 8/30/2023    Procedure: MICROLARYNGOSCOPY, CO2 laser resection of vocal fold lesion;  Surgeon: Josephine Malone MD;  [facility]    LASER CO2 LARYNGOSCOPY N/A 10/25/2023    Procedure: MICROLARYNGOSCOPY, CO2 laser resection of vocal fold lesion;  Surgeon: Josephine Malone MD;  [facility]    LASER CO2 LARYNGOSCOPY N/A 12/1/2023    Procedure: MICROLARYNGOSCOPY CO2 laser standby, resection of vocal fold lesion, stent removal, biopsy;  Surgeon: Josephine Malone MD;  Location: [facility]       CURRENT MEDICATIONS:   Current Outpatient Medications:     Acetaminophen (TYLENOL PO), Take 650 mg by mouth once as needed for mild pain or fever, Disp: , Rfl:     bacitracin 500 UNIT/GM external ointment, Apply topically 2 times daily Please use over the red area on your neck twice a day., Disp: 28 g, Rfl: 0    beclomethasone HFA (QVAR REDIHALER) 80 MCG/ACT inhaler, Inhale 2 puffs into the lungs 2 times daily, Disp: 10.6 g, Rfl: 3    Blood Pressure Monitor KIT, Automatic Blood Pressure Monitor, Disp: 1  kit, Rfl: 0    calcium carbonate 600 mg-vitamin D 400 units (CALTRATE) 600-400 MG-UNIT per tablet, Take 1 tablet by mouth 2 times daily., Disp: , Rfl:     Cholecalciferol (VITAMIN D) 1000 UNITS capsule, Take 1 capsule by mouth daily., Disp: , Rfl:     folic acid (FOLVITE) 1 MG tablet, Take 2 tablets (2 mg) by mouth daily, Disp: 180 tablet, Rfl: 3    gabapentin (NEURONTIN) 400 MG capsule, Take 1 capsule (400 mg) by mouth At Bedtime, Disp: 90 capsule, Rfl: 3    HYDROcodone-acetaminophen (NORCO) 5-325 MG tablet, Take 1 tablet by mouth every 6 hours as needed for severe pain, Disp: 6 tablet, Rfl: 0    losartan (COZAAR) 50 MG tablet, Take 1 tablet (50 mg) by mouth daily, Disp: 90 tablet, Rfl: 2    methotrexate sodium 2.5 MG TABS, Take 4 tablets (10 mg) by mouth once a week, Disp: 48 tablet, Rfl: 1    pantoprazole (PROTONIX) 40 MG EC tablet, Take 1 tablet (40 mg) by mouth 2 times daily, Disp: 80 tablet, Rfl: 3  No current facility-administered medications for this visit.    ALLERGIES: Shrimp    SOCIAL HISTORY:    Social History     Socioeconomic History    Marital status: Single     Spouse name: Not on file    Number of children: Not on file    Years of education: Not on file    Highest education level: Not on file   Occupational History     Employer: [employer]   Tobacco Use    Smoking status: Former     Packs/day: 1.00     Years: 20.00     Additional pack years: 0.00     Total pack years: 20.00     Types: Cigarettes     Quit date: 2/14/2013     Years since quitting: 10.8    Smokeless tobacco: Never   Vaping Use    Vaping Use: Never used   Substance and Sexual Activity    Alcohol use: Yes     Comment: rare    Drug use: No    Sexual activity: Not Currently     Partners: Female   Other Topics Concern    Parent/sibling w/ CABG, MI or angioplasty before 65F 55M? Not Asked     Service Not Asked    Blood Transfusions Not Asked    Caffeine Concern Not Asked    Occupational Exposure Not Asked    Hobby Hazards  Not Asked    Sleep Concern Not Asked    Stress Concern Not Asked    Weight Concern Not Asked    Special Diet Not Asked    Back Care Not Asked    Exercise Yes     Comment: walking    Bike Helmet Not Asked    Seat Belt Not Asked    Self-Exams Not Asked   Social History Narrative    Not on file     Social Determinants of Health     Financial Resource Strain: Not on file   Food Insecurity: Not on file   Transportation Needs: Not on file   Physical Activity: Not on file   Stress: Not on file   Social Connections: Not on file   Interpersonal Safety: Not on file   Housing Stability: Not on file         FAMILY HISTORY:   Family History   Problem Relation Age of Onset    Diabetes Mother     Hypertension Mother     Cancer Father         stomach    Heart Disease Father     Heart Disease Brother     Diabetes Sister     Diabetes Sister     Diabetes Sister     Heart Disease Brother     Cancer Sister     Glaucoma No family hx of     Macular Degeneration No family hx of       Non-contributory for problems with anesthesia    REVIEW OF SYSTEMS:   The patient was asked a 14 point review of systems regarding constitutional symptoms, eye symptoms, ears, nose, mouth, throat symptoms, cardiovascular symptoms, respiratory symptoms, gastrointestinal symptoms, genitourinary symptoms, musculoskeletal symptoms, integumentary symptoms, neurological symptoms, psychiatric symptoms, endocrine symptoms, hematologic/lymphatic symptoms, and allergic/ immunologic symptoms.   The pertinent factors have been included in the HPI and below.  Patient Supplied Answers to Review of Systems      6/13/2018     8:42 AM    ENT ROS   Ears, Nose, Throat Ringing/noise in ears    Nasal congestion or drainage    Sore throat    Hoarseness   Cardiopulmonary Cough       Physical Examination   The patient underwent a physical examination as described below. The pertinent positive and negative findings are summarized after the description of the  examination.  Constitutional: The patient's developmental and nutritional status was assessed. The patient's voice quality was assessed.  Head and Face: The head and face were inspected for deformities. The sinuses were palpated. The salivary glands were palpated. Facial muscle strength was assessed bilaterally.  Eyes: Extraocular movements and primary gaze alignment were assessed.  Ears, Nose, Mouth and Throat: The ears and nose were examined for deformities. The nasal septum, mucosa, and turbinates were inspected by anterior rhinoscopy. The lips, teeth, and gums were examined for abnormalities. The oral mucosa, tongue, palate, tonsils, lateral and posterior pharynx were inspected for the presence of asymmetry or mucosal lesions.    Neck: The tracheal position was noted, and the neck mass palpated to determine if there were any asymmetries, abnormal neck masses, thyromegally, or thyroid nodules.  Respiratory: The nature of the breathing and chest expansion/symmetry was observed.  Cardiovascular: The patient was examined to determine the presence of any edema or jugular venous distension.  Abdomen: The contour of the abdomen was noted.  Lymphatic: The patient was examined for infraclavicular lymphadenopathy.  Musculoskeletal: The patient was inspected for the presence of skeletal deformities.  Extremities: The extremities were examined for any clubbing or cyanosis.  Skin: The skin was examined for inflammatory or neoplastic conditions.  Neurologic: The patient's orientation, mood, and affect were noted. The cranial nerve  functions were examined.  Other pertinent positive and negative findings on physical examination:   OC/OP: no lesions, uvula midline, soft palate elevates symmetrically   Neck: no lesions, no TH tenderness to palpation  All other physical examination findings were within normal limits and noncontributory.    Procedures     Video Laryngoscopy with Stroboscopy (CPT 20052)    Preoperative Diagnosis:   Dysphonia and throat symptoms  Postoperative Diagnosis: Dysphonia and throat symptoms  Indication:  Patient has new or persistent dysphonia and throat symptoms.  This requires evaluation by stroboscopy to fully delineate the laryngeal functioning; especially mucosal wave assessment, ultrasharp visualization of lesions on the vocal folds, and overall functioning of the larynx.  Details of Procedure: A 70 degree rigid telescopic laryngoscope or a distal chip flexible scope was used. The lighting was obtained via a light cable connected to a stroboscopic unit. The telescope was inserted either transorally or transnasally until the vocal folds could be visualized. The patient was instructed to sustain the vowel  ee  at a comfortable pitch and loudness as the voice was monitored by a microphone connected to a fundamental frequency tracker. This circuit tracked vocal periodicity, allowing the light to flash in synchrony with the glottal cycles. A setting on the stroboscope was set to change the phase of light strobing with relation to the vocal fundamental frequency, producing an image of 1 to 2 glottal cycles every second. The video images were recorded for analysis. Use of the variable speed, slow and stop scan allowed careful study of pertinent segments of laryngeal function to increase accuracy of clinical assessments of function and dysfunction.  In particular, features of glottal closure, mucosal wave on the vocal fold cover and laryngeal symmetry were analyzed. Lastly, the patient was asked to phonate speech samples and auditory/perceptual evaluation of voice and resonance were performed.  The vocal quality was carefully evaluated for hoarseness, breathiness, loudness, phrase length and intelligibility to determine the source of dysphonia.    Findings:      B. LARYNGOVIDEOSTROBOSCOPY   Anatomic/Physiological Deviations:  RNC, bilateral vocal fold stiffness and swelling with question of laryngeal web, ventricular  phonation  Mucosal wave: Right:  Marked restriction     Left: Marked restriction  Bilateral Vocal Fold Vibration: Symmetric  Vocal Process: Right: No restriction    Left:  No restriction  Vocal Fold closure: Complete glottal closure    Complication(s): None  Disposition: Patient tolerated the procedure well         Laryngoscopy with Steroid Injection(CPT 84648)      Pre-procedure diagnosis: Laryngeal lesion  Post-procedure diagnosis: Same  Indication for procedure: Mr. Ennis is a 78 year old male with a laryngeal lesion.Steroid injection is being performed for treatment purposes.  Procedure(s): Flexible Laryngoscopy with Steroid Injection   Details of Procedure: After informed consent was obtained, laryngoscopy was performed transnasally. Then, topical anesthesia was achieved by spraying 4% topical lidocaine directly onto the larynx through a working channel in the endoscope or via a trans-cutaneous route.  After adequate anesthesia was achieved a flexible injector needle was threaded through the working channel and multiple injections were done into the lesion with care not to create swelling and airway obstruction in between each injection. Having completed this the operation was completed and the scope withdrawn atraumatically when hemostasis was confirmed  Findings:   Anatomic/physiological deviations: RNC, done with VT2 and dexamethasone, does not appear to have a web, but rather thickening and swelling of anterior vocal folds   Right vocal process: No restriction of mobility   Left vocal process: No restriction of mobility  Glottal gap: Complete glottal closure  Supraglottic structures: Normal  Hypopharynx: Normal     Estimated Blood Loss: minimal  Complications: None  Disposition: Patient tolerated the procedure well         Review of Relevant Clinical Data   I personally reviewed:    Pathology: surgical pathology 12/1/23  Final Diagnosis  A. LARYNX, ANTERIOR COMMISSURE INFERIOR BIOPSY:  - Granulation tissue  "with reactive atypia  - No evidence of carcinoma  LARYNX, ANTERIOR COMMISSURE SUPERIOR BIOPSY:  - Detached fragment of squamous epithelium with mild to moderate dysplasia  - No evidence of carcinoma    Labs:  No results found for: \"TSH\"  Lab Results   Component Value Date     01/03/2024    CO2 22 01/03/2024    BUN 35.3 (H) 01/03/2024    PHOS 2.6 08/12/2016     Lab Results   Component Value Date    WBC 6.2 01/03/2024    HGB 16.2 01/03/2024    HCT 47.4 01/03/2024    MCV 96 01/03/2024     01/03/2024     Lab Results   Component Value Date    PTT 24 04/11/2013    INR 0.98 04/11/2013     No results found for: \"DUANE\"  No components found for: \"RHEUMATOIDFACTOR\", \"RF\"  Lab Results   Component Value Date    CRP <2.9 07/07/2022    CRP <2.9 01/07/2022    CRP <2.9 07/28/2021    CRP <2.9 03/05/2021    CRP <2.9 11/11/2020     No components found for: \"CKTOT\", \"URICACID\"  No components found for: \"C3\", \"C4\", \"DSDNAAB\", \"NDNAABIFA\"  Lab Results   Component Value Date    MPOAB Not Reported 02/14/2014       Patient reported Quality of Life (QOL) Measures   Patient Supplied Answers To VHI Questionnaire      8/23/2023     8:49 AM   Voice Handicap Index (VHI-10)   My voice makes it difficult for people to hear me 4   People have difficulty understanding me in a noisy room 4   My voice difficulties restrict my personal and social life.  0   I feel left out of conversations because of my voice 2   My voice problem causes me to lose income 0   I feel as though I have to strain to produce voice 1   The clarity of my voice is unpredictable 2   My voice problem upsets me 1   My voice makes me feel handicapped 0   People ask, \"What's wrong with your voice?\" 2   VHI-10 16         Patient Supplied Answers To EAT Questionnaire       No data to display                  Patient Supplied Answers To CSI Questionnaire      8/23/2023     8:49 AM   Cough Severity Index (CSI)   My cough is worse when I lie down 0   My coughing problem causes " me to restrict my personal and social life 0   I tend to avoid places because of my cough problem 0   I feel embarrassed because of my coughing problem 1   People ask, ''What's wrong?'' because I cough a lot 1   I run out of air when I cough 0   My coughing problem affects my voice 2   My coughing problem limits my physical activity 0   My coughing problem upsets me 0   People ask me if I am sick because I cough a lot 2   CSI Score 6         Patient Supplied Answers to Dyspnea Index Questionnaire:       No data to display                Impression & Plan     IMPRESSION: Mr. Ennis is a 78 year old male who is being seen for the following:    Dysphonia   - anterior commissure lesion seen on scope on 7/31/23  - voice changes worsening for a few months  - prior smoker  - scope shows anterior commissure lesion with extension to the right vocal fold  - given anterior location will require CT neck to rule out thyroid cartilage extension  - discussed awake biopsy - patient in agreement this was done today  - pathology showed SCC  - CT chest had findings of new nodules - after multiple discussions with radiology the decision was made to proceed with surgery and repeat imaging for this finding in the chest  - s/p MDL with bilateral cordectomy with stent placement on 8/30/23  - today shows that the vocal folds closed around the stent - discussed he needs the scar broken up and bigger stent placed - patient in agreement   - pathology showed SCC with positive margins  - went back for MDL and stent removal on 9/13/23   - symptoms 10/10/2023 are stable, voice is poor  - scope shows  laryngeal web  - discussed he has positive margins - will proceed with repeat surger - if can't clear will need radiation  - ct chest shows improved size of lung nodule of concern  - s/p microlaryngoscopy, CO2 laser resection of vocal fold lesion, steroid injection, stent placement, and biopsy 10/25/23  - pathology shows invasive squamous cell  carcinoma  - symptoms 11/16/2023 are stable, voice is poor, discussed case at tumor board, will continue with close observation and repeat biopsies  - scope shows stent in place, vocal folds are open, laryngeal web developed  - discussed removal of stent, then will collect a sample, if the sample is cancerous, will refer for radiation therapy  - s/p microlaryngoscopy, CO2 laser resection of vocal fold lesion, steroid injection, stent removal, and biopsy 12/01/23  - pathology showed detached fragment of squamous epithelium with mild to moderate dysplasia, no evidence of carcinoma  - s/p laryngoscopy and anterior glottic web vocal fold steroid injection 12/11/23      - symptoms 1/4/2024 are has been having a hard time talking with his voice  - strobe shows bilateral vocal fold stiffness and swelling with question of laryngeal web, ventricular phonation  - s/p steroid injection today, does not appear to have a web, but rather vocal fold stiffness and swelling  - discussed treatment will be for vocal folds with steroid injections and voice therapy  - discussed voice result is questionable given severity of ventricular phonation  Plan  - continue voice therapy  - hearing test at next follow-up      RETURN VISIT: repeat steroid injection in 2 weeks    Scribe Disclosure:   I, Lulu Cortez, am serving as a scribe; to document services personally performed by Josephine Malone MD -based on data collection and the provider's statements to me.     Provider Disclosure:  I agree with above History, Review of Systems, Physical exam and Plan.  I have reviewed the content of the documentation and have edited it as needed. I have personally performed the services documented here and the documentation accurately represents those services and the decisions I have made.        Josephine Malone MD    Laryngology    [facility]  Department of  Otolaryngology - Head and Neck Surgery  Clinics & Surgery [place]   [address] [place] [address]  Appointment line: [phone]  Fax: [phone]  [email or web address]

## 2024-01-05 ENCOUNTER — TELEPHONE (OUTPATIENT)
Dept: EMERGENCY MEDICINE | Facility: CLINIC | Age: 79
End: 2024-01-05
Payer: COMMERCIAL

## 2024-01-05 ENCOUNTER — PATIENT OUTREACH (OUTPATIENT)
Dept: CARE COORDINATION | Facility: CLINIC | Age: 79
End: 2024-01-05
Payer: COMMERCIAL

## 2024-01-05 NOTE — LETTER
Joshua Ennis  [address] [facility] [address]    Dear Joshua Ennis,      I am a team member within the [facility] Care Resource Center with [facility]. I recently contacted you to ensure you are doing well following a visit within our health system. I also wanted to take this chance to introduce Clinic Care Coordination should you have any interest in this program in the future.     Below is a description of Clinic Care Coordination and how this team can further assist you:       The Clinic Care Coordination team is made up of a Registered Nurse, , Financial Resource Worker, and a Community Health Worker who understand and can help navigate the health care system. The goal of clinic care coordination is to help you manage your health, improve access to care, and achieve optimal health outcomes. They work alongside your provider to assist you in determining your health and social needs, obtain health care and community resources, and provide you with necessary information and education. Clinic Care Coordination can work with you through any barriers and develop a care plan that helps coordinate and strengthen the relationship between you and your care team.    If you wish to connect with the Clinic Care Coordination Team, please let your [facility] Primary Care Provider or Clinic Care Team know and they can place a referral. The Clinic Care Coordination team will then reach out by phone to further support you.    We are focused on providing you with the highest-quality healthcare experience possible.    Sincerely,   Your care team with [facility]

## 2024-01-05 NOTE — TELEPHONE ENCOUNTER
[facility]    Reason for call: Lab Result Notification     Lab Result (including Rx patient on, if applicable).  If culture, copy of lab report at bottom.  Lab Result: Final Enteric Bacteria and Virus Panel by [facility]UTHER Stool is POSITIVE or Enteropathic E. Coli (EPEC) and Sapovirus.  Recommendations in treatment per [facility] ED Lab Result Enteric Bacteria and Virus Panel protocol.    Creatinine Level (mg/dl)   Creatinine   Date Value Ref Range Status   01/03/2024 1.92 (H) 0.67 - 1.17 mg/dL Final   03/05/2021 1.48 (H) 0.66 - 1.25 mg/dL Final    Creatinine clearance (ml/min), if applicable Serum creatinine: 1.92 mg/dL (H) 01/03/24 1100  Estimated creatinine clearance: 39.3 mL/min (A)     Patient's current Symptoms:   Left voicemail message requesting a call back to [facility] ED Lab Result RN at [phone].  RN is available every day between 9 a.m. and 5:30 p.m.    Returned call at [phone]. Pt has improved since visit, though still having a few episodes of loose stools. Relayed result and recommendations, hydration status, reasons to return and infection control.     RN Recommendations/Instructions per [place] ED lab result protocol:   was not notified of lab result.   Upadate/addendum. Pt now notified.     Damian Robles RN    Component      Latest Ref Rng 1/3/2024  10:52 AM   Campylobacter species      Negative  Negative    SALMONELLA SPECIES      Negative  Negative    Vibrio species      Negative  Negative    Vibrio cholerae      Negative  Negative    YERSINIA ENTEROCOLITICA      Negative  Negative    Enteropathogenic E. coli (EPEC)      Negative, NA  Positive !    Shiga-like toxin-producing E. coli (STEC)      Negative  Negative    Shigella/Enteroinvasive E. coli (EIEC)      Negative  Negative    Cryptosporidium species      Negative  Negative    Giardia lamblia      Negative  Negative    Norovirus Gl/Gll      Negative  Negative    ROTAVIRUS A      Negative  Negative     Plesiomonas shigelloides      Negative  Negative    Enteroaggregative E. coli (EAEC)      Negative  Negative    Enterotoxigenic E. coli (ETEC)      Negative  Negative    E. coli O157      Negative, NA  NA    Cyclospora cayetanensis      Negative  Negative    Entamoeba histolytica      Negative  Negative    Adenovirus F40/41      Negative  Negative    Astrovirus      Negative  Negative    Sapovirus      Negative  Positive !       Legend:  ! Abnormal

## 2024-01-05 NOTE — PROGRESS NOTES
Clinic Care Coordination Contact  Community Health Worker Initial Outreach    CHW Initial Information Gathering:  Referral Source: ED Follow-Up  CHW Additional Questions  If ED/Hospital discharge, follow-up appointment scheduled as recommended?: Yes (Scheduled prior to outreach)  Michael active?: Yes    Patient accepts CC: No, patient declined at this time. Patient will be sent Care Coordination introduction letter for future reference.       Claudia Saeed  Community Health Worker  Connected Care Resource [facility]    *Connected Care Resource Team does NOT follow patient ongoing. Referrals are identified based on internal discharge reports and the outreach is to ensure patient has an understanding of their discharge instructions.

## 2024-01-08 ENCOUNTER — OFFICE VISIT (OUTPATIENT)
Dept: FAMILY MEDICINE | Facility: CLINIC | Age: 79
End: 2024-01-08
Payer: COMMERCIAL

## 2024-01-08 VITALS
WEIGHT: 197 LBS | RESPIRATION RATE: 20 BRPM | DIASTOLIC BLOOD PRESSURE: 78 MMHG | BODY MASS INDEX: 29.18 KG/M2 | HEIGHT: 69 IN | OXYGEN SATURATION: 99 % | SYSTOLIC BLOOD PRESSURE: 124 MMHG | TEMPERATURE: 98.1 F | HEART RATE: 76 BPM

## 2024-01-08 DIAGNOSIS — L82.1 SEBORRHEIC KERATOSES: ICD-10-CM

## 2024-01-08 DIAGNOSIS — R19.7 DIARRHEA OF PRESUMED INFECTIOUS ORIGIN: Primary | ICD-10-CM

## 2024-01-08 PROCEDURE — 99213 OFFICE O/P EST LOW 20 MIN: CPT | Mod: 24 | Performed by: FAMILY MEDICINE

## 2024-01-08 ASSESSMENT — PAIN SCALES - GENERAL: PAINLEVEL: NO PAIN (0)

## 2024-01-08 NOTE — NURSING NOTE
"Chief Complaint   Patient presents with    ER F/U     /78 (Cuff Size: Adult Regular)   Pulse 76   Temp 98.1  F (36.7  C) (Tympanic)   Resp 20   Ht 1.753 m (5' 9\")   Wt 89.4 kg (197 lb)   SpO2 99%   BMI 29.09 kg/m   Estimated body mass index is 29.09 kg/m  as calculated from the following:    Height as of this encounter: 1.753 m (5' 9\").    Weight as of this encounter: 89.4 kg (197 lb).  Patient presents to the clinic using No DME      Health Maintenance that is potentially due pending provider review:    Health Maintenance Due   Topic Date Due    SKIN CANCER SCREENING  Never done    ZOSTER IMMUNIZATION (1 of 2) Never done    RSV VACCINE (Pregnancy & 60+) (1 - 1-dose 60+ series) Never done    MEDICARE ANNUAL WELLNESS VISIT  Never done    ADVANCE CARE PLANNING  04/15/2018    COLORECTAL CANCER SCREENING  04/13/2023    INFLUENZA VACCINE (1) Never done    COVID-19 Vaccine (4 - 2023-24 season) 09/01/2023                  "

## 2024-01-08 NOTE — PROGRESS NOTES
"  Assessment & Plan     Diarrhea of presumed infectious origin  Patient was seen in ER last week for diarrhea, stool culture came back positive for enteropathic E. coli and napovirus, manage conservatively.  Patient has been feeling better and stools are more formed now.  As per patient, probably got it from salsa which he ate at a [employer] restaurant.  Recommended to continue well hydration,  - Primary Care Referral    Seborrheic keratoses  Benign nature explained and reassurance provided.      Patrick Mcintyre MD  [facility]    Tahira Lewis is a 78 year old, presenting for the following health issues:  ER F/U      HPI       ED/UC Followup:  Facility:  [facility]   Date of visit: 1/3/2024  Reason for visit: Diarrhea   Current Status: is feeling way better, little bit of diarrhea, not much     Check mole on back- is a little raised.       Review of Systems   Constitutional, HEENT, cardiovascular, pulmonary, gi and gu systems are negative, except as otherwise noted.      Objective    /78 (Cuff Size: Adult Regular)   Pulse 76   Temp 98.1  F (36.7  C) (Tympanic)   Resp 20   Ht 1.753 m (5' 9\")   Wt 89.4 kg (197 lb)   SpO2 99%   BMI 29.09 kg/m    Body mass index is 29.09 kg/m .  Physical Exam   GENERAL: alert and no distress  NECK: no adenopathy, no asymmetry, masses, or scars and thyroid normal to palpation  RESP: lungs clear to auscultation - no rales, rhonchi or wheezes  CV: regular rate and rhythm, normal S1 S2, no S3 or S4, no murmur, click or rub, no peripheral edema and peripheral pulses strong  ABDOMEN: soft, nontender and bowel sounds normal  MS: no gross musculoskeletal defects noted, no edema  SKIN: keratoses - seborrheic, involving back skin  PSYCH: mentation appears normal, affect normal/bright                  "

## 2024-01-12 ENCOUNTER — LAB (OUTPATIENT)
Dept: LAB | Facility: CLINIC | Age: 79
End: 2024-01-12
Payer: COMMERCIAL

## 2024-01-12 DIAGNOSIS — R19.7 DIARRHEA OF PRESUMED INFECTIOUS ORIGIN: ICD-10-CM

## 2024-01-12 DIAGNOSIS — R91.1 LUNG NODULE: ICD-10-CM

## 2024-01-12 DIAGNOSIS — R05.2 SUBACUTE COUGH: ICD-10-CM

## 2024-01-12 DIAGNOSIS — M31.30 GRANULOMATOSIS WITH POLYANGIITIS, UNSPECIFIED WHETHER RENAL INVOLVEMENT (H): ICD-10-CM

## 2024-01-12 LAB
ALBUMIN MFR UR ELPH: 11 MG/DL
ALBUMIN SERPL BCG-MCNC: 4.1 G/DL (ref 3.5–5.2)
ALBUMIN UR-MCNC: NEGATIVE MG/DL
ALT SERPL W P-5'-P-CCNC: 21 U/L (ref 0–70)
ANION GAP SERPL CALCULATED.3IONS-SCNC: 9 MMOL/L (ref 7–15)
APPEARANCE UR: CLEAR
AST SERPL W P-5'-P-CCNC: 21 U/L (ref 0–45)
BACTERIA #/AREA URNS HPF: ABNORMAL /HPF
BASOPHILS # BLD AUTO: 0 10E3/UL (ref 0–0.2)
BASOPHILS NFR BLD AUTO: 0 %
BILIRUB UR QL STRIP: NEGATIVE
BUN SERPL-MCNC: 23 MG/DL (ref 8–23)
CALCIUM SERPL-MCNC: 9.3 MG/DL (ref 8.8–10.2)
CHLORIDE SERPL-SCNC: 107 MMOL/L (ref 98–107)
COLOR UR AUTO: YELLOW
CREAT SERPL-MCNC: 1.51 MG/DL (ref 0.67–1.17)
CREAT UR-MCNC: 117.3 MG/DL
CRP SERPL-MCNC: 4.2 MG/L
DEPRECATED HCO3 PLAS-SCNC: 24 MMOL/L (ref 22–29)
EGFRCR SERPLBLD CKD-EPI 2021: 48 ML/MIN/1.73M2
EOSINOPHIL # BLD AUTO: 0.2 10E3/UL (ref 0–0.7)
EOSINOPHIL NFR BLD AUTO: 3 %
ERYTHROCYTE [DISTWIDTH] IN BLOOD BY AUTOMATED COUNT: 13 % (ref 10–15)
ERYTHROCYTE [SEDIMENTATION RATE] IN BLOOD BY WESTERGREN METHOD: 8 MM/HR (ref 0–20)
GLUCOSE SERPL-MCNC: 83 MG/DL (ref 70–99)
GLUCOSE UR STRIP-MCNC: NEGATIVE MG/DL
HCT VFR BLD AUTO: 41 % (ref 40–53)
HGB BLD-MCNC: 15.3 G/DL (ref 13.3–17.7)
HGB UR QL STRIP: NEGATIVE
IMM GRANULOCYTES # BLD: 0 10E3/UL
IMM GRANULOCYTES NFR BLD: 0 %
KETONES UR STRIP-MCNC: NEGATIVE MG/DL
LEUKOCYTE ESTERASE UR QL STRIP: NEGATIVE
LYMPHOCYTES # BLD AUTO: 1.3 10E3/UL (ref 0.8–5.3)
LYMPHOCYTES NFR BLD AUTO: 17 %
MCH RBC QN AUTO: 35.7 PG (ref 26.5–33)
MCHC RBC AUTO-ENTMCNC: 37.3 G/DL (ref 31.5–36.5)
MCV RBC AUTO: 96 FL (ref 78–100)
MONOCYTES # BLD AUTO: 0.7 10E3/UL (ref 0–1.3)
MONOCYTES NFR BLD AUTO: 9 %
MUCOUS THREADS #/AREA URNS LPF: PRESENT /LPF
NEUTROPHILS # BLD AUTO: 5.5 10E3/UL (ref 1.6–8.3)
NEUTROPHILS NFR BLD AUTO: 71 %
NITRATE UR QL: NEGATIVE
PH UR STRIP: 5.5 [PH] (ref 5–7)
PLATELET # BLD AUTO: 289 10E3/UL (ref 150–450)
POTASSIUM SERPL-SCNC: 3.7 MMOL/L (ref 3.4–5.3)
PROT/CREAT 24H UR: 0.09 MG/MG CR (ref 0–0.2)
RBC # BLD AUTO: 4.28 10E6/UL (ref 4.4–5.9)
RBC #/AREA URNS AUTO: ABNORMAL /HPF
SODIUM SERPL-SCNC: 140 MMOL/L (ref 135–145)
SP GR UR STRIP: 1.02 (ref 1–1.03)
SQUAMOUS #/AREA URNS AUTO: ABNORMAL /LPF
UROBILINOGEN UR STRIP-ACNC: 0.2 E.U./DL
WBC # BLD AUTO: 7.7 10E3/UL (ref 4–11)
WBC #/AREA URNS AUTO: ABNORMAL /HPF

## 2024-01-12 PROCEDURE — 83876 ASSAY MYELOPEROXIDASE: CPT

## 2024-01-12 PROCEDURE — 85025 COMPLETE CBC W/AUTO DIFF WBC: CPT

## 2024-01-12 PROCEDURE — 82040 ASSAY OF SERUM ALBUMIN: CPT

## 2024-01-12 PROCEDURE — 80048 BASIC METABOLIC PNL TOTAL CA: CPT

## 2024-01-12 PROCEDURE — 86036 ANCA SCREEN EACH ANTIBODY: CPT

## 2024-01-12 PROCEDURE — 36415 COLL VENOUS BLD VENIPUNCTURE: CPT

## 2024-01-12 PROCEDURE — 84156 ASSAY OF PROTEIN URINE: CPT

## 2024-01-12 PROCEDURE — 86140 C-REACTIVE PROTEIN: CPT

## 2024-01-12 PROCEDURE — 86037 ANCA TITER EACH ANTIBODY: CPT

## 2024-01-12 PROCEDURE — 84450 TRANSFERASE (AST) (SGOT): CPT

## 2024-01-12 PROCEDURE — 82784 ASSAY IGA/IGD/IGG/IGM EACH: CPT

## 2024-01-12 PROCEDURE — 84460 ALANINE AMINO (ALT) (SGPT): CPT

## 2024-01-12 PROCEDURE — 85652 RBC SED RATE AUTOMATED: CPT

## 2024-01-12 PROCEDURE — 83516 IMMUNOASSAY NONANTIBODY: CPT

## 2024-01-12 PROCEDURE — 81001 URINALYSIS AUTO W/SCOPE: CPT

## 2024-01-15 LAB
IGA SERPL-MCNC: 107 MG/DL (ref 84–499)
IGG SERPL-MCNC: 928 MG/DL (ref 610–1616)
IGM SERPL-MCNC: 27 MG/DL (ref 35–242)

## 2024-01-16 LAB
ANCA AB PATTERN SER IF-IMP: ABNORMAL
C-ANCA TITR SER IF: ABNORMAL {TITER}
MYELOPEROXIDASE AB SER IA-ACNC: 0.3 U/ML
MYELOPEROXIDASE AB SER IA-ACNC: NEGATIVE
PROTEINASE3 AB SER IA-ACNC: 26 U/ML
PROTEINASE3 AB SER IA-ACNC: POSITIVE

## 2024-01-18 ENCOUNTER — OFFICE VISIT (OUTPATIENT)
Dept: OTOLARYNGOLOGY | Facility: CLINIC | Age: 79
End: 2024-01-18
Payer: COMMERCIAL

## 2024-01-18 VITALS — BODY MASS INDEX: 29.18 KG/M2 | HEIGHT: 69 IN | WEIGHT: 197 LBS

## 2024-01-18 DIAGNOSIS — R49.0 DYSPHONIA: Primary | ICD-10-CM

## 2024-01-18 PROCEDURE — 31575 DIAGNOSTIC LARYNGOSCOPY: CPT | Mod: 79 | Performed by: OTOLARYNGOLOGY

## 2024-01-18 PROCEDURE — 99214 OFFICE O/P EST MOD 30 MIN: CPT | Mod: 25 | Performed by: OTOLARYNGOLOGY

## 2024-01-18 NOTE — PATIENT INSTRUCTIONS
1.  You were seen in the ENT Clinic today by Dr. Malone. If you have any questions or concerns after your appointment, please call [phone]. Press option #1 for scheduling related needs. Press option #3 for Nurse advice.    2.  Dr. Malone has recommended the following:   - Follow up with Senait Jefferson PA-C     3.  Plan is to return to clinic 3/14 at 8:40 AM    Nina Cardenas  Please call [phone] with any questions or concerns  [facility] - Otolaryngology

## 2024-01-18 NOTE — LETTER
2024       RE: Joshua Ennis  [address]  [facility] [address]     Dear Colleague,    Thank you for referring your patient, Joshua Ennis, to the [facility] E[facility] [place] [facility]. Please see a copy of my visit note below.        [facility]   at the [facility]   Otolaryngology Clinic     Patient: Joshua Ennis    MRN: [id number]    : 1945    Age/Gender: 78 year old male  Date of Service: 2024  Rendering Provider:   Josephine Malone MD      Chief Complaint   Vocal fold SCC  S/p MDL with bilateral cordectomy and steroid injection 23  S/p MDL with stent removal and steroid injection on 23  S/p microlaryngoscopy, CO2 laser resection of vocal fold lesion, steroid injection, stent placement, and biopsy 10/25/23    S/p microlaryngoscopy, CO2 laser resection of vocal fold lesion, steroid injection, stent removal, and biopsy 23  S/p laryngoscopy and anterior glottic web vocal fold steroid injection 23   S/p steroid injection 24               Interval History   HISTORY OF PRESENT ILLNESS:  Mr. Ennis is a 78 year old male is being followed for dysphonia. he was initially seen on 8/15/23. Please refer to this note for full history.     Today, he presents for follow up. he reports:  - doing well  - voice has been about the same     PAST MEDICAL HISTORY:   Past Medical History:   Diagnosis Date     Arthritis      Autoimmune disease (H24)      Hearing problem      Hoarseness      Hypertension      Kidney problem      Malignant melanoma (H)      Malignant neoplasm (H)     melanoma     Squamous cell carcinoma      Russo syndrome        PAST SURGICAL HISTORY:   Past Surgical History:   Procedure Laterality Date     BIOPSY  2011    melanoma on back     DISSECT LYMPH NODE INGUINAL  3/1/2013    Procedure: DISSECT LYMPH NODE INGUINAL;  Bilateral Inguinal Lymph Node Biopsy.;   Surgeon: Tariq Acosta MD;  Location: [facility]     ESOPHAGOSCOPY, GASTROSCOPY, DUODENOSCOPY (EGD), COMBINED  7/5/2013    Procedure: COMBINED ESOPHAGOSCOPY, GASTROSCOPY, DUODENOSCOPY (EGD);  Gastroscopy;  Surgeon: Tariq Acosta MD;  Location: [facility]     HERNIORRHAPHY INGUINAL  8/6/2012    Procedure: HERNIORRHAPHY INGUINAL;  Open Repair Left Inguinal Hernia;  Surgeon: Jerad Baker MD;  Location: [facility]     INJECT STEROID (LOCATION) N/A 8/30/2023    Procedure: steroid injection;  Surgeon: Josephine Malone MD;  Location: [facility]     INJECT STEROID (LOCATION) N/A 9/13/2023    Procedure: steroid injection;  Surgeon: Josephine Malone MD;  [facility]     INJECT STEROID (LOCATION) N/A 10/25/2023    Procedure: steroid injection, stent placement, and biopsy;  Surgeon: Josephine Malone MD;  [facility]     INJECT STEROID (LOCATION) N/A 12/1/2023    Procedure: steroid injection;  Surgeon: Josephine Malone MD;  [facility]     LARYNGOSCOPY WITH MICROSCOPE N/A 9/13/2023    Procedure: MICROLARYNGOSCOPY with stent removal and biopsy;  Surgeon: Josephine Malone MD;  [facility]: [facility]     LARYNGOSCOPY, FLEXIBLE WITH INJECTION N/A 12/11/2023    Procedure: LARYNGOSCOPY, FLEXIBLE WITH INJECTION with steroid;  Surgeon: Josephine Malone MD;  Location: [facility]     LASER CO2 LARYNGOSCOPY N/A 8/30/2023    Procedure: MICROLARYNGOSCOPY, CO2 laser resection of vocal fold lesion;  Surgeon: Josephine Malone MD;  [facility]     LASER CO2 LARYNGOSCOPY N/A 10/25/2023    Procedure: MICROLARYNGOSCOPY, CO2 laser resection of vocal fold lesion;  Surgeon: Josephine Malone MD;  [facility]     LASER CO2 LARYNGOSCOPY N/A 12/1/2023    Procedure: MICROLARYNGOSCOPY CO2 laser standby, resection of vocal fold lesion, stent removal, biopsy;  Surgeon: Josephine Malone MD;  Location: [facility]       CURRENT MEDICATIONS:   Current Outpatient Medications:      Acetaminophen (TYLENOL PO), Take 650 mg by mouth once as needed for mild pain or fever, Disp: , Rfl:       bacitracin 500 UNIT/GM external ointment, Apply topically 2 times daily Please use over the red area on your neck twice a day., Disp: 28 g, Rfl: 0     beclomethasone HFA (QVAR REDIHALER) 80 MCG/ACT inhaler, Inhale 2 puffs into the lungs 2 times daily, Disp: 10.6 g, Rfl: 3     Blood Pressure Monitor KIT, Automatic Blood Pressure Monitor, Disp: 1 kit, Rfl: 0     calcium carbonate 600 mg-vitamin D 400 units (CALTRATE) 600-400 MG-UNIT per tablet, Take 1 tablet by mouth 2 times daily., Disp: , Rfl:      Cholecalciferol (VITAMIN D) 1000 UNITS capsule, Take 1 capsule by mouth daily., Disp: , Rfl:      folic acid (FOLVITE) 1 MG tablet, Take 2 tablets (2 mg) by mouth daily, Disp: 180 tablet, Rfl: 3     gabapentin (NEURONTIN) 400 MG capsule, Take 1 capsule (400 mg) by mouth At Bedtime, Disp: 90 capsule, Rfl: 3     HYDROcodone-acetaminophen (NORCO) 5-325 MG tablet, Take 1 tablet by mouth every 6 hours as needed for severe pain, Disp: 6 tablet, Rfl: 0     losartan (COZAAR) 50 MG tablet, Take 1 tablet (50 mg) by mouth daily, Disp: 90 tablet, Rfl: 2     methotrexate sodium 2.5 MG TABS, Take 4 tablets (10 mg) by mouth once a week, Disp: 48 tablet, Rfl: 1     pantoprazole (PROTONIX) 40 MG EC tablet, Take 1 tablet (40 mg) by mouth 2 times daily, Disp: 80 tablet, Rfl: 3    ALLERGIES: Shrimp    SOCIAL HISTORY:    Social History     Socioeconomic History     Marital status: Single     Spouse name: Not on file     Number of children: Not on file     Years of education: Not on file     Highest education level: Not on file   Occupational History     Employer: [employer]   Tobacco Use     Smoking status: Former     Packs/day: 1.00     Years: 20.00     Additional pack years: 0.00     Total pack years: 20.00     Types: Cigarettes     Quit date: 2/14/2013     Years since quitting: 10.9     Passive exposure: Past     Smokeless tobacco: Never   Vaping Use     Vaping Use: Never used   Substance and Sexual Activity     Alcohol  use: Yes     Comment: rare     Drug use: No     Sexual activity: Not Currently     Partners: Female   Other Topics Concern     Parent/sibling w/ CABG, MI or angioplasty before 65F 55M? Not Asked      Service Not Asked     Blood Transfusions Not Asked     Caffeine Concern Not Asked     Occupational Exposure Not Asked     Hobby Hazards Not Asked     Sleep Concern Not Asked     Stress Concern Not Asked     Weight Concern Not Asked     Special Diet Not Asked     Back Care Not Asked     Exercise Yes     Comment: walking     Bike Helmet Not Asked     Seat Belt Not Asked     Self-Exams Not Asked   Social History Narrative     Not on file     Social Determinants of Health     Financial Resource Strain: Not on file   Food Insecurity: Not on file   Transportation Needs: Not on file   Physical Activity: Not on file   Stress: Not on file   Social Connections: Not on file   Interpersonal Safety: Low Risk  (1/8/2024)    Interpersonal Safety      Do you feel physically and emotionally safe where you currently live?: Yes      Within the past 12 months, have you been hit, slapped, kicked or otherwise physically hurt by someone?: No      Within the past 12 months, have you been humiliated or emotionally abused in other ways by your partner or ex-partner?: No   Housing Stability: Not on file         FAMILY HISTORY:   Family History   Problem Relation Age of Onset     Diabetes Mother      Hypertension Mother      Cancer Father         stomach     Heart Disease Father      Heart Disease Brother      Diabetes Sister      Diabetes Sister      Diabetes Sister      Heart Disease Brother      Cancer Sister      Glaucoma No family hx of      Macular Degeneration No family hx of       Non-contributory for problems with anesthesia    REVIEW OF SYSTEMS:   The patient was asked a 14 point review of systems regarding constitutional symptoms, eye symptoms, ears, nose, mouth, throat symptoms, cardiovascular symptoms, respiratory symptoms,  gastrointestinal symptoms, genitourinary symptoms, musculoskeletal symptoms, integumentary symptoms, neurological symptoms, psychiatric symptoms, endocrine symptoms, hematologic/lymphatic symptoms, and allergic/ immunologic symptoms.   The pertinent factors have been included in the HPI and below.  Patient Supplied Answers to Review of Systems      6/13/2018     8:42 AM   [facility] ROS   Ears, Nose, Throat Ringing/noise in ears    Nasal congestion or drainage    Sore throat    Hoarseness   Cardiopulmonary Cough       Physical Examination   The patient underwent a physical examination as described below. The pertinent positive and negative findings are summarized after the description of the examination.  Constitutional: The patient's developmental and nutritional status was assessed. The patient's voice quality was assessed.  Head and Face: The head and face were inspected for deformities. The sinuses were palpated. The salivary glands were palpated. Facial muscle strength was assessed bilaterally.  Eyes: Extraocular movements and primary gaze alignment were assessed.  Ears, Nose, Mouth and Throat: The ears and nose were examined for deformities. The nasal septum, mucosa, and turbinates were inspected by anterior rhinoscopy. The lips, teeth, and gums were examined for abnormalities. The oral mucosa, tongue, palate, tonsils, lateral and posterior pharynx were inspected for the presence of asymmetry or mucosal lesions.    Neck: The tracheal position was noted, and the neck mass palpated to determine if there were any asymmetries, abnormal neck masses, thyromegally, or thyroid nodules.  Respiratory: The nature of the breathing and chest expansion/symmetry was observed.  Cardiovascular: The patient was examined to determine the presence of any edema or jugular venous distension.  Abdomen: The contour of the abdomen was noted.  Lymphatic: The patient was examined for infraclavicular lymphadenopathy.  Musculoskeletal: The  patient was inspected for the presence of skeletal deformities.  Extremities: The extremities were examined for any clubbing or cyanosis.  Skin: The skin was examined for inflammatory or neoplastic conditions.  Neurologic: The patient's orientation, mood, and affect were noted. The cranial nerve  functions were examined.  Other pertinent positive and negative findings on physical examination:   OC/OP: no lesions, uvula midline, soft palate elevates symmetrically   Neck: no lesions, no TH tenderness to palpation  All other physical examination findings were within normal limits and noncontributory.    Procedures     Flexible laryngoscopy (CPT 74412)      Pre-procedure diagnosis: dysphonia  Post-procedure diagnosis: same as above  Indication for procedure: Mr. Ennis is a 78 year old male with see above  Procedure(s): Fiberoptic Laryngoscopy    Details of Procedure: After informed consent was obtained, the patient was seated in the examination chair.  The areas of the nasopharynx as well as the hypopharynx were anesthetized with topical 4% lidocaine with 0.25% phenylephrine atomizer.  Examination of the base of tongue was performed first.  Attention was directed to any evidence of masses in the area or evidence of leukoplakia or candidal infection.  Attention was directed to the epiglottis where its size and position was determined and its movement on phonation of the vowel  e .  The piriform sinuses were then inspected for any mass lesions or pooling of secretions.  Attention was then directed to the larynx. The vocal folds were inspected for infection or any areas of leukoplakia, for masses, polypoid degeneration, or hemorrhage.  Having done this, the arytenoids and vocal processes were inspected for erythema or evidence of granuloma formation.  The posterior commissure was then inspected for evidence of inflammatory changes in the mucosa and heaping up of mucosal tissue. The patient was then instructed to say the  "vowel  e .  Adduction of vocal folds to the midline was observed for any evidence of paresis or paralysis of the larynx or asymmetry in rotation of the larynx to the left or right. The patient was asked to breathe and the degree of abduction was noted bilaterally.  Subglottic view of the larynx was obtained for any additional mass lesions or mucosal changes.  Finally the post cricoid was examined for evidence of pooling of secretions, as well as the pharyngeal wall mucosa.   Anesthesia type: 0.25% phenylephrine    Findings:  Anatomic/physiological deviations: RNC,  anterior glottic web, did lidocaine anesthesia, but difficult to see if the vocal folds can split apart today as compared to last time, he had laryngeal spasm    Right vocal process: No restriction of mobility   Left vocal process: No restriction of mobility  Glottal gap: Complete glottal closure  Supraglottic structures: Normal  Hypopharynx: Normal     Estimated Blood Loss: minimal  Complications: None  Disposition: Patient tolerated the procedure well                 Review of Relevant Clinical Data   I personally reviewed:    Labs:  No results found for: \"TSH\"  Lab Results   Component Value Date     01/12/2024    CO2 24 01/12/2024    BUN 23.0 01/12/2024    PHOS 2.6 08/12/2016     Lab Results   Component Value Date    WBC 7.7 01/12/2024    HGB 15.3 01/12/2024    HCT 41.0 01/12/2024    MCV 96 01/12/2024     01/12/2024     Lab Results   Component Value Date    PTT 24 04/11/2013    INR 0.98 04/11/2013     No results found for: \"DUANE\"  No components found for: \"RHEUMATOIDFACTOR\", \"RF\"  Lab Results   Component Value Date    CRP <2.9 07/07/2022    CRP <2.9 01/07/2022    CRP <2.9 07/28/2021    CRP <2.9 03/05/2021    CRP <2.9 11/11/2020     No components found for: \"CKTOT\", \"URICACID\"  No components found for: \"C3\", \"C4\", \"DSDNAAB\", \"NDNAABIFA\"  Lab Results   Component Value Date    MPOAB Not Reported 02/14/2014       Patient reported Quality of " "Life (QOL) Measures   Patient Supplied Answers To VHI Questionnaire      8/23/2023     8:49 AM   Voice Handicap Index (VHI-10)   My voice makes it difficult for people to hear me 4   People have difficulty understanding me in a noisy room 4   My voice difficulties restrict my personal and social life.  0   I feel left out of conversations because of my voice 2   My voice problem causes me to lose income 0   I feel as though I have to strain to produce voice 1   The clarity of my voice is unpredictable 2   My voice problem upsets me 1   My voice makes me feel handicapped 0   People ask, \"What's wrong with your voice?\" 2   VHI-10 16         Patient Supplied Answers To EAT Questionnaire       No data to display                  Patient Supplied Answers To CSI Questionnaire      8/23/2023     8:49 AM   Cough Severity Index (CSI)   My cough is worse when I lie down 0   My coughing problem causes me to restrict my personal and social life 0   I tend to avoid places because of my cough problem 0   I feel embarrassed because of my coughing problem 1   People ask, ''What's wrong?'' because I cough a lot 1   I run out of air when I cough 0   My coughing problem affects my voice 2   My coughing problem limits my physical activity 0   My coughing problem upsets me 0   People ask me if I am sick because I cough a lot 2   CSI Score 6         Patient Supplied Answers to Dyspnea Index Questionnaire:       No data to display                Impression & Plan     IMPRESSION: Mr. Ennis is a 78 year old male who is being seen for the following:    Dysphonia   - anterior commissure lesion seen on scope on 7/31/23  - voice changes worsening for a few months  - prior smoker  - scope shows anterior commissure lesion with extension to the right vocal fold  - given anterior location will require CT neck to rule out thyroid cartilage extension  - discussed awake biopsy - patient in agreement this was done today  - pathology showed SCC  - CT " chest had findings of new nodules - after multiple discussions with radiology the decision was made to proceed with surgery and repeat imaging for this finding in the chest  - s/p MDL with bilateral cordectomy with stent placement on 8/30/23  - today shows that the vocal folds closed around the stent - discussed he needs the scar broken up and bigger stent placed - patient in agreement   - pathology showed SCC with positive margins  - went back for MDL and stent removal on 9/13/23   - symptoms 10/10/2023 are stable, voice is poor  - scope shows  laryngeal web  - discussed he has positive margins - will proceed with repeat surger - if can't clear will need radiation  - ct chest shows improved size of lung nodule of concern  - s/p microlaryngoscopy, CO2 laser resection of vocal fold lesion, steroid injection, stent placement, and biopsy 10/25/23  - pathology shows invasive squamous cell carcinoma  - symptoms 11/16/2023 are stable, voice is poor, discussed case at tumor board, will continue with close observation and repeat biopsies  - scope shows stent in place, vocal folds are open, laryngeal web developed  - discussed removal of stent, then will collect a sample, if the sample is cancerous, will refer for radiation therapy  - s/p microlaryngoscopy, CO2 laser resection of vocal fold lesion, steroid injection, stent removal, and biopsy 12/01/23  - pathology showed detached fragment of squamous epithelium with mild to moderate dysplasia, no evidence of carcinoma  - s/p laryngoscopy and anterior glottic web vocal fold steroid injection 12/11/23      - symptoms 1/4/2024 are has been having a hard time talking with his voice  - strobe shows bilateral vocal fold stiffness and swelling with question of laryngeal web, ventricular phonation  - s/p steroid injection today, does not appear to have a web, but rather vocal fold stiffness and swelling  - discussed treatment for vocal folds will be with steroid injections and voice  therapy  - discussed voice result is questionable given severity of ventricular phonation  - symptoms 1/18/2024 are stable  - scope shows anterior glottic web, did lidocaine anesthesia, but difficult to see if the vocal folds can split apart today as compared to last time, he had laryngeal spasm  - discussed that we should focus on surveillance at this time and keep him in voice therapy, I would not do a repeat stunt placement given the last two did not work   Plan  - continue voice therapy  - q6 week follow-up, CT neck w/ contrast 3 months from December  - discuss case with head/neck about surveillance with repeat DL with possible biopsy       RETURN VISIT: 6 weeks    Scribe Disclosure:   I, Lulu Dana, am serving as a scribe; to document services personally performed by Josephine Malone MD -based on data collection and the provider's statements to me.     Provider Disclosure:  I agree with above History, Review of Systems, Physical exam and Plan.  I have reviewed the content of the documentation and have edited it as needed. I have personally performed the services documented here and the documentation accurately represents those services and the decisions I have made.        Josephine Malone MD    Laryngology    [facility]  Department of  Otolaryngology - Head and Neck Surgery  Clinics & Surgery Center  [address] [place] [address]  Appointment line: [phone]  Fax: [phone]  [email or web address]          Again, thank you for allowing me to participate in the care of your patient.      Sincerely,    Josephine Malone MD

## 2024-01-18 NOTE — PROGRESS NOTES
[facility]   at the [facility]   Otolaryngology Clinic     Patient: Joshua Ennis    MRN: [id number]    : 1945    Age/Gender: 78 year old male  Date of Service: 2024  Rendering Provider:   Josephine Malone MD      Chief Complaint   Vocal fold SCC  S/p MDL with bilateral cordectomy and steroid injection 23  S/p MDL with stent removal and steroid injection on 23  S/p microlaryngoscopy, CO2 laser resection of vocal fold lesion, steroid injection, stent placement, and biopsy 10/25/23    S/p microlaryngoscopy, CO2 laser resection of vocal fold lesion, steroid injection, stent removal, and biopsy 23  S/p laryngoscopy and anterior glottic web vocal fold steroid injection 23   S/p steroid injection 24               Interval History   HISTORY OF PRESENT ILLNESS:  Mr. Ennis is a 78 year old male is being followed for dysphonia. he was initially seen on 8/15/23. Please refer to this note for full history.     Today, he presents for follow up. he reports:  - doing well  - voice has been about the same     PAST MEDICAL HISTORY:   Past Medical History:   Diagnosis Date    Arthritis     Autoimmune disease (H24)     Hearing problem     Hoarseness     Hypertension     Kidney problem     Malignant melanoma (H)     Malignant neoplasm (H)     melanoma    Squamous cell carcinoma     Russo syndrome        PAST SURGICAL HISTORY:   Past Surgical History:   Procedure Laterality Date    BIOPSY  2011    melanoma on back    DISSECT LYMPH NODE INGUINAL  3/1/2013    Procedure: DISSECT LYMPH NODE INGUINAL;  Bilateral Inguinal Lymph Node Biopsy.;  Surgeon: Tariq Acosta MD;  Location: [facility]    ESOPHAGOSCOPY, GASTROSCOPY, DUODENOSCOPY (EGD), COMBINED  2013    Procedure: COMBINED ESOPHAGOSCOPY, GASTROSCOPY, DUODENOSCOPY (EGD);  Gastroscopy;  Surgeon: Tariq Acosta MD;  Location: [facility]    HERNIORRHAPHY INGUINAL  2012    Procedure: HERNIORRHAPHY INGUINAL;  Open  Repair Left Inguinal Hernia;  Surgeon: Jerad Baker MD;  Location: [facility]    INJECT STEROID (LOCATION) N/A 8/30/2023    Procedure: steroid injection;  Surgeon: Josephine Malone MD;  Location: [facility]    INJECT STEROID (LOCATION) N/A 9/13/2023    Procedure: steroid injection;  Surgeon: Josephine Malone MD;  Location: [facility]    INJECT STEROID (LOCATION) N/A 10/25/2023    Procedure: steroid injection, stent placement, and biopsy;  Surgeon: Josephine Malone MD;  Location: [facility]    INJECT STEROID (LOCATION) N/A 12/1/2023    Procedure: steroid injection;  Surgeon: Josephine Malone MD;  Location: [facility]    LARYNGOSCOPY WITH MICROSCOPE N/A 9/13/2023    Procedure: MICROLARYNGOSCOPY with stent removal and biopsy;  Surgeon: Josephine Malone MD;  Location: [facility]    LARYNGOSCOPY, FLEXIBLE WITH INJECTION N/A 12/11/2023    Procedure: LARYNGOSCOPY, FLEXIBLE WITH INJECTION with steroid;  Surgeon: Josephine Malone MD;  Location: [facility]    LASER CO2 LARYNGOSCOPY N/A 8/30/2023    Procedure: MICROLARYNGOSCOPY, CO2 laser resection of vocal fold lesion;  Surgeon: Josephine Malone MD;  Location: [facility]    LASER CO2 LARYNGOSCOPY N/A 10/25/2023    Procedure: MICROLARYNGOSCOPY, CO2 laser resection of vocal fold lesion;  Surgeon: Josephine Malone MD;  Location: [facility]    LASER CO2 LARYNGOSCOPY N/A 12/1/2023    Procedure: MICROLARYNGOSCOPY CO2 laser standby, resection of vocal fold lesion, stent removal, biopsy;  Surgeon: Josephine Malone MD;  Location: [facility]       CURRENT MEDICATIONS:   Current Outpatient Medications:     Acetaminophen (TYLENOL PO), Take 650 mg by mouth once as needed for mild pain or fever, Disp: , Rfl:     bacitracin 500 UNIT/GM external ointment, Apply topically 2 times daily Please use over the red area on your neck twice a day., Disp: 28 g, Rfl: 0    beclomethasone HFA (QVAR REDIHALER) 80 MCG/ACT inhaler, Inhale 2 puffs into the lungs 2 times daily, Disp: 10.6 g, Rfl: 3    Blood Pressure Monitor KIT, Automatic Blood Pressure Monitor, Disp: 1  kit, Rfl: 0    calcium carbonate 600 mg-vitamin D 400 units (CALTRATE) 600-400 MG-UNIT per tablet, Take 1 tablet by mouth 2 times daily., Disp: , Rfl:     Cholecalciferol (VITAMIN D) 1000 UNITS capsule, Take 1 capsule by mouth daily., Disp: , Rfl:     folic acid (FOLVITE) 1 MG tablet, Take 2 tablets (2 mg) by mouth daily, Disp: 180 tablet, Rfl: 3    gabapentin (NEURONTIN) 400 MG capsule, Take 1 capsule (400 mg) by mouth At Bedtime, Disp: 90 capsule, Rfl: 3    HYDROcodone-acetaminophen (NORCO) 5-325 MG tablet, Take 1 tablet by mouth every 6 hours as needed for severe pain, Disp: 6 tablet, Rfl: 0    losartan (COZAAR) 50 MG tablet, Take 1 tablet (50 mg) by mouth daily, Disp: 90 tablet, Rfl: 2    methotrexate sodium 2.5 MG TABS, Take 4 tablets (10 mg) by mouth once a week, Disp: 48 tablet, Rfl: 1    pantoprazole (PROTONIX) 40 MG EC tablet, Take 1 tablet (40 mg) by mouth 2 times daily, Disp: 80 tablet, Rfl: 3    ALLERGIES: Shrimp    SOCIAL HISTORY:    Social History     Socioeconomic History    Marital status: Single     Spouse name: Not on file    Number of children: Not on file    Years of education: Not on file    Highest education level: Not on file   Occupational History     Employer: [employer]   Tobacco Use    Smoking status: Former     Packs/day: 1.00     Years: 20.00     Additional pack years: 0.00     Total pack years: 20.00     Types: Cigarettes     Quit date: 2/14/2013     Years since quitting: 10.9     Passive exposure: Past    Smokeless tobacco: Never   Vaping Use    Vaping Use: Never used   Substance and Sexual Activity    Alcohol use: Yes     Comment: rare    Drug use: No    Sexual activity: Not Currently     Partners: Female   Other Topics Concern    Parent/sibling w/ CABG, MI or angioplasty before 65F 55M? Not Asked     Service Not Asked    Blood Transfusions Not Asked    Caffeine Concern Not Asked    Occupational Exposure Not Asked    Hobby Hazards Not Asked    Sleep Concern Not  Asked    Stress Concern Not Asked    Weight Concern Not Asked    Special Diet Not Asked    Back Care Not Asked    Exercise Yes     Comment: walking    Bike Helmet Not Asked    Seat Belt Not Asked    Self-Exams Not Asked   Social History Narrative    Not on file     Social Determinants of Health     Financial Resource Strain: Not on file   Food Insecurity: Not on file   Transportation Needs: Not on file   Physical Activity: Not on file   Stress: Not on file   Social Connections: Not on file   Interpersonal Safety: Low Risk  (1/8/2024)    Interpersonal Safety     Do you feel physically and emotionally safe where you currently live?: Yes     Within the past 12 months, have you been hit, slapped, kicked or otherwise physically hurt by someone?: No     Within the past 12 months, have you been humiliated or emotionally abused in other ways by your partner or ex-partner?: No   Housing Stability: Not on file         FAMILY HISTORY:   Family History   Problem Relation Age of Onset    Diabetes Mother     Hypertension Mother     Cancer Father         stomach    Heart Disease Father     Heart Disease Brother     Diabetes Sister     Diabetes Sister     Diabetes Sister     Heart Disease Brother     Cancer Sister     Glaucoma No family hx of     Macular Degeneration No family hx of       Non-contributory for problems with anesthesia    REVIEW OF SYSTEMS:   The patient was asked a 14 point review of systems regarding constitutional symptoms, eye symptoms, ears, nose, mouth, throat symptoms, cardiovascular symptoms, respiratory symptoms, gastrointestinal symptoms, genitourinary symptoms, musculoskeletal symptoms, integumentary symptoms, neurological symptoms, psychiatric symptoms, endocrine symptoms, hematologic/lymphatic symptoms, and allergic/ immunologic symptoms.   The pertinent factors have been included in the HPI and below.  Patient Supplied Answers to Review of Systems      6/13/2018     8:42 AM    ENT ROS   Ears, Nose,  Throat Ringing/noise in ears    Nasal congestion or drainage    Sore throat    Hoarseness   Cardiopulmonary Cough       Physical Examination   The patient underwent a physical examination as described below. The pertinent positive and negative findings are summarized after the description of the examination.  Constitutional: The patient's developmental and nutritional status was assessed. The patient's voice quality was assessed.  Head and Face: The head and face were inspected for deformities. The sinuses were palpated. The salivary glands were palpated. Facial muscle strength was assessed bilaterally.  Eyes: Extraocular movements and primary gaze alignment were assessed.  Ears, Nose, Mouth and Throat: The ears and nose were examined for deformities. The nasal septum, mucosa, and turbinates were inspected by anterior rhinoscopy. The lips, teeth, and gums were examined for abnormalities. The oral mucosa, tongue, palate, tonsils, lateral and posterior pharynx were inspected for the presence of asymmetry or mucosal lesions.    Neck: The tracheal position was noted, and the neck mass palpated to determine if there were any asymmetries, abnormal neck masses, thyromegally, or thyroid nodules.  Respiratory: The nature of the breathing and chest expansion/symmetry was observed.  Cardiovascular: The patient was examined to determine the presence of any edema or jugular venous distension.  Abdomen: The contour of the abdomen was noted.  Lymphatic: The patient was examined for infraclavicular lymphadenopathy.  Musculoskeletal: The patient was inspected for the presence of skeletal deformities.  Extremities: The extremities were examined for any clubbing or cyanosis.  Skin: The skin was examined for inflammatory or neoplastic conditions.  Neurologic: The patient's orientation, mood, and affect were noted. The cranial nerve  functions were examined.  Other pertinent positive and negative findings on physical examination:    OC/OP: no lesions, uvula midline, soft palate elevates symmetrically   Neck: no lesions, no TH tenderness to palpation  All other physical examination findings were within normal limits and noncontributory.    Procedures     Flexible laryngoscopy (CPT 63073)      Pre-procedure diagnosis: dysphonia  Post-procedure diagnosis: same as above  Indication for procedure: Mr. Ennis is a 78 year old male with see above  Procedure(s): Fiberoptic Laryngoscopy    Details of Procedure: After informed consent was obtained, the patient was seated in the examination chair.  The areas of the nasopharynx as well as the hypopharynx were anesthetized with topical 4% lidocaine with 0.25% phenylephrine atomizer.  Examination of the base of tongue was performed first.  Attention was directed to any evidence of masses in the area or evidence of leukoplakia or candidal infection.  Attention was directed to the epiglottis where its size and position was determined and its movement on phonation of the vowel  e .  The piriform sinuses were then inspected for any mass lesions or pooling of secretions.  Attention was then directed to the larynx. The vocal folds were inspected for infection or any areas of leukoplakia, for masses, polypoid degeneration, or hemorrhage.  Having done this, the arytenoids and vocal processes were inspected for erythema or evidence of granuloma formation.  The posterior commissure was then inspected for evidence of inflammatory changes in the mucosa and heaping up of mucosal tissue. The patient was then instructed to say the vowel  e .  Adduction of vocal folds to the midline was observed for any evidence of paresis or paralysis of the larynx or asymmetry in rotation of the larynx to the left or right. The patient was asked to breathe and the degree of abduction was noted bilaterally.  Subglottic view of the larynx was obtained for any additional mass lesions or mucosal changes.  Finally the post cricoid was  "examined for evidence of pooling of secretions, as well as the pharyngeal wall mucosa.   Anesthesia type: 0.25% phenylephrine    Findings:  Anatomic/physiological deviations: RNC,  anterior glottic web, did lidocaine anesthesia, but difficult to see if the vocal folds can split apart today as compared to last time, he had laryngeal spasm    Right vocal process: No restriction of mobility   Left vocal process: No restriction of mobility  Glottal gap: Complete glottal closure  Supraglottic structures: Normal  Hypopharynx: Normal     Estimated Blood Loss: minimal  Complications: None  Disposition: Patient tolerated the procedure well                 Review of Relevant Clinical Data   I personally reviewed:    Labs:  No results found for: \"TSH\"  Lab Results   Component Value Date     01/12/2024    CO2 24 01/12/2024    BUN 23.0 01/12/2024    PHOS 2.6 08/12/2016     Lab Results   Component Value Date    WBC 7.7 01/12/2024    HGB 15.3 01/12/2024    HCT 41.0 01/12/2024    MCV 96 01/12/2024     01/12/2024     Lab Results   Component Value Date    PTT 24 04/11/2013    INR 0.98 04/11/2013     No results found for: \"DUANE\"  No components found for: \"RHEUMATOIDFACTOR\", \"RF\"  Lab Results   Component Value Date    CRP <2.9 07/07/2022    CRP <2.9 01/07/2022    CRP <2.9 07/28/2021    CRP <2.9 03/05/2021    CRP <2.9 11/11/2020     No components found for: \"CKTOT\", \"URICACID\"  No components found for: \"C3\", \"C4\", \"DSDNAAB\", \"NDNAABIFA\"  Lab Results   Component Value Date    MPOAB Not Reported 02/14/2014       Patient reported Quality of Life (QOL) Measures   Patient Supplied Answers To VHI Questionnaire      8/23/2023     8:49 AM   Voice Handicap Index (VHI-10)   My voice makes it difficult for people to hear me 4   People have difficulty understanding me in a noisy room 4   My voice difficulties restrict my personal and social life.  0   I feel left out of conversations because of my voice 2   My voice problem causes me to " "lose income 0   I feel as though I have to strain to produce voice 1   The clarity of my voice is unpredictable 2   My voice problem upsets me 1   My voice makes me feel handicapped 0   People ask, \"What's wrong with your voice?\" 2   VHI-10 16         Patient Supplied Answers To EAT Questionnaire       No data to display                  Patient Supplied Answers To CSI Questionnaire      8/23/2023     8:49 AM   Cough Severity Index (CSI)   My cough is worse when I lie down 0   My coughing problem causes me to restrict my personal and social life 0   I tend to avoid places because of my cough problem 0   I feel embarrassed because of my coughing problem 1   People ask, ''What's wrong?'' because I cough a lot 1   I run out of air when I cough 0   My coughing problem affects my voice 2   My coughing problem limits my physical activity 0   My coughing problem upsets me 0   People ask me if I am sick because I cough a lot 2   CSI Score 6         Patient Supplied Answers to Dyspnea Index Questionnaire:       No data to display                Impression & Plan     IMPRESSION: Mr. Ennis is a 78 year old male who is being seen for the following:    Dysphonia   - anterior commissure lesion seen on scope on 7/31/23  - voice changes worsening for a few months  - prior smoker  - scope shows anterior commissure lesion with extension to the right vocal fold  - given anterior location will require CT neck to rule out thyroid cartilage extension  - discussed awake biopsy - patient in agreement this was done today  - pathology showed SCC  - CT chest had findings of new nodules - after multiple discussions with radiology the decision was made to proceed with surgery and repeat imaging for this finding in the chest  - s/p MDL with bilateral cordectomy with stent placement on 8/30/23  - today shows that the vocal folds closed around the stent - discussed he needs the scar broken up and bigger stent placed - patient in agreement   - " pathology showed SCC with positive margins  - went back for MDL and stent removal on 9/13/23   - symptoms 10/10/2023 are stable, voice is poor  - scope shows  laryngeal web  - discussed he has positive margins - will proceed with repeat surger - if can't clear will need radiation  - ct chest shows improved size of lung nodule of concern  - s/p microlaryngoscopy, CO2 laser resection of vocal fold lesion, steroid injection, stent placement, and biopsy 10/25/23  - pathology shows invasive squamous cell carcinoma  - symptoms 11/16/2023 are stable, voice is poor, discussed case at tumor board, will continue with close observation and repeat biopsies  - scope shows stent in place, vocal folds are open, laryngeal web developed  - discussed removal of stent, then will collect a sample, if the sample is cancerous, will refer for radiation therapy  - s/p microlaryngoscopy, CO2 laser resection of vocal fold lesion, steroid injection, stent removal, and biopsy 12/01/23  - pathology showed detached fragment of squamous epithelium with mild to moderate dysplasia, no evidence of carcinoma  - s/p laryngoscopy and anterior glottic web vocal fold steroid injection 12/11/23      - symptoms 1/4/2024 are has been having a hard time talking with his voice  - strobe shows bilateral vocal fold stiffness and swelling with question of laryngeal web, ventricular phonation  - s/p steroid injection today, does not appear to have a web, but rather vocal fold stiffness and swelling  - discussed treatment for vocal folds will be with steroid injections and voice therapy  - discussed voice result is questionable given severity of ventricular phonation  - symptoms 1/18/2024 are stable  - scope shows anterior glottic web, did lidocaine anesthesia, but difficult to see if the vocal folds can split apart today as compared to last time, he had laryngeal spasm  - discussed that we should focus on surveillance at this time and keep him in voice therapy, I  would not do a repeat stunt placement given the last two did not work   Plan  - continue voice therapy  - q6 week follow-up, CT neck w/ contrast 3 months from December  - discuss case with head/neck about surveillance with repeat DL with possible biopsy       RETURN VISIT: 6 weeks    Scribe Disclosure:   I, Lulu Cortez, am serving as a scribe; to document services personally performed by Josephine Malone MD -based on data collection and the provider's statements to me.     Provider Disclosure:  I agree with above History, Review of Systems, Physical exam and Plan.  I have reviewed the content of the documentation and have edited it as needed. I have personally performed the services documented here and the documentation accurately represents those services and the decisions I have made.        Josephine Malone MD    Laryngology    [facility]  Department of  Otolaryngology - Head and Neck Surgery  [facility]  [address] [place] [id number]  Appointment line: [phone]  Fax: [phone]  [email or web address]

## 2024-01-19 ENCOUNTER — TELEPHONE (OUTPATIENT)
Dept: OTOLARYNGOLOGY | Facility: CLINIC | Age: 79
End: 2024-01-19
Payer: COMMERCIAL

## 2024-01-19 NOTE — TELEPHONE ENCOUNTER
Called patient back, patient was prescribed 3 months of protonix after the procedure, given it has been more than three months after the procedure patient can stop medication. Nina Cardenas RN on 1/19/2024 at 2:58 PM

## 2024-01-19 NOTE — TELEPHONE ENCOUNTER
[facility] Call Center    Phone Message    May a detailed message be left on voicemail: yes     Reason for Call: Per pt's SO pt wants to stop taking his Protonix if possible. Please call to discuss. Thank you    Action Taken: Message routed to:  [facility] ([facility]): ENT    Travel Screening: Not Applicable

## 2024-01-22 ENCOUNTER — OFFICE VISIT (OUTPATIENT)
Dept: OTOLARYNGOLOGY | Facility: CLINIC | Age: 79
End: 2024-01-22
Payer: COMMERCIAL

## 2024-01-22 ENCOUNTER — OFFICE VISIT (OUTPATIENT)
Dept: AUDIOLOGY | Facility: CLINIC | Age: 79
End: 2024-01-22
Attending: OTOLARYNGOLOGY
Payer: COMMERCIAL

## 2024-01-22 DIAGNOSIS — J38.7 LARYNGEAL HYPERFUNCTION: ICD-10-CM

## 2024-01-22 DIAGNOSIS — H90.3 SENSORINEURAL HEARING LOSS (SNHL) OF BOTH EARS: Primary | ICD-10-CM

## 2024-01-22 DIAGNOSIS — R49.0 DYSPHONIA: Primary | ICD-10-CM

## 2024-01-22 DIAGNOSIS — C32.9 LARYNGEAL CANCER (H): ICD-10-CM

## 2024-01-22 DIAGNOSIS — Q31.0 ANTERIOR GLOTTIC WEB OF LARYNX: ICD-10-CM

## 2024-01-22 PROCEDURE — 92507 TX SP LANG VOICE COMM INDIV: CPT | Mod: GN | Performed by: SPEECH-LANGUAGE PATHOLOGIST

## 2024-01-22 PROCEDURE — 92550 TYMPANOMETRY & REFLEX THRESH: CPT | Performed by: AUDIOLOGIST-HEARING AID FITTER

## 2024-01-22 PROCEDURE — 92557 COMPREHENSIVE HEARING TEST: CPT | Performed by: AUDIOLOGIST-HEARING AID FITTER

## 2024-01-22 NOTE — PROGRESS NOTES
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    Laryngeal evaluation demonstrated white crusting at the anterior commissure supraglottically and infraglottically:; white crusting and redness at the anterior portion (superior surface) of the right vocal fold and the mid portion at the superior surface and medial edge; white crusting and redness at the vibratory edge/superior surface of the left vocal fold at the middle portion that extends towards posterior portion vibratory edge and this also extends laterally at the middle superior surface; Narrow Band Imaging yielded the following: prominent white crusting observed at the anterior commissure, right,and left vocal fold with prominent bilateral erythema; mild-moderate posterior commissure hypertrophy; possible white crusting at the left medial arytenoid wall but unclear; bilateral ventricular approximation during phonation; L>R; severe four-way constriction of the supraglottic larynx during connected speech; frequent breath holding observed; RTVF Amplitude is marked at the site of anterior lesion (stiff) but mildly reduced at the posterior 2/3 portion of the vocal fold; LTVF amplitude is mildly reduced; mucosal wave of right and left vocal folds is mildly reduced;  asymmetry observed; on phonation, glottic closure is " incomplete due to irregularity; irregular-shaped configuration of the glottis during many phonatory tasks  Perceptual evaluation demonstrated marked-severe roughness; moderate breathiness; moderate asthenia; clearer and louder voice with laugh and when he reacted (a loud exclamataion) to the Lidocaine spray;and he often spoke at a higher pitch (G3-A3) than is typical for a man of his age and gender.      STIMULABILITY: results of therapy probes during perceptual and laryngeal evaluation demonstrate improvement with flow phonation probes.   RECOMMENDATIONS:   A course of speech therapy is recommended to improve voice quality and optimize surgical results.  He demonstrates a Good prognosis for improvement given adherence to therapeutic recommendations.        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 Perceptually, his voice is very weak today, and he is talking minimally during our session, as he is just 7 days post-surgery. Laryngoscopy today demonstrated white scar tissue at the anterior 2/3s of the vocal folds, creating webbing anteriorly. Therefore, Dr. Malone plans on reducing this anterior web surgically, placing a larger stent, and injecting steroid into the anterior commissure next week. Formal re-evaluation is recommended at his next post-op appointment prior to initiating a full course of voice therapy. Perioperative care instructions were reviewed with Joshua and his wife today, as they will be repeating this process next week and had questions about gradually increasing his voice use. Joshua is in agreement with this plan of care.         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   Remarkable findings included:  Perceptual evaluation demonstrated: moderate to severe breathiness; moderate to severe strain; he is completely aphonic; and severely reduced conversational speech.   Laryngeal exam demonstrated: anterior laryngeal web; bilateral erythema L>R; irritation at the left medial arytenoid wall; moderate posterior commissure hypertrophy; mild presence of bubbly and sticky secretions on the vocal folds and throughout the laryngeal area; Narrow Band Imaging yielded the following:  white discoloration at the anterior portion of the anterior laryngeal web and at the irritation at the left medial arytenoid wall     RECOMMENDATIONS per Dr. 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 Perceptually, his voice is very weak today with hints of phonation secondary to stent placement at the anterior commissure and compensatory hyperfunction to achieve phonation. Laryngoscopy today demonstrated white tissue at the anterior-most portion of the true vocal folds, just anterior to the stent, as well as generalized erythema of the true vocal folds. Joshua will be undergoing another procedure with Dr. Malone in the coming weeks to confirm negative margins and hopefully remove the stent. If the stent is removed and margins are negative, we can then proceed with a course of voice therapy. If margins are positive, then Joshua will likely need additional treatments (e.g.  radiation). I will keep in contact with Dr. Malone after the procedure and plan accordingly. Joshua and his wife did not have any additional questions about perioperative care today and are in agreement with this plan of care.       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 Perceptually, his voice was moderately to severely weak and breathy with moderate strain and roughness, as well as elevated pitch compared to age- and gender-matched peers. Given this unsuspected worsening of voice quality, laryngoscopy was performed today by Dr. Malone and demonstrated generalized erythema of the true vocal folds with increased edema, but not reformation of the anterior glottic web, at the anterior commissure. General vocal fold pliability was significant diminished, and excessive laryngeal hyperfunction was observed with little response to therapeutic probing.  Therefore, Dr. Malone provided a steroid injection into the true vocal folds today, and perioperative voice recommendations were reviewed (e.g. SOVT exercises, rescue breathing) to aid with repairing the delicate vocal fold layers. Written instructions were provided. Joshua is in agreement with this plan of care.        SUBJECTIVE:  Planned to receive another steroid injection (#2) with Dr. Malone on 1/18/24 but could not be completed due to laryngospasm  Has continued the transoral lip buzz at high pitch, but this is difficult to perform well consistently  Has not continued rescue breathing with nasal inhalation  Times where the voice will sound better/stronger than others but is generally doing poorly/similarly to most recent session  Denies dyspnea and dysphagia  Some harsh coughing in the mornings secondary to thick mucus  Audiology visit scheduled for  "after appointment today      OBJECTIVE/ASSESSMENT:    Patient Supplied Answers To Last 2 VHI Questionnaires      8/23/2023     8:49 AM 1/22/2024     1:02 PM   Voice Handicap Index (VHI-10)   My voice makes it difficult for people to hear me 4 4   People have difficulty understanding me in a noisy room 4 4   My voice difficulties restrict my personal and social life.  0 2   I feel left out of conversations because of my voice 2 3   My voice problem causes me to lose income 0 0   I feel as though I have to strain to produce voice 1 4   The clarity of my voice is unpredictable 2 1   My voice problem upsets me 1 2   My voice makes me feel handicapped 0 2   People ask, \"What's wrong with your voice?\" 2 2   VHI-10 16 24     Patient Supplied Answers To Last 2 CSI Questionnaires      8/23/2023     8:49 AM   Cough Severity Index (CSI)   My cough is worse when I lie down 0   My coughing problem causes me to restrict my personal and social life 0   I tend to avoid places because of my cough problem 0   I feel embarrassed because of my coughing problem 1   People ask, ''What's wrong?'' because I cough a lot 1   I run out of air when I cough 0   My coughing problem affects my voice 2   My coughing problem limits my physical activity 0   My coughing problem upsets me 0   People ask me if I am sick because I cough a lot 2   CSI Score 6       THERAPEUTIC ACTIVITIES:  Vocal hygiene concepts were discussed with the patient to optimize vocal health. Systemic and topical hydration was emphasized. Joshua reports frequently coughing up thick mucus. It has been recommended that he incorporate nasal saline irrigation and salt water gargles to aid with topical hydration.     Semi-Occluded Vocal Tract Exercises (SOVTs) are a series of exercises aimed to recoordinate respiration, phonation, and resonance to produce an effortless, clear voice. Such exercises include straw phonation with or without water resistance, lip trills, humming/Basic " "Training Gesture for Resonant Voice Therapy, and transoral lip buzz/Basic Training Gesture for Vocal Function Exercises at comfortable speaking pitches and glissando tasks. These exercises were reviewed today, and Joshua performed them with low accuracy with maximal clinician cueing and modeling. Tissue paper was use for visual biofeedback of airflow. Deep inspiration prior to each repetition lead to a clearer voice quality with improved airflow. Straw phonation lead to a clearer voice with reduced straining today compared to modified transoral lip buzz    Rescue breathing techniques help achieve maximal glottic opening during inspiration and maintenance of the airway during expiration during episodes of dyspnea secondary to VCD/PVFM. Inhalation with pursed lips or three gentle sniffs is trained, and prolonged exhalation is performed with a high pressure, voiceless fricative such as \"sh.\" These exercises were reviewed today, and Joshua performed them with high independent accuracy. 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 He continues voice exercises without perceptual improvements with his voice thus far. Exercises were adjusted to feature straw phonation without water resistance secondary to improved stimulability. He will be undergoing a hearing test immediately following our appointment today       PLAN:  Joshua will perform SOVT exercises 5 times daily for 2-3 minutes between sessions  Joshua will perform rescue breathing techniques intermittently throughout the day  Joshua will adhere to vocal hygiene recommendations, particularly topical " hydration  Written instructions were provided to aid home programming via printed AVS  Joshua continues to demonstrate adequate progress toward long- and short-term goals.  Continued voice therapy services are recommended. Joshua will follow-up for additional sessions on 24. Current goals will continue to be addressed.  Joshua is in agreement with this plan of care.      SLP PLAN:  Voice SLP presence at next appointment with laryngologist (e.g. Dr. Malone, Dr. Jimenez, Dr. Reid) is recommended, if possible, to monitor laryngeal status. Next scheduled MD appointment is 3/14/24 with Senait Jefferson PA-C, for routine surveillance, as Dr. Malone will be out on leave.      BILLING SUMMARY:  Total treatment time: 59 minutes from 1:02 - 2:01 PM (including 15 minutes of chart review and preparation, interpretation of testing and therapeutic maneuvers, and document writing)  Speech Pathology Treatment (20813)      Kym Mireles MS CCC-SLP  Speech-Language Pathologist  [facility]  Department of Otolaryngology - Head and Neck Surgery  [facility]  [email or web address]  Direct: [phone]  [phone]    *This note may have been completed using [id number] dictation software, so errors may exist. Please contact me for clarification if needed*

## 2024-01-22 NOTE — PROGRESS NOTES
AUDIOLOGY REPORT    SUMMARY: Audiology visit completed. See audiogram for results.      RECOMMENDATIONS:   Follow-up with Dr. Malone in ENT.   Consistent use of hearing protection.  Consider hearing aid trial if patient interest/motivation.  [facility] reports [facility] if [facility] has [facility] benefits [facility].     The patient expressed understanding and agreement with this plan.    Susie Arguelles, CCC-A, [facility]  Licensed Audiologist  [place] #[id number]

## 2024-01-22 NOTE — LETTER
"1/22/2024       RE: Joshua Ennis  [address] [facility] [address]     Dear Colleague,    Thank you for referring your patient, Joshua Ennis, to the [facility] [place] at [facility]. Please see a copy of my visit note below.    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    Laryngeal evaluation demonstrated white crusting at the anterior commissure supraglottically and infraglottically:; white crusting and redness at the anterior portion (superior surface) of the right vocal fold and the mid portion at the superior surface and medial edge; white crusting and redness at the vibratory edge/superior surface of the left vocal fold at the middle portion that extends towards posterior portion vibratory edge and this also extends laterally at the middle superior surface; Narrow Band Imaging yielded the following: prominent white crusting observed at the anterior commissure, right,and left vocal fold with prominent bilateral erythema; mild-moderate posterior commissure hypertrophy; possible white crusting at the left medial arytenoid wall but unclear; bilateral ventricular approximation during phonation; L>R; severe four-way constriction of the supraglottic larynx during connected speech; frequent " breath holding observed; RTVF Amplitude is marked at the site of anterior lesion (stiff) but mildly reduced at the posterior 2/3 portion of the vocal fold; LTVF amplitude is mildly reduced; mucosal wave of right and left vocal folds is mildly reduced;  asymmetry observed; on phonation, glottic closure is incomplete due to irregularity; irregular-shaped configuration of the glottis during many phonatory tasks  Perceptual evaluation demonstrated marked-severe roughness; moderate breathiness; moderate asthenia; clearer and louder voice with laugh and when he reacted (a loud exclamataion) to the Lidocaine spray;and he often spoke at a higher pitch (G3-A3) than is typical for a man of his age and gender.      STIMULABILITY: results of therapy probes during perceptual and laryngeal evaluation demonstrate improvement with flow phonation probes.   RECOMMENDATIONS:   A course of speech therapy is recommended to improve voice quality and optimize surgical results.  He demonstrates a Good prognosis for improvement given adherence to therapeutic recommendations.        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 Perceptually, his voice is very weak today, and he is talking minimally during our session, as he is just 7 days post-surgery. Laryngoscopy today demonstrated white scar tissue at the anterior 2/3s of the vocal folds, creating webbing anteriorly. Therefore, Dr. Malone plans on reducing this anterior web surgically, placing a larger stent, and injecting steroid into the anterior commissure next week. Formal re-evaluation is recommended at his next post-op appointment prior to initiating a full course of voice therapy. Perioperative care instructions were reviewed with Joshua  and his wife today, as they will be repeating this process next week and had questions about gradually increasing his voice use. Joshua is in agreement with this plan of care.         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   Remarkable findings included:  Perceptual evaluation demonstrated: moderate to severe breathiness; moderate to severe strain; he is completely aphonic; and severely reduced conversational speech.   Laryngeal exam demonstrated: anterior laryngeal web; bilateral erythema L>R; irritation at the left medial arytenoid wall; moderate posterior commissure hypertrophy; mild presence of bubbly and sticky secretions on the vocal folds and throughout the laryngeal area; Narrow Band Imaging yielded the following:  white discoloration at the anterior portion of the anterior laryngeal web and at the irritation at the left medial arytenoid wall     RECOMMENDATIONS per Dr. 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 Perceptually, his voice is very weak today with hints of phonation secondary to stent placement at the anterior commissure and compensatory hyperfunction to achieve phonation. Laryngoscopy today demonstrated white tissue at the anterior-most portion of the true vocal folds, just anterior to the stent, as well as generalized erythema of the true vocal folds. Joshua will be undergoing  another procedure with Dr. Malone in the coming weeks to confirm negative margins and hopefully remove the stent. If the stent is removed and margins are negative, we can then proceed with a course of voice therapy. If margins are positive, then Joshua will likely need additional treatments (e.g. radiation). I will keep in contact with Dr. Malone after the procedure and plan accordingly. Joshua and his wife did not have any additional questions about perioperative care today and are in agreement with this plan of care.       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 Perceptually, his voice was moderately to severely weak and breathy with moderate strain and roughness, as well as elevated pitch compared to age- and gender-matched peers. Given this unsuspected worsening of voice quality, laryngoscopy was performed today by Dr. Malone and demonstrated generalized erythema of the true vocal folds with increased edema, but not reformation of the anterior glottic web, at the anterior commissure. General vocal fold pliability was significant diminished, and excessive laryngeal hyperfunction was observed with little response to therapeutic probing.  Therefore, Dr. Malone provided a steroid injection into the true vocal folds today, and perioperative voice recommendations were reviewed (e.g. SOVT exercises, rescue breathing) to aid with repairing the delicate vocal fold layers. Written instructions were provided. Joshua is in agreement with this plan of care.        SUBJECTIVE:  Planned to receive another steroid injection (#2) with Dr. Malone on 1/18/24 but could not be completed due to laryngospasm  Has continued the transoral lip buzz at high pitch, but this is difficult to perform well consistently  Has not  "continued rescue breathing with nasal inhalation  Times where the voice will sound better/stronger than others but is generally doing poorly/similarly to most recent session  Denies dyspnea and dysphagia  Some harsh coughing in the mornings secondary to thick mucus  Audiology visit scheduled for after appointment today      OBJECTIVE/ASSESSMENT:    Patient Supplied Answers To Last 2 VHI Questionnaires      8/23/2023     8:49 AM 1/22/2024     1:02 PM   Voice Handicap Index (VHI-10)   My voice makes it difficult for people to hear me 4 4   People have difficulty understanding me in a noisy room 4 4   My voice difficulties restrict my personal and social life.  0 2   I feel left out of conversations because of my voice 2 3   My voice problem causes me to lose income 0 0   I feel as though I have to strain to produce voice 1 4   The clarity of my voice is unpredictable 2 1   My voice problem upsets me 1 2   My voice makes me feel handicapped 0 2   People ask, \"What's wrong with your voice?\" 2 2   VHI-10 16 24     Patient Supplied Answers To Last 2 CSI Questionnaires      8/23/2023     8:49 AM   Cough Severity Index (CSI)   My cough is worse when I lie down 0   My coughing problem causes me to restrict my personal and social life 0   I tend to avoid places because of my cough problem 0   I feel embarrassed because of my coughing problem 1   People ask, ''What's wrong?'' because I cough a lot 1   I run out of air when I cough 0   My coughing problem affects my voice 2   My coughing problem limits my physical activity 0   My coughing problem upsets me 0   People ask me if I am sick because I cough a lot 2   CSI Score 6       THERAPEUTIC ACTIVITIES:  Vocal hygiene concepts were discussed with the patient to optimize vocal health. Systemic and topical hydration was emphasized. Joshua reports frequently coughing up thick mucus. It has been recommended that he incorporate nasal saline irrigation and salt water gargles to aid " "with topical hydration.     Semi-Occluded Vocal Tract Exercises (SOVTs) are a series of exercises aimed to recoordinate respiration, phonation, and resonance to produce an effortless, clear voice. Such exercises include straw phonation with or without water resistance, lip trills, humming/Basic Training Gesture for Resonant Voice Therapy, and transoral lip buzz/Basic Training Gesture for Vocal Function Exercises at comfortable speaking pitches and glissando tasks. These exercises were reviewed today, and Joshua performed them with low accuracy with maximal clinician cueing and modeling. Tissue paper was use for visual biofeedback of airflow. Deep inspiration prior to each repetition lead to a clearer voice quality with improved airflow. Straw phonation lead to a clearer voice with reduced straining today compared to modified transoral lip buzz    Rescue breathing techniques help achieve maximal glottic opening during inspiration and maintenance of the airway during expiration during episodes of dyspnea secondary to VCD/PVFM. Inhalation with pursed lips or three gentle sniffs is trained, and prolonged exhalation is performed with a high pressure, voiceless fricative such as \"sh.\" These exercises were reviewed today, and Joshua performed them with high independent accuracy. 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 He continues voice exercises without perceptual improvements with his voice thus far. Exercises were adjusted to feature straw phonation without water resistance secondary to improved stimulability. He will be undergoing a hearing test immediately " following our appointment today       PLAN:  Joshua will perform SOVT exercises 5 times daily for 2-3 minutes between sessions  Joshua will perform rescue breathing techniques intermittently throughout the day  Joshua will adhere to vocal hygiene recommendations, particularly topical hydration  Written instructions were provided to aid home programming via printed AVS  Joshua continues to demonstrate adequate progress toward long- and short-term goals.  Continued voice therapy services are recommended. Joshua will follow-up for additional sessions on 24. Current goals will continue to be addressed.  Joshua is in agreement with this plan of care.      SLP PLAN:  Voice SLP presence at next appointment with laryngologist (e.g. Dr. Malone, Dr. Jimenez, Dr. Reid) is recommended, if possible, to monitor laryngeal status. Next scheduled MD appointment is 3/14/24 with Senait Jefferson PA-C, for routine surveillance, as Dr. Malone will be out on leave.      BILLING SUMMARY:  Total treatment time: 59 minutes from 1:02 - 2:01 PM (including 15 minutes of chart review and preparation, interpretation of testing and therapeutic maneuvers, and document writing)  Speech Pathology Treatment (58003)      Kym Mireles MS CCC-SLP  Speech-Language Pathologist  [facility]  Department of Otolaryngology - Head and Neck Surgery  [facility]  [email or web address]  Direct: [phone]  [phone]    *This note may have been completed using [id number] dictation software, so errors may exist. Please contact me for clarification if needed*      Again, thank you for allowing me to participate in the care of your patient.      Sincerely,    Kym Mireles, JOVANNI

## 2024-01-22 NOTE — PATIENT INSTRUCTIONS
"    Saline gargle recipe  8 oz water  1/4 tsp of salt  1/4 tsp of baking soda    Look into a humidifier, especially at night      You can do these as many times per day as you want        Voice exercises    Sniffs  3 sniffs in, long \"sh\" out  Do this multiple times per day - you can't overdo these  These are to help your BREATHING by opening your vocal cords  \"Hooooooooooo\"  Use the straw and Kleenex to monitor your air  Do these multiple times per day but keep it at 1-2 minutes  We can the VOICE/vocal cords to stay pliable  Make sure this is EFFORTLESS and gentle and CLEAR  Take a DEEP BREATH before each time - it'll sound better and feel easier  "

## 2024-01-29 DIAGNOSIS — M31.30 GRANULOMATOSIS WITH POLYANGIITIS, UNSPECIFIED WHETHER RENAL INVOLVEMENT (H): ICD-10-CM

## 2024-01-29 DIAGNOSIS — Z79.899 HIGH RISK MEDICATIONS (NOT ANTICOAGULANTS) LONG-TERM USE: ICD-10-CM

## 2024-01-30 ENCOUNTER — TELEPHONE (OUTPATIENT)
Dept: RHEUMATOLOGY | Facility: CLINIC | Age: 79
End: 2024-01-30
Payer: COMMERCIAL

## 2024-01-30 NOTE — TELEPHONE ENCOUNTER
Results mailed to patient per direction of Dr. Sanchez.    Yadira Miranda, BSN, RN  RN Care Coordinator Rheumatology

## 2024-02-01 NOTE — TELEPHONE ENCOUNTER
methotrexate sodium 2.5 MG TABS      Last Written Prescription Date:  7/13/2023  Last Fill Quantity: 48,   # refills: 1  Last Office Visit: 7/6/2023  [facility] Ede Moran MD     Future Office visit:  7/11/2024     CBC RESULTS:   Recent Labs   Lab Test 01/12/24  1410   WBC 7.7   RBC 4.28*   HGB 15.3   HCT 41.0   MCV 96   MCH 35.7*   MCHC 37.3*   RDW 13.0          Creatinine   Date Value Ref Range Status   01/12/2024 1.51 (H) 0.67 - 1.17 mg/dL Final   03/05/2021 1.48 (H) 0.66 - 1.25 mg/dL Final   ]    Liver Function Studies -   Recent Labs   Lab Test 01/12/24  1410 01/03/24  1100   PROTTOTAL  --  7.4   ALBUMIN 4.1 4.4   BILITOTAL  --  0.7   ALKPHOS  --  103   AST 21 23   ALT 21 28       Routing refill request to provider for review/approval because:  Pended 90 days +1 refill, PROVIDER MUST AUTHORIZE

## 2024-02-02 RX ORDER — METHOTREXATE 2.5 MG/1
10 TABLET ORAL WEEKLY
Qty: 48 TABLET | Refills: 1 | Status: SHIPPED | OUTPATIENT
Start: 2024-02-02 | End: 2024-05-24

## 2024-02-02 NOTE — TELEPHONE ENCOUNTER
Follow up call to patient, spoke with Earlene.  She confirmed that patient  has continued on his methotrexate, 4 tablets weekly.  He takes it on Sundays.    He has been holding it one week before and one week after any surgical procedure.    Will update Dr. Sanchez.    Yadira Miranda, KATIUSKAN, RN  RN Care Coordinator Rheumatology

## 2024-02-12 ENCOUNTER — OFFICE VISIT (OUTPATIENT)
Dept: OTOLARYNGOLOGY | Facility: CLINIC | Age: 79
End: 2024-02-12
Payer: COMMERCIAL

## 2024-02-12 DIAGNOSIS — J38.7 LARYNGEAL HYPERFUNCTION: ICD-10-CM

## 2024-02-12 DIAGNOSIS — Q31.0 ANTERIOR GLOTTIC WEB OF LARYNX: ICD-10-CM

## 2024-02-12 DIAGNOSIS — R49.0 DYSPHONIA: Primary | ICD-10-CM

## 2024-02-12 DIAGNOSIS — C32.9 LARYNGEAL CANCER (H): ICD-10-CM

## 2024-02-12 PROCEDURE — 92507 TX SP LANG VOICE COMM INDIV: CPT | Mod: GN | Performed by: SPEECH-LANGUAGE PATHOLOGIST

## 2024-02-12 NOTE — PROGRESS NOTES
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    Laryngeal evaluation demonstrated white crusting at the anterior commissure supraglottically and infraglottically:; white crusting and redness at the anterior portion (superior surface) of the right vocal fold and the mid portion at the superior surface and medial edge; white crusting and redness at the vibratory edge/superior surface of the left vocal fold at the middle portion that extends towards posterior portion vibratory edge and this also extends laterally at the middle superior surface; Narrow Band Imaging yielded the following: prominent white crusting observed at the anterior commissure, right,and left vocal fold with prominent bilateral erythema; mild-moderate posterior commissure hypertrophy; possible white crusting at the left medial arytenoid wall but unclear; bilateral ventricular approximation during phonation; L>R; severe four-way constriction of the supraglottic larynx during connected speech; frequent breath holding observed; RTVF Amplitude is marked at the site of anterior lesion (stiff) but mildly reduced at the posterior 2/3 portion of the vocal fold; LTVF amplitude is mildly reduced; mucosal wave of right and left vocal folds is mildly reduced;  asymmetry observed; on phonation, glottic closure is " incomplete due to irregularity; irregular-shaped configuration of the glottis during many phonatory tasks  Perceptual evaluation demonstrated marked-severe roughness; moderate breathiness; moderate asthenia; clearer and louder voice with laugh and when he reacted (a loud exclamataion) to the Lidocaine spray;and he often spoke at a higher pitch (G3-A3) than is typical for a man of his age and gender.      STIMULABILITY: results of therapy probes during perceptual and laryngeal evaluation demonstrate improvement with flow phonation probes.   RECOMMENDATIONS:   A course of speech therapy is recommended to improve voice quality and optimize surgical results.  He demonstrates a Good prognosis for improvement given adherence to therapeutic recommendations.        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 Perceptually, his voice is very weak today, and he is talking minimally during our session, as he is just 7 days post-surgery. Laryngoscopy today demonstrated white scar tissue at the anterior 2/3s of the vocal folds, creating webbing anteriorly. Therefore, Dr. Malone plans on reducing this anterior web surgically, placing a larger stent, and injecting steroid into the anterior commissure next week. Formal re-evaluation is recommended at his next post-op appointment prior to initiating a full course of voice therapy. Perioperative care instructions were reviewed with Joshua and his wife today, as they will be repeating this process next week and had questions about gradually increasing his voice use. Joshua is in agreement with this plan of care.         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   Remarkable findings included:  Perceptual evaluation demonstrated: moderate to severe breathiness; moderate to severe strain; he is completely aphonic; and severely reduced conversational speech.   Laryngeal exam demonstrated: anterior laryngeal web; bilateral erythema L>R; irritation at the left medial arytenoid wall; moderate posterior commissure hypertrophy; mild presence of bubbly and sticky secretions on the vocal folds and throughout the laryngeal area; Narrow Band Imaging yielded the following:  white discoloration at the anterior portion of the anterior laryngeal web and at the irritation at the left medial arytenoid wall     RECOMMENDATIONS per Dr. 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 Perceptually, his voice is very weak today with hints of phonation secondary to stent placement at the anterior commissure and compensatory hyperfunction to achieve phonation. Laryngoscopy today demonstrated white tissue at the anterior-most portion of the true vocal folds, just anterior to the stent, as well as generalized erythema of the true vocal folds. Joshua will be undergoing another procedure with Dr. Malone in the coming weeks to confirm negative margins and hopefully remove the stent. If the stent is removed and margins are negative, we can then proceed with a course of voice therapy. If margins are positive, then Joshua will likely need additional treatments (e.g.  "radiation). I will keep in contact with Dr. Malone after the procedure and plan accordingly. Joshua and his wife did not have any additional questions about perioperative care today and are in agreement with this plan of care.       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 Perceptually, his voice was moderately to severely weak and breathy with moderate strain and roughness, as well as elevated pitch compared to age- and gender-matched peers. Given this unsuspected worsening of voice quality, laryngoscopy was performed today by Dr. Malone and demonstrated generalized erythema of the true vocal folds with increased edema, but not reformation of the anterior glottic web, at the anterior commissure. General vocal fold pliability was significant diminished, and excessive laryngeal hyperfunction was observed with little response to therapeutic probing.  Therefore, Dr. Malone provided a steroid injection into the true vocal folds today, and perioperative voice recommendations were reviewed (e.g. SOVT exercises, rescue breathing) to aid with repairing the delicate vocal fold layers. Written instructions were provided. Joshua is in agreement with this plan of care.        SUBJECTIVE:  Audiogram completed 1/22/24  \"R normal hearing through 1000 Hz, sloping to moderate to severe sensorineural hearing loss at 1500 Hz and above. Left normal hearing through 500 Hz, sloping to mild to severe sensorineural hearing loss at 1000 Hz and above... Recommendations: consistent use of hearing protection. Consider hearing aid trial if patient is interested/motivated. Patient reports [facility] if [facility] benefits\"  I printed off audiogram today [facility]  Voice is stable  Has " "continued exercises as recommended  Still struggles with being heard, high pitch  Some improvement in roughness      OBJECTIVE/ASSESSMENT:    Patient Supplied Answers To Last 2 VHI Questionnaires      8/23/2023     8:49 AM 1/22/2024     1:02 PM   Voice Handicap Index (VHI-10)   My voice makes it difficult for people to hear me 4 4   People have difficulty understanding me in a noisy room 4 4   My voice difficulties restrict my personal and social life.  0 2   I feel left out of conversations because of my voice 2 3   My voice problem causes me to lose income 0 0   I feel as though I have to strain to produce voice 1 4   The clarity of my voice is unpredictable 2 1   My voice problem upsets me 1 2   My voice makes me feel handicapped 0 2   People ask, \"What's wrong with your voice?\" 2 2   VHI-10 16 24     Patient Supplied Answers To Last 2 CSI Questionnaires      8/23/2023     8:49 AM   Cough Severity Index (CSI)   My cough is worse when I lie down 0   My coughing problem causes me to restrict my personal and social life 0   I tend to avoid places because of my cough problem 0   I feel embarrassed because of my coughing problem 1   People ask, ''What's wrong?'' because I cough a lot 1   I run out of air when I cough 0   My coughing problem affects my voice 2   My coughing problem limits my physical activity 0   My coughing problem upsets me 0   People ask me if I am sick because I cough a lot 2   CSI Score 6       THERAPEUTIC ACTIVITIES:  Semi-Occluded Vocal Tract Exercises (SOVTs) are a series of exercises aimed to recoordinate respiration, phonation, and resonance to produce an effortless, clear voice. Such exercises include straw phonation with or without water resistance, lip trills, humming/Basic Training Gesture for Resonant Voice Therapy, and transoral lip buzz/Basic Training Gesture for Vocal Function Exercises at comfortable speaking pitches and glissando tasks. These exercises were reviewed today, and Joshua" performed them with appropriate accuracy; however, he was limited to a singular pitch above his modal pitch. Tissue paper was use for visual biofeedback of airflow. Deep inspiration prior to each repetition lead to a clearer voice quality with improved airflow. Straw phonation lead to a clearer voice with reduced straining today compared to modified transoral lip buzz. Intermittent roughness was observed, and Joshua endorsed being able to identify this. He was encourage to continue using strong airflow and prioritizing clarity.      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 Overall, he feels that his voice is stable with some improvement in roughness. At this time, his prognosis is unknown for continued improvements in his voice secondary to post-operative swelling and scarring, as well as his vocal folds' tendency to grow anterior glottic webs. At this time, Joshua will continue voice therapy exercises (e.g. SOVTs, rescue breathing) between sessions. He will continue to follow-up for laryngoscopy with the ENT department to monitor his larynx and catch recurrences in a timely manner. We will connect in 1 month to ensure that he is remaining stable. We discussed that surgical intervention (if appropriate) needs to be considered that this will need to wait until Dr. Malone returns this summer from maternity leave.       PLAN:  Joshua will perform SOVT exercises 5 times daily for 2-3 minutes between sessions  Joshua will perform rescue breathing techniques intermittently throughout the day  Joshua will adhere to vocal hygiene recommendations, particularly topical hydration  Written instructions were provided to aid home programming via printed AVS  Joshua continues  to demonstrate adequate progress toward long- and short-term goals.  Continued voice therapy services are recommended. Joshua will follow-up for additional sessions on 3/21/24 via telephone call. Current goals will continue to be addressed.  Joshua is in agreement with this plan of care.      SLP PLAN:  Voice SLP presence at next appointment with laryngologist (e.g. Dr. Malone, Dr. Jimenez, Dr. Reid) is recommended, if possible, to monitor laryngeal status. Next scheduled MD appointment is 3/14/24 with Senait Jefferson PA-C, for routine surveillance, as Dr. Malone will be out on leave.      BILLING SUMMARY:  Total treatment time: 40 minutes (including 13 minutes of chart review and preparation, interpretation of testing and therapeutic maneuvers, and document writing)  Speech Pathology Treatment (35092)      Kym Mireles MS CCC-SLP  Speech-Language Pathologist  [facility]  Department of Otolaryngology - Head and Neck Surgery  [facility]  [email or web address]  Direct: [phone]  [phone]    *This note may have been completed using [id number] dictation software, so errors may exist. Please contact me for clarification if needed*

## 2024-02-12 NOTE — LETTER
"2/12/2024       RE: Joshua Ennis  [address] [facility] [address]     Dear Colleague,    Thank you for referring your patient, Joshua Ennis, to the [facility] VOICE CLINIC [place] [facility]. Please see a copy of my visit note below.    [facility]  VOICE / UPPER AIRWAY TREATMENT NOTE (CPT 55799)      Patient's name: Joshua Ennis  Date of Session: 2/12/24  Providing SLP: Kym Mireles MS CCC-SLP  Referring Provider: Josephine Malone MD  Insurance Coverage: [employer]  Total # of SLP Visits: 3 in this course of therapy  # of SLP Therapy Sessions: \" \"  Session Location: [facility]  Others in Attendance: his friend, Gray       Impressions from initial evaluation on 8/15/23 with Oksana Bell CCC-SLP:     Dysphonia (R49.0)  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    Laryngeal evaluation demonstrated white crusting at the anterior commissure supraglottically and infraglottically:; white crusting and redness at the anterior portion (superior surface) of the right vocal fold and the mid portion at the superior surface and medial edge; white crusting and redness at the vibratory edge/superior surface of the left vocal fold at the middle portion that extends towards posterior portion vibratory edge and this also extends laterally at the middle superior surface; Narrow Band Imaging yielded the following: prominent white crusting observed at the anterior commissure, right,and left vocal fold with prominent bilateral erythema; mild-moderate posterior commissure hypertrophy; possible white crusting at the left medial arytenoid wall but unclear; bilateral ventricular approximation during phonation; L>R; severe four-way constriction of the supraglottic larynx during connected speech; frequent " breath holding observed; RTVF Amplitude is marked at the site of anterior lesion (stiff) but mildly reduced at the posterior 2/3 portion of the vocal fold; LTVF amplitude is mildly reduced; mucosal wave of right and left vocal folds is mildly reduced;  asymmetry observed; on phonation, glottic closure is incomplete due to irregularity; irregular-shaped configuration of the glottis during many phonatory tasks  Perceptual evaluation demonstrated marked-severe roughness; moderate breathiness; moderate asthenia; clearer and louder voice with laugh and when he reacted (a loud exclamataion) to the Lidocaine spray;and he often spoke at a higher pitch (G3-A3) than is typical for a man of his age and gender.      STIMULABILITY: results of therapy probes during perceptual and laryngeal evaluation demonstrate improvement with flow phonation probes.   RECOMMENDATIONS:   A course of speech therapy is recommended to improve voice quality and optimize surgical results.  He demonstrates a Good prognosis for improvement given adherence to therapeutic recommendations.        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 Perceptually, his voice is very weak today, and he is talking minimally during our session, as he is just 7 days post-surgery. Laryngoscopy today demonstrated white scar tissue at the anterior 2/3s of the vocal folds, creating webbing anteriorly. Therefore, Dr. Malone plans on reducing this anterior web surgically, placing a larger stent, and injecting steroid into the anterior commissure next week. Formal re-evaluation is recommended at his next post-op appointment prior to initiating a full course of voice therapy. Perioperative care instructions were reviewed with Joshua  and his wife today, as they will be repeating this process next week and had questions about gradually increasing his voice use. Joshua is in agreement with this plan of care.         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   Remarkable findings included:  Perceptual evaluation demonstrated: moderate to severe breathiness; moderate to severe strain; he is completely aphonic; and severely reduced conversational speech.   Laryngeal exam demonstrated: anterior laryngeal web; bilateral erythema L>R; irritation at the left medial arytenoid wall; moderate posterior commissure hypertrophy; mild presence of bubbly and sticky secretions on the vocal folds and throughout the laryngeal area; Narrow Band Imaging yielded the following:  white discoloration at the anterior portion of the anterior laryngeal web and at the irritation at the left medial arytenoid wall     RECOMMENDATIONS per Dr. 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 Perceptually, his voice is very weak today with hints of phonation secondary to stent placement at the anterior commissure and compensatory hyperfunction to achieve phonation. Laryngoscopy today demonstrated white tissue at the anterior-most portion of the true vocal folds, just anterior to the stent, as well as generalized erythema of the true vocal folds. Joshua will be undergoing  "another procedure with Dr. Malone in the coming weeks to confirm negative margins and hopefully remove the stent. If the stent is removed and margins are negative, we can then proceed with a course of voice therapy. If margins are positive, then Joshua will likely need additional treatments (e.g. radiation). I will keep in contact with Dr. Malone after the procedure and plan accordingly. Joshua and his wife did not have any additional questions about perioperative care today and are in agreement with this plan of care.       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 Perceptually, his voice was moderately to severely weak and breathy with moderate strain and roughness, as well as elevated pitch compared to age- and gender-matched peers. Given this unsuspected worsening of voice quality, laryngoscopy was performed today by Dr. Malone and demonstrated generalized erythema of the true vocal folds with increased edema, but not reformation of the anterior glottic web, at the anterior commissure. General vocal fold pliability was significant diminished, and excessive laryngeal hyperfunction was observed with little response to therapeutic probing.  Therefore, Dr. Malone provided a steroid injection into the true vocal folds today, and perioperative voice recommendations were reviewed (e.g. SOVT exercises, rescue breathing) to aid with repairing the delicate vocal fold layers. Written instructions were provided. Joshua is in agreement with this plan of care.        SUBJECTIVE:  Audiogram completed 1/22/24  \"R normal hearing through 1000 Hz, sloping to moderate to severe sensorineural hearing loss at 1500 Hz and above. Left normal hearing through 500 Hz, sloping to mild to severe sensorineural hearing loss at 1000 " "Hz and above... Recommendations: consistent use of hearing protection. Consider hearing aid trial if patient is interested/motivated. Patient reports [facility] if [facility] has [facility] benefits\"  I printed off audiogram today [facility] to [facility]  Voice is stable  Has continued exercises as recommended  Still struggles with being heard, high pitch  Some improvement in roughness      OBJECTIVE/ASSESSMENT:    Patient Supplied Answers To Last 2 VHI Questionnaires      8/23/2023     8:49 AM 1/22/2024     1:02 PM   Voice Handicap Index (VHI-10)   My voice makes it difficult for people to hear me 4 4   People have difficulty understanding me in a noisy room 4 4   My voice difficulties restrict my personal and social life.  0 2   I feel left out of conversations because of my voice 2 3   My voice problem causes me to lose income 0 0   I feel as though I have to strain to produce voice 1 4   The clarity of my voice is unpredictable 2 1   My voice problem upsets me 1 2   My voice makes me feel handicapped 0 2   People ask, \"What's wrong with your voice?\" 2 2   VHI-10 16 24     Patient Supplied Answers To Last 2 CSI Questionnaires      8/23/2023     8:49 AM   Cough Severity Index (CSI)   My cough is worse when I lie down 0   My coughing problem causes me to restrict my personal and social life 0   I tend to avoid places because of my cough problem 0   I feel embarrassed because of my coughing problem 1   People ask, ''What's wrong?'' because I cough a lot 1   I run out of air when I cough 0   My coughing problem affects my voice 2   My coughing problem limits my physical activity 0   My coughing problem upsets me 0   People ask me if I am sick because I cough a lot 2   CSI Score 6       THERAPEUTIC ACTIVITIES:  Semi-Occluded Vocal Tract Exercises (SOVTs) are a series of exercises aimed to recoordinate respiration, phonation, and resonance to produce an effortless, clear voice. Such exercises include straw " phonation with or without water resistance, lip trills, humming/Basic Training Gesture for Resonant Voice Therapy, and transoral lip buzz/Basic Training Gesture for Vocal Function Exercises at comfortable speaking pitches and glissando tasks. These exercises were reviewed today, and Joshua performed them with appropriate accuracy; however, he was limited to a singular pitch above his modal pitch. Tissue paper was use for visual biofeedback of airflow. Deep inspiration prior to each repetition lead to a clearer voice quality with improved airflow. Straw phonation lead to a clearer voice with reduced straining today compared to modified transoral lip buzz. Intermittent roughness was observed, and Joshua endorsed being able to identify this. He was encourage to continue using strong airflow and prioritizing clarity.      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 Overall, he feels that his voice is stable with some improvement in roughness. At this time, his prognosis is unknown for continued improvements in his voice secondary to post-operative swelling and scarring, as well as his vocal folds' tendency to grow anterior glottic webs. At this time, Joshua will continue voice therapy exercises (e.g. SOVTs, rescue breathing) between sessions. He will continue to follow-up for laryngoscopy with the ENT department to monitor his larynx and catch recurrences in a timely manner. We will connect in 1 month to ensure that he is remaining stable. We discussed that surgical intervention (if appropriate) needs to be considered that this will need to wait until Dr. Malone returns this summer from maternity leave.       PLAN:  Joshua will perform SOVT exercises 5 times daily for  2-3 minutes between sessions  Joshua will perform rescue breathing techniques intermittently throughout the day  Joshua will adhere to vocal hygiene recommendations, particularly topical hydration  Written instructions were provided to aid home programming via printed AVS  Joshua continues to demonstrate adequate progress toward long- and short-term goals.  Continued voice therapy services are recommended. Joshua will follow-up for additional sessions on 3/21/24 via telephone call. Current goals will continue to be addressed.  Joshua is in agreement with this plan of care.      SLP PLAN:  Voice SLP presence at next appointment with laryngologist (e.g. Dr. Malone, Dr. Jimenez, Dr. Reid) is recommended, if possible, to monitor laryngeal status. Next scheduled MD appointment is 3/14/24 with Senait Jefferson PA-C, for routine surveillance, as Dr. Malone will be out on leave.      BILLING SUMMARY:  Total treatment time: 40 minutes (including 13 minutes of chart review and preparation, interpretation of testing and therapeutic maneuvers, and document writing)  Speech Pathology Treatment (27827)      Kym Mireles MS CCC-SLP  Speech-Language Pathologist  [facility]  Department of Otolaryngology - Head and Neck Surgery  [facility]  [email or web address]  Direct: [phone]  [phone]    *This note may have been completed using [id number] dictation software, so errors may exist. Please contact me for clarification if needed*      Again, thank you for allowing me to participate in the care of your patient.      Sincerely,    Kym Mireles, JOVANNI

## 2024-02-12 NOTE — PATIENT INSTRUCTIONS
Continue with straw hoooo and tissue hoooo    Make sure it's clear!!!!    Kym Mireles MS CCC-SLP  Speech-Language Pathologist  [facility]  Department of Otolaryngology - Head and Neck Surgery  [facility]  [email or web address]  Direct: [phone]  [phone]      Appointment on 3/21 no longer in-person. We moved it to 4:30/5 PM (whenever I'm finished with my patient before you) and a telephone call

## 2024-03-14 ENCOUNTER — PATIENT OUTREACH (OUTPATIENT)
Dept: OTOLARYNGOLOGY | Facility: CLINIC | Age: 79
End: 2024-03-14

## 2024-03-14 ENCOUNTER — OFFICE VISIT (OUTPATIENT)
Dept: OTOLARYNGOLOGY | Facility: CLINIC | Age: 79
End: 2024-03-14
Payer: COMMERCIAL

## 2024-03-14 ENCOUNTER — TELEPHONE (OUTPATIENT)
Dept: OTOLARYNGOLOGY | Facility: CLINIC | Age: 79
End: 2024-03-14

## 2024-03-14 VITALS
OXYGEN SATURATION: 97 % | SYSTOLIC BLOOD PRESSURE: 122 MMHG | HEIGHT: 69 IN | BODY MASS INDEX: 29.37 KG/M2 | HEART RATE: 66 BPM | DIASTOLIC BLOOD PRESSURE: 71 MMHG | WEIGHT: 198.3 LBS

## 2024-03-14 DIAGNOSIS — C32.9 LARYNGEAL CANCER (H): Primary | ICD-10-CM

## 2024-03-14 PROCEDURE — 31575 DIAGNOSTIC LARYNGOSCOPY: CPT | Performed by: PHYSICIAN ASSISTANT

## 2024-03-14 PROCEDURE — 99212 OFFICE O/P EST SF 10 MIN: CPT | Mod: 25 | Performed by: PHYSICIAN ASSISTANT

## 2024-03-14 ASSESSMENT — PAIN SCALES - GENERAL: PAINLEVEL: NO PAIN (0)

## 2024-03-14 NOTE — PROGRESS NOTES
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 He presents to clinic today for surveillance. Patient denies any changes in voice or swallow. He has no new concerns.        Past Medical History:  Past Medical History:   Diagnosis Date    Arthritis     Autoimmune disease (H24)     Hearing problem     Hoarseness     Hypertension     Kidney problem     Malignant melanoma (H)     Malignant neoplasm (H)     melanoma    Squamous cell carcinoma     Russo syndrome        Past Surgical History:  Past Surgical History:   Procedure Laterality Date    BIOPSY  11/2011    melanoma on back    DISSECT LYMPH NODE INGUINAL  3/1/2013    Procedure: DISSECT LYMPH NODE INGUINAL;  Bilateral Inguinal Lymph Node Biopsy.;  Surgeon: Tariq Acosta MD;  Location: [facility]    ESOPHAGOSCOPY, GASTROSCOPY, DUODENOSCOPY (EGD), COMBINED  7/5/2013    Procedure: COMBINED ESOPHAGOSCOPY, GASTROSCOPY, DUODENOSCOPY (EGD);  Gastroscopy;  Surgeon: Tariq Acosta MD;  Location: [facility]    HERNIORRHAPHY INGUINAL  8/6/2012    Procedure: HERNIORRHAPHY INGUINAL;  Open Repair Left Inguinal Hernia;  Surgeon: Jerad Baker MD;  Location: [facility]    INJECT STEROID (LOCATION) N/A 8/30/2023    Procedure: steroid injection;  Surgeon: Josephine Malone MD;  Location: [facility]    INJECT STEROID (LOCATION) N/A 9/13/2023    Procedure: steroid injection;  Surgeon: Josephine Malone MD;  Location: [facility]    INJECT STEROID (LOCATION) N/A 10/25/2023    Procedure: steroid injection, stent placement, and biopsy;  Surgeon: Josephine Malone MD;  Location: [facility]    INJECT STEROID (LOCATION) N/A 12/1/2023    Procedure: steroid injection;  Surgeon: Josephine Malone MD;  Location: [facility]    LARYNGOSCOPY WITH MICROSCOPE N/A 9/13/2023    Procedure: MICROLARYNGOSCOPY with stent removal  and biopsy;  Surgeon: Josephine Malone MD;  Location: [facility]    LARYNGOSCOPY, FLEXIBLE WITH INJECTION N/A 12/11/2023    Procedure: LARYNGOSCOPY, FLEXIBLE WITH INJECTION with steroid;  Surgeon: Josephine Malone MD;  Location: [facility] OR    LASER CO2 LARYNGOSCOPY N/A 8/30/2023    Procedure: MICROLARYNGOSCOPY, CO2 laser resection of vocal fold lesion;  Surgeon: Josephine Malone MD;  Location: [facility]    LASER CO2 LARYNGOSCOPY N/A 10/25/2023    Procedure: MICROLARYNGOSCOPY, CO2 laser resection of vocal fold lesion;  Surgeon: Josephine Malone MD;  Location: [facility]    LASER CO2 LARYNGOSCOPY N/A 12/1/2023    Procedure: MICROLARYNGOSCOPY CO2 laser standby, resection of vocal fold lesion, stent removal, biopsy;  Surgeon: Josephine Malone MD;  Location: [facility]       Medications:  Current Outpatient Medications   Medication Sig Dispense Refill    calcium carbonate 600 mg-vitamin D 400 units (CALTRATE) 600-400 MG-UNIT per tablet Take 1 tablet by mouth 2 times daily.      Cholecalciferol (VITAMIN D) 1000 UNITS capsule Take 1 capsule by mouth daily.      folic acid (FOLVITE) 1 MG tablet Take 2 tablets (2 mg) by mouth daily 180 tablet 3    gabapentin (NEURONTIN) 400 MG capsule Take 1 capsule (400 mg) by mouth At Bedtime 90 capsule 3    losartan (COZAAR) 50 MG tablet Take 1 tablet (50 mg) by mouth daily 90 tablet 2    methotrexate 2.5 MG tablet Take 4 tablets (10 mg) by mouth once a week 48 tablet 1    pantoprazole (PROTONIX) 40 MG EC tablet Take 1 tablet (40 mg) by mouth 2 times daily 80 tablet 3    Acetaminophen (TYLENOL PO) Take 650 mg by mouth once as needed for mild pain or fever (Patient not taking: Reported on 3/14/2024)      bacitracin 500 UNIT/GM external ointment Apply topically 2 times daily Please use over the red area on your neck twice a day. (Patient not taking: Reported on 3/14/2024) 28 g 0    beclomethasone HFA (QVAR REDIHALER) 80 MCG/ACT inhaler Inhale 2 puffs into the lungs 2 times daily (Patient not taking: Reported on 3/14/2024) 10.6  "g 3    Blood Pressure Monitor KIT Automatic Blood Pressure Monitor (Patient not taking: Reported on 3/14/2024) 1 kit 0    HYDROcodone-acetaminophen (NORCO) 5-325 MG tablet Take 1 tablet by mouth every 6 hours as needed for severe pain (Patient not taking: Reported on 3/14/2024) 6 tablet 0       Allergies:  Allergies   Allergen Reactions    Shrimp Nausea and Vomiting and Unknown     Got sick each time he ate it        Social History:  Social History     Tobacco Use    Smoking status: Former     Packs/day: 1.00     Years: 20.00     Additional pack years: 0.00     Total pack years: 20.00     Types: Cigarettes     Quit date: 2013     Years since quittin.0     Passive exposure: Past    Smokeless tobacco: Never   Vaping Use    Vaping Use: Never used   Substance Use Topics    Alcohol use: Yes     Comment: rare    Drug use: No       ROS: 10 point ROS neg other than the symptoms noted above in the HPI.    Physical Exam:    /71   Pulse 66   Ht 1.753 m (5' 9\")   Wt 89.9 kg (198 lb 4.8 oz)   SpO2 97%   BMI 29.28 kg/m       Constitutional:  The patient was accompanied by his wife, well-groomed, and in no acute distress.     Skin: Normal:  warm and pink without rash   Neurologic: Alert and oriented x 3. Voice normal.    Psychiatric: The patient's affect was calm, cooperative, and appropriate.    Communication:  Normal; communicates verbally, normal voice quality.    Respiratory: Breathing comfortably without stridor or exertion of accessory muscles.    Head/Face:  Normocephalic and atraumatic.  No lesions or scars.   Salivary glands -  Normal size, no tenderness, swelling, or palpable masses    Eyes: Clear sclera. Extraocular movement intact.   Ears: Pinnae and tragus non-tender. Hearing normal to conversational voice.   Nose: No external deformity, no anterior drainage   Oral Cavity: Normal tongue, floor of mouth, buccal mucosa, and palate.  No lesions or masses on inspection   Hypopharynx: Absence of pooled " secretions and normal  pyriform sinus and pharyngeal wall mucosa.   Larynx: Scope exam shows sharp epiglottis, false vocal cords, true vocal cords.  Mobile arytenoids and cords.  Cords are clear and mobile. Anterior web appears to have recurred. Small residual scarring of left vocal fold.   Neck: Supple with normal laryngeal and tracheal landmarks. No palpable thyroid.  Normal range of motion.    Lymphatic: There is no palpable lymphadenopathy in the neck.           FIBEROPTIC LARYNGOSCOPY:  Due to laryngeal cancer history, fiberoptic laryngoscopy was indicated. After obtaining verbal consent, the nose was topically decongested and anesthetized. The fiberoptic laryngoscope was passed under endoscopic vision through the right nasal passage. The turbinates were normal. The inferior and middle meati were clear bilaterally without purulence, masses, or polyps. The nasopharynx was clear. The eustachian tubes were clear. The soft palate appeared normal with good mobility. The epiglottis was sharp, and the visualized portion of the vallecula was clear. The larynx was clear with mobile cords. The arytenoids were clear, and there was no pooling in the hypopharynx.      Assessment and Plan:  Laryngeal cancer  Vocal cords appear stable. There may be some recurrent webbing at the anterior commissure of the vocal folds. A CT scan will be ordered in alignment with prior plan established with Dr. Malone. Results of study will be communicated with patient.     Patient will follow up in 6 weeks    Senait Jefferson PA-C  Otolaryngology  Head & Neck Surgery  [phone]    Review of external notes as documented elsewhere in note  15 minutes spent by me on the date of the encounter doing chart review, history and exam, documentation and further activities per the note      Documented time does not include time spent on above procedure.

## 2024-03-14 NOTE — LETTER
3/14/2024       RE: Joshua Ennis  [address] [facility] [address]     Dear Colleague,    Thank you for referring your patient, Joshua Ennis, to the [facility] EAR NOSE AND THROAT CLINIC [place] at [facility]. Please see a copy of my visit note below.      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 He presents to clinic today for surveillance. Patient denies any changes in voice or swallow. He has no new concerns.        Past Medical History:  Past Medical History:   Diagnosis Date    Arthritis     Autoimmune disease (H24)     Hearing problem     Hoarseness     Hypertension     Kidney problem     Malignant melanoma (H)     Malignant neoplasm (H)     melanoma    Squamous cell carcinoma     Russo syndrome        Past Surgical History:  Past Surgical History:   Procedure Laterality Date    BIOPSY  11/2011    melanoma on back    DISSECT LYMPH NODE INGUINAL  3/1/2013    Procedure: DISSECT LYMPH NODE INGUINAL;  Bilateral Inguinal Lymph Node Biopsy.;  Surgeon: Tariq Acosta MD;  Location: [facility]    ESOPHAGOSCOPY, GASTROSCOPY, DUODENOSCOPY (EGD), COMBINED  7/5/2013    Procedure: COMBINED ESOPHAGOSCOPY, GASTROSCOPY, DUODENOSCOPY (EGD);  Gastroscopy;  Surgeon: Tariq Acosta MD;  Location: [facility]    HERNIORRHAPHY INGUINAL  8/6/2012    Procedure: HERNIORRHAPHY INGUINAL;  Open Repair Left Inguinal Hernia;  Surgeon: Jerad Baker MD;  Location: [facility]    INJECT STEROID (LOCATION) N/A 8/30/2023    Procedure: steroid injection;  Surgeon: Josephine Malone MD;  Location: [facility]    INJECT STEROID (LOCATION) N/A 9/13/2023    Procedure: steroid injection;  Surgeon: Josephine Malone MD;  [facility]    INJECT STEROID (LOCATION) N/A 10/25/2023     Procedure: steroid injection, stent placement, and biopsy;  Surgeon: Josephine Malone MD;  [facility]    INJECT STEROID (LOCATION) N/A 12/1/2023    Procedure: steroid injection;  Surgeon: Josephine Malone MD;  [facility]    LARYNGOSCOPY WITH MICROSCOPE N/A 9/13/2023    Procedure: MICROLARYNGOSCOPY with stent removal and biopsy;  Surgeon: Josephine Malone MD;  Location: [facility]    LARYNGOSCOPY, FLEXIBLE WITH INJECTION N/A 12/11/2023    Procedure: LARYNGOSCOPY, FLEXIBLE WITH INJECTION with steroid;  Surgeon: Josephine Malone MD;  Location: [facility] OR    LASER CO2 LARYNGOSCOPY N/A 8/30/2023    Procedure: MICROLARYNGOSCOPY, CO2 laser resection of vocal fold lesion;  Surgeon: Josephine Malone MD;  [facility]    LASER CO2 LARYNGOSCOPY N/A 10/25/2023    Procedure: MICROLARYNGOSCOPY, CO2 laser resection of vocal fold lesion;  Surgeon: Josephine Malone MD;  [facility]    LASER CO2 LARYNGOSCOPY N/A 12/1/2023    Procedure: MICROLARYNGOSCOPY CO2 laser standby, resection of vocal fold lesion, stent removal, biopsy;  Surgeon: Josephine Malone MD;  Location: [facility]       Medications:  Current Outpatient Medications   Medication Sig Dispense Refill    calcium carbonate 600 mg-vitamin D 400 units (CALTRATE) 600-400 MG-UNIT per tablet Take 1 tablet by mouth 2 times daily.      Cholecalciferol (VITAMIN D) 1000 UNITS capsule Take 1 capsule by mouth daily.      folic acid (FOLVITE) 1 MG tablet Take 2 tablets (2 mg) by mouth daily 180 tablet 3    gabapentin (NEURONTIN) 400 MG capsule Take 1 capsule (400 mg) by mouth At Bedtime 90 capsule 3    losartan (COZAAR) 50 MG tablet Take 1 tablet (50 mg) by mouth daily 90 tablet 2    methotrexate 2.5 MG tablet Take 4 tablets (10 mg) by mouth once a week 48 tablet 1    pantoprazole (PROTONIX) 40 MG EC tablet Take 1 tablet (40 mg) by mouth 2 times daily 80 tablet 3    Acetaminophen (TYLENOL PO) Take 650 mg by mouth once as needed for mild pain or fever (Patient not taking: Reported on 3/14/2024)       "bacitracin 500 UNIT/GM external ointment Apply topically 2 times daily Please use over the red area on your neck twice a day. (Patient not taking: Reported on 3/14/2024) 28 g 0    beclomethasone HFA (QVAR REDIHALER) 80 MCG/ACT inhaler Inhale 2 puffs into the lungs 2 times daily (Patient not taking: Reported on 3/14/2024) 10.6 g 3    Blood Pressure Monitor KIT Automatic Blood Pressure Monitor (Patient not taking: Reported on 3/14/2024) 1 kit 0    HYDROcodone-acetaminophen (NORCO) 5-325 MG tablet Take 1 tablet by mouth every 6 hours as needed for severe pain (Patient not taking: Reported on 3/14/2024) 6 tablet 0       Allergies:  Allergies   Allergen Reactions    Shrimp Nausea and Vomiting and Unknown     Got sick each time he ate it        Social History:  Social History     Tobacco Use    Smoking status: Former     Packs/day: 1.00     Years: 20.00     Additional pack years: 0.00     Total pack years: 20.00     Types: Cigarettes     Quit date: 2013     Years since quittin.0     Passive exposure: Past    Smokeless tobacco: Never   Vaping Use    Vaping Use: Never used   Substance Use Topics    Alcohol use: Yes     Comment: rare    Drug use: No       ROS: 10 point ROS neg other than the symptoms noted above in the HPI.    Physical Exam:    /71   Pulse 66   Ht 1.753 m (5' 9\")   Wt 89.9 kg (198 lb 4.8 oz)   SpO2 97%   BMI 29.28 kg/m       Constitutional:  The patient was accompanied by his wife, well-groomed, and in no acute distress.     Skin: Normal:  warm and pink without rash   Neurologic: Alert and oriented x 3. Voice normal.    Psychiatric: The patient's affect was calm, cooperative, and appropriate.    Communication:  Normal; communicates verbally, normal voice quality.    Respiratory: Breathing comfortably without stridor or exertion of accessory muscles.    Head/Face:  Normocephalic and atraumatic.  No lesions or scars.   Salivary glands -  Normal size, no tenderness, swelling, or palpable " masses    Eyes: Clear sclera. Extraocular movement intact.   Ears: Pinnae and tragus non-tender. Hearing normal to conversational voice.   Nose: No external deformity, no anterior drainage   Oral Cavity: Normal tongue, floor of mouth, buccal mucosa, and palate.  No lesions or masses on inspection   Hypopharynx: Absence of pooled secretions and normal  pyriform sinus and pharyngeal wall mucosa.   Larynx: Scope exam shows sharp epiglottis, false vocal cords, true vocal cords.  Mobile arytenoids and cords.  Cords are clear and mobile. Anterior web appears to have recurred. Small residual scarring of left vocal fold.   Neck: Supple with normal laryngeal and tracheal landmarks. No palpable thyroid.  Normal range of motion.    Lymphatic: There is no palpable lymphadenopathy in the neck.           FIBEROPTIC LARYNGOSCOPY:  Due to laryngeal cancer history, fiberoptic laryngoscopy was indicated. After obtaining verbal consent, the nose was topically decongested and anesthetized. The fiberoptic laryngoscope was passed under endoscopic vision through the right nasal passage. The turbinates were normal. The inferior and middle meati were clear bilaterally without purulence, masses, or polyps. The nasopharynx was clear. The eustachian tubes were clear. The soft palate appeared normal with good mobility. The epiglottis was sharp, and the visualized portion of the vallecula was clear. The larynx was clear with mobile cords. The arytenoids were clear, and there was no pooling in the hypopharynx.      Assessment and Plan:  Laryngeal cancer  Vocal cords appear stable. There may be some recurrent webbing at the anterior commissure of the vocal folds. A CT scan will be ordered in alignment with prior plan established with Dr. Malone. Results of study will be communicated with patient.     Patient will follow up in 6 weeks    Senait Jefferson PA-C  Otolaryngology  Head & Neck Surgery  [phone]    Review of external notes as documented  elsewhere in note  15 minutes spent by me on the date of the encounter doing chart review, history and exam, documentation and further activities per the note      Documented time does not include time spent on above procedure.       Again, thank you for allowing me to participate in the care of your patient.      Sincerely,    Senait Jefferson PA-C

## 2024-03-14 NOTE — PROGRESS NOTES
Called patient SO to let her know that care team recommended CT scan just to make sure that nothing is growing back. Patient SO was agreeable to this and will wait for CC to call to help schedule at a clinic closer to the. Writer educated patient on how to reach clinic with any questions or concerns in the meantime.  Patient was agreeable and verbalized understanding of the situation. Nina Cardenas RN on 3/14/2024 at 8:59 AM

## 2024-03-15 ENCOUNTER — TELEPHONE (OUTPATIENT)
Dept: RHEUMATOLOGY | Facility: CLINIC | Age: 79
End: 2024-03-15
Payer: COMMERCIAL

## 2024-03-15 NOTE — TELEPHONE ENCOUNTER
Returned signed and filled out handicap form to patient and also put into scanning.  Sidra Redd, Evita CMA  3/15/2024 8:02 AM

## 2024-03-18 DIAGNOSIS — G62.9 NEUROPATHY: ICD-10-CM

## 2024-03-21 ENCOUNTER — VIRTUAL VISIT (OUTPATIENT)
Dept: OTOLARYNGOLOGY | Facility: CLINIC | Age: 79
End: 2024-03-21
Payer: COMMERCIAL

## 2024-03-21 DIAGNOSIS — J38.7 LARYNGEAL HYPERFUNCTION: ICD-10-CM

## 2024-03-21 DIAGNOSIS — Q31.0 ANTERIOR GLOTTIC WEB OF LARYNX: ICD-10-CM

## 2024-03-21 DIAGNOSIS — C32.9 LARYNGEAL CANCER (H): ICD-10-CM

## 2024-03-21 DIAGNOSIS — R49.0 DYSPHONIA: Primary | ICD-10-CM

## 2024-03-21 PROCEDURE — 99207 PR NO BILLABLE SERVICE THIS VISIT: CPT | Mod: 95 | Performed by: SPEECH-LANGUAGE PATHOLOGIST

## 2024-03-21 NOTE — LETTER
"3/21/2024       RE: Joshua Ennis  [address] [facility] [address]     Dear Colleague,    Thank you for referring your patient, Joshua Ennis, to the [facility] VOICE CLINIC [place] at [facility]. Please see a copy of my visit note below.    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    Laryngeal evaluation demonstrated white crusting at the anterior commissure supraglottically and infraglottically:; white crusting and redness at the anterior portion (superior surface) of the right vocal fold and the mid portion at the superior surface and medial edge; white crusting and redness at the vibratory edge/superior surface of the left vocal fold at the middle portion that extends towards posterior portion vibratory edge and this also extends laterally at the middle superior surface; Narrow Band Imaging yielded the following: prominent white crusting observed at the anterior commissure, right,and left vocal fold with prominent bilateral erythema; mild-moderate posterior commissure hypertrophy; possible white crusting at the left medial arytenoid wall but unclear; bilateral ventricular approximation during phonation; L>R; severe four-way constriction of the supraglottic larynx during connected speech; frequent breath holding observed; RTVF " Amplitude is marked at the site of anterior lesion (stiff) but mildly reduced at the posterior 2/3 portion of the vocal fold; LTVF amplitude is mildly reduced; mucosal wave of right and left vocal folds is mildly reduced;  asymmetry observed; on phonation, glottic closure is incomplete due to irregularity; irregular-shaped configuration of the glottis during many phonatory tasks  Perceptual evaluation demonstrated marked-severe roughness; moderate breathiness; moderate asthenia; clearer and louder voice with laugh and when he reacted (a loud exclamataion) to the Lidocaine spray;and he often spoke at a higher pitch (G3-A3) than is typical for a man of his age and gender.      STIMULABILITY: results of therapy probes during perceptual and laryngeal evaluation demonstrate improvement with flow phonation probes.   RECOMMENDATIONS:   A course of speech therapy is recommended to improve voice quality and optimize surgical results.  He demonstrates a Good prognosis for improvement given adherence to therapeutic recommendations.        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 Perceptually, his voice is very weak today, and he is talking minimally during our session, as he is just 7 days post-surgery. Laryngoscopy today demonstrated white scar tissue at the anterior 2/3s of the vocal folds, creating webbing anteriorly. Therefore, Dr. Malone plans on reducing this anterior web surgically, placing a larger stent, and injecting steroid into the anterior commissure next week. Formal re-evaluation is recommended at his next post-op appointment prior to initiating a full course of voice therapy. Perioperative care instructions were reviewed with Joshua and his wife today, as they  will be repeating this process next week and had questions about gradually increasing his voice use. Joshua is in agreement with this plan of care.         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   Remarkable findings included:  Perceptual evaluation demonstrated: moderate to severe breathiness; moderate to severe strain; he is completely aphonic; and severely reduced conversational speech.   Laryngeal exam demonstrated: anterior laryngeal web; bilateral erythema L>R; irritation at the left medial arytenoid wall; moderate posterior commissure hypertrophy; mild presence of bubbly and sticky secretions on the vocal folds and throughout the laryngeal area; Narrow Band Imaging yielded the following:  white discoloration at the anterior portion of the anterior laryngeal web and at the irritation at the left medial arytenoid wall     RECOMMENDATIONS per Dr. 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 Perceptually, his voice is very weak today with hints of phonation secondary to stent placement at the anterior commissure and compensatory hyperfunction to achieve phonation. Laryngoscopy today demonstrated white tissue at the anterior-most portion of the true vocal folds, just anterior to the stent, as well as generalized erythema of the true vocal folds. Joshua will be undergoing another procedure with   Faisal in the coming weeks to confirm negative margins and hopefully remove the stent. If the stent is removed and margins are negative, we can then proceed with a course of voice therapy. If margins are positive, then Joshua will likely need additional treatments (e.g. radiation). I will keep in contact with Dr. Malone after the procedure and plan accordingly. Joshua and his wife did not have any additional questions about perioperative care today and are in agreement with this plan of care.       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 Perceptually, his voice was moderately to severely weak and breathy with moderate strain and roughness, as well as elevated pitch compared to age- and gender-matched peers. Given this unsuspected worsening of voice quality, laryngoscopy was performed today by Dr. Malone and demonstrated generalized erythema of the true vocal folds with increased edema, but not reformation of the anterior glottic web, at the anterior commissure. General vocal fold pliability was significant diminished, and excessive laryngeal hyperfunction was observed with little response to therapeutic probing.  Therefore, Dr. Malone provided a steroid injection into the true vocal folds today, and perioperative voice recommendations were reviewed (e.g. SOVT exercises, rescue breathing) to aid with repairing the delicate vocal fold layers. Written instructions were provided. Joshua is in agreement with this plan of care.        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 Malone's protocol while she's on maternity leave  CT being completed on 3/24/24  Voice is stable and continues to be weak and high pitch  Got personal voice  "amplification system, which has been helpful, but doesn't use it at work where it would get in the way  Denies swallowing issue, dyspnea, etc  No updates to share regarding obtaining hearing aids      OBJECTIVE/ASSESSMENT:    Patient Supplied Answers To Last 2 VHI Questionnaires      8/23/2023     8:49 AM 1/22/2024     1:02 PM   Voice Handicap Index (VHI-10)   My voice makes it difficult for people to hear me 4 4   People have difficulty understanding me in a noisy room 4 4   My voice difficulties restrict my personal and social life.  0 2   I feel left out of conversations because of my voice 2 3   My voice problem causes me to lose income 0 0   I feel as though I have to strain to produce voice 1 4   The clarity of my voice is unpredictable 2 1   My voice problem upsets me 1 2   My voice makes me feel handicapped 0 2   People ask, \"What's wrong with your voice?\" 2 2   VHI-10 16 24     Patient Supplied Answers To Last 2 CSI Questionnaires      8/23/2023     8:49 AM   Cough Severity Index (CSI)   My cough is worse when I lie down 0   My coughing problem causes me to restrict my personal and social life 0   I tend to avoid places because of my cough problem 0   I feel embarrassed because of my coughing problem 1   People ask, ''What's wrong?'' because I cough a lot 1   I run out of air when I cough 0   My coughing problem affects my voice 2   My coughing problem limits my physical activity 0   My coughing problem upsets me 0   People ask me if I am sick because I cough a lot 2   CSI Score 6       THERAPEUTIC ACTIVITIES:  None provided today, as this session occurred via telephone      IMPRESSIONS:   Dysphonia R49.0 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 He continues to follow with Dr. Malone/Senait Jefferson while Dr. Malone is on leave. Most recently, recurrence of his anterior glottic web " was visualized. Because of this significant structural abnormality, which continues to regrow despite our best efforts therapeutically and surgically with Dr. Malone, as well as lack of progress in voice therapy, he is not an appropriate candidate for continued voice therapy at this time. He will continue working with the laryngology team for repeating imaging as needed given his cancer diagnosis. If additional surgeries are needed once Dr. Malone returns/sooner with another laryngologist, we will plan on a short of perioperative care. Joshua and Falguni are in agreement with this plan of care.       SLP PLAN:  Voice SLP presence at next appointment with laryngologist (e.g. Dr. Malone, Dr. Jimenez, Dr. Reid) is not needed, as he is no longer a therapy candidate with his anterior glottic web's return. Next scheduled MD appointment is 24 with Senait Jefferson PA-C, for routine surveillance, as Dr. Malone will be out on leave.      BILLING SUMMARY:  Total call time: 22 minutes (including 15 minutes of chart review and preparation, interpretation of testing and therapeutic maneuvers, and document writing)  No charge facility fee, as no skilled services were provided in this telephone call      Kym Mireles MS CCC-SLP  Speech-Language Pathologist  [facility]  Department of Otolaryngology - Head and Neck Surgery  [facility]  [email or web address]  Direct: [phone]  [phone]    *This note may have been completed using [id number] dictation software, so errors may exist. Please contact me for clarification if needed*      Again, thank you for allowing me to participate in the care of your patient.      Sincerely,    Kym Mireles, JOVANNI

## 2024-03-21 NOTE — PROGRESS NOTES
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    Laryngeal evaluation demonstrated white crusting at the anterior commissure supraglottically and infraglottically:; white crusting and redness at the anterior portion (superior surface) of the right vocal fold and the mid portion at the superior surface and medial edge; white crusting and redness at the vibratory edge/superior surface of the left vocal fold at the middle portion that extends towards posterior portion vibratory edge and this also extends laterally at the middle superior surface; Narrow Band Imaging yielded the following: prominent white crusting observed at the anterior commissure, right,and left vocal fold with prominent bilateral erythema; mild-moderate posterior commissure hypertrophy; possible white crusting at the left medial arytenoid wall but unclear; bilateral ventricular approximation during phonation; L>R; severe four-way constriction of the supraglottic larynx during connected speech; frequent breath holding observed; RTVF Amplitude is marked at the site of anterior lesion (stiff) but mildly reduced at the posterior 2/3 portion of the vocal fold; LTVF amplitude is mildly reduced; mucosal wave of right and left vocal folds is mildly reduced;  asymmetry observed; on phonation, glottic closure is incomplete due to irregularity; " irregular-shaped configuration of the glottis during many phonatory tasks  Perceptual evaluation demonstrated marked-severe roughness; moderate breathiness; moderate asthenia; clearer and louder voice with laugh and when he reacted (a loud exclamataion) to the Lidocaine spray;and he often spoke at a higher pitch (G3-A3) than is typical for a man of his age and gender.      STIMULABILITY: results of therapy probes during perceptual and laryngeal evaluation demonstrate improvement with flow phonation probes.   RECOMMENDATIONS:   A course of speech therapy is recommended to improve voice quality and optimize surgical results.  He demonstrates a Good prognosis for improvement given adherence to therapeutic recommendations.        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 Perceptually, his voice is very weak today, and he is talking minimally during our session, as he is just 7 days post-surgery. Laryngoscopy today demonstrated white scar tissue at the anterior 2/3s of the vocal folds, creating webbing anteriorly. Therefore, Dr. Malone plans on reducing this anterior web surgically, placing a larger stent, and injecting steroid into the anterior commissure next week. Formal re-evaluation is recommended at his next post-op appointment prior to initiating a full course of voice therapy. Perioperative care instructions were reviewed with Joshua and his wife today, as they will be repeating this process next week and had questions about gradually increasing his voice use. Joshua is in agreement with this plan of care.         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   Remarkable findings included:  Perceptual evaluation demonstrated: moderate to severe breathiness; moderate to severe strain; he is completely aphonic; and severely reduced conversational speech.   Laryngeal exam demonstrated: anterior laryngeal web; bilateral erythema L>R; irritation at the left medial arytenoid wall; moderate posterior commissure hypertrophy; mild presence of bubbly and sticky secretions on the vocal folds and throughout the laryngeal area; Narrow Band Imaging yielded the following:  white discoloration at the anterior portion of the anterior laryngeal web and at the irritation at the left medial arytenoid wall     RECOMMENDATIONS per Dr. 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 Perceptually, his voice is very weak today with hints of phonation secondary to stent placement at the anterior commissure and compensatory hyperfunction to achieve phonation. Laryngoscopy today demonstrated white tissue at the anterior-most portion of the true vocal folds, just anterior to the stent, as well as generalized erythema of the true vocal folds. Joshua will be undergoing another procedure with Dr. Malone in the coming weeks to confirm negative margins and hopefully remove the stent. If the stent is removed and margins are negative, we can then proceed with a course of voice therapy. If margins are positive, then Joshua will likely need additional treatments (e.g. radiation). I will keep in contact with  Dr. Malone after the procedure and plan accordingly. Joshua and his wife did not have any additional questions about perioperative care today and are in agreement with this plan of care.       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 Perceptually, his voice was moderately to severely weak and breathy with moderate strain and roughness, as well as elevated pitch compared to age- and gender-matched peers. Given this unsuspected worsening of voice quality, laryngoscopy was performed today by Dr. Malone and demonstrated generalized erythema of the true vocal folds with increased edema, but not reformation of the anterior glottic web, at the anterior commissure. General vocal fold pliability was significant diminished, and excessive laryngeal hyperfunction was observed with little response to therapeutic probing.  Therefore, Dr. Malone provided a steroid injection into the true vocal folds today, and perioperative voice recommendations were reviewed (e.g. SOVT exercises, rescue breathing) to aid with repairing the delicate vocal fold layers. Written instructions were provided. Joshua is in agreement with this plan of care.        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 Malone's protocol while she's on maternity leave  CT being completed on 3/24/24  Voice is stable and continues to be weak and high pitch  Got personal voice amplification system, which has been helpful, but doesn't use it at work where it would get in the way  Denies swallowing issue, dyspnea, etc  No updates to share regarding obtaining hearing aids      OBJECTIVE/ASSESSMENT:    Patient Supplied Answers To Last 2 VHI Questionnaires      8/23/2023     8:49 AM  "1/22/2024     1:02 PM   Voice Handicap Index (VHI-10)   My voice makes it difficult for people to hear me 4 4   People have difficulty understanding me in a noisy room 4 4   My voice difficulties restrict my personal and social life.  0 2   I feel left out of conversations because of my voice 2 3   My voice problem causes me to lose income 0 0   I feel as though I have to strain to produce voice 1 4   The clarity of my voice is unpredictable 2 1   My voice problem upsets me 1 2   My voice makes me feel handicapped 0 2   People ask, \"What's wrong with your voice?\" 2 2   VHI-10 16 24     Patient Supplied Answers To Last 2 CSI Questionnaires      8/23/2023     8:49 AM   Cough Severity Index (CSI)   My cough is worse when I lie down 0   My coughing problem causes me to restrict my personal and social life 0   I tend to avoid places because of my cough problem 0   I feel embarrassed because of my coughing problem 1   People ask, ''What's wrong?'' because I cough a lot 1   I run out of air when I cough 0   My coughing problem affects my voice 2   My coughing problem limits my physical activity 0   My coughing problem upsets me 0   People ask me if I am sick because I cough a lot 2   CSI Score 6       THERAPEUTIC ACTIVITIES:  None provided today, as this session occurred via telephone      IMPRESSIONS:   Dysphonia R49.0 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 He continues to follow with Dr. Malone/Senait Jefferson while Dr. Malone is on leave. Most recently, recurrence of his anterior glottic web was visualized. Because of this significant structural abnormality, which continues to regrow despite our best efforts therapeutically and surgically with Dr. Malone, as well as lack of progress in voice therapy, he is not an appropriate candidate for continued voice therapy at this time. He will continue " working with the laryngology team for repeating imaging as needed given his cancer diagnosis. If additional surgeries are needed once Dr. Malone returns/sooner with another laryngologist, we will plan on a short of perioperative care. Joshua and Falguni are in agreement with this plan of care.       SLP PLAN:  Voice SLP presence at next appointment with laryngologist (e.g. Dr. Malone, Dr. Jimenez, Dr. Reid) is not needed, as he is no longer a therapy candidate with his anterior glottic web's return. Next scheduled MD appointment is 24 with Senait Jefferson PA-C, for routine surveillance, as Dr. Malone will be out on leave.      BILLING SUMMARY:  Total call time: 22 minutes (including 15 minutes of chart review and preparation, interpretation of testing and therapeutic maneuvers, and document writing)  No charge facility fee, as no skilled services were provided in this telephone call      Kym Mireles MS CCC-SLP  Speech-Language Pathologist  [facility]  Department of Otolaryngology - Head and Neck Surgery  [facility]  [email or web address]  Direct: [phone]  [phone]    *This note may have been completed using [id number] dictation software, so errors may exist. Please contact me for clarification if needed*

## 2024-03-22 RX ORDER — FOLIC ACID 1 MG/1
2 TABLET ORAL DAILY
Qty: 180 TABLET | Refills: 3 | Status: SHIPPED | OUTPATIENT
Start: 2024-03-22 | End: 2024-07-31

## 2024-03-22 NOTE — TELEPHONE ENCOUNTER
LVD:  7/6/2023  [facility] Ede Moran MD  Rheumatology Granulomatosis with polyangiitis, unspecified whether renal involvement      Refilled per protocol.

## 2024-03-24 ENCOUNTER — HOSPITAL ENCOUNTER (OUTPATIENT)
Dept: CT IMAGING | Facility: CLINIC | Age: 79
Discharge: HOME | End: 2024-03-24
Attending: PHYSICIAN ASSISTANT | Admitting: PHYSICIAN ASSISTANT
Payer: COMMERCIAL

## 2024-03-24 DIAGNOSIS — C32.9 LARYNGEAL CANCER (H): ICD-10-CM

## 2024-03-24 LAB
CREAT BLD-MCNC: 1.8 MG/DL (ref 0.7–1.3)
EGFRCR SERPLBLD CKD-EPI 2021: 39 ML/MIN/1.73M2

## 2024-03-24 PROCEDURE — 70491 CT SOFT TISSUE NECK W/DYE: CPT

## 2024-03-24 PROCEDURE — 250N000009 HC RX 250: Performed by: RADIOLOGY

## 2024-03-24 PROCEDURE — 82565 ASSAY OF CREATININE: CPT

## 2024-03-24 PROCEDURE — 250N000011 HC RX IP 250 OP 636: Performed by: RADIOLOGY

## 2024-03-24 RX ORDER — IOPAMIDOL 755 MG/ML
90 INJECTION, SOLUTION INTRAVASCULAR ONCE
Status: COMPLETED | OUTPATIENT
Start: 2024-03-24 | End: 2024-03-24

## 2024-03-24 RX ADMIN — IOPAMIDOL 90 ML: 755 INJECTION, SOLUTION INTRAVENOUS at 10:34

## 2024-03-24 RX ADMIN — SODIUM CHLORIDE 80 ML: 9 INJECTION, SOLUTION INTRAVENOUS at 10:39

## 2024-04-11 DIAGNOSIS — G62.9 NEUROPATHY: ICD-10-CM

## 2024-04-17 RX ORDER — GABAPENTIN 400 MG/1
400 CAPSULE ORAL AT BEDTIME
Qty: 90 CAPSULE | Refills: 3 | Status: SHIPPED | OUTPATIENT
Start: 2024-04-17 | End: 2024-08-25 | Stop reason: DRUGHIGH

## 2024-04-17 NOTE — TELEPHONE ENCOUNTER
Call received from pharmacy to check on the status of this request-they report that pt only has enough medication left for today (4/17/24), so they are hoping this can be refilled ASAP.

## 2024-04-17 NOTE — TELEPHONE ENCOUNTER
Patient was seen in UC, then ED on 4/7 for r/o DVT and elevated BP.  Was told it was varicose veins.  Patient was advised to follow-up with PCP due to elevated BP.  Patient has MWV scheduled on 5/6, doesn't feel that she needs to be seen sooner.  Patient's BPs in the clinic/ED were 148/80, 147/90, then 122/80, 131/91, 132/95.  Patient reports she has been eating out a lot, plans to cut back and start limiting her sodium.  Going to start walking more.  Advised patient to keep appointment in May, but please advise if you feel patient needs to be seen sooner.     gabapentin (NEURONTIN) 400 MG capsule 90 capsule 3 4/14/2023 - No  Sig - Route: Take 1 capsule (400 mg) by mouth At Bedtime - Oral  ----------------------  Last Office Visit : 7/6/2023  [facility]     Ede Sanchez MD     Future Office visit:    7/11/2024 Status: Meaghan    Arrive By: 10:45 AM     Appointment Time: 11:00 AM Length: 30   Visit Type: RETURN [657] MILLY: [id number]   Provider: Ede Sanchez MD Department:  ADULT RHEUMATOLOGY     ----------------------      Routing refill request to provider for review/approval because:  Gabapentin not on refill protocol

## 2024-04-25 ENCOUNTER — OFFICE VISIT (OUTPATIENT)
Dept: OTOLARYNGOLOGY | Facility: CLINIC | Age: 79
End: 2024-04-25
Payer: COMMERCIAL

## 2024-04-25 VITALS — HEIGHT: 69 IN | WEIGHT: 198 LBS | BODY MASS INDEX: 29.33 KG/M2

## 2024-04-25 DIAGNOSIS — C32.9 LARYNGEAL CANCER (H): Primary | ICD-10-CM

## 2024-04-25 PROCEDURE — 31575 DIAGNOSTIC LARYNGOSCOPY: CPT | Performed by: PHYSICIAN ASSISTANT

## 2024-04-25 NOTE — LETTER
4/25/2024       RE: Joshua Ennis  [address] [facility] [facility] [address]     Dear Colleague,    Thank you for referring your patient, Joshua Ennis, to the [facility] EAR NOSE AND THROAT CLINIC [place] at [facility]. Please see a copy of my visit note below.      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 He presents to clinic today for surveillance. Patient denies any changes in voice or swallow. He has no new concerns.         FIBEROPTIC LARYNGOSCOPY:  Due to laryngeal cancer history, fiberoptic laryngoscopy was indicated. After obtaining verbal consent, the nose was topically decongested and anesthetized. The fiberoptic laryngoscope was passed under endoscopic vision through the right nasal passage. The turbinates were normal. The inferior and middle meati were clear bilaterally without purulence, masses, or polyps. The nasopharynx was clear. The eustachian tubes were clear. The soft palate appeared normal with good mobility. The epiglottis was sharp, and the visualized portion of the vallecula was clear. The larynx was clear with mobile cords. The arytenoids were clear, and there was no pooling in the hypopharynx.       Assessment and Plan:  1. Laryngeal cancer (H)  CT scan from last visit returned normal without evidence of recurrence. Scope exam today is reassuring. There is one spot on the mid-left cord, however this is also present on previous scope exams. The glottic webbing does not seem to be worsening and it is reasonable to continue monitoring at this time. Will consult Dr. Jimenez and let patient know if there is a change to the treatment plan.      Patient will follow up in 6 weeks    Senait Jefferson PA-C  Otolaryngology  Head & Neck Surgery  [phone]    10 minutes spent on the date of the encounter doing  chart review, history and exam, documentation and further activities per the note      Again, thank you for allowing me to participate in the care of your patient.      Sincerely,    Senait Jefferson PA-C

## 2024-05-20 DIAGNOSIS — M31.30 GRANULOMATOSIS WITH POLYANGIITIS, UNSPECIFIED WHETHER RENAL INVOLVEMENT (H): ICD-10-CM

## 2024-05-20 DIAGNOSIS — Z79.899 HIGH RISK MEDICATIONS (NOT ANTICOAGULANTS) LONG-TERM USE: ICD-10-CM

## 2024-05-22 DIAGNOSIS — I10 HYPERTENSION, UNSPECIFIED TYPE: Primary | ICD-10-CM

## 2024-05-22 DIAGNOSIS — G62.9 NEUROPATHY: ICD-10-CM

## 2024-05-24 RX ORDER — METHOTREXATE 2.5 MG/1
TABLET ORAL
Qty: 48 TABLET | Refills: 0 | Status: SHIPPED | OUTPATIENT
Start: 2024-05-24 | End: 2024-07-11

## 2024-05-24 NOTE — TELEPHONE ENCOUNTER
methotrexate       Last Written Prescription Date:  2/2/2024  Last Fill Quantity: 48,   # refills: 1  Last Office Visit: 7/6/2023  Future Office visit: 7/11/2024       Routing refill request to provider for review/approval because:  Unable to refill due to RN protocol.  Keira Fowler, RN on 5/24/2024 at 4:27 PM

## 2024-05-30 RX ORDER — LOSARTAN POTASSIUM 50 MG/1
50 TABLET ORAL DAILY
Qty: 90 TABLET | Refills: 3 | Status: SHIPPED | OUTPATIENT
Start: 2024-05-30

## 2024-06-04 ENCOUNTER — OFFICE VISIT (OUTPATIENT)
Dept: OTOLARYNGOLOGY | Facility: CLINIC | Age: 79
End: 2024-06-04
Payer: COMMERCIAL

## 2024-06-04 ENCOUNTER — PATIENT OUTREACH (OUTPATIENT)
Dept: OTOLARYNGOLOGY | Facility: CLINIC | Age: 79
End: 2024-06-04

## 2024-06-04 ENCOUNTER — TELEPHONE (OUTPATIENT)
Dept: OTOLARYNGOLOGY | Facility: CLINIC | Age: 79
End: 2024-06-04

## 2024-06-04 VITALS
OXYGEN SATURATION: 67 % | BODY MASS INDEX: 28.76 KG/M2 | WEIGHT: 194.2 LBS | HEART RATE: 67 BPM | HEIGHT: 69 IN | SYSTOLIC BLOOD PRESSURE: 134 MMHG | DIASTOLIC BLOOD PRESSURE: 78 MMHG

## 2024-06-04 DIAGNOSIS — Q31.0 GLOTTIC WEB OF LARYNX: ICD-10-CM

## 2024-06-04 DIAGNOSIS — J38.7 LEUKOPLAKIA OF LARYNX: ICD-10-CM

## 2024-06-04 DIAGNOSIS — C32.9 LARYNGEAL CANCER (H): Primary | ICD-10-CM

## 2024-06-04 PROCEDURE — 99212 OFFICE O/P EST SF 10 MIN: CPT | Mod: 25 | Performed by: PHYSICIAN ASSISTANT

## 2024-06-04 PROCEDURE — 31575 DIAGNOSTIC LARYNGOSCOPY: CPT | Performed by: PHYSICIAN ASSISTANT

## 2024-06-04 RX ORDER — PREDNISONE 10 MG/1
20 TABLET ORAL DAILY
Qty: 10 TABLET | Refills: 0 | Status: SHIPPED | OUTPATIENT
Start: 2024-06-04 | End: 2024-06-09

## 2024-06-04 RX ORDER — PANTOPRAZOLE SODIUM 40 MG/1
40 TABLET, DELAYED RELEASE ORAL DAILY
Qty: 60 TABLET | Refills: 1 | Status: SHIPPED | OUTPATIENT
Start: 2024-06-04 | End: 2024-10-04

## 2024-06-04 ASSESSMENT — PAIN SCALES - GENERAL: PAINLEVEL: NO PAIN (0)

## 2024-06-04 NOTE — LETTER
6/4/2024       RE: Joshua Ennis  [address]  [place] [facility] [address]     Dear Colleague,    Thank you for referring your patient, Joshua Ennis, to the [facility] EAR NOSE AND THROAT CLINIC [place] at [facility]. Please see a copy of my visit note below.      Otolaryngology Clinic  June 4, 2024    HPI:  Joshua Ennis is here for a recheck of his vocal cords. Last seen in clinic 4/25/2024 by myself. Patient and wife report worsening breathing and voice. He reports his breathing is noisier and he has a harder time breathing with activity. Additionally, he has to work harder to speak. He has not been on any reflux medication and does endorse thick phlegm that he is able to cough up.          FIBEROPTIC LARYNGOSCOPY:  Due to prior laryngeal cancer, fiberoptic laryngoscopy was indicated. After obtaining verbal consent, the nose was topically decongested and anesthetized. The fiberoptic laryngoscope was passed under endoscopic vision through the right nasal passage. The turbinates were normal. The inferior and middle meati were clear bilaterally without purulence, masses, or polyps. The nasopharynx was clear. The eustachian tubes were clear. The soft palate appeared normal with good mobility. The epiglottis was sharp, and the visualized portion of the vallecula was clear. The arytenoids were clear, and there was no pooling in the hypopharynx. Persistent and progressive anterior glottic web. Additionally right vocal cord is erythematous and edematous. Leukoplakia appears stable. Airway remains patent but is smaller than last scope exam.    Assessment and Plan:  Leukoplakia left vocal cord  Stable.  2.  Glottic web  3. History of laryngeal cancer  Progressing glottic web, will move up Dr. Malone's appointment with patient. Protonix and prednisone course sent.    The patient presents for recheck of larynx.    Patient will follow up 6/27 at 2:45pm    Senait  SHEFALI Jefferson  Otolaryngology  Head & Neck Surgery  [phone]    10 minutes spent on the date of the encounter doing chart review, history and exam, documentation and further activities per the note      Again, thank you for allowing me to participate in the care of your patient.      Sincerely,    Senait Jefferson PA-C

## 2024-06-04 NOTE — PROGRESS NOTES
Called patient partner to let her know that we had another provider review scope and exam and recommended adding prednisone to the Protonix. Also let patient know that care team recommended moving up appointment with Dr. Malone given breathing and voice concerns. Patient partner was agreeable to this and verbalized understanding of the situation, and will reach out with any worsening or concerning symptoms/questions. Nina Cardenas RN on 6/4/2024 at 9:56 AM

## 2024-06-04 NOTE — TELEPHONE ENCOUNTER
Needs a PA for protonix medication, pt was seen by Senait on 6/4/24.     Maria Del Carmen Fernando LPN

## 2024-06-04 NOTE — PROGRESS NOTES
Otolaryngology Clinic  June 4, 2024    HPI:  Joshua Ennis is here for a recheck of his vocal cords. Last seen in clinic 4/25/2024 by myself. Patient and wife report worsening breathing and voice. He reports his breathing is noisier and he has a harder time breathing with activity. Additionally, he has to work harder to speak. He has not been on any reflux medication and does endorse thick phlegm that he is able to cough up.          FIBEROPTIC LARYNGOSCOPY:  Due to prior laryngeal cancer, fiberoptic laryngoscopy was indicated. After obtaining verbal consent, the nose was topically decongested and anesthetized. The fiberoptic laryngoscope was passed under endoscopic vision through the right nasal passage. The turbinates were normal. The inferior and middle meati were clear bilaterally without purulence, masses, or polyps. The nasopharynx was clear. The eustachian tubes were clear. The soft palate appeared normal with good mobility. The epiglottis was sharp, and the visualized portion of the vallecula was clear. The arytenoids were clear, and there was no pooling in the hypopharynx. Persistent and progressive anterior glottic web. Additionally right vocal cord is erythematous and edematous. Leukoplakia appears stable. Airway remains patent but is smaller than last scope exam.    Assessment and Plan:  Leukoplakia left vocal cord  Stable.  2.  Glottic web  3. History of laryngeal cancer  Progressing glottic web, will move up Dr. Malone's appointment with patient. Protonix and prednisone course sent.    The patient presents for recheck of larynx.    Patient will follow up 6/27 at 2:45pm    Senait Jefferson PA-C  Otolaryngology  Head & Neck Surgery  [phone]    10 minutes spent on the date of the encounter doing chart review, history and exam, documentation and further activities per the note

## 2024-06-04 NOTE — TELEPHONE ENCOUNTER
Note: Due to record-high volumes, our turn-around time is taking longer than usual . We are currently 2 weeks behind in the pools.   We are working diligently to submit all requests in a timely manner and in the order they are received. Please only flag TRUE URGENT requests as high priority to the pool at this time.   If you have questions on status of PA's,  please send a note/message in the active PA encounter and send back to the [facility] PA pool [[phone]].    If you have questions about the turn-around time or about our process, please reach out to our supervisor Karon Michelle.   Thank you!   RPPA (Retail Pharmacy Prior Authorization) team    We are currently submitting requests we received on 05/23/2024      Retail Pharmacy Prior Authorization Team   Phone: [phone]    PA Initiation    Medication: PANTOPRAZOLE SODIUM 40 MG PO [facility]  Insurance Company: [employer] ([employer]) - Phone [phone] Fax [phone]  Pharmacy Filling the Rx: [facility] [address]  Filling Pharmacy Phone: [phone]  Filling Pharmacy Fax:    Start Date: 6/4/2024    Started PA on CMM and a response of Product or Service is not covered for patient age.   Per insurance, the medication is covered for patient's age 12 and under.  Patient's over the age of 13, the medication is considered a plan exclusion and there is not an option to complete a PA.

## 2024-06-25 ENCOUNTER — PATIENT OUTREACH (OUTPATIENT)
Dept: OTOLARYNGOLOGY | Facility: CLINIC | Age: 79
End: 2024-06-25
Payer: COMMERCIAL

## 2024-06-25 DIAGNOSIS — C32.9 LARYNGEAL CANCER (H): Primary | ICD-10-CM

## 2024-06-25 DIAGNOSIS — C32.9 LARYNGEAL SQUAMOUS CELL CARCINOMA (H): ICD-10-CM

## 2024-06-25 NOTE — PROGRESS NOTES
Called patient SO provider would like to get CT scan done ASAP as they are afraid his cancer is back. LVM and will follow up on MyChart as well. Nina Cardenas RN on 6/25/2024 at 10:25 AM

## 2024-06-27 ENCOUNTER — ANCILLARY PROCEDURE (OUTPATIENT)
Dept: CT IMAGING | Facility: CLINIC | Age: 79
End: 2024-06-27
Attending: OTOLARYNGOLOGY
Payer: COMMERCIAL

## 2024-06-27 ENCOUNTER — OFFICE VISIT (OUTPATIENT)
Dept: OTOLARYNGOLOGY | Facility: CLINIC | Age: 79
End: 2024-06-27
Payer: COMMERCIAL

## 2024-06-27 VITALS
BODY MASS INDEX: 29.47 KG/M2 | HEART RATE: 68 BPM | SYSTOLIC BLOOD PRESSURE: 147 MMHG | HEIGHT: 69 IN | WEIGHT: 199 LBS | DIASTOLIC BLOOD PRESSURE: 79 MMHG

## 2024-06-27 DIAGNOSIS — C32.9 LARYNGEAL CANCER (H): ICD-10-CM

## 2024-06-27 DIAGNOSIS — Q31.0 ANTERIOR GLOTTIC WEB OF LARYNX: ICD-10-CM

## 2024-06-27 DIAGNOSIS — Q31.0 GLOTTIC WEB OF LARYNX: ICD-10-CM

## 2024-06-27 DIAGNOSIS — R49.0 DYSPHONIA: Primary | ICD-10-CM

## 2024-06-27 DIAGNOSIS — J38.7 LEUKOPLAKIA OF LARYNX: ICD-10-CM

## 2024-06-27 DIAGNOSIS — C32.9 LARYNGEAL SQUAMOUS CELL CARCINOMA (H): ICD-10-CM

## 2024-06-27 LAB
CREAT BLD-MCNC: 1.8 MG/DL (ref 0.7–1.3)
EGFRCR SERPLBLD CKD-EPI 2021: 38 ML/MIN/1.73M2

## 2024-06-27 PROCEDURE — 99214 OFFICE O/P EST MOD 30 MIN: CPT | Mod: 25 | Performed by: OTOLARYNGOLOGY

## 2024-06-27 PROCEDURE — 70491 CT SOFT TISSUE NECK W/DYE: CPT | Mod: GC | Performed by: RADIOLOGY

## 2024-06-27 PROCEDURE — 82565 ASSAY OF CREATININE: CPT | Performed by: PATHOLOGY

## 2024-06-27 PROCEDURE — 31575 DIAGNOSTIC LARYNGOSCOPY: CPT | Performed by: OTOLARYNGOLOGY

## 2024-06-27 PROCEDURE — 92524 BEHAVRAL QUALIT ANALYS VOICE: CPT | Mod: GN | Performed by: SPEECH-LANGUAGE PATHOLOGIST

## 2024-06-27 RX ORDER — IOPAMIDOL 755 MG/ML
90 INJECTION, SOLUTION INTRAVASCULAR ONCE
Status: COMPLETED | OUTPATIENT
Start: 2024-06-27 | End: 2024-06-27

## 2024-06-27 RX ADMIN — IOPAMIDOL 90 ML: 755 INJECTION, SOLUTION INTRAVASCULAR at 13:32

## 2024-06-27 NOTE — DISCHARGE INSTRUCTIONS

## 2024-06-27 NOTE — LETTER
2024       RE: Joshua Ennis  [address]  [facility] [address]     Dear Colleague,    Thank you for referring your patient, Joshua Ennis, to the [facility] at [facility]. Please see a copy of my visit note below.        [facility]   at the [facility]   Otolaryngology Clinic     Patient: Joshua Ennis    MRN: [id number]    : 1945    Age/Gender: 79 year old male  Date of Service: 2024  Rendering Provider:   Josephine Malone MD     Chief Complaint   Vocal fold SCC  S/p MDL with bilateral cordectomy and steroid injection 23  S/p MDL with stent removal and steroid injection on 23  S/p microlaryngoscopy, CO2 laser resection of vocal fold lesion, steroid injection, stent placement, and biopsy 10/25/23    S/p microlaryngoscopy, CO2 laser resection of vocal fold lesion, steroid injection, stent removal, and biopsy 23  S/p laryngoscopy and anterior glottic web vocal fold steroid injection 23   S/p steroid injection 24               Interval History   HISTORY OF PRESENT ILLNESS:  Mr. Ennis is a 78 year old male is being followed for dysphonia. he was initially seen on 8/15/23. Please refer to this note for full history.     Today, he presents for follow up. he reports:  - breathing difficulty with activity  - no voice     PAST MEDICAL HISTORY:   Past Medical History:   Diagnosis Date    Arthritis     Autoimmune disease (H24)     Hearing problem     Hoarseness     Hypertension     Kidney problem     Malignant melanoma (H)     Malignant neoplasm (H)     melanoma    Squamous cell carcinoma     Russo syndrome        PAST SURGICAL HISTORY:   Past Surgical History:   Procedure Laterality Date    BIOPSY  2011    melanoma on back    DISSECT LYMPH NODE INGUINAL  3/1/2013    Procedure: DISSECT LYMPH NODE INGUINAL;  Bilateral Inguinal Lymph Node Biopsy.;  Surgeon:  Tariq Acosta MD;  Location: [facility]    ESOPHAGOSCOPY, GASTROSCOPY, DUODENOSCOPY (EGD), COMBINED  7/5/2013    Procedure: COMBINED ESOPHAGOSCOPY, GASTROSCOPY, DUODENOSCOPY (EGD);  Gastroscopy;  Surgeon: Tariq Acosta MD;  Location: [facility]    HERNIORRHAPHY INGUINAL  8/6/2012    Procedure: HERNIORRHAPHY INGUINAL;  Open Repair Left Inguinal Hernia;  Surgeon: Jerad Baker MD;  Location: [facility]    INJECT STEROID (LOCATION) N/A 8/30/2023    Procedure: steroid injection;  Surgeon: Josephine Malone MD;  [facility]    INJECT STEROID (LOCATION) N/A 9/13/2023    Procedure: steroid injection;  Surgeon: Josephine Malone MD;  [facility]    INJECT STEROID (LOCATION) N/A 10/25/2023    Procedure: steroid injection, stent placement, and biopsy;  Surgeon: Josephine Malone MD;  [facility]    INJECT STEROID (LOCATION) N/A 12/1/2023    Procedure: steroid injection;  Surgeon: Josephine Malone MD;  [facility]    LARYNGOSCOPY WITH MICROSCOPE N/A 9/13/2023    Procedure: MICROLARYNGOSCOPY with stent removal and biopsy;  Surgeon: Josephine Malone MD;  [facility]: [facility]    LARYNGOSCOPY, FLEXIBLE WITH INJECTION N/A 12/11/2023    Procedure: LARYNGOSCOPY, FLEXIBLE WITH INJECTION with steroid;  Surgeon: Josephine Malone MD;  Location: [facility] OR    LASER CO2 LARYNGOSCOPY N/A 8/30/2023    Procedure: MICROLARYNGOSCOPY, CO2 laser resection of vocal fold lesion;  Surgeon: Josephine Malone MD;  [facility]    LASER CO2 LARYNGOSCOPY N/A 10/25/2023    Procedure: MICROLARYNGOSCOPY, CO2 laser resection of vocal fold lesion;  Surgeon: Josephine Malone MD;  [facility]    LASER CO2 LARYNGOSCOPY N/A 12/1/2023    Procedure: MICROLARYNGOSCOPY CO2 laser standby, resection of vocal fold lesion, stent removal, biopsy;  Surgeon: Josephine Malone MD;  [facility]       CURRENT MEDICATIONS:   Current Outpatient Medications:     calcium carbonate 600 mg-vitamin D 400 units (CALTRATE) 600-400 MG-UNIT per tablet, Take 1 tablet by mouth 2 times daily.,  Disp: , Rfl:     Cholecalciferol (VITAMIN D) 1000 UNITS capsule, Take 1 capsule by mouth daily., Disp: , Rfl:     folic acid (FOLVITE) 1 MG tablet, Take 2 tablets (2 mg) by mouth daily, Disp: 180 tablet, Rfl: 3    gabapentin (NEURONTIN) 400 MG capsule, Take 1 capsule (400 mg) by mouth At Bedtime, Disp: 90 capsule, Rfl: 3    losartan (COZAAR) 50 MG tablet, Take 1 tablet (50 mg) by mouth daily, Disp: 90 tablet, Rfl: 3    methotrexate 2.5 MG tablet, TAKE 4 TABLETS BY MOUTH EVERY 7 DAYS, Disp: 48 tablet, Rfl: 0    pantoprazole (PROTONIX) 40 MG EC tablet, Take 1 tablet (40 mg) by mouth daily, Disp: 60 tablet, Rfl: 1    Acetaminophen (TYLENOL PO), Take 650 mg by mouth once as needed for mild pain or fever (Patient not taking: Reported on 3/14/2024), Disp: , Rfl:     bacitracin 500 UNIT/GM external ointment, Apply topically 2 times daily Please use over the red area on your neck twice a day. (Patient not taking: Reported on 3/14/2024), Disp: 28 g, Rfl: 0    beclomethasone HFA (QVAR REDIHALER) 80 MCG/ACT inhaler, Inhale 2 puffs into the lungs 2 times daily (Patient not taking: Reported on 3/14/2024), Disp: 10.6 g, Rfl: 3    Blood Pressure Monitor KIT, Automatic Blood Pressure Monitor (Patient not taking: Reported on 3/14/2024), Disp: 1 kit, Rfl: 0    HYDROcodone-acetaminophen (NORCO) 5-325 MG tablet, Take 1 tablet by mouth every 6 hours as needed for severe pain (Patient not taking: Reported on 3/14/2024), Disp: 6 tablet, Rfl: 0    pantoprazole (PROTONIX) 40 MG EC tablet, Take 1 tablet (40 mg) by mouth 2 times daily, Disp: 80 tablet, Rfl: 3  No current facility-administered medications for this visit.    ALLERGIES: Shrimp    SOCIAL HISTORY:    Social History     Socioeconomic History    Marital status: Single     Spouse name: Not on file    Number of children: Not on file    Years of education: Not on file    Highest education level: Not on file   Occupational History     Employer: [employer]   Tobacco Use     Smoking status: Former     Current packs/day: 0.00     Average packs/day: 1 pack/day for 20.0 years (20.0 ttl pk-yrs)     Types: Cigarettes     Start date: 1993     Quit date: 2013     Years since quittin.3     Passive exposure: Past    Smokeless tobacco: Never   Vaping Use    Vaping status: Never Used   Substance and Sexual Activity    Alcohol use: Yes     Comment: rare    Drug use: No    Sexual activity: Not Currently     Partners: Female   Other Topics Concern    Parent/sibling w/ CABG, MI or angioplasty before 65F 55M? Not Asked     Service Not Asked    Blood Transfusions Not Asked    Caffeine Concern Not Asked    Occupational Exposure Not Asked    Hobby Hazards Not Asked    Sleep Concern Not Asked    Stress Concern Not Asked    Weight Concern Not Asked    Special Diet Not Asked    Back Care Not Asked    Exercise Yes     Comment: walking    Bike Helmet Not Asked    Seat Belt Not Asked    Self-Exams Not Asked   Social History Narrative    Not on file     Social Determinants of Health     Financial Resource Strain: High Risk (2021)    [facility]    Financial Resource Strain     Difficulty of Paying Living Expenses: Not on file     Difficulty of Paying Living Expenses: Not on file   Food Insecurity: Not on file   Transportation Needs: Not on file   Physical Activity: Not on file   Stress: Not on file   Social Connections: Unknown (2021)    [facility]    Social Connections     Frequency of Communication with Friends and Family: Not on file   Interpersonal Safety: Low Risk  (2024)    Interpersonal Safety     Do you feel physically and emotionally safe where you currently live?: Yes     Within the past 12 months, have you been hit, slapped, kicked or otherwise physically hurt by someone?: No      Within the past 12 months, have you been humiliated or emotionally abused in other ways by your partner or ex-partner?: No   Housing Stability: Not on file         FAMILY HISTORY:   Family History   Problem Relation Age of Onset    Diabetes Mother     Hypertension Mother     Cancer Father         stomach    Heart Disease Father     Heart Disease Brother     Diabetes Sister     Diabetes Sister     Diabetes Sister     Heart Disease Brother     Cancer Sister     Glaucoma No family hx of     Macular Degeneration No family hx of       Non-contributory for problems with anesthesia    REVIEW OF SYSTEMS:   The patient was asked a 14 point review of systems regarding constitutional symptoms, eye symptoms, ears, nose, mouth, throat symptoms, cardiovascular symptoms, respiratory symptoms, gastrointestinal symptoms, genitourinary symptoms, musculoskeletal symptoms, integumentary symptoms, neurological symptoms, psychiatric symptoms, endocrine symptoms, hematologic/lymphatic symptoms, and allergic/ immunologic symptoms.   The pertinent factors have been included in the HPI and below.  Patient Supplied Answers to Review of Systems      6/13/2018     8:42 AM   [facility] ROS   Ears, Nose, Throat Ringing/noise in ears    Nasal congestion or drainage    Sore throat    Hoarseness   Cardiopulmonary Cough       Physical Examination   The patient underwent a physical examination as described below. The pertinent positive and negative findings are summarized after the description of the examination.  Constitutional: The patient's developmental and nutritional status was assessed. The patient's voice quality was assessed.  Head and Face: The head and face were inspected for deformities. The sinuses were palpated. The salivary glands were palpated. Facial muscle strength was assessed bilaterally.  Eyes: Extraocular movements and primary gaze alignment were assessed.  Ears, Nose, Mouth and Throat: The ears and nose were examined for  deformities. The nasal septum, mucosa, and turbinates were inspected by anterior rhinoscopy. The lips, teeth, and gums were examined for abnormalities. The oral mucosa, tongue, palate, tonsils, lateral and posterior pharynx were inspected for the presence of asymmetry or mucosal lesions.    Neck: The tracheal position was noted, and the neck mass palpated to determine if there were any asymmetries, abnormal neck masses, thyromegally, or thyroid nodules.  Respiratory: The nature of the breathing and chest expansion/symmetry was observed.  Cardiovascular: The patient was examined to determine the presence of any edema or jugular venous distension.  Abdomen: The contour of the abdomen was noted.  Lymphatic: The patient was examined for infraclavicular lymphadenopathy.  Musculoskeletal: The patient was inspected for the presence of skeletal deformities.  Extremities: The extremities were examined for any clubbing or cyanosis.  Skin: The skin was examined for inflammatory or neoplastic conditions.  Neurologic: The patient's orientation, mood, and affect were noted. The cranial nerve  functions were examined.  Other pertinent positive and negative findings on physical examination:   OC/OP: no lesions, uvula midline, soft palate elevates symmetrically   Neck: no lesions, no TH tenderness to palpation     All other physical examination findings were within normal limits and noncontributory.    Procedures   Flexible laryngoscopy (CPT 21130)        Pre-procedure diagnosis: dysphonia  Post-procedure diagnosis: same as above  Indication for procedure: Mr. Ennis is a 78 year old male with see above  Procedure(s): Fiberoptic Laryngoscopy     Details of Procedure: After informed consent was obtained, the patient was seated in the examination chair.  The areas of the nasopharynx as well as the hypopharynx were anesthetized with topical 4% lidocaine with 0.25% phenylephrine atomizer.  Examination of the base of tongue was performed  "first.  Attention was directed to any evidence of masses in the area or evidence of leukoplakia or candidal infection.  Attention was directed to the epiglottis where its size and position was determined and its movement on phonation of the vowel  e .  The piriform sinuses were then inspected for any mass lesions or pooling of secretions.  Attention was then directed to the larynx. The vocal folds were inspected for infection or any areas of leukoplakia, for masses, polypoid degeneration, or hemorrhage.  Having done this, the arytenoids and vocal processes were inspected for erythema or evidence of granuloma formation.  The posterior commissure was then inspected for evidence of inflammatory changes in the mucosa and heaping up of mucosal tissue. The patient was then instructed to say the vowel  e .  Adduction of vocal folds to the midline was observed for any evidence of paresis or paralysis of the larynx or asymmetry in rotation of the larynx to the left or right. The patient was asked to breathe and the degree of abduction was noted bilaterally.  Subglottic view of the larynx was obtained for any additional mass lesions or mucosal changes.  Finally the post cricoid was examined for evidence of pooling of secretions, as well as the pharyngeal wall mucosa.   Anesthesia type: 0.25% phenylephrine     Findings:  Anatomic/physiological deviations: RNC, anterior glottic web, moderate airway restriction               Right vocal process: No restriction of mobility   Left vocal process: No restriction of mobility  Glottal gap: Complete glottal closure  Supraglottic structures: Normal  Hypopharynx: Normal      Estimated Blood Loss: minimal  Complications: None  Disposition: Patient tolerated the procedure well             Review of Relevant Clinical Data   I personally reviewed:  Notes:   Radiology: CT neck 6/25/24    Pathology:    Procedures:    Labs:  No results found for: \"TSH\"  Lab Results   Component Value Date    NA " "140 01/12/2024    CO2 24 01/12/2024    BUN 23.0 01/12/2024    PHOS 2.6 08/12/2016     Lab Results   Component Value Date    WBC 7.7 01/12/2024    HGB 15.3 01/12/2024    HCT 41.0 01/12/2024    MCV 96 01/12/2024     01/12/2024     Lab Results   Component Value Date    PTT 24 04/11/2013    INR 0.98 04/11/2013     No results found for: \"DUANE\"  No components found for: \"RHEUMATOIDFACTOR\", \"RF\"  Lab Results   Component Value Date    CRP <2.9 07/07/2022    CRP <2.9 01/07/2022    CRP <2.9 07/28/2021    CRP <2.9 03/05/2021    CRP <2.9 11/11/2020     No components found for: \"CKTOT\", \"URICACID\"  No components found for: \"C3\", \"C4\", \"DSDNAAB\", \"NDNAABIFA\"  Lab Results   Component Value Date    MPOAB Not Reported 02/14/2014       Patient reported Quality of Life (QOL) Measures   Patient Supplied Answers To VHI Questionnaire      1/22/2024     1:02 PM   Voice Handicap Index (VHI-10)   My voice makes it difficult for people to hear me 4   People have difficulty understanding me in a noisy room 4   My voice difficulties restrict my personal and social life.  2   I feel left out of conversations because of my voice 3   My voice problem causes me to lose income 0   I feel as though I have to strain to produce voice 4   The clarity of my voice is unpredictable 1   My voice problem upsets me 2   My voice makes me feel handicapped 2   People ask, \"What's wrong with your voice?\" 2   VHI-10 24         Patient Supplied Answers To EAT Questionnaire       No data to display                  Patient Supplied Answers To CSI Questionnaire      8/23/2023     8:49 AM   Cough Severity Index (CSI)   My cough is worse when I lie down 0   My coughing problem causes me to restrict my personal and social life 0   I tend to avoid places because of my cough problem 0   I feel embarrassed because of my coughing problem 1   People ask, ''What's wrong?'' because I cough a lot 1   I run out of air when I cough 0   My coughing problem affects my voice " 2   My coughing problem limits my physical activity 0   My coughing problem upsets me 0   People ask me if I am sick because I cough a lot 2   CSI Score 6         Patient Supplied Answers to Dyspnea Index Questionnaire:       No data to display                Impression & Plan     IMPRESSION: Mr. Ennis is a 79 year old male who is being seen for the following:      Dysphonia   - anterior commissure lesion seen on scope on 7/31/23  - voice changes worsening for a few months  - prior smoker  - scope shows anterior commissure lesion with extension to the right vocal fold  - given anterior location will require CT neck to rule out thyroid cartilage extension  - discussed awake biopsy - patient in agreement this was done today  - pathology showed SCC  - CT chest had findings of new nodules - after multiple discussions with radiology the decision was made to proceed with surgery and repeat imaging for this finding in the chest  - s/p MDL with bilateral cordectomy with stent placement on 8/30/23  - today shows that the vocal folds closed around the stent - discussed he needs the scar broken up and bigger stent placed - patient in agreement   - pathology showed SCC with positive margins  - went back for MDL and stent removal on 9/13/23   - symptoms 10/10/2023 are stable, voice is poor  - scope shows  laryngeal web  - discussed he has positive margins - will proceed with repeat surger - if can't clear will need radiation  - ct chest shows improved size of lung nodule of concern  - s/p microlaryngoscopy, CO2 laser resection of vocal fold lesion, steroid injection, stent placement, and biopsy 10/25/23  - pathology shows invasive squamous cell carcinoma  - symptoms 11/16/2023 are stable, voice is poor, discussed case at tumor board, will continue with close observation and repeat biopsies  - scope shows stent in place, vocal folds are open, laryngeal web developed  - discussed removal of stent, then will collect a sample, if  the sample is cancerous, will refer for radiation therapy  - s/p microlaryngoscopy, CO2 laser resection of vocal fold lesion, steroid injection, stent removal, and biopsy 12/01/23  - pathology showed detached fragment of squamous epithelium with mild to moderate dysplasia, no evidence of carcinoma  - s/p laryngoscopy and anterior glottic web vocal fold steroid injection 12/11/23      - symptoms 1/4/2024 are has been having a hard time talking with his voice  - strobe shows bilateral vocal fold stiffness and swelling with question of laryngeal web, ventricular phonation  - s/p steroid injection today, does not appear to have a web, but rather vocal fold stiffness and swelling  - discussed treatment for vocal folds will be with steroid injections and voice therapy  - discussed voice result is questionable given severity of ventricular phonation  - symptoms 1/18/2024 are stable  - scope shows anterior glottic web, did lidocaine anesthesia, but difficult to see if the vocal folds can split apart today as compared to last time, he had laryngeal spasm  - discussed that we should focus on surveillance at this time and keep him in voice therapy, I would not do a repeat stunt placement given the last two did not work   - symptoms 6/27/2024 are worse difficulty breathing  - scope shows anterior glottic web, moderate airway restriction  - has final read of CT pending  - discussed I am concerned for tumor, needs evaluation under anesthesia discussed awake intubation, discussed risk of trach  Plan  - tumor board   - follow up CT read  - abx, steroid inhaler  - schedule surgery    RETURN VISIT: after surgery    Josephine Malone MD    Laryngology    [facility]  Department of  Otolaryngology - Head and Neck Surgery  Clinics & Surgery Center  [address] [place] [id number]  Appointment line: [phone]  Fax: [phone]  [email or web address]

## 2024-06-27 NOTE — PROGRESS NOTES
[facility]  Edwin Reid Jr., M.D., F.A.C.S.  Nahomy Jimenez M.D., M.P.H.  Josephine Malone M.D.  CHRISTIN Toney, M.M. (voice), M.A., CCC-SLP  Oksana Bell, M.S., CCC-SLP  Vanessa Scott, Ph.D., CCC-SLP  Jose L Campo, Ph.D., CCC-SLP  Kym Mireles, M.S., CCC-SLP  Jamil Banegas M.M., M.A., CCC-SLP  KATHRYN Resendiz (voice), M.S., CCC-SLP    [facility]  VOICE/SPEECH/BREATHING THERAPY  CLINICAL FOLLOW-UP AND LARYNGEAL EXAMINATION REPORT - IN PERSON    Clinician: CHRISTIN Toney, M.MKathryn (voice), M.A., CCC-SLP  Seen in conjunction with: Dr. Josephine Malone  Referring physician:  Faisal  Patient: Joshua Ennis  Date of Visit: 6/27/2024    HISTORY  PATIENT INFORMATION  Joshua Ennis was seen for follow-up in conjunction with a visit to Dr. Josephine Malone today.  Please also refer to Dr. Malone's dictation.  He was last seen on 6/4/24 by Livia Jefferson PA-C.  The evaluation noted the following:  The arytenoids were clear, and there was no pooling in the hypopharynx. Persistent and progressive anterior glottic web. Additionally right vocal cord is erythematous and edematous. Leukoplakia appears stable. Airway remains patent but is smaller than last scope exam.     PROGRESS SINCE LAST VISIT  Mr. Ennis reports:  Voice:  Voice has also significantly declined.     Throat:  No concerns    Breathing:  Becoming more difficult  Breathing continues to worsen. 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    Laryngeal evaluation demonstrated white crusting at the anterior commissure supraglottically and infraglottically:; white crusting and  redness at the anterior portion (superior surface) of the right vocal fold and the mid portion at the superior surface and medial edge; white crusting and redness at the vibratory edge/superior surface of the left vocal fold at the middle portion that extends towards posterior portion vibratory edge and this also extends laterally at the middle superior surface; Narrow Band Imaging yielded the following: prominent white crusting observed at the anterior commissure, right,and left vocal fold with prominent bilateral erythema; mild-moderate posterior commissure hypertrophy; possible white crusting at the left medial arytenoid wall but unclear; bilateral ventricular approximation during phonation; L>R; severe four-way constriction of the supraglottic larynx during connected speech; frequent breath holding observed; RTVF Amplitude is marked at the site of anterior lesion (stiff) but mildly reduced at the posterior 2/3 portion of the vocal fold; LTVF amplitude is mildly reduced; mucosal wave of right and left vocal folds is mildly reduced;  asymmetry observed; on phonation, glottic closure is incomplete due to irregularity; irregular-shaped configuration of the glottis during many phonatory tasks  Perceptual evaluation demonstrated marked-severe roughness; moderate breathiness; moderate asthenia; clearer and louder voice with laugh and when he reacted (a loud exclamataion) to the Lidocaine spray;and he often spoke at a higher pitch (G3-A3) than is typical for a man of his age and gender.      STIMULABILITY: results of therapy probes during perceptual and laryngeal evaluation demonstrate improvement with flow phonation probes.   RECOMMENDATIONS:   A course of speech therapy is recommended to improve voice quality and optimize surgical results.  He demonstrates a Good prognosis for improvement given adherence to therapeutic recommendations.        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 Perceptually, his voice is very weak today, and he is talking minimally during our session, as he is just 7 days post-surgery. Laryngoscopy today demonstrated white scar tissue at the anterior 2/3s of the vocal folds, creating webbing anteriorly. Therefore, Dr. Malone plans on reducing this anterior web surgically, placing a larger stent, and injecting steroid into the anterior commissure next week. Formal re-evaluation is recommended at his next post-op appointment prior to initiating a full course of voice therapy. Perioperative care instructions were reviewed with Joshua and his wife today, as they will be repeating this process next week and had questions about gradually increasing his voice use. Joshua is in agreement with this plan of care.         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   Remarkable findings included:  Perceptual evaluation demonstrated: moderate to severe breathiness; moderate to severe strain; he is completely aphonic; and severely reduced conversational speech.   Laryngeal exam demonstrated: anterior laryngeal web; bilateral erythema L>R; irritation at the left medial arytenoid wall; moderate posterior commissure hypertrophy; mild presence of bubbly and sticky secretions on the vocal folds and throughout the laryngeal area; Narrow Band Imaging yielded the following:  white discoloration at the anterior portion of the anterior laryngeal web and at the irritation at the left medial arytenoid  wall     RECOMMENDATIONS per Dr. 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 Perceptually, his voice is very weak today with hints of phonation secondary to stent placement at the anterior commissure and compensatory hyperfunction to achieve phonation. Laryngoscopy today demonstrated white tissue at the anterior-most portion of the true vocal folds, just anterior to the stent, as well as generalized erythema of the true vocal folds. Joshua will be undergoing another procedure with Dr. Malone in the coming weeks to confirm negative margins and hopefully remove the stent. If the stent is removed and margins are negative, we can then proceed with a course of voice therapy. If margins are positive, then Joshua will likely need additional treatments (e.g. radiation). I will keep in contact with Dr. Malone after the procedure and plan accordingly. Joshua and his wife did not have any additional questions about perioperative care today and are in agreement with this plan of care.         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 Perceptually, his voice was moderately to severely weak and breathy with moderate strain and roughness, as well as elevated pitch compared to age- and gender-matched peers. Given this unsuspected worsening of voice quality, laryngoscopy was performed today by Dr. Malone and demonstrated generalized erythema of the true vocal folds with increased edema,  "but not reformation of the anterior glottic web, at the anterior commissure. General vocal fold pliability was significant diminished, and excessive laryngeal hyperfunction was observed with little response to therapeutic probing.  Therefore, Dr. Malone provided a steroid injection into the true vocal folds today, and perioperative voice recommendations were reviewed (e.g. SOVT exercises, rescue breathing) to aid with repairing the delicate vocal fold layers. Written instructions were provided. Joshua is in agreement with this plan of care.     PATIENT REPORTED MEASURES  Patient Supplied Answers To VHI Questionnaire      1/22/2024     1:02 PM   Voice Handicap Index (VHI-10)   My voice makes it difficult for people to hear me 4   People have difficulty understanding me in a noisy room 4   My voice difficulties restrict my personal and social life.  2   I feel left out of conversations because of my voice 3   My voice problem causes me to lose income 0   I feel as though I have to strain to produce voice 4   The clarity of my voice is unpredictable 1   My voice problem upsets me 2   My voice makes me feel handicapped 2   People ask, \"What's wrong with your voice?\" 2   VHI-10 24       Patient Supplied Answers To CSI Questionnaire      8/23/2023     8:49 AM   Cough Severity Index (CSI)   My cough is worse when I lie down 0   My coughing problem causes me to restrict my personal and social life 0   I tend to avoid places because of my cough problem 0   I feel embarrassed because of my coughing problem 1   People ask, ''What's wrong?'' because I cough a lot 1   I run out of air when I cough 0   My coughing problem affects my voice 2   My coughing problem limits my physical activity 0   My coughing problem upsets me 0   People ask me if I am sick because I cough a lot 2   CSI Score 6       Patient Supplied Answers To EAT Questionnaire       No data to display                    OBJECTIVE FINDINGS  PERCEPTUAL EVALUATION (CPT " 10835)  Today's Quality &/or GRBAS: severe to profound breathiness, moderate inconsistent roughness.   POSTURE / TENSION:   WNL    BREATHING:   inspirations are inadequate in volume and frequency  clavicular elevation on inspiration  shoulder and neck involvement    LARYNGEAL PALPATION: n/a    VOICE:  Roughness: Moderate Intermittent  Breathiness: Severe to profound Consistent  Strain: Severe  Loudness  Conversational speech:  Moderately reduced  Projected speech:  n/a  Pitch:  F0/ Habitual Pitch: markedly elevated  Global Overall Severity:  60/100    COUGH/THROAT CLEARING:  Not observed    LARYNGEAL FUNCTION STUDIES (CPT 69895)  N/a    LARYNGEAL EXAMINATION  Procedure: Flexible endoscopy with chip-tip technology without stroboscopy, right nostril; topical anesthesia with 3% Lidocaine and 0.25% phenylephrine was applied.   Performed by: Dr. Malone  The laryngeal and pharyngeal structures were evaluated for gross appearance, mobility, function, and focal lesions / abnormalities of the associated mucosa.    All findings were within normal limits with the exception of the following salient features:   This exam shows the following:    Posterior commissure: moderate hypertrophy and a marked area of irritation along the left interarytenoid mucosa  Secretions: mildly present    Movement:  Right Vocal Fold: Normal  Left Vocal Fold: Normal    Glottic Closure: Normal  Upper Airway: Patent    Vocal Fold Findings:  Right Vocal Fold: moderate erythema. Leukoplakia along the vibratory margin  Left Vocal Fold: moderate erythema. Leukoplakia, especially along the posterior 1/3rd of the vibratory margin  Glottic web            THERAPY PROBES: limited improvement    The laryngeal exam was reviewed with Mr. Ennis, and I provided pertinent explanations, as well as written and oral information.    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    RECOMMENDATIONS:   EVALUATION ONLY AT THIS TIME - NO CERTIFICATION WARRANTED  An ongoing course of speech therapy is recommended to optimize vocal technique and improve voice quality, as warranted by Dr. Malone    Plan by Dr. Malone:  - tumor board   Joshua is concerned that there might be tumor growing back underneath where the vocal folds are fused together   Not sure that the steriods were helpful  - follow up CT read  - abx, steroid inhaler  - schedule surgery  Surgery - concern that there may be tumor under the area where the vocal folds have fused together.   GOALS:  Patient goal:   To resolve the vocal fold lesions  To understand the problem and fix it as much as possible    Short-term goal(s): Within the first 4 sessions, Mr. Ennis:  will be able to independently list key factors in maintenance of good vocal hygiene with 80% accuracy, and report on their use outside the therapy room.  will demonstrate semi-occluded vocal tract (SOVT) exercises with at least 80% accuracy with no clinician support  will initiate Resonant Voice Therapy (RVT)    Long-term goal(s): In 6 months, Mr. Ennis will:  Report resolution of symptoms, and use of optimal voice quality and comfort to meet personal, social, and professional needs, 90% of the time during a typical week of vocal activities    This treatment plan was developed with the  patient who agreed with the recommendations.    TOTAL SERVICE TIME: 60 minutes  EVALUATION OF VOICE AND RESONANCE (68226)  NO CHARGE FACILITY FEE (93183)    Mally Gifford M.M. (voice) MNELSON, CCC/SLP  Speech-Language Pathologist  Certificate of Vocology  [facility]  [phone]  [email or web address]  Pronouns: she/her

## 2024-06-27 NOTE — NURSING NOTE
Pre-op instructions given at appointment today. Patient will have H&P with PCP, patient has holding plan for methotrexate, and will confirm with prescribing provider. Writer will send instructions via [employer]. Patient will reach out if she has any other questions or concerns in the meantime. Nina Cardenas RN on 6/27/2024 at 3:35 PM

## 2024-06-27 NOTE — PATIENT INSTRUCTIONS
1.  You were seen in the ENT Clinic today by . If you have any questions or concerns after your appointment, please call [phone]. Press option #1 for scheduling related needs. Press option #3 for Nurse advice.    2.   has recommended  the following:   - prescription for Qvar steroid inhaler sent to your pharmacy. use up until surgery   - start augmentin and take as directed. Prescription sent to your pharmacy   -continue taking PPI    3.  Plan is to return to clinic after surgery for follow up      Hattie Hair LPN  [phone]  [facility] Ot[facility]

## 2024-06-27 NOTE — PROGRESS NOTES
[facility]   at the [facility]   Otolaryngology Clinic     Patient: Joshua Ennis    MRN: [id number]    : 1945    Age/Gender: 79 year old male  Date of Service: 2024  Rendering Provider:   Josephine Malone MD     Chief Complaint   Vocal fold SCC  S/p MDL with bilateral cordectomy and steroid injection 23  S/p MDL with stent removal and steroid injection on 23  S/p microlaryngoscopy, CO2 laser resection of vocal fold lesion, steroid injection, stent placement, and biopsy 10/25/23    S/p microlaryngoscopy, CO2 laser resection of vocal fold lesion, steroid injection, stent removal, and biopsy 23  S/p laryngoscopy and anterior glottic web vocal fold steroid injection 23   S/p steroid injection 24               Interval History   HISTORY OF PRESENT ILLNESS:  Mr. Ennis is a 78 year old male is being followed for dysphonia. he was initially seen on 8/15/23. Please refer to this note for full history.     Today, he presents for follow up. he reports:  - breathing difficulty with activity  - no voice     PAST MEDICAL HISTORY:   Past Medical History:   Diagnosis Date    Arthritis     Autoimmune disease (H24)     Hearing problem     Hoarseness     Hypertension     Kidney problem     Malignant melanoma (H)     Malignant neoplasm (H)     melanoma    Squamous cell carcinoma     Russo syndrome        PAST SURGICAL HISTORY:   Past Surgical History:   Procedure Laterality Date    BIOPSY  2011    melanoma on back    DISSECT LYMPH NODE INGUINAL  3/1/2013    Procedure: DISSECT LYMPH NODE INGUINAL;  Bilateral Inguinal Lymph Node Biopsy.;  Surgeon: Tariq Acosta MD;  Location: [facility]    ESOPHAGOSCOPY, GASTROSCOPY, DUODENOSCOPY (EGD), COMBINED  2013    Procedure: COMBINED ESOPHAGOSCOPY, GASTROSCOPY, DUODENOSCOPY (EGD);  Gastroscopy;  Surgeon: Tariq Acosta MD;  Location: [facility]    HERNIORRHAPHY INGUINAL  2012    Procedure: HERNIORRHAPHY INGUINAL;  Open  Repair Left Inguinal Hernia;  Surgeon: Jerad Baker MD;  Location: [facility]    INJECT STEROID (LOCATION) N/A 8/30/2023    Procedure: steroid injection;  Surgeon: Josephine Malone MD;  Location: [facility]    INJECT STEROID (LOCATION) N/A 9/13/2023    Procedure: steroid injection;  Surgeon: Josephine Malone MD;  Location: [facility]    INJECT STEROID (LOCATION) N/A 10/25/2023    Procedure: steroid injection, stent placement, and biopsy;  Surgeon: Josephine Malone MD;  Location[facility]    INJECT STEROID (LOCATION) N/A 12/1/2023    Procedure: steroid injection;  Surgeon: Josephine Malone MD;  Location: [facility]    LARYNGOSCOPY WITH MICROSCOPE N/A 9/13/2023    Procedure: MICROLARYNGOSCOPY with stent removal and biopsy;  Surgeon: Josephine Malone MD;  Location: [facility]    LARYNGOSCOPY, FLEXIBLE WITH INJECTION N/A 12/11/2023    Procedure: LARYNGOSCOPY, FLEXIBLE WITH INJECTION with steroid;  Surgeon: Josephine Malone MD;  Location: [facility] OR    LASER CO2 LARYNGOSCOPY N/A 8/30/2023    Procedure: MICROLARYNGOSCOPY, CO2 laser resection of vocal fold lesion;  Surgeon: Josephine Malone MD;  Location: [facility]    LASER CO2 LARYNGOSCOPY N/A 10/25/2023    Procedure: MICROLARYNGOSCOPY, CO2 laser resection of vocal fold lesion;  Surgeon: Josephine Malone MD;  Location: [facility]    LASER CO2 LARYNGOSCOPY N/A 12/1/2023    Procedure: MICROLARYNGOSCOPY CO2 laser standby, resection of vocal fold lesion, stent removal, biopsy;  Surgeon: Josephine Malone MD;  Location: [facility]       CURRENT MEDICATIONS:   Current Outpatient Medications:     calcium carbonate 600 mg-vitamin D 400 units (CALTRATE) 600-400 MG-UNIT per tablet, Take 1 tablet by mouth 2 times daily., Disp: , Rfl:     Cholecalciferol (VITAMIN D) 1000 UNITS capsule, Take 1 capsule by mouth daily., Disp: , Rfl:     folic acid (FOLVITE) 1 MG tablet, Take 2 tablets (2 mg) by mouth daily, Disp: 180 tablet, Rfl: 3    gabapentin (NEURONTIN) 400 MG capsule, Take 1 capsule (400 mg) by mouth At Bedtime, Disp: 90 capsule, Rfl: 3     losartan (COZAAR) 50 MG tablet, Take 1 tablet (50 mg) by mouth daily, Disp: 90 tablet, Rfl: 3    methotrexate 2.5 MG tablet, TAKE 4 TABLETS BY MOUTH EVERY 7 DAYS, Disp: 48 tablet, Rfl: 0    pantoprazole (PROTONIX) 40 MG EC tablet, Take 1 tablet (40 mg) by mouth daily, Disp: 60 tablet, Rfl: 1    Acetaminophen (TYLENOL PO), Take 650 mg by mouth once as needed for mild pain or fever (Patient not taking: Reported on 3/14/2024), Disp: , Rfl:     bacitracin 500 UNIT/GM external ointment, Apply topically 2 times daily Please use over the red area on your neck twice a day. (Patient not taking: Reported on 3/14/2024), Disp: 28 g, Rfl: 0    beclomethasone HFA (QVAR REDIHALER) 80 MCG/ACT inhaler, Inhale 2 puffs into the lungs 2 times daily (Patient not taking: Reported on 3/14/2024), Disp: 10.6 g, Rfl: 3    Blood Pressure Monitor KIT, Automatic Blood Pressure Monitor (Patient not taking: Reported on 3/14/2024), Disp: 1 kit, Rfl: 0    HYDROcodone-acetaminophen (NORCO) 5-325 MG tablet, Take 1 tablet by mouth every 6 hours as needed for severe pain (Patient not taking: Reported on 3/14/2024), Disp: 6 tablet, Rfl: 0    pantoprazole (PROTONIX) 40 MG EC tablet, Take 1 tablet (40 mg) by mouth 2 times daily, Disp: 80 tablet, Rfl: 3  No current facility-administered medications for this visit.    ALLERGIES: Shrimp    SOCIAL HISTORY:    Social History     Socioeconomic History    Marital status: Single     Spouse name: Not on file    Number of children: Not on file    Years of education: Not on file    Highest education level: Not on file   Occupational History     Employer: [employer]   Tobacco Use    Smoking status: Former     Current packs/day: 0.00     Average packs/day: 1 pack/day for 20.0 years (20.0 ttl pk-yrs)     Types: Cigarettes     Start date: 1993     Quit date: 2013     Years since quittin.3     Passive exposure: Past    Smokeless tobacco: Never   Vaping Use    Vaping status: Never Used    Substance and Sexual Activity    Alcohol use: Yes     Comment: rare    Drug use: No    Sexual activity: Not Currently     Partners: Female   Other Topics Concern    Parent/sibling w/ CABG, MI or angioplasty before 65F 55M? Not Asked     Service Not Asked    Blood Transfusions Not Asked    Caffeine Concern Not Asked    Occupational Exposure Not Asked    Hobby Hazards Not Asked    Sleep Concern Not Asked    Stress Concern Not Asked    Weight Concern Not Asked    Special Diet Not Asked    Back Care Not Asked    Exercise Yes     Comment: walking    Bike Helmet Not Asked    Seat Belt Not Asked    Self-Exams Not Asked   Social History Narrative    Not on file     Social Determinants of Health     Financial Resource Strain: High Risk (12/24/2021)    Received from [email or web address] [email or web address]    Financial Resource Strain     Difficulty of Paying Living Expenses: Not on file     Difficulty of Paying Living Expenses: Not on file   Food Insecurity: Not on file   Transportation Needs: Not on file   Physical Activity: Not on file   Stress: Not on file   Social Connections: Unknown (12/24/2021)    Received from [email or web address], [email or web address]    Social Connections     Frequency of Communication with Friends and Family: Not on file   Interpersonal Safety: Low Risk  (1/8/2024)    Interpersonal Safety     Do you feel physically and emotionally safe where you currently live?: Yes     Within the past 12 months, have you been hit, slapped, kicked or otherwise physically hurt by someone?: No     Within the past 12 months, have you been humiliated or emotionally abused in other ways by your partner or ex-partner?: No   Housing Stability: Not on file         FAMILY HISTORY:   Family History   Problem Relation Age of Onset    Diabetes Mother     Hypertension Mother     Cancer Father         stomach    Heart  Disease Father     Heart Disease Brother     Diabetes Sister     Diabetes Sister     Diabetes Sister     Heart Disease Brother     Cancer Sister     Glaucoma No family hx of     Macular Degeneration No family hx of       Non-contributory for problems with anesthesia    REVIEW OF SYSTEMS:   The patient was asked a 14 point review of systems regarding constitutional symptoms, eye symptoms, ears, nose, mouth, throat symptoms, cardiovascular symptoms, respiratory symptoms, gastrointestinal symptoms, genitourinary symptoms, musculoskeletal symptoms, integumentary symptoms, neurological symptoms, psychiatric symptoms, endocrine symptoms, hematologic/lymphatic symptoms, and allergic/ immunologic symptoms.   The pertinent factors have been included in the HPI and below.  Patient Supplied Answers to Review of Systems      6/13/2018     8:42 AM   [facility] ROS   Ears, Nose, Throat Ringing/noise in ears    Nasal congestion or drainage    Sore throat    Hoarseness   Cardiopulmonary Cough       Physical Examination   The patient underwent a physical examination as described below. The pertinent positive and negative findings are summarized after the description of the examination.  Constitutional: The patient's developmental and nutritional status was assessed. The patient's voice quality was assessed.  Head and Face: The head and face were inspected for deformities. The sinuses were palpated. The salivary glands were palpated. Facial muscle strength was assessed bilaterally.  Eyes: Extraocular movements and primary gaze alignment were assessed.  Ears, Nose, Mouth and Throat: The ears and nose were examined for deformities. The nasal septum, mucosa, and turbinates were inspected by anterior rhinoscopy. The lips, teeth, and gums were examined for abnormalities. The oral mucosa, tongue, palate, tonsils, lateral and posterior pharynx were inspected for the presence of asymmetry or mucosal lesions.    Neck: The tracheal position was  noted, and the neck mass palpated to determine if there were any asymmetries, abnormal neck masses, thyromegally, or thyroid nodules.  Respiratory: The nature of the breathing and chest expansion/symmetry was observed.  Cardiovascular: The patient was examined to determine the presence of any edema or jugular venous distension.  Abdomen: The contour of the abdomen was noted.  Lymphatic: The patient was examined for infraclavicular lymphadenopathy.  Musculoskeletal: The patient was inspected for the presence of skeletal deformities.  Extremities: The extremities were examined for any clubbing or cyanosis.  Skin: The skin was examined for inflammatory or neoplastic conditions.  Neurologic: The patient's orientation, mood, and affect were noted. The cranial nerve  functions were examined.  Other pertinent positive and negative findings on physical examination:   OC/OP: no lesions, uvula midline, soft palate elevates symmetrically   Neck: no lesions, no TH tenderness to palpation     All other physical examination findings were within normal limits and noncontributory.    Procedures   Flexible laryngoscopy (CPT 58260)        Pre-procedure diagnosis: dysphonia  Post-procedure diagnosis: same as above  Indication for procedure: Mr. Ennis is a 78 year old male with see above  Procedure(s): Fiberoptic Laryngoscopy     Details of Procedure: After informed consent was obtained, the patient was seated in the examination chair.  The areas of the nasopharynx as well as the hypopharynx were anesthetized with topical 4% lidocaine with 0.25% phenylephrine atomizer.  Examination of the base of tongue was performed first.  Attention was directed to any evidence of masses in the area or evidence of leukoplakia or candidal infection.  Attention was directed to the epiglottis where its size and position was determined and its movement on phonation of the vowel  e .  The piriform sinuses were then inspected for any mass lesions or  "pooling of secretions.  Attention was then directed to the larynx. The vocal folds were inspected for infection or any areas of leukoplakia, for masses, polypoid degeneration, or hemorrhage.  Having done this, the arytenoids and vocal processes were inspected for erythema or evidence of granuloma formation.  The posterior commissure was then inspected for evidence of inflammatory changes in the mucosa and heaping up of mucosal tissue. The patient was then instructed to say the vowel  e .  Adduction of vocal folds to the midline was observed for any evidence of paresis or paralysis of the larynx or asymmetry in rotation of the larynx to the left or right. The patient was asked to breathe and the degree of abduction was noted bilaterally.  Subglottic view of the larynx was obtained for any additional mass lesions or mucosal changes.  Finally the post cricoid was examined for evidence of pooling of secretions, as well as the pharyngeal wall mucosa.   Anesthesia type: 0.25% phenylephrine     Findings:  Anatomic/physiological deviations: RNC, anterior glottic web, moderate airway restriction               Right vocal process: No restriction of mobility   Left vocal process: No restriction of mobility  Glottal gap: Complete glottal closure  Supraglottic structures: Normal  Hypopharynx: Normal      Estimated Blood Loss: minimal  Complications: None  Disposition: Patient tolerated the procedure well             Review of Relevant Clinical Data   I personally reviewed:  Notes:   Radiology: CT neck 6/25/24    Pathology:    Procedures:    Labs:  No results found for: \"TSH\"  Lab Results   Component Value Date     01/12/2024    CO2 24 01/12/2024    BUN 23.0 01/12/2024    PHOS 2.6 08/12/2016     Lab Results   Component Value Date    WBC 7.7 01/12/2024    HGB 15.3 01/12/2024    HCT 41.0 01/12/2024    MCV 96 01/12/2024     01/12/2024     Lab Results   Component Value Date    PTT 24 04/11/2013    INR 0.98 04/11/2013 " "    No results found for: \"DUANE\"  No components found for: \"RHEUMATOIDFACTOR\", \"RF\"  Lab Results   Component Value Date    CRP <2.9 07/07/2022    CRP <2.9 01/07/2022    CRP <2.9 07/28/2021    CRP <2.9 03/05/2021    CRP <2.9 11/11/2020     No components found for: \"CKTOT\", \"URICACID\"  No components found for: \"C3\", \"C4\", \"DSDNAAB\", \"NDNAABIFA\"  Lab Results   Component Value Date    MPOAB Not Reported 02/14/2014       Patient reported Quality of Life (QOL) Measures   Patient Supplied Answers To VHI Questionnaire      1/22/2024     1:02 PM   Voice Handicap Index (VHI-10)   My voice makes it difficult for people to hear me 4   People have difficulty understanding me in a noisy room 4   My voice difficulties restrict my personal and social life.  2   I feel left out of conversations because of my voice 3   My voice problem causes me to lose income 0   I feel as though I have to strain to produce voice 4   The clarity of my voice is unpredictable 1   My voice problem upsets me 2   My voice makes me feel handicapped 2   People ask, \"What's wrong with your voice?\" 2   VHI-10 24         Patient Supplied Answers To EAT Questionnaire       No data to display                  Patient Supplied Answers To CSI Questionnaire      8/23/2023     8:49 AM   Cough Severity Index (CSI)   My cough is worse when I lie down 0   My coughing problem causes me to restrict my personal and social life 0   I tend to avoid places because of my cough problem 0   I feel embarrassed because of my coughing problem 1   People ask, ''What's wrong?'' because I cough a lot 1   I run out of air when I cough 0   My coughing problem affects my voice 2   My coughing problem limits my physical activity 0   My coughing problem upsets me 0   People ask me if I am sick because I cough a lot 2   CSI Score 6         Patient Supplied Answers to Dyspnea Index Questionnaire:       No data to display                Impression & Plan     IMPRESSION: Mr. Ennis is a 79 " year old male who is being seen for the following:      Dysphonia   - anterior commissure lesion seen on scope on 7/31/23  - voice changes worsening for a few months  - prior smoker  - scope shows anterior commissure lesion with extension to the right vocal fold  - given anterior location will require CT neck to rule out thyroid cartilage extension  - discussed awake biopsy - patient in agreement this was done today  - pathology showed SCC  - CT chest had findings of new nodules - after multiple discussions with radiology the decision was made to proceed with surgery and repeat imaging for this finding in the chest  - s/p MDL with bilateral cordectomy with stent placement on 8/30/23  - today shows that the vocal folds closed around the stent - discussed he needs the scar broken up and bigger stent placed - patient in agreement   - pathology showed SCC with positive margins  - went back for MDL and stent removal on 9/13/23   - symptoms 10/10/2023 are stable, voice is poor  - scope shows  laryngeal web  - discussed he has positive margins - will proceed with repeat surger - if can't clear will need radiation  - ct chest shows improved size of lung nodule of concern  - s/p microlaryngoscopy, CO2 laser resection of vocal fold lesion, steroid injection, stent placement, and biopsy 10/25/23  - pathology shows invasive squamous cell carcinoma  - symptoms 11/16/2023 are stable, voice is poor, discussed case at tumor board, will continue with close observation and repeat biopsies  - scope shows stent in place, vocal folds are open, laryngeal web developed  - discussed removal of stent, then will collect a sample, if the sample is cancerous, will refer for radiation therapy  - s/p microlaryngoscopy, CO2 laser resection of vocal fold lesion, steroid injection, stent removal, and biopsy 12/01/23  - pathology showed detached fragment of squamous epithelium with mild to moderate dysplasia, no evidence of carcinoma  - s/p  laryngoscopy and anterior glottic web vocal fold steroid injection 12/11/23      - symptoms 1/4/2024 are has been having a hard time talking with his voice  - strobe shows bilateral vocal fold stiffness and swelling with question of laryngeal web, ventricular phonation  - s/p steroid injection today, does not appear to have a web, but rather vocal fold stiffness and swelling  - discussed treatment for vocal folds will be with steroid injections and voice therapy  - discussed voice result is questionable given severity of ventricular phonation  - symptoms 1/18/2024 are stable  - scope shows anterior glottic web, did lidocaine anesthesia, but difficult to see if the vocal folds can split apart today as compared to last time, he had laryngeal spasm  - discussed that we should focus on surveillance at this time and keep him in voice therapy, I would not do a repeat stunt placement given the last two did not work   - symptoms 6/27/2024 are worse difficulty breathing  - scope shows anterior glottic web, moderate airway restriction  - has final read of CT pending  - discussed I am concerned for tumor, needs evaluation under anesthesia discussed awake intubation, discussed risk of trach  Plan  - tumor board   - follow up CT read  - abx, steroid inhaler  - schedule surgery    RETURN VISIT: after surgery    Josephine Malone MD    Laryngology    [facility]  Department of  Otolaryngology - Head and Neck Surgery  [facility]  [address] [place] [address]  Appointment line: [phone]  Fax: [phone]  [email or web address]

## 2024-06-28 ENCOUNTER — PREP FOR PROCEDURE (OUTPATIENT)
Dept: OTOLARYNGOLOGY | Facility: CLINIC | Age: 79
End: 2024-06-28
Payer: COMMERCIAL

## 2024-06-28 ENCOUNTER — TELEPHONE (OUTPATIENT)
Dept: RHEUMATOLOGY | Facility: CLINIC | Age: 79
End: 2024-06-28
Payer: COMMERCIAL

## 2024-06-28 ENCOUNTER — TELEPHONE (OUTPATIENT)
Dept: OTOLARYNGOLOGY | Facility: CLINIC | Age: 79
End: 2024-06-28
Payer: COMMERCIAL

## 2024-06-28 DIAGNOSIS — C32.9 LARYNGEAL CANCER (H): Primary | ICD-10-CM

## 2024-06-28 RX ORDER — CEFAZOLIN SODIUM 2 G/50ML
2 SOLUTION INTRAVENOUS
Status: CANCELLED | OUTPATIENT
Start: 2024-06-28

## 2024-06-28 RX ORDER — CEFAZOLIN SODIUM 2 G/50ML
2 SOLUTION INTRAVENOUS SEE ADMIN INSTRUCTIONS
Status: CANCELLED | OUTPATIENT
Start: 2024-06-28

## 2024-06-28 NOTE — TELEPHONE ENCOUNTER
[facility] Call Center    Phone Message    May a detailed message be left on voicemail: yes     Reason for Call: Other: Per pt's SO she wants to his pre op appt isn't until the 15th is that going to be okay or should they try somewhere else. Please call to discuss. Thank you     Action Taken: Message routed to:  [facility] ([facility]): ENT    Travel Screening: Not Applicable     Date of Service:

## 2024-06-28 NOTE — TELEPHONE ENCOUNTER
Scheduled surgery with Dr. Malone on 7/17/2024    Spoke with: Earlene Jerez (Significant other)    Surgery is located at [facility]    Patient will be seen for their H&P by their PCP Dr. Mcintyre within 30 days of surgery - Confirmed PCP on file is up to date     Does patient need a consult before upcoming surgery? No: already done    Anesthesia type: General    Requested Imaging required for surgery: No    Patient needs scheduled for their 1 week post op    Patient will receive their surgery packet & surgical soap via mail per their preference - Confirmed address on file is up to date    Patient was not provided a start time for surgery & is aware they will receive this information 2-3 days before surgery    Additional comments: Patient was instructed to call back with any further questions or concerns.     Senait Pettit on 6/28/2024 at 11:32 AM

## 2024-06-28 NOTE — TELEPHONE ENCOUNTER
[facility] Call Center    Phone Message    May a detailed message be left on voicemail: yes     Reason for Call: Other: Caller states patient has surgery on 7/17/2024 they need to know if he should with hold his dosage of -methotrexate 2.5 MG tablet  on 7/14/2024 before surgery?     Action Taken: Other: Rheum     Travel Screening: Not Applicable

## 2024-06-28 NOTE — TELEPHONE ENCOUNTER
Called patient SO to confirm the pre-op was okay, but to have Dr. Salazar advise on when to hold methotrexate. Patient SO also wondered about the process of the surgery. Writer went over what would be happening, patient SO was agreeable and verbalized understanding of the situation. Nina Cardenas RN on 6/28/2024 at 1:58 PM

## 2024-06-30 ENCOUNTER — HEALTH MAINTENANCE LETTER (OUTPATIENT)
Age: 79
End: 2024-06-30

## 2024-07-01 NOTE — TELEPHONE ENCOUNTER
Spoke with Patient's Partner-Patient does not have to hold the Methotrexate prior to surgery. It is okay for Patient to take the antibiotic with the Methotrexate also long as it is for preventive measures and not for an active infection.   Per Dr Sanchez and Nurse Yadira Escalante.

## 2024-07-03 ENCOUNTER — PREP FOR PROCEDURE (OUTPATIENT)
Dept: OTOLARYNGOLOGY | Facility: CLINIC | Age: 79
End: 2024-07-03

## 2024-07-03 ENCOUNTER — LAB (OUTPATIENT)
Dept: LAB | Facility: CLINIC | Age: 79
End: 2024-07-03
Payer: COMMERCIAL

## 2024-07-03 DIAGNOSIS — M31.30 GRANULOMATOSIS WITH POLYANGIITIS, UNSPECIFIED WHETHER RENAL INVOLVEMENT (H): ICD-10-CM

## 2024-07-03 DIAGNOSIS — R91.1 LUNG NODULE: ICD-10-CM

## 2024-07-03 DIAGNOSIS — R05.2 SUBACUTE COUGH: ICD-10-CM

## 2024-07-03 LAB
ALBUMIN MFR UR ELPH: 12.1 MG/DL
ALBUMIN SERPL BCG-MCNC: 4.2 G/DL (ref 3.5–5.2)
ALBUMIN UR-MCNC: NEGATIVE MG/DL
ALT SERPL W P-5'-P-CCNC: 35 U/L (ref 0–70)
APPEARANCE UR: CLEAR
AST SERPL W P-5'-P-CCNC: 34 U/L (ref 0–45)
BACTERIA #/AREA URNS HPF: ABNORMAL /HPF
BASOPHILS # BLD AUTO: 0.1 10E3/UL (ref 0–0.2)
BASOPHILS NFR BLD AUTO: 1 %
BILIRUB UR QL STRIP: NEGATIVE
COLOR UR AUTO: YELLOW
CREAT SERPL-MCNC: 1.76 MG/DL (ref 0.67–1.17)
CREAT UR-MCNC: 174.7 MG/DL
CRP SERPL-MCNC: <3 MG/L
EGFRCR SERPLBLD CKD-EPI 2021: 39 ML/MIN/1.73M2
EOSINOPHIL # BLD AUTO: 0.3 10E3/UL (ref 0–0.7)
EOSINOPHIL NFR BLD AUTO: 6 %
ERYTHROCYTE [DISTWIDTH] IN BLOOD BY AUTOMATED COUNT: 13.1 % (ref 10–15)
ERYTHROCYTE [SEDIMENTATION RATE] IN BLOOD BY WESTERGREN METHOD: 1 MM/HR (ref 0–20)
GLUCOSE UR STRIP-MCNC: NEGATIVE MG/DL
HCT VFR BLD AUTO: 41.7 % (ref 40–53)
HGB BLD-MCNC: 13.9 G/DL (ref 13.3–17.7)
HGB UR QL STRIP: NEGATIVE
IMM GRANULOCYTES # BLD: 0 10E3/UL
IMM GRANULOCYTES NFR BLD: 0 %
KETONES UR STRIP-MCNC: NEGATIVE MG/DL
LEUKOCYTE ESTERASE UR QL STRIP: NEGATIVE
LYMPHOCYTES # BLD AUTO: 0.9 10E3/UL (ref 0.8–5.3)
LYMPHOCYTES NFR BLD AUTO: 23 %
MCH RBC QN AUTO: 32.9 PG (ref 26.5–33)
MCHC RBC AUTO-ENTMCNC: 33.3 G/DL (ref 31.5–36.5)
MCV RBC AUTO: 99 FL (ref 78–100)
MONOCYTES # BLD AUTO: 0.5 10E3/UL (ref 0–1.3)
MONOCYTES NFR BLD AUTO: 13 %
NEUTROPHILS # BLD AUTO: 2.3 10E3/UL (ref 1.6–8.3)
NEUTROPHILS NFR BLD AUTO: 57 %
NITRATE UR QL: NEGATIVE
PH UR STRIP: 5.5 [PH] (ref 5–7)
PLATELET # BLD AUTO: 187 10E3/UL (ref 150–450)
PROT/CREAT 24H UR: 0.07 MG/MG CR (ref 0–0.2)
RBC # BLD AUTO: 4.23 10E6/UL (ref 4.4–5.9)
RBC #/AREA URNS AUTO: ABNORMAL /HPF
SP GR UR STRIP: 1.02 (ref 1–1.03)
SQUAMOUS #/AREA URNS AUTO: ABNORMAL /LPF
UROBILINOGEN UR STRIP-ACNC: 0.2 E.U./DL
WBC # BLD AUTO: 4 10E3/UL (ref 4–11)
WBC #/AREA URNS AUTO: ABNORMAL /HPF

## 2024-07-03 PROCEDURE — 84156 ASSAY OF PROTEIN URINE: CPT

## 2024-07-03 PROCEDURE — 82784 ASSAY IGA/IGD/IGG/IGM EACH: CPT

## 2024-07-03 PROCEDURE — 86036 ANCA SCREEN EACH ANTIBODY: CPT

## 2024-07-03 PROCEDURE — 36415 COLL VENOUS BLD VENIPUNCTURE: CPT

## 2024-07-03 PROCEDURE — 86355 B CELLS TOTAL COUNT: CPT

## 2024-07-03 PROCEDURE — 84450 TRANSFERASE (AST) (SGOT): CPT

## 2024-07-03 PROCEDURE — 84460 ALANINE AMINO (ALT) (SGPT): CPT

## 2024-07-03 PROCEDURE — 86140 C-REACTIVE PROTEIN: CPT

## 2024-07-03 PROCEDURE — 85025 COMPLETE CBC W/AUTO DIFF WBC: CPT

## 2024-07-03 PROCEDURE — 86037 ANCA TITER EACH ANTIBODY: CPT

## 2024-07-03 PROCEDURE — 82040 ASSAY OF SERUM ALBUMIN: CPT

## 2024-07-03 PROCEDURE — 82565 ASSAY OF CREATININE: CPT

## 2024-07-03 PROCEDURE — 81001 URINALYSIS AUTO W/SCOPE: CPT

## 2024-07-03 PROCEDURE — 85652 RBC SED RATE AUTOMATED: CPT

## 2024-07-03 PROCEDURE — 83876 ASSAY MYELOPEROXIDASE: CPT

## 2024-07-03 PROCEDURE — 83516 IMMUNOASSAY NONANTIBODY: CPT

## 2024-07-05 LAB
ANCA AB PATTERN SER IF-IMP: ABNORMAL
C-ANCA TITR SER IF: ABNORMAL {TITER}
CD19 B CELL COMMENT: NORMAL
CD19 CELLS # BLD: 197 CELLS/UL (ref 107–698)
CD19 CELLS NFR BLD: 20 % (ref 6–27)
IGA SERPL-MCNC: 94 MG/DL (ref 84–499)
IGG SERPL-MCNC: 911 MG/DL (ref 610–1616)
IGM SERPL-MCNC: 30 MG/DL (ref 35–242)

## 2024-07-08 LAB
MYELOPEROXIDASE AB SER IA-ACNC: 0.4 U/ML
MYELOPEROXIDASE AB SER IA-ACNC: NEGATIVE
PROTEINASE3 AB SER IA-ACNC: 24 U/ML
PROTEINASE3 AB SER IA-ACNC: POSITIVE

## 2024-07-11 ENCOUNTER — OFFICE VISIT (OUTPATIENT)
Dept: RHEUMATOLOGY | Facility: CLINIC | Age: 79
End: 2024-07-11
Attending: INTERNAL MEDICINE
Payer: COMMERCIAL

## 2024-07-11 VITALS
SYSTOLIC BLOOD PRESSURE: 114 MMHG | HEART RATE: 62 BPM | DIASTOLIC BLOOD PRESSURE: 67 MMHG | BODY MASS INDEX: 29.58 KG/M2 | OXYGEN SATURATION: 98 % | WEIGHT: 200.3 LBS

## 2024-07-11 DIAGNOSIS — Z79.899 HIGH RISK MEDICATIONS (NOT ANTICOAGULANTS) LONG-TERM USE: ICD-10-CM

## 2024-07-11 DIAGNOSIS — M31.30 GRANULOMATOSIS WITH POLYANGIITIS, UNSPECIFIED WHETHER RENAL INVOLVEMENT (H): ICD-10-CM

## 2024-07-11 PROCEDURE — 99213 OFFICE O/P EST LOW 20 MIN: CPT | Performed by: INTERNAL MEDICINE

## 2024-07-11 PROCEDURE — G2211 COMPLEX E/M VISIT ADD ON: HCPCS | Performed by: INTERNAL MEDICINE

## 2024-07-11 PROCEDURE — 99215 OFFICE O/P EST HI 40 MIN: CPT | Performed by: INTERNAL MEDICINE

## 2024-07-11 RX ORDER — METHOTREXATE 2.5 MG/1
10 TABLET ORAL
Qty: 60 TABLET | Refills: 1 | Status: SHIPPED | OUTPATIENT
Start: 2024-07-11 | End: 2024-07-31

## 2024-07-11 ASSESSMENT — PAIN SCALES - GENERAL: PAINLEVEL: NO PAIN (0)

## 2024-07-11 NOTE — PROGRESS NOTES
[facility] Follow-Up In Person Visit Note    Date of visit: 7/11/2024  Last visit date: 7/6/2023    Joshua Ennis MRN# [id number]   Age: 79 year old    Reason for visit: GPA, high risk med use monitoring   YOB: 1945          Assessment and Plan:     Assessment:   Mr. Ennis is a 78 yr old  male with history of melanoma s/p resection in 11/2011, former tobacco use, and GPA (PR3+, MPO and c-ANCA negative in 3/2013 based on aforementioned labs, confirmed by kidney and lung biopsy) who presents for clinic follow-up regarding GPA.  He was initiated on cyclophosphamide 150 mg QD and high-dose prednisone 3/2013. He last took prednisone in 10/2013. Cytoxan was switched to MTX for maintenance treatment in 10/2013.    7/6/2023:  Vasculitis seems to be stable and he has no signs or symptoms of flare up today, though PR3 remains persistently positive. New upper airway symptoms, while more likely due to non-vasculitis inflammation, GERD, vocal cord dysfunction, could represent vasculitis in tracheal wall, which can occur in absence of elevated inflammatory markers. Also, former smoker and with immunosuppression status, need to make sure there is no SCC. Will refer for ENT evaluation and also acquire high-resolution non-contrast CT to follow nodule in his lung incidentally identified in 2020. Otherwise continue with current plan of low dose methotrexate 10mg (4 tabs) weekly and Q3 mo labs. Was advised to call in case of vasculitis flare up sx.        Plan 7/6/2023:    - Continue MTX 10 mg (4 tabs) weekly     > Labs q3m      > Hold methotrexate if develops an infection  - Continue with Folic acid 1 mg po   - Referral to ENT for new onset persistent voice changes/hoarseness and continued cough/phlegm (2017 note indicated laryngoscopy would be next step if he developed persistent hoarseness)  - High-resolution CT chest w/o contrast for follow up of pulm nodule  - Repeat labs in 3  mo    Return in 1y (in person)               Today 7/11/2024:    Since last visit, was diagnosed with laryngeal SCC, will have another procedure on 7/17 with awake intubation, unchanged horseness, no other complaints. Asked me about awake intubation process, I advised him to ask surgical team to review/explain to him, I will message his ENT team. 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 Advised good hydration. He is on amoxicillin since 7/3 x 2 wk course to prevent surgical infection, due to drug drug interaction and possible rise in methotrexate level and low GFR, advised him tos kip next dose of methotrexate with follow up labs on Mon. If he needs further cancer tx, will hold methotrexate. He could benefit from MTM referral to check on meds, drug-drug interaction in future, referral was placed.    Plan:    Skip the methotrexate this coming Sunday 7/14, resume Sunday 7/21     Continue with folic acid    Labs on Monday    Good hydration    Refer to [facility] pharmacist    Return in 6 months (in person)    TT 40 min was spent on date of the encounter doing chart review, history and exam, documentation and further activities as noted above. Any prior notes, outside records, laboratory results, and imaging studies were reviewed if relevant.    The longitudinal plan of care for the diagnosis(es)/condition(s) as documented were addressed during this visit. Due to the added complexity in care, I will continue to support Joshua in the subsequent management and with ongoing continuity of care.      Ede Sanchez MD      Orders Placed This Encounter   Procedures    ALT    Albumin level    AST    Creatinine    Med Therapy Management Referral    CBC with Platelets & Differential            HPI / Interim History:      Mr. Ennis is a 78 yo WM with h/o melanoma s/p resection in 11/2011, former tobacco use, and  GPA diagnosed in 3/2013. He presents for follow up.    Had a hospitalization 11/2-11/4/2020 with a fall with displaced left 5th-9th rib fractures, now recovered.    He has been tolerating it well. He had a flare in 12/2013 with increased crusts in his nose along with recurrence of arthralgia, worsening numbness in feet and increasing vasculitis marker. Renal function was stable with negative repeat chest CT. His vasculitis flare was treated with short course of prednisone taper 20 mg po qd max and increasing MTX to 8 tab = 20 mg qwk. Vasculitis symptoms improved.  At visit on 1/2015, he had scabs in his nose, had cold dakota symptoms and increased arthralgia/fatigue (shoulders, L hand). Labs from 12/16 showed increased Cr level and hematuria with rising PR3 (more than 8), there was a concern for vasculitis flare (in kidneys, sinuses), no flare on repeat chest CT 1/2015. Therefore it was recommended to try rituximab. Another reason was to be able to taper MTX off given abnormal Cr.  He is now s/p Rituximab infusion x 4 (1st on 2/25/2015). He is feeling well. No hematuria with stable Cr, no SOB. Saw ENT with negative nasal mucosa biopsy 6/2015 for granuloma but showed active inflammation, had left nostril lesion which decreased in size by use of mupirocin ointment. PR3 vasculitis marker went back to NL in 6/2015, it was NL in 10/2015, given stable vasculitis and low GFR, MTX from 8 to 7 tab po qwk. Repeat MD-3 in 3/2016 was slightly positive, Cr was slightly higher than baseline. We tapered his MTX further to 6 tab po qwk in 2/2016 given lack of symptoms. In 3/2017, MTX was reduced from 6 to 5 tab qwk. It was further tapered to 4 tab po qwk in 6/2017 given stable disease. No vasculitis flare ups by such change.     7/7/22:  Reports doing well overall. Denies any vasculitis symptoms. Denies any fever/chills, fatigue, headaches, congestion, nose bleeds, mouth sores, cough/hemoptysis, chest pain, SOB, hematuria, dysuria, or  "any RUQ pain. Still endorses neuropathy but it is stable. No other concerns    7/6/23:  Doing fairly well overall though has noted persistent bothersome hoarseness and voice changes over the past 3 mos. Associates this with \"stickier\" phlegm than usual with his chronic cough. Notes this cough has been productive of grey-white sputum and present for many years relatively unchanged. Also reports episode of poison oak ~ 3 wks ago, was given a short course of prednisone (notes indicate 40 mg every day x 5 days) by an urgent care. Has now resolved, and reports that during that prednisone course there was no change to his hoarseness or phlegm. Otherwise denies weight loss, loss of energy, loss of appetite, dysgeusia, fevers, chills, headaches, congestion, nose bleeds, nose crusting, mouth sores, hemoptysis, rashes, sores, chest pain, dyspnea, hematuria, dysuria, nausea, diarrhea, RUQ pain, vision or hearing changes. No other concerns.      Today 7/11/2024:    -no vasculitis flare up, no crusting in the nose    -Dx of laryngeal SCC since last visit, continues to have hoarseness, will have another ENT procedure on 7/17    -no weight loss, energy and appetite is good, in good spirit, physically active         Past Medical History:     Past Medical History:   Diagnosis Date    Arthritis     Autoimmune disease (H24)     Hearing problem     Hoarseness     Hypertension     Kidney problem     Malignant melanoma (H)     Malignant neoplasm (H)     melanoma    Squamous cell carcinoma     Russo syndrome           Past Surgical History:     Past Surgical History:   Procedure Laterality Date    BIOPSY  11/2011    melanoma on back    DISSECT LYMPH NODE INGUINAL  3/1/2013    Procedure: DISSECT LYMPH NODE INGUINAL;  Bilateral Inguinal Lymph Node Biopsy.;  Surgeon: Tariq Acosta MD;  Location: [facility]    ESOPHAGOSCOPY, GASTROSCOPY, DUODENOSCOPY (EGD), COMBINED  7/5/2013    Procedure: COMBINED ESOPHAGOSCOPY, GASTROSCOPY, DUODENOSCOPY " (EGD);  Gastroscopy;  Surgeon: Tariq Acosta MD;  Location: [facility]    HERNIORRHAPHY INGUINAL  2012    Procedure: HERNIORRHAPHY INGUINAL;  Open Repair Left Inguinal Hernia;  Surgeon: Jerad Baker MD;  Location: [facility]    INJECT STEROID (LOCATION) N/A 2023    Procedure: steroid injection;  Surgeon: Josephine Malone MD;  Location: [facility]    INJECT STEROID (LOCATION) N/A 2023    Procedure: steroid injection;  Surgeon: Josephine Malone MD;  [facility]    INJECT STEROID (LOCATION) N/A 10/25/2023    Procedure: steroid injection, stent placement, and biopsy;  Surgeon: Josephine Malone MD;  [facility]: [facility]    INJECT STEROID (LOCATION) N/A 2023    Procedure: steroid injection;  Surgeon: Josephine Malone MD;  [facility]: [facility]    LARYNGOSCOPY WITH MICROSCOPE N/A 2023    Procedure: MICROLARYNGOSCOPY with stent removal and biopsy;  Surgeon: Josephine Malone MD;  [facility]    LARYNGOSCOPY, FLEXIBLE WITH INJECTION N/A 2023    Procedure: LARYNGOSCOPY, FLEXIBLE WITH INJECTION with steroid;  Surgeon: Josephine Malone MD;  Location: [facility]    LASER CO2 LARYNGOSCOPY N/A 2023    Procedure: MICROLARYNGOSCOPY, CO2 laser resection of vocal fold lesion;  Surgeon: Josephine Malone MD;  Location: [facility]    LASER CO2 LARYNGOSCOPY N/A 10/25/2023    Procedure: MICROLARYNGOSCOPY, CO2 laser resection of vocal fold lesion;  Surgeon: Josephine Malone MD;  [facility]: [facility]    LASER CO2 LARYNGOSCOPY N/A 2023    Procedure: MICROLARYNGOSCOPY CO2 laser standby, resection of vocal fold lesion, stent removal, biopsy;  Surgeon: Josephine Malone MD;  Location: [facility]          Social History:     Social History     Tobacco Use    Smoking status: Former     Current packs/day: 0.00     Average packs/day: 1 pack/day for 20.0 years (20.0 ttl pk-yrs)     Types: Cigarettes     Start date: 1993     Quit date: 2013     Years since quittin.4     Passive exposure: Past    Smokeless tobacco: Never   Substance Use Topics     Alcohol use: Yes     Comment: rare          Family History:     Family History   Problem Relation Age of Onset    Diabetes Mother     Hypertension Mother     Cancer Father         stomach    Heart Disease Father     Heart Disease Brother     Diabetes Sister     Diabetes Sister     Diabetes Sister     Heart Disease Brother     Cancer Sister     Glaucoma No family hx of     Macular Degeneration No family hx of           Immunizations:   Due for shingles vaccine, encouraged to seek this from his PCP.    PMHx, FHx, SHx were reviewed, unchanged.         Allergies:     Allergies   Allergen Reactions    Shrimp Nausea and Vomiting and Unknown     Got sick each time he ate it          Medications:     Current Outpatient Medications   Medication Sig Dispense Refill    amoxicillin-clavulanate (AUGMENTIN) 875-125 MG tablet Take 1 tablet by mouth 2 times daily for 14 days 28 tablet 0    beclomethasone HFA (QVAR REDIHALER) 80 MCG/ACT inhaler Inhale 2 puffs into the lungs 2 times daily 10.6 g 3    beclomethasone HFA (QVAR REDIHALER) 80 MCG/ACT inhaler Inhale 2 puffs into the lungs 2 times daily 10.6 g 3    calcium carbonate 600 mg-vitamin D 400 units (CALTRATE) 600-400 MG-UNIT per tablet Take 1 tablet by mouth 2 times daily.      Cholecalciferol (VITAMIN D) 1000 UNITS capsule Take 1 capsule by mouth daily.      folic acid (FOLVITE) 1 MG tablet Take 2 tablets (2 mg) by mouth daily 180 tablet 3    gabapentin (NEURONTIN) 400 MG capsule Take 1 capsule (400 mg) by mouth At Bedtime 90 capsule 3    losartan (COZAAR) 50 MG tablet Take 1 tablet (50 mg) by mouth daily 90 tablet 3    methotrexate 2.5 MG tablet TAKE 4 TABLETS BY MOUTH EVERY 7 DAYS 48 tablet 0    pantoprazole (PROTONIX) 40 MG EC tablet Take 1 tablet (40 mg) by mouth daily 60 tablet 1     No current facility-administered medications for this visit.          Review of Systems:   A comprehensive ROS was done. Positives are per HPI.           Physical Exam:   /67   Pulse  62   Wt 90.9 kg (200 lb 4.8 oz)   SpO2 98%   BMI 29.58 kg/m        Gen:pleasant, NAD, significant other Falguni is present  HEENT: nl conj/sclera, EOMI, no oral ulcers, +hoarseness  CV: RRR no M/G/R  Resp: CTAB  Abd: soft, ND, NT  Ext: no edema  Skin: no rash   Neuro: grossly non focal  MSK: francois ctive synovitis or joint tenderness             Data:   Recent laboratory data reviewed.    CT CAP 11/2020  IMPRESSION:  1.  Acute minimally displaced posterior left 9th rib fracture.     2.  Acute displaced lateral left 5th-8th rib fractures.     3.  No other fractures.     4.  Normal caliber thoracic and abdominal aorta without evidence for acute injury.     5.  No solid organ injury in the upper abdomen or significant free fluid throughout the abdomen or pelvis.     6.  No pneumothorax or pulmonary contusion or significant pleural fluid.     7.  In the extreme left lung apex there is a 9 x 7 mm nodule extending toward the level of the pleural surface. Recommend continued short interval follow-up in 2-3 months to assess for stability.        [Recommend Follow Up: Lung nodule]      CHEST CT (2/16/2013)    Chest: Multiple indeterminate pulmonary nodules. Several of the  larger lesions are cavitary. There are 3 dominant lesions, a 3.2 cm  cavitary lesion in the left upper lobe, a 3.4 cm solid mass in the  right lower lobe laterally and a 3.7 cm cavitary mass in the right  lower lobe medially. In addition there is mediastinal lymphadenopathy  with a dominant 3 cm subcarinal node. Bilateral axillary  lymphadenopathy with 2.4 cm node seen bilaterally. Chest otherwise  unremarkable.       Exam: High-resolution Chest CT without contrast 1/9/2015 12:42 PM.  History: Concern for ANCA vasculitis flare in the chest.  Comparison: 3/7/2014, 11/14/2013, 2/16/2013.  Technique: CT helical acquisition from the lung apices to the kidneys  was obtained without intravenous contrast. Both inspiratory and  expiratory images were  obtained.    Findings:  Moderate atherosclerotic calcifications of the coronary arteries. Mild  calcifications involving the aorta and aortic great vessels. Prominent  paratracheal lymph node on series 3 image 22 measuring 9 mm. Decreased  from 2/16/2013 and unchanged from 3/7/2014. Borderline enlarged right  hilar lymph node, unchanged from 3/7/2014 and decreased from  2/16/2013. Heart is not enlarged. Trace cardial effusion. No axillary  lymphadenopathy.  Nodular scarring in the left apex, unchanged from 2/16/2013. No  pleural effusion or pneumothorax. No evidence of intrapulmonary  infection. There is a cluster of tree in bud nodularity noted in the  anteromedial right upper lobe. These nodules appear new. Expiratory  images demonstrate mild air trapping. Scattered pulmonary nodules:  Series 6 image 146: Seen posteriorly in the right lower lobe, there is  a sub-pleural nodule measuring 1.6 x 1.2 cm with a spiculated border.  Previously, this nodule measured 2.0 x 1.6 cm.  Series 6 image 189: Seen laterally in the right lower lobe, there is a  former mid pulmonary nodule which is unchanged from 3/7/2013 and  decreased from 2/16/2013.  Series 6 image 169: Seen posterolaterally in the right lower lobe,  there is a 3 mm pulmonary nodule which is unchanged from 3/7/2014 and  decreased from 2/16/2013.  Series 6 image 225: Seen posterolaterally in the left lower lobe,  there is a 4 mm subpleural nodule which is unchanged from 2/16/2014.  Other scattered sub-4 mm pulmonary nodules appear stable from previous  study.  Evaluation of the upper abdomen is limited due to the lack of  intravenous contrast.  No suspicious bony lesions.    IMPRESSION  Impression:   1. Scattered pulmonary nodules enlarged lymph nodes are  stable/slightly decreased from the previous study. No evidence for  acute flare in patient's known ANCA-associated vasculitis.  2. New tree in bud nodularity seen anteromedially in the right middle  lobe.  "Findings could be seen in the setting of an atypical infectious  process.  3. Mild air trapping. Finding is nonspecific and is seen in setting of  small airways disease such as asthma or bronchiolitis.  I have personally reviewed the examination and initial interpretation  and I agree with the findings.  TOÑO PERKINS MD    Copath Report     Patient Name: ADONAY FAITH  MR#: [id number]  Specimen #: [id number]  Collected: 6/11/2015  Received: 6/11/2015  Reported: 6/12/2015 17:37  Ordering Phy(s): DANIELLE PARIS    SPECIMEN(S):  Nasal septum    FINAL DIAGNOSIS:  Nasal septum, biopsy:  -Squamous mucosa with ulcer, marked acute inflammation and reactive  changes  -No granulomas identified  -A special stain for fungi (GMS) is negative    I have personally reviewed all specimens and or slides, including the  listed special stains, and used them with my medical judgement to  determine the final diagnosis.    Electronically signed out by:    Dorys Barton M.D., [facility]    CLINICAL HISTORY:  The patient is a 70-year-old man with a history of left upper back  melanoma, resected in 2011 (see consult report [id number]). 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 He now presents for followup visit with nasal  cavity scabs, increased arthralgia and fatigue, concerning for  vasculitis flare. Operative procedure: Nasal septum biopsy.    GROSS:  The specimen is received in formalin with proper patient identification,  labeled \"septal tissue\". The specimen consists of three tan-pink tissue  fragments, 0.2 cm in greatest dimension, entirely submitted in cassette  1. (Dictated by: Ana Mixon 6/11/2015 03:39 PM)    MICROSCOPIC:  Microscopic examination is performed. A GMS stain, performed with  appropriate positive control, is " negative.    CPT Codes:  A: 06673-DZ8, 93652-WTN    TESTING LAB LOCATION:  [facility], [facility] [address]  [address] [place] [id number] [phone]    COLLECTION SITE:  Client: [facility]  Location: [facility] ([facility])     Component      Latest Ref Rng & Units 3/5/2021   WBC      4.0 - 11.0 10e9/L 6.9   RBC Count      4.4 - 5.9 10e12/L 4.18 (L)   Hemoglobin      13.3 - 17.7 g/dL 13.8   Hematocrit      40.0 - 53.0 % 41.4   MCV      78 - 100 fl 99   MCH      26.5 - 33.0 pg 33.0   MCHC      31.5 - 36.5 g/dL 33.3   RDW      10.0 - 15.0 % 12.5   Platelet Count      150 - 450 10e9/L 185   % Neutrophils      % 63.6   % Lymphocytes      % 21.6   % Monocytes      % 10.8   % Eosinophils      % 3.4   % Basophils      % 0.6   Absolute Neutrophil      1.6 - 8.3 10e9/L 4.4   Absolute Lymphocytes      0.8 - 5.3 10e9/L 1.5   Absolute Monocytes      0.0 - 1.3 10e9/L 0.7   Absolute Eosinophils      0.0 - 0.7 10e9/L 0.2   Absolute Basophils      0.0 - 0.2 10e9/L 0.0   Diff Method       Automated Method   Color Urine       Yellow   Appearance Urine       Clear   Glucose Urine      NEG:Negative mg/dL Negative   Bilirubin Urine      NEG:Negative Negative   Ketones Urine      NEG:Negative mg/dL Negative   Specific Gravity Urine      1.003 - 1.035 1.020   pH Urine      5.0 - 7.0 pH 5.5   Protein Albumin Urine      NEG:Negative mg/dL Negative   Urobilinogen Urine      0.2 - 1.0 EU/dL 0.2   Nitrite Urine      NEG:Negative Negative   Blood Urine      NEG:Negative Negative   Leukocyte Esterase Urine      NEG:Negative Negative   Source       Midstream Urine   WBC Urine      OTO5:0 - 5 /HPF 0 - 5   RBC Urine      OTO2:O - 2 /HPF O - 2   Squamous Epithelial /LPF Urine      FEW:Few /LPF Few   Bacteria Urine      NEG:Negative /HPF Few (A)   IGG      610 - 1,616 mg/dL 981   IGA      84 - 499 mg/dL 85   IGM      35 - 242 mg/dL 28 (L)   Creatinine      0.66 - 1.25  mg/dL 1.48 (H)   GFR Estimate      >60 mL/min/1.73:m2 46 (L)   GFR Estimate If Black      >60 mL/min/1.73:m2 53 (L)   Neutrophil Cytoplasmic Antibody      <1:10 titer 1:20 (A)   Neutrophil Cytoplasmic Antibody Pattern       Cytoplasmic ANCA (c-ANCA) staining pattern observed and confirmed on formalin-fixed . . .   CD19 B Cells      6 - 27 % 20   Absolute CD19      107 - 698 cells/uL 352   Protein Random Urine      g/L 0.10   Protein Total Urine g/gr Creatinine      0 - 0.2 g/g Cr 0.13   Myeloperoxidase Antibody IgG      0.0 - 0.9 AI <0.2   Proteinase 3 Antibody IgG      0.0 - 0.9 AI 6.3 (H)   Sed Rate      0 - 20 mm/h 6   Creatinine Urine Random      mg/dL 82   CRP Inflammation      0.0 - 8.0 mg/L <2.9   Albumin      3.4 - 5.0 g/dL 4.0   ALT      0 - 70 U/L 30   AST      0 - 45 U/L 22   Creatinine Urine      mg/dL 82     Component      Latest Ref Rng & Units 1/7/2022   WBC      4.0 - 11.0 10e3/uL 6.2   RBC Count      4.40 - 5.90 10e6/uL 4.25 (L)   Hemoglobin      13.3 - 17.7 g/dL 14.0   Hematocrit      40.0 - 53.0 % 42.2   MCV      78 - 100 fL 99   MCH      26.5 - 33.0 pg 32.9   MCHC      31.5 - 36.5 g/dL 33.2   RDW      10.0 - 15.0 % 12.7   Platelet Count      150 - 450 10e3/uL 183   % Neutrophils      % 63   % Lymphocytes      % 22   % Monocytes      % 11   % Eosinophils      % 4   % Basophils      % 1   Absolute Neutrophils      1.6 - 8.3 10e3/uL 3.9   Absolute Lymphocytes      0.8 - 5.3 10e3/uL 1.4   Absolute Monocytes      0.0 - 1.3 10e3/uL 0.7   Absolute Eosinophils      0.0 - 0.7 10e3/uL 0.2   Absolute Basophils      0.0 - 0.2 10e3/uL 0.0   Color Urine      Colorless, Straw, Light Yellow, Yellow Yellow   Appearance Urine      Clear Clear   Glucose Urine      Negative mg/dL Negative   Bilirubin Urine      Negative Negative   Ketones Urine      Negative mg/dL Negative   Specific Gravity Urine      1.003 - 1.035 <=1.005   Blood Urine      Negative Negative   pH Urine      5.0 - 7.0 6.0   Protein Albumin Urine       Negative mg/dL Negative   Urobilinogen Urine      0.2, 1.0 E.U./dL 0.2   Nitrite Urine      Negative Negative   Leukocyte Esterase Urine      Negative Negative   MPO Anju IgG Instrument Value      <3.5 U/mL 0.3   Myeloperoxidase Antibody IgG      Negative Negative   Proteinase 3 Anju IgG Instrument Value      <2.0 U/mL 36.0 (H)   Proteinase 3 Antibody IgG      Negative Positive (A)   IGG      610-1,616 mg/dL 1,060   IGA      84 - 499 mg/dL 80 (L)   IGM      35 - 242 mg/dL 32 (L)   Protein Random Urine      g/L <0.05   Protein Total Urine g/gr Creatinine          Creatinine Urine      mg/dL 35   Neutrophil Cytoplasmic Antibody      <1:10 1:10 (H)   Neutrophil Cytoplasmic Antibody Pattern       Cytoplasmic ANCA (c-ANCA) staining pattern observed and confirmed on formalin-fixed neutrophils.   CD19 B Cells      6 - 27 % 23   Absolute CD19      107 - 698 cells/uL 336   Creatinine      0.66 - 1.25 mg/dL 1.53 (H)   GFR Estimate      >60 mL/min/1.73m2 47 (L)   RBC Urine      0-2 /HPF /HPF None Seen   WBC Urine      0-5 /HPF /HPF None Seen   Sed Rate      0 - 20 mm/hr 5   CRP Inflammation      0.0 - 8.0 mg/L <2.9   Albumin      3.4 - 5.0 g/dL 3.6   ALT      0 - 70 U/L 26   AST      0 - 45 U/L 20      [facility] Reference Range & Units 03/10/23 14:29   Creatinine 0.67 - 1.17 mg/dL 1.45 (H)   GFR Estimate >60 mL/min/1.73m2 50 (L)   Albumin 3.5 - 5.2 g/dL 4.4   ALT 10 - 50 U/L 18   AST 10 - 50 U/L 25   CRP Inflammation <5.00 mg/L <3.00   Creatinine Urine mg/dL 118.5   WBC 4.0 - 11.0 10e3/uL 7.1   Hemoglobin 13.3 - 17.7 g/dL 13.8   Hematocrit 40.0 - 53.0 % 41.7   Platelet Count 150 - 450 10e3/uL 191   RBC Count 4.40 - 5.90 10e6/uL 4.25 (L)   MCV 78 - 100 fL 98   MCH 26.5 - 33.0 pg 32.5   MCHC 31.5 - 36.5 g/dL 33.1   RDW 10.0 - 15.0 % 12.4   % Neutrophils % 67   % Lymphocytes % 20   % Monocytes % 9   % Eosinophils % 4   % Basophils % 1   Absolute Basophils 0.0 - 0.2 10e3/uL 0.1   Absolute Eosinophils 0.0 - 0.7 10e3/uL 0.3   Absolute  Lymphocytes 0.8 - 5.3 10e3/uL 1.4   Absolute Monocytes 0.0 - 1.3 10e3/uL 0.6   Absolute Neutrophils 1.6 - 8.3 10e3/uL 4.8   Sed Rate 0 - 20 mm/hr 7   Color Urine Colorless, Straw, Light Yellow, Yellow  Yellow   Appearance Urine Clear  Clear   Glucose Urine Negative mg/dL Negative   Bilirubin Urine Negative  Negative   Ketones Urine Negative mg/dL Negative   Specific Gravity Urine 1.003 - 1.035  1.025   pH Urine 5.0 - 7.0  6.0   Protein Albumin Urine Negative mg/dL Negative   Urobilinogen Urine 0.2, 1.0 E.U./dL 0.2   Nitrite Urine Negative  Negative   Blood Urine Negative  Negative   Leukocyte Esterase Urine Negative  Negative   WBC Urine 0-5 /HPF /HPF 0-5   RBC Urine 0-2 /HPF /HPF 0-2   Bacteria Urine None Seen /HPF Few !   Squamous Epithelial /LPF Urine None Seen /LPF Few !   Myeloperoxidase Antibody IgG Negative  Negative   Neutrophil Cytoplasmic Antibody <1:10  1:20 (H)   Neutrophil Cytoplasmic Antibody Pattern  Cytoplasmic ANCA (c-ANCA) staining pattern observed and confirmed on formalin-fixed neutrophils.   Proteinase 3 Antibody IgG Negative  Positive !   (H): Data is abnormally high  (L): Data is abnormally low  !: Data is abnormal

## 2024-07-11 NOTE — TUMOR CONFERENCE
Head & Neck Tumor Conference Note   Status:  Established    Staff: Dr. Malone     Tumor Site: glottis  Tumor Pathology: SCC  Tumor Stage: T1N0  Tumor Treatment:   - 23: Transoral laser microsurgery with endoscopic right and left Type Vd cordectomy encompassing the subglottis, stent placement   - 23: Stent removal, biopsy  - 10/25/23: Transoral laser microsurgery with endoscopic right and left Type Vd cordectomy encompassing the subglottis, stent placement   - 23: MDL with stent removal, CO2 laser resection of vocal fold lesion, biopsy of anterior commissure, steroid injection       Reason for Review: Review imaging, path, and POC     Brief History: This is a 79 year old male with a history of laryngeal mass s/p resection on 23 with positive margins and repeat resection on 10/25. He went the OR on  for stent removal and biopsy of anterior commissure which showed mild-moderate dysplasia but was negative for malignancy. 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 He additionally underwent CT neck which was negative for malignancy. We are her today to review updated imaging results as well as a plan of care.     Pertinent PMH:   Past Medical History:   Diagnosis Date    Arthritis     Autoimmune disease (H24)     Hearing problem     Hoarseness     Hypertension     Kidney problem     Malignant melanoma (H)     Malignant neoplasm (H)     melanoma    Squamous cell carcinoma     Russo syndrome       Smoking Hx:   Social History     Tobacco Use    Smoking status: Former     Current packs/day: 0.00     Average packs/day: 1 pack/day for 20.0 years (20.0 ttl pk-yrs)     Types: Cigarettes     Start date: 1993     Quit date: 2013     Years since quittin.4     Passive exposure: Past    Smokeless  tobacco: Never   Vaping Use    Vaping status: Never Used   Substance Use Topics    Alcohol use: Yes     Comment: rare    Drug use: No       Imaging:   CT Soft tissue neck w contrast    Narrative  CT SOFT TISSUE NECK W CONTRAST 6/27/2024 1:47 PM    History:  Laryngeal cancer (H); Laryngeal squamous cell carcinoma (H)    Comparison:  3/24/2024 and 8/16/2023 CT. Body CT 11/2/2020.    Technique: Following intravenous administration of nonionic iodinated  contrast medium, thin section helical CT images were obtained from the  skull base down to the level of the aortic arch.  Axial, coronal and  sagittal reformations were performed with 2-3 mm slice thickness  reconstruction. Images were reviewed in soft tissue, lung and bone  windows.    Contrast: Isovue 370 90cc    Findings:  No evident abnormality in the nasopharynx, oropharynx or the glottis.  No mucosal abnormality along the vocal folds. The tongue base appears  normal. The major salivary glands appear normal. The thyroid gland  appears normal.    No pathologically enlarged cervical chain lymph nodes.    The fascial spaces in the neck are intact bilaterally. The major  vascular structures in the neck are patent with atherosclerotic  calcifications of the bilateral carotid bulbs, right greater than  left.    No worrisome lytic or sclerotic lesion. No high-grade spinal canal  stenosis. The visualized paranasal sinuses are clear. Minimal  dependent mastoid effusions, unchanged.    Bilateral scleral calcifications of the globes, unchanged.    Continued stability of a 9 x 6 mm spiculated solid nodule in the  apical left upper lobe dating back to 11/2/2020 and likely a  consequence of scarring.    Impression  Impression:  1. Primary: NI-RADS 1. No imaging evidence of recurrent disease at the  primary site.  2. Neck: NI-RADS 1].  No abnormal lymph node.    NI-RADS CECT Surveillance Legend:    Primary  1: No evidence of recurrence: routine surveillance  2: Low suspicion  a)  Superficial abnormality (skin, mucosal surface): direct visual  inspection  b) Ill-defined deep abnormality: short interval follow-up* or PET  3: High suspicion (new or enlarging discrete nodule/mass): biopsy  4: Definitive recurrence (path proven or clinical progression): no  biopsy needed    Nodes  1: No evidence of recurrence: routine surveillance  2: Low suspicion (ill-defined): short interval follow-up or PET  3: High suspicion (new or enlarging lymph node): biopsy if clinically  needed  4: Definitive recurrence (path proven or clinical progression): no  biopsy needed    *short interval follow-up: 3 months at our institution       Pathology:   12/1/2023  Final Diagnosis   A. 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 CT reviewed today. Subglottic region demonstrates right anterior thickening. At the true cord level no obvious concerning findings. Plan for biopsy with frozens intra-op. Will present at tumor board following results.     Plan:   - OR for DL and biopsy    Ester Schwartz MD  Otolaryngology- Head and Neck Surgery, PGY-3    Documentation / Disclaimer Cancer Tumor Board Note: Cancer tumor board recommendations do not override what is determined to be reasonable care and treatment, which is dependent on the circumstances of a patient's case; the patient's medical, social, and personal concerns; and the clinical judgment of the oncologist [physician].

## 2024-07-11 NOTE — PATIENT INSTRUCTIONS
Skip the methotrexate this coming Sunday 7/14, resume Sunday 7/21     Continue with folic acid    Labs on Monday    Good hydration    Refer to Mabel our pharmacist    Return in 6 months (in person)

## 2024-07-11 NOTE — NURSING NOTE
Chief Complaint   Patient presents with    Follow Up     /67   Pulse 62   Wt 90.9 kg (200 lb 4.8 oz)   SpO2 98%   BMI 29.58 kg/m    Alessandro Taylor MA on 7/11/2024 at 10:51 AM

## 2024-07-11 NOTE — LETTER
7/11/2024       RE: Joshua Ennis  [address] [facility] [address]     Dear Colleague,    Thank you for referring your patient, Joshua Ennis, to the [facility] [place] at [facility]. Please see a copy of my visit note below.    [facility] Follow-Up In Person Visit Note    Date of visit: 7/11/2024  Last visit date: 7/6/2023    Joshua Ennis MRN# [id number]   Age: 79 year old    Reason for visit: GPA, high risk med use monitoring   YOB: 1945          Assessment and Plan:     Assessment:   Mr. Ennis is a 78 yr old  male with history of melanoma s/p resection in 11/2011, former tobacco use, and GPA (PR3+, MPO and c-ANCA negative in 3/2013 based on aforementioned labs, confirmed by kidney and lung biopsy) who presents for clinic follow-up regarding GPA.  He was initiated on cyclophosphamide 150 mg QD and high-dose prednisone 3/2013. He last took prednisone in 10/2013. Cytoxan was switched to MTX for maintenance treatment in 10/2013.    7/6/2023:  Vasculitis seems to be stable and he has no signs or symptoms of flare up today, though PR3 remains persistently positive. New upper airway symptoms, while more likely due to non-vasculitis inflammation, GERD, vocal cord dysfunction, could represent vasculitis in tracheal wall, which can occur in absence of elevated inflammatory markers. Also, former smoker and with immunosuppression status, need to make sure there is no SCC. Will refer for ENT evaluation and also acquire high-resolution non-contrast CT to follow nodule in his lung incidentally identified in 2020. Otherwise continue with current plan of low dose methotrexate 10mg (4 tabs) weekly and Q3 mo labs. Was advised to call in case of vasculitis flare up sx.        Plan 7/6/2023:    - Continue MTX 10 mg (4 tabs) weekly     > Labs q3m      > Hold methotrexate if develops an infection  -  Continue with Folic acid 1 mg po   - Referral to ENT for new onset persistent voice changes/hoarseness and continued cough/phlegm (2017 note indicated laryngoscopy would be next step if he developed persistent hoarseness)  - High-resolution CT chest w/o contrast for follow up of pulm nodule  - Repeat labs in 3 mo    Return in 1y (in person)               Today 7/11/2024:    Since last visit, was diagnosed with laryngeal SCC, will have another procedure on 7/17 with awake intubation, unchanged horseness, no other complaints. Asked me about awake intubation process, I advised him to ask surgical team to review/explain to him, I will message his ENT team. 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 Advised good hydration. He is on amoxicillin since 7/3 x 2 wk course to prevent surgical infection, due to drug drug interaction and possible rise in methotrexate level and low GFR, advised him tos kip next dose of methotrexate with follow up labs on Mon. If he needs further cancer tx, will hold methotrexate. He could benefit from MTM referral to check on meds, drug-drug interaction in future, referral was placed.    Plan:    Skip the methotrexate this coming Sunday 7/14, resume Sunday 7/21     Continue with folic acid    Labs on Monday    Good hydration    Refer to Mabel our pharmacist    Return in 6 months (in person)    TT 40 min was spent on date of the encounter doing chart review, history and exam, documentation and further activities as noted above. Any prior notes, outside records, laboratory results, and imaging studies were reviewed if relevant.    The longitudinal plan of care for the diagnosis(es)/condition(s) as documented were addressed during this visit. Due to the added complexity in care, I will continue to support Joshua in the subsequent management and with ongoing continuity of  care.      Ede Sanchez MD      Orders Placed This Encounter   Procedures    ALT    Albumin level    AST    Creatinine    Med Therapy Management Referral    CBC with Platelets & Differential            HPI / Interim History:      Mr. Ennis is a 76 yo WM with h/o melanoma s/p resection in 11/2011, former tobacco use, and GPA diagnosed in 3/2013. He presents for follow up.    Had a hospitalization 11/2-11/4/2020 with a fall with displaced left 5th-9th rib fractures, now recovered.    He has been tolerating it well. He had a flare in 12/2013 with increased crusts in his nose along with recurrence of arthralgia, worsening numbness in feet and increasing vasculitis marker. Renal function was stable with negative repeat chest CT. His vasculitis flare was treated with short course of prednisone taper 20 mg po qd max and increasing MTX to 8 tab = 20 mg qwk. Vasculitis symptoms improved.  At visit on 1/2015, he had scabs in his nose, had cold dakota symptoms and increased arthralgia/fatigue (shoulders, L hand). Labs from 12/16 showed increased Cr level and hematuria with rising PR3 (more than 8), there was a concern for vasculitis flare (in kidneys, sinuses), no flare on repeat chest CT 1/2015. Therefore it was recommended to try rituximab. Another reason was to be able to taper MTX off given abnormal Cr.  He is now s/p Rituximab infusion x 4 (1st on 2/25/2015). He is feeling well. No hematuria with stable Cr, no SOB. Saw ENT with negative nasal mucosa biopsy 6/2015 for granuloma but showed active inflammation, had left nostril lesion which decreased in size by use of mupirocin ointment. PR3 vasculitis marker went back to NL in 6/2015, it was NL in 10/2015, given stable vasculitis and low GFR, MTX from 8 to 7 tab po qwk. Repeat ME-3 in 3/2016 was slightly positive, Cr was slightly higher than baseline. We tapered his MTX further to 6 tab po qwk in 2/2016 given lack of symptoms. In 3/2017, MTX was reduced from 6 to 5 tab  "qwk. It was further tapered to 4 tab po qwk in 6/2017 given stable disease. No vasculitis flare ups by such change.     7/7/22:  Reports doing well overall. Denies any vasculitis symptoms. Denies any fever/chills, fatigue, headaches, congestion, nose bleeds, mouth sores, cough/hemoptysis, chest pain, SOB, hematuria, dysuria, or any RUQ pain. Still endorses neuropathy but it is stable. No other concerns    7/6/23:  Doing fairly well overall though has noted persistent bothersome hoarseness and voice changes over the past 3 mos. Associates this with \"stickier\" phlegm than usual with his chronic cough. Notes this cough has been productive of grey-white sputum and present for many years relatively unchanged. Also reports episode of poison oak ~ 3 wks ago, was given a short course of prednisone (notes indicate 40 mg every day x 5 days) by an urgent care. Has now resolved, and reports that during that prednisone course there was no change to his hoarseness or phlegm. Otherwise denies weight loss, loss of energy, loss of appetite, dysgeusia, fevers, chills, headaches, congestion, nose bleeds, nose crusting, mouth sores, hemoptysis, rashes, sores, chest pain, dyspnea, hematuria, dysuria, nausea, diarrhea, RUQ pain, vision or hearing changes. No other concerns.      Today 7/11/2024:    -no vasculitis flare up, no crusting in the nose    -Dx of laryngeal SCC since last visit, continues to have hoarseness, will have another ENT procedure on 7/17    -no weight loss, energy and appetite is good, in good spirit, physically active         Past Medical History:     Past Medical History:   Diagnosis Date    Arthritis     Autoimmune disease (H24)     Hearing problem     Hoarseness     Hypertension     Kidney problem     Malignant melanoma (H)     Malignant neoplasm (H)     melanoma    Squamous cell carcinoma     Russo syndrome           Past Surgical History:     Past Surgical History:   Procedure Laterality Date    BIOPSY  11/2011 "    melanoma on back    DISSECT LYMPH NODE INGUINAL  3/1/2013    Procedure: DISSECT LYMPH NODE INGUINAL;  Bilateral Inguinal Lymph Node Biopsy.;  Surgeon: Tariq Acosta MD;  Location: [facility]    ESOPHAGOSCOPY, GASTROSCOPY, DUODENOSCOPY (EGD), COMBINED  7/5/2013    Procedure: COMBINED ESOPHAGOSCOPY, GASTROSCOPY, DUODENOSCOPY (EGD);  Gastroscopy;  Surgeon: Tariq Acosta MD;  Location: [facility]    HERNIORRHAPHY INGUINAL  8/6/2012    Procedure: HERNIORRHAPHY INGUINAL;  Open Repair Left Inguinal Hernia;  Surgeon: Jerad Baker MD;  Location: [facility]    INJECT STEROID (LOCATION) N/A 8/30/2023    Procedure: steroid injection;  Surgeon: Josephine Mlaone MD;  Location: [facility]    INJECT STEROID (LOCATION) N/A 9/13/2023    Procedure: steroid injection;  Surgeon: Josephine Malone MD;  [facility]    INJECT STEROID (LOCATION) N/A 10/25/2023    Procedure: steroid injection, stent placement, and biopsy;  Surgeon: Josephine Malone MD;  [facility]    INJECT STEROID (LOCATION) N/A 12/1/2023    Procedure: steroid injection;  Surgeon: Josephine Malone MD;  [facility]    LARYNGOSCOPY WITH MICROSCOPE N/A 9/13/2023    Procedure: MICROLARYNGOSCOPY with stent removal and biopsy;  Surgeon: Josephine Malone MD;  [facility]: [facility]    LARYNGOSCOPY, FLEXIBLE WITH INJECTION N/A 12/11/2023    Procedure: LARYNGOSCOPY, FLEXIBLE WITH INJECTION with steroid;  Surgeon: Josephine Malone MD;  Location: [facility] OR    LASER CO2 LARYNGOSCOPY N/A 8/30/2023    Procedure: MICROLARYNGOSCOPY, CO2 laser resection of vocal fold lesion;  Surgeon: Josephine Malone MD;  [facility]    LASER CO2 LARYNGOSCOPY N/A 10/25/2023    Procedure: MICROLARYNGOSCOPY, CO2 laser resection of vocal fold lesion;  Surgeon: Josephine Malone MD;  [facility]    LASER CO2 LARYNGOSCOPY N/A 12/1/2023    Procedure: MICROLARYNGOSCOPY CO2 laser standby, resection of vocal fold lesion, stent removal, biopsy;  Surgeon: Josephine Malone MD;  Location: [facility]          Social History:     Social  History     Tobacco Use    Smoking status: Former     Current packs/day: 0.00     Average packs/day: 1 pack/day for 20.0 years (20.0 ttl pk-yrs)     Types: Cigarettes     Start date: 1993     Quit date: 2013     Years since quittin.4     Passive exposure: Past    Smokeless tobacco: Never   Substance Use Topics    Alcohol use: Yes     Comment: rare          Family History:     Family History   Problem Relation Age of Onset    Diabetes Mother     Hypertension Mother     Cancer Father         stomach    Heart Disease Father     Heart Disease Brother     Diabetes Sister     Diabetes Sister     Diabetes Sister     Heart Disease Brother     Cancer Sister     Glaucoma No family hx of     Macular Degeneration No family hx of           Immunizations:   Due for shingles vaccine, encouraged to seek this from his PCP.    PMHx, FHx, SHx were reviewed, unchanged.         Allergies:     Allergies   Allergen Reactions    Shrimp Nausea and Vomiting and Unknown     Got sick each time he ate it          Medications:     Current Outpatient Medications   Medication Sig Dispense Refill    amoxicillin-clavulanate (AUGMENTIN) 875-125 MG tablet Take 1 tablet by mouth 2 times daily for 14 days 28 tablet 0    beclomethasone HFA (QVAR REDIHALER) 80 MCG/ACT inhaler Inhale 2 puffs into the lungs 2 times daily 10.6 g 3    beclomethasone HFA (QVAR REDIHALER) 80 MCG/ACT inhaler Inhale 2 puffs into the lungs 2 times daily 10.6 g 3    calcium carbonate 600 mg-vitamin D 400 units (CALTRATE) 600-400 MG-UNIT per tablet Take 1 tablet by mouth 2 times daily.      Cholecalciferol (VITAMIN D) 1000 UNITS capsule Take 1 capsule by mouth daily.      folic acid (FOLVITE) 1 MG tablet Take 2 tablets (2 mg) by mouth daily 180 tablet 3    gabapentin (NEURONTIN) 400 MG capsule Take 1 capsule (400 mg) by mouth At Bedtime 90 capsule 3    losartan (COZAAR) 50 MG tablet Take 1 tablet (50 mg) by mouth daily 90 tablet 3    methotrexate 2.5 MG tablet TAKE 4  TABLETS BY MOUTH EVERY 7 DAYS 48 tablet 0    pantoprazole (PROTONIX) 40 MG EC tablet Take 1 tablet (40 mg) by mouth daily 60 tablet 1     No current facility-administered medications for this visit.          Review of Systems:   A comprehensive ROS was done. Positives are per HPI.           Physical Exam:   /67   Pulse 62   Wt 90.9 kg (200 lb 4.8 oz)   SpO2 98%   BMI 29.58 kg/m        Gen:pleasant, NAD, significant other Falguni is present  HEENT: nl conj/sclera, EOMI, no oral ulcers, +hoarseness  CV: RRR no M/G/R  Resp: CTAB  Abd: soft, ND, NT  Ext: no edema  Skin: no rash   Neuro: grossly non focal  MSK: francois ctive synovitis or joint tenderness             Data:   Recent laboratory data reviewed.    CT CAP 11/2020  IMPRESSION:  1.  Acute minimally displaced posterior left 9th rib fracture.     2.  Acute displaced lateral left 5th-8th rib fractures.     3.  No other fractures.     4.  Normal caliber thoracic and abdominal aorta without evidence for acute injury.     5.  No solid organ injury in the upper abdomen or significant free fluid throughout the abdomen or pelvis.     6.  No pneumothorax or pulmonary contusion or significant pleural fluid.     7.  In the extreme left lung apex there is a 9 x 7 mm nodule extending toward the level of the pleural surface. Recommend continued short interval follow-up in 2-3 months to assess for stability.        [Recommend Follow Up: Lung nodule]      CHEST CT (2/16/2013)    Chest: Multiple indeterminate pulmonary nodules. Several of the  larger lesions are cavitary. There are 3 dominant lesions, a 3.2 cm  cavitary lesion in the left upper lobe, a 3.4 cm solid mass in the  right lower lobe laterally and a 3.7 cm cavitary mass in the right  lower lobe medially. In addition there is mediastinal lymphadenopathy  with a dominant 3 cm subcarinal node. Bilateral axillary  lymphadenopathy with 2.4 cm node seen bilaterally. Chest otherwise  unremarkable.       Exam:  High-resolution Chest CT without contrast 1/9/2015 12:42 PM.  History: Concern for ANCA vasculitis flare in the chest.  Comparison: 3/7/2014, 11/14/2013, 2/16/2013.  Technique: CT helical acquisition from the lung apices to the kidneys  was obtained without intravenous contrast. Both inspiratory and  expiratory images were obtained.    Findings:  Moderate atherosclerotic calcifications of the coronary arteries. Mild  calcifications involving the aorta and aortic great vessels. Prominent  paratracheal lymph node on series 3 image 22 measuring 9 mm. Decreased  from 2/16/2013 and unchanged from 3/7/2014. Borderline enlarged right  hilar lymph node, unchanged from 3/7/2014 and decreased from  2/16/2013. Heart is not enlarged. Trace cardial effusion. No axillary  lymphadenopathy.  Nodular scarring in the left apex, unchanged from 2/16/2013. No  pleural effusion or pneumothorax. No evidence of intrapulmonary  infection. There is a cluster of tree in bud nodularity noted in the  anteromedial right upper lobe. These nodules appear new. Expiratory  images demonstrate mild air trapping. Scattered pulmonary nodules:  Series 6 image 146: Seen posteriorly in the right lower lobe, there is  a sub-pleural nodule measuring 1.6 x 1.2 cm with a spiculated border.  Previously, this nodule measured 2.0 x 1.6 cm.  Series 6 image 189: Seen laterally in the right lower lobe, there is a  former mid pulmonary nodule which is unchanged from 3/7/2013 and  decreased from 2/16/2013.  Series 6 image 169: Seen posterolaterally in the right lower lobe,  there is a 3 mm pulmonary nodule which is unchanged from 3/7/2014 and  decreased from 2/16/2013.  Series 6 image 225: Seen posterolaterally in the left lower lobe,  there is a 4 mm subpleural nodule which is unchanged from 2/16/2014.  Other scattered sub-4 mm pulmonary nodules appear stable from previous  study.  Evaluation of the upper abdomen is limited due to the lack of  intravenous  contrast.  No suspicious bony lesions.    IMPRESSION  Impression:   1. Scattered pulmonary nodules enlarged lymph nodes are  stable/slightly decreased from the previous study. No evidence for  acute flare in patient's known ANCA-associated vasculitis.  2. New tree in bud nodularity seen anteromedially in the right middle  lobe. Findings could be seen in the setting of an atypical infectious  process.  3. Mild air trapping. Finding is nonspecific and is seen in setting of  small airways disease such as asthma or bronchiolitis.  I have personally reviewed the examination and initial interpretation  and I agree with the findings.  TOÑO PERKINS MD    Copath Report     Patient Name: ADONAY FAITH  MR#: [id number]  Specimen #: [id number]  Collected: 6/11/2015  Received: 6/11/2015  Reported: 6/12/2015 17:37  Ordering Phy(s): DANIELLE PARIS    SPECIMEN(S):  Nasal septum    FINAL DIAGNOSIS:  Nasal septum, biopsy:  -Squamous mucosa with ulcer, marked acute inflammation and reactive  changes  -No granulomas identified  -A special stain for fungi (GMS) is negative    I have personally reviewed all specimens and or slides, including the  listed special stains, and used them with my medical judgement to  determine the final diagnosis.    Electronically signed out by:    Dorys Barton M.D., [facility]    CLINICAL HISTORY:  The patient is a 70-year-old man with a history of left upper back  melanoma, resected in 2011 (see consult report [id number]). 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 He now presents for followup visit with nasal  cavity scabs, increased arthralgia and fatigue, concerning for  vasculitis flare. Operative procedure: Nasal septum biopsy.    GROSS:  The specimen is received in formalin with proper patient  "identification,  labeled \"septal tissue\". The specimen consists of three tan-pink tissue  fragments, 0.2 cm in greatest dimension, entirely submitted in cassette  1. (Dictated by: Ana Mixon 6/11/2015 03:39 PM)    MICROSCOPIC:  Microscopic examination is performed. A GMS stain, performed with  appropriate positive control, is negative.    CPT Codes:  A: 92053-IY0, 38229-KCI    TESTING LAB LOCATION:  [facility], [address] [id number] [phone]    COLLECTION SITE:  Client: [facility]  Location: [facility] (B)     Component      Latest Ref Rng & Units 3/5/2021   WBC      4.0 - 11.0 10e9/L 6.9   RBC Count      4.4 - 5.9 10e12/L 4.18 (L)   Hemoglobin      13.3 - 17.7 g/dL 13.8   Hematocrit      40.0 - 53.0 % 41.4   MCV      78 - 100 fl 99   MCH      26.5 - 33.0 pg 33.0   MCHC      31.5 - 36.5 g/dL 33.3   RDW      10.0 - 15.0 % 12.5   Platelet Count      150 - 450 10e9/L 185   % Neutrophils      % 63.6   % Lymphocytes      % 21.6   % Monocytes      % 10.8   % Eosinophils      % 3.4   % Basophils      % 0.6   Absolute Neutrophil      1.6 - 8.3 10e9/L 4.4   Absolute Lymphocytes      0.8 - 5.3 10e9/L 1.5   Absolute Monocytes      0.0 - 1.3 10e9/L 0.7   Absolute Eosinophils      0.0 - 0.7 10e9/L 0.2   Absolute Basophils      0.0 - 0.2 10e9/L 0.0   Diff Method       Automated Method   Color Urine       Yellow   Appearance Urine       Clear   Glucose Urine      NEG:Negative mg/dL Negative   Bilirubin Urine      NEG:Negative Negative   Ketones Urine      NEG:Negative mg/dL Negative   Specific Gravity Urine      1.003 - 1.035 1.020   pH Urine      5.0 - 7.0 pH 5.5   Protein Albumin Urine      NEG:Negative mg/dL Negative   Urobilinogen Urine      0.2 - 1.0 EU/dL 0.2   Nitrite Urine      NEG:Negative Negative   Blood Urine      NEG:Negative Negative   Leukocyte Esterase Urine      NEG:Negative Negative "   Source       Midstream Urine   WBC Urine      OTO5:0 - 5 /HPF 0 - 5   RBC Urine      OTO2:O - 2 /HPF O - 2   Squamous Epithelial /LPF Urine      FEW:Few /LPF Few   Bacteria Urine      NEG:Negative /HPF Few (A)   IGG      610 - 1,616 mg/dL 981   IGA      84 - 499 mg/dL 85   IGM      35 - 242 mg/dL 28 (L)   Creatinine      0.66 - 1.25 mg/dL 1.48 (H)   GFR Estimate      >60 mL/min/1.73:m2 46 (L)   GFR Estimate If Black      >60 mL/min/1.73:m2 53 (L)   Neutrophil Cytoplasmic Antibody      <1:10 titer 1:20 (A)   Neutrophil Cytoplasmic Antibody Pattern       Cytoplasmic ANCA (c-ANCA) staining pattern observed and confirmed on formalin-fixed . . .   CD19 B Cells      6 - 27 % 20   Absolute CD19      107 - 698 cells/uL 352   Protein Random Urine      g/L 0.10   Protein Total Urine g/gr Creatinine      0 - 0.2 g/g Cr 0.13   Myeloperoxidase Antibody IgG      0.0 - 0.9 AI <0.2   Proteinase 3 Antibody IgG      0.0 - 0.9 AI 6.3 (H)   Sed Rate      0 - 20 mm/h 6   Creatinine Urine Random      mg/dL 82   CRP Inflammation      0.0 - 8.0 mg/L <2.9   Albumin      3.4 - 5.0 g/dL 4.0   ALT      0 - 70 U/L 30   AST      0 - 45 U/L 22   Creatinine Urine      mg/dL 82     Component      Latest Ref Rng & Units 1/7/2022   WBC      4.0 - 11.0 10e3/uL 6.2   RBC Count      4.40 - 5.90 10e6/uL 4.25 (L)   Hemoglobin      13.3 - 17.7 g/dL 14.0   Hematocrit      40.0 - 53.0 % 42.2   MCV      78 - 100 fL 99   MCH      26.5 - 33.0 pg 32.9   MCHC      31.5 - 36.5 g/dL 33.2   RDW      10.0 - 15.0 % 12.7   Platelet Count      150 - 450 10e3/uL 183   % Neutrophils      % 63   % Lymphocytes      % 22   % Monocytes      % 11   % Eosinophils      % 4   % Basophils      % 1   Absolute Neutrophils      1.6 - 8.3 10e3/uL 3.9   Absolute Lymphocytes      0.8 - 5.3 10e3/uL 1.4   Absolute Monocytes      0.0 - 1.3 10e3/uL 0.7   Absolute Eosinophils      0.0 - 0.7 10e3/uL 0.2   Absolute Basophils      0.0 - 0.2 10e3/uL 0.0   Color Urine      Colorless, Straw,  Light Yellow, Yellow Yellow   Appearance Urine      Clear Clear   Glucose Urine      Negative mg/dL Negative   Bilirubin Urine      Negative Negative   Ketones Urine      Negative mg/dL Negative   Specific Gravity Urine      1.003 - 1.035 <=1.005   Blood Urine      Negative Negative   pH Urine      5.0 - 7.0 6.0   Protein Albumin Urine      Negative mg/dL Negative   Urobilinogen Urine      0.2, 1.0 E.U./dL 0.2   Nitrite Urine      Negative Negative   Leukocyte Esterase Urine      Negative Negative   MPO Anju IgG Instrument Value      <3.5 U/mL 0.3   Myeloperoxidase Antibody IgG      Negative Negative   Proteinase 3 Anju IgG Instrument Value      <2.0 U/mL 36.0 (H)   Proteinase 3 Antibody IgG      Negative Positive (A)   IGG      610-1,616 mg/dL 1,060   IGA      84 - 499 mg/dL 80 (L)   IGM      35 - 242 mg/dL 32 (L)   Protein Random Urine      g/L <0.05   Protein Total Urine g/gr Creatinine          Creatinine Urine      mg/dL 35   Neutrophil Cytoplasmic Antibody      <1:10 1:10 (H)   Neutrophil Cytoplasmic Antibody Pattern       Cytoplasmic ANCA (c-ANCA) staining pattern observed and confirmed on formalin-fixed neutrophils.   CD19 B Cells      6 - 27 % 23   Absolute CD19      107 - 698 cells/uL 336   Creatinine      0.66 - 1.25 mg/dL 1.53 (H)   GFR Estimate      >60 mL/min/1.73m2 47 (L)   RBC Urine      0-2 /HPF /HPF None Seen   WBC Urine      0-5 /HPF /HPF None Seen   Sed Rate      0 - 20 mm/hr 5   CRP Inflammation      0.0 - 8.0 mg/L <2.9   Albumin      3.4 - 5.0 g/dL 3.6   ALT      0 - 70 U/L 26   AST      0 - 45 U/L 20      [facility] Reference Range & Units 03/10/23 14:29   Creatinine 0.67 - 1.17 mg/dL 1.45 (H)   GFR Estimate >60 mL/min/1.73m2 50 (L)   Albumin 3.5 - 5.2 g/dL 4.4   ALT 10 - 50 U/L 18   AST 10 - 50 U/L 25   CRP Inflammation <5.00 mg/L <3.00   Creatinine Urine mg/dL 118.5   WBC 4.0 - 11.0 10e3/uL 7.1   Hemoglobin 13.3 - 17.7 g/dL 13.8   Hematocrit 40.0 - 53.0 % 41.7   Platelet Count 150 - 450 10e3/uL  191   RBC Count 4.40 - 5.90 10e6/uL 4.25 (L)   MCV 78 - 100 fL 98   MCH 26.5 - 33.0 pg 32.5   MCHC 31.5 - 36.5 g/dL 33.1   RDW 10.0 - 15.0 % 12.4   % Neutrophils % 67   % Lymphocytes % 20   % Monocytes % 9   % Eosinophils % 4   % Basophils % 1   Absolute Basophils 0.0 - 0.2 10e3/uL 0.1   Absolute Eosinophils 0.0 - 0.7 10e3/uL 0.3   Absolute Lymphocytes 0.8 - 5.3 10e3/uL 1.4   Absolute Monocytes 0.0 - 1.3 10e3/uL 0.6   Absolute Neutrophils 1.6 - 8.3 10e3/uL 4.8   Sed Rate 0 - 20 mm/hr 7   Color Urine Colorless, Straw, Light Yellow, Yellow  Yellow   Appearance Urine Clear  Clear   Glucose Urine Negative mg/dL Negative   Bilirubin Urine Negative  Negative   Ketones Urine Negative mg/dL Negative   Specific Gravity Urine 1.003 - 1.035  1.025   pH Urine 5.0 - 7.0  6.0   Protein Albumin Urine Negative mg/dL Negative   Urobilinogen Urine 0.2, 1.0 E.U./dL 0.2   Nitrite Urine Negative  Negative   Blood Urine Negative  Negative   Leukocyte Esterase Urine Negative  Negative   WBC Urine 0-5 /HPF /HPF 0-5   RBC Urine 0-2 /HPF /HPF 0-2   Bacteria Urine None Seen /HPF Few !   Squamous Epithelial /LPF Urine None Seen /LPF Few !   Myeloperoxidase Antibody IgG Negative  Negative   Neutrophil Cytoplasmic Antibody <1:10  1:20 (H)   Neutrophil Cytoplasmic Antibody Pattern  Cytoplasmic ANCA (c-ANCA) staining pattern observed and confirmed on formalin-fixed neutrophils.   Proteinase 3 Antibody IgG Negative  Positive !   (H): Data is abnormally high  (L): Data is abnormally low  !: Data is abnormal      Ede Sanchez MD

## 2024-07-12 ENCOUNTER — PATIENT OUTREACH (OUTPATIENT)
Dept: OTOLARYNGOLOGY | Facility: CLINIC | Age: 79
End: 2024-07-12

## 2024-07-12 ENCOUNTER — APPOINTMENT (OUTPATIENT)
Dept: ONCOLOGY | Facility: CLINIC | Age: 79
End: 2024-07-12
Payer: COMMERCIAL

## 2024-07-12 NOTE — PROGRESS NOTES
Provider called patient partner to let her know that patient's case was reviewed at tumor conference and they believe the cancer has come back. Let patient know we would take more biopsies during the surgery, and that it is likely the patient will get a trach at the next surgery, this will require the patient to take 1-2 weeks off of work and spend about a week in the hospital. Patient SO was agreeable to this plan and verbalized understanding of the situation. Nina Cardenas RN on 7/12/2024 at 2:41 PM

## 2024-07-15 ENCOUNTER — OFFICE VISIT (OUTPATIENT)
Dept: FAMILY MEDICINE | Facility: CLINIC | Age: 79
End: 2024-07-15
Payer: COMMERCIAL

## 2024-07-15 ENCOUNTER — PATIENT OUTREACH (OUTPATIENT)
Dept: OTOLARYNGOLOGY | Facility: CLINIC | Age: 79
End: 2024-07-15

## 2024-07-15 VITALS
RESPIRATION RATE: 18 BRPM | DIASTOLIC BLOOD PRESSURE: 70 MMHG | HEART RATE: 64 BPM | OXYGEN SATURATION: 98 % | HEIGHT: 69 IN | BODY MASS INDEX: 29.47 KG/M2 | TEMPERATURE: 97.4 F | SYSTOLIC BLOOD PRESSURE: 116 MMHG | WEIGHT: 199 LBS

## 2024-07-15 DIAGNOSIS — I10 ESSENTIAL HYPERTENSION: ICD-10-CM

## 2024-07-15 DIAGNOSIS — Z87.891 EX-SMOKER: ICD-10-CM

## 2024-07-15 DIAGNOSIS — Z79.899 HIGH RISK MEDICATIONS (NOT ANTICOAGULANTS) LONG-TERM USE: ICD-10-CM

## 2024-07-15 DIAGNOSIS — K21.9 GASTROESOPHAGEAL REFLUX DISEASE, UNSPECIFIED WHETHER ESOPHAGITIS PRESENT: ICD-10-CM

## 2024-07-15 DIAGNOSIS — M31.30 GRANULOMATOSIS WITH POLYANGIITIS, UNSPECIFIED WHETHER RENAL INVOLVEMENT (H): ICD-10-CM

## 2024-07-15 DIAGNOSIS — Z01.818 PREOP GENERAL PHYSICAL EXAM: Primary | ICD-10-CM

## 2024-07-15 DIAGNOSIS — N18.31 STAGE 3A CHRONIC KIDNEY DISEASE (H): ICD-10-CM

## 2024-07-15 DIAGNOSIS — C32.9 LARYNGEAL CANCER (H): ICD-10-CM

## 2024-07-15 LAB
ALBUMIN SERPL BCG-MCNC: 4.3 G/DL (ref 3.5–5.2)
ALT SERPL W P-5'-P-CCNC: 21 U/L (ref 0–70)
AST SERPL W P-5'-P-CCNC: 27 U/L (ref 0–45)
BASOPHILS # BLD AUTO: 0 10E3/UL (ref 0–0.2)
BASOPHILS NFR BLD AUTO: 0 %
CREAT SERPL-MCNC: 1.54 MG/DL (ref 0.67–1.17)
EGFRCR SERPLBLD CKD-EPI 2021: 46 ML/MIN/1.73M2
EOSINOPHIL # BLD AUTO: 0.1 10E3/UL (ref 0–0.7)
EOSINOPHIL NFR BLD AUTO: 2 %
ERYTHROCYTE [DISTWIDTH] IN BLOOD BY AUTOMATED COUNT: 13.3 % (ref 10–15)
HCT VFR BLD AUTO: 43 % (ref 40–53)
HGB BLD-MCNC: 14.3 G/DL (ref 13.3–17.7)
IMM GRANULOCYTES # BLD: 0 10E3/UL
IMM GRANULOCYTES NFR BLD: 0 %
LYMPHOCYTES # BLD AUTO: 1 10E3/UL (ref 0.8–5.3)
LYMPHOCYTES NFR BLD AUTO: 15 %
MCH RBC QN AUTO: 32.9 PG (ref 26.5–33)
MCHC RBC AUTO-ENTMCNC: 33.3 G/DL (ref 31.5–36.5)
MCV RBC AUTO: 99 FL (ref 78–100)
MONOCYTES # BLD AUTO: 0.6 10E3/UL (ref 0–1.3)
MONOCYTES NFR BLD AUTO: 9 %
NEUTROPHILS # BLD AUTO: 5 10E3/UL (ref 1.6–8.3)
NEUTROPHILS NFR BLD AUTO: 73 %
PLATELET # BLD AUTO: 175 10E3/UL (ref 150–450)
RBC # BLD AUTO: 4.35 10E6/UL (ref 4.4–5.9)
WBC # BLD AUTO: 6.8 10E3/UL (ref 4–11)

## 2024-07-15 PROCEDURE — 99214 OFFICE O/P EST MOD 30 MIN: CPT | Performed by: FAMILY MEDICINE

## 2024-07-15 PROCEDURE — 82040 ASSAY OF SERUM ALBUMIN: CPT | Performed by: FAMILY MEDICINE

## 2024-07-15 PROCEDURE — G2211 COMPLEX E/M VISIT ADD ON: HCPCS | Performed by: FAMILY MEDICINE

## 2024-07-15 PROCEDURE — 82565 ASSAY OF CREATININE: CPT | Performed by: FAMILY MEDICINE

## 2024-07-15 PROCEDURE — 85025 COMPLETE CBC W/AUTO DIFF WBC: CPT | Performed by: FAMILY MEDICINE

## 2024-07-15 PROCEDURE — 36415 COLL VENOUS BLD VENIPUNCTURE: CPT | Performed by: FAMILY MEDICINE

## 2024-07-15 PROCEDURE — 84460 ALANINE AMINO (ALT) (SGPT): CPT | Performed by: FAMILY MEDICINE

## 2024-07-15 PROCEDURE — 84450 TRANSFERASE (AST) (SGOT): CPT | Performed by: FAMILY MEDICINE

## 2024-07-15 ASSESSMENT — PAIN SCALES - GENERAL: PAINLEVEL: NO PAIN (0)

## 2024-07-15 NOTE — NURSING NOTE
"Chief Complaint   Patient presents with    Pre-Op Exam     /70 (Cuff Size: Adult Large)   Pulse 64   Temp 97.4  F (36.3  C) (Tympanic)   Resp 18   Ht 1.753 m (5' 9\")   Wt 90.3 kg (199 lb)   SpO2 98%   BMI 29.39 kg/m   Estimated body mass index is 29.39 kg/m  as calculated from the following:    Height as of this encounter: 1.753 m (5' 9\").    Weight as of this encounter: 90.3 kg (199 lb).  Patient presents to the clinic using No DME      Health Maintenance that is potentially due pending provider review:    Health Maintenance Due   Topic Date Due    SKIN CANCER SCREENING  Never done    ZOSTER IMMUNIZATION (1 of 2) Never done    RSV VACCINE (Pregnancy & 60+) (1 - 1-dose 60+ series) Never done    MEDICARE ANNUAL WELLNESS VISIT  Never done    COLORECTAL CANCER SCREENING  04/13/2023    COVID-19 Vaccine (4 - 2023-24 season) 09/01/2023                  "

## 2024-07-15 NOTE — PROGRESS NOTES
Preoperative Evaluation  [facility]  [address] [place] [address]  Phone: [phone]  Fax: [phone]  Primary Provider: Patrick Mcintyre MD  Pre-op Performing Provider: Patrick Mcintyre MD  Jul 15, 2024             7/15/2024   Surgical Information   What procedure is being done? MICROLARYNGOSCOPY, CO2 laser resection of vocal fold lesion, possible stent placement possible biopsy   [facility]   Who is doing the procedure / surgery? Dr. Malone   Date of surgery / procedure: 7/17/24   Time of surgery / procedure: 11:50 am    Where do you plan to recover after surgery? at home with family        Fax number for surgical facility: Note does not need to be faxed, will be available electronically in Epic.    Assessment & Plan     The proposed surgical procedure is considered INTERMEDIATE risk.      ICD-10-CM    1. Preop general physical exam  Z01.818       2. 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 Essential hypertension  I10     Blood pressure within target goal of less than 140/90.  Will continue losartan      4. Stage 3a chronic kidney disease (H)  N18.31     Renal function stable currently      5. Gastroesophageal reflux disease, unspecified whether esophagitis present  K21.9       6. Ex-smoker  Z87.891       7. 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  Following rheumatologist      8. High risk medications (not anticoagulants) long-term use  Z79.899 ALT     Albumin level     AST     CBC with Platelets & Differential     Creatinine           - No identified additional risk factors other than previously addressed    Antiplatelet or Anticoagulation Medication Instructions   - Patient is on no antiplatelet or anticoagulation  medications.    Additional Medication Instructions   - DMARDs: did not take methotrexate this sunday     Recommendation  Approval given to proceed with proposed procedure, without further diagnostic evaluation.    Tahira Lewis is a 79 year old, presenting for the following:  Pre-Op Exam  HPI related to upcoming procedure:   79-year-old male presents for a preop physical exam.  Patient is scheduled to have microlaryngoscopy, CO2 laser resection of vocal fold lesion, possible stent placement possible biopsy on July 17, 2024.  He requires evaluation and anesthesia risk assessment prior to undergoing surgery/procedure.  Patient denies any fever, chills, sore throat, cough, shortness of breath, chest pain, palpitation, diarrhea, constipation, abdominal pain, headache or other relevant systemic symptoms, able to perform > 4 METS of activity without any difficulty.        7/15/2024   Pre-Op Questionnaire   Have you ever had a heart attack or stroke? No   Have you ever had surgery on your heart or blood vessels, such as a stent placement, a coronary artery bypass, or surgery on an artery in your head, neck, heart, or legs? No   Do you have chest pain with activity? No   Do you have a history of heart failure? No   Do you currently have a cold, bronchitis or symptoms of other infection? No   Do you have a cough, shortness of breath, or wheezing? No   Do you or anyone in your family have previous history of blood clots? No   Do you or does anyone in your family have a serious bleeding problem such as prolonged bleeding following surgeries or cuts? No   Have you ever had problems with anemia or been told to take iron pills? No   Have you had any abnormal blood loss such as black, tarry or bloody stools? No   Have you ever had a blood transfusion? No   Are you willing to have a blood transfusion if it is medically needed before, during, or after your surgery? Yes   Have you or any of your relatives ever had problems with  anesthesia? No   Do you have sleep apnea, excessive snoring or daytime drowsiness? No   Do you have any artifical heart valves or other implanted medical devices like a pacemaker, defibrillator, or continuous glucose monitor? No   Do you have artificial joints? No   Are you allergic to latex? No      Health Care Directive  Patient has a Health Care Directive on file    Preoperative Review of    reviewed - controlled substances reflected in medication list.      Status of Chronic Conditions:  See problem list for active medical problems.  Problems all longstanding and stable, except as noted/documented.  See ROS for pertinent symptoms related to these conditions.    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     SPECIMEN(S):   A: Cecal polyp   B: Colon polyp at 30 cm     FINAL DIAGNOSIS:   A.  Cecum, mucosal biopsies:   - Tubular adenoma.   - Negative for high-grade dysplasia and malignancy.     B.  Colon at 30 cm, mucosal biopsies:   - Tubular adenoma and a fragment of normal colonic mucosa.   - Negative for high-grade dysplasia and malignancy.     Electronically signed out by:     Yuliet Melissa M.D.      CKD (chronic kidney disease) stage 3, GFR 30-59 ml/min (H) 06/18/2014     Priority: Medium    Granulomatosis with polyangiitis (H) 09/12/2013     Priority: Medium     Diagnosis updated by automated process. Provider to review and confirm.      Encounter for long-term (current) use of steroids 07/30/2013     Priority: Medium    Anemia 07/10/2013      Priority: Medium    Hypertension, renal 05/20/2013     Priority: Medium    GERD (gastroesophageal reflux disease) 04/26/2013     Priority: Medium    Idiopathic progressive polyneuropathy 04/11/2013     Priority: Medium     Class: Acute    CARDIOVASCULAR SCREENING; LDL GOAL LESS THAN 160 07/06/2012     Priority: Medium    Tobacco abuse 04/16/2009     Priority: Medium     3/4 pack/day  X 25 years         Past Medical History:   Diagnosis Date    Arthritis     Autoimmune disease (H24)     Hearing problem     Hoarseness     Hypertension     Kidney problem     Malignant melanoma (H)     Malignant neoplasm (H)     melanoma    Squamous cell carcinoma     Russo syndrome      Past Surgical History:   Procedure Laterality Date    BIOPSY  11/2011    melanoma on back    DISSECT LYMPH NODE INGUINAL  3/1/2013    Procedure: DISSECT LYMPH NODE INGUINAL;  Bilateral Inguinal Lymph Node Biopsy.;  Surgeon: Tariq Acosta MD;  Location: [facility]    ESOPHAGOSCOPY, GASTROSCOPY, DUODENOSCOPY (EGD), COMBINED  7/5/2013    Procedure: COMBINED ESOPHAGOSCOPY, GASTROSCOPY, DUODENOSCOPY (EGD);  Gastroscopy;  Surgeon: Tariq Acosta MD;  Location: [facility]    HERNIORRHAPHY INGUINAL  8/6/2012    Procedure: HERNIORRHAPHY INGUINAL;  Open Repair Left Inguinal Hernia;  Surgeon: Jerad Baker MD;  Location: [facility]    INJECT STEROID (LOCATION) N/A 8/30/2023    Procedure: steroid injection;  Surgeon: Josephine Malone MD;  [facility]    INJECT STEROID (LOCATION) N/A 9/13/2023    Procedure: steroid injection;  Surgeon: Josephine Malone MD;  [facility]    INJECT STEROID (LOCATION) N/A 10/25/2023    Procedure: steroid injection, stent placement, and biopsy;  Surgeon: Josephine Malone MD;  [facility]    INJECT STEROID (LOCATION) N/A 12/1/2023    Procedure: steroid injection;  Surgeon: Josephine Malone MD;  [facility]    LARYNGOSCOPY WITH MICROSCOPE N/A 9/13/2023    Procedure: MICROLARYNGOSCOPY with stent removal and biopsy;  Surgeon:  Josephine Malone MD;  [facility]    LARYNGOSCOPY, FLEXIBLE WITH INJECTION N/A 12/11/2023    Procedure: LARYNGOSCOPY, FLEXIBLE WITH INJECTION with steroid;  Surgeon: Josephine Malone MD;  Location: [facility]    LASER CO2 LARYNGOSCOPY N/A 8/30/2023    Procedure: MICROLARYNGOSCOPY, CO2 laser resection of vocal fold lesion;  Surgeon: Josephine Malone MD;  [facility]    LASER CO2 LARYNGOSCOPY N/A 10/25/2023    Procedure: MICROLARYNGOSCOPY, CO2 laser resection of vocal fold lesion;  Surgeon: Josephine Malone MD;  [facility]    LASER CO2 LARYNGOSCOPY N/A 12/1/2023    Procedure: MICROLARYNGOSCOPY CO2 laser standby, resection of vocal fold lesion, stent removal, biopsy;  Surgeon: Josephine Malone MD;  [facility]     Current Outpatient Medications   Medication Sig Dispense Refill    beclomethasone HFA (QVAR REDIHALER) 80 MCG/ACT inhaler Inhale 2 puffs into the lungs 2 times daily 10.6 g 3    beclomethasone HFA (QVAR REDIHALER) 80 MCG/ACT inhaler Inhale 2 puffs into the lungs 2 times daily 10.6 g 3    calcium carbonate 600 mg-vitamin D 400 units (CALTRATE) 600-400 MG-UNIT per tablet Take 1 tablet by mouth 2 times daily.      Cholecalciferol (VITAMIN D) 1000 UNITS capsule Take 1 capsule by mouth daily.      folic acid (FOLVITE) 1 MG tablet Take 2 tablets (2 mg) by mouth daily 180 tablet 3    gabapentin (NEURONTIN) 400 MG capsule Take 1 capsule (400 mg) by mouth At Bedtime 90 capsule 3    losartan (COZAAR) 50 MG tablet Take 1 tablet (50 mg) by mouth daily 90 tablet 3    methotrexate 2.5 MG tablet Take 4 tablets (10 mg) by mouth every 7 days 60 tablet 1    pantoprazole (PROTONIX) 40 MG EC tablet Take 1 tablet (40 mg) by mouth daily 60 tablet 1       Allergies   Allergen Reactions    Shrimp Nausea and Vomiting and Unknown     Got sick each time he ate it        Social History     Tobacco Use    Smoking status: Former     Current packs/day: 0.00     Average packs/day: 1 pack/day for 20.0 years (20.0 ttl pk-yrs)     Types:  "Cigarettes     Start date: 1993     Quit date: 2013     Years since quittin.4     Passive exposure: Past    Smokeless tobacco: Never   Substance Use Topics    Alcohol use: Yes     Comment: rare     Family History   Problem Relation Age of Onset    Diabetes Mother     Hypertension Mother     Cancer Father         stomach    Heart Disease Father     Heart Disease Brother     Diabetes Sister     Diabetes Sister     Diabetes Sister     Heart Disease Brother     Cancer Sister     Glaucoma No family hx of     Macular Degeneration No family hx of      History   Drug Use No             Review of Systems  Constitutional, neuro, ENT, endocrine, pulmonary, cardiac, gastrointestinal, genitourinary, musculoskeletal, integument and psychiatric systems are negative, except as otherwise noted.    Objective    /70 (Cuff Size: Adult Large)   Pulse 64   Temp 97.4  F (36.3  C) (Tympanic)   Resp 18   Ht 1.753 m (5' 9\")   Wt 90.3 kg (199 lb)   SpO2 98%   BMI 29.39 kg/m     Estimated body mass index is 29.39 kg/m  as calculated from the following:    Height as of this encounter: 1.753 m (5' 9\").    Weight as of this encounter: 90.3 kg (199 lb).  Physical Exam  GENERAL: alert and no distress  EYES: Eyes grossly normal to inspection, PERRL and conjunctivae and sclerae normal  HENT: normal cephalic/atraumatic, nose and mouth without ulcers or lesions, oropharynx clear, and oral mucous membranes moist  NECK: no adenopathy, no asymmetry, masses, or scars  RESP: lungs clear to auscultation - no rales, rhonchi or wheezes  CV: regular rate and rhythm, normal S1 S2, no S3 or S4, no murmur, click or rub, no peripheral edema  ABDOMEN: soft, nontender, no hepatosplenomegaly, no masses and bowel sounds normal  MS: no gross musculoskeletal defects noted, no edema  SKIN: no suspicious lesions or rashes  NEURO: Normal strength and tone, mentation intact and speech normal  PSYCH: mentation appears normal, affect " normal/bright    Recent Labs   Lab Test 07/03/24  1637 06/27/24  1327 03/24/24  1038 01/12/24  1410 01/03/24  1100   HGB 13.9  --   --  15.3 16.2     --   --  289 204   NA  --   --   --  140 136   POTASSIUM  --   --   --  3.7 4.1   CR 1.76* 1.8*   < > 1.51* 1.92*    < > = values in this interval not displayed.        Diagnostics  Labs pending at this time.  Results will be reviewed when available.   No EKG required, no history of coronary heart disease, significant arrhythmia, peripheral arterial disease or other structural heart disease.    Revised Cardiac Risk Index (RCRI)  The patient has the following serious cardiovascular risks for perioperative complications:   - No serious cardiac risks = 0 points     RCRI Interpretation: 0 points: Class I (very low risk - 0.4% complication rate)         Signed Electronically by: Patrick Mcintyre MD  Copy of this evaluation report is provided to requesting physician.

## 2024-07-15 NOTE — PROGRESS NOTES
Pt SO called wondering if it was important to get the surgery done on Wednesday. Writer let SO know that if it is cancer growing on the back of the vocal cords it is important to get that addressed as soon as possible. In regards to the trach is it important to have an airway established prior to the airway getting to small as that can lead to an emergency situation regarding the patient's airway. Pt SO was agreeable to this and verbalized understanding, writer encouraged SO and pt to reach out with any questions or concerns in the meantime. Nina Cardenas RN on 7/15/2024 at 3:37 PM

## 2024-07-16 ENCOUNTER — ANESTHESIA EVENT (OUTPATIENT)
Dept: SURGERY | Facility: CLINIC | Age: 79
End: 2024-07-16
Payer: COMMERCIAL

## 2024-07-16 ASSESSMENT — LIFESTYLE VARIABLES: TOBACCO_USE: 1

## 2024-07-16 NOTE — ANESTHESIA PREPROCEDURE EVALUATION
Anesthesia Pre-Procedure Evaluation    Patient: Joshua Ennis   MRN: [id number] : 1945        Procedure : Procedure(s):  MICROLARYNGOSCOPY, CO2 laser resection of vocal fold lesion, possible stent placement possible biopsy  possible steroid injection  possible awake fiberoptic intubation, possible awake tracheotomy          Past Medical History:   Diagnosis Date     Arthritis      Autoimmune disease (H24)      Hearing problem      Hoarseness      Hypertension      Kidney problem      Malignant melanoma (H)      Malignant neoplasm (H)     melanoma     Squamous cell carcinoma      Russo syndrome       Past Surgical History:   Procedure Laterality Date     BIOPSY  2011    melanoma on back     DISSECT LYMPH NODE INGUINAL  3/1/2013    Procedure: DISSECT LYMPH NODE INGUINAL;  Bilateral Inguinal Lymph Node Biopsy.;  Surgeon: Tariq Acosta MD;  Location: [facility]     ESOPHAGOSCOPY, GASTROSCOPY, DUODENOSCOPY (EGD), COMBINED  2013    Procedure: COMBINED ESOPHAGOSCOPY, GASTROSCOPY, DUODENOSCOPY (EGD);  Gastroscopy;  Surgeon: Tariq Acosta MD;  [facility]     HERNIORRHAPHY INGUINAL  2012    Procedure: HERNIORRHAPHY INGUINAL;  Open Repair Left Inguinal Hernia;  Surgeon: Jerad Baker MD;  [facility]     INJECT STEROID (LOCATION) N/A 2023    Procedure: steroid injection;  Surgeon: Josephine Malone MD;  [facility]     INJECT STEROID (LOCATION) N/A 2023    Procedure: steroid injection;  Surgeon: Josephine Malone MD;  [facility]     INJECT STEROID (LOCATION) N/A 10/25/2023    Procedure: steroid injection, stent placement, and biopsy;  Surgeon: Josephine Malone MD;  [facility]     INJECT STEROID (LOCATION) N/A 2023    Procedure: steroid injection;  Surgeon: Josephine Malone MD;  [facility]     LARYNGOSCOPY WITH MICROSCOPE N/A 2023    Procedure: MICROLARYNGOSCOPY with stent removal and biopsy;  Surgeon: Josephine Malone MD;  [facility]     LARYNGOSCOPY,  FLEXIBLE WITH INJECTION N/A 2023    Procedure: LARYNGOSCOPY, FLEXIBLE WITH INJECTION with steroid;  Surgeon: Josephine Malone MD;  Location: [facility]     LASER CO2 LARYNGOSCOPY N/A 2023    Procedure: MICROLARYNGOSCOPY, CO2 laser resection of vocal fold lesion;  Surgeon: Josephine Malone MD;  [facility]     LASER CO2 LARYNGOSCOPY N/A 10/25/2023    Procedure: MICROLARYNGOSCOPY, CO2 laser resection of vocal fold lesion;  Surgeon: Josephine Malone MD;  [facility]     LASER CO2 LARYNGOSCOPY N/A 2023    Procedure: MICROLARYNGOSCOPY CO2 laser standby, resection of vocal fold lesion, stent removal, biopsy;  Surgeon: Josephine Malone MD;  [facility]      Allergies   Allergen Reactions     Shrimp Nausea and Vomiting and Unknown     Got sick each time he ate it      Social History     Tobacco Use     Smoking status: Former     Current packs/day: 0.00     Average packs/day: 1 pack/day for 20.0 years (20.0 ttl pk-yrs)     Types: Cigarettes     Start date: 1993     Quit date: 2013     Years since quittin.4     Passive exposure: Past     Smokeless tobacco: Never   Substance Use Topics     Alcohol use: Not Currently     Comment: rare      Wt Readings from Last 1 Encounters:   07/15/24 90.3 kg (199 lb)        Anesthesia Evaluation   Pt has had prior anesthetic. Type: General and MAC.    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   On 24 ENT laryngoscopy found RNC, anterior glottic web, moderate airway restriction    (+)                tobacco use,                        Neurologic: Comment: Idiopathic progressive polyneuropathy - neg neurologic ROS     Cardiovascular:     (+)  hypertension- -   -  - -                                 Previous cardiac testing   Echo: Date: Results:    Stress Test:  Date: Results:    ECG Reviewed:  Date: 4/3/15 Results:  NSR  Cath:  Date: Results:       METS/Exercise Tolerance: >4 METS    Hematologic:     (+)      anemia,          Musculoskeletal:   (+)  arthritis,             GI/Hepatic:     (+) GERD, Asymptomatic on medication,                  Renal/Genitourinary: Comment: 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 Follows with rheumatology, last labs were stable except small drop in GFR    (+) renal disease, type: CRI, Pt does not require dialysis,           Endo:       Psychiatric/Substance Use:  - neg psychiatric ROS     Infectious Disease: Comment: On amoxicillin since 7/3 x 2 wk course to prevent surgical infection in s/o methotrexate for GPA - neg infectious disease ROS     Malignancy: Comment: Melanoma s/p resection in 11/201  (+) Malignancy (vocal cord SCC), History of Skin.Skin CA status post Surgery.      Other:            Physical Exam    Airway        Mallampati: II   TM distance: > 3 FB   Neck ROM: full   Mouth opening: > 3 cm    Respiratory Devices and Support         Dental       (+) Edentulous      Cardiovascular   cardiovascular exam normal       Rhythm and rate: regular     Pulmonary   pulmonary exam normal        breath sounds clear to auscultation       OUTSIDE LABS:  CBC:   Lab Results   Component Value Date    WBC 6.8 07/15/2024    WBC 4.0 07/03/2024    HGB 14.3 07/15/2024    HGB 13.9 07/03/2024    HCT 43.0 07/15/2024    HCT 41.7 07/03/2024     07/15/2024     07/03/2024     BMP:   Lab Results   Component Value Date     01/12/2024     01/03/2024    POTASSIUM 3.7 01/12/2024    POTASSIUM 4.1 01/03/2024    CHLORIDE 107 01/12/2024    CHLORIDE 107 01/03/2024    CO2 24 01/12/2024    CO2 22 01/03/2024    BUN 23.0 01/12/2024    BUN 35.3 (H) 01/03/2024    CR 1.54 (H) 07/15/2024    CR 1.76 (H) 07/03/2024    GLC 83 01/12/2024     (H) 01/03/2024     COAGS:   Lab Results   Component Value Date    PTT 24 04/11/2013    INR 0.98 04/11/2013     POC:   Lab Results   Component Value Date    BGM 82  "03/14/2013     HEPATIC:   Lab Results   Component Value Date    ALBUMIN 4.3 07/15/2024    PROTTOTAL 7.4 01/03/2024    ALT 21 07/15/2024    AST 27 07/15/2024     (H) 03/07/2013    ALKPHOS 103 01/03/2024    BILITOTAL 0.7 01/03/2024     OTHER:   Lab Results   Component Value Date    LACT 0.8 02/14/2017    LAMAR 9.3 01/12/2024    PHOS 2.6 08/12/2016    MAG 2.1 03/14/2016    LIPASE 40 01/03/2024    CRP <2.9 07/07/2022    SED 1 07/03/2024       Anesthesia Plan    ASA Status:  3    NPO Status:  NPO Appropriate    Anesthesia Type: General.     - Airway: ETT   Induction: Intravenous, Propofol.   Maintenance: TIVA.   Techniques and Equipment:     - Airway: Video-Laryngoscope     - Lines/Monitors: BIS     Consents    Anesthesia Plan(s) and associated risks, benefits, and realistic alternatives discussed. Questions answered and patient/representative(s) expressed understanding.     - Discussed: Risks, Benefits and Alternatives for the PROCEDURE were discussed     - Discussed with:  Patient, Spouse      - Extended Intubation/Ventilatory Support Discussed: No.      - Patient is DNR/DNI Status: No     Use of blood products discussed: No .     Postoperative Care    Pain management: IV analgesics, Oral pain medications, Multi-modal analgesia.   PONV prophylaxis: Ondansetron (or other 5HT-3), Dexamethasone or Solumedrol, Background Propofol Infusion     Comments:    Other Comments: Planning 5.0 laser resistant tube           Tyler Thomson MD    I have reviewed the pertinent notes and labs in the chart from the past 30 days and (re)examined the patient.  Any updates or changes from those notes are reflected in this note.              # Overweight: Estimated body mass index is 29.39 kg/m  as calculated from the following:    Height as of 7/15/24: 1.753 m (5' 9\").    Weight as of 7/15/24: 90.3 kg (199 lb).      "

## 2024-07-17 ENCOUNTER — HOSPITAL ENCOUNTER (OUTPATIENT)
Facility: CLINIC | Age: 79
Discharge: HOME | End: 2024-07-17
Attending: OTOLARYNGOLOGY | Admitting: OTOLARYNGOLOGY
Payer: COMMERCIAL

## 2024-07-17 ENCOUNTER — ANESTHESIA (OUTPATIENT)
Dept: SURGERY | Facility: CLINIC | Age: 79
End: 2024-07-17
Payer: COMMERCIAL

## 2024-07-17 VITALS
OXYGEN SATURATION: 98 % | TEMPERATURE: 97.4 F | WEIGHT: 194.67 LBS | RESPIRATION RATE: 16 BRPM | DIASTOLIC BLOOD PRESSURE: 72 MMHG | SYSTOLIC BLOOD PRESSURE: 126 MMHG | HEART RATE: 62 BPM | HEIGHT: 69 IN | BODY MASS INDEX: 28.83 KG/M2

## 2024-07-17 LAB — GLUCOSE BLDC GLUCOMTR-MCNC: 93 MG/DL (ref 70–99)

## 2024-07-17 PROCEDURE — 250N000009 HC RX 250: Performed by: STUDENT IN AN ORGANIZED HEALTH CARE EDUCATION/TRAINING PROGRAM

## 2024-07-17 PROCEDURE — 710N000012 HC RECOVERY PHASE 2, PER MINUTE: Performed by: OTOLARYNGOLOGY

## 2024-07-17 PROCEDURE — 999N000141 HC STATISTIC PRE-PROCEDURE NURSING ASSESSMENT: Performed by: OTOLARYNGOLOGY

## 2024-07-17 PROCEDURE — 250N000011 HC RX IP 250 OP 636: Performed by: OTOLARYNGOLOGY

## 2024-07-17 PROCEDURE — 250N000009 HC RX 250: Performed by: OTOLARYNGOLOGY

## 2024-07-17 PROCEDURE — 250N000009 HC RX 250

## 2024-07-17 PROCEDURE — 88331 PATH CONSLTJ SURG 1 BLK 1SPC: CPT | Mod: TC | Performed by: OTOLARYNGOLOGY

## 2024-07-17 PROCEDURE — 31536 LARYNGOSCOPY W/BX & OP SCOPE: CPT | Mod: 22 | Performed by: OTOLARYNGOLOGY

## 2024-07-17 PROCEDURE — 31536 LARYNGOSCOPY W/BX & OP SCOPE: CPT

## 2024-07-17 PROCEDURE — 360N000077 HC SURGERY LEVEL 4, PER MIN: Performed by: OTOLARYNGOLOGY

## 2024-07-17 PROCEDURE — 99100 ANES PT EXTEME AGE<1 YR&>70: CPT

## 2024-07-17 PROCEDURE — 88305 TISSUE EXAM BY PATHOLOGIST: CPT | Mod: 26 | Performed by: PATHOLOGY

## 2024-07-17 PROCEDURE — 272N000001 HC OR GENERAL SUPPLY STERILE: Performed by: OTOLARYNGOLOGY

## 2024-07-17 PROCEDURE — 710N000010 HC RECOVERY PHASE 1, LEVEL 2, PER MIN: Performed by: OTOLARYNGOLOGY

## 2024-07-17 PROCEDURE — 88331 PATH CONSLTJ SURG 1 BLK 1SPC: CPT | Mod: 26 | Performed by: PATHOLOGY

## 2024-07-17 PROCEDURE — 250N000013 HC RX MED GY IP 250 OP 250 PS 637: Performed by: STUDENT IN AN ORGANIZED HEALTH CARE EDUCATION/TRAINING PROGRAM

## 2024-07-17 PROCEDURE — 250N000011 HC RX IP 250 OP 636

## 2024-07-17 PROCEDURE — 258N000003 HC RX IP 258 OP 636

## 2024-07-17 PROCEDURE — 88360 TUMOR IMMUNOHISTOCHEM/MANUAL: CPT | Mod: 26 | Performed by: PATHOLOGY

## 2024-07-17 PROCEDURE — 82962 GLUCOSE BLOOD TEST: CPT

## 2024-07-17 PROCEDURE — 370N000017 HC ANESTHESIA TECHNICAL FEE, PER MIN: Performed by: OTOLARYNGOLOGY

## 2024-07-17 RX ORDER — OXYMETAZOLINE HYDROCHLORIDE 0.05 G/100ML
SPRAY NASAL PRN
Status: DISCONTINUED | OUTPATIENT
Start: 2024-07-17 | End: 2024-07-17 | Stop reason: HOSPADM

## 2024-07-17 RX ORDER — HYDROMORPHONE HCL IN WATER/PF 6 MG/30 ML
0.2 PATIENT CONTROLLED ANALGESIA SYRINGE INTRAVENOUS EVERY 5 MIN PRN
Status: DISCONTINUED | OUTPATIENT
Start: 2024-07-17 | End: 2024-07-17 | Stop reason: HOSPADM

## 2024-07-17 RX ORDER — PROPOFOL 10 MG/ML
INJECTION, EMULSION INTRAVENOUS PRN
Status: DISCONTINUED | OUTPATIENT
Start: 2024-07-17 | End: 2024-07-17

## 2024-07-17 RX ORDER — DEXAMETHASONE SODIUM PHOSPHATE 4 MG/ML
4 INJECTION, SOLUTION INTRA-ARTICULAR; INTRALESIONAL; INTRAMUSCULAR; INTRAVENOUS; SOFT TISSUE
Status: DISCONTINUED | OUTPATIENT
Start: 2024-07-17 | End: 2024-07-17 | Stop reason: HOSPADM

## 2024-07-17 RX ORDER — LABETALOL HYDROCHLORIDE 5 MG/ML
10 INJECTION, SOLUTION INTRAVENOUS
Status: DISCONTINUED | OUTPATIENT
Start: 2024-07-17 | End: 2024-07-17 | Stop reason: HOSPADM

## 2024-07-17 RX ORDER — HYDRALAZINE HYDROCHLORIDE 20 MG/ML
INJECTION INTRAMUSCULAR; INTRAVENOUS PRN
Status: DISCONTINUED | OUTPATIENT
Start: 2024-07-17 | End: 2024-07-17

## 2024-07-17 RX ORDER — OXYCODONE HYDROCHLORIDE 5 MG/1
5 TABLET ORAL
Status: DISCONTINUED | OUTPATIENT
Start: 2024-07-17 | End: 2024-07-17 | Stop reason: HOSPADM

## 2024-07-17 RX ORDER — OXYCODONE HYDROCHLORIDE 10 MG/1
10 TABLET ORAL
Status: DISCONTINUED | OUTPATIENT
Start: 2024-07-17 | End: 2024-07-17 | Stop reason: HOSPADM

## 2024-07-17 RX ORDER — ONDANSETRON 4 MG/1
4 TABLET, ORALLY DISINTEGRATING ORAL EVERY 30 MIN PRN
Status: DISCONTINUED | OUTPATIENT
Start: 2024-07-17 | End: 2024-07-17 | Stop reason: HOSPADM

## 2024-07-17 RX ORDER — CEFAZOLIN SODIUM/WATER 2 G/20 ML
2 SYRINGE (ML) INTRAVENOUS SEE ADMIN INSTRUCTIONS
Status: DISCONTINUED | OUTPATIENT
Start: 2024-07-17 | End: 2024-07-17 | Stop reason: HOSPADM

## 2024-07-17 RX ORDER — FENTANYL CITRATE 50 UG/ML
50 INJECTION, SOLUTION INTRAMUSCULAR; INTRAVENOUS EVERY 5 MIN PRN
Status: DISCONTINUED | OUTPATIENT
Start: 2024-07-17 | End: 2024-07-17 | Stop reason: HOSPADM

## 2024-07-17 RX ORDER — SODIUM CHLORIDE, SODIUM LACTATE, POTASSIUM CHLORIDE, CALCIUM CHLORIDE 600; 310; 30; 20 MG/100ML; MG/100ML; MG/100ML; MG/100ML
INJECTION, SOLUTION INTRAVENOUS CONTINUOUS
Status: DISCONTINUED | OUTPATIENT
Start: 2024-07-17 | End: 2024-07-17 | Stop reason: HOSPADM

## 2024-07-17 RX ORDER — FENTANYL CITRATE 50 UG/ML
INJECTION, SOLUTION INTRAMUSCULAR; INTRAVENOUS PRN
Status: DISCONTINUED | OUTPATIENT
Start: 2024-07-17 | End: 2024-07-17

## 2024-07-17 RX ORDER — NALOXONE HYDROCHLORIDE 0.4 MG/ML
0.1 INJECTION, SOLUTION INTRAMUSCULAR; INTRAVENOUS; SUBCUTANEOUS
Status: DISCONTINUED | OUTPATIENT
Start: 2024-07-17 | End: 2024-07-17 | Stop reason: HOSPADM

## 2024-07-17 RX ORDER — ONDANSETRON 2 MG/ML
4 INJECTION INTRAMUSCULAR; INTRAVENOUS EVERY 30 MIN PRN
Status: DISCONTINUED | OUTPATIENT
Start: 2024-07-17 | End: 2024-07-17 | Stop reason: HOSPADM

## 2024-07-17 RX ORDER — DEXAMETHASONE SODIUM PHOSPHATE 4 MG/ML
INJECTION, SOLUTION INTRA-ARTICULAR; INTRALESIONAL; INTRAMUSCULAR; INTRAVENOUS; SOFT TISSUE PRN
Status: DISCONTINUED | OUTPATIENT
Start: 2024-07-17 | End: 2024-07-17

## 2024-07-17 RX ORDER — SODIUM CHLORIDE, SODIUM LACTATE, POTASSIUM CHLORIDE, CALCIUM CHLORIDE 600; 310; 30; 20 MG/100ML; MG/100ML; MG/100ML; MG/100ML
INJECTION, SOLUTION INTRAVENOUS CONTINUOUS PRN
Status: DISCONTINUED | OUTPATIENT
Start: 2024-07-17 | End: 2024-07-17

## 2024-07-17 RX ORDER — ACETAMINOPHEN 325 MG/1
975 TABLET ORAL ONCE
Status: COMPLETED | OUTPATIENT
Start: 2024-07-17 | End: 2024-07-17

## 2024-07-17 RX ORDER — FENTANYL CITRATE 50 UG/ML
25 INJECTION, SOLUTION INTRAMUSCULAR; INTRAVENOUS EVERY 5 MIN PRN
Status: DISCONTINUED | OUTPATIENT
Start: 2024-07-17 | End: 2024-07-17 | Stop reason: HOSPADM

## 2024-07-17 RX ORDER — LIDOCAINE 40 MG/G
CREAM TOPICAL
Status: DISCONTINUED | OUTPATIENT
Start: 2024-07-17 | End: 2024-07-17 | Stop reason: HOSPADM

## 2024-07-17 RX ORDER — PROPOFOL 10 MG/ML
INJECTION, EMULSION INTRAVENOUS CONTINUOUS PRN
Status: DISCONTINUED | OUTPATIENT
Start: 2024-07-17 | End: 2024-07-17

## 2024-07-17 RX ORDER — LIDOCAINE HYDROCHLORIDE 40 MG/ML
SOLUTION TOPICAL PRN
Status: DISCONTINUED | OUTPATIENT
Start: 2024-07-17 | End: 2024-07-17 | Stop reason: HOSPADM

## 2024-07-17 RX ORDER — ESMOLOL HYDROCHLORIDE 10 MG/ML
INJECTION INTRAVENOUS PRN
Status: DISCONTINUED | OUTPATIENT
Start: 2024-07-17 | End: 2024-07-17

## 2024-07-17 RX ORDER — ONDANSETRON 2 MG/ML
INJECTION INTRAMUSCULAR; INTRAVENOUS PRN
Status: DISCONTINUED | OUTPATIENT
Start: 2024-07-17 | End: 2024-07-17

## 2024-07-17 RX ORDER — CEFAZOLIN SODIUM/WATER 2 G/20 ML
2 SYRINGE (ML) INTRAVENOUS
Status: COMPLETED | OUTPATIENT
Start: 2024-07-17 | End: 2024-07-17

## 2024-07-17 RX ORDER — HYDROMORPHONE HCL IN WATER/PF 6 MG/30 ML
0.4 PATIENT CONTROLLED ANALGESIA SYRINGE INTRAVENOUS EVERY 5 MIN PRN
Status: DISCONTINUED | OUTPATIENT
Start: 2024-07-17 | End: 2024-07-17 | Stop reason: HOSPADM

## 2024-07-17 RX ORDER — ACETAMINOPHEN 325 MG/1
650 TABLET ORAL
Status: DISCONTINUED | OUTPATIENT
Start: 2024-07-17 | End: 2024-07-17 | Stop reason: HOSPADM

## 2024-07-17 RX ORDER — LIDOCAINE HYDROCHLORIDE 20 MG/ML
INJECTION, SOLUTION INFILTRATION; PERINEURAL PRN
Status: DISCONTINUED | OUTPATIENT
Start: 2024-07-17 | End: 2024-07-17

## 2024-07-17 RX ADMIN — Medication 2 G: at 12:32

## 2024-07-17 RX ADMIN — ONDANSETRON 4 MG: 2 INJECTION INTRAMUSCULAR; INTRAVENOUS at 13:44

## 2024-07-17 RX ADMIN — ACETAMINOPHEN 975 MG: 325 TABLET, FILM COATED ORAL at 10:45

## 2024-07-17 RX ADMIN — DEXAMETHASONE SODIUM PHOSPHATE 10 MG: 4 INJECTION, SOLUTION INTRA-ARTICULAR; INTRALESIONAL; INTRAMUSCULAR; INTRAVENOUS; SOFT TISSUE at 12:55

## 2024-07-17 RX ADMIN — Medication 50 MG: at 12:42

## 2024-07-17 RX ADMIN — FENTANYL CITRATE 100 MCG: 50 INJECTION INTRAMUSCULAR; INTRAVENOUS at 12:42

## 2024-07-17 RX ADMIN — SODIUM CHLORIDE, POTASSIUM CHLORIDE, SODIUM LACTATE AND CALCIUM CHLORIDE: 600; 310; 30; 20 INJECTION, SOLUTION INTRAVENOUS at 12:42

## 2024-07-17 RX ADMIN — Medication 200 MG: at 13:50

## 2024-07-17 RX ADMIN — ESMOLOL HYDROCHLORIDE 10 MG: 10 INJECTION, SOLUTION INTRAVENOUS at 13:14

## 2024-07-17 RX ADMIN — LIDOCAINE HYDROCHLORIDE 0.2 ML: 10 INJECTION, SOLUTION EPIDURAL; INFILTRATION; INTRACAUDAL; PERINEURAL at 10:15

## 2024-07-17 RX ADMIN — ESMOLOL HYDROCHLORIDE 10 MG: 10 INJECTION, SOLUTION INTRAVENOUS at 13:09

## 2024-07-17 RX ADMIN — PROPOFOL 125 MCG/KG/MIN: 10 INJECTION, EMULSION INTRAVENOUS at 12:42

## 2024-07-17 RX ADMIN — PROPOFOL 150 MG: 10 INJECTION, EMULSION INTRAVENOUS at 12:42

## 2024-07-17 RX ADMIN — ESMOLOL HYDROCHLORIDE 10 MG: 10 INJECTION, SOLUTION INTRAVENOUS at 13:30

## 2024-07-17 RX ADMIN — PROPOFOL 50 MG: 10 INJECTION, EMULSION INTRAVENOUS at 12:49

## 2024-07-17 RX ADMIN — HYDRALAZINE HYDROCHLORIDE 10 MG: 20 INJECTION INTRAMUSCULAR; INTRAVENOUS at 13:23

## 2024-07-17 RX ADMIN — Medication 10 MG: at 13:44

## 2024-07-17 RX ADMIN — LIDOCAINE HYDROCHLORIDE 100 MG: 20 INJECTION, SOLUTION INFILTRATION; PERINEURAL at 12:42

## 2024-07-17 ASSESSMENT — ACTIVITIES OF DAILY LIVING (ADL)
ADLS_ACUITY_SCORE: 22
ADLS_ACUITY_SCORE: 32
ADLS_ACUITY_SCORE: 22

## 2024-07-17 NOTE — DISCHARGE INSTRUCTIONS
Contacting your Doctor -   To contact a doctor, call Dr. Malone or:  [phone] and ask for the resident on call for ENT-Otolaryngology (answered 24 hours a day)   Emergency Department:  [facility]: [phone]  [phone] if you are in need of immediate or emergent help

## 2024-07-17 NOTE — ANESTHESIA POSTPROCEDURE EVALUATION
Patient: Joshua Ennis    Procedure: Procedure(s):  MICROLARYNGOSCOPY, CO2 laser standby, biopsy       Anesthesia Type:  General    Note:  Disposition: Outpatient   Postop Pain Control: Uneventful            Sign Out: Well controlled pain   PONV: No   Neuro/Psych: Uneventful            Sign Out: Acceptable/Baseline neuro status   Airway/Respiratory: Uneventful            Sign Out: Acceptable/Baseline resp. status   CV/Hemodynamics: Uneventful            Sign Out: Acceptable CV status; No obvious hypovolemia; No obvious fluid overload   Other NRE: NONE   DID A NON-ROUTINE EVENT OCCUR? No           Last vitals:  Vitals Value Taken Time   /90 07/17/24 1445   Temp 36.4  C (97.5  F) 07/17/24 1405   Pulse 69 07/17/24 1456   Resp 14 07/17/24 1456   SpO2 98 % 07/17/24 1456   Vitals shown include unfiled device data.    Electronically Signed By: Tyler Thomson MD  July 17, 2024  2:56 PM

## 2024-07-17 NOTE — ANESTHESIA CARE TRANSFER NOTE
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    Anesthesia Type:   General     Note:    Oropharynx: oropharynx clear of all foreign objects and spontaneously breathing  Level of Consciousness: awake  Oxygen Supplementation: face mask  Level of Supplemental Oxygen (L/min / FiO2): 6  Independent Airway: airway patency satisfactory and stable  Dentition: dentition unchanged  Vital Signs Stable: post-procedure vital signs reviewed and stable  Report to RN Given: handoff report given  Patient transferred to: PACU    Handoff Report: Identifed the Patient, Identified the Reponsible Provider, Reviewed the pertinent medical history, Discussed the surgical course, Reviewed Intra-OP anesthesia mangement and issues during anesthesia, Set expectations for post-procedure period and Allowed opportunity for questions and acknowledgement of understanding      Vitals:  Vitals Value Taken Time   /68 07/17/24 1409   Temp     Pulse 67 07/17/24 1409   Resp     SpO2 97 % 07/17/24 1409   Vitals shown include unfiled device data.    Electronically Signed By: Louis Gonzalez MD  July 17, 2024  2:10 PM

## 2024-07-17 NOTE — ANESTHESIA PROCEDURE NOTES
Airway         Procedure Start/Stop Times: 7/17/2024 12:50 PM  Staff -        Anesthesiologist:  Huan Damon MD       Resident/Fellow: Louis Gonzalez MD       Other Anesthesia Staff: Tyler Thomson MD       Performed By: resident  Consent for Airway        Urgency: elective  Indications and Patient Condition       Indications for airway management: reggie-procedural       Induction type:intravenous       Mask difficulty assessment: 1 - vent by mask    Final Airway Details       Final airway type: endotracheal airway       Successful airway: ETT - single  Endotracheal Airway Details        ETT size (mm): 6.0       Cuffed: yes       Successful intubation technique: direct laryngoscopy       DL Blade Type: Other (comment)       Grade View of Cords: 1    Post intubation assessment        Number of attempts at approach: 1       Ease of procedure: easy       Dental guard used and removed.    Medication(s) Administered   Medication Administration Time: 7/17/2024 12:50 PM    Additional Comments       ETT placed by ENT. Induction completed by anesthesia staff

## 2024-07-17 NOTE — PROGRESS NOTES
In preop patient has a chronic sore throat, hoarse voice, difficult to talk, [place] chronic ringing of ears. Patient has history of deviated septum, bloody drainage from nose after the nozin nasal , patient coughing up bloody secretions.

## 2024-07-17 NOTE — OP NOTE
Operative Note   Otolaryngology - Head and Neck Surgery       DATE OF OPERATION:   July 17, 2024    PREOPERATIVE DIAGNOSIS:   Anterior glottic web  Laryngeal carcinoma      POSTOPERATIVE DIAGNOSIS:   Same    NAME OF OPERATION:   1. Microdirect suspension laryngoscopy with biopsy of anterior commissure and right posterior vocal fold    ANESTHESIA  Type: General  ETT: mask down to 6.0 ett    SURGEON:   Josephine Malone MD    RESIDENT SURGEON(S):   Arlin Zhong MD      INDICATIONS FOR PROCEDURE:   The patient is a 78 year old male with a recently diagnosed laryngeal mass s/p resection on 8/30/23 with positive margins came in  for repeat resection on 10/25/23 and stent placement now came in on 12/1/23 in for stent removal and biopsies. He returns today for repeat biopsy for concern of cancer return. Risks, benefits, alternatives, and rationale for the procedure(s) listed above were discussed with the patient, and the patient wishes to proceed with surgery and has signed an informed consent.     COMPLEXITY  This surgery was more complex than normally because of performing the surgery with history of anterior glottic web. The approach therefore was more difficult technically than normally.  Significantly more time was required to perform all parts of the operation, especially obtaining the airway and then obtaining the biopsies. Multiple passes and types of tools were used to obtain adequate biopsies.       FINDINGS:   1. Exposure with dedo then 6.0 ett placed through then changed to ossoff for better anterior commissure exposure  2. Anterior glottic web hard - split with scissors and mulitple biosies done. Does not extend subglottically - frozen section with invasive squamous cell carcinoma  3. Right posterior vocal fold also with mass that was biopsied       DESCRIPTION OF PROCEDURE:   The patient was brought into the operating room and placed supine on the operating room table. A time-out was performed. General  anesthesia was induced. The patient was 2 hand mask ventilated.      Then the patient was turned 90 degrees from the anesthesiologist. The head was drapped in the usual fashion. Then the dentition and mucosa were inspected prior to start. A reinforced tooth guard was placed on the upper dentition. The dedo laryngoscope was carefully introduced into the oral cavity and gently passed to the oropharynx. Here the larynx was exposed and the patient was placed in suspension.     This revealed the anterior glottic web. Then a 6.0 ett was passed through and the dedo was changed to an ossoff. Photodocumentation was performed.     Then the up scissors were used to split the web. This required multiple passes.     Then a biopsy forceps was used and the anterior commissure was biopsied. This was sent for histopathologic evaluation. The 1, 2 and 4 biopsy forceps were used. This was sent for frozen evaluation and revealed squamous cell carcinoma. More tissue was sent for permanent evaluation.     Then the right posterior vocal fold was also biopsied. This was sent for histopathologic evaluation.     This was the end of our procedure. Afrin soaked pledgets were applied to the surgical site for hemostasis.  The surgical site was then inspected and no residual bleeding was seen. The patient was taken out of suspension. The instrumentation was carefully removed, examining the mucosa and dentition on the way it. The dental guard was removed, and the mucosa and dentition were seen to be in their preoperative state at the conclusion of the procedure. The patient was then handed back to the care of anesthesia who awoke and extubated the patient without complication.    COMPLICATIONS:   None.  .   ESTIMATED BLOOD LOSS:   Less than 10cc    DISPOSITION:   PACU.    SPECIMENS:  ID Type Source Tests Collected by Time Destination   1 : anterior commisure mass Tissue Other SURGICAL PATHOLOGY EXAM Josephine Malone MD 7/17/2024  1:15 PM    2 :  anterior commisure mass Tissue Other SURGICAL PATHOLOGY EXAM Josephine Malone MD 7/17/2024  1:26 PM    3 : right posterior vocal fold Tissue Other SURGICAL PATHOLOGY EXAM Josephine Malone MD 7/17/2024  1:35 PM           PHOTODOCUMENTATION:

## 2024-07-18 ENCOUNTER — PATIENT OUTREACH (OUTPATIENT)
Dept: OTOLARYNGOLOGY | Facility: CLINIC | Age: 79
End: 2024-07-18

## 2024-07-18 NOTE — PROGRESS NOTES
Called pt SO to let her know that we will keep post op appointment until we have the ENT conference tomorrow morning, then we would give them a call to let them know what the plan was. Patient is doing well after the surgery, notes no pain and is feeling well. Writer asked if anything changed in the meantime to give us a call. Pt SO was agreeable and verbalized understanding of the situation. Nina Cardenas RN on 7/18/2024 at 9:03 AM

## 2024-07-19 ENCOUNTER — PATIENT OUTREACH (OUTPATIENT)
Dept: ONCOLOGY | Facility: CLINIC | Age: 79
End: 2024-07-19
Payer: COMMERCIAL

## 2024-07-19 ENCOUNTER — PATIENT OUTREACH (OUTPATIENT)
Dept: OTOLARYNGOLOGY | Facility: CLINIC | Age: 79
End: 2024-07-19
Payer: COMMERCIAL

## 2024-07-19 DIAGNOSIS — R13.10 DYSPHAGIA, UNSPECIFIED TYPE: Primary | ICD-10-CM

## 2024-07-19 DIAGNOSIS — C32.9 LARYNGEAL SQUAMOUS CELL CARCINOMA (H): Primary | ICD-10-CM

## 2024-07-19 LAB
PATH REPORT.ADDENDUM SPEC: ABNORMAL
PATH REPORT.COMMENTS IMP SPEC: ABNORMAL
PATH REPORT.COMMENTS IMP SPEC: YES
PATH REPORT.FINAL DX SPEC: ABNORMAL
PATH REPORT.GROSS SPEC: ABNORMAL
PATH REPORT.INTRAOP OBS SPEC DOC: ABNORMAL
PATH REPORT.MICROSCOPIC SPEC OTHER STN: ABNORMAL
PATH REPORT.RELEVANT HX SPEC: ABNORMAL
PHOTO IMAGE: ABNORMAL

## 2024-07-19 NOTE — PROGRESS NOTES
Called patient significant other to let her know that the patient's case was reviewed on tumor board today and they all recommended that patient start radiation. Patient significant other was agreeable to this. Writer educated patient on connecting with the patient's dentist as there are precautions that are needed with teeth through the radiation process. Writer let her know that the SLP team may want to get a VFSS prior to the radiation as sometimes with the radiation it can weaken the swallow, and would reach out to schedule this if needed by SLP team. Patient was agreeable and verbalized understanding of the situation. Patient will reach out with any other questions or concerns in the meantime. Nina Cardenas RN on 7/19/2024 at 11:10 AM

## 2024-07-19 NOTE — PROGRESS NOTES
New Patient Oncology Nurse Navigator Note     [facility]: [facility]     Referred to (specialty): Radiation Oncology    Requested provider (if applicable): Dr. Baal Ochoa, 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 Malone, otolaryngology consult    IMAGIN/27 & 3/24/2024:  CT Soft Tissue Neck    PATHOLOGY:  Addendum   RESULT FOR IMMUNOHISTOCHEMICAL [place] CLONE  PD-L1 ASSAY  COMBINED POSITIVE SCORE (CPS):  1-5  TUMOR PROPORTION SCORE (TPS):  <1%  INTERPRETATION: NEGATIVE PD-L1 EXPRESSION (TPS <1%)     COMMENT:  This [place]  PD-L1 immunohistochemistry antibody assay is a laboratory developed test to be used for patients with non-small cell lung carcinoma (NSCLC), gastric or gastroesophageal junction (GEJ) adenocarcinoma, urothelial carcinomas, melanomas, liver, breast and brain tumors who are being considered for treatment with Keytruda (Pembrolizumab), an anti-PD-1 immune checkpoint inhibitor. The [place]  PD-L1 assay has been validated by the [facility] against the FDA approved clinical trial-validated PharmDx 22C3 PD-L1 assay. Evidence suggests that the level of PD-L1 expression in the tumor cell population by immunohistochemistry is a major predictor of response to checkpoint inhibitor therapy. Previous studies demonstrate a high correlation between PD-L1 immunohistochemistry expression data obtained with PD-L1 clones Dako 22C3 and [place]  in NSCLC. (References: Lancet 387:1540-50, 2016; NEJM 375:1823-33, 2016; J Clin Oncol 34:4102-9. 2016; J Thorac Oncol 12:1654-63, 2017;  [facility] PD-L1 2018 assessment)  Scoring system: The tumor proportion score (TPS) is determined by enumeration of the percentage of PD-L1 tumor cells with any amount of membrane positivity expressed as a whole number relative to all viable tumor cells in the specimen. The scoring system for PD-L1 expression is divided into three groups: a) High expressor, for tumors with TPS >/= 50%; b) Low expressor, for tumors with TPS of >/=1%-49%; and c) Negative, for tumors with TPS <1%. If CPS score is reported, it is calculated based on the following formula: [(PD-L1 positive tumor cells + PD-L1 positive mononuclear inflammatory cells)/Total tumor cells] x 100.  Assay conditions:  - Fixation and processing: 10% neutral buffered formalin, paraffin embedded.  - Staining method: [place] predilute monoclonal PD-L1 antibody clone , standard heat induced epitope retrieval in cell conditioning 1 (CC1 - EDTA, alkaline pH), primary antibody incubation 16 minutes, [place] Optiview detection kit, and [employer] Ultra automated instrument.  - Minimum tumor cell requirement: >/= 100 viable tumor cells present in the specimen.  - Positive and negative controls react appropriately.   Addendum electronically signed by Eyal Echevarria MD on 7/19/2024 at 10:23 AM   Final Diagnosis   A-C.  LARYNX, ANTERIOR COMMISURE MASS AND THE RIGHT POSTERIOR VOCAL FOLD, BIOPSIES:  -INVASIVE KERATINIZING SQUAMOUS CELL CARCINOMA, POORLY DIFFERENTIATED WITH SARCOMATOID TRANSFORMATION, see comment.         Comments:   This is an appended report. These results have been appended to a previously preliminary verified report.      Electronically signed by Eyal Echevarria MD on 7/18/2024 at  9:43 AM   Comment  [facility]   PD-L1 immunohistochemistry test is in progress and result will be provided in addendum.   Comment: This is an appended report. These results have been appended to a previously preliminary verified report.   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  NO  Additional testing needed prior to consult: none

## 2024-07-22 ENCOUNTER — TELEPHONE (OUTPATIENT)
Dept: SPEECH THERAPY | Facility: CLINIC | Age: 79
End: 2024-07-22
Payer: COMMERCIAL

## 2024-07-22 NOTE — TELEPHONE ENCOUNTER
Patient confirmed scheduled appointment:  Date: 8/22  Time: 9  Provider: Perla AHMADI  Location: [facility]   Testing/imaging:   Additional notes:

## 2024-07-23 ENCOUNTER — OFFICE VISIT (OUTPATIENT)
Dept: DERMATOLOGY | Facility: CLINIC | Age: 79
End: 2024-07-23
Payer: COMMERCIAL

## 2024-07-23 DIAGNOSIS — D22.9 MULTIPLE BENIGN NEVI: ICD-10-CM

## 2024-07-23 DIAGNOSIS — L82.1 SEBORRHEIC KERATOSIS: ICD-10-CM

## 2024-07-23 DIAGNOSIS — L57.0 AK (ACTINIC KERATOSIS): ICD-10-CM

## 2024-07-23 DIAGNOSIS — D18.01 CHERRY ANGIOMA: ICD-10-CM

## 2024-07-23 DIAGNOSIS — L81.4 SOLAR LENTIGO: Primary | ICD-10-CM

## 2024-07-23 DIAGNOSIS — Z85.820 HISTORY OF MELANOMA: ICD-10-CM

## 2024-07-23 PROCEDURE — 99204 OFFICE O/P NEW MOD 45 MIN: CPT | Performed by: DERMATOLOGY

## 2024-07-23 RX ORDER — FLUOROURACIL 50 MG/G
CREAM TOPICAL 2 TIMES DAILY
Qty: 40 G | Refills: 2 | Status: SHIPPED | OUTPATIENT
Start: 2024-07-23

## 2024-07-23 RX ORDER — CALCIPOTRIENE 50 UG/G
CREAM TOPICAL 2 TIMES DAILY
Qty: 60 G | Refills: 2 | Status: SHIPPED | OUTPATIENT
Start: 2024-07-23

## 2024-07-23 ASSESSMENT — PAIN SCALES - GENERAL: PAINLEVEL: NO PAIN (0)

## 2024-07-23 NOTE — PROGRESS NOTES
[facility] Dermatology Note  Encounter Date: Jul 23, 2024  Office Visit     Dermatology Problem List:  1. History of melanoma 14-16 years ago  - Left upper back 2011 0.5 mm   2. 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 AK  - Effudex   ____________________________________________    Assessment & Plan:    # Actinic Damage  - Start Efudex 5% cream twice daily for six weeks, apply above the eyebrows to the nonbearing hair on the scalp. Discussed possible side effects of irritation, burning or pruritus.    - calcipotriene (DOVONOX) 0.005 % external cream        Apply topically 2 times daily In conjunction with fluorouracil cream in a 1:1 mixture.    - fluorouracil (EFUDEX) 5 % external cream        Apply topically 2 times daily In conjunction with calcipotriene cream i    #. Benign lesions: Seborrheic keratoses, lentigines, cherry angioma, and multiple benign nevi  - Reassurance provided; no treatment is medically necessary    #. History of malignant melanoma of the L upper back ([place] 0.5mm)   Note per Dr. Hooks on 5/29/2013 indicates History of melanoma 2011. Reviewed Pathology Results from 4/10/2013 denoted below in Labs Reviewed.     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    Follow-up: 6 month(s) in-person, or earlier for new or changing lesions    Staff and Scribe:     Scribe Disclosure:   RADHA WILSON, am serving as a scribe; to document services personally performed by Jluis Kim MD-based on data collection and the provider's  statements to me.    Staff attestation:  The documentation recorded by the scribe accurately reflects the services I personally performed and the decisions I personally made. I have made edits where needed.    Jluis Kim MD  Staff Dermatologist and Dermatopathologist  , Department of Dermatology   ____________________________________________    CC: Skin Check (Here today for a skin check. No concerns. )    HPI:  Mr. Joshua Ennis is a(n) 79 year old male who presents today as a new patient for a skin check. Previously seen by 2 other dermatologists. Personal history of melanoma and treated precancerous lesion on the scalp with Effudex.     Today, the patient mentions no areas of concern.    Patient is otherwise feeling well, without additional skin concerns.    Labs Reviewed:  Pathology consult reviewed from 4/10/2013    FINAL DIAGNOSIS:  A.  Skin, left upper back, excision - ([id number] A obtained  12/15/2011):       -  Melanoma     Documented as a 12/2011 diagnosis by Dr. Hooks chart note 5/29/2013    Physical Exam:  Vitals: There were no vitals taken for this visit.  SKIN: Full skin, which includes the head/face, both arms, chest, back, abdomen,both legs, genitalia and/or groin buttocks, digits and/or nails, was examined.  - There are dome shaped bright red papules on the trunk.   - Multiple regular brown pigmented macules and papules are identified on the trunk and extremities.   - Scattered brown macules on sun exposed areas.  - There are waxy stuck on tan to brown papules on the trunk.   - There are erythematous macules with overyling adherent scale on the scalp.   - No other lesions of concern on areas examined.     Medications:  Current Outpatient Medications   Medication Sig Dispense Refill    beclomethasone HFA (QVAR REDIHALER) 80 MCG/ACT inhaler Inhale 2 puffs into the lungs 2 times daily 10.6 g 3    beclomethasone HFA (QVAR REDIHALER) 80 MCG/ACT inhaler Inhale 2 puffs into  the lungs 2 times daily 10.6 g 3    calcipotriene (DOVONOX) 0.005 % external cream Apply topically 2 times daily In conjunction with fluorouracil cream in a 1:1 mixture. 60 g 2    calcium carbonate 600 mg-vitamin D 400 units (CALTRATE) 600-400 MG-UNIT per tablet Take 1 tablet by mouth 2 times daily.      Cholecalciferol (VITAMIN D) 1000 UNITS capsule Take 1 capsule by mouth daily.      fluorouracil (EFUDEX) 5 % external cream Apply topically 2 times daily In conjunction with calcipotriene cream in a 1:1 mixture. 40 g 2    folic acid (FOLVITE) 1 MG tablet Take 2 tablets (2 mg) by mouth daily 180 tablet 3    gabapentin (NEURONTIN) 400 MG capsule Take 1 capsule (400 mg) by mouth At Bedtime 90 capsule 3    losartan (COZAAR) 50 MG tablet Take 1 tablet (50 mg) by mouth daily (Patient taking differently: Take 50 mg by mouth at bedtime) 90 tablet 3    methotrexate 2.5 MG tablet Take 4 tablets (10 mg) by mouth every 7 days 60 tablet 1    pantoprazole (PROTONIX) 40 MG EC tablet Take 1 tablet (40 mg) by mouth daily 60 tablet 1     No current facility-administered medications for this visit.      Past Medical History:   Patient Active Problem List   Diagnosis    Tobacco abuse    CARDIOVASCULAR SCREENING; LDL GOAL LESS THAN 160    Idiopathic progressive polyneuropathy    GERD (gastroesophageal reflux disease)    Hypertension, renal    Anemia    Encounter for long-term (current) use of steroids    Granulomatosis with polyangiitis (H)    CKD (chronic kidney disease) stage 3, GFR 30-59 ml/min (H)    colonic polyps- Tubular Adenomas    Multiple fractures of ribs, left side, initial encounter for closed fracture    Glottic web of larynx     Past Medical History:   Diagnosis Date    Anemia     Arthritis     Autoimmune disease (H24)     CKD (chronic kidney disease) stage 3, GFR 30-59 ml/min (H)     Gastroesophageal reflux disease with esophagitis     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    Granulomatosis with polyangiitis, unspecified  whether renal involvement (H)     Hearing problem     [place] and ringing in ears    History of colonic polyps 04/18/2018    Tubular Adenomas. Negative for high-grade dysplasia and malignancy.    Hoarseness     Hypertension     Idiopathic progressive polyneuropathy     Kidney problem     Laryngeal cancer (H)     Malignant melanoma (H)     Malignant neoplasm (H)     melanoma    Squamous cell carcinoma     Russo syndrome         CC Referred Self, MD  No address on file on close of this encounter.

## 2024-07-23 NOTE — NURSING NOTE
Dermatology Rooming Note    Joshua Ennis's goals for this visit include:   Chief Complaint   Patient presents with    Skin Check     Here today for a skin check. No concerns.      Crissy To RN

## 2024-07-23 NOTE — LETTER
7/23/2024       RE: Joshua Ennis  [address] [facility] [address]     Dear Colleague,    Thank you for referring your patient, Joshua Ennis, to the [facility] [place] at [facility]. Please see a copy of my visit note below.    [facility] Dermatology Note  Encounter Date: Jul 23, 2024  Office Visit     Dermatology Problem List:  1. History of melanoma 14-16 years ago  - Left upper back 2011 0.5 mm   2. 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 AK  - Effudex   ____________________________________________    Assessment & Plan:    # Actinic Damage  - Start Efudex 5% cream twice daily for six weeks, apply above the eyebrows to the nonbearing hair on the scalp. Discussed possible side effects of irritation, burning or pruritus.    - calcipotriene (DOVONOX) 0.005 % external cream        Apply topically 2 times daily In conjunction with fluorouracil cream in a 1:1 mixture.    - fluorouracil (EFUDEX) 5 % external cream        Apply topically 2 times daily In conjunction with calcipotriene cream i    #. Benign lesions: Seborrheic keratoses, lentigines, cherry angioma, and multiple benign nevi  - Reassurance provided; no treatment is medically necessary    #. History of malignant melanoma of the L upper back ([place] 0.5mm)   Note per Dr. Hooks on 5/29/2013 indicates History of melanoma 2011. Reviewed Pathology Results from 4/10/2013 denoted below in Labs Reviewed.     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    Follow-up: 6 month(s) in-person, or earlier for new or changing lesions    Staff and Scribe:     Scribe Disclosure:   I, RADHA ROCHA, am serving as a scribe; to document services personally performed by Jluis Kim MD-based on data collection and the provider's statements to me.    Staff attestation:  The documentation recorded by the scribe accurately reflects the services I personally performed and the decisions I personally made. I have made edits where needed.    Jluis Kim MD  Staff Dermatologist and Dermatopathologist  , Department of Dermatology   ____________________________________________    CC: Skin Check (Here today for a skin check. No concerns. )    HPI:  Mr. Joshua Ennis is a(n) 79 year old male who presents today as a new patient for a skin check. Previously seen by 2 other dermatologists. Personal history of melanoma and treated precancerous lesion on the scalp with Effudex.     Today, the patient mentions no areas of concern.    Patient is otherwise feeling well, without additional skin concerns.    Labs Reviewed:  Pathology consult reviewed from 4/10/2013    FINAL DIAGNOSIS:  A.  Skin, left upper back, excision - ([id number] obtained  12/15/2011):       -  Melanoma     Documented as a 12/2011 diagnosis by Dr. Hooks chart note 5/29/2013    Physical Exam:  Vitals: There were no vitals taken for this visit.  SKIN: Full skin, which includes the head/face, both arms, chest, back, abdomen,both legs, genitalia and/or groin buttocks, digits and/or nails, was examined.  - There are dome shaped bright red papules on the trunk.   - Multiple regular brown pigmented macules and papules are identified on the trunk and extremities.   - Scattered brown macules on sun exposed areas.  - There are waxy stuck on tan to brown papules on the trunk.   - There are erythematous macules with overyling adherent scale on the scalp.   -  No other lesions of concern on areas examined.     Medications:  Current Outpatient Medications   Medication Sig Dispense Refill     beclomethasone HFA (QVAR REDIHALER) 80 MCG/ACT inhaler Inhale 2 puffs into the lungs 2 times daily 10.6 g 3     beclomethasone HFA (QVAR REDIHALER) 80 MCG/ACT inhaler Inhale 2 puffs into the lungs 2 times daily 10.6 g 3     calcipotriene (DOVONOX) 0.005 % external cream Apply topically 2 times daily In conjunction with fluorouracil cream in a 1:1 mixture. 60 g 2     calcium carbonate 600 mg-vitamin D 400 units (CALTRATE) 600-400 MG-UNIT per tablet Take 1 tablet by mouth 2 times daily.       Cholecalciferol (VITAMIN D) 1000 UNITS capsule Take 1 capsule by mouth daily.       fluorouracil (EFUDEX) 5 % external cream Apply topically 2 times daily In conjunction with calcipotriene cream in a 1:1 mixture. 40 g 2     folic acid (FOLVITE) 1 MG tablet Take 2 tablets (2 mg) by mouth daily 180 tablet 3     gabapentin (NEURONTIN) 400 MG capsule Take 1 capsule (400 mg) by mouth At Bedtime 90 capsule 3     losartan (COZAAR) 50 MG tablet Take 1 tablet (50 mg) by mouth daily (Patient taking differently: Take 50 mg by mouth at bedtime) 90 tablet 3     methotrexate 2.5 MG tablet Take 4 tablets (10 mg) by mouth every 7 days 60 tablet 1     pantoprazole (PROTONIX) 40 MG EC tablet Take 1 tablet (40 mg) by mouth daily 60 tablet 1     No current facility-administered medications for this visit.      Past Medical History:   Patient Active Problem List   Diagnosis     Tobacco abuse     CARDIOVASCULAR SCREENING; LDL GOAL LESS THAN 160     Idiopathic progressive polyneuropathy     GERD (gastroesophageal reflux disease)     Hypertension, renal     Anemia     Encounter for long-term (current) use of steroids     Granulomatosis with polyangiitis (H)     CKD (chronic kidney disease) stage 3, GFR 30-59 ml/min (H)     colonic polyps- Tubular Adenomas     Multiple fractures of ribs, left side, initial encounter for  closed fracture     Glottic web of larynx     Past Medical History:   Diagnosis Date     Anemia      Arthritis      Autoimmune disease (H24)      CKD (chronic kidney disease) stage 3, GFR 30-59 ml/min (H)      Gastroesophageal reflux disease with esophagitis      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 Negative for high-grade dysplasia and malignancy.     Hoarseness      Hypertension      Idiopathic progressive polyneuropathy      Kidney problem      Laryngeal cancer (H)      Malignant melanoma (H)      Malignant neoplasm (H)     melanoma     Squamous cell carcinoma      Russo syndrome         CC Referred Self, MD  No address on file on close of this encounter.      Again, thank you for allowing me to participate in the care of your patient.      Sincerely,    Jluis Kim MD

## 2024-07-29 NOTE — PROGRESS NOTES
[facility]   at the [facility]   Otolaryngology Clinic     Patient: Joshua Ennis    MRN: [id number]    : 1945    Age/Gender: 79 year old male  Date of Service: 2024  Rendering Provider:   Josephine Malone MD         Impression & Plan     IMPRESSION: Mr. Ennis is a 79 year old male who is being seen for the following:    Dysphonia   - anterior commissure lesion seen on scope on 23  - voice changes worsening for a few months  - prior smoker  - scope shows anterior commissure lesion with extension to the right vocal fold  - given anterior location will require CT neck to rule out thyroid cartilage extension  - discussed awake biopsy - patient in agreement this was done today  - pathology showed SCC  - CT chest had findings of new nodules - after multiple discussions with radiology the decision was made to proceed with surgery and repeat imaging for this finding in the chest  - s/p MDL with bilateral cordectomy with stent placement on 23  - today shows that the vocal folds closed around the stent - discussed he needs the scar broken up and bigger stent placed - patient in agreement   - pathology showed SCC with positive margins  - went back for MDL and stent removal on 23   - symptoms 10/10/2023 are stable, voice is poor  - scope shows  laryngeal web  - discussed he has positive margins - will proceed with repeat surger - if can't clear will need radiation  - ct chest shows improved size of lung nodule of concern  - s/p microlaryngoscopy, CO2 laser resection of vocal fold lesion, steroid injection, stent placement, and biopsy 10/25/23  - pathology shows invasive squamous cell carcinoma  - symptoms 2023 are stable, voice is poor, discussed case at tumor board, will continue with close observation and repeat biopsies  - scope shows stent in place, vocal folds are open, laryngeal web developed  - discussed removal of stent, then will collect a sample, if the  sample is cancerous, will refer for radiation therapy  - s/p microlaryngoscopy, CO2 laser resection of vocal fold lesion, steroid injection, stent removal, and biopsy 12/01/23  - pathology showed detached fragment of squamous epithelium with mild to moderate dysplasia, no evidence of carcinoma  - s/p laryngoscopy and anterior glottic web vocal fold steroid injection 12/11/23      - symptoms 1/4/2024 are has been having a hard time talking with his voice  - strobe shows bilateral vocal fold stiffness and swelling with question of laryngeal web, ventricular phonation  - s/p steroid injection today, does not appear to have a web, but rather vocal fold stiffness and swelling  - discussed treatment for vocal folds will be with steroid injections and voice therapy  - discussed voice result is questionable given severity of ventricular phonation  - symptoms 1/18/2024 are stable  - scope shows anterior glottic web, did lidocaine anesthesia, but difficult to see if the vocal folds can split apart today as compared to last time, he had laryngeal spasm  - discussed that we should focus on surveillance at this time and keep him in voice therapy, I would not do a repeat stunt placement given the last two did not work   - symptoms 6/27/2024 are worse difficulty breathing  - scope shows anterior glottic web, moderate airway restriction  - has final read of CT pending  - discussed I am concerned for tumor, needs evaluation under anesthesia   discussed awake intubation, discussed risk of trach  - s/p MDL with biopsy 7/17/24 with pathology showing SCC, tumor board discussion ensued with decision to proceed with radiation   - symptoms 7/29/2024 are stable per him though improved per Earlene, less panting with mowing the lawn  - scope shows anterior glottic web, though improved glottic airway   - discussed if breathing should worsen during treatment he is to reach out and consider trach placement   Plan  - radiation treatment  - will  see after  - swallow therapy during radiation  - let Dr Sanchez know about radiation    RETURN VISIT: after radiation     Chief Complaint   Vocal fold SCC  S/p MDL with bilateral cordectomy and steroid injection 8/30/23  S/p MDL with stent removal and steroid injection on 9/13/23  S/p microlaryngoscopy, CO2 laser resection of vocal fold lesion, steroid injection, stent placement, and biopsy 10/25/23    S/p microlaryngoscopy, CO2 laser resection of vocal fold lesion, steroid injection, stent removal, and biopsy 12/01/23  S/p laryngoscopy and anterior glottic web vocal fold steroid injection 12/11/23   S/p steroid injection 1/4/24               Interval History   HISTORY OF PRESENT ILLNESS: Mr. Ennis is a 79 year old male is being followed for dysphonia. he was initially seen on 8/15/23. Please refer to this note for full history.     Today, he presents for follow up. 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 Negative for high-grade dysplasia and malignancy.    Hoarseness     Hypertension     Idiopathic progressive polyneuropathy     Kidney problem     Laryngeal cancer (H)     Malignant melanoma (H)     Malignant neoplasm (H)     melanoma    Squamous cell carcinoma     Russo syndrome        PAST SURGICAL HISTORY:   Past Surgical History:   Procedure Laterality Date    BIOPSY  11/2011    melanoma on back    DISSECT LYMPH NODE INGUINAL  3/1/2013    Procedure: DISSECT LYMPH NODE INGUINAL;  Bilateral Inguinal Lymph Node Biopsy.;  Surgeon: Tariq Acosta MD;  Location: [facility]     ESOPHAGOSCOPY, GASTROSCOPY, DUODENOSCOPY (EGD), COMBINED  7/5/2013    Procedure: COMBINED ESOPHAGOSCOPY, GASTROSCOPY, DUODENOSCOPY (EGD);  Gastroscopy;  Surgeon: Tariq Acosta MD;  Location: [facility]    HERNIORRHAPHY INGUINAL  8/6/2012    Procedure: HERNIORRHAPHY INGUINAL;  Open Repair Left Inguinal Hernia;  Surgeon: Jerad Baker MD;  Location: [facility]    INJECT STEROID (LOCATION) N/A 8/30/2023    Procedure: steroid injection;  Surgeon: Josephine Malone MD;  Location: [facility]    INJECT STEROID (LOCATION) N/A 9/13/2023    Procedure: steroid injection;  Surgeon: Josephine Malone MD;  [facility]: [facility]    INJECT STEROID (LOCATION) N/A 10/25/2023    Procedure: steroid injection, stent placement, and biopsy;  Surgeon: Josephine Malone MD;  [facility]: [facility]    INJECT STEROID (LOCATION) N/A 12/1/2023    Procedure: steroid injection;  Surgeon: Josephine Malone MD;  Location: [facility]    LARYNGOSCOPY WITH MICROSCOPE N/A 9/13/2023    Procedure: MICROLARYNGOSCOPY with stent removal and biopsy;  Surgeon: Josephine Malone MD;  Location: [facility]    LARYNGOSCOPY, FLEXIBLE WITH INJECTION N/A 12/11/2023    Procedure: LARYNGOSCOPY, FLEXIBLE WITH INJECTION with steroid;  Surgeon: Josephine Malone MD;  Location: [facility] OR    LASER CO2 LARYNGOSCOPY N/A 8/30/2023    Procedure: MICROLARYNGOSCOPY, CO2 laser resection of vocal fold lesion;  Surgeon: Josephine Malone MD;  [facility]: [facility]    LASER CO2 LARYNGOSCOPY N/A 10/25/2023    Procedure: MICROLARYNGOSCOPY, CO2 laser resection of vocal fold lesion;  Surgeon: Josephine Malone MD;  Location: [facility]    LASER CO2 LARYNGOSCOPY N/A 12/1/2023    Procedure: MICROLARYNGOSCOPY CO2 laser standby, resection of vocal fold lesion, stent removal, biopsy;  Surgeon: Josephine Malone MD;  Location: [facility]    LASER CO2 LARYNGOSCOPY, COMPLEX N/A 7/17/2024    Procedure: MICROLARYNGOSCOPY, CO2 laser standby, biopsy;  Surgeon: Josephine Malone MD;  Location: [facility]       CURRENT MEDICATIONS:   Current Outpatient Medications:     beclomethasone  HFA (QVAR REDIHALER) 80 MCG/ACT inhaler, Inhale 2 puffs into the lungs 2 times daily, Disp: 10.6 g, Rfl: 3    beclomethasone HFA (QVAR REDIHALER) 80 MCG/ACT inhaler, Inhale 2 puffs into the lungs 2 times daily, Disp: 10.6 g, Rfl: 3    calcipotriene (DOVONOX) 0.005 % external cream, Apply topically 2 times daily In conjunction with fluorouracil cream in a 1:1 mixture., Disp: 60 g, Rfl: 2    calcium carbonate 600 mg-vitamin D 400 units (CALTRATE) 600-400 MG-UNIT per tablet, Take 1 tablet by mouth 2 times daily., Disp: , Rfl:     Cholecalciferol (VITAMIN D) 1000 UNITS capsule, Take 1 capsule by mouth daily., Disp: , Rfl:     fluorouracil (EFUDEX) 5 % external cream, Apply topically 2 times daily In conjunction with calcipotriene cream in a 1:1 mixture., Disp: 40 g, Rfl: 2    folic acid (FOLVITE) 1 MG tablet, Take 2 tablets (2 mg) by mouth daily, Disp: 180 tablet, Rfl: 3    gabapentin (NEURONTIN) 400 MG capsule, Take 1 capsule (400 mg) by mouth At Bedtime, Disp: 90 capsule, Rfl: 3    losartan (COZAAR) 50 MG tablet, Take 1 tablet (50 mg) by mouth daily (Patient taking differently: Take 50 mg by mouth at bedtime), Disp: 90 tablet, Rfl: 3    methotrexate 2.5 MG tablet, Take 4 tablets (10 mg) by mouth every 7 days, Disp: 60 tablet, Rfl: 1    pantoprazole (PROTONIX) 40 MG EC tablet, Take 1 tablet (40 mg) by mouth daily, Disp: 60 tablet, Rfl: 1    ALLERGIES: Shrimp and Advil [ibuprofen]    SOCIAL HISTORY:    Social History     Socioeconomic History    Marital status: Single     Spouse name: Not on file    Number of children: Not on file    Years of education: Not on file    Highest education level: Not on file   Occupational History     Employer: [employer]   Tobacco Use    Smoking status: Former     Current packs/day: 0.00     Average packs/day: 1 pack/day for 20.0 years (20.0 ttl pk-yrs)     Types: Cigarettes     Start date: 1993     Quit date: 2013     Years since quittin.4     Passive exposure:  Past    Smokeless tobacco: Never   Vaping Use    Vaping status: Never Used   Substance and Sexual Activity    Alcohol use: Not Currently     Comment: rare    Drug use: No    Sexual activity: Not Currently     Partners: Female   Other Topics Concern    Parent/sibling w/ CABG, MI or angioplasty before 65F 55M? Not Asked     Service Not Asked    Blood Transfusions Not Asked    Caffeine Concern Not Asked    Occupational Exposure Not Asked    Hobby Hazards Not Asked    Sleep Concern Not Asked    Stress Concern Not Asked    Weight Concern Not Asked    Special Diet Not Asked    Back Care Not Asked    Exercise Yes     Comment: walking    Bike Helmet Not Asked    Seat Belt Not Asked    Self-Exams Not Asked   Social History Narrative    Not on file     Social Determinants of Health     Financial Resource Strain: High Risk (12/24/2021)    [facility]    Financial Resource Strain     Difficulty of Paying Living Expenses: Not on file     Difficulty of Paying Living Expenses: Not on file   Food Insecurity: Not on file   Transportation Needs: Not on file   Physical Activity: Not on file   Stress: Not on file   Social Connections: Unknown (12/24/2021)    [facility]    Social Connections     Frequency of Communication with Friends and Family: Not on file   Interpersonal Safety: Low Risk  (7/15/2024)    Interpersonal Safety     Do you feel physically and emotionally safe where you currently live?: Yes     Within the past 12 months, have you been hit, slapped, kicked or otherwise physically hurt by someone?: No     Within the past 12 months, have you been humiliated or emotionally abused in other ways by your partner or ex-partner?: No   Housing Stability: Not on file         FAMILY HISTORY:   Family History   Problem Relation Age of Onset     Diabetes Mother     Hypertension Mother     Cancer Father         stomach    Heart Disease Father     Heart Disease Brother     Diabetes Sister     Diabetes Sister     Diabetes Sister     Heart Disease Brother     Cancer Sister     Glaucoma No family hx of     Macular Degeneration No family hx of       Non-contributory for problems with anesthesia    REVIEW OF SYSTEMS:   The patient was asked a 14 point review of systems regarding constitutional symptoms, eye symptoms, ears, nose, mouth, throat symptoms, cardiovascular symptoms, respiratory symptoms, gastrointestinal symptoms, genitourinary symptoms, musculoskeletal symptoms, integumentary symptoms, neurological symptoms, psychiatric symptoms, endocrine symptoms, hematologic/lymphatic symptoms, and allergic/ immunologic symptoms.   The pertinent factors have been included in the HPI and below.  Patient Supplied Answers to Review of Systems      6/13/2018     8:42 AM   [facility] ROS   Ears, Nose, Throat Ringing/noise in ears    Nasal congestion or drainage    Sore throat    Hoarseness   Cardiopulmonary Cough       Physical Examination   The patient underwent a physical examination as described below. The pertinent positive and negative findings are summarized after the description of the examination.  Constitutional: The patient's developmental and nutritional status was assessed. The patient's voice quality was assessed.  Head and Face: The head and face were inspected for deformities. The sinuses were palpated. The salivary glands were palpated. Facial muscle strength was assessed bilaterally.  Eyes: Extraocular movements and primary gaze alignment were assessed.  Ears, Nose, Mouth and Throat: The ears and nose were examined for deformities. The nasal septum, mucosa, and turbinates were inspected by anterior rhinoscopy. The lips, teeth, and gums were examined for abnormalities. The oral mucosa, tongue, palate, tonsils, lateral and posterior pharynx were inspected for  the presence of asymmetry or mucosal lesions.    Neck: The tracheal position was noted, and the neck mass palpated to determine if there were any asymmetries, abnormal neck masses, thyromegally, or thyroid nodules.  Respiratory: The nature of the breathing and chest expansion/symmetry was observed.  Cardiovascular: The patient was examined to determine the presence of any edema or jugular venous distension.  Abdomen: The contour of the abdomen was noted.  Lymphatic: The patient was examined for infraclavicular lymphadenopathy.  Musculoskeletal: The patient was inspected for the presence of skeletal deformities.  Extremities: The extremities were examined for any clubbing or cyanosis.  Skin: The skin was examined for inflammatory or neoplastic conditions.  Neurologic: The patient's orientation, mood, and affect were noted. The cranial nerve  functions were examined.  Other pertinent positive and negative findings on physical examination:   OC/OP: no lesions, uvula midline, soft palate elevates symmetrically   Neck: no lesions, no TH tenderness to palpation     All other physical examination findings were within normal limits and noncontributory.    Procedures   Flexible laryngoscopy (CPT 38090)        Pre-procedure diagnosis: dysphonia  Post-procedure diagnosis: same as above  Indication for procedure: Mr. Ennis is a 78 year old male with see above  Procedure(s): Fiberoptic Laryngoscopy     Details of Procedure: After informed consent was obtained, the patient was seated in the examination chair.  The areas of the nasopharynx as well as the hypopharynx were anesthetized with topical 4% lidocaine with 0.25% phenylephrine atomizer.  Examination of the base of tongue was performed first.  Attention was directed to any evidence of masses in the area or evidence of leukoplakia or candidal infection.  Attention was directed to the epiglottis where its size and position was determined and its movement on phonation of the  "vowel  e .  The piriform sinuses were then inspected for any mass lesions or pooling of secretions.  Attention was then directed to the larynx. The vocal folds were inspected for infection or any areas of leukoplakia, for masses, polypoid degeneration, or hemorrhage.  Having done this, the arytenoids and vocal processes were inspected for erythema or evidence of granuloma formation.  The posterior commissure was then inspected for evidence of inflammatory changes in the mucosa and heaping up of mucosal tissue. The patient was then instructed to say the vowel  e .  Adduction of vocal folds to the midline was observed for any evidence of paresis or paralysis of the larynx or asymmetry in rotation of the larynx to the left or right. The patient was asked to breathe and the degree of abduction was noted bilaterally.  Subglottic view of the larynx was obtained for any additional mass lesions or mucosal changes.  Finally the post cricoid was examined for evidence of pooling of secretions, as well as the pharyngeal wall mucosa.   Anesthesia type: 0.25% phenylephrine     Findings:  Anatomic/physiological deviations: RNC, anterior glottic web, improved airway opening               Right vocal process: No restriction of mobility   Left vocal process: No restriction of mobility  Glottal gap: Complete glottal closure  Supraglottic structures: Normal  Hypopharynx: Normal      Estimated Blood Loss: minimal  Complications: None  Disposition: Patient tolerated the procedure well      Review of Relevant Clinical Data   I personally reviewed:     Labs:  No results found for: \"TSH\"  Lab Results   Component Value Date     01/12/2024    CO2 24 01/12/2024    BUN 23.0 01/12/2024    PHOS 2.6 08/12/2016     Lab Results   Component Value Date    WBC 6.8 07/15/2024    HGB 14.3 07/15/2024    HCT 43.0 07/15/2024    MCV 99 07/15/2024     07/15/2024     Lab Results   Component Value Date    PTT 24 04/11/2013    INR 0.98 04/11/2013 " "    No results found for: \"DUANE\"  No components found for: \"RHEUMATOIDFACTOR\", \"RF\"  Lab Results   Component Value Date    CRP <2.9 07/07/2022    CRP <2.9 01/07/2022    CRP <2.9 07/28/2021    CRP <2.9 03/05/2021    CRP <2.9 11/11/2020     No components found for: \"CKTOT\", \"URICACID\"  No components found for: \"C3\", \"C4\", \"DSDNAAB\", \"NDNAABIFA\"  Lab Results   Component Value Date    MPOAB Not Reported 02/14/2014       Patient reported Quality of Life (QOL) Measures   Patient Supplied Answers To VHI Questionnaire      1/22/2024     1:02 PM   Voice Handicap Index (VHI-10)   My voice makes it difficult for people to hear me 4   People have difficulty understanding me in a noisy room 4   My voice difficulties restrict my personal and social life.  2   I feel left out of conversations because of my voice 3   My voice problem causes me to lose income 0   I feel as though I have to strain to produce voice 4   The clarity of my voice is unpredictable 1   My voice problem upsets me 2   My voice makes me feel handicapped 2   People ask, \"What's wrong with your voice?\" 2   VHI-10 24         Patient Supplied Answers To EAT Questionnaire       No data to display                  Patient Supplied Answers To CSI Questionnaire      8/23/2023     8:49 AM   Cough Severity Index (CSI)   My cough is worse when I lie down 0   My coughing problem causes me to restrict my personal and social life 0   I tend to avoid places because of my cough problem 0   I feel embarrassed because of my coughing problem 1   People ask, ''What's wrong?'' because I cough a lot 1   I run out of air when I cough 0   My coughing problem affects my voice 2   My coughing problem limits my physical activity 0   My coughing problem upsets me 0   People ask me if I am sick because I cough a lot 2   CSI Score 6         @[email or web address]@    Scribe Disclosure:   I, RADHA AJ, am serving as a scribe; to document services personally performed by Josephine Malone MD -based " on data collection and the provider's statements to me.     Provider Disclosure:  I agree with above History, Review of Systems, Physical exam and Plan.  I have reviewed the content of the documentation and have edited it as needed. I have personally performed the services documented here and the documentation accurately represents those services and the decisions I have made.      Electronically signed by:    Josephine Malone MD    Laryngology    [facility]  Department of  Otolaryngology - Head and Neck Surgery  [facility] [place] [address]  Appointment line: [phone]  Fax: [phone]  [email or web address]

## 2024-07-29 NOTE — TELEPHONE ENCOUNTER
MEDICAL RECORDS REQUEST   Radiation Oncology  [address] [place] [address]  Fax: [phone]          FUTURE VISIT INFORMATION                                                   Joshua Ennis, : 1945 scheduled for future visit at [facility] Radiation Oncology    RECORDS REQUESTED FOR VISIT                                                     HEAD & NECK     OFFICE NOTE from ENT [facility] 24: Dr. Josephine Malone   OPERATIVE/BIOPSY REPORTS [facility] 24: MICROLARYNGOSCOPY    MEDICATION LIST [facility]    LABS     PATHOLOGY REPORTS Report in Epic 24: [id number]     23: [id number]   10/2/23: [id number]   23: [id number]   23: [id number]   8/15/23: [id number]    ANYTHING RELATED TO DIAGNOSIS Epic Most recent 24   IMAGING (NEED IMAGES & REPORT)     CT SCANS PACS 24-23: CT Soft Tissue Neck  23-23: CT Chest

## 2024-07-30 ENCOUNTER — PRE VISIT (OUTPATIENT)
Dept: RADIATION ONCOLOGY | Facility: CLINIC | Age: 79
End: 2024-07-30

## 2024-07-30 ENCOUNTER — THERAPY VISIT (OUTPATIENT)
Dept: SPEECH THERAPY | Facility: CLINIC | Age: 79
End: 2024-07-30
Payer: COMMERCIAL

## 2024-07-30 ENCOUNTER — OFFICE VISIT (OUTPATIENT)
Dept: RADIATION ONCOLOGY | Facility: CLINIC | Age: 79
End: 2024-07-30
Attending: OTOLARYNGOLOGY
Payer: COMMERCIAL

## 2024-07-30 ENCOUNTER — OFFICE VISIT (OUTPATIENT)
Dept: OTOLARYNGOLOGY | Facility: CLINIC | Age: 79
End: 2024-07-30
Payer: COMMERCIAL

## 2024-07-30 VITALS
HEART RATE: 82 BPM | WEIGHT: 194 LBS | DIASTOLIC BLOOD PRESSURE: 78 MMHG | BODY MASS INDEX: 28.65 KG/M2 | SYSTOLIC BLOOD PRESSURE: 163 MMHG

## 2024-07-30 VITALS
HEART RATE: 52 BPM | SYSTOLIC BLOOD PRESSURE: 157 MMHG | BODY MASS INDEX: 28.58 KG/M2 | DIASTOLIC BLOOD PRESSURE: 87 MMHG | HEIGHT: 69 IN | WEIGHT: 193 LBS

## 2024-07-30 DIAGNOSIS — R49.0 DYSPHONIA: Primary | ICD-10-CM

## 2024-07-30 DIAGNOSIS — C32.0 CANCER OF GLOTTIS (H): Primary | ICD-10-CM

## 2024-07-30 DIAGNOSIS — C32.9 SQUAMOUS CELL CARCINOMA OF LARYNX (H): Primary | ICD-10-CM

## 2024-07-30 PROCEDURE — 99214 OFFICE O/P EST MOD 30 MIN: CPT | Mod: 25 | Performed by: OTOLARYNGOLOGY

## 2024-07-30 PROCEDURE — 99213 OFFICE O/P EST LOW 20 MIN: CPT | Mod: 25 | Performed by: SURGERY

## 2024-07-30 PROCEDURE — 99207 PR NO BILLABLE SERVICE THIS VISIT: CPT | Performed by: SPEECH-LANGUAGE PATHOLOGIST

## 2024-07-30 PROCEDURE — 31575 DIAGNOSTIC LARYNGOSCOPY: CPT | Performed by: SURGERY

## 2024-07-30 PROCEDURE — 99417 PROLNG OP E/M EACH 15 MIN: CPT | Performed by: SURGERY

## 2024-07-30 PROCEDURE — 31575 DIAGNOSTIC LARYNGOSCOPY: CPT | Performed by: OTOLARYNGOLOGY

## 2024-07-30 PROCEDURE — 99205 OFFICE O/P NEW HI 60 MIN: CPT | Mod: 25 | Performed by: SURGERY

## 2024-07-30 NOTE — PROGRESS NOTES
Rehab Screen within Multidisciplinary Physician Clinic    Joshua Ennis was seen by Speech Language Pathology  in as part of Multidisciplinary ENT clinic visit.  Pt being see in multi-disciplinary clinic for dysphonia/dysphagia. Formal evaluation not completed this date as VFSS is scheduled.    Services patient is receiving: none  Visit focused on: follow up with ENT and benefits of further swallow therapy during and after radiation therapy  Current functional status based on informal observation/patient report: Speech: functional  Swallowing: functional  Education provided: role of SLP, trajectory of care  Recommendations: video swallow study to establish baseline function prior to onset of radiation therapy.     All questions answered within scope of practice. Patient/family encouraged pt to reach out with any questions or concerns.     Perla Platt MS, CCC-SLP  Speech-Language Pathology  [facility]  Department of Otolaryngology/D&T - 4th floor  Phone: [phone]  Email: [email or web address]

## 2024-07-30 NOTE — PROGRESS NOTES
Department of Radiation Oncology  [facility]: Cancer Center  [facility]  [address] [place] [id number]  [phone]       Consultation Note    Name: Joshua Ennis MRN: [id number]   : 1945   Date of Service: 2024 Referring: Dr. Malone     Reason for consultation: recurrent T1 glottic cancer     History of Present Illness     Mr. 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     Briefly, his oncologic history is as follows:    Patient presented with dysphonia in 2023.  Patient underwent a [employer] laryngoscopy and cordectomy on 2023,, with pathology demonstrating squamous cell carcinoma moderately differentiated in the right glottis, involving the anterior commissure and left vocal cord, with positive margin.  This was reexcised on 2023 which is negative for malignancy.    In the interim, he has had numerous procedures, particular on 10/25/2023, and last on 2023, with pathology at that time negative for any invasive cancer.    However in  of this year, scope exam demonstrated likely recurrent glottic cancer particular involving the anterior commissure with a glottic web and right vocal cord extending posteriorly (see pictures below from Dr. Malone's scope).    Imaging of the neck was performed on 2024, which is negative for any lymphadenopathy, or any thyroid cartilage involvement.    On 2024 he underwent [employer]Direct laryngoscopy and removal of anterior commissure web, and biopsy of antral, show right posterior vocal cord, which returned positive for invasive squamous cell carcinoma, poorly differentiated, with sarcomatoid features.  There is no involvement of the  supraglottis or subglottis.    His case was discussed at head neck tumor board, with recommendation for definitive radiotherapy.    He was seen by Dr. Malone on 7/30/2024, with persistence of anterior glottic web, but improved airway opening since prior.    Today, he is accompanied by his wife.  He does have a breathy, and hoarse voice, but denies any evidence of dysphagia, odynophagia, aspiration.  Patient denies any sore throat, oral pain, globus, otalgia, neck lymphadenopathy, dysphagia, odynophagia, aspiration symptoms, weight loss, chest pain, hemoptysis. Denies headaches or focal neurologic deficits. Denies fevers, drainage.     Denies any prior history of radiation.  Of note, he does have a history of Wegener's granulomatosis currently managed with Dr. Sanchez and is on methotrexate for this since 2013.  He has not had any significant interventions recently due to his Wegener's history.    Past Medical History:   Past Medical History:   Diagnosis Date    Anemia     Arthritis     Autoimmune disease (H24)     CKD (chronic kidney disease) stage 3, GFR 30-59 ml/min (H)     Gastroesophageal reflux disease with esophagitis     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    Granulomatosis with polyangiitis, unspecified whether renal involvement (H)     Hearing problem     Squaxin and ringing in ears    History of colonic polyps 04/18/2018    Tubular Adenomas. Negative for high-grade dysplasia and malignancy.    Hoarseness     Hypertension     Idiopathic progressive polyneuropathy     Kidney problem     Laryngeal cancer (H)     Malignant melanoma (H)     Malignant neoplasm (H)     melanoma    Squamous cell carcinoma     Russo syndrome        Past Surgical History:   Past Surgical History:   Procedure Laterality Date    BIOPSY  11/2011    melanoma on back    DISSECT LYMPH NODE INGUINAL  3/1/2013    Procedure: DISSECT LYMPH NODE INGUINAL;  Bilateral Inguinal Lymph Node Biopsy.;  Surgeon: Tariq Acosta MD;  Location: [facility]     ESOPHAGOSCOPY, GASTROSCOPY, DUODENOSCOPY (EGD), COMBINED  7/5/2013    Procedure: COMBINED ESOPHAGOSCOPY, GASTROSCOPY, DUODENOSCOPY (EGD);  Gastroscopy;  Surgeon: Tariq Acosta MD;  Location: [facility]    HERNIORRHAPHY INGUINAL  8/6/2012    Procedure: HERNIORRHAPHY INGUINAL;  Open Repair Left Inguinal Hernia;  Surgeon: Jerad Baker MD;  Location: [facility]    INJECT STEROID (LOCATION) N/A 8/30/2023    Procedure: steroid injection;  Surgeon: Josephine Malone MD;  Location: [facility]    INJECT STEROID (LOCATION) N/A 9/13/2023    Procedure: steroid injection;  Surgeon: Josephine Malone MD;  Location: [facility]    INJECT STEROID (LOCATION) N/A 10/25/2023    Procedure: steroid injection, stent placement, and biopsy;  Surgeon: Josephine Malone MD;  Location: [facility]    INJECT STEROID (LOCATION) N/A 12/1/2023    Procedure: steroid injection;  Surgeon: Josephine Malone MD;  Location: [facility]    LARYNGOSCOPY WITH MICROSCOPE N/A 9/13/2023    Procedure: MICROLARYNGOSCOPY with stent removal and biopsy;  Surgeon: Josephine Malone MD;  Location: [facility]    LARYNGOSCOPY, FLEXIBLE WITH INJECTION N/A 12/11/2023    Procedure: LARYNGOSCOPY, FLEXIBLE WITH INJECTION with steroid;  Surgeon: Josephine Malone MD;  Location: [facility] OR    LASER CO2 LARYNGOSCOPY N/A 8/30/2023    Procedure: MICROLARYNGOSCOPY, CO2 laser resection of vocal fold lesion;  Surgeon: Josephine Malone MD;  Location: [facility]    LASER CO2 LARYNGOSCOPY N/A 10/25/2023    Procedure: MICROLARYNGOSCOPY, CO2 laser resection of vocal fold lesion;  Surgeon: Josephine Malone MD;  Location: [facility]    LASER CO2 LARYNGOSCOPY N/A 12/1/2023    Procedure: MICROLARYNGOSCOPY CO2 laser standby, resection of vocal fold lesion, stent removal, biopsy;  Surgeon: Josephine Malone MD;  Location: [facility]    LASER CO2 LARYNGOSCOPY, COMPLEX N/A 7/17/2024    Procedure: MICROLARYNGOSCOPY, CO2 laser standby, biopsy;  Surgeon: Josephine Malone MD;  Location: [facility]       Chemotherapy History:  Per HPI    Radiation History:  No prior  RT    Pregnant: No  Implanted Cardiac Devices: No    Medications:  Current Outpatient Medications   Medication Sig Dispense Refill    beclomethasone HFA (QVAR REDIHALER) 80 MCG/ACT inhaler Inhale 2 puffs into the lungs 2 times daily 10.6 g 3    beclomethasone HFA (QVAR REDIHALER) 80 MCG/ACT inhaler Inhale 2 puffs into the lungs 2 times daily 10.6 g 3    calcipotriene (DOVONOX) 0.005 % external cream Apply topically 2 times daily In conjunction with fluorouracil cream in a 1:1 mixture. 60 g 2    calcium carbonate 600 mg-vitamin D 400 units (CALTRATE) 600-400 MG-UNIT per tablet Take 1 tablet by mouth 2 times daily.      Cholecalciferol (VITAMIN D) 1000 UNITS capsule Take 1 capsule by mouth daily.      fluorouracil (EFUDEX) 5 % external cream Apply topically 2 times daily In conjunction with calcipotriene cream in a 1:1 mixture. 40 g 2    folic acid (FOLVITE) 1 MG tablet Take 2 tablets (2 mg) by mouth daily 180 tablet 3    gabapentin (NEURONTIN) 400 MG capsule Take 1 capsule (400 mg) by mouth At Bedtime 90 capsule 3    losartan (COZAAR) 50 MG tablet Take 1 tablet (50 mg) by mouth daily (Patient taking differently: Take 50 mg by mouth at bedtime) 90 tablet 3    methotrexate 2.5 MG tablet Take 4 tablets (10 mg) by mouth every 7 days 60 tablet 1    pantoprazole (PROTONIX) 40 MG EC tablet Take 1 tablet (40 mg) by mouth daily 60 tablet 1     No current facility-administered medications for this visit.         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       Social History:  Social History     Tobacco Use    Smoking status: Former     Current packs/day: 0.00     Average packs/day: 1 pack/day for 20.0 years (20.0 ttl pk-yrs)     Types: Cigarettes     Start date: 1993     Quit date: 2013     Years since quittin.4     Passive  exposure: Past    Smokeless tobacco: Never   Vaping Use    Vaping status: Never Used   Substance Use Topics    Alcohol use: Not Currently     Comment: rare    Drug use: No         Family History:  Family History   Problem Relation Age of Onset    Diabetes Mother     Hypertension Mother     Cancer Father         stomach    Heart Disease Father     Heart Disease Brother     Diabetes Sister     Diabetes Sister     Diabetes Sister     Heart Disease Brother     Cancer Sister     Glaucoma No family hx of     Macular Degeneration No family hx of        Review of Systems   A 10-point review of systems was performed. Pertinent findings are noted in the HPI.    Physical Exam   ECOG Status: 2    Vitals:  BP (!) 163/78   Pulse 82   Wt 88 kg (194 lb)   BMI 28.65 kg/m      Gen: Alert, in NAD, breathy, hoarse voice  Head: NC/AT  Eyes: PERRL, EOMI, sclera anicteric  Ears: No external auricular lesions  Nose/sinus: No rhinorrhea or epistaxis  Oral cavity/oropharynx: MMM, no visible oral cavity lesions, soft to palpation  Neck: Full ROM, supple, no palpable adenopathy  Pulm: No wheezing, stridor or respiratory distress  CV: Extremities are warm and well-perfused, no cyanosis, no pedal edema  Abdominal: Normal bowel sounds, soft, nontender, no masses  Musculoskeletal: Normal bulk and tone  Skin: Normal color and turgor  Neuro: A/Ox3, CN II-XII intact, normal gait    Procedure Note  Flexible Nasolaryngoscopy:    : Bala Ochoa MD    Indication: Head and neck cancer evaluation    Description of procedure: Local neck nasal decongestant and anesthetic was sprayed in both nostrils of patient.  A flexible scope was inserted into the Right nares and passed gently posteriorly through the nasopharynx, into the oropharynx, and down into the larynx. The scope was then removed safely without any incident. The scope was then inserted intraorally and then removed without incident. Patient tolerated the procedure well.      Findings:    1. No masses in the nasal cavity, nasopharynx  2. No masses in the base of tongue, vallecula or epiglottis.   3. No masses visualized in the false or true glottis. Bilateral vocal cord are mobile. No evidence of recurrence. Anterior glottic web/scarring.  4. No masses in the hypopharynx.  5. No masses in the tonsillar fossa.      Imaging/Path/Labs   Imaging: per HPI, personally reviewed and in agreement    Path: per HPI, personally reviewed and in agreement    7/17/24    A-C.  LARYNX, ANTERIOR COMMISURE MASS AND THE RIGHT POSTERIOR VOCAL FOLD, BIOPSIES:  -INVASIVE KERATINIZING SQUAMOUS CELL CARCINOMA, POORLY DIFFERENTIATED WITH SARCOMATOID TRANSFORMATION, see comment.    Labs: per HPI, personally reviewed and in agreement    Dr. Malone's scope 6/27/24 (pre last surgery)        7/30/24 (post last surgery)      I have personally reviewed Dr. Mart notes, Dr. Sanchez's notes, [facility] tumor board notes, Discussed the case with Dr. Wai Ochoa    Assessment      Mr. 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     I discussed the NCCN guidelines for management of early glottic laryngeal cancers. For early stage laryngeal cancers amenable to laryngeal preservation (T1-T2N0), the NCCN guidelines support surgery or definitive radiation therapy. This recommendation is category 2A, uniform consensus based on level 2 evidence.  The implications of this category of recommendation were discussed with the patient.  In this particular instance, patient is already had numerous laser resections, with recurrent disease, that is now extensive involving the anterior commissure and right posterior vocal cord.  This would make definitive surgical options challenging and would likely involve more extensive  surgery that would greatly affect his voice function.  Thus, we discussed options of definitive radiotherapy for laryngeal preservation.    For early stage laryngeal cancers, NCCN guidelines for definitive radiotherapy include hypofractionated regimens 2.25Gy/fraction (63Gy for T1, 65.25Gy for T2) or conventional fractionation 2.0Gy/fx (66Gy for T1, 70Gy for T2). Although there was no overall survival benefit, there was local control advantage demonstrated with hypofractionation regimen (Danika, [facility], [place],  Int Jour of Rad Onc Bio Phys, 1997; and Maribel turcios al, Int Jour of Rad Onc Bio Phys, 2006). Thus, recommendation would be for hypofractionated regimen.  However, I did discuss that local control may be lower than in the studies given sarcomatoid features and immunosuppression history with methotrexate for his Wegener's granulomatosis.    Plan     After extensive discussion, patient wishes to pursue definitive RT. I recommended 63Gy/28fx.     He would like to be treated at the [place], [place] Location-referral placed and discussed with Dr. Wai Ochoa.    Dental- He states he has seen a dentist already, follow up on radiation clearance     Feeding tube- Not required    Updated imaging- not required    Will discuss with Dr. Sanchez to see if MTX can be dose reduced/held during radiation, given possibility of additional side effects/toxicity during radiation treatment as well as likely increased recurrence risk due to immunosuppression.     All benefits and risks discussed, and patient is in agreement with the oncologic plan discussed above.     Thank you for allowing me to participate in your patient's care.  If you should require any additional information, please do not hesitate in contacting me.     Bala Ochoa MD  Department of Radiation Oncology  [facility]     A total of 110 minutes were spent on this visit, including preparation for this visit, face to face with patient, and medical decision making.  Medical decision-making included consideration and possible diagnosis, management options, complex record review, review of diagnostic tests, consideration and discussion of significant complications based up medical history/comorbidities, and discussion with providers involved in care of the patient.

## 2024-07-30 NOTE — PROGRESS NOTES
"Oncology Rooming Note    July 30, 2024 9:51 AM   Joshua Ennis is a 79 year old male who presents for:    Chief Complaint   Patient presents with    Oncology Clinic Visit     Consult     Initial Vitals: BP (!) 163/78   Pulse 82   Wt 88 kg (194 lb)   BMI 28.65 kg/m   Estimated body mass index is 28.65 kg/m  as calculated from the following:    Height as of 7/17/24: 1.753 m (5' 9\").    Weight as of this encounter: 88 kg (194 lb). Body surface area is 2.07 meters squared.    No LMP for male patient.  Allergies reviewed: Yes  Medications reviewed: Yes    Medications: Medication refills not needed today.  Pharmacy name entered into [employer]:    [id number] - [place], [place] - [address] [id number] - [place], [place] - [address] [place] [address] [facility]ARMACY - [place] - [address] [facility]    Frailty Screening:   Is the patient here for a new oncology consult visit in cancer care? 2. No      Clinical concerns: No concerns  Dr Ochoa was notified.      Di Rosado RN  Considerations for radiation treatment   Pregnancy status: Male   Implanted Cardiac Devices: No   Any previous radiation therapy: No          "

## 2024-07-30 NOTE — LETTER
2024       RE: Joshua Ennis  [address] [facility] [address]     Dear Colleague,    Thank you for referring your patient, Joshua Ennis, to the [facility] EAR NOSE AND THROAT CLINIC [place] at [facility]. Please see a copy of my visit note below.        [facility]   at the [facility]   Otolaryngology Clinic     Patient: Joshua Ennis    MRN: [id number]    : 1945    Age/Gender: 79 year old male  Date of Service: 2024  Rendering Provider:   Josephine Malone MD         Impression & Plan     IMPRESSION: Mr. Ennis is a 79 year old male who is being seen for the following:    Dysphonia   - anterior commissure lesion seen on scope on 23  - voice changes worsening for a few months  - prior smoker  - scope shows anterior commissure lesion with extension to the right vocal fold  - given anterior location will require CT neck to rule out thyroid cartilage extension  - discussed awake biopsy - patient in agreement this was done today  - pathology showed SCC  - CT chest had findings of new nodules - after multiple discussions with radiology the decision was made to proceed with surgery and repeat imaging for this finding in the chest  - s/p MDL with bilateral cordectomy with stent placement on 23  - today shows that the vocal folds closed around the stent - discussed he needs the scar broken up and bigger stent placed - patient in agreement   - pathology showed SCC with positive margins  - went back for MDL and stent removal on 23   - symptoms 10/10/2023 are stable, voice is poor  - scope shows  laryngeal web  - discussed he has positive margins - will proceed with repeat surger - if can't clear will need radiation  - ct chest shows improved size of lung nodule of concern  - s/p microlaryngoscopy, CO2 laser resection of vocal fold lesion, steroid injection, stent placement, and biopsy 10/25/23  - pathology  shows invasive squamous cell carcinoma  - symptoms 11/16/2023 are stable, voice is poor, discussed case at tumor board, will continue with close observation and repeat biopsies  - scope shows stent in place, vocal folds are open, laryngeal web developed  - discussed removal of stent, then will collect a sample, if the sample is cancerous, will refer for radiation therapy  - s/p microlaryngoscopy, CO2 laser resection of vocal fold lesion, steroid injection, stent removal, and biopsy 12/01/23  - pathology showed detached fragment of squamous epithelium with mild to moderate dysplasia, no evidence of carcinoma  - s/p laryngoscopy and anterior glottic web vocal fold steroid injection 12/11/23      - symptoms 1/4/2024 are has been having a hard time talking with his voice  - strobe shows bilateral vocal fold stiffness and swelling with question of laryngeal web, ventricular phonation  - s/p steroid injection today, does not appear to have a web, but rather vocal fold stiffness and swelling  - discussed treatment for vocal folds will be with steroid injections and voice therapy  - discussed voice result is questionable given severity of ventricular phonation  - symptoms 1/18/2024 are stable  - scope shows anterior glottic web, did lidocaine anesthesia, but difficult to see if the vocal folds can split apart today as compared to last time, he had laryngeal spasm  - discussed that we should focus on surveillance at this time and keep him in voice therapy, I would not do a repeat stunt placement given the last two did not work   - symptoms 6/27/2024 are worse difficulty breathing  - scope shows anterior glottic web, moderate airway restriction  - has final read of CT pending  - discussed I am concerned for tumor, needs evaluation under anesthesia   discussed awake intubation, discussed risk of trach  - s/p MDL with biopsy 7/17/24 with pathology showing SCC, tumor board discussion ensued with decision to proceed with  radiation   - symptoms 7/29/2024 are stable per him though improved per Earlene, less panting with mowing the lawn  - scope shows anterior glottic web, though improved glottic airway   - discussed if breathing should worsen during treatment he is to reach out and consider trach placement   Plan  - radiation treatment  - will see after  - swallow therapy during radiation  - let Dr Sanchez know about radiation    RETURN VISIT: after radiation     Chief Complaint   Vocal fold SCC  S/p MDL with bilateral cordectomy and steroid injection 8/30/23  S/p MDL with stent removal and steroid injection on 9/13/23  S/p microlaryngoscopy, CO2 laser resection of vocal fold lesion, steroid injection, stent placement, and biopsy 10/25/23    S/p microlaryngoscopy, CO2 laser resection of vocal fold lesion, steroid injection, stent removal, and biopsy 12/01/23  S/p laryngoscopy and anterior glottic web vocal fold steroid injection 12/11/23   S/p steroid injection 1/4/24               Interval History   HISTORY OF PRESENT ILLNESS: Mr. Ennis is a 79 year old male is being followed for dysphonia. he was initially seen on 8/15/23. Please refer to this note for full history.     Today, he presents for follow up. 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 Negative for high-grade dysplasia and malignancy.     Hoarseness      Hypertension      Idiopathic progressive polyneuropathy      Kidney problem      Laryngeal cancer (H)      Malignant melanoma (H)      Malignant neoplasm (H)     melanoma      Squamous cell carcinoma      Russo syndrome        PAST SURGICAL HISTORY:   Past Surgical History:   Procedure Laterality Date     BIOPSY  11/2011    melanoma on back     DISSECT LYMPH NODE INGUINAL  3/1/2013    Procedure: DISSECT LYMPH NODE INGUINAL;  Bilateral Inguinal Lymph Node Biopsy.;  Surgeon: Tariq Acosta MD;  Location: [facility]     ESOPHAGOSCOPY, GASTROSCOPY, DUODENOSCOPY (EGD), COMBINED  7/5/2013    Procedure: COMBINED ESOPHAGOSCOPY, GASTROSCOPY, DUODENOSCOPY (EGD);  Gastroscopy;  Surgeon: Tariq Acosta MD;  Location: [facility]     HERNIORRHAPHY INGUINAL  8/6/2012    Procedure: HERNIORRHAPHY INGUINAL;  Open Repair Left Inguinal Hernia;  Surgeon: Jerad Baker MD;  Location: [facility]     INJECT STEROID (LOCATION) N/A 8/30/2023    Procedure: steroid injection;  Surgeon: Josephine Malone MD;  Location: [facility]     INJECT STEROID (LOCATION) N/A 9/13/2023    Procedure: steroid injection;  Surgeon: Josephine Malone MD;  Location: [facility]     INJECT STEROID (LOCATION) N/A 10/25/2023    Procedure: steroid injection, stent placement, and biopsy;  Surgeon: Josephine Malone MD;  [facility]: [facility]     INJECT STEROID (LOCATION) N/A 12/1/2023    Procedure: steroid injection;  Surgeon: Josephine Malone MD;  Location: [facility]     LARYNGOSCOPY WITH MICROSCOPE N/A 9/13/2023    Procedure: MICROLARYNGOSCOPY with stent removal and biopsy;  Surgeon: Josephine Malone MD;  Location: [facility]     LARYNGOSCOPY, FLEXIBLE WITH INJECTION N/A 12/11/2023    Procedure: LARYNGOSCOPY, FLEXIBLE WITH INJECTION with steroid;  Surgeon: Josephine Malone MD;  Location: [facility] OR     LASER CO2 LARYNGOSCOPY N/A 8/30/2023    Procedure: MICROLARYNGOSCOPY, CO2 laser resection of vocal fold lesion;  Surgeon: Josephine Malone MD;  Location: [facility]     LASER CO2 LARYNGOSCOPY N/A 10/25/2023    Procedure: MICROLARYNGOSCOPY, CO2 laser resection of vocal fold lesion;  Surgeon: Josephine Malone MD;  Location: [facility]     LASER CO2 LARYNGOSCOPY N/A 12/1/2023    Procedure:  MICROLARYNGOSCOPY CO2 laser standby, resection of vocal fold lesion, stent removal, biopsy;  Surgeon: Josephine Malone MD;  Location: [facility]     LASER CO2 LARYNGOSCOPY, COMPLEX N/A 7/17/2024    Procedure: MICROLARYNGOSCOPY, CO2 laser standby, biopsy;  Surgeon: Josephine Mlaone MD;  Location: [facility]       CURRENT MEDICATIONS:   Current Outpatient Medications:      beclomethasone HFA (QVAR REDIHALER) 80 MCG/ACT inhaler, Inhale 2 puffs into the lungs 2 times daily, Disp: 10.6 g, Rfl: 3     beclomethasone HFA (QVAR REDIHALER) 80 MCG/ACT inhaler, Inhale 2 puffs into the lungs 2 times daily, Disp: 10.6 g, Rfl: 3     calcipotriene (DOVONOX) 0.005 % external cream, Apply topically 2 times daily In conjunction with fluorouracil cream in a 1:1 mixture., Disp: 60 g, Rfl: 2     calcium carbonate 600 mg-vitamin D 400 units (CALTRATE) 600-400 MG-UNIT per tablet, Take 1 tablet by mouth 2 times daily., Disp: , Rfl:      Cholecalciferol (VITAMIN D) 1000 UNITS capsule, Take 1 capsule by mouth daily., Disp: , Rfl:      fluorouracil (EFUDEX) 5 % external cream, Apply topically 2 times daily In conjunction with calcipotriene cream in a 1:1 mixture., Disp: 40 g, Rfl: 2     folic acid (FOLVITE) 1 MG tablet, Take 2 tablets (2 mg) by mouth daily, Disp: 180 tablet, Rfl: 3     gabapentin (NEURONTIN) 400 MG capsule, Take 1 capsule (400 mg) by mouth At Bedtime, Disp: 90 capsule, Rfl: 3     losartan (COZAAR) 50 MG tablet, Take 1 tablet (50 mg) by mouth daily (Patient taking differently: Take 50 mg by mouth at bedtime), Disp: 90 tablet, Rfl: 3     methotrexate 2.5 MG tablet, Take 4 tablets (10 mg) by mouth every 7 days, Disp: 60 tablet, Rfl: 1     pantoprazole (PROTONIX) 40 MG EC tablet, Take 1 tablet (40 mg) by mouth daily, Disp: 60 tablet, Rfl: 1    ALLERGIES: Shrimp and Advil [ibuprofen]    SOCIAL HISTORY:    Social History     Socioeconomic History     Marital status: Single     Spouse name: Not on file     Number of children: Not on file     Years  of education: Not on file     Highest education level: Not on file   Occupational History     Employer: [employer]   Tobacco Use     Smoking status: Former     Current packs/day: 0.00     Average packs/day: 1 pack/day for 20.0 years (20.0 ttl pk-yrs)     Types: Cigarettes     Start date: 1993     Quit date: 2013     Years since quittin.4     Passive exposure: Past     Smokeless tobacco: Never   Vaping Use     Vaping status: Never Used   Substance and Sexual Activity     Alcohol use: Not Currently     Comment: rare     Drug use: No     Sexual activity: Not Currently     Partners: Female   Other Topics Concern     Parent/sibling w/ CABG, MI or angioplasty before 65F 55M? Not Asked      Service Not Asked     Blood Transfusions Not Asked     Caffeine Concern Not Asked     Occupational Exposure Not Asked     Hobby Hazards Not Asked     Sleep Concern Not Asked     Stress Concern Not Asked     Weight Concern Not Asked     Special Diet Not Asked     Back Care Not Asked     Exercise Yes     Comment: walking     Bike Helmet Not Asked     Seat Belt Not Asked     Self-Exams Not Asked   Social History Narrative     Not on file     Social Determinants of Health     Financial Resource Strain: High Risk (2021)    Received from [facility], [facility]    Financial Resource Strain      Difficulty of Paying Living Expenses: Not on file      Difficulty of Paying Living Expenses: Not on file   Food Insecurity: Not on file   Transportation Needs: Not on file   Physical Activity: Not on file   Stress: Not on file   Social Connections: Unknown (2021)    [facility] [facility], [facility]    Social Connections      Frequency of Communication with Friends and Family: Not on file   Interpersonal Safety: Low Risk  (7/15/2024)    Interpersonal Safety      Do you  feel physically and emotionally safe where you currently live?: Yes      Within the past 12 months, have you been hit, slapped, kicked or otherwise physically hurt by someone?: No      Within the past 12 months, have you been humiliated or emotionally abused in other ways by your partner or ex-partner?: No   Housing Stability: Not on file         FAMILY HISTORY:   Family History   Problem Relation Age of Onset     Diabetes Mother      Hypertension Mother      Cancer Father         stomach     Heart Disease Father      Heart Disease Brother      Diabetes Sister      Diabetes Sister      Diabetes Sister      Heart Disease Brother      Cancer Sister      Glaucoma No family hx of      Macular Degeneration No family hx of       Non-contributory for problems with anesthesia    REVIEW OF SYSTEMS:   The patient was asked a 14 point review of systems regarding constitutional symptoms, eye symptoms, ears, nose, mouth, throat symptoms, cardiovascular symptoms, respiratory symptoms, gastrointestinal symptoms, genitourinary symptoms, musculoskeletal symptoms, integumentary symptoms, neurological symptoms, psychiatric symptoms, endocrine symptoms, hematologic/lymphatic symptoms, and allergic/ immunologic symptoms.   The pertinent factors have been included in the HPI and below.  Patient Supplied Answers to Review of Systems      6/13/2018     8:42 AM   [facility] ROS   Ears, Nose, Throat Ringing/noise in ears    Nasal congestion or drainage    Sore throat    Hoarseness   Cardiopulmonary Cough       Physical Examination   The patient underwent a physical examination as described below. The pertinent positive and negative findings are summarized after the description of the examination.  Constitutional: The patient's developmental and nutritional status was assessed. The patient's voice quality was assessed.  Head and Face: The head and face were inspected for deformities. The sinuses were palpated. The salivary glands were palpated.  Facial muscle strength was assessed bilaterally.  Eyes: Extraocular movements and primary gaze alignment were assessed.  Ears, Nose, Mouth and Throat: The ears and nose were examined for deformities. The nasal septum, mucosa, and turbinates were inspected by anterior rhinoscopy. The lips, teeth, and gums were examined for abnormalities. The oral mucosa, tongue, palate, tonsils, lateral and posterior pharynx were inspected for the presence of asymmetry or mucosal lesions.    Neck: The tracheal position was noted, and the neck mass palpated to determine if there were any asymmetries, abnormal neck masses, thyromegally, or thyroid nodules.  Respiratory: The nature of the breathing and chest expansion/symmetry was observed.  Cardiovascular: The patient was examined to determine the presence of any edema or jugular venous distension.  Abdomen: The contour of the abdomen was noted.  Lymphatic: The patient was examined for infraclavicular lymphadenopathy.  Musculoskeletal: The patient was inspected for the presence of skeletal deformities.  Extremities: The extremities were examined for any clubbing or cyanosis.  Skin: The skin was examined for inflammatory or neoplastic conditions.  Neurologic: The patient's orientation, mood, and affect were noted. The cranial nerve  functions were examined.  Other pertinent positive and negative findings on physical examination:   OC/OP: no lesions, uvula midline, soft palate elevates symmetrically   Neck: no lesions, no TH tenderness to palpation     All other physical examination findings were within normal limits and noncontributory.    Procedures   Flexible laryngoscopy (CPT 13212)        Pre-procedure diagnosis: dysphonia  Post-procedure diagnosis: same as above  Indication for procedure: Mr. Ennis is a 78 year old male with see above  Procedure(s): Fiberoptic Laryngoscopy     Details of Procedure: After informed consent was obtained, the patient was seated in the examination  chair.  The areas of the nasopharynx as well as the hypopharynx were anesthetized with topical 4% lidocaine with 0.25% phenylephrine atomizer.  Examination of the base of tongue was performed first.  Attention was directed to any evidence of masses in the area or evidence of leukoplakia or candidal infection.  Attention was directed to the epiglottis where its size and position was determined and its movement on phonation of the vowel  e .  The piriform sinuses were then inspected for any mass lesions or pooling of secretions.  Attention was then directed to the larynx. The vocal folds were inspected for infection or any areas of leukoplakia, for masses, polypoid degeneration, or hemorrhage.  Having done this, the arytenoids and vocal processes were inspected for erythema or evidence of granuloma formation.  The posterior commissure was then inspected for evidence of inflammatory changes in the mucosa and heaping up of mucosal tissue. The patient was then instructed to say the vowel  e .  Adduction of vocal folds to the midline was observed for any evidence of paresis or paralysis of the larynx or asymmetry in rotation of the larynx to the left or right. The patient was asked to breathe and the degree of abduction was noted bilaterally.  Subglottic view of the larynx was obtained for any additional mass lesions or mucosal changes.  Finally the post cricoid was examined for evidence of pooling of secretions, as well as the pharyngeal wall mucosa.   Anesthesia type: 0.25% phenylephrine     Findings:  Anatomic/physiological deviations: RNC, anterior glottic web, improved airway opening               Right vocal process: No restriction of mobility   Left vocal process: No restriction of mobility  Glottal gap: Complete glottal closure  Supraglottic structures: Normal  Hypopharynx: Normal      Estimated Blood Loss: minimal  Complications: None  Disposition: Patient tolerated the procedure well      Review of Relevant  "Clinical Data   I personally reviewed:     Labs:  No results found for: \"TSH\"  Lab Results   Component Value Date     01/12/2024    CO2 24 01/12/2024    BUN 23.0 01/12/2024    PHOS 2.6 08/12/2016     Lab Results   Component Value Date    WBC 6.8 07/15/2024    HGB 14.3 07/15/2024    HCT 43.0 07/15/2024    MCV 99 07/15/2024     07/15/2024     Lab Results   Component Value Date    PTT 24 04/11/2013    INR 0.98 04/11/2013     No results found for: \"DUANE\"  No components found for: \"RHEUMATOIDFACTOR\", \"RF\"  Lab Results   Component Value Date    CRP <2.9 07/07/2022    CRP <2.9 01/07/2022    CRP <2.9 07/28/2021    CRP <2.9 03/05/2021    CRP <2.9 11/11/2020     No components found for: \"CKTOT\", \"URICACID\"  No components found for: \"C3\", \"C4\", \"DSDNAAB\", \"NDNAABIFA\"  Lab Results   Component Value Date    MPOAB Not Reported 02/14/2014       Patient reported Quality of Life (QOL) Measures   Patient Supplied Answers To VHI Questionnaire      1/22/2024     1:02 PM   Voice Handicap Index (VHI-10)   My voice makes it difficult for people to hear me 4   People have difficulty understanding me in a noisy room 4   My voice difficulties restrict my personal and social life.  2   I feel left out of conversations because of my voice 3   My voice problem causes me to lose income 0   I feel as though I have to strain to produce voice 4   The clarity of my voice is unpredictable 1   My voice problem upsets me 2   My voice makes me feel handicapped 2   People ask, \"What's wrong with your voice?\" 2   VHI-10 24         Patient Supplied Answers To EAT Questionnaire       No data to display                  Patient Supplied Answers To CSI Questionnaire      8/23/2023     8:49 AM   Cough Severity Index (CSI)   My cough is worse when I lie down 0   My coughing problem causes me to restrict my personal and social life 0   I tend to avoid places because of my cough problem 0   I feel embarrassed because of my coughing problem 1   People " ask, ''What's wrong?'' because I cough a lot 1   I run out of air when I cough 0   My coughing problem affects my voice 2   My coughing problem limits my physical activity 0   My coughing problem upsets me 0   People ask me if I am sick because I cough a lot 2   CSI Score 6         [email or web address]    Scribe Disclosure:   I, RADHA ROCHA, am serving as a scribe; to document services personally performed by Josephine Malone MD -based on data collection and the provider's statements to me.     Provider Disclosure:  I agree with above History, Review of Systems, Physical exam and Plan.  I have reviewed the content of the documentation and have edited it as needed. I have personally performed the services documented here and the documentation accurately represents those services and the decisions I have made.      Electronically signed by:    Josephine Malone MD    Laryngology    [facility]  Department of  Otolaryngology - Head and Neck Surgery  Clinics [place] [address]  Appointment line: [phone]  Fax: [phone]  [email or web address]         Again, thank you for allowing me to participate in the care of your patient.      Sincerely,    Josephine Malone MD

## 2024-07-30 NOTE — LETTER
"2024      Joshua Ennis  [address] [facility] [address]      Dear Colleague,    Thank you for referring your patient, Joshua Ennis, to the [facility] RADIATION ONCOLOGY. Please see a copy of my visit note below.    Oncology Rooming Note    2024 9:51 AM   Joshua Ennis is a 79 year old male who presents for:    Chief Complaint   Patient presents with     Oncology Clinic Visit     Consult     Initial Vitals: BP (!) 163/78   Pulse 82   Wt 88 kg (194 lb)   BMI 28.65 kg/m   Estimated body mass index is 28.65 kg/m  as calculated from the following:    Height as of 24: 1.753 m (5' 9\").    Weight as of this encounter: 88 kg (194 lb). Body surface area is 2.07 meters squared.    No LMP for male patient.  Allergies reviewed: Yes  Medications reviewed: Yes    Medications: Medication refills not needed today.  Pharmacy name entered into [employer]:    [id number] - [place], [place] - [address] [id number] - [place], [place] - [address] [place] [address] [facility]ARMACY - [place], [place] - [address] [facility]    Frailty Screening:   Is the patient here for a new oncology consult visit in cancer care? 2. No      Clinical concerns: No concerns  Dr Ochoa was notified.      Di Rosado, RN  Considerations for radiation treatment   Pregnancy status: Male   Implanted Cardiac Devices: No   Any previous radiation therapy: No               Department of Radiation Oncology  [facility] Cancer [facility]  [facility]  [address] [place] [id number]  [phone]       Consultation Note    Name: Joshua Ennis MRN: [id number]   : 1945   Date of Service: 2024 Referring: Dr. Malone     Reason for consultation: recurrent T1 glottic cancer     History of Present Illness     Mr. 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     Briefly, his oncologic history is as follows:    Patient presented with dysphonia in July 2023.  Patient underwent a [employer] laryngoscopy and cordectomy on 8/30/2023,, with pathology demonstrating squamous cell carcinoma moderately differentiated in the right glottis, involving the anterior commissure and left vocal cord, with positive margin.  This was reexcised on 9/13/2023 which is negative for malignancy.    In the interim, he has had numerous procedures, particular on 10/25/2023, and last on 12/1/2023, with pathology at that time negative for any invasive cancer.    However in June of this year, scope exam demonstrated likely recurrent glottic cancer particular involving the anterior commissure with a glottic web and right vocal cord extending posteriorly (see pictures below from Dr. Malone's scope).    Imaging of the neck was performed on 6/27/2024, which is negative for any lymphadenopathy, or any thyroid cartilage involvement.    On 7/17/2024 he underwent [employer] laryngoscopy and removal of anterior commissure web, and biopsy of antral, show right posterior vocal cord, which returned positive for invasive squamous cell carcinoma, poorly differentiated, with sarcomatoid features.  There is no involvement of the supraglottis or subglottis.    His case was discussed at head neck tumor board, with recommendation for definitive radiotherapy.    He was seen by Dr. Malone on 7/30/2024, with persistence of anterior glottic web, but improved airway opening since prior.    Today, he is accompanied by his wife.  He does have a breathy, and hoarse voice, but denies any evidence of dysphagia, odynophagia, aspiration.  Patient denies any sore throat, oral pain, globus, otalgia, neck lymphadenopathy, dysphagia, odynophagia, aspiration symptoms, weight  loss, chest pain, hemoptysis. Denies headaches or focal neurologic deficits. Denies fevers, drainage.     Denies any prior history of radiation.  Of note, he does have a history of Wegener's granulomatosis currently managed with Dr. Sanchez and is on methotrexate for this since 2013.  He has not had any significant interventions recently due to his Wegener's history.    Past Medical History:   Past Medical History:   Diagnosis Date     Anemia      Arthritis      Autoimmune disease (H24)      CKD (chronic kidney disease) stage 3, GFR 30-59 ml/min (H)      Gastroesophageal reflux disease with esophagitis      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     Granulomatosis with polyangiitis, unspecified whether renal involvement (H)      Hearing problem     [place] and ringing in ears     History of colonic polyps 04/18/2018    Tubular Adenomas. Negative for high-grade dysplasia and malignancy.     Hoarseness      Hypertension      Idiopathic progressive polyneuropathy      Kidney problem      Laryngeal cancer (H)      Malignant melanoma (H)      Malignant neoplasm (H)     melanoma     Squamous cell carcinoma      Russo syndrome        Past Surgical History:   Past Surgical History:   Procedure Laterality Date     BIOPSY  11/2011    melanoma on back     DISSECT LYMPH NODE INGUINAL  3/1/2013    Procedure: DISSECT LYMPH NODE INGUINAL;  Bilateral Inguinal Lymph Node Biopsy.;  Surgeon: Tariq Acosta MD;  Location: [facility]     ESOPHAGOSCOPY, GASTROSCOPY, DUODENOSCOPY (EGD), COMBINED  7/5/2013    Procedure: COMBINED ESOPHAGOSCOPY, GASTROSCOPY, DUODENOSCOPY (EGD);  Gastroscopy;  Surgeon: Tariq Acosta MD;  Location: [facility]     HERNIORRHAPHY INGUINAL  8/6/2012    Procedure: HERNIORRHAPHY INGUINAL;  Open Repair Left Inguinal Hernia;  Surgeon: Jerad Baker MD;  Location: [facility]     INJECT STEROID (LOCATION) N/A 8/30/2023    Procedure: steroid injection;  Surgeon: Josephine Malone MD;  Location: [facility]     INJECT STEROID  (LOCATION) N/A 9/13/2023    Procedure: steroid injection;  Surgeon: Josephine Malone MD;  [facility]     INJECT STEROID (LOCATION) N/A 10/25/2023    Procedure: steroid injection, stent placement, and biopsy;  Surgeon: Josephine Malone MD;  [facility]     INJECT STEROID (LOCATION) N/A 12/1/2023    Procedure: steroid injection;  Surgeon: Josephine Malone MD;  [facility]: [facility]     LARYNGOSCOPY WITH MICROSCOPE N/A 9/13/2023    Procedure: MICROLARYNGOSCOPY with stent removal and biopsy;  Surgeon: Josephine Malone MD;  [facility]: [facility]     LARYNGOSCOPY, FLEXIBLE WITH INJECTION N/A 12/11/2023    Procedure: LARYNGOSCOPY, FLEXIBLE WITH INJECTION with steroid;  Surgeon: Josephine Malone MD;  Location: [facility] OR     LASER CO2 LARYNGOSCOPY N/A 8/30/2023    Procedure: MICROLARYNGOSCOPY, CO2 laser resection of vocal fold lesion;  Surgeon: Josephine Malone MD;  [facility]     LASER CO2 LARYNGOSCOPY N/A 10/25/2023    Procedure: MICROLARYNGOSCOPY, CO2 laser resection of vocal fold lesion;  Surgeon: Josephine Malone MD;  [facility]: [facility]     LASER CO2 LARYNGOSCOPY N/A 12/1/2023    Procedure: MICROLARYNGOSCOPY CO2 laser standby, resection of vocal fold lesion, stent removal, biopsy;  Surgeon: Josephine Malone MD;  [facility]     LASER CO2 LARYNGOSCOPY, COMPLEX N/A 7/17/2024    Procedure: MICROLARYNGOSCOPY, CO2 laser standby, biopsy;  Surgeon: Josephine Malone MD;  Location: [facility]       Chemotherapy History:  Per HPI    Radiation History:  No prior RT    Pregnant: No  Implanted Cardiac Devices: No    Medications:  Current Outpatient Medications   Medication Sig Dispense Refill     beclomethasone HFA (QVAR REDIHALER) 80 MCG/ACT inhaler Inhale 2 puffs into the lungs 2 times daily 10.6 g 3     beclomethasone HFA (QVAR REDIHALER) 80 MCG/ACT inhaler Inhale 2 puffs into the lungs 2 times daily 10.6 g 3     calcipotriene (DOVONOX) 0.005 % external cream Apply topically 2 times daily In conjunction with fluorouracil cream in a 1:1 mixture. 60 g 2      calcium carbonate 600 mg-vitamin D 400 units (CALTRATE) 600-400 MG-UNIT per tablet Take 1 tablet by mouth 2 times daily.       Cholecalciferol (VITAMIN D) 1000 UNITS capsule Take 1 capsule by mouth daily.       fluorouracil (EFUDEX) 5 % external cream Apply topically 2 times daily In conjunction with calcipotriene cream in a 1:1 mixture. 40 g 2     folic acid (FOLVITE) 1 MG tablet Take 2 tablets (2 mg) by mouth daily 180 tablet 3     gabapentin (NEURONTIN) 400 MG capsule Take 1 capsule (400 mg) by mouth At Bedtime 90 capsule 3     losartan (COZAAR) 50 MG tablet Take 1 tablet (50 mg) by mouth daily (Patient taking differently: Take 50 mg by mouth at bedtime) 90 tablet 3     methotrexate 2.5 MG tablet Take 4 tablets (10 mg) by mouth every 7 days 60 tablet 1     pantoprazole (PROTONIX) 40 MG EC tablet Take 1 tablet (40 mg) by mouth daily 60 tablet 1     No current facility-administered medications for this visit.         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       Social History:  Social History     Tobacco Use     Smoking status: Former     Current packs/day: 0.00     Average packs/day: 1 pack/day for 20.0 years (20.0 ttl pk-yrs)     Types: Cigarettes     Start date: 1993     Quit date: 2013     Years since quittin.4     Passive exposure: Past     Smokeless tobacco: Never   Vaping Use     Vaping status: Never Used   Substance Use Topics     Alcohol use: Not Currently     Comment: rare     Drug use: No         Family History:  Family History   Problem Relation Age of Onset     Diabetes Mother      Hypertension Mother      Cancer Father         stomach     Heart Disease Father      Heart Disease Brother      Diabetes Sister      Diabetes Sister      Diabetes Sister      Heart Disease Brother      Cancer Sister       Glaucoma No family hx of      Macular Degeneration No family hx of        Review of Systems   A 10-point review of systems was performed. Pertinent findings are noted in the HPI.    Physical Exam   ECOG Status: 2    Vitals:  BP (!) 163/78   Pulse 82   Wt 88 kg (194 lb)   BMI 28.65 kg/m      Gen: Alert, in NAD, breathy, hoarse voice  Head: NC/AT  Eyes: PERRL, EOMI, sclera anicteric  Ears: No external auricular lesions  Nose/sinus: No rhinorrhea or epistaxis  Oral cavity/oropharynx: MMM, no visible oral cavity lesions, soft to palpation  Neck: Full ROM, supple, no palpable adenopathy  Pulm: No wheezing, stridor or respiratory distress  CV: Extremities are warm and well-perfused, no cyanosis, no pedal edema  Abdominal: Normal bowel sounds, soft, nontender, no masses  Musculoskeletal: Normal bulk and tone  Skin: Normal color and turgor  Neuro: A/Ox3, CN II-XII intact, normal gait    Procedure Note  Flexible Nasolaryngoscopy:    : Bala Ochoa MD    Indication: Head and neck cancer evaluation    Description of procedure: Local neck nasal decongestant and anesthetic was sprayed in both nostrils of patient.  A flexible scope was inserted into the Right nares and passed gently posteriorly through the nasopharynx, into the oropharynx, and down into the larynx. The scope was then removed safely without any incident. The scope was then inserted intraorally and then removed without incident. Patient tolerated the procedure well.      Findings:   1. No masses in the nasal cavity, nasopharynx  2. No masses in the base of tongue, vallecula or epiglottis.   3. No masses visualized in the false or true glottis. Bilateral vocal cord are mobile. No evidence of recurrence. Anterior glottic web/scarring.  4. No masses in the hypopharynx.  5. No masses in the tonsillar fossa.      Imaging/Path/Labs   Imaging: per HPI, personally reviewed and in agreement    Path: per HPI, personally reviewed and in  agreement    7/17/24    A-C.  LARYNX, ANTERIOR COMMISURE MASS AND THE RIGHT POSTERIOR VOCAL FOLD, BIOPSIES:  -INVASIVE KERATINIZING SQUAMOUS CELL CARCINOMA, POORLY DIFFERENTIATED WITH SARCOMATOID TRANSFORMATION, see comment.    Labs: per HPI, personally reviewed and in agreement    Dr. Malone's scope 6/27/24 (pre last surgery)        7/30/24 (post last surgery)      I have personally reviewed Dr. Mart notes, Dr. Sanchez's notes, [facility] tumor board notes, Discussed the case with Dr. Wai Ochoa    Assessment      Mr. 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     I discussed the NCCN guidelines for management of early glottic laryngeal cancers. For early stage laryngeal cancers amenable to laryngeal preservation (T1-T2N0), the NCCN guidelines support surgery or definitive radiation therapy. This recommendation is category 2A, uniform consensus based on level 2 evidence.  The implications of this category of recommendation were discussed with the patient.  In this particular instance, patient is already had numerous laser resections, with recurrent disease, that is now extensive involving the anterior commissure and right posterior vocal cord.  This would make definitive surgical options challenging and would likely involve more extensive surgery that would greatly affect his voice function.  Thus, we discussed options of definitive radiotherapy for laryngeal preservation.    For early stage laryngeal cancers, NCCN guidelines for definitive radiotherapy include hypofractionated regimens 2.25Gy/fraction (63Gy for T1, 65.25Gy for T2) or conventional fractionation 2.0Gy/fx (66Gy for T1, 70Gy for T2). Although there was no overall survival benefit, there was local control advantage  demonstrated with hypofractionation regimen (Danika, [facility],  Int Jour of Rad Onc Bio Phys, 1997; and Maribel turcios al, Int Jour of Rad Onc Bio Phys, 2006). Thus, recommendation would be for hypofractionated regimen.  However, I did discuss that local control may be lower than in the studies given sarcomatoid features and immunosuppression history with methotrexate for his Wegener's granulomatosis.    Plan     After extensive discussion, patient wishes to pursue definitive RT. I recommended 63Gy/28fx.     He would like to be treated at the [place], [place] Location-referral placed and discussed with Dr. Wai Ochoa.    Dental- He states he has seen a dentist already, follow up on radiation clearance     Feeding tube- Not required    Updated imaging- not required    Will discuss with Dr. Sanchez to see if MTX can be dose reduced/held during radiation, given possibility of additional side effects/toxicity during radiation treatment as well as likely increased recurrence risk due to immunosuppression.     All benefits and risks discussed, and patient is in agreement with the oncologic plan discussed above.     Thank you for allowing me to participate in your patient's care.  If you should require any additional information, please do not hesitate in contacting me.     Bala Ochoa MD  Department of Radiation Oncology  [facility]     A total of 110 minutes were spent on this visit, including preparation for this visit, face to face with patient, and medical decision making. Medical decision-making included consideration and possible diagnosis, management options, complex record review, review of diagnostic tests, consideration and discussion of significant complications based up medical history/comorbidities, and discussion with providers involved in care of the patient.       Again, thank you for allowing me to participate in the care of your patient.        Sincerely,        Bala Ochoa MD

## 2024-07-30 NOTE — PATIENT INSTRUCTIONS
1.  You were seen in the ENT Clinic today by Dr. Malone. If you have any questions or concerns after your appointment, please call [phone]. Press option #1 for scheduling related needs. Press option #3 for Nurse advice.    2.  Dr. Malone has recommended the following:   - Radiation and swallow study     3.  Plan is to return to clinic after radiation     How to Contact Us:  Send a MissingLINK message to your provider. Our team will respond to you via MissingLINK. Occasionally, we will need to call you to get further information.  For urgent matters (Monday-Friday, 8:00 AM-3:30 PM), call the ENT Clinic: [phone] and speak with a call center team member - they will route your call appropriately.   If you'd like to speak directly with a nurse, please call [phone]. We do our best to check voicemail frequently throughout the day, and will work to call you back within 1-2 days. For urgent matters, please use the general clinic phone numbers listed above.      Nina Cardenas  [phone]  [facility]

## 2024-07-31 ENCOUNTER — TELEPHONE (OUTPATIENT)
Dept: DERMATOLOGY | Facility: CLINIC | Age: 79
End: 2024-07-31
Payer: COMMERCIAL

## 2024-07-31 ENCOUNTER — PATIENT OUTREACH (OUTPATIENT)
Dept: OTOLARYNGOLOGY | Facility: CLINIC | Age: 79
End: 2024-07-31
Payer: COMMERCIAL

## 2024-07-31 DIAGNOSIS — M31.30 GRANULOMATOSIS WITH POLYANGIITIS, UNSPECIFIED WHETHER RENAL INVOLVEMENT (H): Primary | ICD-10-CM

## 2024-07-31 DIAGNOSIS — Z79.899 HIGH RISK MEDICATIONS (NOT ANTICOAGULANTS) LONG-TERM USE: ICD-10-CM

## 2024-07-31 NOTE — PROGRESS NOTES
LVM for patient to see if they wanted to transfer their radiation treatments. LVM and asked patient to call back using direct number provided. Nina Cardenas RN on 7/31/2024 at 1:38 PM

## 2024-07-31 NOTE — PROGRESS NOTES
Called patient SO to talk about moving radiation treatments. Per patient SO the patient is still thinking about moving them as it will be a lot of driving. Patient SO will talk more with the patient and call back tomorrow morning. Writer was agreeable to this plan and told patient to reach out with any other questions or concerns in the meantime. Patient SO verbalized understanding of the situation. Nina Cardenas RN on 7/31/2024 at 2:53 PM

## 2024-07-31 NOTE — TELEPHONE ENCOUNTER
Left Voicemail (1st Attempt) and Sent Mychart (1st Attempt) for the patient to call back and schedule the following:    Appointment type: Return dermatology  Provider: Dr. Jluis Kim  Return date: Approx. 1/30/25  Specialty phone number: [phone]    Additional Notes:

## 2024-08-01 ENCOUNTER — TELEPHONE (OUTPATIENT)
Dept: RHEUMATOLOGY | Facility: CLINIC | Age: 79
End: 2024-08-01
Payer: COMMERCIAL

## 2024-08-01 NOTE — PROGRESS NOTES
Medication Therapy Management (MTM) Encounter    ASSESSMENT:                            Medication Adherence/Access: No issues identified    Vasculitis: Stable. Patient will monitor for symptoms of vasculitis and we will continue plan to monitor labs monthly.    Neuropathy: Stable.    Hypertension:  Last few blood pressure readings in clinic were above goal of 130/80 mmHg, will continue to monitor as this has historically been well controlled and follow up with PCP as needed.    Malignant melanoma: Would benefit from continued follow up with oncology.     GERD: Stable.     Supplements: Stable.    PLAN:                             Continue current medications as prescribed.    Follow-up: Return in about 6 months (around 2/2/2025) for MTM Follow Up. - or sooner if needed    SUBJECTIVE/OBJECTIVE:                          Joshua Ennis is a 79 year old male seen for an initial visit. He was referred to me from Dr. Ede Sanchez. Patient was accompanied by his partner Earlene.     Reason for visit: Initial medication review.    Allergies/ADRs: Reviewed in chart  Past Medical History: Reviewed in chart  Tobacco: He reports that he quit smoking about 11 years ago. His smoking use included cigarettes. He started smoking about 31 years ago. He has a 20 pack-year smoking history. He has been exposed to tobacco smoke. He has never used smokeless tobacco.  Alcohol: not discussed    Medication Adherence/Access: no issues reported    Vasculitis:  Methotrexate 10 mg weekly - currently on hold while undergoing radiation  Folic acid 1 mg - also holding since off methotrexate    He recently stopped his methotrexate due to upcoming oncology radiation treatments. They are monitoring for vasculitis symptoms but not having anything right now. Will still be monitoring his vasculitis labs monthly.    Liver Function Studies -   Recent Labs   Lab Test 07/15/24  0927 01/12/24  1410 01/03/24  1100   PROTTOTAL  --   --  7.4   ALBUMIN 4.3   < >  4.4   BILITOTAL  --   --  0.7   ALKPHOS  --   --  103   AST 27   < > 23   ALT 21   < > 28    < > = values in this interval not displayed.     CBC RESULTS:   Recent Labs   Lab Test 07/15/24  0927   WBC 6.8   RBC 4.35*   HGB 14.3   HCT 43.0   MCV 99   MCH 32.9   MCHC 33.3   RDW 13.3        Neuropathy:   Gabapentin 400 mg daily at bedtime    No concerns with this, helps with neuropathy.    Hypertension:  Losartan 50 mg daily    Had high blood pressure readings the last few appointments. Working with PCP to manage.    Malignant melanoma:   Fluorouracil 5% cream twice daily - hasn't started yet  Calcipotriene 0.005% twice daily  - hasn't started yet    He will start the above topical medications in October. Once he starts them will mix both together and then apply to his skin. Follows with Dr. Kim, dermatology.     GERD    Pantoprazole 40 mg once daily     Patient reports no current symptoms.   Patient feels that current regimen is effective.     Supplements   Calcium-vitamin D 600 mg-400 units twice daily  Vitamin D 1000 units daily    No reported issues at this time.      ----------------  Post Discharge Medication Reconciliation Status: discharge medications reconciled and changed, per note/orders.    I spent 20 minutes with this patient today. I offer these suggestions for consideration by Dr. Ede Sanchez. A copy of the visit note was provided to the patient's provider(s).    A summary of these recommendations was sent via [employer].    Mabel Gonzalez PharmD  Medication Therapy Management Pharmacist  [facility]  Phone: [phone]    Stephany Agrawal PharmD  Medication Therapy Management Pharmacist    Telemedicine Visit Details  Type of service:  Telephone visit  Start Time:  2:00 PM  End Time:  2:20 PM     Medication Therapy Recommendations  No medication therapy recommendations to display

## 2024-08-01 NOTE — TELEPHONE ENCOUNTER
Called with recommendations from provider but was told pt was not available. Tape TV message sent with recommendations.     Kamilah JOVEL RN  Adult Rheumatology Clinic\

## 2024-08-02 ENCOUNTER — VIRTUAL VISIT (OUTPATIENT)
Dept: RHEUMATOLOGY | Facility: CLINIC | Age: 79
End: 2024-08-02
Attending: INTERNAL MEDICINE
Payer: COMMERCIAL

## 2024-08-02 DIAGNOSIS — G60.3 IDIOPATHIC PROGRESSIVE POLYNEUROPATHY: ICD-10-CM

## 2024-08-02 DIAGNOSIS — K21.9 GASTROESOPHAGEAL REFLUX DISEASE, UNSPECIFIED WHETHER ESOPHAGITIS PRESENT: ICD-10-CM

## 2024-08-02 DIAGNOSIS — C43.9 MALIGNANT MELANOMA, UNSPECIFIED SITE (H): ICD-10-CM

## 2024-08-02 DIAGNOSIS — Z78.9 TAKES DIETARY SUPPLEMENTS: ICD-10-CM

## 2024-08-02 DIAGNOSIS — M31.8 SYSTEMIC VASCULITIS (H): Primary | ICD-10-CM

## 2024-08-02 DIAGNOSIS — I10 ESSENTIAL HYPERTENSION: ICD-10-CM

## 2024-08-02 NOTE — Clinical Note
8/2/2024       RE: Joshua Ennis  [address] [facility] [address]     Dear Colleague,    Thank you for referring your patient, Joshua Ennis, to the [facility] RHEUMATOLOGY CLINIC [place] at [facility]. Please see a copy of my visit note below.    Medication Therapy Management (MTM) Encounter    ASSESSMENT:                            Medication Adherence/Access: {adherencechoices:342063}    ***:   ***    PLAN:                            ***    Follow-up: {followuptest2:474241}    SUBJECTIVE/OBJECTIVE:                          Joshua Ennis is a 79 year old male seen for an initial visit. He was referred to me from Dr. Sanchez. {mtmvisitdetails:232168}     Reason for visit: ***.    Allergies/ADRs: {1/2/3/4/5:110956}  Past Medical History: {1/2/3/4/5:811860}  Tobacco: He reports that he quit smoking about 11 years ago. His smoking use included cigarettes. He started smoking about 31 years ago. He has a 20 pack-year smoking history. He has been exposed to tobacco smoke. He has never used smokeless tobacco.  Alcohol: {ALCOHOL CONSUMPTION HX:813481}  {Social and Goals:040965}  Medication Adherence/Access: {fumedadherence:048920}    Neuropathy:   Gabapentin 400 mg daily at bedtime    ***    Vasculitis:  Methotrexate 10 mg weekly - currently on hold while undergoing radiation.    ***    Renal Hypertension:  Losartan 50 mg daily    ***    Malignant melanoma:   Fluorouracil 5% cream twice daily  Calcipotriene 0.005% twice daily    ***.  Follows with Dr. Kim    Asthma  Qvar 80 mcg twice daily  No albuterol?  ***    {Steroid inhaler -- Delete if patient does not use steroid:344732}   Patient reports {:620423}.      Triggers include: {ASTHMA TRIGGERS:040212}.  Patient reports the following symptoms: {SYMPTOMS:702694}.     GERD   Pantoprazole 40 mg once daily   {gerd2:261471}     Supplements  Calcium-vitamin D 600 mg-400 units twice daily  Vitamin D 1000 units  "daily    {supplement:003205}     Today's Vitals: There were no vitals taken for this visit.  ----------------  {CAESAR?:407800}    I spent {mtm total time 3:292579} with this patient today{MTMpartdbillingquestion:570729}. { :104865}. A copy of the visit note was provided to the patient's provider(s).    A summary of these recommendations {GIVEN/NOT GIVEN:640174}.    ***    Telemedicine Visit Details  Type of service:  {telemedvisitmtm:382200::\"Telephone visit\"}  Start Time: {video/phone visit start time:152948}  End Time: {video/phone visit end time:152948}     Medication Therapy Recommendations  No medication therapy recommendations to display     Medication Therapy Management (MTM) Encounter    ASSESSMENT:                            Medication Adherence/Access: No issues identified    Vasculitis: ***.      Vaccine history:  will review again after completing radiation.    Neuropathy: Stable.    Hypertension:  Last few blood pressure readings in clinic were above goal of 130/80 mmHg, will continue to monitor as this has historically been well controlled.    Malignant melanoma: managed by oncology.    Asthma: Stable.     GERD: Stable.     Supplements: Stable.    PLAN:                             Continue current medications    Follow-up: Return in about 6 months (around 2/2/2025) for MTM Follow Up. - or sooner if needed    SUBJECTIVE/OBJECTIVE:                          Joshua Ennis is a 79 year old male seen for an initial visit. He was referred to me from Dr. Sanchez. Patient was accompanied by his partner Earlene.     Reason for visit: initial medication review.    Allergies/ADRs: Reviewed in chart  Past Medical History: Reviewed in chart  Tobacco: He reports that he quit smoking about 11 years ago. His smoking use included cigarettes. He started smoking about 31 years ago. He has a 20 pack-year smoking history. He has been exposed to tobacco smoke. He has never used smokeless tobacco.  Alcohol: not " discussed    Medication Adherence/Access: no issues reported    Vasculitis:  Methotrexate 10 mg weekly - currently on hold while undergoing radiation.  Folic acid 1 mg - also holding since off methotrexate    He recently stopped his methotrexate due to oncology treatments.  They are monitoring for vasculitis symptoms but not having anything right now.  They will still be monitoring his vasculitis labs monthly.    Vaccine history:  Typically avoids vaccines, hasn't gotten Shingrix yet.    Neuropathy:   Gabapentin 400 mg daily at bedtime    No concerns with this, helps with neuropathy.    Hypertension:  Losartan 50 mg daily    Had high blood pressure readings the last few appointments.    Malignant melanoma:   Fluorouracil 5% cream twice daily - hasn't started yet  Calcipotriene 0.005% twice daily  - hasn't started yet    He will start the above topical medications in October.  Once he starts them will mix both together and then apply to his skin.  Follows with Dr. Kim, dermatology.    Asthma  Qvar 80 mcg twice daily - no longer taking    Dr. Malone in ENT discontinued the Qvar yesterday     GERD   Pantoprazole 40 mg once daily     Patient reports no current symptoms.   Patient feels that current regimen is effective.     Supplements  Calcium-vitamin D 600 mg-400 units twice daily  Vitamin D 1000 units daily    No reported issues at this time.      ----------------  Post Discharge Medication Reconciliation Status: discharge medications reconciled and changed, per note/orders.    I spent 15 minutes with this patient today. I offer these suggestions for consideration by Dr. Ede Sanchez. A copy of the visit note was provided to the patient's provider(s).    A summary of these recommendations was sent via [employer].    ***    Stephany Agrawal, PharmD  Medication Therapy Management Pharmacist      Telemedicine Visit Details  Type of service:  Telephone visit  Start Time:  2:00 PM  End Time: {video/phone visit end  time:603124}     Medication Therapy Recommendations  No medication therapy recommendations to display       Again, thank you for allowing me to participate in the care of your patient.      Sincerely,    SAMAN REBOLLEDO [facility]

## 2024-08-02 NOTE — PATIENT INSTRUCTIONS
"Recommendations from today's MTM visit:                                                       Continue current medications as prescribed.    Follow-up: Return in about 6 months (around 2/2/2025) for MTM Follow Up. - or sooner if needed    It was great speaking with you today.  I value your experience and would be very thankful for your time in providing feedback in our clinic survey. In the next few days, you may receive an email or text message from [facility] [employer] with a link to a survey related to your  clinical pharmacist.\"     To schedule another MTM appointment, please call the clinic directly or you may call the MTM scheduling line at [phone].    My Clinical Pharmacist's contact information:                                                      Please feel free to contact me with any questions or concerns you have.      Mabel Gonzalez, PharmD  Medication Therapy Management Pharmacist  [facility]  Phone: [phone]    "

## 2024-08-06 ENCOUNTER — OFFICE VISIT (OUTPATIENT)
Dept: RADIATION ONCOLOGY | Facility: CLINIC | Age: 79
End: 2024-08-06
Attending: OTOLARYNGOLOGY
Payer: COMMERCIAL

## 2024-08-06 DIAGNOSIS — C32.9 LARYNGEAL CANCER (H): Primary | ICD-10-CM

## 2024-08-06 PROCEDURE — 77290 THER RAD SIMULAJ FIELD CPLX: CPT | Performed by: SURGERY

## 2024-08-06 PROCEDURE — 77263 THER RADIOLOGY TX PLNG CPLX: CPT | Performed by: SURGERY

## 2024-08-06 PROCEDURE — 77290 THER RAD SIMULAJ FIELD CPLX: CPT | Mod: 26 | Performed by: SURGERY

## 2024-08-06 PROCEDURE — 77334 RADIATION TREATMENT AID(S): CPT | Performed by: SURGERY

## 2024-08-06 PROCEDURE — 77334 RADIATION TREATMENT AID(S): CPT | Mod: 26 | Performed by: SURGERY

## 2024-08-06 NOTE — PROGRESS NOTES
A CT simulation was performed today for radiation therapy planning. See [employer]osaic for additional details.     Bala Ochoa M.D.  Department of Radiation Oncology  [facility]

## 2024-08-06 NOTE — LETTER
8/6/2024      Joshua Ennis  [address] [facility] [address]      Dear Colleague,    Thank you for referring your patient, Joshua Ennis, to the [facility] RADIATION ONCOLOGY. Please see a copy of my visit note below.    Radiation Therapy Patient Education    Person involved with teaching: Patient    Patient educational needs for self management of treatment-related side effects assessment completed.  EPIC Patient Ed tab contains Patient Learning Assessment    Education Materials Given  Radiation Therapy for Head and Neck    Educational Topics Discussed  Side effects expected, Pain management, Skin care, Nutrition and weight loss, and When to call MD/RN    Response To Teaching  Verbalizes understanding    GYN Only  Vaginal Dilator-given and educated: N/A    Referrals sent: None    Chemotherapy?  No      A CT simulation was performed today for radiation therapy planning. See Mosaic for additional details.     Bala Ochoa M.D.  Department of Radiation Oncology  [facility]       Again, thank you for allowing me to participate in the care of your patient.        Sincerely,        Bala Ochoa MD

## 2024-08-06 NOTE — PROGRESS NOTES
Radiation Therapy Patient Education    Person involved with teaching: Patient    Patient educational needs for self management of treatment-related side effects assessment completed.  [facility] Patient Ed tab contains Patient Learning Assessment    Education Materials Given  Radiation Therapy for Head and Neck    Educational Topics Discussed  Side effects expected, Pain management, Skin care, Nutrition and weight loss, and When to call MD/RN    Response To Teaching  Verbalizes understanding    GYN Only  Vaginal Dilator-given and educated: N/A    Referrals sent: None    Chemotherapy?  No

## 2024-08-08 ENCOUNTER — TRANSFERRED RECORDS (OUTPATIENT)
Dept: HEALTH INFORMATION MANAGEMENT | Facility: CLINIC | Age: 79
End: 2024-08-08
Payer: COMMERCIAL

## 2024-08-13 ENCOUNTER — PATIENT OUTREACH (OUTPATIENT)
Dept: OTOLARYNGOLOGY | Facility: CLINIC | Age: 79
End: 2024-08-13
Payer: COMMERCIAL

## 2024-08-13 NOTE — PROGRESS NOTES
Called patient SO to give the medical record release phone number. Patient was agreeable and wondered about going in person. Writer let patient know that it says on the website pick-up in person is by appointment only. Patient verbalized understanding of the situation. Nina Cardenas RN on 8/13/2024 at 4:04 PM

## 2024-08-20 ENCOUNTER — OFFICE VISIT (OUTPATIENT)
Dept: RADIATION ONCOLOGY | Facility: CLINIC | Age: 79
End: 2024-08-20
Attending: SURGERY
Payer: COMMERCIAL

## 2024-08-20 VITALS
WEIGHT: 194 LBS | HEART RATE: 64 BPM | SYSTOLIC BLOOD PRESSURE: 145 MMHG | BODY MASS INDEX: 28.65 KG/M2 | DIASTOLIC BLOOD PRESSURE: 78 MMHG

## 2024-08-20 DIAGNOSIS — C32.9 CARCINOMA LARYNX (H): Primary | ICD-10-CM

## 2024-08-20 DIAGNOSIS — C32.9 LARYNGEAL CANCER (H): Primary | ICD-10-CM

## 2024-08-20 DIAGNOSIS — C32.0 CANCER OF GLOTTIS (H): ICD-10-CM

## 2024-08-20 PROCEDURE — 77412 RADIATION TX DELIVERY LVL 3: CPT | Performed by: SURGERY

## 2024-08-20 PROCEDURE — 77280 THER RAD SIMULAJ FIELD SMPL: CPT | Mod: 26 | Performed by: SURGERY

## 2024-08-20 PROCEDURE — 77280 THER RAD SIMULAJ FIELD SMPL: CPT | Performed by: SURGERY

## 2024-08-20 RX ORDER — GABAPENTIN 300 MG/1
1200 CAPSULE ORAL 3 TIMES DAILY
Qty: 360 CAPSULE | Refills: 3 | Status: SHIPPED | OUTPATIENT
Start: 2024-08-20 | End: 2024-08-25 | Stop reason: DRUGHIGH

## 2024-08-20 NOTE — LETTER
2024      Joshua nEnis  [address] [facility] [address]      Dear Colleague,    Thank you for referring your patient, Joshua Ennis, to the [facility] RADIATION ONCOLOGY. Please see a copy of my visit note below.    [facility] PHYSICIANS  SPECIALIZING IN BREAKTHROUGHS  Radiation Oncology    On Treatment Visit Note      Joshua Ennis      Date: Aug 20, 2024   MRN: [id number]   : 1945  Diagnosis: Larynx        ID: Mr. 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     Reason for Visit:  On Radiation Treatment Visit       Treatment Summary to Date    Current Dose: 225/3000 cGy Fractions:       Chemotherapy  Chemo concurrent with radx?: No    Subjective:   No complaints, tolerating RT well overall. Dysphonia. Pain controlled.    Pain Control: Gabapentin starting to escalate   Fluid Intake: Adequate  Nutrition: PO  Rinses: starting  Skin care: Aquaphor      Nursing ROS:   Nutrition Alteration  Diet Type: Patient's Preference  Skin  Skin Reaction: 0 - No changes  Skin Progress: Aquaphor     ENT and Mouth Exam  Mucositis - Current: 0 - None   Cardiovascular  Respiratory effort: 1 - Normal - without distress  Gastrointestinal  Nausea: 0 - None     Psychosocial  Mood - Anxiety: 0 - Normal  Pain Assessment  0-10 Pain Scale: 1  Pain Treatment: Gabapentin      Objective:   BP (!) 145/78   Pulse 64   Wt 88 kg (194 lb)   BMI 28.65 kg/m    Gen: Appears well, in no acute distress  HN/Skin: mild erythema, no skin breakdown   CV/Resp: rrr, breathing comfortably on room air  Neuro: CN 2-12 grossly intact, UE/LE full strength     Labs:  CBC RESULTS:   Recent Labs   Lab Test 07/15/24  0927   WBC 6.8   RBC 4.35*   HGB 14.3   HCT  43.0   MCV 99   MCH 32.9   MCHC 33.3   RDW 13.3        ELECTROLYTES:  Recent Labs   Lab Test 07/17/24  1005 07/15/24  0927 03/24/24  1038 01/12/24  1410   NA  --   --   --  140   POTASSIUM  --   --   --  3.7   CHLORIDE  --   --   --  107   LAMAR  --   --   --  9.3   CO2  --   --   --  24   BUN  --   --   --  23.0   CR  --  1.54*   < > 1.51*   GLC 93  --   --  83    < > = values in this interval not displayed.       Assessment:    Tolerating radiation therapy well.  All questions and concerns addressed.      Plan:   Continue current therapy.    Continue gabapentin, rinses, nutrition, hydration, skin care      Mosaiq chart and setup information reviewed  Ports checked and MVCT/IGRT images checked    Medication Review  Med list reviewed with patient?: Yes  Med list printed and given: Offered and declined    Educational Topic Discussed  Education Instructions: Reviewed    Bala Ochoa MD  Radiation Oncology   [facility]  Clinic: [phone]        Again, thank you for allowing me to participate in the care of your patient.        Sincerely,        Bala Ochoa MD

## 2024-08-20 NOTE — LETTER
8/20/2024      Joshua Ennis  [address] [facility] [address]      Dear Colleague,    Thank you for referring your patient, Joshua Ennis, to the [facility] RADIATION ONCOLOGY. Please see a copy of my visit note below.    Opened in error.     Again, thank you for allowing me to participate in the care of your patient.        Sincerely,        Bala Ochoa MD

## 2024-08-21 ENCOUNTER — APPOINTMENT (OUTPATIENT)
Dept: RADIATION ONCOLOGY | Facility: CLINIC | Age: 79
End: 2024-08-21
Attending: SURGERY
Payer: COMMERCIAL

## 2024-08-21 PROCEDURE — 77412 RADIATION TX DELIVERY LVL 3: CPT | Performed by: SURGERY

## 2024-08-21 PROCEDURE — 77014 PR CT GUIDE FOR PLACEMENT RADIATION THERAPY FIELDS: CPT | Mod: 26 | Performed by: SURGERY

## 2024-08-21 PROCEDURE — 77387 GUIDANCE FOR RADJ TX DLVR: CPT | Performed by: SURGERY

## 2024-08-21 NOTE — PROGRESS NOTES
[facility] PHYSICIANS  SPECIALIZING IN BREAKTHROUGHS  Radiation Oncology    On Treatment Visit Note      Joshua Ennis      Date: Aug 20, 2024   MRN: [id number]   : 1945  Diagnosis: Larynx        ID: Mr. 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     Reason for Visit:  On Radiation Treatment Visit       Treatment Summary to Date    Current Dose: 225/3000 cGy Fractions:       Chemotherapy  Chemo concurrent with radx?: No    Subjective:   No complaints, tolerating RT well overall. Dysphonia. Pain controlled.    Pain Control: Gabapentin starting to escalate   Fluid Intake: Adequate  Nutrition: PO  Rinses: starting  Skin care: Aquaphor      Nursing ROS:   Nutrition Alteration  Diet Type: Patient's Preference  Skin  Skin Reaction: 0 - No changes  Skin Progress: Aquaphor     ENT and Mouth Exam  Mucositis - Current: 0 - None   Cardiovascular  Respiratory effort: 1 - Normal - without distress  Gastrointestinal  Nausea: 0 - None     Psychosocial  Mood - Anxiety: 0 - Normal  Pain Assessment  0-10 Pain Scale: 1  Pain Treatment: Gabapentin      Objective:   BP (!) 145/78   Pulse 64   Wt 88 kg (194 lb)   BMI 28.65 kg/m    Gen: Appears well, in no acute distress  HN/Skin: mild erythema, no skin breakdown   CV/Resp: rrr, breathing comfortably on room air  Neuro: CN 2-12 grossly intact, UE/LE full strength     Labs:  CBC RESULTS:   Recent Labs   Lab Test 07/15/24  0927   WBC 6.8   RBC 4.35*   HGB 14.3   HCT 43.0   MCV 99   MCH 32.9   MCHC 33.3   RDW 13.3        ELECTROLYTES:  Recent Labs   Lab Test 24  1005 07/15/24  0927 24  1038 24  1410   NA  --   --   --  140   POTASSIUM  --   --   --  3.7   CHLORIDE  --   --   --   107   LMAAR  --   --   --  9.3   CO2  --   --   --  24   BUN  --   --   --  23.0   CR  --  1.54*   < > 1.51*   GLC 93  --   --  83    < > = values in this interval not displayed.       Assessment:    Tolerating radiation therapy well.  All questions and concerns addressed.      Plan:   Continue current therapy.    Continue gabapentin, rinses, nutrition, hydration, skin care      Mosaiq chart and setup information reviewed  Ports checked and MVCT/IGRT images checked    Medication Review  Med list reviewed with patient?: Yes  Med list printed and given: Offered and declined    Educational Topic Discussed  Education Instructions: Reviewed    Bala Ochoa MD  Radiation Oncology   [employer]: [phone]

## 2024-08-22 ENCOUNTER — ANCILLARY PROCEDURE (OUTPATIENT)
Dept: GENERAL RADIOLOGY | Facility: CLINIC | Age: 79
End: 2024-08-22
Attending: OTOLARYNGOLOGY
Payer: COMMERCIAL

## 2024-08-22 ENCOUNTER — APPOINTMENT (OUTPATIENT)
Dept: RADIATION ONCOLOGY | Facility: CLINIC | Age: 79
End: 2024-08-22
Attending: SURGERY
Payer: COMMERCIAL

## 2024-08-22 ENCOUNTER — THERAPY VISIT (OUTPATIENT)
Dept: SPEECH THERAPY | Facility: CLINIC | Age: 79
End: 2024-08-22
Payer: COMMERCIAL

## 2024-08-22 DIAGNOSIS — C32.0 CANCER OF GLOTTIS (H): ICD-10-CM

## 2024-08-22 DIAGNOSIS — C32.9 SQUAMOUS CELL CARCINOMA OF LARYNX (H): Primary | ICD-10-CM

## 2024-08-22 DIAGNOSIS — R13.10 DYSPHAGIA, UNSPECIFIED TYPE: ICD-10-CM

## 2024-08-22 DIAGNOSIS — R13.12 OROPHARYNGEAL DYSPHAGIA: ICD-10-CM

## 2024-08-22 PROCEDURE — 77014 PR CT GUIDE FOR PLACEMENT RADIATION THERAPY FIELDS: CPT | Mod: 26 | Performed by: SURGERY

## 2024-08-22 PROCEDURE — 77412 RADIATION TX DELIVERY LVL 3: CPT | Performed by: SURGERY

## 2024-08-22 PROCEDURE — 92610 EVALUATE SWALLOWING FUNCTION: CPT | Mod: GN | Performed by: SPEECH-LANGUAGE PATHOLOGIST

## 2024-08-22 PROCEDURE — 92611 MOTION FLUOROSCOPY/SWALLOW: CPT | Mod: GN | Performed by: SPEECH-LANGUAGE PATHOLOGIST

## 2024-08-22 PROCEDURE — 74230 X-RAY XM SWLNG FUNCJ C+: CPT | Mod: GC | Performed by: RADIOLOGY

## 2024-08-22 PROCEDURE — 77387 GUIDANCE FOR RADJ TX DLVR: CPT | Performed by: SURGERY

## 2024-08-22 PROCEDURE — 92526 ORAL FUNCTION THERAPY: CPT | Mod: GN | Performed by: SPEECH-LANGUAGE PATHOLOGIST

## 2024-08-22 RX ORDER — BARIUM SULFATE 400 MG/ML
SUSPENSION ORAL ONCE
Status: COMPLETED | OUTPATIENT
Start: 2024-08-22 | End: 2024-08-22

## 2024-08-22 RX ADMIN — BARIUM SULFATE: 400 SUSPENSION ORAL at 09:22

## 2024-08-22 NOTE — PROGRESS NOTES
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 Negative for high-grade dysplasia and malignancy.    Hoarseness     Hypertension     Idiopathic progressive polyneuropathy     Kidney problem     Laryngeal cancer (H)     Malignant melanoma (H)     Malignant neoplasm (H)     melanoma    Squamous cell carcinoma     Russo syndrome        Dates & types of surgery: vocalcords  Past Surgical History:   Procedure Laterality Date    BIOPSY  11/2011    melanoma on back    DISSECT LYMPH NODE INGUINAL  3/1/2013    Procedure: DISSECT LYMPH NODE INGUINAL;  Bilateral Inguinal Lymph Node Biopsy.;  Surgeon: Tariq Acosta MD;  Location: [facility]    ESOPHAGOSCOPY, GASTROSCOPY, DUODENOSCOPY (EGD), COMBINED  7/5/2013    Procedure: COMBINED ESOPHAGOSCOPY, GASTROSCOPY, DUODENOSCOPY (EGD);  Gastroscopy;  Surgeon: Tariq Acosta MD;  [facility]: [facility]    HERNIORRHAPHY INGUINAL  8/6/2012    Procedure: HERNIORRHAPHY INGUINAL;  Open Repair Left Inguinal Hernia;  Surgeon: Jerad Baker MD;  [facility]    INJECT STEROID (LOCATION) N/A 8/30/2023    Procedure: steroid injection;  Surgeon: Josephine Malone MD;   [facility]    INJECT STEROID (LOCATION) N/A 9/13/2023    Procedure: steroid injection;  Surgeon: Josephine Malone MD;  [facility]    INJECT STEROID (LOCATION) N/A 10/25/2023    Procedure: steroid injection, stent placement, and biopsy;  Surgeon: Josephine Malone MD;  [facility]    INJECT STEROID (LOCATION) N/A 12/1/2023    Procedure: steroid injection;  Surgeon: Josephine Malone MD;  [facility]    LARYNGOSCOPY WITH MICROSCOPE N/A 9/13/2023    Procedure: MICROLARYNGOSCOPY with stent removal and biopsy;  Surgeon: Josephine Malone MD;  [facility]    LARYNGOSCOPY, FLEXIBLE WITH INJECTION N/A 12/11/2023    Procedure: LARYNGOSCOPY, FLEXIBLE WITH INJECTION with steroid;  Surgeon: Josephine Malone MD;  Location: [facility]    LASER CO2 LARYNGOSCOPY N/A 8/30/2023    Procedure: MICROLARYNGOSCOPY, CO2 laser resection of vocal fold lesion;  Surgeon: Josephine Malone MD;  [facility]    LASER CO2 LARYNGOSCOPY N/A 10/25/2023    Procedure: MICROLARYNGOSCOPY, CO2 laser resection of vocal fold lesion;  Surgeon: Josephine Malone MD;  [facility]    LASER CO2 LARYNGOSCOPY N/A 12/1/2023    Procedure: MICROLARYNGOSCOPY CO2 laser standby, resection of vocal fold lesion, stent removal, biopsy;  Surgeon: Josephine Malone MD;  [facility]    LASER CO2 LARYNGOSCOPY, COMPLEX N/A 7/17/2024    Procedure: MICROLARYNGOSCOPY, CO2 laser standby, biopsy;  Surgeon: Josephine Malone MD;  [facility]         Prior diagnostic imaging/testing results: CT scan     Prior therapy history for the same diagnosis, illness or injury:        Living Environment  Social support: With a significant other or spouse   Help at home:    Equipment owned:       Employment: Yes   Hobbies/Interests: mowing lawn    Patient goals for therapy:      Pain assessment: Pain denied     Objective     SWALLOW EVALUTION  Dysphagia history: Pt denies trouble swallowing. He has hoarse voice due to cancer  Current Diet/Method of Nutritional Intake: thin liquids  (level 0), regular diet        CLINICAL SWALLOW EVALUATION  Oral Motor Function: Dentition: natural dentition, upper dentures  Secretion management: WFL  Mucosal quality: good  Mandibular function: intact  Oral labial function: WFL  Lingual function: WFL  Velar function: intact   Buccal function: intact  Laryngeal function: cough, throat clear, volitional swallow, voicing WFL     Level of assist required for feeding: no assistance needed   Textures Trialed:   Clinical Swallow Eval: Thin Liquids  Mode of presentation: cup   Volume presented: 3 oz water  Preparatory Phase: WFL  Oral Phase: WFL  Pharyngeal phase of swallow: intact   Strategies trialed during procedure: none   Diagnostic statement: no overt clinical s/sx of aspiration/penetration noted on thin liquid trials.      ADDITIONAL EVAL COMPLETED TODAY : yes; rationale: objective evaluation of pharyngeal phase indicated due to pending cancer treatment    VIDEOFLUOROSCOPIC SWALLOW STUDY  Radiologist: Resident  Views Taken: left lateral, A/P   Physical location of procedure: [facility]  Patient sitting upright on chair/stool for lateral views and standing for A/P views      VFSS textures trialed:   VFSS Eval: Thin Liquids  Mode of Presentation: cup, self-fed   Order of Presentation: Series 2, 3, 4 (consecutive sips), 11, 14 (A/P)  Preparatory Phase: WFL  Oral Phase: WFL  Bolus Location When Swallow Initiated: pyriforms  Pharyngeal Phase: WFL  Rosenbeck's Penetration Aspiration Scale: 2 - contrast enters airway, remains above the vocal cords, no residue remains (penetration)  Response to Aspiration: n/a  Strategies and Compensations: not applicable  Diagnostic Statement: Flash penetration on large consecutive sips of thin liquid. No aspiration on any trials. No penetration noted on smaller sips. Minimal pharyngeal residue after the swallow which is cleared with a spontaneous second swallow. On A/P view premature spillage is noted on the left prior to  the onset of the swallow.       VFSS Eval: Mildly Thick Liquids  Mode of Presentation: cup, self-fed   Order of Presentation: Series 5, 6  Preparatory Phase: WFL  Oral Phase: WFL  Bolus Location When Swallow Initiated: valleculae  Pharyngeal Phase: WFL  Rosenbeck's Penetration Aspiration Scale: 1 - no aspiration, contrast does not enter airway  Response to Aspiration: n/a  Strategies and Compensations: not applicable  Diagnostic Statement: No aspiration/penetration noted on mildly thick liquid trials. Adequate closure of velopharyngeal port. No oral residue after the completed swallow. Adequate hyolaryngeal excursion and epiglottic inversion demonstrated.     VFSS Eval: Purees  Mode of Presentation: spoon, self-fed   Order of Presentation: Series 7, 8, 13 (A/P)  Preparatory Phase: WFL  Oral Phase: WFL  Bolus Location When Swallow Initiated: posterior angle of ramus  Pharyngeal Phase: WFL  Rosenbeck s Penetration Aspiration Scale: 1 - no aspiration, contrast does not enter airway  Response to Aspiration: n/a  Strategies and Compensations: not applicable  Diagnostic Statement: Safe functional swallow noted on puree consistency.     VFSS Eval: Solids  Mode of Presentation: spoon, self-fed   Order of Presentation: Series 9, 10   Preparatory Phase: WFL  Oral Phase: WFL  Bolus Location When Swallow Initiated: posterior angle of ramus  Pharyngeal Phase: WFL   Rosenbeck s Penetration Aspiration Scale: 1 - no aspiration, contrast does not enter airway  Response to Aspiration: n/a  Strategies and Compensations: not applicable  Diagnostic Statement: Safe functional swallow noted on solid consistency trials.     VFSS Eval: Barium Tablet  Mode of Presentation: self-fed   Order of Presentation: Series 12  Preparatory Phase: WFL  Oral Phase: WFL  Pharyngeal Phase: WFL  Diagnostic Statement: Adequate transit of barium tablet through oral and pharyngeal phases.      ESOPHAGEAL PHASE OF SWALLOW  no observed or reported concerns  related to esophageal function  esophageal sweep performed during today's videofluoroscopic exam  Adequate transit of bolus through esophageal phase.      SWALLOW ASSESSMENT CLINICAL IMPRESSIONS AND RATIONALE  Diet Consistency Recommendations: thin liquids (level 0), regular diet    Recommended Feeding/Eating Techniques: slow rate of intake, maintain upright sitting position for eating   Medication Administration Recommendations: Whole with liquid  Instrumental Assessment Recommendations: none     Assessment & Plan   CLINICAL IMPRESSIONS   Medical Diagnosis: laryngeal cancer    Treatment Diagnosis: oropharyngeal dysphagia   Impression/Assessment: Pt is a 79 year old male with dysphagia. The following significant findings have been identified: impaired swallowing, characterized by penetration on thin liquid trials when taking large consecutive sips and mild residue in the valleculae after the completed swallow on thin liquid trials. He is at high risk for short and long term dysphagia due to radiation fibrosis as a result of the radiation therapy he is undergoing for his cancer. Identified deficits interfere with their ability to consume an oral diet and maintain nutrition as compared to previous level of function.    PLAN OF CARE  Treatment Interventions: Swallowing dysfunction and/or oral function for feeding    Prognosis to achieve stated therapy goals is good   Rehab potential is impacted by: comorbidities, pending medical intervention    Long Term Goals:   SLP Goal 1  Goal Identifier: PO  Goal Description: Pt will tolerate least restricitive diet while adhering to safe swallow precautions and without pulmonary compromise across 6 months time.  Rationale: To maximize safety, ease and/or independence of oral intake  Target Date: 11/19/24  SLP Goal 2  Goal Identifier: Exercise  Goal Description: Pt will improve and/or maintain ROM and strength of BOT, jaw and pharynx by completing 10 repetitions of 5/5 exercises 3  times daily with minimal written or verbal cues.  Rationale: To maximize safety, ease and/or independence of oral intake  Target Date: 11/19/24      Frequency of Treatment: 2-4x/month  Duration of Treatment: 12 months     Recommended Referrals to Other Professionals: none  Education Assessment:   Learner/Method: Patient;No Barriers to Learning    Risks and benefits of evaluation/treatment have been explained.   Patient/Family/caregiver agrees with Plan of Care.     Evaluation Time:    SLP Eval: oral/pharyngeal swallow function, clinical minutes (29945): 10  SLP Eval: VideoFluoroscopic Swallow function Minutes (97357): 10      Signing Clinician: Perla Platt SLP

## 2024-08-23 ENCOUNTER — APPOINTMENT (OUTPATIENT)
Dept: RADIATION ONCOLOGY | Facility: CLINIC | Age: 79
End: 2024-08-23
Attending: SURGERY
Payer: COMMERCIAL

## 2024-08-23 PROCEDURE — 77014 PR CT GUIDE FOR PLACEMENT RADIATION THERAPY FIELDS: CPT | Mod: 26 | Performed by: SURGERY

## 2024-08-23 PROCEDURE — 77387 GUIDANCE FOR RADJ TX DLVR: CPT | Performed by: SURGERY

## 2024-08-23 PROCEDURE — 77412 RADIATION TX DELIVERY LVL 3: CPT | Performed by: SURGERY

## 2024-08-25 RX ORDER — GABAPENTIN 300 MG/1
600 CAPSULE ORAL 3 TIMES DAILY
Qty: 180 CAPSULE | Refills: 3 | Status: SHIPPED | OUTPATIENT
Start: 2024-08-25

## 2024-08-26 ENCOUNTER — APPOINTMENT (OUTPATIENT)
Dept: RADIATION ONCOLOGY | Facility: CLINIC | Age: 79
End: 2024-08-26
Attending: SURGERY
Payer: COMMERCIAL

## 2024-08-26 PROCEDURE — 77387 GUIDANCE FOR RADJ TX DLVR: CPT | Performed by: SURGERY

## 2024-08-26 PROCEDURE — 77427 RADIATION TX MANAGEMENT X5: CPT | Performed by: SURGERY

## 2024-08-26 PROCEDURE — 77014 PR CT GUIDE FOR PLACEMENT RADIATION THERAPY FIELDS: CPT | Mod: 26 | Performed by: SURGERY

## 2024-08-26 PROCEDURE — 77336 RADIATION PHYSICS CONSULT: CPT | Performed by: SURGERY

## 2024-08-26 PROCEDURE — 77412 RADIATION TX DELIVERY LVL 3: CPT | Performed by: SURGERY

## 2024-08-27 ENCOUNTER — OFFICE VISIT (OUTPATIENT)
Dept: RADIATION ONCOLOGY | Facility: CLINIC | Age: 79
End: 2024-08-27
Attending: OTOLARYNGOLOGY
Payer: COMMERCIAL

## 2024-08-27 ENCOUNTER — APPOINTMENT (OUTPATIENT)
Dept: RADIATION ONCOLOGY | Facility: CLINIC | Age: 79
End: 2024-08-27
Attending: SURGERY
Payer: COMMERCIAL

## 2024-08-27 DIAGNOSIS — C32.9 LARYNGEAL CANCER (H): Primary | ICD-10-CM

## 2024-08-27 DIAGNOSIS — N18.32 STAGE 3B CHRONIC KIDNEY DISEASE (H): ICD-10-CM

## 2024-08-27 PROCEDURE — 77387 GUIDANCE FOR RADJ TX DLVR: CPT | Performed by: SURGERY

## 2024-08-27 PROCEDURE — 77014 PR CT GUIDE FOR PLACEMENT RADIATION THERAPY FIELDS: CPT | Mod: 26 | Performed by: SURGERY

## 2024-08-27 PROCEDURE — 77412 RADIATION TX DELIVERY LVL 3: CPT | Performed by: SURGERY

## 2024-08-27 PROCEDURE — 97802 MEDICAL NUTRITION INDIV IN: CPT | Performed by: DIETITIAN, REGISTERED

## 2024-08-27 NOTE — LETTER
8/27/2024      Joshua Ennis  [address] [facility] [address]      Dear Colleague,    Thank you for referring your patient, Joshua Ennis, to the [facility] RADIATION [facility]COLOGY. Please see a copy of my visit note below.      CLINICAL NUTRITION SERVICES - ASSESSMENT NOTE    Joshua Ennis 79 year old referred for MNT related to larynx cancer     Time Spent: 30 minutes  Visit Type: initial  Pt accompanied by: his KELVIN Henao  Referring Physician: Naren    Chemotherapy: No  Radiation: will complete on 9/27  Surgery history:   Past Surgical History:   Procedure Laterality Date     BIOPSY  11/2011    melanoma on back     DISSECT LYMPH NODE INGUINAL  3/1/2013    Procedure: DISSECT LYMPH NODE INGUINAL;  Bilateral Inguinal Lymph Node Biopsy.;  Surgeon: Tariq Acosta MD;  [facility]     ESOPHAGOSCOPY, GASTROSCOPY, DUODENOSCOPY (EGD), COMBINED  7/5/2013    Procedure: COMBINED ESOPHAGOSCOPY, GASTROSCOPY, DUODENOSCOPY (EGD);  Gastroscopy;  Surgeon: Tariq Acosta MD;  [facility]     HERNIORRHAPHY INGUINAL  8/6/2012    Procedure: HERNIORRHAPHY INGUINAL;  Open Repair Left Inguinal Hernia;  Surgeon: Jerad Baker MD;  [facility]     INJECT STEROID (LOCATION) N/A 8/30/2023    Procedure: steroid injection;  Surgeon: Josephine Malone MD;  [facility]     INJECT STEROID (LOCATION) N/A 9/13/2023    Procedure: steroid injection;  Surgeon: Josephine Malone MD;  [facility]     INJECT STEROID (LOCATION) N/A 10/25/2023    Procedure: steroid injection, stent placement, and biopsy;  Surgeon: Josephine Malone MD;  [facility]     INJECT STEROID (LOCATION) N/A 12/1/2023    Procedure: steroid injection;  Surgeon: Josephine Malone MD;  [facility]     LARYNGOSCOPY WITH MICROSCOPE N/A 9/13/2023    Procedure: MICROLARYNGOSCOPY with stent removal and biopsy;  Surgeon: Josephine Malone MD;  [facility]     LARYNGOSCOPY, FLEXIBLE WITH INJECTION N/A 12/11/2023    Procedure: LARYNGOSCOPY,  FLEXIBLE WITH INJECTION with steroid;  Surgeon: Josephine Malone MD;  Location: [facility]     LASER CO2 LARYNGOSCOPY N/A 8/30/2023    Procedure: MICROLARYNGOSCOPY, CO2 laser resection of vocal fold lesion;  Surgeon: Josephine Malone MD;  [facility]     LASER CO2 LARYNGOSCOPY N/A 10/25/2023    Procedure: MICROLARYNGOSCOPY, CO2 laser resection of vocal fold lesion;  Surgeon: Josephine Malone MD;  [facility]     LASER CO2 LARYNGOSCOPY N/A 12/1/2023    Procedure: MICROLARYNGOSCOPY CO2 laser standby, resection of vocal fold lesion, stent removal, biopsy;  Surgeon: Josephine Malone MD;  [facility]     LASER CO2 LARYNGOSCOPY, COMPLEX N/A 7/17/2024    Procedure: MICROLARYNGOSCOPY, CO2 laser standby, biopsy;  Surgeon: Josephine Malone MD;  [facility]     PEG tube: No    FOOD AND NUTRITION RELATED HISTORY:  --Over the counter supplements: None  --Oncology treatment side effects: None at this time  --Factors effecting nutritional intake: None at this time    Joshua tells me that he has been eating his normal diet.   He typically has 2-3 meals/day, often skipping lunch as he has an early dinner.   He has been intentionally increasing his calories to gain weight with anticipation of weight loss.     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 Negative for high-grade dysplasia and malignancy.     Hoarseness      Hypertension      Idiopathic progressive polyneuropathy      Kidney problem      Laryngeal cancer (H)      Malignant melanoma (H)      Malignant neoplasm (H)     melanoma     Squamous cell carcinoma      Russo syndrome        Current Medications    Current Outpatient Medications:      calcipotriene (DOVONOX) 0.005 % external cream, Apply topically 2 times daily In conjunction with fluorouracil cream in a 1:1 mixture. (Patient not taking: Reported on 8/2/2024), Disp: 60 g, Rfl: 2     calcium carbonate 600 mg-vitamin D 400 units (CALTRATE) 600-400 MG-UNIT per tablet, Take 1 tablet by mouth 2 times daily., Disp: , Rfl:      Cholecalciferol (VITAMIN D) 1000 UNITS capsule, Take 1 capsule by mouth daily., Disp: , Rfl:      fluorouracil (EFUDEX) 5 % external cream, Apply topically 2 times daily In conjunction with calcipotriene cream in a 1:1 mixture. (Patient not taking: Reported on 8/2/2024), Disp: 40 g, Rfl: 2     gabapentin (NEURONTIN) 300 MG capsule, Take 2 capsules (600 mg) by mouth 3 times daily. Escalate per handout provided in clinic. Thank you., Disp: 180 capsule, Rfl: 3     losartan (COZAAR) 50 MG tablet, Take 1 tablet (50 mg) by mouth daily (Patient taking differently: Take 50 mg by mouth at bedtime), Disp: 90 tablet, Rfl: 3     pantoprazole (PROTONIX) 40 MG EC tablet, Take 1 tablet (40 mg) by mouth daily, Disp: 60 tablet, Rfl: 1  Medications have been reviewed.    Pertinent lab results:  Labs:  Electrolytes  Potassium (mmol/L)   Date Value   01/12/2024 3.7   01/03/2024 4.1   10/06/2023 4.9   11/11/2020 4.3   11/04/2020 4.4   11/02/2020 4.7     Phosphorus (mg/dL)   Date Value   08/12/2016 2.6   12/10/2015 3.0   06/11/2015 2.7   06/11/2015 2.1 (L)   11/14/2013 4.0    Blood Glucose  Glucose (mg/dL)   Date Value   01/12/2024 83   01/03/2024 100 (H)   10/06/2023 78   11/11/2020 79   11/04/2020 107 (H)   11/02/2020 125 (H)    08/10/2020 93   09/05/2019 95     GLUCOSE BY METER POCT (mg/dL)   Date Value   07/17/2024 93    Inflammatory Markers  CRP Inflammation (mg/L)   Date Value   07/07/2022 <2.9   01/07/2022 <2.9   07/28/2021 <2.9   03/05/2021 <2.9   11/11/2020 <2.9   08/10/2020 <2.9     WBC (10e9/L)   Date Value   03/05/2021 6.9   11/11/2020 8.0   11/04/2020 6.5     WBC Count (10e3/uL)   Date Value   07/15/2024 6.8   07/03/2024 4.0   01/12/2024 7.7     Albumin (g/dL)   Date Value   07/15/2024 4.3   07/03/2024 4.2   01/12/2024 4.1   07/07/2022 3.7   01/07/2022 3.6   07/28/2021 4.1   03/05/2021 4.0   11/11/2020 3.8   08/10/2020 3.8      Magnesium (mg/dL)   Date Value   03/14/2016 2.1     Sodium (mmol/L)   Date Value   01/12/2024 140   01/03/2024 136   10/06/2023 139   11/11/2020 139   11/04/2020 138   11/02/2020 139    Renal  Urea Nitrogen (mg/dL)   Date Value   01/12/2024 23.0   01/03/2024 35.3 (H)   10/06/2023 25.3 (H)   11/11/2020 28   11/04/2020 34 (H)   11/02/2020 32 (H)     Creatinine (mg/dL)   Date Value   07/15/2024 1.54 (H)   07/03/2024 1.76 (H)   01/12/2024 1.51 (H)   03/05/2021 1.48 (H)   11/11/2020 1.38 (H)   11/11/2020 1.40 (H)     Creatinine POCT (mg/dL)   Date Value   06/27/2024 1.8 (H)   03/24/2024 1.8 (H)     Additional  Triglycerides (mg/dL)   Date Value   10/06/2023 172 (H)   04/17/2013 125     Ketones Urine (mg/dL)   Date Value   07/03/2024 Negative   03/05/2021 Negative          ANTHROPOMETRICS  Height: 69'  Weight: 194 lbs/88kg  BMI: 28  Weight Status:  Overweight BMI 25-29.9  IBW: 160 lbs (121%)  Dosing Weight: 88kg  Weight History: stable  Wt Readings from Last 8 Encounters:   08/20/24 88 kg (194 lb)   07/30/24 87.5 kg (193 lb)   07/30/24 88 kg (194 lb)   07/17/24 88.3 kg (194 lb 10.7 oz)   07/15/24 90.3 kg (199 lb)   07/11/24 90.9 kg (200 lb 4.8 oz)   06/27/24 90.3 kg (199 lb)   06/04/24 88.1 kg (194 lb 3.2 oz)     Labs:   Labs reviewed    NUTRITION FOCUSED PHYSICAL ASSESSMENT FOR DIAGNOSING  MALNUTRITION:  Consult for education only    ASSESSED NUTRITION NEEDS:  Estimated Energy Needs: 6909-2719 kcals (25-30 Kcal/Kg)  Justification: maintenance  Estimated Protein Needs: 70-90 grams protein (0.8 -1.0 g pro/Kg)  Justification: CKD, increased with radiation treatment  Estimated Fluid Needs: 2000  mL   Justification: maintenance      NUTRITION DIAGNOSIS:  No nutrition diagnosis identified at this time     INTERVENTIONS  Provided written & verbal education:     - Discussed the role of nutrition during cancer treatment.  Discussed principles of weight maintenance and oral intake recommendations for patients undergoing cancer treatment.  Reviewed the importance of maintaining current weight (within 5%, not more than 2 lb weight loss each week) during course of treatment  - Discussed nutrition and hydration needs. Encouraged pt to aim for at least 2200kcal and 70-90g protein, 8 cups non-caffeine containing beverages (water/electrolytes) daily. Encouraged increased fluids with CKD.   - Discussed strategies to help fortify meals and snacks. Encouraged to focus on small, frequent meals.    - Discussed sources of protein. Encouraged to have a protein source with each meal and snack.    - Reviewed common barriers to eating with cancer treatment.  Discussed that radiation progresses, eating will become more difficult and reviewed ways to modify foods and food textures.   - Discussed Nutrition shake options:   [employer] Milk: regular whole milk 150 eli, 13g protein (red jug)  [employer] Nutrition Plan shakes: 150 eli, 30g protein  Boost Very High Calorie (VHC): 8 oz 530 calories, 22g protein  Boost Plus:  8 oz 350 calories, 15g protein  Ensure Plus: 8 oz 350 calories, 15g protein  Ensure Complete: 11 oz 350 calories, 30g protein  Naked Mass: 24 oz (4 scoops of powder with 3 cups water or milk) 1250 calories, 50g protein  Encouraged utilizing these ONS in home made shakes/smoothies to prevent flavor fatigue.    Provided  samples and coupons for Ensure Complete in clinic today.     Provided pt with corresponding education materials/handouts on:  [employer] High eli/High protein recipes, Tips to increase calories in your diet, Sources of Protein, Nutrition Considerations for patients with Head/neck cancer treatment, Soft/Moist sources of protein, Electrolyte hydration options    Pt verbalize understanding of materials provided during consult.   Patient Understanding: good  Expected patient engagement: good     Goals  --Aim for 5-6 small frequent meals  --Aim for 2200kcal and 70-90g protein, 8 cups non-caffeine fluids per day  --Weight maintenance     Follow-Up Plans: Pt has RD contact information for questions.      MONITORING AND EVALUATION: 2 week follow-up  -Food/beverage intake  -Weight trends    Ana Abel RD, , LD  [facility]  [phone]            Again, thank you for allowing me to participate in the care of your patient.        Sincerely,        Ana Abel RD

## 2024-08-28 ENCOUNTER — APPOINTMENT (OUTPATIENT)
Dept: RADIATION ONCOLOGY | Facility: CLINIC | Age: 79
End: 2024-08-28
Attending: SURGERY
Payer: COMMERCIAL

## 2024-08-28 VITALS
BODY MASS INDEX: 29.83 KG/M2 | WEIGHT: 202 LBS | HEART RATE: 66 BPM | DIASTOLIC BLOOD PRESSURE: 78 MMHG | SYSTOLIC BLOOD PRESSURE: 142 MMHG

## 2024-08-28 DIAGNOSIS — C32.9 CARCINOMA LARYNX (H): Primary | ICD-10-CM

## 2024-08-28 PROCEDURE — 77412 RADIATION TX DELIVERY LVL 3: CPT | Performed by: RADIOLOGY

## 2024-08-28 PROCEDURE — 77014 PR CT GUIDE FOR PLACEMENT RADIATION THERAPY FIELDS: CPT | Mod: 26 | Performed by: RADIOLOGY

## 2024-08-28 PROCEDURE — 77387 GUIDANCE FOR RADJ TX DLVR: CPT | Performed by: RADIOLOGY

## 2024-08-28 NOTE — LETTER
2024      Joshua Ennis  [address] [facility] [address]      Dear Colleague,    Thank you for referring your patient, Joshua Ennis, to the [facility] RADIATION ONCOLOGY. Please see a copy of my visit note below.    [facility] PHYSICIANS  SPECIALIZING IN BREAKTHROUGHS  Radiation Oncology    On Treatment Visit Note      Joshua Ennis      Date: Aug 28, 2024   MRN: [id number]   : 1945  Diagnosis: Larynx        ID: Mr. 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     Reason for Visit:  On Radiation Treatment Visit       Treatment Summary to Date    Current Dose: 1575/6300 cGy Fractions:       Chemotherapy  Chemo concurrent with radx?: No    Subjective:   No complaints, tolerating RT well overall. Dysphonia. Pain controlled.      Nursing ROS:   Nutrition Alteration  Diet Type: Patient's Preference  Skin  Skin Reaction: 0 - No changes  Skin Progress: Aquaphor     ENT and Mouth Exam  Mucositis - Current: 0 - None   ENT/Mouth Note: S&S 15-20  Cardiovascular  Respiratory effort: 1 - Normal - without distress  Gastrointestinal  Nausea: 0 - None     Psychosocial  Mood - Anxiety: 0 - Normal  Pain Assessment  0-10 Pain Scale: 1  Pain Treatment: Gabapentin hasn't started      Objective:   BP (!) 142/78   Pulse 66   Wt 91.6 kg (202 lb)   BMI 29.83 kg/m    Gen: Appears well, in no acute distress  HN/Skin: mild erythema, no skin breakdown   CV/Resp: rrr, breathing comfortably on room air  Neuro: CN 2-12 grossly intact, apparent UE/LE full strength     Labs:  CBC RESULTS:   Recent Labs   Lab Test 07/15/24  0927   WBC 6.8   RBC 4.35*   HGB 14.3   HCT 43.0   MCV 99   MCH 32.9   MCHC 33.3   RDW 13.3         ELECTROLYTES:  Recent Labs   Lab Test 07/17/24  1005 07/15/24  0927 03/24/24  1038 01/12/24  1410   NA  --   --   --  140   POTASSIUM  --   --   --  3.7   CHLORIDE  --   --   --  107   LAMAR  --   --   --  9.3   CO2  --   --   --  24   BUN  --   --   --  23.0   CR  --  1.54*   < > 1.51*   GLC 93  --   --  83    < > = values in this interval not displayed.       Assessment:    Tolerating radiation therapy well.  All questions and concerns addressed.      Plan:   Continue current therapy.    Continue gabapentin, rinses, nutrition, hydration, skin care      Mosaiq chart and setup information reviewed  Ports checked and MVCT/IGRT images checked    Medication Review  Med list reviewed with patient?: Yes  Med list printed and given: Offered and declined    Educational Topic Discussed  Education Instructions: Reviewed      Isatu Aguilera MD, PhD  Covering for primary radiation oncology physician:  Bala Ochoa MD  Radiation Oncology   [facility]  Clinic: [phone]      Again, thank you for allowing me to participate in the care of your patient.        Sincerely,        Isatu Aguilera

## 2024-08-28 NOTE — PROGRESS NOTES
CLINICAL NUTRITION SERVICES - ASSESSMENT NOTE    Joshua Ennis 79 year old referred for MNT related to larynx cancer     Time Spent: 30 minutes  Visit Type: initial  Pt accompanied by: his KELVIN Henao  Referring Physician: Naren    Chemotherapy: No  Radiation: will complete on 9/27  Surgery history:   Past Surgical History:   Procedure Laterality Date    BIOPSY  11/2011    melanoma on back    DISSECT LYMPH NODE INGUINAL  3/1/2013    Procedure: DISSECT LYMPH NODE INGUINAL;  Bilateral Inguinal Lymph Node Biopsy.;  Surgeon: Tariq Acosta MD;  Location: [facility]    ESOPHAGOSCOPY, GASTROSCOPY, DUODENOSCOPY (EGD), COMBINED  7/5/2013    Procedure: COMBINED ESOPHAGOSCOPY, GASTROSCOPY, DUODENOSCOPY (EGD);  Gastroscopy;  Surgeon: Tariq Acosta MD;  Location: [facility]    HERNIORRHAPHY INGUINAL  8/6/2012    Procedure: HERNIORRHAPHY INGUINAL;  Open Repair Left Inguinal Hernia;  Surgeon: Jerad Baker MD;  Location: [facility]    INJECT STEROID (LOCATION) N/A 8/30/2023    Procedure: steroid injection;  Surgeon: Josephine Malone MD;  Location: [facility]    INJECT STEROID (LOCATION) N/A 9/13/2023    Procedure: steroid injection;  Surgeon: Josephine Malone MD;  [facility]: [facility]    INJECT STEROID (LOCATION) N/A 10/25/2023    Procedure: steroid injection, stent placement, and biopsy;  Surgeon: Josephine Malone MD;  [facility]: [facility]    INJECT STEROID (LOCATION) N/A 12/1/2023    Procedure: steroid injection;  Surgeon: Josephine Malone MD;  Location: [facility]    LARYNGOSCOPY WITH MICROSCOPE N/A 9/13/2023    Procedure: MICROLARYNGOSCOPY with stent removal and biopsy;  Surgeon: Josephine Malone MD;  Location: [facility]    LARYNGOSCOPY, FLEXIBLE WITH INJECTION N/A 12/11/2023    Procedure: LARYNGOSCOPY, FLEXIBLE WITH INJECTION with steroid;  Surgeon: Josephine Malone MD;  Location: [facility] OR    LASER CO2 LARYNGOSCOPY N/A 8/30/2023    Procedure: MICROLARYNGOSCOPY, CO2 laser resection of vocal fold lesion;  Surgeon: Josephine Malone MD;  Location: [facility]    LASER  CO2 LARYNGOSCOPY N/A 10/25/2023    Procedure: MICROLARYNGOSCOPY, CO2 laser resection of vocal fold lesion;  Surgeon: Josephine Malone MD;  Location: [facility]    LASER CO2 LARYNGOSCOPY N/A 12/1/2023    Procedure: MICROLARYNGOSCOPY CO2 laser standby, resection of vocal fold lesion, stent removal, biopsy;  Surgeon: Josephine Malone MD;  Location: [facility]    LASER CO2 LARYNGOSCOPY, COMPLEX N/A 7/17/2024    Procedure: MICROLARYNGOSCOPY, CO2 laser standby, biopsy;  Surgeon: Josephine Malone MD;  Location: [facility]     PEG tube: No    FOOD AND NUTRITION RELATED HISTORY:  --Over the counter supplements: None  --Oncology treatment side effects: None at this time  --Factors effecting nutritional intake: None at this time    Joshua tells me that he has been eating his normal diet.   He typically has 2-3 meals/day, often skipping lunch as he has an early dinner.   He has been intentionally increasing his calories to gain weight with anticipation of weight loss.     Diet Recall  Breakfast Sweet roll, 1 12 oz pepsi, 8 oz water   Lunch Ritz crackers or fast food [employer] potato cakes and sliders or skips   Dinner Chicken, mashed potaotes, +/- cooked vegetable   Snacks Ritz crackers ~1 sleeve per day   Beverages 2-3 cans of pepsi, 32 oz water daily     Patient Medical History  Past Medical History:   Diagnosis Date    Anemia     Arthritis     Autoimmune disease (H24)     CKD (chronic kidney disease) stage 3, GFR 30-59 ml/min (H)     Gastroesophageal reflux disease with esophagitis     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    Granulomatosis with polyangiitis, unspecified whether renal involvement (H)     Hearing problem     [employer] and ringing in ears    History of colonic polyps 04/18/2018    Tubular Adenomas. Negative for high-grade dysplasia and malignancy.    Hoarseness     Hypertension     Idiopathic progressive polyneuropathy     Kidney problem     Laryngeal cancer (H)     Malignant melanoma (H)     Malignant neoplasm (H)     melanoma    Squamous cell  carcinoma     Russo syndrome        Current Medications    Current Outpatient Medications:     calcipotriene (DOVONOX) 0.005 % external cream, Apply topically 2 times daily In conjunction with fluorouracil cream in a 1:1 mixture. (Patient not taking: Reported on 8/2/2024), Disp: 60 g, Rfl: 2    calcium carbonate 600 mg-vitamin D 400 units (CALTRATE) 600-400 MG-UNIT per tablet, Take 1 tablet by mouth 2 times daily., Disp: , Rfl:     Cholecalciferol (VITAMIN D) 1000 UNITS capsule, Take 1 capsule by mouth daily., Disp: , Rfl:     fluorouracil (EFUDEX) 5 % external cream, Apply topically 2 times daily In conjunction with calcipotriene cream in a 1:1 mixture. (Patient not taking: Reported on 8/2/2024), Disp: 40 g, Rfl: 2    gabapentin (NEURONTIN) 300 MG capsule, Take 2 capsules (600 mg) by mouth 3 times daily. Escalate per handout provided in clinic. Thank you., Disp: 180 capsule, Rfl: 3    losartan (COZAAR) 50 MG tablet, Take 1 tablet (50 mg) by mouth daily (Patient taking differently: Take 50 mg by mouth at bedtime), Disp: 90 tablet, Rfl: 3    pantoprazole (PROTONIX) 40 MG EC tablet, Take 1 tablet (40 mg) by mouth daily, Disp: 60 tablet, Rfl: 1  Medications have been reviewed.    Pertinent lab results:  Labs:  Electrolytes  Potassium (mmol/L)   Date Value   01/12/2024 3.7   01/03/2024 4.1   10/06/2023 4.9   11/11/2020 4.3   11/04/2020 4.4   11/02/2020 4.7     Phosphorus (mg/dL)   Date Value   08/12/2016 2.6   12/10/2015 3.0   06/11/2015 2.7   06/11/2015 2.1 (L)   11/14/2013 4.0    Blood Glucose  Glucose (mg/dL)   Date Value   01/12/2024 83   01/03/2024 100 (H)   10/06/2023 78   11/11/2020 79   11/04/2020 107 (H)   11/02/2020 125 (H)   08/10/2020 93   09/05/2019 95     GLUCOSE BY METER POCT (mg/dL)   Date Value   07/17/2024 93    Inflammatory Markers  CRP Inflammation (mg/L)   Date Value   07/07/2022 <2.9   01/07/2022 <2.9   07/28/2021 <2.9   03/05/2021 <2.9   11/11/2020 <2.9   08/10/2020 <2.9     WBC (10e9/L)   Date  Value   03/05/2021 6.9   11/11/2020 8.0   11/04/2020 6.5     WBC Count (10e3/uL)   Date Value   07/15/2024 6.8   07/03/2024 4.0   01/12/2024 7.7     Albumin (g/dL)   Date Value   07/15/2024 4.3   07/03/2024 4.2   01/12/2024 4.1   07/07/2022 3.7   01/07/2022 3.6   07/28/2021 4.1   03/05/2021 4.0   11/11/2020 3.8   08/10/2020 3.8      Magnesium (mg/dL)   Date Value   03/14/2016 2.1     Sodium (mmol/L)   Date Value   01/12/2024 140   01/03/2024 136   10/06/2023 139   11/11/2020 139   11/04/2020 138   11/02/2020 139    Renal  Urea Nitrogen (mg/dL)   Date Value   01/12/2024 23.0   01/03/2024 35.3 (H)   10/06/2023 25.3 (H)   11/11/2020 28   11/04/2020 34 (H)   11/02/2020 32 (H)     Creatinine (mg/dL)   Date Value   07/15/2024 1.54 (H)   07/03/2024 1.76 (H)   01/12/2024 1.51 (H)   03/05/2021 1.48 (H)   11/11/2020 1.38 (H)   11/11/2020 1.40 (H)     Creatinine POCT (mg/dL)   Date Value   06/27/2024 1.8 (H)   03/24/2024 1.8 (H)     Additional  Triglycerides (mg/dL)   Date Value   10/06/2023 172 (H)   04/17/2013 125     Ketones Urine (mg/dL)   Date Value   07/03/2024 Negative   03/05/2021 Negative          ANTHROPOMETRICS  Height: 69'  Weight: 194 lbs/88kg  BMI: 28  Weight Status:  Overweight BMI 25-29.9  IBW: 160 lbs (121%)  Dosing Weight: 88kg  Weight History: stable  Wt Readings from Last 8 Encounters:   08/20/24 88 kg (194 lb)   07/30/24 87.5 kg (193 lb)   07/30/24 88 kg (194 lb)   07/17/24 88.3 kg (194 lb 10.7 oz)   07/15/24 90.3 kg (199 lb)   07/11/24 90.9 kg (200 lb 4.8 oz)   06/27/24 90.3 kg (199 lb)   06/04/24 88.1 kg (194 lb 3.2 oz)     Labs:   Labs reviewed    NUTRITION FOCUSED PHYSICAL ASSESSMENT FOR DIAGNOSING MALNUTRITION:  Consult for education only    ASSESSED NUTRITION NEEDS:  Estimated Energy Needs: 3116-8103 kcals (25-30 Kcal/Kg)  Justification: maintenance  Estimated Protein Needs: 70-90 grams protein (0.8 -1.0 g pro/Kg)  Justification: CKD, increased with radiation treatment  Estimated Fluid Needs: 2000   mL   Justification: maintenance      NUTRITION DIAGNOSIS:  No nutrition diagnosis identified at this time     INTERVENTIONS  Provided written & verbal education:     - Discussed the role of nutrition during cancer treatment.  Discussed principles of weight maintenance and oral intake recommendations for patients undergoing cancer treatment.  Reviewed the importance of maintaining current weight (within 5%, not more than 2 lb weight loss each week) during course of treatment  - Discussed nutrition and hydration needs. Encouraged pt to aim for at least 2200kcal and 70-90g protein, 8 cups non-caffeine containing beverages (water/electrolytes) daily. Encouraged increased fluids with CKD.   - Discussed strategies to help fortify meals and snacks. Encouraged to focus on small, frequent meals.    - Discussed sources of protein. Encouraged to have a protein source with each meal and snack.    - Reviewed common barriers to eating with cancer treatment.  Discussed that radiation progresses, eating will become more difficult and reviewed ways to modify foods and food textures.   - Discussed Nutrition shake options:   PromoJam Milk: regular whole milk 150 eli, 13g protein (red jug)  PromoJam Nutrition Plan shakes: 150 eli, 30g protein  Boost Very High Calorie (VHC): 8 oz 530 calories, 22g protein  Boost Plus:  8 oz 350 calories, 15g protein  Ensure Plus: 8 oz 350 calories, 15g protein  Ensure Complete: 11 oz 350 calories, 30g protein  Naked Mass: 24 oz (4 scoops of powder with 3 cups water or milk) 1250 calories, 50g protein  Encouraged utilizing these ONS in home made shakes/smoothies to prevent flavor fatigue.    Provided samples and coupons for Ensure Complete in clinic today.     Provided pt with corresponding education materials/handouts on:  [employer] High eli/High protein recipes, Tips to increase calories in your diet, Sources of Protein, Nutrition Considerations for patients with Head/neck  cancer treatment, Soft/Moist sources of protein, Electrolyte hydration options    Pt verbalize understanding of materials provided during consult.   Patient Understanding: good  Expected patient engagement: good     Goals  --Aim for 5-6 small frequent meals  --Aim for 2200kcal and 70-90g protein, 8 cups non-caffeine fluids per day  --Weight maintenance     Follow-Up Plans: Pt has RD contact information for questions.      MONITORING AND EVALUATION: 2 week follow-up  -Food/beverage intake  -Weight trends    nAa Abel RD, , LD  [facility]  [phone]

## 2024-08-29 ENCOUNTER — APPOINTMENT (OUTPATIENT)
Dept: RADIATION ONCOLOGY | Facility: CLINIC | Age: 79
End: 2024-08-29
Attending: SURGERY
Payer: COMMERCIAL

## 2024-08-29 PROCEDURE — 77412 RADIATION TX DELIVERY LVL 3: CPT | Performed by: RADIOLOGY

## 2024-08-29 PROCEDURE — 77387 GUIDANCE FOR RADJ TX DLVR: CPT | Performed by: RADIOLOGY

## 2024-08-29 PROCEDURE — 77014 PR CT GUIDE FOR PLACEMENT RADIATION THERAPY FIELDS: CPT | Mod: 26 | Performed by: RADIOLOGY

## 2024-08-30 ENCOUNTER — APPOINTMENT (OUTPATIENT)
Dept: RADIATION ONCOLOGY | Facility: CLINIC | Age: 79
End: 2024-08-30
Attending: SURGERY
Payer: COMMERCIAL

## 2024-08-30 PROCEDURE — 77427 RADIATION TX MANAGEMENT X5: CPT | Performed by: RADIOLOGY

## 2024-08-30 PROCEDURE — 77014 PR CT GUIDE FOR PLACEMENT RADIATION THERAPY FIELDS: CPT | Mod: 26 | Performed by: RADIOLOGY

## 2024-08-30 PROCEDURE — 77412 RADIATION TX DELIVERY LVL 3: CPT | Performed by: RADIOLOGY

## 2024-08-30 PROCEDURE — 77387 GUIDANCE FOR RADJ TX DLVR: CPT | Performed by: RADIOLOGY

## 2024-08-30 NOTE — PROGRESS NOTES
[facility] PHYSICIANS  SPECIALIZING IN BREAKTHROUGHS  Radiation Oncology    On Treatment Visit Note      Joshua Ennis      Date: Aug 28, 2024   MRN: [id number]   : 1945  Diagnosis: Larynx        ID: Mr. 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 Maolne) involving anterior commissure with associated glottic web and right posterior true vocal cord.Plan for 63Gy/28fx.     Reason for Visit:  On Radiation Treatment Visit       Treatment Summary to Date    Current Dose: 1575/6300 cGy Fractions:       Chemotherapy  Chemo concurrent with radx?: No    Subjective:   No complaints, tolerating RT well overall. Dysphonia. Pain controlled.      Nursing ROS:   Nutrition Alteration  Diet Type: Patient's Preference  Skin  Skin Reaction: 0 - No changes  Skin Progress: Aquaphor     ENT and Mouth Exam  Mucositis - Current: 0 - None   ENT/Mouth Note: S&S 15-20  Cardiovascular  Respiratory effort: 1 - Normal - without distress  Gastrointestinal  Nausea: 0 - None     Psychosocial  Mood - Anxiety: 0 - Normal  Pain Assessment  0-10 Pain Scale: 1  Pain Treatment: Gabapentin hasn't started      Objective:   BP (!) 142/78   Pulse 66   Wt 91.6 kg (202 lb)   BMI 29.83 kg/m    Gen: Appears well, in no acute distress  HN/Skin: mild erythema, no skin breakdown   CV/Resp: rrr, breathing comfortably on room air  Neuro: CN 2-12 grossly intact, apparent UE/LE full strength     Labs:  CBC RESULTS:   Recent Labs   Lab Test 07/15/24  0927   WBC 6.8   RBC 4.35*   HGB 14.3   HCT 43.0   MCV 99   MCH 32.9   MCHC 33.3   RDW 13.3        ELECTROLYTES:  Recent Labs   Lab Test 24  1005 07/15/24  0927 24  1038 24  1410   NA  --   --   --  140   POTASSIUM  --   --   --  3.7   CHLORIDE  --   --   --  107   LAMAR  --   --   --  9.3   CO2  --   --   --  24   BUN  --   --   --   23.0   CR  --  1.54*   < > 1.51*   GLC 93  --   --  83    < > = values in this interval not displayed.       Assessment:    Tolerating radiation therapy well.  All questions and concerns addressed.      Plan:   Continue current therapy.    Continue gabapentin, rinses, nutrition, hydration, skin care      Mosaiq chart and setup information reviewed  Ports checked and MVCT/IGRT images checked    Medication Review  Med list reviewed with patient?: Yes  Med list printed and given: Offered and declined    Educational Topic Discussed  Education Instructions: Reviewed      Isatu Aguilera MD, PhD  Covering for primary radiation oncology physician:  Bala Ochoa MD  Radiation Oncology   [facility]  Clinic: [phone]

## 2024-09-03 ENCOUNTER — THERAPY VISIT (OUTPATIENT)
Dept: SPEECH THERAPY | Facility: CLINIC | Age: 79
End: 2024-09-03
Payer: COMMERCIAL

## 2024-09-03 ENCOUNTER — APPOINTMENT (OUTPATIENT)
Dept: RADIATION ONCOLOGY | Facility: CLINIC | Age: 79
End: 2024-09-03
Attending: SURGERY
Payer: COMMERCIAL

## 2024-09-03 DIAGNOSIS — C32.0 CANCER OF GLOTTIS (H): ICD-10-CM

## 2024-09-03 DIAGNOSIS — R13.12 OROPHARYNGEAL DYSPHAGIA: ICD-10-CM

## 2024-09-03 DIAGNOSIS — C32.9 SQUAMOUS CELL CARCINOMA OF LARYNX (H): Primary | ICD-10-CM

## 2024-09-03 PROCEDURE — 77412 RADIATION TX DELIVERY LVL 3: CPT | Performed by: SURGERY

## 2024-09-03 PROCEDURE — 92526 ORAL FUNCTION THERAPY: CPT | Mod: GN | Performed by: SPEECH-LANGUAGE PATHOLOGIST

## 2024-09-03 PROCEDURE — 77014 PR CT GUIDE FOR PLACEMENT RADIATION THERAPY FIELDS: CPT | Mod: 26 | Performed by: RADIOLOGY

## 2024-09-03 PROCEDURE — 77387 GUIDANCE FOR RADJ TX DLVR: CPT | Performed by: SURGERY

## 2024-09-03 PROCEDURE — 77336 RADIATION PHYSICS CONSULT: CPT | Performed by: SURGERY

## 2024-09-04 ENCOUNTER — APPOINTMENT (OUTPATIENT)
Dept: RADIATION ONCOLOGY | Facility: CLINIC | Age: 79
End: 2024-09-04
Attending: SURGERY
Payer: COMMERCIAL

## 2024-09-04 VITALS
OXYGEN SATURATION: 96 % | RESPIRATION RATE: 16 BRPM | DIASTOLIC BLOOD PRESSURE: 77 MMHG | SYSTOLIC BLOOD PRESSURE: 139 MMHG | HEART RATE: 75 BPM

## 2024-09-04 DIAGNOSIS — C32.9 CARCINOMA LARYNX (H): Primary | ICD-10-CM

## 2024-09-04 PROCEDURE — 77014 PR CT GUIDE FOR PLACEMENT RADIATION THERAPY FIELDS: CPT | Mod: 26 | Performed by: RADIOLOGY

## 2024-09-04 PROCEDURE — 77387 GUIDANCE FOR RADJ TX DLVR: CPT | Performed by: SURGERY

## 2024-09-04 PROCEDURE — 77412 RADIATION TX DELIVERY LVL 3: CPT | Performed by: SURGERY

## 2024-09-04 ASSESSMENT — PAIN SCALES - GENERAL: PAINLEVEL: MILD PAIN (3)

## 2024-09-04 NOTE — LETTER
2024      Joshua Ennis  [address] [facility] [address]      Dear Colleague,    Thank you for referring your patient, Joshua Ennis, to the [facility] RADIATION ONCOLOGY. Please see a copy of my visit note below.    [facility] PHYSICIANS  SPECIALIZING IN BREAKTHROUGHS  Radiation Oncology    On Treatment Visit Note      Joshua Ennis      Date: Sep 4, 2024   MRN: [id number]   : 1945  Diagnosis: larynx        ID: Mr. 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     Reason for Visit:  On Radiation Treatment Visit       Treatment Summary to Date   Larynx Current Dose: 2475/6300 cGy Fractions:       Chemotherapy  Chemo concurrent with radx?: No    Subjective:   No complaints, tolerating RT well overall. Dysphonia.  He is having increased pain with swallowing.  He continues on gabapentin 400 mg 3 times daily.  He also notes a sense of a lump at the level of the larynx and increased phlegm.      Nursing ROS:   Nutrition Alteration  Diet Type: Patient's Preference  Nutrition Note: Due to reported difficulty with swallowing from swelling Pt was educated on trying softer foods and increasing the use of ensures or protein shakes to help meet his caloric needs.  Skin  Skin Reaction: 1 - Faint erythema or dry desquamation  Skin Intervention: Pt was educated on the use of aquaphor BID to his neck area.  Pt agreed to this.     ENT and Mouth Exam  Mucositis - Current: 1 - Generalized erythema  Esophagitis: 1 - Mild  ENT/Mouth Note: Pt reports the feeling of a lump in his throat when trying to swallow and pain is 3/10.  Pt was encouraged to use tylenol in addition to the gabapentin and if he feels pain is not improving magic  mouthwash can also be ordered.  Pt will reach out to the RT team tomorrow (Thurs).  Cardiovascular  Respiratory effort: 1 - Normal - without distress  Gastrointestinal  Nausea: 0 - None     Psychosocial  Mood - Anxiety: 0 - Normal  Pain Assessment  0-10 Pain Scale: 3 (With swallowing Pt reports throat pain at a 3/10)  Pain Treatment: Pt is currently using gabapentin and will try adding in tylenol.  If this does not help with the pain he will reach out tomorrow (thurs) and magic mouthwash can be ordered.      Objective:   /77 (BP Location: Right arm)   Pulse 75   Resp 16   SpO2 96%   Gen: Appears well, in no acute distress, hoarse voice  HN/Skin: mild erythema, no skin breakdown   CV/Resp: rrr, breathing comfortably on room air  Neuro: CN 2-12 grossly intact, apparent UE/LE full strength     Labs:  CBC RESULTS:   Recent Labs   Lab Test 07/15/24  0927   WBC 6.8   RBC 4.35*   HGB 14.3   HCT 43.0   MCV 99   MCH 32.9   MCHC 33.3   RDW 13.3        ELECTROLYTES:  Recent Labs   Lab Test 07/17/24  1005 07/15/24  0927 03/24/24  1038 01/12/24  1410   NA  --   --   --  140   POTASSIUM  --   --   --  3.7   CHLORIDE  --   --   --  107   LAMAR  --   --   --  9.3   CO2  --   --   --  24   BUN  --   --   --  23.0   CR  --  1.54*   < > 1.51*   GLC 93  --   --  83    < > = values in this interval not displayed.       Assessment:    Tolerating radiation therapy well.  All questions and concerns addressed.      Plan:   Continue current therapy.    Continue gabapentin, rinses, nutrition, hydration, skin care  Acetaminophen 1000 mg 3 times daily  Aquaphor to skin twice daily  Patient will check back with us later this week if he wishes to try Magic mouthwash prior to the weekend.      Catheter Connections chart and setup information reviewed  Ports checked and MVCT/IGRT images checked    Medication Review  Med list reviewed with patient?: Yes  Medication changes: See above notes  Med list printed and given: Offered and  declined    Educational Topic Discussed  Additional Instructions: Continue with therapy  Education Instructions: Reviewed radiation SE, skin care, and nutritional intake.      Caremnza Sneed MD  Radiation Oncology           Again, thank you for allowing me to participate in the care of your patient.        Sincerely,        Carmenza Sneed MD

## 2024-09-04 NOTE — PROGRESS NOTES
[facility] PHYSICIANS  SPECIALIZING IN BREAKTHROUGHS  Radiation Oncology    On Treatment Visit Note      Joshua Ennis      Date: Sep 4, 2024   MRN: [id number]   : 1945  Diagnosis: larynx        ID: Mr. 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     Reason for Visit:  On Radiation Treatment Visit       Treatment Summary to Date   Larynx Current Dose: 2475/6300 cGy Fractions:       Chemotherapy  Chemo concurrent with radx?: No    Subjective:   No complaints, tolerating RT well overall. Dysphonia.  He is having increased pain with swallowing.  He continues on gabapentin 400 mg 3 times daily.  He also notes a sense of a lump at the level of the larynx and increased phlegm.      Nursing ROS:   Nutrition Alteration  Diet Type: Patient's Preference  Nutrition Note: Due to reported difficulty with swallowing from swelling Pt was educated on trying softer foods and increasing the use of ensures or protein shakes to help meet his caloric needs.  Skin  Skin Reaction: 1 - Faint erythema or dry desquamation  Skin Intervention: Pt was educated on the use of aquaphor BID to his neck area.  Pt agreed to this.     ENT and Mouth Exam  Mucositis - Current: 1 - Generalized erythema  Esophagitis: 1 - Mild  ENT/Mouth Note: Pt reports the feeling of a lump in his throat when trying to swallow and pain is 3/10.  Pt was encouraged to use tylenol in addition to the gabapentin and if he feels pain is not improving magic mouthwash can also be ordered.  Pt will reach out to the RT team tomorrow (Thurs).  Cardiovascular  Respiratory effort: 1 - Normal - without distress  Gastrointestinal  Nausea: 0 - None     Psychosocial  Mood - Anxiety: 0 - Normal  Pain  Assessment  0-10 Pain Scale: 3 (With swallowing Pt reports throat pain at a 3/10)  Pain Treatment: Pt is currently using gabapentin and will try adding in tylenol.  If this does not help with the pain he will reach out tomorrow (thurs) and magic mouthwash can be ordered.      Objective:   /77 (BP Location: Right arm)   Pulse 75   Resp 16   SpO2 96%   Gen: Appears well, in no acute distress, hoarse voice  HN/Skin: mild erythema, no skin breakdown   CV/Resp: rrr, breathing comfortably on room air  Neuro: CN 2-12 grossly intact, apparent UE/LE full strength     Labs:  CBC RESULTS:   Recent Labs   Lab Test 07/15/24  0927   WBC 6.8   RBC 4.35*   HGB 14.3   HCT 43.0   MCV 99   MCH 32.9   MCHC 33.3   RDW 13.3        ELECTROLYTES:  Recent Labs   Lab Test 07/17/24  1005 07/15/24  0927 03/24/24  1038 01/12/24  1410   NA  --   --   --  140   POTASSIUM  --   --   --  3.7   CHLORIDE  --   --   --  107   LAMAR  --   --   --  9.3   CO2  --   --   --  24   BUN  --   --   --  23.0   CR  --  1.54*   < > 1.51*   GLC 93  --   --  83    < > = values in this interval not displayed.       Assessment:    Tolerating radiation therapy well.  All questions and concerns addressed.      Plan:   Continue current therapy.    Continue gabapentin, rinses, nutrition, hydration, skin care  Acetaminophen 1000 mg 3 times daily  Aquaphor to skin twice daily  Patient will check back with us later this week if he wishes to try Magic mouthwash prior to the weekend.      [employer] chart and setup information reviewed  Ports checked and MVCT/IGRT images checked    Medication Review  Med list reviewed with patient?: Yes  Medication changes: See above notes  Med list printed and given: Offered and declined    Educational Topic Discussed  Additional Instructions: Continue with therapy  Education Instructions: Reviewed radiation SE, skin care, and nutritional intake.      Carmenza Sneed MD  Radiation Oncology

## 2024-09-05 ENCOUNTER — APPOINTMENT (OUTPATIENT)
Dept: RADIATION ONCOLOGY | Facility: CLINIC | Age: 79
End: 2024-09-05
Attending: SURGERY
Payer: COMMERCIAL

## 2024-09-05 PROCEDURE — 77014 PR CT GUIDE FOR PLACEMENT RADIATION THERAPY FIELDS: CPT | Mod: 26 | Performed by: RADIOLOGY

## 2024-09-05 PROCEDURE — 77412 RADIATION TX DELIVERY LVL 3: CPT | Performed by: RADIOLOGY

## 2024-09-05 PROCEDURE — 77387 GUIDANCE FOR RADJ TX DLVR: CPT | Performed by: RADIOLOGY

## 2024-09-06 ENCOUNTER — APPOINTMENT (OUTPATIENT)
Dept: RADIATION ONCOLOGY | Facility: CLINIC | Age: 79
End: 2024-09-06
Attending: SURGERY
Payer: COMMERCIAL

## 2024-09-06 PROCEDURE — 77412 RADIATION TX DELIVERY LVL 3: CPT | Performed by: RADIOLOGY

## 2024-09-06 PROCEDURE — 77387 GUIDANCE FOR RADJ TX DLVR: CPT | Performed by: RADIOLOGY

## 2024-09-06 PROCEDURE — 77014 PR CT GUIDE FOR PLACEMENT RADIATION THERAPY FIELDS: CPT | Mod: 26 | Performed by: RADIOLOGY

## 2024-09-09 ENCOUNTER — APPOINTMENT (OUTPATIENT)
Dept: RADIATION ONCOLOGY | Facility: CLINIC | Age: 79
End: 2024-09-09
Attending: SURGERY
Payer: COMMERCIAL

## 2024-09-09 VITALS
HEART RATE: 82 BPM | BODY MASS INDEX: 28.65 KG/M2 | WEIGHT: 194 LBS | RESPIRATION RATE: 16 BRPM | SYSTOLIC BLOOD PRESSURE: 142 MMHG | DIASTOLIC BLOOD PRESSURE: 73 MMHG

## 2024-09-09 DIAGNOSIS — C32.9 LARYNGEAL CANCER (H): Primary | ICD-10-CM

## 2024-09-09 PROCEDURE — 77387 GUIDANCE FOR RADJ TX DLVR: CPT | Performed by: RADIOLOGY

## 2024-09-09 PROCEDURE — 77412 RADIATION TX DELIVERY LVL 3: CPT | Performed by: RADIOLOGY

## 2024-09-09 PROCEDURE — 77014 PR CT GUIDE FOR PLACEMENT RADIATION THERAPY FIELDS: CPT | Mod: 26 | Performed by: RADIOLOGY

## 2024-09-09 NOTE — LETTER
2024      Joshua Ennis  [address] [facility] [address]      Dear Colleague,    Thank you for referring your patient, Joshua Ennis, to the [facility] RADIATION ONCOLOGY. Please see a copy of my visit note below.    [facility] PHYSICIANS  SPECIALIZING IN BREAKTHROUGHS  Radiation Oncology    On Treatment Visit Note      Joshua Ennis      Date: Sep 9, 2024   MRN: [id number]   : 1945           ID: Mr. 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     Reason for Visit:  On Radiation Treatment Visit       Treatment Summary to Date   Larynx  3150 cGy/6300 cGy  14/28 fractions       Subjective:  Mr. Ennis continues to eat solid foods including cinnamon rolls and sandwiches as well as 5 ensures per day.  He is having increased pain with swallowing.  He has also started noticing difficulty with swallowing water which makes him cough but he continues to be able to swallow cranberry juice and other clear liquids.  He continues on gabapentin 400 mg 3 times daily.  He also notes a sense of a lump at the level of the larynx and increased phlegm.    Nursing ROS:   Nutrition Alteration  Diet Type: Patient's Preference  Nutrition Note: PEG needed  Skin  Skin Reaction: 1 - Faint erythema or dry desquamation  Skin Progress: Aquaphor     ENT and Mouth Exam  Mucositis - Current: 1 - Generalized erythema  Cardiovascular  Respiratory effort: 1 - Normal - without distress  Gastrointestinal  Nausea: 0 - None     Psychosocial  Mood - Anxiety: 0 - Normal  Pain Assessment  0-10 Pain Scale: 3 (With swallowing Pt reports throat pain at a 3/10)  Pain Treatment: Gabapentin and Tylenol     Objective:   BP (!) 142/73   Pulse 82   Resp 16   Wt  88 kg (194 lb)   BMI 28.65 kg/m    Gen: Appears well, in no acute distress, hoarse voice  HN/Skin: mild erythema, no skin breakdown, no thrush  CV/Resp: rrr, breathing comfortably on room air  Neuro: CN 2-12 grossly intact, apparent UE/LE full strength     Labs:  CBC RESULTS:   Recent Labs   Lab Test 07/15/24  0927   WBC 6.8   RBC 4.35*   HGB 14.3   HCT 43.0   MCV 99   MCH 32.9   MCHC 33.3   RDW 13.3        ELECTROLYTES:  Recent Labs   Lab Test 07/17/24  1005 07/15/24  0927 03/24/24  1038 01/12/24  1410   NA  --   --   --  140   POTASSIUM  --   --   --  3.7   CHLORIDE  --   --   --  107   LAMAR  --   --   --  9.3   CO2  --   --   --  24   BUN  --   --   --  23.0   CR  --  1.54*   < > 1.51*   GLC 93  --   --  83    < > = values in this interval not displayed.     Assessment:    Tolerating radiation therapy as expected.  All questions and concerns addressed.    Plan:   Continue current therapy.    Continue gabapentin, oral rinses  Discussed nutrition and ways to increase calorie density including higher calorie Ensure  Continue hydration-okay to drink other clear liquids besides water  Patient needs with Speech Therapy later this week for ongoing swallowing assessment  Acetaminophen 1000 mg 3 times daily  Aquaphor to skin twice daily  Patient will check back with us later this week if he wishes to try Magic mouthwash prior to the weekend.  An IR referral was placed for feeding tube placement.  We will continue to work with patient but, if he needs a feeding tube towards the end of treatment, this referral will hopefully make it more expedient to get a tube when needed.    [employer] chart and setup information reviewed  Ports checked and MVCT/IGRT images checked    Carmenza Sneed MD  Radiation Oncology           Again, thank you for allowing me to participate in the care of your patient.        Sincerely,        Carmenza Sneed MD

## 2024-09-09 NOTE — PROGRESS NOTES
[facility] PHYSICIANS  SPECIALIZING IN BREAKTHROUGHS  Radiation Oncology    On Treatment Visit Note      Joshua Ennis      Date: Sep 9, 2024   MRN: [id number]   : 1945           ID: Mr. 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     Reason for Visit:  On Radiation Treatment Visit       Treatment Summary to Date   Larynx  3150 cGy/6300 cGy  14/28 fractions       Subjective:  Mr. Ennis continues to eat solid foods including cinnamon rolls and sandwiches as well as 5 ensures per day.  He is having increased pain with swallowing.  He has also started noticing difficulty with swallowing water which makes him cough but he continues to be able to swallow cranberry juice and other clear liquids.  He continues on gabapentin 400 mg 3 times daily.  He also notes a sense of a lump at the level of the larynx and increased phlegm.    Nursing ROS:   Nutrition Alteration  Diet Type: Patient's Preference  Nutrition Note: PEG needed  Skin  Skin Reaction: 1 - Faint erythema or dry desquamation  Skin Progress: Aquaphor     ENT and Mouth Exam  Mucositis - Current: 1 - Generalized erythema  Cardiovascular  Respiratory effort: 1 - Normal - without distress  Gastrointestinal  Nausea: 0 - None     Psychosocial  Mood - Anxiety: 0 - Normal  Pain Assessment  0-10 Pain Scale: 3 (With swallowing Pt reports throat pain at a 3/10)  Pain Treatment: Gabapentin and Tylenol     Objective:   BP (!) 142/73   Pulse 82   Resp 16   Wt 88 kg (194 lb)   BMI 28.65 kg/m    Gen: Appears well, in no acute distress, hoarse voice  HN/Skin: mild erythema, no skin breakdown, no thrush  CV/Resp: rrr, breathing comfortably on room air  Neuro: CN 2-12 grossly intact, apparent UE/LE  full strength     Labs:  CBC RESULTS:   Recent Labs   Lab Test 07/15/24  0927   WBC 6.8   RBC 4.35*   HGB 14.3   HCT 43.0   MCV 99   MCH 32.9   MCHC 33.3   RDW 13.3        ELECTROLYTES:  Recent Labs   Lab Test 07/17/24  1005 07/15/24  0927 03/24/24  1038 01/12/24  1410   NA  --   --   --  140   POTASSIUM  --   --   --  3.7   CHLORIDE  --   --   --  107   LAMAR  --   --   --  9.3   CO2  --   --   --  24   BUN  --   --   --  23.0   CR  --  1.54*   < > 1.51*   GLC 93  --   --  83    < > = values in this interval not displayed.     Assessment:    Tolerating radiation therapy as expected.  All questions and concerns addressed.    Plan:   Continue current therapy.    Continue gabapentin, oral rinses  Discussed nutrition and ways to increase calorie density including higher calorie Ensure  Continue hydration-okay to drink other clear liquids besides water  Patient needs with Speech Therapy later this week for ongoing swallowing assessment  Acetaminophen 1000 mg 3 times daily  Aquaphor to skin twice daily  Patient will check back with us later this week if he wishes to try Magic mouthwash prior to the weekend.  An IR referral was placed for feeding tube placement.  We will continue to work with patient but, if he needs a feeding tube towards the end of treatment, this referral will hopefully make it more expedient to get a tube when needed.    [employer] chart and setup information reviewed  Ports checked and MVCT/IGRT images checked    Carmenza Sneed MD  Radiation Oncology

## 2024-09-10 ENCOUNTER — APPOINTMENT (OUTPATIENT)
Dept: RADIATION ONCOLOGY | Facility: CLINIC | Age: 79
End: 2024-09-10
Attending: SURGERY
Payer: COMMERCIAL

## 2024-09-10 DIAGNOSIS — C32.9 LARYNGEAL CANCER (H): ICD-10-CM

## 2024-09-10 PROCEDURE — 77014 PR CT GUIDE FOR PLACEMENT RADIATION THERAPY FIELDS: CPT | Mod: 26 | Performed by: RADIOLOGY

## 2024-09-10 PROCEDURE — 77412 RADIATION TX DELIVERY LVL 3: CPT | Performed by: RADIOLOGY

## 2024-09-10 PROCEDURE — 77427 RADIATION TX MANAGEMENT X5: CPT | Performed by: RADIOLOGY

## 2024-09-10 PROCEDURE — 77336 RADIATION PHYSICS CONSULT: CPT | Performed by: RADIOLOGY

## 2024-09-10 PROCEDURE — 77387 GUIDANCE FOR RADJ TX DLVR: CPT | Performed by: RADIOLOGY

## 2024-09-10 NOTE — CONSULTS
Outpatient Percutaneous Gastrotomy/Gastrojejunostomy Tube IR Referral  09/10/24    Referring Provider: Dr. Sneed   IR Referral Request: To place a Gastrostomy tube for nutrition:Patient undergoing radiation for laryngeal cancer-unable to swallow food/liquids without choking   Procedure Approved for: G tube placement attempt, if there is not a safe percutaneous window tube placement procedure will be cancelled.  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 Patient undergoing radiation for laryngeal cancer-unable to swallow food/liquids without choking  Patient no obvious abdominal surgeries.     Pertinent Imaging Reviewed:  CT 9/29/23        Case and imaging was reviewed with Dr. Zambrano from IR and attempt of G tube placement is approved. TO note most recent abdominal imaging is from 2023.    Med holds Not on AC at time of referral.     Procedure order placed.    Requesting team, Dr. Sneed, made aware of IR recommendations via Epic messaging.      DENISSE Escobar CNP  Interventional Radiology   IR on-call pager: [phone]

## 2024-09-11 ENCOUNTER — APPOINTMENT (OUTPATIENT)
Dept: RADIATION ONCOLOGY | Facility: CLINIC | Age: 79
End: 2024-09-11
Attending: SURGERY
Payer: COMMERCIAL

## 2024-09-11 PROCEDURE — 77014 PR CT GUIDE FOR PLACEMENT RADIATION THERAPY FIELDS: CPT | Mod: 26 | Performed by: RADIOLOGY

## 2024-09-11 PROCEDURE — 77387 GUIDANCE FOR RADJ TX DLVR: CPT | Performed by: RADIOLOGY

## 2024-09-11 PROCEDURE — 77412 RADIATION TX DELIVERY LVL 3: CPT | Performed by: RADIOLOGY

## 2024-09-12 ENCOUNTER — APPOINTMENT (OUTPATIENT)
Dept: RADIATION ONCOLOGY | Facility: CLINIC | Age: 79
End: 2024-09-12
Attending: SURGERY
Payer: COMMERCIAL

## 2024-09-12 PROCEDURE — 77387 GUIDANCE FOR RADJ TX DLVR: CPT | Performed by: RADIOLOGY

## 2024-09-12 PROCEDURE — 77014 PR CT GUIDE FOR PLACEMENT RADIATION THERAPY FIELDS: CPT | Mod: 26 | Performed by: RADIOLOGY

## 2024-09-12 PROCEDURE — 77412 RADIATION TX DELIVERY LVL 3: CPT | Performed by: RADIOLOGY

## 2024-09-13 ENCOUNTER — TELEPHONE (OUTPATIENT)
Dept: SPEECH THERAPY | Facility: CLINIC | Age: 79
End: 2024-09-13

## 2024-09-13 ENCOUNTER — APPOINTMENT (OUTPATIENT)
Dept: RADIATION ONCOLOGY | Facility: CLINIC | Age: 79
End: 2024-09-13
Attending: SURGERY
Payer: COMMERCIAL

## 2024-09-13 PROCEDURE — 77412 RADIATION TX DELIVERY LVL 3: CPT | Performed by: RADIOLOGY

## 2024-09-13 PROCEDURE — 77387 GUIDANCE FOR RADJ TX DLVR: CPT | Performed by: RADIOLOGY

## 2024-09-13 PROCEDURE — 77014 PR CT GUIDE FOR PLACEMENT RADIATION THERAPY FIELDS: CPT | Mod: 26 | Performed by: RADIOLOGY

## 2024-09-16 ENCOUNTER — APPOINTMENT (OUTPATIENT)
Dept: RADIATION ONCOLOGY | Facility: CLINIC | Age: 79
End: 2024-09-16
Attending: SURGERY
Payer: COMMERCIAL

## 2024-09-16 VITALS
WEIGHT: 190.5 LBS | HEART RATE: 80 BPM | SYSTOLIC BLOOD PRESSURE: 120 MMHG | BODY MASS INDEX: 28.13 KG/M2 | DIASTOLIC BLOOD PRESSURE: 77 MMHG

## 2024-09-16 DIAGNOSIS — C32.9 CARCINOMA LARYNX (H): Primary | ICD-10-CM

## 2024-09-16 PROCEDURE — 77014 PR CT GUIDE FOR PLACEMENT RADIATION THERAPY FIELDS: CPT | Mod: 26 | Performed by: RADIOLOGY

## 2024-09-16 PROCEDURE — 77412 RADIATION TX DELIVERY LVL 3: CPT | Performed by: RADIOLOGY

## 2024-09-16 PROCEDURE — 77387 GUIDANCE FOR RADJ TX DLVR: CPT | Performed by: RADIOLOGY

## 2024-09-16 RX ORDER — GUAIFENESIN 200 MG/10ML
200 LIQUID ORAL EVERY 4 HOURS PRN
Qty: 500 ML | Refills: 4 | Status: SHIPPED | OUTPATIENT
Start: 2024-09-16

## 2024-09-16 NOTE — PROGRESS NOTES
[facility] PHYSICIANS  SPECIALIZING IN BREAKTHROUGHS  Radiation Oncology    On Treatment Visit Note      Joshua Ennis      Date: Sep 16, 2024   MRN: [id number]   : 1945  Diagnosis: Larynx        ID: Mr. 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     Reason for Visit:  On Radiation Treatment Visit       Treatment Summary to Date   Larynx Current Dose: 4275/6300 Fractions:        Subjective:  Mr. Ennis continues to eat solid foods including cinnamon rolls and sandwiches as well as 5 ensures per day.  He is having even more increased pain with swallowing.  He has also started noticing difficulty with swallowing water which makes him cough but he continues to be able to swallow cranberry juice and other clear liquids.  He continues on gabapentin 400 mg 3 times daily.  He also notes a sense of a lump at the level of the larynx and increased phlegm.  He is starting to experience coughing jags and bringing up thick, stringy phlegm.    Nursing ROS:   Nutrition Alteration  Diet Type: Patient's Preference  Nutrition Note: PEG needed  Skin  Skin Reaction: 1 - Faint erythema or dry desquamation  Skin Progress: Aquaphor     ENT and Mouth Exam  Mucositis - Current: 1 - Generalized erythema  Cardiovascular  Respiratory effort: 1 - Normal - without distress  Gastrointestinal  Nausea: 0 - None     Psychosocial  Mood - Anxiety: 0 - Normal  Pain Assessment  0-10 Pain Scale: 3 (With swallowing Pt reports throat pain at a 3/10)  Pain Treatment: Gabapentin and Tylenol     Objective:   /77   Pulse 80   Wt 86.4 kg (190 lb 8 oz)   BMI 28.13 kg/m    Gen: Appears well, in no acute distress, hoarse voice  HN/Skin: Moderate erythema, no skin  breakdown, no thrush  CV/Resp: rrr, breathing comfortably on room air, no wheezing    Labs:  CBC RESULTS:   Recent Labs   Lab Test 07/15/24  0927   WBC 6.8   RBC 4.35*   HGB 14.3   HCT 43.0   MCV 99   MCH 32.9   MCHC 33.3   RDW 13.3        ELECTROLYTES:  Recent Labs   Lab Test 07/17/24  1005 07/15/24  0927 03/24/24  1038 01/12/24  1410   NA  --   --   --  140   POTASSIUM  --   --   --  3.7   CHLORIDE  --   --   --  107   LAMAR  --   --   --  9.3   CO2  --   --   --  24   BUN  --   --   --  23.0   CR  --  1.54*   < > 1.51*   GLC 93  --   --  83    < > = values in this interval not displayed.     Assessment:    Tolerating radiation therapy as expected.  All questions and concerns addressed.    Plan:   Continue current therapy.    Continue gabapentin, oral rinses  Discussed nutrition and ways to increase calorie density including higher calorie Ensure  Continue hydration-okay to drink other clear liquids besides water  Continue with Speech Therapy for ongoing swallowing assessment  Acetaminophen 1000 mg 3 times daily  Aquaphor to skin twice daily  Patient will check back with us later this week if he wishes to try Magic mouthwash prior to the weekend.  An IR referral was placed for feeding tube placement.  We will continue to work with patient but, if he needs a feeding tube towards the end of treatment, this referral will hopefully make it more expedient to get a tube when needed.  Guaifenesin 200 mg every 4 hours as needed cough    [employer] chart and setup information reviewed  Ports checked and MVCT/IGRT images checked    Carmenza Sneed MD  Radiation Oncology

## 2024-09-16 NOTE — LETTER
2024      Joshua Ennis  [address] [facility] [address]      Dear Colleague,    Thank you for referring your patient, Joshua Ennis, to the [facility] RADIATION ONCOLOGY. Please see a copy of my visit note below.    [facility] PHYSICIANS  SPECIALIZING IN BREAKTHROUGHS  Radiation Oncology    On Treatment Visit Note      Joshua Ennis      Date: Sep 16, 2024   MRN: [id number]   : 1945  Diagnosis: Larynx        ID: Mr. 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     Reason for Visit:  On Radiation Treatment Visit       Treatment Summary to Date   Larynx Current Dose: 4275/6300 Fractions:        Subjective:  Mr. Ennis continues to eat solid foods including cinnamon rolls and sandwiches as well as 5 ensures per day.  He is having even more increased pain with swallowing.  He has also started noticing difficulty with swallowing water which makes him cough but he continues to be able to swallow cranberry juice and other clear liquids.  He continues on gabapentin 400 mg 3 times daily.  He also notes a sense of a lump at the level of the larynx and increased phlegm.  He is starting to experience coughing jags and bringing up thick, stringy phlegm.    Nursing ROS:   Nutrition Alteration  Diet Type: Patient's Preference  Nutrition Note: PEG needed  Skin  Skin Reaction: 1 - Faint erythema or dry desquamation  Skin Progress: Aquaphor     ENT and Mouth Exam  Mucositis - Current: 1 - Generalized erythema  Cardiovascular  Respiratory effort: 1 - Normal - without distress  Gastrointestinal  Nausea: 0 - None     Psychosocial  Mood - Anxiety: 0 - Normal  Pain Assessment  0-10 Pain Scale: 3 (With swallowing Pt reports throat  pain at a 3/10)  Pain Treatment: Gabapentin and Tylenol     Objective:   /77   Pulse 80   Wt 86.4 kg (190 lb 8 oz)   BMI 28.13 kg/m    Gen: Appears well, in no acute distress, hoarse voice  HN/Skin: Moderate erythema, no skin breakdown, no thrush  CV/Resp: rrr, breathing comfortably on room air, no wheezing    Labs:  CBC RESULTS:   Recent Labs   Lab Test 07/15/24  0927   WBC 6.8   RBC 4.35*   HGB 14.3   HCT 43.0   MCV 99   MCH 32.9   MCHC 33.3   RDW 13.3        ELECTROLYTES:  Recent Labs   Lab Test 07/17/24  1005 07/15/24  0927 03/24/24  1038 01/12/24  1410   NA  --   --   --  140   POTASSIUM  --   --   --  3.7   CHLORIDE  --   --   --  107   LAMAR  --   --   --  9.3   CO2  --   --   --  24   BUN  --   --   --  23.0   CR  --  1.54*   < > 1.51*   GLC 93  --   --  83    < > = values in this interval not displayed.     Assessment:    Tolerating radiation therapy as expected.  All questions and concerns addressed.    Plan:   Continue current therapy.    Continue gabapentin, oral rinses  Discussed nutrition and ways to increase calorie density including higher calorie Ensure  Continue hydration-okay to drink other clear liquids besides water  Continue with Speech Therapy for ongoing swallowing assessment  Acetaminophen 1000 mg 3 times daily  Aquaphor to skin twice daily  Patient will check back with us later this week if he wishes to try Magic mouthwash prior to the weekend.  An IR referral was placed for feeding tube placement.  We will continue to work with patient but, if he needs a feeding tube towards the end of treatment, this referral will hopefully make it more expedient to get a tube when needed.  Guaifenesin 200 mg every 4 hours as needed cough    [employer] chart and setup information reviewed  Ports checked and MVCT/IGRT images checked    Carmenza Sneed MD  Radiation Oncology           Again, thank you for allowing me to participate in the care of your patient.        Sincerely,        Carmenza  ZACHARY Sneed MD

## 2024-09-17 ENCOUNTER — OFFICE VISIT (OUTPATIENT)
Dept: RADIATION ONCOLOGY | Facility: CLINIC | Age: 79
End: 2024-09-17
Attending: SURGERY
Payer: COMMERCIAL

## 2024-09-17 DIAGNOSIS — C32.9 CARCINOMA LARYNX (H): Primary | ICD-10-CM

## 2024-09-17 PROCEDURE — 77014 PR CT GUIDE FOR PLACEMENT RADIATION THERAPY FIELDS: CPT | Mod: 26 | Performed by: RADIOLOGY

## 2024-09-17 PROCEDURE — 77427 RADIATION TX MANAGEMENT X5: CPT | Performed by: RADIOLOGY

## 2024-09-17 PROCEDURE — 77412 RADIATION TX DELIVERY LVL 3: CPT | Performed by: RADIOLOGY

## 2024-09-17 PROCEDURE — 77387 GUIDANCE FOR RADJ TX DLVR: CPT | Performed by: RADIOLOGY

## 2024-09-17 PROCEDURE — 77336 RADIATION PHYSICS CONSULT: CPT | Performed by: RADIOLOGY

## 2024-09-17 NOTE — PROGRESS NOTES
"The patient has been notified of the following:      \"We have found that certain health care needs can be provided without the need for a face to face visit.  This service lets us provide the care you need with a phone conversation.       I will have full access to your [facility] medical record during this entire phone call.   I will be taking notes for your medical record.      Since this is like an office visit, we will bill your insurance company for this service.       There are potential benefits and risks of telephone visits (e.g. limits to patient confidentiality) that differ from in-person visits.?  Confidentiality still applies for telephone services, and nobody will record the visit.  It is important to be in a quiet, private space that is free of distractions (including cell phone or other devices) during the visit.??      If during the course of the call I believe a telephone visit is not appropriate, you will not be charged for this service\"     Consent has been obtained for this service by care team member: Yes     CLINICAL NUTRITION SERVICES - BRIEF NOTE   EVALUATION OF PREVIOUS PLAN OF CARE:   Visit Type: return phone - brief  Pt accompanied by: self  Referring Physician: Naren    Chemotherapy: No  Radiation: will complete on 9/27  Monitoring from previous assessment:   -Food/Fluid intake - Joshua tells me that he is still able to eat most foods other than rice as rice gets caught up.  He admits that despite eating most foods, his volume has declined.  He has started to drink more Ensure shakes, taking ~3/day.  He plans to increase this to 4-5 per day as needed.   Previous Diet Recall  Breakfast Sweet roll, 1 12 oz pepsi, 8 oz water   Lunch Ritz crackers or fast food [employer] potato cakes and sliders or skips   Dinner Chicken, mashed potaotes, +/- cooked vegetable   Snacks Ritz crackers ~1 sleeve per day   Beverages 2-3 cans of pepsi, 32 oz water daily     -Weight trends - down 4 lb x past one week "   Wt Readings from Last 10 Encounters:   09/16/24 86.4 kg (190 lb 8 oz)   09/09/24 88 kg (194 lb)   08/28/24 91.6 kg (202 lb)   08/20/24 88 kg (194 lb)   07/30/24 87.5 kg (193 lb)   07/30/24 88 kg (194 lb)   07/17/24 88.3 kg (194 lb 10.7 oz)   07/15/24 90.3 kg (199 lb)   07/11/24 90.9 kg (200 lb 4.8 oz)   06/27/24 90.3 kg (199 lb)     Previous Goals:   --Aim for 5-6 small frequent meals  --Aim for 2200kcal and 70-90g protein, 8 cups non-caffeine fluids per day  --Weight maintenance   Evaluation: Not met   Previous Nutrition Diagnosis:   No nutrition diagnosis identified at this time   Evaluation: Declining   NEW FINDINGS:   Weight loss, odynophagia   CURRENT NUTRITION DIAGNOSIS   Inadequate oral intake related to decreased ability to consume sufficient energy due to side effects of cancer therapy as evidenced by 4 lb wt loss x past one week,  dietary intake 75% estimated needs.   INTERVENTIONS   Composition of Meals and Snacks/General/healthful diet - reviewed soft/moist, high eli/high protein foods to start adding.   Medical Food Supplement - reviewed high calorie ONS to take. Encouraged to increase towards 4-5/day if PO of regular remains lower and/or continues to decline.   Goals   --Aim for 5-6 small frequent meals  --Aim for 2200kcal and 70-90g protein, 8 cups non-caffeine fluids per day  --Weight maintenance     Follow up/Monitoring: scheduled for phone call next week, 10/1 - phone call  Food intake, Fluid/beverage intake, and Weight trends    Ana Abel RD, , LD  [facility]  [phone]

## 2024-09-17 NOTE — LETTER
"9/17/2024      Joshua Ennis  [address] [facility] [address]      Dear Colleague,    Thank you for referring your patient, Joshua Ennis, to the [facility] RADIATION ONCOLOGY. Please see a copy of my visit note below.    The patient has been notified of the following:      \"We have found that certain health care needs can be provided without the need for a face to face visit.  This service lets us provide the care you need with a phone conversation.       I will have full access to your [facility] medical record during this entire phone call.   I will be taking notes for your medical record.      Since this is like an office visit, we will bill your insurance company for this service.       There are potential benefits and risks of telephone visits (e.g. limits to patient confidentiality) that differ from in-person visits.?  Confidentiality still applies for telephone services, and nobody will record the visit.  It is important to be in a quiet, private space that is free of distractions (including cell phone or other devices) during the visit.??      If during the course of the call I believe a telephone visit is not appropriate, you will not be charged for this service\"     Consent has been obtained for this service by care team member: Yes     CLINICAL NUTRITION SERVICES - BRIEF NOTE   EVALUATION OF PREVIOUS PLAN OF CARE:   Visit Type: return phone - brief  Pt accompanied by: self  Referring Physician: Naren    Chemotherapy: No  Radiation: will complete on 9/27  Monitoring from previous assessment:   -Food/Fluid intake - Joshua tells me that he is still able to eat most foods other than rice as rice gets caught up.  He admits that despite eating most foods, his volume has declined.  He has started to drink more Ensure shakes, taking ~3/day.  He plans to increase this to 4-5 per day as needed.   Previous Diet Recall  Breakfast Sweet roll, 1 12 oz pepsi, 8 oz water   Lunch Ritz crackers or " fast food [employer] potato cakes and sliders or skips   Dinner Chicken, mashed potaotes, +/- cooked vegetable   Snacks Ritz crackers ~1 sleeve per day   Beverages 2-3 cans of pepsi, 32 oz water daily     -Weight trends - down 4 lb x past one week   Wt Readings from Last 10 Encounters:   09/16/24 86.4 kg (190 lb 8 oz)   09/09/24 88 kg (194 lb)   08/28/24 91.6 kg (202 lb)   08/20/24 88 kg (194 lb)   07/30/24 87.5 kg (193 lb)   07/30/24 88 kg (194 lb)   07/17/24 88.3 kg (194 lb 10.7 oz)   07/15/24 90.3 kg (199 lb)   07/11/24 90.9 kg (200 lb 4.8 oz)   06/27/24 90.3 kg (199 lb)     Previous Goals:   --Aim for 5-6 small frequent meals  --Aim for 2200kcal and 70-90g protein, 8 cups non-caffeine fluids per day  --Weight maintenance   Evaluation: Not met   Previous Nutrition Diagnosis:   No nutrition diagnosis identified at this time   Evaluation: Declining   NEW FINDINGS:   Weight loss, odynophagia   CURRENT NUTRITION DIAGNOSIS   Inadequate oral intake related to decreased ability to consume sufficient energy due to side effects of cancer therapy as evidenced by 4 lb wt loss x past one week,  dietary intake 75% estimated needs.   INTERVENTIONS   Composition of Meals and Snacks/General/healthful diet - reviewed soft/moist, high eli/high protein foods to start adding.   Medical Food Supplement - reviewed high calorie ONS to take. Encouraged to increase towards 4-5/day if PO of regular remains lower and/or continues to decline.   Goals   --Aim for 5-6 small frequent meals  --Aim for 2200kcal and 70-90g protein, 8 cups non-caffeine fluids per day  --Weight maintenance     Follow up/Monitoring: scheduled for phone call next week, 10/1 - phone call  Food intake, Fluid/beverage intake, and Weight trends    Ana Abel RD, , LD  [facility]  [phone]        Again, thank you for allowing me to participate in the care of your patient.        Sincerely,        Ana Abel RD

## 2024-09-18 ENCOUNTER — THERAPY VISIT (OUTPATIENT)
Dept: SPEECH THERAPY | Facility: CLINIC | Age: 79
End: 2024-09-18
Payer: COMMERCIAL

## 2024-09-18 ENCOUNTER — APPOINTMENT (OUTPATIENT)
Dept: RADIATION ONCOLOGY | Facility: CLINIC | Age: 79
End: 2024-09-18
Attending: SURGERY
Payer: COMMERCIAL

## 2024-09-18 DIAGNOSIS — R13.12 OROPHARYNGEAL DYSPHAGIA: ICD-10-CM

## 2024-09-18 DIAGNOSIS — C32.0 CANCER OF GLOTTIS (H): ICD-10-CM

## 2024-09-18 DIAGNOSIS — C32.9 SQUAMOUS CELL CARCINOMA OF LARYNX (H): Primary | ICD-10-CM

## 2024-09-18 PROCEDURE — 77387 GUIDANCE FOR RADJ TX DLVR: CPT | Performed by: RADIOLOGY

## 2024-09-18 PROCEDURE — 92526 ORAL FUNCTION THERAPY: CPT | Mod: GN | Performed by: SPEECH-LANGUAGE PATHOLOGIST

## 2024-09-18 PROCEDURE — 77412 RADIATION TX DELIVERY LVL 3: CPT | Performed by: RADIOLOGY

## 2024-09-18 PROCEDURE — 77014 PR CT GUIDE FOR PLACEMENT RADIATION THERAPY FIELDS: CPT | Mod: 26 | Performed by: RADIOLOGY

## 2024-09-19 ENCOUNTER — APPOINTMENT (OUTPATIENT)
Dept: RADIATION ONCOLOGY | Facility: CLINIC | Age: 79
End: 2024-09-19
Attending: SURGERY
Payer: COMMERCIAL

## 2024-09-19 PROCEDURE — 77412 RADIATION TX DELIVERY LVL 3: CPT | Performed by: RADIOLOGY

## 2024-09-19 PROCEDURE — 77387 GUIDANCE FOR RADJ TX DLVR: CPT | Performed by: RADIOLOGY

## 2024-09-19 PROCEDURE — 77014 PR CT GUIDE FOR PLACEMENT RADIATION THERAPY FIELDS: CPT | Mod: 26 | Performed by: RADIOLOGY

## 2024-09-20 ENCOUNTER — APPOINTMENT (OUTPATIENT)
Dept: RADIATION ONCOLOGY | Facility: CLINIC | Age: 79
End: 2024-09-20
Attending: SURGERY
Payer: COMMERCIAL

## 2024-09-20 ENCOUNTER — LAB (OUTPATIENT)
Dept: LAB | Facility: CLINIC | Age: 79
End: 2024-09-20
Payer: COMMERCIAL

## 2024-09-20 DIAGNOSIS — Z79.899 HIGH RISK MEDICATIONS (NOT ANTICOAGULANTS) LONG-TERM USE: ICD-10-CM

## 2024-09-20 DIAGNOSIS — M31.30 GRANULOMATOSIS WITH POLYANGIITIS, UNSPECIFIED WHETHER RENAL INVOLVEMENT (H): ICD-10-CM

## 2024-09-20 LAB
ALBUMIN MFR UR ELPH: 19 MG/DL
ALBUMIN SERPL BCG-MCNC: 4.1 G/DL (ref 3.5–5.2)
ALBUMIN UR-MCNC: 20 MG/DL
ALT SERPL W P-5'-P-CCNC: 18 U/L (ref 0–70)
APPEARANCE UR: CLEAR
AST SERPL W P-5'-P-CCNC: 25 U/L (ref 0–45)
BASOPHILS # BLD AUTO: 0 10E3/UL (ref 0–0.2)
BASOPHILS NFR BLD AUTO: 1 %
BILIRUB UR QL STRIP: NEGATIVE
COLOR UR AUTO: YELLOW
CREAT SERPL-MCNC: 1.9 MG/DL (ref 0.67–1.17)
CREAT UR-MCNC: 269 MG/DL
CRP SERPL-MCNC: 65.9 MG/L
EGFRCR SERPLBLD CKD-EPI 2021: 36 ML/MIN/1.73M2
EOSINOPHIL # BLD AUTO: 0.2 10E3/UL (ref 0–0.7)
EOSINOPHIL NFR BLD AUTO: 2 %
ERYTHROCYTE [DISTWIDTH] IN BLOOD BY AUTOMATED COUNT: 11.6 % (ref 10–15)
ERYTHROCYTE [SEDIMENTATION RATE] IN BLOOD BY WESTERGREN METHOD: 26 MM/HR (ref 0–20)
GLUCOSE UR STRIP-MCNC: NEGATIVE MG/DL
HCT VFR BLD AUTO: 41.9 % (ref 40–53)
HGB BLD-MCNC: 13.7 G/DL (ref 13.3–17.7)
HGB UR QL STRIP: NEGATIVE
HYALINE CASTS: 7 /LPF
IMM GRANULOCYTES # BLD: 0 10E3/UL
IMM GRANULOCYTES NFR BLD: 0 %
KETONES UR STRIP-MCNC: NEGATIVE MG/DL
LEUKOCYTE ESTERASE UR QL STRIP: NEGATIVE
LYMPHOCYTES # BLD AUTO: 0.8 10E3/UL (ref 0.8–5.3)
LYMPHOCYTES NFR BLD AUTO: 10 %
MCH RBC QN AUTO: 32 PG (ref 26.5–33)
MCHC RBC AUTO-ENTMCNC: 32.7 G/DL (ref 31.5–36.5)
MCV RBC AUTO: 98 FL (ref 78–100)
MONOCYTES # BLD AUTO: 0.9 10E3/UL (ref 0–1.3)
MONOCYTES NFR BLD AUTO: 11 %
MUCOUS THREADS #/AREA URNS LPF: PRESENT /LPF
NEUTROPHILS # BLD AUTO: 5.9 10E3/UL (ref 1.6–8.3)
NEUTROPHILS NFR BLD AUTO: 75 %
NITRATE UR QL: NEGATIVE
NRBC # BLD AUTO: 0 10E3/UL
NRBC BLD AUTO-RTO: 0 /100
PH UR STRIP: 5.5 [PH] (ref 5–7)
PLATELET # BLD AUTO: 240 10E3/UL (ref 150–450)
PROT/CREAT 24H UR: 0.07 MG/MG CR (ref 0–0.2)
RBC # BLD AUTO: 4.28 10E6/UL (ref 4.4–5.9)
RBC URINE: 0 /HPF
SP GR UR STRIP: 1.02 (ref 1–1.03)
SQUAMOUS EPITHELIAL: <1 /HPF
UROBILINOGEN UR STRIP-MCNC: 2 MG/DL
WBC # BLD AUTO: 7.8 10E3/UL (ref 4–11)
WBC URINE: 1 /HPF

## 2024-09-20 PROCEDURE — 84450 TRANSFERASE (AST) (SGOT): CPT | Performed by: PATHOLOGY

## 2024-09-20 PROCEDURE — 82565 ASSAY OF CREATININE: CPT | Performed by: PATHOLOGY

## 2024-09-20 PROCEDURE — 82040 ASSAY OF SERUM ALBUMIN: CPT | Performed by: PATHOLOGY

## 2024-09-20 PROCEDURE — 83516 IMMUNOASSAY NONANTIBODY: CPT | Performed by: INTERNAL MEDICINE

## 2024-09-20 PROCEDURE — 36415 COLL VENOUS BLD VENIPUNCTURE: CPT | Performed by: PATHOLOGY

## 2024-09-20 PROCEDURE — 77014 PR CT GUIDE FOR PLACEMENT RADIATION THERAPY FIELDS: CPT | Mod: 26 | Performed by: RADIOLOGY

## 2024-09-20 PROCEDURE — 81001 URINALYSIS AUTO W/SCOPE: CPT | Performed by: PATHOLOGY

## 2024-09-20 PROCEDURE — 86140 C-REACTIVE PROTEIN: CPT | Performed by: PATHOLOGY

## 2024-09-20 PROCEDURE — 99000 SPECIMEN HANDLING OFFICE-LAB: CPT | Performed by: PATHOLOGY

## 2024-09-20 PROCEDURE — 83876 ASSAY MYELOPEROXIDASE: CPT | Performed by: INTERNAL MEDICINE

## 2024-09-20 PROCEDURE — 77387 GUIDANCE FOR RADJ TX DLVR: CPT | Performed by: RADIOLOGY

## 2024-09-20 PROCEDURE — 86037 ANCA TITER EACH ANTIBODY: CPT | Performed by: INTERNAL MEDICINE

## 2024-09-20 PROCEDURE — 84460 ALANINE AMINO (ALT) (SGPT): CPT | Performed by: PATHOLOGY

## 2024-09-20 PROCEDURE — 77412 RADIATION TX DELIVERY LVL 3: CPT | Performed by: RADIOLOGY

## 2024-09-20 PROCEDURE — 85652 RBC SED RATE AUTOMATED: CPT | Performed by: PATHOLOGY

## 2024-09-20 PROCEDURE — 85025 COMPLETE CBC W/AUTO DIFF WBC: CPT | Performed by: PATHOLOGY

## 2024-09-20 PROCEDURE — 84156 ASSAY OF PROTEIN URINE: CPT | Performed by: PATHOLOGY

## 2024-09-23 ENCOUNTER — HOSPITAL ENCOUNTER (OUTPATIENT)
Dept: GENERAL RADIOLOGY | Facility: CLINIC | Age: 79
Discharge: HOME | End: 2024-09-23
Attending: RADIOLOGY | Admitting: RADIOLOGY
Payer: COMMERCIAL

## 2024-09-23 ENCOUNTER — APPOINTMENT (OUTPATIENT)
Dept: RADIATION ONCOLOGY | Facility: CLINIC | Age: 79
End: 2024-09-23
Attending: SURGERY
Payer: COMMERCIAL

## 2024-09-23 VITALS
WEIGHT: 188 LBS | HEART RATE: 80 BPM | SYSTOLIC BLOOD PRESSURE: 132 MMHG | BODY MASS INDEX: 27.76 KG/M2 | DIASTOLIC BLOOD PRESSURE: 78 MMHG

## 2024-09-23 DIAGNOSIS — L58.0 ACUTE RADIATION DERMATITIS: Primary | ICD-10-CM

## 2024-09-23 DIAGNOSIS — C32.9 LARYNGEAL CANCER (H): ICD-10-CM

## 2024-09-23 PROCEDURE — 77387 GUIDANCE FOR RADJ TX DLVR: CPT | Performed by: RADIOLOGY

## 2024-09-23 PROCEDURE — G6002 STEREOSCOPIC X-RAY GUIDANCE: HCPCS | Mod: 26 | Performed by: RADIOLOGY

## 2024-09-23 PROCEDURE — 77412 RADIATION TX DELIVERY LVL 3: CPT | Performed by: RADIOLOGY

## 2024-09-23 PROCEDURE — 71046 X-RAY EXAM CHEST 2 VIEWS: CPT

## 2024-09-23 PROCEDURE — 71046 X-RAY EXAM CHEST 2 VIEWS: CPT | Mod: 26 | Performed by: RADIOLOGY

## 2024-09-23 RX ORDER — MUPIROCIN CALCIUM 20 MG/G
CREAM TOPICAL 3 TIMES DAILY
Qty: 30 G | Refills: 0 | Status: SHIPPED | OUTPATIENT
Start: 2024-09-23

## 2024-09-23 RX ORDER — SILVER SULFADIAZINE 10 MG/G
CREAM TOPICAL 2 TIMES DAILY
Qty: 400 G | Refills: 0 | Status: SHIPPED | OUTPATIENT
Start: 2024-09-23

## 2024-09-23 NOTE — LETTER
2024      Joshua Ennis  [address] [facility] [address]      Dear Colleague,    Thank you for referring your patient, Joshua Ennis, to the [facility] RADIATION ONCOLOGY. Please see a copy of my visit note below.    [facility]  SPECIALIZING IN BREAKTHROUGHS  Radiation Oncology    On Treatment Visit Note      Joshua Ennis      Date: Sep 23, 2024   MRN: [id number]   : 1945  Diagnosis: Larynx        ID: Mr. 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     Reason for Visit:  On Radiation Treatment Visit       Treatment Summary to Date   Larynx Current Dose: 5400/6300 Fractions:        Subjective:  Mr. Ennis has now altered his diet to softer foods and is drinking 3 ensures per day.  He is able to swallow water with a chin tuck.  He is having even more increased pain with swallowing.  He continues to cough up phlegm and is bothered by coughing jags in order to get the phlegm up from the level of his larynx.  He continues on gabapentin 400 mg 3 times daily.  He also notes a sense of a lump at the level of the larynx and increased phlegm.  The guaifenesin has been somewhat helpful with his phlegm management.  His wife is concerned because she is hearing more wheezing which is difficult to tell if it is from his upper airway or lungs.    Nursing ROS:   Nutrition Alteration  Diet Type: Patient's Preference  Nutrition Note: PEG needed  Skin  Skin Reaction: 1 - Faint erythema or dry desquamation  Skin Progress: Aquaphor     ENT and Mouth Exam  Mucositis - Current: 1 - Generalized erythema  Cardiovascular  Respiratory effort: 1 - Normal - without distress  Gastrointestinal  Nausea: 0 - None      Psychosocial  Mood - Anxiety: 0 - Normal  Pain Assessment  0-10 Pain Scale: 3 (With swallowing Pt reports throat pain at a 3/10)  Pain Treatment: Gabapentin and Tylenol     Objective:   /78   Pulse 80   Wt 85.3 kg (188 lb)   BMI 27.76 kg/m    Gen: Appears well, in no acute distress, hoarse voice  HN/Skin: Moderate erythema, with patchy moist desquamation, no thrush  CV/Resp: rrr, breathing comfortably on room air, bilateral lower lobe expiratory wheeze although difficult to discern between upper airway noise and lung.    Labs:  CBC RESULTS:   Recent Labs   Lab Test 09/20/24  1342   WBC 7.8   RBC 4.28*   HGB 13.7   HCT 41.9   MCV 98   MCH 32.0   MCHC 32.7   RDW 11.6        ELECTROLYTES:  Recent Labs   Lab Test 09/20/24  1342 07/17/24  1005 03/24/24  1038 01/12/24  1410   NA  --   --   --  140   POTASSIUM  --   --   --  3.7   CHLORIDE  --   --   --  107   LAMAR  --   --   --  9.3   CO2  --   --   --  24   BUN  --   --   --  23.0   CR 1.90*  --    < > 1.51*   GLC  --  93  --  83    < > = values in this interval not displayed.     Assessment:    Tolerating radiation therapy as expected.  All questions and concerns addressed.    Plan:   Continue current therapy.  Completes treatment later this week.  Continue gabapentin, oral rinses.  Discussed gabapentin taper.  Discussed nutrition and ways to increase calorie density including higher calorie Ensure  Continue hydration-okay to drink other clear liquids besides water  Continue with Speech Therapy for ongoing swallowing assessment  Acetaminophen 1000 mg 3 times daily  Aquaphor to skin twice daily  Patient will check back with us later this week if he wishes to try Magic mouthwash prior to the weekend.  An IR referral was placed for feeding tube placement.  We will continue to work with patient but, if he needs a feeding tube towards the end of treatment, this referral will hopefully make it more expedient to get a tube when needed.  Guaifenesin 200 mg  every 4 hours as needed cough  Bactroban cream applied 3 times daily to the area of radiation dermatitis followed by Aquaphor  After his last radiation fraction, he can start using Silvadene 3 times daily until the skin has healed up with point and return to using Aquaphor.  I have ordered a chest x-ray to evaluate for pneumonia.    [employer] chart and setup information reviewed  Ports checked and MVCT/IGRT images checked    Carmenza Sneed MD  Radiation Oncology           Again, thank you for allowing me to participate in the care of your patient.        Sincerely,        Carmenza Sneed MD

## 2024-09-24 ENCOUNTER — APPOINTMENT (OUTPATIENT)
Dept: RADIATION ONCOLOGY | Facility: CLINIC | Age: 79
End: 2024-09-24
Attending: SURGERY
Payer: COMMERCIAL

## 2024-09-24 DIAGNOSIS — C32.9 LARYNGEAL CANCER (H): ICD-10-CM

## 2024-09-24 DIAGNOSIS — J18.9 PNEUMONIA OF RIGHT LUNG DUE TO INFECTIOUS ORGANISM, UNSPECIFIED PART OF LUNG: Primary | ICD-10-CM

## 2024-09-24 LAB
ANCA AB PATTERN SER IF-IMP: ABNORMAL
C-ANCA TITR SER IF: ABNORMAL {TITER}
MYELOPEROXIDASE AB SER IA-ACNC: 0.5 U/ML
MYELOPEROXIDASE AB SER IA-ACNC: NEGATIVE
PROTEINASE3 AB SER IA-ACNC: 34 U/ML
PROTEINASE3 AB SER IA-ACNC: POSITIVE

## 2024-09-24 PROCEDURE — 77387 GUIDANCE FOR RADJ TX DLVR: CPT | Performed by: RADIOLOGY

## 2024-09-24 PROCEDURE — 77412 RADIATION TX DELIVERY LVL 3: CPT | Performed by: RADIOLOGY

## 2024-09-24 PROCEDURE — 77427 RADIATION TX MANAGEMENT X5: CPT | Performed by: RADIOLOGY

## 2024-09-24 PROCEDURE — 77336 RADIATION PHYSICS CONSULT: CPT | Performed by: RADIOLOGY

## 2024-09-24 PROCEDURE — G6002 STEREOSCOPIC X-RAY GUIDANCE: HCPCS | Mod: 26 | Performed by: RADIOLOGY

## 2024-09-24 RX ORDER — AMOXICILLIN AND CLAVULANATE POTASSIUM 400; 57 MG/5ML; MG/5ML
875 POWDER, FOR SUSPENSION ORAL EVERY 12 HOURS
Qty: 218.8 ML | Refills: 0 | Status: SHIPPED | OUTPATIENT
Start: 2024-09-24 | End: 2024-10-04

## 2024-09-24 NOTE — CONFIDENTIAL NOTE
Oncology Nutrition Services:     Called Joshua and his spouse, Cristina today to follow-up on his nutrition during radiation treatment.   Left detailed VM indicating reason for phone call. Advised to return call at their convenience if they have any further nutrition related questions or concerns as he completes radiation this week, 9/27.  RD contact number provided.     Ana Abel RD, , LD  [facility]  [phone]

## 2024-09-24 NOTE — PROGRESS NOTES
[facility]  SPECIALIZING IN BREAKTHROUGHS  Radiation Oncology    On Treatment Visit Note      Joshua Ennis      Date: Sep 23, 2024   MRN: [id number]   : 1945  Diagnosis: Larynx        ID: Mr. 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     Reason for Visit:  On Radiation Treatment Visit       Treatment Summary to Date   Larynx Current Dose: 5400/6300 Fractions:        Subjective:  Mr. Ennis has now altered his diet to softer foods and is drinking 3 ensures per day.  He is able to swallow water with a chin tuck.  He is having even more increased pain with swallowing.  He continues to cough up phlegm and is bothered by coughing jags in order to get the phlegm up from the level of his larynx.  He continues on gabapentin 400 mg 3 times daily.  He also notes a sense of a lump at the level of the larynx and increased phlegm.  The guaifenesin has been somewhat helpful with his phlegm management.  His wife is concerned because she is hearing more wheezing which is difficult to tell if it is from his upper airway or lungs.    Nursing ROS:   Nutrition Alteration  Diet Type: Patient's Preference  Nutrition Note: PEG needed  Skin  Skin Reaction: 1 - Faint erythema or dry desquamation  Skin Progress: Aquaphor     ENT and Mouth Exam  Mucositis - Current: 1 - Generalized erythema  Cardiovascular  Respiratory effort: 1 - Normal - without distress  Gastrointestinal  Nausea: 0 - None     Psychosocial  Mood - Anxiety: 0 - Normal  Pain Assessment  0-10 Pain Scale: 3 (With swallowing Pt reports throat pain at a 3/10)  Pain Treatment: Gabapentin and Tylenol     Objective:   /78   Pulse 80   Wt 85.3 kg (188 lb)   BMI 27.76 kg/m    Gen:  Appears well, in no acute distress, hoarse voice  HN/Skin: Moderate erythema, with patchy moist desquamation, no thrush  CV/Resp: rrr, breathing comfortably on room air, bilateral lower lobe expiratory wheeze although difficult to discern between upper airway noise and lung.    Labs:  CBC RESULTS:   Recent Labs   Lab Test 09/20/24  1342   WBC 7.8   RBC 4.28*   HGB 13.7   HCT 41.9   MCV 98   MCH 32.0   MCHC 32.7   RDW 11.6        ELECTROLYTES:  Recent Labs   Lab Test 09/20/24  1342 07/17/24  1005 03/24/24  1038 01/12/24  1410   NA  --   --   --  140   POTASSIUM  --   --   --  3.7   CHLORIDE  --   --   --  107   LAMAR  --   --   --  9.3   CO2  --   --   --  24   BUN  --   --   --  23.0   CR 1.90*  --    < > 1.51*   GLC  --  93  --  83    < > = values in this interval not displayed.     Assessment:    Tolerating radiation therapy as expected.  All questions and concerns addressed.    Plan:   Continue current therapy.  Completes treatment later this week.  Continue gabapentin, oral rinses.  Discussed gabapentin taper.  Discussed nutrition and ways to increase calorie density including higher calorie Ensure  Continue hydration-okay to drink other clear liquids besides water  Continue with Speech Therapy for ongoing swallowing assessment  Acetaminophen 1000 mg 3 times daily  Aquaphor to skin twice daily  Patient will check back with us later this week if he wishes to try Magic mouthwash prior to the weekend.  An IR referral was placed for feeding tube placement.  We will continue to work with patient but, if he needs a feeding tube towards the end of treatment, this referral will hopefully make it more expedient to get a tube when needed.  Guaifenesin 200 mg every 4 hours as needed cough  Bactroban cream applied 3 times daily to the area of radiation dermatitis followed by Aquaphor  After his last radiation fraction, he can start using Silvadene 3 times daily until the skin has healed up with point and return to  using Aquaphor.  I have ordered a chest x-ray to evaluate for pneumonia.    [facility] chart and setup information reviewed  Ports checked and MVCT/IGRT images checked    Carmenza Sneed MD  Radiation Oncology

## 2024-09-24 NOTE — LETTER
9/24/2024      Joshua Ennis  [address] [facility] [address]      Dear Colleague,    Thank you for referring your patient, Joshua Ennis, to the [facility] RADIATION ONCOLOGY. Please see a copy of my visit note below.    No notes on file    Again, thank you for allowing me to participate in the care of your patient.        Sincerely,        Ana Abel RD

## 2024-09-24 NOTE — PROGRESS NOTES
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   Chest xray was ordered yesterday by Dr. Carmenza Sneed. Patient of wheezing, increase oral secretions, and fever over the weekend. Chest xray showed:   IMPRESSION:   1. Basilar lung opacities on the right., May represent atelectasis  versus infectious/inflammatory process.  2. Mediastinal/hilar lymphadenopathy    PLAN:   Pneumonia, unspecified:   Amoxicillin-clavulanate (Augmentin) 400-57 mg/ml suspension. Take 10.94 mls (875mg) by mouth every 12 hours for 10 days.     DENISSE Agee, CNP  Department of Radiation Oncology  [facility]   Pager # [phone]

## 2024-09-25 ENCOUNTER — APPOINTMENT (OUTPATIENT)
Dept: RADIATION ONCOLOGY | Facility: CLINIC | Age: 79
End: 2024-09-25
Attending: SURGERY
Payer: COMMERCIAL

## 2024-09-25 DIAGNOSIS — Z79.899 HIGH RISK MEDICATIONS (NOT ANTICOAGULANTS) LONG-TERM USE: ICD-10-CM

## 2024-09-25 DIAGNOSIS — M31.30 GRANULOMATOSIS WITH POLYANGIITIS, UNSPECIFIED WHETHER RENAL INVOLVEMENT (H): Primary | ICD-10-CM

## 2024-09-25 PROCEDURE — 77387 GUIDANCE FOR RADJ TX DLVR: CPT | Performed by: RADIOLOGY

## 2024-09-25 PROCEDURE — 77014 PR CT GUIDE FOR PLACEMENT RADIATION THERAPY FIELDS: CPT | Mod: 26 | Performed by: RADIOLOGY

## 2024-09-25 PROCEDURE — 77412 RADIATION TX DELIVERY LVL 3: CPT | Performed by: RADIOLOGY

## 2024-09-26 ENCOUNTER — APPOINTMENT (OUTPATIENT)
Dept: RADIATION ONCOLOGY | Facility: CLINIC | Age: 79
End: 2024-09-26
Attending: SURGERY
Payer: COMMERCIAL

## 2024-09-26 PROCEDURE — 77387 GUIDANCE FOR RADJ TX DLVR: CPT | Performed by: RADIOLOGY

## 2024-09-26 PROCEDURE — 77412 RADIATION TX DELIVERY LVL 3: CPT | Performed by: RADIOLOGY

## 2024-09-26 PROCEDURE — 77014 PR CT GUIDE FOR PLACEMENT RADIATION THERAPY FIELDS: CPT | Mod: 26 | Performed by: RADIOLOGY

## 2024-09-27 ENCOUNTER — THERAPY VISIT (OUTPATIENT)
Dept: SPEECH THERAPY | Facility: CLINIC | Age: 79
End: 2024-09-27
Payer: COMMERCIAL

## 2024-09-27 ENCOUNTER — APPOINTMENT (OUTPATIENT)
Dept: RADIATION ONCOLOGY | Facility: CLINIC | Age: 79
End: 2024-09-27
Attending: SURGERY
Payer: COMMERCIAL

## 2024-09-27 DIAGNOSIS — R13.12 OROPHARYNGEAL DYSPHAGIA: ICD-10-CM

## 2024-09-27 DIAGNOSIS — C32.0 CANCER OF GLOTTIS (H): ICD-10-CM

## 2024-09-27 DIAGNOSIS — C32.9 SQUAMOUS CELL CARCINOMA OF LARYNX (H): Primary | ICD-10-CM

## 2024-09-27 PROCEDURE — 77412 RADIATION TX DELIVERY LVL 3: CPT | Performed by: RADIOLOGY

## 2024-09-27 PROCEDURE — 77014 PR CT GUIDE FOR PLACEMENT RADIATION THERAPY FIELDS: CPT | Mod: 26 | Performed by: RADIOLOGY

## 2024-09-27 PROCEDURE — 77387 GUIDANCE FOR RADJ TX DLVR: CPT | Performed by: RADIOLOGY

## 2024-09-27 PROCEDURE — 77336 RADIATION PHYSICS CONSULT: CPT | Performed by: RADIOLOGY

## 2024-09-27 PROCEDURE — 92526 ORAL FUNCTION THERAPY: CPT | Mod: GN | Performed by: SPEECH-LANGUAGE PATHOLOGIST

## 2024-09-30 ENCOUNTER — TELEPHONE (OUTPATIENT)
Dept: RHEUMATOLOGY | Facility: CLINIC | Age: 79
End: 2024-09-30
Payer: COMMERCIAL

## 2024-09-30 NOTE — TELEPHONE ENCOUNTER
Unable to contact patient via phone. Sent [employer] message with recommendations from Dr. Sanchez. Will try calling again.     Jessica Zheng RN  Adult Rheumatology Clinic

## 2024-09-30 NOTE — TELEPHONE ENCOUNTER
----- Message from Ede Sanchez sent at 9/29/2024  8:58 AM CDT -----  Regarding: RE: question about restarting medications  Thank you for the note.    GFR is down and is being treated for infection. If repeat labs look ok, could resume methotrexate. Jessica, would you please let them know?    Thank you!  ----- Message -----  From: Clementina Jones, SLP  Sent: 9/27/2024   2:17 PM CDT  To: Ede Sanchez MD  Subject: question about restarting medications            Hi Dr. Sanchez,    I saw Joshua today and his significant other was asking me about when to start Methotrexate after his radiation therapy?    They asked if I would send a message to you.     Is your team able to reach out to him?    He finished radiation today.      Thank you!  Clementina

## 2024-10-04 ENCOUNTER — DOCUMENTATION ONLY (OUTPATIENT)
Dept: OTOLARYNGOLOGY | Facility: CLINIC | Age: 79
End: 2024-10-04
Payer: COMMERCIAL

## 2024-10-04 ENCOUNTER — MYC REFILL (OUTPATIENT)
Dept: OTOLARYNGOLOGY | Facility: CLINIC | Age: 79
End: 2024-10-04
Payer: COMMERCIAL

## 2024-10-04 DIAGNOSIS — C32.9 LARYNGEAL CANCER (H): ICD-10-CM

## 2024-10-04 RX ORDER — PANTOPRAZOLE SODIUM 40 MG/1
40 TABLET, DELAYED RELEASE ORAL DAILY
Qty: 60 TABLET | Refills: 1 | Status: SHIPPED | OUTPATIENT
Start: 2024-10-04 | End: 2024-10-31

## 2024-10-04 NOTE — PROGRESS NOTES
Refill request form for pantoprazole faxed to [facility] pharmacy. Fax number [phone]. 1 pg faxed.

## 2024-10-08 ENCOUNTER — TELEPHONE (OUTPATIENT)
Dept: RHEUMATOLOGY | Facility: CLINIC | Age: 79
End: 2024-10-08
Payer: COMMERCIAL

## 2024-10-08 NOTE — TELEPHONE ENCOUNTER
Left Voicemail (1st Attempt) and Sent Mychart (1st Attempt) for the patient to call back and schedule the following:    Appointment type: Return Rheumatology  Provider: Dr. Sanchez  Return date: Reschedule 1/10 appt to next availability and add to waitlist  Specialty phone number: [phone]  Additional appointment(s) needed: NA  Additonal Notes: 1st reschedule attempt. Reschedule 1/10 appt to next available and add to waitlist

## 2024-10-14 ENCOUNTER — VIRTUAL VISIT (OUTPATIENT)
Dept: SPEECH THERAPY | Facility: CLINIC | Age: 79
End: 2024-10-14
Payer: COMMERCIAL

## 2024-10-14 DIAGNOSIS — R13.12 OROPHARYNGEAL DYSPHAGIA: Primary | ICD-10-CM

## 2024-10-14 DIAGNOSIS — C32.0 CANCER OF GLOTTIS (H): ICD-10-CM

## 2024-10-14 DIAGNOSIS — C32.9 SQUAMOUS CELL CARCINOMA OF LARYNX (H): ICD-10-CM

## 2024-10-14 PROCEDURE — 92526 ORAL FUNCTION THERAPY: CPT | Mod: GN | Performed by: SPEECH-LANGUAGE PATHOLOGIST

## 2024-10-15 ENCOUNTER — HOSPITAL ENCOUNTER (EMERGENCY)
Facility: CLINIC | Age: 79
Discharge: HOME | End: 2024-10-15
Attending: FAMILY MEDICINE | Admitting: FAMILY MEDICINE
Payer: COMMERCIAL

## 2024-10-15 ENCOUNTER — PATIENT OUTREACH (OUTPATIENT)
Dept: OTOLARYNGOLOGY | Facility: CLINIC | Age: 79
End: 2024-10-15
Payer: COMMERCIAL

## 2024-10-15 ENCOUNTER — APPOINTMENT (OUTPATIENT)
Dept: CT IMAGING | Facility: CLINIC | Age: 79
End: 2024-10-15
Attending: FAMILY MEDICINE
Payer: COMMERCIAL

## 2024-10-15 VITALS
RESPIRATION RATE: 23 BRPM | OXYGEN SATURATION: 99 % | WEIGHT: 177.4 LBS | DIASTOLIC BLOOD PRESSURE: 84 MMHG | HEART RATE: 74 BPM | TEMPERATURE: 97.5 F | BODY MASS INDEX: 26.2 KG/M2 | SYSTOLIC BLOOD PRESSURE: 145 MMHG

## 2024-10-15 DIAGNOSIS — J38.6 GLOTTIC STENOSIS: ICD-10-CM

## 2024-10-15 LAB
ANION GAP SERPL CALCULATED.3IONS-SCNC: 9 MMOL/L (ref 7–15)
BASOPHILS # BLD AUTO: 0 10E3/UL (ref 0–0.2)
BASOPHILS NFR BLD AUTO: 1 %
BUN SERPL-MCNC: 28.3 MG/DL (ref 8–23)
CALCIUM SERPL-MCNC: 9.3 MG/DL (ref 8.8–10.4)
CHLORIDE SERPL-SCNC: 104 MMOL/L (ref 98–107)
CREAT SERPL-MCNC: 1.44 MG/DL (ref 0.67–1.17)
EGFRCR SERPLBLD CKD-EPI 2021: 50 ML/MIN/1.73M2
EOSINOPHIL # BLD AUTO: 0.3 10E3/UL (ref 0–0.7)
EOSINOPHIL NFR BLD AUTO: 5 %
ERYTHROCYTE [DISTWIDTH] IN BLOOD BY AUTOMATED COUNT: 11.9 % (ref 10–15)
GLUCOSE SERPL-MCNC: 90 MG/DL (ref 70–99)
HCO3 SERPL-SCNC: 26 MMOL/L (ref 22–29)
HCT VFR BLD AUTO: 40.6 % (ref 40–53)
HGB BLD-MCNC: 13.6 G/DL (ref 13.3–17.7)
IMM GRANULOCYTES # BLD: 0 10E3/UL
IMM GRANULOCYTES NFR BLD: 0 %
LYMPHOCYTES # BLD AUTO: 1 10E3/UL (ref 0.8–5.3)
LYMPHOCYTES NFR BLD AUTO: 16 %
MCH RBC QN AUTO: 32.3 PG (ref 26.5–33)
MCHC RBC AUTO-ENTMCNC: 33.5 G/DL (ref 31.5–36.5)
MCV RBC AUTO: 96 FL (ref 78–100)
MONOCYTES # BLD AUTO: 0.7 10E3/UL (ref 0–1.3)
MONOCYTES NFR BLD AUTO: 11 %
NEUTROPHILS # BLD AUTO: 4 10E3/UL (ref 1.6–8.3)
NEUTROPHILS NFR BLD AUTO: 67 %
NRBC # BLD AUTO: 0 10E3/UL
NRBC BLD AUTO-RTO: 0 /100
PLATELET # BLD AUTO: 190 10E3/UL (ref 150–450)
POTASSIUM SERPL-SCNC: 4.7 MMOL/L (ref 3.4–5.3)
RBC # BLD AUTO: 4.21 10E6/UL (ref 4.4–5.9)
SODIUM SERPL-SCNC: 139 MMOL/L (ref 135–145)
WBC # BLD AUTO: 6 10E3/UL (ref 4–11)

## 2024-10-15 PROCEDURE — 250N000009 HC RX 250: Performed by: FAMILY MEDICINE

## 2024-10-15 PROCEDURE — 80048 BASIC METABOLIC PNL TOTAL CA: CPT | Performed by: FAMILY MEDICINE

## 2024-10-15 PROCEDURE — 70491 CT SOFT TISSUE NECK W/DYE: CPT

## 2024-10-15 PROCEDURE — 250N000011 HC RX IP 250 OP 636: Performed by: FAMILY MEDICINE

## 2024-10-15 PROCEDURE — 85025 COMPLETE CBC W/AUTO DIFF WBC: CPT | Performed by: FAMILY MEDICINE

## 2024-10-15 PROCEDURE — 99285 EMERGENCY DEPT VISIT HI MDM: CPT | Mod: 25

## 2024-10-15 PROCEDURE — 99284 EMERGENCY DEPT VISIT MOD MDM: CPT | Performed by: FAMILY MEDICINE

## 2024-10-15 PROCEDURE — 71260 CT THORAX DX C+: CPT

## 2024-10-15 PROCEDURE — 36415 COLL VENOUS BLD VENIPUNCTURE: CPT | Performed by: FAMILY MEDICINE

## 2024-10-15 RX ORDER — IOPAMIDOL 755 MG/ML
115 INJECTION, SOLUTION INTRAVASCULAR ONCE
Status: COMPLETED | OUTPATIENT
Start: 2024-10-15 | End: 2024-10-15

## 2024-10-15 RX ADMIN — SODIUM CHLORIDE 80 ML: 9 INJECTION, SOLUTION INTRAVENOUS at 18:28

## 2024-10-15 RX ADMIN — IOPAMIDOL 115 ML: 755 INJECTION, SOLUTION INTRAVENOUS at 18:28

## 2024-10-15 ASSESSMENT — ACTIVITIES OF DAILY LIVING (ADL)
ADLS_ACUITY_SCORE: 35

## 2024-10-15 ASSESSMENT — COLUMBIA-SUICIDE SEVERITY RATING SCALE - C-SSRS
1. IN THE PAST MONTH, HAVE YOU WISHED YOU WERE DEAD OR WISHED YOU COULD GO TO SLEEP AND NOT WAKE UP?: NO
6. HAVE YOU EVER DONE ANYTHING, STARTED TO DO ANYTHING, OR PREPARED TO DO ANYTHING TO END YOUR LIFE?: NO
2. HAVE YOU ACTUALLY HAD ANY THOUGHTS OF KILLING YOURSELF IN THE PAST MONTH?: NO

## 2024-10-15 NOTE — ED TRIAGE NOTES
Hx of vocal cord cancer. Recent diagnoses of pneumonia. Concerns for worsening pneumonia. Pt here with shortness of breath, cough, congestion, coughing up yellow mucus.

## 2024-10-15 NOTE — PROGRESS NOTES
Called patient SO to talk about breathing concerns. Per patient SO, the patient has raddling while breathing and has been coughing up think yellow secreations to the point where it feels like he is chocking on them. Patient also reports severe fatigue, that affects AODL. Patient finished abx about 2 weeks ago. Given breathing and secreations writer recommended patient go to urgent care for a CXR to see if the pneumonia was not resolved. Patient SO was agreeable, and agreed to follow up scheduled next week with ENT care team. Patient will reach out after urgent care and if the patient/family have any questions or concerns. Nina Cardenas RN on 10/15/2024 at 10:11 AM

## 2024-10-15 NOTE — ED PROVIDER NOTES
"  HPI   Patient is a 79-year-old male presenting with increasing difficulty with breathing, removing chest congestion, and increasing \"loudness of breathing.\"  Per my chart review, the patient has a known history of vocal cord cancer.  He has had radiation treatment.  He has had web takedown management as well.  He is scheduled to see his throat doctor next week because of the problems above.  However, she recommended that the patient be sent to the ED for evaluation of his chest especially, given his history of pneumonia diagnosis about a month ago.    The patient has increasing chest congestion.  He denies shortness of breath.  However, he does have increased difficulty breathing when trying to cough up secretions.  He feels like he is choking when he does this.  The secretions are white, green, yellow.  No chest pain or back pain.  No fever.  His voice is raspy and this has been the case since starting radiation management.  The patient's wife is concerned about increasing difficulty with breathing in general as well.  She tells me that he does not talk much but it seems like overall his airway is worse.      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     SPECIMEN(S):   A: Cecal polyp   B: Colon polyp at 30 cm "     FINAL DIAGNOSIS:   A.  Cecum, mucosal biopsies:   - Tubular adenoma.   - Negative for high-grade dysplasia and malignancy.     B.  Colon at 30 cm, mucosal biopsies:   - Tubular adenoma and a fragment of normal colonic mucosa.   - Negative for high-grade dysplasia and malignancy.     Electronically signed out by:     Yuliet Melissa M.D.      CKD (chronic kidney disease) stage 3, GFR 30-59 ml/min (H) 06/18/2014     Priority: Medium    Granulomatosis with polyangiitis (H) 09/12/2013     Priority: Medium     Diagnosis updated by automated process. Provider to review and confirm.      Encounter for long-term (current) use of steroids 07/30/2013     Priority: Medium    Anemia 07/10/2013     Priority: Medium    Hypertension, renal 05/20/2013     Priority: Medium    GERD (gastroesophageal reflux disease) 04/26/2013     Priority: Medium    Idiopathic progressive polyneuropathy 04/11/2013     Priority: Medium     Class: Acute    CARDIOVASCULAR SCREENING; LDL GOAL LESS THAN 160 07/06/2012     Priority: Medium    Tobacco abuse 04/16/2009     Priority: Medium     3/4 pack/day  X 25 years         Past Medical History:    Past Medical History:   Diagnosis Date    Anemia     Arthritis     Autoimmune disease (H)     CKD (chronic kidney disease) stage 3, GFR 30-59 ml/min (H)     Gastroesophageal reflux disease with esophagitis     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    Granulomatosis with polyangiitis, unspecified whether renal involvement (H)     Hearing problem     History of colonic polyps 04/18/2018    Hoarseness     Hypertension     Idiopathic progressive polyneuropathy     Kidney problem     Laryngeal cancer (H)     Malignant melanoma (H)     Malignant neoplasm (H)     Squamous cell carcinoma     Russo syndrome      Past Surgical History:    Past Surgical History:   Procedure Laterality Date    BIOPSY  11/2011    melanoma on back    DISSECT LYMPH NODE INGUINAL  3/1/2013    Procedure: DISSECT LYMPH NODE INGUINAL;  Bilateral  Inguinal Lymph Node Biopsy.;  Surgeon: Tariq Acosta MD;  Location: [facility]    ESOPHAGOSCOPY, GASTROSCOPY, DUODENOSCOPY (EGD), COMBINED  7/5/2013    Procedure: COMBINED ESOPHAGOSCOPY, GASTROSCOPY, DUODENOSCOPY (EGD);  Gastroscopy;  Surgeon: Tariq Acosta MD;  Location: [facility]    HERNIORRHAPHY INGUINAL  8/6/2012    Procedure: HERNIORRHAPHY INGUINAL;  Open Repair Left Inguinal Hernia;  Surgeon: Jerad Baker MD;  Location: [facility]    INJECT STEROID (LOCATION) N/A 8/30/2023    Procedure: steroid injection;  Surgeon: Josephine Malone MD;  Location: [facility]    INJECT STEROID (LOCATION) N/A 9/13/2023    Procedure: steroid injection;  Surgeon: Josephine Malone MD;  Location: [facility]    INJECT STEROID (LOCATION) N/A 10/25/2023    Procedure: steroid injection, stent placement, and biopsy;  Surgeon: Josephine Malone MD;  Location: [facility]    INJECT STEROID (LOCATION) N/A 12/1/2023    Procedure: steroid injection;  Surgeon: Josephine Malone MD;  Location: [facility]    LARYNGOSCOPY WITH MICROSCOPE N/A 9/13/2023    Procedure: MICROLARYNGOSCOPY with stent removal and biopsy;  Surgeon: Josephine Malone MD;  Location: [facility]    LARYNGOSCOPY, FLEXIBLE WITH INJECTION N/A 12/11/2023    Procedure: LARYNGOSCOPY, FLEXIBLE WITH INJECTION with steroid;  Surgeon: Josephine Malone MD;  Location: [facility]    LASER CO2 LARYNGOSCOPY N/A 8/30/2023    Procedure: MICROLARYNGOSCOPY, CO2 laser resection of vocal fold lesion;  Surgeon: Josephine Malone MD;  Location: [facility]    LASER CO2 LARYNGOSCOPY N/A 10/25/2023    Procedure: MICROLARYNGOSCOPY, CO2 laser resection of vocal fold lesion;  Surgeon: Josephine Malone MD;  Location: [facility]    LASER CO2 LARYNGOSCOPY N/A 12/1/2023    Procedure: MICROLARYNGOSCOPY CO2 laser standby, resection of vocal fold lesion, stent removal, biopsy;  Surgeon: Josephine Malone MD;  Location: [facility]    LASER CO2 LARYNGOSCOPY, COMPLEX N/A 7/17/2024    Procedure: MICROLARYNGOSCOPY, CO2 laser standby, biopsy;  Surgeon: Josephine Malone MD;  Location: [facility]     Family History:    Family History   Problem Relation Age of Onset    Diabetes Mother     Hypertension Mother     Cancer Father         stomach    Heart Disease Father     Heart Disease Brother     Diabetes Sister     Diabetes Sister     Diabetes Sister     Heart Disease Brother     Cancer Sister     Glaucoma No family hx of     Macular Degeneration No family hx of      Social History:  Marital Status:  Single [1]  Social History     Tobacco Use    Smoking status: Former     Current packs/day: 0.00     Average packs/day: 1 pack/day for 20.0 years (20.0 ttl pk-yrs)     Types: Cigarettes     Start date: 1993     Quit date: 2013     Years since quittin.6     Passive exposure: Past    Smokeless tobacco: Never   Vaping Use    Vaping status: Never Used   Substance Use Topics    Alcohol use: Not Currently     Comment: rare    Drug use: No      Medications:    calcipotriene (DOVONOX) 0.005 % external cream  calcium carbonate 600 mg-vitamin D 400 units (CALTRATE) 600-400 MG-UNIT per tablet  Cholecalciferol (VITAMIN D) 1000 UNITS capsule  fluorouracil (EFUDEX) 5 % external cream  gabapentin (NEURONTIN) 300 MG capsule  guaiFENesin (ROBITUSSIN) 20 mg/mL liquid  losartan (COZAAR) 50 MG tablet  mupirocin (BACTROBAN) 2 % external cream  pantoprazole (PROTONIX) 40 MG EC tablet  silver sulfADIAZINE (SILVADENE) 1 % external cream      Review of Systems   All other systems reviewed and are negative.      PE   BP: (!) 137/36  Pulse: 84  Temp: 97.5  F (36.4  C)  Resp: 23  Weight: 80.5 kg (177 lb 6.4 oz)  SpO2: 97 %  Physical Exam  Vitals and nursing note reviewed.   Constitutional:       General: He is not in acute distress.     Comments: Patient does not have obvious respiratory distress but he talks with a harsh, raspy voice.  He has inspiratory and expiratory stridor.  He has rhonchi.   HENT:      Head: Atraumatic.      Right Ear: External ear normal.      Left Ear: External ear normal.      Nose: Nose normal.       Mouth/Throat:      Mouth: Mucous membranes are moist.      Pharynx: Oropharynx is clear.   Eyes:      General: No scleral icterus.     Extraocular Movements: Extraocular movements intact.      Conjunctiva/sclera: Conjunctivae normal.      Pupils: Pupils are equal, round, and reactive to light.   Cardiovascular:      Rate and Rhythm: Normal rate.   Pulmonary:      Effort: Pulmonary effort is normal. No respiratory distress.   Musculoskeletal:         General: Normal range of motion.      Cervical back: Normal range of motion.   Skin:     General: Skin is warm and dry.   Neurological:      Mental Status: He is alert and oriented to person, place, and time.   Psychiatric:         Behavior: Behavior normal.         ED COURSE and MDM   1720.  Patient with increasing chest congestion, difficulty without removing secretions, and concern for changing voice/trouble breathing/airway narrowing.  CTA soft tissue neck pending.  CT chest with contrast pending.  CBC and basic metabolic panel ordered.    2016.  Patient has nodularity seen in the glottis.  This causes moderate stenosis.  No emergent need for hospitalization.  The patient happens to have an appointment with his oncologist/otolaryngologist scheduled.  Direct visualization will be needed, per radiology recommendation.  Patient agrees with plan.    Electronic medical chart reviewed, including medical problems, medications, medical allergies, social history.  Recent hospitalizations and surgical procedures reviewed.  Recent clinic visits and consultations reviewed.  Recent labs and test results reviewed.  Nursing notes reviewed.    The patient, their parent if applicable, and/or their medical decision maker(s) and I have reviewed all of the available historical information, applicable PMH, physical exam findings, and objective diagnostic data gathered during this ED visit.  We then discussed all work-up options and then together agreed upon the course taken during this  visit.  The ultimate disposition and plan was a cooperative decision made between myself and the patient, their parent if applicable, and/or their legal decision maker(s).  The risks and benefits of all decisions made during this visit were discussed to the best of my abilities given the circumstances, and all parties are understanding of the pertinent ramifications of these decisions.      LABS  Labs Ordered and Resulted from Time of ED Arrival to Time of ED Departure   BASIC METABOLIC PANEL - Abnormal       Result Value    Sodium 139      Potassium 4.7      Chloride 104      Carbon Dioxide (CO2) 26      Anion Gap 9      Urea Nitrogen 28.3 (*)     Creatinine 1.44 (*)     GFR Estimate 50 (*)     Calcium 9.3      Glucose 90     CBC WITH PLATELETS AND DIFFERENTIAL - Abnormal    WBC Count 6.0      RBC Count 4.21 (*)     Hemoglobin 13.6      Hematocrit 40.6      MCV 96      MCH 32.3      MCHC 33.5      RDW 11.9      Platelet Count 190      % Neutrophils 67      % Lymphocytes 16      % Monocytes 11      % Eosinophils 5      % Basophils 1      % Immature Granulocytes 0      NRBCs per 100 WBC 0      Absolute Neutrophils 4.0      Absolute Lymphocytes 1.0      Absolute Monocytes 0.7      Absolute Eosinophils 0.3      Absolute Basophils 0.0      Absolute Immature Granulocytes 0.0      Absolute NRBCs 0.0         IMAGING  Images reviewed by me.  Radiology report also reviewed.  CT Chest w Contrast   Final Result   IMPRESSION:    1.  No definite acute abnormality in the chest.   2.  Borderline enlarged mediastinal lymphadenopathy, grossly stable from prior CT.      Soft tissue neck CT w contrast   Final Result   IMPRESSION:    1.  Soft tissue prominence and apparent nodularity involving the glottis and supraglottis. Differential considerations are posttreatment change versus malignancy and direct visualization is recommended.   2.  This produces moderate narrowing of the airway.          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                             Pelon Osorio MD  10/15/24 2017

## 2024-10-16 NOTE — DISCHARGE INSTRUCTIONS
RETURN TO THE EMERGENCY ROOM FOR THE FOLLOWING:    Severely worsened breathing, fever greater than 101, or at anytime for any concern.    FOLLOW UP:    With your cancer doctor/otolaryngologist/oncologist as scheduled.      TREATMENT RECOMMENDATIONS:    There have been no new medications provided and there are no prescription medication changes recommended.     NURSE ADVICE LINE:  [phone] or [phone]

## 2024-10-17 ENCOUNTER — TELEPHONE (OUTPATIENT)
Dept: RHEUMATOLOGY | Facility: CLINIC | Age: 79
End: 2024-10-17

## 2024-10-17 ENCOUNTER — HOSPITAL ENCOUNTER (INPATIENT)
Facility: CLINIC | Age: 79
LOS: 6 days | Discharge: HOME HEALTH | DRG: 011 | End: 2024-10-23
Attending: FAMILY MEDICINE | Admitting: OTOLARYNGOLOGY
Payer: COMMERCIAL

## 2024-10-17 ENCOUNTER — PATIENT OUTREACH (OUTPATIENT)
Dept: OTOLARYNGOLOGY | Facility: CLINIC | Age: 79
End: 2024-10-17
Payer: COMMERCIAL

## 2024-10-17 ENCOUNTER — APPOINTMENT (OUTPATIENT)
Dept: GENERAL RADIOLOGY | Facility: CLINIC | Age: 79
DRG: 011 | End: 2024-10-17
Attending: FAMILY MEDICINE
Payer: COMMERCIAL

## 2024-10-17 ENCOUNTER — ANESTHESIA (OUTPATIENT)
Dept: SURGERY | Facility: CLINIC | Age: 79
DRG: 011 | End: 2024-10-17
Payer: COMMERCIAL

## 2024-10-17 ENCOUNTER — PATIENT OUTREACH (OUTPATIENT)
Dept: CARE COORDINATION | Facility: CLINIC | Age: 79
End: 2024-10-17

## 2024-10-17 ENCOUNTER — ANESTHESIA EVENT (OUTPATIENT)
Dept: SURGERY | Facility: CLINIC | Age: 79
DRG: 011 | End: 2024-10-17
Payer: COMMERCIAL

## 2024-10-17 ENCOUNTER — OFFICE VISIT (OUTPATIENT)
Dept: OTOLARYNGOLOGY | Facility: CLINIC | Age: 79
End: 2024-10-17
Payer: COMMERCIAL

## 2024-10-17 VITALS
SYSTOLIC BLOOD PRESSURE: 130 MMHG | HEIGHT: 69 IN | DIASTOLIC BLOOD PRESSURE: 70 MMHG | HEART RATE: 70 BPM | WEIGHT: 177 LBS | BODY MASS INDEX: 26.22 KG/M2 | OXYGEN SATURATION: 98 %

## 2024-10-17 DIAGNOSIS — R49.0 DYSPHONIA: ICD-10-CM

## 2024-10-17 DIAGNOSIS — G89.18 ACUTE POST-OPERATIVE PAIN: ICD-10-CM

## 2024-10-17 DIAGNOSIS — J38.6 OBSTRUCTION OF LARYNX: ICD-10-CM

## 2024-10-17 DIAGNOSIS — Z93.0 TRACHEOSTOMY STATUS (H): ICD-10-CM

## 2024-10-17 DIAGNOSIS — C32.9 LARYNGEAL CANCER (H): Primary | ICD-10-CM

## 2024-10-17 DIAGNOSIS — K59.03 DRUG-INDUCED CONSTIPATION: ICD-10-CM

## 2024-10-17 DIAGNOSIS — R00.1 BRADYCARDIA, SINUS: ICD-10-CM

## 2024-10-17 LAB
ALBUMIN SERPL BCG-MCNC: 4.1 G/DL (ref 3.5–5.2)
ALP SERPL-CCNC: 86 U/L (ref 40–150)
ALT SERPL W P-5'-P-CCNC: 13 U/L (ref 0–70)
ANION GAP SERPL CALCULATED.3IONS-SCNC: 9 MMOL/L (ref 7–15)
APTT PPP: 23 SECONDS (ref 22–38)
AST SERPL W P-5'-P-CCNC: 24 U/L (ref 0–45)
ATRIAL RATE - MUSE: 57 BPM
BASOPHILS # BLD AUTO: 0 10E3/UL (ref 0–0.2)
BASOPHILS NFR BLD AUTO: 1 %
BILIRUB SERPL-MCNC: 0.8 MG/DL
BUN SERPL-MCNC: 27.3 MG/DL (ref 8–23)
CALCIUM SERPL-MCNC: 9.9 MG/DL (ref 8.8–10.4)
CHLORIDE SERPL-SCNC: 105 MMOL/L (ref 98–107)
CREAT SERPL-MCNC: 1.53 MG/DL (ref 0.67–1.17)
DIASTOLIC BLOOD PRESSURE - MUSE: NORMAL MMHG
EGFRCR SERPLBLD CKD-EPI 2021: 47 ML/MIN/1.73M2
EOSINOPHIL # BLD AUTO: 0.3 10E3/UL (ref 0–0.7)
EOSINOPHIL NFR BLD AUTO: 6 %
ERYTHROCYTE [DISTWIDTH] IN BLOOD BY AUTOMATED COUNT: 12 % (ref 10–15)
GLUCOSE SERPL-MCNC: 94 MG/DL (ref 70–99)
HCO3 SERPL-SCNC: 26 MMOL/L (ref 22–29)
HCT VFR BLD AUTO: 43.2 % (ref 40–53)
HGB BLD-MCNC: 13.7 G/DL (ref 13.3–17.7)
IMM GRANULOCYTES # BLD: 0 10E3/UL
IMM GRANULOCYTES NFR BLD: 0 %
INR PPP: 1.05 (ref 0.85–1.15)
INTERPRETATION ECG - MUSE: NORMAL
LYMPHOCYTES # BLD AUTO: 0.8 10E3/UL (ref 0.8–5.3)
LYMPHOCYTES NFR BLD AUTO: 18 %
MAGNESIUM SERPL-MCNC: 2 MG/DL (ref 1.7–2.3)
MCH RBC QN AUTO: 31.3 PG (ref 26.5–33)
MCHC RBC AUTO-ENTMCNC: 31.7 G/DL (ref 31.5–36.5)
MCV RBC AUTO: 99 FL (ref 78–100)
MONOCYTES # BLD AUTO: 0.6 10E3/UL (ref 0–1.3)
MONOCYTES NFR BLD AUTO: 12 %
NEUTROPHILS # BLD AUTO: 3 10E3/UL (ref 1.6–8.3)
NEUTROPHILS NFR BLD AUTO: 64 %
NRBC # BLD AUTO: 0 10E3/UL
NRBC BLD AUTO-RTO: 0 /100
P AXIS - MUSE: 51 DEGREES
PLATELET # BLD AUTO: 212 10E3/UL (ref 150–450)
POTASSIUM SERPL-SCNC: 4.6 MMOL/L (ref 3.4–5.3)
PR INTERVAL - MUSE: 166 MS
PROT SERPL-MCNC: 7.3 G/DL (ref 6.4–8.3)
QRS DURATION - MUSE: 100 MS
QT - MUSE: 420 MS
QTC - MUSE: 408 MS
R AXIS - MUSE: 55 DEGREES
RBC # BLD AUTO: 4.38 10E6/UL (ref 4.4–5.9)
SODIUM SERPL-SCNC: 140 MMOL/L (ref 135–145)
SYSTOLIC BLOOD PRESSURE - MUSE: NORMAL MMHG
T AXIS - MUSE: 28 DEGREES
VENTRICULAR RATE- MUSE: 57 BPM
WBC # BLD AUTO: 4.7 10E3/UL (ref 4–11)

## 2024-10-17 PROCEDURE — 88341 IMHCHEM/IMCYTCHM EA ADD ANTB: CPT | Mod: 26 | Performed by: STUDENT IN AN ORGANIZED HEALTH CARE EDUCATION/TRAINING PROGRAM

## 2024-10-17 PROCEDURE — 250N000011 HC RX IP 250 OP 636

## 2024-10-17 PROCEDURE — 999N000157 HC STATISTIC RCP TIME EA 10 MIN

## 2024-10-17 PROCEDURE — 31535 LARYNGOSCOPY W/BIOPSY: CPT | Mod: 51 | Performed by: OTOLARYNGOLOGY

## 2024-10-17 PROCEDURE — 0CBT8ZX EXCISION OF RIGHT VOCAL CORD, VIA NATURAL OR ARTIFICIAL OPENING ENDOSCOPIC, DIAGNOSTIC: ICD-10-PCS | Performed by: OTOLARYNGOLOGY

## 2024-10-17 PROCEDURE — 370N000017 HC ANESTHESIA TECHNICAL FEE, PER MIN: Performed by: OTOLARYNGOLOGY

## 2024-10-17 PROCEDURE — 71045 X-RAY EXAM CHEST 1 VIEW: CPT

## 2024-10-17 PROCEDURE — 85610 PROTHROMBIN TIME: CPT | Performed by: FAMILY MEDICINE

## 2024-10-17 PROCEDURE — 250N000011 HC RX IP 250 OP 636: Performed by: NURSE ANESTHETIST, CERTIFIED REGISTERED

## 2024-10-17 PROCEDURE — 93005 ELECTROCARDIOGRAM TRACING: CPT | Performed by: FAMILY MEDICINE

## 2024-10-17 PROCEDURE — 85025 COMPLETE CBC W/AUTO DIFF WBC: CPT | Performed by: FAMILY MEDICINE

## 2024-10-17 PROCEDURE — 0CBV8ZX EXCISION OF LEFT VOCAL CORD, VIA NATURAL OR ARTIFICIAL OPENING ENDOSCOPIC, DIAGNOSTIC: ICD-10-PCS | Performed by: OTOLARYNGOLOGY

## 2024-10-17 PROCEDURE — 31575 DIAGNOSTIC LARYNGOSCOPY: CPT | Performed by: OTOLARYNGOLOGY

## 2024-10-17 PROCEDURE — 258N000003 HC RX IP 258 OP 636: Performed by: NURSE ANESTHETIST, CERTIFIED REGISTERED

## 2024-10-17 PROCEDURE — 88305 TISSUE EXAM BY PATHOLOGIST: CPT | Mod: 26 | Performed by: STUDENT IN AN ORGANIZED HEALTH CARE EDUCATION/TRAINING PROGRAM

## 2024-10-17 PROCEDURE — 250N000009 HC RX 250: Performed by: NURSE ANESTHETIST, CERTIFIED REGISTERED

## 2024-10-17 PROCEDURE — 360N000075 HC SURGERY LEVEL 2, PER MIN: Performed by: OTOLARYNGOLOGY

## 2024-10-17 PROCEDURE — 99285 EMERGENCY DEPT VISIT HI MDM: CPT | Mod: 25 | Performed by: FAMILY MEDICINE

## 2024-10-17 PROCEDURE — 31535 LARYNGOSCOPY W/BIOPSY: CPT | Performed by: ANESTHESIOLOGY

## 2024-10-17 PROCEDURE — 120N000002 HC R&B MED SURG/OB UMMC

## 2024-10-17 PROCEDURE — 99215 OFFICE O/P EST HI 40 MIN: CPT | Mod: 25 | Performed by: OTOLARYNGOLOGY

## 2024-10-17 PROCEDURE — 99100 ANES PT EXTEME AGE<1 YR&>70: CPT | Performed by: ANESTHESIOLOGY

## 2024-10-17 PROCEDURE — 83735 ASSAY OF MAGNESIUM: CPT | Performed by: FAMILY MEDICINE

## 2024-10-17 PROCEDURE — 31603 EMER TRACHEOSTOMY TTRACH: CPT | Mod: GC | Performed by: OTOLARYNGOLOGY

## 2024-10-17 PROCEDURE — 82040 ASSAY OF SERUM ALBUMIN: CPT | Performed by: FAMILY MEDICINE

## 2024-10-17 PROCEDURE — 31535 LARYNGOSCOPY W/BIOPSY: CPT | Performed by: NURSE ANESTHETIST, CERTIFIED REGISTERED

## 2024-10-17 PROCEDURE — 88342 IMHCHEM/IMCYTCHM 1ST ANTB: CPT | Mod: 26 | Performed by: STUDENT IN AN ORGANIZED HEALTH CARE EDUCATION/TRAINING PROGRAM

## 2024-10-17 PROCEDURE — 250N000011 HC RX IP 250 OP 636: Performed by: FAMILY MEDICINE

## 2024-10-17 PROCEDURE — 250N000013 HC RX MED GY IP 250 OP 250 PS 637

## 2024-10-17 PROCEDURE — 120N000003 HC R&B IMCU UMMC

## 2024-10-17 PROCEDURE — 36415 COLL VENOUS BLD VENIPUNCTURE: CPT | Performed by: FAMILY MEDICINE

## 2024-10-17 PROCEDURE — 99100 ANES PT EXTEME AGE<1 YR&>70: CPT | Performed by: NURSE ANESTHETIST, CERTIFIED REGISTERED

## 2024-10-17 PROCEDURE — 250N000009 HC RX 250

## 2024-10-17 PROCEDURE — 710N000010 HC RECOVERY PHASE 1, LEVEL 2, PER MIN: Performed by: OTOLARYNGOLOGY

## 2024-10-17 PROCEDURE — 85730 THROMBOPLASTIN TIME PARTIAL: CPT | Performed by: FAMILY MEDICINE

## 2024-10-17 PROCEDURE — 250N000011 HC RX IP 250 OP 636: Performed by: OTOLARYNGOLOGY

## 2024-10-17 PROCEDURE — 272N000001 HC OR GENERAL SUPPLY STERILE: Performed by: OTOLARYNGOLOGY

## 2024-10-17 PROCEDURE — 99285 EMERGENCY DEPT VISIT HI MDM: CPT | Performed by: FAMILY MEDICINE

## 2024-10-17 PROCEDURE — 0CBM7ZX EXCISION OF PHARYNX, VIA NATURAL OR ARTIFICIAL OPENING, DIAGNOSTIC: ICD-10-PCS | Performed by: OTOLARYNGOLOGY

## 2024-10-17 PROCEDURE — 88341 IMHCHEM/IMCYTCHM EA ADD ANTB: CPT | Mod: TC | Performed by: OTOLARYNGOLOGY

## 2024-10-17 PROCEDURE — 0B110F4 BYPASS TRACHEA TO CUTANEOUS WITH TRACHEOSTOMY DEVICE, OPEN APPROACH: ICD-10-PCS | Performed by: OTOLARYNGOLOGY

## 2024-10-17 PROCEDURE — 71045 X-RAY EXAM CHEST 1 VIEW: CPT | Mod: 26 | Performed by: RADIOLOGY

## 2024-10-17 PROCEDURE — 93010 ELECTROCARDIOGRAM REPORT: CPT | Performed by: FAMILY MEDICINE

## 2024-10-17 RX ORDER — PANTOPRAZOLE SODIUM 40 MG/1
40 TABLET, DELAYED RELEASE ORAL DAILY
Status: DISCONTINUED | OUTPATIENT
Start: 2024-10-18 | End: 2024-10-23 | Stop reason: HOSPADM

## 2024-10-17 RX ORDER — SODIUM CHLORIDE, SODIUM LACTATE, POTASSIUM CHLORIDE, CALCIUM CHLORIDE 600; 310; 30; 20 MG/100ML; MG/100ML; MG/100ML; MG/100ML
INJECTION, SOLUTION INTRAVENOUS CONTINUOUS PRN
Status: DISCONTINUED | OUTPATIENT
Start: 2024-10-17 | End: 2024-10-17

## 2024-10-17 RX ORDER — FENTANYL CITRATE 50 UG/ML
25 INJECTION, SOLUTION INTRAMUSCULAR; INTRAVENOUS EVERY 5 MIN PRN
Status: DISCONTINUED | OUTPATIENT
Start: 2024-10-17 | End: 2024-10-17 | Stop reason: HOSPADM

## 2024-10-17 RX ORDER — ACETAMINOPHEN 325 MG/1
975 TABLET ORAL EVERY 8 HOURS
Status: COMPLETED | OUTPATIENT
Start: 2024-10-17 | End: 2024-10-20

## 2024-10-17 RX ORDER — FENTANYL CITRATE 50 UG/ML
50 INJECTION, SOLUTION INTRAMUSCULAR; INTRAVENOUS EVERY 5 MIN PRN
Status: DISCONTINUED | OUTPATIENT
Start: 2024-10-17 | End: 2024-10-17 | Stop reason: HOSPADM

## 2024-10-17 RX ORDER — NALOXONE HYDROCHLORIDE 0.4 MG/ML
0.4 INJECTION, SOLUTION INTRAMUSCULAR; INTRAVENOUS; SUBCUTANEOUS
Status: DISCONTINUED | OUTPATIENT
Start: 2024-10-17 | End: 2024-10-23 | Stop reason: HOSPADM

## 2024-10-17 RX ORDER — POLYETHYLENE GLYCOL 3350 17 G/17G
17 POWDER, FOR SOLUTION ORAL DAILY
Status: DISCONTINUED | OUTPATIENT
Start: 2024-10-18 | End: 2024-10-23 | Stop reason: HOSPADM

## 2024-10-17 RX ORDER — DEXAMETHASONE SODIUM PHOSPHATE 4 MG/ML
4 INJECTION, SOLUTION INTRA-ARTICULAR; INTRALESIONAL; INTRAMUSCULAR; INTRAVENOUS; SOFT TISSUE
Status: DISCONTINUED | OUTPATIENT
Start: 2024-10-17 | End: 2024-10-17 | Stop reason: HOSPADM

## 2024-10-17 RX ORDER — BISACODYL 10 MG
10 SUPPOSITORY, RECTAL RECTAL DAILY PRN
Status: DISCONTINUED | OUTPATIENT
Start: 2024-10-20 | End: 2024-10-23 | Stop reason: HOSPADM

## 2024-10-17 RX ORDER — GUAIFENESIN 200 MG/10ML
200 LIQUID ORAL EVERY 4 HOURS PRN
Status: DISCONTINUED | OUTPATIENT
Start: 2024-10-17 | End: 2024-10-23 | Stop reason: HOSPADM

## 2024-10-17 RX ORDER — HYDROXYZINE HYDROCHLORIDE 50 MG/1
50 TABLET, FILM COATED ORAL EVERY 6 HOURS PRN
Status: DISCONTINUED | OUTPATIENT
Start: 2024-10-17 | End: 2024-10-23 | Stop reason: HOSPADM

## 2024-10-17 RX ORDER — PROPOFOL 10 MG/ML
INJECTION, EMULSION INTRAVENOUS CONTINUOUS PRN
Status: DISCONTINUED | OUTPATIENT
Start: 2024-10-17 | End: 2024-10-17

## 2024-10-17 RX ORDER — ONDANSETRON 2 MG/ML
4 INJECTION INTRAMUSCULAR; INTRAVENOUS EVERY 6 HOURS PRN
Status: DISCONTINUED | OUTPATIENT
Start: 2024-10-17 | End: 2024-10-23 | Stop reason: HOSPADM

## 2024-10-17 RX ORDER — ONDANSETRON 4 MG/1
4 TABLET, ORALLY DISINTEGRATING ORAL EVERY 30 MIN PRN
Status: DISCONTINUED | OUTPATIENT
Start: 2024-10-17 | End: 2024-10-17 | Stop reason: HOSPADM

## 2024-10-17 RX ORDER — HYDROMORPHONE HCL IN WATER/PF 6 MG/30 ML
0.2 PATIENT CONTROLLED ANALGESIA SYRINGE INTRAVENOUS EVERY 5 MIN PRN
Status: DISCONTINUED | OUTPATIENT
Start: 2024-10-17 | End: 2024-10-17 | Stop reason: HOSPADM

## 2024-10-17 RX ORDER — LOSARTAN POTASSIUM 50 MG/1
50 TABLET ORAL AT BEDTIME
Status: DISCONTINUED | OUTPATIENT
Start: 2024-10-17 | End: 2024-10-23 | Stop reason: HOSPADM

## 2024-10-17 RX ORDER — LIDOCAINE HYDROCHLORIDE AND EPINEPHRINE 10; 10 MG/ML; UG/ML
INJECTION, SOLUTION INFILTRATION; PERINEURAL PRN
Status: DISCONTINUED | OUTPATIENT
Start: 2024-10-17 | End: 2024-10-17 | Stop reason: HOSPADM

## 2024-10-17 RX ORDER — FENTANYL CITRATE 50 UG/ML
INJECTION, SOLUTION INTRAMUSCULAR; INTRAVENOUS PRN
Status: DISCONTINUED | OUTPATIENT
Start: 2024-10-17 | End: 2024-10-17

## 2024-10-17 RX ORDER — HYDROMORPHONE HCL IN WATER/PF 6 MG/30 ML
0.2 PATIENT CONTROLLED ANALGESIA SYRINGE INTRAVENOUS EVERY 6 HOURS PRN
Status: DISCONTINUED | OUTPATIENT
Start: 2024-10-17 | End: 2024-10-21

## 2024-10-17 RX ORDER — AMOXICILLIN 250 MG
1 CAPSULE ORAL 2 TIMES DAILY
Status: DISCONTINUED | OUTPATIENT
Start: 2024-10-17 | End: 2024-10-23 | Stop reason: HOSPADM

## 2024-10-17 RX ORDER — OXYCODONE HYDROCHLORIDE 10 MG/1
10 TABLET ORAL
Status: DISCONTINUED | OUTPATIENT
Start: 2024-10-17 | End: 2024-10-17 | Stop reason: HOSPADM

## 2024-10-17 RX ORDER — NALOXONE HYDROCHLORIDE 0.4 MG/ML
0.2 INJECTION, SOLUTION INTRAMUSCULAR; INTRAVENOUS; SUBCUTANEOUS
Status: DISCONTINUED | OUTPATIENT
Start: 2024-10-17 | End: 2024-10-23 | Stop reason: HOSPADM

## 2024-10-17 RX ORDER — ONDANSETRON 2 MG/ML
4 INJECTION INTRAMUSCULAR; INTRAVENOUS EVERY 30 MIN PRN
Status: DISCONTINUED | OUTPATIENT
Start: 2024-10-17 | End: 2024-10-17 | Stop reason: HOSPADM

## 2024-10-17 RX ORDER — LABETALOL HYDROCHLORIDE 5 MG/ML
10 INJECTION, SOLUTION INTRAVENOUS
Status: DISCONTINUED | OUTPATIENT
Start: 2024-10-17 | End: 2024-10-17 | Stop reason: HOSPADM

## 2024-10-17 RX ORDER — NALOXONE HYDROCHLORIDE 0.4 MG/ML
0.1 INJECTION, SOLUTION INTRAMUSCULAR; INTRAVENOUS; SUBCUTANEOUS
Status: DISCONTINUED | OUTPATIENT
Start: 2024-10-17 | End: 2024-10-17 | Stop reason: HOSPADM

## 2024-10-17 RX ORDER — HYDROMORPHONE HCL IN WATER/PF 6 MG/30 ML
0.4 PATIENT CONTROLLED ANALGESIA SYRINGE INTRAVENOUS EVERY 5 MIN PRN
Status: DISCONTINUED | OUTPATIENT
Start: 2024-10-17 | End: 2024-10-17 | Stop reason: HOSPADM

## 2024-10-17 RX ORDER — HYDROXYZINE HYDROCHLORIDE 25 MG/1
25 TABLET, FILM COATED ORAL EVERY 6 HOURS PRN
Status: DISCONTINUED | OUTPATIENT
Start: 2024-10-17 | End: 2024-10-23 | Stop reason: HOSPADM

## 2024-10-17 RX ORDER — OXYCODONE HYDROCHLORIDE 5 MG/1
5 TABLET ORAL EVERY 4 HOURS PRN
Status: DISCONTINUED | OUTPATIENT
Start: 2024-10-17 | End: 2024-10-23 | Stop reason: HOSPADM

## 2024-10-17 RX ORDER — LIDOCAINE 40 MG/G
CREAM TOPICAL
Status: DISCONTINUED | OUTPATIENT
Start: 2024-10-17 | End: 2024-10-23 | Stop reason: HOSPADM

## 2024-10-17 RX ORDER — ACETAMINOPHEN 325 MG/1
650 TABLET ORAL EVERY 4 HOURS PRN
Status: DISCONTINUED | OUTPATIENT
Start: 2024-10-20 | End: 2024-10-23 | Stop reason: HOSPADM

## 2024-10-17 RX ORDER — DEXAMETHASONE SODIUM PHOSPHATE 10 MG/ML
10 INJECTION, SOLUTION INTRAMUSCULAR; INTRAVENOUS ONCE
Status: COMPLETED | OUTPATIENT
Start: 2024-10-17 | End: 2024-10-17

## 2024-10-17 RX ORDER — OXYCODONE HYDROCHLORIDE 5 MG/1
5 TABLET ORAL
Status: DISCONTINUED | OUTPATIENT
Start: 2024-10-17 | End: 2024-10-17 | Stop reason: HOSPADM

## 2024-10-17 RX ORDER — AMPICILLIN AND SULBACTAM 2; 1 G/1; G/1
3 INJECTION, POWDER, FOR SOLUTION INTRAMUSCULAR; INTRAVENOUS ONCE
Status: COMPLETED | OUTPATIENT
Start: 2024-10-17 | End: 2024-10-17

## 2024-10-17 RX ORDER — PROCHLORPERAZINE MALEATE 5 MG/1
5 TABLET ORAL EVERY 6 HOURS PRN
Status: DISCONTINUED | OUTPATIENT
Start: 2024-10-17 | End: 2024-10-20

## 2024-10-17 RX ORDER — ONDANSETRON 4 MG/1
4 TABLET, ORALLY DISINTEGRATING ORAL EVERY 6 HOURS PRN
Status: DISCONTINUED | OUTPATIENT
Start: 2024-10-17 | End: 2024-10-23 | Stop reason: HOSPADM

## 2024-10-17 RX ORDER — GABAPENTIN 300 MG/1
600 CAPSULE ORAL 3 TIMES DAILY
Status: DISCONTINUED | OUTPATIENT
Start: 2024-10-17 | End: 2024-10-19

## 2024-10-17 RX ORDER — PROPOFOL 10 MG/ML
INJECTION, EMULSION INTRAVENOUS PRN
Status: DISCONTINUED | OUTPATIENT
Start: 2024-10-17 | End: 2024-10-17

## 2024-10-17 RX ADMIN — FENTANYL CITRATE 50 MCG: 50 INJECTION INTRAMUSCULAR; INTRAVENOUS at 13:26

## 2024-10-17 RX ADMIN — SODIUM CHLORIDE, POTASSIUM CHLORIDE, SODIUM LACTATE AND CALCIUM CHLORIDE: 600; 310; 30; 20 INJECTION, SOLUTION INTRAVENOUS at 12:38

## 2024-10-17 RX ADMIN — PROPOFOL 150 MCG/KG/MIN: 10 INJECTION, EMULSION INTRAVENOUS at 13:15

## 2024-10-17 RX ADMIN — HYDROXYZINE HYDROCHLORIDE 25 MG: 25 TABLET, FILM COATED ORAL at 21:09

## 2024-10-17 RX ADMIN — PROPOFOL 50 MG: 10 INJECTION, EMULSION INTRAVENOUS at 13:13

## 2024-10-17 RX ADMIN — MIDAZOLAM 1 MG: 1 INJECTION INTRAMUSCULAR; INTRAVENOUS at 12:45

## 2024-10-17 RX ADMIN — DEXAMETHASONE SODIUM PHOSPHATE 10 MG: 10 INJECTION, SOLUTION INTRAMUSCULAR; INTRAVENOUS at 12:46

## 2024-10-17 RX ADMIN — DEXMEDETOMIDINE HYDROCHLORIDE 8 MCG: 100 INJECTION, SOLUTION INTRAVENOUS at 12:53

## 2024-10-17 RX ADMIN — SODIUM CHLORIDE, POTASSIUM CHLORIDE, SODIUM LACTATE AND CALCIUM CHLORIDE: 600; 310; 30; 20 INJECTION, SOLUTION INTRAVENOUS at 13:38

## 2024-10-17 RX ADMIN — HYDROMORPHONE HYDROCHLORIDE 0.2 MG: 0.2 INJECTION, SOLUTION INTRAMUSCULAR; INTRAVENOUS; SUBCUTANEOUS at 18:56

## 2024-10-17 RX ADMIN — PROPOFOL 50 MG: 10 INJECTION, EMULSION INTRAVENOUS at 13:11

## 2024-10-17 RX ADMIN — PROPOFOL 40 MG: 10 INJECTION, EMULSION INTRAVENOUS at 13:15

## 2024-10-17 RX ADMIN — LIDOCAINE HYDROCHLORIDE 3 ML: 40 INJECTION, SOLUTION RETROBULBAR; TOPICAL at 14:02

## 2024-10-17 RX ADMIN — FENTANYL CITRATE 25 MCG: 50 INJECTION, SOLUTION INTRAMUSCULAR; INTRAVENOUS at 14:41

## 2024-10-17 RX ADMIN — PROPOFOL 30 MG: 10 INJECTION, EMULSION INTRAVENOUS at 13:12

## 2024-10-17 RX ADMIN — Medication 20 MG: at 13:15

## 2024-10-17 RX ADMIN — AMPICILLIN SODIUM AND SULBACTAM SODIUM 3 G: 2; 1 INJECTION, POWDER, FOR SOLUTION INTRAMUSCULAR; INTRAVENOUS at 12:46

## 2024-10-17 RX ADMIN — Medication 100 MG: at 13:30

## 2024-10-17 RX ADMIN — GABAPENTIN 600 MG: 300 CAPSULE ORAL at 21:09

## 2024-10-17 RX ADMIN — FENTANYL CITRATE 25 MCG: 50 INJECTION, SOLUTION INTRAMUSCULAR; INTRAVENOUS at 15:11

## 2024-10-17 RX ADMIN — DEXMEDETOMIDINE HYDROCHLORIDE 8 MCG: 100 INJECTION, SOLUTION INTRAVENOUS at 12:48

## 2024-10-17 ASSESSMENT — ACTIVITIES OF DAILY LIVING (ADL)
ADLS_ACUITY_SCORE: 35

## 2024-10-17 ASSESSMENT — COLUMBIA-SUICIDE SEVERITY RATING SCALE - C-SSRS
6. HAVE YOU EVER DONE ANYTHING, STARTED TO DO ANYTHING, OR PREPARED TO DO ANYTHING TO END YOUR LIFE?: NO
2. HAVE YOU ACTUALLY HAD ANY THOUGHTS OF KILLING YOURSELF IN THE PAST MONTH?: NO
1. IN THE PAST MONTH, HAVE YOU WISHED YOU WERE DEAD OR WISHED YOU COULD GO TO SLEEP AND NOT WAKE UP?: NO

## 2024-10-17 ASSESSMENT — PAIN SCALES - GENERAL: PAINLEVEL: NO PAIN (0)

## 2024-10-17 ASSESSMENT — LIFESTYLE VARIABLES: TOBACCO_USE: 1

## 2024-10-17 NOTE — Clinical Note
10/17/2024       RE: Joshua Ennis  [address] [facility] [address]     Dear Colleague,    Thank you for referring your patient, Joshua Ennis, to the [facility] [place] at [facility]. Please see a copy of my visit note below.        [facility]   at the [facility]   Otolaryngology Clinic     Patient: Joshua Ennis    MRN: [id number]    : 1945    Age/Gender: 79 year old male  Date of Service: 10/17/2024  Rendering Provider:   Josephine Malone MD         Impression & Plan     IMPRESSION: Mr. Ennis is a 79 year old male who is being seen for the following:    Dysphonia   - anterior commissure lesion seen on scope on 23  - voice changes worsening for a few months  - prior smoker  - scope shows anterior commissure lesion with extension to the right vocal fold  - given anterior location will require CT neck to rule out thyroid cartilage extension  - discussed awake biopsy - patient in agreement this was done today  - pathology showed SCC  - CT chest had findings of new nodules - after multiple discussions with radiology the decision was made to proceed with surgery and repeat imaging for this finding in the chest  - s/p MDL with bilateral cordectomy with stent placement on 23  - today shows that the vocal folds closed around the stent - discussed he needs the scar broken up and bigger stent placed - patient in agreement   - pathology showed SCC with positive margins  - went back for MDL and stent removal on 23   - symptoms 10/10/2023 are stable, voice is poor  - scope shows  laryngeal web  - discussed he has positive margins - will proceed with repeat surger - if can't clear will need radiation  - ct chest shows improved size of lung nodule of concern  - s/p microlaryngoscopy, CO2 laser resection of vocal fold lesion, steroid injection, stent placement, and biopsy 10/25/23  - pathology  shows invasive squamous cell carcinoma  - symptoms 11/16/2023 are stable, voice is poor, discussed case at tumor board, will continue with close observation and repeat biopsies  - scope shows stent in place, vocal folds are open, laryngeal web developed  - discussed removal of stent, then will collect a sample, if the sample is cancerous, will refer for radiation therapy  - s/p microlaryngoscopy, CO2 laser resection of vocal fold lesion, steroid injection, stent removal, and biopsy 12/01/23  - pathology showed detached fragment of squamous epithelium with mild to moderate dysplasia, no evidence of carcinoma  - s/p laryngoscopy and anterior glottic web vocal fold steroid injection 12/11/23      - symptoms 1/4/2024 are has been having a hard time talking with his voice  - strobe shows bilateral vocal fold stiffness and swelling with question of laryngeal web, ventricular phonation  - s/p steroid injection today, does not appear to have a web, but rather vocal fold stiffness and swelling  - discussed treatment for vocal folds will be with steroid injections and voice therapy  - discussed voice result is questionable given severity of ventricular phonation  - symptoms 1/18/2024 are stable  - scope shows anterior glottic web, did lidocaine anesthesia, but difficult to see if the vocal folds can split apart today as compared to last time, he had laryngeal spasm  - discussed that we should focus on surveillance at this time and keep him in voice therapy, I would not do a repeat stunt placement given the last two did not work   - symptoms 6/27/2024 are worse difficulty breathing  - scope shows anterior glottic web, moderate airway restriction  - has final read of CT pending  - discussed I am concerned for tumor, needs evaluation under anesthesia   discussed awake intubation, discussed risk of trach  - s/p MDL with biopsy 7/17/24 with pathology showing SCC, tumor board discussion ensued with decision to proceed with  radiation   - symptoms 7/29/2024 are stable per him though improved per Earlene, less panting with mowing the lawn  - scope shows anterior glottic web, though improved glottic airway   - discussed if breathing should worsen during treatment he is to reach out and consider trach placement   - symptoms 10/17/2024 are worsened for the past few weeks in terms of his breathing, has finished radiation, had pneumonia that resolved but his breathing has not, breathing has gotten worse, here with his fiancee who is concerned, patient denies difficulty breathing however he has stridor   - 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  - discussed his options at this time are observation, steroid treatment vs awake tracheotomy  - given how severe the airway restriction is would recommend awake tracheotomy   - patient reluctant saying he would rather die, he is tired of all these interventions, everytime something happens there is another thing  - his fiancee on the other hand wants him to proceed  - after much discussion patient in agreement and will proceed to the hospital  Plan  - hospital admission  - abx, steroids  - plan for awake tracheotomy   - let rheumatology and radiation oncology know  - will do a DL with biopsies at the same time      RETURN VISIT: after surgery    Chief Complaint   Vocal fold SCC  S/p MDL with bilateral cordectomy and steroid injection 8/30/23  S/p MDL with stent removal and steroid injection on 9/13/23  S/p microlaryngoscopy, CO2 laser resection of vocal fold lesion, steroid injection, stent placement, and biopsy 10/25/23    S/p microlaryngoscopy, CO2 laser resection of vocal fold lesion, steroid injection, stent removal, and biopsy 12/01/23  S/p laryngoscopy and anterior glottic web vocal fold steroid injection 12/11/23   S/p steroid injection 1/4/24               Interval History   HISTORY OF PRESENT ILLNESS: Mr. Ennis is a 79 year old male is  being followed for dysphonia. he was initially seen on 8/15/23. Please refer to this note for full history.     Today, he presents for follow up. he reports:  - he comes with his    Dysphonia:  ***  He reports the voice problem began   ago and has   over time. He feels the problem was caused by   and it bothers him  . More specifically, he has been experiencing   . He has   vocal demands with  .    Dysphagia:  ***  He reports that the swallowing problem began   ago, has   over time, and bothers him  . With regard to symptoms, He notes  . He maintains a   and   diet.     Dyspnea:  ***  He reports that the breathing problem began   ago, has   over time, and bothers him  . With regard to symptoms, he notes  . In regards to exertion, his breathing problem can be triggered by  .    Coughing / Throat-clearing:  ***  He reports that the cough / throat-clearing problem began   ago, has   over time, and bothers him  . With regard to symptoms, he notes  . His cough / throat-clearing can be triggered by  .    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 Negative for high-grade dysplasia and malignancy.     Hoarseness      Hypertension      Idiopathic progressive polyneuropathy      Kidney problem      Laryngeal cancer (H)      Malignant melanoma (H)      Malignant neoplasm (H)     melanoma     Squamous cell carcinoma      Russo syndrome        PAST SURGICAL HISTORY:   Past Surgical History:   Procedure Laterality Date     BIOPSY  11/2011    melanoma on back     DISSECT LYMPH NODE INGUINAL   3/1/2013    Procedure: DISSECT LYMPH NODE INGUINAL;  Bilateral Inguinal Lymph Node Biopsy.;  Surgeon: Tariq Acosta MD;  Location: [facility]     ESOPHAGOSCOPY, GASTROSCOPY, DUODENOSCOPY (EGD), COMBINED  7/5/2013    Procedure: COMBINED ESOPHAGOSCOPY, GASTROSCOPY, DUODENOSCOPY (EGD);  Gastroscopy;  Surgeon: Tariq Acosta MD;  Location: [facility]     HERNIORRHAPHY INGUINAL  8/6/2012    Procedure: HERNIORRHAPHY INGUINAL;  Open Repair Left Inguinal Hernia;  Surgeon: Jerad Baker MD;  Location: [facility]     INJECT STEROID (LOCATION) N/A 8/30/2023    Procedure: steroid injection;  Surgeon: Josephine Malone MD;  Location: [facility]     INJECT STEROID (LOCATION) N/A 9/13/2023    Procedure: steroid injection;  Surgeon: Josephine Malone MD;  [facility]     INJECT STEROID (LOCATION) N/A 10/25/2023    Procedure: steroid injection, stent placement, and biopsy;  Surgeon: Josephine Malone MD;  [facility]     INJECT STEROID (LOCATION) N/A 12/1/2023    Procedure: steroid injection;  Surgeon: Josephine Malone MD;  [facility]: [facility]     LARYNGOSCOPY WITH MICROSCOPE N/A 9/13/2023    Procedure: MICROLARYNGOSCOPY with stent removal and biopsy;  Surgeon: Josephine Malone MD;  Location: [facility]     LARYNGOSCOPY, FLEXIBLE WITH INJECTION N/A 12/11/2023    Procedure: LARYNGOSCOPY, FLEXIBLE WITH INJECTION with steroid;  Surgeon: Josephine Malone MD;  Location: [facility] OR     LASER CO2 LARYNGOSCOPY N/A 8/30/2023    Procedure: MICROLARYNGOSCOPY, CO2 laser resection of vocal fold lesion;  Surgeon: Josephine Malone MD;  [facility]     LASER CO2 LARYNGOSCOPY N/A 10/25/2023    Procedure: MICROLARYNGOSCOPY, CO2 laser resection of vocal fold lesion;  Surgeon: Josephine Malone MD;  [facility]: [facility]     LASER CO2 LARYNGOSCOPY N/A 12/1/2023    Procedure: MICROLARYNGOSCOPY CO2 laser standby, resection of vocal fold lesion, stent removal, biopsy;  Surgeon: Josephine Malone MD;  [facility]: [facility]     LASER CO2 LARYNGOSCOPY, COMPLEX N/A 7/17/2024    Procedure:  MICROLARYNGOSCOPY, CO2 laser standby, biopsy;  Surgeon: Josephine Malone MD;  Location: [facility]       CURRENT MEDICATIONS:   Current Outpatient Medications:      calcipotriene (DOVONOX) 0.005 % external cream, Apply topically 2 times daily In conjunction with fluorouracil cream in a 1:1 mixture. (Patient not taking: Reported on 8/2/2024), Disp: 60 g, Rfl: 2     calcium carbonate 600 mg-vitamin D 400 units (CALTRATE) 600-400 MG-UNIT per tablet, Take 1 tablet by mouth 2 times daily., Disp: , Rfl:      Cholecalciferol (VITAMIN D) 1000 UNITS capsule, Take 1 capsule by mouth daily., Disp: , Rfl:      fluorouracil (EFUDEX) 5 % external cream, Apply topically 2 times daily In conjunction with calcipotriene cream in a 1:1 mixture. (Patient not taking: Reported on 8/2/2024), Disp: 40 g, Rfl: 2     gabapentin (NEURONTIN) 300 MG capsule, Take 2 capsules (600 mg) by mouth 3 times daily. Escalate per handout provided in clinic. Thank you., Disp: 180 capsule, Rfl: 3     guaiFENesin (ROBITUSSIN) 20 mg/mL liquid, Take 10 mLs (200 mg) by mouth every 4 hours as needed for cough., Disp: 500 mL, Rfl: 4     losartan (COZAAR) 50 MG tablet, Take 1 tablet (50 mg) by mouth daily (Patient taking differently: Take 50 mg by mouth at bedtime), Disp: 90 tablet, Rfl: 3     mupirocin (BACTROBAN) 2 % external cream, Apply topically 3 times daily. Apply to affected area three times daily before applying aquaphor, Disp: 30 g, Rfl: 0     pantoprazole (PROTONIX) 40 MG EC tablet, Take 1 tablet (40 mg) by mouth daily., Disp: 60 tablet, Rfl: 1     silver sulfADIAZINE (SILVADENE) 1 % external cream, Apply topically 2 times daily. Apply to affected area, Disp: 400 g, Rfl: 0    ALLERGIES: Shrimp and Advil [ibuprofen]    SOCIAL HISTORY:    Social History     Socioeconomic History     Marital status: Single     Spouse name: Not on file     Number of children: Not on file     Years of education: Not on file     Highest education level: Not on file   Occupational  History     Employer: [employer]   Tobacco Use     Smoking status: Former     Current packs/day: 0.00     Average packs/day: 1 pack/day for 20.0 years (20.0 ttl pk-yrs)     Types: Cigarettes     Start date: 1993     Quit date: 2013     Years since quittin.6     Passive exposure: Past     Smokeless tobacco: Never   Vaping Use     Vaping status: Never Used   Substance and Sexual Activity     Alcohol use: Not Currently     Comment: rare     Drug use: No     Sexual activity: Not Currently     Partners: Female   Other Topics Concern     Parent/sibling w/ CABG, MI or angioplasty before 65F 55M? Not Asked      Service Not Asked     Blood Transfusions Not Asked     Caffeine Concern Not Asked     Occupational Exposure Not Asked     Hobby Hazards Not Asked     Sleep Concern Not Asked     Stress Concern Not Asked     Weight Concern Not Asked     Special Diet Not Asked     Back Care Not Asked     Exercise Yes     Comment: walking     Bike Helmet Not Asked     Seat Belt Not Asked     Self-Exams Not Asked   Social History Narrative     Not on file     Social Determinants of Health     Financial Resource Strain: High Risk (2021)    Received from [facility], [employer]    Financial Resource Strain      Difficulty of Paying Living Expenses: Not on file      Difficulty of Paying Living Expenses: Not on file   Food Insecurity: Not on file   Transportation Needs: Not on file   Physical Activity: Not on file   Stress: Not on file   Social Connections: Unknown (2021)    Received from [facility], [employer]    Social Connections      Frequency of Communication with Friends and Family: Not on file   Interpersonal Safety: Low Risk  (2024)    Interpersonal Safety      Do you feel physically and emotionally safe where you currently live?: Yes      Within the  past 12 months, have you been hit, slapped, kicked or otherwise physically hurt by someone?: No      Within the past 12 months, have you been humiliated or emotionally abused in other ways by your partner or ex-partner?: No   Housing Stability: Not on file         FAMILY HISTORY:   Family History   Problem Relation Age of Onset     Diabetes Mother      Hypertension Mother      Cancer Father         stomach     Heart Disease Father      Heart Disease Brother      Diabetes Sister      Diabetes Sister      Diabetes Sister      Heart Disease Brother      Cancer Sister      Glaucoma No family hx of      Macular Degeneration No family hx of       Non-contributory for problems with anesthesia    REVIEW OF SYSTEMS:   The patient was asked a 14 point review of systems regarding constitutional symptoms, eye symptoms, ears, nose, mouth, throat symptoms, cardiovascular symptoms, respiratory symptoms, gastrointestinal symptoms, genitourinary symptoms, musculoskeletal symptoms, integumentary symptoms, neurological symptoms, psychiatric symptoms, endocrine symptoms, hematologic/lymphatic symptoms, and allergic/ immunologic symptoms.   The pertinent factors have been included in the HPI and below.  Patient Supplied Answers to Review of Systems      10/17/2024     9:42 AM   [facility] ROS   Constitutional Weight loss    Appetite change    Unexplained fatigue   Cardiopulmonary Cough    Breathing problems    Wheezing       Physical Examination   The patient underwent a physical examination as described below. The pertinent positive and negative findings are summarized after the description of the examination.  Constitutional: The patient's developmental and nutritional status was assessed. The patient's voice quality was assessed.  Head and Face: The head and face were inspected for deformities. The sinuses were palpated. The salivary glands were palpated. Facial muscle strength was assessed bilaterally.  Eyes: Extraocular movements and  "primary gaze alignment were assessed.  Ears, Nose, Mouth and Throat: The ears and nose were examined for deformities. The nasal septum, mucosa, and turbinates were inspected by anterior rhinoscopy. The lips, teeth, and gums were examined for abnormalities. The oral mucosa, tongue, palate, tonsils, lateral and posterior pharynx were inspected for the presence of asymmetry or mucosal lesions.    Neck: The tracheal position was noted, and the neck mass palpated to determine if there were any asymmetries, abnormal neck masses, thyromegally, or thyroid nodules.  Respiratory: The nature of the breathing and chest expansion/symmetry was observed.  Cardiovascular: The patient was examined to determine the presence of any edema or jugular venous distension.  Abdomen: The contour of the abdomen was noted.  Lymphatic: The patient was examined for infraclavicular lymphadenopathy.  Musculoskeletal: The patient was inspected for the presence of skeletal deformities.  Extremities: The extremities were examined for any clubbing or cyanosis.  Skin: The skin was examined for inflammatory or neoplastic conditions.  Neurologic: The patient's orientation, mood, and affect were noted. The cranial nerve  functions were examined.  Other pertinent positive and negative findings on physical examination:   OC/OP: no lesions, uvula midline, soft palate elevates symmetrically   Neck: no lesions, no TH tenderness to palpation  ***  All other physical examination findings were within normal limits and noncontributory.    Procedures   ***    Review of Relevant Clinical Data   I personally reviewed:  Notes: ***  Radiology: ***  Pathology: ***  Procedures: ***  Labs:  No results found for: \"TSH\"  Lab Results   Component Value Date     10/15/2024    CO2 26 10/15/2024    BUN 28.3 (H) 10/15/2024    PHOS 2.6 08/12/2016     Lab Results   Component Value Date    WBC 6.0 10/15/2024    HGB 13.6 10/15/2024    HCT 40.6 10/15/2024    MCV 96 10/15/2024    " " 10/15/2024     Lab Results   Component Value Date    PTT 24 04/11/2013    INR 0.98 04/11/2013     No results found for: \"DUANE\"  No components found for: \"RHEUMATOIDFACTOR\", \"RF\"  Lab Results   Component Value Date    CRP <2.9 07/07/2022    CRP <2.9 01/07/2022    CRP <2.9 07/28/2021    CRP <2.9 03/05/2021    CRP <2.9 11/11/2020     No components found for: \"CKTOT\", \"URICACID\"  No components found for: \"C3\", \"C4\", \"DSDNAAB\", \"NDNAABIFA\"  Lab Results   Component Value Date    MPOAB Not Reported 02/14/2014       Patient reported Quality of Life (QOL) Measures   Patient Supplied Answers To VHI Questionnaire      1/22/2024     1:02 PM   Voice Handicap Index (VHI-10)   My voice makes it difficult for people to hear me 4   People have difficulty understanding me in a noisy room 4   My voice difficulties restrict my personal and social life.  2   I feel left out of conversations because of my voice 3   My voice problem causes me to lose income 0   I feel as though I have to strain to produce voice 4   The clarity of my voice is unpredictable 1   My voice problem upsets me 2   My voice makes me feel handicapped 2   People ask, \"What's wrong with your voice?\" 2   VHI-10 24         Patient Supplied Answers To EAT Questionnaire       No data to display                  Patient Supplied Answers To CSI Questionnaire      8/23/2023     8:49 AM   Cough Severity Index (CSI)   My cough is worse when I lie down 0   My coughing problem causes me to restrict my personal and social life 0   I tend to avoid places because of my cough problem 0   I feel embarrassed because of my coughing problem 1   People ask, ''What's wrong?'' because I cough a lot 1   I run out of air when I cough 0   My coughing problem affects my voice 2   My coughing problem limits my physical activity 0   My coughing problem upsets me 0   People ask me if I am sick because I cough a lot 2   CSI Score 6         Patient Supplied Answers to Dyspnea Index " Questionnaire:       No data to display                    Josephine Malone MD    Laryngology    [facility]  Department of  Otolaryngology - Head and Neck Surgery  [facility] [place] [address]  Appointment line: [phone]  Fax: [phone]  [email or web address]         Again, thank you for allowing me to participate in the care of your patient.      Sincerely,    Josephine Malone MD

## 2024-10-17 NOTE — PROGRESS NOTES
[facility]  Community Health Worker Initial Outreach    Background: Primary Care - Care Coordination program identified per system criteria based on ED discharge report and reviewed for possible outreach.    CHW will not proceed with patient outreach due to the following reason:    Patient has been seen by ENT today and recommended for hospital admission. Patient returned to the ED.    Plan: Primary Care - Care Coordination episode addressed appropriately per reason noted above.        Claudia Saeed  Community Health Worker  [facility]    *Connected Care Resource Team does NOT follow patient ongoing. Referrals are identified based on internal discharge reports and the outreach is to ensure patient has an understanding of their discharge instructions.

## 2024-10-17 NOTE — ED PROVIDER NOTES
[place] EMERGENCY DEPARTMENT ([facility])    10/17/24       ED PROVIDER NOTE    History     Chief Complaint   Patient presents with    Surgery Referral     HPI  Joshua Ennis is a 79 year old male with a past medical history of laryngeal cancer, hypertension, CKD stage 3, multiple fractures of ribs, and granulomatosis with polyangiitis, who presents to the ED due to surgery referral patient noted onset of symptoms shortness of breath of last few weeks.  Patient finished radiation treatment had developed pneumonia that resolved also continued to be more short of breath.  Patient was seen in ENT clinic at this point with her evaluation scope shows severe airway restriction with bilateral vocal cord paralysis and supraglottic edema discussed options patient is now presents to be taken the OR for tracheotomy.  Also recommendations for steroids antibiotics etc..     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 Negative for high-grade dysplasia and malignancy.    Hoarseness     Hypertension     Idiopathic progressive polyneuropathy     Kidney problem     Laryngeal cancer (H)     Malignant melanoma (H)     Malignant neoplasm (H)     melanoma    Squamous cell carcinoma     Russo syndrome      Past Surgical History:   Procedure Laterality Date    BIOPSY  11/2011    melanoma on back    DISSECT LYMPH NODE INGUINAL  3/1/2013    Procedure: DISSECT LYMPH NODE INGUINAL;  Bilateral Inguinal Lymph Node Biopsy.;  Surgeon: Tariq Acosta MD;  Location: [facility] OR    ESOPHAGOSCOPY, GASTROSCOPY, DUODENOSCOPY (EGD),  COMBINED  7/5/2013    Procedure: COMBINED ESOPHAGOSCOPY, GASTROSCOPY, DUODENOSCOPY (EGD);  Gastroscopy;  Surgeon: Tariq Acosta MD;  Location: [facility]    HERNIORRHAPHY INGUINAL  8/6/2012    Procedure: HERNIORRHAPHY INGUINAL;  Open Repair Left Inguinal Hernia;  Surgeon: Jerad Baker MD;  Location: [facility]    INJECT STEROID (LOCATION) N/A 8/30/2023    Procedure: steroid injection;  Surgeon: Josephine Malone MD;  Location: [facility]    INJECT STEROID (LOCATION) N/A 9/13/2023    Procedure: steroid injection;  Surgeon: Josephine Malone MD;  Location: [facility]    INJECT STEROID (LOCATION) N/A 10/25/2023    Procedure: steroid injection, stent placement, and biopsy;  Surgeon: Josephine Malone MD;  Location: [facility]    INJECT STEROID (LOCATION) N/A 12/1/2023    Procedure: steroid injection;  Surgeon: Josephine Malone MD;  Location: [facility]    LARYNGOSCOPY WITH MICROSCOPE N/A 9/13/2023    Procedure: MICROLARYNGOSCOPY with stent removal and biopsy;  Surgeon: Josephine Malone MD;  Location: [facility]    LARYNGOSCOPY, FLEXIBLE WITH INJECTION N/A 12/11/2023    Procedure: LARYNGOSCOPY, FLEXIBLE WITH INJECTION with steroid;  Surgeon: Josephine Malone MD;  Location: [facility] OR    LASER CO2 LARYNGOSCOPY N/A 8/30/2023    Procedure: MICROLARYNGOSCOPY, CO2 laser resection of vocal fold lesion;  Surgeon: Josephine Malone MD;  Location: [facility]    LASER CO2 LARYNGOSCOPY N/A 10/25/2023    Procedure: MICROLARYNGOSCOPY, CO2 laser resection of vocal fold lesion;  Surgeon: Josephine Malone MD;  Location: [facility]    LASER CO2 LARYNGOSCOPY N/A 12/1/2023    Procedure: MICROLARYNGOSCOPY CO2 laser standby, resection of vocal fold lesion, stent removal, biopsy;  Surgeon: Josephine Malone MD;  Location: [facility]    LASER CO2 LARYNGOSCOPY, COMPLEX N/A 7/17/2024    Procedure: MICROLARYNGOSCOPY, CO2 laser standby, biopsy;  Surgeon: Josephine Malone MD;  Location: [facility]     No current outpatient medications on file.    Allergies   Allergen Reactions    Shrimp Nausea and Vomiting and Unknown      "Got sick each time he ate it    Advil [Ibuprofen] Other (See Comments)     Patient has vasculitis-conroy's disease and should not take Advil     Family History  Family History   Problem Relation Age of Onset    Diabetes Mother     Hypertension Mother     Cancer Father         stomach    Heart Disease Father     Heart Disease Brother     Diabetes Sister     Diabetes Sister     Diabetes Sister     Heart Disease Brother     Cancer Sister     Glaucoma No family hx of     Macular Degeneration No family hx of      Social History   Social History     Tobacco Use    Smoking status: Former     Current packs/day: 0.00     Average packs/day: 1 pack/day for 20.0 years (20.0 ttl pk-yrs)     Types: Cigarettes     Start date: 1993     Quit date: 2013     Years since quittin.6     Passive exposure: Past    Smokeless tobacco: Never   Vaping Use    Vaping status: Never Used   Substance Use Topics    Alcohol use: Not Currently     Comment: rare    Drug use: No      A complete review of systems was performed with pertinent positives and negatives noted in the HPI, and all other systems negative.    Physical Exam   BP: 133/76  Pulse: 62  Temp: 97.9  F (36.6  C)  Resp: 18  Height: 172.7 cm (5' 8\")  Weight: 79.9 kg (176 lb 3.2 oz)  SpO2: 98 %  Physical Exam  Vitals and nursing note reviewed.   Constitutional:       General: He is in acute distress.      Appearance: He is well-developed. He is not toxic-appearing or diaphoretic.      Comments: Patient seen upon arrival here ENT was also present they had called me the patient was coming.  Patient is lying flat vitally stable though at this point.  Is not drooling otherwise not markedly stridorous   HENT:      Head: Normocephalic and atraumatic.      Nose: Nose normal. No congestion.      Mouth/Throat:      Mouth: Mucous membranes are moist.      Pharynx: Oropharynx is clear.   Eyes:      General: No scleral icterus.     Extraocular Movements: Extraocular movements intact.     "  Conjunctiva/sclera: Conjunctivae normal.      Pupils: Pupils are equal, round, and reactive to light.   Cardiovascular:      Rate and Rhythm: Normal rate and regular rhythm.   Pulmonary:      Effort: Respiratory distress present.      Breath sounds: No stridor.   Abdominal:      General: Abdomen is flat.      Tenderness: There is no guarding.   Musculoskeletal:         General: No tenderness or deformity. Normal range of motion.      Cervical back: Normal range of motion and neck supple.   Lymphadenopathy:      Cervical: No cervical adenopathy.   Skin:     General: Skin is warm and dry.      Capillary Refill: Capillary refill takes less than 2 seconds.      Coloration: Skin is not jaundiced or pale.      Findings: No rash.   Neurological:      General: No focal deficit present.      Mental Status: He is alert and oriented to person, place, and time.      Cranial Nerves: No cranial nerve deficit.      Sensory: No sensory deficit.      Coordination: Coordination normal.   Psychiatric:      Comments: Flattened affect otherwise appropriate not agitated or delusional           ED Course, Procedures, & Data        In epic records reviewed as noted.  Office visit today also with recommendations discussed with ENT also on the phone.    In the ER patient arrived ENT did see the patient also.  This point plan patient does not need racemic epinephrine currently state but an IV was established Decadron 10 mg IV ordered along with 3 g of Unasyn IV.    EKG was done noted below also.  Portable chest x-ray done also revealing no pneumothorax or marked effusion or mass.  Reviewed by myself also.  White count 4.7 hemoglobin is 13.7.  Platelets noted be 212.  Sodium 140 potassium 4.6.  Bicarb 26 gap is 9 BUN 27 creatinine 1.53 glucose noted to be 94      Patient is point planning to go to the OR for operative intervention by ENT.  Will be managed by them also.      Procedures            EKG Interpretation:      Interpreted by Jluis  Shukri Vasquez MD  Time reviewed: 1211  Symptoms at time of EKG: sob   Rhythm: normal sinus castillo  Rate: normal 57  Axis: normal  Ectopy: none  Conduction: normal  ST Segments/ T Waves: No ST-T wave changes  Q Waves: none  Comparison to prior: No old EKG available    Clinical Impression: Sinus bradycardia       IV Antibiotics given and/or elevated Lactate of 0 and no sepsis note found - Delete this reminder and enter the sepsis note or '.edcms' before signing chart.>>>     Results for orders placed or performed during the hospital encounter of 10/17/24   XR Chest Port 1 View     Status: None    Narrative    Portable chest    INDICATION: Short of breath    COMPARISON: Chest CT 10/15/2024    FINDINGS: Heart size normal. No consolidation or costophrenic angle  blunting. No suspicious nodule. Bony structures appear grossly intact.  No pneumothorax.      Impression    IMPRESSION: Negative portable chest    KAYLAN HILLMAN MD         SYSTEM ID:  [id number]   INR     Status: Normal   Result Value Ref Range    INR 1.05 0.85 - 1.15   Partial thromboplastin time     Status: Normal   Result Value Ref Range    aPTT 23 22 - 38 Seconds   Comprehensive metabolic panel     Status: Abnormal   Result Value Ref Range    Sodium 140 135 - 145 mmol/L    Potassium 4.6 3.4 - 5.3 mmol/L    Carbon Dioxide (CO2) 26 22 - 29 mmol/L    Anion Gap 9 7 - 15 mmol/L    Urea Nitrogen 27.3 (H) 8.0 - 23.0 mg/dL    Creatinine 1.53 (H) 0.67 - 1.17 mg/dL    GFR Estimate 47 (L) >60 mL/min/1.73m2    Calcium 9.9 8.8 - 10.4 mg/dL    Chloride 105 98 - 107 mmol/L    Glucose 94 70 - 99 mg/dL    Alkaline Phosphatase 86 40 - 150 U/L    AST 24 0 - 45 U/L    ALT 13 0 - 70 U/L    Protein Total 7.3 6.4 - 8.3 g/dL    Albumin 4.1 3.5 - 5.2 g/dL    Bilirubin Total 0.8 <=1.2 mg/dL   Magnesium     Status: Normal   Result Value Ref Range    Magnesium 2.0 1.7 - 2.3 mg/dL   CBC with platelets and differential     Status: Abnormal   Result Value Ref Range    WBC Count 4.7 4.0 -  11.0 10e3/uL    RBC Count 4.38 (L) 4.40 - 5.90 10e6/uL    Hemoglobin 13.7 13.3 - 17.7 g/dL    Hematocrit 43.2 40.0 - 53.0 %    MCV 99 78 - 100 fL    MCH 31.3 26.5 - 33.0 pg    MCHC 31.7 31.5 - 36.5 g/dL    RDW 12.0 10.0 - 15.0 %    Platelet Count 212 150 - 450 10e3/uL    % Neutrophils 64 %    % Lymphocytes 18 %    % Monocytes 12 %    % Eosinophils 6 %    % Basophils 1 %    % Immature Granulocytes 0 %    NRBCs per 100 WBC 0 <1 /100    Absolute Neutrophils 3.0 1.6 - 8.3 10e3/uL    Absolute Lymphocytes 0.8 0.8 - 5.3 10e3/uL    Absolute Monocytes 0.6 0.0 - 1.3 10e3/uL    Absolute Eosinophils 0.3 0.0 - 0.7 10e3/uL    Absolute Basophils 0.0 0.0 - 0.2 10e3/uL    Absolute Immature Granulocytes 0.0 <=0.4 10e3/uL    Absolute NRBCs 0.0 10e3/uL   EKG 12-lead, tracing only     Status: None   Result Value Ref Range    Systolic Blood Pressure  mmHg    Diastolic Blood Pressure  mmHg    Ventricular Rate 57 BPM    Atrial Rate 57 BPM    ID Interval 166 ms    QRS Duration 100 ms     ms    QTc 408 ms    P Axis 51 degrees    R AXIS 55 degrees    T Axis 28 degrees    Interpretation ECG       Sinus bradycardia  Otherwise normal ECG  Unconfirmed report - interpretation of this ECG is computer generated - see medical record for final interpretation  Confirmed by - EMERGENCY ROOM, PHYSICIAN ([id number]),  ARTHUR STEIN ([id number]) on 10/17/2024 5:02:01 PM     CBC with platelets differential     Status: Abnormal    Narrative    The following orders were created for panel order CBC with platelets differential.  Procedure                               Abnormality         Status                     ---------                               -----------         ------                     CBC with platelets and d...[[id number]]  Abnormal            Final result                 Please view results for these tests on the individual orders.     Medications   lidocaine 1 % 0.1-1 mL ( Other Unhold 10/17/24 8831)   lidocaine (LMX4) cream ( Topical Unhold  10/17/24 1801)   sodium chloride (PF) 0.9% PF flush 3 mL ( Intracatheter Unhold 10/17/24 1801)   sodium chloride (PF) 0.9% PF flush 3 mL ( Intracatheter Unhold 10/17/24 1801)   gabapentin (NEURONTIN) capsule 600 mg (has no administration in time range)   losartan (COZAAR) tablet 50 mg ( Oral Automatically Held 10/20/24 2200)   pantoprazole (PROTONIX) EC tablet 40 mg (has no administration in time range)   lidocaine 1 % 0.1-1 mL (has no administration in time range)   lidocaine (LMX4) cream (has no administration in time range)   sodium chloride (PF) 0.9% PF flush 3 mL (has no administration in time range)   sodium chloride (PF) 0.9% PF flush 3 mL (has no administration in time range)   acetaminophen (TYLENOL) tablet 975 mg (has no administration in time range)   acetaminophen (TYLENOL) tablet 650 mg (has no administration in time range)   ondansetron (ZOFRAN ODT) ODT tab 4 mg (has no administration in time range)     Or   ondansetron (ZOFRAN) injection 4 mg (has no administration in time range)   prochlorperazine (COMPAZINE) injection 5 mg (has no administration in time range)     Or   prochlorperazine (COMPAZINE) tablet 5 mg (has no administration in time range)   senna-docusate (SENOKOT-S/PERICOLACE) 8.6-50 MG per tablet 1 tablet (has no administration in time range)   polyethylene glycol (MIRALAX) Packet 17 g (has no administration in time range)   magnesium hydroxide (MILK OF MAGNESIA) suspension 30 mL (has no administration in time range)   bisacodyl (DULCOLAX) suppository 10 mg (has no administration in time range)   oxyCODONE IR (ROXICODONE) half-tab 2.5 mg (has no administration in time range)     Or   oxyCODONE (ROXICODONE) tablet 5 mg (has no administration in time range)   naloxone (NARCAN) injection 0.2 mg (has no administration in time range)     Or   naloxone (NARCAN) injection 0.4 mg (has no administration in time range)     Or   naloxone (NARCAN) injection 0.2 mg (has no administration in time range)      Or   naloxone (NARCAN) injection 0.4 mg (has no administration in time range)   HYDROmorphone (DILAUDID) injection 0.2 mg (0.2 mg Intravenous $Given 10/17/24 8436)   guaiFENesin (ROBITUSSIN) 20 mg/mL solution 200 mg (has no administration in time range)   dexAMETHasone PF (DECADRON) injection 10 mg ( Intravenous Unhold 10/17/24 1801)   ampicillin-sulbactam (UNASYN) 3 g vial to attach to  mL bag ( Intravenous Unhold 10/17/24 1801)   lidocaine inhalant 4% nebulizer solution 3 mL (3 mLs Nebulization $Given 10/17/24 1402)     Labs Ordered and Resulted from Time of ED Arrival to Time of ED Departure   CBC WITH PLATELETS AND DIFFERENTIAL - Abnormal       Result Value    WBC Count 4.7      RBC Count 4.38 (*)     Hemoglobin 13.7      Hematocrit 43.2      MCV 99      MCH 31.3      MCHC 31.7      RDW 12.0      Platelet Count 212      % Neutrophils 64      % Lymphocytes 18      % Monocytes 12      % Eosinophils 6      % Basophils 1      % Immature Granulocytes 0      NRBCs per 100 WBC 0      Absolute Neutrophils 3.0      Absolute Lymphocytes 0.8      Absolute Monocytes 0.6      Absolute Eosinophils 0.3      Absolute Basophils 0.0      Absolute Immature Granulocytes 0.0      Absolute NRBCs 0.0     PARTIAL THROMBOPLASTIN TIME - Normal    aPTT 23       XR Chest Port 1 View   Final Result   IMPRESSION: Negative portable chest      KAYLAN HILLMAN MD            SYSTEM ID:  [id number]             Critical care was not performed.     Medical Decision Making  The patient's presentation was of moderate complexity (a chronic illness mild to moderate exacerbation, progression, or side effect of treatment).    The patient's evaluation involved:  review of external note(s) from 3+ sources (see separate area of note for details)  review of 3+ test result(s) ordered prior to this encounter (see separate area of note for details)  ordering and/or review of 3+ test(s) in this encounter (see separate area of note for  details)  discussion of management or test interpretation with another health professional (see separate area of note for details)    The patient's management necessitated high risk (a decision regarding hospitalization).    Assessment & Plan   79-year-old male with history of laryngeal cancer had finished radiation now with increasing airway concerns needing emergent tracheotomy.  Patient stable here in the ER laying supine seen by ENT EKG chest x-ray done etc.  Patient transferred from clinic did receive 10 mg of Decadron IV also Unasyn IV.  ENT aware they did see the patient here in the ER patient after IV established and medicines given etc. EKG labs done also patient then transferred the OR for emergent tracheotomy.       I have reviewed the nursing notes. I have reviewed the findings, diagnosis, plan and need for follow up with the patient.    Current Discharge Medication List          Final diagnoses:   Laryngeal cancer (H)   Obstruction of larynx   Tracheostomy status (H)       Jluis Vasquez MD  [facility] EMERGENCY DEPARTMENT  10/17/2024    This note was created at least in part by the use of dragon voice dictation system. Inadvertent typographical errors may still exist.  Jluis Vasquez MD.  Patient evaluated in the emergency department during the COVID-19 pandemic period. Careful attention to patients safety was addressed throughout the evaluation. Evaluation and treatment management was initiated with disposition made efficiently and appropriate as possible to minimize any risk of potential exposure to patient during this evaluation.       Jluis Vasquez MD  10/17/24 2007

## 2024-10-17 NOTE — H&P
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 He has previously undergone several laser procedure with Dr. Malone, then developed recurrence in July 2024 and was treated with radiation completed 9/27/24. He was diagnosed with a pneumonia 9/24 with increased secretions, wheezing and fever, and completed a 10 day course of Augmentin.  He continued to have noisy breathing and was seen in the ED 10/15 for increasing difficulty breathing , chest congestion and increasing noisy breathing. Neck and chest imaging showed concern for nodularity at the glottis, no acute chest abnormality. He followed up in ENT clinic with Dr. Malone today, and was found to have severe narrowing of the glottis with b/l vocal cord paresis and supraglottic edema on laryngoscopy. It was recommended that he proceed to the ED for awake tracheostomy.     Patient seen in the ED and reports his breathing has been stable since he was evaluated in the ENT clinic. He denies SOB, but does have noisy breathing. He is in agreement to proceed with tracheostomy today.      Past Medical History:   Diagnosis Date    Anemia     Arthritis     Autoimmune disease (H)     CKD (chronic kidney disease) stage 3, GFR 30-59 ml/min (H)     Gastroesophageal reflux disease with esophagitis     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    Granulomatosis with polyangiitis, unspecified whether renal involvement (H)     Hearing problem     [place] and ringing in ears    History of colonic polyps 04/18/2018    Tubular Adenomas. Negative for high-grade dysplasia and malignancy.    Hoarseness     Hypertension     Idiopathic progressive polyneuropathy     Kidney problem     Laryngeal cancer (H)     Malignant melanoma (H)     Malignant neoplasm (H)     melanoma    Squamous cell carcinoma     Russo syndrome        Past Surgical  History:   Procedure Laterality Date    BIOPSY  11/2011    melanoma on back    DISSECT LYMPH NODE INGUINAL  3/1/2013    Procedure: DISSECT LYMPH NODE INGUINAL;  Bilateral Inguinal Lymph Node Biopsy.;  Surgeon: Tariq Acosta MD;  Location: [facility]    ESOPHAGOSCOPY, GASTROSCOPY, DUODENOSCOPY (EGD), COMBINED  7/5/2013    Procedure: COMBINED ESOPHAGOSCOPY, GASTROSCOPY, DUODENOSCOPY (EGD);  Gastroscopy;  Surgeon: Tariq Acosta MD;  Location: [facility]    HERNIORRHAPHY INGUINAL  8/6/2012    Procedure: HERNIORRHAPHY INGUINAL;  Open Repair Left Inguinal Hernia;  Surgeon: Jerad Baker MD;  Location: [facility]    INJECT STEROID (LOCATION) N/A 8/30/2023    Procedure: steroid injection;  Surgeon: Josephine Malone MD;  Location: [facility]    INJECT STEROID (LOCATION) N/A 9/13/2023    Procedure: steroid injection;  Surgeon: Josephine Malone MD;  Location: [facility]    INJECT STEROID (LOCATION) N/A 10/25/2023    Procedure: steroid injection, stent placement, and biopsy;  Surgeon: Josephine Malone MD;  Location: [facility]    INJECT STEROID (LOCATION) N/A 12/1/2023    Procedure: steroid injection;  Surgeon: Josephine Malone MD;  Location: [facility]    LARYNGOSCOPY WITH MICROSCOPE N/A 9/13/2023    Procedure: MICROLARYNGOSCOPY with stent removal and biopsy;  Surgeon: Josephine Malone MD;  Location: [facility]    LARYNGOSCOPY, FLEXIBLE WITH INJECTION N/A 12/11/2023    Procedure: LARYNGOSCOPY, FLEXIBLE WITH INJECTION with steroid;  Surgeon: Josephine Malone MD;  Location: [facility]    LASER CO2 LARYNGOSCOPY N/A 8/30/2023    Procedure: MICROLARYNGOSCOPY, CO2 laser resection of vocal fold lesion;  Surgeon: Josephine Malone MD;  Location: [facility]    LASER CO2 LARYNGOSCOPY N/A 10/25/2023    Procedure: MICROLARYNGOSCOPY, CO2 laser resection of vocal fold lesion;  Surgeon: Josephine Malone MD;  Location: [facility]    LASER CO2 LARYNGOSCOPY N/A 12/1/2023    Procedure: MICROLARYNGOSCOPY CO2 laser standby, resection of vocal fold lesion, stent removal, biopsy;  Surgeon: Josephine Malone  MD;  Location: [facility]    LASER CO2 LARYNGOSCOPY, COMPLEX N/A 7/17/2024    Procedure: MICROLARYNGOSCOPY, CO2 laser standby, biopsy;  Surgeon: Josephine Malone MD;  Location: [facility]       Current Outpatient Medications   Medication Sig Dispense Refill    calcipotriene (DOVONOX) 0.005 % external cream Apply topically 2 times daily In conjunction with fluorouracil cream in a 1:1 mixture. (Patient not taking: Reported on 8/2/2024) 60 g 2    calcium carbonate 600 mg-vitamin D 400 units (CALTRATE) 600-400 MG-UNIT per tablet Take 1 tablet by mouth 2 times daily.      Cholecalciferol (VITAMIN D) 1000 UNITS capsule Take 1 capsule by mouth daily.      fluorouracil (EFUDEX) 5 % external cream Apply topically 2 times daily In conjunction with calcipotriene cream in a 1:1 mixture. (Patient not taking: Reported on 8/2/2024) 40 g 2    gabapentin (NEURONTIN) 300 MG capsule Take 2 capsules (600 mg) by mouth 3 times daily. Escalate per handout provided in clinic. Thank you. 180 capsule 3    guaiFENesin (ROBITUSSIN) 20 mg/mL liquid Take 10 mLs (200 mg) by mouth every 4 hours as needed for cough. 500 mL 4    losartan (COZAAR) 50 MG tablet Take 1 tablet (50 mg) by mouth daily (Patient taking differently: Take 50 mg by mouth at bedtime) 90 tablet 3    mupirocin (BACTROBAN) 2 % external cream Apply topically 3 times daily. Apply to affected area three times daily before applying aquaphor 30 g 0    pantoprazole (PROTONIX) 40 MG EC tablet Take 1 tablet (40 mg) by mouth daily. 60 tablet 1    silver sulfADIAZINE (SILVADENE) 1 % external cream Apply topically 2 times daily. 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       Social History     Socioeconomic History    Marital status: Single     Spouse name: Not on file    Number of children:  Not on file    Years of education: Not on file    Highest education level: Not on file   Occupational History     Employer: [employer]   Tobacco Use    Smoking status: Former     Current packs/day: 0.00     Average packs/day: 1 pack/day for 20.0 years (20.0 ttl pk-yrs)     Types: Cigarettes     Start date: 1993     Quit date: 2013     Years since quittin.6     Passive exposure: Past    Smokeless tobacco: Never   Vaping Use    Vaping status: Never Used   Substance and Sexual Activity    Alcohol use: Not Currently     Comment: rare    Drug use: No    Sexual activity: Not Currently     Partners: Female   Other Topics Concern    Parent/sibling w/ CABG, MI or angioplasty before 65F 55M? Not Asked     Service Not Asked    Blood Transfusions Not Asked    Caffeine Concern Not Asked    Occupational Exposure Not Asked    Hobby Hazards Not Asked    Sleep Concern Not Asked    Stress Concern Not Asked    Weight Concern Not Asked    Special Diet Not Asked    Back Care Not Asked    Exercise Yes     Comment: walking    Bike Helmet Not Asked    Seat Belt Not Asked    Self-Exams Not Asked   Social History Narrative    Not on file     Social Determinants of Health     Financial Resource Strain: High Risk (2021)    Received [facility] [employer] [facility]    Financial Resource Strain     Difficulty of Paying Living Expenses: Not on file     Difficulty of Paying Living Expenses: Not on file   Food Insecurity: Not on file   Transportation Needs: Not on file   Physical Activity: Not on file   Stress: Not on file   Social Connections: Unknown (2021)    Received from [facility], [employer] [facility]    Social Connections     Frequency of Communication with Friends and Family: Not on file   Interpersonal Safety: Low Risk  (2024)    Interpersonal Safety     Do you feel  "physically and emotionally safe where you currently live?: Yes     Within the past 12 months, have you been hit, slapped, kicked or otherwise physically hurt by someone?: No     Within the past 12 months, have you been humiliated or emotionally abused in other ways by your partner or ex-partner?: No   Housing Stability: Not on file       Family History   Problem Relation Age of Onset    Diabetes Mother     Hypertension Mother     Cancer Father         stomach    Heart Disease Father     Heart Disease Brother     Diabetes Sister     Diabetes Sister     Diabetes Sister     Heart Disease Brother     Cancer Sister     Glaucoma No family hx of     Macular Degeneration No family hx of        ROS: 12 point review of systems is negative unless noted in HPI.    PHYSICAL EXAM:  General: laying in bed, no acute distress  /76   Pulse 62   Temp 97.9  F (36.6  C) (Oral)   Resp 18   Ht 1.727 m (5' 8\")   Wt 79.9 kg (176 lb 3.2 oz)   SpO2 98%   BMI 26.79 kg/m    HEAD: normocephalic, atraumatic  Face: symmetrical, no swelling, edema, or erythema  Eyes: EOMI, clear sclera  Ears: no otorrhea  Nose: no anterior drainage  Mouth: moist,tongue midline and symmetric  Neck: soft and flat with palpable laryngeal landmarks, trachea midline  Neuro: cranial nerves 2-12 grossly intact  Respiratory: Breathing non-labored,audible inspiratory stridor, no accessory muscle use. spO2 100%    ROUTINE IP LABS (Last four results)  BMP  Recent Labs   Lab 10/15/24  1731      POTASSIUM 4.7   CHLORIDE 104   LAMAR 9.3   CO2 26   BUN 28.3*   CR 1.44*   GLC 90     CBC  Recent Labs   Lab 10/17/24  1206 10/15/24  1731   WBC 4.7 6.0   RBC 4.38* 4.21*   HGB 13.7 13.6   HCT 43.2 40.6   MCV 99 96   MCH 31.3 32.3   MCHC 31.7 33.5   RDW 12.0 11.9    190     INR  Recent Labs   Lab 10/17/24  1206   INR 1.05       Assessment and Plan  Joshua Ennis is a 79 year old male with a past medical history of Eleuterio's with recurrent T1 glottic cancer (SCC " "with sarcomatoid features) with associated glottic web. He has previously undergone several laser procedure with Dr. Malone, then developed recurrence in July 2024 and was treated with radiation completed 9/27/24. He is now being admitted through the ED for upper airway compromise with b/l vocal cord paresis and supraglottic edema. It was recommended that he undergo emergent awake tracheostomy.     Neuro:  - Pain control: Tylenol, oxycodone PRN  - PTA gabapentin    HEENT:  Glottic SCC s/p radiation   B/l vocal cord paresis and supraglottic edema  Glottic web  - IV decadron 10mg now, start IV Unasyn  - emergent awake tracheostomy, DL w/ biopsies today  - will need DME trach and suction supplies for discharge    Respiratory:  - supplemental O2 PRN to keep sats >92%  - continuous pulse oximetry     CV/heme:  - hemodynamically stable  - PTA losartan, consider resuming tomorrow pending post op BPs    FEN/GI:  - NPO for OR  - PTA pantoprazole, plan to resume tomorrow  - Bowel regimen: pericolace PRN     :  - voiding independently    Endo  - no acute issues    ID:  - Afebrile  - IV Unasyn     PPX:  - consider starting lovenox prophylaxis post op  - SCDs  - IS    Clinically Significant Risk Factors Present on Admission                   # Hypertension: Noted on problem list         # Overweight: Estimated body mass index is 26.79 kg/m  as calculated from the following:    Height as of this encounter: 1.727 m (5' 8\").    Weight as of this encounter: 79.9 kg (176 lb 3.2 oz).                Disposition Plan   Expected discharge in 3-5 days to prior living arrangement once stable from a respiratory standpoint with trach, independent with trach cares, and DME supplies set up for home.     Entered: Kathy Kwon PA-C 10/17/2024, 11:55 AM         Kathy Kwon PA-C  Otolaryngology-Head & Neck Surgery  Please page ENT with questions by dialing [phone] and entering job code [phone] when prompted.    "

## 2024-10-17 NOTE — TELEPHONE ENCOUNTER
Left Voicemail (1st Attempt) and Sent Mychart (1st Attempt) for the patient to call back and schedule the following:    Appointment type: Return Rheumatology  Provider: Dr. Sanchez  Return date: Rescheduling 3/7 appointment  Specialty phone number: [phone]  Additional appointment(s) needed: NA  Additonal Notes: Rescheduling 3/7 appt. 1st attempt

## 2024-10-17 NOTE — ED TRIAGE NOTES
"Ambulatory to triage from clinic. Dr. Malone from [facility] told him to come in for \"emergency trach surgery wanting it at 12:30 pm today.\" Pt has a history of vocal chord cancer with radiation treatment. Pt sating a 98% on RA without difficulty. Difficulty speaking. Pt's fiance mostly speaks for him.    Paula Cerna RN on 10/17/2024 at 11:32 AM          "

## 2024-10-17 NOTE — NURSING NOTE
"Chief Complaint   Patient presents with    RECHECK   Blood pressure 130/70, pulse 70, height 1.753 m (5' 9\"), weight 80.3 kg (177 lb), SpO2 98%. Coleman Alatorre, EMT    "

## 2024-10-17 NOTE — PROGRESS NOTES
[facility]   at the [facility]   Otolaryngology Clinic     Patient: Joshua Ennis    MRN: [id number]    : 1945    Age/Gender: 79 year old male  Date of Service: 10/17/2024  Rendering Provider:   Josephine Malone MD         Impression & Plan     IMPRESSION: Mr. Ennis is a 79 year old male who is being seen for the following:    Dysphonia   - anterior commissure lesion seen on scope on 23  - voice changes worsening for a few months  - prior smoker  - scope shows anterior commissure lesion with extension to the right vocal fold  - given anterior location will require CT neck to rule out thyroid cartilage extension  - discussed awake biopsy - patient in agreement this was done today  - pathology showed SCC  - CT chest had findings of new nodules - after multiple discussions with radiology the decision was made to proceed with surgery and repeat imaging for this finding in the chest  - s/p MDL with bilateral cordectomy with stent placement on 23  - today shows that the vocal folds closed around the stent - discussed he needs the scar broken up and bigger stent placed - patient in agreement   - pathology showed SCC with positive margins  - went back for MDL and stent removal on 23   - symptoms 10/10/2023 are stable, voice is poor  - scope shows  laryngeal web  - discussed he has positive margins - will proceed with repeat surger - if can't clear will need radiation  - ct chest shows improved size of lung nodule of concern  - s/p microlaryngoscopy, CO2 laser resection of vocal fold lesion, steroid injection, stent placement, and biopsy 10/25/23  - pathology shows invasive squamous cell carcinoma  - symptoms 2023 are stable, voice is poor, discussed case at tumor board, will continue with close observation and repeat biopsies  - scope shows stent in place, vocal folds are open, laryngeal web developed  - discussed removal of stent, then will collect a sample, if the  sample is cancerous, will refer for radiation therapy  - s/p microlaryngoscopy, CO2 laser resection of vocal fold lesion, steroid injection, stent removal, and biopsy 12/01/23  - pathology showed detached fragment of squamous epithelium with mild to moderate dysplasia, no evidence of carcinoma  - s/p laryngoscopy and anterior glottic web vocal fold steroid injection 12/11/23      - symptoms 1/4/2024 are has been having a hard time talking with his voice  - strobe shows bilateral vocal fold stiffness and swelling with question of laryngeal web, ventricular phonation  - s/p steroid injection today, does not appear to have a web, but rather vocal fold stiffness and swelling  - discussed treatment for vocal folds will be with steroid injections and voice therapy  - discussed voice result is questionable given severity of ventricular phonation  - symptoms 1/18/2024 are stable  - scope shows anterior glottic web, did lidocaine anesthesia, but difficult to see if the vocal folds can split apart today as compared to last time, he had laryngeal spasm  - discussed that we should focus on surveillance at this time and keep him in voice therapy, I would not do a repeat stunt placement given the last two did not work   - symptoms 6/27/2024 are worse difficulty breathing  - scope shows anterior glottic web, moderate airway restriction  - has final read of CT pending  - discussed I am concerned for tumor, needs evaluation under anesthesia   discussed awake intubation, discussed risk of trach  - s/p MDL with biopsy 7/17/24 with pathology showing SCC, tumor board discussion ensued with decision to proceed with radiation   - symptoms 7/29/2024 are stable per him though improved per Earlene, less panting with mowing the lawn  - scope shows anterior glottic web, though improved glottic airway   - discussed if breathing should worsen during treatment he is to reach out and consider trach placement   - symptoms 10/17/2024 are worsened for  the past few weeks in terms of his breathing, has finished radiation, had pneumonia that resolved but his breathing has not, breathing has gotten worse, here with his fiancee who is concerned, patient denies difficulty breathing however he has stridor   - 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  - discussed his options at this time are observation, steroid treatment vs awake tracheotomy  - given how severe the airway restriction is would recommend awake tracheotomy   - patient reluctant saying he would rather die, he is tired of all these interventions, everytime something happens there is another thing  - his fiancee on the other hand wants him to proceed  - after much discussion patient in agreement and will proceed to the hospital  Plan  - hospital admission  - abx, steroids  - plan for awake tracheotomy   - let rheumatology and radiation oncology know  - will do a DL with biopsies at the same time      RETURN VISIT: after surgery    Chief Complaint   Vocal fold SCC  S/p MDL with bilateral cordectomy and steroid injection 8/30/23  S/p MDL with stent removal and steroid injection on 9/13/23  S/p microlaryngoscopy, CO2 laser resection of vocal fold lesion, steroid injection, stent placement, and biopsy 10/25/23    S/p microlaryngoscopy, CO2 laser resection of vocal fold lesion, steroid injection, stent removal, and biopsy 12/01/23  S/p laryngoscopy and anterior glottic web vocal fold steroid injection 12/11/23   S/p steroid injection 1/4/24               Interval History   HISTORY OF PRESENT ILLNESS: Mr. Ennis is a 79 year old male is being followed for dysphonia. he was initially seen on 8/15/23. Please refer to this note for full history.     Today, he presents for follow up. 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 Negative for high-grade dysplasia and malignancy.    Hoarseness     Hypertension     Idiopathic progressive polyneuropathy     Kidney problem     Laryngeal cancer (H)     Malignant melanoma (H)     Malignant neoplasm (H)     melanoma    Squamous cell carcinoma     Russo syndrome        PAST SURGICAL HISTORY:   Past Surgical History:   Procedure Laterality Date    BIOPSY  11/2011    melanoma on back    DISSECT LYMPH NODE INGUINAL  3/1/2013    Procedure: DISSECT LYMPH NODE INGUINAL;  Bilateral Inguinal Lymph Node Biopsy.;  Surgeon: Tariq Acosta MD;  Location: [facility]    ESOPHAGOSCOPY, GASTROSCOPY, DUODENOSCOPY (EGD), COMBINED  7/5/2013    Procedure: COMBINED ESOPHAGOSCOPY, GASTROSCOPY, DUODENOSCOPY (EGD);  Gastroscopy;  Surgeon: Tariq Acosta MD;  Location: [facility]    HERNIORRHAPHY INGUINAL  8/6/2012    Procedure: HERNIORRHAPHY INGUINAL;  Open Repair Left Inguinal Hernia;  Surgeon: Jerad Baker MD;  Location: [facility]    INJECT STEROID (LOCATION) N/A 8/30/2023    Procedure: steroid injection;  Surgeon: Josephine Malone MD;  Location: [facility]    INJECT STEROID (LOCATION) N/A 9/13/2023    Procedure: steroid injection;  Surgeon: Josephine Malone MD;  Location: [facility]    INJECT STEROID (LOCATION) N/A 10/25/2023    Procedure: steroid injection, stent placement, and biopsy;  Surgeon: Josephine Malone MD;  [facility]: [facility]    INJECT STEROID (LOCATION) N/A 12/1/2023    Procedure: steroid injection;  Surgeon: Josephine Malone MD;  Location: [facility]     LARYNGOSCOPY WITH MICROSCOPE N/A 9/13/2023    Procedure: MICROLARYNGOSCOPY with stent removal and biopsy;  Surgeon: Josephine Malone MD;  Location: [facility]    LARYNGOSCOPY, FLEXIBLE WITH INJECTION N/A 12/11/2023    Procedure: LARYNGOSCOPY, FLEXIBLE WITH INJECTION with steroid;  Surgeon: Josephine Malone MD;  Location: [facility] OR    LASER CO2 LARYNGOSCOPY N/A 8/30/2023    Procedure: MICROLARYNGOSCOPY, CO2 laser resection of vocal fold lesion;  Surgeon: Josephine Malone MD;  Location: [facility]    LASER CO2 LARYNGOSCOPY N/A 10/25/2023    Procedure: MICROLARYNGOSCOPY, CO2 laser resection of vocal fold lesion;  Surgeon: Josephine Malone MD;  Location: [facility]    LASER CO2 LARYNGOSCOPY N/A 12/1/2023    Procedure: MICROLARYNGOSCOPY CO2 laser standby, resection of vocal fold lesion, stent removal, biopsy;  Surgeon: Josephine Malone MD;  Location: [facility]    LASER CO2 LARYNGOSCOPY, COMPLEX N/A 7/17/2024    Procedure: MICROLARYNGOSCOPY, CO2 laser standby, biopsy;  Surgeon: Josephine Malone MD;  Location: [facility]       CURRENT MEDICATIONS:   Current Outpatient Medications:     calcipotriene (DOVONOX) 0.005 % external cream, Apply topically 2 times daily In conjunction with fluorouracil cream in a 1:1 mixture. (Patient not taking: Reported on 8/2/2024), Disp: 60 g, Rfl: 2    calcium carbonate 600 mg-vitamin D 400 units (CALTRATE) 600-400 MG-UNIT per tablet, Take 1 tablet by mouth 2 times daily., Disp: , Rfl:     Cholecalciferol (VITAMIN D) 1000 UNITS capsule, Take 1 capsule by mouth daily., Disp: , Rfl:     fluorouracil (EFUDEX) 5 % external cream, Apply topically 2 times daily In conjunction with calcipotriene cream in a 1:1 mixture. (Patient not taking: Reported on 8/2/2024), Disp: 40 g, Rfl: 2    gabapentin (NEURONTIN) 300 MG capsule, Take 2 capsules (600 mg) by mouth 3 times daily. Escalate per handout provided in clinic. Thank you., Disp: 180 capsule, Rfl: 3    guaiFENesin (ROBITUSSIN) 20 mg/mL liquid, Take 10 mLs (200 mg) by mouth every 4 hours as  needed for cough., Disp: 500 mL, Rfl: 4    losartan (COZAAR) 50 MG tablet, Take 1 tablet (50 mg) by mouth daily (Patient taking differently: Take 50 mg by mouth at bedtime), Disp: 90 tablet, Rfl: 3    mupirocin (BACTROBAN) 2 % external cream, Apply topically 3 times daily. Apply to affected area three times daily before applying aquaphor, Disp: 30 g, Rfl: 0    pantoprazole (PROTONIX) 40 MG EC tablet, Take 1 tablet (40 mg) by mouth daily., Disp: 60 tablet, Rfl: 1    silver sulfADIAZINE (SILVADENE) 1 % external cream, Apply topically 2 times daily. Apply to affected area, Disp: 400 g, Rfl: 0    ALLERGIES: Shrimp and Advil [ibuprofen]    SOCIAL HISTORY:    Social History     Socioeconomic History    Marital status: Single     Spouse name: Not on file    Number of children: Not on file    Years of education: Not on file    Highest education level: Not on file   Occupational History     Employer: [employer]   Tobacco Use    Smoking status: Former     Current packs/day: 0.00     Average packs/day: 1 pack/day for 20.0 years (20.0 ttl pk-yrs)     Types: Cigarettes     Start date: 1993     Quit date: 2013     Years since quittin.6     Passive exposure: Past    Smokeless tobacco: Never   Vaping Use    Vaping status: Never Used   Substance and Sexual Activity    Alcohol use: Not Currently     Comment: rare    Drug use: No    Sexual activity: Not Currently     Partners: Female   Other Topics Concern    Parent/sibling w/ CABG, MI or angioplasty before 65F 55M? Not Asked     Service Not Asked    Blood Transfusions Not Asked    Caffeine Concern Not Asked    Occupational Exposure Not Asked    Hobby Hazards Not Asked    Sleep Concern Not Asked    Stress Concern Not Asked    Weight Concern Not Asked    Special Diet Not Asked    Back Care Not Asked    Exercise Yes     Comment: walking    Bike Helmet Not Asked    Seat Belt Not Asked    Self-Exams Not Asked   Social History Narrative    Not on file      Social Determinants of Health     Financial Resource Strain: High Risk (12/24/2021)    Received from [facility], [facility]    Financial Resource Strain     Difficulty of Paying Living Expenses: Not on file     Difficulty of Paying Living Expenses: Not on file   Food Insecurity: Not on file   Transportation Needs: Not on file   Physical Activity: Not on file   Stress: Not on file   Social Connections: Unknown (12/24/2021)    Received from [facility], [facility]    Social Connections     Frequency of Communication with Friends and Family: Not on file   Interpersonal Safety: Low Risk  (8/22/2024)    Interpersonal Safety     Do you feel physically and emotionally safe where you currently live?: Yes     Within the past 12 months, have you been hit, slapped, kicked or otherwise physically hurt by someone?: No     Within the past 12 months, have you been humiliated or emotionally abused in other ways by your partner or ex-partner?: No   Housing Stability: Not on file         FAMILY HISTORY:   Family History   Problem Relation Age of Onset    Diabetes Mother     Hypertension Mother     Cancer Father         stomach    Heart Disease Father     Heart Disease Brother     Diabetes Sister     Diabetes Sister     Diabetes Sister     Heart Disease Brother     Cancer Sister     Glaucoma No family hx of     Macular Degeneration No family hx of       Non-contributory for problems with anesthesia    REVIEW OF SYSTEMS:   The patient was asked a 14 point review of systems regarding constitutional symptoms, eye symptoms, ears, nose, mouth, throat symptoms, cardiovascular symptoms, respiratory symptoms, gastrointestinal symptoms, genitourinary symptoms, musculoskeletal symptoms, integumentary symptoms, neurological symptoms, psychiatric symptoms, endocrine symptoms, hematologic/lymphatic symptoms,  and allergic/ immunologic symptoms.   The pertinent factors have been included in the HPI and below.  Patient Supplied Answers to Review of Systems      10/17/2024     9:42 AM   [facility] ROS   Constitutional Weight loss    Appetite change    Unexplained fatigue   Cardiopulmonary Cough    Breathing problems    Wheezing       Physical Examination   The patient underwent a physical examination as described below. The pertinent positive and negative findings are summarized after the description of the examination.  Constitutional: The patient's developmental and nutritional status was assessed. The patient's voice quality was assessed.  Head and Face: The head and face were inspected for deformities. The sinuses were palpated. The salivary glands were palpated. Facial muscle strength was assessed bilaterally.  Eyes: Extraocular movements and primary gaze alignment were assessed.  Ears, Nose, Mouth and Throat: The ears and nose were examined for deformities. The nasal septum, mucosa, and turbinates were inspected by anterior rhinoscopy. The lips, teeth, and gums were examined for abnormalities. The oral mucosa, tongue, palate, tonsils, lateral and posterior pharynx were inspected for the presence of asymmetry or mucosal lesions.    Neck: The tracheal position was noted, and the neck mass palpated to determine if there were any asymmetries, abnormal neck masses, thyromegally, or thyroid nodules.  Respiratory: The nature of the breathing and chest expansion/symmetry was observed.  Cardiovascular: The patient was examined to determine the presence of any edema or jugular venous distension.  Abdomen: The contour of the abdomen was noted.  Lymphatic: The patient was examined for infraclavicular lymphadenopathy.  Musculoskeletal: The patient was inspected for the presence of skeletal deformities.  Extremities: The extremities were examined for any clubbing or cyanosis.  Skin: The skin was examined for inflammatory or neoplastic  conditions.  Neurologic: The patient's orientation, mood, and affect were noted. The cranial nerve  functions were examined.  Other pertinent positive and negative findings on physical examination:   OC/OP: no lesions, uvula midline, soft palate elevates symmetrically   Neck: no lesions, no TH tenderness to palpation     All other physical examination findings were within normal limits and noncontributory.    Procedures   Flexible laryngoscopy (CPT 63732)        Pre-procedure diagnosis: dysphonia  Post-procedure diagnosis: same as above  Indication for procedure: Mr. Ennis is a 78 year old male with see above  Procedure(s): Fiberoptic Laryngoscopy     Details of Procedure: After informed consent was obtained, the patient was seated in the examination chair.  The areas of the nasopharynx as well as the hypopharynx were anesthetized with topical 4% lidocaine with 0.25% phenylephrine atomizer.  Examination of the base of tongue was performed first.  Attention was directed to any evidence of masses in the area or evidence of leukoplakia or candidal infection.  Attention was directed to the epiglottis where its size and position was determined and its movement on phonation of the vowel  e .  The piriform sinuses were then inspected for any mass lesions or pooling of secretions.  Attention was then directed to the larynx. The vocal folds were inspected for infection or any areas of leukoplakia, for masses, polypoid degeneration, or hemorrhage.  Having done this, the arytenoids and vocal processes were inspected for erythema or evidence of granuloma formation.  The posterior commissure was then inspected for evidence of inflammatory changes in the mucosa and heaping up of mucosal tissue. The patient was then instructed to say the vowel  e .  Adduction of vocal folds to the midline was observed for any evidence of paresis or paralysis of the larynx or asymmetry in rotation of the larynx to the left or right. The patient  "was asked to breathe and the degree of abduction was noted bilaterally.  Subglottic view of the larynx was obtained for any additional mass lesions or mucosal changes.  Finally the post cricoid was examined for evidence of pooling of secretions, as well as the pharyngeal wall mucosa.   Anesthesia type: 0.25% phenylephrine     Findings:  Anatomic/physiological deviations: RNC, severe airway restriction with bilateral vocal fold paralysis and supraglottic edema               Right vocal process: Severe restriction of mobility   Left vocal process: Severe restriction of mobility  Glottal gap: Complete glottal closure  Supraglottic structures: Normal  Hypopharynx: Normal      Estimated Blood Loss: minimal  Complications: None  Disposition: Patient tolerated the procedure well        7/30/24 exam      Review of Relevant Clinical Data   I personally reviewed:  Notes:    Radiology:    Pathology:    Procedures:    Labs:  No results found for: \"TSH\"  Lab Results   Component Value Date     10/15/2024    CO2 26 10/15/2024    BUN 28.3 (H) 10/15/2024    PHOS 2.6 08/12/2016     Lab Results   Component Value Date    WBC 6.0 10/15/2024    HGB 13.6 10/15/2024    HCT 40.6 10/15/2024    MCV 96 10/15/2024     10/15/2024     Lab Results   Component Value Date    PTT 24 04/11/2013    INR 0.98 04/11/2013     No results found for: \"DUANE\"  No components found for: \"RHEUMATOIDFACTOR\", \"RF\"  Lab Results   Component Value Date    CRP <2.9 07/07/2022    CRP <2.9 01/07/2022    CRP <2.9 07/28/2021    CRP <2.9 03/05/2021    CRP <2.9 11/11/2020     No components found for: \"CKTOT\", \"URICACID\"  No components found for: \"C3\", \"C4\", \"DSDNAAB\", \"NDNAABIFA\"  Lab Results   Component Value Date    MPOAB Not Reported 02/14/2014       Patient reported Quality of Life (QOL) Measures   Patient Supplied Answers To VHI Questionnaire      1/22/2024     1:02 PM   Voice Handicap Index (VHI-10)   My voice makes it difficult for people to hear me 4 " "  People have difficulty understanding me in a noisy room 4   My voice difficulties restrict my personal and social life.  2   I feel left out of conversations because of my voice 3   My voice problem causes me to lose income 0   I feel as though I have to strain to produce voice 4   The clarity of my voice is unpredictable 1   My voice problem upsets me 2   My voice makes me feel handicapped 2   People ask, \"What's wrong with your voice?\" 2   VHI-10 24         Patient Supplied Answers To EAT Questionnaire       No data to display                  Patient Supplied Answers To CSI Questionnaire      8/23/2023     8:49 AM   Cough Severity Index (CSI)   My cough is worse when I lie down 0   My coughing problem causes me to restrict my personal and social life 0   I tend to avoid places because of my cough problem 0   I feel embarrassed because of my coughing problem 1   People ask, ''What's wrong?'' because I cough a lot 1   I run out of air when I cough 0   My coughing problem affects my voice 2   My coughing problem limits my physical activity 0   My coughing problem upsets me 0   People ask me if I am sick because I cough a lot 2   CSI Score 6         Patient Supplied Answers to Dyspnea Index Questionnaire:       No data to display                    Josephine Malone MD    Laryngology    [facility]  Department of  Otolaryngology - Head and Neck Surgery  Clinics & Surgery Center  [address] [place] [address]  Appointment line: [phone]  Fax: [phone]  [email or web address]     "

## 2024-10-17 NOTE — ANESTHESIA CARE TRANSFER NOTE
Patient: Joshua Ennis    Procedure: Procedure(s):  Awake Tracheostomy, Direct Laryngoscopy with Biopsies       Diagnosis: Airway obstruction [J98.8]  Diagnosis Additional Information: No value filed.    Anesthesia Type:   No value filed.     Note:    Oropharynx: oropharynx clear of all foreign objects and spontaneously breathing  Level of Consciousness: awake  Oxygen Supplementation: blow-by O2 (trachdome)  Level of Supplemental Oxygen (L/min / FiO2): 6  Independent Airway: airway patency not satisfactory and stable (s/p tracheostomy)  Dentition: dentition unchanged  Vital Signs Stable: post-procedure vital signs reviewed and stable  Report to RN Given: handoff report given  Patient transferred to: PACU    Handoff Report: Identifed the Patient, Identified the Reponsible Provider, Reviewed the pertinent medical history, Discussed the surgical course, Reviewed Intra-OP anesthesia mangement and issues during anesthesia, Set expectations for post-procedure period and Allowed opportunity for questions and acknowledgement of understanding  Vitals:  Vitals Value Taken Time   /132 10/17/24 1345   Temp     Pulse 70 10/17/24 1348   Resp 24 10/17/24 1348   SpO2 97 % 10/17/24 1348   Vitals shown include unfiled device data.    Electronically Signed By: DENISSE Sosa CRNA  October 17, 2024  1:48 PM

## 2024-10-17 NOTE — ANESTHESIA PREPROCEDURE EVALUATION
Anesthesia Pre-Procedure Evaluation    Patient: Joshua Ennis   MRN: [id number] : 1945        Procedure : Procedure(s):  Tracheobronchoscopy via established tracheostomy incision-awake trach  Laryngoscopy-direct  Bronchoscopy flexible          Past Medical History:   Diagnosis Date     Anemia      Arthritis      Autoimmune disease (H)      CKD (chronic kidney disease) stage 3, GFR 30-59 ml/min (H)      Gastroesophageal reflux disease with esophagitis      Glottic web of larynx 2023     Granulomatosis with polyangiitis, unspecified whether renal involvement (H)      Hearing problem     [place] and ringing in ears     History of colonic polyps 2018    Tubular Adenomas. Negative for high-grade dysplasia and malignancy.     Hoarseness      Hypertension      Idiopathic progressive polyneuropathy      Kidney problem      Laryngeal cancer (H)      Malignant melanoma (H)      Malignant neoplasm (H)     melanoma     Squamous cell carcinoma      Russo syndrome       Past Surgical History:   Procedure Laterality Date     BIOPSY  2011    melanoma on back     DISSECT LYMPH NODE INGUINAL  3/1/2013    Procedure: DISSECT LYMPH NODE INGUINAL;  Bilateral Inguinal Lymph Node Biopsy.;  Surgeon: Tariq Acosta MD;  Location: [facility]     ESOPHAGOSCOPY, GASTROSCOPY, DUODENOSCOPY (EGD), COMBINED  2013    Procedure: COMBINED ESOPHAGOSCOPY, GASTROSCOPY, DUODENOSCOPY (EGD);  Gastroscopy;  Surgeon: Tariq Acosta MD;  Location: [facility]     HERNIORRHAPHY INGUINAL  2012    Procedure: HERNIORRHAPHY INGUINAL;  Open Repair Left Inguinal Hernia;  Surgeon: Jerad Baker MD;  Location: [facility]     INJECT STEROID (LOCATION) N/A 2023    Procedure: steroid injection;  Surgeon: Josephine Malone MD;  Location: [facility]     INJECT STEROID (LOCATION) N/A 2023    Procedure: steroid injection;  Surgeon: Josephine Malone MD;  [facility]     INJECT STEROID (LOCATION) N/A 10/25/2023    Procedure: steroid injection,  stent placement, and biopsy;  Surgeon: Josephine Malone MD;  [facility]     INJECT STEROID (LOCATION) N/A 2023    Procedure: steroid injection;  Surgeon: Josephine Malone MD;  [facility]     LARYNGOSCOPY WITH MICROSCOPE N/A 2023    Procedure: MICROLARYNGOSCOPY with stent removal and biopsy;  Surgeon: Josephine Malone MD;  [facility]     LARYNGOSCOPY, FLEXIBLE WITH INJECTION N/A 2023    Procedure: LARYNGOSCOPY, FLEXIBLE WITH INJECTION with steroid;  Surgeon: Josephine Malone MD;  Location: [facility]     LASER CO2 LARYNGOSCOPY N/A 2023    Procedure: MICROLARYNGOSCOPY, CO2 laser resection of vocal fold lesion;  Surgeon: Josephine Malone MD;  [facility]     LASER CO2 LARYNGOSCOPY N/A 10/25/2023    Procedure: MICROLARYNGOSCOPY, CO2 laser resection of vocal fold lesion;  Surgeon: Josephine Malone MD;  [facility]     LASER CO2 LARYNGOSCOPY N/A 2023    Procedure: MICROLARYNGOSCOPY CO2 laser standby, resection of vocal fold lesion, stent removal, biopsy;  Surgeon: Josephine Malone MD;  [facility]     LASER CO2 LARYNGOSCOPY, COMPLEX N/A 2024    Procedure: MICROLARYNGOSCOPY, CO2 laser standby, biopsy;  Surgeon: Josephine Malone MD;  [facility]      Allergies   Allergen Reactions     Shrimp Nausea and Vomiting and Unknown     Got sick each time he ate it     Advil [Ibuprofen] Other (See Comments)     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 Type:  General and MAC.    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   On 6/27/24 ENT laryngoscopy found RNC, anterior glottic web, moderate airway restriction    (+)                tobacco use,                        Neurologic: Comment: Idiopathic progressive polyneuropathy - neg neurologic ROS     Cardiovascular:     (+)  hypertension- -   -  - -                                 Previous cardiac testing   Echo: Date: Results:    Stress Test:  Date: Results:    ECG Reviewed:  Date: 4/3/15 Results:  NSR  Cath:  Date: Results:      METS/Exercise Tolerance: >4 METS    Hematologic:     (+)      anemia,          Musculoskeletal:   (+)  arthritis,             GI/Hepatic:     (+) GERD, Asymptomatic on medication,                  Renal/Genitourinary: Comment:   # 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 Follows with rheumatology    (+) renal disease, type: CRI, Pt does not require dialysis,           Endo:       Psychiatric/Substance Use:  - neg psychiatric ROS     Infectious Disease:  - neg infectious disease ROS     Malignancy: Comment: Melanoma s/p resection in 11/201  (+) Malignancy (vocal cord SCC), History of Skin.Skin CA status post Surgery.      Other:            Physical Exam    Airway        Mallampati: III   TM distance: > 3 FB   Neck ROM: full   Mouth opening: > 3 cm    Respiratory Devices and Support         Dental       (+) Edentulous      Cardiovascular          Rhythm and rate: normal     Pulmonary   pulmonary exam normal            OUTSIDE LABS:  CBC:   Lab Results   Component Value Date    WBC 6.0 10/15/2024    WBC 7.8 09/20/2024    HGB 13.6 10/15/2024    HGB 13.7 09/20/2024    HCT 40.6 10/15/2024    HCT 41.9 09/20/2024     10/15/2024     09/20/2024     BMP:   Lab Results   Component Value Date     10/15/2024     01/12/2024    POTASSIUM 4.7  "10/15/2024    POTASSIUM 3.7 01/12/2024    CHLORIDE 104 10/15/2024    CHLORIDE 107 01/12/2024    CO2 26 10/15/2024    CO2 24 01/12/2024    BUN 28.3 (H) 10/15/2024    BUN 23.0 01/12/2024    CR 1.44 (H) 10/15/2024    CR 1.90 (H) 09/20/2024    GLC 90 10/15/2024    GLC 93 07/17/2024     COAGS:   Lab Results   Component Value Date    PTT 24 04/11/2013    INR 0.98 04/11/2013     POC:   Lab Results   Component Value Date    BGM 82 03/14/2013     HEPATIC:   Lab Results   Component Value Date    ALBUMIN 4.1 09/20/2024    PROTTOTAL 7.4 01/03/2024    ALT 18 09/20/2024    AST 25 09/20/2024     (H) 03/07/2013    ALKPHOS 103 01/03/2024    BILITOTAL 0.7 01/03/2024     OTHER:   Lab Results   Component Value Date    LACT 0.8 02/14/2017    LAMAR 9.3 10/15/2024    PHOS 2.6 08/12/2016    MAG 2.1 03/14/2016    LIPASE 40 01/03/2024    CRP <2.9 07/07/2022    SED 26 (H) 09/20/2024       Anesthesia Plan    ASA Status:  3    NPO Status:  NPO Appropriate    Anesthesia Type: General.     - Airway: Tracheostomy              Consents    Anesthesia Plan(s) and associated risks, benefits, and realistic alternatives discussed. Questions answered and patient/representative(s) expressed understanding.     - Discussed:     - Discussed with:  Spouse, Patient      - Extended Intubation/Ventilatory Support Discussed: No.      - Patient is DNR/DNI Status: No          Postoperative Care       PONV prophylaxis: Dexamethasone or Solumedrol, Ondansetron (or other 5HT-3)     Comments:    Other Comments: Starting as MAC, once trach in GA for DL         Koby Brunner MD    I have reviewed the pertinent notes and labs in the chart from the past 30 days and (re)examined the patient.  Any updates or changes from those notes are reflected in this note.               # Hypertension: Noted on problem list         # Overweight: Estimated body mass index is 26.79 kg/m  as calculated from the following:    Height as of this encounter: 1.727 m (5' 8\").    " Weight as of this encounter: 79.9 kg (176 lb 3.2 oz).

## 2024-10-17 NOTE — BRIEF OP NOTE
[facility]    Brief Operative Note    Pre-operative diagnosis: Airway obstruction [J98.8]  Post-operative diagnosis Same as pre-operative diagnosis    Procedure: Awake Tracheostomy, Direct Laryngoscopy with Biopsies, N/A - Bronchus    Surgeon: Jeramie Child MD  Anesthesia: General   Estimated Blood Loss: 10mL    Drains: None  Specimens:   ID Type Source Tests Collected by Time Destination   1 : right supraglottis Tissue Other SURGICAL PATHOLOGY EXAM Jeramie Child MD 10/17/2024  1:25 PM    2 : left supraglottis Tissue Other SURGICAL PATHOLOGY EXAM Jeramie Child MD 10/17/2024  1:25 PM    3 : post cricoid biopsy Tissue Other SURGICAL PATHOLOGY EXAM Jeramie Child MD 10/17/2024  1:28 PM      Findings:   Straightforward trach, favio between 1st and 2nd rings. 6 cuffed shiley in place. DLB with Grade 1 exposure, edematous and friable tissue over supraglottis, ulcerated tissue and fibrinous debris over arytenoids and post-cricoid space. Bx taken of right and left supraglottis, postcricoid space.   .  Complications: None.  Implants: * No implants in log *    78 yo M long-term RG patient with Pmhx [id number] SCC of the larynx s/p cordectomy 8/23, MDL w/ laser excision 10/23, Re-resection 12/23, MLD w bx demonstrating SCC 7/24, radiotherapy 63Gy over 28 fractures completed 2 weeks ago now s/p awake tracheostomy 10/17 with SK after finding supraglottic/glottic edema with bilateral cord paralysis and associated stridor in clinic.     Neuro: Pain control with tylenol, oxycodone, PTA gabapentin  HEENT:   - Hx of glottic web, recurrent laryngeal cancer  - s/p trach, cut sutures 10/21  Cardiovascular:   - Hx of HTN  - Resume PTA losartan tomorrow if indicated  Respiratory:   - Hx of recent pneumonia, supraglottic edema with bilateral vocal cord paralysis  - Supplemental oxygen PRN for sats under 92%  FEN/GI:   - Hx of GERD  - Hx of CKD  - Resume PTA PPI tomorrow  -  Advance diet as tolerated  - BMP in AM  Heme/ID:   - CBC in AM  :   - GINA  Endocrine/Rheum:   - Hx of GPA  MSK:  - GINA  Cons:   - None currently  Ppx:   - Likely lovenox POD1 if no concerns    Kyle Christian MD  Otolaryngology - Head and Neck Surgery PGY-4

## 2024-10-17 NOTE — ANESTHESIA POSTPROCEDURE EVALUATION
Patient: Joshua Ennis    Procedure: Procedure(s):  Awake Tracheostomy, Direct Laryngoscopy with Biopsies       Anesthesia Type:  General    Note:  Disposition: Inpatient; Admission   Postop Pain Control: Uneventful            Sign Out: Well controlled pain   PONV: No   Neuro/Psych: Uneventful            Sign Out: Acceptable/Baseline neuro status   Airway/Respiratory:             Sign Out: AIRWAY IN SITU/Resp. Support (Coughing in post op. Improved with nebulized lidocaine and humidification of air.)               Airway in situ/Resp. Support: Tracheostomy   CV/Hemodynamics: Uneventful            Sign Out: Acceptable CV status; No obvious hypovolemia; No obvious fluid overload   Other NRE: NONE   DID A NON-ROUTINE EVENT OCCUR? No           Last vitals:  Vitals Value Taken Time   /75 10/17/24 1530   Temp 36.4  C (97.5  F) 10/17/24 1345   Pulse 55 10/17/24 1535   Resp 10 10/17/24 1535   SpO2 99 % 10/17/24 1535   Vitals shown include unfiled device data.    Electronically Signed By: Silvio Smith MD  October 17, 2024  3:35 PM

## 2024-10-17 NOTE — PROGRESS NOTES
Returned patient SO call as CT neck results were concerning per care team. Writer advised patient that provider would like to see him today. Patient SO was agreeable and will leave now to come to clinic. Writer also advised patient SO patient should refrain from eating in case there is a procedure. Patient was agreeable and verbalized understanding of the situation. Nina Cardenas RN on 10/17/2024 at 7:58 AM

## 2024-10-18 ENCOUNTER — APPOINTMENT (OUTPATIENT)
Dept: SPEECH THERAPY | Facility: CLINIC | Age: 79
DRG: 011 | End: 2024-10-18
Payer: COMMERCIAL

## 2024-10-18 LAB
ANION GAP SERPL CALCULATED.3IONS-SCNC: 10 MMOL/L (ref 7–15)
BUN SERPL-MCNC: 27.3 MG/DL (ref 8–23)
CALCIUM SERPL-MCNC: 9.6 MG/DL (ref 8.8–10.4)
CHLORIDE SERPL-SCNC: 105 MMOL/L (ref 98–107)
CREAT SERPL-MCNC: 1.37 MG/DL (ref 0.67–1.17)
EGFRCR SERPLBLD CKD-EPI 2021: 53 ML/MIN/1.73M2
ERYTHROCYTE [DISTWIDTH] IN BLOOD BY AUTOMATED COUNT: 12.1 % (ref 10–15)
GLUCOSE SERPL-MCNC: 94 MG/DL (ref 70–99)
HCO3 SERPL-SCNC: 25 MMOL/L (ref 22–29)
HCT VFR BLD AUTO: 45.4 % (ref 40–53)
HGB BLD-MCNC: 14.6 G/DL (ref 13.3–17.7)
MCH RBC QN AUTO: 31.3 PG (ref 26.5–33)
MCHC RBC AUTO-ENTMCNC: 32.2 G/DL (ref 31.5–36.5)
MCV RBC AUTO: 97 FL (ref 78–100)
PLATELET # BLD AUTO: 240 10E3/UL (ref 150–450)
POTASSIUM SERPL-SCNC: 5 MMOL/L (ref 3.4–5.3)
RBC # BLD AUTO: 4.67 10E6/UL (ref 4.4–5.9)
SODIUM SERPL-SCNC: 140 MMOL/L (ref 135–145)
WBC # BLD AUTO: 8.8 10E3/UL (ref 4–11)

## 2024-10-18 PROCEDURE — 999N000215 HC STATISTIC HFNC ADULT NON-CPAP

## 2024-10-18 PROCEDURE — 92610 EVALUATE SWALLOWING FUNCTION: CPT | Mod: GN

## 2024-10-18 PROCEDURE — 36415 COLL VENOUS BLD VENIPUNCTURE: CPT

## 2024-10-18 PROCEDURE — 99231 SBSQ HOSP IP/OBS SF/LOW 25: CPT | Mod: GC | Performed by: OTOLARYNGOLOGY

## 2024-10-18 PROCEDURE — 92526 ORAL FUNCTION THERAPY: CPT | Mod: GN

## 2024-10-18 PROCEDURE — 250N000013 HC RX MED GY IP 250 OP 250 PS 637

## 2024-10-18 PROCEDURE — 120N000002 HC R&B MED SURG/OB UMMC

## 2024-10-18 PROCEDURE — 85027 COMPLETE CBC AUTOMATED: CPT

## 2024-10-18 PROCEDURE — 250N000011 HC RX IP 250 OP 636: Performed by: PHYSICIAN ASSISTANT

## 2024-10-18 PROCEDURE — 80048 BASIC METABOLIC PNL TOTAL CA: CPT

## 2024-10-18 PROCEDURE — 999N000157 HC STATISTIC RCP TIME EA 10 MIN

## 2024-10-18 RX ORDER — ENOXAPARIN SODIUM 100 MG/ML
40 INJECTION SUBCUTANEOUS EVERY 24 HOURS
Status: DISCONTINUED | OUTPATIENT
Start: 2024-10-18 | End: 2024-10-18

## 2024-10-18 RX ORDER — ENOXAPARIN SODIUM 100 MG/ML
40 INJECTION SUBCUTANEOUS EVERY 24 HOURS
Status: DISCONTINUED | OUTPATIENT
Start: 2024-10-18 | End: 2024-10-23 | Stop reason: HOSPADM

## 2024-10-18 RX ADMIN — ACETAMINOPHEN 975 MG: 325 TABLET, FILM COATED ORAL at 11:02

## 2024-10-18 RX ADMIN — SENNOSIDES AND DOCUSATE SODIUM 1 TABLET: 8.6; 5 TABLET ORAL at 19:47

## 2024-10-18 RX ADMIN — PANTOPRAZOLE SODIUM 40 MG: 40 TABLET, DELAYED RELEASE ORAL at 09:14

## 2024-10-18 RX ADMIN — HYDROXYZINE HYDROCHLORIDE 50 MG: 50 TABLET, FILM COATED ORAL at 09:13

## 2024-10-18 RX ADMIN — ACETAMINOPHEN 975 MG: 325 TABLET, FILM COATED ORAL at 19:46

## 2024-10-18 RX ADMIN — GUAIFENESIN 200 MG: 200 SOLUTION ORAL at 09:11

## 2024-10-18 RX ADMIN — GABAPENTIN 600 MG: 300 CAPSULE ORAL at 13:47

## 2024-10-18 RX ADMIN — GABAPENTIN 600 MG: 300 CAPSULE ORAL at 09:12

## 2024-10-18 RX ADMIN — HYDROXYZINE HYDROCHLORIDE 50 MG: 50 TABLET, FILM COATED ORAL at 17:10

## 2024-10-18 RX ADMIN — GABAPENTIN 600 MG: 300 CAPSULE ORAL at 19:46

## 2024-10-18 RX ADMIN — ENOXAPARIN SODIUM 40 MG: 40 INJECTION SUBCUTANEOUS at 11:02

## 2024-10-18 ASSESSMENT — ACTIVITIES OF DAILY LIVING (ADL)
ADLS_ACUITY_SCORE: 42
ADLS_ACUITY_SCORE: 35
ADLS_ACUITY_SCORE: 42
ADLS_ACUITY_SCORE: 39
ADLS_ACUITY_SCORE: 42
ADLS_ACUITY_SCORE: 39
ADLS_ACUITY_SCORE: 26
ADLS_ACUITY_SCORE: 42
ADLS_ACUITY_SCORE: 39
DEPENDENT_IADLS:: INDEPENDENT
ADLS_ACUITY_SCORE: 26
ADLS_ACUITY_SCORE: 39
ADLS_ACUITY_SCORE: 42
ADLS_ACUITY_SCORE: 35
ADLS_ACUITY_SCORE: 26
ADLS_ACUITY_SCORE: 26
ADLS_ACUITY_SCORE: 42
ADLS_ACUITY_SCORE: 42
ADLS_ACUITY_SCORE: 26

## 2024-10-18 NOTE — CONSULTS
Care Management Initial Consult    General Information  Assessment completed with: Joshua Fenton  Type of CM/SW Visit: Initial Assessment    Primary Care Provider verified and updated as needed: Yes   Readmission within the last 30 days: no previous admission in last 30 days   Reason for Consult: discharge planning  Advance Care Planning: Advance Care Planning Reviewed: present on chart        Communication Assessment  Patient's communication style: spoken language (English or Bilingual) (nods and write to communicate s/p trach placement)        Cognitive  Cognitive/Neuro/Behavioral: WDL                      Living Environment:   People in home: significant other  Earlene  Current living Arrangements: house      Able to return to prior arrangements: yes     Family/Social Support:  Care provided by: self  Provides care for: no one  Marital Status: Lives with Significant Other  Support system: Significant Other       [facility]  Description of Support System: Involved, Supportive    Support Assessment: Adequate family and caregiver support    Current Resources:   Patient receiving home care services: No     Community Resources: None  Equipment currently used at home: none  Supplies currently used at home: None    Employment/Financial:  Employment Status:          Financial Concerns:     Referral to Financial Worker: No     Does the patient's insurance plan have a 3 day qualifying hospital stay waiver?  Yes   Which insurance plan 3 day waiver is available? Alternative insurance waiver  Will the waiver be used for post-acute placement? No    Lifestyle & Psychosocial Needs:  Social Determinants of Health     Food Insecurity: Not on file   Depression: Not at risk (1/8/2024)    PHQ-2     PHQ-2 Score: 0   Housing Stability: Not on file   Tobacco Use: Medium Risk (10/17/2024)    Patient History     Smoking Tobacco Use: Former     Smokeless Tobacco Use: Never     Passive Exposure: Past   Financial Resource Strain: High Risk  (12/24/2021)    [facility]    Financial Resource Strain     Difficulty of Paying Living Expenses: Not on file     Difficulty of Paying Living Expenses: Not on file   Alcohol Use: Not on file   Transportation Needs: Not on file   Physical Activity: Not on file   Interpersonal Safety: Low Risk  (8/22/2024)    Interpersonal Safety     Do you feel physically and emotionally safe where you currently live?: Yes     Within the past 12 months, have you been hit, slapped, kicked or otherwise physically hurt by someone?: No     Within the past 12 months, have you been humiliated or emotionally abused in other ways by your partner or ex-partner?: No   Stress: Not on file   Social Connections: Unknown (12/24/2021)    Received from [employer] [facility], [facility]    Social Connections     Frequency of Communication with Friends and Family: Not on file   Health Literacy: Not on file     Functional Status:  Prior to admission patient needed assistance:   Dependent ADLs:: Independent  Dependent IADLs:: Independent     Mental Health Status:  Mental Health Status: No Current Concerns       Chemical Dependency Status:  Chemical Dependency Status: No Current Concerns           Values/Beliefs:  Spiritual, Cultural Beliefs, Catholic Practices, Values that affect care: no             Discussed  Partnership in Safe Discharge Planning  document with patient/family: No    Additional Information:  Met with patient to complete initial care management assessment. Patient answered assessment questions with yes no responses. Patient confirmed currently lives in a house in \[place]\"". At first he states he lives alone but later went on to say his significant other, Earlene, also lives with him. He denies any assistive devices or medical equipment or supplies at baseline. He Is independent with  ADLs/IADLs. He denies current home or community services.     Discussed need for suction, humidity and trach supplies for discharge. Patient does not have a preference on a DME provider and agrees with referral being sent to [facility]. Referral sent.     Discussed home care follow up. Patient agrees and does not have a preference on home care agency. Referral for home RN sent to [facility] at this time.     Spoke with Tricia (ph: [phone]) at [facility] who confirmed receipt of suction, humidity, trach supplies orders. Tricia states they can deliver the suction machine to the hospital on Monday before 11 am. Tricia states the remainder of supplies will be delivered to patient's home on day of discharge.     Next Steps:   RNCC will continue to follow.  Will need to follow up on home care referral to determine if patient was accepted.  Will need to keep [facility] updated on discharge plan so they can arrange delivery of supplies to patient's home on day of discharge.   Will need to ensure suction machine is delivered to bedside prior to discharge.     [facility] - suction, humidity, trach supplies  Ph: [phone] or [phone]  Fax: [phone]  Tricia: [phone]    Rachel Alvarado, RN, BSN  6A RN Care Coordinator  Ph: [phone]   Елена: 6A Neuro RNCC

## 2024-10-18 NOTE — PLAN OF CARE
Status: PMHx of vocal cord cancer, CKD, HTN. Seen in clinic 10/17, sent to [facility] for emergency awake trach d/t increased airway narrowing and restriction. POD 1   Vitals: VSS on 21% HTD   Neuros: Intact ex writes to communicate   IV: PIV SL  Resp/trach: #6 Shiley, cuffed. 21% HTD. Good cough. No tracheal suctioning needed. Inner cannula changed per orders.  Diet: Clear liquid; advance as tolerated  Bowel status: LBM PTA  : voiding spont via bedside urinal  Skin: blanchable redness to trach site, small amount serosang drainage. Mepi lite in place  Pain: patient refused any prn/scheduled meds. Denied pain  Activity: Up SBA

## 2024-10-18 NOTE — PROGRESS NOTES
10/18/24 1016   Appointment Info   Signing Clinician's Name / Credentials (SLP) Kassie Michele MS CCC-SLP   General Information   Onset of Illness/Injury or Date of Surgery 10/17/24   Referring Physician Silvio Christian MD   Patient/Family Therapy Goal Statement (SLP) None stated   Pertinent History of Current Problem 78 yo M long-term RG patient with Pmhx [id number] SCC of the larynx s/p cordectomy 8/23, MDL w/ laser excision 10/23, Re-resection 12/23, MLD w bx demonstrating SCC 7/24, radiotherapy 63Gy over 28 fractures completed 2 weeks ago now s/p awake tracheostomy 10/17 with SK after finding supraglottic/glottic edema with bilateral cord paralysis and associated stridor in clinic. Clinical swallow eval completed per MD orders to further assess oropharyngeal swallow function.   Type of Evaluation   Type of Evaluation Swallow Evaluation   Oral Motor   Oral Musculature generally intact   Structural Abnormalities none present   Mucosal Quality adequate   Dentition (Oral Motor)   Dentition (Oral Motor) adequate dentition;dental appliance/dentures   Dental Appliance/Denture (Oral Motor) upper;adequate fit   Facial Symmetry (Oral Motor)   Facial Symmetry (Oral Motor) WNL   Lip Function (Oral Motor)   Lip Range of Motion (Oral Motor) WNL   Tongue Function (Oral Motor)   Tongue ROM (Oral Motor) WNL   Jaw Function (Oral Motor)   Jaw Function (Oral Motor) WNL   Cough/Swallow/Gag Reflex (Oral Motor)   Soft Palate/Velum (Oral Motor) WNL   Volitional Throat Clear/Cough (Oral Motor) WNL   Vocal Quality/Secretion Management (Oral Motor)   Vocal Quality (Oral Motor) unable/difficult to assess   Secretion Management (Oral Motor)   (pt expectorating secretions for oral suctioning)   General Swallowing Observations   Past History of Dysphagia Pt familiar to SLP caseload with VFSS completed 8/22/24 which demonstrated penetration on large consecutive sips. No penetration on smaller sips and mild pharyngeal residuals noted.  "Pt was recommended a regular diet and thin liquids. Pt reported that prior to admission he was not having trouble swallowing and was eating \"anything I wanted.\" Pt completed audio visit with OP SLP on 10/14 targeting pharyngeal exercises.   Respiratory Support room air  (humidified room air previously on trach but pt removed during evaluation)   Current Diet/Method of Nutritional Intake (General Swallowing Observations, NIS) clear liquid diet;thin liquids (level 0)   Swallowing Evaluation Clinical swallow evaluation   Clinical Swallow Evaluation   Feeding Assistance no assistance needed   Clinical Swallow Evaluation Textures Trialed thin liquids;pureed;solid foods   Clinical Swallow Eval: Thin Liquid Texture Trial   Mode of Presentation, Thin Liquids self-fed;cup;spoon   Volume of Liquid or Food Presented 1 ice chip, 8 oz   Oral Phase of Swallow WFL   Pharyngeal Phase of Swallow intact   Diagnostic Statement No overt s/sx of aspiration   Clinical Swallow Evaluation: Puree Solid Texture Trial   Mode of Presentation, Puree spoon;self-fed   Volume of Puree Presented 4 oz   Oral Phase, Puree WFL   Pharyngeal Phase, Puree intact   Diagnostic Statement No overt s/sx of aspiration   Clinical Swallow Evaluation: Solid Food Texture Trial   Mode of Presentation self-fed   Volume Presented 3 tbsp   Oral Phase WFL   Pharyngeal Phase intact   Diagnostic Statement No overt s/sx of aspiration   Esophageal Phase of Swallow   Patient reports or presents with symptoms of esophageal dysphagia Yes   Esophageal comments Hx of GERD per chart review   Swallowing Recommendations   Diet Consistency Recommendations thin liquids (level 0);regular diet   Supervision Level for Intake patient independent   Mode of Delivery Recommendations bolus size, small;food moistened;slow rate of intake   Swallowing Maneuver Recommendations alternate food and liquid intake;extra swallow   Monitoring/Assistance Required (Eating/Swallowing) monitor for cough " or change in vocal quality with intake;stop eating activities when fatigue is present   Recommended Feeding/Eating Techniques (Swallow Eval) maintain upright sitting position for eating;maintain upright posture during/after eating for 30 minutes;minimize distractions during oral intake;set-up and prepare tray   Medication Administration Recommendations, Swallowing (SLP) as tolerated   Instrumental Assessment Recommendations instrumental evaluation not recommended at this time   General Therapy Interventions   Planned Therapy Interventions Dysphagia Treatment   Dysphagia treatment Instruction of safe swallow strategies;Compensatory strategies for swallowing   Clinical Impression   Criteria for Skilled Therapeutic Interventions Met (SLP Eval) Yes, treatment indicated   SLP Diagnosis mild oropharyngeal dysphagia   Risks & Benefits of therapy have been explained evaluation/treatment results reviewed;care plan/treatment goals reviewed;risks/benefits reviewed;current/potential barriers reviewed;participants voiced agreement with care plan;participants included;patient   Clinical Impression Comments Clinical swallow eval completed per MD orders. Pt presents with mild oropharyngeal dysphagia s/p awake trach placement (POD). Pt with Shiley 6 trach with cuff deflated on room air upon SLP arrival.  Pt placed upper dentures for evaluation and was expectorating secretions for oral suctioning. Oral mech exam generally WFL. Pt tolerated thin liquids, pureed, and regular solid textures with no overt s/sx of aspiration. Functional time for mastication. Recommend regular diet and thin liquids. Please encourage excellent oral cares. Pt should be fully upright and alert for all PO, take small sips/bites, slow pacing, and alternate between consistencies. ST to continue to follow. Recommend pt resume OP SLP at ENT clinic at discharge.   SLP Discharge Recommendation home with outpatient therapy services   SLP Rationale for DC Rec  recommend pt resume OP SLP at ENT clinic at discharge   SLP Brief overview of current status  Recommend regular diet and thin liquids. Please encourage excellent oral cares. Pt should be fully upright and alert for all PO, take small sips/bites, slow pacing, and alternate between consistencies.   Total Session Time   Total Session Time (sum of timed and untimed services) 18

## 2024-10-18 NOTE — PROVIDER NOTIFICATION
Joshua Ennis [id number]    Could you please come and see the patient? patient is not tolerating a clear liquid diet, and is frustrated and in pain.     Thanks,   CAMILLE Hoffmann

## 2024-10-18 NOTE — OP NOTE
Otolaryngology Operative Report    Procedure Date: October 17, 2024      SURGEON: Jeramie Child MD  ASSISTANT:  Kyle Christian MD     PREOPERATIVE DIAGNOSIS:    - Acute respiratory failure secondary to upper airway obstruction  POSTOPERATIVE DIAGNOSIS:  Same     PROCEDURE:    1.  Awake tracheostomy  2.  Direct laryngoscopy with biopsy    FINDINGS:   Straightforward tracheostomy with normal anterior neck landmarks. Tracheostomy performed between 1st and 2nd tracheal rings, Ismael flap made. 6-0 cuffed shiley placed, sutures in four quadrants  DLB with grade 1 view of cords, edematous and friable tissue over supraglottis, ulcerated tissue and fibrinous debris over arytenoids and post-cricoid space. Bx taken of right and left supraglottis, postcricoid space.      ANESTHESIA:  General.  ESTIMATED BLOOD LOSS:  10mL  SPECIMEN:    Right supraglottis  Left supraglottis  Post-cricoid space.  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 He elected to proceed with the above stated procedures. After a full discussion of risks and benefits of the procedure, informed consent was obtained.     OPERATIVE COURSE:  The patient was brought to the operating room and placed on the operating table supine. He was found to be breathing appropriately. The patient was prepped and draped in standard fashion.  A timeout was performed to identify the patient and correctness of the procedure. Skin and deeper tissues were marked and infiltrated with 1% lidocaine with 1:100,000 epinephrine. This was allowed time to act. A vertical incision in the low  midline neck was injected with 1% lidocaine with 1:100,000 epinephrine solution.  The skin incision was made and the midline raphe of the straps was identified and divided.  The straps were reflected laterally to expose the thyroid isthmus.  With careful dissection the isthmus was divided and ligated.  We now exposed the anterior tracheal wall.  A Ismael flap based on the 1st and 2nd tracheal rings was designed.  Ventilation was initiated immediately without incident. End tidal CO2 was confirmed. The tube was secured by silk suture and the ties were placed tightly.     We then turned the patient for the DLB portion of the case. A mouthguard was placed. A Ossoff laryngoscope was used to visualized the larynx with a grade 1 view. The supraglottic structures appeared grossly edematous with friable, sloughing mucosa overlying the false and true cords. The false cords were biopsied bilaterally and sent for permanent pathology. The post-cricoid space and esophageal introitus was exposed which revealed white fibrinous debris overlaying ulcerated mucosa along the posterior pharyngeal wall and posterior arytenoids. The posterior pharyngeal wall was biopsied. Dedo and mouth guard were then removed without incident.     This marked the end of the procedure. The patient was awoken and taken to PACU in stable condition.     Dr. Child was present for the entire case    Kyle Christian MD  Otolaryngology - Head and Neck Surgery

## 2024-10-18 NOTE — PLAN OF CARE
Arrived from: PACU  Belongings/meds: with patient. No meds  2 RN skin check: [place]  Non intact findings: trach site    Status: PMHx of vocal cord cancer, CKD, HTN. Seen in clinic 10/17, sent to [facility] for emergency awake trach d/t increased airway narrowing and restriction.  Vitals: HTN, OVSS  Neuros: intact. Using communication board  IV: PIV SL  Resp/trach: #6 Shiley, cuffed. 31% HTD. Good, productive cough. Suctioning oral secretions w/ Yankauer. Trach suctioned PRN  Diet: clear liquid diet, ADAT per orders  Bowel status: LBM PTA, passing gas  : voiding spont via urinal  Skin: trach site, WNL  Pain: pain w/ coughing, utilizing PRNs and scheduled meds  Activity: SBA w/ line management  Plan/updates: follow POC    Trach emergency kit at bedside

## 2024-10-18 NOTE — PROGRESS NOTES
"Otolaryngology Progress Note  October 18, 2024    S: No acute events overnight. Some difficulty with PO. Atarax and dilaudid given for pain. Afebrile, vitals stable, BP in good range 128/72.    O: /72   Pulse 72   Temp 98.5  F (36.9  C) (Oral)   Resp 18   Ht 1.727 m (5' 8\")   Wt 79.9 kg (176 lb 3.2 oz)   SpO2 99%   BMI 26.79 kg/m     General: Alert and oriented x 3, No acute distress, interactive, frequent coughing   HEENT: 6-0 cuffed Shiley in place, sutures x4 intact. Some blood-tinged secretions noted from tracheostomy tube. Cuff taken down without complication.    Pulmonary: Breathing non-labored, no stridor, no accessory muscle use.     LABS:  BMP WNL except for known elevation of Cr and BUN from CKD, WBC 8.8, Hgb 14.6    A/P: 80 yo M long-term RG patient with Pmhx [id number] SCC of the larynx s/p cordectomy 8/23, MDL w/ laser excision 10/23, Re-resection 12/23, MLD w bx demonstrating SCC 7/24, radiotherapy 63Gy over 28 fractures completed 2 weeks ago now s/p awake tracheostomy 10/17 with SK after finding supraglottic/glottic edema with bilateral cord paralysis and associated stridor in clinic.     Plan today:   - Cuff down  - Advance diet  - Electrolyte labs moving forward to check for re-feeding syndrome     Neuro: Pain control with tylenol, oxycodone, PTA gabapentin (when tolerating PO)  HEENT:   - Hx of glottic web, recurrent laryngeal cancer  - s/p trach, cut sutures 10/21  Cardiovascular:   - Hx of HTN  - Resume PTA losartan tomorrow if indicated  Respiratory:   - Hx of recent pneumonia, supraglottic edema with bilateral vocal cord paralysis  - Supplemental oxygen PRN for sats under 92%  - patient will require tracheostomy and portable suction supplies on discharge due to tracheostomy dependence and inability to independently manage respiratory secretions.   FEN/GI:   - Hx of GERD  - Hx of CKD  - Resume PTA PPI when able  - Advance diet as tolerated  - BMP in AM  Heme/ID:   - CBC in AM  :   - " GNIA  Endocrine/Rheum:   - Hx of GPA  MSK:  - GINA  Cons:   - None currently  Ppx:   - Lovenox    Kyle Christian MD  Otolaryngology - Head and Neck Surgery PGY-4

## 2024-10-18 NOTE — PLAN OF CARE
Goal Outcome Evaluation:      Plan of Care Reviewed With: patient, spouse    Overall Patient Progress: improvingOverall Patient Progress: improving    Outcome Evaluation: Cuff down, HME added today, Regular diet, first teaching completed    Status: PMHx of vocal cord cancer, CKD, HTN. Seen in clinic 10/17, sent to [facility] for emergency awake trach d/t increased airway narrowing and restriction. POD 1   Vitals: VSS on 21% HTD or HME  Neuros: Intact ex writes to communicate   IV: PIV SL  Resp/trach: #6 Shiley, cuffed. 21% HTD. Good cough. No tracheal suctioning needed. Inner cannula changed q2-4h.  Diet: regular  Bowel status: LBM PTA  : voiding spont in BR  Skin: blanchable redness to trach site, small amount serosang drainage. Mepi lite in place  Pain: Tylenol, gabapentin, and atarax q6h.  Activity: Up SBA for line help only.    6A Patient Tracheostomy Teaching:    Folder in Patient Room: yes, ambrocio is taking home overnight to review.  Describe parts of a trach tube: reviewed x1  Importance of Obturator: reviewed x1  Perform stoma site care: reviewed x1   Clean/change inner cannula: patient demonstrated  Suctioning: reviewed  Lavaging: reviewed  Change trach ties: reviewed  Possible Complications: reviewed    How to reinsert trach: reviewed  Use of PMV or trach cap: reviewed  Importance of humidification: reviewed  Contacting local Fire/Police: reviewed  When to contact provider: reviewed  When to call 911: reviewed

## 2024-10-18 NOTE — PROGRESS NOTES
BRIEF ENT NOTE  10/17/24    Paged to bedside per request of bedside nursing x2 regarding ongoing anxiety, cough, frustration, and pain.    On evaluation patient is laying in bed breathing comfortably with minimal secretions around new trach site. Communicates via written white board. He did one coughing episode while I was present in room which appeared to cause significant pain/discomfort.    I subsequently passed a flex scope through trach to ensure appropriate positioning. Trach was sitting appropriately in the airway with no backwalling. Discussed that discomfort is frequently common following new trach and new sensation unfortunately just takes time to get used to.     Did add prn dilaudid for pain, atarax for anxiety, and melatonin to help w/ sleep    Jackelin Carrington MD  ENT PGY-2

## 2024-10-18 NOTE — PROGRESS NOTES
Brief ENT Note:    Patient seen on PM rounds and breathing comfortably. Performed tracheoscopy to reassess trach position and secretion burden. Trach is placed in excellent position without contact with walls. Moderate thick secretions around trach when retroflexed.    - Added saline nebulizer to thin secretions    Sandeep Matt MD   Otolaryngology - Head and Neck Surgery, PGY 2  Please page ENT with questions

## 2024-10-19 ENCOUNTER — APPOINTMENT (OUTPATIENT)
Dept: SPEECH THERAPY | Facility: CLINIC | Age: 79
DRG: 011 | End: 2024-10-19
Payer: COMMERCIAL

## 2024-10-19 ENCOUNTER — APPOINTMENT (OUTPATIENT)
Dept: PHYSICAL THERAPY | Facility: CLINIC | Age: 79
DRG: 011 | End: 2024-10-19
Payer: COMMERCIAL

## 2024-10-19 LAB
HOLD SPECIMEN: NORMAL
MAGNESIUM SERPL-MCNC: 1.9 MG/DL (ref 1.7–2.3)
PHOSPHATE SERPL-MCNC: 2.1 MG/DL (ref 2.5–4.5)
POTASSIUM SERPL-SCNC: 4.5 MMOL/L (ref 3.4–5.3)

## 2024-10-19 PROCEDURE — 83735 ASSAY OF MAGNESIUM: CPT

## 2024-10-19 PROCEDURE — 999N000157 HC STATISTIC RCP TIME EA 10 MIN

## 2024-10-19 PROCEDURE — 97161 PT EVAL LOW COMPLEX 20 MIN: CPT | Mod: GP

## 2024-10-19 PROCEDURE — 250N000013 HC RX MED GY IP 250 OP 250 PS 637

## 2024-10-19 PROCEDURE — 250N000011 HC RX IP 250 OP 636: Performed by: PHYSICIAN ASSISTANT

## 2024-10-19 PROCEDURE — 92526 ORAL FUNCTION THERAPY: CPT | Mod: GN

## 2024-10-19 PROCEDURE — 36415 COLL VENOUS BLD VENIPUNCTURE: CPT

## 2024-10-19 PROCEDURE — 999N000123 HC STATISTIC OXYGEN O2DAILY TECH TIME

## 2024-10-19 PROCEDURE — 250N000009 HC RX 250

## 2024-10-19 PROCEDURE — 94640 AIRWAY INHALATION TREATMENT: CPT | Mod: 76

## 2024-10-19 PROCEDURE — 94640 AIRWAY INHALATION TREATMENT: CPT

## 2024-10-19 PROCEDURE — 999N000215 HC STATISTIC HFNC ADULT NON-CPAP

## 2024-10-19 PROCEDURE — 84100 ASSAY OF PHOSPHORUS: CPT

## 2024-10-19 PROCEDURE — 250N000009 HC RX 250: Performed by: OTOLARYNGOLOGY

## 2024-10-19 PROCEDURE — 97116 GAIT TRAINING THERAPY: CPT | Mod: GP

## 2024-10-19 PROCEDURE — 250N000013 HC RX MED GY IP 250 OP 250 PS 637: Performed by: OTOLARYNGOLOGY

## 2024-10-19 PROCEDURE — 120N000002 HC R&B MED SURG/OB UMMC

## 2024-10-19 PROCEDURE — 84132 ASSAY OF SERUM POTASSIUM: CPT

## 2024-10-19 PROCEDURE — 97530 THERAPEUTIC ACTIVITIES: CPT | Mod: GP

## 2024-10-19 RX ORDER — GABAPENTIN 400 MG/1
400 CAPSULE ORAL AT BEDTIME
COMMUNITY

## 2024-10-19 RX ORDER — ALBUTEROL SULFATE 5 MG/ML
2.5 SOLUTION RESPIRATORY (INHALATION)
Status: DISCONTINUED | OUTPATIENT
Start: 2024-10-19 | End: 2024-10-20

## 2024-10-19 RX ORDER — SODIUM CHLORIDE FOR INHALATION 3 %
3 VIAL, NEBULIZER (ML) INHALATION
Status: DISCONTINUED | OUTPATIENT
Start: 2024-10-19 | End: 2024-10-20

## 2024-10-19 RX ORDER — GABAPENTIN 400 MG/1
400 CAPSULE ORAL AT BEDTIME
Status: DISCONTINUED | OUTPATIENT
Start: 2024-10-19 | End: 2024-10-23 | Stop reason: HOSPADM

## 2024-10-19 RX ADMIN — PANTOPRAZOLE SODIUM 40 MG: 40 TABLET, DELAYED RELEASE ORAL at 08:48

## 2024-10-19 RX ADMIN — SENNOSIDES AND DOCUSATE SODIUM 1 TABLET: 8.6; 5 TABLET ORAL at 19:39

## 2024-10-19 RX ADMIN — GABAPENTIN 600 MG: 300 CAPSULE ORAL at 08:47

## 2024-10-19 RX ADMIN — POTASSIUM & SODIUM PHOSPHATES POWDER PACK 280-160-250 MG 1 PACKET: 280-160-250 PACK at 12:00

## 2024-10-19 RX ADMIN — ACETAMINOPHEN 975 MG: 325 TABLET, FILM COATED ORAL at 12:00

## 2024-10-19 RX ADMIN — SODIUM CHLORIDE SOLN NEBU 3% 3 ML: 3 NEBU SOLN at 16:47

## 2024-10-19 RX ADMIN — HYDROXYZINE HYDROCHLORIDE 50 MG: 50 TABLET, FILM COATED ORAL at 08:49

## 2024-10-19 RX ADMIN — SODIUM CHLORIDE SOLN NEBU 3% 3 ML: 3 NEBU SOLN at 13:17

## 2024-10-19 RX ADMIN — ALBUTEROL SULFATE 2.5 MG: 2.5 SOLUTION RESPIRATORY (INHALATION) at 13:17

## 2024-10-19 RX ADMIN — GABAPENTIN 400 MG: 400 CAPSULE ORAL at 22:15

## 2024-10-19 RX ADMIN — POLYETHYLENE GLYCOL 3350 17 G: 17 POWDER, FOR SOLUTION ORAL at 08:47

## 2024-10-19 RX ADMIN — SENNOSIDES AND DOCUSATE SODIUM 1 TABLET: 8.6; 5 TABLET ORAL at 08:48

## 2024-10-19 RX ADMIN — ALBUTEROL SULFATE 2.5 MG: 2.5 SOLUTION RESPIRATORY (INHALATION) at 19:26

## 2024-10-19 RX ADMIN — POTASSIUM & SODIUM PHOSPHATES POWDER PACK 280-160-250 MG 1 PACKET: 280-160-250 PACK at 18:08

## 2024-10-19 RX ADMIN — ACETAMINOPHEN 975 MG: 325 TABLET, FILM COATED ORAL at 18:08

## 2024-10-19 RX ADMIN — POTASSIUM & SODIUM PHOSPHATES POWDER PACK 280-160-250 MG 1 PACKET: 280-160-250 PACK at 14:40

## 2024-10-19 RX ADMIN — ENOXAPARIN SODIUM 40 MG: 40 INJECTION SUBCUTANEOUS at 08:47

## 2024-10-19 RX ADMIN — ALBUTEROL SULFATE 2.5 MG: 2.5 SOLUTION RESPIRATORY (INHALATION) at 16:47

## 2024-10-19 RX ADMIN — GABAPENTIN 600 MG: 300 CAPSULE ORAL at 14:40

## 2024-10-19 ASSESSMENT — ACTIVITIES OF DAILY LIVING (ADL)
ADLS_ACUITY_SCORE: 26

## 2024-10-19 NOTE — PLAN OF CARE
Goal Outcome Evaluation:      Plan of Care Reviewed With: patient    Overall Patient Progress: improvingOverall Patient Progress: improving    Outcome Evaluation: Pt doing well with learning some trach cares, limited by motivation and energy level.    Status: PMHx of vocal cord cancer, CKD, HTN. Seen in clinic 10/17, sent to [facility] for emergency awake trach d/t increased airway narrowing and restriction. POD 2  Vitals: VSS on 21% HTD or HME  Neuros: writes to communicate, developed BUE tremors this evening, see provider notification.  IV: PIV SL  Lab: Phosphate replaced this evening  Resp/trach: #6 Shiley, cuffed. 21% HTD. Good cough. No tracheal suctioning needed. Inner cannula changed q2-4h.  Diet: regular  Bowel status: LBM PTA  : voiding spont in BR  Skin: blanchable redness to trach site, small amount serosang drainage. Skin care completed q4h, vaseline applied to chest to protect from drainage.   Pain: Tylenol, gabapentin, and atarax q6h.  Activity: Up SBA for line help only.

## 2024-10-19 NOTE — PROVIDER NOTIFICATION
" Paged to on call ENT resident:  \"Patient is having BUE tremors that has only begun this evening. Patient states he's never had tremors before, and it's getting so bad he is having trouble writing straight.\"  "

## 2024-10-19 NOTE — PROGRESS NOTES
"Otolaryngology Progress Note  October 19, 2024    S: NAEON. Vitals stable. Afebrile. SLP ok for regular diet and thin liquids. Did not require narcotics overnight. Atarax x1. Improved comfort with trach. Trach position confirmed 10/19 PM. Some phonation with trach finger occlusion. Limited PO intake.    O: /68 (BP Location: Left leg)   Pulse 71   Temp 98.6  F (37  C) (Oral)   Resp 18   Ht 1.727 m (5' 8\")   Wt 79.9 kg (176 lb 3.2 oz)   SpO2 100%   BMI 26.79 kg/m     General: Alert and oriented x 3, No acute distress, interactive, frequent coughing   HEENT: 6-0 cuffed Shiley in place, sutures x4 intact. Trach inner canula cleaned, Thick secretions. Pt not wearing HTD or HME this morning.   Pulmonary: Breathing non-labored, no stridor, no accessory muscle use.     LABS:  BMP WNL except for known elevation of Cr and BUN from CKD, WBC 8.8, Hgb 14.6    A/P: 78 yo M long-term RG patient with Pmhx [id number] SCC of the larynx s/p cordectomy 8/23, MDL w/ laser excision 10/23, Re-resection 12/23, MLD w bx demonstrating SCC 7/24, radiotherapy 63Gy over 28 fractures completed 2 weeks ago now s/p awake tracheostomy 10/17 with SK after finding supraglottic/glottic edema with bilateral cord paralysis and associated stridor in clinic.     Plan today:   - Advance diet  - Electrolyte labs moving forward to check for re-feeding syndrome     Neuro:   - Pain control with tylenol, oxycodone, PTA gabapentin (when tolerating PO)    HEENT:   - Hx of glottic web, recurrent laryngeal cancer  - s/p trach, Trach change planned 10/22    Cardiovascular:   - Hx of HTN  - PTA losartan      Respiratory:   - Hx of recent pneumonia, supraglottic edema with bilateral vocal cord paralysis  - Supplemental oxygen PRN for sats under 92%  - patient will require tracheostomy and portable suction supplies on discharge due to tracheostomy dependence and inability to independently manage respiratory secretions.   - Saline nebs     FEN/GI:   - Hx of " GERD  - Hx of CKD  - Resume PTA PPI when able  - Advance diet as tolerated  - BMP in AM    Heme/ID:   - CBC in AM    :   - GINA    Endocrine/Rheum:   - Hx of GPA    MSK:  - GINA    Cons:   - PT   - SLP     Ppx:   - Daiana Ramos MD  Otolaryngology - Head and Neck Surgery PGY-1

## 2024-10-19 NOTE — PHARMACY-ADMISSION MEDICATION HISTORY
Pharmacy Intern Admission Medication History    Admission medication history is complete. The information provided in this note is only as accurate as the sources available at the time of the update.    Information Source(s): Family member and CareEverywhere/SureScripts via in-person    Pertinent Information:   Upon My Visit:  Patient wasn't able to talk and his significant other provided the following information with her best of knowledge:  She is unsure about the calcipotriene (DOVONOX) 0.005% external cream and the fluorouracil (EFUDEX) 5% external cream, despite it is stated to start in October 2024.   Based on the report, for the most recently dispensed medications other than the ones on the [id number] medication list, she confirmed that none of them have been taken   She mentioned for gabapentin capsule, patient is taking one 400 mg capsule at bedtime, not the 300 mg capsule.     Changes made to PTA medication list:  Added:   Gabapentin (NEUROTIN) 400 MG capsule  Deleted:   Gabapentin (NEUROTIN) 300 MG capsule  Changed: None    Allergies reviewed with patient and updates made in EHR: yes    Medication History Completed By: Ana Luisa Medrano 10/19/2024 1:49 PM    PTA Med List   Medication Sig Last Dose    calcipotriene (DOVONOX) 0.005 % external cream Apply topically 2 times daily In conjunction with fluorouracil cream in a 1:1 mixture. Unknown    calcium carbonate 600 mg-vitamin D 400 units (CALTRATE) 600-400 MG-UNIT per tablet Take 1 tablet by mouth 2 times daily. 10/16/2024    Cholecalciferol (VITAMIN D) 1000 UNITS capsule Take 1 capsule by mouth daily. 10/16/2024    fluorouracil (EFUDEX) 5 % external cream Apply topically 2 times daily In conjunction with calcipotriene cream in a 1:1 mixture. Unknown    gabapentin (NEURONTIN) 400 MG capsule Take 400 mg by mouth at bedtime. 10/16/2024 at PM    guaiFENesin (ROBITUSSIN) 20 mg/mL liquid Take 10 mLs (200 mg) by mouth every 4 hours as needed for cough. Past Week     losartan (COZAAR) 50 MG tablet Take 1 tablet (50 mg) by mouth daily (Patient taking differently: Take 50 mg by mouth at bedtime.) 10/16/2024 at PM    mupirocin (BACTROBAN) 2 % external cream Apply topically 3 times daily. Apply to affected area three times daily before applying aquaphor 9/27/2024    pantoprazole (PROTONIX) 40 MG EC tablet Take 1 tablet (40 mg) by mouth daily. 10/17/2024 at AM    silver sulfADIAZINE (SILVADENE) 1 % external cream Apply topically 2 times daily. Apply to affected area 10/10/2024

## 2024-10-19 NOTE — PLAN OF CARE
Status: PMHx of vocal cord cancer, CKD, HTN. Seen in clinic 10/17, sent to [facility] for emergency awake trach d/t increased airway narrowing and restriction. POD 1   Vitals: VSS on 21% HTD   Neuros: Intact ex writes to communicate   IV: PIV SL  Resp/trach: #6 Shiley, cuffed. 21% HTD. Good cough. No tracheal suctioning needed. Inner cannula changed per orders.  Diet: Regular diet   Bowel status: LBM PTA  : Voiding   Skin: blanchable redness to trach site, small amount serosang drainage. Mepi lite in place  Pain: Denied pain  Activity: Up SBA   Plan: Continue to monitor and follow POC

## 2024-10-19 NOTE — PROGRESS NOTES
10/19/24 1100   Appointment Info   Signing Clinician's Name / Credentials (PT) Silvia Wolfe DPT   Living Environment   People in Home spouse  (chago montes de oca)   Current Living Arrangements house   Home Accessibility stairs to enter home   Number of Stairs, Main Entrance greater than 10 stairs  (12)   Stair Railings, Main Entrance railing on right side (ascending)   Transportation Anticipated family or friend will provide   Living Environment Comments Pt reports living in home with 12 stairs to access bedroom/bathroom level. Pt minimally receptive to questions regarding home set up, lives with chago Montes De Oca   Self-Care   Usual Activity Tolerance good   Current Activity Tolerance moderate   Regular Exercise No   Equipment Currently Used at Home none   Fall history within last six months no   Activity/Exercise/Self-Care Comment Pt reports IND prior to admission for all upright mobility, no AD use. no falls. Reports IND with self cares, bathing/dressing, ADLs and IADLs   General Information   Onset of Illness/Injury or Date of Surgery 10/17/24   Referring Physician Edwin Ramos MD   Patient/Family Therapy Goals Statement (PT) To go home   Pertinent History of Current Problem (include personal factors and/or comorbidities that impact the POC) 80 yo M long-term RG patient with Pmhx [id number] SCC of the larynx s/p cordectomy 8/23, MDL w/ laser excision 10/23, Re-resection 12/23, MLD w bx demonstrating SCC 7/24, radiotherapy 63Gy over 28 fractures completed 2 weeks ago now s/p awake tracheostomy 10/17 with SK after finding supraglottic/glottic edema with bilateral cord paralysis and associated stridor in clinic.   Existing Precautions/Restrictions fall   Cognition   Affect/Mental Status (Cognition) agitated   Orientation Status (Cognition) oriented x 4   Follows Commands (Cognition) WFL   Pain Assessment   Patient Currently in Pain Yes, see Vital Sign flowsheet  (at trach site)   Integumentary/Edema    Integumentary/Edema other (describe)   Integumentary/Edema Comments redness around trach site   Posture    Posture Forward head position;Protracted shoulders   Range of Motion (ROM)   ROM Comment BLE WFL, pt reports limited cervical extension 2/2 new trach   Strength (Manual Muscle Testing)   Strength (Manual Muscle Testing) strength is WFL   Bed Mobility   Comment, (Bed Mobility) sup>sit IND with HOB elevated ~45 deg   Transfers   Transfers sit-stand transfer   Comment, (Transfers) STS SBA no AD   Gait/Stairs (Locomotion)   Comment, (Gait/Stairs) Amb x 30' with SBA no AD, mild lateral instability and sway noted   Balance   Balance other (describe)   Sitting Balance: Static good balance   Sitting Balance: Dynamic good balance   Standing Balance: Static good balance   Standing Balance: Dynamic fair balance   Balance Comments dynamic balance improved with progressive distance of ambulation   Balance Quick Add Sitting balance: Static;Sitting balance: dynamic;Standing balance: static;Standing balance: dynamic   Sensory Examination   Sensory Perception patient reports no sensory changes   Coordination   Coordination no deficits were identified   Muscle Tone   Muscle Tone no deficits were identified   Clinical Impression   Criteria for Skilled Therapeutic Intervention Yes, treatment indicated   PT Diagnosis (PT) Impaired functional mobility   Influenced by the following impairments Mild generalized weakness, impaired balance   Functional limitations due to impairments Decreased IND with amb and stairs   Clinical Presentation (PT Evaluation Complexity) stable   Clinical Presentation Rationale Clinical judgment   Clinical Decision Making (Complexity) low complexity   Planned Therapy Interventions (PT) balance training;bed mobility training;gait training;home exercise program;patient/family education;neuromuscular re-education;postural re-education;ROM (range of motion);stair training;strengthening;transfer  training;progressive activity/exercise   Risk & Benefits of therapy have been explained evaluation/treatment results reviewed;care plan/treatment goals reviewed;risks/benefits reviewed;patient   PT Total Evaluation Time   PT Eval, Low Complexity Minutes (46191) 7   Physical Therapy Goals   PT Frequency 2x/week   PT Predicted Duration/Target Date for Goal Attainment 11/15/24   PT Goals Bed Mobility;Transfers;Gait;Stairs   PT: Bed Mobility Independent;Supine to/from sit   PT: Gait Independent;Greater than 200 feet   PT: Stairs Modified independent;Rail on right;Greater than 10 stairs  (12)   PT Discharge Planning   PT Plan Gait with dynamic balance challenges, stairs   PT Discharge Recommendation (DC Rec) home with assist   PT Rationale for DC Rec Pt presents near functional baseline, limited by 2 days not ambulating outside of room (per pt). Mild instability noted, but improved throughout session with progressive distance. Would benefit from amb 3-4x/day with RN staff as tolerated and PT to follow up if additional needs arise. Pt minimally receptive to education,denies any need for OP PT.   PT Brief overview of current status A x 1 SBA, encourage amb 3-4x/day   Total Session Time   Timed Code Treatment Minutes 16   Total Session Time (sum of timed and untimed services) 23

## 2024-10-20 LAB
ANION GAP SERPL CALCULATED.3IONS-SCNC: 13 MMOL/L (ref 7–15)
BUN SERPL-MCNC: 22 MG/DL (ref 8–23)
CALCIUM SERPL-MCNC: 9 MG/DL (ref 8.8–10.4)
CHLORIDE SERPL-SCNC: 103 MMOL/L (ref 98–107)
CREAT SERPL-MCNC: 1.28 MG/DL (ref 0.67–1.17)
EGFRCR SERPLBLD CKD-EPI 2021: 58 ML/MIN/1.73M2
ERYTHROCYTE [DISTWIDTH] IN BLOOD BY AUTOMATED COUNT: 12.1 % (ref 10–15)
GLUCOSE SERPL-MCNC: 107 MG/DL (ref 70–99)
HCO3 SERPL-SCNC: 21 MMOL/L (ref 22–29)
HCT VFR BLD AUTO: 39.9 % (ref 40–53)
HGB BLD-MCNC: 13 G/DL (ref 13.3–17.7)
HOLD SPECIMEN: NORMAL
MAGNESIUM SERPL-MCNC: 1.9 MG/DL (ref 1.7–2.3)
MCH RBC QN AUTO: 31.5 PG (ref 26.5–33)
MCHC RBC AUTO-ENTMCNC: 32.6 G/DL (ref 31.5–36.5)
MCV RBC AUTO: 97 FL (ref 78–100)
PHOSPHATE SERPL-MCNC: 3.6 MG/DL (ref 2.5–4.5)
PLATELET # BLD AUTO: 180 10E3/UL (ref 150–450)
POTASSIUM SERPL-SCNC: 4.2 MMOL/L (ref 3.4–5.3)
POTASSIUM SERPL-SCNC: 4.2 MMOL/L (ref 3.4–5.3)
RBC # BLD AUTO: 4.13 10E6/UL (ref 4.4–5.9)
SODIUM SERPL-SCNC: 137 MMOL/L (ref 135–145)
WBC # BLD AUTO: 7 10E3/UL (ref 4–11)

## 2024-10-20 PROCEDURE — 250N000013 HC RX MED GY IP 250 OP 250 PS 637

## 2024-10-20 PROCEDURE — 94640 AIRWAY INHALATION TREATMENT: CPT

## 2024-10-20 PROCEDURE — 120N000002 HC R&B MED SURG/OB UMMC

## 2024-10-20 PROCEDURE — 250N000009 HC RX 250: Performed by: OTOLARYNGOLOGY

## 2024-10-20 PROCEDURE — 250N000011 HC RX IP 250 OP 636: Performed by: PHYSICIAN ASSISTANT

## 2024-10-20 PROCEDURE — 84132 ASSAY OF SERUM POTASSIUM: CPT

## 2024-10-20 PROCEDURE — 85027 COMPLETE CBC AUTOMATED: CPT

## 2024-10-20 PROCEDURE — 83735 ASSAY OF MAGNESIUM: CPT

## 2024-10-20 PROCEDURE — 82310 ASSAY OF CALCIUM: CPT

## 2024-10-20 PROCEDURE — 80048 BASIC METABOLIC PNL TOTAL CA: CPT

## 2024-10-20 PROCEDURE — 999N000157 HC STATISTIC RCP TIME EA 10 MIN

## 2024-10-20 PROCEDURE — 84100 ASSAY OF PHOSPHORUS: CPT

## 2024-10-20 PROCEDURE — 94640 AIRWAY INHALATION TREATMENT: CPT | Mod: 76

## 2024-10-20 PROCEDURE — 36415 COLL VENOUS BLD VENIPUNCTURE: CPT

## 2024-10-20 PROCEDURE — 999N000215 HC STATISTIC HFNC ADULT NON-CPAP

## 2024-10-20 PROCEDURE — 250N000009 HC RX 250

## 2024-10-20 RX ORDER — SODIUM CHLORIDE FOR INHALATION 3 %
3 VIAL, NEBULIZER (ML) INHALATION 4 TIMES DAILY
Status: DISCONTINUED | OUTPATIENT
Start: 2024-10-20 | End: 2024-10-23 | Stop reason: HOSPADM

## 2024-10-20 RX ORDER — ALBUTEROL SULFATE 5 MG/ML
2.5 SOLUTION RESPIRATORY (INHALATION)
Status: DISCONTINUED | OUTPATIENT
Start: 2024-10-20 | End: 2024-10-23 | Stop reason: HOSPADM

## 2024-10-20 RX ORDER — SODIUM CHLORIDE FOR INHALATION 3 %
3 VIAL, NEBULIZER (ML) INHALATION
Status: DISCONTINUED | OUTPATIENT
Start: 2024-10-20 | End: 2024-10-20

## 2024-10-20 RX ADMIN — GABAPENTIN 400 MG: 400 CAPSULE ORAL at 22:38

## 2024-10-20 RX ADMIN — ACETAMINOPHEN 650 MG: 325 TABLET, FILM COATED ORAL at 20:13

## 2024-10-20 RX ADMIN — LOSARTAN POTASSIUM 50 MG: 50 TABLET, FILM COATED ORAL at 22:38

## 2024-10-20 RX ADMIN — SODIUM CHLORIDE SOLN NEBU 3% 3 ML: 3 NEBU SOLN at 00:11

## 2024-10-20 RX ADMIN — SODIUM CHLORIDE SOLN NEBU 3% 3 ML: 3 NEBU SOLN at 19:43

## 2024-10-20 RX ADMIN — ALBUTEROL SULFATE 2.5 MG: 2.5 SOLUTION RESPIRATORY (INHALATION) at 07:51

## 2024-10-20 RX ADMIN — PANTOPRAZOLE SODIUM 40 MG: 40 TABLET, DELAYED RELEASE ORAL at 08:28

## 2024-10-20 RX ADMIN — ENOXAPARIN SODIUM 40 MG: 40 INJECTION SUBCUTANEOUS at 08:28

## 2024-10-20 RX ADMIN — SODIUM CHLORIDE SOLN NEBU 3% 3 ML: 3 NEBU SOLN at 16:48

## 2024-10-20 RX ADMIN — ALBUTEROL SULFATE 2.5 MG: 2.5 SOLUTION RESPIRATORY (INHALATION) at 00:11

## 2024-10-20 RX ADMIN — SODIUM CHLORIDE SOLN NEBU 3% 3 ML: 3 NEBU SOLN at 12:54

## 2024-10-20 RX ADMIN — ALBUTEROL SULFATE 2.5 MG: 2.5 SOLUTION RESPIRATORY (INHALATION) at 19:43

## 2024-10-20 RX ADMIN — SODIUM CHLORIDE SOLN NEBU 3% 3 ML: 3 NEBU SOLN at 07:51

## 2024-10-20 RX ADMIN — ACETAMINOPHEN 975 MG: 325 TABLET, FILM COATED ORAL at 08:28

## 2024-10-20 ASSESSMENT — ACTIVITIES OF DAILY LIVING (ADL)
ADLS_ACUITY_SCORE: 26

## 2024-10-20 NOTE — PROGRESS NOTES
"Otolaryngology Progress Note  October 20, 2024    S: No acute events overnight. Noted tremulousness last evening with writing, possibly associated with gabapentin changes. Doing much better with trach overall, though continues to have secretions.  Noted to not have humidity on.  Afebrile, HDS, Tolerating diet, improving PO. LBM 10/19. Voiding. PT HWA.  Working on trach teaching    O: /57 (BP Location: Left arm, Cuff Size: Adult Regular)   Pulse 65   Temp 99.4  F (37.4  C) (Axillary)   Resp 18   Ht 1.727 m (5' 8\")   Wt 79.9 kg (176 lb 3.2 oz)   SpO2 94%   BMI 26.79 kg/m     General: Alert and oriented x 3, No acute distress, interactive, frequent coughing   HEENT: 6-0 cuffed Shiley in place, sutures x4 intact. Trach inner canula clean.  Moderately thick secretions, clearing.  Slight erythema of the neck above trach site.   Pulmonary: Breathing non-labored, no stridor, no accessory muscle use.     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  He is now recovering day by day, and overall doing fairly well.  He developed some tremulousness last night.  Electrolytes appear to be within normal range.  Will investigate potential medication for causes.    Plan today:  - Continue " humidity, increase nebs to QID  - Will discuss with pharmacy to determine if tremor may be gabapentin related     Neuro:   - Pain control with tylenol, oxycodone, PTA gabapentin 400 mg at bedtime    HEENT:   - Hx of glottic web, recurrent laryngeal cancer  - s/p trach, Trach change planned 10/22  - Trach teaching/checklist    Cardiovascular:   - Hx of HTN  - PTA losartan     Respiratory:   - Hx of recent pneumonia, supraglottic edema with bilateral vocal cord paralysis  - Supplemental oxygen PRN for sats under 92%  - Saline nebs 4 times daily  - Perform regular suctioning   - RN should change disposable inner canulas every shift and/or clean it with a brush   - Keep trach tube obturator taped to wall behind the head of the bed   - Keep extra unopened 6-0 Shiley and 5-0 Shiley at bedside at all times   - patient will require tracheostomy and portable suction supplies on discharge due to tracheostomy dependence and inability to independently manage respiratory secretions.     FEN/GI:   - Hx of GERD  - Hx of CKD  - PTA PPI   -Regular diet  -Last bowel movement 10/19    Heme/ID:   -No concerns  -Lovenox    :   - GINA, voiding spontaneously    Endocrine/Rheum:   - Hx of GPA    MSK:  - GINA    Cons:   - PT   - SLP     Ppx:   - Lovenox    Sandeep Matt MD   Otolaryngology - Head and Neck Surgery, PGY 2  Please page ENT with questions

## 2024-10-20 NOTE — PLAN OF CARE
Status: PMHx of vocal cord cancer, CKD, HTN. Seen in clinic 10/17, sent to [facility] for emergency awake trach d/t increased airway narrowing and restriction. POD 1   Vitals: VSS on 21% HTD   Neuros: Intact ex writes to communicate  IV: PIV SL  Resp/trach: #6 Shiley, cuffed. 21% HTD. Good cough. Suctioned x3. Yaunker at bedside for oral suctioning Inner cannula changed x3.   Diet: Regular diet   Bowel status: 10/19  : Voiding spont  Skin: blanchable redness to trach site, small amount serosang drainage.  Pain: Denied pain   Activity: Up SBA   Plan: Continue to monitor and follow POC

## 2024-10-20 NOTE — PLAN OF CARE
Status: PMHx of vocal cord cancer, CKD, HTN. Seen in clinic 10/17, sent to [facility] for emergency awake trach d/t increased airway narrowing and restriction.   Vitals: VSS on 21% HTD.   Neuros: Intact ex writes or mouths words to communicate.   IV: PIV SL.   Labs/Electrolytes: WDL.   Resp/trach: #6 Shiley, cuff down. 21% HTD. Good, productive cough. Trach cares and suction ~Q4h.   Diet: Regular, fair intake. Pt needs encouragement to order meals.   Bowel status: BS+. LBM today.   : Voids spontaneously.   Skin: Trach site with blanchable redness and secretions.   Pain: C/o discomfort in throat and around trach, only scheduled Tylenol given, pt declined further intervention.   Activity: SBA for line management.   Social: CELIO Montes De Oca updated over the phone, will be here tomorrow.   Plan: Trach change 10/22.   Updates this shift: Pt needs reminders/encouragement to keep humidity in place.   Education completed: Trach teaching reinforced, but pt not motivated to complete cares independently at this time. Further reinforcement needed.       Goal Outcome Evaluation:    Plan of Care Reviewed With: patient  Overall Patient Progress: improving  Outcome Evaluation: Pt able to cough up secretions. Education reinforced, but could use more review.

## 2024-10-21 ENCOUNTER — APPOINTMENT (OUTPATIENT)
Dept: PHYSICAL THERAPY | Facility: CLINIC | Age: 79
DRG: 011 | End: 2024-10-21
Payer: COMMERCIAL

## 2024-10-21 LAB
HOLD SPECIMEN: NORMAL
MAGNESIUM SERPL-MCNC: 2.1 MG/DL (ref 1.7–2.3)
PATH REPORT.COMMENTS IMP SPEC: NORMAL
PATH REPORT.FINAL DX SPEC: NORMAL
PATH REPORT.GROSS SPEC: NORMAL
PATH REPORT.MICROSCOPIC SPEC OTHER STN: NORMAL
PATH REPORT.RELEVANT HX SPEC: NORMAL
PHOSPHATE SERPL-MCNC: 2.8 MG/DL (ref 2.5–4.5)
PHOTO IMAGE: NORMAL
POTASSIUM SERPL-SCNC: 4.3 MMOL/L (ref 3.4–5.3)

## 2024-10-21 PROCEDURE — 999N000123 HC STATISTIC OXYGEN O2DAILY TECH TIME

## 2024-10-21 PROCEDURE — 36415 COLL VENOUS BLD VENIPUNCTURE: CPT

## 2024-10-21 PROCEDURE — 84132 ASSAY OF SERUM POTASSIUM: CPT

## 2024-10-21 PROCEDURE — 250N000009 HC RX 250

## 2024-10-21 PROCEDURE — 120N000002 HC R&B MED SURG/OB UMMC

## 2024-10-21 PROCEDURE — 999N000215 HC STATISTIC HFNC ADULT NON-CPAP

## 2024-10-21 PROCEDURE — 999N000157 HC STATISTIC RCP TIME EA 10 MIN

## 2024-10-21 PROCEDURE — 94640 AIRWAY INHALATION TREATMENT: CPT

## 2024-10-21 PROCEDURE — 83735 ASSAY OF MAGNESIUM: CPT

## 2024-10-21 PROCEDURE — 97530 THERAPEUTIC ACTIVITIES: CPT | Mod: GP

## 2024-10-21 PROCEDURE — 84100 ASSAY OF PHOSPHORUS: CPT

## 2024-10-21 PROCEDURE — 120N000003 HC R&B IMCU UMMC

## 2024-10-21 PROCEDURE — 250N000009 HC RX 250: Performed by: OTOLARYNGOLOGY

## 2024-10-21 PROCEDURE — 250N000013 HC RX MED GY IP 250 OP 250 PS 637

## 2024-10-21 PROCEDURE — 94640 AIRWAY INHALATION TREATMENT: CPT | Mod: 76

## 2024-10-21 PROCEDURE — 250N000011 HC RX IP 250 OP 636: Performed by: PHYSICIAN ASSISTANT

## 2024-10-21 RX ADMIN — SENNOSIDES AND DOCUSATE SODIUM 1 TABLET: 8.6; 5 TABLET ORAL at 08:29

## 2024-10-21 RX ADMIN — GABAPENTIN 400 MG: 400 CAPSULE ORAL at 21:43

## 2024-10-21 RX ADMIN — POLYETHYLENE GLYCOL 3350 17 G: 17 POWDER, FOR SOLUTION ORAL at 08:32

## 2024-10-21 RX ADMIN — LOSARTAN POTASSIUM 50 MG: 50 TABLET, FILM COATED ORAL at 21:43

## 2024-10-21 RX ADMIN — ALBUTEROL SULFATE 2.5 MG: 2.5 SOLUTION RESPIRATORY (INHALATION) at 20:45

## 2024-10-21 RX ADMIN — PANTOPRAZOLE SODIUM 40 MG: 40 TABLET, DELAYED RELEASE ORAL at 08:29

## 2024-10-21 RX ADMIN — SODIUM CHLORIDE SOLN NEBU 3% 3 ML: 3 NEBU SOLN at 16:41

## 2024-10-21 RX ADMIN — OXYCODONE HYDROCHLORIDE 2.5 MG: 5 TABLET ORAL at 08:29

## 2024-10-21 RX ADMIN — ALBUTEROL SULFATE 2.5 MG: 2.5 SOLUTION RESPIRATORY (INHALATION) at 08:12

## 2024-10-21 RX ADMIN — OXYCODONE HYDROCHLORIDE 2.5 MG: 5 TABLET ORAL at 23:15

## 2024-10-21 RX ADMIN — ACETAMINOPHEN 650 MG: 325 TABLET, FILM COATED ORAL at 08:29

## 2024-10-21 RX ADMIN — SODIUM CHLORIDE SOLN NEBU 3% 3 ML: 3 NEBU SOLN at 12:36

## 2024-10-21 RX ADMIN — SODIUM CHLORIDE SOLN NEBU 3% 3 ML: 3 NEBU SOLN at 08:12

## 2024-10-21 RX ADMIN — SODIUM CHLORIDE SOLN NEBU 3% 3 ML: 3 NEBU SOLN at 20:45

## 2024-10-21 RX ADMIN — ENOXAPARIN SODIUM 40 MG: 40 INJECTION SUBCUTANEOUS at 08:29

## 2024-10-21 RX ADMIN — ACETAMINOPHEN 650 MG: 325 TABLET, FILM COATED ORAL at 23:15

## 2024-10-21 ASSESSMENT — ACTIVITIES OF DAILY LIVING (ADL)
ADLS_ACUITY_SCORE: 30
ADLS_ACUITY_SCORE: 26
ADLS_ACUITY_SCORE: 30
ADLS_ACUITY_SCORE: 26
ADLS_ACUITY_SCORE: 30
ADLS_ACUITY_SCORE: 30
ADLS_ACUITY_SCORE: 26
ADLS_ACUITY_SCORE: 30
ADLS_ACUITY_SCORE: 26
ADLS_ACUITY_SCORE: 30
ADLS_ACUITY_SCORE: 26
ADLS_ACUITY_SCORE: 30
ADLS_ACUITY_SCORE: 26
ADLS_ACUITY_SCORE: 30

## 2024-10-21 NOTE — PLAN OF CARE
Shift: [id number]:    Status: Emergent awake trach placed on 10/17 d/t increased airway narrowing & restriction. PMHx: vocal cord cancer, CKD, HTN.  Vitals: VSS on 21% HTD.  Neuros: A&Ox4. Intact. 5/5 strengths thru out. Writes to communicate.  IV: PIV SL.  Labs/Electrolytes: WNL.  Resp/trach: #6 Shiley, cuff down. 21% HTD. Good, productive cough. Trachs completed for q4hr: disposable inner cannula replaced & suctioned once. Total 3x suction today - tolerated well.   Diet: Reg diet - poor intake. Pt needs encouragement to order meals. Had a few bites for evening meal per pt report.  Bowel status: LBM 10/20 - sched senna held per pt request.  : Voids spont.  Skin: Trach site w/ blanchable redness & secretions - cleansed this charles.  Pain: 3/10 trach pain - trx w/ PRN tylenol 1x.  Activity: SBA for line management.   Social: SO Falguni called this charles & RN provided update about pt status. Will visit tmrw AM.  Education completed: Reviewed trach care techniques & secretion management.     Goal Outcome Evaluation:      Plan of Care Reviewed With: patient    Overall Patient Progress: no change    Outcome Evaluation: Daily charles routine maintained.

## 2024-10-21 NOTE — DISCHARGE SUMMARY
Discharge Summary  Joshua Ennis  [id number]  1945    Date of Admission: 10/17/2024  Date of Discharge: 10/23/2024    Admission Diagnosis: Obstruction of larynx [J38.6]  Laryngeal cancer (H) [C32.9]  Discharge Diagnosis: Same, Hypertension, GERD    Procedures:  Date: 10/23/24  Procedure(s): 10/15/24  Awake Tracheostomy, Direct Laryngoscopy with Biopsies    Pathology:  A. RIGHT SUPRAGLOTTIS:  - Predominantly necrotic debris with granulation tissue and reactive atypia consistent with treatment effect  - No definitive carcinoma is present     B. LEFT SUPRAGLOTTIS:  - Predominantly necrotic debris with granulation tissue and reactive atypia consistent with treatment effect  - No definitive carcinoma is present     C. POST CRICOID BIOPSY:  - Necrotic debris, no evidence of malignancy       HPI: Joshua Ennis is a 79 year old male with history of [id number] SCC of the larynx s/p cordectomy 8/23, MDL w/ laser excision 10/23, Re-resection 12/23, MLD w bx demonstrating SCC 7/24, radiotherapy 63Gy over 28 fractures completed 2 weeks ago. Patient presented to Dr. Malone's clinic 10/17/2024 with significant supraglottic/glottic edema and significant stridor in clinic the decision was made to proceed with urgent awake tracheostomy 10/17/2024.  It was recommended that he undergo operative intervention and the patient consented to the above procedure after detailed explanation of the risks and benefits of said procedure.    Hospital Course: The patient was admitted to the hospital and underwent the above mentioned procedure. He tolerated the procedure without any intra- or reggie-operative complications. Please see the operative report for full details of the procedure. The patient was admitted for post-operative monitoring. His postoperative course was uneventful. Patient experienced severe pain and anxiety requiring narcotics and anti-anxiety medications. Pain is now under control and significant anxiety surrounding trach has  significantly improved. Pt motivated to go home and participating in trach cares. Pts home anti-hypertensive medications and GERD medications were restarted in the post-operative periods. Patient initially struggled with limited PO intake secondary to odynophagia which has steadily improved. Pt tolerating regular diet. Patient had his trach change 10/22 from a 6-0 cuffed shiley to a 6-0 cuffless shiley. Following pts trach change, SLP discussed speaking valve with pt. Pt was evaluated by physical therapy and deemed to be adequate for discharging home with assistance.  At discharge, the patient's pain was well controlled, the patient was voiding on his own, and he was ambulating and tolerating a regular diet.     Discharge Exam:  Vitals:    10/21/24 0747 10/21/24 0812 10/21/24 1025 10/21/24 1236   BP: 119/63      BP Location: Left arm      Pulse: 84      Resp: 16      Temp: 98.6  F (37  C)      TempSrc: Oral      SpO2: 98% 98%  95%   Weight:   77.2 kg (170 lb 3.2 oz)    Height:                General: Alert and oriented x 3, No acute distress, interactive, frequent coughing                  HEENT: 6-0 cuffed Shiley in place, sutures x4 intact. Cuff down. Trach inner canula clean.  Moderately thick secretions, clearing.  Slight erythema of the neck above trach site.                  Pulmonary: Breathing non-labored, no stridor, no accessory muscle use.     Discharge Medications:     Medication List        ASK your doctor about these medications      gabapentin 400 MG capsule  Commonly known as: NEURONTIN  Ask about: Which instructions should I use?              Discharge Procedure Orders   Primary Care - Care Coordination Referral   Standing Status: Future   Referral Priority: Routine: Next available opening Referral Type: Care Coordination   Number of Visits Requested: 1     Tracheostomy/Laryngectomy Supplies Order   Order Comments: Equipment being ordered: Tracheostomy supplies    0.9% sodium chloride 1000 mL  bottles  Box split 4x4 gauze sponges  Trach kits with brushes  Velcro trach ties  3 cc 0.9% sodium chloride lavages for trach suctioning  Large gloves  Cool mist humidity via trach dome  Trach HMEs    Trach/Supplies Documentation:   Patient has an open surgical tracheostomy. The patient requires the ordered tracheostomy supplies to provide appropriate routine tracheostomy care.     I, the undersigned, certify that the above prescribed supplies are medically necessary for this patient and is both reasonable and necessary in reference to accepted standards of medical and necessary in reference to accepted standards of medical practice in the treatment of this patient's condition and is not prescribed as a convenience.     Order Specific Question Answer Comments   Medical Equipment (DME) Supplier: [employer]    PATIENT INSTRUCTIONS: If you did not receive this ordered item today, please contact [employer] for availability ([facility] Locations: [phone], [place]: [phone]).    Start Date: 10/17/2024    Trach/Laryn: Trach/Laryn Tube Reusable 1 per year ()    Reusable Tube Type: 6-0 cuffless Shiley    Reusable Tube Size: 6-0 cuffless Shiley    Supplies: Trach/Laryn Care Kit (31 per month) (/)    Supplies: Trach/Laryn Collar Daniel (31 per month) ()    Supplies: Sterile Saline 10 ml 300/month ()    Supplies: Trach/Laryn Valve/Speaking Valve    Supplies: Sterile Water 500 ml 48/month ()    Supplies: Trach Mask/Dome    Supplies: Lubricant 4 ounces per month ()    Supplies: Sterile Gauze Pads ()    Diagnosis: Z93.0 - Tracheostomy Status    Humidity Needed (hover for more info): Yes    Humidity Type: Cool Humidity with Air Compressor    Humidity Type: Heat Moisture Exchange (HME)    Does patient need supplemental O2? No    Length of Need: Lifetime    The face to face evaluation was performed on: 10/17/2024      Suction Machine Order   Order  Comments: Equipment being ordered:   Portable suction machine   14 Lithuanian suction catheters    Suction Documentation  I attest that I have seen and evaluated Joshua Ennis. He has a chronic diagnosis of Z93.0 - Tracheostomy Status requiring use of a suction machine. Face to face addressing dysphagia and inability to clear/mobilize secretions.     I, the undersigned, certify that the above prescribed supplies are medically necessary for this patient and is both reasonable and necessary in reference to accepted standards of medical and necessary in reference to accepted standards of medical practice in the treatment of this patient's condition and is not prescribed as a convenience.     Order Specific Question Answer Comments   Medical Equipment (DME) Supplier: [employer]    PATIENT INSTRUCTIONS: If you did not receive this ordered item today, please contact [employer] for availability ([facility] Locations: [phone], [place]: [phone]).    Start Date: 10/17/2024    Secretion Type: Trach    Supplies: Disposible Canisters 2/ month ()    Supplies: 6' Suction Tubing with Filter Kit ()    Supplies: Suction Catheter Kit 90/month ()    Supplies: Sterile Water 500 ml 48 bottles/month ()    Supplies: Sterile Saline Vials 3, 5, or 10ml 300/month ()    Length of Need: Lifetime    The face to face evaluation was performed on: 10/17/2024        Dispo: To home in good condition. All of the patient's questions/concerns have been addressed at this time. They are comfortable and independent in all cares.      Kyle Christian MD  Otolaryngology - Head and Neck Surgery PGY-4

## 2024-10-21 NOTE — PROGRESS NOTES
"Otolaryngology Progress Note  October 20, 2024    S:   Afebrile, HDS, working on PO intake and trach teaching.  No tremors on exam.     O: /48 (BP Location: Left arm)   Pulse 69   Temp 99  F (37.2  C) (Oral)   Resp 16   Ht 1.727 m (5' 8\")   Wt 79.9 kg (176 lb 3.2 oz)   SpO2 97%   BMI 26.79 kg/m     General: Alert and oriented x 3, No acute distress, interactive, frequent coughing   HEENT: 6-0 cuffed Shiley in place, sutures x4 intact. Cuff down. Trach inner canula clean.  Moderately thick secretions, clearing.  Slight erythema of the neck above trach site.   Pulmonary: Breathing non-labored, no stridor, no accessory muscle use.     LABS: Pending Mg, Phos, and K     A/P: 80 yo M long-term RG patient with Pmhx [id number] SCC of the larynx s/p cordectomy 8/23, MDL w/ laser excision 10/23, Re-resection 12/23, MLD w bx demonstrating SCC 7/24, radiotherapy 63Gy over 28 fractures completed 2 weeks ago now s/p awake tracheostomy 10/17 with SK after finding supraglottic/glottic edema with bilateral cord paralysis and associated stridor in clinic.      Plan today:  - Start calorie counts  - Continue working on trach education  - SLP for PMSV      Neuro: Pain control with tylenol, oxycodone, PTA gabapentin 400 mg at bedtime  HEENT: Hx of glottic web, recurrent laryngeal cancer  - s/p trach, Trach change planned 10/22 to 6cfls shiley   - Trach teaching/checklist    Cardiovascular: Hx of HTN, PTA losartan   Respiratory: Hx of recent pneumonia, supraglottic edema with bilateral vocal cord paralysis  - Saline nebs 4 times daily  - Perform regular suctioning. RN should change disposable inner canulas every shift and/or clean it with a brush. Keep trach tube obturator taped to wall behind the head of the bed. Keep extra unopened 6-0 Shiley and 5-0 Shiley at bedside at all times   - patient will require tracheostomy and portable suction supplies on discharge due to tracheostomy dependence and inability to independently " manage respiratory secretions.     FEN/GI: Hx of GERD & CKD,   - PTA PPI   -Regular diet, low PO. Will start calorie counts today.     Heme/ID: GINA   : GINA, voiding spontaneously  Endocrine/Rheum: Hx of GPA  MSK: GINA  Cons: PT  (HWA), SLP   Ppx: Lovenox    Abi Miller MD   ENT Resident

## 2024-10-21 NOTE — PLAN OF CARE
Pt her for emergent awake trach on 10/17, vss, neuros include: generalilzed weakness, trached. PIV SL, regular diet w/good PO intake, voids spont, up SBA w/GB. Pt not requiring tracheal suction, requested pain meds x1 with relief. Plan for evening RN to continue teaching while family present. Continue to care per orders.

## 2024-10-21 NOTE — PLAN OF CARE
Status: Emergent awake trach placed on 10/17 d/t increased airway narrowing & restriction. PMHx: vocal cord cancer, CKD, HTN.  Vitals: VSS on 21% HTD with continuous pulse ox 92-98%  Neuros: A&Ox4, writes to communicate. 5/5 throughout  IV: PIV SL.  Labs/Electrolytes: WNL.  Resp/trach: #6 Shiley, cuff down secured with trach ties/sutures. Able to clear moderate amount of thick secretions with strong productive cough, no suction required. Inner cannula cares completed q4hrs.   Diet: Reg diet - poor PO per report. Lyle counts added this morning from 10/21/24 to 10/25   Bowel status: LBM 10/20   : Voids spont.  Skin: Trach site w/ blanchable redness & secretions, declined offer of dressing beneath trach site.   Pain: Denied   Activity: SBA for line management.   Plan: Trach education to be completed, too sleepy this shift. Plan for trach change 10/22. PT recommending HWA.     Goal Outcome Evaluation:      Plan of Care Reviewed With: patient    Overall Patient Progress: no change    Outcome Evaluation: Plan for trach change 10/22. Trach education still needed.

## 2024-10-22 ENCOUNTER — APPOINTMENT (OUTPATIENT)
Dept: SPEECH THERAPY | Facility: CLINIC | Age: 79
DRG: 011 | End: 2024-10-22
Payer: COMMERCIAL

## 2024-10-22 LAB
ALBUMIN MFR UR ELPH: 65.6 MG/DL
ALBUMIN SERPL BCG-MCNC: 3.2 G/DL (ref 3.5–5.2)
ALBUMIN UR-MCNC: 30 MG/DL
ALP SERPL-CCNC: 82 U/L (ref 40–150)
ALT SERPL W P-5'-P-CCNC: 19 U/L (ref 0–70)
APPEARANCE UR: CLEAR
AST SERPL W P-5'-P-CCNC: 28 U/L (ref 0–45)
BASOPHILS # BLD AUTO: 0.1 10E3/UL (ref 0–0.2)
BASOPHILS NFR BLD AUTO: 1 %
BILIRUB DIRECT SERPL-MCNC: 0.24 MG/DL (ref 0–0.3)
BILIRUB SERPL-MCNC: 0.6 MG/DL
BILIRUB UR QL STRIP: NEGATIVE
COLOR UR AUTO: YELLOW
CREAT SERPL-MCNC: 1.33 MG/DL (ref 0.67–1.17)
CREAT UR-MCNC: 263 MG/DL
EGFRCR SERPLBLD CKD-EPI 2021: 55 ML/MIN/1.73M2
EOSINOPHIL # BLD AUTO: 0.4 10E3/UL (ref 0–0.7)
EOSINOPHIL NFR BLD AUTO: 7 %
ERYTHROCYTE [DISTWIDTH] IN BLOOD BY AUTOMATED COUNT: 11.9 % (ref 10–15)
GLUCOSE UR STRIP-MCNC: NEGATIVE MG/DL
HCT VFR BLD AUTO: 38.3 % (ref 40–53)
HGB BLD-MCNC: 12.3 G/DL (ref 13.3–17.7)
HGB UR QL STRIP: NEGATIVE
HYALINE CASTS: 1 /LPF
IMM GRANULOCYTES # BLD: 0 10E3/UL
IMM GRANULOCYTES NFR BLD: 0 %
KETONES UR STRIP-MCNC: NEGATIVE MG/DL
LEUKOCYTE ESTERASE UR QL STRIP: NEGATIVE
LYMPHOCYTES # BLD AUTO: 1 10E3/UL (ref 0.8–5.3)
LYMPHOCYTES NFR BLD AUTO: 19 %
MAGNESIUM SERPL-MCNC: 2.1 MG/DL (ref 1.7–2.3)
MCH RBC QN AUTO: 31.1 PG (ref 26.5–33)
MCHC RBC AUTO-ENTMCNC: 32.1 G/DL (ref 31.5–36.5)
MCV RBC AUTO: 97 FL (ref 78–100)
MONOCYTES # BLD AUTO: 0.8 10E3/UL (ref 0–1.3)
MONOCYTES NFR BLD AUTO: 16 %
MUCOUS THREADS #/AREA URNS LPF: PRESENT /LPF
NEUTROPHILS # BLD AUTO: 2.9 10E3/UL (ref 1.6–8.3)
NEUTROPHILS NFR BLD AUTO: 57 %
NITRATE UR QL: NEGATIVE
NRBC # BLD AUTO: 0 10E3/UL
NRBC BLD AUTO-RTO: 0 /100
PH UR STRIP: 6.5 [PH] (ref 5–7)
PHOSPHATE SERPL-MCNC: 3.1 MG/DL (ref 2.5–4.5)
PLATELET # BLD AUTO: 213 10E3/UL (ref 150–450)
POTASSIUM SERPL-SCNC: 4.2 MMOL/L (ref 3.4–5.3)
PROT SERPL-MCNC: 6.4 G/DL (ref 6.4–8.3)
PROT/CREAT 24H UR: 0.25 MG/MG CR (ref 0–0.2)
RBC # BLD AUTO: 3.96 10E6/UL (ref 4.4–5.9)
RBC URINE: <1 /HPF
SP GR UR STRIP: 1.03 (ref 1–1.03)
SQUAMOUS EPITHELIAL: <1 /HPF
TRANSITIONAL EPI: <1 /HPF
UROBILINOGEN UR STRIP-MCNC: 6 MG/DL
WBC # BLD AUTO: 5.1 10E3/UL (ref 4–11)
WBC URINE: 3 /HPF

## 2024-10-22 PROCEDURE — 94640 AIRWAY INHALATION TREATMENT: CPT | Mod: 76

## 2024-10-22 PROCEDURE — 250N000009 HC RX 250: Performed by: OTOLARYNGOLOGY

## 2024-10-22 PROCEDURE — 83876 ASSAY MYELOPEROXIDASE: CPT

## 2024-10-22 PROCEDURE — 94640 AIRWAY INHALATION TREATMENT: CPT

## 2024-10-22 PROCEDURE — 250N000011 HC RX IP 250 OP 636: Performed by: PHYSICIAN ASSISTANT

## 2024-10-22 PROCEDURE — 80076 HEPATIC FUNCTION PANEL: CPT

## 2024-10-22 PROCEDURE — 92597 ORAL SPEECH DEVICE EVAL: CPT | Mod: GN

## 2024-10-22 PROCEDURE — 92526 ORAL FUNCTION THERAPY: CPT | Mod: GN

## 2024-10-22 PROCEDURE — 92507 TX SP LANG VOICE COMM INDIV: CPT | Mod: GN

## 2024-10-22 PROCEDURE — 999N000215 HC STATISTIC HFNC ADULT NON-CPAP

## 2024-10-22 PROCEDURE — 250N000013 HC RX MED GY IP 250 OP 250 PS 637

## 2024-10-22 PROCEDURE — 83735 ASSAY OF MAGNESIUM: CPT

## 2024-10-22 PROCEDURE — 84156 ASSAY OF PROTEIN URINE: CPT

## 2024-10-22 PROCEDURE — 84132 ASSAY OF SERUM POTASSIUM: CPT

## 2024-10-22 PROCEDURE — 250N000009 HC RX 250

## 2024-10-22 PROCEDURE — 85025 COMPLETE CBC W/AUTO DIFF WBC: CPT

## 2024-10-22 PROCEDURE — 36415 COLL VENOUS BLD VENIPUNCTURE: CPT

## 2024-10-22 PROCEDURE — 999N000157 HC STATISTIC RCP TIME EA 10 MIN

## 2024-10-22 PROCEDURE — 86036 ANCA SCREEN EACH ANTIBODY: CPT

## 2024-10-22 PROCEDURE — 83516 IMMUNOASSAY NONANTIBODY: CPT

## 2024-10-22 PROCEDURE — 82565 ASSAY OF CREATININE: CPT

## 2024-10-22 PROCEDURE — 81003 URINALYSIS AUTO W/O SCOPE: CPT

## 2024-10-22 PROCEDURE — 84100 ASSAY OF PHOSPHORUS: CPT

## 2024-10-22 PROCEDURE — 120N000002 HC R&B MED SURG/OB UMMC

## 2024-10-22 PROCEDURE — 0B21XFZ CHANGE TRACHEOSTOMY DEVICE IN TRACHEA, EXTERNAL APPROACH: ICD-10-PCS | Performed by: PHYSICIAN ASSISTANT

## 2024-10-22 RX ADMIN — SODIUM CHLORIDE SOLN NEBU 3% 3 ML: 3 NEBU SOLN at 07:50

## 2024-10-22 RX ADMIN — GABAPENTIN 400 MG: 400 CAPSULE ORAL at 21:45

## 2024-10-22 RX ADMIN — ENOXAPARIN SODIUM 40 MG: 40 INJECTION SUBCUTANEOUS at 07:55

## 2024-10-22 RX ADMIN — SODIUM CHLORIDE SOLN NEBU 3% 3 ML: 3 NEBU SOLN at 19:38

## 2024-10-22 RX ADMIN — POLYETHYLENE GLYCOL 3350 17 G: 17 POWDER, FOR SOLUTION ORAL at 07:56

## 2024-10-22 RX ADMIN — SODIUM CHLORIDE SOLN NEBU 3% 3 ML: 3 NEBU SOLN at 16:13

## 2024-10-22 RX ADMIN — OXYCODONE HYDROCHLORIDE 5 MG: 5 TABLET ORAL at 12:24

## 2024-10-22 RX ADMIN — SENNOSIDES AND DOCUSATE SODIUM 1 TABLET: 8.6; 5 TABLET ORAL at 07:55

## 2024-10-22 RX ADMIN — OXYCODONE HYDROCHLORIDE 5 MG: 5 TABLET ORAL at 21:51

## 2024-10-22 RX ADMIN — OXYCODONE HYDROCHLORIDE 2.5 MG: 5 TABLET ORAL at 08:10

## 2024-10-22 RX ADMIN — ALBUTEROL SULFATE 2.5 MG: 2.5 SOLUTION RESPIRATORY (INHALATION) at 19:38

## 2024-10-22 RX ADMIN — LOSARTAN POTASSIUM 50 MG: 50 TABLET, FILM COATED ORAL at 21:45

## 2024-10-22 RX ADMIN — ALBUTEROL SULFATE 2.5 MG: 2.5 SOLUTION RESPIRATORY (INHALATION) at 07:50

## 2024-10-22 RX ADMIN — PANTOPRAZOLE SODIUM 40 MG: 40 TABLET, DELAYED RELEASE ORAL at 07:55

## 2024-10-22 RX ADMIN — ACETAMINOPHEN 650 MG: 325 TABLET, FILM COATED ORAL at 21:51

## 2024-10-22 RX ADMIN — HYDROXYZINE HYDROCHLORIDE 50 MG: 50 TABLET, FILM COATED ORAL at 00:06

## 2024-10-22 RX ADMIN — SODIUM CHLORIDE SOLN NEBU 3% 3 ML: 3 NEBU SOLN at 12:51

## 2024-10-22 RX ADMIN — ACETAMINOPHEN 650 MG: 325 TABLET, FILM COATED ORAL at 08:10

## 2024-10-22 ASSESSMENT — ACTIVITIES OF DAILY LIVING (ADL)
ADLS_ACUITY_SCORE: 28
ADLS_ACUITY_SCORE: 27
ADLS_ACUITY_SCORE: 28

## 2024-10-22 NOTE — PROGRESS NOTES
Calorie Count  Intake recorded for: 10/21  Total Kcals: 1394 Total Protein: 38g  Kcals from Hospital Food: 1394   Protein: 38g  Kcals from Outside Food (average):0 Protein: 0g  # Meals Ordered from Kitchen: 3 meals  # Meals Recorded: 2 meals (First - 100% 2 Hebrew toast w/ 1 syrup, diced peaches, pudding, 2 Pepsi, 1 8oz 1% milk)      (Second - 100% 1 ice cream, 1 lemon ice, 75% mac n cheese, less than 25% chicken tenders)  # Supplements Recorded: 0

## 2024-10-22 NOTE — PROGRESS NOTES
Otolaryngology Trach Change Note  October 22, 2024    Type of trach removed: 6 cuffed shiley  Type of trach placed: 6 cuffless shiley    Procedure note: Patient was suctioned via trach and secretions were removed.  Patient was appropriately positioned flat and supine. Trach ties/sutures were removed while holding trach in place. Patient was then hyperoxygenated via trach dome. Trach was removed without difficulty. Stoma appeared patent without obstruction or secretions. New trach was inserted with obturator without difficulty or resistance. Obturator was removed and inner cannula locked in place. Correct positioning of trach was confirmed by easily passing a suction through the trach and obvious air movement was noted with good oxygen saturations. Trach ties were placed and secured. Patient tolerated the trach change well and without complication.     Continue routine trach cares as directed.   Future trach changes will be performed as needed.   SLP consulted for PMSV trials    Kathy Kwon PA-C  Otolaryngology-Head & Neck Surgery  Please contact ENT by dialing [phone] and entering job code [phone] when prompted.

## 2024-10-22 NOTE — PROGRESS NOTES
"CLINICAL NUTRITION SERVICES - ASSESSMENT NOTE     Nutrition Prescription    RECOMMENDATIONS FOR MDs/PROVIDERS TO ORDER:   Please encourage PO intake    Malnutrition Status:   Severe malnutrition in the context of acute on chronic.    Recommendations already ordered by Registered Dietitian (RD):   Provided pt preference snacks from floor kitchenette.    Medical food supplement therapy - ordered Ensure Enlive (strawberry) TID with meals    Future/Additional Recommendations:   Monitor weights/intake trends      REASON FOR ASSESSMENT   Joshua Ennis is a/an 79 year old male assessed by the dietitian for Provider Order - \"Calorie counting and failure to thrive\"    PMHx   Per ENT progress note, PMHx HTN, CKD, GERD, and MLD w bx demonstrating SCC 7/24     NUTRITION HISTORY   Visited with pt. Discussed appetite and food intake. Pt frustrated by food and snack options.     Nutrition/GI: Last BM 10/20  Renal/: CKD  Endo: Hx GPA  Onc: SCC  Skin: Basim score 22 with nutrition marked as adequate per flowsheets. Surgical incision noted,    CURRENT NUTRITION ORDERS   Diet: Regular Diet Adult    Intake/Tolerance:   Per flowsheets, intake documentation likely missing some meals/snacks, but on average 75% intake of meals documented.   10/21: 1394 kcal and 38 g protein (2 meals, 0 supplements)    LABS   Labs Reviewed   10/20/24 06:44 10/21/24 06:35 10/22/24 05:57   Sodium 137     Potassium 4.2 4.3 4.2   Urea Nitrogen 22.0     Creatinine 1.28 (H)  1.33 (H)   GFR Estimate 58 (L)  55 (L)   Calcium 9.0     Anion Gap 13     Magnesium 1.9 2.1 2.1   Phosphorus 3.6 2.8 3.1   Albumin   3.2 (L)   Glucose 107 (H)     WBC 7.0  5.1   Hemoglobin 13.0 (L)  12.3 (L)   Platelet Count 180  213   MCV 97  97     MEDICATIONS   Medications reviewed  Protonix  Miralax  Senokot  PRN meds available  Robitussin  Milk of Magnvon Paynean  Oxycodone       ANTHROPOMETRICS  Height: 172.7 cm (5' 8\")  Most Recent Weight: 77.2 kg (170 lb 3.2 oz)    IBW: 70 " kg  BMI: 25.88 - Overweight BMI 25-29.9  Weight History:   Pt with 32 lb (15.7 %) weight loss over 2 months.    Wt Readings from Last Encounters:   10/21/24 77.2 kg (170 lb 3.2 oz)   10/17/24 80.3 kg (177 lb)   10/15/24 80.5 kg (177 lb 6.4 oz)   09/23/24 85.3 kg (188 lb)   09/16/24 86.4 kg (190 lb 8 oz)   09/09/24 88 kg (194 lb)   08/28/24 91.6 kg (202 lb)   08/20/24 88 kg (194 lb)   07/30/24 87.5 kg (193 lb)   07/30/24 88 kg (194 lb)   07/17/24 88.3 kg (194 lb 10.7 oz)   07/15/24 90.3 kg (199 lb)   07/11/24 90.9 kg (200 lb 4.8 oz)   06/27/24 90.3 kg (199 lb)   06/04/24 88.1 kg (194 lb 3.2 oz)   04/25/24 89.8 kg (198 lb)   03/14/24 89.9 kg (198 lb 4.8 oz)   01/18/24 89.4 kg (197 lb)   01/08/24 89.4 kg (197 lb)   01/03/24 84.8 kg (187 lb)   12/11/23 89.8 kg (198 lb)   12/01/23 89.9 kg (198 lb 3.1 oz)   11/16/23 89.8 kg (198 lb)   10/25/23 89.5 kg (197 lb 5 oz)   10/10/23 89.8 kg (198 lb)       ASSESSED NUTRITION NEEDS   Current: 77.2 kg (170 lb 3.2 oz)   IBW: 70 kg  Dosing Weight: 77 kg - Actual  Estimated Energy Needs: 2,310- 2,695 kcals/day (30 - 35 kcals/kg )  Justification: Increased needs and Repletion  Estimated Protein Needs: 77- 115 grams protein/day (1.0 - 1.5 grams of pro/kg)  Justification: CKD vs Increased needs  Estimated Fluid Needs: 1925- 2,310 mL/day (25 - 30 mL/kg)   Justification: Maintenance    PHYSICAL FINDINGS   See malnutrition section below.    MALNUTRITION   % Intake: Decreased intake does not meet criteria   % Weight Loss: > 7.5% in 3 months (severe)  Subcutaneous Fat Loss: Facial region:  moderate, Upper arm: moderate, and Lower arm:  moderate  Muscle Loss: Temporal:  moderate  Fluid Accumulation/Edema: None noted  Malnutrition Diagnosis: Severe malnutrition in the context of acute on chronic.    NUTRITION DIAGNOSIS   Inadequate oral intake related to pt food preferences as evidenced by pt report of not liking food.       INTERVENTIONS   Implementation   Provided pt preference snacks from  "floor kitchenette.    Medical food supplement therapy - ordered Ensure Enlive (strawberry) TID with meals      Goals   Patient to consume % of nutritionally adequate meal trays TID, or the equivalent with supplements/snacks.     Monitoring/Evaluation   Progress toward goals will be monitored and evaluated per protocol.  Lorin Starks PhD, RD  Available on "[employer]" (\"5A Clinical Dietitian\" or \"6A Clinical Dietitian\")      "

## 2024-10-22 NOTE — PROGRESS NOTES
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 Now with cuffless shiley 6. Communication/PMSV evaluation completed per MD orders.   Type of Evaluation   Type of Evaluation Artificial Airway (Speaking Valve)   Tracheostomy Assessment (Speaking Valve)   Cuff, Tracheostomy Tube cuffless   Date of Tracheostomy 10/17/24  (switched to cuffless trach 10/22)   Tube Size, Tracheostomy 6   Participation Ability (Speaking Valve) awake/alert;attempts to communicate, mouthing words;follows simple commands   Type, Tracheostomy Tube Shiley   Respiratory Status (Speaking Valve)   Oxygen Supply Trach Dome;Humidity  (25LPM with 21% FiO2)   Oral/Tracheal Secretions (Speaking Valve)   Oral Secretions (Speaking Valve Assessment) minimal secretions   Tracheal Secretions (Speaking Valve Assessment) moderate secretions   Speaking Valve Trials (Speaking Valve)   Outcome of Trial (Speaking Valve) tolerance is adequate   Voice Production (Speaking Valve Trial) fair strength/quality   Breath Support (Speaking Valve Trial) exhales through mouth;coordinates speech with breath support   Recommendations (Speaking Valve Trials) speaking valve use recommended   Cough Production (Speaking Valve Trial) fair   Secretions/Suction, Cuff Deflation (Speaking Valve)   (pt independently expectorating secretions  from trach tube)   Secretions During Valve Use (Speaking Valve Trial) secretions managed well during valve use   Airflow/Phonation Air flow around trach adequate with finger occlusion;Able to phonate with occlusion of trach   Speaking Valve placed on tracheostomy tube   Cuff Inflated at Onset of Evaluation   (n/a cuffless trach)   Oxygen saturation after cuff deflation 97 %   Oxygen saturation with PMSV placement 97 %   Total amount of time with PMSV placement: 30 minutes   Respiratory rate after cuff deflation 22 Per Minute   Respiratory Rate with PMSV placement 21 Per Minute   General Therapy Interventions   Planned Therapy Interventions Communication   Communication Speaking valve instruction;Improve speech intelligibility   Clinical Impression   Criteria for Skilled Therapeutic Interventions Met (SLP Eval) Yes, treatment indicated   SLP Diagnosis aphonia d/t tracheostomy   Risks & Benefits of therapy have been explained evaluation/treatment results reviewed;care plan/treatment goals reviewed;risks/benefits reviewed;current/potential barriers reviewed;participants voiced agreement with care plan;participants included;patient   Clinical Impression Comments Communication/PMSV evaluation completed per MD orders. Pt with Shiley 6 cuffless trach on humidified room air. Pt's baseline O2 sats 97%. Pt independently expectorating secretions from trach tube. Pt was able to achieve strained voicing during finger occlusion trials, so PMSV placed by SLP. Pt tolerated PMSV for 30 minutes with VSS and harsh but functional voicing. After initial training, pt was able to don/doff PMSV with min cues. At end of session, PMSV left in place; RN notified. Recommend PMSV use as tolerated. Remove PMSV if SOB or sleeping. ST to continue to follow. Recommend OP SLP follow-up at ENT clinic at discharge.   SLP Discharge Recommendation home with outpatient therapy services   SLP Rationale for DC Rec recommend pt resume OP SLP at ENT clinic  at discharge   SLP Brief overview of current status  Recommend PMSV use as tolerated. Remove PMSV if SOB or sleeping.   Total Session Time   Total Session Time (sum of timed and untimed services) 35

## 2024-10-22 NOTE — PROGRESS NOTES
"Otolaryngology Progress Note  October 21, 2024    S:   Afebrile, HDS. Trach teaching yesterday, still needs more. Calorie counts started, questionable PO intake. LBM 10/20. Voiding. Tremors improving today per pt.     O: /77 (BP Location: Left arm)   Pulse 88   Temp 98.5  F (36.9  C) (Oral)   Resp 20   Ht 1.727 m (5' 8\")   Wt 77.2 kg (170 lb 3.2 oz)   SpO2 95%   BMI 25.88 kg/m     General: Alert and oriented x 3, No acute distress, interactive, frequent coughing   HEENT: 6-0 cuffed Shiley in place, sutures x4 intact. Cuff down. Trach inner canula clean.  Moderately thick secretions, clearing.  Slight erythema of the neck above trach site.   Pulmonary: Breathing non-labored, no stridor, no accessory muscle use.     A/P: 78 yo M long-term RG patient with Pmhx [id number] SCC of the larynx s/p cordectomy 8/23, MDL w/ laser excision 10/23, Re-resection 12/23, MLD w bx demonstrating SCC 7/24, radiotherapy 63Gy over 28 fractures completed 2 weeks ago now s/p awake tracheostomy 10/17 with SK after finding supraglottic/glottic edema with bilateral cord paralysis and associated stridor in clinic.      Plan today:  - Nutrition consult  - Trach change to cuffless  - SLP for PMSV   - Continue working on trach education     Neuro: Pain control with tylenol, oxycodone, PTA gabapentin 400 mg at bedtime  HEENT: Hx of glottic web, recurrent laryngeal cancer  - s/p trach, First trach change today  - Trach teaching/checklist - continue teaching    Cardiovascular: Hx of HTN, PTA losartan   Respiratory: Hx of recent pneumonia, supraglottic edema with bilateral vocal cord paralysis  - Saline nebs 4 times daily  - Perform regular suctioning. RN should change disposable inner canulas every shift and/or clean it with a brush. Keep trach tube obturator taped to wall behind the head of the bed. Keep extra unopened 6-0 Shiley and 5-0 Shiley at bedside at all times   - patient will require tracheostomy and portable suction supplies on " discharge due to tracheostomy dependence and inability to independently manage respiratory secretions.     FEN/GI: Hx of GERD & CKD,   - PTA PPI   -Regular diet, poor PO. Calorie counts, nutrition consult    Heme/ID: GINA   : GINA, voiding spontaneously  Endocrine/Rheum: Hx of GPA  MSK: GINA  Cons: PT  (HEIDI), SLP   Ppx: Daiana Matt MD   Otolaryngology - Head and Neck Surgery, PGY 2  Please page ENT with questions

## 2024-10-22 NOTE — PLAN OF CARE
"Status: Emergent awake trach placed on 10/17 d/t increased airway narrowing & restriction. PMHx: vocal cord cancer, CKD, HTN.  Vitals: VSS on 21% HTD.  Neuros: A&Ox4. Intact. 5/5 strengths thru out. Writes to communicate.  IV: No PIV - ok per order.  Labs/Electrolytes: WNL.  Resp/trach: #6 Shiley, cuffless w/ reusable inner cannula. Tolerating new trach well. Speaking valve in place - able to have on at all times if pt prefers. 21% HTD. Good, productive cough. Trachs completed q4hr: reusable inner cannula cleansed & suctioned 1x this charles w/ SO administering cares - pt tolerated well. 2nd q4hr trach cares - declined => pt wanted to rest at this time.  Diet: Reg diet - poor intake. Pt needs encouragement to order meals. Did not eat dinner - did not have appetite. Lyle counts 10/21 thru 10/25.  Bowel status: LBM 10/20 - refused sched senna this charles.  : Voids spont.  Skin: Trach site w/ blanchable redness & secretions - cleansed this charles.  Pain: 8/10 throat pain when swallowing - trx w/ PRNs thru out day including oxy & tylenol.  Activity: SBA.  Social: SO Falguni & grand daughter at bedside thru out early charles & supportive.  Plan: Discharging home tomorrow - trach teaching still needs completion, checklist at bedside.   Education completed: Continued to review general trach care techniques & secretion management w/ pt & SO including suction technique, inner cannula cleansing, stoma site cleansing, home humidity expectations, how to manage trach opening while going outside of home for work & other needs, opened & introduced portable suction kit to familiarize. Additional trach supplies gathered by RN for pt in pt belonging bag next to portable suction machine. Will need reinforcement & additional \"hands on\" experiences for SO. SO reported that they learn best thru verbal & kinesthetic teachings methods. SO plans on being here trmw in early afternoon if not earlier to have more opportunity to time for learning.    Goal " Outcome Evaluation:      Plan of Care Reviewed With: patient, significant other, grandchild(albert)    Overall Patient Progress: improving    Outcome Evaluation: Trach education continued this charles. Pt & SO trach care skills are improving.

## 2024-10-22 NOTE — PLAN OF CARE
Goal Outcome Evaluation:      Plan of Care Reviewed With: patient, significant other    Overall Patient Progress: improvingOverall Patient Progress: improving     Status: Emergent awake trach placed on 10/17 d/t increased airway narrowing & restriction. PMHx: vocal cord cancer, CKD, HTN.   Vitals: VSS  Neuros: intact  IV: no PIV,  ok per order  Labs/Electrolytes: WNL  Resp/trach: pt switched to #6 cuffless shiley (reusable inner cannula) approx 1100, no issues since, pt tolerating new trach well and subsequent teaching with SLP for use of speaking valve. Pt can have valve on at all times. No suctioning this shift. Inner cannula inspected at 1400 and remains c/d/I. Pt demonstrates strong cough and expectorates thick, tan secretions with good force. C  Diet: reg diet with eli counts through 10/25. Fair intake today though pt states that he doesn't like the hospital food.  Bowel status: LBM 10/20, bowel meds given, pt passing flatus  : voiding without difficulty, urine is briana and clear  Skin: stoma site reddened, aquaphor applied. Trach held in place with ties  Pain: c/o mild pain relieved effectively with PO oxycodone  Activity: up independently during the day.  Social: SO and family at bedside since 1400. Helpful and willingness to learn trach teaching, alongside pt  Plan: discharging to home tomorrow. Trach teaching needs to be completed, checklist at bedside  Updates this shift: pt is compliant and cooperative. Gracious but is a person of few words.

## 2024-10-22 NOTE — PLAN OF CARE
Goal Outcome Evaluation:      Plan of Care Reviewed With: patient    Overall Patient Progress: no changeOverall Patient Progress: no change    Outcome Evaluation: pending completion of trach education    Status: Emergent awake trach placed on 10/17 d/t increased airway narrowing & restriction. PMHx: vocal cord cancer, CKD, HTN.   Vitals: VSS on 21% HTD  Neuros: A&Ox4, GW. 5/5.  IV: PIV SL  Labs/electrolytes: Orders for UA/protein urine  Resp/trach: LSC, denies SOB. #6 shiley cuff down. No sx overnight, changed IC x2, cleansed trach w/NS. Good productive cough with moderate amt of sticky tan secretions  Diet: Regular diet. Lyle count 10/21-10/25  Bowel status: LBM 10/20  : Voiding spontaneously  Skin: Blanchable redness at trach site.  Pain: Shoulder pain managed w/ice. PRN atarax given  Activity: SB, GB  Plan: Trach change today 10/22. Pt recommending home with assist.  Education completed: In progress.

## 2024-10-22 NOTE — PLAN OF CARE
"Status: Emergent awake trach placed on 10/17 d/t increased airway narrowing & restriction. PMHx: vocal cord cancer, CKD, HTN.  Vitals: VSS on 21% HTD.  Neuros: A&Ox4. Intact. 5/5 strengths thru out. Writes to communicate.  IV: PIV SL.  Labs/Electrolytes: WNL.  Resp/trach: #6 Shiley, cuff down. 21% HTD. Good, productive cough. Trachs completed for first q4hr: disposable inner cannula replaced & suctioned 2x this charles - tolerated well. Pt declined 2nd trach care session d/t wanting to rest - no signs/sx of suction needs at this time.  Diet: Reg diet - poor intake. Pt needs encouragement to order meals. Ate half of ordered dinner this charles. Lyle counts 10/21 thru 10/25.  Bowel status: LBM 10/20 - pt declined sched senna.  : Voids spont.  Skin: Trach site w/ blanchable redness & secretions - cleansed this charles.  Pain: Denied pain this shift.  Activity: SBA for line management.   Social: SO Falguni at bedside thru out early charles & supportive.  Plan: Trach change planned for tmrw 10/22.  Education completed: Reviewed trach care techniques & secretion management w/ pt & SO. Initiated introductory trach cares teaching specifically with suction technique, inner cannula replacement, & site cleansing. Will need reinforcement & additional \"hands on\" experiences for SO. SO reported that they learn best thru verbal & kinesthetic teachings methods.     Goal Outcome Evaluation:      Plan of Care Reviewed With: patient, significant other    Overall Patient Progress: improving    Outcome Evaluation: Trach education ongoing - initiated introductory knowledge about basic trach cares w/ pt & SO.      "

## 2024-10-22 NOTE — PROGRESS NOTES
Care Management Follow Up    Length of Stay (days): 5    Expected Discharge Date: 10/23/2024     Concerns to be Addressed: discharge planning     Patient plan of care discussed at interdisciplinary rounds: Yes    Anticipated Discharge Disposition: Home  Anticipated Discharge Services: None  Anticipated Discharge DME: suction, humidity, trach supplies    Patient/family educated on Medicare website which has current facility and service quality ratings: yes  Education Provided on the Discharge Plan: yes  Patient/Family in Agreement with the Plan: yes    Referrals Placed by CM/SW:  HC, DME  Private pay costs discussed: Not applicable    Discussed  Partnership in Safe Discharge Planning  document with patient/family: No     Handoff Completed: No, handoff not indicated or clinically appropriate at this time    Additional Information:  Patient status reviewed, discussed in discharge rounds. Per provider, patient will be medically ready for discharge tomorrow.     Call placed to [employer] [facility], spoke with Helga and updated on discharge plan. Helga confirmed RT can come to patient's home on day of discharge to deliver supplies and provide education.     Home care referrals were sent last week. Unfortunately we were unable to secure home care.     Suction machine was delivered to bedside.     Call placed to patient's significant other, Earlene, and updated on discharge plan. Earlene states [employer] RT is currently there delivering supplies and completing education. Informed Earlene patient will also need to receive this education. She states she feels comfortable relaying the information. She states the [employer] RT states he can return tomorrow when patient is home if he needs additional education. Informed Earlene we were unable to secure home care. Earlene states she will be able to provide discharge ride tomorrow morning at 11 am. She also states she will be coming to visit this afternoon.     Met with patient and  provided update on above. Patient expressed understanding and agrees with plan.     Next Steps:   RNCC will continue to follow.   Should encourage patient/S.O. to contact [facility] if needing any additional education on equipment.   Complete handoff.    Rachel Alvarado RN, BSN  6A RN Care Coordinator  Ph: [phone]   Vocera: 6A Neuro RNCC

## 2024-10-23 ENCOUNTER — APPOINTMENT (OUTPATIENT)
Dept: SPEECH THERAPY | Facility: CLINIC | Age: 79
DRG: 011 | End: 2024-10-23
Payer: COMMERCIAL

## 2024-10-23 VITALS
TEMPERATURE: 99 F | BODY MASS INDEX: 25.79 KG/M2 | WEIGHT: 170.2 LBS | HEIGHT: 68 IN | SYSTOLIC BLOOD PRESSURE: 124 MMHG | OXYGEN SATURATION: 96 % | DIASTOLIC BLOOD PRESSURE: 66 MMHG | HEART RATE: 59 BPM | RESPIRATION RATE: 15 BRPM

## 2024-10-23 LAB
ANCA AB PATTERN SER IF-IMP: NORMAL
C-ANCA TITR SER IF: NORMAL {TITER}
HOLD SPECIMEN: NORMAL
MAGNESIUM SERPL-MCNC: 2.1 MG/DL (ref 1.7–2.3)
MYELOPEROXIDASE AB SER IA-ACNC: 0.4 U/ML
MYELOPEROXIDASE AB SER IA-ACNC: NEGATIVE
PHOSPHATE SERPL-MCNC: 3.4 MG/DL (ref 2.5–4.5)
POTASSIUM SERPL-SCNC: 4.6 MMOL/L (ref 3.4–5.3)
PROTEINASE3 AB SER IA-ACNC: 25 U/ML
PROTEINASE3 AB SER IA-ACNC: POSITIVE

## 2024-10-23 PROCEDURE — 94640 AIRWAY INHALATION TREATMENT: CPT

## 2024-10-23 PROCEDURE — 36415 COLL VENOUS BLD VENIPUNCTURE: CPT

## 2024-10-23 PROCEDURE — 92507 TX SP LANG VOICE COMM INDIV: CPT | Mod: GN

## 2024-10-23 PROCEDURE — 84100 ASSAY OF PHOSPHORUS: CPT

## 2024-10-23 PROCEDURE — 250N000009 HC RX 250

## 2024-10-23 PROCEDURE — 83735 ASSAY OF MAGNESIUM: CPT

## 2024-10-23 PROCEDURE — 999N000157 HC STATISTIC RCP TIME EA 10 MIN

## 2024-10-23 PROCEDURE — 250N000011 HC RX IP 250 OP 636: Performed by: PHYSICIAN ASSISTANT

## 2024-10-23 PROCEDURE — 250N000009 HC RX 250: Performed by: OTOLARYNGOLOGY

## 2024-10-23 PROCEDURE — 250N000013 HC RX MED GY IP 250 OP 250 PS 637

## 2024-10-23 PROCEDURE — 84132 ASSAY OF SERUM POTASSIUM: CPT

## 2024-10-23 PROCEDURE — 999N000215 HC STATISTIC HFNC ADULT NON-CPAP

## 2024-10-23 PROCEDURE — 92526 ORAL FUNCTION THERAPY: CPT | Mod: GN

## 2024-10-23 RX ORDER — ACETAMINOPHEN 325 MG/1
650 TABLET ORAL EVERY 4 HOURS PRN
Qty: 50 TABLET | Refills: 0 | Status: SHIPPED | OUTPATIENT
Start: 2024-10-23

## 2024-10-23 RX ORDER — AMOXICILLIN 250 MG
1 CAPSULE ORAL 2 TIMES DAILY
Qty: 30 TABLET | Refills: 0 | Status: SHIPPED | OUTPATIENT
Start: 2024-10-23

## 2024-10-23 RX ORDER — OXYCODONE HYDROCHLORIDE 5 MG/1
2.5-5 TABLET ORAL EVERY 4 HOURS PRN
Qty: 15 TABLET | Refills: 0 | Status: SHIPPED | OUTPATIENT
Start: 2024-10-23

## 2024-10-23 RX ADMIN — OXYCODONE HYDROCHLORIDE 2.5 MG: 5 TABLET ORAL at 06:01

## 2024-10-23 RX ADMIN — ACETAMINOPHEN 650 MG: 325 TABLET, FILM COATED ORAL at 06:01

## 2024-10-23 RX ADMIN — ALBUTEROL SULFATE 2.5 MG: 2.5 SOLUTION RESPIRATORY (INHALATION) at 08:45

## 2024-10-23 RX ADMIN — SODIUM CHLORIDE SOLN NEBU 3% 3 ML: 3 NEBU SOLN at 08:45

## 2024-10-23 RX ADMIN — ENOXAPARIN SODIUM 40 MG: 40 INJECTION SUBCUTANEOUS at 10:03

## 2024-10-23 RX ADMIN — PANTOPRAZOLE SODIUM 40 MG: 40 TABLET, DELAYED RELEASE ORAL at 10:03

## 2024-10-23 ASSESSMENT — ACTIVITIES OF DAILY LIVING (ADL)
ADLS_ACUITY_SCORE: 0
ADLS_ACUITY_SCORE: 28
ADLS_ACUITY_SCORE: 0

## 2024-10-23 NOTE — PROGRESS NOTES
Calorie Count  Intake recorded for: 10/22  Total Kcals: 774 Total Protein: 30g  Kcals from Hospital Food: 774  Protein: 30g  Kcals from Outside Food (average):0 Protein: 0g  # Meals Ordered from Kitchen: 2 meals   # Meals Recorded: 3 meals (First - 100% cheerios w/ whole milk)       (Second - 100% 2 lemon lime sodas, Greek yogurt, 50% grapes, 25% ice cream, sausage pizza)       (Third - less than 25% ham sandwich)   # Supplements Recorded: 0

## 2024-10-23 NOTE — PROGRESS NOTES
Care Management Discharge Note    Discharge Date: 10/23/2024       Discharge Disposition: Home    Discharge Services: Home Care    Discharge DME: Other (see comment) (suction, humidity, trach supplies)    Discharge Transportation: family or friend will provide    Private pay costs discussed: Not applicable    Patient/family educated on Medicare website which has current facility and service quality ratings: yes    Education Provided on the Discharge Plan: Yes  Persons Notified of Discharge Plans: Patient, SO  Patient/Family in Agreement with the Plan: yes    Handoff Referral Completed: Yes, [facility] PCP: Internal handoff referral completed    Additional Information:  Patient discharged to home, SO provided transportation.  Patient had trach care supplies delivered to his home 10/22/24.    [employer]  Phone #[phone]    Ana M Dyer RN  6A care coordinator #[phone]

## 2024-10-23 NOTE — PLAN OF CARE
Speech Language Therapy Discharge Summary    Reason for therapy discharge:    Discharged to home with outpatient therapy.    Progress towards therapy goal(s). See goals on Care Plan in [facility] electronic health record for goal details.  Goals partially met.  Barriers to achieving goals:   discharge from facility.    Therapy recommendation(s):    Continued therapy is recommended.  Rationale/Recommendations:  Continued OP SLP follow-up at discharge.      Recommend PMSV use as tolerated. Remove PMSV if SOB or sleeping. Recommend regular diet and thin liquids as tolerated.

## 2024-10-23 NOTE — PLAN OF CARE
Status: Emergent awake trach placed on 10/17 d/t increased airway narrowing & restriction. PMHx: vocal cord cancer, CKD, HTN.  Vitals: VSS on 21% HTD with continuous pulse ox 92-98%  Neuros: A&Ox4, writes to communicate. 5/5 throughout  IV: Ok for no IV  Labs/Electrolytes: WNL.  Resp/trach: #6 Shiley cuffless secured with trach ties/sutures. Small area of breakdown noted at L. lower border of trach faceplate, mepilex lite applied. Strong productive cough with moderate amount of tan creamy secretions. Suction x1. Inner cannula cares completed q4hrs.   Diet: Reg diet - poor PO per report. Lyle counts from 10/21/24 to 10/25   Bowel status: LBM 10/20. Has scheduled senna and miralax   : Voids spont.  Skin: Trach site w/ blanchable redness & small area of breakdown at L. lower border, dressing applied   Pain: C/o 5/10 throat  pain managed with PRN oxycodone and tylenol   Activity: SBA   Plan:  Discharging home today - trach teaching still needs completion, checklist at bedside.       Goal Outcome Evaluation:      Plan of Care Reviewed With: patient    Overall Patient Progress: no change    Outcome Evaluation: Regular diet, PO continues to be poor. Trach teaching to be completed. Plan for discharge to home today

## 2024-10-23 NOTE — PROGRESS NOTES
"Otolaryngology Progress Note  October 22, 2024    S:   Oxy x3, small area of breakdown noted along left footplate, tan secretions noted, working on trach teaching, PMSV as tolerated, Poor PO    O: /66 (BP Location: Left arm)   Pulse 59   Temp 99  F (37.2  C) (Oral)   Resp 15   Ht 1.727 m (5' 8\")   Wt 77.2 kg (170 lb 3.2 oz)   SpO2 99%   BMI 25.88 kg/m     General: Alert and oriented x 3, No acute distress, interactive, frequent coughing   HEENT: 6-0 uncuffed Shiley in place, sutures x4 intact. Cuff down. Trach inner canula clean.  Moderately thick secretions, clearing.  Slight erythema of the neck above trach site.   Pulmonary: Breathing non-labored, no stridor, no accessory muscle use.     A/P: 78 yo M long-term RG patient with Pmhx [id number] SCC of the larynx s/p cordectomy 8/23, MDL w/ laser excision 10/23, Re-resection 12/23, MLD w bx demonstrating SCC 7/24, radiotherapy 63Gy over 28 fractures completed 2 weeks ago now s/p awake tracheostomy 10/17 with SK after finding supraglottic/glottic edema with bilateral cord paralysis and associated stridor in clinic.      Plan today:  - Continue trach changing  - Possible discharge pending care independence    Neuro: Pain control with tylenol, oxycodone, PTA gabapentin 400 mg at bedtime  HEENT: Hx of glottic web, recurrent laryngeal cancer  - s/p trach, first trach change completed  - Trach teaching/checklist - continue teaching    Cardiovascular: Hx of HTN, PTA losartan   Respiratory: Hx of recent pneumonia, supraglottic edema with bilateral vocal cord paralysis  - Saline nebs 4 times daily  - Perform regular suctioning. RN should change disposable inner canulas every shift and/or clean it with a brush. Keep trach tube obturator taped to wall behind the head of the bed. Keep extra unopened 6-0 Shiley and 5-0 Shiley at bedside at all times   - patient will require tracheostomy and portable suction supplies on discharge due to tracheostomy dependence and " inability to independently manage respiratory secretions.     FEN/GI: Hx of GERD & CKD   - PTA PPI   - Regular diet. Ensure TID  - Calorie counts    Heme/ID: GINA   : GINA, voiding spontaneously  Endocrine/Rheum: Hx of GPA  MSK: GINA  Cons: PT  (HWA), SLP PMSV  Ppx: Lovenox    Dispo: Discharge today pending trach cares    Sandeep Matt MD   Otolaryngology - Head and Neck Surgery, PGY 2  Please page ENT with questions

## 2024-10-23 NOTE — PROGRESS NOTES
Discharge time/date: 10/23/24  Walked or Wheelchair: wheelchair  PIV removed: PIV SL'd  Reviewed AVS with patient: yes  Medication due times added to AVS in EPIC: yes  Verbalized understanding of discharge with teachback: yes  Medications retrieved from pharmacy: yes  Supplies sent home: yes  Belongings from security with patient: n/a    6A Patient Tracheostomy Teaching:    Folder in Patient Room: yes  Describe parts of a trach tube: yes  Importance of Obturator: yes  Perform stoma site care: yes   Clean/change inner cannula: yes  Suctioning: yes  Lavaging: discussed, pt refused  Change trach ties: discussed, performed with spare trach tie  Possible Complications: yes    How to reinsert trach: yes  Use of PMV or trach cap: yes  Importance of humidification: yes  Contacting local Fire/Police: yes, pt can speak and declined to learn how to pre-record a message.  When to contact provider: yes  When to call 911: yes

## 2024-10-24 ENCOUNTER — PATIENT OUTREACH (OUTPATIENT)
Dept: CARE COORDINATION | Facility: CLINIC | Age: 79
End: 2024-10-24
Payer: COMMERCIAL

## 2024-10-24 ENCOUNTER — PATIENT OUTREACH (OUTPATIENT)
Dept: OTOLARYNGOLOGY | Facility: CLINIC | Age: 79
End: 2024-10-24
Payer: COMMERCIAL

## 2024-10-24 NOTE — TELEPHONE ENCOUNTER
Left Voicemail (2nd Attempt) for the patient to call back and schedule the following:    Appointment type: Return Rheumatology  Provider: Dr. Sanchez  Return date: Next schedule  Specialty phone number: [phone]  Additional appointment(s) needed: NA  Additonal Notes: 2nd reschedule attempt for 3/7 appt. Max attempts reached. Cancelling appt.

## 2024-10-24 NOTE — PROGRESS NOTES
Clinic Care Coordination Contact  Care Coordination Clinician Chart Review    Situation: Patient chart reviewed by care coordinator.    Background: Clinic Care Coordination Referral received from inpatient care team for transition handoff communication following hospital admission.    Assessment: Upon chart review, patient is not a candidate for Primary Care Clinic Care Coordination enrollment due to reason stated below:  Primary Care Clinic Care Coordination will defer follow-up outreach to Specialty Clinic Care Coordination team who are already closely following patient.    Plan/Recommendations: Clinic Care Coordination Referral/order cancelled. RN/SW CC will perform no further monitoring/outreaches at this time and will remain available as needed. If new needs arise, a new Care Coordination Referral may be placed.    [facility]   Dorothy Bernabe RN, Care Coordinator   [facility]   E-mail [email or web address]   [phone]

## 2024-10-24 NOTE — PROGRESS NOTES
Called patient SO to check in and see how things were going after leaving the hospital. Per patient SO the patient was doing well. Patient wondered about returning to work, writer advised patient that he should wait until his follow up with provider next week to make sure everything is healing okay. Patient SO was agreeable to this time, ad verbalized understanding of the situation. Patient/family will reach out with any other questions or concerns in the meantime. Nina Daly RN on 10/24/2024 at 8:22 AM

## 2024-10-25 ENCOUNTER — TELEPHONE (OUTPATIENT)
Dept: RHEUMATOLOGY | Facility: CLINIC | Age: 79
End: 2024-10-25
Payer: COMMERCIAL

## 2024-10-25 NOTE — TELEPHONE ENCOUNTER
[facility] Call Center    Phone Message    May a detailed message be left on voicemail: yes     Reason for Call: Other: patient SO called to have a return call and talk to some one in the care team about labs and her calls she is missed. Please call back and advise.      Action Taken: Message routed to:  [facility] ([facility]): rheum    Travel Screening: Not Applicable     Date of Service: 10/25/24

## 2024-10-25 NOTE — TELEPHONE ENCOUNTER
Called and spoke with SO Earlene.     Joshua was hospitalized 10/17-10/23 for significant supraglottic/glottic edema and significant stridor in clinic the decision was made to proceed with urgent awake tracheostomy 10/17/2024.     He had labs done 10/22 while hospitalized. SO Earlene is asking if labs for Dr. Sanchez should still be done as the plan was to have them done around the time of his hospitalization. The could have them done 10/29. Orders are in.    Plan:  Confirm with Dr. Sanchez that labs should be done on 10/29.    Jessica Zheng RN  [facility]

## 2024-10-26 ENCOUNTER — HOSPITAL ENCOUNTER (EMERGENCY)
Facility: CLINIC | Age: 79
Discharge: HOME | End: 2024-10-26
Payer: COMMERCIAL

## 2024-10-26 VITALS
TEMPERATURE: 98.6 F | WEIGHT: 170 LBS | HEART RATE: 85 BPM | BODY MASS INDEX: 25.85 KG/M2 | RESPIRATION RATE: 18 BRPM | OXYGEN SATURATION: 95 % | SYSTOLIC BLOOD PRESSURE: 130 MMHG | DIASTOLIC BLOOD PRESSURE: 86 MMHG

## 2024-10-26 DIAGNOSIS — Z43.0 TRACHEOSTOMY CARE (H): Primary | ICD-10-CM

## 2024-10-26 PROCEDURE — 99282 EMERGENCY DEPT VISIT SF MDM: CPT

## 2024-10-26 PROCEDURE — 99283 EMERGENCY DEPT VISIT LOW MDM: CPT

## 2024-10-26 ASSESSMENT — COLUMBIA-SUICIDE SEVERITY RATING SCALE - C-SSRS
1. IN THE PAST MONTH, HAVE YOU WISHED YOU WERE DEAD OR WISHED YOU COULD GO TO SLEEP AND NOT WAKE UP?: NO
2. HAVE YOU ACTUALLY HAD ANY THOUGHTS OF KILLING YOURSELF IN THE PAST MONTH?: NO
6. HAVE YOU EVER DONE ANYTHING, STARTED TO DO ANYTHING, OR PREPARED TO DO ANYTHING TO END YOUR LIFE?: NO

## 2024-10-26 ASSESSMENT — ACTIVITIES OF DAILY LIVING (ADL): ADLS_ACUITY_SCORE: 0

## 2024-10-26 NOTE — ED PROVIDER NOTES
[facility]  Emergency Department Visit Note    PATIENT:  Joshua Ennis     79 year old     male      [id number]    Chief complaint:  Chief Complaint   Patient presents with    Foreign Body in Skin     suture          History of present illness:  Patient is a 79 year old male with laryngeal cancer status post tracheostomy, CKD presenting for evaluation of tracheostomy concern.    Presented to clinic 10/17/2024, noted to have subglottic edema and decision made in clinic to transfer to emergency department for awake tracheostomy.  Placed by Dr. Child, uncomplicated.  Initially 6-0 cuffed Shiley, swapped for 6-0 uncuffed Shiley 10/22.  Patient's hospital course complicated by pain and anxiety that ultimately improved.    Family presents today due to what they think is a stitch at the inferior tracheostomy site.  He is otherwise doing well at home.  No fevers, dyspnea.  No tracheostomy site bleeding.      Review of Systems:  As in HPI above    /86   Pulse 85   Temp 98.6  F (37  C) (Oral)   Resp 18   Wt 77.1 kg (170 lb)   SpO2 95%   BMI 25.85 kg/m        Physical Exam  Constitutional: laying in hospital bed, alert, oriented, and in no apparent distress  HEENT: normocephalic, atraumatic  Neck: 6-0 Shiley secured with tracheostomy securement device .  No bleeding from around tracheostomy site.  Does appear to be suture that is not connected to anything on the inferior border of the tracheostomy site.  No surrounding erythema.  Cardiovascular: regular rate and rhythm  Pulmonary: breathing comfortably on room air  Extremities/MSK: no cyanosis   Skin: non-diaphoretic  Neurologic: moves all four extremities spontaneously  Psychiatric: calm, appropriate      MDM:  Patient is a 79 year old male with above history presenting for evaluation of tracheostomy concern.    Vitals grossly normal. Exam reassuring, tracheostomy site appears in an appropriate stage of healing, no bleeding.    Likely  suture retained within the skin.  This was removed as it was not connected to anything.  No bleeding noted on exam or reported by patient or wife.  Patient otherwise doing well.  Routine follow-up with surgical team.    Disposition discharge. Remainder of ED course below.    ED COURSE:       Encounter Diagnoses:  Final diagnoses:   Tracheostomy care (H)       Final disposition: discharge    Elroy Fine MD  10/26/2024  12:48 PM   Emergency Medicine  [facility]      Elroy Fine MD  10/26/24 [id number]

## 2024-10-26 NOTE — ED NOTES
MD updated and shown stitch, Suture removed by MD.   Trach teaching and education given to pt and wife by RN and RT.  Demonstrated Trach tie change, Meplex sponge change, suctioning, discussed humidified air.  Wife states she feels more comfortable and validates what she has been doing, and alleviates concern about those she was not sure of.  Pt has apt next week and would recommend further repetition of education and skills.

## 2024-10-26 NOTE — DISCHARGE INSTRUCTIONS
You were seen in the emergency department today for tracheostomy concern.  The white stitch that you saw was indeed a stitch that probably was used to secure the previous device.  This is no longer necessary and it was removed.    Please follow up with your doctors as scheduled after the tracheostomy for ongoing evaluation and management of your symptoms.    Return to the emergency department with new or worsening symptoms that you find concerning.

## 2024-10-28 ENCOUNTER — PATIENT OUTREACH (OUTPATIENT)
Dept: CARE COORDINATION | Facility: CLINIC | Age: 79
End: 2024-10-28
Payer: COMMERCIAL

## 2024-10-28 NOTE — TELEPHONE ENCOUNTER
Ede Sanchez MD to Me       10/28/24  1:36 PM  Labs are stable. I recommend to continue holding methotrexate while he is healing from surgery, but re-check vasculitis labs in one month.      Call back placed to KELVIN Henao, as requested writer left detailed VM with the above instructions.       Jessica Zheng RN  [facility]

## 2024-10-28 NOTE — PROGRESS NOTES
[facility]   at the [facility]   Otolaryngology Clinic     Patient: Joshua Ennis    MRN: [id number]    : 1945    Age/Gender: 79 year old male  Date of Service: 10/29/2024  Rendering Provider:   Josephine Malone MD         Impression & Plan     IMPRESSION: Mr. Ennis is a 79 year old male who is being seen for the following:    Dysphonia   - anterior commissure lesion seen on scope on 23  - voice changes worsening for a few months  - prior smoker  - scope shows anterior commissure lesion with extension to the right vocal fold  - given anterior location will require CT neck to rule out thyroid cartilage extension  - discussed awake biopsy - patient in agreement this was done today  - pathology showed SCC  - CT chest had findings of new nodules - after multiple discussions with radiology the decision was made to proceed with surgery and repeat imaging for this finding in the chest  - s/p MDL with bilateral cordectomy with stent placement on 23  - today shows that the vocal folds closed around the stent - discussed he needs the scar broken up and bigger stent placed - patient in agreement   - pathology showed SCC with positive margins  - went back for MDL and stent removal on 23   - symptoms 10/10/2023 are stable, voice is poor  - scope shows  laryngeal web  - discussed he has positive margins - will proceed with repeat surger - if can't clear will need radiation  - ct chest shows improved size of lung nodule of concern  - s/p microlaryngoscopy, CO2 laser resection of vocal fold lesion, steroid injection, stent placement, and biopsy 10/25/23  - pathology shows invasive squamous cell carcinoma  - symptoms 2023 are stable, voice is poor, discussed case at tumor board, will continue with close observation and repeat biopsies  - scope shows stent in place, vocal folds are open, laryngeal web developed  - discussed removal of stent, then will collect a sample, if the  sample is cancerous, will refer for radiation therapy  - s/p microlaryngoscopy, CO2 laser resection of vocal fold lesion, steroid injection, stent removal, and biopsy 12/01/23  - pathology showed detached fragment of squamous epithelium with mild to moderate dysplasia, no evidence of carcinoma  - s/p laryngoscopy and anterior glottic web vocal fold steroid injection 12/11/23      - symptoms 1/4/2024 are has been having a hard time talking with his voice  - strobe shows bilateral vocal fold stiffness and swelling with question of laryngeal web, ventricular phonation  - s/p steroid injection today, does not appear to have a web, but rather vocal fold stiffness and swelling  - discussed treatment for vocal folds will be with steroid injections and voice therapy  - discussed voice result is questionable given severity of ventricular phonation  - symptoms 1/18/2024 are stable  - scope shows anterior glottic web, did lidocaine anesthesia, but difficult to see if the vocal folds can split apart today as compared to last time, he had laryngeal spasm  - discussed that we should focus on surveillance at this time and keep him in voice therapy, I would not do a repeat stunt placement given the last two did not work   - symptoms 6/27/2024 are worse difficulty breathing  - scope shows anterior glottic web, moderate airway restriction  - has final read of CT pending  - discussed I am concerned for tumor, needs evaluation under anesthesia   discussed awake intubation, discussed risk of trach  - s/p MDL with biopsy 7/17/24 with pathology showing SCC, tumor board discussion ensued with decision to proceed with radiation   - symptoms 7/29/2024 are stable per him though improved per Earlene, less panting with mowing the lawn  - scope shows anterior glottic web, though improved glottic airway   - discussed if breathing should worsen during treatment he is to reach out and consider trach placement   - symptoms 10/17/2024 are worsened for  the past few weeks in terms of his breathing, has finished radiation, had pneumonia that resolved but his breathing has not, breathing has gotten worse, here with his fiancee who is concerned, patient denies difficulty breathing however he has stridor   - 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  - discussed his options at this time are observation, steroid treatment vs awake tracheotomy  - given how severe the airway restriction is would recommend awake tracheotomy   - patient reluctant saying he would rather die, he is tired of all these interventions, everytime something happens there is another thing  - his fiancee on the other hand wants him to proceed  - after much discussion patient in agreement and will proceed to the hospital  - finished radition on 9/24  - had roney and MDL with biopsy on 10/17/24  - pathology negative for carcinoma  - symptoms 10/29/2024 are   - s/p MDL with biopsy at the time of trahc  - scope shows improved glottic airway, arytenoid eema  Plan  - 12 weeks from end date of radiation needs PET-CT  - lymphedema therapy after       2. Trach dependence  - in the setting of airway edema   - since 10/17/24  - has 6 shiley trach  - speaking with speaking valve  - not capping  - at night uses HME  - tbronch shows no secretions  Plan  - ciprodex drops  - will downsize and capping trial at the next visit   - letter for work - ok to return      RETURN VISIT: as scheduled    Chief Complaint   Anterior commissure SCC  S/p MDL with bilateral cordectomy and steroid injection 8/30/23  S/p MDL with stent removal and steroid injection on 9/13/23  S/p microlaryngoscopy, CO2 laser resection of vocal fold lesion, steroid injection, stent placement, and biopsy 10/25/23    S/p microlaryngoscopy, CO2 laser resection of vocal fold lesion, steroid injection, stent removal, and biopsy 12/01/23  S/p laryngoscopy and anterior glottic web vocal fold steroid  injection 12/11/23   S/p steroid injection 1/4/24           S/p radiation completed on 9/24/24   Trach placement 10/17/24  Interval History   HISTORY OF PRESENT ILLNESS:  Mr. Ennis is a 79 year old male is being followed for dysphonia. he was initially seen on 8/15/23. Please refer to this note for full history.        Today, he presents for follow up. 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 Negative for high-grade dysplasia and malignancy.    Hoarseness     Hypertension     Idiopathic progressive polyneuropathy     Kidney problem     Laryngeal cancer (H)     Malignant melanoma (H)     Malignant neoplasm (H)     melanoma    Squamous cell carcinoma     Russo syndrome        PAST SURGICAL HISTORY:   Past Surgical History:   Procedure Laterality Date    BIOPSY  11/2011    melanoma on back    DISSECT LYMPH NODE INGUINAL  3/1/2013    Procedure: DISSECT LYMPH NODE INGUINAL;  Bilateral Inguinal Lymph Node Biopsy.;  Surgeon: Tariq Acosta MD;  Location: [facility]    ESOPHAGOSCOPY, GASTROSCOPY, DUODENOSCOPY (EGD), COMBINED  7/5/2013    Procedure: COMBINED ESOPHAGOSCOPY, GASTROSCOPY, DUODENOSCOPY (EGD);  Gastroscopy;  Surgeon: Tariq Acosta MD;  Location: [facility]    HERNIORRHAPHY INGUINAL  8/6/2012    Procedure: HERNIORRHAPHY INGUINAL;  Open Repair Left Inguinal Hernia;  Surgeon: Jerad Baker MD;  Location: [facility]    INJECT STEROID  (LOCATION) N/A 8/30/2023    Procedure: steroid injection;  Surgeon: Josephine Malone MD;  Location: [facility]    INJECT STEROID (LOCATION) N/A 9/13/2023    Procedure: steroid injection;  Surgeon: Josephine Malone MD;  [facility]    INJECT STEROID (LOCATION) N/A 10/25/2023    Procedure: steroid injection, stent placement, and biopsy;  Surgeon: Josephine Malone MD;  [facility]    INJECT STEROID (LOCATION) N/A 12/1/2023    Procedure: steroid injection;  Surgeon: Josephine Malone MD;  Location: [facility]    LARYNGOSCOPY WITH MICROSCOPE N/A 9/13/2023    Procedure: MICROLARYNGOSCOPY with stent removal and biopsy;  Surgeon: Josephine Malone MD;  Location: [facility]    LARYNGOSCOPY, FLEXIBLE WITH INJECTION N/A 12/11/2023    Procedure: LARYNGOSCOPY, FLEXIBLE WITH INJECTION with steroid;  Surgeon: Josephine Malone MD;  Location: [facility] OR    LASER CO2 LARYNGOSCOPY N/A 8/30/2023    Procedure: MICROLARYNGOSCOPY, CO2 laser resection of vocal fold lesion;  Surgeon: Josephine Malone MD;  [facility]    LASER CO2 LARYNGOSCOPY N/A 10/25/2023    Procedure: MICROLARYNGOSCOPY, CO2 laser resection of vocal fold lesion;  Surgeon: Josephine Malone MD;  [facility]    LASER CO2 LARYNGOSCOPY N/A 12/1/2023    Procedure: MICROLARYNGOSCOPY CO2 laser standby, resection of vocal fold lesion, stent removal, biopsy;  Surgeon: Josephine Malone MD;  Location: [facility]    LASER CO2 LARYNGOSCOPY, COMPLEX N/A 7/17/2024    Procedure: MICROLARYNGOSCOPY, CO2 laser standby, biopsy;  Surgeon: Josephine Malone MD;  Location: [facility]       CURRENT MEDICATIONS:   Current Outpatient Medications:     acetaminophen (TYLENOL) 325 MG tablet, Take 2 tablets (650 mg) by mouth every 4 hours as needed for pain., Disp: 50 tablet, Rfl: 0    calcipotriene (DOVONOX) 0.005 % external cream, Apply topically 2 times daily In conjunction with fluorouracil cream in a 1:1 mixture., Disp: 60 g, Rfl: 2    calcium carbonate 600 mg-vitamin D 400 units (CALTRATE) 600-400 MG-UNIT per tablet, Take 1 tablet by mouth  2 times daily., Disp: , Rfl:     cetirizine (ZYRTEC) 10 MG tablet, Take 1 tablet by mouth daily., Disp: , Rfl:     Cholecalciferol (VITAMIN D) 1000 UNITS capsule, Take 1 capsule by mouth daily., Disp: , Rfl:     fluorouracil (EFUDEX) 5 % external cream, Apply topically 2 times daily In conjunction with calcipotriene cream in a 1:1 mixture., Disp: 40 g, Rfl: 2    gabapentin (NEURONTIN) 400 MG capsule, Take 400 mg by mouth at bedtime., Disp: , Rfl:     losartan (COZAAR) 50 MG tablet, Take 1 tablet (50 mg) by mouth daily (Patient taking differently: Take 50 mg by mouth at bedtime.), Disp: 90 tablet, Rfl: 3    mupirocin (BACTROBAN) 2 % external cream, Apply topically 3 times daily. Apply to affected area three times daily before applying aquaphor, Disp: 30 g, Rfl: 0    oxyCODONE (ROXICODONE) 5 MG tablet, Take 0.5-1 tablets (2.5-5 mg) by mouth every 4 hours as needed for moderate to severe pain., Disp: 15 tablet, Rfl: 0    pantoprazole (PROTONIX) 40 MG EC tablet, Take 1 tablet (40 mg) by mouth daily., Disp: 60 tablet, Rfl: 1    senna-docusate (SENOKOT-S/PERICOLACE) 8.6-50 MG tablet, Take 1 tablet by mouth 2 times daily., Disp: 30 tablet, Rfl: 0    silver sulfADIAZINE (SILVADENE) 1 % external cream, Apply topically 2 times daily. Apply to affected area, Disp: 400 g, Rfl: 0    ALLERGIES: Shrimp and Advil [ibuprofen]    SOCIAL HISTORY:    Social History     Socioeconomic History    Marital status: Single     Spouse name: Not on file    Number of children: Not on file    Years of education: Not on file    Highest education level: Not on file   Occupational History     Employer: [employer]   Tobacco Use    Smoking status: Former     Current packs/day: 0.00     Average packs/day: 1 pack/day for 20.0 years (20.0 ttl pk-yrs)     Types: Cigarettes     Start date: 1993     Quit date: 2013     Years since quittin.7     Passive exposure: Past    Smokeless tobacco: Never   Vaping Use    Vaping status: Never  Used   Substance and Sexual Activity    Alcohol use: Not Currently     Comment: rare    Drug use: No    Sexual activity: Not Currently     Partners: Female   Other Topics Concern    Parent/sibling w/ CABG, MI or angioplasty before 65F 55M? Not Asked     Service Not Asked    Blood Transfusions Not Asked    Caffeine Concern Not Asked    Occupational Exposure Not Asked    Hobby Hazards Not Asked    Sleep Concern Not Asked    Stress Concern Not Asked    Weight Concern Not Asked    Special Diet Not Asked    Back Care Not Asked    Exercise Yes     Comment: walking    Bike Helmet Not Asked    Seat Belt Not Asked    Self-Exams Not Asked   Social History Narrative    Not on file     Social Drivers of Health     Financial Resource Strain: Low Risk  (10/18/2024)    Financial Resource Strain     Within the past 12 months, have you or your family members you live with been unable to get utilities (heat, electricity) when it was really needed?: No   Food Insecurity: Low Risk  (10/18/2024)    Food Insecurity     Within the past 12 months, did you worry that your food would run out before you got money to buy more?: No     Within the past 12 months, did the food you bought just not last and you didn t have money to get more?: No   Transportation Needs: Low Risk  (10/18/2024)    Transportation Needs     Within the past 12 months, has lack of transportation kept you from medical appointments, getting your medicines, non-medical meetings or appointments, work, or from getting things that you need?: No   Physical Activity: Not on file   Stress: Not on file   Social Connections: Unknown (12/24/2021)    Received from [facility], [facility]    Social Connections     Frequency of Communication with Friends and Family: Not on file   Interpersonal Safety: Low Risk  (10/18/2024)    Interpersonal Safety     Do you feel physically and emotionally safe where you  currently live?: Yes     Within the past 12 months, have you been hit, slapped, kicked or otherwise physically hurt by someone?: No     Within the past 12 months, have you been humiliated or emotionally abused in other ways by your partner or ex-partner?: No   Housing Stability: Low Risk  (10/18/2024)    Housing Stability     Do you have housing? : Yes     Are you worried about losing your housing?: No         FAMILY HISTORY:   Family History   Problem Relation Age of Onset    Diabetes Mother     Hypertension Mother     Cancer Father         stomach    Heart Disease Father     Heart Disease Brother     Diabetes Sister     Diabetes Sister     Diabetes Sister     Heart Disease Brother     Cancer Sister     Glaucoma No family hx of     Macular Degeneration No family hx of       Non-contributory for problems with anesthesia    REVIEW OF SYSTEMS:   The patient was asked a 14 point review of systems regarding constitutional symptoms, eye symptoms, ears, nose, mouth, throat symptoms, cardiovascular symptoms, respiratory symptoms, gastrointestinal symptoms, genitourinary symptoms, musculoskeletal symptoms, integumentary symptoms, neurological symptoms, psychiatric symptoms, endocrine symptoms, hematologic/lymphatic symptoms, and allergic/ immunologic symptoms.   The pertinent factors have been included in the HPI and below.  Patient Supplied Answers to Review of Systems      10/17/2024     9:42 AM    ENT ROS   Constitutional Weight loss    Appetite change    Unexplained fatigue   Cardiopulmonary Cough    Breathing problems    Wheezing       Physical Examination   The patient underwent a physical examination as described below. The pertinent positive and negative findings are summarized after the description of the examination.  Constitutional: The patient's developmental and nutritional status was assessed. The patient's voice quality was assessed.  Head and Face: The head and face were inspected for deformities. The  sinuses were palpated. The salivary glands were palpated. Facial muscle strength was assessed bilaterally.  Eyes: Extraocular movements and primary gaze alignment were assessed.  Ears, Nose, Mouth and Throat: The ears and nose were examined for deformities. The nasal septum, mucosa, and turbinates were inspected by anterior rhinoscopy. The lips, teeth, and gums were examined for abnormalities. The oral mucosa, tongue, palate, tonsils, lateral and posterior pharynx were inspected for the presence of asymmetry or mucosal lesions.    Neck: The tracheal position was noted, and the neck mass palpated to determine if there were any asymmetries, abnormal neck masses, thyromegally, or thyroid nodules.  Respiratory: The nature of the breathing and chest expansion/symmetry was observed.  Cardiovascular: The patient was examined to determine the presence of any edema or jugular venous distension.  Abdomen: The contour of the abdomen was noted.  Lymphatic: The patient was examined for infraclavicular lymphadenopathy.  Musculoskeletal: The patient was inspected for the presence of skeletal deformities.  Extremities: The extremities were examined for any clubbing or cyanosis.  Skin: The skin was examined for inflammatory or neoplastic conditions.  Neurologic: The patient's orientation, mood, and affect were noted. The cranial nerve  functions were examined.  Other pertinent positive and negative findings on physical examination:   OC/OP: no lesions, uvula midline, soft palate elevates symmetrically   Neck: no lesions, no TH tenderness to palpation   6 shiley in place  All other physical examination findings were within normal limits and noncontributory.    Procedures   Flexible laryngoscopy (CPT 39338)      Pre-procedure diagnosis: dysphonia  Post-procedure diagnosis: same as above  Indication for procedure: Mr. Ennis is a 79 year old male with see above  Procedure(s): Fiberoptic Laryngoscopy    Details of Procedure: After  informed consent was obtained, the patient was seated in the examination chair.  The areas of the nasopharynx as well as the hypopharynx were anesthetized with topical 4% lidocaine with 0.25% phenylephrine atomizer.  Examination of the base of tongue was performed first.  Attention was directed to any evidence of masses in the area or evidence of leukoplakia or candidal infection.  Attention was directed to the epiglottis where its size and position was determined and its movement on phonation of the vowel  e .  The piriform sinuses were then inspected for any mass lesions or pooling of secretions.  Attention was then directed to the larynx. The vocal folds were inspected for infection or any areas of leukoplakia, for masses, polypoid degeneration, or hemorrhage.  Having done this, the arytenoids and vocal processes were inspected for erythema or evidence of granuloma formation.  The posterior commissure was then inspected for evidence of inflammatory changes in the mucosa and heaping up of mucosal tissue. The patient was then instructed to say the vowel  e .  Adduction of vocal folds to the midline was observed for any evidence of paresis or paralysis of the larynx or asymmetry in rotation of the larynx to the left or right. The patient was asked to breathe and the degree of abduction was noted bilaterally.  Subglottic view of the larynx was obtained for any additional mass lesions or mucosal changes.  Finally the post cricoid was examined for evidence of pooling of secretions, as well as the pharyngeal wall mucosa.   Anesthesia type: 0.25% phenylephrine    Findings:  Anatomic/physiological deviations: RNC, improved swelling, arytenoid edema, anterior glottic web   Right vocal process: Some restriction of mobility   Left vocal process: Some restriction of mobility  Glottal gap: Complete glottal closure  Supraglottic structures: Normal  Hypopharynx: Normal     Estimated Blood Loss: minimal  Complications:  "None  Disposition: Patient tolerated the procedure well      Review of Relevant Clinical Data   I personally reviewed:   10/17 path  Final Diagnosis   A. RIGHT SUPRAGLOTTIS:  - Predominantly necrotic debris with granulation tissue and reactive atypia consistent with treatment effect  - No definitive carcinoma is present     B. LEFT SUPRAGLOTTIS:  - Predominantly necrotic debris with granulation tissue and reactive atypia consistent with treatment effect  - No definitive carcinoma is present     C. POST CRICOID BIOPSY:  - Necrotic debris, no evidence of malignancy     Labs:  No results found for: \"TSH\"  Lab Results   Component Value Date     10/20/2024    CO2 21 (L) 10/20/2024    BUN 22.0 10/20/2024    PHOS 3.4 10/23/2024     Lab Results   Component Value Date    WBC 5.1 10/22/2024    HGB 12.3 (L) 10/22/2024    HCT 38.3 (L) 10/22/2024    MCV 97 10/22/2024     10/22/2024     Lab Results   Component Value Date    PTT 23 10/17/2024    INR 1.05 10/17/2024     No results found for: \"DUANE\"  No components found for: \"RHEUMATOIDFACTOR\", \"RF\"  Lab Results   Component Value Date    CRP <2.9 07/07/2022    CRP <2.9 01/07/2022    CRP <2.9 07/28/2021    CRP <2.9 03/05/2021    CRP <2.9 11/11/2020     No components found for: \"CKTOT\", \"URICACID\"  No components found for: \"C3\", \"C4\", \"DSDNAAB\", \"NDNAABIFA\"  Lab Results   Component Value Date    MPOAB Not Reported 02/14/2014       Patient reported Quality of Life (QOL) Measures   Patient Supplied Answers To VHI Questionnaire      1/22/2024     1:02 PM   Voice Handicap Index (VHI-10)   My voice makes it difficult for people to hear me 4    People have difficulty understanding me in a noisy room 4    My voice difficulties restrict my personal and social life.  2    I feel left out of conversations because of my voice 3    My voice problem causes me to lose income 0    I feel as though I have to strain to produce voice 4    The clarity of my voice is unpredictable 1    My " "voice problem upsets me 2    My voice makes me feel handicapped 2    People ask, \"What's wrong with your voice?\" 2    VHI-10 24       Patient-reported         Patient Supplied Answers To EAT Questionnaire       No data to display                  Patient Supplied Answers To CSI Questionnaire      8/23/2023     8:49 AM   Cough Severity Index (CSI)   My cough is worse when I lie down 0    My coughing problem causes me to restrict my personal and social life 0    I tend to avoid places because of my cough problem 0    I feel embarrassed because of my coughing problem 1    People ask, ''What's wrong?'' because I cough a lot 1    I run out of air when I cough 0    My coughing problem affects my voice 2    My coughing problem limits my physical activity 0    My coughing problem upsets me 0    People ask me if I am sick because I cough a lot 2    CSI Score 6       Patient-reported         Patient Supplied Answers to Dyspnea Index Questionnaire:       No data to display                    Josephine Malone MD    Laryngology    [facility]  Department of  Otolaryngology - Head and Neck Surgery  [facility] [place] [address]  Appointment line: [phone]  Fax: [phone]  [email or web address]       "

## 2024-10-28 NOTE — PROGRESS NOTES
[facility] Care [facility]  Community Health Worker Initial Outreach    Background: Primary Care - Care Coordination program identified per system criteria based on ED discharge report and reviewed for possible outreach.    CHW will not proceed with patient outreach due to the following reason:    Patient is followed by speciality CC and has been reviewed for primary care care coordination services on 10/24/2024. At that time primary care care coordination program was closed since patient is receiving similar services from ENT care coordination. Please see Patient Outreach fby Clinic Care Coordination RN on 10/24/2024.    Plan: Primary Care - Care Coordination episode addressed appropriately per reason noted above.        Claudia Saeed  Community Health Worker  Connected Care Resource [facility]    *Connected Care Resource Team does NOT follow patient ongoing. Referrals are identified based on internal discharge reports and the outreach is to ensure patient has an understanding of their discharge instructions.

## 2024-10-29 ENCOUNTER — PATIENT OUTREACH (OUTPATIENT)
Dept: OTOLARYNGOLOGY | Facility: CLINIC | Age: 79
End: 2024-10-29

## 2024-10-29 ENCOUNTER — THERAPY VISIT (OUTPATIENT)
Dept: SPEECH THERAPY | Facility: CLINIC | Age: 79
End: 2024-10-29
Payer: COMMERCIAL

## 2024-10-29 ENCOUNTER — OFFICE VISIT (OUTPATIENT)
Dept: OTOLARYNGOLOGY | Facility: CLINIC | Age: 79
End: 2024-10-29
Payer: COMMERCIAL

## 2024-10-29 ENCOUNTER — TELEPHONE (OUTPATIENT)
Dept: OTOLARYNGOLOGY | Facility: CLINIC | Age: 79
End: 2024-10-29

## 2024-10-29 VITALS
BODY MASS INDEX: 25.91 KG/M2 | HEART RATE: 68 BPM | WEIGHT: 171 LBS | SYSTOLIC BLOOD PRESSURE: 121 MMHG | DIASTOLIC BLOOD PRESSURE: 61 MMHG | HEIGHT: 68 IN | OXYGEN SATURATION: 96 %

## 2024-10-29 DIAGNOSIS — R49.0 DYSPHONIA: ICD-10-CM

## 2024-10-29 DIAGNOSIS — Z93.0 TRACHEOSTOMY IN PLACE (H): ICD-10-CM

## 2024-10-29 DIAGNOSIS — C32.0 CANCER OF GLOTTIS (H): Primary | ICD-10-CM

## 2024-10-29 DIAGNOSIS — C32.9 LARYNGEAL CANCER (H): Primary | ICD-10-CM

## 2024-10-29 DIAGNOSIS — Z43.0 TRACHEOSTOMY CARE (H): Primary | ICD-10-CM

## 2024-10-29 DIAGNOSIS — Z43.0 TRACHEOSTOMY CARE (H): ICD-10-CM

## 2024-10-29 PROCEDURE — 31575 DIAGNOSTIC LARYNGOSCOPY: CPT | Mod: 51 | Performed by: OTOLARYNGOLOGY

## 2024-10-29 PROCEDURE — 31615 TRCHEOBRNCHSC EST TRACHS INC: CPT | Performed by: OTOLARYNGOLOGY

## 2024-10-29 PROCEDURE — 92597 ORAL SPEECH DEVICE EVAL: CPT | Mod: GN | Performed by: SPEECH-LANGUAGE PATHOLOGIST

## 2024-10-29 PROCEDURE — 99214 OFFICE O/P EST MOD 30 MIN: CPT | Mod: 25 | Performed by: OTOLARYNGOLOGY

## 2024-10-29 RX ORDER — CETIRIZINE HYDROCHLORIDE 10 MG/1
1 TABLET ORAL DAILY
COMMUNITY
Start: 2024-08-08

## 2024-10-29 RX ORDER — CIPROFLOXACIN AND DEXAMETHASONE 3; 1 MG/ML; MG/ML
SUSPENSION/ DROPS AURICULAR (OTIC)
Qty: 7.5 ML | Refills: 1 | Status: SHIPPED | OUTPATIENT
Start: 2024-10-29 | End: 2024-10-31

## 2024-10-29 NOTE — TELEPHONE ENCOUNTER
[facility] Call Center    Phone Message    May a detailed message be left on voicemail: yes     Reason for Call: Other: Pharmacy called in regards to the medication that was sent over today for the medication. Instructions are unclear and would like clarification     Action Taken: Other: [facility] E[facility]    Travel Screening: Not Applicable     Date of Service:

## 2024-10-29 NOTE — LETTER
10/29/2024       RE: Joshua Enins  [address] [facility] [address]     Dear Colleague,    Thank you for referring your patient, Joshua Ennis, to the [facility] [place] [facility]. Please see a copy of my visit note below.        [facility]   at the [facility]   Otolaryngology Clinic     Patient: Joshua Ennis    MRN: [id number]    : 1945    Age/Gender: 79 year old male  Date of Service: 10/29/2024  Rendering Provider:   Josephine Malone MD     Interval History   HISTORY OF PRESENT ILLNESS: Mr. Ennis is a 79 year old male is being followed for dysphonia. he was initially seen on 8/15/23. Please refer to this note for full history.   Of note ***    Today, he presents for follow up. he reports:  - ***    Dysphonia:  ***  He reports the voice problem began   ago and has   over time. He feels the problem was caused by   and it bothers him  . More specifically, he has been experiencing   . He has   vocal demands with  .    Dysphagia:  ***  He reports that the swallowing problem began   ago, has   over time, and bothers him  . With regard to symptoms, He notes  . He maintains a   and   diet.     Dyspnea:  ***  He reports that the breathing problem began   ago, has   over time, and bothers him  . With regard to symptoms, he notes  . In regards to exertion, his breathing problem can be triggered by  .    Coughing / Throat-clearing:  ***  He reports that the cough / throat-clearing problem began   ago, has   over time, and bothers him  . With regard to symptoms, he notes  . His cough / throat-clearing can be triggered by  .    GERD/LPRD:  ***    Procedures   ***    Return to clinic in ***    Scribe Disclosure:   I, Isela Clemente, am serving as a scribe; to document services personally performed by Josephine Malone MD -based on data collection and the provider's statements to me.     Provider  Disclosure:  I agree with above History, Review of Systems, Physical exam and Plan.  I have reviewed the content of the documentation and have edited it as needed. I have personally performed the services documented here and the documentation accurately represents those services and the decisions I have made.      Josephine Malone MD    Laryngology    [facility]  Department of  Otolaryngology - Head and Neck Surgery  [facility] [place] [address]  Appointment line: [phone]  Fax: [phone]  [email or web address]      Again, thank you for allowing me to participate in the care of your patient.      Sincerely,    Josephine Malone MD

## 2024-10-29 NOTE — TELEPHONE ENCOUNTER
Called pharmacy and clarified instructions. Pharmacy was agreeable and verbalized understanding of the situation. Nina Daly RN on 10/29/2024 at 1:28 PM

## 2024-10-29 NOTE — PROGRESS NOTES
SPEECH LANGUAGE PATHOLOGY EVALUATION              Subjective        Presenting condition or subjective complaint: Pt presents today in conjunction with ENT clinic visit and is seen per MD request.  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 Negative for high-grade dysplasia and malignancy.    Hoarseness     Hypertension     Idiopathic progressive polyneuropathy     Kidney problem     Laryngeal cancer (H)     Malignant melanoma (H)     Malignant neoplasm (H)     melanoma    Squamous cell carcinoma     Russo syndrome      Dates & types of surgery:  awake tracheostomy 10/17/24  Past Surgical History:   Procedure Laterality Date    BIOPSY  11/2011    melanoma on back    DISSECT LYMPH NODE INGUINAL  3/1/2013    Procedure: DISSECT LYMPH NODE INGUINAL;  Bilateral Inguinal Lymph Node Biopsy.;  Surgeon: Tariq Acosta MD;  Location: [facility]    ESOPHAGOSCOPY, GASTROSCOPY, DUODENOSCOPY (EGD), COMBINED  7/5/2013    Procedure: COMBINED ESOPHAGOSCOPY, GASTROSCOPY, DUODENOSCOPY (EGD);  Gastroscopy;  Surgeon: Tariq Acosta MD;  Location: [facility]    HERNIORRHAPHY INGUINAL  8/6/2012    Procedure: HERNIORRHAPHY INGUINAL;  Open Repair Left Inguinal Hernia;  Surgeon: Jerad Baker MD;  Location: [facility]    INJECT STEROID (LOCATION) N/A 8/30/2023    Procedure: steroid injection;  Surgeon: Josephine Malone MD;  Location: [facility]    INJECT STEROID (LOCATION) N/A 9/13/2023    Procedure: steroid injection;  Surgeon: Josephine Malone MD;  Location: [facility]    INJECT STEROID (LOCATION) N/A 10/25/2023     Procedure: steroid injection, stent placement, and biopsy;  Surgeon: Josephine Malone MD;  [facility]: [facility]    INJECT STEROID (LOCATION) N/A 12/1/2023    Procedure: steroid injection;  Surgeon: Josephine Malone MD;  Location: [facility]    LARYNGOSCOPY WITH MICROSCOPE N/A 9/13/2023    Procedure: MICROLARYNGOSCOPY with stent removal and biopsy;  Surgeon: Josephine Malone MD;  Location: [facility]    LARYNGOSCOPY, FLEXIBLE WITH INJECTION N/A 12/11/2023    Procedure: LARYNGOSCOPY, FLEXIBLE WITH INJECTION with steroid;  Surgeon: Josephine Malone MD;  Location: [facility] OR    LASER CO2 LARYNGOSCOPY N/A 8/30/2023    Procedure: MICROLARYNGOSCOPY, CO2 laser resection of vocal fold lesion;  Surgeon: Josephine Malone MD;  Location: [facility]    LASER CO2 LARYNGOSCOPY N/A 10/25/2023    Procedure: MICROLARYNGOSCOPY, CO2 laser resection of vocal fold lesion;  Surgeon: Josephine Malone MD;  Location: [facility]    LASER CO2 LARYNGOSCOPY N/A 12/1/2023    Procedure: MICROLARYNGOSCOPY CO2 laser standby, resection of vocal fold lesion, stent removal, biopsy;  Surgeon: Josephine Malone MD;  Location: [facility]    LASER CO2 LARYNGOSCOPY, COMPLEX N/A 7/17/2024    Procedure: MICROLARYNGOSCOPY, CO2 laser standby, biopsy;  Surgeon: Josephine Malone MD;  Location: [facility]       Prior diagnostic imaging/testing results: (Patient-Rptd) CT scan; X-ray   yes VFSS  Prior therapy history for the same diagnosis, illness or injury:   yes for swallowing    Living Environment  Social support: (Patient-Rptd) With a significant other or spouse   Help at home: (Patient-Rptd) Self Cares (home health aide/personal care attendant, family, etc)  Equipment owned:       Employment: (Patient-Rptd) Yes (Patient-Rptd)   Hobbies/Interests: (Patient-Rptd) mowing walking    Patient goals for therapy:  to get trach out    Pain assessment: Pain present     Objective     Type of trach: Shiley, cuffless  Size of trach: 6  Oxygen Supply: room air   Tracheal suctioning needs: pt and significant other report  suctioning 1-2x/day; yellow secretions observed coming out around the trach stoma  Air movement around trach: adequate upon finger occlusion   Passy Adore Speaking valve: placed without difficulty   Voicing observations: breathy and rough  Total amount of time with Speaking valve: pt wore speaking valve the entire visit ~30 minutes    Assessment & Plan   CLINICAL IMPRESSIONS   Medical Diagnosis: glottic cancer    Treatment Diagnosis: dysphonia; tracheostomy in place   Impression/Assessment: Pt is a 79 year old male with communication complaints. The following significant findings have been identified: impaired communication, characterized by tracheostomy present. Identified deficits interfere with their ability to communicate wants and needs and communicate within the home or community as compared to previous level of function.    Reviewed suctioning and uses of HME with pt and s/o.     PLAN OF CARE  Treatment Interventions: Communication    Prognosis to achieve stated therapy goals is good   Rehab potential is impacted by: family/caregiver support, pending medical intervention    Long Term Goals:   SLP Goal 1  Goal Identifier: Trach Cleaning/Maintenance  Goal Description: 1.  Patient and family will demonstrate independent ability to clean trach cannula as well as trach stoma with 100% accuracy.  Rationale: To maximize functional communication within the home or community  Target Date: 01/26/25  SLP Goal 2  Goal Identifier: Use of speaking valve or trach For communication  Goal Description: 2.  Patient will demonstrate independent ability to use speaking valve or trach to maximize functional communication within the home and community.  Rationale: To maximize functional communication within the home or community  Target Date: 01/26/25        Frequency of Treatment: 2x/month  Duration of Treatment: 12 months     Recommended Referrals to Other Professionals:  n/a    Education Assessment:   Learner/Method: Patient;No  Barriers to Learning;Significant Other    Risks and benefits of evaluation/treatment have been explained.   Patient/Family/caregiver agrees with Plan of Care.     Evaluation Time:        Signing Clinician: Clementina Jones, SLP

## 2024-10-31 DIAGNOSIS — C32.9 LARYNGEAL CANCER (H): ICD-10-CM

## 2024-10-31 RX ORDER — CIPROFLOXACIN AND DEXAMETHASONE 3; 1 MG/ML; MG/ML
SUSPENSION/ DROPS AURICULAR (OTIC)
Qty: 7.5 ML | Refills: 1 | Status: SHIPPED | OUTPATIENT
Start: 2024-10-31

## 2024-10-31 RX ORDER — PANTOPRAZOLE SODIUM 40 MG/1
40 TABLET, DELAYED RELEASE ORAL DAILY
Qty: 60 TABLET | Refills: 1 | Status: SHIPPED | OUTPATIENT
Start: 2024-10-31

## 2024-11-07 ENCOUNTER — PATIENT OUTREACH (OUTPATIENT)
Dept: OTOLARYNGOLOGY | Facility: CLINIC | Age: 79
End: 2024-11-07
Payer: COMMERCIAL

## 2024-11-07 NOTE — PROGRESS NOTES
Called patient to let her know that we sent off the fax to the medical supply. Patient was agreeable and will reach out with any follow up questions. Nina Daly RN on 11/7/2024 at 10:06 AM

## 2024-11-07 NOTE — PROGRESS NOTES
Returned patient call regarding med supply order. Per med supply company they had not received the fax yet. Patient wife will reach out to get a direct fax number so we can resend the order. Patient wife was agreeable and verbalized understanding of the situation. Nina Daly RN on 11/7/2024 at 8:26 AM

## 2024-11-12 NOTE — PROGRESS NOTES
[facility]   at the [facility]   Otolaryngology Clinic     Patient: Joshua Ennis    MRN: [id number]    : 1945    Age/Gender: 79 year old male  Date of Service: 2024  Rendering Provider:   Josephine Malone MD         Impression & Plan     IMPRESSION: Mr. Ennis is a 79 year old male who is being seen for the following:    Dysphonia   - anterior commissure lesion seen on scope on 23  - voice changes worsening for a few months  - prior smoker  - scope shows anterior commissure lesion with extension to the right vocal fold  - given anterior location will require CT neck to rule out thyroid cartilage extension  - discussed awake biopsy - patient in agreement this was done today  - pathology showed SCC  - CT chest had findings of new nodules - after multiple discussions with radiology the decision was made to proceed with surgery and repeat imaging for this finding in the chest  - s/p MDL with bilateral cordectomy with stent placement on 23  - today shows that the vocal folds closed around the stent - discussed he needs the scar broken up and bigger stent placed - patient in agreement   - pathology showed SCC with positive margins  - went back for MDL and stent removal on 23   - symptoms 10/10/2023 are stable, voice is poor  - scope shows  laryngeal web  - discussed he has positive margins - will proceed with repeat surger - if can't clear will need radiation  - ct chest shows improved size of lung nodule of concern  - s/p microlaryngoscopy, CO2 laser resection of vocal fold lesion, steroid injection, stent placement, and biopsy 10/25/23  - pathology shows invasive squamous cell carcinoma  - symptoms 2023 are stable, voice is poor, discussed case at tumor board, will continue with close observation and repeat biopsies  - scope shows stent in place, vocal folds are open, laryngeal web developed  - discussed removal of stent, then will collect a sample, if the  sample is cancerous, will refer for radiation therapy  - s/p microlaryngoscopy, CO2 laser resection of vocal fold lesion, steroid injection, stent removal, and biopsy 12/01/23  - pathology showed detached fragment of squamous epithelium with mild to moderate dysplasia, no evidence of carcinoma  - s/p laryngoscopy and anterior glottic web vocal fold steroid injection 12/11/23      - symptoms 1/4/2024 are has been having a hard time talking with his voice  - strobe shows bilateral vocal fold stiffness and swelling with question of laryngeal web, ventricular phonation  - s/p steroid injection today, does not appear to have a web, but rather vocal fold stiffness and swelling  - discussed treatment for vocal folds will be with steroid injections and voice therapy  - discussed voice result is questionable given severity of ventricular phonation  - symptoms 1/18/2024 are stable  - scope shows anterior glottic web, did lidocaine anesthesia, but difficult to see if the vocal folds can split apart today as compared to last time, he had laryngeal spasm  - discussed that we should focus on surveillance at this time and keep him in voice therapy, I would not do a repeat stunt placement given the last two did not work   - symptoms 6/27/2024 are worse difficulty breathing  - scope shows anterior glottic web, moderate airway restriction  - has final read of CT pending  - discussed I am concerned for tumor, needs evaluation under anesthesia   discussed awake intubation, discussed risk of trach  - s/p MDL with biopsy 7/17/24 with pathology showing SCC, tumor board discussion ensued with decision to proceed with radiation   - symptoms 7/29/2024 are stable per him though improved per Earlene, less panting with mowing the lawn  - scope shows anterior glottic web, though improved glottic airway   - discussed if breathing should worsen during treatment he is to reach out and consider trach placement   - symptoms 10/17/2024 are worsened for  the past few weeks in terms of his breathing, has finished radiation, had pneumonia that resolved but his breathing has not, breathing has gotten worse, here with his fiancee who is concerned, patient denies difficulty breathing however he has stridor   - 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  - discussed his options at this time are observation, steroid treatment vs awake tracheotomy  - given how severe the airway restriction is would recommend awake tracheotomy   - patient reluctant saying he would rather die, he is tired of all these interventions, everytime something happens there is another thing  - his fiancee on the other hand wants him to proceed  - after much discussion patient in agreement and will proceed to the hospital  - finished radition on 9/24  - had trach and MDL with biopsy on 10/17/24  - pathology negative for carcinoma  - symptoms 10/29/2024 are stable   - scope shows improved glottic airway, arytenoid edema  - symptoms 11/12/2024 are stable, does not like the trach  - scope shows improved glottic airway, arytenoid edema  Plan  - 12 weeks from end date of radiation needs PET-CT  - lymphedema therapy after         2. Trach dependence  - in the setting of airway edema   - since 10/17/24  - has 6 shiley trach  - speaking with speaking valve  - not capping  - at night uses HME  - tbronch shows no secretions  - symptoms 11/12/2024 are breathing is good   - scope shows improved glottic edema but persistent arytenoid edema, erythema around stoma   - downsize trach with capping trial, size 5  Plan  - ciprodex drops, apply 4 drops around stoma daily  - Augmentin    - cap trach during the day only  - uncap trach at night and use the humidifier   - return in one month        RETURN VISIT: 12/2024    Chief Complaint   Anterior commissure SCC  S/p MDL with bilateral cordectomy and steroid injection 8/30/23  S/p MDL with stent removal and steroid  injection on 9/13/23  S/p microlaryngoscopy, CO2 laser resection of vocal fold lesion, steroid injection, stent placement, and biopsy 10/25/23    S/p microlaryngoscopy, CO2 laser resection of vocal fold lesion, steroid injection, stent removal, and biopsy 12/01/23  S/p laryngoscopy and anterior glottic web vocal fold steroid injection 12/11/23   S/p steroid injection 1/4/24           S/p radiation completed on 9/24/24   Trach placement 10/17/24  Interval History   HISTORY OF PRESENT ILLNESS:  Mr. Ennis is a 79 year old male is being followed for dysphonia. he was initially seen on 8/15/23. Please refer to this note for full history.        Today, he presents for follow up. 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 Negative for high-grade dysplasia and malignancy.    Hoarseness     Hypertension     Idiopathic progressive polyneuropathy     Kidney problem     Laryngeal cancer (H)     Malignant melanoma (H)     Malignant neoplasm (H)     melanoma    Squamous cell carcinoma     Russo syndrome        PAST SURGICAL HISTORY:   Past Surgical History:   Procedure Laterality Date    BIOPSY  11/2011    melanoma on back    DISSECT LYMPH NODE INGUINAL  3/1/2013    Procedure: DISSECT LYMPH NODE INGUINAL;  Bilateral Inguinal Lymph Node Biopsy.;  Surgeon: Tariq Acosta MD;  Location: [facility]    ESOPHAGOSCOPY, GASTROSCOPY, DUODENOSCOPY  (EGD), COMBINED  7/5/2013    Procedure: COMBINED ESOPHAGOSCOPY, GASTROSCOPY, DUODENOSCOPY (EGD);  Gastroscopy;  Surgeon: Tariq Acosta MD;  Location: [facility]    HERNIORRHAPHY INGUINAL  8/6/2012    Procedure: HERNIORRHAPHY INGUINAL;  Open Repair Left Inguinal Hernia;  Surgeon: Jerad Baker MD;  Location: [facility]    INJECT STEROID (LOCATION) N/A 8/30/2023    Procedure: steroid injection;  Surgeon: Josephine Malone MD;  Location: [facility]    INJECT STEROID (LOCATION) N/A 9/13/2023    Procedure: steroid injection;  Surgeon: Josephine Malone MD;  [facility]: [facility]    INJECT STEROID (LOCATION) N/A 10/25/2023    Procedure: steroid injection, stent placement, and biopsy;  Surgeon: Josephine Malone MD;  Location: [facility]    INJECT STEROID (LOCATION) N/A 12/1/2023    Procedure: steroid injection;  Surgeon: Josephine Malone MD;  [facility]    LARYNGOSCOPY WITH MICROSCOPE N/A 9/13/2023    Procedure: MICROLARYNGOSCOPY with stent removal and biopsy;  Surgeon: Josephine Malone MD;  Location: [facility]    LARYNGOSCOPY, FLEXIBLE WITH INJECTION N/A 12/11/2023    Procedure: LARYNGOSCOPY, FLEXIBLE WITH INJECTION with steroid;  Surgeon: Josephine Malone MD;  Location: [facility]    LASER CO2 LARYNGOSCOPY N/A 8/30/2023    Procedure: MICROLARYNGOSCOPY, CO2 laser resection of vocal fold lesion;  Surgeon: Josephine Malone MD;  [facility]    LASER CO2 LARYNGOSCOPY N/A 10/25/2023    Procedure: MICROLARYNGOSCOPY, CO2 laser resection of vocal fold lesion;  Surgeon: Josephine Malone MD;  Location: [facility]    LASER CO2 LARYNGOSCOPY N/A 12/1/2023    Procedure: MICROLARYNGOSCOPY CO2 laser standby, resection of vocal fold lesion, stent removal, biopsy;  Surgeon: Josephine Malone MD;  Location: [facility]    LASER CO2 LARYNGOSCOPY, COMPLEX N/A 7/17/2024    Procedure: MICROLARYNGOSCOPY, CO2 laser standby, biopsy;  Surgeon: Josephine Malone MD;  Location: [facility]       CURRENT MEDICATIONS:   Current Outpatient Medications:     acetaminophen (TYLENOL) 325 MG tablet, Take 2 tablets  (650 mg) by mouth every 4 hours as needed for pain., Disp: 50 tablet, Rfl: 0    calcipotriene (DOVONOX) 0.005 % external cream, Apply topically 2 times daily In conjunction with fluorouracil cream in a 1:1 mixture., Disp: 60 g, Rfl: 2    calcium carbonate 600 mg-vitamin D 400 units (CALTRATE) 600-400 MG-UNIT per tablet, Take 1 tablet by mouth 2 times daily., Disp: , Rfl:     cetirizine (ZYRTEC) 10 MG tablet, Take 1 tablet by mouth daily., Disp: , Rfl:     Cholecalciferol (VITAMIN D) 1000 UNITS capsule, Take 1 capsule by mouth daily., Disp: , Rfl:     ciprofloxacin-dexAMETHasone (CIPRODEX) 0.3-0.1 % otic suspension, Instill 4 drops into trach twice daily, Disp: 7.5 mL, Rfl: 1    fluorouracil (EFUDEX) 5 % external cream, Apply topically 2 times daily In conjunction with calcipotriene cream in a 1:1 mixture., Disp: 40 g, Rfl: 2    gabapentin (NEURONTIN) 400 MG capsule, Take 400 mg by mouth at bedtime., Disp: , Rfl:     losartan (COZAAR) 50 MG tablet, Take 1 tablet (50 mg) by mouth daily (Patient taking differently: Take 50 mg by mouth at bedtime.), Disp: 90 tablet, Rfl: 3    mupirocin (BACTROBAN) 2 % external cream, Apply topically 3 times daily. Apply to affected area three times daily before applying aquaphor, Disp: 30 g, Rfl: 0    oxyCODONE (ROXICODONE) 5 MG tablet, Take 0.5-1 tablets (2.5-5 mg) by mouth every 4 hours as needed for moderate to severe pain., Disp: 15 tablet, Rfl: 0    pantoprazole (PROTONIX) 40 MG EC tablet, Take 1 tablet (40 mg) by mouth daily., Disp: 60 tablet, Rfl: 1    senna-docusate (SENOKOT-S/PERICOLACE) 8.6-50 MG tablet, Take 1 tablet by mouth 2 times daily., Disp: 30 tablet, Rfl: 0    silver sulfADIAZINE (SILVADENE) 1 % external cream, Apply topically 2 times daily. Apply to affected area, Disp: 400 g, Rfl: 0    ALLERGIES: Shrimp and Advil [ibuprofen]    SOCIAL HISTORY:    Social History     Socioeconomic History    Marital status: Single     Spouse name: Not on file    Number of children:  Not on file    Years of education: Not on file    Highest education level: Not on file   Occupational History     Employer: [employer]   Tobacco Use    Smoking status: Former     Current packs/day: 0.00     Average packs/day: 1 pack/day for 20.0 years (20.0 ttl pk-yrs)     Types: Cigarettes     Start date: 1993     Quit date: 2013     Years since quittin.7     Passive exposure: Past    Smokeless tobacco: Never   Vaping Use    Vaping status: Never Used   Substance and Sexual Activity    Alcohol use: Not Currently     Comment: rare    Drug use: No    Sexual activity: Not Currently     Partners: Female   Other Topics Concern    Parent/sibling w/ CABG, MI or angioplasty before 65F 55M? Not Asked     Service Not Asked    Blood Transfusions Not Asked    Caffeine Concern Not Asked    Occupational Exposure Not Asked    Hobby Hazards Not Asked    Sleep Concern Not Asked    Stress Concern Not Asked    Weight Concern Not Asked    Special Diet Not Asked    Back Care Not Asked    Exercise Yes     Comment: walking    Bike Helmet Not Asked    Seat Belt Not Asked    Self-Exams Not Asked   Social History Narrative    Not on file     Social Drivers of Health     Financial Resource Strain: Low Risk  (10/18/2024)    Financial Resource Strain     Within the past 12 months, have you or your family members you live with been unable to get utilities (heat, electricity) when it was really needed?: No   Food Insecurity: Low Risk  (10/18/2024)    Food Insecurity     Within the past 12 months, did you worry that your food would run out before you got money to buy more?: No     Within the past 12 months, did the food you bought just not last and you didn t have money to get more?: No   Transportation Needs: Low Risk  (10/18/2024)    Transportation Needs     Within the past 12 months, has lack of transportation kept you from medical appointments, getting your medicines, non-medical meetings or appointments,  work, or from getting things that you need?: No   Physical Activity: Not on file   Stress: Not on file   Social Connections: Unknown (12/24/2021)    Received from [email or web address] [facility]    Social Connections     Frequency of Communication with Friends and Family: Not on file   Interpersonal Safety: Low Risk  (10/18/2024)    Interpersonal Safety     Do you feel physically and emotionally safe where you currently live?: Yes     Within the past 12 months, have you been hit, slapped, kicked or otherwise physically hurt by someone?: No     Within the past 12 months, have you been humiliated or emotionally abused in other ways by your partner or ex-partner?: No   Housing Stability: Low Risk  (10/18/2024)    Housing Stability     Do you have housing? : Yes     Are you worried about losing your housing?: No         FAMILY HISTORY:   Family History   Problem Relation Age of Onset    Diabetes Mother     Hypertension Mother     Cancer Father         stomach    Heart Disease Father     Heart Disease Brother     Diabetes Sister     Diabetes Sister     Diabetes Sister     Heart Disease Brother     Cancer Sister     Glaucoma No family hx of     Macular Degeneration No family hx of       Non-contributory for problems with anesthesia    REVIEW OF SYSTEMS:   The patient was asked a 14 point review of systems regarding constitutional symptoms, eye symptoms, ears, nose, mouth, throat symptoms, cardiovascular symptoms, respiratory symptoms, gastrointestinal symptoms, genitourinary symptoms, musculoskeletal symptoms, integumentary symptoms, neurological symptoms, psychiatric symptoms, endocrine symptoms, hematologic/lymphatic symptoms, and allergic/ immunologic symptoms.   The pertinent factors have been included in the HPI and below.  Patient Supplied Answers to Review of Systems      10/17/2024     9:42 AM   [facility] ROS   Constitutional Weight loss    Appetite change     Unexplained fatigue   Cardiopulmonary Cough    Breathing problems    Wheezing       Physical Examination   The patient underwent a physical examination as described below. The pertinent positive and negative findings are summarized after the description of the examination.  Constitutional: The patient's developmental and nutritional status was assessed. The patient's voice quality was assessed.  Head and Face: The head and face were inspected for deformities. The sinuses were palpated. The salivary glands were palpated. Facial muscle strength was assessed bilaterally.  Eyes: Extraocular movements and primary gaze alignment were assessed.  Ears, Nose, Mouth and Throat: The ears and nose were examined for deformities. The nasal septum, mucosa, and turbinates were inspected by anterior rhinoscopy. The lips, teeth, and gums were examined for abnormalities. The oral mucosa, tongue, palate, tonsils, lateral and posterior pharynx were inspected for the presence of asymmetry or mucosal lesions.    Neck: The tracheal position was noted, and the neck mass palpated to determine if there were any asymmetries, abnormal neck masses, thyromegally, or thyroid nodules.  Respiratory: The nature of the breathing and chest expansion/symmetry was observed.  Cardiovascular: The patient was examined to determine the presence of any edema or jugular venous distension.  Abdomen: The contour of the abdomen was noted.  Lymphatic: The patient was examined for infraclavicular lymphadenopathy.  Musculoskeletal: The patient was inspected for the presence of skeletal deformities.  Extremities: The extremities were examined for any clubbing or cyanosis.  Skin: The skin was examined for inflammatory or neoplastic conditions.  Neurologic: The patient's orientation, mood, and affect were noted. The cranial nerve  functions were examined.  Other pertinent positive and negative findings on physical examination:   OC/OP: no lesions, uvula midline, soft  palate elevates symmetrically   Neck: no lesions, no TH tenderness to palpation  6 changed 5 shiley  All other physical examination findings were within normal limits and noncontributory.    Procedures   Flexible laryngoscopy (CPT 91383)        Pre-procedure diagnosis: dysphonia  Post-procedure diagnosis: same as above  Indication for procedure: Mr. Ennis is a 79 year old male with see above  Procedure(s): Fiberoptic Laryngoscopy     Details of Procedure: After informed consent was obtained, the patient was seated in the examination chair.  The areas of the nasopharynx as well as the hypopharynx were anesthetized with topical 4% lidocaine with 0.25% phenylephrine atomizer.  Examination of the base of tongue was performed first.  Attention was directed to any evidence of masses in the area or evidence of leukoplakia or candidal infection.  Attention was directed to the epiglottis where its size and position was determined and its movement on phonation of the vowel  e .  The piriform sinuses were then inspected for any mass lesions or pooling of secretions.  Attention was then directed to the larynx. The vocal folds were inspected for infection or any areas of leukoplakia, for masses, polypoid degeneration, or hemorrhage.  Having done this, the arytenoids and vocal processes were inspected for erythema or evidence of granuloma formation.  The posterior commissure was then inspected for evidence of inflammatory changes in the mucosa and heaping up of mucosal tissue. The patient was then instructed to say the vowel  e .  Adduction of vocal folds to the midline was observed for any evidence of paresis or paralysis of the larynx or asymmetry in rotation of the larynx to the left or right. The patient was asked to breathe and the degree of abduction was noted bilaterally.  Subglottic view of the larynx was obtained for any additional mass lesions or mucosal changes.  Finally the post cricoid was examined for evidence of  "pooling of secretions, as well as the pharyngeal wall mucosa.   Anesthesia type: 0.25% phenylephrine     Findings:  Anatomic/physiological deviations: RNC, improved swelling, arytenoid edema, anterior glottic web               Right vocal process: Some restriction of mobility   Left vocal process: Some restriction of mobility  Glottal gap: Complete glottal closure  Supraglottic structures: Normal  Hypopharynx: Normal      Estimated Blood Loss: minimal  Complications: None  Disposition: Patient tolerated the procedure well        Tracheobronchoscopy, through established tracheostomy (CPT 70335)    Pre-Procedure Diagnosis:  trach dependence  Post Procedure Diagnosis: same  Indication for procedure:  Mr. Ennis is a 79 year old male with tracheotomy depedence      Procedure(s): Tracheobronchoscopy, through established tracheostomy    Details of Procedure: Topical anesthesia was achieved by spraying 4% topical lidocaine directly through the tracheotomy.  After adequate anesthesia was achieved a flexible bronchoscope was passed through the tracheotomy into the trachea.  Abnormalities were noted. The nacho was visualized, followed by further insertion of the scope to the main stem bronchus level to evaluate the bronchi as well as the orifices of the sub-segmental bronchi.   Having completed this the operation was completed and the scope withdrawn atraumatically.   Anesthesia type: 4% topical lidocaine    Findings:   BRONCHOSCOPY  >Tracheostoma: Erythema around site   >Trachea: Normal mucosa  >Nacho: Normal mucosa  >Mainstem Bronchi: Normal mucosa    Estimated Blood Loss: None  Complication(s): None  Disposition: Patient tolerated the procedure well          Review of Relevant Clinical Data   I personally reviewed:    Labs:  No results found for: \"TSH\"  Lab Results   Component Value Date     10/20/2024    CO2 21 (L) 10/20/2024    BUN 22.0 10/20/2024    PHOS 3.4 10/23/2024     Lab Results   Component Value Date    " "WBC 5.1 10/22/2024    HGB 12.3 (L) 10/22/2024    HCT 38.3 (L) 10/22/2024    MCV 97 10/22/2024     10/22/2024     Lab Results   Component Value Date    PTT 23 10/17/2024    INR 1.05 10/17/2024     No results found for: \"DUANE\"  No components found for: \"RHEUMATOIDFACTOR\", \"RF\"  Lab Results   Component Value Date    CRP <2.9 07/07/2022    CRP <2.9 01/07/2022    CRP <2.9 07/28/2021    CRP <2.9 03/05/2021    CRP <2.9 11/11/2020     No components found for: \"CKTOT\", \"URICACID\"  No components found for: \"C3\", \"C4\", \"DSDNAAB\", \"NDNAABIFA\"  Lab Results   Component Value Date    MPOAB Not Reported 02/14/2014       Patient reported Quality of Life (QOL) Measures   Patient Supplied Answers To VHI Questionnaire      1/22/2024     1:02 PM   Voice Handicap Index (VHI-10)   My voice makes it difficult for people to hear me 4    People have difficulty understanding me in a noisy room 4    My voice difficulties restrict my personal and social life.  2    I feel left out of conversations because of my voice 3    My voice problem causes me to lose income 0    I feel as though I have to strain to produce voice 4    The clarity of my voice is unpredictable 1    My voice problem upsets me 2    My voice makes me feel handicapped 2    People ask, \"What's wrong with your voice?\" 2    VHI-10 24       Patient-reported         Patient Supplied Answers To EAT Questionnaire       No data to display                  Patient Supplied Answers To CSI Questionnaire      8/23/2023     8:49 AM   Cough Severity Index (CSI)   My cough is worse when I lie down 0    My coughing problem causes me to restrict my personal and social life 0    I tend to avoid places because of my cough problem 0    I feel embarrassed because of my coughing problem 1    People ask, ''What's wrong?'' because I cough a lot 1    I run out of air when I cough 0    My coughing problem affects my voice 2    My coughing problem limits my physical activity 0    My coughing problem " upsets me 0    People ask me if I am sick because I cough a lot 2    CSI Score 6       Patient-reported         [email or web address]        Scribe Disclosure:   I, RADHA ROCHA, am serving as a scribe; to document services personally performed by Josephine Malone MD -based on data collection and the provider's statements to me.     Provider Disclosure:  I agree with above History, Review of Systems, Physical exam and Plan.  I have reviewed the content of the documentation and have edited it as needed. I have personally performed the services documented here and the documentation accurately represents those services and the decisions I have made.      Electronically signed by:  Josephine Malone MD    Laryngology    [facility]  Department of  Otolaryngology - Head and Neck Surgery  [facility] [place] [address]  Appointment line: [phone]  [email or web address]: [phone]  [email or web address]   '

## 2024-11-14 ENCOUNTER — OFFICE VISIT (OUTPATIENT)
Dept: OTOLARYNGOLOGY | Facility: CLINIC | Age: 79
End: 2024-11-14
Payer: COMMERCIAL

## 2024-11-14 ENCOUNTER — THERAPY VISIT (OUTPATIENT)
Dept: SPEECH THERAPY | Facility: CLINIC | Age: 79
End: 2024-11-14
Payer: COMMERCIAL

## 2024-11-14 ENCOUNTER — TELEPHONE (OUTPATIENT)
Dept: OTOLARYNGOLOGY | Facility: CLINIC | Age: 79
End: 2024-11-14

## 2024-11-14 VITALS — BODY MASS INDEX: 25.91 KG/M2 | HEIGHT: 68 IN | WEIGHT: 171 LBS

## 2024-11-14 DIAGNOSIS — Z43.0 TRACHEOSTOMY CARE (H): ICD-10-CM

## 2024-11-14 DIAGNOSIS — C32.9 LARYNGEAL CANCER (H): Primary | ICD-10-CM

## 2024-11-14 DIAGNOSIS — C32.0 CANCER OF GLOTTIS (H): ICD-10-CM

## 2024-11-14 DIAGNOSIS — R13.12 OROPHARYNGEAL DYSPHAGIA: Primary | ICD-10-CM

## 2024-11-14 PROCEDURE — 31575 DIAGNOSTIC LARYNGOSCOPY: CPT | Mod: 51 | Performed by: OTOLARYNGOLOGY

## 2024-11-14 PROCEDURE — 31615 TRCHEOBRNCHSC EST TRACHS INC: CPT | Performed by: OTOLARYNGOLOGY

## 2024-11-14 PROCEDURE — 99214 OFFICE O/P EST MOD 30 MIN: CPT | Mod: 25 | Performed by: OTOLARYNGOLOGY

## 2024-11-14 NOTE — LETTER
2024       RE: Joshua Ennis  [address] [facility] [address]     Dear Colleague,    Thank you for referring your patient, Joshua Ennis, to the [facility] [place] at [facility]. Please see a copy of my visit note below.        [facility]   at the [facility]   Otolaryngology Clinic     Patient: Joshua Ennis    MRN: [id number]    : 1945    Age/Gender: 79 year old male  Date of Service: 2024  Rendering Provider:   Josephine Malone MD         Impression & Plan     IMPRESSION: Mr. Ennis is a 79 year old male who is being seen for the following:    Dysphonia   - anterior commissure lesion seen on scope on 23  - voice changes worsening for a few months  - prior smoker  - scope shows anterior commissure lesion with extension to the right vocal fold  - given anterior location will require CT neck to rule out thyroid cartilage extension  - discussed awake biopsy - patient in agreement this was done today  - pathology showed SCC  - CT chest had findings of new nodules - after multiple discussions with radiology the decision was made to proceed with surgery and repeat imaging for this finding in the chest  - s/p MDL with bilateral cordectomy with stent placement on 23  - today shows that the vocal folds closed around the stent - discussed he needs the scar broken up and bigger stent placed - patient in agreement   - pathology showed SCC with positive margins  - went back for MDL and stent removal on 23   - symptoms 10/10/2023 are stable, voice is poor  - scope shows  laryngeal web  - discussed he has positive margins - will proceed with repeat surger - if can't clear will need radiation  - ct chest shows improved size of lung nodule of concern  - s/p microlaryngoscopy, CO2 laser resection of vocal fold lesion, steroid injection, stent placement, and biopsy 10/25/23  - pathology  shows invasive squamous cell carcinoma  - symptoms 11/16/2023 are stable, voice is poor, discussed case at tumor board, will continue with close observation and repeat biopsies  - scope shows stent in place, vocal folds are open, laryngeal web developed  - discussed removal of stent, then will collect a sample, if the sample is cancerous, will refer for radiation therapy  - s/p microlaryngoscopy, CO2 laser resection of vocal fold lesion, steroid injection, stent removal, and biopsy 12/01/23  - pathology showed detached fragment of squamous epithelium with mild to moderate dysplasia, no evidence of carcinoma  - s/p laryngoscopy and anterior glottic web vocal fold steroid injection 12/11/23      - symptoms 1/4/2024 are has been having a hard time talking with his voice  - strobe shows bilateral vocal fold stiffness and swelling with question of laryngeal web, ventricular phonation  - s/p steroid injection today, does not appear to have a web, but rather vocal fold stiffness and swelling  - discussed treatment for vocal folds will be with steroid injections and voice therapy  - discussed voice result is questionable given severity of ventricular phonation  - symptoms 1/18/2024 are stable  - scope shows anterior glottic web, did lidocaine anesthesia, but difficult to see if the vocal folds can split apart today as compared to last time, he had laryngeal spasm  - discussed that we should focus on surveillance at this time and keep him in voice therapy, I would not do a repeat stunt placement given the last two did not work   - symptoms 6/27/2024 are worse difficulty breathing  - scope shows anterior glottic web, moderate airway restriction  - has final read of CT pending  - discussed I am concerned for tumor, needs evaluation under anesthesia   discussed awake intubation, discussed risk of trach  - s/p MDL with biopsy 7/17/24 with pathology showing SCC, tumor board discussion ensued with decision to proceed with  radiation   - symptoms 7/29/2024 are stable per him though improved per Earlene, less panting with mowing the lawn  - scope shows anterior glottic web, though improved glottic airway   - discussed if breathing should worsen during treatment he is to reach out and consider trach placement   - symptoms 10/17/2024 are worsened for the past few weeks in terms of his breathing, has finished radiation, had pneumonia that resolved but his breathing has not, breathing has gotten worse, here with his fiancee who is concerned, patient denies difficulty breathing however he has stridor   - 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  - discussed his options at this time are observation, steroid treatment vs awake tracheotomy  - given how severe the airway restriction is would recommend awake tracheotomy   - patient reluctant saying he would rather die, he is tired of all these interventions, everytime something happens there is another thing  - his fiancee on the other hand wants him to proceed  - after much discussion patient in agreement and will proceed to the hospital  - finished radition on 9/24  - had trach and MDL with biopsy on 10/17/24  - pathology negative for carcinoma  - symptoms 10/29/2024 are stable   - scope shows improved glottic airway, arytenoid edema  - symptoms 11/12/2024 are stable, does not like the trach  - scope shows improved glottic airway, arytenoid edema  Plan  - 12 weeks from end date of radiation needs PET-CT  - lymphedema therapy after         2. Trach dependence  - in the setting of airway edema   - since 10/17/24  - has 6 katya trach  - speaking with speaking valve  - not capping  - at night uses HME  - tbronch shows no secretions  - symptoms 11/12/2024 are breathing is good   - scope shows improved glottic edema but persistent arytenoid edema, erythema around stoma   - downsize trach with capping trial, size 5  Plan  - ciprodex drops, apply  4 drops around stoma daily  - Augmentin    - cap trach during the day only  - uncap trach at night and use the humidifier   - return in one month        RETURN VISIT: 12/2024    Chief Complaint   Anterior commissure SCC  S/p MDL with bilateral cordectomy and steroid injection 8/30/23  S/p MDL with stent removal and steroid injection on 9/13/23  S/p microlaryngoscopy, CO2 laser resection of vocal fold lesion, steroid injection, stent placement, and biopsy 10/25/23    S/p microlaryngoscopy, CO2 laser resection of vocal fold lesion, steroid injection, stent removal, and biopsy 12/01/23  S/p laryngoscopy and anterior glottic web vocal fold steroid injection 12/11/23   S/p steroid injection 1/4/24           S/p radiation completed on 9/24/24   Trach placement 10/17/24  Interval History   HISTORY OF PRESENT ILLNESS:  Mr. Ennis is a 79 year old male is being followed for dysphonia. he was initially seen on 8/15/23. Please refer to this note for full history.        Today, he presents for follow up. 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 Negative for high-grade dysplasia and malignancy.     Hoarseness      Hypertension      Idiopathic progressive polyneuropathy      Kidney problem      Laryngeal cancer (H)      Malignant melanoma (H)      Malignant neoplasm (H)     melanoma     Squamous cell carcinoma      Russo syndrome         PAST SURGICAL HISTORY:   Past Surgical History:   Procedure Laterality Date     BIOPSY  11/2011    melanoma on back     DISSECT LYMPH NODE INGUINAL  3/1/2013    Procedure: DISSECT LYMPH NODE INGUINAL;  Bilateral Inguinal Lymph Node Biopsy.;  Surgeon: Tariq Acosta MD;  Location: [facility]     ESOPHAGOSCOPY, GASTROSCOPY, DUODENOSCOPY (EGD), COMBINED  7/5/2013    Procedure: COMBINED ESOPHAGOSCOPY, GASTROSCOPY, DUODENOSCOPY (EGD);  Gastroscopy;  Surgeon: Tariq Acosta MD;  Location: [facility]     HERNIORRHAPHY INGUINAL  8/6/2012    Procedure: HERNIORRHAPHY INGUINAL;  Open Repair Left Inguinal Hernia;  Surgeon: Jerad Baker MD;  Location: [facility]     INJECT STEROID (LOCATION) N/A 8/30/2023    Procedure: steroid injection;  Surgeon: Josephine Malone MD;  Location: [facility]     INJECT STEROID (LOCATION) N/A 9/13/2023    Procedure: steroid injection;  Surgeon: Josephine Malone MD;  [facility]: [facility]     INJECT STEROID (LOCATION) N/A 10/25/2023    Procedure: steroid injection, stent placement, and biopsy;  Surgeon: Josephine Malone MD;  [facility]     INJECT STEROID (LOCATION) N/A 12/1/2023    Procedure: steroid injection;  Surgeon: Josephine Malone MD;  [facility]     LARYNGOSCOPY WITH MICROSCOPE N/A 9/13/2023    Procedure: MICROLARYNGOSCOPY with stent removal and biopsy;  Surgeon: Josephine Malone MD;  Location: [facility]     LARYNGOSCOPY, FLEXIBLE WITH INJECTION N/A 12/11/2023    Procedure: LARYNGOSCOPY, FLEXIBLE WITH INJECTION with steroid;  Surgeon: Josephine Malone MD;  Location: [facility] OR     LASER CO2 LARYNGOSCOPY N/A 8/30/2023    Procedure: MICROLARYNGOSCOPY, CO2 laser resection of vocal fold lesion;  Surgeon: Josephine Malone MD;  [facility]: [facility]     LASER CO2 LARYNGOSCOPY N/A 10/25/2023    Procedure: MICROLARYNGOSCOPY, CO2 laser resection of vocal fold lesion;  Surgeon: Josephine Malone MD;  Location: [facility]     LASER CO2 LARYNGOSCOPY N/A 12/1/2023    Procedure: MICROLARYNGOSCOPY CO2 laser standby, resection of vocal  fold lesion, stent removal, biopsy;  Surgeon: Josephine Malone MD;  [facility]: [facility]     LASER CO2 LARYNGOSCOPY, COMPLEX N/A 7/17/2024    Procedure: MICROLARYNGOSCOPY, CO2 laser standby, biopsy;  Surgeon: Josephine Malone MD;  Location: [facility]       CURRENT MEDICATIONS:   Current Outpatient Medications:      acetaminophen (TYLENOL) 325 MG tablet, Take 2 tablets (650 mg) by mouth every 4 hours as needed for pain., Disp: 50 tablet, Rfl: 0     calcipotriene (DOVONOX) 0.005 % external cream, Apply topically 2 times daily In conjunction with fluorouracil cream in a 1:1 mixture., Disp: 60 g, Rfl: 2     calcium carbonate 600 mg-vitamin D 400 units (CALTRATE) 600-400 MG-UNIT per tablet, Take 1 tablet by mouth 2 times daily., Disp: , Rfl:      cetirizine (ZYRTEC) 10 MG tablet, Take 1 tablet by mouth daily., Disp: , Rfl:      Cholecalciferol (VITAMIN D) 1000 UNITS capsule, Take 1 capsule by mouth daily., Disp: , Rfl:      ciprofloxacin-dexAMETHasone (CIPRODEX) 0.3-0.1 % otic suspension, Instill 4 drops into trach twice daily, Disp: 7.5 mL, Rfl: 1     fluorouracil (EFUDEX) 5 % external cream, Apply topically 2 times daily In conjunction with calcipotriene cream in a 1:1 mixture., Disp: 40 g, Rfl: 2     gabapentin (NEURONTIN) 400 MG capsule, Take 400 mg by mouth at bedtime., Disp: , Rfl:      losartan (COZAAR) 50 MG tablet, Take 1 tablet (50 mg) by mouth daily (Patient taking differently: Take 50 mg by mouth at bedtime.), Disp: 90 tablet, Rfl: 3     mupirocin (BACTROBAN) 2 % external cream, Apply topically 3 times daily. Apply to affected area three times daily before applying aquaphor, Disp: 30 g, Rfl: 0     oxyCODONE (ROXICODONE) 5 MG tablet, Take 0.5-1 tablets (2.5-5 mg) by mouth every 4 hours as needed for moderate to severe pain., Disp: 15 tablet, Rfl: 0     pantoprazole (PROTONIX) 40 MG EC tablet, Take 1 tablet (40 mg) by mouth daily., Disp: 60 tablet, Rfl: 1     senna-docusate (SENOKOT-S/PERICOLACE) 8.6-50 MG tablet, Take 1  tablet by mouth 2 times daily., Disp: 30 tablet, Rfl: 0     silver sulfADIAZINE (SILVADENE) 1 % external cream, Apply topically 2 times daily. Apply to affected area, Disp: 400 g, Rfl: 0    ALLERGIES: Shrimp and Advil [ibuprofen]    SOCIAL HISTORY:    Social History     Socioeconomic History     Marital status: Single     Spouse name: Not on file     Number of children: Not on file     Years of education: Not on file     Highest education level: Not on file   Occupational History     Employer: [employer]   Tobacco Use     Smoking status: Former     Current packs/day: 0.00     Average packs/day: 1 pack/day for 20.0 years (20.0 ttl pk-yrs)     Types: Cigarettes     Start date: 1993     Quit date: 2013     Years since quittin.7     Passive exposure: Past     Smokeless tobacco: Never   Vaping Use     Vaping status: Never Used   Substance and Sexual Activity     Alcohol use: Not Currently     Comment: rare     Drug use: No     Sexual activity: Not Currently     Partners: Female   Other Topics Concern     Parent/sibling w/ CABG, MI or angioplasty before 65F 55M? Not Asked      Service Not Asked     Blood Transfusions Not Asked     Caffeine Concern Not Asked     Occupational Exposure Not Asked     Hobby Hazards Not Asked     Sleep Concern Not Asked     Stress Concern Not Asked     Weight Concern Not Asked     Special Diet Not Asked     Back Care Not Asked     Exercise Yes     Comment: walking     Bike Helmet Not Asked     Seat Belt Not Asked     Self-Exams Not Asked   Social History Narrative     Not on file     Social Drivers of Health     Financial Resource Strain: Low Risk  (10/18/2024)    Financial Resource Strain      Within the past 12 months, have you or your family members you live with been unable to get utilities (heat, electricity) when it was really needed?: No   Food Insecurity: Low Risk  (10/18/2024)    Food Insecurity      Within the past 12 months, did you worry that your  food would run out before you got money to buy more?: No      Within the past 12 months, did the food you bought just not last and you didn t have money to get more?: No   Transportation Needs: Low Risk  (10/18/2024)    Transportation Needs      Within the past 12 months, has lack of transportation kept you from medical appointments, getting your medicines, non-medical meetings or appointments, work, or from getting things that you need?: No   Physical Activity: Not on file   Stress: Not on file   Social Connections: Unknown (12/24/2021)    Received from [facility], [facility]    Social Connections      Frequency of Communication with Friends and Family: Not on file   Interpersonal Safety: Low Risk  (10/18/2024)    Interpersonal Safety      Do you feel physically and emotionally safe where you currently live?: Yes      Within the past 12 months, have you been hit, slapped, kicked or otherwise physically hurt by someone?: No      Within the past 12 months, have you been humiliated or emotionally abused in other ways by your partner or ex-partner?: No   Housing Stability: Low Risk  (10/18/2024)    Housing Stability      Do you have housing? : Yes      Are you worried about losing your housing?: No         FAMILY HISTORY:   Family History   Problem Relation Age of Onset     Diabetes Mother      Hypertension Mother      Cancer Father         stomach     Heart Disease Father      Heart Disease Brother      Diabetes Sister      Diabetes Sister      Diabetes Sister      Heart Disease Brother      Cancer Sister      Glaucoma No family hx of      Macular Degeneration No family hx of       Non-contributory for problems with anesthesia    REVIEW OF SYSTEMS:   The patient was asked a 14 point review of systems regarding constitutional symptoms, eye symptoms, ears, nose, mouth, throat symptoms, cardiovascular symptoms, respiratory symptoms, gastrointestinal  symptoms, genitourinary symptoms, musculoskeletal symptoms, integumentary symptoms, neurological symptoms, psychiatric symptoms, endocrine symptoms, hematologic/lymphatic symptoms, and allergic/ immunologic symptoms.   The pertinent factors have been included in the HPI and below.  Patient Supplied Answers to Review of Systems      10/17/2024     9:42 AM   [facility] ROS   Constitutional Weight loss    Appetite change    Unexplained fatigue   Cardiopulmonary Cough    Breathing problems    Wheezing       Physical Examination   The patient underwent a physical examination as described below. The pertinent positive and negative findings are summarized after the description of the examination.  Constitutional: The patient's developmental and nutritional status was assessed. The patient's voice quality was assessed.  Head and Face: The head and face were inspected for deformities. The sinuses were palpated. The salivary glands were palpated. Facial muscle strength was assessed bilaterally.  Eyes: Extraocular movements and primary gaze alignment were assessed.  Ears, Nose, Mouth and Throat: The ears and nose were examined for deformities. The nasal septum, mucosa, and turbinates were inspected by anterior rhinoscopy. The lips, teeth, and gums were examined for abnormalities. The oral mucosa, tongue, palate, tonsils, lateral and posterior pharynx were inspected for the presence of asymmetry or mucosal lesions.    Neck: The tracheal position was noted, and the neck mass palpated to determine if there were any asymmetries, abnormal neck masses, thyromegally, or thyroid nodules.  Respiratory: The nature of the breathing and chest expansion/symmetry was observed.  Cardiovascular: The patient was examined to determine the presence of any edema or jugular venous distension.  Abdomen: The contour of the abdomen was noted.  Lymphatic: The patient was examined for infraclavicular lymphadenopathy.  Musculoskeletal: The patient was  inspected for the presence of skeletal deformities.  Extremities: The extremities were examined for any clubbing or cyanosis.  Skin: The skin was examined for inflammatory or neoplastic conditions.  Neurologic: The patient's orientation, mood, and affect were noted. The cranial nerve  functions were examined.  Other pertinent positive and negative findings on physical examination:   OC/OP: no lesions, uvula midline, soft palate elevates symmetrically   Neck: no lesions, no TH tenderness to palpation  6 changed 5 shiley  All other physical examination findings were within normal limits and noncontributory.    Procedures   Flexible laryngoscopy (CPT 59135)        Pre-procedure diagnosis: dysphonia  Post-procedure diagnosis: same as above  Indication for procedure: Mr. Ennis is a 79 year old male with see above  Procedure(s): Fiberoptic Laryngoscopy     Details of Procedure: After informed consent was obtained, the patient was seated in the examination chair.  The areas of the nasopharynx as well as the hypopharynx were anesthetized with topical 4% lidocaine with 0.25% phenylephrine atomizer.  Examination of the base of tongue was performed first.  Attention was directed to any evidence of masses in the area or evidence of leukoplakia or candidal infection.  Attention was directed to the epiglottis where its size and position was determined and its movement on phonation of the vowel  e .  The piriform sinuses were then inspected for any mass lesions or pooling of secretions.  Attention was then directed to the larynx. The vocal folds were inspected for infection or any areas of leukoplakia, for masses, polypoid degeneration, or hemorrhage.  Having done this, the arytenoids and vocal processes were inspected for erythema or evidence of granuloma formation.  The posterior commissure was then inspected for evidence of inflammatory changes in the mucosa and heaping up of mucosal tissue. The patient was then instructed to  say the vowel  e .  Adduction of vocal folds to the midline was observed for any evidence of paresis or paralysis of the larynx or asymmetry in rotation of the larynx to the left or right. The patient was asked to breathe and the degree of abduction was noted bilaterally.  Subglottic view of the larynx was obtained for any additional mass lesions or mucosal changes.  Finally the post cricoid was examined for evidence of pooling of secretions, as well as the pharyngeal wall mucosa.   Anesthesia type: 0.25% phenylephrine     Findings:  Anatomic/physiological deviations: RNC, improved swelling, arytenoid edema, anterior glottic web               Right vocal process: Some restriction of mobility   Left vocal process: Some restriction of mobility  Glottal gap: Complete glottal closure  Supraglottic structures: Normal  Hypopharynx: Normal      Estimated Blood Loss: minimal  Complications: None  Disposition: Patient tolerated the procedure well        Tracheobronchoscopy, through established tracheostomy (CPT 68963)    Pre-Procedure Diagnosis:  trach dependence  Post Procedure Diagnosis: same  Indication for procedure:  Mr. Ennis is a 79 year old male with tracheotomy depedence      Procedure(s): Tracheobronchoscopy, through established tracheostomy    Details of Procedure: Topical anesthesia was achieved by spraying 4% topical lidocaine directly through the tracheotomy.  After adequate anesthesia was achieved a flexible bronchoscope was passed through the tracheotomy into the trachea.  Abnormalities were noted. The nacho was visualized, followed by further insertion of the scope to the main stem bronchus level to evaluate the bronchi as well as the orifices of the sub-segmental bronchi.   Having completed this the operation was completed and the scope withdrawn atraumatically.   Anesthesia type: 4% topical lidocaine    Findings:   BRONCHOSCOPY  >Tracheostoma: Erythema around site   >Trachea: Normal mucosa  >Nacho:  "Normal mucosa  >Mainstem Bronchi: Normal mucosa    Estimated Blood Loss: None  Complication(s): None  Disposition: Patient tolerated the procedure well          Review of Relevant Clinical Data   I personally reviewed:    Labs:  No results found for: \"TSH\"  Lab Results   Component Value Date     10/20/2024    CO2 21 (L) 10/20/2024    BUN 22.0 10/20/2024    PHOS 3.4 10/23/2024     Lab Results   Component Value Date    WBC 5.1 10/22/2024    HGB 12.3 (L) 10/22/2024    HCT 38.3 (L) 10/22/2024    MCV 97 10/22/2024     10/22/2024     Lab Results   Component Value Date    PTT 23 10/17/2024    INR 1.05 10/17/2024     No results found for: \"DUANE\"  No components found for: \"RHEUMATOIDFACTOR\", \"RF\"  Lab Results   Component Value Date    CRP <2.9 07/07/2022    CRP <2.9 01/07/2022    CRP <2.9 07/28/2021    CRP <2.9 03/05/2021    CRP <2.9 11/11/2020     No components found for: \"CKTOT\", \"URICACID\"  No components found for: \"C3\", \"C4\", \"DSDNAAB\", \"NDNAABIFA\"  Lab Results   Component Value Date    MPOAB Not Reported 02/14/2014       Patient reported Quality of Life (QOL) Measures   Patient Supplied Answers To VHI Questionnaire      1/22/2024     1:02 PM   Voice Handicap Index (VHI-10)   My voice makes it difficult for people to hear me 4    People have difficulty understanding me in a noisy room 4    My voice difficulties restrict my personal and social life.  2    I feel left out of conversations because of my voice 3    My voice problem causes me to lose income 0    I feel as though I have to strain to produce voice 4    The clarity of my voice is unpredictable 1    My voice problem upsets me 2    My voice makes me feel handicapped 2    People ask, \"What's wrong with your voice?\" 2    VHI-10 24       Patient-reported         Patient Supplied Answers To EAT Questionnaire       No data to display                  Patient Supplied Answers To CSI Questionnaire      8/23/2023     8:49 AM   Cough Severity Index (CSI)   My " cough is worse when I lie down 0    My coughing problem causes me to restrict my personal and social life 0    I tend to avoid places because of my cough problem 0    I feel embarrassed because of my coughing problem 1    People ask, ''What's wrong?'' because I cough a lot 1    I run out of air when I cough 0    My coughing problem affects my voice 2    My coughing problem limits my physical activity 0    My coughing problem upsets me 0    People ask me if I am sick because I cough a lot 2    CSI Score 6       Patient-reported         [email or web address]        Scribe Disclosure:   I, RADHA ROCHA, am serving as a scribe; to document services personally performed by Josephine Malone MD -based on data collection and the provider's statements to me.     Provider Disclosure:  I agree with above History, Review of Systems, Physical exam and Plan.  I have reviewed the content of the documentation and have edited it as needed. I have personally performed the services documented here and the documentation accurately represents those services and the decisions I have made.      Electronically signed by:  Josephine Malone MD    Laryngology    [facility]  Department of  Otolaryngology - Head and Neck Surgery  Cl[facility]  [address] [place] [address]  Appointment line: [phone]  Fax: [phone]  [email or web address]   '      Again, thank you for allowing me to participate in the care of your patient.      Sincerely,    Josephine Malone MD

## 2024-11-14 NOTE — TELEPHONE ENCOUNTER
[facility] Call Center    Phone Message    May a detailed message be left on voicemail: yes     Reason for Call: Other: Earlene called stating Joshua was seen today and was advised by Dr. Malone to come back in one month. No availability. Please advise. Thanks.      Action Taken: Other: ent    Travel Screening: Not Applicable

## 2024-11-14 NOTE — TELEPHONE ENCOUNTER
Left Voicemail (1st Attempt) for the patient to call back and schedule the following:    Appointment type: Return Throat  Provider: Dr. Malone  Return date: Mid-December  Specialty phone number: [phone]  Additional appointment(s) needed: No  Additonal Notes: No    **There are plenty of openings around 12/14/24. Use Return Throat not Return ENT.

## 2024-11-14 NOTE — PATIENT INSTRUCTIONS
1.  You were seen in the ENT Clinic today by Dr. Malone. If you have any questions or concerns after your appointment, please call [phone]. Press option #1 for scheduling related needs. Press option #3 for Nurse advice.    2.  Dr. Malone has recommended the following:   - Augmentin for 10 days twice daily   - Ciprodex drops around the tracheostomy tube    3.  Plan is to return to clinic 1 month    How to Contact Us:  Send a [employer] message to your provider. Our team will respond to you via [employer]. Occasionally, we will need to call you to get further information.  For urgent matters (Monday-Friday, 8:00 AM-3:30 PM), call the ENT Clinic: [phone] and speak with a call center team member - they will route your call appropriately.   If you'd like to speak directly with a nurse, please call [phone]. We do our best to check voicemail frequently throughout the day, and will work to call you back within 1-2 days. For urgent matters, please use the general clinic phone numbers listed above.      Nina Cardenas  [phone]  [facility] - Otolaryngology

## 2024-11-20 ENCOUNTER — LAB (OUTPATIENT)
Dept: LAB | Facility: CLINIC | Age: 79
End: 2024-11-20
Payer: COMMERCIAL

## 2024-11-20 DIAGNOSIS — Z79.899 HIGH RISK MEDICATIONS (NOT ANTICOAGULANTS) LONG-TERM USE: ICD-10-CM

## 2024-11-20 DIAGNOSIS — M31.30 GRANULOMATOSIS WITH POLYANGIITIS, UNSPECIFIED WHETHER RENAL INVOLVEMENT (H): ICD-10-CM

## 2024-11-20 LAB
ALBUMIN MFR UR ELPH: 11.6 MG/DL
ALBUMIN SERPL BCG-MCNC: 4 G/DL (ref 3.5–5.2)
ALBUMIN UR-MCNC: NEGATIVE MG/DL
ALT SERPL W P-5'-P-CCNC: 11 U/L (ref 0–70)
APPEARANCE UR: CLEAR
AST SERPL W P-5'-P-CCNC: 20 U/L (ref 0–45)
BACTERIA #/AREA URNS HPF: ABNORMAL /HPF
BASOPHILS # BLD AUTO: 0 10E3/UL (ref 0–0.2)
BASOPHILS NFR BLD AUTO: 1 %
BILIRUB UR QL STRIP: NEGATIVE
CAOX CRY #/AREA URNS HPF: ABNORMAL /HPF
COLOR UR AUTO: YELLOW
CREAT SERPL-MCNC: 1.33 MG/DL (ref 0.67–1.17)
CREAT UR-MCNC: 147.7 MG/DL
CRP SERPL-MCNC: <3 MG/L
EGFRCR SERPLBLD CKD-EPI 2021: 55 ML/MIN/1.73M2
EOSINOPHIL # BLD AUTO: 0.2 10E3/UL (ref 0–0.7)
EOSINOPHIL NFR BLD AUTO: 3 %
ERYTHROCYTE [DISTWIDTH] IN BLOOD BY AUTOMATED COUNT: 12 % (ref 10–15)
ERYTHROCYTE [SEDIMENTATION RATE] IN BLOOD BY WESTERGREN METHOD: 23 MM/HR (ref 0–20)
GLUCOSE UR STRIP-MCNC: NEGATIVE MG/DL
HCT VFR BLD AUTO: 39.9 % (ref 40–53)
HGB BLD-MCNC: 12.7 G/DL (ref 13.3–17.7)
HGB UR QL STRIP: NEGATIVE
IMM GRANULOCYTES # BLD: 0 10E3/UL
IMM GRANULOCYTES NFR BLD: 0 %
KETONES UR STRIP-MCNC: NEGATIVE MG/DL
LEUKOCYTE ESTERASE UR QL STRIP: NEGATIVE
LYMPHOCYTES # BLD AUTO: 1.2 10E3/UL (ref 0.8–5.3)
LYMPHOCYTES NFR BLD AUTO: 22 %
MCH RBC QN AUTO: 29.8 PG (ref 26.5–33)
MCHC RBC AUTO-ENTMCNC: 31.8 G/DL (ref 31.5–36.5)
MCV RBC AUTO: 94 FL (ref 78–100)
MONOCYTES # BLD AUTO: 0.6 10E3/UL (ref 0–1.3)
MONOCYTES NFR BLD AUTO: 11 %
MUCOUS THREADS #/AREA URNS LPF: PRESENT /LPF
NEUTROPHILS # BLD AUTO: 3.5 10E3/UL (ref 1.6–8.3)
NEUTROPHILS NFR BLD AUTO: 63 %
NITRATE UR QL: NEGATIVE
PH UR STRIP: 6 [PH] (ref 5–7)
PLATELET # BLD AUTO: 219 10E3/UL (ref 150–450)
PROT/CREAT 24H UR: 0.08 MG/MG CR (ref 0–0.2)
RBC # BLD AUTO: 4.26 10E6/UL (ref 4.4–5.9)
RBC #/AREA URNS AUTO: ABNORMAL /HPF
SP GR UR STRIP: 1.02 (ref 1–1.03)
SQUAMOUS #/AREA URNS AUTO: ABNORMAL /LPF
UROBILINOGEN UR STRIP-ACNC: 0.2 E.U./DL
WBC # BLD AUTO: 5.6 10E3/UL (ref 4–11)
WBC #/AREA URNS AUTO: ABNORMAL /HPF

## 2024-11-20 PROCEDURE — 84156 ASSAY OF PROTEIN URINE: CPT

## 2024-11-20 PROCEDURE — 84460 ALANINE AMINO (ALT) (SGPT): CPT

## 2024-11-20 PROCEDURE — 81001 URINALYSIS AUTO W/SCOPE: CPT

## 2024-11-20 PROCEDURE — 84450 TRANSFERASE (AST) (SGOT): CPT

## 2024-11-20 PROCEDURE — 85652 RBC SED RATE AUTOMATED: CPT

## 2024-11-20 PROCEDURE — 86140 C-REACTIVE PROTEIN: CPT

## 2024-11-20 PROCEDURE — 85025 COMPLETE CBC W/AUTO DIFF WBC: CPT

## 2024-11-20 PROCEDURE — 82565 ASSAY OF CREATININE: CPT

## 2024-11-20 PROCEDURE — 82040 ASSAY OF SERUM ALBUMIN: CPT

## 2024-11-20 PROCEDURE — 36415 COLL VENOUS BLD VENIPUNCTURE: CPT

## 2024-11-21 ENCOUNTER — TELEPHONE (OUTPATIENT)
Dept: OTOLARYNGOLOGY | Facility: CLINIC | Age: 79
End: 2024-11-21
Payer: COMMERCIAL

## 2024-11-21 LAB
CD19 B CELL COMMENT: NORMAL
CD19 CELLS # BLD: 136 CELLS/UL (ref 107–698)
CD19 CELLS NFR BLD: 10 % (ref 6–27)
IGA SERPL-MCNC: 162 MG/DL (ref 84–499)
IGG SERPL-MCNC: 1132 MG/DL (ref 610–1616)
IGM SERPL-MCNC: 73 MG/DL (ref 35–242)

## 2024-11-21 NOTE — TELEPHONE ENCOUNTER
Called patient SO to move appointment time. Per patient SO they were agreeable with the time change. Patient/ family will reach out with questions or concerns and verbalized understanding of the situation. Nina Daly RN on 11/21/2024 at 9:54 AM

## 2024-12-12 ENCOUNTER — OFFICE VISIT (OUTPATIENT)
Dept: RHEUMATOLOGY | Facility: CLINIC | Age: 79
End: 2024-12-12
Attending: INTERNAL MEDICINE
Payer: COMMERCIAL

## 2024-12-12 ENCOUNTER — THERAPY VISIT (OUTPATIENT)
Dept: SPEECH THERAPY | Facility: CLINIC | Age: 79
End: 2024-12-12
Payer: COMMERCIAL

## 2024-12-12 VITALS
HEIGHT: 69 IN | DIASTOLIC BLOOD PRESSURE: 75 MMHG | SYSTOLIC BLOOD PRESSURE: 132 MMHG | OXYGEN SATURATION: 98 % | WEIGHT: 172.7 LBS | TEMPERATURE: 97.8 F | HEART RATE: 62 BPM | BODY MASS INDEX: 25.58 KG/M2

## 2024-12-12 DIAGNOSIS — Z79.899 HIGH RISK MEDICATIONS (NOT ANTICOAGULANTS) LONG-TERM USE: ICD-10-CM

## 2024-12-12 DIAGNOSIS — C32.0 CANCER OF GLOTTIS (H): ICD-10-CM

## 2024-12-12 DIAGNOSIS — Z93.0 TRACHEOSTOMY IN PLACE (H): ICD-10-CM

## 2024-12-12 DIAGNOSIS — M31.30 GRANULOMATOSIS WITH POLYANGIITIS, UNSPECIFIED WHETHER RENAL INVOLVEMENT (H): Primary | ICD-10-CM

## 2024-12-12 DIAGNOSIS — C32.9 SQUAMOUS CELL CARCINOMA OF LARYNX (H): ICD-10-CM

## 2024-12-12 DIAGNOSIS — R13.12 OROPHARYNGEAL DYSPHAGIA: Primary | ICD-10-CM

## 2024-12-12 PROCEDURE — 99213 OFFICE O/P EST LOW 20 MIN: CPT | Performed by: INTERNAL MEDICINE

## 2024-12-12 ASSESSMENT — PAIN SCALES - GENERAL: PAINLEVEL_OUTOF10: NO PAIN (0)

## 2024-12-12 NOTE — PROGRESS NOTES
[facility] Follow-Up In Person Visit Note    Date of visit: 12/12/2024  Last visit date: 7/11/2024    Joshua Ennis MRN# [id number]   Age: 79 year old    Reason for visit: GPA, high risk med use monitoring   YOB: 1945          Assessment and Plan:     Assessment:   Mr. Ennis is a 79 yr old  male with history of melanoma s/p resection in 11/2011, former tobacco use, and GPA (PR3+, MPO and c-ANCA negative in 3/2013 based on aforementioned labs, confirmed by kidney and lung biopsy) who presents for clinic follow-up regarding GPA.  He was initiated on cyclophosphamide 150 mg QD and high-dose prednisone 3/2013. He last took prednisone in 10/2013. Cytoxan was switched to MTX for maintenance treatment in 10/2013.    7/6/2023:  Vasculitis seems to be stable and he has no signs or symptoms of flare up today, though PR3 remains persistently positive. New upper airway symptoms, while more likely due to non-vasculitis inflammation, GERD, vocal cord dysfunction, could represent vasculitis in tracheal wall, which can occur in absence of elevated inflammatory markers. Also, former smoker and with immunosuppression status, need to make sure there is no SCC. Will refer for ENT evaluation and also acquire high-resolution non-contrast CT to follow nodule in his lung incidentally identified in 2020. Otherwise continue with current plan of low dose methotrexate 10mg (4 tabs) weekly and Q3 mo labs. Was advised to call in case of vasculitis flare up sx.        Plan 7/6/2023:    - Continue MTX 10 mg (4 tabs) weekly     > Labs q3m      > Hold methotrexate if develops an infection  - Continue with Folic acid 1 mg po   - Referral to ENT for new onset persistent voice changes/hoarseness and continued cough/phlegm (2017 note indicated laryngoscopy would be next step if he developed persistent hoarseness)  - High-resolution CT chest w/o contrast for follow up of pulm nodule  - Repeat labs in 3  mo    Return in 1y (in person)               7/11/2024:    Since last visit, was diagnosed with laryngeal SCC, will have another procedure on 7/17 with awake intubation, unchanged horseness, no other complaints. Asked me about awake intubation process, I advised him to ask surgical team to review/explain to him, I will message his ENT team. 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 Advised good hydration. He is on amoxicillin since 7/3 x 2 wk course to prevent surgical infection, due to drug drug interaction and possible rise in methotrexate level and low GFR, advised him tos kip next dose of methotrexate with follow up labs on Mon. If he needs further cancer tx, will hold methotrexate. He could benefit from MTM referral to check on meds, drug-drug interaction in future, referral was placed.    Plan:    Skip the methotrexate this coming Sunday 7/14, resume Sunday 7/21     Continue with folic acid    Labs on Monday    Good hydration    Refer to [facility] pharmacist    Return in 6 months (in person)        Today 12/12/2024:      New Dx of laryngeal SCC s/p surgery, radiation, now has a trach in place. ENT follow up today, upcoming PET CT 12 wk post radiation. Will continue to hold methotrexate and monitor for vasculitis flare up sx. Labs every 3 mo, so far stable off methotrexate. Last checked 11/2024. Will continue with gabapentin 400 mg at bedtime for chronic neuropathy.    Plan:    Labs every 3 months, due 2/2025    Continue to hold methotrexate and folic acid    Return in person in 6 months    TT 30 min was spent on date of the encounter doing chart review, history and exam, documentation and further activities as noted above. Any prior notes, outside records, laboratory results, and imaging studies were reviewed if relevant.    The longitudinal plan of care for the diagnosis(es)/condition(s) as  documented were addressed during this visit. Due to the added complexity in care, I will continue to support Joshua in the subsequent management and with ongoing continuity of care.      Ede Sanchez MD      Orders Placed This Encounter   Procedures    AST    ALT    Myeloperoxidase and Proteinase 3 Panel    Albumin level    Creatinine    CRP inflammation    UA with Microscopic reflex to Culture    Protein  random urine    Creatinine random urine    Erythrocyte sedimentation rate auto    ANCA IgG by IFA with Reflex to Titer    CBC with Platelets & Differential            HPI / Interim History:      Mr. Ennis is a 78 yo WM with h/o melanoma s/p resection in 11/2011, former tobacco use, and GPA diagnosed in 3/2013. He presents for follow up.    Had a hospitalization 11/2-11/4/2020 with a fall with displaced left 5th-9th rib fractures, now recovered.    He has been tolerating it well. He had a flare in 12/2013 with increased crusts in his nose along with recurrence of arthralgia, worsening numbness in feet and increasing vasculitis marker. Renal function was stable with negative repeat chest CT. His vasculitis flare was treated with short course of prednisone taper 20 mg po qd max and increasing MTX to 8 tab = 20 mg qwk. Vasculitis symptoms improved.  At visit on 1/2015, he had scabs in his nose, had cold [place] symptoms and increased arthralgia/fatigue (shoulders, L hand). Labs from 12/16 showed increased Cr level and hematuria with rising PR3 (more than 8), there was a concern for vasculitis flare (in kidneys, sinuses), no flare on repeat chest CT 1/2015. Therefore it was recommended to try rituximab. Another reason was to be able to taper MTX off given abnormal Cr.  He is now s/p Rituximab infusion x 4 (1st on 2/25/2015). He is feeling well. No hematuria with stable Cr, no SOB. Saw ENT with negative nasal mucosa biopsy 6/2015 for granuloma but showed active inflammation, had left nostril lesion which decreased in  "size by use of mupirocin ointment. PR3 vasculitis marker went back to NL in 6/2015, it was NL in 10/2015, given stable vasculitis and low GFR, MTX from 8 to 7 tab po qwk. Repeat W[place]-3 in 3/2016 was slightly positive, Cr was slightly higher than baseline. We tapered his MTX further to 6 tab po qwk in 2/2016 given lack of symptoms. In 3/2017, MTX was reduced from 6 to 5 tab qwk. It was further tapered to 4 tab po qwk in 6/2017 given stable disease. No vasculitis flare ups by such change.     7/7/22:  Reports doing well overall. Denies any vasculitis symptoms. Denies any fever/chills, fatigue, headaches, congestion, nose bleeds, mouth sores, cough/hemoptysis, chest pain, SOB, hematuria, dysuria, or any RUQ pain. Still endorses neuropathy but it is stable. No other concerns    7/6/23:  Doing fairly well overall though has noted persistent bothersome hoarseness and voice changes over the past 3 mos. Associates this with \"stickier\" phlegm than usual with his chronic cough. Notes this cough has been productive of grey-white sputum and present for many years relatively unchanged. Also reports episode of poison oak ~ 3 wks ago, was given a short course of prednisone (notes indicate 40 mg every day x 5 days) by an urgent care. Has now resolved, and reports that during that prednisone course there was no change to his hoarseness or phlegm. Otherwise denies weight loss, loss of energy, loss of appetite, dysgeusia, fevers, chills, headaches, congestion, nose bleeds, nose crusting, mouth sores, hemoptysis, rashes, sores, chest pain, dyspnea, hematuria, dysuria, nausea, diarrhea, RUQ pain, vision or hearing changes. No other concerns.       7/11/2024:    -no vasculitis flare up, no crusting in the nose    -Dx of laryngeal SCC since last visit, continues to have hoarseness, will have another ENT procedure on 7/17    -no weight loss, energy and appetite is good, in good spirit, physically active      Today 12/12/2024:      -laryngeal " SCC    Per ENT:    -finished radiation on 9/24  - had roney and MDL with biopsy on 10/17/24  - pathology negative for carcinoma      -doing well today, still has trach in place, will see DR. 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 Negative for high-grade dysplasia and malignancy.    Hoarseness     Hypertension     Idiopathic progressive polyneuropathy     Kidney problem     Laryngeal cancer (H)     Malignant melanoma (H)     Malignant neoplasm (H)     melanoma    Squamous cell carcinoma     Russo syndrome           Past Surgical History:     Past Surgical History:   Procedure Laterality Date    BIOPSY  11/2011    melanoma on back    DISSECT LYMPH NODE INGUINAL  3/1/2013    Procedure: DISSECT LYMPH NODE INGUINAL;  Bilateral Inguinal Lymph Node Biopsy.;  Surgeon: Tariq Acosta MD;  Location: [facility]    ESOPHAGOSCOPY, GASTROSCOPY, DUODENOSCOPY (EGD), COMBINED  7/5/2013    Procedure: COMBINED ESOPHAGOSCOPY, GASTROSCOPY, DUODENOSCOPY (EGD);  Gastroscopy;  Surgeon: Tariq Acosta MD;  Location: [facility]    HERNIORRHAPHY INGUINAL  8/6/2012    Procedure: HERNIORRHAPHY INGUINAL;  Open Repair Left Inguinal Hernia;  Surgeon: Jerad Baker MD;  Location: [facility]    INJECT STEROID (LOCATION) N/A 8/30/2023     Procedure: steroid injection;  Surgeon: Josephine Malone MD;  Location: [facility]    INJECT STEROID (LOCATION) N/A 2023    Procedure: steroid injection;  Surgeon: Josephine Malone MD;  [facility]    INJECT STEROID (LOCATION) N/A 10/25/2023    Procedure: steroid injection, stent placement, and biopsy;  Surgeon: Josephine Malone MD;  [facility]: [facility]    INJECT STEROID (LOCATION) N/A 2023    Procedure: steroid injection;  Surgeon: Josephine Malone MD;  [facility]    LARYNGOSCOPY WITH MICROSCOPE N/A 2023    Procedure: MICROLARYNGOSCOPY with stent removal and biopsy;  Surgeon: Josephine Malone MD;  [facility]    LARYNGOSCOPY, FLEXIBLE WITH INJECTION N/A 2023    Procedure: LARYNGOSCOPY, FLEXIBLE WITH INJECTION with steroid;  Surgeon: Josephine Malone MD;  Location: [facility]    LASER CO2 LARYNGOSCOPY N/A 2023    Procedure: MICROLARYNGOSCOPY, CO2 laser resection of vocal fold lesion;  Surgeon: Josephine Malone MD;  [facility]    LASER CO2 LARYNGOSCOPY N/A 10/25/2023    Procedure: MICROLARYNGOSCOPY, CO2 laser resection of vocal fold lesion;  Surgeon: Josephine Malone MD;  [facility]    LASER CO2 LARYNGOSCOPY N/A 2023    Procedure: MICROLARYNGOSCOPY CO2 laser standby, resection of vocal fold lesion, stent removal, biopsy;  Surgeon: Josephine Malone MD;  [facility]    LASER CO2 LARYNGOSCOPY, COMPLEX N/A 2024    Procedure: MICROLARYNGOSCOPY, CO2 laser standby, biopsy;  Surgeon: Josephine Malone MD;  Location: [facility]          Social History:     Social History     Tobacco Use    Smoking status: Former     Current packs/day: 0.00     Average packs/day: 1 pack/day for 20.0 years (20.0 ttl pk-yrs)     Types: Cigarettes     Start date: 1993     Quit date: 2013     Years since quittin.8     Passive exposure: Past    Smokeless tobacco: Never   Substance Use Topics    Alcohol use: Not Currently     Comment: rare          Family History:     Family History   Problem Relation Age of Onset     Diabetes Mother     Hypertension Mother     Cancer Father         stomach    Heart Disease Father     Heart Disease Brother     Diabetes Sister     Diabetes Sister     Diabetes Sister     Heart Disease Brother     Cancer Sister     Glaucoma No family hx of     Macular Degeneration No family hx of           Immunizations:   Due for shingles vaccine, encouraged to seek this from his PCP.    PMHx, FHx, SHx were reviewed, unchanged.         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 50 tablet 0    calcipotriene (DOVONOX) 0.005 % external cream Apply topically 2 times daily In conjunction with fluorouracil cream in a 1:1 mixture. 60 g 2    calcium carbonate 600 mg-vitamin D 400 units (CALTRATE) 600-400 MG-UNIT per tablet Take 1 tablet by mouth 2 times daily.      cetirizine (ZYRTEC) 10 MG tablet Take 1 tablet by mouth daily.      Cholecalciferol (VITAMIN D) 1000 UNITS capsule Take 1 capsule by mouth daily.      ciprofloxacin-dexAMETHasone (CIPRODEX) 0.3-0.1 % otic suspension Instill 4 drops into trach twice daily 7.5 mL 1    fluorouracil (EFUDEX) 5 % external cream Apply topically 2 times daily In conjunction with calcipotriene cream in a 1:1 mixture. 40 g 2    gabapentin (NEURONTIN) 400 MG capsule Take 1 capsule (400 mg) by mouth at bedtime. 90 capsule 3    losartan (COZAAR) 50 MG tablet Take 1 tablet (50 mg) by mouth daily (Patient taking differently: Take 50 mg by mouth at bedtime.) 90 tablet 3    mupirocin (BACTROBAN) 2 % external cream Apply topically 3 times daily. Apply to affected area three times daily before applying aquaphor 30 g  "0    oxyCODONE (ROXICODONE) 5 MG tablet Take 0.5-1 tablets (2.5-5 mg) by mouth every 4 hours as needed for moderate to severe pain. 15 tablet 0    pantoprazole (PROTONIX) 40 MG EC tablet Take 1 tablet (40 mg) by mouth daily. 60 tablet 1    senna-docusate (SENOKOT-S/PERICOLACE) 8.6-50 MG tablet Take 1 tablet by mouth 2 times daily. 30 tablet 0    silver sulfADIAZINE (SILVADENE) 1 % external cream Apply topically 2 times daily. Apply to affected area 400 g 0     No current facility-administered medications for this visit.          Review of Systems:   A comprehensive ROS was done. Positives are per HPI.           Physical Exam:   /75   Pulse 62   Temp 97.8  F (36.6  C) (Oral)   Ht 1.753 m (5' 9\")   Wt 78.3 kg (172 lb 11.2 oz)   SpO2 98%   BMI 25.50 kg/m        Gen: pleasant, NAD, significant other Falguni is present  HEENT: nl conj/sclera, EOMI, no oral ulcers, +hoarseness, trach in place, no nasal crusts  CV: RRR no M/G/R  Resp: CTAB  Abd: soft, ND, NT  Ext: no edema  Skin: one circular patch 5 mm x 5 mm over upper chest (was told to be burn from radiation)  Neuro: grossly non focal  MSK: no active synovitis or joint tenderness, OA changes of hands             Data:   Recent laboratory data reviewed.    CT CAP 11/2020  IMPRESSION:  1.  Acute minimally displaced posterior left 9th rib fracture.     2.  Acute displaced lateral left 5th-8th rib fractures.     3.  No other fractures.     4.  Normal caliber thoracic and abdominal aorta without evidence for acute injury.     5.  No solid organ injury in the upper abdomen or significant free fluid throughout the abdomen or pelvis.     6.  No pneumothorax or pulmonary contusion or significant pleural fluid.     7.  In the extreme left lung apex there is a 9 x 7 mm nodule extending toward the level of the pleural surface. Recommend continued short interval follow-up in 2-3 months to assess for stability.        [Recommend Follow Up: Lung nodule]      CHEST CT " (2/16/2013)    Chest: Multiple indeterminate pulmonary nodules. Several of the  larger lesions are cavitary. There are 3 dominant lesions, a 3.2 cm  cavitary lesion in the left upper lobe, a 3.4 cm solid mass in the  right lower lobe laterally and a 3.7 cm cavitary mass in the right  lower lobe medially. In addition there is mediastinal lymphadenopathy  with a dominant 3 cm subcarinal node. Bilateral axillary  lymphadenopathy with 2.4 cm node seen bilaterally. Chest otherwise  unremarkable.       Exam: High-resolution Chest CT without contrast 1/9/2015 12:42 PM.  History: Concern for ANCA vasculitis flare in the chest.  Comparison: 3/7/2014, 11/14/2013, 2/16/2013.  Technique: CT helical acquisition from the lung apices to the kidneys  was obtained without intravenous contrast. Both inspiratory and  expiratory images were obtained.    Findings:  Moderate atherosclerotic calcifications of the coronary arteries. Mild  calcifications involving the aorta and aortic great vessels. Prominent  paratracheal lymph node on series 3 image 22 measuring 9 mm. Decreased  from 2/16/2013 and unchanged from 3/7/2014. Borderline enlarged right  hilar lymph node, unchanged from 3/7/2014 and decreased from  2/16/2013. Heart is not enlarged. Trace cardial effusion. No axillary  lymphadenopathy.  Nodular scarring in the left apex, unchanged from 2/16/2013. No  pleural effusion or pneumothorax. No evidence of intrapulmonary  infection. There is a cluster of tree in bud nodularity noted in the  anteromedial right upper lobe. These nodules appear new. Expiratory  images demonstrate mild air trapping. Scattered pulmonary nodules:  Series 6 image 146: Seen posteriorly in the right lower lobe, there is  a sub-pleural nodule measuring 1.6 x 1.2 cm with a spiculated border.  Previously, this nodule measured 2.0 x 1.6 cm.  Series 6 image 189: Seen laterally in the right lower lobe, there is a  former mid pulmonary nodule which is unchanged from  3/7/2013 and  decreased from 2/16/2013.  Series 6 image 169: Seen posterolaterally in the right lower lobe,  there is a 3 mm pulmonary nodule which is unchanged from 3/7/2014 and  decreased from 2/16/2013.  Series 6 image 225: Seen posterolaterally in the left lower lobe,  there is a 4 mm subpleural nodule which is unchanged from 2/16/2014.  Other scattered sub-4 mm pulmonary nodules appear stable from previous  study.  Evaluation of the upper abdomen is limited due to the lack of  intravenous contrast.  No suspicious bony lesions.    IMPRESSION  Impression:   1. Scattered pulmonary nodules enlarged lymph nodes are  stable/slightly decreased from the previous study. No evidence for  acute flare in patient's known ANCA-associated vasculitis.  2. New tree in bud nodularity seen anteromedially in the right middle  lobe. Findings could be seen in the setting of an atypical infectious  process.  3. Mild air trapping. Finding is nonspecific and is seen in setting of  small airways disease such as asthma or bronchiolitis.  I have personally reviewed the examination and initial interpretation  and I agree with the findings.  TOÑO PERKINS MD    Copath Report     Patient Name: ADONAY FAITH  MR#: [id number]  Specimen #: [id number]  Collected: 6/11/2015  Received: 6/11/2015  Reported: 6/12/2015 17:37  Ordering Phy(s): DANIELLE PARIS    SPECIMEN(S):  Nasal septum    FINAL DIAGNOSIS:  Nasal septum, biopsy:  -Squamous mucosa with ulcer, marked acute inflammation and reactive  changes  -No granulomas identified  -A special stain for fungi (GMS) is negative    I have personally reviewed all specimens and or slides, including the  listed special stains, and used them with my medical judgement to  determine the final diagnosis.    Electronically signed out by:    Dorys Barton M.D., [facility]    CLINICAL HISTORY:  The patient is a 70-year-old man with a history of left upper back  melanoma, resected in 2011 (see consult report  "[id number]). 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 He now presents for followup visit with nasal  cavity scabs, increased arthralgia and fatigue, concerning for  vasculitis flare. Operative procedure: Nasal septum biopsy.    GROSS:  The specimen is received in formalin with proper patient identification,  labeled \"septal tissue\". The specimen consists of three tan-pink tissue  fragments, 0.2 cm in greatest dimension, entirely submitted in cassette  1. (Dictated by: Ana Mixon 6/11/2015 03:39 PM)    MICROSCOPIC:  Microscopic examination is performed. A GMS stain, performed with  appropriate positive control, is negative.    CPT Codes:  A: 28684-RI6, 97163-QVY    TESTING LAB LOCATION:  [facility], [address] [id number] [phone]    COLLECTION SITE:  Client: [facility]  Location: [facility] ([facility])     Component      Latest Ref Rng & Units 3/5/2021   WBC      4.0 - 11.0 10e9/L 6.9   RBC Count      4.4 - 5.9 10e12/L 4.18 (L)   Hemoglobin      13.3 - 17.7 g/dL 13.8   Hematocrit      40.0 - 53.0 % 41.4   MCV      78 - 100 fl 99   MCH      26.5 - 33.0 pg 33.0   MCHC      31.5 - 36.5 g/dL 33.3   RDW      10.0 - 15.0 % 12.5   Platelet Count      150 - 450 10e9/L 185   % Neutrophils      % 63.6   % Lymphocytes      % 21.6   % Monocytes      % 10.8   % Eosinophils      % 3.4   % Basophils      % 0.6   Absolute Neutrophil      1.6 - 8.3 10e9/L 4.4   Absolute Lymphocytes      0.8 - 5.3 10e9/L 1.5   Absolute Monocytes      0.0 - 1.3 10e9/L 0.7   Absolute Eosinophils      0.0 - 0.7 10e9/L 0.2   Absolute Basophils      0.0 - 0.2 10e9/L 0.0   Diff Method       Automated Method   Color Urine       Yellow   Appearance " Urine       Clear   Glucose Urine      NEG:Negative mg/dL Negative   Bilirubin Urine      NEG:Negative Negative   Ketones Urine      NEG:Negative mg/dL Negative   Specific Gravity Urine      1.003 - 1.035 1.020   pH Urine      5.0 - 7.0 pH 5.5   Protein Albumin Urine      NEG:Negative mg/dL Negative   Urobilinogen Urine      0.2 - 1.0 EU/dL 0.2   Nitrite Urine      NEG:Negative Negative   Blood Urine      NEG:Negative Negative   Leukocyte Esterase Urine      NEG:Negative Negative   Source       Midstream Urine   WBC Urine      OTO5:0 - 5 /HPF 0 - 5   RBC Urine      OTO2:O - 2 /HPF O - 2   Squamous Epithelial /LPF Urine      FEW:Few /LPF Few   Bacteria Urine      NEG:Negative /HPF Few (A)   IGG      610 - 1,616 mg/dL 981   IGA      84 - 499 mg/dL 85   IGM      35 - 242 mg/dL 28 (L)   Creatinine      0.66 - 1.25 mg/dL 1.48 (H)   GFR Estimate      >60 mL/min/1.73:m2 46 (L)   GFR Estimate If Black      >60 mL/min/1.73:m2 53 (L)   Neutrophil Cytoplasmic Antibody      <1:10 titer 1:20 (A)   Neutrophil Cytoplasmic Antibody Pattern       Cytoplasmic ANCA (c-ANCA) staining pattern observed and confirmed on formalin-fixed . . .   CD19 B Cells      6 - 27 % 20   Absolute CD19      107 - 698 cells/uL 352   Protein Random Urine      g/L 0.10   Protein Total Urine g/gr Creatinine      0 - 0.2 g/g Cr 0.13   Myeloperoxidase Antibody IgG      0.0 - 0.9 AI <0.2   Proteinase 3 Antibody IgG      0.0 - 0.9 AI 6.3 (H)   Sed Rate      0 - 20 mm/h 6   Creatinine Urine Random      mg/dL 82   CRP Inflammation      0.0 - 8.0 mg/L <2.9   Albumin      3.4 - 5.0 g/dL 4.0   ALT      0 - 70 U/L 30   AST      0 - 45 U/L 22   Creatinine Urine      mg/dL 82     Component      Latest Ref Rng & Units 1/7/2022   WBC      4.0 - 11.0 10e3/uL 6.2   RBC Count      4.40 - 5.90 10e6/uL 4.25 (L)   Hemoglobin      13.3 - 17.7 g/dL 14.0   Hematocrit      40.0 - 53.0 % 42.2   MCV      78 - 100 fL 99   MCH      26.5 - 33.0 pg 32.9   MCHC      31.5 - 36.5 g/dL 33.2    RDW      10.0 - 15.0 % 12.7   Platelet Count      150 - 450 10e3/uL 183   % Neutrophils      % 63   % Lymphocytes      % 22   % Monocytes      % 11   % Eosinophils      % 4   % Basophils      % 1   Absolute Neutrophils      1.6 - 8.3 10e3/uL 3.9   Absolute Lymphocytes      0.8 - 5.3 10e3/uL 1.4   Absolute Monocytes      0.0 - 1.3 10e3/uL 0.7   Absolute Eosinophils      0.0 - 0.7 10e3/uL 0.2   Absolute Basophils      0.0 - 0.2 10e3/uL 0.0   Color Urine      Colorless, Straw, Light Yellow, Yellow Yellow   Appearance Urine      Clear Clear   Glucose Urine      Negative mg/dL Negative   Bilirubin Urine      Negative Negative   Ketones Urine      Negative mg/dL Negative   Specific Gravity Urine      1.003 - 1.035 <=1.005   Blood Urine      Negative Negative   pH Urine      5.0 - 7.0 6.0   Protein Albumin Urine      Negative mg/dL Negative   Urobilinogen Urine      0.2, 1.0 E.U./dL 0.2   Nitrite Urine      Negative Negative   Leukocyte Esterase Urine      Negative Negative   MPO Anju IgG Instrument Value      <3.5 U/mL 0.3   Myeloperoxidase Antibody IgG      Negative Negative   Proteinase 3 Anju IgG Instrument Value      <2.0 U/mL 36.0 (H)   Proteinase 3 Antibody IgG      Negative Positive (A)   IGG      610-1,616 mg/dL 1,060   IGA      84 - 499 mg/dL 80 (L)   IGM      35 - 242 mg/dL 32 (L)   Protein Random Urine      g/L <0.05   Protein Total Urine g/gr Creatinine          Creatinine Urine      mg/dL 35   Neutrophil Cytoplasmic Antibody      <1:10 1:10 (H)   Neutrophil Cytoplasmic Antibody Pattern       Cytoplasmic ANCA (c-ANCA) staining pattern observed and confirmed on formalin-fixed neutrophils.   CD19 B Cells      6 - 27 % 23   Absolute CD19      107 - 698 cells/uL 336   Creatinine      0.66 - 1.25 mg/dL 1.53 (H)   GFR Estimate      >60 mL/min/1.73m2 47 (L)   RBC Urine      0-2 /HPF /HPF None Seen   WBC Urine      0-5 /HPF /HPF None Seen   Sed Rate      0 - 20 mm/hr 5   CRP Inflammation      0.0 - 8.0 mg/L <2.9    Albumin      3.4 - 5.0 g/dL 3.6   ALT      0 - 70 U/L 26   AST      0 - 45 U/L 20      [facility] Reference Range & Units 03/10/23 14:29   Creatinine 0.67 - 1.17 mg/dL 1.45 (H)   GFR Estimate >60 mL/min/1.73m2 50 (L)   Albumin 3.5 - 5.2 g/dL 4.4   ALT 10 - 50 U/L 18   AST 10 - 50 U/L 25   CRP Inflammation <5.00 mg/L <3.00   Creatinine Urine mg/dL 118.5   WBC 4.0 - 11.0 10e3/uL 7.1   Hemoglobin 13.3 - 17.7 g/dL 13.8   Hematocrit 40.0 - 53.0 % 41.7   Platelet Count 150 - 450 10e3/uL 191   RBC Count 4.40 - 5.90 10e6/uL 4.25 (L)   MCV 78 - 100 fL 98   MCH 26.5 - 33.0 pg 32.5   MCHC 31.5 - 36.5 g/dL 33.1   RDW 10.0 - 15.0 % 12.4   % Neutrophils % 67   % Lymphocytes % 20   % Monocytes % 9   % Eosinophils % 4   % Basophils % 1   Absolute Basophils 0.0 - 0.2 10e3/uL 0.1   Absolute Eosinophils 0.0 - 0.7 10e3/uL 0.3   Absolute Lymphocytes 0.8 - 5.3 10e3/uL 1.4   Absolute Monocytes 0.0 - 1.3 10e3/uL 0.6   Absolute Neutrophils 1.6 - 8.3 10e3/uL 4.8   Sed Rate 0 - 20 mm/hr 7   Color Urine Colorless, Straw, Light Yellow, Yellow  Yellow   Appearance Urine Clear  Clear   Glucose Urine Negative mg/dL Negative   Bilirubin Urine Negative  Negative   Ketones Urine Negative mg/dL Negative   Specific Gravity Urine 1.003 - 1.035  1.025   pH Urine 5.0 - 7.0  6.0   Protein Albumin Urine Negative mg/dL Negative   Urobilinogen Urine 0.2, 1.0 E.U./dL 0.2   Nitrite Urine Negative  Negative   Blood Urine Negative  Negative   Leukocyte Esterase Urine Negative  Negative   WBC Urine 0-5 /HPF /HPF 0-5   RBC Urine 0-2 /HPF /HPF 0-2   Bacteria Urine None Seen /HPF Few !   Squamous Epithelial /LPF Urine None Seen /LPF Few !   Myeloperoxidase Antibody IgG Negative  Negative   Neutrophil Cytoplasmic Antibody <1:10  1:20 (H)   Neutrophil Cytoplasmic Antibody Pattern  Cytoplasmic ANCA (c-ANCA) staining pattern observed and confirmed on formalin-fixed neutrophils.   Proteinase 3 Antibody IgG Negative  Positive !   (H): Data is abnormally high  (L): Data is  abnormally low  !: Data is abnormal      Component      Latest Ref Rn 11/20/2024  4:13 PM   WBC      4.0 - 11.0 10e3/uL 5.6    RBC Count      4.40 - 5.90 10e6/uL 4.26 (L)    Hemoglobin      13.3 - 17.7 g/dL 12.7 (L)    Hematocrit      40.0 - 53.0 % 39.9 (L)    MCV      78 - 100 fL 94    MCH      26.5 - 33.0 pg 29.8    MCHC      31.5 - 36.5 g/dL 31.8    RDW      10.0 - 15.0 % 12.0    Platelet Count      150 - 450 10e3/uL 219    % Neutrophils      % 63    % Lymphocytes      % 22    % Monocytes      % 11    % Eosinophils      % 3    % Basophils      % 1    % Immature Granulocytes      % 0    Absolute Neutrophils      1.6 - 8.3 10e3/uL 3.5    Absolute Lymphocytes      0.8 - 5.3 10e3/uL 1.2    Absolute Monocytes      0.0 - 1.3 10e3/uL 0.6    Absolute Eosinophils      0.0 - 0.7 10e3/uL 0.2    Absolute Basophils      0.0 - 0.2 10e3/uL 0.0    Absolute Immature Granulocytes      <=0.4 10e3/uL 0.0    Color Urine      Colorless, Straw, Light Yellow, Yellow  Yellow    Appearance Urine      Clear  Clear    Glucose Urine      Negative mg/dL Negative    Bilirubin Urine      Negative  Negative    Ketones Urine      Negative mg/dL Negative    Specific Gravity Urine      1.003 - 1.035  1.020    Blood Urine      Negative  Negative    pH Urine      5.0 - 7.0  6.0    Protein Albumin Urine      Negative mg/dL Negative    Urobilinogen Urine      0.2, 1.0 E.U./dL 0.2    Nitrite Urine      Negative  Negative    Leukocyte Esterase Urine      Negative  Negative    Bacteria Urine      None Seen /HPF None Seen    RBC Urine      0-2 /HPF /HPF None Seen    WBC Urine      0-5 /HPF /HPF 0-5    Squamous Epithelial /LPF Urine      None Seen /LPF None Seen    Mucus Urine      None Seen /LPF Present !    Calcium Oxalate      None Seen /HPF Few !    MPO Anju IgG Instrument Value      <3.5 U/mL 0.5    Myeloperoxidase Antibody IgG      Negative  Negative    Proteinase 3 Anju IgG Instrument Value      <2.0 U/mL 28.0 (H)    Proteinase 3 Antibody IgG       Negative  Positive !    IGG      610 - 1,616 mg/dL 1,132    IGA      84 - 499 mg/dL 162    IGM      35 - 242 mg/dL 73    CD19 B Cells      6 - 27 % 10    Absolute CD19      107 - 698 cells/uL 136    Total Protein Urine mg/dL        mg/dL 11.6    Total Protein Urine mg/mg Creat      0.00 - 0.20 mg/mg Cr 0.08    Creatinine Urine      mg/dL 147.7    Creatinine      0.67 - 1.17 mg/dL 1.33 (H)    GFR Estimate      >60 mL/min/1.73m2 55 (L)    Neutrophil Cytoplasmic Antibody      <=1:10  1:10    Neutrophil Cytoplasmic Antibody Pattern Cytoplasmic ANCA (c-ANCA) staining pattern observed and confirmed on formalin-fixed neutrophils.    AST      0 - 45 U/L 20    ALT      0 - 70 U/L 11    Albumin      3.5 - 5.2 g/dL 4.0    CRP Inflammation      <5.00 mg/L <3.00    Sed Rate      0 - 20 mm/hr 23 (H)       Legend:  (L) Low  ! Abnormal  (H) High   done

## 2024-12-12 NOTE — LETTER
12/12/2024       RE: Joshua Ennis  [address] [facility] [address]     Dear Colleague,    Thank you for referring your patient, Joshua Ennis, to the [facility] [place] at [facility]. Please see a copy of my visit note below.    [facility] Follow-Up In Person Visit Note    Date of visit: 12/12/2024  Last visit date: 7/11/2024    Joshua Ennis MRN# [id number]   Age: 79 year old    Reason for visit: GPA, high risk med use monitoring   YOB: 1945          Assessment and Plan:     Assessment:   Mr. Ennis is a 79 yr old  male with history of melanoma s/p resection in 11/2011, former tobacco use, and GPA (PR3+, MPO and c-ANCA negative in 3/2013 based on aforementioned labs, confirmed by kidney and lung biopsy) who presents for clinic follow-up regarding GPA.  He was initiated on cyclophosphamide 150 mg QD and high-dose prednisone 3/2013. He last took prednisone in 10/2013. Cytoxan was switched to MTX for maintenance treatment in 10/2013.    7/6/2023:  Vasculitis seems to be stable and he has no signs or symptoms of flare up today, though PR3 remains persistently positive. New upper airway symptoms, while more likely due to non-vasculitis inflammation, GERD, vocal cord dysfunction, could represent vasculitis in tracheal wall, which can occur in absence of elevated inflammatory markers. Also, former smoker and with immunosuppression status, need to make sure there is no SCC. Will refer for ENT evaluation and also acquire high-resolution non-contrast CT to follow nodule in his lung incidentally identified in 2020. Otherwise continue with current plan of low dose methotrexate 10mg (4 tabs) weekly and Q3 mo labs. Was advised to call in case of vasculitis flare up sx.        Plan 7/6/2023:    - Continue MTX 10 mg (4 tabs) weekly     > Labs q3m      > Hold methotrexate if develops an infection  -  Continue with Folic acid 1 mg po   - Referral to ENT for new onset persistent voice changes/hoarseness and continued cough/phlegm (2017 note indicated laryngoscopy would be next step if he developed persistent hoarseness)  - High-resolution CT chest w/o contrast for follow up of pulm nodule  - Repeat labs in 3 mo    Return in 1y (in person)               7/11/2024:    Since last visit, was diagnosed with laryngeal SCC, will have another procedure on 7/17 with awake intubation, unchanged horseness, no other complaints. Asked me about awake intubation process, I advised him to ask surgical team to review/explain to him, I will message his ENT team. 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 Advised good hydration. He is on amoxicillin since 7/3 x 2 wk course to prevent surgical infection, due to drug drug interaction and possible rise in methotrexate level and low GFR, advised him tos kip next dose of methotrexate with follow up labs on Mon. If he needs further cancer tx, will hold methotrexate. He could benefit from MTM referral to check on meds, drug-drug interaction in future, referral was placed.    Plan:    Skip the methotrexate this coming Sunday 7/14, resume Sunday 7/21     Continue with folic acid    Labs on Monday    Good hydration    Refer to [facility] pharmacist    Return in 6 months (in person)        Today 12/12/2024:      New Dx of laryngeal SCC s/p surgery, radiation, now has a trach in place. ENT follow up today, upcoming PET CT 12 wk post radiation. Will continue to hold methotrexate and monitor for vasculitis flare up sx. Labs every 3 mo, so far stable off methotrexate. Last checked 11/2024. Will continue with gabapentin 400 mg at bedtime for chronic neuropathy.    Plan:    Labs every 3 months, due 2/2025    Continue to hold methotrexate and folic acid    Return in person in 6  months    TT 30 min was spent on date of the encounter doing chart review, history and exam, documentation and further activities as noted above. Any prior notes, outside records, laboratory results, and imaging studies were reviewed if relevant.    The longitudinal plan of care for the diagnosis(es)/condition(s) as documented were addressed during this visit. Due to the added complexity in care, I will continue to support Joshua in the subsequent management and with ongoing continuity of care.      Ede Sanchez MD      Orders Placed This Encounter   Procedures     AST     ALT     Myeloperoxidase and Proteinase 3 Panel     Albumin level     Creatinine     CRP inflammation     UA with Microscopic reflex to Culture     Protein  random urine     Creatinine random urine     Erythrocyte sedimentation rate auto     ANCA IgG by IFA with Reflex to Titer     CBC with Platelets & Differential            HPI / Interim History:      Mr. Ennis is a 76 yo WM with h/o melanoma s/p resection in 11/2011, former tobacco use, and GPA diagnosed in 3/2013. He presents for follow up.    Had a hospitalization 11/2-11/4/2020 with a fall with displaced left 5th-9th rib fractures, now recovered.    He has been tolerating it well. He had a flare in 12/2013 with increased crusts in his nose along with recurrence of arthralgia, worsening numbness in feet and increasing vasculitis marker. Renal function was stable with negative repeat chest CT. His vasculitis flare was treated with short course of prednisone taper 20 mg po qd max and increasing MTX to 8 tab = 20 mg qwk. Vasculitis symptoms improved.  At visit on 1/2015, he had scabs in his nose, had cold dakota symptoms and increased arthralgia/fatigue (shoulders, L hand). Labs from 12/16 showed increased Cr level and hematuria with rising PR3 (more than 8), there was a concern for vasculitis flare (in kidneys, sinuses), no flare on repeat chest CT 1/2015. Therefore it was recommended to try  "rituximab. Another reason was to be able to taper MTX off given abnormal Cr.  He is now s/p Rituximab infusion x 4 (1st on 2/25/2015). He is feeling well. No hematuria with stable Cr, no SOB. Saw ENT with negative nasal mucosa biopsy 6/2015 for granuloma but showed active inflammation, had left nostril lesion which decreased in size by use of mupirocin ointment. PR3 vasculitis marker went back to NL in 6/2015, it was NL in 10/2015, given stable vasculitis and low GFR, MTX from 8 to 7 tab po qwk. Repeat WV-3 in 3/2016 was slightly positive, Cr was slightly higher than baseline. We tapered his MTX further to 6 tab po qwk in 2/2016 given lack of symptoms. In 3/2017, MTX was reduced from 6 to 5 tab qwk. It was further tapered to 4 tab po qwk in 6/2017 given stable disease. No vasculitis flare ups by such change.     7/7/22:  Reports doing well overall. Denies any vasculitis symptoms. Denies any fever/chills, fatigue, headaches, congestion, nose bleeds, mouth sores, cough/hemoptysis, chest pain, SOB, hematuria, dysuria, or any RUQ pain. Still endorses neuropathy but it is stable. No other concerns    7/6/23:  Doing fairly well overall though has noted persistent bothersome hoarseness and voice changes over the past 3 mos. Associates this with \"stickier\" phlegm than usual with his chronic cough. Notes this cough has been productive of grey-white sputum and present for many years relatively unchanged. Also reports episode of poison oak ~ 3 wks ago, was given a short course of prednisone (notes indicate 40 mg every day x 5 days) by an urgent care. Has now resolved, and reports that during that prednisone course there was no change to his hoarseness or phlegm. Otherwise denies weight loss, loss of energy, loss of appetite, dysgeusia, fevers, chills, headaches, congestion, nose bleeds, nose crusting, mouth sores, hemoptysis, rashes, sores, chest pain, dyspnea, hematuria, dysuria, nausea, diarrhea, RUQ pain, vision or hearing " changes. No other concerns.       7/11/2024:    -no vasculitis flare up, no crusting in the nose    -Dx of laryngeal SCC since last visit, continues to have hoarseness, will have another ENT procedure on 7/17    -no weight loss, energy and appetite is good, in good spirit, physically active      Today 12/12/2024:      -laryngeal SCC    Per ENT:    -finished radiation on 9/24  - had trach and MDL with biopsy on 10/17/24  - pathology negative for carcinoma      -doing well today, still has trach in place, will see DR. 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 Negative for high-grade dysplasia and malignancy.     Hoarseness      Hypertension      Idiopathic progressive polyneuropathy      Kidney problem      Laryngeal cancer (H)      Malignant melanoma (H)      Malignant neoplasm (H)     melanoma     Squamous cell carcinoma      Russo syndrome           Past Surgical History:     Past Surgical History:   Procedure Laterality Date     BIOPSY  11/2011    melanoma on back     DISSECT LYMPH NODE INGUINAL  3/1/2013    Procedure: DISSECT LYMPH NODE INGUINAL;  Bilateral Inguinal Lymph Node Biopsy.;  Surgeon: Tariq Acosta MD;  Location: [facility]     ESOPHAGOSCOPY, GASTROSCOPY, DUODENOSCOPY (EGD), COMBINED   7/5/2013    Procedure: COMBINED ESOPHAGOSCOPY, GASTROSCOPY, DUODENOSCOPY (EGD);  Gastroscopy;  Surgeon: Tariq Acosta MD;  Location: [facility]     HERNIORRHAPHY INGUINAL  8/6/2012    Procedure: HERNIORRHAPHY INGUINAL;  Open Repair Left Inguinal Hernia;  Surgeon: Jerad Baker MD;  Location: [facility]     INJECT STEROID (LOCATION) N/A 8/30/2023    Procedure: steroid injection;  Surgeon: Josephine Malone MD;  Location: [facility]     INJECT STEROID (LOCATION) N/A 9/13/2023    Procedure: steroid injection;  Surgeon: Josephine Malone MD;  [facility]: [facility]     INJECT STEROID (LOCATION) N/A 10/25/2023    Procedure: steroid injection, stent placement, and biopsy;  Surgeon: Josephine Malone MD;  [facility]: [facility]     INJECT STEROID (LOCATION) N/A 12/1/2023    Procedure: steroid injection;  Surgeon: Josephine Malone MD;  [facility]     LARYNGOSCOPY WITH MICROSCOPE N/A 9/13/2023    Procedure: MICROLARYNGOSCOPY with stent removal and biopsy;  Surgeon: Josephine Malone MD;  Location: [facility]     LARYNGOSCOPY, FLEXIBLE WITH INJECTION N/A 12/11/2023    Procedure: LARYNGOSCOPY, FLEXIBLE WITH INJECTION with steroid;  Surgeon: Josephine Malone MD;  Location: [facility] OR     LASER CO2 LARYNGOSCOPY N/A 8/30/2023    Procedure: MICROLARYNGOSCOPY, CO2 laser resection of vocal fold lesion;  Surgeon: Josephine Malone MD;  [facility]: [facility]     LASER CO2 LARYNGOSCOPY N/A 10/25/2023    Procedure: MICROLARYNGOSCOPY, CO2 laser resection of vocal fold lesion;  Surgeon: Josephine Malone MD;  [facility]: [facility]     LASER CO2 LARYNGOSCOPY N/A 12/1/2023    Procedure: MICROLARYNGOSCOPY CO2 laser standby, resection of vocal fold lesion, stent removal, biopsy;  Surgeon: Josephine Malone MD;  [facility]: [facility]     LASER CO2 LARYNGOSCOPY, COMPLEX N/A 7/17/2024    Procedure: MICROLARYNGOSCOPY, CO2 laser standby, biopsy;  Surgeon: Josephine Malone MD;  Location: [facility]          Social History:     Social History     Tobacco Use     Smoking status: Former     Current packs/day: 0.00     Average  packs/day: 1 pack/day for 20.0 years (20.0 ttl pk-yrs)     Types: Cigarettes     Start date: 1993     Quit date: 2013     Years since quittin.8     Passive exposure: Past     Smokeless tobacco: Never   Substance Use Topics     Alcohol use: Not Currently     Comment: rare          Family History:     Family History   Problem Relation Age of Onset     Diabetes Mother      Hypertension Mother      Cancer Father         stomach     Heart Disease Father      Heart Disease Brother      Diabetes Sister      Diabetes Sister      Diabetes Sister      Heart Disease Brother      Cancer Sister      Glaucoma No family hx of      Macular Degeneration No family hx of           Immunizations:   Due for shingles vaccine, encouraged to seek this from his PCP.    PMHx, FHx, SHx were reviewed, unchanged.         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 50 tablet 0     calcipotriene (DOVONOX) 0.005 % external cream Apply topically 2 times daily In conjunction with fluorouracil cream in a 1:1 mixture. 60 g 2     calcium carbonate 600 mg-vitamin D 400 units (CALTRATE) 600-400 MG-UNIT per tablet Take 1 tablet by mouth 2 times daily.       cetirizine (ZYRTEC) 10 MG tablet Take 1 tablet by mouth daily.       Cholecalciferol (VITAMIN D) 1000 UNITS capsule Take 1 capsule by mouth daily.       ciprofloxacin-dexAMETHasone (CIPRODEX) 0.3-0.1 % otic suspension Instill 4 drops into trach twice daily 7.5 mL 1     fluorouracil (EFUDEX) 5 % external cream Apply topically 2 times daily In conjunction with calcipotriene cream in a 1:1 mixture. 40 g 2  "    gabapentin (NEURONTIN) 400 MG capsule Take 1 capsule (400 mg) by mouth at bedtime. 90 capsule 3     losartan (COZAAR) 50 MG tablet Take 1 tablet (50 mg) by mouth daily (Patient taking differently: Take 50 mg by mouth at bedtime.) 90 tablet 3     mupirocin (BACTROBAN) 2 % external cream Apply topically 3 times daily. Apply to affected area three times daily before applying aquaphor 30 g 0     oxyCODONE (ROXICODONE) 5 MG tablet Take 0.5-1 tablets (2.5-5 mg) by mouth every 4 hours as needed for moderate to severe pain. 15 tablet 0     pantoprazole (PROTONIX) 40 MG EC tablet Take 1 tablet (40 mg) by mouth daily. 60 tablet 1     senna-docusate (SENOKOT-S/PERICOLACE) 8.6-50 MG tablet Take 1 tablet by mouth 2 times daily. 30 tablet 0     silver sulfADIAZINE (SILVADENE) 1 % external cream Apply topically 2 times daily. Apply to affected area 400 g 0     No current facility-administered medications for this visit.          Review of Systems:   A comprehensive ROS was done. Positives are per HPI.           Physical Exam:   /75   Pulse 62   Temp 97.8  F (36.6  C) (Oral)   Ht 1.753 m (5' 9\")   Wt 78.3 kg (172 lb 11.2 oz)   SpO2 98%   BMI 25.50 kg/m        Gen: pleasant, NAD, significant other Falguni is present  HEENT: nl conj/sclera, EOMI, no oral ulcers, +hoarseness, trach in place, no nasal crusts  CV: RRR no M/G/R  Resp: CTAB  Abd: soft, ND, NT  Ext: no edema  Skin: one circular patch 5 mm x 5 mm over upper chest (was told to be burn from radiation)  Neuro: grossly non focal  MSK: no active synovitis or joint tenderness, OA changes of hands             Data:   Recent laboratory data reviewed.    CT CAP 11/2020  IMPRESSION:  1.  Acute minimally displaced posterior left 9th rib fracture.     2.  Acute displaced lateral left 5th-8th rib fractures.     3.  No other fractures.     4.  Normal caliber thoracic and abdominal aorta without evidence for acute injury.     5.  No solid organ injury in the upper abdomen or " significant free fluid throughout the abdomen or pelvis.     6.  No pneumothorax or pulmonary contusion or significant pleural fluid.     7.  In the extreme left lung apex there is a 9 x 7 mm nodule extending toward the level of the pleural surface. Recommend continued short interval follow-up in 2-3 months to assess for stability.        [Recommend Follow Up: Lung nodule]      CHEST CT (2/16/2013)    Chest: Multiple indeterminate pulmonary nodules. Several of the  larger lesions are cavitary. There are 3 dominant lesions, a 3.2 cm  cavitary lesion in the left upper lobe, a 3.4 cm solid mass in the  right lower lobe laterally and a 3.7 cm cavitary mass in the right  lower lobe medially. In addition there is mediastinal lymphadenopathy  with a dominant 3 cm subcarinal node. Bilateral axillary  lymphadenopathy with 2.4 cm node seen bilaterally. Chest otherwise  unremarkable.       Exam: High-resolution Chest CT without contrast 1/9/2015 12:42 PM.  History: Concern for ANCA vasculitis flare in the chest.  Comparison: 3/7/2014, 11/14/2013, 2/16/2013.  Technique: CT helical acquisition from the lung apices to the kidneys  was obtained without intravenous contrast. Both inspiratory and  expiratory images were obtained.    Findings:  Moderate atherosclerotic calcifications of the coronary arteries. Mild  calcifications involving the aorta and aortic great vessels. Prominent  paratracheal lymph node on series 3 image 22 measuring 9 mm. Decreased  from 2/16/2013 and unchanged from 3/7/2014. Borderline enlarged right  hilar lymph node, unchanged from 3/7/2014 and decreased from  2/16/2013. Heart is not enlarged. Trace cardial effusion. No axillary  lymphadenopathy.  Nodular scarring in the left apex, unchanged from 2/16/2013. No  pleural effusion or pneumothorax. No evidence of intrapulmonary  infection. There is a cluster of tree in bud nodularity noted in the  anteromedial right upper lobe. These nodules appear new.  Expiratory  images demonstrate mild air trapping. Scattered pulmonary nodules:  Series 6 image 146: Seen posteriorly in the right lower lobe, there is  a sub-pleural nodule measuring 1.6 x 1.2 cm with a spiculated border.  Previously, this nodule measured 2.0 x 1.6 cm.  Series 6 image 189: Seen laterally in the right lower lobe, there is a  former mid pulmonary nodule which is unchanged from 3/7/2013 and  decreased from 2/16/2013.  Series 6 image 169: Seen posterolaterally in the right lower lobe,  there is a 3 mm pulmonary nodule which is unchanged from 3/7/2014 and  decreased from 2/16/2013.  Series 6 image 225: Seen posterolaterally in the left lower lobe,  there is a 4 mm subpleural nodule which is unchanged from 2/16/2014.  Other scattered sub-4 mm pulmonary nodules appear stable from previous  study.  Evaluation of the upper abdomen is limited due to the lack of  intravenous contrast.  No suspicious bony lesions.    IMPRESSION  Impression:   1. Scattered pulmonary nodules enlarged lymph nodes are  stable/slightly decreased from the previous study. No evidence for  acute flare in patient's known ANCA-associated vasculitis.  2. New tree in bud nodularity seen anteromedially in the right middle  lobe. Findings could be seen in the setting of an atypical infectious  process.  3. Mild air trapping. Finding is nonspecific and is seen in setting of  small airways disease such as asthma or bronchiolitis.  I have personally reviewed the examination and initial interpretation  and I agree with the findings.  TOÑO PERKINS MD    Copath Report     Patient Name: ADONAY FAITH  MR#: [id number]  Specimen #: [id number]  Collected: 6/11/2015  Received: 6/11/2015  Reported: 6/12/2015 17:37  Ordering Phy(s): DANIELLE PARIS    SPECIMEN(S):  Nasal septum    FINAL DIAGNOSIS:  Nasal septum, biopsy:  -Squamous mucosa with ulcer, marked acute inflammation and reactive  changes  -No granulomas identified  -A special stain for fungi  "(GMS) is negative    I have personally reviewed all specimens and or slides, including the  listed special stains, and used them with my medical judgement to  determine the final diagnosis.    Electronically signed out by:    Dorys Barton M.D., [facility]    CLINICAL HISTORY:  The patient is a 70-year-old man with a history of left upper back  melanoma, resected in 2011 (see consult report [id number]). 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 He now presents for followup visit with nasal  cavity scabs, increased arthralgia and fatigue, concerning for  vasculitis flare. Operative procedure: Nasal septum biopsy.    GROSS:  The specimen is received in formalin with proper patient identification,  labeled \"septal tissue\". The specimen consists of three tan-pink tissue  fragments, 0.2 cm in greatest dimension, entirely submitted in cassette  1. (Dictated by: Ana Mixon 6/11/2015 03:39 PM)    MICROSCOPIC:  Microscopic examination is performed. A GMS stain, performed with  appropriate positive control, is negative.    CPT Codes:  A: 84908-NR7, 18807-VPZ    TESTING LAB LOCATION:  [facility], [address] [id number] [phone]    COLLECTION SITE:  Client: [facility]  Location: [facility] (B)     Component      Latest Ref Rng & Units 3/5/2021   WBC      4.0 - 11.0 10e9/L 6.9   RBC Count      4.4 - 5.9 10e12/L 4.18 (L)   Hemoglobin      13.3 - 17.7 g/dL 13.8   Hematocrit      40.0 - 53.0 % 41.4   MCV      78 - 100 fl 99   MCH      26.5 - 33.0 pg 33.0   MCHC      31.5 - 36.5 g/dL 33.3   RDW      10.0 - 15.0 % 12.5   Platelet Count      150 - 450 10e9/L 185   % Neutrophils      % 63.6   % Lymphocytes      % " 21.6   % Monocytes      % 10.8   % Eosinophils      % 3.4   % Basophils      % 0.6   Absolute Neutrophil      1.6 - 8.3 10e9/L 4.4   Absolute Lymphocytes      0.8 - 5.3 10e9/L 1.5   Absolute Monocytes      0.0 - 1.3 10e9/L 0.7   Absolute Eosinophils      0.0 - 0.7 10e9/L 0.2   Absolute Basophils      0.0 - 0.2 10e9/L 0.0   Diff Method       Automated Method   Color Urine       Yellow   Appearance Urine       Clear   Glucose Urine      NEG:Negative mg/dL Negative   Bilirubin Urine      NEG:Negative Negative   Ketones Urine      NEG:Negative mg/dL Negative   Specific Gravity Urine      1.003 - 1.035 1.020   pH Urine      5.0 - 7.0 pH 5.5   Protein Albumin Urine      NEG:Negative mg/dL Negative   Urobilinogen Urine      0.2 - 1.0 EU/dL 0.2   Nitrite Urine      NEG:Negative Negative   Blood Urine      NEG:Negative Negative   Leukocyte Esterase Urine      NEG:Negative Negative   Source       Midstream Urine   WBC Urine      OTO5:0 - 5 /HPF 0 - 5   RBC Urine      OTO2:O - 2 /HPF O - 2   Squamous Epithelial /LPF Urine      FEW:Few /LPF Few   Bacteria Urine      NEG:Negative /HPF Few (A)   IGG      610 - 1,616 mg/dL 981   IGA      84 - 499 mg/dL 85   IGM      35 - 242 mg/dL 28 (L)   Creatinine      0.66 - 1.25 mg/dL 1.48 (H)   GFR Estimate      >60 mL/min/1.73:m2 46 (L)   GFR Estimate If Black      >60 mL/min/1.73:m2 53 (L)   Neutrophil Cytoplasmic Antibody      <1:10 titer 1:20 (A)   Neutrophil Cytoplasmic Antibody Pattern       Cytoplasmic ANCA (c-ANCA) staining pattern observed and confirmed on formalin-fixed . . .   CD19 B Cells      6 - 27 % 20   Absolute CD19      107 - 698 cells/uL 352   Protein Random Urine      g/L 0.10   Protein Total Urine g/gr Creatinine      0 - 0.2 g/g Cr 0.13   Myeloperoxidase Antibody IgG      0.0 - 0.9 AI <0.2   Proteinase 3 Antibody IgG      0.0 - 0.9 AI 6.3 (H)   Sed Rate      0 - 20 mm/h 6   Creatinine Urine Random      mg/dL 82   CRP Inflammation      0.0 - 8.0 mg/L <2.9   Albumin       3.4 - 5.0 g/dL 4.0   ALT      0 - 70 U/L 30   AST      0 - 45 U/L 22   Creatinine Urine      mg/dL 82     Component      Latest Ref Rng & Units 1/7/2022   WBC      4.0 - 11.0 10e3/uL 6.2   RBC Count      4.40 - 5.90 10e6/uL 4.25 (L)   Hemoglobin      13.3 - 17.7 g/dL 14.0   Hematocrit      40.0 - 53.0 % 42.2   MCV      78 - 100 fL 99   MCH      26.5 - 33.0 pg 32.9   MCHC      31.5 - 36.5 g/dL 33.2   RDW      10.0 - 15.0 % 12.7   Platelet Count      150 - 450 10e3/uL 183   % Neutrophils      % 63   % Lymphocytes      % 22   % Monocytes      % 11   % Eosinophils      % 4   % Basophils      % 1   Absolute Neutrophils      1.6 - 8.3 10e3/uL 3.9   Absolute Lymphocytes      0.8 - 5.3 10e3/uL 1.4   Absolute Monocytes      0.0 - 1.3 10e3/uL 0.7   Absolute Eosinophils      0.0 - 0.7 10e3/uL 0.2   Absolute Basophils      0.0 - 0.2 10e3/uL 0.0   Color Urine      Colorless, Straw, Light Yellow, Yellow Yellow   Appearance Urine      Clear Clear   Glucose Urine      Negative mg/dL Negative   Bilirubin Urine      Negative Negative   Ketones Urine      Negative mg/dL Negative   Specific Gravity Urine      1.003 - 1.035 <=1.005   Blood Urine      Negative Negative   pH Urine      5.0 - 7.0 6.0   Protein Albumin Urine      Negative mg/dL Negative   Urobilinogen Urine      0.2, 1.0 E.U./dL 0.2   Nitrite Urine      Negative Negative   Leukocyte Esterase Urine      Negative Negative   MPO Anju IgG Instrument Value      <3.5 U/mL 0.3   Myeloperoxidase Antibody IgG      Negative Negative   Proteinase 3 Anju IgG Instrument Value      <2.0 U/mL 36.0 (H)   Proteinase 3 Antibody IgG      Negative Positive (A)   IGG      610-1,616 mg/dL 1,060   IGA      84 - 499 mg/dL 80 (L)   IGM      35 - 242 mg/dL 32 (L)   Protein Random Urine      g/L <0.05   Protein Total Urine g/gr Creatinine          Creatinine Urine      mg/dL 35   Neutrophil Cytoplasmic Antibody      <1:10 1:10 (H)   Neutrophil Cytoplasmic Antibody Pattern       Cytoplasmic ANCA  (c-ANCA) staining pattern observed and confirmed on formalin-fixed neutrophils.   CD19 B Cells      6 - 27 % 23   Absolute CD19      107 - 698 cells/uL 336   Creatinine      0.66 - 1.25 mg/dL 1.53 (H)   GFR Estimate      >60 mL/min/1.73m2 47 (L)   RBC Urine      0-2 /HPF /HPF None Seen   WBC Urine      0-5 /HPF /HPF None Seen   Sed Rate      0 - 20 mm/hr 5   CRP Inflammation      0.0 - 8.0 mg/L <2.9   Albumin      3.4 - 5.0 g/dL 3.6   ALT      0 - 70 U/L 26   AST      0 - 45 U/L 20      [facility] Reference Range & Units 03/10/23 14:29   Creatinine 0.67 - 1.17 mg/dL 1.45 (H)   GFR Estimate >60 mL/min/1.73m2 50 (L)   Albumin 3.5 - 5.2 g/dL 4.4   ALT 10 - 50 U/L 18   AST 10 - 50 U/L 25   CRP Inflammation <5.00 mg/L <3.00   Creatinine Urine mg/dL 118.5   WBC 4.0 - 11.0 10e3/uL 7.1   Hemoglobin 13.3 - 17.7 g/dL 13.8   Hematocrit 40.0 - 53.0 % 41.7   Platelet Count 150 - 450 10e3/uL 191   RBC Count 4.40 - 5.90 10e6/uL 4.25 (L)   MCV 78 - 100 fL 98   MCH 26.5 - 33.0 pg 32.5   MCHC 31.5 - 36.5 g/dL 33.1   RDW 10.0 - 15.0 % 12.4   % Neutrophils % 67   % Lymphocytes % 20   % Monocytes % 9   % Eosinophils % 4   % Basophils % 1   Absolute Basophils 0.0 - 0.2 10e3/uL 0.1   Absolute Eosinophils 0.0 - 0.7 10e3/uL 0.3   Absolute Lymphocytes 0.8 - 5.3 10e3/uL 1.4   Absolute Monocytes 0.0 - 1.3 10e3/uL 0.6   Absolute Neutrophils 1.6 - 8.3 10e3/uL 4.8   Sed Rate 0 - 20 mm/hr 7   Color Urine Colorless, Straw, Light Yellow, Yellow  Yellow   Appearance Urine Clear  Clear   Glucose Urine Negative mg/dL Negative   Bilirubin Urine Negative  Negative   Ketones Urine Negative mg/dL Negative   Specific Gravity Urine 1.003 - 1.035  1.025   pH Urine 5.0 - 7.0  6.0   Protein Albumin Urine Negative mg/dL Negative   Urobilinogen Urine 0.2, 1.0 E.U./dL 0.2   Nitrite Urine Negative  Negative   Blood Urine Negative  Negative   Leukocyte Esterase Urine Negative  Negative   WBC Urine 0-5 /HPF /HPF 0-5   RBC Urine 0-2 /HPF /HPF 0-2   Bacteria Urine None  Seen /HPF Few !   Squamous Epithelial /LPF Urine None Seen /LPF Few !   Myeloperoxidase Antibody IgG Negative  Negative   Neutrophil Cytoplasmic Antibody <1:10  1:20 (H)   Neutrophil Cytoplasmic Antibody Pattern  Cytoplasmic ANCA (c-ANCA) staining pattern observed and confirmed on formalin-fixed neutrophils.   Proteinase 3 Antibody IgG Negative  Positive !   (H): Data is abnormally high  (L): Data is abnormally low  !: Data is abnormal      Component      Latest Ref [facility] 11/20/2024  4:13 PM   WBC      4.0 - 11.0 10e3/uL 5.6    RBC Count      4.40 - 5.90 10e6/uL 4.26 (L)    Hemoglobin      13.3 - 17.7 g/dL 12.7 (L)    Hematocrit      40.0 - 53.0 % 39.9 (L)    MCV      78 - 100 fL 94    MCH      26.5 - 33.0 pg 29.8    MCHC      31.5 - 36.5 g/dL 31.8    RDW      10.0 - 15.0 % 12.0    Platelet Count      150 - 450 10e3/uL 219    % Neutrophils      % 63    % Lymphocytes      % 22    % Monocytes      % 11    % Eosinophils      % 3    % Basophils      % 1    % Immature Granulocytes      % 0    Absolute Neutrophils      1.6 - 8.3 10e3/uL 3.5    Absolute Lymphocytes      0.8 - 5.3 10e3/uL 1.2    Absolute Monocytes      0.0 - 1.3 10e3/uL 0.6    Absolute Eosinophils      0.0 - 0.7 10e3/uL 0.2    Absolute Basophils      0.0 - 0.2 10e3/uL 0.0    Absolute Immature Granulocytes      <=0.4 10e3/uL 0.0    Color Urine      Colorless, Straw, Light Yellow, Yellow  Yellow    Appearance Urine      Clear  Clear    Glucose Urine      Negative mg/dL Negative    Bilirubin Urine      Negative  Negative    Ketones Urine      Negative mg/dL Negative    Specific Gravity Urine      1.003 - 1.035  1.020    Blood Urine      Negative  Negative    pH Urine      5.0 - 7.0  6.0    Protein Albumin Urine      Negative mg/dL Negative    Urobilinogen Urine      0.2, 1.0 E.U./dL 0.2    Nitrite Urine      Negative  Negative    Leukocyte Esterase Urine      Negative  Negative    Bacteria Urine      None Seen /HPF None Seen    RBC Urine      0-2 /HPF /HPF None  Seen    WBC Urine      0-5 /HPF /HPF 0-5    Squamous Epithelial /LPF Urine      None Seen /LPF None Seen    Mucus Urine      None Seen /LPF Present !    Calcium Oxalate      None Seen /HPF Few !    MPO Anju IgG Instrument Value      <3.5 U/mL 0.5    Myeloperoxidase Antibody IgG      Negative  Negative    Proteinase 3 Anju IgG Instrument Value      <2.0 U/mL 28.0 (H)    Proteinase 3 Antibody IgG      Negative  Positive !    IGG      610 - 1,616 mg/dL 1,132    IGA      84 - 499 mg/dL 162    IGM      35 - 242 mg/dL 73    CD19 B Cells      6 - 27 % 10    Absolute CD19      107 - 698 cells/uL 136    Total Protein Urine mg/dL        mg/dL 11.6    Total Protein Urine mg/mg Creat      0.00 - 0.20 mg/mg Cr 0.08    Creatinine Urine      mg/dL 147.7    Creatinine      0.67 - 1.17 mg/dL 1.33 (H)    GFR Estimate      >60 mL/min/1.73m2 55 (L)    Neutrophil Cytoplasmic Antibody      <=1:10  1:10    Neutrophil Cytoplasmic Antibody Pattern Cytoplasmic ANCA (c-ANCA) staining pattern observed and confirmed on formalin-fixed neutrophils.    AST      0 - 45 U/L 20    ALT      0 - 70 U/L 11    Albumin      3.5 - 5.2 g/dL 4.0    CRP Inflammation      <5.00 mg/L <3.00    Sed Rate      0 - 20 mm/hr 23 (H)       Legend:  (L) Low  ! Abnormal  (H) High      Again, thank you for allowing me to participate in the care of your patient.      Sincerely,    Ede Sanchez MD

## 2024-12-12 NOTE — PATIENT INSTRUCTIONS
Labs every 3 months, due 2/2025    Continue to hold methotrexate and folic acid    Return in person in 6 months

## 2024-12-12 NOTE — NURSING NOTE
"Chief Complaint   Patient presents with    RECHECK     /75   Pulse 62   Temp 97.8  F (36.6  C) (Oral)   Ht 1.753 m (5' 9\")   Wt 78.3 kg (172 lb 11.2 oz)   SpO2 98%   BMI 25.50 kg/m    Mi Santana on 12/12/2024 at 10:16 AM    "

## 2024-12-20 ENCOUNTER — HOSPITAL ENCOUNTER (OUTPATIENT)
Dept: PET IMAGING | Facility: CLINIC | Age: 79
Discharge: HOME | End: 2024-12-20
Attending: OTOLARYNGOLOGY
Payer: COMMERCIAL

## 2024-12-20 ENCOUNTER — ANCILLARY ORDERS (OUTPATIENT)
Dept: OTOLARYNGOLOGY | Facility: CLINIC | Age: 79
End: 2024-12-20

## 2024-12-20 DIAGNOSIS — C32.9 LARYNGEAL CANCER (H): Primary | ICD-10-CM

## 2024-12-20 DIAGNOSIS — C32.9 LARYNGEAL CANCER (H): ICD-10-CM

## 2024-12-20 PROCEDURE — 343N000001 HC RX 343 MED OP 636: Performed by: OTOLARYNGOLOGY

## 2024-12-20 PROCEDURE — 78816 PET IMAGE W/CT FULL BODY: CPT | Mod: PI

## 2024-12-20 PROCEDURE — 70491 CT SOFT TISSUE NECK W/DYE: CPT

## 2024-12-20 PROCEDURE — 78813 PET IMAGE FULL BODY: CPT | Mod: 26 | Performed by: RADIOLOGY

## 2024-12-20 PROCEDURE — 74177 CT ABD & PELVIS W/CONTRAST: CPT

## 2024-12-20 PROCEDURE — 71260 CT THORAX DX C+: CPT | Mod: 26 | Performed by: RADIOLOGY

## 2024-12-20 PROCEDURE — A9552 F18 FDG: HCPCS | Performed by: OTOLARYNGOLOGY

## 2024-12-20 PROCEDURE — 70491 CT SOFT TISSUE NECK W/DYE: CPT | Mod: 26 | Performed by: RADIOLOGY

## 2024-12-20 PROCEDURE — 74177 CT ABD & PELVIS W/CONTRAST: CPT | Mod: 26 | Performed by: RADIOLOGY

## 2024-12-20 PROCEDURE — 250N000011 HC RX IP 250 OP 636: Performed by: OTOLARYNGOLOGY

## 2024-12-20 RX ORDER — IOPAMIDOL 755 MG/ML
10-135 INJECTION, SOLUTION INTRAVASCULAR ONCE
Status: COMPLETED | OUTPATIENT
Start: 2024-12-20 | End: 2024-12-20

## 2024-12-20 RX ORDER — FLUDEOXYGLUCOSE F 18 200 MCI/ML
10-18 INJECTION, SOLUTION INTRAVENOUS ONCE
Status: COMPLETED | OUTPATIENT
Start: 2024-12-20 | End: 2024-12-20

## 2024-12-20 RX ADMIN — IOPAMIDOL 105 ML: 755 INJECTION, SOLUTION INTRAVENOUS at 14:45

## 2024-12-20 RX ADMIN — FLUDEOXYGLUCOSE F 18 10.39 MILLICURIE: 200 INJECTION, SOLUTION INTRAVENOUS at 13:43

## 2024-12-26 NOTE — ED TRIAGE NOTES
Pt here with skin issues due to a new trach tube being replaced on Tues at the [place].  Pt with [facility]rews for f/unit(s) this Tuesday.  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   Initial Trach was placed a week ago Thursday.        Wife reports she sees stiches in the skin in the skin under the trach.  Large white stitch with loop and knot noted under meplex sponge.  Stitch does not seem to be connected to any part of the trach.       Pt in no distress, no airway issues.  Pt with passymier valve inplace and able to speak well.         Principal Discharge DX:	Asthmatic bronchitis with exacerbation   1

## 2025-01-10 ENCOUNTER — OFFICE VISIT (OUTPATIENT)
Dept: OTOLARYNGOLOGY | Facility: CLINIC | Age: 80
End: 2025-01-10
Payer: COMMERCIAL

## 2025-01-10 ENCOUNTER — TUMOR CONFERENCE (OUTPATIENT)
Dept: ONCOLOGY | Facility: CLINIC | Age: 80
End: 2025-01-10
Payer: COMMERCIAL

## 2025-01-10 VITALS
OXYGEN SATURATION: 98 % | BODY MASS INDEX: 26.36 KG/M2 | DIASTOLIC BLOOD PRESSURE: 76 MMHG | SYSTOLIC BLOOD PRESSURE: 130 MMHG | HEIGHT: 69 IN | WEIGHT: 178 LBS | HEART RATE: 80 BPM

## 2025-01-10 DIAGNOSIS — R13.12 OROPHARYNGEAL DYSPHAGIA: ICD-10-CM

## 2025-01-10 DIAGNOSIS — C32.9 LARYNGEAL CANCER (H): Primary | ICD-10-CM

## 2025-01-10 DIAGNOSIS — Z93.0 TRACHEOSTOMY DEPENDENCE (H): ICD-10-CM

## 2025-01-10 PROCEDURE — 99214 OFFICE O/P EST MOD 30 MIN: CPT | Mod: 25 | Performed by: OTOLARYNGOLOGY

## 2025-01-10 PROCEDURE — 92612 ENDOSCOPY SWALLOW (FEES) VID: CPT | Mod: GN | Performed by: OTOLARYNGOLOGY

## 2025-01-10 PROCEDURE — 31615 TRCHEOBRNCHSC EST TRACHS INC: CPT | Performed by: OTOLARYNGOLOGY

## 2025-01-10 PROCEDURE — 92613 ENDOSCOPY SWALLOW (FEES) I&R: CPT | Performed by: OTOLARYNGOLOGY

## 2025-01-10 NOTE — LETTER
1/10/2025       RE: Joshua Ennis  [address] [facility] [address]     Dear Colleague,    Thank you for referring your patient, Joshua Ennis, to the [facility] [place] [facility]. Please see a copy of my visit note below.        [facility]   at the [facility]   Otolaryngology Clinic     Patient: Joshua Ennis    MRN: [id number]    : 1945    Age/Gender: 79 year old male  Date of Service: 1/10/2025  Rendering Provider:   Josephine Malone MD         Impression & Plan     IMPRESSION: Mr. Ennis is a 79 year old male who is being seen for the following:    Dysphonia   - anterior commissure lesion seen on scope on 23  - voice changes worsening for a few months  - prior smoker  - scope shows anterior commissure lesion with extension to the right vocal fold  - given anterior location will require CT neck to rule out thyroid cartilage extension  - discussed awake biopsy - patient in agreement this was done today  - pathology showed SCC  - CT chest had findings of new nodules - after multiple discussions with radiology the decision was made to proceed with surgery and repeat imaging for this finding in the chest  - s/p MDL with bilateral cordectomy with stent placement on 23  - today shows that the vocal folds closed around the stent - discussed he needs the scar broken up and bigger stent placed - patient in agreement   - pathology showed SCC with positive margins  - went back for MDL and stent removal on 23   - symptoms 10/10/2023 are stable, voice is poor  - scope shows  laryngeal web  - discussed he has positive margins - will proceed with repeat surger - if can't clear will need radiation  - ct chest shows improved size of lung nodule of concern  - s/p microlaryngoscopy, CO2 laser resection of vocal fold lesion, steroid injection, stent placement, and biopsy 10/25/23  - pathology  shows invasive squamous cell carcinoma  - symptoms 11/16/2023 are stable, voice is poor, discussed case at tumor board, will continue with close observation and repeat biopsies  - scope shows stent in place, vocal folds are open, laryngeal web developed  - discussed removal of stent, then will collect a sample, if the sample is cancerous, will refer for radiation therapy  - s/p microlaryngoscopy, CO2 laser resection of vocal fold lesion, steroid injection, stent removal, and biopsy 12/01/23  - pathology showed detached fragment of squamous epithelium with mild to moderate dysplasia, no evidence of carcinoma  - s/p laryngoscopy and anterior glottic web vocal fold steroid injection 12/11/23      - symptoms 1/4/2024 are has been having a hard time talking with his voice  - strobe shows bilateral vocal fold stiffness and swelling with question of laryngeal web, ventricular phonation  - s/p steroid injection today, does not appear to have a web, but rather vocal fold stiffness and swelling  - discussed treatment for vocal folds will be with steroid injections and voice therapy  - discussed voice result is questionable given severity of ventricular phonation  - symptoms 1/18/2024 are stable  - scope shows anterior glottic web, did lidocaine anesthesia, but difficult to see if the vocal folds can split apart today as compared to last time, he had laryngeal spasm  - discussed that we should focus on surveillance at this time and keep him in voice therapy, I would not do a repeat stunt placement given the last two did not work   - symptoms 6/27/2024 are worse difficulty breathing  - scope shows anterior glottic web, moderate airway restriction  - has final read of CT pending  - discussed I am concerned for tumor, needs evaluation under anesthesia   discussed awake intubation, discussed risk of trach  - s/p MDL with biopsy 7/17/24 with pathology showing SCC, tumor board discussion ensued with decision to proceed with  radiation   - symptoms 7/29/2024 are stable per him though improved per Earl[place], less panting with mowing the lawn  - scope shows anterior glottic web, though improved glottic airway   - discussed if breathing should worsen during treatment he is to reach out and consider trach placement   - symptoms 10/17/2024 are worsened for the past few weeks in terms of his breathing, has finished radiation, had pneumonia that resolved but his breathing has not, breathing has gotten worse, here with his fiancee who is concerned, patient denies difficulty breathing however he has stridor   - 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  - discussed his options at this time are observation, steroid treatment vs awake tracheotomy  - given how severe the airway restriction is would recommend awake tracheotomy   - patient reluctant saying he would rather die, he is tired of all these interventions, everytime something happens there is another thing  - his fiancee on the other hand wants him to proceed  - after much discussion patient in agreement and will proceed to the hospital  - finished radition on 9/24  - had trach and MDL with biopsy on 10/17/24  - pathology negative for carcinoma  - symptoms 10/29/2024 are stable   - scope shows improved glottic airway, arytenoid edema  - symptoms 11/12/2024 are stable, does not like the trach  - scope shows improved glottic airway, arytenoid edema  - symptoms 12/12/2024 are stable, has been capping without breathing problems  - scope shows much improved swelling, still has arytenoid edema  - discussed lymphedema therapy after PET-CT  - symptoms 1/10/2025 are stable, breathing is good, swallowing continues to feel stable but has significant mucus and phlegm, discussed at tumor board today - PET-CT was clean, still has stable chest skin lesion  - scope shows stable airway and arytenoid edema  - FEES shows penetration with thins and aspiration  (seen during tbronch)  - discussed swallow therapy and lymphedema therapy  - discussed swallow maneuvers and increased aerobic activity  Plan  - lymphedema therapy  - dermatology referral for chest skin wall lesion  - CT neck with contrast/CT chest non con q6 months until 9/2026, then yearly thereafter    2. Trach dependence  - in the setting of airway edema   - since 10/17/24  - has 6 shiley trach  - speaking with speaking valve  - not capping  - at night uses HME  - tbronch shows no secretions  - symptoms 11/12/2024 are breathing is good   - scope shows improved glottic edema but persistent arytenoid edema, erythema around stoma   - downsize trach with capping trial, size 5  - symptoms 12/12/24 are worsened mucus in the evening, very difficult to manage  - scope shows mild redness on posterior tracheal wall  - trach downsized to size 4  - discussed capping trach day and night  - mucus could be due to dysphagia - will need to reassess at the next visit  - symptoms 1/10/2025 are stable, doing well, has been capping day and night  - risk of pneumonia reviewed with trach removal  - patient would like trach to be removed  - decannulated today   Plan  -occlusive dressing    RETURN VISIT: 4-6 weeks    Chief Complaint   Anterior commissure SCC  S/p MDL with bilateral cordectomy and steroid injection 8/30/23  S/p MDL with stent removal and steroid injection on 9/13/23  S/p microlaryngoscopy, CO2 laser resection of vocal fold lesion, steroid injection, stent placement, and biopsy 10/25/23    S/p microlaryngoscopy, CO2 laser resection of vocal fold lesion, steroid injection, stent removal, and biopsy 12/01/23  S/p laryngoscopy and anterior glottic web vocal fold steroid injection 12/11/23   S/p steroid injection 1/4/24           S/p radiation completed on 9/24/24   Trach placement 10/17/24  Trach decannulated 10/1/25  Interval History   HISTORY OF PRESENT ILLNESS:  Mr. Ennis is a 79 year old male is being followed for  dysphonia. he was initially seen on 8/15/23. Please refer to this note for full history.     Today, he presents for follow up. 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 Negative for high-grade dysplasia and malignancy.     Hoarseness      Hypertension      Idiopathic progressive polyneuropathy      Kidney problem      Laryngeal cancer (H)      Malignant melanoma (H)      Malignant neoplasm (H)     melanoma     Squamous cell carcinoma      Russo syndrome        PAST SURGICAL HISTORY:   Past Surgical History:   Procedure Laterality Date     BIOPSY  11/2011    melanoma on back     DISSECT LYMPH NODE INGUINAL  3/1/2013    Procedure: DISSECT LYMPH NODE INGUINAL;  Bilateral Inguinal Lymph Node Biopsy.;  Surgeon: Tariq Acosta MD;  Location: [facility]     ESOPHAGOSCOPY, GASTROSCOPY, DUODENOSCOPY (EGD), COMBINED  7/5/2013    Procedure: COMBINED ESOPHAGOSCOPY, GASTROSCOPY, DUODENOSCOPY (EGD);  Gastroscopy;  Surgeon: Tariq Acosta MD;  Location: [facility]     HERNIORRHAPHY INGUINAL  8/6/2012    Procedure: HERNIORRHAPHY INGUINAL;  Open Repair Left Inguinal Hernia;  Surgeon: Jerad Baker MD;  Location: [facility]     INJECT STEROID (LOCATION) N/A 8/30/2023    Procedure: steroid injection;  Surgeon: Josephine Malone MD;  Location: [facility]     INJECT STEROID (LOCATION) N/A  9/13/2023    Procedure: steroid injection;  Surgeon: Josephine Malone MD;  [facility]: [facility]     INJECT STEROID (LOCATION) N/A 10/25/2023    Procedure: steroid injection, stent placement, and biopsy;  Surgeon: Josephine Malone MD;  Location: [facility]     INJECT STEROID (LOCATION) N/A 12/1/2023    Procedure: steroid injection;  Surgeon: Josephine Malone MD;  Location: [facility]     LARYNGOSCOPY WITH MICROSCOPE N/A 9/13/2023    Procedure: MICROLARYNGOSCOPY with stent removal and biopsy;  Surgeon: Josephine Malone MD;  Location: [facility]     LARYNGOSCOPY, FLEXIBLE WITH INJECTION N/A 12/11/2023    Procedure: LARYNGOSCOPY, FLEXIBLE WITH INJECTION with steroid;  Surgeon: Josephine Malone MD;  Location: [facility] OR     LASER CO2 LARYNGOSCOPY N/A 8/30/2023    Procedure: MICROLARYNGOSCOPY, CO2 laser resection of vocal fold lesion;  Surgeon: Josephine Malone MD;  Location: [facility]     LASER CO2 LARYNGOSCOPY N/A 10/25/2023    Procedure: MICROLARYNGOSCOPY, CO2 laser resection of vocal fold lesion;  Surgeon: Josephine Malone MD;  Location: [facility]     LASER CO2 LARYNGOSCOPY N/A 12/1/2023    Procedure: MICROLARYNGOSCOPY CO2 laser standby, resection of vocal fold lesion, stent removal, biopsy;  Surgeon: Josephine Malone MD;  Location: [facility]     LASER CO2 LARYNGOSCOPY, COMPLEX N/A 7/17/2024    Procedure: MICROLARYNGOSCOPY, CO2 laser standby, biopsy;  Surgeon: Josephine Malone MD;  Location: [facility]       CURRENT MEDICATIONS:   Current Outpatient Medications:      acetaminophen (TYLENOL) 325 MG tablet, Take 2 tablets (650 mg) by mouth every 4 hours as needed for pain., Disp: 50 tablet, Rfl: 0     calcipotriene (DOVONOX) 0.005 % external cream, Apply topically 2 times daily In conjunction with fluorouracil cream in a 1:1 mixture., Disp: 60 g, Rfl: 2     calcium carbonate 600 mg-vitamin D 400 units (CALTRATE) 600-400 MG-UNIT per tablet, Take 1 tablet by mouth 2 times daily., Disp: , Rfl:      cetirizine (ZYRTEC) 10 MG tablet, Take 1 tablet by mouth daily., Disp: , Rfl:       Cholecalciferol (VITAMIN D) 1000 UNITS capsule, Take 1 capsule by mouth daily., Disp: , Rfl:      ciprofloxacin-dexAMETHasone (CIPRODEX) 0.3-0.1 % otic suspension, Instill 4 drops into trach twice daily, Disp: 7.5 mL, Rfl: 1     fluorouracil (EFUDEX) 5 % external cream, Apply topically 2 times daily In conjunction with calcipotriene cream in a 1:1 mixture., Disp: 40 g, Rfl: 2     gabapentin (NEURONTIN) 400 MG capsule, Take 1 capsule (400 mg) by mouth at bedtime., Disp: 90 capsule, Rfl: 3     losartan (COZAAR) 50 MG tablet, Take 1 tablet (50 mg) by mouth daily (Patient taking differently: Take 50 mg by mouth at bedtime.), Disp: 90 tablet, Rfl: 3     mupirocin (BACTROBAN) 2 % external cream, Apply topically 3 times daily. Apply to affected area three times daily before applying aquaphor, Disp: 30 g, Rfl: 0     oxyCODONE (ROXICODONE) 5 MG tablet, Take 0.5-1 tablets (2.5-5 mg) by mouth every 4 hours as needed for moderate to severe pain., Disp: 15 tablet, Rfl: 0     pantoprazole (PROTONIX) 40 MG EC tablet, Take 1 tablet (40 mg) by mouth daily., Disp: 60 tablet, Rfl: 1     senna-docusate (SENOKOT-S/PERICOLACE) 8.6-50 MG tablet, Take 1 tablet by mouth 2 times daily., Disp: 30 tablet, Rfl: 0     silver sulfADIAZINE (SILVADENE) 1 % external cream, Apply topically 2 times daily. Apply to affected area, Disp: 400 g, Rfl: 0    ALLERGIES: Shrimp and Advil [ibuprofen]    SOCIAL HISTORY:    Social History     Socioeconomic History     Marital status: Single     Spouse name: Not on file     Number of children: Not on file     Years of education: Not on file     Highest education level: Not on file   Occupational History     Employer: [employer]   Tobacco Use     Smoking status: Former     Current packs/day: 0.00     Average packs/day: 1 pack/day for 20.0 years (20.0 ttl pk-yrs)     Types: Cigarettes     Start date: 1993     Quit date: 2013     Years since quittin.9     Passive exposure: Past     Smokeless  tobacco: Never   Vaping Use     Vaping status: Never Used   Substance and Sexual Activity     Alcohol use: Not Currently     Comment: rare     Drug use: No     Sexual activity: Not Currently     Partners: Female   Other Topics Concern     Parent/sibling w/ CABG, MI or angioplasty before 65F 55M? Not Asked      Service Not Asked     Blood Transfusions Not Asked     Caffeine Concern Not Asked     Occupational Exposure Not Asked     Hobby Hazards Not Asked     Sleep Concern Not Asked     Stress Concern Not Asked     Weight Concern Not Asked     Special Diet Not Asked     Back Care Not Asked     Exercise Yes     Comment: walking     Bike Helmet Not Asked     Seat Belt Not Asked     Self-Exams Not Asked   Social History Narrative     Not on file     Social Drivers of Health     Financial Resource Strain: Low Risk  (10/18/2024)    Financial Resource Strain      Within the past 12 months, have you or your family members you live with been unable to get utilities (heat, electricity) when it was really needed?: No   Food Insecurity: Low Risk  (10/18/2024)    Food Insecurity      Within the past 12 months, did you worry that your food would run out before you got money to buy more?: No      Within the past 12 months, did the food you bought just not last and you didn t have money to get more?: No   Transportation Needs: Low Risk  (10/18/2024)    Transportation Needs      Within the past 12 months, has lack of transportation kept you from medical appointments, getting your medicines, non-medical meetings or appointments, work, or from getting things that you need?: No   Physical Activity: Not on file   Stress: Not on file   Social Connections: Unknown (12/24/2021)    Received from [facility], [facility]    Social Connections      Frequency of Communication with Friends and Family: Not on file   Interpersonal Safety: Low Risk  (10/18/2024)     Interpersonal Safety      Do you feel physically and emotionally safe where you currently live?: Yes      Within the past 12 months, have you been hit, slapped, kicked or otherwise physically hurt by someone?: No      Within the past 12 months, have you been humiliated or emotionally abused in other ways by your partner or ex-partner?: No   Housing Stability: Low Risk  (10/18/2024)    Housing Stability      Do you have housing? : Yes      Are you worried about losing your housing?: No         FAMILY HISTORY:   Family History   Problem Relation Age of Onset     Diabetes Mother      Hypertension Mother      Cancer Father         stomach     Heart Disease Father      Heart Disease Brother      Diabetes Sister      Diabetes Sister      Diabetes Sister      Heart Disease Brother      Cancer Sister      Glaucoma No family hx of      Macular Degeneration No family hx of       Non-contributory for problems with anesthesia    REVIEW OF SYSTEMS:   The patient was asked a 14 point review of systems regarding constitutional symptoms, eye symptoms, ears, nose, mouth, throat symptoms, cardiovascular symptoms, respiratory symptoms, gastrointestinal symptoms, genitourinary symptoms, musculoskeletal symptoms, integumentary symptoms, neurological symptoms, psychiatric symptoms, endocrine symptoms, hematologic/lymphatic symptoms, and allergic/ immunologic symptoms.   The pertinent factors have been included in the HPI and below.  Patient Supplied Answers to Review of Systems      12/12/2024    10:53 AM   [facility] ROS   Constitutional Weight gain   Cardiopulmonary Cough       Physical Examination   The patient underwent a physical examination as described below. The pertinent positive and negative findings are summarized after the description of the examination.  Constitutional: The patient's developmental and nutritional status was assessed. The patient's voice quality was assessed.  Head and Face: The head and face were inspected for  deformities. The sinuses were palpated. The salivary glands were palpated. Facial muscle strength was assessed bilaterally.  Eyes: Extraocular movements and primary gaze alignment were assessed.  Ears, Nose, Mouth and Throat: The ears and nose were examined for deformities. The nasal septum, mucosa, and turbinates were inspected by anterior rhinoscopy. The lips, teeth, and gums were examined for abnormalities. The oral mucosa, tongue, palate, tonsils, lateral and posterior pharynx were inspected for the presence of asymmetry or mucosal lesions.    Neck: The tracheal position was noted, and the neck mass palpated to determine if there were any asymmetries, abnormal neck masses, thyromegally, or thyroid nodules.  Respiratory: The nature of the breathing and chest expansion/symmetry was observed.  Cardiovascular: The patient was examined to determine the presence of any edema or jugular venous distension.  Abdomen: The contour of the abdomen was noted.  Lymphatic: The patient was examined for infraclavicular lymphadenopathy.  Musculoskeletal: The patient was inspected for the presence of skeletal deformities.  Extremities: The extremities were examined for any clubbing or cyanosis.  Skin: The skin was examined for inflammatory or neoplastic conditions.  Neurologic: The patient's orientation, mood, and affect were noted. The cranial nerve  functions were examined.  Other pertinent positive and negative findings on physical examination:   OC/OP: no lesions, uvula midline, soft palate elevates symmetrically   Neck: no lesions, no TH tenderness to palpation  4 cuffless shiley removed   All other physical examination findings were within normal limits and noncontributory.    Procedures   Flexible laryngoscopy (CPT 65110)        Pre-procedure diagnosis: dysphonia  Post-procedure diagnosis: same as above  Indication for procedure: Mr. Ennis is a 79 year old male with see above  Procedure(s): Fiberoptic Laryngoscopy      Details of Procedure: After informed consent was obtained, the patient was seated in the examination chair.  The areas of the nasopharynx as well as the hypopharynx were anesthetized with topical 4% lidocaine with 0.25% phenylephrine atomizer.  Examination of the base of tongue was performed first.  Attention was directed to any evidence of masses in the area or evidence of leukoplakia or candidal infection.  Attention was directed to the epiglottis where its size and position was determined and its movement on phonation of the vowel  e .  The piriform sinuses were then inspected for any mass lesions or pooling of secretions.  Attention was then directed to the larynx. The vocal folds were inspected for infection or any areas of leukoplakia, for masses, polypoid degeneration, or hemorrhage.  Having done this, the arytenoids and vocal processes were inspected for erythema or evidence of granuloma formation.  The posterior commissure was then inspected for evidence of inflammatory changes in the mucosa and heaping up of mucosal tissue. The patient was then instructed to say the vowel  e .  Adduction of vocal folds to the midline was observed for any evidence of paresis or paralysis of the larynx or asymmetry in rotation of the larynx to the left or right. The patient was asked to breathe and the degree of abduction was noted bilaterally.  Subglottic view of the larynx was obtained for any additional mass lesions or mucosal changes.  Finally the post cricoid was examined for evidence of pooling of secretions, as well as the pharyngeal wall mucosa.   Anesthesia type: 0.25% phenylephrine     Findings:  Anatomic/physiological deviations: RNC, improved swelling, arytenoid edema, anterior glottic web                Right vocal process: Some restriction of mobility   Left vocal process: Some restriction of mobility  Glottal gap: Complete glottal closure  Supraglottic structures: Normal  Hypopharynx: Normal      Estimated  Blood Loss: minimal  Complications: None  Disposition: Patient tolerated the procedure well    Fiberoptic Endoscopic Evaluation of Swallowing (CPT 34542)  and Interpretation of Swallowing (CPT 89296)    Indications: See above notes for complete history and physical. Patient complains of dysphagia to both solids and liquids and/or there is suggestion on history and endoscopic exam of the presence of dysphagia causing medical complaints.  Swallowing evaluation is being performed to assess the presence and degree of dysphagia, and to recommend a safe diet.     Pulmonary Status:  No PNA   Current Diet:              regular                                        thin liquids      Consistency Amounts:  Thin Liquid: sip   Puree: sip  Solid: cookies            Positioning: upright in a chair  Oral Peripheral Exam: See physical exam section.  Anatomic Notes: See Videostroboscopy report for assessment of anatomy and laryngeal functioning  Pharyngeal secretions prior to administration of liquid or food: No    Oral Phase Abnormal Findings: No abnormal behavior observed    Pharyngeal Phase Abnormal Findings: penetration, aspiration with thin, comes from posterior commissure, improved swallowing maneuver  - throat clearing after each thin sip    Recommended Diet:  regular                                        thin liquids           Tracheobronchoscopy, through established tracheostomy (CPT 78757)     Pre-Procedure Diagnosis: trach dependence  Post Procedure Diagnosis: same  Indication for procedure:  Mr. Ennis is a 79 year old male with trach dependence     Procedure(s): Tracheobronchoscopy, through established tracheostomy     Details of Procedure: Topical anesthesia was achieved by spraying 4% topical lidocaine directly through the tracheotomy.  After adequate anesthesia was achieved a flexible bronchoscope was passed through the tracheotomy into the trachea.  Abnormalities were noted. The nacho was visualized, followed by  "further insertion of the scope to the main stem bronchus level to evaluate the bronchi as well as the orifices of the sub-segmental bronchi.   Having completed this the operation was completed and the scope withdrawn atraumatically.   Anesthesia type: 4% topical lidocaine     Findings:   BRONCHOSCOPY  >Tracheostoma: resolved granulation  >Trachea: normal   >Gloria: Normal mucosa  >Mainstem Bronchi: Normal mucosa     Estimated Blood Loss: None  Complication(s): None  Disposition: Patient tolerated the procedure well      Review of Relevant Clinical Data   I personally reviewed:  Radiology:   12/20/2024 PET/CT Oncology whole body   Impression:  1. Primary: NI-RADS 1} Expected post-treatment changes in the neck  without evidence of recurrent disease at the primary site.  2. Neck: NI-RADS 1}  No abnormal lymph node.   3. Increased FDG uptake within the cervical esophageal mucosa, likely  inflammatory/ postradiation.   4. Few prominent mediastinal nodes with trace uptake, likely  inflammatory/ reactive. Attention on follow up is recommended.   5. No strong evidence of distant metastasis.     Procedures:   01/22/2024 Audiogram/Tympanogram   Results: Right normal hearing through 1000 Hz, sloping to moderate to severe sensorineural hearing loss at 1500 Hz and above. Left  normal hearing through 500 Hz, sloping to a mild to severe sensorineural hearing loss at 1000 Hz and above.  Tympanograms normal bilaterally  Acoustic reflexes: Right ipsi and right contra normal. Left ipsi and left contra elevated    Labs:  No results found for: \"TSH\"  Lab Results   Component Value Date     10/20/2024    CO2 21 (L) 10/20/2024    BUN 22.0 10/20/2024    PHOS 3.4 10/23/2024     Lab Results   Component Value Date    WBC 5.6 11/20/2024    HGB 12.7 (L) 11/20/2024    HCT 39.9 (L) 11/20/2024    MCV 94 11/20/2024     11/20/2024     Lab Results   Component Value Date    PTT 23 10/17/2024    INR 1.05 10/17/2024     No results found for: " "\"DUANE\"  No components found for: \"RHEUMATOIDFACTOR\", \"RF\"  Lab Results   Component Value Date    CRP <2.9 07/07/2022    CRP <2.9 01/07/2022    CRP <2.9 07/28/2021    CRP <2.9 03/05/2021    CRP <2.9 11/11/2020     No components found for: \"CKTOT\", \"URICACID\"  No components found for: \"C3\", \"C4\", \"DSDNAAB\", \"NDNAABIFA\"  Lab Results   Component Value Date    MPOAB Not Reported 02/14/2014       Patient reported Quality of Life (QOL) Measures   Patient Supplied Answers To VHI Questionnaire      1/22/2024     1:02 PM   Voice Handicap Index (VHI-10)   My voice makes it difficult for people to hear me 4   People have difficulty understanding me in a noisy room 4   My voice difficulties restrict my personal and social life.  2   I feel left out of conversations because of my voice 3   My voice problem causes me to lose income 0   I feel as though I have to strain to produce voice 4   The clarity of my voice is unpredictable 1   My voice problem upsets me 2   My voice makes me feel handicapped 2   People ask, \"What's wrong with your voice?\" 2   VHI-10 24         Patient Supplied Answers To EAT Questionnaire       No data to display                  Patient Supplied Answers To CSI Questionnaire      12/12/2024    11:00 AM   Cough Severity Index (CSI)   My cough is worse when I lie down 2   My coughing problem causes me to restrict my personal and social life 2   I tend to avoid places because of my cough problem 1   I feel embarrassed because of my coughing problem 1   People ask, ''What's wrong?'' because I cough a lot 0   I run out of air when I cough 2   My coughing problem affects my voice 1   My coughing problem limits my physical activity 0   My coughing problem upsets me 2   People ask me if I am sick because I cough a lot 0   CSI Score 11        Patient-reported         Patient Supplied Answers to Dyspnea Index Questionnaire:      12/12/2024    11:00 AM   Dyspnea Index   1. I have trouble getting air in. 1   2. I feel " tightness in my throat when I am having da breathing problem. 2   3. It takes more effort to breathe than it used to. 1   4. Change in weather affect my breathing problem. 1   5. My breathing gets worse with stress. 0   6. I make sound/noise breathing in 2   7. I have to strain to breathe. 0   8. My shortness of breath gets worse with exercise or physical activity 0   9. My breathing problem makes me feel stressed. 2   10. My breathing problem casuses me to restrict my personal and social life. 1   Dyspnea Index Total Score 10        Patient-reported     Scribe Disclosure:   I, Isela Clemente, am serving as a scribe; to document services personally performed by Josephine Malone MD -based on data collection and the provider's statements to me.     Provider Disclosure:  I agree with above History, Review of Systems, Physical exam and Plan.  I have reviewed the content of the documentation and have edited it as needed. I have personally performed the services documented here and the documentation accurately represents those services and the decisions I have made.      Josephine Malone MD    Laryngology    [facility]  Department of  Otolaryngology - Head and Neck Surgery  Clinics & Surgery Center  [address] [place] [address]  Appointment line: [phone]  Fax: [phone]  [email or web address]    Again, thank you for allowing me to participate in the care of your patient.      Sincerely,    Josephine Malone MD

## 2025-01-10 NOTE — TUMOR CONFERENCE
Head & Neck Tumor Conference Note   January 10, 2025  Status: Established    Staff: Dr. Malone    Tumor Site: Glottis  Tumor Pathology: SCC  Tumor Stage: T1N0  Tumor Treatment:   - 8/30/23: Transoral laser microsurgery with endoscopic right and left Type Vd cordectomy encompassing the subglottis, stent placement   - 9/13/23: Stent removal, biopsy  - 10/25/23: Transoral laser microsurgery with endoscopic right and left Type Vd cordectomy encompassing the subglottis, stent placement   - 12/1/23: MDL with stent removal, CO2 laser resection of vocal fold lesion, biopsy of anterior commissure, steroid injection       Reason for Review: Review imaging, path, and POC     Brief History: Joshua Ennis is a 79 year old male with 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 Patient presented to Dr. Malone's clinic 10/17/2024 with significant supraglottic/glottic edema and significant stridor in clinic and decision was made to proceed with urgent awake tracheostomy 10/17/2024. We are here today to review his 3 month post-treatment PET scan.    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 Negative for high-grade dysplasia and malignancy.    Hoarseness     Hypertension     Idiopathic progressive polyneuropathy     Kidney problem     Laryngeal cancer (H)     Malignant melanoma (H)     Malignant neoplasm (H)     melanoma    Squamous cell carcinoma     Russo syndrome       Smoking Hx:    Social History     Tobacco Use    Smoking status: Former     Current packs/day: 0.00     Average packs/day: 1 pack/day for 20.0 years (20.0 ttl pk-yrs)     Types: Cigarettes     Start date: 1993     Quit date: 2013     Years since quittin.9     Passive exposure: Past    Smokeless tobacco: Never   Vaping Use    Vaping status: Never Used   Substance Use Topics    Alcohol use: Not Currently     Comment: rare    Drug use: No       Imaging:   Results for orders placed in visit on 10/29/24    PET Oncology Whole Body    Narrative  Combined Report of: PET and CT on 2024 3:14 PM:    1. PET of the neck, chest, abdomen, and pelvis.  2. PET CT Fusion for Attenuation Correction and Anatomical  Localization.  3. Diagnostic CT of the chest, abdomen and pelvis with intravenous  contrast obtained for diagnostic interpretation.  4. 3D MIP and PET-CT fused images were processed on an independent  workstation and archived to PACS and reviewed by a radiologist.    Technique:    1. PET: The patient received 10.39 mCi of F-18-FDG. The serum glucose  was 88 mg/dL prior to administration. Body weight was 78 kg. Images  were evaluated in the axial, sagittal, and coronal planes as well as  the rotational whole body MIP. Images were acquired from cranial  vertex to feet.    UPTAKE WAS MEASURED AT 60 MINUTES.    2. CT: Volumetric acquisition for clinical interpretation of the  chest, abdomen, and pelvis acquired at 3 mm sections. The chest,  abdomen, and pelvis were evaluated at 5 mm sections in bone, soft  tissue, and lung windows. High resolution images of the neck were  obtained with multiple oblique projection reformats.    Contrast and Medications:  IV contrast: 105 mL of Isovue 370 intravenously.  PO contrast: 500 mL water  Additional Medications: None.    3. 3D MIP and PET-CT fused images were processed on an independent  workstation and archived to PACS and reviewed by a radiologist.    INDICATION: Laryngeal  cancer (H).    ADDITIONAL INFORMATION OBTAINED FROM EMR: Patient is a 79-year-old  male with lesion of the anterior commissure as seen on scope 7/31/2023  with extension into the right vocal cord. Initial biopsy showed  squamous cell carcinoma. Microtrabecular endoscopy with bilateral  cordectomy was done 8/30/2023 with repeat 9/13/2023. Additional CO2  laser resection of vocal cord lesion 12/1/2023. Biopsy 7/17/2024  showed squamous cell carcinoma and radiation was begun.    COMPARISON: CT neck 10/15/2024, 6/27/2024    FINDINGS:    BACKGROUND: Liver SUV max = 3.4, Aorta Blood SUV max = 2.7.      HEAD/NECK:    Pharyngeal mucosal spaces: Nasopharynx and palatine tonsils are within  normal limits. Tongue base is normal. Oral tongue and buccal mucosa of  the oral cavity is normal. Tracheostomy in place. Increased FDG  avidity along the tracheostomy tract, inflammatory. Previously seen  thick mucosal and submucosal enhancement along the false and true  cords on 10/15/24 is markedly decreased. No suspicious uptake along  the cords. New bilateral AE fold non avid thickening is postradiation.  Mild increased FDG avidity within the cervical esophagus with a max  SUV of 6.8, likely inflammatory/ postradiation.    Sinonasal region: Paranasal sinuses are clear. No mass within the  nasal cavity.    Lymph nodes: No FDG avid or abnormally enlarged lymph nodes.    Salivary glands: The major salivary glands are within normal limits.    Thyroid gland: The thyroid gland is within normal limits.    Vascular structures: The major vasculature of the neck are patent.    Brain: No abnormal FDG avid lesion or abnormal enhancement.    Orbital cavities: No abnormal FDG avid lesion or abnormal enhancement.      CHEST:    Lymph nodes: Prominent hypermetabolic left paratracheal lymph node  measuring 9 mm with a max SUV of 4.5 (3/262). Additional prominent non  hypermetabolic lymph nodes, for example a right paratracheal node  measuring 1.0 cm  (3/259). 1.3 cm subcarinal node with trace uptake.    Lungs: Bibasilar atelectasis. Unchanged spiculated non avid nodularity  within the left lung apex measuring 8 x 6 mm, likely scarring. The  central tracheobronchial tree is clear. No acute consolidation.  No  pleural effusion or pneumothorax.    Heart and great vessels: Heart size is within normal limits. Mild  atherosclerotic calcifications of the coronary arteries and thoracic  aorta. Comment the brachiocephalic and left common carotid artery. No  pericardial effusion. The thoracic aorta and main pulmonary artery are  within normal limits. The esophagus is unremarkable.    Breasts: No abnormal uptake in the breasts.    ABDOMEN AND PELVIS:    Liver: No FDG avid lesion. No intrahepatic or extrahepatic biliary  ductal dilatation. Cholelithiasis without evidence of acute  cholecystitis.    Pancreas: The pancreas is within normal limits. No pancreatic ductal  dilatation.    Spleen: No FDG avid lesion.    Adrenal glands: No FDG avid foci.    Kidneys: No FDG avid lesion. No hydronephrosis. Left extrarenal pelvis  and parapelvic cysts. The urinary bladder is unremarkable.    Reproductive organs: Bilateral hydroceles..    Gastrointestinal system: Normal caliber of the small and large bowel.  Diverticulosis without evidence of acute diverticulitis. Normal  appendix.    Lymph nodes: No FDG avid or abnormally enlarged lymph nodes.    Vascular structures: Normal caliber of the abdominal aorta.    No free air, free fluid, or fluid collection.    EXTREMITIES:    No abnormal FDG uptake in the visualized extremities.    BONES AND SOFT TISSUES:    No abnormal FDG uptake in the skeleton. Chronic healed fracture of the  left posterior ninth rib. No abnormal lytic or blastic osseous  lesions.    SPINAL CANAL:    No evident canal compromise. No abnormal FDG avid lesion.    Impression  Impression:  1. Primary: NI-RADS 1} Expected post-treatment changes in the neck  without evidence  of recurrent disease at the primary site.  2. Neck: NI-RADS 1}  No abnormal lymph node.  3. Increased FDG uptake within the cervical esophageal mucosa, likely  inflammatory/ postradiation.  4. Few prominent mediastinal nodes with trace uptake, likely  inflammatory/ reactive. Attention on follow up is recommended.  5. No strong evidence of distant metastasis.    NI-RADS CECT Surveillance Legend:    Primary  1: No evidence of recurrence: routine surveillance  2: Low suspicion  a) Superficial abnormality (skin, mucosal surface): direct visual  inspection  b) Ill-defined deep abnormality: short interval follow-up* or PET  3: High suspicion (new or enlarging discrete nodule/mass): biopsy  4: Definitive recurrence (path proven or clinical progression): no  biopsy needed    Nodes  1: No evidence of recurrence: routine surveillance  2: Low suspicion (ill-defined): short interval follow-up or PET  3: High suspicion (new or enlarging lymph node): biopsy if clinically  needed  4: Definitive recurrence (path proven or clinical progression): no  biopsy needed    *short interval follow-up: 3 months at our institution    I have personally reviewed the examination and initial interpretation  and I agree with the findings.    SEE DODD MD      SYSTEM ID:  [id number]    No results found for this or any previous visit.      Results for orders placed during the hospital encounter of 12/20/24    CT Soft Tissue Neck w Contrast    Narrative  Combined Report of: PET and CT on 12/20/2024 3:14 PM:    1. PET of the neck, chest, abdomen, and pelvis.  2. PET CT Fusion for Attenuation Correction and Anatomical  Localization.  3. Diagnostic CT of the chest, abdomen and pelvis with intravenous  contrast obtained for diagnostic interpretation.  4. 3D MIP and PET-CT fused images were processed on an independent  workstation and archived to PACS and reviewed by a radiologist.    Technique:    1. PET: The patient received 10.39 mCi of F-18-FDG. The  serum glucose  was 88 mg/dL prior to administration. Body weight was 78 kg. Images  were evaluated in the axial, sagittal, and coronal planes as well as  the rotational whole body MIP. Images were acquired from cranial  vertex to feet.    UPTAKE WAS MEASURED AT 60 MINUTES.    2. CT: Volumetric acquisition for clinical interpretation of the  chest, abdomen, and pelvis acquired at 3 mm sections. The chest,  abdomen, and pelvis were evaluated at 5 mm sections in bone, soft  tissue, and lung windows. High resolution images of the neck were  obtained with multiple oblique projection reformats.    Contrast and Medications:  IV contrast: 105 mL of Isovue 370 intravenously.  PO contrast: 500 mL water  Additional Medications: None.    3. 3D MIP and PET-CT fused images were processed on an independent  workstation and archived to PACS and reviewed by a radiologist.    INDICATION: Laryngeal cancer (H).    ADDITIONAL INFORMATION OBTAINED FROM EMR: Patient is a 79-year-old  male with lesion of the anterior commissure as seen on scope 7/31/2023  with extension into the right vocal cord. Initial biopsy showed  squamous cell carcinoma. Microtrabecular endoscopy with bilateral  cordectomy was done 8/30/2023 with repeat 9/13/2023. Additional CO2  laser resection of vocal cord lesion 12/1/2023. Biopsy 7/17/2024  showed squamous cell carcinoma and radiation was begun.    COMPARISON: CT neck 10/15/2024, 6/27/2024    FINDINGS:    BACKGROUND: Liver SUV max = 3.4, Aorta Blood SUV max = 2.7.      HEAD/NECK:    Pharyngeal mucosal spaces: Nasopharynx and palatine tonsils are within  normal limits. Tongue base is normal. Oral tongue and buccal mucosa of  the oral cavity is normal. Tracheostomy in place. Increased FDG  avidity along the tracheostomy tract, inflammatory. Previously seen  thick mucosal and submucosal enhancement along the false and true  cords on 10/15/24 is markedly decreased. No suspicious uptake along  the cords. New bilateral  AE fold non avid thickening is postradiation.  Mild increased FDG avidity within the cervical esophagus with a max  SUV of 6.8, likely inflammatory/ postradiation.    Sinonasal region: Paranasal sinuses are clear. No mass within the  nasal cavity.    Lymph nodes: No FDG avid or abnormally enlarged lymph nodes.    Salivary glands: The major salivary glands are within normal limits.    Thyroid gland: The thyroid gland is within normal limits.    Vascular structures: The major vasculature of the neck are patent.    Brain: No abnormal FDG avid lesion or abnormal enhancement.    Orbital cavities: No abnormal FDG avid lesion or abnormal enhancement.      CHEST:    Lymph nodes: Prominent hypermetabolic left paratracheal lymph node  measuring 9 mm with a max SUV of 4.5 (3/262). Additional prominent non  hypermetabolic lymph nodes, for example a right paratracheal node  measuring 1.0 cm (3/259). 1.3 cm subcarinal node with trace uptake.    Lungs: Bibasilar atelectasis. Unchanged spiculated non avid nodularity  within the left lung apex measuring 8 x 6 mm, likely scarring. The  central tracheobronchial tree is clear. No acute consolidation.  No  pleural effusion or pneumothorax.    Heart and great vessels: Heart size is within normal limits. Mild  atherosclerotic calcifications of the coronary arteries and thoracic  aorta. Comment the brachiocephalic and left common carotid artery. No  pericardial effusion. The thoracic aorta and main pulmonary artery are  within normal limits. The esophagus is unremarkable.    Breasts: No abnormal uptake in the breasts.    ABDOMEN AND PELVIS:    Liver: No FDG avid lesion. No intrahepatic or extrahepatic biliary  ductal dilatation. Cholelithiasis without evidence of acute  cholecystitis.    Pancreas: The pancreas is within normal limits. No pancreatic ductal  dilatation.    Spleen: No FDG avid lesion.    Adrenal glands: No FDG avid foci.    Kidneys: No FDG avid lesion. No hydronephrosis. Left  extrarenal pelvis  and parapelvic cysts. The urinary bladder is unremarkable.    Reproductive organs: Bilateral hydroceles..    Gastrointestinal system: Normal caliber of the small and large bowel.  Diverticulosis without evidence of acute diverticulitis. Normal  appendix.    Lymph nodes: No FDG avid or abnormally enlarged lymph nodes.    Vascular structures: Normal caliber of the abdominal aorta.    No free air, free fluid, or fluid collection.    EXTREMITIES:    No abnormal FDG uptake in the visualized extremities.    BONES AND SOFT TISSUES:    No abnormal FDG uptake in the skeleton. Chronic healed fracture of the  left posterior ninth rib. No abnormal lytic or blastic osseous  lesions.    SPINAL CANAL:    No evident canal compromise. No abnormal FDG avid lesion.    Impression  Impression:  1. Primary: NI-RADS 1} Expected post-treatment changes in the neck  without evidence of recurrent disease at the primary site.  2. Neck: NI-RADS 1}  No abnormal lymph node.  3. Increased FDG uptake within the cervical esophageal mucosa, likely  inflammatory/ postradiation.  4. Few prominent mediastinal nodes with trace uptake, likely  inflammatory/ reactive. Attention on follow up is recommended.  5. No strong evidence of distant metastasis.    NI-RADS CECT Surveillance Legend:    Primary  1: No evidence of recurrence: routine surveillance  2: Low suspicion  a) Superficial abnormality (skin, mucosal surface): direct visual  inspection  b) Ill-defined deep abnormality: short interval follow-up* or PET  3: High suspicion (new or enlarging discrete nodule/mass): biopsy  4: Definitive recurrence (path proven or clinical progression): no  biopsy needed    Nodes  1: No evidence of recurrence: routine surveillance  2: Low suspicion (ill-defined): short interval follow-up or PET  3: High suspicion (new or enlarging lymph node): biopsy if clinically  needed  4: Definitive recurrence (path proven or clinical progression): no  biopsy  needed    *short interval follow-up: 3 months at our institution    I have personally reviewed the examination and initial interpretation  and I agree with the findings.    SEE DODD MD      SYSTEM ID:  [id number]    Results for orders placed during the hospital encounter of 10/15/24    CT Chest w Contrast    Narrative  EXAM: CT CHEST W CONTRAST  LOCATION: [facility]  DATE: 10/15/2024    INDICATION: Pneumonia diagnosed about 4 weeks ago, patient with worsened breathing since that time, known vocal cord cancer history, CT soft tissue neck pending as well  COMPARISON: Chest CT 9/29/2023  TECHNIQUE: CT chest with IV contrast. Multiplanar reformats were obtained. Dose reduction techniques were used.    CONTRAST: 115mL Isovue 370    FINDINGS:  LUNGS AND PLEURA: Trace peripheral fibrosis and biapical scarring in both lungs, similar to prior exam. No focal airspace consolidation, pleural effusion or pneumothorax.    MEDIASTINUM/AXILLAE: Stable borderline enlarged mediastinal lymph nodes with representative left paratracheal node measuring 1.1 cm in short axis, previously 0.9 cm (series 4 image 60). No definite new lymphadenopathy. No thoracic aneurysm.    CORONARY ARTERY CALCIFICATION: Severe.    UPPER ABDOMEN: No significant finding.    MUSCULOSKELETAL: No acute bony abnormality. Healed bilateral rib fractures.    Impression  IMPRESSION:  1.  No definite acute abnormality in the chest.  2.  Borderline enlarged mediastinal lymphadenopathy, grossly stable from prior CT.    Results for orders placed during the hospital encounter of 04/18/17    MR Brain w/o & w Contrast    Narrative  MRI BRAIN WITHOUT AND WITH CONTRAST  4/18/2017 2:40 PM    HISTORY: Skew deviation, evaluate for posterior fossa lesion.  Diplopia. Lost bottom half of eyesight eight months ago, then episode  of complete blindness lasting 30 seconds, then returned to normal.    TECHNIQUE: Multiplanar, multisequence MRI of the brain  without and  with 7 mL IV Gadavist.    COMPARISON: 2/25/2013    FINDINGS: There is generalized atrophy of the brain. White matter  changes are present in the cerebral hemispheres that are consistent  with small vessel ischemic disease in this age patient. There is no  evidence of hemorrhage, mass, acute infarct, or anomaly. There are no  gadolinium enhancing lesions.    The facial structures appear normal. The arteries at the base of the  brain and the dural venous sinuses appear patent.    Impression  IMPRESSION:  1. No evidence of posterior fossa lesion.  2. Mild atrophy of the brain.  3. Mild white matter changes consistent with sequelae of small vessel  ischemic disease.      MICHEAL GAN MD    Pathology:   Final Diagnosis 10/17/24  A. RIGHT SUPRAGLOTTIS:  - Predominantly necrotic debris with granulation tissue and reactive atypia consistent with treatment effect  - No definitive carcinoma is present     B. LEFT SUPRAGLOTTIS:  - Predominantly necrotic debris with granulation tissue and reactive atypia consistent with treatment effect  - No definitive carcinoma is present     C. POST CRICOID BIOPSY:  - Necrotic debris, no evidence of malignancy      Tumor Board Recommendation:   Discussion: Reviewed 3 month post-treatment PET scan. Nothing is suspicious for residual or recurrent disease on PET. Currently no evidence of disease; will proceed with routine surveillance.    Plan:   - CT neck and chest every 6 months x 2 years      Rebecca Cisneros MD  Otolaryngology-Head & Neck Surgery PGY-3      Documentation / Disclaimer Cancer Tumor Board Note: Cancer tumor board recommendations do not override what is determined to be reasonable care and treatment, which is dependent on the circumstances of a patient's case; the patient's medical, social, and personal concerns; and the clinical judgment of the oncologist [physician].

## 2025-01-10 NOTE — PATIENT INSTRUCTIONS
1.  You were seen in the ENT Clinic today by . If you have any questions or concerns after your appointment, please call [phone]. Press option #1 for scheduling related needs. Press option #3 for Nurse advice.    2.   has recommended  the following:   - CT scan of neck w/contrast.    - CT scan of chest   - Lymphedema therapy. They will contact you to schedule.   - change gauze on trach site daily. Order for supplies will be sent to [facility].  3.  Plan is to return to clinic in 4-6 weeks      Hattie Hair LPN  [phone]  [facility] - Otolaryngology

## 2025-01-10 NOTE — PROGRESS NOTES
[facility]   at the [facility]   Otolaryngology Clinic     Patient: Jsohua Ennis    MRN: [id number]    : 1945    Age/Gender: 79 year old male  Date of Service: 1/10/2025  Rendering Provider:   Josephine Malone MD         Impression & Plan     IMPRESSION: Mr. Ennis is a 79 year old male who is being seen for the following:    Dysphonia   - anterior commissure lesion seen on scope on 23  - voice changes worsening for a few months  - prior smoker  - scope shows anterior commissure lesion with extension to the right vocal fold  - given anterior location will require CT neck to rule out thyroid cartilage extension  - discussed awake biopsy - patient in agreement this was done today  - pathology showed SCC  - CT chest had findings of new nodules - after multiple discussions with radiology the decision was made to proceed with surgery and repeat imaging for this finding in the chest  - s/p MDL with bilateral cordectomy with stent placement on 23  - today shows that the vocal folds closed around the stent - discussed he needs the scar broken up and bigger stent placed - patient in agreement   - pathology showed SCC with positive margins  - went back for MDL and stent removal on 23   - symptoms 10/10/2023 are stable, voice is poor  - scope shows  laryngeal web  - discussed he has positive margins - will proceed with repeat surger - if can't clear will need radiation  - ct chest shows improved size of lung nodule of concern  - s/p microlaryngoscopy, CO2 laser resection of vocal fold lesion, steroid injection, stent placement, and biopsy 10/25/23  - pathology shows invasive squamous cell carcinoma  - symptoms 2023 are stable, voice is poor, discussed case at tumor board, will continue with close observation and repeat biopsies  - scope shows stent in place, vocal folds are open, laryngeal web developed  - discussed removal of stent, then will collect a sample, if the  sample is cancerous, will refer for radiation therapy  - s/p microlaryngoscopy, CO2 laser resection of vocal fold lesion, steroid injection, stent removal, and biopsy 12/01/23  - pathology showed detached fragment of squamous epithelium with mild to moderate dysplasia, no evidence of carcinoma  - s/p laryngoscopy and anterior glottic web vocal fold steroid injection 12/11/23      - symptoms 1/4/2024 are has been having a hard time talking with his voice  - strobe shows bilateral vocal fold stiffness and swelling with question of laryngeal web, ventricular phonation  - s/p steroid injection today, does not appear to have a web, but rather vocal fold stiffness and swelling  - discussed treatment for vocal folds will be with steroid injections and voice therapy  - discussed voice result is questionable given severity of ventricular phonation  - symptoms 1/18/2024 are stable  - scope shows anterior glottic web, did lidocaine anesthesia, but difficult to see if the vocal folds can split apart today as compared to last time, he had laryngeal spasm  - discussed that we should focus on surveillance at this time and keep him in voice therapy, I would not do a repeat stunt placement given the last two did not work   - symptoms 6/27/2024 are worse difficulty breathing  - scope shows anterior glottic web, moderate airway restriction  - has final read of CT pending  - discussed I am concerned for tumor, needs evaluation under anesthesia   discussed awake intubation, discussed risk of trach  - s/p MDL with biopsy 7/17/24 with pathology showing SCC, tumor board discussion ensued with decision to proceed with radiation   - symptoms 7/29/2024 are stable per him though improved per Earlene, less panting with mowing the lawn  - scope shows anterior glottic web, though improved glottic airway   - discussed if breathing should worsen during treatment he is to reach out and consider trach placement   - symptoms 10/17/2024 are worsened for  the past few weeks in terms of his breathing, has finished radiation, had pneumonia that resolved but his breathing has not, breathing has gotten worse, here with his fiancee who is concerned, patient denies difficulty breathing however he has stridor   - 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  - discussed his options at this time are observation, steroid treatment vs awake tracheotomy  - given how severe the airway restriction is would recommend awake tracheotomy   - patient reluctant saying he would rather die, he is tired of all these interventions, everytime something happens there is another thing  - his fiancee on the other hand wants him to proceed  - after much discussion patient in agreement and will proceed to the hospital  - finished radition on 9/24  - had trach and MDL with biopsy on 10/17/24  - pathology negative for carcinoma  - symptoms 10/29/2024 are stable   - scope shows improved glottic airway, arytenoid edema  - symptoms 11/12/2024 are stable, does not like the trach  - scope shows improved glottic airway, arytenoid edema  - symptoms 12/12/2024 are stable, has been capping without breathing problems  - scope shows much improved swelling, still has arytenoid edema  - discussed lymphedema therapy after PET-CT  - symptoms 1/10/2025 are stable, breathing is good, swallowing continues to feel stable but has significant mucus and phlegm, discussed at tumor board today - PET-CT was clean, still has stable chest skin lesion  - scope shows stable airway and arytenoid edema  - FEES shows penetration with thins and aspiration (seen during tbronch)  - discussed swallow therapy and lymphedema therapy  - discussed swallow maneuvers and increased aerobic activity  Plan  - lymphedema therapy  - dermatology referral for chest skin wall lesion  - CT neck with contrast/CT chest non con q6 months until 9/2026, then yearly thereafter    2. Trach dependence  -  in the setting of airway edema   - since 10/17/24  - has 6 shiley trach  - speaking with speaking valve  - not capping  - at night uses HME  - tbronch shows no secretions  - symptoms 11/12/2024 are breathing is good   - scope shows improved glottic edema but persistent arytenoid edema, erythema around stoma   - downsize trach with capping trial, size 5  - symptoms 12/12/24 are worsened mucus in the evening, very difficult to manage  - scope shows mild redness on posterior tracheal wall  - trach downsized to size 4  - discussed capping trach day and night  - mucus could be due to dysphagia - will need to reassess at the next visit  - symptoms 1/10/2025 are stable, doing well, has been capping day and night  - risk of pneumonia reviewed with trach removal  - patient would like trach to be removed  - decannulated today   Plan  -occlusive dressing    RETURN VISIT: 4-6 weeks    Chief Complaint   Anterior commissure SCC  S/p MDL with bilateral cordectomy and steroid injection 8/30/23  S/p MDL with stent removal and steroid injection on 9/13/23  S/p microlaryngoscopy, CO2 laser resection of vocal fold lesion, steroid injection, stent placement, and biopsy 10/25/23    S/p microlaryngoscopy, CO2 laser resection of vocal fold lesion, steroid injection, stent removal, and biopsy 12/01/23  S/p laryngoscopy and anterior glottic web vocal fold steroid injection 12/11/23   S/p steroid injection 1/4/24           S/p radiation completed on 9/24/24   Trach placement 10/17/24  Trach decannulated 10/1/25  Interval History   HISTORY OF PRESENT ILLNESS:  Mr. Ennis is a 79 year old male is being followed for dysphonia. he was initially seen on 8/15/23. Please refer to this note for full history.     Today, he presents for follow up. 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 Negative for high-grade dysplasia and malignancy.    Hoarseness     Hypertension     Idiopathic progressive polyneuropathy     Kidney problem     Laryngeal cancer (H)     Malignant melanoma (H)     Malignant neoplasm (H)     melanoma    Squamous cell carcinoma     Russo syndrome        PAST SURGICAL HISTORY:   Past Surgical History:   Procedure Laterality Date    BIOPSY  11/2011    melanoma on back    DISSECT LYMPH NODE INGUINAL  3/1/2013    Procedure: DISSECT LYMPH NODE INGUINAL;  Bilateral Inguinal Lymph Node Biopsy.;  Surgeon: Tariq Acosta MD;  Location: [facility]    ESOPHAGOSCOPY, GASTROSCOPY, DUODENOSCOPY (EGD), COMBINED  7/5/2013    Procedure: COMBINED ESOPHAGOSCOPY, GASTROSCOPY, DUODENOSCOPY (EGD);  Gastroscopy;  Surgeon: Tariq Acosta MD;  Location: [facility]    HERNIORRHAPHY INGUINAL  8/6/2012    Procedure: HERNIORRHAPHY INGUINAL;  Open Repair Left Inguinal Hernia;  Surgeon: Jerad Baker MD;  Location: [facility]    INJECT STEROID (LOCATION) N/A 8/30/2023    Procedure: steroid injection;  Surgeon: Josephine Malone MD;  Location: [facility]    INJECT STEROID (LOCATION) N/A 9/13/2023    Procedure: steroid injection;  Surgeon: Josephine Malone MD;  Location: [facility]    INJECT STEROID (LOCATION) N/A 10/25/2023    Procedure: steroid injection, stent placement, and biopsy;  Surgeon: Josephine Malone MD;  Location: [facility]    INJECT STEROID (LOCATION) N/A 12/1/2023    Procedure: steroid injection;  Surgeon: Josephine Malone MD;   Location: [facility]    LARYNGOSCOPY WITH MICROSCOPE N/A 9/13/2023    Procedure: MICROLARYNGOSCOPY with stent removal and biopsy;  Surgeon: Josephine Malone MD;  Location: [facility]    LARYNGOSCOPY, FLEXIBLE WITH INJECTION N/A 12/11/2023    Procedure: LARYNGOSCOPY, FLEXIBLE WITH INJECTION with steroid;  Surgeon: Josephine Malone MD;  Location: [facility]    LASER CO2 LARYNGOSCOPY N/A 8/30/2023    Procedure: MICROLARYNGOSCOPY, CO2 laser resection of vocal fold lesion;  Surgeon: Josephine Malone MD;  Location: [facility]    LASER CO2 LARYNGOSCOPY N/A 10/25/2023    Procedure: MICROLARYNGOSCOPY, CO2 laser resection of vocal fold lesion;  Surgeon: Josephine Malone MD;  Location: [facility]    LASER CO2 LARYNGOSCOPY N/A 12/1/2023    Procedure: MICROLARYNGOSCOPY CO2 laser standby, resection of vocal fold lesion, stent removal, biopsy;  Surgeon: Josephine Malone MD;  Location: [facility]    LASER CO2 LARYNGOSCOPY, COMPLEX N/A 7/17/2024    Procedure: MICROLARYNGOSCOPY, CO2 laser standby, biopsy;  Surgeon: Josephine Malone MD;  Location: [facility]       CURRENT MEDICATIONS:   Current Outpatient Medications:     acetaminophen (TYLENOL) 325 MG tablet, Take 2 tablets (650 mg) by mouth every 4 hours as needed for pain., Disp: 50 tablet, Rfl: 0    calcipotriene (DOVONOX) 0.005 % external cream, Apply topically 2 times daily In conjunction with fluorouracil cream in a 1:1 mixture., Disp: 60 g, Rfl: 2    calcium carbonate 600 mg-vitamin D 400 units (CALTRATE) 600-400 MG-UNIT per tablet, Take 1 tablet by mouth 2 times daily., Disp: , Rfl:     cetirizine (ZYRTEC) 10 MG tablet, Take 1 tablet by mouth daily., Disp: , Rfl:     Cholecalciferol (VITAMIN D) 1000 UNITS capsule, Take 1 capsule by mouth daily., Disp: , Rfl:     ciprofloxacin-dexAMETHasone (CIPRODEX) 0.3-0.1 % otic suspension, Instill 4 drops into trach twice daily, Disp: 7.5 mL, Rfl: 1    fluorouracil (EFUDEX) 5 % external cream, Apply topically 2 times daily In conjunction with calcipotriene cream in a 1:1 mixture., Disp:  40 g, Rfl: 2    gabapentin (NEURONTIN) 400 MG capsule, Take 1 capsule (400 mg) by mouth at bedtime., Disp: 90 capsule, Rfl: 3    losartan (COZAAR) 50 MG tablet, Take 1 tablet (50 mg) by mouth daily (Patient taking differently: Take 50 mg by mouth at bedtime.), Disp: 90 tablet, Rfl: 3    mupirocin (BACTROBAN) 2 % external cream, Apply topically 3 times daily. Apply to affected area three times daily before applying aquaphor, Disp: 30 g, Rfl: 0    oxyCODONE (ROXICODONE) 5 MG tablet, Take 0.5-1 tablets (2.5-5 mg) by mouth every 4 hours as needed for moderate to severe pain., Disp: 15 tablet, Rfl: 0    pantoprazole (PROTONIX) 40 MG EC tablet, Take 1 tablet (40 mg) by mouth daily., Disp: 60 tablet, Rfl: 1    senna-docusate (SENOKOT-S/PERICOLACE) 8.6-50 MG tablet, Take 1 tablet by mouth 2 times daily., Disp: 30 tablet, Rfl: 0    silver sulfADIAZINE (SILVADENE) 1 % external cream, Apply topically 2 times daily. Apply to affected area, Disp: 400 g, Rfl: 0    ALLERGIES: Shrimp and Advil [ibuprofen]    SOCIAL HISTORY:    Social History     Socioeconomic History    Marital status: Single     Spouse name: Not on file    Number of children: Not on file    Years of education: Not on file    Highest education level: Not on file   Occupational History     Employer: [employer]   Tobacco Use    Smoking status: Former     Current packs/day: 0.00     Average packs/day: 1 pack/day for 20.0 years (20.0 ttl pk-yrs)     Types: Cigarettes     Start date: 1993     Quit date: 2013     Years since quittin.9     Passive exposure: Past    Smokeless tobacco: Never   Vaping Use    Vaping status: Never Used   Substance and Sexual Activity    Alcohol use: Not Currently     Comment: rare    Drug use: No    Sexual activity: Not Currently     Partners: Female   Other Topics Concern    Parent/sibling w/ CABG, MI or angioplasty before 65F 55M? Not Asked     Service Not Asked    Blood Transfusions Not Asked    Caffeine  Concern Not Asked    Occupational Exposure Not Asked    Hobby Hazards Not Asked    Sleep Concern Not Asked    Stress Concern Not Asked    Weight Concern Not Asked    Special Diet Not Asked    Back Care Not Asked    Exercise Yes     Comment: walking    Bike Helmet Not Asked    Seat Belt Not Asked    Self-Exams Not Asked   Social History Narrative    Not on file     Social Drivers of Health     Financial Resource Strain: Low Risk  (10/18/2024)    Financial Resource Strain     Within the past 12 months, have you or your family members you live with been unable to get utilities (heat, electricity) when it was really needed?: No   Food Insecurity: Low Risk  (10/18/2024)    Food Insecurity     Within the past 12 months, did you worry that your food would run out before you got money to buy more?: No     Within the past 12 months, did the food you bought just not last and you didn t have money to get more?: No   Transportation Needs: Low Risk  (10/18/2024)    Transportation Needs     Within the past 12 months, has lack of transportation kept you from medical appointments, getting your medicines, non-medical meetings or appointments, work, or from getting things that you need?: No   Physical Activity: Not on file   Stress: Not on file   Social Connections: Unknown (12/24/2021)    Received from [facility] [employer] [facility] [employer] [facility]    Social Connections     Frequency of Communication with Friends and Family: Not on file   Interpersonal Safety: Low Risk  (10/18/2024)    Interpersonal Safety     Do you feel physically and emotionally safe where you currently live?: Yes     Within the past 12 months, have you been hit, slapped, kicked or otherwise physically hurt by someone?: No     Within the past 12 months, have you been humiliated or emotionally abused in other ways by your partner or ex-partner?: No   Housing Stability: Low Risk  (10/18/2024)    Housing Stability     Do  you have housing? : Yes     Are you worried about losing your housing?: No         FAMILY HISTORY:   Family History   Problem Relation Age of Onset    Diabetes Mother     Hypertension Mother     Cancer Father         stomach    Heart Disease Father     Heart Disease Brother     Diabetes Sister     Diabetes Sister     Diabetes Sister     Heart Disease Brother     Cancer Sister     Glaucoma No family hx of     Macular Degeneration No family hx of       Non-contributory for problems with anesthesia    REVIEW OF SYSTEMS:   The patient was asked a 14 point review of systems regarding constitutional symptoms, eye symptoms, ears, nose, mouth, throat symptoms, cardiovascular symptoms, respiratory symptoms, gastrointestinal symptoms, genitourinary symptoms, musculoskeletal symptoms, integumentary symptoms, neurological symptoms, psychiatric symptoms, endocrine symptoms, hematologic/lymphatic symptoms, and allergic/ immunologic symptoms.   The pertinent factors have been included in the HPI and below.  Patient Supplied Answers to Review of Systems      12/12/2024    10:53 AM    ENT ROS   Constitutional Weight gain   Cardiopulmonary Cough       Physical Examination   The patient underwent a physical examination as described below. The pertinent positive and negative findings are summarized after the description of the examination.  Constitutional: The patient's developmental and nutritional status was assessed. The patient's voice quality was assessed.  Head and Face: The head and face were inspected for deformities. The sinuses were palpated. The salivary glands were palpated. Facial muscle strength was assessed bilaterally.  Eyes: Extraocular movements and primary gaze alignment were assessed.  Ears, Nose, Mouth and Throat: The ears and nose were examined for deformities. The nasal septum, mucosa, and turbinates were inspected by anterior rhinoscopy. The lips, teeth, and gums were examined for abnormalities. The oral  mucosa, tongue, palate, tonsils, lateral and posterior pharynx were inspected for the presence of asymmetry or mucosal lesions.    Neck: The tracheal position was noted, and the neck mass palpated to determine if there were any asymmetries, abnormal neck masses, thyromegally, or thyroid nodules.  Respiratory: The nature of the breathing and chest expansion/symmetry was observed.  Cardiovascular: The patient was examined to determine the presence of any edema or jugular venous distension.  Abdomen: The contour of the abdomen was noted.  Lymphatic: The patient was examined for infraclavicular lymphadenopathy.  Musculoskeletal: The patient was inspected for the presence of skeletal deformities.  Extremities: The extremities were examined for any clubbing or cyanosis.  Skin: The skin was examined for inflammatory or neoplastic conditions.  Neurologic: The patient's orientation, mood, and affect were noted. The cranial nerve  functions were examined.  Other pertinent positive and negative findings on physical examination:   OC/OP: no lesions, uvula midline, soft palate elevates symmetrically   Neck: no lesions, no TH tenderness to palpation  4 cuffless shiley removed   All other physical examination findings were within normal limits and noncontributory.    Procedures   Flexible laryngoscopy (CPT 41271)        Pre-procedure diagnosis: dysphonia  Post-procedure diagnosis: same as above  Indication for procedure: Mr. Ennis is a 79 year old male with see above  Procedure(s): Fiberoptic Laryngoscopy     Details of Procedure: After informed consent was obtained, the patient was seated in the examination chair.  The areas of the nasopharynx as well as the hypopharynx were anesthetized with topical 4% lidocaine with 0.25% phenylephrine atomizer.  Examination of the base of tongue was performed first.  Attention was directed to any evidence of masses in the area or evidence of leukoplakia or candidal infection.  Attention was  directed to the epiglottis where its size and position was determined and its movement on phonation of the vowel  e .  The piriform sinuses were then inspected for any mass lesions or pooling of secretions.  Attention was then directed to the larynx. The vocal folds were inspected for infection or any areas of leukoplakia, for masses, polypoid degeneration, or hemorrhage.  Having done this, the arytenoids and vocal processes were inspected for erythema or evidence of granuloma formation.  The posterior commissure was then inspected for evidence of inflammatory changes in the mucosa and heaping up of mucosal tissue. The patient was then instructed to say the vowel  e .  Adduction of vocal folds to the midline was observed for any evidence of paresis or paralysis of the larynx or asymmetry in rotation of the larynx to the left or right. The patient was asked to breathe and the degree of abduction was noted bilaterally.  Subglottic view of the larynx was obtained for any additional mass lesions or mucosal changes.  Finally the post cricoid was examined for evidence of pooling of secretions, as well as the pharyngeal wall mucosa.   Anesthesia type: 0.25% phenylephrine     Findings:  Anatomic/physiological deviations: RNC, improved swelling, arytenoid edema, anterior glottic web                Right vocal process: Some restriction of mobility   Left vocal process: Some restriction of mobility  Glottal gap: Complete glottal closure  Supraglottic structures: Normal  Hypopharynx: Normal      Estimated Blood Loss: minimal  Complications: None  Disposition: Patient tolerated the procedure well    Fiberoptic Endoscopic Evaluation of Swallowing (CPT 95031)  and Interpretation of Swallowing (CPT 45784)    Indications: See above notes for complete history and physical. Patient complains of dysphagia to both solids and liquids and/or there is suggestion on history and endoscopic exam of the presence of dysphagia causing medical  complaints.  Swallowing evaluation is being performed to assess the presence and degree of dysphagia, and to recommend a safe diet.     Pulmonary Status:  No PNA   Current Diet:              regular                                        thin liquids      Consistency Amounts:  Thin Liquid: sip   Puree: sip  Solid: cookies            Positioning: upright in a chair  Oral Peripheral Exam: See physical exam section.  Anatomic Notes: See Videostroboscopy report for assessment of anatomy and laryngeal functioning  Pharyngeal secretions prior to administration of liquid or food: No    Oral Phase Abnormal Findings: No abnormal behavior observed    Pharyngeal Phase Abnormal Findings: penetration, aspiration with thin, comes from posterior commissure, improved swallowing maneuver  - throat clearing after each thin sip    Recommended Diet:  regular                                        thin liquids           Tracheobronchoscopy, through established tracheostomy (CPT 22939)     Pre-Procedure Diagnosis: trach dependence  Post Procedure Diagnosis: same  Indication for procedure:  Mr. Ennis is a 79 year old male with trach dependence     Procedure(s): Tracheobronchoscopy, through established tracheostomy     Details of Procedure: Topical anesthesia was achieved by spraying 4% topical lidocaine directly through the tracheotomy.  After adequate anesthesia was achieved a flexible bronchoscope was passed through the tracheotomy into the trachea.  Abnormalities were noted. The nacho was visualized, followed by further insertion of the scope to the main stem bronchus level to evaluate the bronchi as well as the orifices of the sub-segmental bronchi.   Having completed this the operation was completed and the scope withdrawn atraumatically.   Anesthesia type: 4% topical lidocaine     Findings:   BRONCHOSCOPY  >Tracheostoma: resolved granulation  >Trachea: normal   >Nacho: Normal mucosa  >Mainstem Bronchi: Normal mucosa     Estimated  "Blood Loss: None  Complication(s): None  Disposition: Patient tolerated the procedure well      Review of Relevant Clinical Data   I personally reviewed:  Radiology:   12/20/2024 PET/CT Oncology whole body   Impression:  1. Primary: NI-RADS 1} Expected post-treatment changes in the neck  without evidence of recurrent disease at the primary site.  2. Neck: NI-RADS 1}  No abnormal lymph node.   3. Increased FDG uptake within the cervical esophageal mucosa, likely  inflammatory/ postradiation.   4. Few prominent mediastinal nodes with trace uptake, likely  inflammatory/ reactive. Attention on follow up is recommended.   5. No strong evidence of distant metastasis.     Procedures:   01/22/2024 Audiogram/Tympanogram   Results: Right normal hearing through 1000 Hz, sloping to moderate to severe sensorineural hearing loss at 1500 Hz and above. Left  normal hearing through 500 Hz, sloping to a mild to severe sensorineural hearing loss at 1000 Hz and above.  Tympanograms normal bilaterally  Acoustic reflexes: Right ipsi and right contra normal. Left ipsi and left contra elevated    Labs:  No results found for: \"TSH\"  Lab Results   Component Value Date     10/20/2024    CO2 21 (L) 10/20/2024    BUN 22.0 10/20/2024    PHOS 3.4 10/23/2024     Lab Results   Component Value Date    WBC 5.6 11/20/2024    HGB 12.7 (L) 11/20/2024    HCT 39.9 (L) 11/20/2024    MCV 94 11/20/2024     11/20/2024     Lab Results   Component Value Date    PTT 23 10/17/2024    INR 1.05 10/17/2024     No results found for: \"DUANE\"  No components found for: \"RHEUMATOIDFACTOR\", \"RF\"  Lab Results   Component Value Date    CRP <2.9 07/07/2022    CRP <2.9 01/07/2022    CRP <2.9 07/28/2021    CRP <2.9 03/05/2021    CRP <2.9 11/11/2020     No components found for: \"CKTOT\", \"URICACID\"  No components found for: \"C3\", \"C4\", \"DSDNAAB\", \"NDNAABIFA\"  Lab Results   Component Value Date    MPOAB Not Reported 02/14/2014       Patient reported Quality of Life " "(QOL) Measures   Patient Supplied Answers To VHI Questionnaire      1/22/2024     1:02 PM   Voice Handicap Index (VHI-10)   My voice makes it difficult for people to hear me 4   People have difficulty understanding me in a noisy room 4   My voice difficulties restrict my personal and social life.  2   I feel left out of conversations because of my voice 3   My voice problem causes me to lose income 0   I feel as though I have to strain to produce voice 4   The clarity of my voice is unpredictable 1   My voice problem upsets me 2   My voice makes me feel handicapped 2   People ask, \"What's wrong with your voice?\" 2   VHI-10 24         Patient Supplied Answers To EAT Questionnaire       No data to display                  Patient Supplied Answers To CSI Questionnaire      12/12/2024    11:00 AM   Cough Severity Index (CSI)   My cough is worse when I lie down 2   My coughing problem causes me to restrict my personal and social life 2   I tend to avoid places because of my cough problem 1   I feel embarrassed because of my coughing problem 1   People ask, ''What's wrong?'' because I cough a lot 0   I run out of air when I cough 2   My coughing problem affects my voice 1   My coughing problem limits my physical activity 0   My coughing problem upsets me 2   People ask me if I am sick because I cough a lot 0   CSI Score 11        Patient-reported         Patient Supplied Answers to Dyspnea Index Questionnaire:      12/12/2024    11:00 AM   Dyspnea Index   1. I have trouble getting air in. 1   2. I feel tightness in my throat when I am having da breathing problem. 2   3. It takes more effort to breathe than it used to. 1   4. Change in weather affect my breathing problem. 1   5. My breathing gets worse with stress. 0   6. I make sound/noise breathing in 2   7. I have to strain to breathe. 0   8. My shortness of breath gets worse with exercise or physical activity 0   9. My breathing problem makes me feel stressed. 2   10. " My breathing problem casuses me to restrict my personal and social life. 1   Dyspnea Index Total Score 10        Patient-reported     Scribe Disclosure:   I, Isela Clemente, am serving as a scribe; to document services personally performed by Josephine Malone MD -based on data collection and the provider's statements to me.     Provider Disclosure:  I agree with above History, Review of Systems, Physical exam and Plan.  I have reviewed the content of the documentation and have edited it as needed. I have personally performed the services documented here and the documentation accurately represents those services and the decisions I have made.      Josephine Malone MD    Laryngology    [facility]  Department of  Otolaryngology - Head and Neck Surgery  [facility] [place] [address]  Appointment line: [phone]  Fax: [phone]  [email or web address]

## 2025-01-13 DIAGNOSIS — J06.9 URI (UPPER RESPIRATORY INFECTION): Primary | ICD-10-CM

## 2025-01-13 DIAGNOSIS — L98.9 SKIN LESION: Primary | ICD-10-CM

## 2025-01-13 NOTE — PROGRESS NOTES
Called patient SO regarding her call today about patient developing a URI. Per patient SO patient developed congestion, and a fever yesterday after getting his trach out. Today fever has went down, and the old trach site was not warm, red, swollen, or painful. Writer reached out to care team, and they recommended a course of abx. Writer let patient SO know to get the abx today, and our team would call to check in tomorrow, however is there were worsening symptoms today to call back. Patient was agreeable and verbalized understanding of the situation. Nina Daly RN on 1/13/2025 at 8:38 AM

## 2025-01-14 ENCOUNTER — DOCUMENTATION ONLY (OUTPATIENT)
Dept: OTOLARYNGOLOGY | Facility: CLINIC | Age: 80
End: 2025-01-14
Payer: COMMERCIAL

## 2025-01-14 NOTE — PROGRESS NOTES
Order for sterile gauze and adhesive film faxed to [facility] fax number [phone]. 2 pgs faxed.  Letter to discharge trach supplies, suction equipment and humidifier also faxed to [facility]. Fax number [phone]. 2 pgs faxed.

## 2025-01-30 ENCOUNTER — THERAPY VISIT (OUTPATIENT)
Dept: PHYSICAL THERAPY | Facility: CLINIC | Age: 80
End: 2025-01-30
Attending: OTOLARYNGOLOGY
Payer: COMMERCIAL

## 2025-01-30 DIAGNOSIS — I89.0 LYMPHEDEMA: Primary | ICD-10-CM

## 2025-01-30 PROCEDURE — 97162 PT EVAL MOD COMPLEX 30 MIN: CPT | Mod: GP

## 2025-01-30 PROCEDURE — 97140 MANUAL THERAPY 1/> REGIONS: CPT | Mod: GP

## 2025-01-30 NOTE — PROGRESS NOTES
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,   Dates & types of surgery:  had B inguinal lymph node dissection in 2013    Prior diagnostic imaging/testing results:       Prior therapy history for the same diagnosis, illness or injury: No      Prior Level of Function  Transfers: Independent  Ambulation: Independent  ADL: Independent  IADL: Driving, Finances, Housekeeping, Laundry, Meal preparation, Medication management, Work    Living Environment  Social support: With a significant other or spouse   Type of home: 2-story   Stairs to enter the home:         Ramp: No   Stairs inside the home:         Help at home: None  Equipment owned:       Employment:    works full time as a   Hobbies/Interests:      Patient goals for therapy: talk with voice    Pain assessment: Pain present - reported as mild and usually only while swallowing and speaking. Pt spouse reports that pt is very stoic and rarely ever c/o pain/discomfort even when she knows he is experiencing it.      Objective       EDEMA EVALUATION  Additional history:  Body part affected by edema: throat vocal cords  If cancer related, treatment: radiation  If not cancer related, problems with veins or cause of swelling:    Distance able to walk: all day  Time able to stand: no limit  Sensation problems in hands/feet: Yes narapethy  Edema etiology: Cancer with lymph node dissection, Radiation    FUNCTIONAL SCALES   Lymphedema Life Impact Scale (LLIS): 16    Cognitive Status Examination  Orientation: Oriented to person, place and time   Level of  Consciousness: Alert  Follows Commands and Answers Questions: 100% of the time  Personal Safety and Judgement: Intact  Memory: Intact    EDEMA  Skin Condition: WFL  Scar: No - only presumed scar tissue d/t radiation to B neck tissue  Stemmer Sign: -  Ulceration: No    GIRTH MEASUREMENTS: Refer to separate girth measurement flowsheet for specific measurements.    Head/Neck Volume: Superior: 44cm, Middle: 42.5cm, Inferior: 41.3cm ; pt external swelling is likely at baseline . Pt and spouse report he had visible swelling months ago but it has greatly improved now.    RANGE OF MOTION:  Pt reports stiffness in neck/upper traps with head/neck rotation R and L, but ROM is WFL for driving (per pt report)  STRENGTH: UE Strength WFL  POSTURE: Sitting Posture: Rounded shoulders, Forward head  PALPATION: Pt does not note any external edema upon palpation of head and neck at this time  ACTIVITIES OF DAILY LIVING: IND  BED MOBILITY: Independent  TRANSFERS: Independent  GAIT/LOCOMOTION: IND  BALANCE: WFL  SENSATION: UE Sensation WNL  VASCULAR:  Pt spouse reports pt has been dx with vasculitis  COORDINATION: WFL  MUSCLE TONE: WFL    Assessment & Plan   CLINICAL IMPRESSIONS  Medical Diagnosis: Laryngeal Cancer    Treatment Diagnosis: Head and Neck Lymphedema   Impression/Assessment: Patient is a 79 year old male with ongoing complaints of tightness in his throat and difficulty with swallowing and speaking; pt is already being seen by skilled SLP.  The following significant findings have been identified: Pain, Decreased ROM/flexibility, Edema, and Decreased activity tolerance. These impairments interfere with their ability to perform self care tasks, work tasks, recreational activities, driving , and community mobility as compared to previous level of function.     Clinical Decision Making (Complexity):  Clinical Presentation: Evolving/Changing  Clinical Presentation Rationale: based on medical and personal factors listed in PT  evaluation  Clinical Decision Making (Complexity): Moderate complexity    PLAN OF CARE  Treatment Interventions:  Interventions: Manual Therapy, Therapeutic Activity, Therapeutic Exercise, Self-Care/Home Management, Gradient Compression Bandaging    Long Term Goals     PT Goal 1  Goal Identifier: STG 1  Goal Description: pt and spouse to be independent with daily skin care and head and neck exercises to reduce lymphedema and reduce infection risk.  Rationale: to maximize safety and independence with performance of ADLs and functional tasks  Target Date: 02/27/25  PT Goal 2  Goal Identifier: STG 2  Goal Description: pt and caregiver to be independent with MLD to reduce lymphedema and infection risk  Rationale: to maximize safety and independence with performance of ADLs and functional tasks  Target Date: 03/27/25  PT Goal 3  Goal Identifier: LTG  Goal Description: Pt will show a statistically significant improvement in his LLIS score by at least an 8pt decrease indicating improed overall QOL and max return to PLOF related to activities of swallowing and speaking.  Rationale: to maximize safety and independence with performance of ADLs and functional tasks  Target Date: 04/24/25      Frequency of Treatment: 1x/wk  Duration of Treatment: 12wks    Recommended Referrals to Other Professionals: Physical Therapy  Education Assessment:   Learner/Method: Patient;Significant Other;Listening;Reading;Demonstration;Pictures/Video;No Barriers to Learning  Education Comments: Pt provided 3 handouts on Head and Neck Lymphedema General Education, MLD, and exercises    Risks and benefits of evaluation/treatment have been explained.   Patient/Family/caregiver agrees with Plan of Care.     Evaluation Time:     PT Eval, Moderate Complexity Minutes (89806): 30   Present: Not applicable     Signing Clinician: Rachna Grant, PT

## 2025-02-20 ENCOUNTER — THERAPY VISIT (OUTPATIENT)
Dept: SPEECH THERAPY | Facility: CLINIC | Age: 80
End: 2025-02-20
Payer: COMMERCIAL

## 2025-02-20 ENCOUNTER — LAB (OUTPATIENT)
Dept: LAB | Facility: CLINIC | Age: 80
End: 2025-02-20
Payer: COMMERCIAL

## 2025-02-20 DIAGNOSIS — C32.0 CANCER OF GLOTTIS (H): ICD-10-CM

## 2025-02-20 DIAGNOSIS — R13.12 OROPHARYNGEAL DYSPHAGIA: Primary | ICD-10-CM

## 2025-02-20 DIAGNOSIS — M31.30 GRANULOMATOSIS WITH POLYANGIITIS, UNSPECIFIED WHETHER RENAL INVOLVEMENT (H): ICD-10-CM

## 2025-02-20 DIAGNOSIS — Z79.899 HIGH RISK MEDICATIONS (NOT ANTICOAGULANTS) LONG-TERM USE: ICD-10-CM

## 2025-02-20 LAB
ALBUMIN MFR UR ELPH: <6 MG/DL
ALBUMIN SERPL BCG-MCNC: 4.2 G/DL (ref 3.5–5.2)
ALBUMIN UR-MCNC: NEGATIVE MG/DL
ALT SERPL W P-5'-P-CCNC: 14 U/L (ref 0–70)
APPEARANCE UR: CLEAR
AST SERPL W P-5'-P-CCNC: 23 U/L (ref 0–45)
BASOPHILS # BLD AUTO: 0.1 10E3/UL (ref 0–0.2)
BASOPHILS NFR BLD AUTO: 1 %
BILIRUB UR QL STRIP: NEGATIVE
COLOR UR AUTO: NORMAL
CREAT SERPL-MCNC: 1.4 MG/DL (ref 0.67–1.17)
CREAT UR-MCNC: 33.2 MG/DL
CRP SERPL-MCNC: <3 MG/L
EGFRCR SERPLBLD CKD-EPI 2021: 52 ML/MIN/1.73M2
EOSINOPHIL # BLD AUTO: 0.2 10E3/UL (ref 0–0.7)
EOSINOPHIL NFR BLD AUTO: 4 %
ERYTHROCYTE [DISTWIDTH] IN BLOOD BY AUTOMATED COUNT: 13.2 % (ref 10–15)
ERYTHROCYTE [SEDIMENTATION RATE] IN BLOOD BY WESTERGREN METHOD: 6 MM/HR (ref 0–20)
GLUCOSE UR STRIP-MCNC: NEGATIVE MG/DL
HCT VFR BLD AUTO: 44.6 % (ref 40–53)
HGB BLD-MCNC: 14.4 G/DL (ref 13.3–17.7)
HGB UR QL STRIP: NEGATIVE
IMM GRANULOCYTES # BLD: 0 10E3/UL
IMM GRANULOCYTES NFR BLD: 0 %
KETONES UR STRIP-MCNC: NEGATIVE MG/DL
LEUKOCYTE ESTERASE UR QL STRIP: NEGATIVE
LYMPHOCYTES # BLD AUTO: 1.5 10E3/UL (ref 0.8–5.3)
LYMPHOCYTES NFR BLD AUTO: 25 %
MCH RBC QN AUTO: 29.7 PG (ref 26.5–33)
MCHC RBC AUTO-ENTMCNC: 32.3 G/DL (ref 31.5–36.5)
MCV RBC AUTO: 92 FL (ref 78–100)
MONOCYTES # BLD AUTO: 0.6 10E3/UL (ref 0–1.3)
MONOCYTES NFR BLD AUTO: 10 %
NEUTROPHILS # BLD AUTO: 3.5 10E3/UL (ref 1.6–8.3)
NEUTROPHILS NFR BLD AUTO: 59 %
NITRATE UR QL: NEGATIVE
NRBC # BLD AUTO: 0 10E3/UL
NRBC BLD AUTO-RTO: 0 /100
PH UR STRIP: 5.5 [PH] (ref 5–7)
PLATELET # BLD AUTO: 179 10E3/UL (ref 150–450)
PROT/CREAT 24H UR: NORMAL MG/G{CREAT}
RBC # BLD AUTO: 4.85 10E6/UL (ref 4.4–5.9)
RBC URINE: <1 /HPF
SP GR UR STRIP: 1.01 (ref 1–1.03)
UROBILINOGEN UR STRIP-MCNC: NORMAL MG/DL
WBC # BLD AUTO: 5.9 10E3/UL (ref 4–11)
WBC URINE: <1 /HPF

## 2025-02-20 PROCEDURE — 99000 SPECIMEN HANDLING OFFICE-LAB: CPT | Performed by: PATHOLOGY

## 2025-02-20 PROCEDURE — 85025 COMPLETE CBC W/AUTO DIFF WBC: CPT | Performed by: PATHOLOGY

## 2025-02-20 PROCEDURE — 83516 IMMUNOASSAY NONANTIBODY: CPT | Performed by: INTERNAL MEDICINE

## 2025-02-20 PROCEDURE — 83876 ASSAY MYELOPEROXIDASE: CPT | Performed by: INTERNAL MEDICINE

## 2025-02-20 PROCEDURE — 81001 URINALYSIS AUTO W/SCOPE: CPT | Performed by: PATHOLOGY

## 2025-02-20 PROCEDURE — 84156 ASSAY OF PROTEIN URINE: CPT | Performed by: PATHOLOGY

## 2025-02-20 PROCEDURE — 36415 COLL VENOUS BLD VENIPUNCTURE: CPT | Performed by: PATHOLOGY

## 2025-02-20 PROCEDURE — 84460 ALANINE AMINO (ALT) (SGPT): CPT | Performed by: PATHOLOGY

## 2025-02-20 PROCEDURE — 86140 C-REACTIVE PROTEIN: CPT | Performed by: PATHOLOGY

## 2025-02-20 PROCEDURE — 85652 RBC SED RATE AUTOMATED: CPT | Performed by: PATHOLOGY

## 2025-02-20 PROCEDURE — 82565 ASSAY OF CREATININE: CPT | Performed by: PATHOLOGY

## 2025-02-20 PROCEDURE — 82040 ASSAY OF SERUM ALBUMIN: CPT | Performed by: PATHOLOGY

## 2025-02-20 PROCEDURE — 86036 ANCA SCREEN EACH ANTIBODY: CPT | Performed by: INTERNAL MEDICINE

## 2025-02-20 PROCEDURE — 84450 TRANSFERASE (AST) (SGOT): CPT | Performed by: PATHOLOGY

## 2025-02-21 LAB
MYELOPEROXIDASE AB SER IA-ACNC: 0.4 U/ML
MYELOPEROXIDASE AB SER IA-ACNC: NEGATIVE
PROTEINASE3 AB SER IA-ACNC: 21 U/ML
PROTEINASE3 AB SER IA-ACNC: POSITIVE

## 2025-02-24 LAB
ANCA AB PATTERN SER IF-IMP: NORMAL
C-ANCA TITR SER IF: NORMAL {TITER}

## 2025-02-25 ENCOUNTER — DOCUMENTATION ONLY (OUTPATIENT)
Dept: OTOLARYNGOLOGY | Facility: CLINIC | Age: 80
End: 2025-02-25
Payer: COMMERCIAL

## 2025-02-25 NOTE — PROGRESS NOTES
Refill authorization form for pantoprazole faxed to [facility] pharmacy. Fax number [phone]. 1 pg faxed.

## 2025-03-24 NOTE — PROGRESS NOTES
[facility] Dermatology Note  Encounter Date: Mar 25, 2025  Office Visit     Reviewed patients past medical history and pertinent chart review prior to patients visit today.     Dermatology Problem List:  # NUB, right upper chest, shave biopsy 3/25/2025     1. History of melanoma 14-16 years ago  - Left upper back 2011 0.5 mm   2. 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 AK  - Effudex/ Dovonex 3/25/2025    4. Anterior commissure SCC  - Follows with ENT, s/p multiple microlaryngoscopy, CO2 laser resection, radiation completed 09/24/24     Social history: Works as a    ____________________________________________    Assessment & Plan:     # Neoplasm of uncertain behavior:  right upper chest  DDx includes [facility]MC vs HAK vs lichenoid keratosis. Shave biopsy today.    Procedure Note: Biopsy by shave technique  The risks and benefits of the procedure were described to the patient. These include but are not limited to bleeding, infection, scar, incomplete removal, and non-diagnostic biopsy. Verbal informed consent was obtained. The above site(s) was cleansed with an alcohol pad and injected with 1% lidocaine with epinephrine. Once anesthesia was obtained, a biopsy(ies) was performed with Gilette blade. The tissue(s) was placed in a labeled container(s) with formalin and sent to pathology. Hemostasis was achieved with aluminum chloride. Vaseline and a bandage were applied to the wound(s). The patient tolerated the procedure well and was given post biopsy care instructions.    # Inflamed seborrheic keratoses x1  The benign nature of the skin lesion(s) was discussed with the patient.  Due to the inflamed and irritated nature of the lesions I recommend cryotherapy and the patient was agreeable.      Cryotherapy procedure note, location(s): right upper back, left lower back After verbal consent and discussion of risks and benefits including, but not limited to, dyspigmentation/scar, blister,  and pain, the lesion(s) was(were) treated with 1-2 mm freeze border for 1-2 cycles with liquid nitrogen. Post cryotherapy instructions were provided.    # Actinic keratoses  - Due to the diffuse nature of the actinic keratoses involving the scalp and forehead, we discussed options for treatment including liquid nitrogen cryotherapy versus topical calcipotriene 0.005% and fluorouracil 5% cream.  The patient was interested in undergoing topical therapy.  We discussed side effects including redness, pain, and sun sensitivity.  I recommended diligent avoidance of application to the eyes.  The patient had no further questions.    # Personal history of NMSC  # Personal history of malignant melanoma  # Multiple nevi, trunk and extremities  # Solar lentigines  - No signs of recurrence. Continued observation recommended.   - Nevi demonstrate no concerning features on dermoscopy. We discussed the importance of self exams at home.   - ABCDEs: Counseled ABCDEs of melanoma: Asymmetry, Border (irregularity), Color (not uniform, changes in color), Diameter (greater than 6 mm which is about the size of a pencil eraser), and Evolving (any changes in preexisting moles).  - Sun protection: Counseled SPF 30+ sunscreen, UPF clothing, sun avoidance, tanning bed avoidance.    # Cherry angiomas  # Seborrheic keratoses  - We discussed the benign nature of the skin lesions. No treatment required. Continued observation recommended. Follow up with any concerns.      Follow-up:  6 months for follow up full body skin exam, as needed for new or changing lesions or new concerns    All risks, benefits and alternatives were discussed with patient.  Patient is in agreement and understands the assessment and plan.  All questions were answered.  Rachel Yanes PA-C  [facility] Dermatology    ____________________________________________    CC: Skin Check (FBSE. PT wants radiation site checked and a few moles)    HPI:  Mr. Joshua Ennis is a(n)  79 year old male who presents today as a return patient for a full body skin cancer screening. The patient has a history of malignant melanoma. Today, the patient reports a lesion on the right chest.  The lesion has been present since August or September.  It is occasionally painful.  Lesion was within the patient's field of radiation for his anterior commissure SCC.  Second, the patient reports a rough, itchy lesions on the back that catches against clothing. No other specific cutaneous concerns.     Physical Exam:  Vitals: There were no vitals taken for this visit.  SKIN: Total skin excluding the genitalia areas was performed. The exam included the scalp, face, neck, bilateral arms, chest, back, abdomen, bilateral legs, digits, mons pubis, buttocks, and nails.   - Rashid II.  - Pink macule(s) with gritty scale involving the scalp and forehead, consistent with actinic keratoses.   - Involving the right chest is a 0.8 x 1.0 cm pink plaque with adherent scale.   - The back x2 demonstrates waxy papule(s) with an erythematous base, consistent with inflamed seborrheic keratoses.   - Multiple tan/brown macules and papules scattered throughout exam, consistent with benign nevi. No concerning features on dermoscopy.   - Scattered tan, homogenous macules scattered on sun exposed skin, consistent with solar lentigines.   - Scattered waxy, stuck on appearing papules and patches, consistent with seborrheic keratoses.  - Several 1-2 mm red dome shaped symmetric papules, consistent with cherry angiomas.     - No other lesions of concern on areas examined.     Medications:  Current Outpatient Medications   Medication Sig Dispense Refill    acetaminophen (TYLENOL) 325 MG tablet Take 2 tablets (650 mg) by mouth every 4 hours as needed for pain. 50 tablet 0    calcipotriene (DOVONOX) 0.005 % external cream Apply topically 2 times daily In conjunction with fluorouracil cream in a 1:1 mixture. 60 g 2    calcium carbonate 600  mg-vitamin D 400 units (CALTRATE) 600-400 MG-UNIT per tablet Take 1 tablet by mouth 2 times daily.      cetirizine (ZYRTEC) 10 MG tablet Take 1 tablet by mouth daily.      Cholecalciferol (VITAMIN D) 1000 UNITS capsule Take 1 capsule by mouth daily.      ciprofloxacin-dexAMETHasone (CIPRODEX) 0.3-0.1 % otic suspension Instill 4 drops into trach twice daily 7.5 mL 1    fluorouracil (EFUDEX) 5 % external cream Apply topically 2 times daily In conjunction with calcipotriene cream in a 1:1 mixture. 40 g 2    gabapentin (NEURONTIN) 400 MG capsule Take 1 capsule (400 mg) by mouth at bedtime. 90 capsule 3    losartan (COZAAR) 50 MG tablet Take 1 tablet (50 mg) by mouth daily (Patient taking differently: Take 50 mg by mouth at bedtime.) 90 tablet 3    mupirocin (BACTROBAN) 2 % external cream Apply topically 3 times daily. Apply to affected area three times daily before applying aquaphor 30 g 0    oxyCODONE (ROXICODONE) 5 MG tablet Take 0.5-1 tablets (2.5-5 mg) by mouth every 4 hours as needed for moderate to severe pain. 15 tablet 0    pantoprazole (PROTONIX) 40 MG EC tablet Take 1 tablet (40 mg) by mouth daily. 60 tablet 1    senna-docusate (SENOKOT-S/PERICOLACE) 8.6-50 MG tablet Take 1 tablet by mouth 2 times daily. 30 tablet 0    silver sulfADIAZINE (SILVADENE) 1 % external cream Apply topically 2 times daily. Apply to affected area 400 g 0     No current facility-administered medications for this visit.      Past Medical History:   Patient Active Problem List   Diagnosis    Tobacco abuse    CARDIOVASCULAR SCREENING; LDL GOAL LESS THAN 160    Idiopathic progressive polyneuropathy    GERD (gastroesophageal reflux disease)    Hypertension, renal    Anemia    Encounter for long-term (current) use of steroids    Granulomatosis with polyangiitis (H)    CKD (chronic kidney disease) stage 3, GFR 30-59 ml/min (H)    colonic polyps- Tubular Adenomas    Multiple fractures of ribs, left side, initial encounter for closed fracture     Glottic web of larynx    Laryngeal cancer (H)    Dysphonia    Obstruction of larynx    Lymphedema     Past Medical History:   Diagnosis Date    Anemia     Arthritis     Autoimmune disease     CKD (chronic kidney disease) stage 3, GFR 30-59 ml/min (H)     Gastroesophageal reflux disease with esophagitis     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    Granulomatosis with polyangiitis, unspecified whether renal involvement (H)     Hearing problem     [place] and ringing in ears    History of colonic polyps 04/18/2018    Tubular Adenomas. Negative for high-grade dysplasia and malignancy.    Hoarseness     Hypertension     Idiopathic progressive polyneuropathy     Kidney problem     Laryngeal cancer (H)     Malignant melanoma (H)     Malignant neoplasm (H)     melanoma    Squamous cell carcinoma     Russo syndrome        CC Referred Self, MD  No address on file on close of this encounter.

## 2025-03-25 ENCOUNTER — OFFICE VISIT (OUTPATIENT)
Dept: DERMATOLOGY | Facility: CLINIC | Age: 80
End: 2025-03-25
Payer: COMMERCIAL

## 2025-03-25 DIAGNOSIS — D49.2 NEOPLASM OF UNSPECIFIED BEHAVIOR OF BONE, SOFT TISSUE, AND SKIN: ICD-10-CM

## 2025-03-25 DIAGNOSIS — Z12.83 ENCOUNTER FOR SCREENING FOR MALIGNANT NEOPLASM OF SKIN: Primary | ICD-10-CM

## 2025-03-25 DIAGNOSIS — Z85.820 HISTORY OF MALIGNANT MELANOMA OF SKIN: ICD-10-CM

## 2025-03-25 DIAGNOSIS — L82.0 INFLAMED SEBORRHEIC KERATOSIS: ICD-10-CM

## 2025-03-25 DIAGNOSIS — Z85.828 HISTORY OF NONMELANOMA SKIN CANCER: ICD-10-CM

## 2025-03-25 DIAGNOSIS — D18.01 CHERRY ANGIOMA: ICD-10-CM

## 2025-03-25 DIAGNOSIS — L57.0 ACTINIC KERATOSES: ICD-10-CM

## 2025-03-25 DIAGNOSIS — D22.9 MULTIPLE NEVI: ICD-10-CM

## 2025-03-25 DIAGNOSIS — L81.4 LENTIGINES: ICD-10-CM

## 2025-03-25 DIAGNOSIS — L82.1 SEBORRHEIC KERATOSES: ICD-10-CM

## 2025-03-25 PROCEDURE — 88305 TISSUE EXAM BY PATHOLOGIST: CPT | Mod: TC | Performed by: PHYSICIAN ASSISTANT

## 2025-03-25 PROCEDURE — 88305 TISSUE EXAM BY PATHOLOGIST: CPT | Mod: 26 | Performed by: DERMATOLOGY

## 2025-03-25 ASSESSMENT — PAIN SCALES - GENERAL: PAINLEVEL_OUTOF10: NO PAIN (0)

## 2025-03-25 NOTE — LETTER
3/25/2025       RE: Joshua Ennis  [address] [facility] [address]     Dear Colleague,    Thank you for referring your patient, Joshua Ennis, to the [facility] DERMATOLOGY CLINIC [place] at [facility]. Please see a copy of my visit note below.    [facility] Dermatology Note  Encounter Date: Mar 25, 2025  Office Visit     Reviewed patients past medical history and pertinent chart review prior to patients visit today.     Dermatology Problem List:  # NUB, right upper chest, shave biopsy 3/25/2025     1. History of melanoma 14-16 years ago  - Left upper back 2011 0.5 mm   2. 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 AK  - Effudex/ Dovonex 3/25/2025    4. Anterior commissure SCC  - Follows with ENT, s/p multiple microlaryngoscopy, CO2 laser resection, radiation completed 09/24/24     Social history: Works as a    ____________________________________________    Assessment & Plan:     # Neoplasm of uncertain behavior:  right upper chest  DDx includes NSMC vs HAK vs lichenoid keratosis. Shave biopsy today.    Procedure Note: Biopsy by shave technique  The risks and benefits of the procedure were described to the patient. These include but are not limited to bleeding, infection, scar, incomplete removal, and non-diagnostic biopsy. Verbal informed consent was obtained. The above site(s) was cleansed with an alcohol pad and injected with 1% lidocaine with epinephrine. Once anesthesia was obtained, a biopsy(ies) was performed with Gilette blade. The tissue(s) was placed in a labeled container(s) with formalin and sent to pathology. Hemostasis was achieved with aluminum chloride. Vaseline and a bandage were applied to the wound(s). The patient tolerated the procedure well and was given post biopsy care instructions.    # Inflamed seborrheic keratoses x1  The benign nature of the skin lesion(s) was discussed with the  patient.  Due to the inflamed and irritated nature of the lesions I recommend cryotherapy and the patient was agreeable.      Cryotherapy procedure note, location(s): right upper back, left lower back After verbal consent and discussion of risks and benefits including, but not limited to, dyspigmentation/scar, blister, and pain, the lesion(s) was(were) treated with 1-2 mm freeze border for 1-2 cycles with liquid nitrogen. Post cryotherapy instructions were provided.    # Actinic keratoses  - Due to the diffuse nature of the actinic keratoses involving the scalp and forehead, we discussed options for treatment including liquid nitrogen cryotherapy versus topical calcipotriene 0.005% and fluorouracil 5% cream.  The patient was interested in undergoing topical therapy.  We discussed side effects including redness, pain, and sun sensitivity.  I recommended diligent avoidance of application to the eyes.  The patient had no further questions.    # Personal history of NMSC  # Personal history of malignant melanoma  # Multiple nevi, trunk and extremities  # Solar lentigines  - No signs of recurrence. Continued observation recommended.   - Nevi demonstrate no concerning features on dermoscopy. We discussed the importance of self exams at home.   - ABCDEs: Counseled ABCDEs of melanoma: Asymmetry, Border (irregularity), Color (not uniform, changes in color), Diameter (greater than 6 mm which is about the size of a pencil eraser), and Evolving (any changes in preexisting moles).  - Sun protection: Counseled SPF 30+ sunscreen, UPF clothing, sun avoidance, tanning bed avoidance.    # Cherry angiomas  # Seborrheic keratoses  - We discussed the benign nature of the skin lesions. No treatment required. Continued observation recommended. Follow up with any concerns.      Follow-up:  6 months for follow up full body skin exam, as needed for new or changing lesions or new concerns    All risks, benefits and alternatives were discussed  with patient.  Patient is in agreement and understands the assessment and plan.  All questions were answered.  Rachel Yanes PA-C  [facility] Dermatology    ____________________________________________    CC: Skin Check (FBSE. PT wants radiation site checked and a few moles)    HPI:  Mr. Joshua Ennis is a(n) 79 year old male who presents today as a return patient for a full body skin cancer screening. The patient has a history of malignant melanoma. Today, the patient reports a lesion on the right chest.  The lesion has been present since August or September.  It is occasionally painful.  Lesion was within the patient's field of radiation for his anterior commissure SCC.  Second, the patient reports a rough, itchy lesions on the back that catches against clothing. No other specific cutaneous concerns.     Physical Exam:  Vitals: There were no vitals taken for this visit.  SKIN: Total skin excluding the genitalia areas was performed. The exam included the scalp, face, neck, bilateral arms, chest, back, abdomen, bilateral legs, digits, mons pubis, buttocks, and nails.   - Rashid II.  - Pink macule(s) with gritty scale involving the scalp and forehead, consistent with actinic keratoses.   - Involving the right chest is a 0.8 x 1.0 cm pink plaque with adherent scale.   - The back x2 demonstrates waxy papule(s) with an erythematous base, consistent with inflamed seborrheic keratoses.   - Multiple tan/brown macules and papules scattered throughout exam, consistent with benign nevi. No concerning features on dermoscopy.   - Scattered tan, homogenous macules scattered on sun exposed skin, consistent with solar lentigines.   - Scattered waxy, stuck on appearing papules and patches, consistent with seborrheic keratoses.  - Several 1-2 mm red dome shaped symmetric papules, consistent with cherry angiomas.     - No other lesions of concern on areas examined.     Medications:  Current Outpatient Medications    Medication Sig Dispense Refill     acetaminophen (TYLENOL) 325 MG tablet Take 2 tablets (650 mg) by mouth every 4 hours as needed for pain. 50 tablet 0     calcipotriene (DOVONOX) 0.005 % external cream Apply topically 2 times daily In conjunction with fluorouracil cream in a 1:1 mixture. 60 g 2     calcium carbonate 600 mg-vitamin D 400 units (CALTRATE) 600-400 MG-UNIT per tablet Take 1 tablet by mouth 2 times daily.       cetirizine (ZYRTEC) 10 MG tablet Take 1 tablet by mouth daily.       Cholecalciferol (VITAMIN D) 1000 UNITS capsule Take 1 capsule by mouth daily.       ciprofloxacin-dexAMETHasone (CIPRODEX) 0.3-0.1 % otic suspension Instill 4 drops into trach twice daily 7.5 mL 1     fluorouracil (EFUDEX) 5 % external cream Apply topically 2 times daily In conjunction with calcipotriene cream in a 1:1 mixture. 40 g 2     gabapentin (NEURONTIN) 400 MG capsule Take 1 capsule (400 mg) by mouth at bedtime. 90 capsule 3     losartan (COZAAR) 50 MG tablet Take 1 tablet (50 mg) by mouth daily (Patient taking differently: Take 50 mg by mouth at bedtime.) 90 tablet 3     mupirocin (BACTROBAN) 2 % external cream Apply topically 3 times daily. Apply to affected area three times daily before applying aquaphor 30 g 0     oxyCODONE (ROXICODONE) 5 MG tablet Take 0.5-1 tablets (2.5-5 mg) by mouth every 4 hours as needed for moderate to severe pain. 15 tablet 0     pantoprazole (PROTONIX) 40 MG EC tablet Take 1 tablet (40 mg) by mouth daily. 60 tablet 1     senna-docusate (SENOKOT-S/PERICOLACE) 8.6-50 MG tablet Take 1 tablet by mouth 2 times daily. 30 tablet 0     silver sulfADIAZINE (SILVADENE) 1 % external cream Apply topically 2 times daily. Apply to affected area 400 g 0     No current facility-administered medications for this visit.      Past Medical History:   Patient Active Problem List   Diagnosis     Tobacco abuse     CARDIOVASCULAR SCREENING; LDL GOAL LESS THAN 160     Idiopathic progressive polyneuropathy     GERD  (gastroesophageal reflux disease)     Hypertension, renal     Anemia     Encounter for long-term (current) use of steroids     Granulomatosis with polyangiitis (H)     CKD (chronic kidney disease) stage 3, GFR 30-59 ml/min (H)     colonic polyps- Tubular Adenomas     Multiple fractures of ribs, left side, initial encounter for closed fracture     Glottic web of larynx     Laryngeal cancer (H)     Dysphonia     Obstruction of larynx     Lymphedema     Past Medical History:   Diagnosis Date     Anemia      Arthritis      Autoimmune disease      CKD (chronic kidney disease) stage 3, GFR 30-59 ml/min (H)      Gastroesophageal reflux disease with esophagitis      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     Granulomatosis with polyangiitis, unspecified whether renal involvement (H)      Hearing problem     [place] and ringing in ears     History of colonic polyps 04/18/2018    Tubular Adenomas. Negative for high-grade dysplasia and malignancy.     Hoarseness      Hypertension      Idiopathic progressive polyneuropathy      Kidney problem      Laryngeal cancer (H)      Malignant melanoma (H)      Malignant neoplasm (H)     melanoma     Squamous cell carcinoma      Russo syndrome        CC Referred Self, MD  No address on file on close of this encounter.    Lidocaine-epinephrine 1-1:055151 % injection   0.8mL once for one use, starting 3/25/2025 ending 3/25/2025,  2mL disp, R-0, injection  Injected by KRISTIN Olivares      Again, thank you for allowing me to participate in the care of your patient.      Sincerely,    Rachel Yanes PA-C

## 2025-03-25 NOTE — PATIENT INSTRUCTIONS
Efudex and Dovonex combination Treatment  Today, you are being prescribed Fluorouracil (Efudex) and Calcipotriene (Dovonex) a topical cream used for the treatment of Actinic Keratosis (AKs).  The medication is working to eliminate the precancerous cells.    Your treatment will last 5-7 days. You may experience some discomfort while being treated.  When using Fluorouracil and Dovonex, apply the cream both morning and night for 5-7 days as your provider recommends. Use a Q-tip or use gloves if applying it with your fingertips. If applied with unprotected fingertips, it is important to wash your hands well after you apply this medicine.   Keep this medication away from pets.  We recommend avoiding excessive sun exposure to the treated area. Wear a sunscreen with SPF 50+ to the areas being treated prior to any sun exposure as you will be more sensitive to the sun and more prone to sunburn while being treated and afterwards during healing process.   You may use ointments such as vaseline or Aquaphor, or moisturizing creams such as Vanicream or Cetaphil cream over bothersome areas. If the reaction becomes itchy you may apply over the counter hydrocorisone 1% cream or ointment twice daily to itchy areas. If the reaction becomes too bothersome, please call the clinic as we may need to discontinue treatment or reevaluate in the clinic.     Potential Side Effects  Your treated areas may be red and inflamed during therapy.  This will improve slowly following the discontinuation of therapy.   During the beginning of treatment, mild inflammation may occur.   During the end of treatment, redness, and swelling may occur with some crusting and burning.   Lesions resolve as the skin exfoliates.   Over 1 to 2 weeks, new skin grows into the treatment area.  Keep this medication away from pets as it can be very harmful to them.     Specific side effects that usually do not require medical attention (report to your doctor or health care  professional if they continue or are bothersome) include:  Red or dark-colored skin   Mild erosion (loss of upper layer of skin)   Mild eye irritation including burning, itching, sensitivity, stinging, or watering   Increased sensitivity of the skin to sun and ultraviolet light   Pain and burning of the affected area   Dryness, scaling or swelling of the affected area   Skin rash, itching of the affected area   Tenderness   If you have severe pain, please, call the clinic immediately and indicate that you have pain.  Ask for a call from the RN.     Who should I call with questions?  Dermatology nurse line: [phone]

## 2025-03-25 NOTE — PROGRESS NOTES
Lidocaine-epinephrine 1-1:776058 % injection   0.8mL once for one use, starting 3/25/2025 ending 3/25/2025,  2mL disp, R-0, injection  Injected by KRISTIN Olivares

## 2025-03-25 NOTE — NURSING NOTE
Dermatology Rooming Note    Joshua Ennis's goals for this visit include:   Chief Complaint   Patient presents with    Skin Check     FBSE. PT wants radiation site checked and a few moles     Beto Barfield - EMT

## 2025-03-26 LAB
PATH REPORT.COMMENTS IMP SPEC: ABNORMAL
PATH REPORT.COMMENTS IMP SPEC: ABNORMAL
PATH REPORT.COMMENTS IMP SPEC: YES
PATH REPORT.FINAL DX SPEC: ABNORMAL
PATH REPORT.GROSS SPEC: ABNORMAL
PATH REPORT.MICROSCOPIC SPEC OTHER STN: ABNORMAL
PATH REPORT.RELEVANT HX SPEC: ABNORMAL

## 2025-04-01 ENCOUNTER — TELEPHONE (OUTPATIENT)
Dept: DERMATOLOGY | Facility: CLINIC | Age: 80
End: 2025-04-01
Payer: COMMERCIAL

## 2025-04-01 DIAGNOSIS — C44.92 SCC (SQUAMOUS CELL CARCINOMA): Primary | ICD-10-CM

## 2025-04-01 NOTE — TELEPHONE ENCOUNTER
----- Message from Rachel Yanes sent at 4/1/2025  7:08 AM CDT -----  SCCIS in previous radiation site, needs Mohs. Please place order and call patient to schedule. Thank you!

## 2025-04-01 NOTE — TELEPHONE ENCOUNTER
"3/25/2025 Dermatopathology Result note:  \"  Final Diagnosis   Right upper chest:  - Squamous cell carcinoma in situ - (see description)     \"  "

## 2025-04-02 NOTE — TELEPHONE ENCOUNTER
Spoke with Earlene and informed her that it would be a MOHS procedure and give her the basic information regarding this. She and the patient have questions about scheduling. She was given the complex 's number to call after she speaks with the patient.   Earlene states the U gave him this cancer due to treating him with radiation. I informed her that I cannot speak on that. Verbal understanding and would like to schedule a consult due to their questions.    Elsa PICKENS, TREVOR

## 2025-04-02 NOTE — TELEPHONE ENCOUNTER
I called pt's chago [facility] ([facility] on file) regarding scheduling with Derm Surgery. She would like to speak with a nurse more about the diagnosis.     Falguni wasn't sure if the [facility] the [place] office would work better for them for Mohs. She will talk with Joshua and decide.    Please give them a call.     Phyllis Gilbert, Complex  4/2/2025 9:54 AM

## 2025-04-03 ENCOUNTER — THERAPY VISIT (OUTPATIENT)
Dept: SPEECH THERAPY | Facility: CLINIC | Age: 80
End: 2025-04-03
Payer: COMMERCIAL

## 2025-04-03 DIAGNOSIS — R13.12 OROPHARYNGEAL DYSPHAGIA: Primary | ICD-10-CM

## 2025-04-03 DIAGNOSIS — C32.0 CANCER OF GLOTTIS (H): ICD-10-CM

## 2025-04-04 ENCOUNTER — TELEPHONE (OUTPATIENT)
Dept: DERMATOLOGY | Facility: CLINIC | Age: 80
End: 2025-04-04
Payer: COMMERCIAL

## 2025-04-04 NOTE — TELEPHONE ENCOUNTER
[facility] Call Center    Phone Message    May a detailed message be left on voicemail: yes     Reason for Call: Appointment Intake    Referring Provider Name: Rachel Yanes PA-C in [facility]   Diagnosis and/or Symptoms: SCCIS in previous radiation site, MOHS, Right upper chest  Please call Earlene back to schedule. Thanks     Action Taken: Message routed to:  [facility] Derm    Travel Screening: Not Applicable     Date of Service:

## 2025-04-07 NOTE — TELEPHONE ENCOUNTER
Called patient to schedule surgery with Dr. Carmen    Date of Surgery: 04/28    Surgery type: Mohs    Consult scheduled: No    Has patient had mohs with us before? Yes    Additional comments: would like sooner. Pt has had mohs with Dr. Hooks before and declined a consult.       Phyllis Gilbert on 4/7/2025 at 10:20 AM

## 2025-04-08 NOTE — TELEPHONE ENCOUNTER
Falguni accepted a sooner [facility] appt on 04/17.    Phyllis Gilbert, Complex  4/8/2025 2:14 PM

## 2025-04-10 ENCOUNTER — PATIENT OUTREACH (OUTPATIENT)
Dept: CARE COORDINATION | Facility: CLINIC | Age: 80
End: 2025-04-10
Payer: COMMERCIAL

## 2025-04-10 NOTE — TELEPHONE ENCOUNTER
FUTURE VISIT INFORMATION      FUTURE VISIT INFORMATION:  Date: 4.17.25  Time: 8:30  Location: [facility]  REFERRAL INFORMATION:  Referring provider:  Joaquín  Referring providers clinic:  Derm  Reason for visit/diagnosis  SCCIS in previous radiation site, MOHS, Right upper chest      RECORDS REQUESTED FROM:       Clinic name Comments Records Status Imaging Status   Derm 3.25.25  Path # [id number] Epic Epic

## 2025-04-10 NOTE — TELEPHONE ENCOUNTER
Falguni called back and we discussed possible sooner appts.     Phyllis Gilbert, Complex  4/10/2025 3:35 PM

## 2025-04-10 NOTE — TELEPHONE ENCOUNTER
Called and left vm for pt's SO miguel to call back for a sooner appt.    Phyllis Gilbert, Complex  4/10/2025 1:19 PM

## 2025-04-17 ENCOUNTER — TELEPHONE (OUTPATIENT)
Dept: DERMATOLOGY | Facility: CLINIC | Age: 80
End: 2025-04-17

## 2025-04-17 ENCOUNTER — OFFICE VISIT (OUTPATIENT)
Dept: DERMATOLOGY | Facility: CLINIC | Age: 80
End: 2025-04-17
Attending: PHYSICIAN ASSISTANT
Payer: COMMERCIAL

## 2025-04-17 ENCOUNTER — PRE VISIT (OUTPATIENT)
Dept: DERMATOLOGY | Facility: CLINIC | Age: 80
End: 2025-04-17

## 2025-04-17 VITALS — SYSTOLIC BLOOD PRESSURE: 115 MMHG | HEART RATE: 74 BPM | DIASTOLIC BLOOD PRESSURE: 76 MMHG

## 2025-04-17 DIAGNOSIS — L57.0 ACTINIC KERATOSIS: ICD-10-CM

## 2025-04-17 DIAGNOSIS — C44.529 SQUAMOUS CELL CARCINOMA OF SKIN OF CHEST: Primary | ICD-10-CM

## 2025-04-17 PROCEDURE — 1126F AMNT PAIN NOTED NONE PRSNT: CPT | Performed by: DERMATOLOGY

## 2025-04-17 PROCEDURE — 17313 MOHS 1 STAGE T/A/L: CPT | Mod: GC | Performed by: DERMATOLOGY

## 2025-04-17 PROCEDURE — 17000 DESTRUCT PREMALG LESION: CPT | Mod: GC | Performed by: DERMATOLOGY

## 2025-04-17 PROCEDURE — 3078F DIAST BP <80 MM HG: CPT | Performed by: DERMATOLOGY

## 2025-04-17 PROCEDURE — 3074F SYST BP LT 130 MM HG: CPT | Performed by: DERMATOLOGY

## 2025-04-17 PROCEDURE — 12032 INTMD RPR S/A/T/EXT 2.6-7.5: CPT | Mod: XS | Performed by: DERMATOLOGY

## 2025-04-17 ASSESSMENT — PAIN SCALES - GENERAL: PAINLEVEL_OUTOF10: NO PAIN (0)

## 2025-04-17 NOTE — NURSING NOTE
Chief Complaint   Patient presents with    Procedure     Mohs and reconstruction to right upper chest     Liza T CMA

## 2025-04-17 NOTE — LETTER
4/17/2025       RE: Joshua Ennis  [address] [facility] [address]     Dear Colleague,    Thank you for referring your patient, Joshua Ennis, to the [facility] DERMATOLOGIC SURGERY CLINIC [place] [facility]. Please see a copy of my visit note below.    [facility] Dermatologic Surgery Clinic [place] Procedure Note    Dermatology Problem List:  SCCIs right upper chest, shave biopsy 3/25/2025, s/p MMS 04/17/25   2. History of melanoma 14-16 years ago  - Left upper back 2011 0.5 mm   3. 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 AK  - Effudex/ Dovonex 3/25/2025    5. Anterior commissure SCC  - Follows with ENT, s/p multiple microlaryngoscopy, CO2 laser resection, radiation completed 09/24/24      Social history: Works as a      Date of Service:  Apr 17, 2025  Surgery: Mohs micrographic surgery    Case 1  Repair Type: intermediate  Repair Size: 4.0 cm  Suture Material: 4-0 monocryl  Tumor Type: SCCI - Squamous cell carcinoma in situ  Location: R upper chest  Derm-Path Accession #: [id number]  PreOp Size: 1.4 x 0.9 cm  PostOp Size: 3.5 x 1.4 cm  Mohs Accession #:   Level of Defect: fat      Procedure:  We discussed the principles of treatment and most likely complications including scarring, bleeding, infection, swelling, pain, crusting, nerve damage, large wound,  incomplete excision, wound dehiscence,  nerve damage, recurrence, and a second procedure may be recommended to obtain the best cosmetic or functional result.    Informed consent was obtained and the patient underwent the procedure as follows:  The patient was placed supine on the operating table.  The cancer was identified, outlined with a marker, and verified by the patient.  The entire surgical field was prepped with Hibiclens.  The surgical site was anesthetized using 1% lidocaine with epinephrine.    The area of clinically apparent tumor was debulked.  The layer of tissue was then surgically excised using a #15 blade and was then transferred onto a specimen sheet maintaining the orientation of the specimen. Hemostasis was obtained using heat cautery. The wound site was then covered with a dressing while the tissue samples were processed for examination.    The excised tissue was transported to the [facility] histology laboratory maintaining the tissue orientation.  The tissue specimen was relaxed so that the entire surgical margin was in a a single horizontal plane for sectioning and inked for precise mapping.  A precise reference map was drawn to reflect the sectioning of the specimen, colored inking of the margins, and orientation on the patient. The tissue was processed using horizontal sectioning of the base and continuous peripheral margins.  The histopathologic sections were reviewed in conjunction with the reference map.    Total blocks: 1    Total slides:  2    There were no cancer cells visualized on examination, therefore Mohs surgery was complete.    Reconstruction: Intermediate Linear Closure    The patient was taken to the operative suite and placed supine on the operating room table. The defect was identified. Appropriate markings were made with a marking pen to plan the repair. The area was infiltrated with 1% lidocaine with epinephrine and prepped with Hibiclens and draped with sterile towels.     The wound was debeveled and undermined widely. Cones were excised within relaxed skin tension lines on both sides of the defect. Hemostasis was obtained using heat cautery. The deeper layers of subcutaneous and superficial (nonmuscle) fascia tissues were then approximated using Monocryl 4-0 sutures (deep layer suturing). The wound edges were then approximated; additional buried sutures were placed in a similar fashion where needed. 4-0 monocryl (superficial layer suturing) was carefully placed for maximum eversion and meticulous approximation in running  subcuticular fashion.    Estimated blood loss was less than 10 ml for all surgical sites. The wound was cleansed with saline and Dermabond was applied along the wound surface. A sterile pressure dressing was applied and wound care instructions were given verbally and in writing. Patient was informed that additional refinement of the resulting surgical scar may be used as a second stage of this reconstruction. The patient was discharged from the [facility] Center alert and ambulatory.    Repair Size: 4.0 cm  Sutures Used:  Deep: 4-0 Monocryl Superficial: 4-0 monocryl     Additional procedures performed today: cryotherapy for actinic keratosis   HPI: Patient reported a scaly lesion on his right posterior earlobe.  On physical exam, there is a gritty erythematous papule on the right posterior earlobe.  Assessment/plan: Actinic keratosis  We reviewed the low but existing risk of progression to cancer of these lesions. After discussing with the patient, we proceeded with a 5-10 second freeze cycle to 1 lesion(s) as detailed in physical examination. Pt advised that lesion(s) will become red and inflamed, may blister and then will crust up and heal over the next 1-2 weeks. The patient will use plain vaseline daily to the area(s) for one week and let us know if lesions return.       Dr. Joseph Carmen MD was physically present  for the entire procedure and always immediately available.     Staff Involved:  Scribe/Staff    Suzanne Gongora MD  Mohs Micrographic Surgery and Dermatologic Oncology Fellow  [facility]    Attestation signed by Joseph Carmen MD at 4/22/2025  8:47 AM:    Attending attestation:  I was present for key elements of the procedure and immediately available for all other portions of the procedure.  I have reviewed the note and edited it as necessary.    Joseph Carmen M.D.  Professor  Director of Dermatologic Surgery  Department of Dermatology  [facility]    Dermatology Surgery  [facility]  [address] [place] [id number]      Again, thank you for allowing me to participate in the care of your patient.      Sincerely,    Joseph Carmen MD

## 2025-04-17 NOTE — PATIENT INSTRUCTIONS
Wound care    I will experience scar, bleeding, swelling, pain, crusting and redness. I may experience incomplete removal or recurrence. Risks are bleeding, bruising, swelling, infection, nerve damage, & a large wound. A second procedure may be recommended to obtain the best cosmetic or functional result.       A three month office visit with your Surgeon is recommended for scar evaluation. Please reach out sooner if you have concerns about you surgical site/wound.    Caring for your skin after surgery    After your surgery, a pressure bandage will be placed over the area that has stitches. This is important to prevent bleeding. Please follow these instructions over the next 1 to 2 weeks. Following this regimen will help to prevent complications as your wound heals.     For the first 48 hours after your surgery:    Leave the pressure dressing on and keep it dry. If it should come loose, you may re-tape it, but do not take it off.  Relax and take it easy. Do not do any vigorous exercise or heavy lifting. This could cause the wound to bleed.  Post-Operative pain is usually mild. If you are able to take tylenol, You may take plain or extra-strength Tylenol (acetaminophen) As directed on the bottle (do not take more than 4,000mg in one day). If you are able to take ibuprofen, you can alternate the tylenol and ibuprofen.   Avoid alcohol as this may increase your tendency to bleed.   You may put an ice pack around the bandaged area for 20 minutes at a time as needed. This may help reduce swelling, bruising, and pain. Make sure the ice pack is waterproof so that the pressure bandage doesn t get wet.  If the wound is on the face try to sleep with your head elevated. Either in a recliner or propped up in bed, this will decrease swelling around the eyes.   You may see a small amount of drainage or blood on your pressure bandage. This is normal. However:  If drainage or bleeding continues or saturates the bandage, you will  need to apply firm pressure over the bandage with a piece of gauze for 15 minutes.  If bleeding continues after applying pressure for 15 minutes, apply an ice pack to the bandaged area for 15 minutes.  If bleeding still continues, call our office or go to the nearest emergency room.    Remove pressure dressing 48 hours after surgery:    Carefully remove the pressure bandage. If it seems sticky or too difficult to get off, you may need to soak it off in the shower.  After the pressure dressing is removed, you may shower and get the wound wet. However, Do Not let the forceful stream of the shower hit the wound directly.  Follow these wound care and dressing change instructions:    You have skin glue over the stitches. This will dry and flake off with time (about 2 weeks). If the stitches are still hanging around after 2 weeks, let us know, we can clip them out for you if they do not fall out with washing.   You may allow water to run over the site. Do not soak.  Do Not rub or scrub the site.  Pat dry after the shower or bath.  Avoid topical medications, lotions, creams, ointment,or oils.  Do not use tanning lamps or expose the site to sun.   Check wound appearance daily, some swelling and redness is normal after a procedure but should go away as your would is healing. If the swelling and redness or pain increases or if any other signs of infection occur listed below please send in a photo via my chart and or call us to let us know.  The clear glue film should start peeling and flaking off approximately around 2 weeks. By this time your wound should be sufficiently healed. If it still looks to be healing when the glue comes off you can clean the wound with soapy warm water daily in the shower. No need to bandage further, but you can put a bandage on the area daily for the two weeks if you would like.   If skin glue and sutures are still intact at 2 weeks after your procedure, you can start applying Vaseline daily to  "help soften up the skin glue. It will come off easier this way.        If you are able to take acetaminophen (\"Tylenol,\" etc.) and ibuprofen (\"Advil\" or \"Motrin,\" etc.), then you may STAGGER these medications by taking 400 mg of ibuprofen (usually two tabs) every 8 hours and 1,000 mg of acetaminophen (e.g., two tabs of extra-strength Tylenol) every 8 hours.    This means, for example, that you could take the followin,000 mg of Tylenol, followed 4 hours later by 400 mg Ibuprofen, followed 4 hours later with 1,000 mg of Tylenol, followed 4 hours later by 400 mg Ibuprofen, followed 4 hours later with 1,000 mg of Tylenol, and so forth.     Essentially, you can take either 1,000 mg of Tylenol or 400 mg of ibuprofen in alternating fashion EVERY FOUR HOURS.    Do NOT exceed more than 4,000 mg of Tylenol or 3,200 mg of ibuprofen per 24 hours. If you are not able to take Tylenol or ibuprofen as above due to other health issues (or a physician has told you directly that you are not allowed to take one of them, say due to pre-existing severe liver or kidney issues), then disregard the above directions.    Scientific evidence supports that this combination/schedule of pain medications is just as effective, if not more effective, than taking a narcotic pain medicine.       Follow up will be a 3 month scar evaluation either in person or via a telephone visit with you sending in a photo via [employer]. Unless you have been told to follow up sooner or if you have concerns and would like to be see sooner. Please call or send us in a [employer] message if possible and attach a photo.        What to expect:    The first couple of days your wound may be tender and may bleed slightly when doing wound care.  There may be swelling and bruising around the wound, especially if it is near the eyes. For your comfort, you may apply ice or cold compresses to the bruises after your have removed the pressure bandage.  The area around your wound may " be numb for several weeks or even months.  You may experience periodic sharp pain or mild itching around the wound as it heals.   The suture line will look dark for a while but will lighten over time.       When to call us:    You have bleeding that will not stop after applying pressure and ice.  You have pain that is not controlled with Tylenol (acetaminophen.)  You have signs or symptoms of an infection such as:  Fever over 100 degrees Fahrenheit  Redness, warmth or foul-smelling drainage from the wound  If you have any questions, or are not sure how to take care of the wound.    Phone numbers:    During business hours (M-F 8:00-4:30 p.m.)  [facility]-  [phone] Option 1 appt. Desk and ask for the Dermatology Surgery Team  [phone] [facility] .     ---------------------------------------------------------  Evenings/Weekends/Holidays  Hospital - [phone]   TTY for hearing vldwjcdr-[phone]  *Ask  to page dermatologist on-call  Emergency Ibkp-[phone]  TTY for hearing impaired- [phone]

## 2025-04-17 NOTE — TELEPHONE ENCOUNTER
Follow up call completed following Mohs procedure with Dr. Carmen.       Are you having pain? YES: mild  Are you taking pain medication? Tylenol (acetaminophen)  Are you applying ice?  NO  Have you had any noticeable bleeding through the bandage? NO   Do you have any other concerns? NO       Please call [phone] if you have any questions or concerns during business hours. For concerns after hours, call the hospital  to reach the dermatology resident on call at [phone], option 4.     Wilfred South EMT

## 2025-04-17 NOTE — PROGRESS NOTES
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 History of melanoma 14-16 years ago  - Left upper back 2011 0.5 mm   3. 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 AK  - Effudex/ Dovonex 3/25/2025    5. Anterior commissure SCC  - Follows with ENT, s/p multiple microlaryngoscopy, CO2 laser resection, radiation completed 09/24/24      Social history: Works as a      Date of Service:  Apr 17, 2025  Surgery: Mohs micrographic surgery    Case 1  Repair Type: intermediate  Repair Size: 4.0 cm  Suture Material: 4-0 monocryl  Tumor Type: SCCI - Squamous cell carcinoma in situ  Location: R upper chest  Derm-Path Accession #: [id number]  PreOp Size: 1.4 x 0.9 cm  PostOp Size: 3.5 x 1.4 cm  Mohs Accession #:   Level of Defect: fat      Procedure:  We discussed the principles of treatment and most likely complications including scarring, bleeding, infection, swelling, pain, crusting, nerve damage, large wound,  incomplete excision, wound dehiscence,  nerve damage, recurrence, and a second procedure may be recommended to obtain the best cosmetic or functional result.    Informed consent was obtained and the patient underwent the procedure as follows:  The patient was placed supine on the operating table.  The cancer was identified, outlined with a marker, and verified by the patient.  The entire surgical field was prepped with Hibiclens.  The surgical site was anesthetized using 1% lidocaine with epinephrine.    The area of clinically apparent tumor was debulked. The layer of tissue was then surgically excised using a #15 blade and was then transferred onto a specimen sheet maintaining the orientation of the specimen. Hemostasis was obtained using heat cautery. The wound site was then covered with a dressing while the tissue samples were processed for examination.    The excised  tissue was transported to the Mohs histology laboratory maintaining the tissue orientation.  The tissue specimen was relaxed so that the entire surgical margin was in a a single horizontal plane for sectioning and inked for precise mapping.  A precise reference map was drawn to reflect the sectioning of the specimen, colored inking of the margins, and orientation on the patient. The tissue was processed using horizontal sectioning of the base and continuous peripheral margins.  The histopathologic sections were reviewed in conjunction with the reference map.    Total blocks: 1    Total slides:  2    There were no cancer cells visualized on examination, therefore Mohs surgery was complete.    Reconstruction: Intermediate Linear Closure    The patient was taken to the operative suite and placed supine on the operating room table. The defect was identified. Appropriate markings were made with a marking pen to plan the repair. The area was infiltrated with 1% lidocaine with epinephrine and prepped with Hibiclens and draped with sterile towels.     The wound was debeveled and undermined widely. Cones were excised within relaxed skin tension lines on both sides of the defect. Hemostasis was obtained using heat cautery. The deeper layers of subcutaneous and superficial (nonmuscle) fascia tissues were then approximated using Monocryl 4-0 sutures (deep layer suturing). The wound edges were then approximated; additional buried sutures were placed in a similar fashion where needed. 4-0 monocryl (superficial layer suturing) was carefully placed for maximum eversion and meticulous approximation in running subcuticular fashion.    Estimated blood loss was less than 10 ml for all surgical sites. The wound was cleansed with saline and Dermabond was applied along the wound surface. A sterile pressure dressing was applied and wound care instructions were given verbally and in writing. Patient was informed that additional refinement  of the resulting surgical scar may be used as a second stage of this reconstruction. The patient was discharged from the [facility] alert and ambulatory.    Repair Size: 4.0 cm  Sutures Used:  Deep: 4-0 Monocryl Superficial: 4-0 monocryl     Additional procedures performed today: cryotherapy for actinic keratosis   HPI: Patient reported a scaly lesion on his right posterior earlobe.  On physical exam, there is a gritty erythematous papule on the right posterior earlobe.  Assessment/plan: Actinic keratosis  We reviewed the low but existing risk of progression to cancer of these lesions. After discussing with the patient, we proceeded with a 5-10 second freeze cycle to 1 lesion(s) as detailed in physical examination. Pt advised that lesion(s) will become red and inflamed, may blister and then will crust up and heal over the next 1-2 weeks. The patient will use plain vaseline daily to the area(s) for one week and let us know if lesions return.       Dr. Joseph Carmen MD was physically present  for the entire procedure and always immediately available.     Staff Involved:  Scribe/Staff    Suzanne Gongora MD  Mohs Micrographic Surgery and Dermatologic Oncology Fellow  [facility]

## 2025-05-21 DIAGNOSIS — C32.9 LARYNGEAL CANCER (H): ICD-10-CM

## 2025-05-22 NOTE — TELEPHONE ENCOUNTER
Last Written Prescription:  pantoprazole (PROTONIX) 40 MG EC tablet 60 tablet 1 10/31/2024 -- No   Sig - Route: Take 1 tablet (40 mg) by mouth daily. - Oral     ----------------------  Last Visit Date: 4-3-25  Future Visit Date: 7-11-25    Refill decision: Medication unable to be refilled by RN due to: Other:  Gap in RF  Last rx limited quantity        Request from pharmacy:  Requested Prescriptions   Pending Prescriptions Disp Refills    pantoprazole (PROTONIX) 40 MG EC tablet 60 tablet 1     Sig: Take 1 tablet (40 mg) by mouth daily.       PPI Protocol Failed - 5/22/2025  8:24 AM        Failed - Medication indicated for associated diagnosis     The medication is prescribed for one or more of the following conditions:     Erosive esophagitis    Gastritis   Gastric hypersecretion   Gastric ulcer   Gastroesophageal reflux disease   Helicobacter pylori gastrointestinal tract infection   Ulcer of duodenum   Drug-induced peptic ulcer   Esophageal stricture   Gastrointestinal hemorrhage   Indigestion   Stress ulcer   Zollinger-Schwartz syndrome   Ochoa s esophagus   Laryngopharyngeal reflux   Epigastric Pain   Morbid Obesity   Cough   History of Peptic Ulcer   Esophageal Atresia, Stenosis and Fistula   Cystic Fibrosis  Bronchiectasis          Passed - Medication is active on med list and the sig matches. RN to manually verify dose and sig if red X/fail.     If the protocol passes (green check), you do not need to verify med dose and sig.    A prescription matches if they are the same clinical intention.    For Example: once daily and every morning are the same.    The protocol can not identify upper and lower case letters as matching and will fail.     For Example: Take 1 tablet (50 mg) by mouth daily     TAKE 1 TABLET (50 MG) BY MOUTH DAILY    For all fails (red x), verify dose and sig.    If the refill does match what is on file, the RN can still proceed to approve the refill request.       If they do not match, route  to the appropriate provider.             Passed - Recent (12 month) or future (90 days) visit with authorizing provider's specialty (provided they have been seen in the past 15 months)     The patient must have completed an in-person or virtual visit within the past 12 months or has a future visit scheduled within the next 90 days with the authorizing provider s specialty.  Urgent care and e-visits do not qualify as an office visit for this protocol.          Passed - Patient is age 18 or older

## 2025-05-25 DIAGNOSIS — G62.9 NEUROPATHY: ICD-10-CM

## 2025-05-25 DIAGNOSIS — I10 HYPERTENSION, UNSPECIFIED TYPE: ICD-10-CM

## 2025-05-27 ENCOUNTER — DOCUMENTATION ONLY (OUTPATIENT)
Dept: OTOLARYNGOLOGY | Facility: CLINIC | Age: 80
End: 2025-05-27
Payer: COMMERCIAL

## 2025-05-27 RX ORDER — PANTOPRAZOLE SODIUM 40 MG/1
40 TABLET, DELAYED RELEASE ORAL DAILY
Qty: 60 TABLET | Refills: 1 | Status: SHIPPED | OUTPATIENT
Start: 2025-05-27

## 2025-05-27 NOTE — PROGRESS NOTES
Refill request form for protonix faxed to [facility] pharmacy. Fax number [phone]. 1 pg faxed.

## 2025-05-28 RX ORDER — LOSARTAN POTASSIUM 50 MG/1
50 TABLET ORAL DAILY
Qty: 90 TABLET | Refills: 0 | Status: SHIPPED | OUTPATIENT
Start: 2025-05-28

## 2025-05-28 NOTE — TELEPHONE ENCOUNTER
Last Written Prescription:  losartan (COZAAR) 50 MG tablet  50 mg, DAILY           Summary: Take 1 tablet (50 mg) by mouth daily, Disp-90 tablet, R-3, E-Prescribe This prescription was filled on 2/29/2024. Any refills authorized will be placed on file.   Dose, Route, Frequency: 50 mg, Oral, DAILYStart: 05/30/2024Ordered On: 05/30/2024Pharmacy: [facility] [address] [place]ReportDx Associated: Taking: Long-term: Med Note:    Prescribed: 50 mg, Oral, DAILY  Patient taking differently: 50 mg Oral AT BEDTIME, Reported on 4/17/2025            Directions: Take 1 tablet (50 mg) by mouth daily  Ordering Department:  ADULT RHEUMATOLOGY  Authorized By: Ede Sanchez MD  Dispense: 90 tablet  Refills: 3 ordered       ----------------------  Last Visit Date: 12/12/2024 Office Visit Rheumatology - ZANDER Sanchez   Future Visit Date: 6/27/25  ----------------------      Refill decision: Medication refilled per  Medication Refill in Ambulatory Care  policy.   []  If no future appointment scheduled: Route to Clinic Coordinators to contact the pt for appointment.      Request from pharmacy:  Requested Prescriptions   Pending Prescriptions Disp Refills    losartan (COZAAR) 50 MG tablet [Pharmacy Med Name: losartan 50 mg tablet] 90 tablet 3     Sig: TAKE 1 TABLET BY MOUTH ONCE DAILY       Angiotensin-II Receptors Failed - 5/28/2025  9:17 AM        Failed - Has GFR on file in past 12 months and most recent value is normal        Passed - Most recent blood pressure under 140/90 in past 12 months     BP Readings from Last 3 Encounters:   04/17/25 115/76   01/10/25 130/76   12/12/24 132/75       No data recorded            Passed - Medication is active on med list and the sig matches. RN to manually verify dose and sig if red X/fail.     If the protocol passes (green check), you do not need to verify med dose and sig.    A prescription matches if they are the same clinical intention.    For  Example: once daily and every morning are the same.    The protocol can not identify upper and lower case letters as matching and will fail.     For Example: Take 1 tablet (50 mg) by mouth daily     TAKE 1 TABLET (50 MG) BY MOUTH DAILY    For all fails (red x), verify dose and sig.    If the refill does match what is on file, the RN can still proceed to approve the refill request.       If they do not match, route to the appropriate provider.             Passed - Medication indicated for associated diagnosis     The medication is prescribed for one or more of the following conditions:    Chronic Kidney Disease (CDK)    Heart Failure (HF)    Diabetes, Nephropathy   Hypertension    Coronary Artery Disease (CAD)   Raynaud's Disease   Ischemic cardiomyopathy   Cardiomyopathy   Pulmonary Hypertension          Passed - Recent (12 month) or future (90 days) visit with authorizing provider's specialty (provided they have been seen in the past 15 months)     The patient must have completed an in-person or virtual visit within the past 12 months or has a future visit scheduled within the next 90 days with the authorizing provider s specialty.  Urgent care and e-visits do not qualify as an office visit for this protocol.          Passed - Patient is age 18 or older        Passed - Normal serum potassium on file in past 12 months     Recent Labs   Lab Test 10/23/24  0619   POTASSIUM 4.6                     Indu B, RN  [facility]/Red Flag Triage & Med Refill Team

## 2025-06-19 ENCOUNTER — LAB (OUTPATIENT)
Dept: LAB | Facility: CLINIC | Age: 80
End: 2025-06-19
Payer: COMMERCIAL

## 2025-06-19 DIAGNOSIS — M31.30 GRANULOMATOSIS WITH POLYANGIITIS, UNSPECIFIED WHETHER RENAL INVOLVEMENT (H): ICD-10-CM

## 2025-06-19 DIAGNOSIS — Z79.899 HIGH RISK MEDICATIONS (NOT ANTICOAGULANTS) LONG-TERM USE: ICD-10-CM

## 2025-06-19 LAB
ALBUMIN MFR UR ELPH: <6 MG/DL
ALBUMIN SERPL BCG-MCNC: 4.1 G/DL (ref 3.5–5.2)
ALBUMIN UR-MCNC: NEGATIVE MG/DL
ALT SERPL W P-5'-P-CCNC: 13 U/L (ref 0–70)
APPEARANCE UR: CLEAR
AST SERPL W P-5'-P-CCNC: 21 U/L (ref 0–45)
BACTERIA #/AREA URNS HPF: ABNORMAL /HPF
BASOPHILS # BLD AUTO: 0 10E3/UL (ref 0–0.2)
BASOPHILS NFR BLD AUTO: 1 %
BILIRUB UR QL STRIP: NEGATIVE
COLOR UR AUTO: YELLOW
CREAT SERPL-MCNC: 1.69 MG/DL (ref 0.67–1.17)
CREAT UR-MCNC: 57.4 MG/DL
CREAT UR-MCNC: 58.2 MG/DL
CRP SERPL-MCNC: <3 MG/L
EGFRCR SERPLBLD CKD-EPI 2021: 41 ML/MIN/1.73M2
EOSINOPHIL # BLD AUTO: 0.2 10E3/UL (ref 0–0.7)
EOSINOPHIL NFR BLD AUTO: 5 %
ERYTHROCYTE [DISTWIDTH] IN BLOOD BY AUTOMATED COUNT: 12.5 % (ref 10–15)
ERYTHROCYTE [SEDIMENTATION RATE] IN BLOOD BY WESTERGREN METHOD: 7 MM/HR (ref 0–20)
GLUCOSE UR STRIP-MCNC: NEGATIVE MG/DL
HCT VFR BLD AUTO: 40 % (ref 40–53)
HGB BLD-MCNC: 13.6 G/DL (ref 13.3–17.7)
HGB UR QL STRIP: NEGATIVE
IMM GRANULOCYTES # BLD: 0 10E3/UL
IMM GRANULOCYTES NFR BLD: 0 %
KETONES UR STRIP-MCNC: NEGATIVE MG/DL
LEUKOCYTE ESTERASE UR QL STRIP: NEGATIVE
LYMPHOCYTES # BLD AUTO: 1.1 10E3/UL (ref 0.8–5.3)
LYMPHOCYTES NFR BLD AUTO: 27 %
MCH RBC QN AUTO: 32 PG (ref 26.5–33)
MCHC RBC AUTO-ENTMCNC: 34 G/DL (ref 31.5–36.5)
MCV RBC AUTO: 94 FL (ref 78–100)
MONOCYTES # BLD AUTO: 0.4 10E3/UL (ref 0–1.3)
MONOCYTES NFR BLD AUTO: 11 %
NEUTROPHILS # BLD AUTO: 2.2 10E3/UL (ref 1.6–8.3)
NEUTROPHILS NFR BLD AUTO: 56 %
NITRATE UR QL: NEGATIVE
PH UR STRIP: 6 [PH] (ref 5–7)
PLATELET # BLD AUTO: 179 10E3/UL (ref 150–450)
PROT/CREAT 24H UR: NORMAL MG/G{CREAT}
RBC # BLD AUTO: 4.25 10E6/UL (ref 4.4–5.9)
RBC #/AREA URNS AUTO: ABNORMAL /HPF
SP GR UR STRIP: <=1.005 (ref 1–1.03)
SQUAMOUS #/AREA URNS AUTO: ABNORMAL /LPF
UROBILINOGEN UR STRIP-ACNC: 0.2 E.U./DL
WBC # BLD AUTO: 4 10E3/UL (ref 4–11)
WBC #/AREA URNS AUTO: ABNORMAL /HPF

## 2025-06-23 ENCOUNTER — RESULTS FOLLOW-UP (OUTPATIENT)
Dept: RHEUMATOLOGY | Facility: CLINIC | Age: 80
End: 2025-06-23

## 2025-06-23 LAB
ANCA AB PATTERN SER IF-IMP: ABNORMAL
C-ANCA TITR SER IF: ABNORMAL {TITER}

## 2025-06-27 ENCOUNTER — ANCILLARY PROCEDURE (OUTPATIENT)
Dept: CT IMAGING | Facility: CLINIC | Age: 80
End: 2025-06-27
Attending: OTOLARYNGOLOGY
Payer: COMMERCIAL

## 2025-06-27 ENCOUNTER — OFFICE VISIT (OUTPATIENT)
Dept: RHEUMATOLOGY | Facility: CLINIC | Age: 80
End: 2025-06-27
Attending: INTERNAL MEDICINE
Payer: COMMERCIAL

## 2025-06-27 VITALS
DIASTOLIC BLOOD PRESSURE: 74 MMHG | WEIGHT: 189 LBS | BODY MASS INDEX: 27.99 KG/M2 | OXYGEN SATURATION: 99 % | HEART RATE: 66 BPM | HEIGHT: 69 IN | SYSTOLIC BLOOD PRESSURE: 118 MMHG

## 2025-06-27 DIAGNOSIS — M31.30 GRANULOMATOSIS WITH POLYANGIITIS, UNSPECIFIED WHETHER RENAL INVOLVEMENT (H): Primary | ICD-10-CM

## 2025-06-27 DIAGNOSIS — C32.9 LARYNGEAL CANCER (H): ICD-10-CM

## 2025-06-27 DIAGNOSIS — Z79.899 HIGH RISK MEDICATIONS (NOT ANTICOAGULANTS) LONG-TERM USE: ICD-10-CM

## 2025-06-27 PROCEDURE — 70491 CT SOFT TISSUE NECK W/DYE: CPT | Performed by: RADIOLOGY

## 2025-06-27 PROCEDURE — 71260 CT THORAX DX C+: CPT | Performed by: RADIOLOGY

## 2025-06-27 PROCEDURE — 99213 OFFICE O/P EST LOW 20 MIN: CPT | Performed by: INTERNAL MEDICINE

## 2025-06-27 PROCEDURE — 99214 OFFICE O/P EST MOD 30 MIN: CPT | Performed by: INTERNAL MEDICINE

## 2025-06-27 PROCEDURE — G2211 COMPLEX E/M VISIT ADD ON: HCPCS | Performed by: INTERNAL MEDICINE

## 2025-06-27 PROCEDURE — 1126F AMNT PAIN NOTED NONE PRSNT: CPT | Performed by: INTERNAL MEDICINE

## 2025-06-27 PROCEDURE — 3074F SYST BP LT 130 MM HG: CPT | Performed by: INTERNAL MEDICINE

## 2025-06-27 PROCEDURE — 3078F DIAST BP <80 MM HG: CPT | Performed by: INTERNAL MEDICINE

## 2025-06-27 RX ORDER — FOLIC ACID 1 MG/1
1 TABLET ORAL DAILY
Qty: 90 TABLET | Refills: 3 | Status: SHIPPED | OUTPATIENT
Start: 2025-06-27

## 2025-06-27 RX ORDER — METHOTREXATE 2.5 MG/1
10 TABLET ORAL
Qty: 60 TABLET | Refills: 1 | Status: SHIPPED | OUTPATIENT
Start: 2025-06-27

## 2025-06-27 RX ORDER — IOPAMIDOL 755 MG/ML
88 INJECTION, SOLUTION INTRAVASCULAR ONCE
Status: COMPLETED | OUTPATIENT
Start: 2025-06-27 | End: 2025-06-27

## 2025-06-27 RX ADMIN — IOPAMIDOL 88 ML: 755 INJECTION, SOLUTION INTRAVASCULAR at 13:00

## 2025-06-27 ASSESSMENT — PAIN SCALES - GENERAL: PAINLEVEL_OUTOF10: NO PAIN (0)

## 2025-06-27 NOTE — DISCHARGE INSTRUCTIONS

## 2025-06-27 NOTE — PATIENT INSTRUCTIONS
Go back on methotrexate 4 tabs a week+ daily folic acid 1 mg a day if ok with cancer doctor agrees    Labs in 9/2025 then every 3 months    Return in 6 months in person

## 2025-06-27 NOTE — NURSING NOTE
"Chief Complaint   Patient presents with    RECHECK      6 month follow up  granulomatosis with polyangitis     /74 (BP Location: Right arm, Patient Position: Sitting)   Pulse 66   Ht 1.753 m (5' 9\")   Wt 85.7 kg (189 lb)   SpO2 99%   BMI 27.91 kg/m    Darnell Zazueta CMA on 6/27/2025 at 2:29 PM    "

## 2025-06-27 NOTE — DISCHARGE INSTRUCTIONS

## 2025-06-27 NOTE — LETTER
6/27/2025       RE: Joshua Ennis  [address] [facility] [address]     Dear Colleague,    Thank you for referring your patient, Joshua Ennis, to the [facility] [place] at [facility]. Please see a copy of my visit note below.    [facility] Follow-Up In Person Visit Note    Date of visit: 6/27/2025  Last visit date: 12/12/2024    Joshua Ennis MRN# [id number]   Age: 80 year old    Reason for visit: GPA, high risk med use monitoring   YOB: 1945          Assessment and Plan:     Assessment:   Mr. Ennis is a 79 yr old  male with history of melanoma s/p resection in 11/2011, former tobacco use, and GPA (PR3+, MPO and c-ANCA negative in 3/2013 based on aforementioned labs, confirmed by kidney and lung biopsy) who presents for clinic follow-up regarding GPA.  He was initiated on cyclophosphamide 150 mg QD and high-dose prednisone 3/2013. He last took prednisone in 10/2013. Cytoxan was switched to MTX for maintenance treatment in 10/2013.    7/6/2023:  Vasculitis seems to be stable and he has no signs or symptoms of flare up today, though PR3 remains persistently positive. New upper airway symptoms, while more likely due to non-vasculitis inflammation, GERD, vocal cord dysfunction, could represent vasculitis in tracheal wall, which can occur in absence of elevated inflammatory markers. Also, former smoker and with immunosuppression status, need to make sure there is no SCC. Will refer for ENT evaluation and also acquire high-resolution non-contrast CT to follow nodule in his lung incidentally identified in 2020. Otherwise continue with current plan of low dose methotrexate 10mg (4 tabs) weekly and Q3 mo labs. Was advised to call in case of vasculitis flare up sx.        Plan 7/6/2023:    - Continue MTX 10 mg (4 tabs) weekly     > Labs q3m      > Hold methotrexate if develops an infection  -  Continue with Folic acid 1 mg po   - Referral to ENT for new onset persistent voice changes/hoarseness and continued cough/phlegm (2017 note indicated laryngoscopy would be next step if he developed persistent hoarseness)  - High-resolution CT chest w/o contrast for follow up of pulm nodule  - Repeat labs in 3 mo    Return in 1y (in person)               7/11/2024:    Since last visit, was diagnosed with laryngeal SCC, will have another procedure on 7/17 with awake intubation, unchanged horseness, no other complaints. Asked me about awake intubation process, I advised him to ask surgical team to review/explain to him, I will message his ENT team. 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 Advised good hydration. He is on amoxicillin since 7/3 x 2 wk course to prevent surgical infection, due to drug drug interaction and possible rise in methotrexate level and low GFR, advised him tos kip next dose of methotrexate with follow up labs on Mon. If he needs further cancer tx, will hold methotrexate. He could benefit from MTM referral to check on meds, drug-drug interaction in future, referral was placed.    Plan:    Skip the methotrexate this coming Sunday 7/14, resume Sunday 7/21     Continue with folic acid    Labs on Monday    Good hydration    Refer to [facility] pharmacist    Return in 6 months (in person)        12/12/2024:      New Dx of laryngeal SCC s/p surgery, radiation, now has a trach in place. ENT follow up today, upcoming PET CT 12 wk post radiation. Will continue to hold methotrexate and monitor for vasculitis flare up sx. Labs every 3 mo, so far stable off methotrexate. Last checked 11/2024. Will continue with gabapentin 400 mg at bedtime for chronic neuropathy.    Plan:    Labs every 3 months, due 2/2025    Continue to hold methotrexate and folic acid    Return in person in 6 months    TT 30  min was spent on date of the encounter doing chart review, history and exam, documentation and further activities as noted above. Any prior notes, outside records, laboratory results, and imaging studies were reviewed if relevant.    The longitudinal plan of care for the diagnosis(es)/condition(s) as documented were addressed during this visit. Due to the added complexity in care, I will continue to support Joshua in the subsequent management and with ongoing continuity of care.      Today 6/27/2025:    Stable GPA, CT neg for cancer recurrence, good news.    Plan:    Go back on methotrexate 4 tabs a week+ daily folic acid 1 mg a day if ok with cancer doctor agrees    Labs in 9/2025 then every 3 months    Return in 6 months in person      TT 30 min was spent on date of the encounter doing chart review, history and exam, documentation and further activities as noted above. Any prior notes, outside records, laboratory results, and imaging studies were reviewed if relevant.      The longitudinal plan of care for the diagnosis(es)/condition(s) as documented were addressed during this visit. Due to the added complexity in care, I will continue to support Joshua in the subsequent management and with ongoing continuity of care.    Ede Sanchez MD           HPI / Interim History:      Mr. Ennis is a 76 yo WM with h/o melanoma s/p resection in 11/2011, former tobacco use, and GPA diagnosed in 3/2013. He presents for follow up.    Had a hospitalization 11/2-11/4/2020 with a fall with displaced left 5th-9th rib fractures, now recovered.    He has been tolerating it well. He had a flare in 12/2013 with increased crusts in his nose along with recurrence of arthralgia, worsening numbness in feet and increasing vasculitis marker. Renal function was stable with negative repeat chest CT. His vasculitis flare was treated with short course of prednisone taper 20 mg po qd max and increasing MTX to 8 tab = 20 mg qwk. Vasculitis  "symptoms improved.  At visit on 1/2015, he had scabs in his nose, had cold [place] symptoms and increased arthralgia/fatigue (shoulders, L hand). Labs from 12/16 showed increased Cr level and hematuria with rising PR3 (more than 8), there was a concern for vasculitis flare (in kidneys, sinuses), no flare on repeat chest CT 1/2015. Therefore it was recommended to try rituximab. Another reason was to be able to taper MTX off given abnormal Cr.  He is now s/p Rituximab infusion x 4 (1st on 2/25/2015). He is feeling well. No hematuria with stable Cr, no SOB. Saw ENT with negative nasal mucosa biopsy 6/2015 for granuloma but showed active inflammation, had left nostril lesion which decreased in size by use of mupirocin ointment. PR3 vasculitis marker went back to NL in 6/2015, it was NL in 10/2015, given stable vasculitis and low GFR, MTX from 8 to 7 tab po qwk. Repeat IL-3 in 3/2016 was slightly positive, Cr was slightly higher than baseline. We tapered his MTX further to 6 tab po qwk in 2/2016 given lack of symptoms. In 3/2017, MTX was reduced from 6 to 5 tab qwk. It was further tapered to 4 tab po qwk in 6/2017 given stable disease. No vasculitis flare ups by such change.     7/7/22:  Reports doing well overall. Denies any vasculitis symptoms. Denies any fever/chills, fatigue, headaches, congestion, nose bleeds, mouth sores, cough/hemoptysis, chest pain, SOB, hematuria, dysuria, or any RUQ pain. Still endorses neuropathy but it is stable. No other concerns    7/6/23:  Doing fairly well overall though has noted persistent bothersome hoarseness and voice changes over the past 3 mos. Associates this with \"stickier\" phlegm than usual with his chronic cough. Notes this cough has been productive of grey-white sputum and present for many years relatively unchanged. Also reports episode of poison oak ~ 3 wks ago, was given a short course of prednisone (notes indicate 40 mg every day x 5 days) by an urgent care. Has now resolved, " and reports that during that prednisone course there was no change to his hoarseness or phlegm. Otherwise denies weight loss, loss of energy, loss of appetite, dysgeusia, fevers, chills, headaches, congestion, nose bleeds, nose crusting, mouth sores, hemoptysis, rashes, sores, chest pain, dyspnea, hematuria, dysuria, nausea, diarrhea, RUQ pain, vision or hearing changes. No other concerns.       7/11/2024:    -no vasculitis flare up, no crusting in the nose    -Dx of laryngeal SCC since last visit, continues to have hoarseness, will have another ENT procedure on 7/17    -no weight loss, energy and appetite is good, in good spirit, physically active      12/12/2024:      -laryngeal SCC    Per ENT:    -finished radiation on 9/24  - had trach and MDL with biopsy on 10/17/24  - pathology negative for carcinoma      -doing well today, still has trach in place, will see DR. 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 Negative for high-grade dysplasia and malignancy.     Hoarseness      Hypertension      Idiopathic progressive polyneuropathy      Kidney problem      Laryngeal cancer (H)      Malignant melanoma (H)      Malignant neoplasm (H)     melanoma     Squamous cell carcinoma       Russo syndrome           Past Surgical History:     Past Surgical History:   Procedure Laterality Date     BIOPSY  11/2011    melanoma on back     DISSECT LYMPH NODE INGUINAL  3/1/2013    Procedure: DISSECT LYMPH NODE INGUINAL;  Bilateral Inguinal Lymph Node Biopsy.;  Surgeon: Tariq Acosta MD;  Location: [facility]     ESOPHAGOSCOPY, GASTROSCOPY, DUODENOSCOPY (EGD), COMBINED  7/5/2013    Procedure: COMBINED ESOPHAGOSCOPY, GASTROSCOPY, DUODENOSCOPY (EGD);  Gastroscopy;  Surgeon: Tariq Acosta MD;  Location: [facility]     HERNIORRHAPHY INGUINAL  8/6/2012    Procedure: HERNIORRHAPHY INGUINAL;  Open Repair Left Inguinal Hernia;  Surgeon: Jerad Baker MD;  Location: [facility]     INJECT STEROID (LOCATION) N/A 8/30/2023    Procedure: steroid injection;  Surgeon: Josephine Malone MD;  Location: [facility]     INJECT STEROID (LOCATION) N/A 9/13/2023    Procedure: steroid injection;  Surgeon: Josephine Malone MD;  [facility]     INJECT STEROID (LOCATION) N/A 10/25/2023    Procedure: steroid injection, stent placement, and biopsy;  Surgeon: Josephine Malone MD;  [facility]     INJECT STEROID (LOCATION) N/A 12/1/2023    Procedure: steroid injection;  Surgeon: Josephine Malone MD;  [facility]     LARYNGOSCOPY WITH MICROSCOPE N/A 9/13/2023    Procedure: MICROLARYNGOSCOPY with stent removal and biopsy;  Surgeon: Josephine Malone MD;  [facility]: [facility]     LARYNGOSCOPY, FLEXIBLE WITH INJECTION N/A 12/11/2023    Procedure: LARYNGOSCOPY, FLEXIBLE WITH INJECTION with steroid;  Surgeon: Josephine Malone MD;  Location: [facility]     LASER CO2 LARYNGOSCOPY N/A 8/30/2023    Procedure: MICROLARYNGOSCOPY, CO2 laser resection of vocal fold lesion;  Surgeon: Josephine Malone MD;  [facility]     LASER CO2 LARYNGOSCOPY N/A 10/25/2023    Procedure: MICROLARYNGOSCOPY, CO2 laser resection of vocal fold lesion;  Surgeon: Josephine Malone MD;  [facility]     LASER CO2 LARYNGOSCOPY N/A 12/1/2023    Procedure: MICROLARYNGOSCOPY CO2 laser  standby, resection of vocal fold lesion, stent removal, biopsy;  Surgeon: Josephine Malone MD;  [facility]     LASER CO2 LARYNGOSCOPY, COMPLEX N/A 2024    Procedure: MICROLARYNGOSCOPY, CO2 laser standby, biopsy;  Surgeon: Josephine Malone MD;  Location: [facility]          Social History:     Social History     Tobacco Use     Smoking status: Former     Current packs/day: 0.00     Average packs/day: 1 pack/day for 20.0 years (20.0 ttl pk-yrs)     Types: Cigarettes     Start date: 1993     Quit date: 2013     Years since quittin.3     Passive exposure: Past     Smokeless tobacco: Never   Substance Use Topics     Alcohol use: Not Currently     Comment: rare          Family History:     Family History   Problem Relation Age of Onset     Diabetes Mother      Hypertension Mother      Cancer Father         stomach     Heart Disease Father      Heart Disease Brother      Diabetes Sister      Diabetes Sister      Diabetes Sister      Heart Disease Brother      Cancer Sister      Glaucoma No family hx of      Macular Degeneration No family hx of           Immunizations:   Due for shingles vaccine, encouraged to seek this from his PCP.    PMHx, FHx, SHx were reviewed, unchanged.         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 50 tablet 0     calcipotriene (DOVONOX) 0.005 % external cream Apply topically 2 times daily In conjunction with fluorouracil cream in a 1:1 mixture. 60 g 2     calcium carbonate 600 mg-vitamin D 400 units (CALTRATE) 600-400 MG-UNIT per tablet Take 1 tablet by mouth 2 times daily.       cetirizine  "(ZYRTEC) 10 MG tablet Take 1 tablet by mouth daily.       Cholecalciferol (VITAMIN D) 1000 UNITS capsule Take 1 capsule by mouth daily.       ciprofloxacin-dexAMETHasone (CIPRODEX) 0.3-0.1 % otic suspension Instill 4 drops into trach twice daily 7.5 mL 1     fluorouracil (EFUDEX) 5 % external cream Apply topically 2 times daily In conjunction with calcipotriene cream in a 1:1 mixture. 40 g 2     folic acid (FOLVITE) 1 MG tablet Take 1 tablet (1 mg) by mouth daily. 90 tablet 3     gabapentin (NEURONTIN) 400 MG capsule Take 1 capsule (400 mg) by mouth at bedtime. 90 capsule 3     losartan (COZAAR) 50 MG tablet TAKE 1 TABLET BY MOUTH ONCE DAILY 90 tablet 0     methotrexate 2.5 MG tablet Take 4 tablets (10 mg) by mouth every 7 days. 60 tablet 1     mupirocin (BACTROBAN) 2 % external cream Apply topically 3 times daily. Apply to affected area three times daily before applying aquaphor 30 g 0     pantoprazole (PROTONIX) 40 MG EC tablet Take 1 tablet (40 mg) by mouth daily. 60 tablet 1     senna-docusate (SENOKOT-S/PERICOLACE) 8.6-50 MG tablet Take 1 tablet by mouth 2 times daily. 30 tablet 0     silver sulfADIAZINE (SILVADENE) 1 % external cream Apply topically 2 times daily. Apply to affected area 400 g 0     oxyCODONE (ROXICODONE) 5 MG tablet Take 0.5-1 tablets (2.5-5 mg) by mouth every 4 hours as needed for moderate to severe pain. (Patient not taking: Reported on 6/27/2025) 15 tablet 0     No current facility-administered medications for this visit.          Review of Systems:   A comprehensive ROS was done. Positives are per HPI.           Physical Exam:   /74 (BP Location: Right arm, Patient Position: Sitting)   Pulse 66   Ht 1.753 m (5' 9\")   Wt 85.7 kg (189 lb)   SpO2 99%   BMI 27.91 kg/m        Gen: pleasant, NAD, significant other Falguni is present  HEENT: nl conj/sclera, EOMI, no oral ulcers, +hoarseness, trach in place, no nasal crusts  CV: RRR no M/G/R  Resp: CTAB  Abd: soft, ND, NT  Ext: no " edema  Skin: one circular patch 5 mm x 5 mm over upper chest (was told to be burn from radiation)  Neuro: grossly non focal  MSK: no active synovitis or joint tenderness, OA changes of hands             Data:   Recent laboratory data reviewed.    CT CAP 11/2020  IMPRESSION:  1.  Acute minimally displaced posterior left 9th rib fracture.     2.  Acute displaced lateral left 5th-8th rib fractures.     3.  No other fractures.     4.  Normal caliber thoracic and abdominal aorta without evidence for acute injury.     5.  No solid organ injury in the upper abdomen or significant free fluid throughout the abdomen or pelvis.     6.  No pneumothorax or pulmonary contusion or significant pleural fluid.     7.  In the extreme left lung apex there is a 9 x 7 mm nodule extending toward the level of the pleural surface. Recommend continued short interval follow-up in 2-3 months to assess for stability.        [Recommend Follow Up: Lung nodule]      CHEST CT (2/16/2013)    Chest: Multiple indeterminate pulmonary nodules. Several of the  larger lesions are cavitary. There are 3 dominant lesions, a 3.2 cm  cavitary lesion in the left upper lobe, a 3.4 cm solid mass in the  right lower lobe laterally and a 3.7 cm cavitary mass in the right  lower lobe medially. In addition there is mediastinal lymphadenopathy  with a dominant 3 cm subcarinal node. Bilateral axillary  lymphadenopathy with 2.4 cm node seen bilaterally. Chest otherwise  unremarkable.       Exam: High-resolution Chest CT without contrast 1/9/2015 12:42 PM.  History: Concern for ANCA vasculitis flare in the chest.  Comparison: 3/7/2014, 11/14/2013, 2/16/2013.  Technique: CT helical acquisition from the lung apices to the kidneys  was obtained without intravenous contrast. Both inspiratory and  expiratory images were obtained.    Findings:  Moderate atherosclerotic calcifications of the coronary arteries. Mild  calcifications involving the aorta and aortic great vessels.  Prominent  paratracheal lymph node on series 3 image 22 measuring 9 mm. Decreased  from 2/16/2013 and unchanged from 3/7/2014. Borderline enlarged right  hilar lymph node, unchanged from 3/7/2014 and decreased from  2/16/2013. Heart is not enlarged. Trace cardial effusion. No axillary  lymphadenopathy.  Nodular scarring in the left apex, unchanged from 2/16/2013. No  pleural effusion or pneumothorax. No evidence of intrapulmonary  infection. There is a cluster of tree in bud nodularity noted in the  anteromedial right upper lobe. These nodules appear new. Expiratory  images demonstrate mild air trapping. Scattered pulmonary nodules:  Series 6 image 146: Seen posteriorly in the right lower lobe, there is  a sub-pleural nodule measuring 1.6 x 1.2 cm with a spiculated border.  Previously, this nodule measured 2.0 x 1.6 cm.  Series 6 image 189: Seen laterally in the right lower lobe, there is a  former mid pulmonary nodule which is unchanged from 3/7/2013 and  decreased from 2/16/2013.  Series 6 image 169: Seen posterolaterally in the right lower lobe,  there is a 3 mm pulmonary nodule which is unchanged from 3/7/2014 and  decreased from 2/16/2013.  Series 6 image 225: Seen posterolaterally in the left lower lobe,  there is a 4 mm subpleural nodule which is unchanged from 2/16/2014.  Other scattered sub-4 mm pulmonary nodules appear stable from previous  study.  Evaluation of the upper abdomen is limited due to the lack of  intravenous contrast.  No suspicious bony lesions.    IMPRESSION  Impression:   1. Scattered pulmonary nodules enlarged lymph nodes are  stable/slightly decreased from the previous study. No evidence for  acute flare in patient's known ANCA-associated vasculitis.  2. New tree in bud nodularity seen anteromedially in the right middle  lobe. Findings could be seen in the setting of an atypical infectious  process.  3. Mild air trapping. Finding is nonspecific and is seen in setting of  small airways  "disease such as asthma or bronchiolitis.  I have personally reviewed the examination and initial interpretation  and I agree with the findings.  TOÑO PERKINS MD    Copath Report     Patient Name: ADONAY FAITH  MR#: [id number]  Specimen #: [id number]  Collected: 6/11/2015  Received: 6/11/2015  Reported: 6/12/2015 17:37  Ordering Phy(s): DANIELLE PARIS    SPECIMEN(S):  Nasal septum    FINAL DIAGNOSIS:  Nasal septum, biopsy:  -Squamous mucosa with ulcer, marked acute inflammation and reactive  changes  -No granulomas identified  -A special stain for fungi (GMS) is negative    I have personally reviewed all specimens and or slides, including the  listed special stains, and used them with my medical judgement to  determine the final diagnosis.    Electronically signed out by:    Dorys Barton M.D., [facility]    CLINICAL HISTORY:  The patient is a 70-year-old man with a history of left upper back  melanoma, resected in 2011 (see consult report [id number]). 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 He now presents for followup visit with nasal  cavity scabs, increased arthralgia and fatigue, concerning for  vasculitis flare. Operative procedure: Nasal septum biopsy.    GROSS:  The specimen is received in formalin with proper patient identification,  labeled \"septal tissue\". The specimen consists of three tan-pink tissue  fragments, 0.2 cm in greatest dimension, entirely submitted in cassette  1. (Dictated by: Ana Mixon 6/11/2015 03:39 PM)    MICROSCOPIC:  Microscopic examination is performed. A GMS stain, performed with  appropriate positive control, is negative.    CPT Codes:  A: 95444-HP6, 18213-TIY    TESTING LAB LOCATION:  [facility], [address] [place] [address] [phone]    [facility]CTION SITE[facility]  [facility]  [facility]     Component      Latest Ref Rng & Units 3/5/2021   WBC      4.0 - 11.0 10e9/L 6.9   RBC Count      4.4 - 5.9 10e12/L 4.18 (L)   Hemoglobin      13.3 - 17.7 g/dL 13.8   Hematocrit      40.0 - 53.0 % 41.4   MCV      78 - 100 fl 99   MCH      26.5 - 33.0 pg 33.0   MCHC      31.5 - 36.5 g/dL 33.3   RDW      10.0 - 15.0 % 12.5   Platelet Count      150 - 450 10e9/L 185   % Neutrophils      % 63.6   % Lymphocytes      % 21.6   % Monocytes      % 10.8   % Eosinophils      % 3.4   % Basophils      % 0.6   Absolute Neutrophil      1.6 - 8.3 10e9/L 4.4   Absolute Lymphocytes      0.8 - 5.3 10e9/L 1.5   Absolute Monocytes      0.0 - 1.3 10e9/L 0.7   Absolute Eosinophils      0.0 - 0.7 10e9/L 0.2   Absolute Basophils      0.0 - 0.2 10e9/L 0.0   Diff Method       Automated Method   Color Urine       Yellow   Appearance Urine       Clear   Glucose Urine      NEG:Negative mg/dL Negative   Bilirubin Urine      NEG:Negative Negative   Ketones Urine      NEG:Negative mg/dL Negative   Specific Gravity Urine      1.003 - 1.035 1.020   pH Urine      5.0 - 7.0 pH 5.5   Protein Albumin Urine      NEG:Negative mg/dL Negative   Urobilinogen Urine      0.2 - 1.0 EU/dL 0.2   Nitrite Urine      NEG:Negative Negative   Blood Urine      NEG:Negative Negative   Leukocyte Esterase Urine      NEG:Negative Negative   Source       Midstream Urine   WBC Urine      OTO5:0 - 5 /HPF 0 - 5   RBC Urine      OTO2:O - 2 /HPF O - 2   Squamous Epithelial /LPF Urine      FEW:Few /LPF Few   Bacteria Urine      NEG:Negative /HPF Few (A)   IGG      610 - 1,616 mg/dL 981   IGA      84 - 499 mg/dL 85   IGM      35 - 242 mg/dL 28 (L)   Creatinine      0.66 - 1.25 mg/dL 1.48 (H)   GFR Estimate      >60 mL/min/1.73:m2 46 (L)   GFR Estimate If Black      >60 mL/min/1.73:m2 53 (L)   Neutrophil Cytoplasmic Antibody      <1:10  titer 1:20 (A)   Neutrophil Cytoplasmic Antibody Pattern       Cytoplasmic ANCA (c-ANCA) staining pattern observed and confirmed on formalin-fixed . . .   CD19 B Cells      6 - 27 % 20   Absolute CD19      107 - 698 cells/uL 352   Protein Random Urine      g/L 0.10   Protein Total Urine g/gr Creatinine      0 - 0.2 g/g Cr 0.13   Myeloperoxidase Antibody IgG      0.0 - 0.9 AI <0.2   Proteinase 3 Antibody IgG      0.0 - 0.9 AI 6.3 (H)   Sed Rate      0 - 20 mm/h 6   Creatinine Urine Random      mg/dL 82   CRP Inflammation      0.0 - 8.0 mg/L <2.9   Albumin      3.4 - 5.0 g/dL 4.0   ALT      0 - 70 U/L 30   AST      0 - 45 U/L 22   Creatinine Urine      mg/dL 82     Component      Latest Ref Rng & Units 1/7/2022   WBC      4.0 - 11.0 10e3/uL 6.2   RBC Count      4.40 - 5.90 10e6/uL 4.25 (L)   Hemoglobin      13.3 - 17.7 g/dL 14.0   Hematocrit      40.0 - 53.0 % 42.2   MCV      78 - 100 fL 99   MCH      26.5 - 33.0 pg 32.9   MCHC      31.5 - 36.5 g/dL 33.2   RDW      10.0 - 15.0 % 12.7   Platelet Count      150 - 450 10e3/uL 183   % Neutrophils      % 63   % Lymphocytes      % 22   % Monocytes      % 11   % Eosinophils      % 4   % Basophils      % 1   Absolute Neutrophils      1.6 - 8.3 10e3/uL 3.9   Absolute Lymphocytes      0.8 - 5.3 10e3/uL 1.4   Absolute Monocytes      0.0 - 1.3 10e3/uL 0.7   Absolute Eosinophils      0.0 - 0.7 10e3/uL 0.2   Absolute Basophils      0.0 - 0.2 10e3/uL 0.0   Color Urine      Colorless, Straw, Light Yellow, Yellow Yellow   Appearance Urine      Clear Clear   Glucose Urine      Negative mg/dL Negative   Bilirubin Urine      Negative Negative   Ketones Urine      Negative mg/dL Negative   Specific Gravity Urine      1.003 - 1.035 <=1.005   Blood Urine      Negative Negative   pH Urine      5.0 - 7.0 6.0   Protein Albumin Urine      Negative mg/dL Negative   Urobilinogen Urine      0.2, 1.0 E.U./dL 0.2   Nitrite Urine      Negative Negative   Leukocyte Esterase Urine      Negative  Negative   MPO Anju IgG Instrument Value      <3.5 U/mL 0.3   Myeloperoxidase Antibody IgG      Negative Negative   Proteinase 3 Anju IgG Instrument Value      <2.0 U/mL 36.0 (H)   Proteinase 3 Antibody IgG      Negative Positive (A)   IGG      610-1,616 mg/dL 1,060   IGA      84 - 499 mg/dL 80 (L)   IGM      35 - 242 mg/dL 32 (L)   Protein Random Urine      g/L <0.05   Protein Total Urine g/gr Creatinine          Creatinine Urine      mg/dL 35   Neutrophil Cytoplasmic Antibody      <1:10 1:10 (H)   Neutrophil Cytoplasmic Antibody Pattern       Cytoplasmic ANCA (c-ANCA) staining pattern observed and confirmed on formalin-fixed neutrophils.   CD19 B Cells      6 - 27 % 23   Absolute CD19      107 - 698 cells/uL 336   Creatinine      0.66 - 1.25 mg/dL 1.53 (H)   GFR Estimate      >60 mL/min/1.73m2 47 (L)   RBC Urine      0-2 /HPF /HPF None Seen   WBC Urine      0-5 /HPF /HPF None Seen   Sed Rate      0 - 20 mm/hr 5   CRP Inflammation      0.0 - 8.0 mg/L <2.9   Albumin      3.4 - 5.0 g/dL 3.6   ALT      0 - 70 U/L 26   AST      0 - 45 U/L 20      [facility] Reference Range & Units 03/10/23 14:29   Creatinine 0.67 - 1.17 mg/dL 1.45 (H)   GFR Estimate >60 mL/min/1.73m2 50 (L)   Albumin 3.5 - 5.2 g/dL 4.4   ALT 10 - 50 U/L 18   AST 10 - 50 U/L 25   CRP Inflammation <5.00 mg/L <3.00   Creatinine Urine mg/dL 118.5   WBC 4.0 - 11.0 10e3/uL 7.1   Hemoglobin 13.3 - 17.7 g/dL 13.8   Hematocrit 40.0 - 53.0 % 41.7   Platelet Count 150 - 450 10e3/uL 191   RBC Count 4.40 - 5.90 10e6/uL 4.25 (L)   MCV 78 - 100 fL 98   MCH 26.5 - 33.0 pg 32.5   MCHC 31.5 - 36.5 g/dL 33.1   RDW 10.0 - 15.0 % 12.4   % Neutrophils % 67   % Lymphocytes % 20   % Monocytes % 9   % Eosinophils % 4   % Basophils % 1   Absolute Basophils 0.0 - 0.2 10e3/uL 0.1   Absolute Eosinophils 0.0 - 0.7 10e3/uL 0.3   Absolute Lymphocytes 0.8 - 5.3 10e3/uL 1.4   Absolute Monocytes 0.0 - 1.3 10e3/uL 0.6   Absolute Neutrophils 1.6 - 8.3 10e3/uL 4.8   Sed Rate 0 - 20 mm/hr 7    Color Urine Colorless, Straw, Light Yellow, Yellow  Yellow   Appearance Urine Clear  Clear   Glucose Urine Negative mg/dL Negative   Bilirubin Urine Negative  Negative   Ketones Urine Negative mg/dL Negative   Specific Gravity Urine 1.003 - 1.035  1.025   pH Urine 5.0 - 7.0  6.0   Protein Albumin Urine Negative mg/dL Negative   Urobilinogen Urine 0.2, 1.0 E.U./dL 0.2   Nitrite Urine Negative  Negative   Blood Urine Negative  Negative   Leukocyte Esterase Urine Negative  Negative   WBC Urine 0-5 /HPF /HPF 0-5   RBC Urine 0-2 /HPF /HPF 0-2   Bacteria Urine None Seen /HPF Few !   Squamous Epithelial /LPF Urine None Seen /LPF Few !   Myeloperoxidase Antibody IgG Negative  Negative   Neutrophil Cytoplasmic Antibody <1:10  1:20 (H)   Neutrophil Cytoplasmic Antibody Pattern  Cytoplasmic ANCA (c-ANCA) staining pattern observed and confirmed on formalin-fixed neutrophils.   Proteinase 3 Antibody IgG Negative  Positive !   (H): Data is abnormally high  (L): Data is abnormally low  !: Data is abnormal      Component      Latest Ref [facility] 11/20/2024  4:13 PM   WBC      4.0 - 11.0 10e3/uL 5.6    RBC Count      4.40 - 5.90 10e6/uL 4.26 (L)    Hemoglobin      13.3 - 17.7 g/dL 12.7 (L)    Hematocrit      40.0 - 53.0 % 39.9 (L)    MCV      78 - 100 fL 94    MCH      26.5 - 33.0 pg 29.8    MCHC      31.5 - 36.5 g/dL 31.8    RDW      10.0 - 15.0 % 12.0    Platelet Count      150 - 450 10e3/uL 219    % Neutrophils      % 63    % Lymphocytes      % 22    % Monocytes      % 11    % Eosinophils      % 3    % Basophils      % 1    % Immature Granulocytes      % 0    Absolute Neutrophils      1.6 - 8.3 10e3/uL 3.5    Absolute Lymphocytes      0.8 - 5.3 10e3/uL 1.2    Absolute Monocytes      0.0 - 1.3 10e3/uL 0.6    Absolute Eosinophils      0.0 - 0.7 10e3/uL 0.2    Absolute Basophils      0.0 - 0.2 10e3/uL 0.0    Absolute Immature Granulocytes      <=0.4 10e3/uL 0.0    Color Urine      Colorless, Straw, Light Yellow, Yellow  Yellow     Appearance Urine      Clear  Clear    Glucose Urine      Negative mg/dL Negative    Bilirubin Urine      Negative  Negative    Ketones Urine      Negative mg/dL Negative    Specific Gravity Urine      1.003 - 1.035  1.020    Blood Urine      Negative  Negative    pH Urine      5.0 - 7.0  6.0    Protein Albumin Urine      Negative mg/dL Negative    Urobilinogen Urine      0.2, 1.0 E.U./dL 0.2    Nitrite Urine      Negative  Negative    Leukocyte Esterase Urine      Negative  Negative    Bacteria Urine      None Seen /HPF None Seen    RBC Urine      0-2 /HPF /HPF None Seen    WBC Urine      0-5 /HPF /HPF 0-5    Squamous Epithelial /LPF Urine      None Seen /LPF None Seen    Mucus Urine      None Seen /LPF Present !    Calcium Oxalate      None Seen /HPF Few !    MPO Anju IgG Instrument Value      <3.5 U/mL 0.5    Myeloperoxidase Antibody IgG      Negative  Negative    Proteinase 3 Anju IgG Instrument Value      <2.0 U/mL 28.0 (H)    Proteinase 3 Antibody IgG      Negative  Positive !    IGG      610 - 1,616 mg/dL 1,132    IGA      84 - 499 mg/dL 162    IGM      35 - 242 mg/dL 73    CD19 B Cells      6 - 27 % 10    Absolute CD19      107 - 698 cells/uL 136    Total Protein Urine mg/dL        mg/dL 11.6    Total Protein Urine mg/mg Creat      0.00 - 0.20 mg/mg Cr 0.08    Creatinine Urine      mg/dL 147.7    Creatinine      0.67 - 1.17 mg/dL 1.33 (H)    GFR Estimate      >60 mL/min/1.73m2 55 (L)    Neutrophil Cytoplasmic Antibody      <=1:10  1:10    Neutrophil Cytoplasmic Antibody Pattern Cytoplasmic ANCA (c-ANCA) staining pattern observed and confirmed on formalin-fixed neutrophils.    AST      0 - 45 U/L 20    ALT      0 - 70 U/L 11    Albumin      3.5 - 5.2 g/dL 4.0    CRP Inflammation      <5.00 mg/L <3.00    Sed Rate      0 - 20 mm/hr 23 (H)       Legend:  (L) Low  ! Abnormal  (H) High    Again, thank you for allowing me to participate in the care of your patient.      Sincerely,    Ede Sanchez MD

## 2025-06-30 ENCOUNTER — TELEPHONE (OUTPATIENT)
Dept: RHEUMATOLOGY | Facility: CLINIC | Age: 80
End: 2025-06-30
Payer: COMMERCIAL

## 2025-06-30 NOTE — TELEPHONE ENCOUNTER
Called and spoke with KELVIN Montes De Oca. Relayed that Joshua is NOT to restart methotrexate until we hear further from ENT/onc, this may take some time but we will update them. She is understanding and in agreement.       Jessica SANCHEZ RN  [facility]

## 2025-06-30 NOTE — TELEPHONE ENCOUNTER
----- Message from Ede Sanchez sent at 6/30/2025  2:22 PM CDT -----  Yes!    Jessica, would you please let Falguni know? To hold off resuming methotrexate till oncology approves resuming?     Thank you  ----- Message -----  From: Josephine Malone MD  Sent: 6/30/2025  12:01 PM CDT  To: Swetha Devine RN; Thang Zheng RN; Konradi#    Hi Dr Sanchez  Let me add him to tumor board - we are off this Friday for the holiday but the following Friday. I dont think anyone from oncology is working with him since he did not have chemotherapy but we do have oncology representation at tumor board. Would it be ok to wait till the following Friday to give you a reply?  Josephine Posada,  Could you kindly add Joshua to the tumor board list?  Josephine  ----- Message -----  From: Ede Sanchez MD  Sent: 6/30/2025  12:44 AM CDT  To: Thang Zheng, CAMILLE; Jluis Kim MD; R#    Hi Dr. Malone,    Good to see he is cancer free, I want to resume low dose methotrexate 4 tabs qwk to prevent GPA flare (ANCA markers are slightly up trending), ok from oncology standpoint? I do not know who from oncology is following but I see the plan is repeat CT every 6 mo x 2 years. This is such low dose and I think it would be ok resuming.

## 2025-06-30 NOTE — PROGRESS NOTES
[facility] Follow-Up In Person Visit Note    Date of visit: 6/27/2025  Last visit date: 12/12/2024    Joshua Ennis MRN# [id number]   Age: 80 year old    Reason for visit: GPA, high risk med use monitoring   YOB: 1945          Assessment and Plan:     Assessment:   Mr. Ennis is a 79 yr old  male with history of melanoma s/p resection in 11/2011, former tobacco use, and GPA (PR3+, MPO and c-ANCA negative in 3/2013 based on aforementioned labs, confirmed by kidney and lung biopsy) who presents for clinic follow-up regarding GPA.  He was initiated on cyclophosphamide 150 mg QD and high-dose prednisone 3/2013. He last took prednisone in 10/2013. Cytoxan was switched to MTX for maintenance treatment in 10/2013.    7/6/2023:  Vasculitis seems to be stable and he has no signs or symptoms of flare up today, though PR3 remains persistently positive. New upper airway symptoms, while more likely due to non-vasculitis inflammation, GERD, vocal cord dysfunction, could represent vasculitis in tracheal wall, which can occur in absence of elevated inflammatory markers. Also, former smoker and with immunosuppression status, need to make sure there is no SCC. Will refer for ENT evaluation and also acquire high-resolution non-contrast CT to follow nodule in his lung incidentally identified in 2020. Otherwise continue with current plan of low dose methotrexate 10mg (4 tabs) weekly and Q3 mo labs. Was advised to call in case of vasculitis flare up sx.        Plan 7/6/2023:    - Continue MTX 10 mg (4 tabs) weekly     > Labs q3m      > Hold methotrexate if develops an infection  - Continue with Folic acid 1 mg po   - Referral to ENT for new onset persistent voice changes/hoarseness and continued cough/phlegm (2017 note indicated laryngoscopy would be next step if he developed persistent hoarseness)  - High-resolution CT chest w/o contrast for follow up of pulm nodule  - Repeat labs in 3  mo    Return in 1y (in person)               7/11/2024:    Since last visit, was diagnosed with laryngeal SCC, will have another procedure on 7/17 with awake intubation, unchanged horseness, no other complaints. Asked me about awake intubation process, I advised him to ask surgical team to review/explain to him, I will message his ENT team. 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 Advised good hydration. He is on amoxicillin since 7/3 x 2 wk course to prevent surgical infection, due to drug drug interaction and possible rise in methotrexate level and low GFR, advised him tos kip next dose of methotrexate with follow up labs on Mon. If he needs further cancer tx, will hold methotrexate. He could benefit from MTM referral to check on meds, drug-drug interaction in future, referral was placed.    Plan:    Skip the methotrexate this coming Sunday 7/14, resume Sunday 7/21     Continue with folic acid    Labs on Monday    Good hydration    Refer to [facility] pharmacist    Return in 6 months (in person)        12/12/2024:      New Dx of laryngeal SCC s/p surgery, radiation, now has a trach in place. ENT follow up today, upcoming PET CT 12 wk post radiation. Will continue to hold methotrexate and monitor for vasculitis flare up sx. Labs every 3 mo, so far stable off methotrexate. Last checked 11/2024. Will continue with gabapentin 400 mg at bedtime for chronic neuropathy.    Plan:    Labs every 3 months, due 2/2025    Continue to hold methotrexate and folic acid    Return in person in 6 months      Today 6/27/2025:    Overall stable GPA off methotrexate. Reviewed and discussed labs with Daryl, vasculitis markers are slightly up; otherwise stable lab, today's CT neg for cancer recurrence , good news.discussed resuming low dose methotrexate if ok with oncology to prevent GPA flare ups. He dose close  follow up with ENT, derm (h/o skin cancers, last from radiation, also h/o melanoma).    Plan:    Go back on methotrexate 4 tabs a week+ daily folic acid 1 mg a day if ok with cancer doctor agrees    Labs in 9/2025 then every 3 months    Return in 6 months in person      TT 30 min was spent on date of the encounter doing chart review, history and exam, documentation and further activities as noted above. Any prior notes, outside records, laboratory results, and imaging studies were reviewed if relevant.      The longitudinal plan of care for the diagnosis(es)/condition(s) as documented were addressed during this visit. Due to the added complexity in care, I will continue to support Joshua in the subsequent management and with ongoing continuity of care.    Orders Placed This Encounter   Procedures    AST    ALT    Myeloperoxidase and Proteinase 3 Panel    Albumin level    Creatinine    CRP inflammation    UA with Microscopic reflex to Culture    Protein  random urine    Creatinine random urine    Erythrocyte sedimentation rate auto    CD19 B Cell Count    ANCA IgG by IFA with Reflex to Titer    Immunoglobulins A G and M    CBC with Platelets & Differential        Ede Sanchez MD           HPI / Interim History:      Mr. Ennis is a 78 yo WM with h/o melanoma s/p resection in 11/2011, former tobacco use, and GPA diagnosed in 3/2013. He presents for follow up.    Had a hospitalization 11/2-11/4/2020 with a fall with displaced left 5th-9th rib fractures, now recovered.    He has been tolerating it well. He had a flare in 12/2013 with increased crusts in his nose along with recurrence of arthralgia, worsening numbness in feet and increasing vasculitis marker. Renal function was stable with negative repeat chest CT. His vasculitis flare was treated with short course of prednisone taper 20 mg po qd max and increasing MTX to 8 tab = 20 mg qwk. Vasculitis symptoms improved.  At visit on 1/2015, he had scabs in his nose,  "had cold [place] symptoms and increased arthralgia/fatigue (shoulders, L hand). Labs from 12/16 showed increased Cr level and hematuria with rising PR3 (more than 8), there was a concern for vasculitis flare (in kidneys, sinuses), no flare on repeat chest CT 1/2015. Therefore it was recommended to try rituximab. Another reason was to be able to taper MTX off given abnormal Cr.  He is now s/p Rituximab infusion x 4 (1st on 2/25/2015). He is feeling well. No hematuria with stable Cr, no SOB. Saw ENT with negative nasal mucosa biopsy 6/2015 for granuloma but showed active inflammation, had left nostril lesion which decreased in size by use of mupirocin ointment. PR3 vasculitis marker went back to NL in 6/2015, it was NL in 10/2015, given stable vasculitis and low GFR, MTX from 8 to 7 tab po qwk. Repeat AR-3 in 3/2016 was slightly positive, Cr was slightly higher than baseline. We tapered his MTX further to 6 tab po qwk in 2/2016 given lack of symptoms. In 3/2017, MTX was reduced from 6 to 5 tab qwk. It was further tapered to 4 tab po qwk in 6/2017 given stable disease. No vasculitis flare ups by such change.     7/7/22:  Reports doing well overall. Denies any vasculitis symptoms. Denies any fever/chills, fatigue, headaches, congestion, nose bleeds, mouth sores, cough/hemoptysis, chest pain, SOB, hematuria, dysuria, or any RUQ pain. Still endorses neuropathy but it is stable. No other concerns    7/6/23:  Doing fairly well overall though has noted persistent bothersome hoarseness and voice changes over the past 3 mos. Associates this with \"stickier\" phlegm than usual with his chronic cough. Notes this cough has been productive of grey-white sputum and present for many years relatively unchanged. Also reports episode of poison oak ~ 3 wks ago, was given a short course of prednisone (notes indicate 40 mg every day x 5 days) by an urgent care. Has now resolved, and reports that during that prednisone course there was no change " to his hoarseness or phlegm. Otherwise denies weight loss, loss of energy, loss of appetite, dysgeusia, fevers, chills, headaches, congestion, nose bleeds, nose crusting, mouth sores, hemoptysis, rashes, sores, chest pain, dyspnea, hematuria, dysuria, nausea, diarrhea, RUQ pain, vision or hearing changes. No other concerns.       7/11/2024:    -no vasculitis flare up, no crusting in the nose    -Dx of laryngeal SCC since last visit, continues to have hoarseness, will have another ENT procedure on 7/17    -no weight loss, energy and appetite is good, in good spirit, physically active      12/12/2024:      -laryngeal SCC    Per ENT:    -finished radiation on 9/24  - had trach and MDL with biopsy on 10/17/24  - pathology negative for carcinoma      -doing well today, still has trach in place, will see DR. Malone for follow up this afternoon    -appetite is good, eating, gained weight    -good spirit, active with work    -no vasculitis flare off methotrexate    -no rash, nasal crust    -was given gabapentin at higher dose during radiation, gave him hand tremor, resolved after resuming 400 mg qhs      Today 6/27/2025:    Doing well, has gained weight, trach is out, no recurrence of cancer, no GPA flare. 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 Negative for high-grade dysplasia and malignancy.    Hoarseness     Hypertension     Idiopathic progressive polyneuropathy      Kidney problem     Laryngeal cancer (H)     Malignant melanoma (H)     Malignant neoplasm (H)     melanoma    Squamous cell carcinoma     Russo syndrome           Past Surgical History:     Past Surgical History:   Procedure Laterality Date    BIOPSY  11/2011    melanoma on back    DISSECT LYMPH NODE INGUINAL  3/1/2013    Procedure: DISSECT LYMPH NODE INGUINAL;  Bilateral Inguinal Lymph Node Biopsy.;  Surgeon: Tariq Acosta MD;  Location: [facility]    ESOPHAGOSCOPY, GASTROSCOPY, DUODENOSCOPY (EGD), COMBINED  7/5/2013    Procedure: COMBINED ESOPHAGOSCOPY, GASTROSCOPY, DUODENOSCOPY (EGD);  Gastroscopy;  Surgeon: Tariq Acosta MD;  Location: [facility]    HERNIORRHAPHY INGUINAL  8/6/2012    Procedure: HERNIORRHAPHY INGUINAL;  Open Repair Left Inguinal Hernia;  Surgeon: Jerad Baker MD;  Location: [facility]    INJECT STEROID (LOCATION) N/A 8/30/2023    Procedure: steroid injection;  Surgeon: Josephine Malone MD;  Location: [facility]    INJECT STEROID (LOCATION) N/A 9/13/2023    Procedure: steroid injection;  Surgeon: Josephine Malone MD;  [facility]    INJECT STEROID (LOCATION) N/A 10/25/2023    Procedure: steroid injection, stent placement, and biopsy;  Surgeon: Josephine Malone MD;  [facility]    INJECT STEROID (LOCATION) N/A 12/1/2023    Procedure: steroid injection;  Surgeon: Josephine Malone MD;  [facility]    LARYNGOSCOPY WITH MICROSCOPE N/A 9/13/2023    Procedure: MICROLARYNGOSCOPY with stent removal and biopsy;  Surgeon: Josephine Malone MD;  [facility]    LARYNGOSCOPY, FLEXIBLE WITH INJECTION N/A 12/11/2023    Procedure: LARYNGOSCOPY, FLEXIBLE WITH INJECTION with steroid;  Surgeon: Josephine Malone MD;  Location: [facility]    LASER CO2 LARYNGOSCOPY N/A 8/30/2023    Procedure: MICROLARYNGOSCOPY, CO2 laser resection of vocal fold lesion;  Surgeon: Josephine Malone MD;  [facility]    LASER CO2 LARYNGOSCOPY N/A 10/25/2023    Procedure: MICROLARYNGOSCOPY, CO2 laser resection of vocal fold lesion;  Surgeon:  Josephine Malone MD;  [facility]    LASER CO2 LARYNGOSCOPY N/A 2023    Procedure: MICROLARYNGOSCOPY CO2 laser standby, resection of vocal fold lesion, stent removal, biopsy;  Surgeon: Josephine Malone MD;  [facility]    LASER CO2 LARYNGOSCOPY, COMPLEX N/A 2024    Procedure: MICROLARYNGOSCOPY, CO2 laser standby, biopsy;  Surgeon: Josephine Malone MD;  Location[facility]          Social History:     Social History     Tobacco Use    Smoking status: Former     Current packs/day: 0.00     Average packs/day: 1 pack/day for 20.0 years (20.0 ttl pk-yrs)     Types: Cigarettes     Start date: 1993     Quit date: 2013     Years since quittin.3     Passive exposure: Past    Smokeless tobacco: Never   Substance Use Topics    Alcohol use: Not Currently     Comment: rare          Family History:     Family History   Problem Relation Age of Onset    Diabetes Mother     Hypertension Mother     Cancer Father         stomach    Heart Disease Father     Heart Disease Brother     Diabetes Sister     Diabetes Sister     Diabetes Sister     Heart Disease Brother     Cancer Sister     Glaucoma No family hx of     Macular Degeneration No family hx of           Immunizations:   Due for shingles vaccine, encouraged to seek this from his PCP.    PMHx, FHx, SHx were reviewed, unchanged.         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 50 tablet 0    calcipotriene (DOVONOX) 0.005 % external cream Apply topically 2 times daily In conjunction with fluorouracil cream in a 1:1 mixture. 60 g 2    calcium carbonate 600 mg-vitamin D 400 units  "(CALTRATE) 600-400 MG-UNIT per tablet Take 1 tablet by mouth 2 times daily.      cetirizine (ZYRTEC) 10 MG tablet Take 1 tablet by mouth daily.      Cholecalciferol (VITAMIN D) 1000 UNITS capsule Take 1 capsule by mouth daily.      ciprofloxacin-dexAMETHasone (CIPRODEX) 0.3-0.1 % otic suspension Instill 4 drops into trach twice daily 7.5 mL 1    fluorouracil (EFUDEX) 5 % external cream Apply topically 2 times daily In conjunction with calcipotriene cream in a 1:1 mixture. 40 g 2    folic acid (FOLVITE) 1 MG tablet Take 1 tablet (1 mg) by mouth daily. 90 tablet 3    gabapentin (NEURONTIN) 400 MG capsule Take 1 capsule (400 mg) by mouth at bedtime. 90 capsule 3    losartan (COZAAR) 50 MG tablet TAKE 1 TABLET BY MOUTH ONCE DAILY 90 tablet 0    methotrexate 2.5 MG tablet Take 4 tablets (10 mg) by mouth every 7 days. 60 tablet 1    mupirocin (BACTROBAN) 2 % external cream Apply topically 3 times daily. Apply to affected area three times daily before applying aquaphor 30 g 0    pantoprazole (PROTONIX) 40 MG EC tablet Take 1 tablet (40 mg) by mouth daily. 60 tablet 1    senna-docusate (SENOKOT-S/PERICOLACE) 8.6-50 MG tablet Take 1 tablet by mouth 2 times daily. 30 tablet 0    silver sulfADIAZINE (SILVADENE) 1 % external cream Apply topically 2 times daily. Apply to affected area 400 g 0    oxyCODONE (ROXICODONE) 5 MG tablet Take 0.5-1 tablets (2.5-5 mg) by mouth every 4 hours as needed for moderate to severe pain. (Patient not taking: Reported on 6/27/2025) 15 tablet 0     No current facility-administered medications for this visit.          Review of Systems:   A comprehensive ROS was done. Positives are per HPI.           Physical Exam:   /74 (BP Location: Right arm, Patient Position: Sitting)   Pulse 66   Ht 1.753 m (5' 9\")   Wt 85.7 kg (189 lb)   SpO2 99%   BMI 27.91 kg/m        Gen: pleasant, NAD, significant other Falguni is present  HEENT: nl conj/sclera, EOMI, no oral ulcers, improved hoarseness, no nasal " crusts  Neck; no cervical LAP  CV: RRR no M/G/R  Resp: CTAB  Abd: soft, ND, NT  Ext: no edema  Skin: no rash  Neuro: grossly non focal  Psych: nl affect  MSK: no active synovitis or joint tenderness, OA changes of hands             Data:   Recent laboratory data reviewed.    CT CAP 11/2020  IMPRESSION:  1.  Acute minimally displaced posterior left 9th rib fracture.     2.  Acute displaced lateral left 5th-8th rib fractures.     3.  No other fractures.     4.  Normal caliber thoracic and abdominal aorta without evidence for acute injury.     5.  No solid organ injury in the upper abdomen or significant free fluid throughout the abdomen or pelvis.     6.  No pneumothorax or pulmonary contusion or significant pleural fluid.     7.  In the extreme left lung apex there is a 9 x 7 mm nodule extending toward the level of the pleural surface. Recommend continued short interval follow-up in 2-3 months to assess for stability.        [Recommend Follow Up: Lung nodule]      CHEST CT (2/16/2013)    Chest: Multiple indeterminate pulmonary nodules. Several of the  larger lesions are cavitary. There are 3 dominant lesions, a 3.2 cm  cavitary lesion in the left upper lobe, a 3.4 cm solid mass in the  right lower lobe laterally and a 3.7 cm cavitary mass in the right  lower lobe medially. In addition there is mediastinal lymphadenopathy  with a dominant 3 cm subcarinal node. Bilateral axillary  lymphadenopathy with 2.4 cm node seen bilaterally. Chest otherwise  unremarkable.       Exam: High-resolution Chest CT without contrast 1/9/2015 12:42 PM.  History: Concern for ANCA vasculitis flare in the chest.  Comparison: 3/7/2014, 11/14/2013, 2/16/2013.  Technique: CT helical acquisition from the lung apices to the kidneys  was obtained without intravenous contrast. Both inspiratory and  expiratory images were obtained.    Findings:  Moderate atherosclerotic calcifications of the coronary arteries. Mild  calcifications involving the  aorta and aortic great vessels. Prominent  paratracheal lymph node on series 3 image 22 measuring 9 mm. Decreased  from 2/16/2013 and unchanged from 3/7/2014. Borderline enlarged right  hilar lymph node, unchanged from 3/7/2014 and decreased from  2/16/2013. Heart is not enlarged. Trace cardial effusion. No axillary  lymphadenopathy.  Nodular scarring in the left apex, unchanged from 2/16/2013. No  pleural effusion or pneumothorax. No evidence of intrapulmonary  infection. There is a cluster of tree in bud nodularity noted in the  anteromedial right upper lobe. These nodules appear new. Expiratory  images demonstrate mild air trapping. Scattered pulmonary nodules:  Series 6 image 146: Seen posteriorly in the right lower lobe, there is  a sub-pleural nodule measuring 1.6 x 1.2 cm with a spiculated border.  Previously, this nodule measured 2.0 x 1.6 cm.  Series 6 image 189: Seen laterally in the right lower lobe, there is a  former mid pulmonary nodule which is unchanged from 3/7/2013 and  decreased from 2/16/2013.  Series 6 image 169: Seen posterolaterally in the right lower lobe,  there is a 3 mm pulmonary nodule which is unchanged from 3/7/2014 and  decreased from 2/16/2013.  Series 6 image 225: Seen posterolaterally in the left lower lobe,  there is a 4 mm subpleural nodule which is unchanged from 2/16/2014.  Other scattered sub-4 mm pulmonary nodules appear stable from previous  study.  Evaluation of the upper abdomen is limited due to the lack of  intravenous contrast.  No suspicious bony lesions.    IMPRESSION  Impression:   1. Scattered pulmonary nodules enlarged lymph nodes are  stable/slightly decreased from the previous study. No evidence for  acute flare in patient's known ANCA-associated vasculitis.  2. New tree in bud nodularity seen anteromedially in the right middle  lobe. Findings could be seen in the setting of an atypical infectious  process.  3. Mild air trapping. Finding is nonspecific and is seen  "in setting of  small airways disease such as asthma or bronchiolitis.  I have personally reviewed the examination and initial interpretation  and I agree with the findings.  TOÑO PERKINS MD    Copath Report     Patient Name: ADONAY FAITH  MR#: [id number]  Specimen #: [id number]  Collected: 6/11/2015  Received: 6/11/2015  Reported: 6/12/2015 17:37  Ordering Phy(s): DANIELLE PARIS    SPECIMEN(S):  Nasal septum    FINAL DIAGNOSIS:  Nasal septum, biopsy:  -Squamous mucosa with ulcer, marked acute inflammation and reactive  changes  -No granulomas identified  -A special stain for fungi (GMS) is negative    I have personally reviewed all specimens and or slides, including the  listed special stains, and used them with my medical judgement to  determine the final diagnosis.    Electronically signed out by:    Dorys Barton M.D., [facility]    CLINICAL HISTORY:  The patient is a 70-year-old man with a history of left upper back  melanoma, resected in 2011 (see consult report [id number]). 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 He now presents for followup visit with nasal  cavity scabs, increased arthralgia and fatigue, concerning for  vasculitis flare. Operative procedure: Nasal septum biopsy.    GROSS:  The specimen is received in formalin with proper patient identification,  labeled \"septal tissue\". The specimen consists of three tan-pink tissue  fragments, 0.2 cm in greatest dimension, entirely submitted in cassette  1. (Dictated by: Ana Mixon 6/11/2015 03:39 PM)    MICROSCOPIC:  Microscopic examination is performed. A GMS stain, performed with  appropriate positive control, is negative.    CPT Codes:  A: 84623-WB8, [id number]    TESTING LAB LOCATION:  [facility] " [address] [place] [id number] [phone]    COLLECTION SITE:  Client: [facility] [place]  Location: [facility] (B)     Component      Latest Ref Rng & Units 3/5/2021   WBC      4.0 - 11.0 10e9/L 6.9   RBC Count      4.4 - 5.9 10e12/L 4.18 (L)   Hemoglobin      13.3 - 17.7 g/dL 13.8   Hematocrit      40.0 - 53.0 % 41.4   MCV      78 - 100 fl 99   MCH      26.5 - 33.0 pg 33.0   MCHC      31.5 - 36.5 g/dL 33.3   RDW      10.0 - 15.0 % 12.5   Platelet Count      150 - 450 10e9/L 185   % Neutrophils      % 63.6   % Lymphocytes      % 21.6   % Monocytes      % 10.8   % Eosinophils      % 3.4   % Basophils      % 0.6   Absolute Neutrophil      1.6 - 8.3 10e9/L 4.4   Absolute Lymphocytes      0.8 - 5.3 10e9/L 1.5   Absolute Monocytes      0.0 - 1.3 10e9/L 0.7   Absolute Eosinophils      0.0 - 0.7 10e9/L 0.2   Absolute Basophils      0.0 - 0.2 10e9/L 0.0   Diff Method       Automated Method   Color Urine       Yellow   Appearance Urine       Clear   Glucose Urine      NEG:Negative mg/dL Negative   Bilirubin Urine      NEG:Negative Negative   Ketones Urine      NEG:Negative mg/dL Negative   Specific Gravity Urine      1.003 - 1.035 1.020   pH Urine      5.0 - 7.0 pH 5.5   Protein Albumin Urine      NEG:Negative mg/dL Negative   Urobilinogen Urine      0.2 - 1.0 EU/dL 0.2   Nitrite Urine      NEG:Negative Negative   Blood Urine      NEG:Negative Negative   Leukocyte Esterase Urine      NEG:Negative Negative   Source       Midstream Urine   WBC Urine      OTO5:0 - 5 /HPF 0 - 5   RBC Urine      OTO2:O - 2 /HPF O - 2   Squamous Epithelial /LPF Urine      FEW:Few /LPF Few   Bacteria Urine      NEG:Negative /HPF Few (A)   IGG      610 - 1,616 mg/dL 981   IGA      84 - 499 mg/dL 85   IGM      35 - 242 mg/dL 28 (L)   Creatinine      0.66 - 1.25 mg/dL 1.48 (H)   GFR Estimate      >60 mL/min/1.73:m2 46 (L)   GFR Estimate If Black      >60 mL/min/1.73:m2 53 (L)   Neutrophil  Cytoplasmic Antibody      <1:10 titer 1:20 (A)   Neutrophil Cytoplasmic Antibody Pattern       Cytoplasmic ANCA (c-ANCA) staining pattern observed and confirmed on formalin-fixed . . .   CD19 B Cells      6 - 27 % 20   Absolute CD19      107 - 698 cells/uL 352   Protein Random Urine      g/L 0.10   Protein Total Urine g/gr Creatinine      0 - 0.2 g/g Cr 0.13   Myeloperoxidase Antibody IgG      0.0 - 0.9 AI <0.2   Proteinase 3 Antibody IgG      0.0 - 0.9 AI 6.3 (H)   Sed Rate      0 - 20 mm/h 6   Creatinine Urine Random      mg/dL 82   CRP Inflammation      0.0 - 8.0 mg/L <2.9   Albumin      3.4 - 5.0 g/dL 4.0   ALT      0 - 70 U/L 30   AST      0 - 45 U/L 22   Creatinine Urine      mg/dL 82     Component      Latest Ref Rng & Units 1/7/2022   WBC      4.0 - 11.0 10e3/uL 6.2   RBC Count      4.40 - 5.90 10e6/uL 4.25 (L)   Hemoglobin      13.3 - 17.7 g/dL 14.0   Hematocrit      40.0 - 53.0 % 42.2   MCV      78 - 100 fL 99   MCH      26.5 - 33.0 pg 32.9   MCHC      31.5 - 36.5 g/dL 33.2   RDW      10.0 - 15.0 % 12.7   Platelet Count      150 - 450 10e3/uL 183   % Neutrophils      % 63   % Lymphocytes      % 22   % Monocytes      % 11   % Eosinophils      % 4   % Basophils      % 1   Absolute Neutrophils      1.6 - 8.3 10e3/uL 3.9   Absolute Lymphocytes      0.8 - 5.3 10e3/uL 1.4   Absolute Monocytes      0.0 - 1.3 10e3/uL 0.7   Absolute Eosinophils      0.0 - 0.7 10e3/uL 0.2   Absolute Basophils      0.0 - 0.2 10e3/uL 0.0   Color Urine      Colorless, Straw, Light Yellow, Yellow Yellow   Appearance Urine      Clear Clear   Glucose Urine      Negative mg/dL Negative   Bilirubin Urine      Negative Negative   Ketones Urine      Negative mg/dL Negative   Specific Gravity Urine      1.003 - 1.035 <=1.005   Blood Urine      Negative Negative   pH Urine      5.0 - 7.0 6.0   Protein Albumin Urine      Negative mg/dL Negative   Urobilinogen Urine      0.2, 1.0 E.U./dL 0.2   Nitrite Urine      Negative Negative   Leukocyte  Esterase Urine      Negative Negative   MPO Anju IgG Instrument Value      <3.5 U/mL 0.3   Myeloperoxidase Antibody IgG      Negative Negative   Proteinase 3 Anju IgG Instrument Value      <2.0 U/mL 36.0 (H)   Proteinase 3 Antibody IgG      Negative Positive (A)   IGG      610-1,616 mg/dL 1,060   IGA      84 - 499 mg/dL 80 (L)   IGM      35 - 242 mg/dL 32 (L)   Protein Random Urine      g/L <0.05   Protein Total Urine g/gr Creatinine          Creatinine Urine      mg/dL 35   Neutrophil Cytoplasmic Antibody      <1:10 1:10 (H)   Neutrophil Cytoplasmic Antibody Pattern       Cytoplasmic ANCA (c-ANCA) staining pattern observed and confirmed on formalin-fixed neutrophils.   CD19 B Cells      6 - 27 % 23   Absolute CD19      107 - 698 cells/uL 336   Creatinine      0.66 - 1.25 mg/dL 1.53 (H)   GFR Estimate      >60 mL/min/1.73m2 47 (L)   RBC Urine      0-2 /HPF /HPF None Seen   WBC Urine      0-5 /HPF /HPF None Seen   Sed Rate      0 - 20 mm/hr 5   CRP Inflammation      0.0 - 8.0 mg/L <2.9   Albumin      3.4 - 5.0 g/dL 3.6   ALT      0 - 70 U/L 26   AST      0 - 45 U/L 20      [facility] Reference Range & Units 03/10/23 14:29   Creatinine 0.67 - 1.17 mg/dL 1.45 (H)   GFR Estimate >60 mL/min/1.73m2 50 (L)   Albumin 3.5 - 5.2 g/dL 4.4   ALT 10 - 50 U/L 18   AST 10 - 50 U/L 25   CRP Inflammation <5.00 mg/L <3.00   Creatinine Urine mg/dL 118.5   WBC 4.0 - 11.0 10e3/uL 7.1   Hemoglobin 13.3 - 17.7 g/dL 13.8   Hematocrit 40.0 - 53.0 % 41.7   Platelet Count 150 - 450 10e3/uL 191   RBC Count 4.40 - 5.90 10e6/uL 4.25 (L)   MCV 78 - 100 fL 98   MCH 26.5 - 33.0 pg 32.5   MCHC 31.5 - 36.5 g/dL 33.1   RDW 10.0 - 15.0 % 12.4   % Neutrophils % 67   % Lymphocytes % 20   % Monocytes % 9   % Eosinophils % 4   % Basophils % 1   Absolute Basophils 0.0 - 0.2 10e3/uL 0.1   Absolute Eosinophils 0.0 - 0.7 10e3/uL 0.3   Absolute Lymphocytes 0.8 - 5.3 10e3/uL 1.4   Absolute Monocytes 0.0 - 1.3 10e3/uL 0.6   Absolute Neutrophils 1.6 - 8.3 10e3/uL 4.8    Sed Rate 0 - 20 mm/hr 7   Color Urine Colorless, Straw, Light Yellow, Yellow  Yellow   Appearance Urine Clear  Clear   Glucose Urine Negative mg/dL Negative   Bilirubin Urine Negative  Negative   Ketones Urine Negative mg/dL Negative   Specific Gravity Urine 1.003 - 1.035  1.025   pH Urine 5.0 - 7.0  6.0   Protein Albumin Urine Negative mg/dL Negative   Urobilinogen Urine 0.2, 1.0 E.U./dL 0.2   Nitrite Urine Negative  Negative   Blood Urine Negative  Negative   Leukocyte Esterase Urine Negative  Negative   WBC Urine 0-5 /HPF /HPF 0-5   RBC Urine 0-2 /HPF /HPF 0-2   Bacteria Urine None Seen /HPF Few !   Squamous Epithelial /LPF Urine None Seen /LPF Few !   Myeloperoxidase Antibody IgG Negative  Negative   Neutrophil Cytoplasmic Antibody <1:10  1:20 (H)   Neutrophil Cytoplasmic Antibody Pattern  Cytoplasmic ANCA (c-ANCA) staining pattern observed and confirmed on formalin-fixed neutrophils.   Proteinase 3 Antibody IgG Negative  Positive !   (H): Data is abnormally high  (L): Data is abnormally low  !: Data is abnormal      Component      Latest Ref [facility] 11/20/2024  4:13 PM   WBC      4.0 - 11.0 10e3/uL 5.6    RBC Count      4.40 - 5.90 10e6/uL 4.26 (L)    Hemoglobin      13.3 - 17.7 g/dL 12.7 (L)    Hematocrit      40.0 - 53.0 % 39.9 (L)    MCV      78 - 100 fL 94    MCH      26.5 - 33.0 pg 29.8    MCHC      31.5 - 36.5 g/dL 31.8    RDW      10.0 - 15.0 % 12.0    Platelet Count      150 - 450 10e3/uL 219    % Neutrophils      % 63    % Lymphocytes      % 22    % Monocytes      % 11    % Eosinophils      % 3    % Basophils      % 1    % Immature Granulocytes      % 0    Absolute Neutrophils      1.6 - 8.3 10e3/uL 3.5    Absolute Lymphocytes      0.8 - 5.3 10e3/uL 1.2    Absolute Monocytes      0.0 - 1.3 10e3/uL 0.6    Absolute Eosinophils      0.0 - 0.7 10e3/uL 0.2    Absolute Basophils      0.0 - 0.2 10e3/uL 0.0    Absolute Immature Granulocytes      <=0.4 10e3/uL 0.0    Color Urine      Colorless, Straw, Light  Yellow, Yellow  Yellow    Appearance Urine      Clear  Clear    Glucose Urine      Negative mg/dL Negative    Bilirubin Urine      Negative  Negative    Ketones Urine      Negative mg/dL Negative    Specific Gravity Urine      1.003 - 1.035  1.020    Blood Urine      Negative  Negative    pH Urine      5.0 - 7.0  6.0    Protein Albumin Urine      Negative mg/dL Negative    Urobilinogen Urine      0.2, 1.0 E.U./dL 0.2    Nitrite Urine      Negative  Negative    Leukocyte Esterase Urine      Negative  Negative    Bacteria Urine      None Seen /HPF None Seen    RBC Urine      0-2 /HPF /HPF None Seen    WBC Urine      0-5 /HPF /HPF 0-5    Squamous Epithelial /LPF Urine      None Seen /LPF None Seen    Mucus Urine      None Seen /LPF Present !    Calcium Oxalate      None Seen /HPF Few !    MPO Anju IgG Instrument Value      <3.5 U/mL 0.5    Myeloperoxidase Antibody IgG      Negative  Negative    Proteinase 3 Anju IgG Instrument Value      <2.0 U/mL 28.0 (H)    Proteinase 3 Antibody IgG      Negative  Positive !    IGG      610 - 1,616 mg/dL 1,132    IGA      84 - 499 mg/dL 162    IGM      35 - 242 mg/dL 73    CD19 B Cells      6 - 27 % 10    Absolute CD19      107 - 698 cells/uL 136    Total Protein Urine mg/dL        mg/dL 11.6    Total Protein Urine mg/mg Creat      0.00 - 0.20 mg/mg Cr 0.08    Creatinine Urine      mg/dL 147.7    Creatinine      0.67 - 1.17 mg/dL 1.33 (H)    GFR Estimate      >60 mL/min/1.73m2 55 (L)    Neutrophil Cytoplasmic Antibody      <=1:10  1:10    Neutrophil Cytoplasmic Antibody Pattern Cytoplasmic ANCA (c-ANCA) staining pattern observed and confirmed on formalin-fixed neutrophils.    AST      0 - 45 U/L 20    ALT      0 - 70 U/L 11    Albumin      3.5 - 5.2 g/dL 4.0    CRP Inflammation      <5.00 mg/L <3.00    Sed Rate      0 - 20 mm/hr 23 (H)       Legend:  (L) Low  ! Abnormal  (H) High    Component      Latest Ref [facility] 6/19/2025  4:17 PM   WBC      4.0 - 11.0 10e3/uL 4.0    RBC Count       4.40 - 5.90 10e6/uL 4.25 (L)    Hemoglobin      13.3 - 17.7 g/dL 13.6    Hematocrit      40.0 - 53.0 % 40.0    MCV      78 - 100 fL 94    MCH      26.5 - 33.0 pg 32.0    MCHC      31.5 - 36.5 g/dL 34.0    RDW      10.0 - 15.0 % 12.5    Platelet Count      150 - 450 10e3/uL 179    % Neutrophils      % 56    % Lymphocytes      % 27    % Monocytes      % 11    % Eosinophils      % 5    % Basophils      % 1    % Immature Granulocytes      % 0    Absolute Neutrophil      1.6 - 8.3 10e3/uL 2.2    Absolute Lymphocytes      0.8 - 5.3 10e3/uL 1.1    Absolute Monocytes      0.0 - 1.3 10e3/uL 0.4    Absolute Eosinophils      0.0 - 0.7 10e3/uL 0.2    Absolute Basophils      0.0 - 0.2 10e3/uL 0.0    Absolute Immature Granulocytes      <=0.4 10e3/uL 0.0    Color Urine      Colorless, Straw, Light Yellow, Yellow     Appearance Urine      Clear     Glucose Urine      Negative mg/dL    Bilirubin Urine      Negative     Ketones Urine      Negative mg/dL    Specific Gravity Urine      1.003 - 1.035     Blood Urine      Negative     pH Urine      5.0 - 7.0     Protein Albumin Urine      Negative mg/dL    Urobilinogen Urine      0.2, 1.0 E.U./dL    Nitrite Urine      Negative     Leukocyte Esterase Urine      Negative     MPO Anju IgG Instrument Value      <3.5 U/mL 0.4    Myeloperoxidase Antibody IgG      Negative  Negative    Proteinase 3 Anju IgG Instrument Value      <2.0 U/mL 35.0 (H)    Proteinase 3 Antibody IgG      Negative  Positive !    Bacteria Urine      None Seen /HPF    RBC Urine      0-2 /HPF /HPF    WBC Urine      0-5 /HPF /HPF    Squamous Epithelial /LPF Urine      None Seen /LPF    Total Protein Urine mg/dL        mg/dL    Total Protein Urine mg/mg Creat    Creatinine Urine      mg/dL 58.2    Creatinine      0.67 - 1.17 mg/dL 1.69 (H)    GFR Estimate      >60 mL/min/1.73m2 41 (L)    Neutrophil Cytoplasmic Antibody      <=1:10  1:20 (H)    Neutrophil Cytoplasmic Antibody Pattern Cytoplasmic ANCA (c-ANCA) staining pattern  observed and confirmed on formalin-fixed neutrophils.    AST      0 - 45 U/L 21    ALT      0 - 70 U/L 13    Albumin      3.5 - 5.2 g/dL 4.1    CRP Inflammation      <5.00 mg/L <3.00    Sed Rate      0 - 20 mm/hr 7      Component      Latest Ref Rng 6/19/2025  4:21 PM   WBC      4.0 - 11.0 10e3/uL    RBC Count      4.40 - 5.90 10e6/uL    Hemoglobin      13.3 - 17.7 g/dL    Hematocrit      40.0 - 53.0 %    MCV      78 - 100 fL    MCH      26.5 - 33.0 pg    MCHC      31.5 - 36.5 g/dL    RDW      10.0 - 15.0 %    Platelet Count      150 - 450 10e3/uL    % Neutrophils      %    % Lymphocytes      %    % Monocytes      %    % Eosinophils      %    % Basophils      %    % Immature Granulocytes      %    Absolute Neutrophil      1.6 - 8.3 10e3/uL    Absolute Lymphocytes      0.8 - 5.3 10e3/uL    Absolute Monocytes      0.0 - 1.3 10e3/uL    Absolute Eosinophils      0.0 - 0.7 10e3/uL    Absolute Basophils      0.0 - 0.2 10e3/uL    Absolute Immature Granulocytes      <=0.4 10e3/uL    Color Urine      Colorless, Straw, Light Yellow, Yellow  Yellow    Appearance Urine      Clear  Clear    Glucose Urine      Negative mg/dL Negative    Bilirubin Urine      Negative  Negative    Ketones Urine      Negative mg/dL Negative    Specific Gravity Urine      1.003 - 1.035  <=1.005    Blood Urine      Negative  Negative    pH Urine      5.0 - 7.0  6.0    Protein Albumin Urine      Negative mg/dL Negative    Urobilinogen Urine      0.2, 1.0 E.U./dL 0.2    Nitrite Urine      Negative  Negative    Leukocyte Esterase Urine      Negative  Negative    MPO Anju IgG Instrument Value      <3.5 U/mL    Myeloperoxidase Antibody IgG      Negative     Proteinase 3 Anju IgG Instrument Value      <2.0 U/mL    Proteinase 3 Antibody IgG      Negative     Bacteria Urine      None Seen /HPF Few !    RBC Urine      0-2 /HPF /HPF None Seen    WBC Urine      0-5 /HPF /HPF 0-5    Squamous Epithelial /LPF Urine      None Seen /LPF Few !    Total Protein Urine  mg/dL        mg/dL <6.0    Total Protein Urine mg/mg Creat --    Creatinine Urine      mg/dL 57.4    Creatinine      0.67 - 1.17 mg/dL    GFR Estimate      >60 mL/min/1.73m2    Neutrophil Cytoplasmic Antibody      <=1:10     Neutrophil Cytoplasmic Antibody Pattern    AST      0 - 45 U/L    ALT      0 - 70 U/L    Albumin      3.5 - 5.2 g/dL    CRP Inflammation      <5.00 mg/L    Sed Rate      0 - 20 mm/hr       Legend:  (L) Low  (H) High  ! Abnormal

## 2025-07-08 NOTE — TUMOR CONFERENCE
Head & Neck Tumor Conference Note   July 11, 2025  Status: Established    Staff: Dr. Malone     Tumor Site: Glottis  Tumor Pathology: SCC  Tumor Stage: T1N0  Tumor Treatment:   - 8/30/23: Transoral laser microsurgery with endoscopic right and left Type Vd cordectomy encompassing the subglottis, stent placement   - 9/13/23: Stent removal, biopsy  - 10/25/23: Transoral laser microsurgery with endoscopic right and left Type Vd cordectomy encompassing the subglottis, stent placement   - 12/1/23: MDL with stent removal, CO2 laser resection of vocal fold lesion, biopsy of anterior commissure, steroid injection    - 7/17/24: MDL biopsy demonstrating SCC  - 10/2024: Radiation, 63 Gy over 28 fractions     Reason for Review: Review imaging, path, and POC     Brief History: Joshua Ennis is a 79 year old male with history of wD4T1D3 SCC of the larynx s/p cordectomy 8/23, MDL w/ laser excision 10/23, re-resection 12/23, MLD w bx demonstrating SCC 7/24, and radiotherapy 63Gy over 28 fractures completed 10/2024. Patient presented to Dr. Malone's clinic 10/17/2024 with significant supraglottic/glottic edema and significant stridor in clinic and decision was made to proceed with urgent awake tracheostomy 10/17/2024.   He was discussed at tumor conference 1/2025 at which time PET/CT demonstrated no evidence of recurrence or residual and routine surveillance was elected. He has 6 month surveillance CT scans which we are reviewing today.  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 Negative for high-grade dysplasia and malignancy.    Hoarseness      Hypertension     Idiopathic progressive polyneuropathy     Kidney problem     Laryngeal cancer (H)     Malignant melanoma (H)     Malignant neoplasm (H)     melanoma    Squamous cell carcinoma     Russo syndrome       Smoking Hx:   Social History     Tobacco Use    Smoking status: Former     Current packs/day: 0.00     Average packs/day: 1 pack/day for 20.0 years (20.0 ttl pk-yrs)     Types: Cigarettes     Start date: 1993     Quit date: 2013     Years since quittin.4     Passive exposure: Past    Smokeless tobacco: Never   Vaping Use    Vaping status: Never Used   Substance Use Topics    Alcohol use: Not Currently     Comment: rare    Drug use: No     Imaging:   Results for orders placed in visit on 10/29/24    PET Oncology Whole Body    Narrative  Combined Report of: PET and CT on 2024 3:14 PM:    1. PET of the neck, chest, abdomen, and pelvis.  2. PET CT Fusion for Attenuation Correction and Anatomical  Localization.  3. Diagnostic CT of the chest, abdomen and pelvis with intravenous  contrast obtained for diagnostic interpretation.  4. 3D MIP and PET-CT fused images were processed on an independent  workstation and archived to PACS and reviewed by a radiologist.    Technique:    1. PET: The patient received 10.39 mCi of F-18-FDG. The serum glucose  was 88 mg/dL prior to administration. Body weight was 78 kg. Images  were evaluated in the axial, sagittal, and coronal planes as well as  the rotational whole body MIP. Images were acquired from cranial  vertex to feet.    UPTAKE WAS MEASURED AT 60 MINUTES.    2. CT: Volumetric acquisition for clinical interpretation of the  chest, abdomen, and pelvis acquired at 3 mm sections. The chest,  abdomen, and pelvis were evaluated at 5 mm sections in bone, soft  tissue, and lung windows. High resolution images of the neck were  obtained with multiple oblique projection reformats.    Contrast and Medications:  IV contrast: 105 mL of Isovue 370  intravenously.  PO contrast: 500 mL water  Additional Medications: None.    3. 3D MIP and PET-CT fused images were processed on an independent  workstation and archived to PACS and reviewed by a radiologist.    INDICATION: Laryngeal cancer (H).    ADDITIONAL INFORMATION OBTAINED FROM EMR: Patient is a 79-year-old  male with lesion of the anterior commissure as seen on scope 7/31/2023  with extension into the right vocal cord. Initial biopsy showed  squamous cell carcinoma. Microtrabecular endoscopy with bilateral  cordectomy was done 8/30/2023 with repeat 9/13/2023. Additional CO2  laser resection of vocal cord lesion 12/1/2023. Biopsy 7/17/2024  showed squamous cell carcinoma and radiation was begun.    COMPARISON: CT neck 10/15/2024, 6/27/2024    FINDINGS:    BACKGROUND: Liver SUV max = 3.4, Aorta Blood SUV max = 2.7.      HEAD/NECK:    Pharyngeal mucosal spaces: Nasopharynx and palatine tonsils are within  normal limits. Tongue base is normal. Oral tongue and buccal mucosa of  the oral cavity is normal. Tracheostomy in place. Increased FDG  avidity along the tracheostomy tract, inflammatory. Previously seen  thick mucosal and submucosal enhancement along the false and true  cords on 10/15/24 is markedly decreased. No suspicious uptake along  the cords. New bilateral AE fold non avid thickening is postradiation.  Mild increased FDG avidity within the cervical esophagus with a max  SUV of 6.8, likely inflammatory/ postradiation.    Sinonasal region: Paranasal sinuses are clear. No mass within the  nasal cavity.    Lymph nodes: No FDG avid or abnormally enlarged lymph nodes.    Salivary glands: The major salivary glands are within normal limits.    Thyroid gland: The thyroid gland is within normal limits.    Vascular structures: The major vasculature of the neck are patent.    Brain: No abnormal FDG avid lesion or abnormal enhancement.    Orbital cavities: No abnormal FDG avid lesion or abnormal  enhancement.      CHEST:    Lymph nodes: Prominent hypermetabolic left paratracheal lymph node  measuring 9 mm with a max SUV of 4.5 (3/262). Additional prominent non  hypermetabolic lymph nodes, for example a right paratracheal node  measuring 1.0 cm (3/259). 1.3 cm subcarinal node with trace uptake.    Lungs: Bibasilar atelectasis. Unchanged spiculated non avid nodularity  within the left lung apex measuring 8 x 6 mm, likely scarring. The  central tracheobronchial tree is clear. No acute consolidation.  No  pleural effusion or pneumothorax.    Heart and great vessels: Heart size is within normal limits. Mild  atherosclerotic calcifications of the coronary arteries and thoracic  aorta. Comment the brachiocephalic and left common carotid artery. No  pericardial effusion. The thoracic aorta and main pulmonary artery are  within normal limits. The esophagus is unremarkable.    Breasts: No abnormal uptake in the breasts.    ABDOMEN AND PELVIS:    Liver: No FDG avid lesion. No intrahepatic or extrahepatic biliary  ductal dilatation. Cholelithiasis without evidence of acute  cholecystitis.    Pancreas: The pancreas is within normal limits. No pancreatic ductal  dilatation.    Spleen: No FDG avid lesion.    Adrenal glands: No FDG avid foci.    Kidneys: No FDG avid lesion. No hydronephrosis. Left extrarenal pelvis  and parapelvic cysts. The urinary bladder is unremarkable.    Reproductive organs: Bilateral hydroceles..    Gastrointestinal system: Normal caliber of the small and large bowel.  Diverticulosis without evidence of acute diverticulitis. Normal  appendix.    Lymph nodes: No FDG avid or abnormally enlarged lymph nodes.    Vascular structures: Normal caliber of the abdominal aorta.    No free air, free fluid, or fluid collection.    EXTREMITIES:    No abnormal FDG uptake in the visualized extremities.    BONES AND SOFT TISSUES:    No abnormal FDG uptake in the skeleton. Chronic healed fracture of the  left  posterior ninth rib. No abnormal lytic or blastic osseous  lesions.    SPINAL CANAL:    No evident canal compromise. No abnormal FDG avid lesion.    Impression  Impression:  1. Primary: NI-RADS 1} Expected post-treatment changes in the neck  without evidence of recurrent disease at the primary site.  2. Neck: NI-RADS 1}  No abnormal lymph node.  3. Increased FDG uptake within the cervical esophageal mucosa, likely  inflammatory/ postradiation.  4. Few prominent mediastinal nodes with trace uptake, likely  inflammatory/ reactive. Attention on follow up is recommended.  5. No strong evidence of distant metastasis.    NI-RADS CECT Surveillance Legend:    Primary  1: No evidence of recurrence: routine surveillance  2: Low suspicion  a) Superficial abnormality (skin, mucosal surface): direct visual  inspection  b) Ill-defined deep abnormality: short interval follow-up* or PET  3: High suspicion (new or enlarging discrete nodule/mass): biopsy  4: Definitive recurrence (path proven or clinical progression): no  biopsy needed    Nodes  1: No evidence of recurrence: routine surveillance  2: Low suspicion (ill-defined): short interval follow-up or PET  3: High suspicion (new or enlarging lymph node): biopsy if clinically  needed  4: Definitive recurrence (path proven or clinical progression): no  biopsy needed    *short interval follow-up: 3 months at our institution    I have personally reviewed the examination and initial interpretation  and I agree with the findings.    SEE DODD MD      SYSTEM ID:  [id number]    Results for orders placed in visit on 06/27/25    CT Soft tissue neck w contrast    Narrative  EXAM: CT SOFT TISSUE NECK W CONTRAST  6/27/2025 1:21 PM    HISTORY:  Laryngeal cancer (H)    COMPARISON:  Same-day chest CT. Neck CT 12/20/2024.    TECHNIQUE: Following intravenous administration of nonionic iodinated  contrast medium, thin section helical CT images were obtained from the  skull base down to the level  of the aortic arch.  Axial, coronal and  sagittal reformations were performed with 2-3 mm slice thickness  reconstruction. Images were reviewed in soft tissue, lung and bone  windows.    CONTRAST: Isovue 370 88cc    FINDINGS:  Endotracheal tube has been removed. Mild hypopharyngeal and laryngeal  edema. Mild thickening of the platysma and subcutaneous fat stranding,  decreased since 12/20/2024. No nasopharyngeal or oropharyngeal mucosal  space abnormality. Normal tongue base. Salivary glands are within  normal limits.    No lymphadenopathy.    Normal thyroid gland.    Atherosclerotic calcification of the bilateral carotid bulbs and  proximal internal carotid arteries, right greater than left, with  associated stenosis.    No suspicious osseus lesion. Multilevel cervical spondylosis. No high  grade spinal canal stenosis.    Clear paranasal sinuses. Mild left greater than right mastoid  effusions, unchanged. Edentulous maxilla. The imaged skull base and  intracranial structures are within normal limits. Bilateral senescent  scleral calcifications.    Partially visualized nodularity in the superior segment of the right  lower lobe which appears less nodular on the same day chest CT.    Impression  IMPRESSION: Posttreatment changes related to laryngeal cancer. No  tumor recurrence. No cervical adenopathy.    ANDREA CHATTERJEE MD      SYSTEM ID:  [id number]    Results for orders placed in visit on 06/27/25    CT Chest w Contrast    Narrative  CT of the chest with contrast    HISTORY: Laryngeal cancer    COMPARISON STUDY: 12/20/2024.    FINDINGS: No mediastinal, hilar or axillary adenopathy. Moderate to  severe coronary calcifications. Main pulmonary artery and aorta are  not enlarged.    Biapical fibronodular abnormality unchanged. Interstitial lung  abnormality.    Fibronodular opacity in the posterolateral right lower lobe image 158  series 5 is unchanged.    3.7 mm subpleural nodule left lower lobe image 222 series 5  unchanged.    Evaluation of the upper abdomen is limited. Cholelithiasis.    Bones: Old bilateral rib fractures.    Impression  IMPRESSION: No definite evidence of metastatic disease in the chest.    TOÑO PERKINS MD      SYSTEM ID:  [id number]    Results for orders placed during the hospital encounter of 04/18/17    MR Brain w/o & w Contrast    Narrative  MRI BRAIN WITHOUT AND WITH CONTRAST  4/18/2017 2:40 PM    HISTORY: Skew deviation, evaluate for posterior fossa lesion.  Diplopia. Lost bottom half of eyesight eight months ago, then episode  of complete blindness lasting 30 seconds, then returned to normal.    TECHNIQUE: Multiplanar, multisequence MRI of the brain without and  with 7 mL IV Gadavist.    COMPARISON: 2/25/2013    FINDINGS: There is generalized atrophy of the brain. White matter  changes are present in the cerebral hemispheres that are consistent  with small vessel ischemic disease in this age patient. There is no  evidence of hemorrhage, mass, acute infarct, or anomaly. There are no  gadolinium enhancing lesions.    The facial structures appear normal. The arteries at the base of the  brain and the dural venous sinuses appear patent.    Impression  IMPRESSION:  1. No evidence of posterior fossa lesion.  2. Mild atrophy of the brain.  3. Mild white matter changes consistent with sequelae of small vessel  ischemic disease.      MICHEAL GAN MD    Pathology:   None    Tumor Board Recommendation:   Discussion: No suspected local recurrence clinically or on imaging. Specific question for oncology regarding appropriateness of restarting methotrexate in context of GPA.    Plan: Surveillance. Restart methotrexate at lowest acceptable dose    Sandeep Matt MD   Otolaryngology - Head and Neck Surgery Resident        Documentation / Disclaimer Cancer Tumor Board Note: Cancer tumor board recommendations do not override what is determined to be reasonable care and treatment, which is dependent on the  circumstances of a patient's case; the patient's medical, social, and personal concerns; and the clinical judgment of the oncologist [physician].

## 2025-07-11 ENCOUNTER — TUMOR CONFERENCE (OUTPATIENT)
Dept: ONCOLOGY | Facility: CLINIC | Age: 80
End: 2025-07-11
Payer: COMMERCIAL

## 2025-07-13 ENCOUNTER — HEALTH MAINTENANCE LETTER (OUTPATIENT)
Age: 80
End: 2025-07-13

## 2025-07-14 ENCOUNTER — DOCUMENTATION ONLY (OUTPATIENT)
Dept: OTOLARYNGOLOGY | Facility: CLINIC | Age: 80
End: 2025-07-14
Payer: COMMERCIAL

## 2025-07-14 NOTE — PROGRESS NOTES
Copy of Deisy's most recent 2 office notes mailed to patient at home address listed in demographics as requested by pts significant other.

## 2025-07-16 ENCOUNTER — TELEPHONE (OUTPATIENT)
Dept: OTOLARYNGOLOGY | Facility: CLINIC | Age: 80
End: 2025-07-16
Payer: COMMERCIAL

## 2025-07-16 NOTE — TELEPHONE ENCOUNTER
[facility] Call Center    Phone Message    May a detailed message be left on voicemail: yes     Reason for Call: Other: Patient was calling to check if they should still be taking the Panpoparzole? Please reach out if that is still supposed to be taken. Thank you.       Action Taken: Message routed to:  [facility] ENT    Travel Screening: Not Applicable     Date of Service:

## 2025-07-17 NOTE — TELEPHONE ENCOUNTER
Spoke to patients significant other Earlene and advised that per provider patient does not need to continue taking this medication. Earlene verbalized understanding of information and appreciative of call.

## 2025-07-23 ENCOUNTER — TELEPHONE (OUTPATIENT)
Dept: OTOLARYNGOLOGY | Facility: CLINIC | Age: 80
End: 2025-07-23
Payer: COMMERCIAL

## 2025-07-23 NOTE — TELEPHONE ENCOUNTER
Patient confirmed scheduled appointment:  Date: 10/24/2025  Time: 9:40AM  Visit type: NEW ENT  Provider: ANSELMO  Location[place] [facility] [place]

## 2025-07-24 NOTE — TELEPHONE ENCOUNTER
Reason for Visit: REFERRED BY-JOSEPHINE MALONE:  significant anterior glottic SCC, needed radiation, had a trach post radiation, is now decannulated and doing well. 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    Date of Appointment: 10/24/2025   Notes Status Description   Office Notes from Referring Provider [facility] ENT  7/11/2025 OV: Josephine Malone MD   7/11/2025, 1/10/2025 tumor conference   Office Notes from Other Specialists [facility] RADIATION ONCOLOGY   9/24/2024 OV: Ana Leiva, SALOMÓN   9/23/2024 OV: Carmenza Sneed MD    Speech & Language Pathology Notes [facility] ENT   9/7/2023 - 7/11/2025   Operative Reports [facility]  10/17/2024 Awake Tracheostomy, Direct Laryngoscopy with Biopsies     1/17/2024   MICROLARYNGOSCOPY, CO2 laser standby, biopsy    *additional in [facility]   Labs     Pathology Reports  (Head & Neck Tumor) [facility]  10/17/2024 Surgical Pathology Exam: [id number]   7/17/2024 Surgical Pathology Exam: [id number]   12/1/2023 Surgical Pathology Exam: [id number] *additional in [facility]   Imaging     CT [facility]/ PACS 6/27/2025 CT chest  6/27/2025 CT neck  12/20/2024 CT CAP   NM/IR/PET Epic/ PACS 12/20/2024 PET   X-Ray Epic/ PACS 8/22/2024 XR video swallow

## 2025-08-04 ENCOUNTER — TELEPHONE (OUTPATIENT)
Dept: RHEUMATOLOGY | Facility: CLINIC | Age: 80
End: 2025-08-04
Payer: COMMERCIAL

## 2025-08-19 DIAGNOSIS — C32.9 LARYNGEAL CANCER (H): ICD-10-CM

## 2025-08-19 RX ORDER — PANTOPRAZOLE SODIUM 40 MG/1
40 TABLET, DELAYED RELEASE ORAL DAILY
Qty: 90 TABLET | Refills: 1 | Status: SHIPPED | OUTPATIENT
Start: 2025-08-19

## 2025-08-25 DIAGNOSIS — I10 HYPERTENSION, UNSPECIFIED TYPE: ICD-10-CM

## 2025-08-25 DIAGNOSIS — G62.9 NEUROPATHY: ICD-10-CM

## 2025-08-25 RX ORDER — LOSARTAN POTASSIUM 50 MG/1
50 TABLET ORAL DAILY
Qty: 90 TABLET | Refills: 0 | OUTPATIENT
Start: 2025-08-25

## 2025-08-26 DIAGNOSIS — M31.30 GRANULOMATOSIS WITH POLYANGIITIS, UNSPECIFIED WHETHER RENAL INVOLVEMENT (H): ICD-10-CM

## 2025-08-26 RX ORDER — GABAPENTIN 400 MG/1
400 CAPSULE ORAL AT BEDTIME
Qty: 90 CAPSULE | Refills: 3 | Status: SHIPPED | OUTPATIENT
Start: 2025-08-26

## 2025-08-26 RX ORDER — LOSARTAN POTASSIUM 50 MG/1
50 TABLET ORAL DAILY
Qty: 90 TABLET | Refills: 0 | Status: SHIPPED | OUTPATIENT
Start: 2025-08-26

## 2025-10-24 ENCOUNTER — PRE VISIT (OUTPATIENT)
Dept: OTOLARYNGOLOGY | Facility: CLINIC | Age: 80
End: 2025-10-24

## (undated) DEVICE — DRSG TELFA 3X8" 1238

## (undated) DEVICE — LINEN TOWEL PACK X5 5464

## (undated) DEVICE — SU PROLENE 2-0 RB-1DA 36" 8559H

## (undated) DEVICE — SPONGE KITTNER 30-101

## (undated) DEVICE — PACK ENT ENDOSCOPY UMMC

## (undated) DEVICE — GLOVE BIOGEL PI MICRO SZ 6.5 48565

## (undated) DEVICE — GLOVE PROTEXIS POWDER FREE SMT 6.5  2D72PT65X

## (undated) DEVICE — GLOVE BIOGEL PI ULTRATOUCH SZ 6.5 41165

## (undated) DEVICE — JELLY LUBRICATING SURGILUBE 2OZ TUBE

## (undated) DEVICE — Device

## (undated) DEVICE — SU SILK 2-0 TIE 12X30" A305H

## (undated) DEVICE — SYR 03ML LL W/O NDL 309657

## (undated) DEVICE — CATH FOLYSIL 14FR 10ML AA6114

## (undated) DEVICE — KIT ENDO FIRST STEP DISINFECTANT 200ML W/POUCH EP-4

## (undated) DEVICE — SOL WATER IRRIG 1000ML BOTTLE 2F7114

## (undated) DEVICE — SUCTION MANIFOLD NEPTUNE 2 SYS 4 PORT 0702-020-000

## (undated) DEVICE — SPONGE RAY-TEC 4X8" 7318

## (undated) DEVICE — NDL BUTTERFLY 25GA .75" 367298

## (undated) DEVICE — SYR PISTON URETHRAL 60ML 68000

## (undated) DEVICE — ANTIFOG SOLUTION W/FOAM PAD CF-1002

## (undated) DEVICE — ENDO FORCEP ALLIGATOR JAW BIOPSY 2MMX100CM FB-211D

## (undated) DEVICE — ENDO VALVE SUCTION BRONCH EVIS MAJ-209

## (undated) DEVICE — TUBING SUCTION 10'X3/16" N510

## (undated) DEVICE — DRAPE MAYO STAND 23X54 8337

## (undated) DEVICE — NDL SCLEROTHERAPY 1.8MM 26GA 200CM M00518160

## (undated) DEVICE — SU PROLENE 0 CTX 30" 8454H

## (undated) DEVICE — ESU GROUND PAD ADULT W/CORD E7507

## (undated) DEVICE — NDL COUNTER 20CT 31142493

## (undated) DEVICE — SPONGE COTTONOID 1/2X3" 80-1407

## (undated) DEVICE — SYR 10ML SLIP TIP W/O NDL 303134

## (undated) DEVICE — RETR ELASTIC STAYS LONE STAR BLUNT DUAL LEAD 3550-1G

## (undated) DEVICE — ENDO VALVE BX EVIS MAJ-210

## (undated) DEVICE — TUBING SMOKE EVAC 2.2CMX3M SEA3715

## (undated) DEVICE — SOL NACL 0.9% IRRIG 1000ML BOTTLE 2F7124

## (undated) DEVICE — NDL 30GA 0.5" 305106

## (undated) DEVICE — TUBING SMOKE EVAC .635CMX3M SEA3705

## (undated) DEVICE — DRSG EYE PAD STERILE 1.63X2.63" NON21600

## (undated) DEVICE — TUBE ENDOTRACHEAL 8.0MMX34CM LASER CUFFED FLEXIBLE TG0050-S

## (undated) DEVICE — SUCTION TIP YANKAUER W/O VENT K86

## (undated) DEVICE — DRSG GAUZE 4X4" 3033

## (undated) DEVICE — NDL 18GAX1.5" 305185

## (undated) DEVICE — STRAP UNIVERSAL POSITIONING 2-PIECE 4X47X76" 91-287

## (undated) DEVICE — SPONGE COTTONOID TELFA 1/2"X3" 80-09

## (undated) DEVICE — SU VICRYL 3-0 SH 8X18" UND J864D

## (undated) DEVICE — ENDO TOOTH QUICK GUARD CONFORMING PROTECTION

## (undated) DEVICE — LINEN TOWEL PACK X6 WHITE 5487

## (undated) DEVICE — SOL WATER IRRIG 500ML BOTTLE 2F7113

## (undated) DEVICE — SYR 01ML 27GA 0.5" NDL TBC 309623

## (undated) DEVICE — ENDO TOOTH GUARD SAC2001

## (undated) DEVICE — ANTIFOG SOLUTION W/FOAM PAD 31142527

## (undated) DEVICE — SPONGE COTTONOID 1/2X3" 20-07S

## (undated) DEVICE — ENDO ADPT BRONCH SWIVEL Y A1002

## (undated) DEVICE — PACK NEURO MINOR UMMC SNE32MNMU4

## (undated) DEVICE — PEN MARKING SKIN W/LABELS 31145884

## (undated) DEVICE — CUP AND LID 2PK 2OZ STERILE  SSK9006A

## (undated) DEVICE — NDL 18GA 1.5" 305196

## (undated) DEVICE — NDL BUTTERFLY 23GA .75" 367297

## (undated) DEVICE — SU PROLENE 0 MO-6 30" 8418H

## (undated) DEVICE — SU VICRYL+ 3-0 27IN SH UND VCP416H

## (undated) DEVICE — SYR 05ML LL W/O NDL

## (undated) DEVICE — SU SILK 2-0 SH CR 8X18" C012D

## (undated) DEVICE — CATH INTERMITTENT CLEAN-CATH 10FR 16" VINYL LF 421710

## (undated) DEVICE — NDL BLUNT 18GA 1" W/O FILTER 305181

## (undated) DEVICE — BLADE KNIFE BEAVER SICKLE EDGE 5.0MM 377300

## (undated) DEVICE — CLIP HORIZON SM RED WIDE SLOT 001201

## (undated) RX ORDER — LABETALOL HYDROCHLORIDE 5 MG/ML
INJECTION, SOLUTION INTRAVENOUS
Status: DISPENSED
Start: 2023-10-25

## (undated) RX ORDER — FENTANYL CITRATE 50 UG/ML
INJECTION, SOLUTION INTRAMUSCULAR; INTRAVENOUS
Status: DISPENSED
Start: 2024-10-17

## (undated) RX ORDER — PROPOFOL 10 MG/ML
INJECTION, EMULSION INTRAVENOUS
Status: DISPENSED
Start: 2023-10-25

## (undated) RX ORDER — DEXAMETHASONE SODIUM PHOSPHATE 10 MG/ML
INJECTION, SOLUTION INTRAMUSCULAR; INTRAVENOUS
Status: DISPENSED
Start: 2023-08-30

## (undated) RX ORDER — DEXAMETHASONE SODIUM PHOSPHATE 4 MG/ML
INJECTION, SOLUTION INTRA-ARTICULAR; INTRALESIONAL; INTRAMUSCULAR; INTRAVENOUS; SOFT TISSUE
Status: DISPENSED
Start: 2023-09-13

## (undated) RX ORDER — TRIAMCINOLONE ACETONIDE 40 MG/ML
INJECTION, SUSPENSION INTRA-ARTICULAR; INTRAMUSCULAR
Status: DISPENSED
Start: 2024-01-18

## (undated) RX ORDER — ACETAMINOPHEN 325 MG/1
TABLET ORAL
Status: DISPENSED
Start: 2023-10-25

## (undated) RX ORDER — ACETAMINOPHEN 325 MG/1
TABLET ORAL
Status: DISPENSED
Start: 2023-12-01

## (undated) RX ORDER — LIDOCAINE HYDROCHLORIDE AND EPINEPHRINE 10; 10 MG/ML; UG/ML
INJECTION, SOLUTION INFILTRATION; PERINEURAL
Status: DISPENSED
Start: 2023-08-30

## (undated) RX ORDER — HYDROMORPHONE HYDROCHLORIDE 1 MG/ML
INJECTION, SOLUTION INTRAMUSCULAR; INTRAVENOUS; SUBCUTANEOUS
Status: DISPENSED
Start: 2023-10-25

## (undated) RX ORDER — ONDANSETRON 2 MG/ML
INJECTION INTRAMUSCULAR; INTRAVENOUS
Status: DISPENSED
Start: 2023-12-01

## (undated) RX ORDER — FENTANYL CITRATE 50 UG/ML
INJECTION, SOLUTION INTRAMUSCULAR; INTRAVENOUS
Status: DISPENSED
Start: 2024-07-17

## (undated) RX ORDER — PROPOFOL 10 MG/ML
INJECTION, EMULSION INTRAVENOUS
Status: DISPENSED
Start: 2023-09-13

## (undated) RX ORDER — DEXAMETHASONE SODIUM PHOSPHATE 4 MG/ML
INJECTION, SOLUTION INTRA-ARTICULAR; INTRALESIONAL; INTRAMUSCULAR; INTRAVENOUS; SOFT TISSUE
Status: DISPENSED
Start: 2024-10-17

## (undated) RX ORDER — LIDOCAINE HYDROCHLORIDE 10 MG/ML
INJECTION, SOLUTION EPIDURAL; INFILTRATION; INTRACAUDAL; PERINEURAL
Status: DISPENSED
Start: 2024-07-17

## (undated) RX ORDER — EPHEDRINE SULFATE 50 MG/ML
INJECTION, SOLUTION INTRAMUSCULAR; INTRAVENOUS; SUBCUTANEOUS
Status: DISPENSED
Start: 2023-10-25

## (undated) RX ORDER — LIDOCAINE HYDROCHLORIDE 40 MG/ML
INJECTION, SOLUTION RETROBULBAR
Status: DISPENSED
Start: 2024-10-17

## (undated) RX ORDER — PROPOFOL 10 MG/ML
INJECTION, EMULSION INTRAVENOUS
Status: DISPENSED
Start: 2024-07-17

## (undated) RX ORDER — FENTANYL CITRATE 50 UG/ML
INJECTION, SOLUTION INTRAMUSCULAR; INTRAVENOUS
Status: DISPENSED
Start: 2023-10-25

## (undated) RX ORDER — CEFAZOLIN SODIUM/WATER 2 G/20 ML
SYRINGE (ML) INTRAVENOUS
Status: DISPENSED
Start: 2024-07-17

## (undated) RX ORDER — FENTANYL CITRATE 50 UG/ML
INJECTION, SOLUTION INTRAMUSCULAR; INTRAVENOUS
Status: DISPENSED
Start: 2023-09-13

## (undated) RX ORDER — FENTANYL CITRATE-0.9 % NACL/PF 10 MCG/ML
PLASTIC BAG, INJECTION (ML) INTRAVENOUS
Status: DISPENSED
Start: 2024-10-17

## (undated) RX ORDER — PROPOFOL 10 MG/ML
INJECTION, EMULSION INTRAVENOUS
Status: DISPENSED
Start: 2024-10-17

## (undated) RX ORDER — ACETAMINOPHEN 325 MG/1
TABLET ORAL
Status: DISPENSED
Start: 2024-07-17

## (undated) RX ORDER — LIDOCAINE HYDROCHLORIDE 10 MG/ML
INJECTION, SOLUTION EPIDURAL; INFILTRATION; INTRACAUDAL; PERINEURAL
Status: DISPENSED
Start: 2018-04-13

## (undated) RX ORDER — FENTANYL CITRATE 50 UG/ML
INJECTION, SOLUTION INTRAMUSCULAR; INTRAVENOUS
Status: DISPENSED
Start: 2023-12-01

## (undated) RX ORDER — HYDROMORPHONE HYDROCHLORIDE 1 MG/ML
INJECTION, SOLUTION INTRAMUSCULAR; INTRAVENOUS; SUBCUTANEOUS
Status: DISPENSED
Start: 2023-08-30

## (undated) RX ORDER — TRIAMCINOLONE ACETONIDE 40 MG/ML
INJECTION, SUSPENSION INTRA-ARTICULAR; INTRAMUSCULAR
Status: DISPENSED
Start: 2023-10-25

## (undated) RX ORDER — LIDOCAINE HYDROCHLORIDE 40 MG/ML
SOLUTION TOPICAL
Status: DISPENSED
Start: 2024-01-04

## (undated) RX ORDER — DEXAMETHASONE SODIUM PHOSPHATE 4 MG/ML
INJECTION, SOLUTION INTRA-ARTICULAR; INTRALESIONAL; INTRAMUSCULAR; INTRAVENOUS; SOFT TISSUE
Status: DISPENSED
Start: 2023-08-30

## (undated) RX ORDER — HYDROMORPHONE HCL IN WATER/PF 6 MG/30 ML
PATIENT CONTROLLED ANALGESIA SYRINGE INTRAVENOUS
Status: DISPENSED
Start: 2023-08-30

## (undated) RX ORDER — DEXAMETHASONE SODIUM PHOSPHATE 4 MG/ML
INJECTION, SOLUTION INTRA-ARTICULAR; INTRALESIONAL; INTRAMUSCULAR; INTRAVENOUS; SOFT TISSUE
Status: DISPENSED
Start: 2023-12-01

## (undated) RX ORDER — OXYMETAZOLINE HYDROCHLORIDE 0.05 G/100ML
SPRAY NASAL
Status: DISPENSED
Start: 2023-12-01

## (undated) RX ORDER — OXYCODONE HYDROCHLORIDE 5 MG/1
TABLET ORAL
Status: DISPENSED
Start: 2023-08-30

## (undated) RX ORDER — OXYMETAZOLINE HYDROCHLORIDE 0.05 G/100ML
SPRAY NASAL
Status: DISPENSED
Start: 2023-12-11

## (undated) RX ORDER — DEXAMETHASONE SODIUM PHOSPHATE 10 MG/ML
INJECTION, SOLUTION INTRAMUSCULAR; INTRAVENOUS
Status: DISPENSED
Start: 2023-10-25

## (undated) RX ORDER — ONDANSETRON 2 MG/ML
INJECTION INTRAMUSCULAR; INTRAVENOUS
Status: DISPENSED
Start: 2023-09-13

## (undated) RX ORDER — LIDOCAINE HYDROCHLORIDE AND EPINEPHRINE 10; 10 MG/ML; UG/ML
INJECTION, SOLUTION INFILTRATION; PERINEURAL
Status: DISPENSED
Start: 2024-10-17

## (undated) RX ORDER — FENTANYL CITRATE 50 UG/ML
INJECTION, SOLUTION INTRAMUSCULAR; INTRAVENOUS
Status: DISPENSED
Start: 2023-08-30

## (undated) RX ORDER — DEXAMETHASONE SODIUM PHOSPHATE 4 MG/ML
INJECTION, SOLUTION INTRA-ARTICULAR; INTRALESIONAL; INTRAMUSCULAR; INTRAVENOUS; SOFT TISSUE
Status: DISPENSED
Start: 2024-01-04